# Patient Record
Sex: MALE | Race: BLACK OR AFRICAN AMERICAN | Employment: OTHER | URBAN - NONMETROPOLITAN AREA
[De-identification: names, ages, dates, MRNs, and addresses within clinical notes are randomized per-mention and may not be internally consistent; named-entity substitution may affect disease eponyms.]

---

## 2017-04-21 ENCOUNTER — OFFICE VISIT (OUTPATIENT)
Dept: ONCOLOGY | Age: 82
End: 2017-04-21

## 2017-04-21 VITALS
WEIGHT: 173.8 LBS | SYSTOLIC BLOOD PRESSURE: 84 MMHG | BODY MASS INDEX: 27.93 KG/M2 | OXYGEN SATURATION: 99 % | HEART RATE: 82 BPM | DIASTOLIC BLOOD PRESSURE: 50 MMHG | HEIGHT: 66 IN | RESPIRATION RATE: 18 BRPM | TEMPERATURE: 98.1 F

## 2017-04-21 DIAGNOSIS — N18.5 CHRONIC KIDNEY DISEASE (CKD), STAGE V (HCC): Primary | ICD-10-CM

## 2017-04-21 PROCEDURE — 1123F ACP DISCUSS/DSCN MKR DOCD: CPT | Performed by: INTERNAL MEDICINE

## 2017-04-21 PROCEDURE — 4040F PNEUMOC VAC/ADMIN/RCVD: CPT | Performed by: INTERNAL MEDICINE

## 2017-04-21 PROCEDURE — 1036F TOBACCO NON-USER: CPT | Performed by: INTERNAL MEDICINE

## 2017-04-21 PROCEDURE — G8599 NO ASA/ANTIPLAT THER USE RNG: HCPCS | Performed by: INTERNAL MEDICINE

## 2017-04-21 PROCEDURE — G8427 DOCREV CUR MEDS BY ELIG CLIN: HCPCS | Performed by: INTERNAL MEDICINE

## 2017-04-21 PROCEDURE — G8420 CALC BMI NORM PARAMETERS: HCPCS | Performed by: INTERNAL MEDICINE

## 2017-04-21 PROCEDURE — 99214 OFFICE O/P EST MOD 30 MIN: CPT | Performed by: INTERNAL MEDICINE

## 2017-08-23 ENCOUNTER — HOSPITAL ENCOUNTER (OUTPATIENT)
Age: 82
Setting detail: SPECIMEN
Discharge: HOME | End: 2017-08-23
Payer: MEDICARE

## 2017-08-23 LAB
ALBUMIN SERPL-MCNC: 3.8 G/DL (ref 3.5–5.1)
ALP BLD-CCNC: 49 U/L (ref 38–126)
ALT SERPL-CCNC: 7 U/L (ref 11–66)
ANION GAP SERPL CALCULATED.3IONS-SCNC: 15 MEQ/L (ref 8–16)
ANISOCYTOSIS: ABNORMAL
AST SERPL-CCNC: 13 U/L (ref 5–40)
BASOPHILIA: ABNORMAL
BASOPHILS # BLD: 0.8 %
BASOPHILS ABSOLUTE: 0 THOU/MM3 (ref 0–0.1)
BILIRUB SERPL-MCNC: 0.3 MG/DL (ref 0.3–1.2)
BILIRUBIN DIRECT: < 0.2 MG/DL (ref 0–0.3)
BUN BLDV-MCNC: 66 MG/DL (ref 7–22)
CALCIUM SERPL-MCNC: 8.8 MG/DL (ref 8.5–10.5)
CHLORIDE BLD-SCNC: 99 MEQ/L (ref 98–111)
CHOLESTEROL, TOTAL: 161 MG/DL (ref 100–199)
CO2: 22 MEQ/L (ref 23–33)
CREAT SERPL-MCNC: 9.7 MG/DL (ref 0.4–1.2)
EOSINOPHIL # BLD: 16.1 %
EOSINOPHILS ABSOLUTE: 0.6 THOU/MM3 (ref 0–0.4)
GFR SERPL CREATININE-BSD FRML MDRD: 6 ML/MIN/1.73M2
GLUCOSE BLD-MCNC: 137 MG/DL (ref 70–108)
HCT VFR BLD CALC: 29.9 % (ref 42–52)
HDLC SERPL-MCNC: 58 MG/DL
HEMOGLOBIN: 10.3 GM/DL (ref 14–18)
INR BLD: 1.01 (ref 0.85–1.13)
LDL CHOLESTEROL CALCULATED: 92 MG/DL
LYMPHOCYTES # BLD: 18.4 %
LYMPHOCYTES ABSOLUTE: 0.7 THOU/MM3 (ref 1–4.8)
MACROCYTES: ABNORMAL
MCH RBC QN AUTO: 34 PG (ref 27–31)
MCHC RBC AUTO-ENTMCNC: 34.3 GM/DL (ref 33–37)
MCV RBC AUTO: 99.1 FL (ref 80–94)
MONOCYTES # BLD: 7.1 %
MONOCYTES ABSOLUTE: 0.3 THOU/MM3 (ref 0.4–1.3)
NUCLEATED RED BLOOD CELLS: 0 /100 WBC
PDW BLD-RTO: 17.9 % (ref 11.5–14.5)
PLATELET # BLD: 94 THOU/MM3 (ref 130–400)
PLATELET ESTIMATE: ABNORMAL
PMV BLD AUTO: 8.8 MCM (ref 7.4–10.4)
POIKILOCYTES: SLIGHT
POTASSIUM SERPL-SCNC: 4.6 MEQ/L (ref 3.5–5.2)
RBC # BLD: 3.02 MILL/MM3 (ref 4.7–6.1)
RBC # BLD: ABNORMAL 10*6/UL
SCAN OF BLOOD SMEAR: NORMAL
SEG NEUTROPHILS: 57.6 %
SEGMENTED NEUTROPHILS ABSOLUTE COUNT: 2.1 THOU/MM3 (ref 1.8–7.7)
SODIUM BLD-SCNC: 136 MEQ/L (ref 135–145)
TARGET CELLS: ABNORMAL
TOTAL PROTEIN: 6.4 G/DL (ref 6.1–8)
TRIGL SERPL-MCNC: 57 MG/DL (ref 0–199)
WBC # BLD: 3.6 THOU/MM3 (ref 4.8–10.8)

## 2017-08-23 PROCEDURE — 85610 PROTHROMBIN TIME: CPT

## 2017-08-23 PROCEDURE — 80053 COMPREHEN METABOLIC PANEL: CPT

## 2017-08-23 PROCEDURE — 36415 COLL VENOUS BLD VENIPUNCTURE: CPT

## 2017-08-23 PROCEDURE — 80061 LIPID PANEL: CPT

## 2017-08-23 PROCEDURE — 82248 BILIRUBIN DIRECT: CPT

## 2017-08-23 PROCEDURE — 85025 COMPLETE CBC W/AUTO DIFF WBC: CPT

## 2017-08-24 ENCOUNTER — HOSPITAL ENCOUNTER (OUTPATIENT)
Age: 82
Discharge: HOME | End: 2017-08-24
Payer: MEDICARE

## 2017-08-24 ENCOUNTER — HOSPITAL ENCOUNTER (OUTPATIENT)
Dept: GENERAL RADIOLOGY | Age: 82
Discharge: HOME | End: 2017-08-24
Payer: MEDICARE

## 2017-08-24 DIAGNOSIS — R55 SYNCOPE, UNSPECIFIED SYNCOPE TYPE: ICD-10-CM

## 2017-08-24 PROCEDURE — 71020 XR CHEST STANDARD TWO VW: CPT

## 2017-08-30 LAB
BUN BLDV-MCNC: 65 MG/DL (ref 7–20)
BUN, POST DIALYSIS: 17 MG/DL (ref 7–20)
CREAT SERPL-MCNC: 9.3 MG/DL (ref 0.7–1.3)
CREATININE CLEARANCE: 7
HCT VFR BLD CALC: 29 % (ref 40–49)
HEMOGLOBIN: 9.7 GM/DL (ref 13.5–16.5)

## 2017-08-31 LAB
HBV SURFACE AB TITR SER: NONREACTIVE {TITER}
HCV AB: NONREACTIVE
HEPATITIS B SURFACE ANTIGEN: NONREACTIVE

## 2017-10-28 ENCOUNTER — APPOINTMENT (OUTPATIENT)
Dept: GENERAL RADIOLOGY | Age: 82
End: 2017-10-28
Payer: MEDICARE

## 2017-10-28 ENCOUNTER — APPOINTMENT (OUTPATIENT)
Dept: CT IMAGING | Age: 82
End: 2017-10-28
Payer: MEDICARE

## 2017-10-28 ENCOUNTER — HOSPITAL ENCOUNTER (EMERGENCY)
Age: 82
Discharge: HOME | End: 2017-10-28
Attending: FAMILY MEDICINE
Payer: MEDICARE

## 2017-10-28 VITALS
RESPIRATION RATE: 14 BRPM | DIASTOLIC BLOOD PRESSURE: 55 MMHG | OXYGEN SATURATION: 93 % | HEART RATE: 60 BPM | BODY MASS INDEX: 28.93 KG/M2 | HEIGHT: 66 IN | SYSTOLIC BLOOD PRESSURE: 123 MMHG | TEMPERATURE: 97.5 F | WEIGHT: 180 LBS

## 2017-10-28 DIAGNOSIS — D64.9 ANEMIA, UNSPECIFIED TYPE: ICD-10-CM

## 2017-10-28 DIAGNOSIS — R11.2 NON-INTRACTABLE VOMITING WITH NAUSEA, UNSPECIFIED VOMITING TYPE: Primary | ICD-10-CM

## 2017-10-28 DIAGNOSIS — K76.9 LIVER LESION: ICD-10-CM

## 2017-10-28 LAB
ALBUMIN SERPL-MCNC: 4 G/DL (ref 3.5–5.1)
ALLEN TEST: POSITIVE
ALP BLD-CCNC: 57 U/L (ref 38–126)
ALT SERPL-CCNC: 9 U/L (ref 11–66)
AMMONIA: 27 UMOL/L (ref 11–60)
AMORPHOUS: ABNORMAL
ANION GAP SERPL CALCULATED.3IONS-SCNC: 13 MEQ/L (ref 8–16)
ANISOCYTOSIS: ABNORMAL
AST SERPL-CCNC: 14 U/L (ref 5–40)
BACTERIA: ABNORMAL /HPF
BASE EXCESS (CALCULATED): 4.6 MMOL/L (ref -2.5–2.5)
BASOPHILS # BLD: 0.4 %
BASOPHILS ABSOLUTE: 0 THOU/MM3 (ref 0–0.1)
BILIRUB SERPL-MCNC: 0.2 MG/DL (ref 0.3–1.2)
BILIRUBIN DIRECT: < 0.2 MG/DL (ref 0–0.3)
BILIRUBIN URINE: NEGATIVE
BLOOD, URINE: NEGATIVE
BUN BLDV-MCNC: 43 MG/DL (ref 7–22)
CALCIUM SERPL-MCNC: 8.1 MG/DL (ref 8.5–10.5)
CASTS UA: ABNORMAL /LPF
CHARACTER, URINE: ABNORMAL
CHLORIDE BLD-SCNC: 95 MEQ/L (ref 98–111)
CO2: 28 MEQ/L (ref 23–33)
COLLECTED BY:: ABNORMAL
COLOR: YELLOW
CREAT SERPL-MCNC: 6.8 MG/DL (ref 0.4–1.2)
CRYSTALS, UA: ABNORMAL
DEVICE: ABNORMAL
EOSINOPHIL # BLD: 7.6 %
EOSINOPHILS ABSOLUTE: 0.6 THOU/MM3 (ref 0–0.4)
EPITHELIAL CELLS, UA: ABNORMAL /HPF
GLUCOSE BLD-MCNC: 92 MG/DL (ref 70–108)
GLUCOSE URINE: NEGATIVE MG/DL
HCO3: 28 MMOL/L (ref 23–28)
HCT VFR BLD CALC: 27.1 % (ref 42–52)
HEMOGLOBIN: 9.2 GM/DL (ref 14–18)
IFIO2: 21
KETONES, URINE: NEGATIVE
LEUKOCYTE ESTERASE, URINE: ABNORMAL
LIPASE: 58.1 U/L (ref 5.6–51.3)
LYMPHOCYTES # BLD: 7.4 %
LYMPHOCYTES ABSOLUTE: 0.5 THOU/MM3 (ref 1–4.8)
MACROCYTES: ABNORMAL
MCH RBC QN AUTO: 34.2 PG (ref 27–31)
MCHC RBC AUTO-ENTMCNC: 34.1 GM/DL (ref 33–37)
MCV RBC AUTO: 100.4 FL (ref 80–94)
MONOCYTES # BLD: 6.4 %
MONOCYTES ABSOLUTE: 0.5 THOU/MM3 (ref 0.4–1.3)
MUCUS: ABNORMAL
NITRITE, URINE: NEGATIVE
NUCLEATED RED BLOOD CELLS: 0 /100 WBC
O2 SATURATION: 98 %
OSMOLALITY CALCULATION: 282.4 MOSMOL/KG (ref 275–300)
PCO2: 33 MMHG (ref 35–45)
PDW BLD-RTO: 17.9 % (ref 11.5–14.5)
PH BLOOD GAS: 7.53 (ref 7.35–7.45)
PH UA: 8
PLATELET # BLD: 115 THOU/MM3 (ref 130–400)
PMV BLD AUTO: 8.2 MCM (ref 7.4–10.4)
PO2: 91 MMHG (ref 71–104)
POTASSIUM SERPL-SCNC: 4.7 MEQ/L (ref 3.5–5.2)
PRO-BNP: 3861 PG/ML (ref 0–1800)
PROTEIN UA: 100
RBC # BLD: 2.7 MILL/MM3 (ref 4.7–6.1)
RBC URINE: ABNORMAL /HPF
SEG NEUTROPHILS: 78.2 %
SEGMENTED NEUTROPHILS ABSOLUTE COUNT: 5.7 THOU/MM3 (ref 1.8–7.7)
SODIUM BLD-SCNC: 136 MEQ/L (ref 135–145)
SOURCE, BLOOD GAS: ABNORMAL
SPECIFIC GRAVITY, URINE: 1.01 (ref 1–1.03)
TOTAL PROTEIN: 6.9 G/DL (ref 6.1–8)
TROPONIN T: 0.02 NG/ML
TROPONIN T: 0.02 NG/ML
TSH SERPL DL<=0.05 MIU/L-ACNC: 1.5 UIU/ML (ref 0.4–4.2)
UROBILINOGEN, URINE: 1 EU/DL
WBC # BLD: 7.3 THOU/MM3 (ref 4.8–10.8)
WBC UA: ABNORMAL /HPF

## 2017-10-28 PROCEDURE — 84443 ASSAY THYROID STIM HORMONE: CPT

## 2017-10-28 PROCEDURE — 81001 URINALYSIS AUTO W/SCOPE: CPT

## 2017-10-28 PROCEDURE — 83690 ASSAY OF LIPASE: CPT

## 2017-10-28 PROCEDURE — 71010 XR CHEST PORTABLE: CPT

## 2017-10-28 PROCEDURE — 82140 ASSAY OF AMMONIA: CPT

## 2017-10-28 PROCEDURE — 84484 ASSAY OF TROPONIN QUANT: CPT

## 2017-10-28 PROCEDURE — 99285 EMERGENCY DEPT VISIT HI MDM: CPT

## 2017-10-28 PROCEDURE — 74177 CT ABD & PELVIS W/CONTRAST: CPT

## 2017-10-28 PROCEDURE — 82248 BILIRUBIN DIRECT: CPT

## 2017-10-28 PROCEDURE — 85025 COMPLETE CBC W/AUTO DIFF WBC: CPT

## 2017-10-28 PROCEDURE — 83880 ASSAY OF NATRIURETIC PEPTIDE: CPT

## 2017-10-28 PROCEDURE — 70450 CT HEAD/BRAIN W/O DYE: CPT

## 2017-10-28 PROCEDURE — 82803 BLOOD GASES ANY COMBINATION: CPT

## 2017-10-28 PROCEDURE — 87086 URINE CULTURE/COLONY COUNT: CPT

## 2017-10-28 PROCEDURE — 36415 COLL VENOUS BLD VENIPUNCTURE: CPT

## 2017-10-28 PROCEDURE — 80053 COMPREHEN METABOLIC PANEL: CPT

## 2017-10-28 PROCEDURE — 6360000004 HC RX CONTRAST MEDICATION: Performed by: FAMILY MEDICINE

## 2017-10-28 PROCEDURE — 93005 ELECTROCARDIOGRAM TRACING: CPT

## 2017-10-28 PROCEDURE — 36600 WITHDRAWAL OF ARTERIAL BLOOD: CPT

## 2017-10-28 RX ADMIN — IOPAMIDOL 80 ML: 755 INJECTION, SOLUTION INTRAVENOUS at 15:55

## 2017-10-28 ASSESSMENT — ENCOUNTER SYMPTOMS
EYE DISCHARGE: 0
RHINORRHEA: 0
SORE THROAT: 0
VOMITING: 1
COUGH: 1
BACK PAIN: 0
DIARRHEA: 0
WHEEZING: 0
ABDOMINAL PAIN: 0
NAUSEA: 1
EYE REDNESS: 0
SHORTNESS OF BREATH: 0

## 2017-10-28 NOTE — ED NOTES
Orthostatics done at this time and are as follows:  Layin/60 P61  Sittin/66 P65  Standin/60 P61  Pt denies dizziness with change of position. Pt then ambulated to and from restroom without difficulty. Urine specimen collected at this time. Will continue to monitor.       Jovana Arevalo RN  10/28/17 [id number]

## 2017-10-28 NOTE — ED NOTES
Bed: 003A  Expected date: 10/28/17  Expected time:   Means of arrival:   Comments:  LIMA FIRE/STROKE SXS     [facility]  10/28/17 [id number]

## 2017-10-28 NOTE — ED NOTES
Pt resting in bed comfortably with family at bedside. Pt currently denies any feelings of nausea. Will continue to monitor.       Johnathan North RN  10/28/17 1834

## 2017-10-28 NOTE — ED PROVIDER NOTES
[facility]  eMERGENCY dEPARTMENT eNCOUnter          CHIEF COMPLAINT       Chief Complaint   Patient presents with    Altered Mental Status       Nurses Notes reviewed and I agree except as noted in the HPI. HISTORY OF PRESENT ILLNESS    Ilda Chowdhury is a 80 y.o. male who presents to the Emergency Department for the evaluation of questionable CVA symptoms. Patient reportedly was sitting in a recliner, eating, and started to feel nauseous. He had an episode of vomiting. No speech changes or numbness/tingling. Patient has a history of tremors. No history of seizures. He also has a history of hyperlipidemia, hypertension, pacemaker, CKD, CAD, COPD. He dialyzes every Monday, Wednesday, and Friday and has not missed any dialysis appointments. Nephrologist is Dr. Joon Monahan. Patient denies shortness of breath or chest pain. No further complaints at this time. He states that the patient sometimes gets the episodes of vomiting after dialysis sessions. No blood in stool was noticed. She also reported some cough and congestion. Denies fever, chills, chest pain, shortness of breath. The HPI was provided by the patient's wife and the patient. REVIEW OF SYSTEMS     Review of Systems   Constitutional: Negative for appetite change, chills, fatigue and fever. HENT: Positive for congestion. Negative for ear pain, rhinorrhea and sore throat. Eyes: Negative for discharge, redness and visual disturbance. Respiratory: Positive for cough. Negative for shortness of breath and wheezing. Cardiovascular: Negative for chest pain, palpitations and leg swelling. Gastrointestinal: Positive for nausea and vomiting. Negative for abdominal pain and diarrhea. Genitourinary: Negative for decreased urine volume, difficulty urinating and dysuria. Musculoskeletal: Negative for arthralgias, back pain, joint swelling and neck pain. Skin: Negative for pallor and rash.    Allergic/Immunologic: Negative for father is . He indicated that his sister is . He indicated that his brother is . He indicated that the status of his paternal aunt is unknown.    family history includes Diabetes in his mother and sister; Heart Disease in his mother; Mental Illness in his paternal aunt. SOCIAL HISTORY      reports that he quit smoking about 35 years ago. He has a 40.00 pack-year smoking history. He has never used smokeless tobacco. He reports that he does not drink alcohol or use drugs. PHYSICAL EXAM     INITIAL VITALS:  height is 5' 6\" (1.676 m) and weight is 180 lb (81.6 kg). His oral temperature is 97.5 °F (36.4 °C). His blood pressure is 123/55 (abnormal) and his pulse is 60. His respiration is 14 and oxygen saturation is 93%. Physical Exam   Constitutional: He is oriented to person, place, and time. He appears well-developed and well-nourished. HENT:   Head: Normocephalic. Right Ear: External ear normal.   Left Ear: External ear normal.   Eyes: Conjunctivae are normal. Right eye exhibits no discharge. Left eye exhibits no discharge. Neck: Normal range of motion. Neck supple. No JVD present. Cardiovascular: Normal rate, regular rhythm and normal heart sounds. No murmur heard. Pulmonary/Chest: Effort normal and breath sounds normal. No respiratory distress. He has no wheezes. He has no rales. Abdominal: Soft. Normal appearance and bowel sounds are normal. He exhibits no distension. There is no tenderness. There is no rigidity, no rebound and no guarding. Minimal discomfort   Musculoskeletal: Normal range of motion. Neurological: He is alert and oriented to person, place, and time. No cranial nerve deficit or sensory deficit. He exhibits normal muscle tone. GCS eye subscore is 4. GCS verbal subscore is 5. GCS motor subscore is 6. Skin: Skin is warm and dry. He is not diaphoretic. No erythema. Psychiatric: He has a normal mood and affect.  His behavior is normal. DIFFERENTIAL DIAGNOSIS not limited to:   Stroke, TIA, electrolyte abnormality, ACS, intestinal obstruction    DIAGNOSTIC RESULTS     EKG: All EKG's are interpreted by the Emergency Department Physician who either signs or Co-signs this chart in the absence of a cardiologist.  EKG 12 Lead  Date/Time: 10/28/2017 6:21 PM  Performed by: Priscilla Seip  Authorized by: Priscilla Seip     Interpretation:     Interpretation: abnormal    Rhythm:     Rhythm comment:  Atrial paced rhythm      RADIOLOGY: non-plain film images(s) such as CT, Ultrasound and MRI are read by the radiologist.    Shira   Final Result   1. No acute intraperitoneal process. 2. Numerous chronic findings as described in the body the report. **This report has been created using voice recognition software. It may contain minor errors which are inherent in voice recognition technology. **      Final report electronically signed by Dr. Trell Kovacs on 10/28/2017 4:08 PM      XR Chest Portable   Final Result      Stable radiographic appearance of the chest. No evidence of an acute intrathoracic process. **This report has been created using voice recognition software. It may contain minor errors which are inherent in voice recognition technology. **      Final report electronically signed by Dr. Jacinda Rico on 10/28/2017 2:44 PM      CT Head WO Contrast (CODE STROKE)   Final Result      Stable senescent changes are present without acute intracranial abnormality identified. If there is continued clinical concern for acute ischemia, MRI is more sensitive and may be considered. **This report has been created using voice recognition software. It may contain minor errors which are inherent in voice recognition technology. **      Final report electronically signed by Dr. Jacinda Rico on 10/28/2017 1:58 PM          LABS:   Labs Reviewed   CBC WITH AUTO DIFFERENTIAL - Abnormal; TempSrc:  Oral     SpO2:  99% 94% 93%   Weight: 180 lb (81.6 kg)      Height: 5' 6\" (1.676 m)          2:08 PM: The patient was seen and evaluated following nausea, vomiting, generalized weakness. History and physical examination were performed. Appropriate labs and imaging ordered and reviewed. CRITICAL CARE:   None     Patient presented to the emergency room with one episode of vomiting. No slurred speech per patient and family. Patient has some episodes of shaking which is chronic per family. Given concern by EMS for slurred speech although the family denied that code stroke was called CT was done which was negative. Patient had lab work done which showed elevation of troponin by that as his baseline and he is a kidney patient, creatinine elevated and patient is an end-stage renal disease. All other lab work as mentioned above. CT abdomen (patient makes less than 300 mils per day)  was also done which was negative for any acute abnormality showed some liver lesions which was discussed with the family and advised to follow up with primary care physician for that. Patient did not have any weakness, numbness, slurred speech during his ER stay and he ambulated to the restroom with no issues. Patient also had some anemia patient denies any blood in stool, hemoglobin at baseline. Patient was advised to follow-up with his primary care physician within 2 days. Return precautions were discussed and patient was advised to come back immediately to the emergency for evaluation of his symptoms recurs. Patient was also advised to discuss with his nephrologist resuming iron pills. Patient was discharged home in stable condition to follow up with the primary care physician within 2 days. PROCEDURES:  None    FINAL IMPRESSION      1. Non-intractable vomiting with nausea, unspecified vomiting type    2. Anemia, unspecified type    3.  Liver lesion, follow up with PCP for repeat CT DISPOSITION/PLAN   Discharge    PATIENT REFERRED TO:  [place] MD  [phone]    Schedule an appointment as soon as possible for a visit in 2 days      Vianney Arvizu MD  [facility], [address]  [address] [facility] [address] [place] [address] Laura:  Discharge Medication List as of 10/28/2017  6:17 PM        (Please note that portions of this note were completed with a voice recognition program.  Efforts were made to edit the dictations but occasionally words are mis-transcribed.)    Scribe:  Sandrita Pollack 10/28/17 2:08 PM Scribing for and in the presence of No att. providers found. Signed by: Miguelangel Tomlin, 10/28/17 10:09 PM    Provider:  I personally performed the services described in the documentation, reviewed and edited the documentation which was dictated to the scribe in my presence, and it accurately records my words and actions.     No att. providers found 10/28/17 10:09 PM          Boby Gonzales MD  10/28/17 [id number]       Boby Gonzales MD  10/28/17 [id number]

## 2017-10-29 LAB
EKG ATRIAL RATE: 80 BPM
EKG P AXIS: -41 DEGREES
EKG P-R INTERVAL: 200 MS
EKG Q-T INTERVAL: 390 MS
EKG QRS DURATION: 74 MS
EKG QTC CALCULATION (BAZETT): 449 MS
EKG R AXIS: 15 DEGREES
EKG T AXIS: 50 DEGREES
EKG VENTRICULAR RATE: 80 BPM

## 2017-10-30 LAB
ORGANISM: ABNORMAL
URINE CULTURE REFLEX: ABNORMAL

## 2017-11-09 ENCOUNTER — HOSPITAL ENCOUNTER (OUTPATIENT)
Dept: INFUSION THERAPY | Age: 82
Discharge: HOME | End: 2017-11-09
Payer: MEDICARE

## 2017-11-09 ENCOUNTER — OFFICE VISIT (OUTPATIENT)
Dept: ONCOLOGY | Age: 82
End: 2017-11-09
Payer: MEDICARE

## 2017-11-09 VITALS
BODY MASS INDEX: 28.35 KG/M2 | DIASTOLIC BLOOD PRESSURE: 59 MMHG | OXYGEN SATURATION: 98 % | TEMPERATURE: 96.8 F | HEART RATE: 80 BPM | SYSTOLIC BLOOD PRESSURE: 101 MMHG | HEIGHT: 66 IN | RESPIRATION RATE: 18 BRPM | WEIGHT: 176.4 LBS

## 2017-11-09 DIAGNOSIS — E88.09 PLASMA CELL DYSCRASIA: ICD-10-CM

## 2017-11-09 DIAGNOSIS — N18.5 CHRONIC KIDNEY DISEASE (CKD), STAGE V (HCC): ICD-10-CM

## 2017-11-09 DIAGNOSIS — D50.8 OTHER IRON DEFICIENCY ANEMIA: Primary | ICD-10-CM

## 2017-11-09 DIAGNOSIS — I95.0 IDIOPATHIC HYPOTENSION: ICD-10-CM

## 2017-11-09 LAB
BASINOPHIL, AUTOMATED: 0 % (ref 0–12)
EOSINOPHILS RELATIVE PERCENT: 7 % (ref 0–12)
GFR SERPL CREATININE-BSD FRML MDRD: 8 ML/MIN/1.73M2
HCT VFR BLD CALC: 29.2 % (ref 42–52)
HEMOGLOBIN: 9.5 GM/DL (ref 14–18)
LYMPHOCYTES # BLD: 16 % (ref 15–47)
MCH RBC QN AUTO: 32.3 PG (ref 27–31)
MCHC RBC AUTO-ENTMCNC: 32.6 GM/DL (ref 33–37)
MCV RBC AUTO: 99 FL (ref 80–94)
MONOCYTES: 6 % (ref 0–12)
PDW BLD-RTO: 14.5 % (ref 11.5–14.5)
PLATELET # BLD: 284 THOU/MM3 (ref 130–400)
PMV BLD AUTO: 6.9 MCM (ref 7.4–10.4)
RBC # BLD: 2.94 MILL/MM3 (ref 4.7–6.1)
SEG NEUTROPHILS: 71 % (ref 43–75)
WBC # BLD: 5.8 THOU/MM3 (ref 4.8–10.8)

## 2017-11-09 PROCEDURE — 86334 IMMUNOFIX E-PHORESIS SERUM: CPT

## 2017-11-09 PROCEDURE — 99211 OFF/OP EST MAY X REQ PHY/QHP: CPT

## 2017-11-09 PROCEDURE — 4040F PNEUMOC VAC/ADMIN/RCVD: CPT | Performed by: INTERNAL MEDICINE

## 2017-11-09 PROCEDURE — G8599 NO ASA/ANTIPLAT THER USE RNG: HCPCS | Performed by: INTERNAL MEDICINE

## 2017-11-09 PROCEDURE — G8417 CALC BMI ABV UP PARAM F/U: HCPCS | Performed by: INTERNAL MEDICINE

## 2017-11-09 PROCEDURE — 84165 PROTEIN E-PHORESIS SERUM: CPT

## 2017-11-09 PROCEDURE — G8427 DOCREV CUR MEDS BY ELIG CLIN: HCPCS | Performed by: INTERNAL MEDICINE

## 2017-11-09 PROCEDURE — 1036F TOBACCO NON-USER: CPT | Performed by: INTERNAL MEDICINE

## 2017-11-09 PROCEDURE — 1123F ACP DISCUSS/DSCN MKR DOCD: CPT | Performed by: INTERNAL MEDICINE

## 2017-11-09 PROCEDURE — 83883 ASSAY NEPHELOMETRY NOT SPEC: CPT

## 2017-11-09 PROCEDURE — 84160 ASSAY OF PROTEIN ANY SOURCE: CPT

## 2017-11-09 PROCEDURE — 82784 ASSAY IGA/IGD/IGG/IGM EACH: CPT

## 2017-11-09 PROCEDURE — 99213 OFFICE O/P EST LOW 20 MIN: CPT | Performed by: INTERNAL MEDICINE

## 2017-11-09 PROCEDURE — G8484 FLU IMMUNIZE NO ADMIN: HCPCS | Performed by: INTERNAL MEDICINE

## 2017-11-09 PROCEDURE — 85025 COMPLETE CBC W/AUTO DIFF WBC: CPT

## 2017-11-09 PROCEDURE — 36415 COLL VENOUS BLD VENIPUNCTURE: CPT

## 2017-11-09 NOTE — PROGRESS NOTES
[facility] PROFESSIONAL SERVICES  [facility]  [address] [facility] [facility] [address] [facility] [address]  [address] [address]  Dept: [phone]  Dept Fax: [phone]  Loc: [phone]    Subjective:     Chief Complaint:  Clarita Phillips is a 80 y.o. male with a history of a monoclonal protein. He was referred by Dr. Jameel Kumar for a monoclonal protein. The patient has a history of Stage IV chronic kidney disease and anemia related to the kidney disease. His creatinine has been increasing over the past several months. Dr. Jameel Kumar obtained labs to evaluate his kidney disease. He has been noted to have a mild increase in Kappa and Lambda free light chains. A bone marrow examination was recently completed. The aspirate found erythroid hyperplasia with 2% plasma cells. There were rare kappa positive plasma cells. The marrow was normocellular with full active trilineage hematopoiesis with erythroid hyperplasia. Flow cytometry found a small population of atypical plasma cells, representing 0.53% of total leukocytes, indicative of plasma cell dyscrasia. HPI:  The patient is here today for follow up evaluation. He reports that he feels well. The patient denies any skeletal pain. He does report chronic arthralgias especially in the left knee from arthritis. He reports mild shortness of breath with exertion. He denies chest pain, change in bowel or bladder function and he continues to urinate. ECOG performance status at today's visit is 0-1. Serum studies found a normal SPEP pattern and MADI shows a normal pattern; no monoclonal proteins seen. PMH, SH, and FH:  I reviewed the PMH, SH and FH as noted on the electronic medical record. There have been no changes as noted in the previous documentation. Review of Systems   Constitutional: Negative. HENT: Negative. Eyes: Negative. Respiratory: Chronic dyspnea. Cardiovascular: Negative. Gastrointestinal: Negative. Genitourinary: Negative. Musculoskeletal: Arthritis. Skin: Negative. Neurological: Negative. Hematological: Negative. Psychiatric/Behavioral: Negative. Objective:   Physical Exam    04/21/17 1127   BP: 84/50   Pulse: 82   Resp: 18   Temp: 98.1 °F (36.7 °C)   SpO2: 99%   Vitals reviewed and are stable. Constitutional: Well-developed and well-nourished. No acute distress. HENT: Normocephalic and atraumatic. Eyes: Pupils are equal and reactive. No scleral icterus. Pulmonary/Chest: Effort normal. No respiratory distress. Cardiovascular: RRR. Normal S1 and S2. Abdominal: Soft. Non tender with no distension. Musculoskeletal: Good ROM of all four extremities. No skeletal tenderness  Neurological: Alert and oriented to person, place, and time. Judgment and thought content normal.  Skin: Skin is warm and dry. No rash. Psychiatric: Mood and affect appropriate for the clinical situation. Behavior is normal.      Data Analysis:  I reviewed the laboratory work from today and compared it to the previous laboratory work from 01/31/2017, 10/21/2016 and 08/20/2016. Hematology 8/20/2016 10/21/2016   WBC 3.4 (L) 4.4 (L)   RBC 2.33 (L) 2.98 (L)   HGB 7.0 (LL) 9.6 (L)   HCT 20.9 (LL) 28.9 (L)   MCV 89.6 97 (H)   RDW 19.5 (H) 17.5 (H)    155     Hematology 1/31/2017 4/21/2017   WBC 2.7 (L) 5.4   RBC 3.82 (L) 2.62 (L)   HGB 12.4 (L) 8.9 (L)   HCT 37.0 (L) 27.0 (L)   MCV 97.0 (H) 103 (H)   RDW 16.8 (H) 15.8 (H)   PLT 83 (L) 173     Assessment:   1. Plasma cell dyscrasia. 2. Anemia. Plan:   1. Monitor monoclonal protein. 2.  Monitor hemoglobin/hematocrit and for any signs of blood loss. 3.  Return to clinic in six months. 4.  Labs on return to clinic. Judd Heredia M.D.   [facility]

## 2017-11-11 PROBLEM — I95.0 IDIOPATHIC HYPOTENSION: Status: ACTIVE | Noted: 2017-11-11

## 2017-11-11 PROBLEM — E88.09 PLASMA CELL DYSCRASIA: Status: ACTIVE | Noted: 2017-11-11

## 2017-11-11 NOTE — PROGRESS NOTES
Hematological: Negative. Psychiatric/Behavioral: Negative. Objective:   Physical Exam   Vitals:    11/09/17 1301   BP: (!) 101/59   Pulse: 80   Resp: 18   Temp: 96.8 °F (36 °C)   SpO2: 98%   Vitals reviewed and are stable. Constitutional: Well-developed and well-nourished. No acute distress. HENT: Normocephalic and atraumatic. Eyes: Pupils are equal and reactive. No scleral icterus. Neck: Overall appearance is symmetrical. No identifiable masses. Chest: Inspection and palpation of chest is normal.  Pulmonary: Effort normal. No respiratory distress. Cardiovascular: RRR. No edema in any of the four extremities. Abdominal: Soft. No hepatomegaly or splenomegaly. Musculoskeletal: Gait is normal. Muscle strength and tone good. Neurological: Alert and oriented to person, place, and time. Judgment and thought content normal.  Skin: Skin is warm and dry. No rash. Psychiatric: Mood and affect appropriate for the clinical situation. Behavior is normal.        Data Analysis:    SPEP/MADI Interpretation                      Normal appearing SPEP pattern.  MADI shows a faint band in   kappa suggestive of a specific immune response or an early   monoclonal protein.  .    Hematology Latest Ref Rng & Units 11/9/2017 10/28/2017 8/30/2017   WBC 4.8 - 10.8 thou/mm3 5.8 7.3    RBC 4.70 - 6.10 mill/mm3 2.94 (L) 2.70 (L)    HGB 14.0 - 18.0 gm/dl 9.5 (L) 9.2 (L) 9.7 (L)   HCT 42.0 - 52.0 % 29.2 (L) 27.1 (L) 29.0 (L)   MCV 80 - 94 fL 99 (H) 100.4 (H)    RDW 11.5 - 14.5 % 14.5 17.9 (H)     - 400 thou/mm3 284 115 (L)      Hematology Latest Ref Rng & Units 8/30/2017 8/23/2017   WBC 4.8 - 10.8 thou/mm3 4.3 (L) 3.6 (L)   RBC 4.70 - 6.10 mill/mm3 2.84 (L) 3.02 (L)   HGB 14.0 - 18.0 gm/dl 9.2 (L) 10.3 (L)   HCT 42.0 - 52.0 % 27.7 (L) 29.9 (L)   MCV 80 - 94 fL 97.3 (H) 99.1 (H)   RDW 11.5 - 14.5 % 17.7 (H) 17.9 (H)    - 400 thou/mm3 78 (L) 94 (L)     Assessment:   1. Plasma cell dyscrasia. 2. Anemia.   3.

## 2017-11-12 LAB
IMMUNOFIXATION WITH QUANT: NORMAL
KAPPA/LAMBDA FREE LIGHT CHAINS: NORMAL

## 2017-11-20 ENCOUNTER — HOSPITAL ENCOUNTER (OUTPATIENT)
Age: 82
Setting detail: SPECIMEN
Discharge: HOME | End: 2017-11-20
Payer: MEDICARE

## 2017-11-20 LAB
HCT VFR BLD CALC: 26 % (ref 42–52)
HEMOGLOBIN: 8.7 GM/DL (ref 14–18)

## 2017-11-20 PROCEDURE — 36415 COLL VENOUS BLD VENIPUNCTURE: CPT

## 2017-11-20 PROCEDURE — 85014 HEMATOCRIT: CPT

## 2017-11-20 PROCEDURE — 85018 HEMOGLOBIN: CPT

## 2018-01-18 ENCOUNTER — ANESTHESIA EVENT (OUTPATIENT)
Dept: ENDOSCOPY | Age: 83
End: 2018-01-18
Payer: MEDICARE

## 2018-01-18 ENCOUNTER — ANESTHESIA (OUTPATIENT)
Dept: ENDOSCOPY | Age: 83
End: 2018-01-18
Payer: MEDICARE

## 2018-01-18 ENCOUNTER — HOSPITAL ENCOUNTER (OUTPATIENT)
Age: 83
Setting detail: OUTPATIENT SURGERY
Discharge: HOME | End: 2018-01-18
Attending: INTERNAL MEDICINE | Admitting: INTERNAL MEDICINE
Payer: MEDICARE

## 2018-01-18 VITALS
SYSTOLIC BLOOD PRESSURE: 129 MMHG | TEMPERATURE: 97.3 F | RESPIRATION RATE: 16 BRPM | WEIGHT: 178 LBS | HEIGHT: 66 IN | DIASTOLIC BLOOD PRESSURE: 69 MMHG | HEART RATE: 62 BPM | OXYGEN SATURATION: 99 % | BODY MASS INDEX: 28.61 KG/M2

## 2018-01-18 VITALS — OXYGEN SATURATION: 100 % | SYSTOLIC BLOOD PRESSURE: 143 MMHG | DIASTOLIC BLOOD PRESSURE: 71 MMHG

## 2018-01-18 PROCEDURE — 7100000000 HC PACU RECOVERY - FIRST 15 MIN: Performed by: INTERNAL MEDICINE

## 2018-01-18 PROCEDURE — 2500000003 HC RX 250 WO HCPCS: Performed by: NURSE ANESTHETIST, CERTIFIED REGISTERED

## 2018-01-18 PROCEDURE — 3700000000 HC ANESTHESIA ATTENDED CARE: Performed by: INTERNAL MEDICINE

## 2018-01-18 PROCEDURE — 2580000003 HC RX 258: Performed by: INTERNAL MEDICINE

## 2018-01-18 PROCEDURE — 6360000002 HC RX W HCPCS: Performed by: NURSE ANESTHETIST, CERTIFIED REGISTERED

## 2018-01-18 PROCEDURE — 3700000001 HC ADD 15 MINUTES (ANESTHESIA): Performed by: INTERNAL MEDICINE

## 2018-01-18 PROCEDURE — 3609017100 HC EGD: Performed by: INTERNAL MEDICINE

## 2018-01-18 PROCEDURE — 7100000001 HC PACU RECOVERY - ADDTL 15 MIN: Performed by: INTERNAL MEDICINE

## 2018-01-18 RX ORDER — CLOPIDOGREL BISULFATE 75 MG/1
75 TABLET ORAL DAILY
Status: ON HOLD | COMMUNITY
End: 2019-06-24 | Stop reason: HOSPADM

## 2018-01-18 RX ORDER — ISOSORBIDE MONONITRATE 30 MG/1
30 TABLET, EXTENDED RELEASE ORAL DAILY
Status: ON HOLD | COMMUNITY
End: 2021-01-01

## 2018-01-18 RX ORDER — GABAPENTIN 100 MG/1
100 CAPSULE ORAL 3 TIMES DAILY
Status: ON HOLD | COMMUNITY
End: 2021-01-01

## 2018-01-18 RX ORDER — CINACALCET 30 MG/1
30 TABLET, FILM COATED ORAL DAILY
Status: ON HOLD | COMMUNITY
End: 2018-05-22 | Stop reason: CLARIF

## 2018-01-18 RX ORDER — SODIUM CHLORIDE 450 MG/100ML
INJECTION, SOLUTION INTRAVENOUS CONTINUOUS
Status: DISCONTINUED | OUTPATIENT
Start: 2018-01-18 | End: 2018-01-18 | Stop reason: HOSPADM

## 2018-01-18 RX ORDER — PROPOFOL 10 MG/ML
INJECTION, EMULSION INTRAVENOUS PRN
Status: DISCONTINUED | OUTPATIENT
Start: 2018-01-18 | End: 2018-01-18 | Stop reason: SDUPTHER

## 2018-01-18 RX ORDER — KETAMINE HYDROCHLORIDE 50 MG/ML
INJECTION, SOLUTION, CONCENTRATE INTRAMUSCULAR; INTRAVENOUS PRN
Status: DISCONTINUED | OUTPATIENT
Start: 2018-01-18 | End: 2018-01-18 | Stop reason: SDUPTHER

## 2018-01-18 RX ORDER — ASCORBIC ACID, THIAMINE MONONITRATE,RIBOFLAVIN, NIACINAMIDE, PYRIDOXINE HYDROCHLORIDE, FOLIC ACID, CYANOCOBALAMIN, BIOTIN, CALCIUM PANTOTHENATE, 100; 1.5; 1.7; 20; 10; 1; 6000; 150000; 5 MG/1; MG/1; MG/1; MG/1; MG/1; MG/1; UG/1; UG/1; MG/1
1 CAPSULE, LIQUID FILLED ORAL DAILY
COMMUNITY

## 2018-01-18 RX ORDER — LIDOCAINE HYDROCHLORIDE 20 MG/ML
INJECTION, SOLUTION INFILTRATION; PERINEURAL PRN
Status: DISCONTINUED | OUTPATIENT
Start: 2018-01-18 | End: 2018-01-18 | Stop reason: SDUPTHER

## 2018-01-18 RX ADMIN — SODIUM CHLORIDE: 4.5 INJECTION, SOLUTION INTRAVENOUS at 07:30

## 2018-01-18 RX ADMIN — PROPOFOL 40 MG: 10 INJECTION, EMULSION INTRAVENOUS at 07:51

## 2018-01-18 RX ADMIN — KETAMINE HYDROCHLORIDE 25 MG: 50 INJECTION, SOLUTION INTRAMUSCULAR; INTRAVENOUS at 07:51

## 2018-01-18 RX ADMIN — LIDOCAINE HYDROCHLORIDE 25 MG: 20 INJECTION, SOLUTION INFILTRATION; PERINEURAL at 07:51

## 2018-01-18 ASSESSMENT — PAIN SCALES - GENERAL: PAINLEVEL_OUTOF10: 0

## 2018-01-18 ASSESSMENT — ENCOUNTER SYMPTOMS: SHORTNESS OF BREATH: 1

## 2018-01-18 ASSESSMENT — PAIN - FUNCTIONAL ASSESSMENT: PAIN_FUNCTIONAL_ASSESSMENT: 0-10

## 2018-01-18 NOTE — PROGRESS NOTES
Patient arrived to PACU. Vital signs stable. Family at bedside. Dr. Junior Si in to speak with patient and family regarding procedure, findings and plan of care.

## 2018-01-18 NOTE — H&P
[address] [id number]                         PREOPERATIVE HISTORY AND PHYSICAL    PATIENT NAME: Sandra Atkinson                      :        11/10/1930  MED REC NO:   [id number]                           ROOM:  ACCOUNT NO:   [de-identified]                           ADMIT DATE: 2018  PROVIDER:     Dinora Martinez M.D. INDICATION:  Hemoccult-positive stools. HISTORY OF PRESENT ILLNESS:  The patient is an 80-year-old pleasant male  for further evaluation of blood in the stool, has a stool studies ordered  by Dr. Henny Soria, has reported occult positive stools. No noticed blood in the  stool. No diarrhea. The patient had history of kidney failure. He is  taking aspirin. He is undergoing further evaluation. PAST MEDICAL HISTORY:  Anemia on chronic kidney disease, arthritis,  coronary artery disease, chronic kidney disease, hemodialysis,  hyperlipidemia, hypertension, sick sinus syndrome. PAST SURGICAL HISTORY:  Colonoscopy, coronary angioplasty with stent  placement, dialysis fistula placement, pacemaker placement, EGD. MEDICATIONS:  Reviewed. PHYSICAL EXAMINATION:  VITAL SIGNS:  Temperature 97.3, pulse 94, respiratory rate 18, blood  pressure 134/66. HEART:  Regular. Normal S1 and S2. No S3.  LUNGS:  Equal to expansion on inspection. ABDOMEN:  Soft, normoactive bowel sounds. IMPRESSION:  An 80-year-old pleasant male, who has Hemoccult-positive  stools. He is undergoing further evaluation. RECOMMENDATIONS:  Keep the patient nothing by mouth. Proceed with EGD as  planned. The risks including, but not limited to perforation, bleeding,  infection, adverse reaction to medicine, very slight chance of missing  significant lesions discussed with the patient. He expressed his  understanding. Further plan is pending the above test results.         Salma Blackburn M.D.    D: 2018 7:50:06       T:

## 2018-01-18 NOTE — ANESTHESIA PRE PROCEDURE
Department of Anesthesiology  Preprocedure Note       Name:  Doris Dooley   Age:  80 y.o.  :  11/10/1930                                          MRN:  [id number]         Date:  2018      Surgeon: Alida Vásquez):  Ebony Abel MD    Procedure: Procedure(s):  EGD    Medications prior to admission:   Prior to Admission medications    Medication Sig Start Date End Date Taking? Authorizing Provider   clopidogrel (PLAVIX) 75 MG tablet Take 75 mg by mouth daily   Yes Historical Provider, MD   aspirin 81 MG tablet Take 81 mg by mouth daily   Yes Historical Provider, MD   cinacalcet (SENSIPAR) 30 MG tablet Take 30 mg by mouth daily   Yes Historical Provider, MD   isosorbide mononitrate (IMDUR) 30 MG extended release tablet Take 30 mg by mouth daily   Yes Historical Provider, MD   gabapentin (NEURONTIN) 100 MG capsule Take 100 mg by mouth nightly.    Yes Historical Provider, MD SENA Complex-C-Folic Acid (RENAL) 1 MG CAPS Take 1 capsule by mouth daily   Yes Historical Provider, MD   pantoprazole (PROTONIX) 40 MG tablet Take 1 tablet by mouth every morning (before breakfast) 17  Yes XIN Hutchison   metoprolol (LOPRESSOR) 50 MG tablet Take 1 tablet by mouth 2 times daily 16  Yes Ford Archer MD   hydrALAZINE (APRESOLINE) 100 MG tablet Take 1 tablet by mouth 2 times daily 16  Yes Ford Archer MD   cloNIDine (CATAPRES) 0.2 MG tablet TAKE 1 TABLET TWICE A DAY 12/14/15  Yes Ford Archer MD   pravastatin (PRAVACHOL) 80 MG tablet Take 1 tablet by mouth daily 11/12/15  Yes oFrd Archer MD   sodium polystyrene (SPS) 15 GM/60ML suspension Take 60 mLs by mouth once for 1 dose 16  Siria Rodriguez MD       Current medications:    Current Facility-Administered Medications   Medication Dose Route Frequency Provider Last Rate Last Dose    0.45 % sodium chloride infusion   Intravenous Continuous Ebony Abel MD           Allergies:  No Known

## 2018-01-18 NOTE — OP NOTE
[address] [place] [address]                                 OPERATIVE REPORT    PATIENT NAME: Halina Armstrong                      :        11/10/1930  MED REC NO:   [id number]                           ROOM:  ACCOUNT NO:   [de-identified]                           ADMIT DATE: 2018  PROVIDER:     DANIKA Vogel  OF PROCEDURE:  2018    PROCEDURE:  Esophagogastroduodenoscopy. INDICATION:  The patient is an 70-year-old pleasant male, who has  Hemoccult-positive stools. He is undergoing further evaluation. Denied  any bright red blood per rectum or melena. Please see my brief history for  details and for physical.    ASA CLASSIFICATION:  As per Anesthesia. Please see Anesthesia note for  details. INSTRUMENT:  GIF-190 gastroscope. PHOTOGRAPHS:  Yes. BIOPSIES:  No.    DESCRIPTION OF PROCEDURE:  Procedure indications and complications  including but not limited to perforation, bleeding, infection, adverse  reaction to medicine, very slight chance of missing significant lesions  discussed with the patient and the patient expressed his understanding and  a written consent was obtained. The patient was placed in the left lateral decubitus position in the endo  room #13. The patient was placed on appropriate monitoring of vitals  including blood pressure, heart rate and pulse ox. Oropharynx was sprayed  with Cetacaine spray and bite-block was placed between maxilla and  mandible. After the adequate total intravenous anesthesia was given by  Anesthesia, the gastroscope was inserted into the oropharynx and the  esophagus was intubated under direct vision. The scope was advanced down  through the stomach into second portion of duodenum. On careful inspection  and on withdrawal of the scope, the duodenum looks normal as shown in  picture #3 and #4. Antrum of the stomach looks normal as shown in picture  #5.

## 2018-03-27 ENCOUNTER — HOSPITAL ENCOUNTER (OUTPATIENT)
Dept: INTERVENTIONAL RADIOLOGY/VASCULAR | Age: 83
Discharge: HOME | End: 2018-03-27
Payer: MEDICARE

## 2018-03-27 VITALS
OXYGEN SATURATION: 98 % | BODY MASS INDEX: 28.99 KG/M2 | DIASTOLIC BLOOD PRESSURE: 52 MMHG | SYSTOLIC BLOOD PRESSURE: 119 MMHG | RESPIRATION RATE: 18 BRPM | TEMPERATURE: 98.2 F | WEIGHT: 179.6 LBS | HEART RATE: 62 BPM

## 2018-03-27 DIAGNOSIS — N18.6 ESRD (END STAGE RENAL DISEASE) (HCC): ICD-10-CM

## 2018-03-27 PROCEDURE — 36907 BALO ANGIOP CTR DIALYSIS SEG: CPT

## 2018-03-27 PROCEDURE — 6360000004 HC RX CONTRAST MEDICATION: Performed by: RADIOLOGY

## 2018-03-27 PROCEDURE — 2500000003 HC RX 250 WO HCPCS

## 2018-03-27 PROCEDURE — 6360000002 HC RX W HCPCS

## 2018-03-27 PROCEDURE — C2623 CATH, TRANSLUMIN, DRUG-COAT: HCPCS

## 2018-03-27 PROCEDURE — 36902 INTRO CATH DIALYSIS CIRCUIT: CPT

## 2018-03-27 PROCEDURE — 2580000003 HC RX 258: Performed by: RADIOLOGY

## 2018-03-27 PROCEDURE — 6370000000 HC RX 637 (ALT 250 FOR IP): Performed by: RADIOLOGY

## 2018-03-27 PROCEDURE — C1894 INTRO/SHEATH, NON-LASER: HCPCS

## 2018-03-27 PROCEDURE — C1769 GUIDE WIRE: HCPCS

## 2018-03-27 PROCEDURE — C1725 CATH, TRANSLUMIN NON-LASER: HCPCS

## 2018-03-27 PROCEDURE — A6402 STERILE GAUZE <= 16 SQ IN: HCPCS

## 2018-03-27 RX ORDER — LIDOCAINE 40 MG/G
CREAM TOPICAL ONCE
Status: COMPLETED | OUTPATIENT
Start: 2018-03-27 | End: 2018-03-27

## 2018-03-27 RX ORDER — MIDAZOLAM HYDROCHLORIDE 1 MG/ML
1 INJECTION INTRAMUSCULAR; INTRAVENOUS ONCE
Status: COMPLETED | OUTPATIENT
Start: 2018-03-27 | End: 2018-03-27

## 2018-03-27 RX ORDER — ONDANSETRON 2 MG/ML
INJECTION INTRAMUSCULAR; INTRAVENOUS
Status: DISPENSED
Start: 2018-03-27 | End: 2018-03-28

## 2018-03-27 RX ORDER — HEPARIN SODIUM 1000 [USP'U]/ML
2000 INJECTION, SOLUTION INTRAVENOUS; SUBCUTANEOUS ONCE
Status: COMPLETED | OUTPATIENT
Start: 2018-03-27 | End: 2018-03-27

## 2018-03-27 RX ORDER — CLINDAMYCIN PHOSPHATE 600 MG/50ML
600 INJECTION INTRAVENOUS
Status: DISCONTINUED | OUTPATIENT
Start: 2018-03-27 | End: 2018-03-28 | Stop reason: HOSPADM

## 2018-03-27 RX ORDER — ONDANSETRON 2 MG/ML
4 INJECTION INTRAMUSCULAR; INTRAVENOUS ONCE
Status: COMPLETED | OUTPATIENT
Start: 2018-03-27 | End: 2018-03-27

## 2018-03-27 RX ORDER — SODIUM CHLORIDE 450 MG/100ML
INJECTION, SOLUTION INTRAVENOUS CONTINUOUS
Status: DISCONTINUED | OUTPATIENT
Start: 2018-03-27 | End: 2018-03-28 | Stop reason: HOSPADM

## 2018-03-27 RX ADMIN — Medication 1 MG: at 09:49

## 2018-03-27 RX ADMIN — LIDOCAINE 4%: 4 CREAM TOPICAL at 08:22

## 2018-03-27 RX ADMIN — MIDAZOLAM HYDROCHLORIDE 1 MG: 1 INJECTION INTRAMUSCULAR; INTRAVENOUS at 09:39

## 2018-03-27 RX ADMIN — IOVERSOL 70 ML: 509 INJECTION INTRA-ARTERIAL; INTRAVENOUS at 10:30

## 2018-03-27 RX ADMIN — SODIUM CHLORIDE: 4.5 INJECTION, SOLUTION INTRAVENOUS at 08:30

## 2018-03-27 RX ADMIN — ONDANSETRON 4 MG: 2 INJECTION INTRAMUSCULAR; INTRAVENOUS at 12:11

## 2018-03-27 RX ADMIN — MIDAZOLAM HYDROCHLORIDE 1 MG: 1 INJECTION INTRAMUSCULAR; INTRAVENOUS at 09:49

## 2018-03-27 RX ADMIN — HEPARIN SODIUM 2000 UNITS: 1000 INJECTION, SOLUTION INTRAVENOUS; SUBCUTANEOUS at 09:54

## 2018-03-27 RX ADMIN — Medication 1 MG: at 09:40

## 2018-03-27 ASSESSMENT — PAIN SCALES - GENERAL
PAINLEVEL_OUTOF10: 0

## 2018-03-27 ASSESSMENT — PAIN - FUNCTIONAL ASSESSMENT: PAIN_FUNCTIONAL_ASSESSMENT: 0-10

## 2018-03-27 NOTE — PROGRESS NOTES
[address] [address]  Sedation/Analgesia History & Physical    Pt Name: Anne Godfrey  MRN: [id number]  YOB: 1930  Provider Performing Procedure: Bettina Rosas MD  Primary Care Physician: Grayson Jaimes MD    PRE-PROCEDURE   DNR-CCA/DNR-CC []Yes [x]No  Brief History/Pre-Procedure Diagnosis: Malfunctioning dialysis fistula/graft           MEDICAL HISTORY  []CAD/Valve  []Liver Disease  []Lung Disease []Diabetes  []Hypertension []Renal Disease  []Additional information:       has a past medical history of Anemia in chronic kidney disease(285.21); Arthritis; CAD (coronary artery disease); Chronic kidney disease; Chronic kidney disease, stage IV (severe) ([facility]); CKD (chronic kidney disease) stage 3, GFR 30-59 ml/min; COPD (chronic obstructive pulmonary disease) ([facility] [address] Hemodialysis patient [facility]); Hyperlipidemia; Hypertension; and Sick sinus syndrome ([facility] SURGICAL HISTORY   has a past surgical history that includes pacemaker placement (2013); Endoscopy, colon, diagnostic; Dialysis fistula creation (5/6/2016); Colonoscopy (3003,7470); Upper gastrointestinal endoscopy (1421,3493); Coronary angioplasty with stent (2004); and pr esophagogastroduodenoscopy transoral diagnostic (N/A, 1/18/2018).   Additional information:       ALLERGIES   Allergies as of 03/27/2018    (No Known Allergies)     Additional information:       MEDICATIONS   Coumadin Use Last 5 Days [x]No []Yes  Antiplatelet drug therapy use last 5 days  []No [x]Yes  Other anticoagulant use last 5 days  [x]No []Yes    Current Outpatient Prescriptions:     clopidogrel (PLAVIX) 75 MG tablet, Take 75 mg by mouth daily, Disp: , Rfl:     aspirin 81 MG tablet, Take 81 mg by mouth daily, Disp: , Rfl:     cinacalcet (SENSIPAR) 30 MG tablet, Take 30 mg by mouth daily, Disp: , Rfl:     isosorbide mononitrate (IMDUR) 30 MG extended release tablet, Take 30 mg by mouth daily, Disp: , Rfl:     gabapentin (NEURONTIN) 100 MG capsule, Take 100 mg by mouth nightly., Disp: , Rfl:     B Complex-C-Folic Acid (RENAL) 1 MG CAPS, Take 1 capsule by mouth daily, Disp: , Rfl:     pantoprazole (PROTONIX) 40 MG tablet, Take 1 tablet by mouth every morning (before breakfast), Disp: 90 tablet, Rfl: 3    metoprolol (LOPRESSOR) 50 MG tablet, Take 1 tablet by mouth 2 times daily, Disp: 180 tablet, Rfl: 3    hydrALAZINE (APRESOLINE) 100 MG tablet, Take 1 tablet by mouth 2 times daily, Disp: 180 tablet, Rfl: 3    cloNIDine (CATAPRES) 0.2 MG tablet, TAKE 1 TABLET TWICE A DAY, Disp: 180 tablet, Rfl: 3    pravastatin (PRAVACHOL) 80 MG tablet, Take 1 tablet by mouth daily, Disp: 30 tablet, Rfl: 5    Current Facility-Administered Medications:     0.45 % sodium chloride infusion, , Intravenous, Continuous, Zoraida Littlejohn MD, Last Rate: 20 mL/hr at 03/27/18 0830    clindamycin (CLEOCIN) 600 mg in dextrose 5 % 50 mL IVPB, 600 mg, Intravenous, 60 Min Pre-Op, Zoraida Littlejohn MD  Prior to Admission medications    Medication Sig Start Date End Date Taking? Authorizing Provider   clopidogrel (PLAVIX) 75 MG tablet Take 75 mg by mouth daily   Yes Historical Provider, MD   aspirin 81 MG tablet Take 81 mg by mouth daily   Yes Historical Provider, MD   cinacalcet (SENSIPAR) 30 MG tablet Take 30 mg by mouth daily   Yes Historical Provider, MD   isosorbide mononitrate (IMDUR) 30 MG extended release tablet Take 30 mg by mouth daily   Yes Historical Provider, MD   gabapentin (NEURONTIN) 100 MG capsule Take 100 mg by mouth nightly.    Yes Historical Provider, MD   B Complex-C-Folic Acid (RENAL) 1 MG CAPS Take 1 capsule by mouth daily   Yes Historical Provider, MD   pantoprazole (PROTONIX) 40 MG tablet Take 1 tablet by mouth every morning (before breakfast) 6/20/17  Yes XIN Paulino   metoprolol (LOPRESSOR) 50 MG tablet Take 1 tablet by mouth 2 times daily 6/14/16  Yes [place]ia Agosto MD   hydrALAZINE (APRESOLINE) 100 MG tablet Take 1 tablet by mouth 2 times daily 3/27/2018 at 10:34 AM

## 2018-03-27 NOTE — PROGRESS NOTES
0930 Pt in specials radiology for fistulogram. Explained procedure to pt and pt verbalizes understanding. Consent signed. 5648 Moved to table and attached to monitor. 2906 Dr Radha Peres to evaluate fistula. 7415 Angioplasty of run-off vein with 6 x 80 IN-PACT Admiral balloon. 1012 Angioplasty of arterial anastamosis with 7 x 60 IN-PACT Admiral balloon. 1015 Angioplasty of adjacent venous run-off vein with 10 x 40 Hanna 35 balloon. 1022 Angioplasty of left subclavian vein with 10 x 40 Hanna 35 balloon. 1029 Procedure complete. Prepping for slip-not insertion. 1035 Slip-nots inserted as sheaths x 2 removed. No bleeding or hematoma noted. 1039 Pt positioned on cart for comfort. 1040 Transferred to [facility] per cart and report called to [employer].

## 2018-05-21 ENCOUNTER — APPOINTMENT (OUTPATIENT)
Dept: GENERAL RADIOLOGY | Age: 83
DRG: 286 | End: 2018-05-21
Payer: MEDICARE

## 2018-05-21 ENCOUNTER — HOSPITAL ENCOUNTER (INPATIENT)
Age: 83
LOS: 5 days | Discharge: HOME | DRG: 286 | End: 2018-05-26
Attending: HOSPITALIST | Admitting: HOSPITALIST
Payer: MEDICARE

## 2018-05-21 ENCOUNTER — APPOINTMENT (OUTPATIENT)
Dept: CT IMAGING | Age: 83
DRG: 286 | End: 2018-05-21
Payer: MEDICARE

## 2018-05-21 DIAGNOSIS — I50.20 SYSTOLIC CONGESTIVE HEART FAILURE, UNSPECIFIED CONGESTIVE HEART FAILURE CHRONICITY: Primary | ICD-10-CM

## 2018-05-21 DIAGNOSIS — I10 ESSENTIAL HYPERTENSION: ICD-10-CM

## 2018-05-21 DIAGNOSIS — R06.89 DYSPNEA AND RESPIRATORY ABNORMALITIES: ICD-10-CM

## 2018-05-21 DIAGNOSIS — R06.00 DYSPNEA AND RESPIRATORY ABNORMALITIES: ICD-10-CM

## 2018-05-21 PROBLEM — I16.0 HYPERTENSIVE URGENCY: Status: ACTIVE | Noted: 2018-05-21

## 2018-05-21 PROBLEM — N18.6 ESRD (END STAGE RENAL DISEASE) (HCC): Status: ACTIVE | Noted: 2018-05-21

## 2018-05-21 LAB
ALBUMIN SERPL-MCNC: 3.9 G/DL (ref 3.5–5.1)
ALLEN TEST: POSITIVE
ALLEN TEST: POSITIVE
ALP BLD-CCNC: 72 U/L (ref 38–126)
ALT SERPL-CCNC: 22 U/L (ref 11–66)
ANION GAP SERPL CALCULATED.3IONS-SCNC: 20 MEQ/L (ref 8–16)
ANISOCYTOSIS: ABNORMAL
AST SERPL-CCNC: 31 U/L (ref 5–40)
BASE EXCESS (CALCULATED): -3.5 MMOL/L (ref -2.5–2.5)
BASE EXCESS (CALCULATED): 2.6 MMOL/L (ref -2.5–2.5)
BASOPHILS # BLD: 0.6 %
BASOPHILS ABSOLUTE: 0 THOU/MM3 (ref 0–0.1)
BILIRUB SERPL-MCNC: 0.3 MG/DL (ref 0.3–1.2)
BILIRUBIN DIRECT: < 0.2 MG/DL (ref 0–0.3)
BUN BLDV-MCNC: 81 MG/DL (ref 7–22)
CALCIUM SERPL-MCNC: 8.2 MG/DL (ref 8.5–10.5)
CHLORIDE BLD-SCNC: 102 MEQ/L (ref 98–111)
CO2: 21 MEQ/L (ref 23–33)
COLLECTED BY:: ABNORMAL
COLLECTED BY:: ABNORMAL
CREAT SERPL-MCNC: 10.6 MG/DL (ref 0.4–1.2)
DEVICE: ABNORMAL
DEVICE: ABNORMAL
EKG ATRIAL RATE: 88 BPM
EKG P-R INTERVAL: 88 MS
EKG Q-T INTERVAL: 162 MS
EKG QRS DURATION: 166 MS
EKG QTC CALCULATION (BAZETT): 272 MS
EKG R AXIS: -120 DEGREES
EKG T AXIS: 0 DEGREES
EKG VENTRICULAR RATE: 170 BPM
EOSINOPHIL # BLD: 8.9 %
EOSINOPHILS ABSOLUTE: 0.6 THOU/MM3 (ref 0–0.4)
GFR SERPL CREATININE-BSD FRML MDRD: 6 ML/MIN/1.73M2
GLUCOSE BLD-MCNC: 96 MG/DL (ref 70–108)
HCO3: 20 MMOL/L (ref 23–28)
HCO3: 23 MMOL/L (ref 23–28)
HCT VFR BLD CALC: 26.4 % (ref 42–52)
HEMOGLOBIN: 8.9 GM/DL (ref 14–18)
IFIO2: 2
LACTIC ACID: 2.1 MMOL/L (ref 0.5–2.2)
LIPASE: 65 U/L (ref 5.6–51.3)
LYMPHOCYTES # BLD: 7 %
LYMPHOCYTES ABSOLUTE: 0.5 THOU/MM3 (ref 1–4.8)
MACROCYTES: ABNORMAL
MAGNESIUM: 2 MG/DL (ref 1.6–2.4)
MCH RBC QN AUTO: 34.3 PG (ref 27–31)
MCHC RBC AUTO-ENTMCNC: 33.9 GM/DL (ref 33–37)
MCV RBC AUTO: 101.3 FL (ref 80–94)
MONOCYTES # BLD: 7.2 %
MONOCYTES ABSOLUTE: 0.5 THOU/MM3 (ref 0.4–1.3)
MRSA SCREEN RT-PCR: NEGATIVE
NUCLEATED RED BLOOD CELLS: 0 /100 WBC
O2 SATURATION: 95 %
O2 SATURATION: 98 %
OSMOLALITY CALCULATION: 309.2 MOSMOL/KG (ref 275–300)
PCO2: 22 MMHG (ref 35–45)
PCO2: 29 MMHG (ref 35–45)
PDW BLD-RTO: 17.4 % (ref 11.5–14.5)
PH BLOOD GAS: 7.44 (ref 7.35–7.45)
PH BLOOD GAS: 7.63 (ref 7.35–7.45)
PLATELET # BLD: 135 THOU/MM3 (ref 130–400)
PMV BLD AUTO: 8.2 FL (ref 7.4–10.4)
PO2: 73 MMHG (ref 71–104)
PO2: 82 MMHG (ref 71–104)
POTASSIUM SERPL-SCNC: 4.6 MEQ/L (ref 3.5–5.2)
PRO-BNP: ABNORMAL PG/ML (ref 0–1800)
RBC # BLD: 2.61 MILL/MM3 (ref 4.7–6.1)
SEG NEUTROPHILS: 76.3 %
SEGMENTED NEUTROPHILS ABSOLUTE COUNT: 5 THOU/MM3 (ref 1.8–7.7)
SODIUM BLD-SCNC: 143 MEQ/L (ref 135–145)
SOURCE, BLOOD GAS: ABNORMAL
SOURCE, BLOOD GAS: ABNORMAL
TOTAL PROTEIN: 6.7 G/DL (ref 6.1–8)
TROPONIN T: 0.04 NG/ML
TROPONIN T: 0.08 NG/ML
TROPONIN T: 0.12 NG/ML
WBC # BLD: 6.6 THOU/MM3 (ref 4.8–10.8)

## 2018-05-21 PROCEDURE — 99223 1ST HOSP IP/OBS HIGH 75: CPT | Performed by: INTERNAL MEDICINE

## 2018-05-21 PROCEDURE — 90935 HEMODIALYSIS ONE EVALUATION: CPT | Performed by: INTERNAL MEDICINE

## 2018-05-21 PROCEDURE — 6360000002 HC RX W HCPCS: Performed by: FAMILY MEDICINE

## 2018-05-21 PROCEDURE — 93005 ELECTROCARDIOGRAM TRACING: CPT | Performed by: NURSE PRACTITIONER

## 2018-05-21 PROCEDURE — 99222 1ST HOSP IP/OBS MODERATE 55: CPT | Performed by: INTERNAL MEDICINE

## 2018-05-21 PROCEDURE — 2580000003 HC RX 258: Performed by: INTERNAL MEDICINE

## 2018-05-21 PROCEDURE — 2060000000 HC ICU INTERMEDIATE R&B

## 2018-05-21 PROCEDURE — 6360000004 HC RX CONTRAST MEDICATION: Performed by: INTERNAL MEDICINE

## 2018-05-21 PROCEDURE — 71275 CT ANGIOGRAPHY CHEST: CPT

## 2018-05-21 PROCEDURE — 82803 BLOOD GASES ANY COMBINATION: CPT

## 2018-05-21 PROCEDURE — 80053 COMPREHEN METABOLIC PANEL: CPT

## 2018-05-21 PROCEDURE — 6370000000 HC RX 637 (ALT 250 FOR IP): Performed by: NURSE PRACTITIONER

## 2018-05-21 PROCEDURE — 83880 ASSAY OF NATRIURETIC PEPTIDE: CPT

## 2018-05-21 PROCEDURE — 6370000000 HC RX 637 (ALT 250 FOR IP): Performed by: INTERNAL MEDICINE

## 2018-05-21 PROCEDURE — 74174 CTA ABD&PLVS W/CONTRAST: CPT

## 2018-05-21 PROCEDURE — 94640 AIRWAY INHALATION TREATMENT: CPT

## 2018-05-21 PROCEDURE — 87641 MR-STAPH DNA AMP PROBE: CPT

## 2018-05-21 PROCEDURE — 6360000002 HC RX W HCPCS: Performed by: NURSE PRACTITIONER

## 2018-05-21 PROCEDURE — 71045 X-RAY EXAM CHEST 1 VIEW: CPT

## 2018-05-21 PROCEDURE — 36600 WITHDRAWAL OF ARTERIAL BLOOD: CPT

## 2018-05-21 PROCEDURE — 93005 ELECTROCARDIOGRAM TRACING: CPT | Performed by: INTERNAL MEDICINE

## 2018-05-21 PROCEDURE — 6360000002 HC RX W HCPCS: Performed by: INTERNAL MEDICINE

## 2018-05-21 PROCEDURE — 83690 ASSAY OF LIPASE: CPT

## 2018-05-21 PROCEDURE — 6360000002 HC RX W HCPCS: Performed by: EMERGENCY MEDICINE

## 2018-05-21 PROCEDURE — 85025 COMPLETE CBC W/AUTO DIFF WBC: CPT

## 2018-05-21 PROCEDURE — 6370000000 HC RX 637 (ALT 250 FOR IP)

## 2018-05-21 PROCEDURE — 82248 BILIRUBIN DIRECT: CPT

## 2018-05-21 PROCEDURE — 84484 ASSAY OF TROPONIN QUANT: CPT

## 2018-05-21 PROCEDURE — 5A1D70Z PERFORMANCE OF URINARY FILTRATION, INTERMITTENT, LESS THAN 6 HOURS PER DAY: ICD-10-PCS | Performed by: INTERNAL MEDICINE

## 2018-05-21 PROCEDURE — 36415 COLL VENOUS BLD VENIPUNCTURE: CPT

## 2018-05-21 PROCEDURE — 96374 THER/PROPH/DIAG INJ IV PUSH: CPT

## 2018-05-21 PROCEDURE — 87081 CULTURE SCREEN ONLY: CPT

## 2018-05-21 PROCEDURE — 2700000000 HC OXYGEN THERAPY PER DAY

## 2018-05-21 PROCEDURE — 83735 ASSAY OF MAGNESIUM: CPT

## 2018-05-21 PROCEDURE — 99285 EMERGENCY DEPT VISIT HI MDM: CPT

## 2018-05-21 PROCEDURE — 70450 CT HEAD/BRAIN W/O DYE: CPT

## 2018-05-21 PROCEDURE — 83605 ASSAY OF LACTIC ACID: CPT

## 2018-05-21 RX ORDER — ISOSORBIDE MONONITRATE 30 MG/1
30 TABLET, EXTENDED RELEASE ORAL DAILY
Status: DISCONTINUED | OUTPATIENT
Start: 2018-05-21 | End: 2018-05-26 | Stop reason: HOSPADM

## 2018-05-21 RX ORDER — HYDRALAZINE HYDROCHLORIDE 20 MG/ML
10 INJECTION INTRAMUSCULAR; INTRAVENOUS EVERY 6 HOURS PRN
Status: DISCONTINUED | OUTPATIENT
Start: 2018-05-21 | End: 2018-05-26 | Stop reason: HOSPADM

## 2018-05-21 RX ORDER — PANTOPRAZOLE SODIUM 40 MG/1
40 TABLET, DELAYED RELEASE ORAL
Status: DISCONTINUED | OUTPATIENT
Start: 2018-05-22 | End: 2018-05-26 | Stop reason: HOSPADM

## 2018-05-21 RX ORDER — MORPHINE SULFATE 2 MG/ML
2 INJECTION, SOLUTION INTRAMUSCULAR; INTRAVENOUS EVERY 4 HOURS PRN
Status: COMPLETED | OUTPATIENT
Start: 2018-05-21 | End: 2018-05-23

## 2018-05-21 RX ORDER — PRAVASTATIN SODIUM 80 MG/1
80 TABLET ORAL NIGHTLY
Status: DISCONTINUED | OUTPATIENT
Start: 2018-05-21 | End: 2018-05-26 | Stop reason: HOSPADM

## 2018-05-21 RX ORDER — FUROSEMIDE 10 MG/ML
40 INJECTION INTRAMUSCULAR; INTRAVENOUS ONCE
Status: COMPLETED | OUTPATIENT
Start: 2018-05-21 | End: 2018-05-21

## 2018-05-21 RX ORDER — ACETAMINOPHEN 325 MG/1
650 TABLET ORAL EVERY 4 HOURS PRN
Status: DISCONTINUED | OUTPATIENT
Start: 2018-05-21 | End: 2018-05-25

## 2018-05-21 RX ORDER — POTASSIUM CHLORIDE 7.45 MG/ML
10 INJECTION INTRAVENOUS PRN
Status: DISCONTINUED | OUTPATIENT
Start: 2018-05-21 | End: 2018-05-26 | Stop reason: HOSPADM

## 2018-05-21 RX ORDER — ASPIRIN 81 MG/1
81 TABLET ORAL DAILY
Status: DISCONTINUED | OUTPATIENT
Start: 2018-05-21 | End: 2018-05-26 | Stop reason: HOSPADM

## 2018-05-21 RX ORDER — SODIUM CHLORIDE 0.9 % (FLUSH) 0.9 %
10 SYRINGE (ML) INJECTION PRN
Status: DISCONTINUED | OUTPATIENT
Start: 2018-05-21 | End: 2018-05-25 | Stop reason: SDUPTHER

## 2018-05-21 RX ORDER — LISINOPRIL 10 MG/1
10 TABLET ORAL DAILY
Status: DISCONTINUED | OUTPATIENT
Start: 2018-05-21 | End: 2018-05-25

## 2018-05-21 RX ORDER — SODIUM CHLORIDE 0.9 % (FLUSH) 0.9 %
10 SYRINGE (ML) INJECTION EVERY 12 HOURS SCHEDULED
Status: DISCONTINUED | OUTPATIENT
Start: 2018-05-21 | End: 2018-05-25 | Stop reason: SDUPTHER

## 2018-05-21 RX ORDER — FOLIC ACID/VIT B COMPLEX AND C 5 MG
1 TABLET ORAL DAILY
Status: DISCONTINUED | OUTPATIENT
Start: 2018-05-21 | End: 2018-05-26 | Stop reason: HOSPADM

## 2018-05-21 RX ORDER — CINACALCET 30 MG/1
30 TABLET, FILM COATED ORAL
Status: DISCONTINUED | OUTPATIENT
Start: 2018-05-21 | End: 2018-05-26 | Stop reason: HOSPADM

## 2018-05-21 RX ORDER — POTASSIUM CHLORIDE 20MEQ/15ML
40 LIQUID (ML) ORAL PRN
Status: DISCONTINUED | OUTPATIENT
Start: 2018-05-21 | End: 2018-05-26 | Stop reason: HOSPADM

## 2018-05-21 RX ORDER — CLOPIDOGREL BISULFATE 75 MG/1
75 TABLET ORAL DAILY
Status: DISCONTINUED | OUTPATIENT
Start: 2018-05-21 | End: 2018-05-26 | Stop reason: HOSPADM

## 2018-05-21 RX ORDER — SODIUM CHLORIDE 0.9 % (FLUSH) 0.9 %
10 SYRINGE (ML) INJECTION EVERY 12 HOURS SCHEDULED
Status: DISCONTINUED | OUTPATIENT
Start: 2018-05-21 | End: 2018-05-21 | Stop reason: SDUPTHER

## 2018-05-21 RX ORDER — HYDRALAZINE HYDROCHLORIDE 50 MG/1
100 TABLET, FILM COATED ORAL 2 TIMES DAILY
Status: DISCONTINUED | OUTPATIENT
Start: 2018-05-21 | End: 2018-05-22

## 2018-05-21 RX ORDER — SODIUM CHLORIDE 0.9 % (FLUSH) 0.9 %
10 SYRINGE (ML) INJECTION PRN
Status: DISCONTINUED | OUTPATIENT
Start: 2018-05-21 | End: 2018-05-21 | Stop reason: SDUPTHER

## 2018-05-21 RX ORDER — POTASSIUM CHLORIDE 20 MEQ/1
40 TABLET, EXTENDED RELEASE ORAL PRN
Status: DISCONTINUED | OUTPATIENT
Start: 2018-05-21 | End: 2018-05-26 | Stop reason: HOSPADM

## 2018-05-21 RX ORDER — GABAPENTIN 100 MG/1
100 CAPSULE ORAL NIGHTLY
Status: DISCONTINUED | OUTPATIENT
Start: 2018-05-21 | End: 2018-05-26 | Stop reason: HOSPADM

## 2018-05-21 RX ORDER — METOPROLOL TARTRATE 50 MG/1
50 TABLET, FILM COATED ORAL 2 TIMES DAILY
Status: DISCONTINUED | OUTPATIENT
Start: 2018-05-21 | End: 2018-05-26 | Stop reason: HOSPADM

## 2018-05-21 RX ORDER — NITROGLYCERIN 0.4 MG/1
TABLET SUBLINGUAL
Status: COMPLETED
Start: 2018-05-21 | End: 2018-05-21

## 2018-05-21 RX ORDER — FAMOTIDINE 20 MG/1
20 TABLET, FILM COATED ORAL 2 TIMES DAILY
Status: DISCONTINUED | OUTPATIENT
Start: 2018-05-21 | End: 2018-05-21 | Stop reason: DRUGHIGH

## 2018-05-21 RX ORDER — IPRATROPIUM BROMIDE AND ALBUTEROL SULFATE 2.5; .5 MG/3ML; MG/3ML
1 SOLUTION RESPIRATORY (INHALATION) ONCE
Status: COMPLETED | OUTPATIENT
Start: 2018-05-21 | End: 2018-05-21

## 2018-05-21 RX ORDER — CLONIDINE HYDROCHLORIDE 0.2 MG/1
0.2 TABLET ORAL 2 TIMES DAILY
Status: DISCONTINUED | OUTPATIENT
Start: 2018-05-21 | End: 2018-05-22

## 2018-05-21 RX ORDER — ONDANSETRON 2 MG/ML
4 INJECTION INTRAMUSCULAR; INTRAVENOUS EVERY 6 HOURS PRN
Status: DISCONTINUED | OUTPATIENT
Start: 2018-05-21 | End: 2018-05-25

## 2018-05-21 RX ORDER — FAMOTIDINE 20 MG/1
20 TABLET, FILM COATED ORAL DAILY
Status: DISCONTINUED | OUTPATIENT
Start: 2018-05-21 | End: 2018-05-26 | Stop reason: HOSPADM

## 2018-05-21 RX ORDER — NITROGLYCERIN 0.4 MG/1
0.4 TABLET SUBLINGUAL ONCE
Status: COMPLETED | OUTPATIENT
Start: 2018-05-21 | End: 2018-05-21

## 2018-05-21 RX ORDER — METHYLPREDNISOLONE SODIUM SUCCINATE 125 MG/2ML
125 INJECTION, POWDER, LYOPHILIZED, FOR SOLUTION INTRAMUSCULAR; INTRAVENOUS ONCE
Status: COMPLETED | OUTPATIENT
Start: 2018-05-21 | End: 2018-05-21

## 2018-05-21 RX ORDER — HEPARIN SODIUM 5000 [USP'U]/ML
5000 INJECTION, SOLUTION INTRAVENOUS; SUBCUTANEOUS EVERY 8 HOURS SCHEDULED
Status: DISCONTINUED | OUTPATIENT
Start: 2018-05-21 | End: 2018-05-26 | Stop reason: HOSPADM

## 2018-05-21 RX ADMIN — Medication 1 TABLET: at 15:35

## 2018-05-21 RX ADMIN — FAMOTIDINE 20 MG: 20 TABLET ORAL at 15:35

## 2018-05-21 RX ADMIN — HYDRALAZINE HYDROCHLORIDE 100 MG: 50 TABLET ORAL at 15:35

## 2018-05-21 RX ADMIN — PRAVASTATIN SODIUM 80 MG: 80 TABLET ORAL at 21:12

## 2018-05-21 RX ADMIN — HEPARIN SODIUM 5000 UNITS: 5000 INJECTION, SOLUTION INTRAVENOUS; SUBCUTANEOUS at 18:13

## 2018-05-21 RX ADMIN — Medication 10 ML: at 21:14

## 2018-05-21 RX ADMIN — MORPHINE SULFATE 2 MG: 2 INJECTION, SOLUTION INTRAMUSCULAR; INTRAVENOUS at 15:45

## 2018-05-21 RX ADMIN — ASPIRIN 81 MG: 81 TABLET, COATED ORAL at 15:35

## 2018-05-21 RX ADMIN — METOPROLOL TARTRATE 50 MG: 50 TABLET, FILM COATED ORAL at 21:12

## 2018-05-21 RX ADMIN — IPRATROPIUM BROMIDE AND ALBUTEROL SULFATE 1 AMPULE: .5; 3 SOLUTION RESPIRATORY (INHALATION) at 05:50

## 2018-05-21 RX ADMIN — CLOPIDOGREL BISULFATE 75 MG: 75 TABLET ORAL at 15:35

## 2018-05-21 RX ADMIN — NITROGLYCERIN 0.4 MG: 0.4 TABLET SUBLINGUAL at 06:10

## 2018-05-21 RX ADMIN — LISINOPRIL 10 MG: 10 TABLET ORAL at 15:35

## 2018-05-21 RX ADMIN — HYDRALAZINE HYDROCHLORIDE 100 MG: 50 TABLET ORAL at 21:12

## 2018-05-21 RX ADMIN — CLONIDINE HYDROCHLORIDE 0.2 MG: 0.2 TABLET ORAL at 15:35

## 2018-05-21 RX ADMIN — FUROSEMIDE 40 MG: 10 INJECTION, SOLUTION INTRAMUSCULAR; INTRAVENOUS at 06:33

## 2018-05-21 RX ADMIN — METHYLPREDNISOLONE SODIUM SUCCINATE 125 MG: 125 INJECTION, POWDER, FOR SOLUTION INTRAMUSCULAR; INTRAVENOUS at 05:33

## 2018-05-21 RX ADMIN — GABAPENTIN 100 MG: 100 CAPSULE ORAL at 21:13

## 2018-05-21 RX ADMIN — CLONIDINE HYDROCHLORIDE 0.2 MG: 0.2 TABLET ORAL at 21:12

## 2018-05-21 RX ADMIN — NITROGLYCERIN: 0.4 TABLET SUBLINGUAL at 13:07

## 2018-05-21 RX ADMIN — IOPAMIDOL 80 ML: 755 INJECTION, SOLUTION INTRAVENOUS at 14:24

## 2018-05-21 RX ADMIN — ISOSORBIDE MONONITRATE 30 MG: 30 TABLET ORAL at 15:35

## 2018-05-21 RX ADMIN — METOPROLOL TARTRATE 50 MG: 50 TABLET, FILM COATED ORAL at 15:35

## 2018-05-21 RX ADMIN — CINACALCET HYDROCHLORIDE 30 MG: 30 TABLET, COATED ORAL at 18:13

## 2018-05-21 ASSESSMENT — PAIN DESCRIPTION - PAIN TYPE
TYPE: ACUTE PAIN

## 2018-05-21 ASSESSMENT — ENCOUNTER SYMPTOMS
BACK PAIN: 0
ABDOMINAL PAIN: 0
NAUSEA: 0
WHEEZING: 0
EYE REDNESS: 0
DIARRHEA: 0
SORE THROAT: 0
COUGH: 0
SHORTNESS OF BREATH: 1
RHINORRHEA: 0
VOMITING: 0
EYE DISCHARGE: 0

## 2018-05-21 ASSESSMENT — PAIN SCALES - GENERAL
PAINLEVEL_OUTOF10: 5
PAINLEVEL_OUTOF10: 8
PAINLEVEL_OUTOF10: 7
PAINLEVEL_OUTOF10: 10
PAINLEVEL_OUTOF10: 8
PAINLEVEL_OUTOF10: 10
PAINLEVEL_OUTOF10: 7
PAINLEVEL_OUTOF10: 0
PAINLEVEL_OUTOF10: 0
PAINLEVEL_OUTOF10: 10

## 2018-05-21 ASSESSMENT — PAIN DESCRIPTION - DESCRIPTORS
DESCRIPTORS: ACHING

## 2018-05-21 ASSESSMENT — PAIN DESCRIPTION - FREQUENCY
FREQUENCY: CONTINUOUS

## 2018-05-21 ASSESSMENT — PAIN DESCRIPTION - LOCATION
LOCATION: CHEST
LOCATION: ABDOMEN;CHEST
LOCATION: ABDOMEN
LOCATION: CHEST
LOCATION: ABDOMEN
LOCATION: CHEST

## 2018-05-21 ASSESSMENT — PAIN DESCRIPTION - PROGRESSION: CLINICAL_PROGRESSION: NOT CHANGED

## 2018-05-21 ASSESSMENT — PAIN DESCRIPTION - ONSET: ONSET: ON-GOING

## 2018-05-22 PROBLEM — E83.39 HYPERPHOSPHATEMIA: Chronic | Status: ACTIVE | Noted: 2018-05-22

## 2018-05-22 PROBLEM — I27.20 PULMONARY HYPERTENSION (HCC): Status: ACTIVE | Noted: 2018-05-20

## 2018-05-22 LAB
ANION GAP SERPL CALCULATED.3IONS-SCNC: 17 MEQ/L (ref 8–16)
BUN BLDV-MCNC: 45 MG/DL (ref 7–22)
CALCIUM SERPL-MCNC: 7.9 MG/DL (ref 8.5–10.5)
CHLORIDE BLD-SCNC: 93 MEQ/L (ref 98–111)
CO2: 27 MEQ/L (ref 23–33)
CREAT SERPL-MCNC: 7.4 MG/DL (ref 0.4–1.2)
EKG ATRIAL RATE: 69 BPM
EKG P AXIS: 52 DEGREES
EKG P-R INTERVAL: 150 MS
EKG Q-T INTERVAL: 470 MS
EKG QRS DURATION: 66 MS
EKG QTC CALCULATION (BAZETT): 503 MS
EKG R AXIS: 4 DEGREES
EKG T AXIS: 23 DEGREES
EKG VENTRICULAR RATE: 69 BPM
GFR SERPL CREATININE-BSD FRML MDRD: 8 ML/MIN/1.73M2
GLUCOSE BLD-MCNC: 92 MG/DL (ref 70–108)
HCT VFR BLD CALC: 24.7 % (ref 42–52)
HEMOGLOBIN: 8.3 GM/DL (ref 14–18)
LV EF: 60 %
LVEF MODALITY: NORMAL
MCH RBC QN AUTO: 33.8 PG (ref 27–31)
MCHC RBC AUTO-ENTMCNC: 33.5 GM/DL (ref 33–37)
MCV RBC AUTO: 100.7 FL (ref 80–94)
PDW BLD-RTO: 17.6 % (ref 11.5–14.5)
PLATELET # BLD: 129 THOU/MM3 (ref 130–400)
PMV BLD AUTO: 8.4 FL (ref 7.4–10.4)
POTASSIUM REFLEX MAGNESIUM: 5.4 MEQ/L (ref 3.5–5.2)
RBC # BLD: 2.46 MILL/MM3 (ref 4.7–6.1)
SODIUM BLD-SCNC: 137 MEQ/L (ref 135–145)
TROPONIN T: 0.05 NG/ML
TROPONIN T: 0.05 NG/ML
WBC # BLD: 3.7 THOU/MM3 (ref 4.8–10.8)

## 2018-05-22 PROCEDURE — 84484 ASSAY OF TROPONIN QUANT: CPT

## 2018-05-22 PROCEDURE — 93306 TTE W/DOPPLER COMPLETE: CPT

## 2018-05-22 PROCEDURE — 85027 COMPLETE CBC AUTOMATED: CPT

## 2018-05-22 PROCEDURE — 36415 COLL VENOUS BLD VENIPUNCTURE: CPT

## 2018-05-22 PROCEDURE — 80048 BASIC METABOLIC PNL TOTAL CA: CPT

## 2018-05-22 PROCEDURE — 2580000003 HC RX 258: Performed by: INTERNAL MEDICINE

## 2018-05-22 PROCEDURE — 99233 SBSQ HOSP IP/OBS HIGH 50: CPT | Performed by: INTERNAL MEDICINE

## 2018-05-22 PROCEDURE — 6360000002 HC RX W HCPCS: Performed by: INTERNAL MEDICINE

## 2018-05-22 PROCEDURE — 99232 SBSQ HOSP IP/OBS MODERATE 35: CPT | Performed by: NURSE PRACTITIONER

## 2018-05-22 PROCEDURE — 1200000003 HC TELEMETRY R&B

## 2018-05-22 PROCEDURE — 6370000000 HC RX 637 (ALT 250 FOR IP): Performed by: NURSE PRACTITIONER

## 2018-05-22 PROCEDURE — 99223 1ST HOSP IP/OBS HIGH 75: CPT | Performed by: INTERNAL MEDICINE

## 2018-05-22 PROCEDURE — 6370000000 HC RX 637 (ALT 250 FOR IP): Performed by: INTERNAL MEDICINE

## 2018-05-22 RX ORDER — HYDRALAZINE HYDROCHLORIDE 50 MG/1
50 TABLET, FILM COATED ORAL 2 TIMES DAILY
Status: DISCONTINUED | OUTPATIENT
Start: 2018-05-22 | End: 2018-05-25

## 2018-05-22 RX ADMIN — PANTOPRAZOLE SODIUM 40 MG: 40 TABLET, DELAYED RELEASE ORAL at 06:06

## 2018-05-22 RX ADMIN — HEPARIN SODIUM 5000 UNITS: 5000 INJECTION, SOLUTION INTRAVENOUS; SUBCUTANEOUS at 06:04

## 2018-05-22 RX ADMIN — METOPROLOL TARTRATE 50 MG: 50 TABLET, FILM COATED ORAL at 19:57

## 2018-05-22 RX ADMIN — Medication 1 TABLET: at 07:56

## 2018-05-22 RX ADMIN — CINACALCET HYDROCHLORIDE 30 MG: 30 TABLET, COATED ORAL at 19:35

## 2018-05-22 RX ADMIN — HYDRALAZINE HYDROCHLORIDE 50 MG: 50 TABLET ORAL at 19:57

## 2018-05-22 RX ADMIN — METOPROLOL TARTRATE 50 MG: 50 TABLET, FILM COATED ORAL at 07:55

## 2018-05-22 RX ADMIN — HYDRALAZINE HYDROCHLORIDE 100 MG: 50 TABLET ORAL at 07:56

## 2018-05-22 RX ADMIN — GABAPENTIN 100 MG: 100 CAPSULE ORAL at 19:57

## 2018-05-22 RX ADMIN — HEPARIN SODIUM 5000 UNITS: 5000 INJECTION, SOLUTION INTRAVENOUS; SUBCUTANEOUS at 22:31

## 2018-05-22 RX ADMIN — CLONIDINE HYDROCHLORIDE 0.2 MG: 0.2 TABLET ORAL at 07:56

## 2018-05-22 RX ADMIN — FAMOTIDINE 20 MG: 20 TABLET ORAL at 07:56

## 2018-05-22 RX ADMIN — LISINOPRIL 10 MG: 10 TABLET ORAL at 07:56

## 2018-05-22 RX ADMIN — CLOPIDOGREL BISULFATE 75 MG: 75 TABLET ORAL at 07:56

## 2018-05-22 RX ADMIN — Medication 10 ML: at 19:58

## 2018-05-22 RX ADMIN — HEPARIN SODIUM 5000 UNITS: 5000 INJECTION, SOLUTION INTRAVENOUS; SUBCUTANEOUS at 14:24

## 2018-05-22 RX ADMIN — Medication 10 ML: at 07:56

## 2018-05-22 RX ADMIN — ASPIRIN 81 MG: 81 TABLET, COATED ORAL at 07:56

## 2018-05-22 RX ADMIN — PRAVASTATIN SODIUM 80 MG: 80 TABLET ORAL at 19:57

## 2018-05-22 RX ADMIN — ISOSORBIDE MONONITRATE 30 MG: 30 TABLET ORAL at 07:56

## 2018-05-22 ASSESSMENT — ENCOUNTER SYMPTOMS
EYES NEGATIVE: 1
SHORTNESS OF BREATH: 1
CHEST TIGHTNESS: 1
GASTROINTESTINAL NEGATIVE: 1
ALLERGIC/IMMUNOLOGIC NEGATIVE: 1
COUGH: 1

## 2018-05-22 ASSESSMENT — PAIN SCALES - GENERAL
PAINLEVEL_OUTOF10: 0

## 2018-05-23 ENCOUNTER — APPOINTMENT (OUTPATIENT)
Dept: NON INVASIVE DIAGNOSTICS | Age: 83
DRG: 286 | End: 2018-05-23
Payer: MEDICARE

## 2018-05-23 LAB
ANION GAP SERPL CALCULATED.3IONS-SCNC: 15 MEQ/L (ref 8–16)
BUN BLDV-MCNC: 64 MG/DL (ref 7–22)
CALCIUM SERPL-MCNC: 7.4 MG/DL (ref 8.5–10.5)
CHLORIDE BLD-SCNC: 94 MEQ/L (ref 98–111)
CO2: 26 MEQ/L (ref 23–33)
CREAT SERPL-MCNC: 9.3 MG/DL (ref 0.4–1.2)
GFR SERPL CREATININE-BSD FRML MDRD: 7 ML/MIN/1.73M2
GLUCOSE BLD-MCNC: 88 MG/DL (ref 70–108)
MRSA SCREEN: NORMAL
POTASSIUM SERPL-SCNC: 5.3 MEQ/L (ref 3.5–5.2)
PRO-BNP: ABNORMAL PG/ML (ref 0–1800)
SODIUM BLD-SCNC: 135 MEQ/L (ref 135–145)
VRE CULTURE: NORMAL

## 2018-05-23 PROCEDURE — 6360000002 HC RX W HCPCS: Performed by: FAMILY MEDICINE

## 2018-05-23 PROCEDURE — 6370000000 HC RX 637 (ALT 250 FOR IP): Performed by: FAMILY MEDICINE

## 2018-05-23 PROCEDURE — 6360000002 HC RX W HCPCS: Performed by: INTERNAL MEDICINE

## 2018-05-23 PROCEDURE — 93017 CV STRESS TEST TRACING ONLY: CPT

## 2018-05-23 PROCEDURE — 1200000003 HC TELEMETRY R&B

## 2018-05-23 PROCEDURE — 6370000000 HC RX 637 (ALT 250 FOR IP): Performed by: INTERNAL MEDICINE

## 2018-05-23 PROCEDURE — 6370000000 HC RX 637 (ALT 250 FOR IP): Performed by: NURSE PRACTITIONER

## 2018-05-23 PROCEDURE — 6360000002 HC RX W HCPCS

## 2018-05-23 PROCEDURE — 3430000000 HC RX DIAGNOSTIC RADIOPHARMACEUTICAL: Performed by: INTERNAL MEDICINE

## 2018-05-23 PROCEDURE — 90935 HEMODIALYSIS ONE EVALUATION: CPT

## 2018-05-23 PROCEDURE — 80048 BASIC METABOLIC PNL TOTAL CA: CPT

## 2018-05-23 PROCEDURE — 93005 ELECTROCARDIOGRAM TRACING: CPT | Performed by: INTERNAL MEDICINE

## 2018-05-23 PROCEDURE — 99233 SBSQ HOSP IP/OBS HIGH 50: CPT | Performed by: INTERNAL MEDICINE

## 2018-05-23 PROCEDURE — A9500 TC99M SESTAMIBI: HCPCS | Performed by: INTERNAL MEDICINE

## 2018-05-23 PROCEDURE — 83880 ASSAY OF NATRIURETIC PEPTIDE: CPT

## 2018-05-23 PROCEDURE — 78452 HT MUSCLE IMAGE SPECT MULT: CPT

## 2018-05-23 PROCEDURE — 36415 COLL VENOUS BLD VENIPUNCTURE: CPT

## 2018-05-23 PROCEDURE — 2580000003 HC RX 258: Performed by: INTERNAL MEDICINE

## 2018-05-23 RX ADMIN — PRAVASTATIN SODIUM 80 MG: 80 TABLET ORAL at 20:09

## 2018-05-23 RX ADMIN — LISINOPRIL 10 MG: 10 TABLET ORAL at 06:22

## 2018-05-23 RX ADMIN — Medication 32.5 MILLICURIE: at 14:45

## 2018-05-23 RX ADMIN — ASPIRIN 81 MG: 81 TABLET, COATED ORAL at 16:02

## 2018-05-23 RX ADMIN — METOPROLOL TARTRATE 50 MG: 50 TABLET, FILM COATED ORAL at 20:09

## 2018-05-23 RX ADMIN — ACETAMINOPHEN 650 MG: 325 TABLET ORAL at 22:18

## 2018-05-23 RX ADMIN — METOPROLOL TARTRATE 50 MG: 50 TABLET, FILM COATED ORAL at 06:19

## 2018-05-23 RX ADMIN — Medication 9.7 MILLICURIE: at 13:45

## 2018-05-23 RX ADMIN — HYDRALAZINE HYDROCHLORIDE 50 MG: 50 TABLET ORAL at 20:09

## 2018-05-23 RX ADMIN — Medication 10 ML: at 20:10

## 2018-05-23 RX ADMIN — CLOPIDOGREL BISULFATE 75 MG: 75 TABLET ORAL at 16:02

## 2018-05-23 RX ADMIN — PANTOPRAZOLE SODIUM 40 MG: 40 TABLET, DELAYED RELEASE ORAL at 16:02

## 2018-05-23 RX ADMIN — ISOSORBIDE MONONITRATE 30 MG: 30 TABLET ORAL at 06:20

## 2018-05-23 RX ADMIN — HEPARIN SODIUM 5000 UNITS: 5000 INJECTION, SOLUTION INTRAVENOUS; SUBCUTANEOUS at 06:20

## 2018-05-23 RX ADMIN — HEPARIN SODIUM 5000 UNITS: 5000 INJECTION, SOLUTION INTRAVENOUS; SUBCUTANEOUS at 20:10

## 2018-05-23 RX ADMIN — GABAPENTIN 100 MG: 100 CAPSULE ORAL at 20:09

## 2018-05-23 RX ADMIN — Medication 1 TABLET: at 16:02

## 2018-05-23 RX ADMIN — CINACALCET HYDROCHLORIDE 30 MG: 30 TABLET, COATED ORAL at 16:02

## 2018-05-23 RX ADMIN — MORPHINE SULFATE 2 MG: 2 INJECTION, SOLUTION INTRAMUSCULAR; INTRAVENOUS at 15:57

## 2018-05-23 RX ADMIN — HYDRALAZINE HYDROCHLORIDE 50 MG: 50 TABLET ORAL at 06:20

## 2018-05-23 RX ADMIN — HYDRALAZINE HYDROCHLORIDE 10 MG: 20 INJECTION INTRAMUSCULAR; INTRAVENOUS at 16:51

## 2018-05-23 RX ADMIN — FAMOTIDINE 20 MG: 20 TABLET ORAL at 16:02

## 2018-05-23 ASSESSMENT — ENCOUNTER SYMPTOMS
SHORTNESS OF BREATH: 1
CHEST TIGHTNESS: 1
EYES NEGATIVE: 1
COUGH: 1
ALLERGIC/IMMUNOLOGIC NEGATIVE: 1
GASTROINTESTINAL NEGATIVE: 1

## 2018-05-23 ASSESSMENT — PAIN SCALES - GENERAL
PAINLEVEL_OUTOF10: 0
PAINLEVEL_OUTOF10: 0
PAINLEVEL_OUTOF10: 8
PAINLEVEL_OUTOF10: 0
PAINLEVEL_OUTOF10: 7

## 2018-05-24 LAB
ANION GAP SERPL CALCULATED.3IONS-SCNC: 16 MEQ/L (ref 8–16)
BUN BLDV-MCNC: 31 MG/DL (ref 7–22)
CALCIUM SERPL-MCNC: 7.6 MG/DL (ref 8.5–10.5)
CHLORIDE BLD-SCNC: 92 MEQ/L (ref 98–111)
CO2: 27 MEQ/L (ref 23–33)
CREAT SERPL-MCNC: 6.9 MG/DL (ref 0.4–1.2)
EKG ATRIAL RATE: 65 BPM
EKG P AXIS: 99 DEGREES
EKG P-R INTERVAL: 198 MS
EKG Q-T INTERVAL: 450 MS
EKG QRS DURATION: 64 MS
EKG QTC CALCULATION (BAZETT): 468 MS
EKG R AXIS: -11 DEGREES
EKG T AXIS: 32 DEGREES
EKG VENTRICULAR RATE: 65 BPM
GFR SERPL CREATININE-BSD FRML MDRD: 9 ML/MIN/1.73M2
GLUCOSE BLD-MCNC: 93 MG/DL (ref 70–108)
POTASSIUM SERPL-SCNC: 4.3 MEQ/L (ref 3.5–5.2)
PRO-BNP: ABNORMAL PG/ML (ref 0–1800)
SODIUM BLD-SCNC: 135 MEQ/L (ref 135–145)

## 2018-05-24 PROCEDURE — 6370000000 HC RX 637 (ALT 250 FOR IP): Performed by: INTERNAL MEDICINE

## 2018-05-24 PROCEDURE — 93010 ELECTROCARDIOGRAM REPORT: CPT | Performed by: INTERNAL MEDICINE

## 2018-05-24 PROCEDURE — 99233 SBSQ HOSP IP/OBS HIGH 50: CPT | Performed by: INTERNAL MEDICINE

## 2018-05-24 PROCEDURE — 2580000003 HC RX 258: Performed by: INTERNAL MEDICINE

## 2018-05-24 PROCEDURE — 83880 ASSAY OF NATRIURETIC PEPTIDE: CPT

## 2018-05-24 PROCEDURE — 6360000002 HC RX W HCPCS: Performed by: INTERNAL MEDICINE

## 2018-05-24 PROCEDURE — 6370000000 HC RX 637 (ALT 250 FOR IP): Performed by: NURSE PRACTITIONER

## 2018-05-24 PROCEDURE — 80048 BASIC METABOLIC PNL TOTAL CA: CPT

## 2018-05-24 PROCEDURE — 99232 SBSQ HOSP IP/OBS MODERATE 35: CPT | Performed by: NURSE PRACTITIONER

## 2018-05-24 PROCEDURE — 36415 COLL VENOUS BLD VENIPUNCTURE: CPT

## 2018-05-24 PROCEDURE — 1200000003 HC TELEMETRY R&B

## 2018-05-24 PROCEDURE — 6360000002 HC RX W HCPCS: Performed by: FAMILY MEDICINE

## 2018-05-24 RX ORDER — MORPHINE SULFATE 2 MG/ML
2 INJECTION, SOLUTION INTRAMUSCULAR; INTRAVENOUS ONCE
Status: DISCONTINUED | OUTPATIENT
Start: 2018-05-24 | End: 2018-05-26 | Stop reason: HOSPADM

## 2018-05-24 RX ADMIN — HYDRALAZINE HYDROCHLORIDE 50 MG: 50 TABLET ORAL at 20:16

## 2018-05-24 RX ADMIN — ASPIRIN 81 MG: 81 TABLET, COATED ORAL at 09:19

## 2018-05-24 RX ADMIN — GABAPENTIN 100 MG: 100 CAPSULE ORAL at 20:16

## 2018-05-24 RX ADMIN — HYDRALAZINE HYDROCHLORIDE 10 MG: 20 INJECTION INTRAMUSCULAR; INTRAVENOUS at 23:32

## 2018-05-24 RX ADMIN — Medication 10 ML: at 20:16

## 2018-05-24 RX ADMIN — HEPARIN SODIUM 5000 UNITS: 5000 INJECTION, SOLUTION INTRAVENOUS; SUBCUTANEOUS at 13:18

## 2018-05-24 RX ADMIN — PRAVASTATIN SODIUM 80 MG: 80 TABLET ORAL at 20:16

## 2018-05-24 RX ADMIN — PANTOPRAZOLE SODIUM 40 MG: 40 TABLET, DELAYED RELEASE ORAL at 06:24

## 2018-05-24 RX ADMIN — FAMOTIDINE 20 MG: 20 TABLET ORAL at 09:19

## 2018-05-24 RX ADMIN — ISOSORBIDE MONONITRATE 30 MG: 30 TABLET ORAL at 09:19

## 2018-05-24 RX ADMIN — Medication 1 TABLET: at 09:19

## 2018-05-24 RX ADMIN — CLOPIDOGREL BISULFATE 75 MG: 75 TABLET ORAL at 09:19

## 2018-05-24 RX ADMIN — METOPROLOL TARTRATE 50 MG: 50 TABLET, FILM COATED ORAL at 20:16

## 2018-05-24 RX ADMIN — CINACALCET HYDROCHLORIDE 30 MG: 30 TABLET, COATED ORAL at 18:11

## 2018-05-24 RX ADMIN — HYDRALAZINE HYDROCHLORIDE 50 MG: 50 TABLET ORAL at 09:19

## 2018-05-24 RX ADMIN — LISINOPRIL 10 MG: 10 TABLET ORAL at 09:19

## 2018-05-24 RX ADMIN — HEPARIN SODIUM 5000 UNITS: 5000 INJECTION, SOLUTION INTRAVENOUS; SUBCUTANEOUS at 20:16

## 2018-05-24 RX ADMIN — METOPROLOL TARTRATE 50 MG: 50 TABLET, FILM COATED ORAL at 09:19

## 2018-05-24 RX ADMIN — Medication 10 ML: at 09:19

## 2018-05-24 ASSESSMENT — ENCOUNTER SYMPTOMS
CHEST TIGHTNESS: 1
ALLERGIC/IMMUNOLOGIC NEGATIVE: 1
SHORTNESS OF BREATH: 1
GASTROINTESTINAL NEGATIVE: 1
EYES NEGATIVE: 1
COUGH: 1

## 2018-05-24 ASSESSMENT — PAIN SCALES - GENERAL: PAINLEVEL_OUTOF10: 0

## 2018-05-25 LAB
ABO: NORMAL
ANION GAP SERPL CALCULATED.3IONS-SCNC: 18 MEQ/L (ref 8–16)
ANTIBODY SCREEN: NORMAL
APTT: 31.7 SECONDS (ref 22–38)
BUN BLDV-MCNC: 42 MG/DL (ref 7–22)
CALCIUM SERPL-MCNC: 7.5 MG/DL (ref 8.5–10.5)
CHLORIDE BLD-SCNC: 93 MEQ/L (ref 98–111)
CO2: 24 MEQ/L (ref 23–33)
CREAT SERPL-MCNC: 9.2 MG/DL (ref 0.4–1.2)
EKG ATRIAL RATE: 67 BPM
EKG P-R INTERVAL: 218 MS
EKG Q-T INTERVAL: 448 MS
EKG QRS DURATION: 68 MS
EKG QTC CALCULATION (BAZETT): 473 MS
EKG R AXIS: -5 DEGREES
EKG T AXIS: 28 DEGREES
EKG VENTRICULAR RATE: 67 BPM
GFR SERPL CREATININE-BSD FRML MDRD: 7 ML/MIN/1.73M2
GLUCOSE BLD-MCNC: 85 MG/DL (ref 70–108)
GLUCOSE BLD-MCNC: 88 MG/DL (ref 70–108)
HCT VFR BLD CALC: 31 % (ref 42–52)
HEMOGLOBIN: 10.5 GM/DL (ref 14–18)
INR BLD: 0.99 (ref 0.85–1.13)
MCH RBC QN AUTO: 33.9 PG (ref 27–31)
MCHC RBC AUTO-ENTMCNC: 33.8 GM/DL (ref 33–37)
MCV RBC AUTO: 100.4 FL (ref 80–94)
PDW BLD-RTO: 17.5 % (ref 11.5–14.5)
PLATELET # BLD: 161 THOU/MM3 (ref 130–400)
PMV BLD AUTO: 8 FL (ref 7.4–10.4)
POTASSIUM REFLEX MAGNESIUM: 4.4 MEQ/L (ref 3.5–5.2)
RBC # BLD: 3.09 MILL/MM3 (ref 4.7–6.1)
RH FACTOR: NORMAL
SODIUM BLD-SCNC: 135 MEQ/L (ref 135–145)
WBC # BLD: 4.4 THOU/MM3 (ref 4.8–10.8)

## 2018-05-25 PROCEDURE — 36415 COLL VENOUS BLD VENIPUNCTURE: CPT

## 2018-05-25 PROCEDURE — 2580000003 HC RX 258: Performed by: PHYSICIAN ASSISTANT

## 2018-05-25 PROCEDURE — 6360000002 HC RX W HCPCS: Performed by: INTERNAL MEDICINE

## 2018-05-25 PROCEDURE — 6370000000 HC RX 637 (ALT 250 FOR IP): Performed by: INTERNAL MEDICINE

## 2018-05-25 PROCEDURE — 2720000010 HC SURG SUPPLY STERILE

## 2018-05-25 PROCEDURE — 82948 REAGENT STRIP/BLOOD GLUCOSE: CPT

## 2018-05-25 PROCEDURE — 6370000000 HC RX 637 (ALT 250 FOR IP): Performed by: NURSE PRACTITIONER

## 2018-05-25 PROCEDURE — 80048 BASIC METABOLIC PNL TOTAL CA: CPT

## 2018-05-25 PROCEDURE — 93005 ELECTROCARDIOGRAM TRACING: CPT | Performed by: PHYSICIAN ASSISTANT

## 2018-05-25 PROCEDURE — B2151ZZ FLUOROSCOPY OF LEFT HEART USING LOW OSMOLAR CONTRAST: ICD-10-PCS | Performed by: INTERNAL MEDICINE

## 2018-05-25 PROCEDURE — C1769 GUIDE WIRE: HCPCS

## 2018-05-25 PROCEDURE — 86850 RBC ANTIBODY SCREEN: CPT

## 2018-05-25 PROCEDURE — 86900 BLOOD TYPING SEROLOGIC ABO: CPT

## 2018-05-25 PROCEDURE — 90935 HEMODIALYSIS ONE EVALUATION: CPT

## 2018-05-25 PROCEDURE — C1894 INTRO/SHEATH, NON-LASER: HCPCS

## 2018-05-25 PROCEDURE — 93458 L HRT ARTERY/VENTRICLE ANGIO: CPT | Performed by: INTERNAL MEDICINE

## 2018-05-25 PROCEDURE — 2780000010 HC IMPLANT OTHER

## 2018-05-25 PROCEDURE — 6360000002 HC RX W HCPCS

## 2018-05-25 PROCEDURE — 93010 ELECTROCARDIOGRAM REPORT: CPT | Performed by: INTERNAL MEDICINE

## 2018-05-25 PROCEDURE — 4A023N7 MEASUREMENT OF CARDIAC SAMPLING AND PRESSURE, LEFT HEART, PERCUTANEOUS APPROACH: ICD-10-PCS | Performed by: INTERNAL MEDICINE

## 2018-05-25 PROCEDURE — 2500000003 HC RX 250 WO HCPCS

## 2018-05-25 PROCEDURE — 85610 PROTHROMBIN TIME: CPT

## 2018-05-25 PROCEDURE — B2111ZZ FLUOROSCOPY OF MULTIPLE CORONARY ARTERIES USING LOW OSMOLAR CONTRAST: ICD-10-PCS | Performed by: INTERNAL MEDICINE

## 2018-05-25 PROCEDURE — 85730 THROMBOPLASTIN TIME PARTIAL: CPT

## 2018-05-25 PROCEDURE — 1200000003 HC TELEMETRY R&B

## 2018-05-25 PROCEDURE — 86901 BLOOD TYPING SEROLOGIC RH(D): CPT

## 2018-05-25 PROCEDURE — 6370000000 HC RX 637 (ALT 250 FOR IP): Performed by: PHYSICIAN ASSISTANT

## 2018-05-25 PROCEDURE — 99232 SBSQ HOSP IP/OBS MODERATE 35: CPT | Performed by: INTERNAL MEDICINE

## 2018-05-25 PROCEDURE — 85027 COMPLETE CBC AUTOMATED: CPT

## 2018-05-25 RX ORDER — ASPIRIN 325 MG
325 TABLET ORAL ONCE
Status: COMPLETED | OUTPATIENT
Start: 2018-05-25 | End: 2018-05-25

## 2018-05-25 RX ORDER — SODIUM CHLORIDE 0.9 % (FLUSH) 0.9 %
10 SYRINGE (ML) INJECTION PRN
Status: DISCONTINUED | OUTPATIENT
Start: 2018-05-25 | End: 2018-05-25

## 2018-05-25 RX ORDER — GUAIFENESIN/DEXTROMETHORPHAN 100-10MG/5
5 SYRUP ORAL EVERY 4 HOURS PRN
Status: DISCONTINUED | OUTPATIENT
Start: 2018-05-25 | End: 2018-05-26 | Stop reason: HOSPADM

## 2018-05-25 RX ORDER — NITROGLYCERIN 0.4 MG/1
0.4 TABLET SUBLINGUAL EVERY 5 MIN PRN
Status: DISCONTINUED | OUTPATIENT
Start: 2018-05-25 | End: 2018-05-25

## 2018-05-25 RX ORDER — SODIUM CHLORIDE 9 MG/ML
INJECTION, SOLUTION INTRAVENOUS CONTINUOUS
Status: DISCONTINUED | OUTPATIENT
Start: 2018-05-25 | End: 2018-05-25

## 2018-05-25 RX ORDER — LISINOPRIL 20 MG/1
20 TABLET ORAL DAILY
Status: DISCONTINUED | OUTPATIENT
Start: 2018-05-26 | End: 2018-05-26 | Stop reason: HOSPADM

## 2018-05-25 RX ORDER — HYDRALAZINE HYDROCHLORIDE 50 MG/1
50 TABLET, FILM COATED ORAL EVERY 8 HOURS SCHEDULED
Status: DISCONTINUED | OUTPATIENT
Start: 2018-05-25 | End: 2018-05-26 | Stop reason: HOSPADM

## 2018-05-25 RX ORDER — SODIUM CHLORIDE 0.9 % (FLUSH) 0.9 %
10 SYRINGE (ML) INJECTION EVERY 12 HOURS SCHEDULED
Status: DISCONTINUED | OUTPATIENT
Start: 2018-05-25 | End: 2018-05-25

## 2018-05-25 RX ADMIN — LISINOPRIL 10 MG: 10 TABLET ORAL at 07:53

## 2018-05-25 RX ADMIN — HEPARIN SODIUM 5000 UNITS: 5000 INJECTION, SOLUTION INTRAVENOUS; SUBCUTANEOUS at 22:03

## 2018-05-25 RX ADMIN — PRAVASTATIN SODIUM 80 MG: 80 TABLET ORAL at 20:36

## 2018-05-25 RX ADMIN — GABAPENTIN 100 MG: 100 CAPSULE ORAL at 20:36

## 2018-05-25 RX ADMIN — METOPROLOL TARTRATE 50 MG: 50 TABLET, FILM COATED ORAL at 20:36

## 2018-05-25 RX ADMIN — HYDRALAZINE HYDROCHLORIDE 50 MG: 50 TABLET ORAL at 22:03

## 2018-05-25 RX ADMIN — CLOPIDOGREL BISULFATE 75 MG: 75 TABLET ORAL at 07:49

## 2018-05-25 RX ADMIN — METOPROLOL TARTRATE 50 MG: 50 TABLET, FILM COATED ORAL at 07:53

## 2018-05-25 RX ADMIN — SODIUM CHLORIDE: 9 INJECTION, SOLUTION INTRAVENOUS at 07:56

## 2018-05-25 RX ADMIN — ASPIRIN 325 MG: 325 TABLET, COATED ORAL at 07:49

## 2018-05-25 RX ADMIN — HYDRALAZINE HYDROCHLORIDE 50 MG: 50 TABLET ORAL at 07:53

## 2018-05-25 RX ADMIN — PANTOPRAZOLE SODIUM 40 MG: 40 TABLET, DELAYED RELEASE ORAL at 07:08

## 2018-05-25 RX ADMIN — Medication 1 TABLET: at 07:53

## 2018-05-25 RX ADMIN — FAMOTIDINE 20 MG: 20 TABLET ORAL at 07:49

## 2018-05-25 RX ADMIN — ISOSORBIDE MONONITRATE 30 MG: 30 TABLET ORAL at 07:53

## 2018-05-25 RX ADMIN — CINACALCET HYDROCHLORIDE 30 MG: 30 TABLET, COATED ORAL at 18:29

## 2018-05-25 ASSESSMENT — PAIN DESCRIPTION - PROGRESSION

## 2018-05-25 ASSESSMENT — PAIN SCALES - GENERAL
PAINLEVEL_OUTOF10: 0
PAINLEVEL_OUTOF10: 0
PAINLEVEL_OUTOF10: 8
PAINLEVEL_OUTOF10: 0

## 2018-05-25 ASSESSMENT — PAIN DESCRIPTION - ONSET
ONSET: ON-GOING
ONSET: ON-GOING

## 2018-05-25 ASSESSMENT — PAIN DESCRIPTION - LOCATION
LOCATION: CHEST
LOCATION: CHEST
LOCATION: ABDOMEN

## 2018-05-25 ASSESSMENT — PAIN DESCRIPTION - DESCRIPTORS
DESCRIPTORS: ACHING
DESCRIPTORS: ACHING

## 2018-05-25 ASSESSMENT — PAIN DESCRIPTION - FREQUENCY
FREQUENCY: CONTINUOUS
FREQUENCY: CONTINUOUS

## 2018-05-25 ASSESSMENT — PAIN DESCRIPTION - PAIN TYPE
TYPE: ACUTE PAIN

## 2018-05-26 VITALS
HEIGHT: 66 IN | SYSTOLIC BLOOD PRESSURE: 133 MMHG | OXYGEN SATURATION: 96 % | RESPIRATION RATE: 16 BRPM | BODY MASS INDEX: 27.1 KG/M2 | TEMPERATURE: 98.2 F | DIASTOLIC BLOOD PRESSURE: 60 MMHG | HEART RATE: 69 BPM | WEIGHT: 168.6 LBS

## 2018-05-26 LAB
ANISOCYTOSIS: ABNORMAL
BASOPHILS # BLD: 0.4 %
BASOPHILS ABSOLUTE: 0 THOU/MM3 (ref 0–0.1)
EOSINOPHIL # BLD: 10.2 %
EOSINOPHILS ABSOLUTE: 0.6 THOU/MM3 (ref 0–0.4)
HCT VFR BLD CALC: 33.5 % (ref 42–52)
HEMOGLOBIN: 11.4 GM/DL (ref 14–18)
LYMPHOCYTES # BLD: 16.5 %
LYMPHOCYTES ABSOLUTE: 0.9 THOU/MM3 (ref 1–4.8)
MACROCYTES: ABNORMAL
MCH RBC QN AUTO: 33.8 PG (ref 27–31)
MCHC RBC AUTO-ENTMCNC: 33.9 GM/DL (ref 33–37)
MCV RBC AUTO: 99.6 FL (ref 80–94)
MONOCYTES # BLD: 11.7 %
MONOCYTES ABSOLUTE: 0.6 THOU/MM3 (ref 0.4–1.3)
NUCLEATED RED BLOOD CELLS: 0 /100 WBC
PDW BLD-RTO: 17.7 % (ref 11.5–14.5)
PLATELET # BLD: 168 THOU/MM3 (ref 130–400)
PMV BLD AUTO: 8.3 FL (ref 7.4–10.4)
RBC # BLD: 3.36 MILL/MM3 (ref 4.7–6.1)
SEG NEUTROPHILS: 61.2 %
SEGMENTED NEUTROPHILS ABSOLUTE COUNT: 3.3 THOU/MM3 (ref 1.8–7.7)
WBC # BLD: 5.4 THOU/MM3 (ref 4.8–10.8)

## 2018-05-26 PROCEDURE — 36415 COLL VENOUS BLD VENIPUNCTURE: CPT

## 2018-05-26 PROCEDURE — 6360000002 HC RX W HCPCS: Performed by: INTERNAL MEDICINE

## 2018-05-26 PROCEDURE — 6370000000 HC RX 637 (ALT 250 FOR IP): Performed by: INTERNAL MEDICINE

## 2018-05-26 PROCEDURE — 99232 SBSQ HOSP IP/OBS MODERATE 35: CPT | Performed by: PHYSICIAN ASSISTANT

## 2018-05-26 PROCEDURE — 99231 SBSQ HOSP IP/OBS SF/LOW 25: CPT | Performed by: INTERNAL MEDICINE

## 2018-05-26 PROCEDURE — 85025 COMPLETE CBC W/AUTO DIFF WBC: CPT

## 2018-05-26 RX ORDER — CLONIDINE HYDROCHLORIDE 0.2 MG/1
0.1 TABLET ORAL 2 TIMES DAILY
Qty: 60 TABLET | Refills: 3 | Status: SHIPPED | OUTPATIENT
Start: 2018-05-26 | End: 2018-06-06 | Stop reason: DRUGHIGH

## 2018-05-26 RX ORDER — HYDRALAZINE HYDROCHLORIDE 50 MG/1
50 TABLET, FILM COATED ORAL EVERY 8 HOURS SCHEDULED
Qty: 90 TABLET | Refills: 3 | Status: SHIPPED | OUTPATIENT
Start: 2018-05-26 | End: 2018-06-06 | Stop reason: DRUGHIGH

## 2018-05-26 RX ORDER — CINACALCET 30 MG/1
30 TABLET, FILM COATED ORAL
Qty: 30 TABLET | Refills: 3 | Status: ON HOLD | OUTPATIENT
Start: 2018-05-26 | End: 2021-01-01

## 2018-05-26 RX ORDER — ONDANSETRON 2 MG/ML
4 INJECTION INTRAMUSCULAR; INTRAVENOUS EVERY 6 HOURS PRN
Status: DISCONTINUED | OUTPATIENT
Start: 2018-05-26 | End: 2018-05-26 | Stop reason: HOSPADM

## 2018-05-26 RX ORDER — SODIUM CHLORIDE 0.9 % (FLUSH) 0.9 %
10 SYRINGE (ML) INJECTION EVERY 12 HOURS SCHEDULED
Status: DISCONTINUED | OUTPATIENT
Start: 2018-05-26 | End: 2018-05-26 | Stop reason: HOSPADM

## 2018-05-26 RX ORDER — LISINOPRIL 20 MG/1
20 TABLET ORAL DAILY
Qty: 30 TABLET | Refills: 3 | Status: ON HOLD | OUTPATIENT
Start: 2018-05-27 | End: 2021-01-01

## 2018-05-26 RX ORDER — SODIUM CHLORIDE 9 MG/ML
INJECTION, SOLUTION INTRAVENOUS CONTINUOUS
Status: DISCONTINUED | OUTPATIENT
Start: 2018-05-26 | End: 2018-05-26 | Stop reason: HOSPADM

## 2018-05-26 RX ORDER — SODIUM CHLORIDE 0.9 % (FLUSH) 0.9 %
10 SYRINGE (ML) INJECTION PRN
Status: DISCONTINUED | OUTPATIENT
Start: 2018-05-26 | End: 2018-05-26 | Stop reason: HOSPADM

## 2018-05-26 RX ORDER — ACETAMINOPHEN 325 MG/1
650 TABLET ORAL EVERY 4 HOURS PRN
Status: DISCONTINUED | OUTPATIENT
Start: 2018-05-26 | End: 2018-05-26 | Stop reason: HOSPADM

## 2018-05-26 RX ADMIN — PANTOPRAZOLE SODIUM 40 MG: 40 TABLET, DELAYED RELEASE ORAL at 05:46

## 2018-05-26 RX ADMIN — LISINOPRIL 20 MG: 20 TABLET ORAL at 09:08

## 2018-05-26 RX ADMIN — CLOPIDOGREL BISULFATE 75 MG: 75 TABLET ORAL at 09:06

## 2018-05-26 RX ADMIN — Medication 1 TABLET: at 09:06

## 2018-05-26 RX ADMIN — HYDRALAZINE HYDROCHLORIDE 50 MG: 50 TABLET ORAL at 05:46

## 2018-05-26 RX ADMIN — ASPIRIN 81 MG: 81 TABLET, COATED ORAL at 09:06

## 2018-05-26 RX ADMIN — METOPROLOL TARTRATE 50 MG: 50 TABLET, FILM COATED ORAL at 09:06

## 2018-05-26 RX ADMIN — FAMOTIDINE 20 MG: 20 TABLET ORAL at 09:06

## 2018-05-26 RX ADMIN — ISOSORBIDE MONONITRATE 30 MG: 30 TABLET ORAL at 09:06

## 2018-05-26 RX ADMIN — HEPARIN SODIUM 5000 UNITS: 5000 INJECTION, SOLUTION INTRAVENOUS; SUBCUTANEOUS at 05:46

## 2018-05-26 ASSESSMENT — PAIN DESCRIPTION - PROGRESSION
CLINICAL_PROGRESSION: NOT CHANGED

## 2018-06-06 ENCOUNTER — TELEPHONE (OUTPATIENT)
Dept: PHARMACY | Facility: CLINIC | Age: 83
End: 2018-06-06

## 2018-06-06 DIAGNOSIS — N18.6 ESRD (END STAGE RENAL DISEASE) (HCC): Primary | ICD-10-CM

## 2018-06-06 PROCEDURE — 1111F DSCHRG MED/CURRENT MED MERGE: CPT | Performed by: PHARMACIST

## 2018-06-06 RX ORDER — HYDRALAZINE HYDROCHLORIDE 50 MG/1
50 TABLET, FILM COATED ORAL 2 TIMES DAILY
Status: ON HOLD | COMMUNITY
End: 2021-01-01 | Stop reason: HOSPADM

## 2018-06-06 RX ORDER — CLONIDINE HYDROCHLORIDE 0.1 MG/1
0.1 TABLET ORAL 2 TIMES DAILY
Status: ON HOLD | COMMUNITY
Start: 2018-05-30 | End: 2021-01-01

## 2018-07-18 ENCOUNTER — HOSPITAL ENCOUNTER (EMERGENCY)
Age: 83
Discharge: HOME | End: 2018-07-18
Attending: EMERGENCY MEDICINE
Payer: MEDICARE

## 2018-07-18 ENCOUNTER — APPOINTMENT (OUTPATIENT)
Dept: CT IMAGING | Age: 83
End: 2018-07-18
Payer: MEDICARE

## 2018-07-18 VITALS
SYSTOLIC BLOOD PRESSURE: 133 MMHG | HEART RATE: 65 BPM | RESPIRATION RATE: 18 BRPM | OXYGEN SATURATION: 95 % | BODY MASS INDEX: 27.32 KG/M2 | TEMPERATURE: 97.7 F | DIASTOLIC BLOOD PRESSURE: 72 MMHG | WEIGHT: 170 LBS | HEIGHT: 66 IN

## 2018-07-18 DIAGNOSIS — N18.6 END STAGE RENAL DISEASE ON DIALYSIS (HCC): ICD-10-CM

## 2018-07-18 DIAGNOSIS — Z99.2 END STAGE RENAL DISEASE ON DIALYSIS (HCC): ICD-10-CM

## 2018-07-18 DIAGNOSIS — R10.9 FLANK PAIN: Primary | ICD-10-CM

## 2018-07-18 DIAGNOSIS — N30.00 ACUTE CYSTITIS WITHOUT HEMATURIA: ICD-10-CM

## 2018-07-18 LAB
ANION GAP SERPL CALCULATED.3IONS-SCNC: 19 MEQ/L (ref 8–16)
BACTERIA: ABNORMAL /HPF
BASOPHILS # BLD: 0.7 %
BASOPHILS ABSOLUTE: 0 THOU/MM3 (ref 0–0.1)
BILIRUBIN URINE: NEGATIVE
BLOOD, URINE: NEGATIVE
BUN BLDV-MCNC: 50 MG/DL (ref 7–22)
CALCIUM SERPL-MCNC: 9.2 MG/DL (ref 8.5–10.5)
CASTS 2: ABNORMAL /LPF
CASTS UA: ABNORMAL /LPF
CHARACTER, URINE: ABNORMAL
CHLORIDE BLD-SCNC: 100 MEQ/L (ref 98–111)
CO2: 23 MEQ/L (ref 23–33)
COLOR: YELLOW
CREAT SERPL-MCNC: 10.1 MG/DL (ref 0.4–1.2)
CRYSTALS, UA: ABNORMAL
EOSINOPHIL # BLD: 6.9 %
EOSINOPHILS ABSOLUTE: 0.3 THOU/MM3 (ref 0–0.4)
EPITHELIAL CELLS, UA: ABNORMAL /HPF
ERYTHROCYTE [DISTWIDTH] IN BLOOD BY AUTOMATED COUNT: 17.3 % (ref 11.5–14.5)
ERYTHROCYTE [DISTWIDTH] IN BLOOD BY AUTOMATED COUNT: 58.7 FL (ref 35–45)
GFR SERPL CREATININE-BSD FRML MDRD: 6 ML/MIN/1.73M2
GLUCOSE BLD-MCNC: 88 MG/DL (ref 70–108)
GLUCOSE URINE: NEGATIVE MG/DL
HCT VFR BLD CALC: 26.5 % (ref 42–52)
HEMOGLOBIN: 9.7 GM/DL (ref 14–18)
IMMATURE GRANS (ABS): 0.11 THOU/MM3 (ref 0–0.07)
IMMATURE GRANULOCYTES: 2.4 %
KETONES, URINE: ABNORMAL
LEUKOCYTE ESTERASE, URINE: ABNORMAL
LYMPHOCYTES # BLD: 15.1 %
LYMPHOCYTES ABSOLUTE: 0.7 THOU/MM3 (ref 1–4.8)
MCH RBC QN AUTO: 34.2 PG (ref 26–33)
MCHC RBC AUTO-ENTMCNC: 36.6 GM/DL (ref 32.2–35.5)
MCV RBC AUTO: 93.3 FL (ref 80–94)
MISCELLANEOUS 2: ABNORMAL
MONOCYTES # BLD: 10 %
MONOCYTES ABSOLUTE: 0.5 THOU/MM3 (ref 0.4–1.3)
NITRITE, URINE: NEGATIVE
NUCLEATED RED BLOOD CELLS: 0 /100 WBC
OSMOLALITY CALCULATION: 295.9 MOSMOL/KG (ref 275–300)
PH UA: 8.5
PLATELET # BLD: 104 THOU/MM3 (ref 130–400)
PMV BLD AUTO: 10.1 FL (ref 9.4–12.4)
POTASSIUM SERPL-SCNC: 5.6 MEQ/L (ref 3.5–5.2)
PROTEIN UA: 100
RBC # BLD: 2.84 MILL/MM3 (ref 4.7–6.1)
RBC URINE: ABNORMAL /HPF
RENAL EPITHELIAL, UA: PRESENT
SEG NEUTROPHILS: 64.9 %
SEGMENTED NEUTROPHILS ABSOLUTE COUNT: 2.9 THOU/MM3 (ref 1.8–7.7)
SODIUM BLD-SCNC: 142 MEQ/L (ref 135–145)
SPECIFIC GRAVITY, URINE: 1.01 (ref 1–1.03)
UROBILINOGEN, URINE: 1 EU/DL
WBC # BLD: 4.5 THOU/MM3 (ref 4.8–10.8)
WBC UA: ABNORMAL /HPF
YEAST: ABNORMAL

## 2018-07-18 PROCEDURE — 85025 COMPLETE CBC W/AUTO DIFF WBC: CPT

## 2018-07-18 PROCEDURE — 87086 URINE CULTURE/COLONY COUNT: CPT

## 2018-07-18 PROCEDURE — 6360000002 HC RX W HCPCS: Performed by: EMERGENCY MEDICINE

## 2018-07-18 PROCEDURE — 80048 BASIC METABOLIC PNL TOTAL CA: CPT

## 2018-07-18 PROCEDURE — 99284 EMERGENCY DEPT VISIT MOD MDM: CPT

## 2018-07-18 PROCEDURE — 6370000000 HC RX 637 (ALT 250 FOR IP): Performed by: EMERGENCY MEDICINE

## 2018-07-18 PROCEDURE — 96374 THER/PROPH/DIAG INJ IV PUSH: CPT

## 2018-07-18 PROCEDURE — 2580000003 HC RX 258: Performed by: EMERGENCY MEDICINE

## 2018-07-18 PROCEDURE — 81001 URINALYSIS AUTO W/SCOPE: CPT

## 2018-07-18 PROCEDURE — 36415 COLL VENOUS BLD VENIPUNCTURE: CPT

## 2018-07-18 PROCEDURE — 96361 HYDRATE IV INFUSION ADD-ON: CPT

## 2018-07-18 PROCEDURE — 74176 CT ABD & PELVIS W/O CONTRAST: CPT

## 2018-07-18 RX ORDER — GABAPENTIN 300 MG/1
600 CAPSULE ORAL ONCE
Status: DISCONTINUED | OUTPATIENT
Start: 2018-07-18 | End: 2018-07-18 | Stop reason: DRUGHIGH

## 2018-07-18 RX ORDER — SODIUM CHLORIDE 9 MG/ML
INJECTION, SOLUTION INTRAVENOUS CONTINUOUS
Status: DISCONTINUED | OUTPATIENT
Start: 2018-07-18 | End: 2018-07-18 | Stop reason: HOSPADM

## 2018-07-18 RX ORDER — CEFDINIR 300 MG/1
300 CAPSULE ORAL EVERY OTHER DAY
Qty: 7 CAPSULE | Refills: 0 | Status: SHIPPED | OUTPATIENT
Start: 2018-07-18 | End: 2018-07-31

## 2018-07-18 RX ORDER — LIDOCAINE 50 MG/G
1 PATCH TOPICAL ONCE
Status: DISCONTINUED | OUTPATIENT
Start: 2018-07-18 | End: 2018-07-18 | Stop reason: HOSPADM

## 2018-07-18 RX ORDER — GABAPENTIN 300 MG/1
300 CAPSULE ORAL ONCE
Status: COMPLETED | OUTPATIENT
Start: 2018-07-18 | End: 2018-07-18

## 2018-07-18 RX ORDER — ORPHENADRINE CITRATE 30 MG/ML
60 INJECTION INTRAMUSCULAR; INTRAVENOUS ONCE
Status: COMPLETED | OUTPATIENT
Start: 2018-07-18 | End: 2018-07-18

## 2018-07-18 RX ORDER — ACETAMINOPHEN 500 MG
1000 TABLET ORAL ONCE
Status: COMPLETED | OUTPATIENT
Start: 2018-07-18 | End: 2018-07-18

## 2018-07-18 RX ADMIN — SODIUM CHLORIDE: 9 INJECTION, SOLUTION INTRAVENOUS at 04:14

## 2018-07-18 RX ADMIN — ACETAMINOPHEN 1000 MG: 500 TABLET, FILM COATED ORAL at 04:02

## 2018-07-18 RX ADMIN — ORPHENADRINE CITRATE 60 MG: 30 INJECTION INTRAMUSCULAR; INTRAVENOUS at 04:02

## 2018-07-18 RX ADMIN — GABAPENTIN 300 MG: 300 CAPSULE ORAL at 04:26

## 2018-07-18 ASSESSMENT — PAIN DESCRIPTION - LOCATION
LOCATION: FLANK

## 2018-07-18 ASSESSMENT — ENCOUNTER SYMPTOMS
EYE DISCHARGE: 0
WHEEZING: 0
RHINORRHEA: 0
SORE THROAT: 0
BACK PAIN: 0
EYE REDNESS: 0
COUGH: 0
NAUSEA: 0
ABDOMINAL PAIN: 0
VOMITING: 0
DIARRHEA: 0
SHORTNESS OF BREATH: 0

## 2018-07-18 ASSESSMENT — PAIN SCALES - GENERAL
PAINLEVEL_OUTOF10: 10
PAINLEVEL_OUTOF10: 5
PAINLEVEL_OUTOF10: 9
PAINLEVEL_OUTOF10: 3

## 2018-07-18 ASSESSMENT — PAIN DESCRIPTION - PAIN TYPE
TYPE: ACUTE PAIN

## 2018-07-18 ASSESSMENT — PAIN DESCRIPTION - ORIENTATION: ORIENTATION: LEFT

## 2018-07-18 NOTE — ED NOTES
Pt is resting quietly on cart with call light in reach. Pt is updated on POC and questions are addressed. Will continue to monitor the patient.      Le Armstrong RN  07/18/18 5709

## 2018-07-18 NOTE — ED NOTES
Pt is returned to room from CT scan and medicated as ordered. PT is updated on POC. Will continue to monitor the patient.      Wing Santana RN  07/18/18 2401

## 2018-07-18 NOTE — ED TRIAGE NOTES
Pt arrives to the ED with a chief complaint of left sided flank pain. PT is a dialysis patient who has dialysis due today. PT states he was woken up tonight with 10/10 pain on his left flank. Pt denies ever having a kidney stone. Provider to see patient.

## 2018-07-18 NOTE — ED PROVIDER NOTES
reviewed. Workup suggest cystitis with bladder wall thickening and polyuria found in the urinalysis. No evidence of kidney stone and an incidental cholelithiasis and cysts are identified. Chronic kidney changes or noted again. Patient is feeling little better and I discussed options with him. We will have him follow-up with dialysis now as scheduled and continue with his normal routine. I contacted the pharmacy and they have recommended Omnicef 300 mg every other day for 7 doses to be taken after dialysis. Culture pending on the urinalysis at discharge. Nephrology paged with no answer at this point. Patient is regularly scheduled for dialysis at this time. CRITICAL CARE:  None. CONSULTS:  None. PROCEDURES:  None. FINAL IMPRESSION      1. Flank pain    2. Acute cystitis without hematuria    3. End stage renal disease on dialysis [facility])          DISPOSITION/PLAN   DISPOSITION Decision To Discharge 07/18/2018 07:05:58 AM    PATIENT REFERRED TO:  Nevaeh Vallejo MD  [address]  [phone]    Schedule an appointment as soon as possible for a visit in 3 days  For [address] [place] [address] [address] [place] [address] Av  Go today        [address] [facility]:  New Prescriptions    CEFDINIR (OMNICEF) 300 MG CAPSULE    Take 1 capsule by mouth every other day for 7 doses Take after dialysis           Scribe:  Althea Griffin 7/18/18 3:41 AM Scribing for and in the presence of Dr. Sima Wells M.D. Signed by: Miguelangel Yepez, 07/18/18 7:13 AM    Provider:  I personally performed the services described in the documentation, reviewed and edited the documentation which was dictated to the scribe in my presence, and it accurately records my words and actions.     Dr. Sima Wells M.D 7/18/18 7:13 AM        Sima Wells MD  07/18/18 4062

## 2018-07-18 NOTE — ED NOTES
Pt is resting on cart with call light in reach. Urine sample is obtained and sent. Pt is updated on POC. Will continue to monitor the patient.      Sukhi Arce RN  07/18/18 2082

## 2018-07-19 LAB
ORGANISM: ABNORMAL
URINE CULTURE REFLEX: ABNORMAL

## 2018-09-07 ENCOUNTER — HOSPITAL ENCOUNTER (EMERGENCY)
Age: 83
Discharge: HOME | End: 2018-09-07
Attending: FAMILY MEDICINE
Payer: MEDICARE

## 2018-09-07 VITALS
HEIGHT: 66 IN | OXYGEN SATURATION: 100 % | WEIGHT: 180 LBS | HEART RATE: 60 BPM | BODY MASS INDEX: 28.93 KG/M2 | TEMPERATURE: 97.7 F | DIASTOLIC BLOOD PRESSURE: 65 MMHG | SYSTOLIC BLOOD PRESSURE: 173 MMHG | RESPIRATION RATE: 18 BRPM

## 2018-09-07 DIAGNOSIS — T82.838A HEMORRHAGE OF ARTERIOVENOUS FISTULA, INITIAL ENCOUNTER (HCC): Primary | ICD-10-CM

## 2018-09-07 PROCEDURE — 99284 EMERGENCY DEPT VISIT MOD MDM: CPT

## 2018-09-07 ASSESSMENT — ENCOUNTER SYMPTOMS
CHEST TIGHTNESS: 0
SHORTNESS OF BREATH: 0
COLOR CHANGE: 0
STRIDOR: 0
CHOKING: 0
APNEA: 0
COUGH: 0
BACK PAIN: 0
WHEEZING: 0

## 2018-09-07 NOTE — ED PROVIDER NOTES
[facility]  eMERGENCY dEPARTMENT eNCOUnter          CHIEF COMPLAINT       Chief Complaint   Patient presents with    Other     fistula bleeding in right arm       Nurses Notes reviewed and I agree except as noted in the HPI. HISTORY OF PRESENT ILLNESS    Conrad Lombardi is a 80 y.o. male who presents to the Emergency Department for the evaluation of AV fistula site bleed status post his hemodialysis this morning. Should be noted that patient is being brought over by squad who helped to manage the initial hemorrhage by applying pressure to the AV fistula site. The patient is an otherwise his usual state of health no current concerns complications nausea vomiting shortness of breath changes in vision chest pain or headaches. The bleeding had stopped prior to the arrival to the emergency room department today. The HPI was provided by the patient. REVIEW OF SYSTEMS     Review of Systems   Constitutional: Negative for activity change, appetite change, chills, diaphoresis, fatigue and fever. HENT: Negative for congestion. Respiratory: Negative for apnea, cough, choking, chest tightness, shortness of breath, wheezing and stridor. Cardiovascular: Negative for chest pain, palpitations and leg swelling. Genitourinary: Negative for difficulty urinating and frequency. Musculoskeletal: Negative for arthralgias, back pain, gait problem, joint swelling, myalgias and neck pain. Skin: Negative for color change and wound. Neurological: Negative for dizziness, tremors, seizures, syncope, facial asymmetry, speech difficulty, weakness, light-headedness, numbness and headaches. Psychiatric/Behavioral: Negative for agitation, confusion, decreased concentration and dysphoric mood. The patient is not nervous/anxious. PAST MEDICAL HISTORY    has a past medical history of Anemia in chronic kidney disease(285.21); Arthritis; CAD (coronary artery disease);  Chronic kidney disease; Chronic Diabetes in his mother and sister; Heart Disease in his mother; Mental Illness in his paternal aunt. SOCIAL HISTORY      reports that he quit smoking about 36 years ago. He has a 40.00 pack-year smoking history. He has never used smokeless tobacco. He reports that he does not drink alcohol or use drugs. PHYSICAL EXAM     INITIAL VITALS:  height is 5' 6\" (1.676 m) and weight is 180 lb (81.6 kg). His blood pressure is 161/70 (abnormal) and his pulse is 60. His respiration is 18 and oxygen saturation is 100%. Physical Exam   Constitutional: He is oriented to person, place, and time. He appears well-developed and well-nourished. No distress. HENT:   Head: Normocephalic and atraumatic. Eyes: Conjunctivae are normal.   Neck: Normal range of motion. Cardiovascular: Normal rate, regular rhythm and normal heart sounds. No murmur heard. Pulmonary/Chest: Effort normal and breath sounds normal. No respiratory distress. He has no wheezes. He exhibits no tenderness. Abdominal: Soft. Bowel sounds are normal. He exhibits no distension and no mass. There is no tenderness. There is no rebound. Musculoskeletal: Normal range of motion. Neurological: He is alert and oriented to person, place, and time. He has normal reflexes. Skin: Skin is warm. Bruising (above R. Anteriorcubital fossa region) noted. No abrasion, no burn, no ecchymosis and no laceration noted. He is not diaphoretic. No erythema. Psychiatric: He has a normal mood and affect.  His behavior is normal. Thought content normal.       DIFFERENTIAL DIAGNOSIS:   AV Fistula Bleed    DIAGNOSTIC RESULTS     EKG: All EKG's are interpreted by the Emergency Department Physician who either signs or Co-signs this chart in the absence of a cardiologist.      RADIOLOGY: non-plain film images(s) such as CT, Ultrasound and MRI are read by the radiologist.    No orders to display       LABS:   Labs Reviewed - No data to display    EMERGENCY DEPARTMENT COURSE: 1:06 PM      Monika Hathaway MD  09/07/18 0917

## 2018-12-07 PROBLEM — Z99.2 ANEMIA DUE TO CHRONIC KIDNEY DISEASE, ON CHRONIC DIALYSIS (HCC): Status: ACTIVE | Noted: 2018-12-07

## 2018-12-07 PROBLEM — D63.1 ANEMIA DUE TO CHRONIC KIDNEY DISEASE, ON CHRONIC DIALYSIS (HCC): Status: ACTIVE | Noted: 2018-12-07

## 2018-12-07 PROBLEM — N18.6 ANEMIA DUE TO CHRONIC KIDNEY DISEASE, ON CHRONIC DIALYSIS (HCC): Status: ACTIVE | Noted: 2018-12-07

## 2019-06-07 ENCOUNTER — HOSPITAL ENCOUNTER (OUTPATIENT)
Dept: NURSING | Age: 84
Discharge: HOME | End: 2019-06-07
Payer: MEDICARE

## 2019-06-07 VITALS
DIASTOLIC BLOOD PRESSURE: 76 MMHG | HEART RATE: 80 BPM | SYSTOLIC BLOOD PRESSURE: 196 MMHG | RESPIRATION RATE: 16 BRPM | TEMPERATURE: 97.2 F

## 2019-06-07 LAB
ABO: NORMAL
ANTIBODY SCREEN: NORMAL
RH FACTOR: NORMAL

## 2019-06-07 PROCEDURE — P9016 RBC LEUKOCYTES REDUCED: HCPCS

## 2019-06-07 PROCEDURE — 36430 TRANSFUSION BLD/BLD COMPNT: CPT

## 2019-06-07 PROCEDURE — 86900 BLOOD TYPING SEROLOGIC ABO: CPT

## 2019-06-07 PROCEDURE — 2709999900 HC NON-CHARGEABLE SUPPLY

## 2019-06-07 PROCEDURE — 86923 COMPATIBILITY TEST ELECTRIC: CPT

## 2019-06-07 PROCEDURE — 36415 COLL VENOUS BLD VENIPUNCTURE: CPT

## 2019-06-07 PROCEDURE — 2580000003 HC RX 258: Performed by: INTERNAL MEDICINE

## 2019-06-07 PROCEDURE — 86901 BLOOD TYPING SEROLOGIC RH(D): CPT

## 2019-06-07 PROCEDURE — 86850 RBC ANTIBODY SCREEN: CPT

## 2019-06-07 RX ORDER — 0.9 % SODIUM CHLORIDE 0.9 %
250 INTRAVENOUS SOLUTION INTRAVENOUS ONCE
Status: COMPLETED | OUTPATIENT
Start: 2019-06-07 | End: 2019-06-07

## 2019-06-07 RX ADMIN — SODIUM CHLORIDE 250 ML: 9 INJECTION, SOLUTION INTRAVENOUS at 12:00

## 2019-06-14 ENCOUNTER — HOSPITAL ENCOUNTER (OUTPATIENT)
Dept: NURSING | Age: 84
Discharge: HOME | End: 2019-06-14
Payer: MEDICARE

## 2019-06-14 VITALS
RESPIRATION RATE: 18 BRPM | TEMPERATURE: 98.3 F | HEART RATE: 64 BPM | DIASTOLIC BLOOD PRESSURE: 90 MMHG | SYSTOLIC BLOOD PRESSURE: 176 MMHG | OXYGEN SATURATION: 100 %

## 2019-06-14 LAB
ABO: NORMAL
ANTIBODY SCREEN: NORMAL
RH FACTOR: NORMAL

## 2019-06-14 PROCEDURE — P9016 RBC LEUKOCYTES REDUCED: HCPCS

## 2019-06-14 PROCEDURE — 36430 TRANSFUSION BLD/BLD COMPNT: CPT

## 2019-06-14 PROCEDURE — 2709999900 HC NON-CHARGEABLE SUPPLY

## 2019-06-14 PROCEDURE — 86850 RBC ANTIBODY SCREEN: CPT

## 2019-06-14 PROCEDURE — 36415 COLL VENOUS BLD VENIPUNCTURE: CPT

## 2019-06-14 PROCEDURE — 86923 COMPATIBILITY TEST ELECTRIC: CPT

## 2019-06-14 PROCEDURE — 86901 BLOOD TYPING SEROLOGIC RH(D): CPT

## 2019-06-14 PROCEDURE — 86900 BLOOD TYPING SEROLOGIC ABO: CPT

## 2019-06-14 RX ORDER — SODIUM CHLORIDE 0.9 % (FLUSH) 0.9 %
10 SYRINGE (ML) INJECTION PRN
Status: DISCONTINUED | OUTPATIENT
Start: 2019-06-14 | End: 2019-06-15 | Stop reason: HOSPADM

## 2019-06-14 RX ORDER — 0.9 % SODIUM CHLORIDE 0.9 %
250 INTRAVENOUS SOLUTION INTRAVENOUS ONCE
Status: DISCONTINUED | OUTPATIENT
Start: 2019-06-14 | End: 2019-06-15 | Stop reason: HOSPADM

## 2019-06-14 NOTE — PROGRESS NOTES
1210:  ARRIVES AMBULATORY FOR PRC. PROCESS REVIEWED AND PT RIGHTS AND RESPONSIBILITIES OFFERED TO PT.   LUNCH OFFERED. PT DECLINES.   LUNGS CLEAR

## 2019-06-14 NOTE — PROGRESS NOTES
Patient denies any needs at this time  [phone] Blood completed patient tolerated well no problems noted   1642 Discharge instructions given to patient verbalized understanding.  Patient discharged to home in stable condition    _m___ Safety:       (Environmental)  Get Calderon to environment   Ensure ID band is correct and in place/ allergy band as needed   Assess for fall risk   Initiate fall precautions as applicable (fall band, side rails, etc.)   Call light within reach   Bed in low position/ wheels locked    __m__ Pain:        Assess pain level and characteristics   Administer analgesics as ordered   Assess effectiveness of pain management and report to MD as needed    __m__ Knowledge Deficit:   Assess baseline knowledge   Provide teaching at level of understanding   Provide teaching via preferred learning method   Evaluate teaching effectiveness    _m___ Hemodynamic/Respiratory Status:       (Pre and Post Procedure Monitoring)   Assess/Monitor vital signs and LOC   Assess Baseline SpO2 prior to any sedation   Obtain weight/height   Assess vital signs/ LOC until patient meets discharge criteria   Monitor procedure site and notify MD of any issues

## 2019-06-14 NOTE — PROGRESS NOTES
1215 bedside report received from Sheri Aguilera 894 PRBC unit 1 started per policy. Lungs clear throughout. Call light in reach, earm blankets given. Declines lunch and refreshments at this time  1240 no reactions noted after initial 15 minute observation. Lungs clear, call light in reach. Denies needs. Advised of reaction symptoms to report to staff  [phone] bedside report to Javier Menard RN  1310 NO CHANGE IN ASSESSMENT, DENIES NEEDS AT THIS TIME  1400 resting, denies needs  1410 PRBC unit 1 complete, no reactions noted, lungs clear throughout  1422 PRBC UNIT 2 STARTED PER POLICY. LUNGS CLEAR THROUGHOUT.  ADVISED OF REACTIONS TO REPORT  1425 JUICE GIVEN, CALL LIGHT IN REACH  1500 infusion continues, denies needs, no reactions noted  1530 bedside report to Msuhtaq Hudson

## 2019-06-21 ENCOUNTER — HOSPITAL ENCOUNTER (INPATIENT)
Age: 84
LOS: 3 days | Discharge: HOME | DRG: 291 | End: 2019-06-24
Attending: INTERNAL MEDICINE | Admitting: INTERNAL MEDICINE
Payer: MEDICARE

## 2019-06-21 ENCOUNTER — APPOINTMENT (OUTPATIENT)
Dept: ULTRASOUND IMAGING | Age: 84
DRG: 291 | End: 2019-06-21
Payer: MEDICARE

## 2019-06-21 ENCOUNTER — APPOINTMENT (OUTPATIENT)
Dept: GENERAL RADIOLOGY | Age: 84
DRG: 291 | End: 2019-06-21
Payer: MEDICARE

## 2019-06-21 DIAGNOSIS — I10 ACCELERATED HYPERTENSION: ICD-10-CM

## 2019-06-21 DIAGNOSIS — D63.8 ANEMIA, CHRONIC DISEASE: ICD-10-CM

## 2019-06-21 DIAGNOSIS — R07.9 CHEST PAIN, UNSPECIFIED TYPE: Primary | ICD-10-CM

## 2019-06-21 DIAGNOSIS — J81.0 ACUTE PULMONARY EDEMA (HCC): ICD-10-CM

## 2019-06-21 PROBLEM — E87.70 VOLUME OVERLOAD: Status: ACTIVE | Noted: 2019-06-21

## 2019-06-21 PROBLEM — Z99.2 ESRD (END STAGE RENAL DISEASE) ON DIALYSIS (HCC): Status: ACTIVE | Noted: 2018-05-21

## 2019-06-21 LAB
ANION GAP SERPL CALCULATED.3IONS-SCNC: 22 MEQ/L (ref 8–16)
BASE EXCESS (CALCULATED): 1.6 MMOL/L (ref -2.5–2.5)
BASOPHILS # BLD: 0.3 %
BASOPHILS ABSOLUTE: 0 THOU/MM3 (ref 0–0.1)
BUN BLDV-MCNC: 65 MG/DL (ref 7–22)
CALCIUM SERPL-MCNC: 9.4 MG/DL (ref 8.5–10.5)
CHLORIDE BLD-SCNC: 97 MEQ/L (ref 98–111)
CO2: 22 MEQ/L (ref 23–33)
COLLECTED BY:: ABNORMAL
CREAT SERPL-MCNC: 8.8 MG/DL (ref 0.4–1.2)
DEVICE: ABNORMAL
EKG ATRIAL RATE: 83 BPM
EKG P AXIS: 53 DEGREES
EKG P-R INTERVAL: 192 MS
EKG Q-T INTERVAL: 364 MS
EKG QRS DURATION: 74 MS
EKG QTC CALCULATION (BAZETT): 427 MS
EKG R AXIS: 23 DEGREES
EKG T AXIS: 28 DEGREES
EKG VENTRICULAR RATE: 83 BPM
EOSINOPHIL # BLD: 2.2 %
EOSINOPHILS ABSOLUTE: 0.1 THOU/MM3 (ref 0–0.4)
ERYTHROCYTE [DISTWIDTH] IN BLOOD BY AUTOMATED COUNT: 16 % (ref 11.5–14.5)
ERYTHROCYTE [DISTWIDTH] IN BLOOD BY AUTOMATED COUNT: 57.1 FL (ref 35–45)
GFR SERPL CREATININE-BSD FRML MDRD: 7 ML/MIN/1.73M2
GLUCOSE BLD-MCNC: 104 MG/DL (ref 70–108)
HCO3: 25 MMOL/L (ref 23–28)
HCT VFR BLD CALC: 24.2 % (ref 42–52)
HEMOGLOBIN: 8 GM/DL (ref 14–18)
IMMATURE GRANS (ABS): 0.04 THOU/MM3 (ref 0–0.07)
IMMATURE GRANULOCYTES: 0.6 %
LYMPHOCYTES # BLD: 5.7 %
LYMPHOCYTES ABSOLUTE: 0.4 THOU/MM3 (ref 1–4.8)
MAGNESIUM: 2.1 MG/DL (ref 1.6–2.4)
MCH RBC QN AUTO: 32.3 PG (ref 26–33)
MCHC RBC AUTO-ENTMCNC: 33.1 GM/DL (ref 32.2–35.5)
MCV RBC AUTO: 97.6 FL (ref 80–94)
MONOCYTES # BLD: 6.8 %
MONOCYTES ABSOLUTE: 0.4 THOU/MM3 (ref 0.4–1.3)
NUCLEATED RED BLOOD CELLS: 0 /100 WBC
O2 SATURATION: 94 %
OSMOLALITY CALCULATION: 300.3 MOSMOL/KG (ref 275–300)
PCO2: 33 MMHG (ref 35–45)
PH BLOOD GAS: 7.49 (ref 7.35–7.45)
PLATELET # BLD: 136 THOU/MM3 (ref 130–400)
PMV BLD AUTO: 9.8 FL (ref 9.4–12.4)
PO2: 65 MMHG (ref 71–104)
POTASSIUM SERPL-SCNC: 5.7 MEQ/L (ref 3.5–5.2)
PRO-BNP: ABNORMAL PG/ML (ref 0–1800)
RBC # BLD: 2.48 MILL/MM3 (ref 4.7–6.1)
SEG NEUTROPHILS: 84.4 %
SEGMENTED NEUTROPHILS ABSOLUTE COUNT: 5.3 THOU/MM3 (ref 1.8–7.7)
SODIUM BLD-SCNC: 141 MEQ/L (ref 135–145)
SOURCE, BLOOD GAS: ABNORMAL
TROPONIN T: 0.04 NG/ML
WBC # BLD: 6.3 THOU/MM3 (ref 4.8–10.8)

## 2019-06-21 PROCEDURE — 99221 1ST HOSP IP/OBS SF/LOW 40: CPT | Performed by: NURSE PRACTITIONER

## 2019-06-21 PROCEDURE — 83880 ASSAY OF NATRIURETIC PEPTIDE: CPT

## 2019-06-21 PROCEDURE — 2500000003 HC RX 250 WO HCPCS: Performed by: PHYSICIAN ASSISTANT

## 2019-06-21 PROCEDURE — 83735 ASSAY OF MAGNESIUM: CPT

## 2019-06-21 PROCEDURE — 80048 BASIC METABOLIC PNL TOTAL CA: CPT

## 2019-06-21 PROCEDURE — 93005 ELECTROCARDIOGRAM TRACING: CPT | Performed by: PHYSICIAN ASSISTANT

## 2019-06-21 PROCEDURE — 36600 WITHDRAWAL OF ARTERIAL BLOOD: CPT

## 2019-06-21 PROCEDURE — 6370000000 HC RX 637 (ALT 250 FOR IP): Performed by: NURSE PRACTITIONER

## 2019-06-21 PROCEDURE — 82803 BLOOD GASES ANY COMBINATION: CPT

## 2019-06-21 PROCEDURE — 36415 COLL VENOUS BLD VENIPUNCTURE: CPT

## 2019-06-21 PROCEDURE — 93010 ELECTROCARDIOGRAM REPORT: CPT | Performed by: INTERNAL MEDICINE

## 2019-06-21 PROCEDURE — 71045 X-RAY EXAM CHEST 1 VIEW: CPT

## 2019-06-21 PROCEDURE — 99223 1ST HOSP IP/OBS HIGH 75: CPT | Performed by: NURSE PRACTITIONER

## 2019-06-21 PROCEDURE — 84484 ASSAY OF TROPONIN QUANT: CPT

## 2019-06-21 PROCEDURE — 1200000003 HC TELEMETRY R&B

## 2019-06-21 PROCEDURE — 90935 HEMODIALYSIS ONE EVALUATION: CPT | Performed by: NURSE PRACTITIONER

## 2019-06-21 PROCEDURE — 76770 US EXAM ABDO BACK WALL COMP: CPT

## 2019-06-21 PROCEDURE — 90935 HEMODIALYSIS ONE EVALUATION: CPT

## 2019-06-21 PROCEDURE — 99285 EMERGENCY DEPT VISIT HI MDM: CPT

## 2019-06-21 PROCEDURE — 5A1D70Z PERFORMANCE OF URINARY FILTRATION, INTERMITTENT, LESS THAN 6 HOURS PER DAY: ICD-10-PCS | Performed by: INTERNAL MEDICINE

## 2019-06-21 PROCEDURE — 96374 THER/PROPH/DIAG INJ IV PUSH: CPT

## 2019-06-21 PROCEDURE — 85025 COMPLETE CBC W/AUTO DIFF WBC: CPT

## 2019-06-21 RX ORDER — BUMETANIDE 0.25 MG/ML
1 INJECTION, SOLUTION INTRAMUSCULAR; INTRAVENOUS ONCE
Status: COMPLETED | OUTPATIENT
Start: 2019-06-21 | End: 2019-06-21

## 2019-06-21 RX ORDER — GABAPENTIN 100 MG/1
100 CAPSULE ORAL NIGHTLY
Status: DISCONTINUED | OUTPATIENT
Start: 2019-06-21 | End: 2019-06-24 | Stop reason: HOSPADM

## 2019-06-21 RX ORDER — HYDRALAZINE HYDROCHLORIDE 20 MG/ML
10 INJECTION INTRAMUSCULAR; INTRAVENOUS EVERY 6 HOURS PRN
Status: DISCONTINUED | OUTPATIENT
Start: 2019-06-21 | End: 2019-06-24 | Stop reason: HOSPADM

## 2019-06-21 RX ORDER — PANTOPRAZOLE SODIUM 40 MG/1
40 TABLET, DELAYED RELEASE ORAL
Status: DISCONTINUED | OUTPATIENT
Start: 2019-06-21 | End: 2019-06-23

## 2019-06-21 RX ORDER — DOCUSATE SODIUM 100 MG/1
100 CAPSULE, LIQUID FILLED ORAL 2 TIMES DAILY
Status: DISCONTINUED | OUTPATIENT
Start: 2019-06-21 | End: 2019-06-24 | Stop reason: HOSPADM

## 2019-06-21 RX ORDER — SODIUM CHLORIDE 0.9 % (FLUSH) 0.9 %
10 SYRINGE (ML) INJECTION EVERY 12 HOURS SCHEDULED
Status: DISCONTINUED | OUTPATIENT
Start: 2019-06-21 | End: 2019-06-24 | Stop reason: HOSPADM

## 2019-06-21 RX ORDER — CINACALCET 30 MG/1
30 TABLET, FILM COATED ORAL
Status: DISCONTINUED | OUTPATIENT
Start: 2019-06-21 | End: 2019-06-24 | Stop reason: HOSPADM

## 2019-06-21 RX ORDER — HYDRALAZINE HYDROCHLORIDE 50 MG/1
50 TABLET, FILM COATED ORAL 2 TIMES DAILY
Status: DISCONTINUED | OUTPATIENT
Start: 2019-06-21 | End: 2019-06-24 | Stop reason: HOSPADM

## 2019-06-21 RX ORDER — SODIUM CHLORIDE 0.9 % (FLUSH) 0.9 %
10 SYRINGE (ML) INJECTION PRN
Status: DISCONTINUED | OUTPATIENT
Start: 2019-06-21 | End: 2019-06-24 | Stop reason: HOSPADM

## 2019-06-21 RX ORDER — METOPROLOL TARTRATE 50 MG/1
50 TABLET, FILM COATED ORAL 2 TIMES DAILY
Status: DISCONTINUED | OUTPATIENT
Start: 2019-06-21 | End: 2019-06-24 | Stop reason: HOSPADM

## 2019-06-21 RX ORDER — LISINOPRIL 20 MG/1
20 TABLET ORAL DAILY
Status: DISCONTINUED | OUTPATIENT
Start: 2019-06-21 | End: 2019-06-24 | Stop reason: HOSPADM

## 2019-06-21 RX ORDER — PRAVASTATIN SODIUM 80 MG/1
80 TABLET ORAL DAILY
Status: DISCONTINUED | OUTPATIENT
Start: 2019-06-21 | End: 2019-06-24 | Stop reason: HOSPADM

## 2019-06-21 RX ORDER — ISOSORBIDE MONONITRATE 30 MG/1
30 TABLET, EXTENDED RELEASE ORAL DAILY
Status: CANCELLED | OUTPATIENT
Start: 2019-06-21

## 2019-06-21 RX ORDER — CLONIDINE HYDROCHLORIDE 0.1 MG/1
0.1 TABLET ORAL 2 TIMES DAILY
Status: DISCONTINUED | OUTPATIENT
Start: 2019-06-21 | End: 2019-06-24 | Stop reason: HOSPADM

## 2019-06-21 RX ORDER — FOLIC ACID/VIT B COMPLEX AND C 5 MG
1 TABLET ORAL DAILY
Status: DISCONTINUED | OUTPATIENT
Start: 2019-06-21 | End: 2019-06-24 | Stop reason: HOSPADM

## 2019-06-21 RX ORDER — NITROGLYCERIN 20 MG/100ML
5 INJECTION INTRAVENOUS CONTINUOUS
Status: DISCONTINUED | OUTPATIENT
Start: 2019-06-21 | End: 2019-06-21

## 2019-06-21 RX ORDER — ACETAMINOPHEN 325 MG/1
650 TABLET ORAL EVERY 4 HOURS PRN
Status: DISCONTINUED | OUTPATIENT
Start: 2019-06-21 | End: 2019-06-24 | Stop reason: HOSPADM

## 2019-06-21 RX ORDER — ONDANSETRON 2 MG/ML
4 INJECTION INTRAMUSCULAR; INTRAVENOUS EVERY 6 HOURS PRN
Status: DISCONTINUED | OUTPATIENT
Start: 2019-06-21 | End: 2019-06-24 | Stop reason: HOSPADM

## 2019-06-21 RX ADMIN — METOPROLOL TARTRATE 50 MG: 50 TABLET, FILM COATED ORAL at 15:18

## 2019-06-21 RX ADMIN — NITROGLYCERIN 5 MCG/MIN: 20 INJECTION INTRAVENOUS at 08:24

## 2019-06-21 RX ADMIN — GABAPENTIN 100 MG: 100 CAPSULE ORAL at 21:00

## 2019-06-21 RX ADMIN — PRAVASTATIN SODIUM 80 MG: 80 TABLET ORAL at 21:00

## 2019-06-21 RX ADMIN — METOPROLOL TARTRATE 50 MG: 50 TABLET, FILM COATED ORAL at 21:00

## 2019-06-21 RX ADMIN — CINACALCET HYDROCHLORIDE 30 MG: 30 TABLET, COATED ORAL at 17:56

## 2019-06-21 RX ADMIN — BUMETANIDE 1 MG: 0.25 INJECTION INTRAMUSCULAR; INTRAVENOUS at 08:24

## 2019-06-21 RX ADMIN — DOCUSATE SODIUM 100 MG: 100 CAPSULE, LIQUID FILLED ORAL at 15:31

## 2019-06-21 RX ADMIN — PANTOPRAZOLE SODIUM 40 MG: 40 TABLET, DELAYED RELEASE ORAL at 15:30

## 2019-06-21 RX ADMIN — DOCUSATE SODIUM 100 MG: 100 CAPSULE, LIQUID FILLED ORAL at 21:00

## 2019-06-21 RX ADMIN — CLONIDINE HYDROCHLORIDE 0.1 MG: 0.1 TABLET ORAL at 15:18

## 2019-06-21 RX ADMIN — LISINOPRIL 20 MG: 20 TABLET ORAL at 15:18

## 2019-06-21 RX ADMIN — ACETAMINOPHEN 650 MG: 325 TABLET ORAL at 17:56

## 2019-06-21 RX ADMIN — Medication 1 TABLET: at 15:18

## 2019-06-21 RX ADMIN — HYDRALAZINE HYDROCHLORIDE 50 MG: 50 TABLET, FILM COATED ORAL at 21:00

## 2019-06-21 RX ADMIN — CLONIDINE HYDROCHLORIDE 0.1 MG: 0.1 TABLET ORAL at 21:00

## 2019-06-21 RX ADMIN — HYDRALAZINE HYDROCHLORIDE 50 MG: 50 TABLET, FILM COATED ORAL at 15:18

## 2019-06-21 ASSESSMENT — ENCOUNTER SYMPTOMS
EYE REDNESS: 0
BACK PAIN: 0
WHEEZING: 0
RHINORRHEA: 0
SHORTNESS OF BREATH: 1
DIARRHEA: 0
ABDOMINAL PAIN: 0
NAUSEA: 0
COUGH: 0
EYE DISCHARGE: 0
SORE THROAT: 0
VOMITING: 0

## 2019-06-21 ASSESSMENT — PAIN SCALES - GENERAL
PAINLEVEL_OUTOF10: 6
PAINLEVEL_OUTOF10: 9

## 2019-06-21 NOTE — ED PROVIDER NOTES
[facility]  eMERGENCY dEPARTMENT eNCOUnter          CHIEF COMPLAINT       Chief Complaint   Patient presents with    Shortness of Breath       Nurses Notes reviewed and I agree except as noted in the HPI. HISTORY OF PRESENT ILLNESS    Elaine Laguna is a 80 y.o. male who presents to the Emergency Department via EMS for the evaluation of shortness of breath and chest pain. Patient was at his dialysis appointment this morning when the nurse noticed he was five pounds heavier than usual. Patient then started complaining of shortness of breath and chest pain and was brought to the Emergency Department. Patient states the chest pain started last night. He does not use oxygen at home. He has a history of hypertension that he takes medication for. Patient has not taken his medication this morning, he notes that he typically waits till after his dialysis treatment. He sees Dr. Daiana Veliz (Nephrologist) for his dialysis and did not receive his dialysis treatment today. Patient had a low hemolglobin last week and was given a blood transfusion. He has a history of CAD, COPD, chronic renal insufficiency, CKD, ESRD, acute diastolic congestive heart failure, and systolic congestive heart failure. No further complaints at initial encounter. The HPI was provided by the patient. REVIEW OF SYSTEMS     Review of Systems   Constitutional: Negative for appetite change, chills, fatigue and fever. HENT: Negative for congestion, ear pain, rhinorrhea and sore throat. Eyes: Negative for discharge, redness and visual disturbance. Respiratory: Positive for shortness of breath. Negative for cough and wheezing. Cardiovascular: Positive for chest pain. Negative for palpitations and leg swelling. Gastrointestinal: Negative for abdominal pain, diarrhea, nausea and vomiting. Genitourinary: Negative for decreased urine volume, difficulty urinating, dysuria and hematuria.    Musculoskeletal: Negative for lung fields with additional alveolar infiltrates within the right infrahilar region. There is blunting of the lateral costophrenic angles suggesting a small amount of pleural fluid. Follow-up chest    radiographs are recommended to confirm complete resolution. **This report has been created using voice recognition software. It may contain minor errors which are inherent in voice recognition technology. **      Final report electronically signed by Dr. Capo Cartwright on 6/21/2019 7:24 AM       RENAL LIMITED    (Results Pending)       LABS:     Labs Reviewed   CBC WITH AUTO DIFFERENTIAL - Abnormal; Notable for the following components:       Result Value    RBC 2.48 (*)     Hemoglobin 8.0 (*)     Hematocrit 24.2 (*)     MCV 97.6 (*)     RDW-CV 16.0 (*)     RDW-SD 57.1 (*)     Lymphocytes # 0.4 (*)     All other components within normal limits   BASIC METABOLIC PANEL - Abnormal; Notable for the following components:    Potassium 5.7 (*)     Chloride 97 (*)     CO2 22 (*)     BUN 65 (*)     CREATININE 8.8 (*)     All other components within normal limits   TROPONIN - Abnormal; Notable for the following components:    Troponin T 0.036 (*)     All other components within normal limits   BRAIN NATRIURETIC PEPTIDE - Abnormal; Notable for the following components:    Pro-BNP 03517.0 (*)     All other components within normal limits   BLOOD GAS, ARTERIAL - Abnormal; Notable for the following components:    pH, Blood Gas 7.49 (*)     PCO2 33 (*)     PO2 65 (*)     All other components within normal limits   ANION GAP - Abnormal; Notable for the following components:    Anion Gap 22.0 (*)     All other components within normal limits   GLOMERULAR FILTRATION RATE, ESTIMATED - Abnormal; Notable for the following components:    Est, Glom Filt Rate 7 (*)     All other components within normal limits   OSMOLALITY - Abnormal; Notable for the following components:    Osmolality Calc 300.3 (*)     All other components within normal limits   MAGNESIUM   SODIUM, URINE, RANDOM   URINE RT REFLEX TO CULTURE       EMERGENCY DEPARTMENT COURSE:   Vitals:    Vitals:    06/21/19 0721 06/21/19 0825 06/21/19 0930 06/21/19 1001   BP:  (!) 189/85 (!) 210/85 (!) 215/112   Pulse:  77 84 87   Resp: 21 21 28 (!) 32   Temp:    97.5 °F (36.4 °C)   SpO2: 96% 97% 98% 100%   Weight:    193 lb 9 oz (87.8 kg)   Height:           7:11 AM: The patient was seen and evaluated. I spoke with Dr. Sarahi Ro (Nephrologist) who graciously agreed to admit the patient. MDM:  The patient was seen and evaluated within the ED today following shortness of breath. Within the department I observed the patient's vital signs to show a BP of 215/112 and a Resp of 32. On exam, I appreciated the patient to appear ill, be in respiratory distress, and have rales bilaterally at the bases. Radiologic studies within the department revealed interstitial infiltrates throughout both lung fields with additional alveolar infiltrates within the right infrahilar region. There is blunting of the lateral costophrenic angles suggesting a small amount of pleural fluid. Follow-up chest radiographs are recommended to confirm complete resolution. Laboratory results showed Creatinine 8.8, Troponin 0.036, BNP 35053.0, and a blood gas pH 7.49 . Within the department, the patient was treated with Bumex and nitroGlycerin. I explained my proposed course of treatment to the patient, who was amenable to my decision. The patient was admitted under Dr. Sarahi Ro (Nephrologist). CRITICAL CARE:   none     CONSULTS:  Dr. Sarahi Ro (Nephrologist) and Miquel Brown NP    PROCEDURES:  none    FINAL IMPRESSION      1. Chest pain, unspecified type    2. Acute pulmonary edema (HCC)    3.  Accelerated hypertension          DISPOSITION/PLAN   admit    PATIENT REFERRED TO:  Kinjal Coley MD  [address] [address] [phone]            DISCHARGE MEDICATIONS:  Current Discharge Medication List

## 2019-06-21 NOTE — ED NOTES
WHILE FURTHER EVALUATING WHERE THE SPOTTED BLOOD IN THE UNDERWEAR IS COMING FROM, IT IS FOUND THAT THE BLOOD IS ACTUALLY COMING FROM THE PATIENTS MEATUS.  DEANNA SAUNDERS CNP IS AT [address] [facility] [place] Moisés Daniel RN  06/21/19 9664

## 2019-06-21 NOTE — FLOWSHEET NOTE
06/21/19 1001 06/21/19 1346   Vital Signs   BP (!) 215/112 (!) 182/83   Temp 97.5 °F (36.4 °C) 98.8 °F (37.1 °C)   Pulse 87 69   Resp (!) 32 17   SpO2 100 % 100 %   Weight 193 lb 9 oz (87.8 kg) 184 lb 11.9 oz (83.8 kg)   Weight Method Bed scale Bed scale   Post-Hemodialysis Assessment   Post-Treatment Procedures  --  Blood returned; Access bleeding time < 10 minutes   Machine Disinfection Process  --  Acid/Vinegar Clean;Heat Disinfect; Exterior Machine Disinfection   Total Liters Processed (l/min)  --  57.7 l/min   Dialyzer Clearance  --  Lightly streaked   Duration of Treatment (minutes)  --  200 minutes   Hemodialysis Intake (ml)  --  400 ml   Hemodialysis Output (ml)  --  4375 ml   NET Removed (ml)  --  2975 ml   Tolerated Treatment  --  Poor   1330 This nurse at pt's bedside. pt not responding and moaning. This nurse immediately returned blood and hit BP. Bp read out at 108/57. pt coming around with blood return and responding appropriately. blood return completed. at 1334 Bp after blood return 208/103. Dr. Nadeen Wetzel  called and updated on incident. Dr. Nadeen Wetzel stating to end tx now and send back to floor. TX ended with 43 min remaining in tx. Bp recheck 15 min post tx 182/83, p 69, R 17. Sp02 100% on non-rebreather at 10 L. pt still reports SOB. 3.975 Liters of fluid removed. Pressure applied to each needle site x 10  min. drsg dry and intact upon leaving unit. Primary nurse Mitchell Cabral updated on pt. [place] record printed for scanning into EMR.

## 2019-06-21 NOTE — CARE COORDINATION
6/21/19, 11:32 AM      Annabelle Paget       Admitted from: Er 6/21/2019/ [address] day: 0   Location: -14/014-A Reason for admit: Volume overload [E87.70] Status: IP  Admit order signed?: yes  PMH:  has a past medical history of Anemia in chronic kidney disease(285.21), Arthritis, CAD (coronary artery disease), Chronic kidney disease, Chronic kidney disease, stage IV (severe) ([facility]), CKD (chronic kidney disease) stage 3, GFR 30-59 ml/min ([facility]), COPD (chronic obstructive pulmonary disease) ([facility] [address] Hemodialysis patient ([facility] [address] Hyperlipidemia, Hyperphosphatemia, Hypertension, Sick sinus syndrome ([facility] [address] and Systolic congestive heart failure ([facility] [address] Medications:  Scheduled Meds:   folbee plus  1 tablet Oral Daily    cinacalcet  30 mg Oral Dinner    cloNIDine  0.1 mg Oral BID    gabapentin  100 mg Oral Nightly    hydrALAZINE  50 mg Oral BID    lisinopril  20 mg Oral Daily    metoprolol tartrate  50 mg Oral BID    pantoprazole  40 mg Oral QAM AC    pravastatin  80 mg Oral Daily    sodium chloride flush  10 mL Intravenous 2 times per day    docusate sodium  100 mg Oral BID     Continuous Infusions:   nitroGLYCERIN 5 mcg/min (06/21/19 0824)      Pertinent Info/Orders/Treatment Plan:  K+ 5.7, creat 8.8, BNP 80047, trops chronically elev, Hgb 8.0. /112, Nitro gtt ordered. Nephrology managing HD. Urology consult pending. Diet: DIET RENAL;   Smoking status:  reports that he quit smoking about 37 years ago. He has a 40.00 pack-year smoking history.  He has never used smokeless tobacco.   PCP: Yumiko Travis MD  Readmission: no  Readmission Risk Score: 14%    Discharge Planning  Current Residence:     Living Arrangements:      Support Systems:     Current Services PTA:     Potential Assistance Needed:     Potential Assistance Purchasing Medications:     Does patient want to participate in local refill/ meds to beds program?     Type of Home Care Services:     Patient expects to be discharged to: Expected Discharge date: Follow Up Appointment: Best Day/ Time:      Discharge Plan: [facility] is currently off the floor having dialysis. Will assess when returns.      Evaluation: no

## 2019-06-21 NOTE — ED NOTES
Patient presents to the emergency dept for increased shortness of breath. Patient was supposed to receive outpatient dialysis today, but when he arrived, they realized how short of breath he was and they sent him to the emergency dept instead of starting his dialysis.  It is reported by dialysis that patient is weighing about five pounds greater than normal. Patient states that his shortness of breath started last night and he feels tightness in h     Heather Benavides RN  06/21/19 [id number]

## 2019-06-21 NOTE — CONSULTS
Nephrology Consult Note    Patient - Shahrzad Lawrence   MRN -  [id number]      - 11/10/1930   80 y.o. Date of Admission -  2019  6:58 AM        Date of evaluation -  2019 12:04 PM    Reason for Consult  Management of End Stage Renal Disease    Requesting Physician:  Desirae Curtis MD  History Information Obtained From: Nursing staff, Electronic medical records, Patient,    [address] / HPI:   The patient is a 80 y.o. male with past medical history of DM II, HTN, CAD, ESRD on HD who presented with c/o of gradually worsening shortness of breath that began last jennifer, associated with 5 lb wt gain. He was due to dialysis today. Has AV fistula. BP were high on admission. He was seen during Hemodialysis with 2 K bath. Goal Ultrafiltration 4000 ml. BP initially high but has dropped to 160/-170/ at this time. Reports compliance with usual home meds, but did not take them today prior to his planned HD today. History of GI bleed and has had recent EGD and colonoscopy per GI. Recent blood transfusion last week. Active Problems:    Volume overload  Resolved Problems:    * No resolved hospital problems.  *       Past Medical History:      Diagnosis Date    Anemia in chronic kidney disease(285.21)     Arthritis     CAD (coronary artery disease)     Chronic kidney disease     [facility], [facility] IV [facility]) [facility]     CKD (chronic kidney disease) stage 3, GFR 30-59 ml/min ([facility])     COPD [facility] obstructive [facility] disease) [facility]     Hemodialysis patient ([facility] 2016    @ [facility]    Hyperlipidemia     Hyperphosphatemia 2018    Hypertension     Sick sinus syndrome ([facility]     Systolic congestive heart failure [facility])        Past Surgical History:        Procedure Laterality Date    COLONOSCOPY  ,    CORONARY ANGIOPLASTY WITH STENT PLACEMENT  2004    DIALYSIS FISTULA CREATION  2016    right arm fistula placement    ENDOSCOPY, COLON, DIAGNOSTIC      PACEMAKER PLACEMENT  2013    CT ESOPHAGOGASTRODUODENOSCOPY TRANSORAL DIAGNOSTIC N/A 2018    EGD performed by Dmitriy Pink MD at [address] [facility] ENDOSCOPY  ,       Family History:       Problem Relation Age of Onset    Diabetes Mother     Heart Disease Mother     Diabetes Sister     Mental Illness Paternal Aunt        Allergies:  Patient has no known allergies. Social and Occupational History:    TOBACCO:   Social History     Tobacco Use   Smoking Status Former Smoker    Packs/day: 1.00    Years: 40.00    Pack years: 40.00    Last attempt to quit: 1982    Years since quittin.4   Smokeless Tobacco Never Used     ETOH:   reports that he does not drink alcohol. reports that he does not use drugs. Home Medications:  Medications Prior to Admission: pantoprazole (PROTONIX) 40 MG tablet, Take 1 tablet by mouth every morning (before breakfast)  polyethylene glycol (MIRALAX) powder, Renal Miralax Colonoscopy prep. Use as directed. Mix Miralax 238 g with 24 oz clear liquids. Drink 8 oz glass every 20-30 minutes until gone. cloNIDine (CATAPRES) 0.1 MG tablet, Take 0.1 mg by mouth 2 times daily  hydrALAZINE (APRESOLINE) 50 MG tablet, Take 50 mg by mouth 2 times daily  cinacalcet (SENSIPAR) 30 MG tablet, Take 1 tablet by mouth Daily with supper  lisinopril (PRINIVIL;ZESTRIL) 20 MG tablet, Take 1 tablet by mouth daily  clopidogrel (PLAVIX) 75 MG tablet, Take 75 mg by mouth daily  aspirin 81 MG tablet, Take 81 mg by mouth daily  isosorbide mononitrate (IMDUR) 30 MG extended release tablet, Take 30 mg by mouth daily  gabapentin (NEURONTIN) 100 MG capsule, Take 100 mg by mouth nightly.   B Complex-C-Folic Acid (RENAL) 1 MG CAPS, Take 1 capsule by mouth daily  metoprolol (LOPRESSOR) 50 MG tablet, Take 1 tablet by mouth 2 times daily  pravastatin (PRAVACHOL) 80 MG tablet, Take 1 tablet by mouth daily    Current Medications:     folbee plus  1 tablet Oral Daily    cinacalcet  30 mg Oral Dinner    cloNIDine  0.1 mg Oral BID    gabapentin  100 mg Oral Nightly    hydrALAZINE  50 mg Oral BID    lisinopril  20 mg Oral Daily    metoprolol tartrate  50 mg Oral BID    pantoprazole  40 mg Oral QAM AC    pravastatin  80 mg Oral Daily    sodium chloride flush  10 mL Intravenous 2 times per day    docusate sodium  100 mg Oral BID     PRN Medications:  sodium chloride flush, ondansetron, acetaminophen  Continuous Infusions:   nitroGLYCERIN 5 mcg/min (06/21/19 0824)     Med and Labs reviewed  24HR INTAKE/OUTPUT:  No intake or output data in the 24 hours ending 06/21/19 1204    No intake/output data recorded. Patient Vitals for the past 96 hrs (Last 3 readings):   Weight   06/21/19 1001 193 lb 9 oz (87.8 kg)   06/21/19 0706 185 lb 3 oz (84 kg)     REVIEW OF SYSTEMS:   GENERAL: Pleasant, Denies fever  HEENT: Denies recent vision change, Denies Upper respiratory complaints  CARDIOVASCULAR: Denies chest pain/angina. RESPIRATORY:Denies cough, wheezing  ABDOMEN: Denies nausea, vomiting, diarrhea, or abdominal pain  CNS:Denies headache, dizziness  MUSCULOSKELETAL: Reports joint arthralgia  SKIN: Denies rash, pruritis  HEMATOLOGIC: Denies bruising  ENDOCRINE:  Negative for heat or cold intolerance     EXAM:    VITALS:  BP (!) 215/112   Pulse 87   Temp 97.5 °F (36.4 °C)   Resp (!) 32   Ht 5' 6\" (1.676 m)   Wt 193 lb 9 oz (87.8 kg)   SpO2 100%   BMI 31.24 kg/m²    Body mass index is 31.24 kg/m². Vitals:  Patient Vitals for the past 6 hrs:   BP Temp Pulse Resp SpO2 Height Weight   06/21/19 1001 (!) 215/112 97.5 °F (36.4 °C) 87 (!) 32 100 % -- 193 lb 9 oz (87.8 kg)   06/21/19 0930 (!) 210/85 -- 84 28 98 % -- --   06/21/19 0825 (!) 189/85 -- 77 21 97 % -- --   06/21/19 0721 -- -- -- 21 96 % -- --   06/21/19 0706 (!) 193/114 97.8 °F (36.6 °C) 74 28 98 % 5' 6\" (1.676 m) 185 lb 3 oz (84 kg)       Constitutional:  No acute distress.   Skin: No rash on exposed surfaces, No significant bruises  Heent:  Head is normocephalic, Extraocular movement intact, Mucus membranes moist. Voice is clear without hoarseness or slurred speech   Neck: no jugular venous distention noted, Neck is supple  Cardiovascular:  S1, S2  regular rate and rhythm. Respiratory: Clear to ausculation bilaterally. Equal breath sounds, No crackles, No wheezes. No shortness of breath. Abdomen: Soft, non tender  Ext: Distal lower extremity temp is warm, Trace lower extremity edema. upper extremity AV Fistula   Musculoskeletal: Movement is coordinated. Moves all extremities. Psychiatric: mood and affect appropriate  Neurological: Patient is alert and oriented to person, place, and time. Recent and remote   memory intact, Thought is coherant. Diagnostic Data:  Recent Labs     06/21/19  0739      K 5.7*   CL 97*   CO2 22*   BUN 65*   CREATININE 8.8*   LABGLOM 7*   GLUCOSE 104   MG 2.1   CALCIUM 9.4     Recent Labs     06/21/19  0739   WBC 6.3   RBC 2.48*   HGB 8.0*   HCT 24.2*         Xr Chest Portable  Result Date: 6/21/2019  1. There are interstitial infiltrates throughout both lung fields with additional alveolar infiltrates within the right infrahilar region. There is blunting of the lateral costophrenic angles suggesting a small amount of pleural fluid. Follow-up chest radiographs are recommended to confirm complete resolution. ASSESSMENT  1. End Stage Renal Disease on Hemodialysis M,W,F. AV fistula. Hemodialysis at present time with goal Ultrafiltration 4000 ml. Expect improvement in resp status with Ultrafiltration  2. Hyperkalemia 2nd to ESRD, 2 K bath  3. Essential Hypertension with nephrosclerosis. Pt did not take his usual meds this AM. Also, BP high due to hypervolemia, Improving with Ultrafiltration   4. Coronary artery disease s/p PCI 2004, PPM 2013  5. Acute on chronic diastolic heart failure with preserved EF 60%  6. Anemia of chronic disease, plasma cell dyscrasias,. Melena, GI bleed, s/p transfusion last week  7. Blood on penis meatus, Urology consult pending  8. Secondary hyperparathyroidism of renal origin, Sensipar, Ca 9.4     Meds and labs reviewed  Active Problems:    Volume overload  Resolved Problems:    * No resolved hospital problems. *    Discussed with Dr. Milan Martins. Patient was advised about impression and plan. Patient verbalizes understanding and agrees with plan of care.    Thank you Henrique Jules MD  for allowing us to participate in care of Kiah Schaumann, APRN - CNP 6/21/2019 12:04 PM    CC: Nataly Palmer MD

## 2019-06-21 NOTE — PROGRESS NOTES
5 Pt presented top the floor with a non rebreather mask on r 28 and /100 pt was at outpatient dialysis and was sent to the ED for SOB. This nurse contacted dialysis to see if he could be taken to the unit sooner as pt was not doing well. 1000 this nurse escorted transport and this patient to dialysis     [phone] [id number] pt arrived back to floor admissions nurse contacted. Pt arrived back with non re breather mask, this nurse weaned pt over 45min to 2L NC. Pt 97% however does drop and become labored with exertion. 1530 Skin assessment completed by Jarvis Mohamud, JULI and [facility] no abnormalities scratched or bruising noted. Skin assessment negative.

## 2019-06-21 NOTE — H&P
History & Physical        Patient:  Jony Stockton  YOB: 1930    MRN: [id number]     Acct: [de-identified]    PCP: Nettie Nuñez MD    Date of Admission: 6/21/2019    Date of Service: Pt seen/examined on 06/21/19  and Admitted to Inpatient with expected LOS greater than two midnights due to medical therapy. Chief Complaint:  Shortness of breath and chest pain      History Of Present Illness:    Jony Stockton is a 80 y.o. male who presented to the Emergency Department via EMS for the evaluation of shortness of breath and chest pain aaht started last evening; he was at his dialysis appointment this morning when the nurse noticed he was 5 pounds heavier than on May 19; he was complaining of shortness of breath and chest pain and was brought to the Emergency Department; he has HTN and has not taken his medication this morning, he notes that he typically waits till after his dialysis treatment; sees Dr. Jaguar Villarreal (Nephrologist) for his dialysis and did not receive his dialysis treatment today; patient had a low hemolglobin last week and was given a blood transfusion (2 units on 6/14 and 1 unit on 6/7); he has had melena and saw Dr Jesus Manuel Sampson and is to have an EGD and colonoscopy done; today RN in ED noticed a small amount of blood in his underwear and upon further assessment (+) blood at the meatus; history of CAD and follows with Dr Coy Guerin; he currently c/o a non productive cough and \"pain all over my chest\") and c/o shortness of breath; in ED, he was given Bumex and started on a NTG gtt; we discussed code status and he wants \"everything done\"; Dr Jaguar Villarreal notified of pt from the eD and wanted pt admitted; pt is being admitted to the Hospitalist Service for further care and evaluation.     Past Medical History:          Diagnosis Date    Anemia in chronic kidney disease(285.21)     Arthritis     CAD (coronary artery disease)     Chronic kidney disease     Chronic kidney disease, stage IV (severe) extended release tablet Take 30 mg by mouth daily   Yes Historical Provider, MD   gabapentin (NEURONTIN) 100 MG capsule Take 100 mg by mouth nightly. Yes Historical Provider, MD SENA Complex-C-Folic Acid (RENAL) 1 MG CAPS Take 1 capsule by mouth daily   Yes Historical Provider, MD   metoprolol (LOPRESSOR) 50 MG tablet Take 1 tablet by mouth 2 times daily 6/14/16  Yes Nataly Palmer MD   pravastatin (PRAVACHOL) 80 MG tablet Take 1 tablet by mouth daily 11/12/15  Yes Nataly Palmer MD       Allergies:  Patient has no known allergies. Social History:      The patient currently lives with wife. TOBACCO:   reports that he quit smoking about 37 years ago. He has a 40.00 pack-year smoking history. He has never used smokeless tobacco.  ETOH:   reports that he does not drink alcohol.       Family History:      Positive as follows:        Problem Relation Age of Onset    Diabetes Mother     Heart Disease Mother     Diabetes Sister     Mental Illness Paternal Aunt        REVIEW OF SYSTEMS:     Constitutional: ROS: negative for - chills or fever; (+) weight gain of 5# in 2 days  Head: no headache, no head injury, no migraine   Eyes ROS: denies blurred/double vision  Ears ROS: no hearing difficulty, no tinnitus  Mouth and Throat ROS: no ulceration, dysphagia, dental caries  Psychological ROS: no depression, no anxiety, no panic attacks, denies suicide/homicide ideation  Endocrine ROS: denies polyuria, polydypsia, no heat or cold intolerance  Respiratory ROS: positive for - cough, pleuritic pain and shortness of breath  Cardiovascular ROS: positive for - chest pain, dyspnea on exertion and shortness of breath  Gastrointestinal ROS: no abdominal pain, change in bowel habits, or black or bloody stools  Genito-Urinary ROS: denies dysuria, frequency, urgency; denies hematuria  Musculoskeletal ROS: negative  Neurological ROS: no syncope, no seizures, no numbness or tingling of hands, no numbness or tingling of feet, no paresis  Dermatology: no skin rash, no eczema  Endocrine: no polyuria, polydypsia, no heat/cold intolerance  Hematology: denies bruising easily, denies bleeding problems, denies clotting disorders    PHYSICAL EXAM:    BP (!) 189/85   Pulse 77   Temp 97.8 °F (36.6 °C)   Resp 21   Ht 5' 6\" (1.676 m)   Wt 185 lb 3 oz (84 kg)   SpO2 97%   BMI 29.89 kg/m²     General appearance:  No apparent distress, appears stated age and cooperative. HEENT:  Normal cephalic, atraumatic without obvious deformity. Pupils equal, round, and reactive to light. Conjunctivae/corneas clear. Oral mucous membranes dry. Neck: Supple, with full range of motion. No jugular venous distention. Trachea midline. Respiratory:  Normal respiratory effort. (+) crackles throughout. Cardiovascular:  Regular rate and rhythm with normal S1/S2 without murmurs, rubs or gallops. Abdomen: Soft, non-tender, non-distended with normal bowel sounds. Musculoskeletal:  No clubbing, cyanosis or edema bilaterally. Full range of motion without deformity. Skin: Skin color, texture, turgor normal.    Neurologic:  Neurovascularly intact without any focal sensory/motor deficits. Cranial nerves: II-XII intact, grossly non-focal.  Psychiatric:  Alert and oriented x 4, thought content appropriate  Capillary Refill: Brisk,< 3 seconds   Peripheral Pulses: +2 palpable, equal bilaterally       Labs:     Recent Labs     06/21/19  0739   WBC 6.3   HGB 8.0*   HCT 24.2*        Recent Labs     06/21/19  0739      K 5.7*   CL 97*   CO2 22*   BUN 65*   CREATININE 8.8*   CALCIUM 9.4     No results for input(s): AST, ALT, BILIDIR, BILITOT, ALKPHOS in the last 72 hours. No results for input(s): INR in the last 72 hours. No results for input(s): Floreen Pill in the last 72 hours.     Urinalysis:      Lab Results   Component Value Date    NITRU NEGATIVE 07/18/2018    WBCUA 25-50 07/18/2018    BACTERIA NONE 07/18/2018    RBCUA 3-5 07/18/2018    BLOODU NEGATIVE 07/18/2018    SPECGRAV 1.013 05/03/2016    GLUCOSEU NEGATIVE 07/18/2018       Radiology:     CXR: I have reviewed the CXR with the following interpretation:     LINES/TUBES/MECHANICAL DEVICES:   1. There is a stable left subclavian pacemaker with leads overlying the right atrium and right ventricle.     TRACHEA/HEART/MEDIASTINUM/HILUM:   1. There is stable mild enlargement of the cardiac silhouette.     LUNG IRCH:   1. There are interstitial infiltrates throughout both lung fields with additional alveolar infiltrates within the right infrahilar region. There is blunting of the lateral costophrenic angles suggesting a small amount of pleural fluid. Follow-up chest   radiographs are recommended to confirm complete resolution.     OTHER: None.     PNEUMOTHORAX:  None.     OSSEOUS STRUCTURES:  1. No acute osseous abnormality. 2. There is generalized osteopenia. 3. Stable mild dextro scoliosis of the thoracic spine.      Impression  1. There are interstitial infiltrates throughout both lung fields with additional alveolar infiltrates within the right infrahilar region. There is blunting of the lateral costophrenic angles suggesting a small amount of pleural fluid. Follow-up chest   radiographs are recommended to confirm complete resolution.     EKG:  I have reviewed the EKG with the following interpretation: SR HR 83     DVT prophylaxis: [] Lovenox                                 [x] SCDs                                 [] SQ Heparin                                 [] Encourage ambulation           [] Already on Anticoagulation    Code Status: Prior      Disposition:    [x] Home       [] TCU       [] Rehab       [] Psych       [] SNF       [] [facility]       [] Other-    PROBLEM LIST:    Active Hospital Problems    Diagnosis Date Noted    Volume overload [E87.70] 06/21/2019       ASSESSMENT/PLAN:    1. Dyspnea--likely 2nd to #3, #4  2.  Acute Chest Pain--has chronic troponin elevation; hold ASA/Plavix with bleeding  3. Probable Volume Overload--per Dr Jonathan Carpenter; likely needs HD  4. Acute on Chronic Diastolic HF--EF was 25% on 5/22/2019 with grade 1 diastolic dysfx; was given Bumex and started on NTG gtt in ED; on BB  5. Hyperkalemia--likely needs HD  6. Blood at the meatus of the penis--have urology evaluate; hold ASA/Plavix  7. ESRD--on HD Mon/Wed/Fri; follows with Dr Jonathan Carpenter; he did not receive HD today  8. Essential HTN, uncontrolled--resume Catapres, Hydralazine, Lisinopril, Lopressor; monitor; need to get AM meds in him; monitor  9. Anemia of Chronic Disease--likely 2nd to #5; received 2 units PRBC's on 6/14/2019 and 1 units on 6/7/2019  10. Melena--follows with Dr Yeimy Marcos; to have EGD and colonoscopy soon per OV note   11. CAD--follows with Dr Selestino Libman; hold ASA and Plavix with (+) blood in the meatus of the penis        Thank you Rishi Ribera MD for the opportunity to be involved in this patient's care.     Electronically signed by Hazle Litten, APRN - CNP on 6/21/2019 at 8:54 AM

## 2019-06-22 LAB
ABO: NORMAL
ALBUMIN SERPL-MCNC: 3.4 G/DL (ref 3.5–5.1)
ANION GAP SERPL CALCULATED.3IONS-SCNC: 13 MEQ/L (ref 8–16)
ANTIBODY SCREEN: NORMAL
BACTERIA: ABNORMAL /HPF
BILIRUBIN URINE: NEGATIVE
BLOOD, URINE: ABNORMAL
BUN BLDV-MCNC: 45 MG/DL (ref 7–22)
CALCIUM SERPL-MCNC: 8.7 MG/DL (ref 8.5–10.5)
CASTS 2: ABNORMAL /LPF
CASTS UA: ABNORMAL /LPF
CHARACTER, URINE: ABNORMAL
CHLORIDE BLD-SCNC: 96 MEQ/L (ref 98–111)
CO2: 28 MEQ/L (ref 23–33)
COLOR: ABNORMAL
CREAT SERPL-MCNC: 8.1 MG/DL (ref 0.4–1.2)
CRYSTALS, UA: ABNORMAL
EPITHELIAL CELLS, UA: ABNORMAL /HPF
ERYTHROCYTE [DISTWIDTH] IN BLOOD BY AUTOMATED COUNT: 15.8 % (ref 11.5–14.5)
ERYTHROCYTE [DISTWIDTH] IN BLOOD BY AUTOMATED COUNT: 56 FL (ref 35–45)
GFR SERPL CREATININE-BSD FRML MDRD: 8 ML/MIN/1.73M2
GLUCOSE BLD-MCNC: 94 MG/DL (ref 70–108)
GLUCOSE URINE: NEGATIVE MG/DL
HCT VFR BLD CALC: 20.8 % (ref 42–52)
HCT VFR BLD CALC: 22.1 % (ref 42–52)
HCT VFR BLD CALC: 22.9 % (ref 42–52)
HEMOGLOBIN: 7 GM/DL (ref 14–18)
HEMOGLOBIN: 7.4 GM/DL (ref 14–18)
HEMOGLOBIN: 7.7 GM/DL (ref 14–18)
KETONES, URINE: NEGATIVE
LEUKOCYTE ESTERASE, URINE: ABNORMAL
MCH RBC QN AUTO: 32 PG (ref 26–33)
MCHC RBC AUTO-ENTMCNC: 33.5 GM/DL (ref 32.2–35.5)
MCV RBC AUTO: 95.7 FL (ref 80–94)
MISCELLANEOUS 2: ABNORMAL
NITRITE, URINE: NEGATIVE
PH UA: 8.5 (ref 5–9)
PHOSPHORUS: 5.6 MG/DL (ref 2.4–4.7)
PLATELET # BLD: 118 THOU/MM3 (ref 130–400)
PMV BLD AUTO: 10 FL (ref 9.4–12.4)
POTASSIUM SERPL-SCNC: 5.1 MEQ/L (ref 3.5–5.2)
PROCALCITONIN: 0.56 NG/ML (ref 0.01–0.09)
PROTEIN UA: 100
RBC # BLD: 2.31 MILL/MM3 (ref 4.7–6.1)
RBC URINE: ABNORMAL /HPF
RENAL EPITHELIAL, UA: ABNORMAL
RH FACTOR: NORMAL
SODIUM BLD-SCNC: 137 MEQ/L (ref 135–145)
SODIUM URINE: 70 MEQ/L
SPECIFIC GRAVITY, URINE: 1.01 (ref 1–1.03)
UROBILINOGEN, URINE: 0.2 EU/DL (ref 0–1)
WBC # BLD: 5.5 THOU/MM3 (ref 4.8–10.8)
WBC UA: ABNORMAL /HPF
YEAST: ABNORMAL

## 2019-06-22 PROCEDURE — 99233 SBSQ HOSP IP/OBS HIGH 50: CPT | Performed by: INTERNAL MEDICINE

## 2019-06-22 PROCEDURE — 86901 BLOOD TYPING SEROLOGIC RH(D): CPT

## 2019-06-22 PROCEDURE — 85018 HEMOGLOBIN: CPT

## 2019-06-22 PROCEDURE — 84145 PROCALCITONIN (PCT): CPT

## 2019-06-22 PROCEDURE — 99232 SBSQ HOSP IP/OBS MODERATE 35: CPT | Performed by: INTERNAL MEDICINE

## 2019-06-22 PROCEDURE — 86850 RBC ANTIBODY SCREEN: CPT

## 2019-06-22 PROCEDURE — 85014 HEMATOCRIT: CPT

## 2019-06-22 PROCEDURE — 86900 BLOOD TYPING SEROLOGIC ABO: CPT

## 2019-06-22 PROCEDURE — 6370000000 HC RX 637 (ALT 250 FOR IP): Performed by: NURSE PRACTITIONER

## 2019-06-22 PROCEDURE — 81001 URINALYSIS AUTO W/SCOPE: CPT

## 2019-06-22 PROCEDURE — 2580000003 HC RX 258: Performed by: NURSE PRACTITIONER

## 2019-06-22 PROCEDURE — 84300 ASSAY OF URINE SODIUM: CPT

## 2019-06-22 PROCEDURE — 80069 RENAL FUNCTION PANEL: CPT

## 2019-06-22 PROCEDURE — 1200000003 HC TELEMETRY R&B

## 2019-06-22 PROCEDURE — 87086 URINE CULTURE/COLONY COUNT: CPT

## 2019-06-22 PROCEDURE — 85027 COMPLETE CBC AUTOMATED: CPT

## 2019-06-22 PROCEDURE — 36415 COLL VENOUS BLD VENIPUNCTURE: CPT

## 2019-06-22 RX ADMIN — CLONIDINE HYDROCHLORIDE 0.1 MG: 0.1 TABLET ORAL at 08:11

## 2019-06-22 RX ADMIN — ACETAMINOPHEN 650 MG: 325 TABLET ORAL at 08:12

## 2019-06-22 RX ADMIN — PRAVASTATIN SODIUM 80 MG: 80 TABLET ORAL at 20:32

## 2019-06-22 RX ADMIN — CLONIDINE HYDROCHLORIDE 0.1 MG: 0.1 TABLET ORAL at 20:32

## 2019-06-22 RX ADMIN — CINACALCET HYDROCHLORIDE 30 MG: 30 TABLET, COATED ORAL at 16:55

## 2019-06-22 RX ADMIN — METOPROLOL TARTRATE 50 MG: 50 TABLET, FILM COATED ORAL at 20:32

## 2019-06-22 RX ADMIN — GABAPENTIN 100 MG: 100 CAPSULE ORAL at 20:32

## 2019-06-22 RX ADMIN — DOCUSATE SODIUM 100 MG: 100 CAPSULE, LIQUID FILLED ORAL at 08:11

## 2019-06-22 RX ADMIN — HYDRALAZINE HYDROCHLORIDE 50 MG: 50 TABLET, FILM COATED ORAL at 20:32

## 2019-06-22 RX ADMIN — Medication 1 TABLET: at 08:11

## 2019-06-22 RX ADMIN — DOCUSATE SODIUM 100 MG: 100 CAPSULE, LIQUID FILLED ORAL at 20:34

## 2019-06-22 RX ADMIN — METOPROLOL TARTRATE 50 MG: 50 TABLET, FILM COATED ORAL at 08:11

## 2019-06-22 RX ADMIN — PANTOPRAZOLE SODIUM 40 MG: 40 TABLET, DELAYED RELEASE ORAL at 06:19

## 2019-06-22 RX ADMIN — Medication 10 ML: at 08:12

## 2019-06-22 RX ADMIN — HYDRALAZINE HYDROCHLORIDE 50 MG: 50 TABLET, FILM COATED ORAL at 08:11

## 2019-06-22 RX ADMIN — LISINOPRIL 20 MG: 20 TABLET ORAL at 08:11

## 2019-06-22 ASSESSMENT — PAIN SCALES - GENERAL
PAINLEVEL_OUTOF10: 0
PAINLEVEL_OUTOF10: 0
PAINLEVEL_OUTOF10: 8
PAINLEVEL_OUTOF10: 0

## 2019-06-22 NOTE — PLAN OF CARE
Problem: Falls - Risk of:  Goal: Will remain free from falls  Description  Will remain free from falls  Outcome: Ongoing  Note:   Patient in bed with bed alarms on, call light within reach and being used appropriately. Non-skid socks on. Patient up with 2 assist to bathroom. Problem: Falls - Risk of:  Goal: Absence of physical injury  Description  Absence of physical injury  Outcome: Ongoing  Note:   Stay with me and falling star program in place. No signs of physical injury at this time. Problem: Pain:  Goal: Pain level will decrease  Description  Pain level will decrease  Outcome: Ongoing  Note:   Patient denies the presence of pain so far this shift. Pain goal is no pain. Problem: Pain:  Goal: Control of acute pain  Description  Control of acute pain  Outcome: Ongoing  Note:   Denies acute pain at this time. Problem: Pain:  Goal: Control of chronic pain  Description  Control of chronic pain  Outcome: Ongoing  Note:   Denies chronic pain. Problem: Tissue Perfusion - Renal, Altered:  Goal: Electrolytes within specified parameters  Description  Electrolytes within specified parameters  Outcome: Ongoing  Note:   Electrolytes being monitored with AM labs. Will be replaced as needed. Problem: Tissue Perfusion - Renal, Altered:  Goal: Serum creatinine will be within specified parameters  Description  Serum creatinine will be within specified parameters  Outcome: Ongoing  Note:   Patient's serum creatinine 8.8, hemodialysis patient. Problem: Discharge Planning:  Goal: Participates in care planning  Description  Participates in care planning  Outcome: Ongoing  Note:   Patient able to assist in identifying goals to achieve throughout the shift. Updated on plan of care. Problem: Discharge Planning:  Goal: Discharged to appropriate level of care  Description  Discharged to appropriate level of care  Outcome: Ongoing  Note:   Patient plans to return home with wife when discharged. Problem: Cardiac Output - Decreased:  Goal: Hemodynamic stability will improve  Description  Hemodynamic stability will improve  Outcome: Ongoing  Note:   Vitals:    06/21/19 2017   BP: (!) 160/72   Pulse: 75   Resp: 22   Temp: 99 °F (37.2 °C)   SpO2: 99%     Will continue to monitor. Problem: Fluid Volume - Imbalance:  Goal: Absence of imbalanced fluid volume signs and symptoms  Description  Absence of imbalanced fluid volume signs and symptoms  Outcome: Ongoing  Note:   Patient received hemodialysis earlier today, removed 4.1L. Lungs clear throughout. Problem: Pain:  Goal: Control of acute pain  Description  Control of acute pain  Outcome: Ongoing  Note:   Patient denies the presence of pain so far this shift. Problem: Pain:  Goal: Control of chronic pain  Description  Control of chronic pain  Outcome: Ongoing     Problem: Skin Integrity - Impaired:  Goal: Absence of new skin breakdown  Description  Absence of new skin breakdown  Outcome: Ongoing  Note:   Patient turns and repositions self in bed, educated patient on importance of turning and repositioning self in bed to preserve skin integrity. No new skin breakdown at this time. Care plan reviewed with patient. Patient verbalize understanding of the plan of care and contribute to goal setting.

## 2019-06-22 NOTE — FLOWSHEET NOTE
06/22/19 1437   Provider Notification   Reason for Communication Critical Value (comment)  (hgb 7.0)   Provider Name [address] Combs Marty   Provider Notification Physician   Method of Communication Face to face   Response See orders  (recheck in 6 hours)   Notification Time 1437   Recheck H+H in 6 hours, Dr. Briana Sullivan seeing today.

## 2019-06-22 NOTE — FLOWSHEET NOTE
06/22/19 1000   Provider Notification   Reason for Communication Review case  (patient peed, do we still need straight cath?)   Provider Name [facility]   Provider Notification Physician   Method of Communication Call   Response No new orders   Notification Time 1000   Notified Dr. [facility] that patient peed 50mL and got urine sample from that.      No straight cath needed

## 2019-06-22 NOTE — FLOWSHEET NOTE
06/22/19 1104   Provider Notification   Reason for Communication Review case  (consult)   Provider Name Dr. Finney Ip   Provider Notification Physician   Method of Communication Secure Message   Response Waiting for response   Notification Time 1104   Consult for Patient with anemia.  Was to get EGD with Dr. Finney Ip in a few days, hgb 7.4, after 4L's fluid removed during dialysis, suspect may have some bleeding    Add to list, will see today

## 2019-06-22 NOTE — PROGRESS NOTES
Renal Progress Note    Assessment and Plan:    1. ESRD on hemodialysis   2. Hyperkalemia better   3. Hematuria  4. Hypertension primary  5. COPD  6. Pulmonary edema   7. Pneumonia ? PLAN:  Reviewed labs   Reviewed chest xray report and images   UA pending   Straight cath for UA  Reviewed kidney ultrasound report   Reviewed medications   PT and OT  Serum potassium level in am  Will follow     Patient Active Problem List:     Hypertension     CAD (coronary artery disease)     COPD (chronic obstructive pulmonary disease) ([facility] [address]     Chronic renal insufficiency     Patient overweight     Arthritis-  low back and neck .      CKD (chronic kidney disease) stage 3, GFR 30-59 ml/min (HCC)     Dyspnea and respiratory abnormalities     ED (erectile dysfunction)     Degenerative joint disease of knee, left     Gout of big toe     Anemia, chronic disease     ROSAURA (acute kidney injury) ([facility] [address]     CKD (chronic kidney disease) stage 4, GFR 15-29 ml/min (HCC)     Essential hypertension     Monoclonal gammopathy     Leukopenia     Thrombocytopenia (HCC)     Chronic kidney disease (CKD), stage V (HCC)     Metabolic acidosis     Hyperkalemia     Benign essential HTN     Iron deficiency anemia     ROSAURA (acute kidney injury) (HCC)     Plasma cell dyscrasia     Idiopathic hypotension     Hypertensive urgency     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)     Systolic congestive heart failure (HCC)     Other fluid overload (CODE)     Hyperphosphatemia     Acute diastolic congestive heart failure (HCC)     Acute on chronic diastolic congestive heart failure (HCC)     Pulmonary hypertension (HCC)     Anemia due to chronic kidney disease, on chronic dialysis (HCC)     Volume overload     Secondary hyperparathyroidism of renal origin ([facility] [address]      Subjective:   Admit Date: 6/21/2019    Interval History:  Seeing for ESRD and fluid overload  Feels better this morning   Had blood in the urine yesterday  Blood pressure is better   Admitted for shortness of breath  Thought to have CHF and /or pneumonia   Was dialyzed yesterday        Medications:   Scheduled Meds:   folbee plus  1 tablet Oral Daily    cinacalcet  30 mg Oral Dinner    cloNIDine  0.1 mg Oral BID    gabapentin  100 mg Oral Nightly    hydrALAZINE  50 mg Oral BID    lisinopril  20 mg Oral Daily    metoprolol tartrate  50 mg Oral BID    pantoprazole  40 mg Oral QAM AC    pravastatin  80 mg Oral Daily    sodium chloride flush  10 mL Intravenous 2 times per day    docusate sodium  100 mg Oral BID     Continuous Infusions:    CBC:   Recent Labs     06/21/19  0739   WBC 6.3   HGB 8.0*        CMP:    Recent Labs     06/21/19  0739 06/22/19  0509    137   K 5.7* 5.1   CL 97* 96*   CO2 22* 28   BUN 65* 45*   CREATININE 8.8* 8.1*   GLUCOSE 104 94   CALCIUM 9.4 8.7   LABGLOM 7* 8*     Troponin: No results for input(s): TROPONINI in the last 72 hours. BNP: No results for input(s): BNP in the last 72 hours. INR: No results for input(s): INR in the last 72 hours. Lipids: No results for input(s): CHOL, LDLDIRECT, TRIG, HDL, AMYLASE, LIPASE in the last 72 hours. Liver:   Recent Labs     06/22/19  0509   LABALBU 3.4*     Iron:  No results for input(s): IRONS, FERRITIN in the last 72 hours. Invalid input(s): LABIRONS    Objective:   Vitals: BP (!) 153/67   Pulse 70   Temp 97.8 °F (36.6 °C) (Oral)   Resp 18   Ht 5' 6\" (1.676 m)   Wt 171 lb 12.8 oz (77.9 kg)   SpO2 98%   BMI 27.73 kg/m²    Wt Readings from Last 3 Encounters:   06/22/19 171 lb 12.8 oz (77.9 kg)   06/06/19 176 lb (79.8 kg)   12/06/18 175 lb 9.6 oz (79.7 kg)      24HR INTAKE/OUTPUT:      Intake/Output Summary (Last 24 hours) at 6/22/2019 0820  Last data filed at 6/22/2019 0407  Gross per 24 hour   Intake 500 ml   Output 4375 ml   Net -3875 ml       Constitutional:  Alert, awake, no apparent distress   Skin:normal   HEENT:Pupils are reactive . Throat is clear   Neck:supple with no thyromegaly  Cardiovascular:  S1, S2 without murmur  Respiratory:  Decreased breath sound   Abdomen: +bs, soft, non tender  Ext: No LE edema  Musculoskeletal:Intact  Neuro:Alert and awake with no deficit      Electronically signed by Bryant Justin MD on 6/22/2019 at 8:20 AM

## 2019-06-22 NOTE — PROGRESS NOTES
Hospitalist Progress Note    Patient:  Brayan Knight      Unit/Bed:6K-14/014-A    YOB: 1930    MRN: [id number]       Acct: [de-identified]     PCP: Amelie Garcias MD    Date of Admission: 6/21/2019    Assessment/Plan:    1. Dyspnea--likely 2nd to #3, #4  2. Acute Chest Pain--has chronic troponin elevation; hold ASA/Plavix with bleeding. Improved. 3. Suspect GIB causing acute blood loss anemia. Dr. Elsa Lopez consulted. Possible EGd tomorrow Transfuse if Hgb <7  4. Probable Volume Overload--per Dr Marybel Woods; undeerwent iHD, improved. 5. Acute on Chronic Diastolic HF--EF was 09% on 5/22/2019 with grade 1 diastolic dysfx; was given Bumex and started on NTG gtt in ED; on BB. Improved. 6. Hyperkalemia--resolved  7. Blood at the meatus of the penis--have urology evaluate; hold ASA/Plavix  8. ESRD--on HD Mon/Wed/Fri; follows with Dr Marybel Woods;   9. Essential HTN, uncontrolled--resume Catapres, Hydralazine, Lisinopril, Lopressor; monitor; need to get AM meds in him; monitor  10. Anemia of Chronic Disease--likely 2nd to #5; received 2 units PRBC's on 6/14/2019 and 1 units on 6/7/2019  11. CAD--follows with Dr Kyle Walker; hold ASA and Plavix with (+) blood in the meatus of the penis      Expected discharge date:  TBD    Disposition:    [] Home       [] TCU       [] Rehab       [] Psych       [] SNF       [] [place]       [] Other-    Chief Complaint: SOB, bleeding    Hospital Course: Vani Stone a 80 y. o. male who presented to the Emergency Department via EMS for the evaluation of shortness of breath and chest pain aaht started last evening; he was at his dialysis appointment this morning when the nurse noticed he was 5 pounds heavier than on May 19; he was complaining of shortness of breath and chest pain and was brought to the Emergency Department; he has HTN and has not taken his medication this morning, he notes that he typically waits till after his dialysis treatment; sees  Valentin Braden (Nephrologist) for his dialysis and did not receive his dialysis treatment today; patient had a low hemolglobin last week and was given a blood transfusion (2 units on 6/14 and 1 unit on 6/7); he has had melena and saw Dr Vicki Reyez and is to have an EGD and colonoscopy done; today RN in ED noticed a small amount of blood in his underwear and upon further assessment (+) blood at the meatus; history of CAD and follows with Dr Brandon Rodriguez; he currently c/o a non productive cough and \"pain all over my chest\") and c/o shortness of breath; in ED, he was given Bumex and started on a NTG gtt;    Felt much better after dialysis, hgb continue to trend down. GI was consulted. Subjective (past 24 hours): Feeling much improved, less SOB, still seeing blood in toilet. Denies chest pain/nausea. Urine clearing up. Medications:  Reviewed    Infusion Medications   Scheduled Medications    folbee plus  1 tablet Oral Daily    cinacalcet  30 mg Oral Dinner    cloNIDine  0.1 mg Oral BID    gabapentin  100 mg Oral Nightly    hydrALAZINE  50 mg Oral BID    lisinopril  20 mg Oral Daily    metoprolol tartrate  50 mg Oral BID    pantoprazole  40 mg Oral QAM AC    pravastatin  80 mg Oral Daily    sodium chloride flush  10 mL Intravenous 2 times per day    docusate sodium  100 mg Oral BID     PRN Meds: sodium chloride flush, ondansetron, acetaminophen, hydrALAZINE      Intake/Output Summary (Last 24 hours) at 6/22/2019 1603  Last data filed at 6/22/2019 1429  Gross per 24 hour   Intake 580 ml   Output 50 ml   Net 530 ml       Diet:  DIET RENAL;    Exam:  /61   Pulse 62   Temp 97.6 °F (36.4 °C) (Oral)   Resp 18   Ht 5' 6\" (1.676 m)   Wt 171 lb 12.8 oz (77.9 kg)   SpO2 94%   BMI 27.73 kg/m²     General appearance: No apparent distress, appears stated age and cooperative. HEENT: Pupils equal, round, and reactive to light. Conjunctivae/corneas clear. Neck: Supple, with full range of motion.  No jugular venous by Dr. Javier Salmeron on 6/21/2019 5:13 PM      XR CHEST PORTABLE   Final Result   1. There are interstitial infiltrates throughout both lung fields with additional alveolar infiltrates within the right infrahilar region. There is blunting of the lateral costophrenic angles suggesting a small amount of pleural fluid. Follow-up chest    radiographs are recommended to confirm complete resolution. **This report has been created using voice recognition software. It may contain minor errors which are inherent in voice recognition technology. **      Final report electronically signed by Dr. Zaheer Shaw on 6/21/2019 7:24 AM          DVT prophylaxis: [] Lovenox                                 [x] SCDs                                 [] SQ Heparin                                 [] Encourage ambulation           [] Already on Anticoagulation     Code Status: Full Code    PT/OT Eval Status: pend    Tele:   [x] yes             [] no    Active Hospital Problems    Diagnosis Date Noted    Volume overload [E87.70] 06/21/2019    Secondary hyperparathyroidism of renal origin ([facility] [address] [N25.81]     ESRD (end stage renal disease) on dialysis ([facility] [address] [N18.6, Z99.2] 05/21/2018    Anemia, chronic disease [D63.8] 04/23/2015       Electronically signed by Shaun Marquez MD on 6/22/2019 at 4:03 PM

## 2019-06-23 ENCOUNTER — APPOINTMENT (OUTPATIENT)
Dept: GENERAL RADIOLOGY | Age: 84
DRG: 291 | End: 2019-06-23
Payer: MEDICARE

## 2019-06-23 LAB
ERYTHROCYTE [DISTWIDTH] IN BLOOD BY AUTOMATED COUNT: 16 % (ref 11.5–14.5)
ERYTHROCYTE [DISTWIDTH] IN BLOOD BY AUTOMATED COUNT: 54.6 FL (ref 35–45)
HCT VFR BLD CALC: 21.1 % (ref 42–52)
HCT VFR BLD CALC: 22.9 % (ref 42–52)
HEMOGLOBIN: 7.4 GM/DL (ref 14–18)
HEMOGLOBIN: 7.8 GM/DL (ref 14–18)
LACTIC ACID, SEPSIS: 0.9 MMOL/L (ref 0.5–1.9)
LACTIC ACID, SEPSIS: 1 MMOL/L (ref 0.5–1.9)
MCH RBC QN AUTO: 32.9 PG (ref 26–33)
MCHC RBC AUTO-ENTMCNC: 35.1 GM/DL (ref 32.2–35.5)
MCV RBC AUTO: 93.8 FL (ref 80–94)
ORGANISM: ABNORMAL
PLATELET # BLD: 121 THOU/MM3 (ref 130–400)
PMV BLD AUTO: 9.9 FL (ref 9.4–12.4)
POTASSIUM SERPL-SCNC: 5.2 MEQ/L (ref 3.5–5.2)
POTASSIUM SERPL-SCNC: 5.7 MEQ/L (ref 3.5–5.2)
PROCALCITONIN: 0.58 NG/ML (ref 0.01–0.09)
RBC # BLD: 2.25 MILL/MM3 (ref 4.7–6.1)
URINE CULTURE REFLEX: ABNORMAL
WBC # BLD: 5.3 THOU/MM3 (ref 4.8–10.8)

## 2019-06-23 PROCEDURE — 87040 BLOOD CULTURE FOR BACTERIA: CPT

## 2019-06-23 PROCEDURE — 6370000000 HC RX 637 (ALT 250 FOR IP): Performed by: NURSE PRACTITIONER

## 2019-06-23 PROCEDURE — 2580000003 HC RX 258: Performed by: INTERNAL MEDICINE

## 2019-06-23 PROCEDURE — 85018 HEMOGLOBIN: CPT

## 2019-06-23 PROCEDURE — 2580000003 HC RX 258: Performed by: NURSE PRACTITIONER

## 2019-06-23 PROCEDURE — 2709999900 HC NON-CHARGEABLE SUPPLY: Performed by: INTERNAL MEDICINE

## 2019-06-23 PROCEDURE — 85027 COMPLETE CBC AUTOMATED: CPT

## 2019-06-23 PROCEDURE — 85014 HEMATOCRIT: CPT

## 2019-06-23 PROCEDURE — 6360000002 HC RX W HCPCS: Performed by: INTERNAL MEDICINE

## 2019-06-23 PROCEDURE — 36415 COLL VENOUS BLD VENIPUNCTURE: CPT

## 2019-06-23 PROCEDURE — 3609012800 HC EGD DIAGNOSTIC ONLY: Performed by: INTERNAL MEDICINE

## 2019-06-23 PROCEDURE — 84145 PROCALCITONIN (PCT): CPT

## 2019-06-23 PROCEDURE — 99232 SBSQ HOSP IP/OBS MODERATE 35: CPT | Performed by: INTERNAL MEDICINE

## 2019-06-23 PROCEDURE — 99152 MOD SED SAME PHYS/QHP 5/>YRS: CPT | Performed by: INTERNAL MEDICINE

## 2019-06-23 PROCEDURE — 71045 X-RAY EXAM CHEST 1 VIEW: CPT

## 2019-06-23 PROCEDURE — 6370000000 HC RX 637 (ALT 250 FOR IP): Performed by: INTERNAL MEDICINE

## 2019-06-23 PROCEDURE — 84132 ASSAY OF SERUM POTASSIUM: CPT

## 2019-06-23 PROCEDURE — 1200000003 HC TELEMETRY R&B

## 2019-06-23 PROCEDURE — 0DJ08ZZ INSPECTION OF UPPER INTESTINAL TRACT, VIA NATURAL OR ARTIFICIAL OPENING ENDOSCOPIC: ICD-10-PCS | Performed by: INTERNAL MEDICINE

## 2019-06-23 PROCEDURE — 83605 ASSAY OF LACTIC ACID: CPT

## 2019-06-23 PROCEDURE — 99221 1ST HOSP IP/OBS SF/LOW 40: CPT | Performed by: UROLOGY

## 2019-06-23 RX ORDER — PANTOPRAZOLE SODIUM 40 MG/1
40 TABLET, DELAYED RELEASE ORAL
Status: DISCONTINUED | OUTPATIENT
Start: 2019-06-23 | End: 2019-06-24 | Stop reason: HOSPADM

## 2019-06-23 RX ORDER — FENTANYL CITRATE 50 UG/ML
INJECTION, SOLUTION INTRAMUSCULAR; INTRAVENOUS PRN
Status: DISCONTINUED | OUTPATIENT
Start: 2019-06-23 | End: 2019-06-23 | Stop reason: ALTCHOICE

## 2019-06-23 RX ORDER — MIDAZOLAM HYDROCHLORIDE 1 MG/ML
INJECTION INTRAMUSCULAR; INTRAVENOUS PRN
Status: DISCONTINUED | OUTPATIENT
Start: 2019-06-23 | End: 2019-06-23 | Stop reason: ALTCHOICE

## 2019-06-23 RX ORDER — CEFTRIAXONE 1 G/1
1 INJECTION, POWDER, FOR SOLUTION INTRAMUSCULAR; INTRAVENOUS EVERY 24 HOURS
Status: DISCONTINUED | OUTPATIENT
Start: 2019-06-23 | End: 2019-06-23 | Stop reason: SDUPTHER

## 2019-06-23 RX ORDER — SODIUM CHLORIDE 0.9 % (FLUSH) 0.9 %
10 SYRINGE (ML) INJECTION EVERY 12 HOURS SCHEDULED
Status: DISCONTINUED | OUTPATIENT
Start: 2019-06-23 | End: 2019-06-23

## 2019-06-23 RX ORDER — SODIUM CHLORIDE 0.9 % (FLUSH) 0.9 %
10 SYRINGE (ML) INJECTION PRN
Status: DISCONTINUED | OUTPATIENT
Start: 2019-06-23 | End: 2019-06-23

## 2019-06-23 RX ORDER — SODIUM POLYSTYRENE SULFONATE 4.1 MEQ/G
15 POWDER, FOR SUSPENSION ORAL; RECTAL ONCE
Status: COMPLETED | OUTPATIENT
Start: 2019-06-23 | End: 2019-06-23

## 2019-06-23 RX ADMIN — GABAPENTIN 100 MG: 100 CAPSULE ORAL at 20:51

## 2019-06-23 RX ADMIN — DOCUSATE SODIUM 100 MG: 100 CAPSULE, LIQUID FILLED ORAL at 07:43

## 2019-06-23 RX ADMIN — CLONIDINE HYDROCHLORIDE 0.1 MG: 0.1 TABLET ORAL at 07:43

## 2019-06-23 RX ADMIN — PRAVASTATIN SODIUM 80 MG: 80 TABLET ORAL at 20:51

## 2019-06-23 RX ADMIN — HYDRALAZINE HYDROCHLORIDE 50 MG: 50 TABLET, FILM COATED ORAL at 20:51

## 2019-06-23 RX ADMIN — Medication 1 TABLET: at 07:44

## 2019-06-23 RX ADMIN — CLONIDINE HYDROCHLORIDE 0.1 MG: 0.1 TABLET ORAL at 20:51

## 2019-06-23 RX ADMIN — METOPROLOL TARTRATE 50 MG: 50 TABLET, FILM COATED ORAL at 20:51

## 2019-06-23 RX ADMIN — CINACALCET HYDROCHLORIDE 30 MG: 30 TABLET, COATED ORAL at 16:37

## 2019-06-23 RX ADMIN — HYDRALAZINE HYDROCHLORIDE 50 MG: 50 TABLET, FILM COATED ORAL at 07:44

## 2019-06-23 RX ADMIN — METOPROLOL TARTRATE 50 MG: 50 TABLET, FILM COATED ORAL at 07:43

## 2019-06-23 RX ADMIN — DOCUSATE SODIUM 100 MG: 100 CAPSULE, LIQUID FILLED ORAL at 20:51

## 2019-06-23 RX ADMIN — PANTOPRAZOLE SODIUM 40 MG: 40 TABLET, DELAYED RELEASE ORAL at 16:38

## 2019-06-23 RX ADMIN — CEFTRIAXONE SODIUM 1 G: 1 INJECTION, POWDER, FOR SOLUTION INTRAMUSCULAR; INTRAVENOUS at 10:02

## 2019-06-23 RX ADMIN — PANTOPRAZOLE SODIUM 40 MG: 40 TABLET, DELAYED RELEASE ORAL at 06:03

## 2019-06-23 RX ADMIN — SODIUM POLYSTYRENE SULFONATE 15 G: 1 POWDER ORAL; RECTAL at 10:02

## 2019-06-23 RX ADMIN — LISINOPRIL 20 MG: 20 TABLET ORAL at 07:43

## 2019-06-23 RX ADMIN — Medication 10 ML: at 07:44

## 2019-06-23 RX ADMIN — Medication 10 ML: at 20:52

## 2019-06-23 ASSESSMENT — PAIN SCALES - GENERAL
PAINLEVEL_OUTOF10: 0
PAINLEVEL_OUTOF10: 0

## 2019-06-23 ASSESSMENT — PAIN - FUNCTIONAL ASSESSMENT: PAIN_FUNCTIONAL_ASSESSMENT: 0-10

## 2019-06-23 NOTE — FLOWSHEET NOTE
Patient came in Friday and is feeling much better. The patient had a procedure done this morning and will have another on Thursday. A prayer for healing was offered on behalf of the patient. 06/23/19 1502   Encounter Summary   Services provided to: Patient   Referral/Consult From: [address] [place] [address] Family members; [facility]carolyn community   Place of [facility]   Contact Jain Completed   Continue Visiting Yes  (6/23)   Complexity of Encounter Low   Length of Encounter 15 minutes   Routine   Type Initial   Assessment Calm; Approachable; Hopeful;Peaceful   Intervention Active listening;Nurtured hope;Prayer   Outcome Acceptance;Comfort;Expressed gratitude;Encouraged

## 2019-06-23 NOTE — FLOWSHEET NOTE
06/23/19 0751   Provider Notification   Reason for Communication Review case  (K 5.7)   Provider Name [facility]   Provider Notification Physician   Method of Communication Face to face   Response See orders   Notification Time 9003

## 2019-06-23 NOTE — PRE SEDATION
[address] [address]  Sedation/Analgesia History & Physical    Patient: Adeel Music: 11/10/1930  Med Rec#: [id number] Acc#: [id number]   Provider Performing Procedure: Alfonzo Varner  Primary Care Physician: Shelly Sims MD    PRE-PROCEDURE   Full CODE [x]Yes  DNR-CCA/DNR-CC []Yes   Brief History/Pre-Procedure Diagnosis:andmia and melena           MEDICAL HISTORY  []CAD/Valve  []Liver Disease  []Lung Disease []Diabetes  []Hypertension []Renal Disease  []Additional information:       has a past medical history of Anemia in chronic kidney disease(285.21), Arthritis, CAD (coronary artery disease), Chronic kidney disease, Chronic kidney disease, stage IV (severe) ([facility] [address] CKD (chronic kidney disease) stage 3, GFR 30-59 ml/min ([facility] [address] COPD (chronic obstructive pulmonary disease) ([facility] [address] GI bleed, Hemodialysis patient ([facility] [address] History of blood transfusion, Hyperlipidemia, Hyperphosphatemia, Hypertension, Sick sinus syndrome ([facility] [address] and Systolic congestive heart failure ([facility] [address] SURGICAL HISTORY   has a past surgical history that includes pacemaker placement (2013); Endoscopy, colon, diagnostic; Dialysis fistula creation (5/6/2016); Colonoscopy (7277,4656); Upper gastrointestinal endoscopy (8393,8234); Coronary angioplasty with stent (2004); pr esophagogastroduodenoscopy transoral diagnostic (N/A, 1/18/2018); vascular surgery; and Cardiac surgery.   Additional information:       ALLERGIES   Allergies as of 06/21/2019 - Review Complete 06/21/2019   Allergen Reaction Noted    No known allergies  12/19/2013     Additional information:       MEDICATIONS   Coumadin Use Last 7 Days [x]No []Yes  Antiplatelet drug therapy use last 7 days  [x]No []Yes  Other anticoagulant use last 7 days  [x]No []Yes    Current Facility-Administered Medications:     cefTRIAXone (ROCEPHIN) 1 g IVPB in 50 mL D5W minibag, 1 g, Intravenous, Q24H, Shena Bro MD    sodium polystyrene (KAYEXALATE) powder 15 g, 15 g, Oral, Once, Boogie Mckeon MD    folbee plus tablet 1 tablet, 1 tablet, Oral, Daily, Aurora Hunt, APRN - CNP, 1 tablet at 06/23/19 0744    cinacalcet (SENSIPAR) tablet 30 mg, 30 mg, Oral, Dinner, Aurora Hunt, APRN - CNP, 30 mg at 06/22/19 1655    cloNIDine (CATAPRES) tablet 0.1 mg, 0.1 mg, Oral, BID, Oneliaher ELAYNE Buchananan, APRN - CNP, 0.1 mg at 06/23/19 0743    gabapentin (NEURONTIN) capsule 100 mg, 100 mg, Oral, Nightly, Onelia Garciahman, APRN - CNP, 100 mg at 06/22/19 2032    hydrALAZINE (APRESOLINE) tablet 50 mg, 50 mg, Oral, BID, Onelia A Rethman, APRN - CNP, 50 mg at 06/23/19 0744    lisinopril (PRINIVIL;ZESTRIL) tablet 20 mg, 20 mg, Oral, Daily, Noelia A Josehman, APRN - CNP, 20 mg at 06/23/19 0743    metoprolol tartrate (LOPRESSOR) tablet 50 mg, 50 mg, Oral, BID, Onelia A Rethman, APRN - CNP, 50 mg at 06/23/19 0743    pantoprazole (PROTONIX) tablet 40 mg, 40 mg, Oral, QAM AC, Onelia A Rethman, APRN - CNP, 40 mg at 06/23/19 0603    pravastatin (PRAVACHOL) tablet 80 mg, 80 mg, Oral, Daily, Onelia A Josehman, APRN - CNP, 80 mg at 06/22/19 2032    sodium chloride flush 0.9 % injection 10 mL, 10 mL, Intravenous, 2 times per day, Aurora Hunt, APRN - CNP, 10 mL at 06/23/19 0744    sodium chloride flush 0.9 % injection 10 mL, 10 mL, Intravenous, PRN, Onelia Garciahman, APRN - CNP    docusate sodium (COLACE) capsule 100 mg, 100 mg, Oral, BID, Onelia A Rethman, APRN - CNP, 100 mg at 06/23/19 0743    ondansetron (ZOFRAN) injection 4 mg, 4 mg, Intravenous, Q6H PRN, Onelia Rosen APRN - CNP    acetaminophen (TYLENOL) tablet 650 mg, 650 mg, Oral, Q4H PRN, Aurora Spoon, APRN - CNP, 650 mg at 06/22/19 4237    hydrALAZINE (APRESOLINE) injection 10 mg, 10 mg, Intravenous, Q6H PRN, Aurora Spoon, APRN - CNP  Prior to Admission medications    Medication Sig Start Date End Date Taking?  Authorizing Provider   pantoprazole (PROTONIX) 40 MG tablet Take 1 tablet by mouth every morning (before breakfast) 6/18/19  Yes XIN Elizabeth CNP   polyethylene glycol (MIRALAX) powder Renal Miralax Colonoscopy prep. Use as directed. Mix Miralax 238 g with 24 oz clear liquids. Drink 8 oz glass every 20-30 minutes until gone. 6/6/19  Yes XIN Elizabeth CNP   cloNIDine (CATAPRES) 0.1 MG tablet Take 0.1 mg by mouth 2 times daily 5/30/18  Yes Historical Provider, MD   hydrALAZINE (APRESOLINE) 50 MG tablet Take 50 mg by mouth 2 times daily   Yes Historical Provider, MD   cinacalcet (SENSIPAR) 30 MG tablet Take 1 tablet by mouth Daily with supper 5/26/18  Yes Vianey Perez DO   lisinopril (PRINIVIL;ZESTRIL) 20 MG tablet Take 1 tablet by mouth daily 5/27/18  Yes Vianey Perez DO   clopidogrel (PLAVIX) 75 MG tablet Take 75 mg by mouth daily   Yes Historical Provider, MD   aspirin 81 MG tablet Take 81 mg by mouth daily   Yes Historical Provider, MD   isosorbide mononitrate (IMDUR) 30 MG extended release tablet Take 30 mg by mouth daily   Yes Historical Provider, MD   gabapentin (NEURONTIN) 100 MG capsule Take 100 mg by mouth nightly.    Yes Historical Provider, MD   B Complex-C-Folic Acid (RENAL) 1 MG CAPS Take 1 capsule by mouth daily   Yes Historical Provider, MD   metoprolol (LOPRESSOR) 50 MG tablet Take 1 tablet by mouth 2 times daily 6/14/16  Yes Arianne Calderon MD   pravastatin (PRAVACHOL) 80 MG tablet Take 1 tablet by mouth daily 11/12/15  Yes Arianne Calderon MD     Additional information:       PHYSICAL:   Heart:  [x]Regular rate and rhythm  []Other:    Lungs:  [x]Clear    []Other:    Abdomen: [x]Soft    []Other:    Mental Status: [x]Alert & Oriented  []Other:      VITAL SIGNS   Patient Vitals for the past 24 hrs:   BP Temp Temp src Pulse Resp SpO2 Weight   06/23/19 0854 128/75 -- -- 85 18 100 % --   06/23/19 0850 (!) 172/73 -- -- 60 14 100 % --   06/23/19 0825 (!) 191/76 -- -- 62 20 97 % --   06/23/19 0740 (!) 165/74 98.1 °F (36.7 °C) Oral 62 16 100 % -- 06/23/19 0324 126/65 98 °F (36.7 °C) Oral 71 16 97 % 173 lb 12.8 oz (78.8 kg)   06/22/19 2317 (!) 168/75 99 °F (37.2 °C) Oral 57 18 97 % --   06/22/19 2000 (!) 166/64 98.4 °F (36.9 °C) Oral 64 18 96 % --   06/22/19 1610 (!) 146/65 97.9 °F (36.6 °C) Oral 64 18 94 % --   06/22/19 1140 127/61 97.6 °F (36.4 °C) Oral 62 18 94 % --       PLANNED PROCEDURE   [x]EGD  []Colonoscopy []Flex Sigmoid  []ERCP []EUS   []Cystoscopy  [] CATH [] BRONCH   Consent: I have discussed with the patient and/or the patient representative the indication, alternatives, and the possible risks and/or complications of the planned procedure and the anesthesia methods. The patient and/or patient representative appear to understand and agree to proceed. SEDATION  Planned agent:[x]Midazolam []Meperidine [x]Sublimaze []Morphine  []Diazepam  []Other:     ASA Classification: Class 3 - A patient with severe systemic disease that limits activity but is not incapacitating    Airway Assessment: Mallampati Class II - (soft palate, fauces & uvula are visible)    Monitoring and Safety: The patient will be placed on a cardiac monitor and vital signs, pulse oximetry and level of consciousness will be continuously evaluated throughout the procedure. The patient will be closely monitored until recovery from the medications is complete and the patient has returned to baseline status. Respiratory therapy will be on standby during the procedure. [x]Pre-procedure diagnostic studies complete and results available. Comment:    [x]Previous sedation/anesthesia experiences assessed. Comment:    [x]The patient is an appropriate candidate to undergo the planned procedure sedation and anesthesia. (Refer to nursing sedation/analgesia documentation record)  [x]Formulation and discussion of sedation/procedure plan, risks, and expectations with patient and/or responsible adult completed. [x]Patient examined immediately prior to the procedure.  (Refer to nursing sedation/analgesia documentation record)    Salena Garces MD   Electronically signed 6/23/2019 at 9:03 AM

## 2019-06-23 NOTE — OP NOTE
[address] [place] [address]                                OPERATIVE REPORT    PATIENT NAME: Fermin Fleming                      :        11/10/1930  MED REC NO:   [id number]                           ROOM:       0014  ACCOUNT NO:   [de-identified]                           ADMIT DATE: 2019  PROVIDER:     DANIKA Thompson OF PROCEDURE:  2019    INDICATIONS:  The patient with anemia, black stool versus melenic stool  as the patient is on iron supplement. Plan today for EGD to evaluate. SURGEON:  Shaq Pradhan MD    ASA CLASSIFICATION:  III. DESCRIPTION OF THE PROCEDURE:  The patient was brought to the GI lab. Consent was obtained. Risks involved with the procedure were explained  to the patient. Informed consent was obtained. The patient was  monitored during the procedure with pulse oximetry, blood pressure  monitoring, and oxygen by nasal cannula. Sedation by incremental doses  of IV Versed, total 1 mg of Versed and 50 mcg of fentanyl given in  incremental dosage during the procedure to achieve continuous conscious  sedation. PROCEDURE PERFORMED:  EGD. A standard video upper scope was advanced under direct vision from the  oral cavity up to third part of the duodenum. Esophagus:  Feature of  mild acid reflux, slightly thickened surface, distal esophagus. Scope  was advanced to the stomach. Retroflex examination of the cardia  revealed normal cardia with no evidence of hiatus hernia. Mucosa,  fundus, and body appear normal.  Antrum:  Patchy erythema and mild  gastritis. No evidence of GI bleed. Duodenum showed also mild  duodenitis. No evidence of GI bleed. No vascular malformation was  seen. No biopsy obtained as biopsy obtained on this patient not long  time ago as an outpatient. PLAN:  1. Continue PPI. 2.  Keep your appointment scheduled for colonoscopy.   3.  If colonoscopy is indicated, small bowel follow through as well as  capsule endoscopy to be done to complete the GI evaluation. Angelo Edwards M.D.    D: 06/23/2019 9:32:39       T: 06/23/2019 15:37:33     [email or web address]  Job#: [id number]     Doc#: [id number]    CC:  Megan Harry. DANIKA Copeland M.D.

## 2019-06-23 NOTE — PLAN OF CARE
Problem: Falls - Risk of:  Goal: Will remain free from falls  Description  Will remain free from falls  6/22/2019 2156 by Edythe Schaumann, RN  Outcome: Ongoing  Note:   Patient in bed with bed alarm on, call light within reach and being used appropriately. Non-skid socks on. Patient up with standby assist as  needed. Problem: Falls - Risk of:  Goal: Absence of physical injury  Description  Absence of physical injury  6/22/2019 2156 by Edythe Schaumann, RN  Outcome: Ongoing  Note:   Stay with me and falling star program in place. No signs of physical injury at this time. Problem: Pain:  Goal: Pain level will decrease  Description  Pain level will decrease  6/22/2019 2156 by Edythe Schaumann, RN  Outcome: Ongoing  Note:   Patient denies the presence of pain so far this shift. Pain goal no pain. Problem: Pain:  Goal: Control of acute pain  Description  Control of acute pain  6/22/2019 2156 by Edythe Schaumann, RN  Outcome: Ongoing  Note:   Denies the presence of pain at this time. Problem: Pain:  Goal: Control of chronic pain  Description  Control of chronic pain  6/22/2019 2156 by Edythe Schaumann, RN  Outcome: Ongoing  Note:   Denies the presence of chronic pain. Problem: Tissue Perfusion - Renal, Altered:  Goal: Electrolytes within specified parameters  Description  Electrolytes within specified parameters  6/22/2019 2156 by Edythe Schaumann, RN  Outcome: Ongoing  Note:   Electrolytes being monitored with AM labs. Will replace as needed. Problem: Tissue Perfusion - Renal, Altered:  Goal: Serum creatinine will be within specified parameters  Description  Serum creatinine will be within specified parameters  6/22/2019 2156 by Edythe Schaumann, RN  Outcome: Ongoing  Note:   Serum creatinine 8.4, hemodialysis patient.      Problem: Discharge Planning:  Goal: Participates in care planning  Description  Participates in care planning  6/22/2019 2156 by Edythe Schaumann, RN  Outcome: Ongoing  Note:   Patient able to assist in identifying goals to achieve throughout the shift, updated on plan of care. Problem: Discharge Planning:  Goal: Discharged to appropriate level of care  Description  Discharged to appropriate level of care  6/22/2019 2156 by Larissa Sandoval RN  Outcome: Ongoing  Note:   Patient plans to return home with wife. Problem: Cardiac Output - Decreased:  Goal: Hemodynamic stability will improve  Description  Hemodynamic stability will improve  6/22/2019 2156 by Larissa Sandoval RN  Outcome: Ongoing  Note:   Vitals:    06/22/19 2000   BP: (!) 166/64   Pulse: 64   Resp: 18   Temp: 98.4 °F (36.9 °C)   SpO2: 96%     Will continue to monitor every 4 hours. Problem: Fluid Volume - Imbalance:  Goal: Absence of imbalanced fluid volume signs and symptoms  Description  Absence of imbalanced fluid volume signs and symptoms  6/22/2019 2156 by Larissa Sandoval RN  Outcome: Ongoing  Note:   Patient received hemodialysis M/W/F. Accurate I&O in progress. Problem: Skin Integrity - Impaired:  Goal: Absence of new skin breakdown  Description  Absence of new skin breakdown  6/22/2019 2156 by Larissa Sandoval RN  Outcome: Ongoing  Note:   Patient turns and repositions self in bed. Educated patient on importance of turning and repositioning self to preserve skin integrity. No new skin breakdown at this time. Care plan reviewed with patient. Patient verbalize understanding of the plan of care and contribute to goal setting.

## 2019-06-23 NOTE — BRIEF OP NOTE
Brief Postoperative Note  ______________________________________________________________    Patient: Diana Woodall  YOB: 1930  MRN: [id number]  Date of Procedure: 6/23/2019    Pre-Op Diagnosis: ANEMIA & MELANA    Post-Op Diagnosis: gastritis and duodenitis . Procedure(s):  EGD DIAGNOSTIC ONLY    Anesthesia: Anesthesia type not filed in the log. Surgeon(s):  Shadia Hernandez MD    Assistant: Pascale Peña RN     Estimated Blood Loss (mL): none    Complications: None    Specimens:   * No specimens in log *    Implants:  * No implants in log *      Drains: * No LDAs found *    Findings:   gastritis and duodenitis .      Shadia Hernandez MD  Date: 6/23/2019  Time: 9:17 AM

## 2019-06-23 NOTE — CONSULTS
[address] [place] [address]                                  CONSULTATION    PATIENT NAME: Víctor Early                      :        11/10/1930  MED REC NO:   [id number]                           ROOM:       0014  ACCOUNT NO:   [de-identified]                           ADMIT DATE: 2019  PROVIDER:     DANIKA Nolasco Cooler:  2019    HISTORY OF PRESENT ILLNESS:  The patient is known to the practice, seen  in GI consult for evaluation of GI bleed and severe anemia, required  blood transfusion. From a GI point of view, he does not have any  significant problem. He says he has nausea. He has no vomit. He has  no heartburn. No dysphagia or odynophagia. He has no regurgitation. No vomiting of undigested food. No early satiety. He lost no weight. Appetite is fine. He has double stomach. His stool is softer more than  usual, been going on for a few days. He feels slightly weak. He  thought to be with anemia. He is having no fever, no chills. He has no abdominal discomfort. He  has no chest pain. He says his last stress test pass  with no  difficulty. His H and H is 7.0 and 20.8, MCV is 95.7. He is on  hemodialysis. He is actually scheduled to have an upper endoscopy done with me at [address] [place] [address] as an outpatient on  at 10:45 a.m. His last exam to be done  while he is here. PAST MEDICAL HISTORY:  Significant for kidney disease, on hemodialysis;  coronary artery disease; COPD; hyperlipidemia; hypercholesterolemia;  hypertension; sick sinus syndrome; systolic congestive heart failure;  coronary artery disease with angioplasty; dialysis fistula creation;  pacemaker placement. SOCIAL HISTORY:  Quit smoking years ago, more than 30 years ago. No  smokeless tobacco.  Does not drink alcohol. FAMILY HISTORY:  Diabetes, heart disease, mental illness. ALLERGIES:  None reported.     MEDICATIONS:

## 2019-06-23 NOTE — PLAN OF CARE
Care plan reviewed with patient and family. Patient and family verbalize understanding of the plan of care and contribute to goal setting. Problem: Falls - Risk of:  Goal: Will remain free from falls  Description  Will remain free from falls  Outcome: Ongoing  Note:   No falls noted this shift. Continue falling star program. Bed alarm on, bed in low position. Call light and personal belongings in reach. Patient uses call light appropriately. Goal: Absence of physical injury  Description  Absence of physical injury  Outcome: Ongoing  Note:   No falls noted this shift. Continue falling star program. Bed alarm on, bed in low position. Call light and personal belongings in reach. Patient uses call light appropriately. Problem: Pain:  Description  Pain management should include both nonpharmacologic and pharmacologic interventions. Goal: Pain level will decrease  Description  Pain level will decrease  Outcome: Ongoing  Note:   Patient denies pain so far this shift, will continue to monitor.      Goal: Control of acute pain  Description  Control of acute pain  Outcome: Ongoing  Goal: Control of chronic pain  Description  Control of chronic pain  Outcome: Ongoing     Problem: Tissue Perfusion - Renal, Altered:  Goal: Electrolytes within specified parameters  Description  Electrolytes within specified parameters  Outcome: Ongoing  Note:   K 5.7 today, kayexelate given, redrawn at 1645, HD tomorrow morning  Goal: Serum creatinine will be within specified parameters  Description  Serum creatinine will be within specified parameters  Outcome: Ongoing  Note:   HD patient, HD tomorrow morning     Problem: Discharge Planning:  Goal: Participates in care planning  Description  Participates in care planning  Outcome: Ongoing  Note:   Patient aware and updated on plan of care    Goal: Discharged to appropriate level of care  Description  Discharged to appropriate level of care  Outcome: Ongoing  Note:   Plan to discharge home with wife     Problem: Cardiac Output - Decreased:  Goal: Hemodynamic stability will improve  Description  Hemodynamic stability will improve  Outcome: Ongoing  Note:   VSS     Problem: Fluid Volume - Imbalance:  Goal: Absence of imbalanced fluid volume signs and symptoms  Description  Absence of imbalanced fluid volume signs and symptoms  Outcome: Ongoing  Note:   VSS, HD tomorrow morning     Problem: Pain:  Description  Pain management should include both nonpharmacologic and pharmacologic interventions.   Goal: Control of acute pain  Description  Control of acute pain  Outcome: Ongoing  Goal: Control of chronic pain  Description  Control of chronic pain  Outcome: Ongoing     Problem: Skin Integrity - Impaired:  Goal: Absence of new skin breakdown  Description  Absence of new skin breakdown  Outcome: Ongoing  Note:   No new signs or symptoms of skin breakdown noted this shift, encouraging patient to turn and reposition self in bed q2h

## 2019-06-23 NOTE — CONSULTS
GI:The patient denies abdominal or flank pain, anorexia, nausea or vomiting. : See HPI  Musculoskeletal: Patient denies low back pain or painful or reduced ROM of the back or extremities. Neurological: The patient denies any symptoms of neurological impairment or               TIA's; no history of stroke. Lymphatic: Denies swollen glands in neck, axillary or inguinal areas. Psychiatric: Denies anxiety or depression. Skin: Denies rash or lesions. The remainder of the complete ROS is negative    PHYSICAL EXAM:  VITALS:  BP (!) 155/70   Pulse 60   Temp 98.1 °F (36.7 °C) (Oral)   Resp 18   Ht 5' 6\" (1.676 m)   Wt 173 lb 12.8 oz (78.8 kg)   SpO2 97%   BMI 28.05 kg/m² . Nursing note and vitals reviewed. Constitutional:   Alert and oriented, no acute distress and cooperative to examination with appropriate mood and affect. Surrounded by multiple family members   HEENT:   Head::Normocephalic and atraumatic. Eyes: No scleral icterus. Nose:The external appearance of the nose is normal  Ears: The ears appear normal to external inspection   Neck: Supple, symmetrical    Pulmonary/Chest:  Chest symmetric with normal A/P diameter,  Normal respiratory rate and rhthym. No use of accessory muscles. Abdominal:   Soft. No tenderness. Genital:  Plallus: normal,   Scrotum: normal, no masses  Testis:atrophic bilaterally, no masses, no tenderness  Rectal:    normal tone   Prostate 30 gm, anodular, non-tender  Extremities:   No cyanosis.     DATA:  Labs:  CBC:   Recent Labs     06/21/19  0739 06/22/19  0948 06/22/19  1400 06/22/19 2002 06/23/19  0605   WBC 6.3 5.5  --   --  5.3   HGB 8.0* 7.4* 7.0* 7.7* 7.4*    118*  --   --  121*     BMP:    Recent Labs     06/21/19  0739 06/22/19  0509 06/23/19  0605    137  --    K 5.7* 5.1 5.7*   CL 97* 96*  --    CO2 22* 28  --    BUN 65* 45*  --    CREATININE 8.8* 8.1*  --    GLUCOSE 104 94  --    CALCIUM 9.4 8.7  --      Hepatic: No results for input(s): AST, ALT, ALB, BILITOT, ALKPHOS in the last 72 hours. INR: No results for input(s): INR in the last 72 hours. U/A:    Lab Results   Component Value Date    COLORU STRAW 06/22/2019    PHUR 8.5 06/22/2019    LABCAST NONE SEEN 05/03/2016    LABCAST NONE SEEN 05/03/2016    WBCUA 50-75 06/22/2019    RBCUA 25-50 06/22/2019    MUCUS THREADS 10/28/2017    YEAST NONE SEEN 06/22/2019    BACTERIA NONE 06/22/2019    SPECGRAV 1.013 05/03/2016    LEUKOCYTESUR LARGE 06/22/2019    UROBILINOGEN 0.2 06/22/2019    BILIRUBINUR NEGATIVE 06/22/2019    BLOODU LARGE 06/22/2019    GLUCOSEU NEGATIVE 06/22/2019    AMORPHOUS NONE SEEN 10/28/2017       Imaging: The patient has had a RENAL/BLADDER ULTRASOUND which I have reviewed along with its accompanying report. The study demonstrates       The urinary bladder appears normal. There is mild bladder wall thickening.  Neither ureteral jets was seen.                     Impression      No gross evidence of hydronephrosis. Kidneys are echogenic suggesting medical renal disease. Each shows moderate cortical thinning and elevated resistive indices. IMPRESSION:   Urethrorrhagia - resolved  Blood per stool  CRI on dialysis / anemia   On multiple meds including ASA and Plavix    Plan:     US was unremarkable. Recommend cystoscopy - can be done as an outpatient.         Thank you for including us in the care of Brayan Knight      Electronically signed by Parker Pradhan MD on 6/23/2019 at 10:57 AM    Urology

## 2019-06-23 NOTE — PROGRESS NOTES
Renal Progress Note    Assessment and Plan:    1. ESRD on hemodialysis   2. UTI  3. Hematuria likely due to UTI  4. Hypertension primary  5. COPD  6. Pulmonary edema better   7. Pneumonia ? 8. Anemia   9. Deconditiouing   PLAN:  Reviewed recent  labs  UA report note d  Reviewed medications   Add ceftriaxone 1000 mg IVPB every 24 hours   AM labs   Will follow     Patient Active Problem List:     Hypertension     CAD (coronary artery disease)     COPD (chronic obstructive pulmonary disease) ([facility] [address]     Chronic renal insufficiency     Patient overweight     Arthritis-  low back and neck .      CKD (chronic kidney disease) stage 3, GFR 30-59 ml/min ([facility])     Dyspnea and respiratory abnormalities     ED (erectile dysfunction)     Degenerative joint disease of knee, left     Gout of big toe     Anemia, chronic disease     ROSAURA (acute kidney injury) ([facility])     CKD (chronic kidney disease) stage 4, GFR 15-29 ml/min ([facility])     Essential hypertension     Monoclonal gammopathy     Leukopenia     Thrombocytopenia (HCC)     Chronic kidney disease (CKD), stage V (HCC)     Metabolic acidosis     Hyperkalemia     Benign essential HTN     Iron deficiency anemia     ROSAURA (acute kidney injury) ([facility] [address]     Plasma cell dyscrasia     Idiopathic hypotension     Hypertensive urgency     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)     Systolic congestive heart failure (HCC)     Other fluid overload (CODE)     Hyperphosphatemia     Acute diastolic congestive heart failure (HCC)     Acute on chronic diastolic congestive heart failure (HCC)     Pulmonary hypertension (HCC)     Anemia due to chronic kidney disease, on chronic dialysis (HCC)     Volume overload     Secondary hyperparathyroidism of renal origin [facility])      Subjective:   Admit Date: 6/21/2019    Interval History:  Seeing for ESRD and fluid overload  Sleeping  Updated by the staff  Blood pressure is stable  Admitted for shortness of breath  Thought to have CHF and /or pneumonia Medications:   Scheduled Meds:   cefTRIAXone  1 g Intramuscular Q24H    folbee plus  1 tablet Oral Daily    cinacalcet  30 mg Oral Dinner    cloNIDine  0.1 mg Oral BID    gabapentin  100 mg Oral Nightly    hydrALAZINE  50 mg Oral BID    lisinopril  20 mg Oral Daily    metoprolol tartrate  50 mg Oral BID    pantoprazole  40 mg Oral QAM AC    pravastatin  80 mg Oral Daily    sodium chloride flush  10 mL Intravenous 2 times per day    docusate sodium  100 mg Oral BID     Continuous Infusions:    CBC:   Recent Labs     06/21/19  0739 06/22/19  0948 06/22/19  1400 06/22/19 2002 06/23/19  0605   WBC 6.3 5.5  --   --  5.3   HGB 8.0* 7.4* 7.0* 7.7* 7.4*    118*  --   --  121*     CMP:    Recent Labs     06/21/19  0739 06/22/19  0509    137   K 5.7* 5.1   CL 97* 96*   CO2 22* 28   BUN 65* 45*   CREATININE 8.8* 8.1*   GLUCOSE 104 94   CALCIUM 9.4 8.7   LABGLOM 7* 8*     Troponin: No results for input(s): TROPONINI in the last 72 hours. BNP: No results for input(s): BNP in the last 72 hours. INR: No results for input(s): INR in the last 72 hours. Lipids: No results for input(s): CHOL, LDLDIRECT, TRIG, HDL, AMYLASE, LIPASE in the last 72 hours. Liver:   Recent Labs     06/22/19  0509   LABALBU 3.4*     Iron:  No results for input(s): IRONS, FERRITIN in the last 72 hours. Invalid input(s): LABIRONS    Objective:   Vitals: /65   Pulse 71   Temp 98 °F (36.7 °C) (Oral)   Resp 16   Ht 5' 6\" (1.676 m)   Wt 173 lb 12.8 oz (78.8 kg)   SpO2 97%   BMI 28.05 kg/m²    Wt Readings from Last 3 Encounters:   06/23/19 173 lb 12.8 oz (78.8 kg)   06/06/19 176 lb (79.8 kg)   12/06/18 175 lb 9.6 oz (79.7 kg)      24HR INTAKE/OUTPUT:      Intake/Output Summary (Last 24 hours) at 6/23/2019 0734  Last data filed at 6/23/2019 0326  Gross per 24 hour   Intake 700 ml   Output 50 ml   Net 650 ml       Constitutional: Sleeping   Skin:normal   HEENT:Pupils are reactive . Throat is clear   Neck:supple with no thyromegaly  Cardiovascular:  S1, S2 without murmur  Respiratory:  Decreased breath sound   Abdomen: +bs, soft, non tender  Ext: No LE edema  Musculoskeletal:Intact  Neuro:Deferred      Electronically signed by Eron Grove MD on 6/23/2019 at 7:34 AM

## 2019-06-24 VITALS
WEIGHT: 172.18 LBS | HEIGHT: 66 IN | SYSTOLIC BLOOD PRESSURE: 152 MMHG | HEART RATE: 71 BPM | BODY MASS INDEX: 27.67 KG/M2 | RESPIRATION RATE: 18 BRPM | DIASTOLIC BLOOD PRESSURE: 54 MMHG | TEMPERATURE: 98.2 F | OXYGEN SATURATION: 97 %

## 2019-06-24 LAB
ANION GAP SERPL CALCULATED.3IONS-SCNC: 20 MEQ/L (ref 8–16)
BUN BLDV-MCNC: 73 MG/DL (ref 7–22)
CALCIUM SERPL-MCNC: 8 MG/DL (ref 8.5–10.5)
CHLORIDE BLD-SCNC: 92 MEQ/L (ref 98–111)
CO2: 23 MEQ/L (ref 23–33)
CREAT SERPL-MCNC: 9.5 MG/DL (ref 0.4–1.2)
ERYTHROCYTE [DISTWIDTH] IN BLOOD BY AUTOMATED COUNT: 15.7 % (ref 11.5–14.5)
ERYTHROCYTE [DISTWIDTH] IN BLOOD BY AUTOMATED COUNT: 51.9 FL (ref 35–45)
GFR SERPL CREATININE-BSD FRML MDRD: 6 ML/MIN/1.73M2
GLUCOSE BLD-MCNC: 120 MG/DL (ref 70–108)
HCT VFR BLD CALC: 21.1 % (ref 42–52)
HEMOGLOBIN: 7.4 GM/DL (ref 14–18)
MCH RBC QN AUTO: 32.6 PG (ref 26–33)
MCHC RBC AUTO-ENTMCNC: 35.1 GM/DL (ref 32.2–35.5)
MCV RBC AUTO: 93 FL (ref 80–94)
PLATELET # BLD: 136 THOU/MM3 (ref 130–400)
PMV BLD AUTO: 9.9 FL (ref 9.4–12.4)
POTASSIUM SERPL-SCNC: 4.1 MEQ/L (ref 3.5–5.2)
RBC # BLD: 2.27 MILL/MM3 (ref 4.7–6.1)
SODIUM BLD-SCNC: 135 MEQ/L (ref 135–145)
WBC # BLD: 3.1 THOU/MM3 (ref 4.8–10.8)

## 2019-06-24 PROCEDURE — 97162 PT EVAL MOD COMPLEX 30 MIN: CPT

## 2019-06-24 PROCEDURE — 6370000000 HC RX 637 (ALT 250 FOR IP): Performed by: INTERNAL MEDICINE

## 2019-06-24 PROCEDURE — 80048 BASIC METABOLIC PNL TOTAL CA: CPT

## 2019-06-24 PROCEDURE — 90935 HEMODIALYSIS ONE EVALUATION: CPT

## 2019-06-24 PROCEDURE — 97116 GAIT TRAINING THERAPY: CPT

## 2019-06-24 PROCEDURE — 6370000000 HC RX 637 (ALT 250 FOR IP): Performed by: NURSE PRACTITIONER

## 2019-06-24 PROCEDURE — 90935 HEMODIALYSIS ONE EVALUATION: CPT | Performed by: NURSE PRACTITIONER

## 2019-06-24 PROCEDURE — 99239 HOSP IP/OBS DSCHRG MGMT >30: CPT | Performed by: INTERNAL MEDICINE

## 2019-06-24 PROCEDURE — 85027 COMPLETE CBC AUTOMATED: CPT

## 2019-06-24 PROCEDURE — 2580000003 HC RX 258: Performed by: NURSE PRACTITIONER

## 2019-06-24 PROCEDURE — 97530 THERAPEUTIC ACTIVITIES: CPT

## 2019-06-24 PROCEDURE — 6360000002 HC RX W HCPCS: Performed by: INTERNAL MEDICINE

## 2019-06-24 PROCEDURE — 2580000003 HC RX 258: Performed by: INTERNAL MEDICINE

## 2019-06-24 PROCEDURE — 36415 COLL VENOUS BLD VENIPUNCTURE: CPT

## 2019-06-24 RX ORDER — FERROUS SULFATE 325(65) MG
325 TABLET ORAL
Qty: 30 TABLET | Refills: 5 | Status: ON HOLD | OUTPATIENT
Start: 2019-06-24 | End: 2021-01-01

## 2019-06-24 RX ORDER — CEPHALEXIN 250 MG/1
250 CAPSULE ORAL DAILY
Qty: 4 CAPSULE | Refills: 0 | Status: SHIPPED | OUTPATIENT
Start: 2019-06-24 | End: 2019-06-28

## 2019-06-24 RX ORDER — ASCORBIC ACID 500 MG
500 TABLET ORAL DAILY
Qty: 30 TABLET | Refills: 3 | Status: SHIPPED | OUTPATIENT
Start: 2019-06-24

## 2019-06-24 RX ORDER — PSEUDOEPHEDRINE HCL 30 MG
100 TABLET ORAL 2 TIMES DAILY
Qty: 60 CAPSULE | Refills: 0 | Status: SHIPPED | OUTPATIENT
Start: 2019-06-24

## 2019-06-24 RX ORDER — PANTOPRAZOLE SODIUM 40 MG/1
40 TABLET, DELAYED RELEASE ORAL
Qty: 60 TABLET | Refills: 1 | Status: SHIPPED | OUTPATIENT
Start: 2019-06-24 | End: 2020-04-28

## 2019-06-24 RX ADMIN — PANTOPRAZOLE SODIUM 40 MG: 40 TABLET, DELAYED RELEASE ORAL at 06:09

## 2019-06-24 RX ADMIN — DOCUSATE SODIUM 100 MG: 100 CAPSULE, LIQUID FILLED ORAL at 13:21

## 2019-06-24 RX ADMIN — CINACALCET HYDROCHLORIDE 30 MG: 30 TABLET, COATED ORAL at 16:55

## 2019-06-24 RX ADMIN — METOPROLOL TARTRATE 50 MG: 50 TABLET, FILM COATED ORAL at 13:20

## 2019-06-24 RX ADMIN — LISINOPRIL 20 MG: 20 TABLET ORAL at 13:21

## 2019-06-24 RX ADMIN — Medication 10 ML: at 13:21

## 2019-06-24 RX ADMIN — CEFTRIAXONE SODIUM 1 G: 1 INJECTION, POWDER, FOR SOLUTION INTRAMUSCULAR; INTRAVENOUS at 13:24

## 2019-06-24 RX ADMIN — Medication 1 TABLET: at 13:20

## 2019-06-24 RX ADMIN — CLONIDINE HYDROCHLORIDE 0.1 MG: 0.1 TABLET ORAL at 13:21

## 2019-06-24 RX ADMIN — PANTOPRAZOLE SODIUM 40 MG: 40 TABLET, DELAYED RELEASE ORAL at 16:55

## 2019-06-24 RX ADMIN — HYDRALAZINE HYDROCHLORIDE 50 MG: 50 TABLET, FILM COATED ORAL at 13:20

## 2019-06-24 ASSESSMENT — PAIN DESCRIPTION - PAIN TYPE: TYPE: ACUTE PAIN

## 2019-06-24 ASSESSMENT — PAIN DESCRIPTION - LOCATION: LOCATION: HEAD

## 2019-06-24 ASSESSMENT — PAIN SCALES - GENERAL: PAINLEVEL_OUTOF10: 0

## 2019-06-24 NOTE — PROGRESS NOTES
Pharmacy called patient will not have payment until after 5pm. He stated he will pick them up tomorrow.

## 2019-06-24 NOTE — PLAN OF CARE
Problem: Falls - Risk of:  Goal: Will remain free from falls  Description  Will remain free from falls  6/24/2019 0006 by Valeriano Serrato RN  Outcome: Ongoing  Note:   Patient in bed with bed alarm on, call light within reach and being used appropriately. Non-skid socks on. Patient up with standby assist to bathroom. Problem: Falls - Risk of:  Goal: Absence of physical injury  Description  Absence of physical injury  6/24/2019 0006 by Valeriano Serrato RN  Outcome: Ongoing  Note:   Falling star program in place. No signs of physical injury at this time. Problem: Pain:  Goal: Pain level will decrease  Description  Pain level will decrease  6/24/2019 0006 by Valeriano Serrato RN  Outcome: Ongoing  Note:   Patient denies the presence of pain at this time. Pain goal is no pain. Problem: Pain:  Goal: Control of acute pain  Description  Control of acute pain  6/24/2019 0006 by Valeriano Serrato RN  Outcome: Ongoing  Note:   Denies acute pain. Problem: Pain:  Goal: Control of chronic pain  Description  Control of chronic pain  6/24/2019 0006 by Valeriano Serrato RN  Outcome: Ongoing  Note:   Denies chronic pain. Problem: Tissue Perfusion - Renal, Altered:  Goal: Electrolytes within specified parameters  Description  Electrolytes within specified parameters  6/24/2019 0006 by Valeriano Serrato RN  Outcome: Ongoing  Note:   Electrolytes being monitored with AM labs and replaced as needed. Problem: Tissue Perfusion - Renal, Altered:  Goal: Serum creatinine will be within specified parameters  Description  Serum creatinine will be within specified parameters  6/24/2019 0006 by Valeriano Serrato RN  Outcome: Ongoing  Note:   Serum creatinine 8.1, hemodialysis patient.      Problem: Discharge Planning:  Goal: Participates in care planning  Description  Participates in care planning  6/24/2019 0006 by Valeriano Serrato RN  Outcome: Ongoing  Note:   Patient able to assist in identifying goals to achieve throughout the shift, updated on plan of care. Problem: Discharge Planning:  Goal: Discharged to appropriate level of care  Description  Discharged to appropriate level of care  6/24/2019 0006 by Carmen Pink RN  Outcome: Ongoing  Note:   Patient plans to return home with wife. Problem: Cardiac Output - Decreased:  Goal: Hemodynamic stability will improve  Description  Hemodynamic stability will improve  6/24/2019 0006 by Carmen Pink RN  Outcome: Ongoing  Note:   Vitals:    06/23/19 2335   BP: (!) 157/72   Pulse: 60   Resp: 16   Temp: 97.6 °F (36.4 °C)   SpO2: 97%     Will continue to monitor. Problem: Fluid Volume - Imbalance:  Goal: Absence of imbalanced fluid volume signs and symptoms  Description  Absence of imbalanced fluid volume signs and symptoms  6/24/2019 0006 by Carmen Pink RN  Outcome: Ongoing  Note:   Accurate I&O in progress. Problem: Pain:  Goal: Control of acute pain  Description  Control of acute pain  6/24/2019 0006 by Carmen Pink RN  Outcome: Ongoing     Problem: Pain:  Goal: Control of chronic pain  Description  Control of chronic pain  6/24/2019 0006 by Carmen Pink RN  Outcome: Ongoing     Problem: Skin Integrity - Impaired:  Goal: Absence of new skin breakdown  Description  Absence of new skin breakdown  6/24/2019 0006 by Carmen Pink RN  Outcome: Ongoing  Note:   Patient turns and repositions self in bed, no new signs of skin breakdown. Care plan reviewed with patient. Patient verbalize understanding of the plan of care and contribute to goal setting.

## 2019-06-24 NOTE — PROGRESS NOTES
Gastroenterology  Progress Note    6/24/2019 6:26 PM  Subjective:   Admit Date: 6/21/2019    Interval History: no gross GI bleeding, had EGD yesterday finding discussed with the patient, plan for colonoscopy as out patint. .   Diet: DIET RENAL; Renal    Medications:   Scheduled Meds:   cefTRIAXone (ROCEPHIN) IV  1 g Intravenous Q24H    pantoprazole  40 mg Oral BID AC    folbee plus  1 tablet Oral Daily    cinacalcet  30 mg Oral Dinner    cloNIDine  0.1 mg Oral BID    gabapentin  100 mg Oral Nightly    hydrALAZINE  50 mg Oral BID    lisinopril  20 mg Oral Daily    metoprolol tartrate  50 mg Oral BID    pravastatin  80 mg Oral Daily    sodium chloride flush  10 mL Intravenous 2 times per day    docusate sodium  100 mg Oral BID     Continuous Infusions:  CBC:   Recent Labs     06/22/19  0948  06/23/19  0605 06/23/19  1812 06/24/19  0830   WBC 5.5  --  5.3  --  3.1*   HGB 7.4*   < > 7.4* 7.8* 7.4*   *  --  121*  --  136    < > = values in this interval not displayed. BMP:    Recent Labs     06/22/19  0509 06/23/19  0605 06/23/19  1812 06/24/19  0830     --   --  135   K 5.1 5.7* 5.2 4.1   CL 96*  --   --  92*   CO2 28  --   --  23   BUN 45*  --   --  73*   CREATININE 8.1*  --   --  9.5*   GLUCOSE 94  --   --  120*     Hepatic: No results for input(s): AST, ALT, ALB, BILITOT, ALKPHOS in the last 72 hours. INR: No results for input(s): INR in the last 72 hours.   Xray:    Endoscopy Finding:         Objective:   Vitals: BP (!) 152/54 Comment: manual  Pulse 71   Temp 98.2 °F (36.8 °C) (Oral)   Resp 18   Ht 5' 6\" (1.676 m)   Wt 172 lb 2.9 oz (78.1 kg)   SpO2 97%   BMI 27.79 kg/m²     Intake/Output Summary (Last 24 hours) at 6/24/2019 1826  Last data filed at 6/24/2019 1347  Gross per 24 hour   Intake 1072.1 ml   Output 2400 ml   Net -1327.9 ml     General appearance: alert and cooperative with exam  Lungs: clear to auscultation bilaterally  Heart: regular rate and rhythm, S1, S2 normal, no murmur, click, rub or gallop  Abdomen: soft, non-tender; bowel sounds normal; no masses,  no organomegaly  Extremities: extremities normal, atraumatic, no cyanosis or edema    Assessment and Plan:   1. Anemia, EGD showed gastritis  2. Colonoscopy as out patient. Follow up in GI Clinic after discharge in 2 week(s)    Patient Active Problem List:     Hypertension     CAD (coronary artery disease)     COPD (chronic obstructive pulmonary disease) (HCC)     Chronic renal insufficiency     Patient overweight     Arthritis-  low back and neck .      CKD (chronic kidney disease) stage 3, GFR 30-59 ml/min (HCC)     Dyspnea and respiratory abnormalities     ED (erectile dysfunction)     Degenerative joint disease of knee, left     Gout of big toe     Anemia, chronic disease     ROSAURA (acute kidney injury) ([address]     CKD (chronic kidney disease) stage 4, GFR 15-29 ml/min (HCC)     Essential hypertension     Monoclonal gammopathy     Leukopenia     Thrombocytopenia (HCC)     Chronic kidney disease (CKD), stage V (HCC)     Metabolic acidosis     Hyperkalemia     Benign essential HTN     Iron deficiency anemia     ROSAURA (acute kidney injury) ([address]     Plasma cell dyscrasia     Idiopathic hypotension     Hypertensive urgency     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)     Systolic congestive heart failure (HCC)     Other fluid overload (CODE)     Hyperphosphatemia     Acute diastolic congestive heart failure (HCC)     Acute on chronic diastolic congestive heart failure (HCC)     Pulmonary hypertension (HCC)     Anemia due to chronic kidney disease, on chronic dialysis ([address]     Volume overload     Secondary hyperparathyroidism of renal origin ([address]     Chest pain      Jagruti Duong MD

## 2019-06-24 NOTE — FLOWSHEET NOTE
06/24/19 1236   Provider Notification   Reason for Communication Review case  (regarding if okay for dc)   Provider Name Pete Vela   Provider Notification Nurse Practitioner   Method of Communication Secure Message   Response No new orders  (okay for dc, f/u at dialysis )   Notification Time [phone]

## 2019-06-24 NOTE — PROGRESS NOTES
Nephrology Progress Note    Patient - Tena Aguilaruise   MRN -  [id number]   Ancelmo # - [de-identified]      - 11/10/1930    80 y.o. Admit Date: 2019  Hospital Day: 3  [facility]  Date of evaluation -  2019    Subjective:   CC: shortness of breath   Denies sob. Room air   BP generally ok  Pt seen during hemodialysis, 2 K bath. BP dropped to 128/. Ultrafiltration decreased from 2000 to 1500 ml.   BP Range: Systolic (41NZX), EEU:022 , Min:128 , TJS:927      Diastolic (43ZCI), MV, Min:63, Max:86    Objective:   VITALS:  BP (!) 173/77   Pulse 66   Temp 97.7 °F (36.5 °C) (Oral)   Resp 16   Ht 5' 6\" (1.676 m)   Wt 175 lb 3.2 oz (79.5 kg)   SpO2 99%   BMI 28.28 kg/m²    Patient Vitals for the past 24 hrs:   BP Temp Temp src Pulse Resp SpO2 Weight   19 0419 (!) 173/77 97.7 °F (36.5 °C) Oral 66 16 99 % 175 lb 3.2 oz (79.5 kg)   19 2335 (!) 157/72 97.6 °F (36.4 °C) Oral 60 16 97 % --   19 1957 (!) 164/70 97.7 °F (36.5 °C) Oral 67 18 98 % --   19 1547 (!) 155/71 97.5 °F (36.4 °C) Oral 67 18 98 % --   19 1152 (!) 166/67 97.5 °F (36.4 °C) Oral 60 18 94 % --   19 0951 (!) 155/70 -- -- 60 18 97 % --   19 0928 (!) 174/77 -- -- 60 -- 98 % --   19 0926 -- -- -- 60 -- 97 % --   19 0925 (!) 164/76 -- -- -- -- -- --   1919 136/63 -- -- -- -- -- --   19 0916 (!) 140/63 -- -- 60 10 100 % --   19 0913 (!) 158/70 -- -- 61 8 100 % --   19 0910 (!) 158/70 -- -- 65 13 100 % --   19 0907 (!) 162/86 -- -- 62 13 100 % --   19 0904 (!) 162/70 -- -- 60 12 100 % --   19 0901 (!) 149/67 -- -- 60 (!) 6 100 % --   19 0859 (!) 155/70 -- -- 62 8 100 % --   19 0856 (!) 144/73 -- -- 70 10 100 % --   19 0854 128/75 -- -- 85 18 100 % --   19 0850 (!) 172/73 -- -- 60 14 100 % --   19 0825 (!) 191/76 -- -- 62 20 97 % --     2 L/min    Intake/Output Summary (Last 24 hours) at 2019 2000  Last data filed at 6/24/2019 0419  Gross per 24 hour   Intake 880 ml   Output 75 ml   Net 805 ml       Admission weight: 185 lb 3 oz (84 kg)  Patient Vitals for the past 96 hrs (Last 3 readings):   Weight   06/24/19 0419 175 lb 3.2 oz (79.5 kg)   06/23/19 0324 173 lb 12.8 oz (78.8 kg)   06/22/19 0405 171 lb 12.8 oz (77.9 kg)     Body mass index is 28.28 kg/m². EXAM:  CONSTITUTIONAL:  No acute distress. Pleasant  HEENT:  Head is normocephalic, Extraocular movement intact. Neck is supple. Voice is clear. CARDIOVASCULAR:  S1, S2  regular rate and rhythm. RESPIRATORY: Clear to ausculation bilaterally. Equal breath sounds. No wheezes. No shortness of breath noted at rest.  ABDOMEN: soft, non tender  NEUROLOGICAL: Patient is alert and oriented to person, place, and time. Recent and remote memory intact. Thought is coherant. SKIN: no rash, No significant bruises on exposed surfaces  MUSCULOSKELETAL: Movement is coordinated. Moves all extremities   EXTREMITIES: Distal lower extremity temp is warm, No  lower extremity edema. Upper extremity AV Fistula   PSYCHIATRIC: mood and affect appropriate.     Medications:   Med reviewed  Scheduled Meds:   cefTRIAXone (ROCEPHIN) IV  1 g Intravenous Q24H    pantoprazole  40 mg Oral BID AC    folbee plus  1 tablet Oral Daily    cinacalcet  30 mg Oral Dinner    cloNIDine  0.1 mg Oral BID    gabapentin  100 mg Oral Nightly    hydrALAZINE  50 mg Oral BID    lisinopril  20 mg Oral Daily    metoprolol tartrate  50 mg Oral BID    pravastatin  80 mg Oral Daily    sodium chloride flush  10 mL Intravenous 2 times per day    docusate sodium  100 mg Oral BID     Continuous Infusions:  Labs:   Labs reviewed  Recent Labs     06/22/19  0509 06/23/19  0605 06/23/19  1812     --   --    K 5.1 5.7* 5.2   CL 96*  --   --    CO2 28  --   --    BUN 45*  --   --    CREATININE 8.1*  --   --    LABGLOM 8*  --   --    GLUCOSE 94  --   --    CALCIUM 8.7  --   --      Recent Labs     06/22/19  0948 06/22/19 2002 06/23/19  0605 06/23/19  1812   WBC 5.5  --   --  5.3  --    RBC 2.31*  --   --  2.25*  --    HGB 7.4*   < > 7.7* 7.4* 7.8*   HCT 22.1*   < > 22.9* 21.1* 22.9*   MCV 95.7*  --   --  93.8  --    MCH 32.0  --   --  32.9  --    *  --   --  121*  --     < > = values in this interval not displayed. ASSESSMENT  1. End Stage Renal Disease on Hemodialysis M,W,F. AV fistula. 2. Hyperkalemia 2nd to ESRD, 2 K bath  3. Essential Hypertension with nephrosclerosis. Improved control  4. Coronary artery disease s/p PCI 2004, PPM 2013  5. Acute on chronic diastolic heart failure with preserved EF 60%  6. Anemia of chronic disease, plasma cell dyscrasias, S/P transfusion, Melena, S/P EGD with duodenitis and gastritis. No evidence of GI bleed. PPI. 7. Blood on penis meatus, Urology plans out pt cysto  8. Secondary hyperparathyroidism of renal origin, Sensipar, Ca 9.4  9. Urinary tract infection     Active Problems:    Anemia, chronic disease    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)    Volume overload    Secondary hyperparathyroidism of renal origin ([facility] [address]    Chest pain  Resolved Problems:    * No resolved hospital problems.  Elisa Murray, XIN - CNP 8:21 AM 6/24/2019

## 2019-06-24 NOTE — PROGRESS NOTES
Palliative care to unit to see pt. Case discussed with pt's nurse,JULI Humphrey. Pt is off unit for dialysis, he may be discharged today. Palliative care will attempt to stop back later this afternoon, as time allows, to follow up and see if pt has questions or needs. Floor to notify PRN if acute needs arise. 14:09 Palliative care back to unit to see pt, pt is unavailable at this time as therapy is seeing him. Pt may be discharged today, if not, palliative care will follow up tomorrow.

## 2019-06-24 NOTE — PROGRESS NOTES
Discharge teaching and instructions for diagnosis/procedure of fluid overload completed with patient using teachback method. AVS reviewed. Printed prescriptions given to patient. Patient voiced understanding regarding prescriptions, follow up appointments, and care of self at home. Discharged ambulatory and in a wheelchair to  home with support per family     Educated patient on all follow up appointments. Dr. Katerin Avelar saw patient before he left. Educated patient his office will call him tomorrow ton tell him about his prep which he must start Wednesday for his colonoscopy scheduled on Thursday.

## 2019-06-24 NOTE — CARE COORDINATION
EGD yesterday, showed gastritis and duodenitis. HD this a.m. Possible discharge later today. From home with wife. Electronically signed by Zeynep Awan RN on 6/24/2019 at 9:00 AM     6/24/19, 1:37 PM    Spoke with Chanel Mantilla, he plans return home with his wife at discharge. He is a MWF HD at [facility]. He declines HH. He has some concerns that oxygen may be needed, but is not using oxygen since hospital admission. Discharge plan discussed by  and . Discharge plan reviewed with patient/ family. Patient/ family verbalize understanding of discharge plan and are in agreement with plan. Understanding was demonstrated using the teach back method.        IMM Letter  IMM Letter given to Patient/Family/Significant other/Guardian/POA/by[de-identified] Italia Garcia CM   IMM Letter date given[de-identified] 06/24/19  IMM Letter time given[de-identified] 6885

## 2019-06-24 NOTE — PROGRESS NOTES
Hospitalist Progress Note    Patient:  Tony Garcia      Unit/Bed:6K-14/014-A    YOB: 1930    MRN: [id number]       Acct: [de-identified]     PCP: Marilee Minor MD    Date of Admission: 6/21/2019    Assessment/Plan:    1. Dyspnea--likely 2nd to #3, #4  2. Acute Chest Pain--has chronic troponin elevation; hold ASA/Plavix with bleeding. Improved. 3. Suspect GIB causing acute blood loss anemia. Dr. Leticia Kinney consulted. EGD with duodenitis and gastritis. Will do PPI BID. F/u with [facility]. 4. Probable Volume Overload--per Dr Macie Samayoa; undeerwent iHD, improved. 5. Acute on Chronic Diastolic HF--EF was 91% on 5/22/2019 with grade 1 diastolic dysfx; was given Bumex and started on NTG gtt in ED; on BB. Improved. Doesn't appear to have PNA on CXR after volume removal with dialysis. 6. Hyperkalemia--resolved  7. Blood at the meatus of the penis--have urology evaluate; hold ASA/Plavix. Will get outpatient cysto. 8. ESRD--on HD Mon/Wed/Fri; follows with Dr Macie Samayoa;   9. Essential HTN, uncontrolled--resume Catapres, Hydralazine, Lisinopril, Lopressor; monitor; need to get AM meds in him; monitor  10. Anemia of Chronic Disease--likely 2nd to #5; received 2 units PRBC's on 6/14/2019 and 1 units on 6/7/2019  11. CAD--follows with Dr Juanpablo Pollack; hold ASA and Plavix with (+) blood in the meatus of the penis      Expected discharge date:  TBD    Disposition:    [] Home       [] TCU       [] Rehab       [] Psych       [] SNF       [] [place]       [] Other-    Chief Complaint: SOB, bleeding    Hospital Course: Erika Frausto a 80 y. o. male who presented to the Emergency Department via EMS for the evaluation of shortness of breath and chest pain aaht started last evening; he was at his dialysis appointment this morning when the nurse noticed he was 5 pounds heavier than on May 19; he was complaining of shortness of breath and chest pain and was brought to the Emergency Department; he has HTN and has not taken his medication this morning, he notes that he typically waits till after his dialysis treatment; sees Dr. Jh Diaz (Nephrologist) for his dialysis and did not receive his dialysis treatment today; patient had a low hemolglobin last week and was given a blood transfusion (2 units on 6/14 and 1 unit on 6/7); he has had melena and saw Dr Reg Cao and is to have an EGD and colonoscopy done; today RN in ED noticed a small amount of blood in his underwear and upon further assessment (+) blood at the meatus; history of CAD and follows with Dr Tom Bajwa; he currently c/o a non productive cough and \"pain all over my chest\") and c/o shortness of breath; in ED, he was given Bumex and started on a NTG gtt;    Felt much better after dialysis, hgb continue to trend down. GI was consulted. EGD with gastritis and duodentitis. Subjective (past 24 hours): Feeling much improved. EGD with gastritis and duodenitis.  Hgb stable Potassium improving after kayexalate       Medications:  Reviewed    Infusion Medications   Scheduled Medications    cefTRIAXone (ROCEPHIN) IV  1 g Intravenous Q24H    pantoprazole  40 mg Oral BID AC    folbee plus  1 tablet Oral Daily    cinacalcet  30 mg Oral Dinner    cloNIDine  0.1 mg Oral BID    gabapentin  100 mg Oral Nightly    hydrALAZINE  50 mg Oral BID    lisinopril  20 mg Oral Daily    metoprolol tartrate  50 mg Oral BID    pravastatin  80 mg Oral Daily    sodium chloride flush  10 mL Intravenous 2 times per day    docusate sodium  100 mg Oral BID     PRN Meds: sodium chloride flush, ondansetron, acetaminophen, hydrALAZINE      Intake/Output Summary (Last 24 hours) at 6/23/2019 2055  Last data filed at 6/23/2019 2052  Gross per 24 hour   Intake 400 ml   Output 75 ml   Net 325 ml       Diet:  DIET RENAL; Renal    Exam:  BP (!) 164/70 Comment: manual  Pulse 67   Temp 97.7 °F (36.5 °C) (Oral)   Resp 18   Ht 5' 6\" (1.676 m)   Wt 173 lb 12.8 oz (78.8 kg)   SpO2 98%   BMI 28.05 [address] 1.013 05/03/2016    GLUCOSEU NEGATIVE 06/22/2019       Radiology:  XR CHEST PORTABLE   Final Result      Subtle linear and patchy densities within the bilateral lung bases are slightly improved compared to the prior exam and may correspond to improving atelectasis or edema. Trace bilateral pleural effusions are similar to the prior exam.               **This report has been created using voice recognition software. It may contain minor errors which are inherent in voice recognition technology. **      Final report electronically signed by Dr. Debby Sifuentes on 6/23/2019 10:05 AM      US RENAL COMPLETE   Final Result      No gross evidence of hydronephrosis. Kidneys are echogenic suggesting medical renal disease. Each shows moderate cortical thinning and elevated resistive indices. **This report has been created using voice recognition software. It may contain minor errors which are inherent in voice recognition technology. **      Final report electronically signed by Dr. Lobo Harrell on 6/21/2019 5:13 PM      XR CHEST PORTABLE   Final Result   1. There are interstitial infiltrates throughout both lung fields with additional alveolar infiltrates within the right infrahilar region. There is blunting of the lateral costophrenic angles suggesting a small amount of pleural fluid. Follow-up chest    radiographs are recommended to confirm complete resolution. **This report has been created using voice recognition software. It may contain minor errors which are inherent in voice recognition technology. **      Final report electronically signed by Dr. Warren Kuamr on 6/21/2019 7:24 AM          DVT prophylaxis: [] Lovenox                                 [x] SCDs                                 [] SQ Heparin                                 [] Encourage ambulation           [] Already on Anticoagulation     Code Status: Full Code    PT/OT Eval Status: pend    Tele:   [x] yes

## 2019-06-24 NOTE — FLOWSHEET NOTE
06/24/19 1647   Provider Notification   Reason for Communication Review case  (regarding [facility])   Provider Name [facility] [place] [place]   Provider Notification Physician   Method of Communication Secure Message   Response Waiting for response   Notification Time  from [facility] cardiology office called and said okay to keep aspirin and plavix on hold until after colonoscopy which is scheduled for 6/27. Restart after at provider discretion.      No new orders, patient okay for dc

## 2019-06-24 NOTE — PROGRESS NOTES
[address] [address]  INPATIENT PHYSICAL THERAPY  EVALUATION  [facility] RENAL TELEMETRY 6K - 6K-14/014-A    Time In: 1400  Time Out: 1432  Timed Code Treatment Minutes: 23 Minutes  Minutes: 32          Date: 2019  Patient Name: Delfino Siddiqi,  Gender:  male        MRN: [id number]  : 11/10/1930  (80 y.o.)      Referring Practitioner: Gladys Hannon MD  Diagnosis: volume overload  Additional Pertinent Hx: Per EMR: \"Tunde Carnes is a 80 y.o. male who presented to the Emergency Department via EMS for the evaluation of shortness of breath and chest pain aaht started last evening; he was at his dialysis appointment this morning when the nurse noticed he was 5 pounds heavier than on May 19; he was complaining of shortness of breath and chest pain and was brought to the Emergency Department; he has HTN and has not taken his medication this morning, he notes that he typically waits till after his dialysis treatment; sees Dr. Molly Dorsey (Nephrologist) for his dialysis and did not receive his dialysis treatment today; patient had a low hemolglobin last week and was given a blood transfusion (2 units on  and 1 unit on ); he has had melena and saw Dr Radames Bullard and is to have an EGD and colonoscopy done; today RN in ED noticed a small amount of blood in his underwear and upon further assessment (+) blood at the meatus; history of CAD and follows with Dr Delilah Sood; he currently c/o a non productive cough and \"pain all over my chest\") and c/o shortness of breath; in ED\"     Past Medical History:   Diagnosis Date    Anemia in chronic kidney disease(285.21)     Arthritis     CAD (coronary artery disease)     Chronic kidney disease     Chronic kidney disease, stage IV (severe) ([address]     CKD (chronic kidney disease) stage 3, GFR 30-59 ml/min ([address]     COPD (chronic obstructive pulmonary disease) ([address]     GI bleed     Hemodialysis patient [facility]) 2016    @ [facility] History of blood transfusion 6/2019    Hyperlipidemia     Hyperphosphatemia 5/22/2018    Hypertension     Sick sinus syndrome (HCC)     Systolic congestive heart failure [facility])      Past Surgical History:   Procedure Laterality Date    CARDIAC SURGERY      COLONOSCOPY  5712,2976    CORONARY ANGIOPLASTY WITH STENT PLACEMENT  2004    DIALYSIS FISTULA CREATION  5/6/2016    right arm fistula placement    ENDOSCOPY, COLON, DIAGNOSTIC      PACEMAKER PLACEMENT  2013    IN ESOPHAGOGASTRODUODENOSCOPY TRANSORAL DIAGNOSTIC N/A 1/18/2018    EGD performed by Brunilda Bills MD at [address]  [id number]    UPPER GASTROINTESTINAL ENDOSCOPY Left 6/23/2019    EGD DIAGNOSTIC ONLY performed by Gigi Bryan MD at [facility]         Restrictions/Precautions: Other position/activity restrictions: dialysis        Subjective:  Chart Reviewed: Yes  Patient assessed for rehabilitation services?: Yes  Family / Caregiver Present: No  Subjective: RN approved PT interventions. General:  Overall Orientation Status: Within Functional Limits  Follows Commands: Within Functional Limits    Vision: Within Functional Limits    Hearing: Within functional limits         Pain:  Yes. Pain Assessment  Pain Assessment: (value not rated)  Pain Type: Acute pain  Pain Location: Head  Non-Pharmaceutical Pain Intervention(s): Repositioned  Response to Pain Intervention: (RN notified)       Social/Functional History:    Lives With: Spouse  Type of Home: House  Home Layout: One level  Home Access: Level entry  Home Equipment: Rolling walker, Cane, [employer]     Bathroom Shower/Tub: Tub/Shower unit  Bathroom Toilet: Handicap height             Ambulation Assistance: Independent  Transfer Assistance: Independent    Active : Yes  Occupation: Retired  Additional Comments: Pt reports I prior to admission without use of AD.        Objective:       RLE AROM: WFL         LLE AROM : [facility] Strength RLE: WFL    Strength LLE: WFL      Sensation  Overall Sensation Status: WFL      Balance  Sitting - Static: Good  Sitting - Dynamic: Fair, +  Standing - Static: Fair, +  Standing - Dynamic: Fair    Rolling to Right: Stand by assistance  Supine to Sit: Stand by assistance(HOB flat, no rail)    Transfers  Sit to Stand: Contact guard assistance  Stand to sit: Stand by assistance  Comment: cueing for hand placement, sequencing provided        Ambulation 1  Surface: level tile  Device: No Device  Assistance: Contact guard assistance  Quality of Gait: increased MARIS, increased bilat ext rotation, decreased heel strike, decreased step lengths   Distance: 75+25+30  Comments: pt required 2 rest breaks secondary to fatigue (pt had dialysis this AM), no LOB noted       Exercises:  Comments: pt instructed in bilat LE Exericses in supine, 1x10: ankle pumps, glut set, quad set for improved functional mobility          Activity Tolerance:  Activity Tolerance: Patient limited by fatigue;Patient limited by pain(head ache)    Treatment Initiated: Pt instructed in bilat LE exercises as noted above. Increased time spent gait training, pt requires 2 rest breaks. Gait training completed, emphasis on heel strike with ambulation. Post ambulation, pt reports headache and a short bout of nausea. BP: 182/79. Pt declined returning back to bed. RN aware. Assessment: Body structures, Functions, Activity limitations: Decreased balance, Decreased safe awareness, Decreased functional mobility , Decreased ROM, Decreased strength, Decreased endurance  Assessment: Pt demonstrates a decrease in baseline by way of transfers, bed mobility and ambulation. Pt will benefit from skilled PT services during admission and post d/c for improved functional mobility and safety. Prognosis: Good    Clinical Presentation: Moderate - Evolving with Changing Characteristics: see assessment    Decision Making:  Moderate Complexitybased on patient assessment and decision making process of determining plan of care and establishing reasonable expectations for measurable functional outcomes    REQUIRES PT FOLLOW UP: Yes    Discharge Recommendations:  Discharge Recommendations: Continue to assess pending progress, Home with assist PRN, Home with Home health PT    Patient Education:  Patient Education: role of PT, plan of care, HEP, gait    Equipment Recommendations:  Equipment Needed: No    Safety:  Type of devices: All fall risk precautions in place, Gait belt, Patient at risk for falls, Nurse notified, Left in chair, Chair alarm in place, Call light within reach    Plan:  Times per week: 5xGM  Times per day: Daily  Specific instructions for Next Treatment: AAT  Current Treatment Recommendations: Strengthening, Transfer Training, Endurance Training, ROM, Balance Training, Gait Training, Home Exercise Program, Functional Mobility Training, Stair training    Goals:  Patient goals : return home with spouse   Short term goals  Time Frame for Short term goals: by discharge   Short term goal 1: bed mobility with mod I to prepare for OOB activity   Short term goal 2: sit <> stand with mod I for ease of transfers   Short term goal 3: ambulate 150ft with use of LRAD and mod I to prepare for community mobility   Short term goal 4: negotiate curb step with use of LRAD and supervision assist for safe community mobility   Long term goals  Time Frame for Long term goals : N/A secondary to short ELOS     Evaluation Complexity: Based on the findings of patient history, examination, clinical presentation, and decision making during this evaluation, the evaluation of Mirella Freeman  is of medium complexity.             AM-PAC Inpatient Mobility without Stair Climbing Raw Score : 15  AM-PAC Inpatient without Stair Climbing T-Scale Score : 43.03  Mobility Inpatient CMS 0-100% Score: 47.43  Mobility Inpatient without Stair CMS G-Code Modifier : CK

## 2019-06-24 NOTE — FLOWSHEET NOTE
06/24/19 0759 06/24/19 1230   Vital Signs   BP (!) 164/71 (!) 163/63   Temp 97.8 °F (36.6 °C) 97.6 °F (36.4 °C)   Pulse 70 63   Resp 16 18   SpO2 100 % 100 %   Weight 175 lb 4.3 oz (79.5 kg) 172 lb 2.9 oz (78.1 kg)   Weight Method Bed scale Bed scale   Percent Weight Change 0.04 -1.76   Post-Hemodialysis Assessment   Post-Treatment Procedures  --  Blood returned; Access bleeding time < 10 minutes   Machine Disinfection Process  --  Acid/Vinegar Clean;Heat Disinfect; Exterior Machine Disinfection   Total Liters Processed (l/min)  --  70.4 l/min   Dialyzer Clearance  --  Lightly streaked   Duration of Treatment (minutes)  --  240 minutes   Hemodialysis Intake (ml)  --  400 ml   Hemodialysis Output (ml)  --  2400 ml   NET Removed (ml)  --  2000 ml   Tolerated Treatment  --  Good   4 hour treatment completed without complications. Tolerated 1.5L fluid removal well. Pressure held to each site x10 min. Dressing clean dry and intact upon leaving the unit. Report called to primary nurse. tx to be scanned into EMR.

## 2019-06-27 ENCOUNTER — ANESTHESIA EVENT (OUTPATIENT)
Dept: ENDOSCOPY | Age: 84
End: 2019-06-27
Payer: MEDICARE

## 2019-06-27 ENCOUNTER — HOSPITAL ENCOUNTER (OUTPATIENT)
Age: 84
Setting detail: OUTPATIENT SURGERY
Discharge: HOME | End: 2019-06-27
Attending: INTERNAL MEDICINE | Admitting: INTERNAL MEDICINE
Payer: MEDICARE

## 2019-06-27 ENCOUNTER — ANESTHESIA (OUTPATIENT)
Dept: ENDOSCOPY | Age: 84
End: 2019-06-27
Payer: MEDICARE

## 2019-06-27 VITALS
OXYGEN SATURATION: 99 % | TEMPERATURE: 97.6 F | RESPIRATION RATE: 20 BRPM | SYSTOLIC BLOOD PRESSURE: 173 MMHG | BODY MASS INDEX: 28.57 KG/M2 | HEIGHT: 66 IN | DIASTOLIC BLOOD PRESSURE: 74 MMHG | HEART RATE: 75 BPM | WEIGHT: 177.8 LBS

## 2019-06-27 VITALS
DIASTOLIC BLOOD PRESSURE: 63 MMHG | RESPIRATION RATE: 10 BRPM | SYSTOLIC BLOOD PRESSURE: 132 MMHG | OXYGEN SATURATION: 100 %

## 2019-06-27 LAB
ANION GAP SERPL CALCULATED.3IONS-SCNC: 17 MEQ/L (ref 8–16)
BUN BLDV-MCNC: 34 MG/DL (ref 7–22)
CALCIUM SERPL-MCNC: 9.2 MG/DL (ref 8.5–10.5)
CHLORIDE BLD-SCNC: 95 MEQ/L (ref 98–111)
CO2: 27 MEQ/L (ref 23–33)
CREAT SERPL-MCNC: 7.5 MG/DL (ref 0.4–1.2)
GFR SERPL CREATININE-BSD FRML MDRD: 8 ML/MIN/1.73M2
GLUCOSE BLD-MCNC: 79 MG/DL (ref 70–108)
POTASSIUM SERPL-SCNC: 5.5 MEQ/L (ref 3.5–5.2)
SODIUM BLD-SCNC: 139 MEQ/L (ref 135–145)

## 2019-06-27 PROCEDURE — 2580000003 HC RX 258: Performed by: INTERNAL MEDICINE

## 2019-06-27 PROCEDURE — 2709999900 HC NON-CHARGEABLE SUPPLY: Performed by: INTERNAL MEDICINE

## 2019-06-27 PROCEDURE — 36415 COLL VENOUS BLD VENIPUNCTURE: CPT

## 2019-06-27 PROCEDURE — 7100000001 HC PACU RECOVERY - ADDTL 15 MIN: Performed by: INTERNAL MEDICINE

## 2019-06-27 PROCEDURE — 3700000001 HC ADD 15 MINUTES (ANESTHESIA): Performed by: INTERNAL MEDICINE

## 2019-06-27 PROCEDURE — 7100000000 HC PACU RECOVERY - FIRST 15 MIN: Performed by: INTERNAL MEDICINE

## 2019-06-27 PROCEDURE — 3700000000 HC ANESTHESIA ATTENDED CARE: Performed by: INTERNAL MEDICINE

## 2019-06-27 PROCEDURE — 80048 BASIC METABOLIC PNL TOTAL CA: CPT

## 2019-06-27 PROCEDURE — 2500000003 HC RX 250 WO HCPCS: Performed by: NURSE ANESTHETIST, CERTIFIED REGISTERED

## 2019-06-27 PROCEDURE — 6360000002 HC RX W HCPCS: Performed by: NURSE ANESTHETIST, CERTIFIED REGISTERED

## 2019-06-27 PROCEDURE — 3609009500 HC COLONOSCOPY DIAGNOSTIC OR SCREENING: Performed by: INTERNAL MEDICINE

## 2019-06-27 RX ORDER — PROPOFOL 10 MG/ML
INJECTION, EMULSION INTRAVENOUS PRN
Status: DISCONTINUED | OUTPATIENT
Start: 2019-06-27 | End: 2019-06-27 | Stop reason: SDUPTHER

## 2019-06-27 RX ORDER — SODIUM CHLORIDE 450 MG/100ML
INJECTION, SOLUTION INTRAVENOUS CONTINUOUS
Status: DISCONTINUED | OUTPATIENT
Start: 2019-06-27 | End: 2019-06-27 | Stop reason: HOSPADM

## 2019-06-27 RX ORDER — LIDOCAINE HYDROCHLORIDE 10 MG/ML
INJECTION, SOLUTION INFILTRATION; PERINEURAL PRN
Status: DISCONTINUED | OUTPATIENT
Start: 2019-06-27 | End: 2019-06-27 | Stop reason: SDUPTHER

## 2019-06-27 RX ADMIN — SODIUM CHLORIDE: 4.5 INJECTION, SOLUTION INTRAVENOUS at 10:35

## 2019-06-27 RX ADMIN — SODIUM CHLORIDE: 4.5 INJECTION, SOLUTION INTRAVENOUS at 11:11

## 2019-06-27 RX ADMIN — LIDOCAINE HYDROCHLORIDE 50 MG: 10 INJECTION, SOLUTION INFILTRATION; PERINEURAL at 11:13

## 2019-06-27 RX ADMIN — PROPOFOL 140 MG: 10 INJECTION, EMULSION INTRAVENOUS at 11:13

## 2019-06-27 ASSESSMENT — ENCOUNTER SYMPTOMS: SHORTNESS OF BREATH: 1

## 2019-06-27 ASSESSMENT — PAIN SCALES - GENERAL
PAINLEVEL_OUTOF10: 0
PAINLEVEL_OUTOF10: 0

## 2019-06-27 ASSESSMENT — PAIN - FUNCTIONAL ASSESSMENT: PAIN_FUNCTIONAL_ASSESSMENT: 0-10

## 2019-06-27 NOTE — H&P
Medication Sig Start Date End Date Taking? Authorizing Provider   cephALEXin (KEFLEX) 250 MG capsule Take 1 capsule by mouth daily for 4 days Take after dialysis on hemodialysis days. 6/24/19 6/28/19 Yes Christel Porteous, DO   docusate sodium (COLACE, DULCOLAX) 100 MG CAPS Take 100 mg by mouth 2 times daily 6/24/19  Yes Christel Porteous, DO   ferrous sulfate 325 (65 Fe) MG tablet Take 1 tablet by mouth daily (with breakfast) 6/24/19  Yes Christel Porteous, DO   polyethylene glycol (MIRALAX) powder Renal Miralax Colonoscopy prep. Use as directed. Mix Miralax 238 g with 24 oz clear liquids. Drink 8 oz glass every 20-30 minutes until gone. 6/6/19  Yes XIN Long CNP   pantoprazole (PROTONIX) 40 MG tablet Take 1 tablet by mouth 2 times daily (before meals) 6/24/19   Christel Porteous, DO   vitamin C (ASCORBIC ACID) 500 MG tablet Take 1 tablet by mouth daily 6/24/19   Christel Porteous, DO   cloNIDine (CATAPRES) 0.1 MG tablet Take 0.1 mg by mouth 2 times daily 5/30/18   Historical Provider, MD   hydrALAZINE (APRESOLINE) 50 MG tablet Take 50 mg by mouth 2 times daily    Historical Provider, MD   cinacalcet (SENSIPAR) 30 MG tablet Take 1 tablet by mouth Daily with supper 5/26/18   Jane Force, DO   lisinopril (PRINIVIL;ZESTRIL) 20 MG tablet Take 1 tablet by mouth daily 5/27/18   Jane Force, DO   isosorbide mononitrate (IMDUR) 30 MG extended release tablet Take 30 mg by mouth daily    Historical Provider, MD   gabapentin (NEURONTIN) 100 MG capsule Take 100 mg by mouth nightly.     Historical Provider, MD   B Complex-C-Folic Acid (RENAL) 1 MG CAPS Take 1 capsule by mouth daily    Historical Provider, MD   metoprolol (LOPRESSOR) 50 MG tablet Take 1 tablet by mouth 2 times daily 6/14/16   Mata Tolliver MD   pravastatin (PRAVACHOL) 80 MG tablet Take 1 tablet by mouth daily 11/12/15   Mata Tolliver MD     Additional information:       PHYSICAL:   Heart:  [x]Regular rate and rhythm  []Other:    Lungs:  [x]Clear

## 2019-06-27 NOTE — ANESTHESIA PRE PROCEDURE
Provider, MD   metoprolol (LOPRESSOR) 50 MG tablet Take 1 tablet by mouth 2 times daily 6/14/16   Luis Tai MD   pravastatin (PRAVACHOL) 80 MG tablet Take 1 tablet by mouth daily 11/12/15   Luis Tai MD       Current medications:    Current Facility-Administered Medications   Medication Dose Route Frequency Provider Last Rate Last Dose    0.45 % sodium chloride infusion   Intravenous Continuous Quin Fleming MD 75 mL/hr at 06/27/19 1035         Allergies: Allergies   Allergen Reactions    No Known Allergies        Problem List:    Patient Active Problem List   Diagnosis Code    Hypertension I10    CAD (coronary artery disease) I25.10    COPD (chronic obstructive pulmonary disease) ([facility] J44.9    Chronic renal insufficiency N18.9    Patient overweight E66.3    Arthritis-  low back and neck .  M19.90    CKD (chronic kidney disease) stage 3, GFR 30-59 ml/min ([facility]) N18.3    Dyspnea and respiratory abnormalities R06.00, R06.89    ED (erectile dysfunction) N52.9    Degenerative joint disease of knee, left M17.12    Gout of big toe M10.9    Anemia, chronic disease D63.8    ROSAURA (acute kidney injury) ([facility] N17.9    CKD (chronic kidney disease) stage 4, GFR 15-29 ml/min ([facility]) N18.4    Essential hypertension I10    Monoclonal gammopathy D47.2    Leukopenia D72.819    Thrombocytopenia ([facility]) D69.6    Chronic kidney disease (CKD), stage V ([facility]) X09.2    Metabolic acidosis M41.1    Hyperkalemia E87.5    Benign essential HTN I10    Iron deficiency anemia D50.9    ROSAURA (acute kidney injury) (HCC) N17.9    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    Idiopathic hypotension I95.0    Hypertensive urgency I16.0    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) N18.6, H58.4    Systolic congestive heart failure (HCC) I50.20    Other fluid overload (CODE) E87.79    Hyperphosphatemia E83.39    Acute diastolic congestive heart failure (HCC) I50.31    Acute on chronic diastolic congestive heart failure (HCC) I50.33    Pulmonary hypertension (HCC) I27.20    Anemia due to chronic kidney disease, on chronic dialysis (HCC) N18.6, D63.1, Z99.2    Volume overload E87.70    Secondary hyperparathyroidism of renal origin ([facility] N25.81    Chest pain R07.9    Acute pulmonary edema (HCC) J81.0    Accelerated hypertension I10    Gastritis and duodenitis K29.90       Past Medical History:        Diagnosis Date    Anemia in chronic kidney disease(285.21)     Arthritis     CAD (coronary artery disease)     Chronic kidney disease     Chronic kidney disease, stage IV (severe) (HCC)     CKD (chronic kidney disease) stage 3, GFR 30-59 ml/min (HCC)     COPD (chronic obstructive pulmonary disease) (HCC)     GI bleed     Hemodialysis patient ([facility] [facility] 2016    @ [facility]    History of blood transfusion     2019    Hyperlipidemia     Hyperphosphatemia 2018    Hypertension     Sick sinus syndrome (HCC)     Systolic congestive heart failure [facility])        Past Surgical History:        Procedure Laterality Date    CARDIAC SURGERY      COLONOSCOPY  ,    CORONARY ANGIOPLASTY WITH STENT PLACEMENT      DIALYSIS FISTULA CREATION  2016    right arm fistula placement    ENDOSCOPY, COLON, DIAGNOSTIC      PACEMAKER PLACEMENT  2013    [place] ESOPHAGOGASTRODUODENOSCOPY TRANSORAL DIAGNOSTIC N/A 2018    EGD performed by Dmitriy Pink MD at [facility]    UPPER GASTROINTESTINAL ENDOSCOPY  ,    UPPER GASTROINTESTINAL ENDOSCOPY Left 2019    EGD DIAGNOSTIC ONLY performed by Alfonzo Varner MD at [facility]    VASCULAR SURGERY         Social History:    Social History     Tobacco Use    Smoking status: Former Smoker     Packs/day: 1.00     Years: 40.00     Pack years: 40.00     Last attempt to quit: 1982     Years since quittin.4    Smokeless tobacco: Never Used   Substance Use Topics    Alcohol use:  No Counseling given: Not Answered      Vital Signs (Current):   Vitals:    06/27/19 1004   BP: (!) 216/99   Pulse: 63   Resp: 18   Temp: 36.7 °C (98.1 °F)   TempSrc: Temporal   SpO2: 96%   Weight: 177 lb 12.8 oz (80.6 kg)   Height: 5' 6\" (1.676 m)                                              BP Readings from Last 3 Encounters:   06/27/19 (!) 216/99   06/24/19 (!) 152/54   06/14/19 (!) 176/90       NPO Status: Time of last liquid consumption: 2000                        Time of last solid consumption: 1900                        Date of last liquid consumption: 06/27/19                        Date of last solid food consumption: 06/25/19    BMI:   Wt Readings from Last 3 Encounters:   06/27/19 177 lb 12.8 oz (80.6 kg)   06/24/19 172 lb 2.9 oz (78.1 kg)   06/06/19 176 lb (79.8 kg)     Body mass index is 28.7 kg/m². CBC:   Lab Results   Component Value Date    WBC 3.1 06/24/2019    RBC 2.27 06/24/2019    RBC 2.95 09/19/2011    HGB 7.4 06/24/2019    HCT 21.1 06/24/2019    MCV 93.0 06/24/2019    RDW 17.7 05/26/2018     06/24/2019       CMP:   Lab Results   Component Value Date     06/24/2019    K 4.1 06/24/2019    K 4.4 05/25/2018    CL 92 06/24/2019    CO2 23 06/24/2019    BUN 73 06/24/2019    CREATININE 9.5 06/24/2019    LABGLOM 6 06/24/2019    GLUCOSE 120 06/24/2019    GLUCOSE 135 09/20/2011    PROT 6.7 05/21/2018    CALCIUM 8.0 06/24/2019    BILITOT 0.3 05/21/2018    ALKPHOS 72 05/21/2018    AST 31 05/21/2018    ALT 22 05/21/2018       POC Tests: No results for input(s): POCGLU, POCNA, POCK, POCCL, POCBUN, POCHEMO, POCHCT in the last 72 hours.     Coags:   Lab Results   Component Value Date    PROTIME 11.5 12/10/2013    INR 0.99 05/25/2018    APTT 31.7 05/25/2018       HCG (If Applicable): No results found for: PREGTESTUR, PREGSERUM, HCG, HCGQUANT     ABGs: No results found for: PHART, PO2ART, WIP6FAQ, HLI7VVA, BEART, W7OVLXDJ     Type & Screen (If Applicable):  Lab Results   Component Value Date    [facility] POS 06/22/2019       Anesthesia Evaluation  Patient summary reviewed and Nursing notes reviewed  Airway: Mallampati: III  TM distance: >3 FB   Neck ROM: full  Mouth opening: > = 3 FB Dental:    (+) upper dentures and lower dentures      Pulmonary:normal exam  breath sounds clear to auscultation  (+) COPD:  shortness of breath: new,                             Cardiovascular:  Exercise tolerance: poor (<4 METS),   (+) hypertension:, angina:, CAD:, CHF:,       ECG reviewed      Echocardiogram reviewed         : Patient is perscribed lopressor 50 mg and hasn't taken for 48 hours. Neuro/Psych:                ROS comment: Patients bp has been elevated. Patient was admitted in the hospital for high BP. Patient was sent home from hospital. On Wednesday June 25th patient went to dialysis and was having increased HTN. Wife at bedside states that when he got home from dialysis patient was very tired and refused to take any of his medications for the day. Patient arrived to [facility] for colonoscopy and found patient's BP to be very elevated. Looking back at labs patient has chronic anemia and elevated creatine and BMP. Dr. Rosy Calle agreed to send a BMP stat. Dr. Anastacia Mcduffie aware of patient's elevated BP due to no meds for 48 hours since they left the hospital.  GI/Hepatic/Renal: Neg GI/Hepatic/Renal ROS            Endo/Other: Negative Endo/Other ROS                    Abdominal:           Vascular: negative vascular ROS. Anesthesia Plan      MAC     ASA 4       Induction: intravenous. Anesthetic plan and risks discussed with patient. Plan discussed with CRNA and attending.     Attending anesthesiologist reviewed and agrees with [address], APRN - CRNA   6/27/2019

## 2019-06-27 NOTE — PROGRESS NOTES
CLINICAL PHARMACY NOTE: MEDS TO [address] Select Patient?: No  Total # of Prescriptions Filled: 4   The following medications were delivered to the patient:    Total # of Interventions Completed: 4  Time Spent (min): 60    Additional Documentation:

## 2019-06-28 LAB
BLOOD CULTURE, ROUTINE: NORMAL
BLOOD CULTURE, ROUTINE: NORMAL

## 2019-06-28 NOTE — OP NOTE
[address] [place] [address]                                OPERATIVE REPORT    PATIENT NAME: Massimo Glasgow                      :        11/10/1930  MED REC NO:   [id number]                           ROOM:  ACCOUNT NO:   [de-identified]                           ADMIT DATE: 2019  PROVIDER:     Escobar Toledo M.D.    Rick Garcia OF PROCEDURE:  2019    INDICATION:  The patient with a history of anemia, occult positive  sample, GI bleed. Plan today for colonoscopy to evaluate. ASA CLASSIFICATION:  IV.    SURGEON:  Escobar Toledo M.D.    DESCRIPTION OF PROCEDURE:  The patient was brought to the GI lab. Consent was obtained. The risks involved with the procedure were  explained to the patient. Informed consent was obtained. The patient  was monitored during the procedure with pulse oximetry, blood pressure  monitoring, and oxygen by nasal cannula. Sedation done by incremental  dose of IV propofol given by the anesthesia service to achieve total IV  anesthesia. PROCEDURE PERFORMED:  Colonoscopy. Digital examination revealed normal rectum. Standard colonoscope was  advanced under direct vision from the rectum up to the cecum. Prep was  good and the patient tolerated the procedure well. The cecum intubation  was confirmed by appendical orifice. Scope was withdrawn. Very rare,  not clinically significant diverticulosis seen. No polyps. No colitis. Grade 2 internal hemorrhoids seen on withdrawing the scope, likely the  source of lower GI bleed. Overall, the exam quality and prep is very good. The scope was withdrawn with no immediate complications. IMPRESSION:  1.  Grade 2 internal hemorrhoids. 2.  Very rare, not clinically significant diverticulosis. PLAN:  Followup with biopsy results in the GI clinic for evaluation. Consider hemorrhoid banding if the patient has recurrent GI bleed.         Grazyna Nascimento M.D.    D: 06/27/2019 11:54:13       T: 06/27/2019 14:32:29     [email or web address]  Job#: [id number]     Doc#: [id number]    CC:  Kylie Baker M.D.

## 2019-12-11 ENCOUNTER — HOSPITAL ENCOUNTER (OUTPATIENT)
Dept: GENERAL RADIOLOGY | Age: 84
Discharge: HOME | End: 2019-12-11
Payer: MEDICARE

## 2019-12-11 ENCOUNTER — HOSPITAL ENCOUNTER (OUTPATIENT)
Age: 84
Discharge: HOME | End: 2019-12-11
Payer: MEDICARE

## 2019-12-11 DIAGNOSIS — M25.552 LEFT HIP PAIN: ICD-10-CM

## 2019-12-11 DIAGNOSIS — M54.50 LOW BACK PAIN, UNSPECIFIED BACK PAIN LATERALITY, UNSPECIFIED CHRONICITY, UNSPECIFIED WHETHER SCIATICA PRESENT: ICD-10-CM

## 2019-12-11 PROCEDURE — 72100 X-RAY EXAM L-S SPINE 2/3 VWS: CPT

## 2019-12-11 PROCEDURE — 73502 X-RAY EXAM HIP UNI 2-3 VIEWS: CPT

## 2019-12-26 ENCOUNTER — HOSPITAL ENCOUNTER (OUTPATIENT)
Age: 84
Setting detail: SPECIMEN
Discharge: HOME | End: 2019-12-26
Payer: MEDICARE

## 2020-01-24 ENCOUNTER — HOSPITAL ENCOUNTER (EMERGENCY)
Age: 85
Discharge: HOME | End: 2020-01-24
Attending: FAMILY MEDICINE
Payer: MEDICARE

## 2020-01-24 VITALS
OXYGEN SATURATION: 97 % | HEIGHT: 66 IN | BODY MASS INDEX: 28.53 KG/M2 | SYSTOLIC BLOOD PRESSURE: 153 MMHG | WEIGHT: 177.5 LBS | RESPIRATION RATE: 16 BRPM | HEART RATE: 93 BPM | DIASTOLIC BLOOD PRESSURE: 85 MMHG | TEMPERATURE: 97.9 F

## 2020-01-24 PROCEDURE — 99283 EMERGENCY DEPT VISIT LOW MDM: CPT

## 2020-01-24 NOTE — ED NOTES
Rn went to change pressure bandage. Bleeding has stopped. Provider notified. New pressure bandage placed. PT tolerated well.       Luzma Abraham RN  01/24/20 2434

## 2020-01-24 NOTE — ED PROVIDER NOTES
COLONOSCOPY DIAGNOSTIC performed by Alison Kirby MD at [address]  2004    DIALYSIS FISTULA CREATION  5/6/2016    right arm fistula placement    ENDOSCOPY, COLON, DIAGNOSTIC      PACEMAKER PLACEMENT  2013    AR ESOPHAGOGASTRODUODENOSCOPY TRANSORAL DIAGNOSTIC N/A 1/18/2018    EGD performed by Ishaan Flannery MD at [address]  2006,2009    UPPER GASTROINTESTINAL ENDOSCOPY Left 6/23/2019    EGD DIAGNOSTIC ONLY performed by Alison Kirby MD at [facility]    VASCULAR SURGERY          CURRENT MEDICATIONS   Current Outpatient Rx   Medication Sig Dispense Refill    docusate sodium (COLACE, DULCOLAX) 100 MG CAPS Take 100 mg by mouth 2 times daily 60 capsule 0    pantoprazole (PROTONIX) 40 MG tablet Take 1 tablet by mouth 2 times daily (before meals) 60 tablet 1    ferrous sulfate 325 (65 Fe) MG tablet Take 1 tablet by mouth daily (with breakfast) 30 tablet 5    vitamin C (ASCORBIC ACID) 500 MG tablet Take 1 tablet by mouth daily 30 tablet 3    polyethylene glycol (MIRALAX) powder Renal Miralax Colonoscopy prep. Use as directed. Mix Miralax 238 g with 24 oz clear liquids. Drink 8 oz glass every 20-30 minutes until gone. 238 g 0    cloNIDine (CATAPRES) 0.1 MG tablet Take 0.1 mg by mouth 2 times daily      hydrALAZINE (APRESOLINE) 50 MG tablet Take 50 mg by mouth 2 times daily      cinacalcet (SENSIPAR) 30 MG tablet Take 1 tablet by mouth Daily with supper 30 tablet 3    lisinopril (PRINIVIL;ZESTRIL) 20 MG tablet Take 1 tablet by mouth daily 30 tablet 3    isosorbide mononitrate (IMDUR) 30 MG extended release tablet Take 30 mg by mouth daily      gabapentin (NEURONTIN) 100 MG capsule Take 100 mg by mouth nightly.       B Complex-C-Folic Acid (RENAL) 1 MG CAPS Take 1 capsule by mouth daily      metoprolol (LOPRESSOR) 50 MG tablet Take 1 tablet by mouth 2 times daily 180 tablet 3    pravastatin (PRAVACHOL) 80 MG tablet Take 1 tablet by mouth daily 30 tablet 5        ALLERGIES   Allergies   Allergen Reactions    No Known Allergies         SOCIAL & FAMILY HISTORY   Social History     Socioeconomic History    Marital status:      Spouse name: Norbert Mccann Number of children: None    Years of education: None    Highest education level: None   Occupational History    None   Social Needs    Financial resource strain: None    Food insecurity:     Worry: None     Inability: None    Transportation needs:     Medical: None     Non-medical: None   Tobacco Use    Smoking status: Former Smoker     Packs/day: 1.00     Years: 40.00     Pack years: 40.00     Last attempt to quit: 1982     Years since quittin.0    Smokeless tobacco: Never Used   Substance and Sexual Activity    Alcohol use: No    Drug use: No    Sexual activity: None   Lifestyle    Physical activity:     Days per week: None     Minutes per session: None    Stress: None   Relationships    Social connections:     Talks on phone: None     Gets together: None     Attends Taoism service: None     Active member of club or organization: None     Attends meetings of clubs or organizations: None     Relationship status: None    Intimate partner violence:     Fear of current or ex partner: None     Emotionally abused: None     Physically abused: None     Forced sexual activity: None   Other Topics Concern    None   Social History Narrative    None      Family History   Problem Relation Age of Onset    Diabetes Mother     Heart Disease Mother     Diabetes Sister     Mental Illness Paternal Aunt         PHYSICAL EXAM   VITAL SIGNS: BP (!) 153/85   Pulse 93   Temp 97.9 °F (36.6 °C) (Oral)   Resp 16   Ht 5' 6\" (1.676 m)   Wt 177 lb 8 oz (80.5 kg)   SpO2 97%   BMI 28.65 kg/m²    Constitutional: Well developed, well nourished, no acute distress   Eyes: Pupils equally round and react to light, sclera nonicteric   HENT: Atraumatic, no trismus Neck: supple, no JVD,   Respiratory: Lungs Clear, no retractions   Cardiovascular: Reg rate, normal rhythm, no murmurs   Vascular: DP pulses 2+ equal bilaterally   GI: Soft, nontender, normal bowel sounds   Musculoskeletal: No edema, palpable bruit noted to right arm (no active pulsatile bleeding noted) with pressure bandage in place   Integument: Well hydrated, no petechiae   Neurologic: Alert & oriented, no slurred speech, 5/5  strength noted to both arms (marlene affected)   Psych: Pleasant affect       RADIOLOGY   No orders to display        Labs Reviewed - No data to display     ED COURSE & MEDICAL DECISION MAKING   Pertinent Labs & Imaging studies reviewed and interpreted. (See chart for details)   Vitals:    01/24/20 1146   BP: (!) 153/85   Pulse: 93   Resp: 16   Temp: 97.9 °F (36.6 °C)   SpO2: 97%        Differential Diagnosis: bleeding AV fistula    FINAL IMPRESSION   1. Hemorrhage of arteriovenous fistula, initial encounter ([facility] [address]         PLAN   Pressure bandaging was successful in stopping the bleeding. I examined the dressings and the underlying fistula, which appear to be intact (I.e. palpable bruit). I will let the pressure bandage continue being applied, will discuss case with Dr. Deisy Aguilar prior to discharging patient. Re-assess at 1300 - pt's arm is free of bleeding upon re-eval. I discussed case with Dr. Deisy Aguilar over phone, who agreed with plan of care and that pt can be dc home. Probable cause of his bleeding is his Plavix use.      Electronically signed by: Violeta Haddad, 1/24/2020 1:02 PM         Taylor Castillo MD  01/24/20 2213

## 2020-01-27 ENCOUNTER — CARE COORDINATION (OUTPATIENT)
Dept: CASE MANAGEMENT | Age: 85
End: 2020-01-27

## 2020-01-28 ENCOUNTER — APPOINTMENT (OUTPATIENT)
Dept: INTERVENTIONAL RADIOLOGY/VASCULAR | Age: 85
End: 2020-01-28
Payer: MEDICARE

## 2020-01-28 ENCOUNTER — APPOINTMENT (OUTPATIENT)
Dept: GENERAL RADIOLOGY | Age: 85
End: 2020-01-28
Payer: MEDICARE

## 2020-01-28 ENCOUNTER — HOSPITAL ENCOUNTER (EMERGENCY)
Age: 85
Discharge: HOME | End: 2020-01-28
Attending: EMERGENCY MEDICINE
Payer: MEDICARE

## 2020-01-28 VITALS
WEIGHT: 180 LBS | DIASTOLIC BLOOD PRESSURE: 80 MMHG | OXYGEN SATURATION: 99 % | HEART RATE: 79 BPM | TEMPERATURE: 98.5 F | BODY MASS INDEX: 28.93 KG/M2 | RESPIRATION RATE: 20 BRPM | HEIGHT: 66 IN | SYSTOLIC BLOOD PRESSURE: 150 MMHG

## 2020-01-28 PROCEDURE — 73502 X-RAY EXAM HIP UNI 2-3 VIEWS: CPT

## 2020-01-28 PROCEDURE — 96372 THER/PROPH/DIAG INJ SC/IM: CPT

## 2020-01-28 PROCEDURE — 6360000002 HC RX W HCPCS: Performed by: EMERGENCY MEDICINE

## 2020-01-28 PROCEDURE — 99284 EMERGENCY DEPT VISIT MOD MDM: CPT

## 2020-01-28 PROCEDURE — 73564 X-RAY EXAM KNEE 4 OR MORE: CPT

## 2020-01-28 PROCEDURE — 6370000000 HC RX 637 (ALT 250 FOR IP): Performed by: EMERGENCY MEDICINE

## 2020-01-28 PROCEDURE — 93971 EXTREMITY STUDY: CPT

## 2020-01-28 RX ORDER — HYDROCODONE BITARTRATE AND ACETAMINOPHEN 5; 325 MG/1; MG/1
1 TABLET ORAL ONCE
Status: COMPLETED | OUTPATIENT
Start: 2020-01-28 | End: 2020-01-28

## 2020-01-28 RX ORDER — CYCLOBENZAPRINE HCL 5 MG
5 TABLET ORAL 2 TIMES DAILY PRN
Qty: 10 TABLET | Refills: 0 | Status: SHIPPED | OUTPATIENT
Start: 2020-01-28 | End: 2020-02-07

## 2020-01-28 RX ORDER — HYDROCODONE BITARTRATE AND ACETAMINOPHEN 5; 325 MG/1; MG/1
1 TABLET ORAL EVERY 6 HOURS PRN
Qty: 12 TABLET | Refills: 0 | Status: SHIPPED | OUTPATIENT
Start: 2020-01-28 | End: 2020-01-31

## 2020-01-28 RX ORDER — CYCLOBENZAPRINE HCL 10 MG
10 TABLET ORAL ONCE
Status: COMPLETED | OUTPATIENT
Start: 2020-01-28 | End: 2020-01-28

## 2020-01-28 RX ORDER — KETOROLAC TROMETHAMINE 30 MG/ML
30 INJECTION, SOLUTION INTRAMUSCULAR; INTRAVENOUS ONCE
Status: COMPLETED | OUTPATIENT
Start: 2020-01-28 | End: 2020-01-28

## 2020-01-28 RX ADMIN — KETOROLAC TROMETHAMINE 30 MG: 30 INJECTION, SOLUTION INTRAMUSCULAR at 06:39

## 2020-01-28 RX ADMIN — CYCLOBENZAPRINE 10 MG: 10 TABLET, FILM COATED ORAL at 05:50

## 2020-01-28 RX ADMIN — HYDROCODONE BITARTRATE AND ACETAMINOPHEN 1 TABLET: 5; 325 TABLET ORAL at 05:50

## 2020-01-28 ASSESSMENT — ENCOUNTER SYMPTOMS
WHEEZING: 0
TROUBLE SWALLOWING: 0
NAUSEA: 0
EYE ITCHING: 0
RHINORRHEA: 0
SHORTNESS OF BREATH: 0
CHOKING: 0
SORE THROAT: 0
VOMITING: 0
EYE REDNESS: 0
BACK PAIN: 0
DIARRHEA: 0
PHOTOPHOBIA: 0
EYE DISCHARGE: 0
VOICE CHANGE: 0
EYE PAIN: 0
CHEST TIGHTNESS: 0
ABDOMINAL DISTENTION: 0
BLOOD IN STOOL: 0
ABDOMINAL PAIN: 0
CONSTIPATION: 0
SINUS PRESSURE: 0
COUGH: 0

## 2020-01-28 ASSESSMENT — PAIN SCALES - GENERAL
PAINLEVEL_OUTOF10: 10
PAINLEVEL_OUTOF10: 9
PAINLEVEL_OUTOF10: 10

## 2020-01-28 ASSESSMENT — PAIN DESCRIPTION - PAIN TYPE: TYPE: ACUTE PAIN

## 2020-01-28 ASSESSMENT — PAIN DESCRIPTION - LOCATION: LOCATION: HIP

## 2020-01-28 ASSESSMENT — PAIN DESCRIPTION - ORIENTATION: ORIENTATION: LEFT

## 2020-01-28 NOTE — ED PROVIDER NOTES
[facility]  eMERGENCY dEPARTMENT eNCOUnter          CHIEF COMPLAINT       Chief Complaint   Patient presents with    Hip Pain       Nurses Notes reviewed and I agree except as noted in the HPI. HISTORY OF PRESENT ILLNESS    Elham Kilpatrick is a 80 y.o. male who presents for evaluation of left hip pain. Patient reports that the pain started yesterday and has radiated to his left foot. Patient denies any falling but his wife reports that he has a history of back problems. Patient rates his pain as a 10/10 in severity. 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 REVIEW OF SYSTEMS     Review of Systems   Constitutional: Negative for activity change, appetite change, diaphoresis, fatigue and unexpected weight change. HENT: Negative for congestion, ear discharge, ear pain, hearing loss, rhinorrhea, sinus pressure, sore throat, trouble swallowing and voice change. Eyes: Negative for photophobia, pain, discharge, redness and itching. Respiratory: Negative for cough, choking, chest tightness, shortness of breath and wheezing. Cardiovascular: Negative for chest pain, palpitations and leg swelling. Gastrointestinal: Negative for abdominal distention, abdominal pain, blood in stool, constipation, diarrhea, nausea and vomiting. Endocrine: Negative for polydipsia, polyphagia and polyuria. Genitourinary: Negative for decreased urine volume, difficulty urinating, dysuria, enuresis, frequency, hematuria and urgency. Musculoskeletal: Positive for arthralgias (left hip radiating to foot) and myalgias (left leg). Negative for back pain, gait problem, neck pain and neck stiffness. Skin: Negative for pallor and rash. Allergic/Immunologic: Negative for immunocompromised state. Neurological: Negative for dizziness, tremors, seizures, syncope, facial asymmetry, weakness, light-headedness, numbness and headaches.    Hematological: Negative for (LOPRESSOR) 50 MG TABLET    Take 1 tablet by mouth 2 times daily    PANTOPRAZOLE (PROTONIX) 40 MG TABLET    Take 1 tablet by mouth 2 times daily (before meals)    POLYETHYLENE GLYCOL (MIRALAX) POWDER    Renal Miralax Colonoscopy prep. Use as directed. Mix Miralax 238 g with 24 oz clear liquids. Drink 8 oz glass every 20-30 minutes until gone. PRAVASTATIN (PRAVACHOL) 80 MG TABLET    Take 1 tablet by mouth daily    VITAMIN C (ASCORBIC ACID) 500 MG TABLET    Take 1 tablet by mouth daily       ALLERGIES     is allergic to no known allergies. FAMILY HISTORY     He indicated that his mother is . He indicated that his father is . He indicated that his sister is . He indicated that his brother is . He indicated that the status of his paternal aunt is unknown.   family history includes Diabetes in his mother and sister; Heart Disease in his mother; Mental Illness in his paternal aunt. SOCIAL HISTORY      reports that he quit smoking about 38 years ago. He has a 40.00 pack-year smoking history. He has never used smokeless tobacco. He reports that he does not drink alcohol or use drugs. PHYSICAL EXAM     INITIAL VITALS:  height is 5' 6\" (1.676 m) and weight is 180 lb (81.6 kg). His oral temperature is 98.5 °F (36.9 °C). His blood pressure is 150/97 (abnormal) and his pulse is 92. His respiration is 20 and oxygen saturation is 99%. Physical Exam  Vitals signs and nursing note reviewed. Constitutional:       General: He is not in acute distress. Appearance: He is well-developed. He is not diaphoretic. HENT:      Head: Normocephalic and atraumatic. Right Ear: External ear normal.      Left Ear: External ear normal.      Nose: Nose normal.      Mouth/Throat:      Pharynx: No oropharyngeal exudate. Eyes:      General: No scleral icterus. Right eye: No discharge. Left eye: No discharge.       Conjunctiva/sclera: Conjunctivae normal.      Pupils: Pupils are equal, round, and reactive to light. Neck:      Musculoskeletal: Normal range of motion and neck supple. Vascular: No JVD. Trachea: No tracheal deviation. Cardiovascular:      Rate and Rhythm: Normal rate and regular rhythm. Heart sounds: Normal heart sounds. No murmur. No friction rub. No gallop. Pulmonary:      Effort: Pulmonary effort is normal.      Breath sounds: Normal breath sounds. No wheezing or rales. Abdominal:      General: Bowel sounds are normal.      Palpations: Abdomen is soft. There is no mass. Tenderness: There is no abdominal tenderness. There is no guarding or rebound. Musculoskeletal: Normal range of motion. General: No tenderness. Comments: Patient has musculoskeletal pain on left hip and leg    Lymphadenopathy:      Cervical: No cervical adenopathy. Skin:     General: Skin is warm and dry. Findings: No rash. Neurological:      Mental Status: He is alert and oriented to person, place, and time. Cranial Nerves: No cranial nerve deficit. Motor: No abnormal muscle tone. Coordination: Coordination normal.      Deep Tendon Reflexes: Reflexes are normal and symmetric. Reflexes normal.   Psychiatric:         Behavior: Behavior normal.         Thought Content: Thought content normal.         Judgment: Judgment normal.         DIFFERENTIAL DIAGNOSIS:   Sciatica, bursitis, back strain, less likely fracture     DIAGNOSTIC RESULTS     EKG: All EKG's are interpreted by the Emergency Department Physician who either signs or Co-signs this chart in the absence of a cardiologist.  None      RADIOLOGY: non-plain film images(s) such as CT, Ultrasound and MRI are read by the radiologist.  XR KNEE LEFT (MIN 4 VIEWS)   Final Result   1. Degenerative changes of the left knee. Negative exam for acute appearing abnormality. **This report has been created using voice recognition software.  It may contain minor errors which are inherent in voice recognition technology. **      Final report electronically signed by Dr. Lilo Mc on 1/28/2020 6:41 AM      XR HIP 2-3 VW W PELVIS LEFT   Final Result   . 1. Negative exam for hip fracture or dislocation. 2. Degenerative arthropathy changes noted. 3. Spondylosis of the lumbar sacral spine. Final report electronically signed by Dr. Lilo Mc on 1/28/2020 6:32 AM      VL DUP LOWER EXTREMITY VENOUS LEFT    (Results Pending)       LABS:   Labs Reviewed - No data to display    EMERGENCY DEPARTMENT COURSE:   Vitals:    Vitals:    01/28/20 0542 01/28/20 0546   BP: (!) 150/97    Pulse: 92    Resp: 20    Temp: 98.5 °F (36.9 °C)    TempSrc: Oral    SpO2: 99%    Weight:  180 lb (81.6 kg)   Height:  5' 6\" (1.676 m)         Patient was assessed bedside. Appropriate labs and imaging were ordered. Patient presents  for evaluation of left hip pain. Patient reports that the pain started yesterday and has radiated to his left foot. Patient denies any falling but his wife reports that he has a history of back problems. Patient rates his pain as a 10/10 in severity. 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 Appropriate examination prove the patient has musculoskeletal pain to his left hip and leg. XR knee, XR hip, Flexeril, and norco were all ordered on 0545. Patient is currently resting on bed with no further complaints. X-rays were negative for any acute injury. At this point I feel the patient be safely discharged home. I think he does have sciatica this is the referral down to his knee. There is also some components of arthritis in the hip as well as knee. This is a contributory factor. The patient will be discharged with pain medication and with muscle relaxers. He is instructed to follow-up with a primary care physician and to do so within the next 1 to 2 days. I discussed this at bedside with the patient who understood and agreed with the plan.

## 2020-01-29 ENCOUNTER — CARE COORDINATION (OUTPATIENT)
Dept: CASE MANAGEMENT | Age: 85
End: 2020-01-29

## 2020-03-04 ENCOUNTER — APPOINTMENT (OUTPATIENT)
Dept: GENERAL RADIOLOGY | Age: 85
End: 2020-03-04
Payer: OTHER MISCELLANEOUS

## 2020-03-04 ENCOUNTER — HOSPITAL ENCOUNTER (EMERGENCY)
Age: 85
Discharge: HOME | End: 2020-03-04
Attending: EMERGENCY MEDICINE
Payer: OTHER MISCELLANEOUS

## 2020-03-04 VITALS
RESPIRATION RATE: 20 BRPM | TEMPERATURE: 99.1 F | HEART RATE: 71 BPM | DIASTOLIC BLOOD PRESSURE: 75 MMHG | BODY MASS INDEX: 28.93 KG/M2 | OXYGEN SATURATION: 98 % | WEIGHT: 180 LBS | SYSTOLIC BLOOD PRESSURE: 147 MMHG | HEIGHT: 66 IN

## 2020-03-04 PROCEDURE — 6370000000 HC RX 637 (ALT 250 FOR IP): Performed by: EMERGENCY MEDICINE

## 2020-03-04 PROCEDURE — 72100 X-RAY EXAM L-S SPINE 2/3 VWS: CPT

## 2020-03-04 PROCEDURE — 73564 X-RAY EXAM KNEE 4 OR MORE: CPT

## 2020-03-04 PROCEDURE — 72040 X-RAY EXAM NECK SPINE 2-3 VW: CPT

## 2020-03-04 PROCEDURE — 99284 EMERGENCY DEPT VISIT MOD MDM: CPT

## 2020-03-04 PROCEDURE — 72072 X-RAY EXAM THORAC SPINE 3VWS: CPT

## 2020-03-04 RX ORDER — CYCLOBENZAPRINE HCL 10 MG
10 TABLET ORAL ONCE
Status: COMPLETED | OUTPATIENT
Start: 2020-03-04 | End: 2020-03-04

## 2020-03-04 RX ORDER — HYDROCODONE BITARTRATE AND ACETAMINOPHEN 5; 325 MG/1; MG/1
1 TABLET ORAL ONCE
Status: COMPLETED | OUTPATIENT
Start: 2020-03-04 | End: 2020-03-04

## 2020-03-04 RX ORDER — TRAMADOL HYDROCHLORIDE 50 MG/1
50 TABLET ORAL 3 TIMES DAILY PRN
Qty: 10 TABLET | Refills: 0 | Status: SHIPPED | OUTPATIENT
Start: 2020-03-04 | End: 2020-03-07

## 2020-03-04 RX ADMIN — HYDROCODONE BITARTRATE AND ACETAMINOPHEN 1 TABLET: 5; 325 TABLET ORAL at 21:03

## 2020-03-04 RX ADMIN — CYCLOBENZAPRINE 10 MG: 10 TABLET, FILM COATED ORAL at 21:03

## 2020-03-04 ASSESSMENT — ENCOUNTER SYMPTOMS
DIARRHEA: 0
ABDOMINAL PAIN: 0
RHINORRHEA: 0
WHEEZING: 0
VOMITING: 0
COUGH: 0
CHEST TIGHTNESS: 0
VOICE CHANGE: 0
SORE THROAT: 0
TROUBLE SWALLOWING: 0
SINUS PRESSURE: 0
SHORTNESS OF BREATH: 0
CONSTIPATION: 0
BACK PAIN: 1
NAUSEA: 0

## 2020-03-04 ASSESSMENT — PAIN DESCRIPTION - PAIN TYPE: TYPE: ACUTE PAIN

## 2020-03-04 ASSESSMENT — PAIN DESCRIPTION - LOCATION: LOCATION: BACK

## 2020-03-04 ASSESSMENT — PAIN SCALES - GENERAL
PAINLEVEL_OUTOF10: 9
PAINLEVEL_OUTOF10: 10
PAINLEVEL_OUTOF10: 9

## 2020-03-04 ASSESSMENT — PAIN DESCRIPTION - DESCRIPTORS: DESCRIPTORS: ACHING
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
## 2020-03-08 ENCOUNTER — APPOINTMENT (OUTPATIENT)
Dept: CT IMAGING | Age: 85
End: 2020-03-08
Payer: MEDICARE

## 2020-03-08 ENCOUNTER — APPOINTMENT (OUTPATIENT)
Dept: GENERAL RADIOLOGY | Age: 85
End: 2020-03-08
Payer: MEDICARE

## 2020-03-08 ENCOUNTER — HOSPITAL ENCOUNTER (EMERGENCY)
Age: 85
Discharge: HOME | End: 2020-03-08
Payer: MEDICARE

## 2020-03-08 VITALS
BODY MASS INDEX: 28.93 KG/M2 | SYSTOLIC BLOOD PRESSURE: 115 MMHG | WEIGHT: 180 LBS | DIASTOLIC BLOOD PRESSURE: 61 MMHG | HEART RATE: 60 BPM | OXYGEN SATURATION: 98 % | TEMPERATURE: 97.8 F | RESPIRATION RATE: 14 BRPM | HEIGHT: 66 IN

## 2020-03-08 PROCEDURE — 96372 THER/PROPH/DIAG INJ SC/IM: CPT

## 2020-03-08 PROCEDURE — 99284 EMERGENCY DEPT VISIT MOD MDM: CPT

## 2020-03-08 PROCEDURE — 73502 X-RAY EXAM HIP UNI 2-3 VIEWS: CPT

## 2020-03-08 PROCEDURE — 6370000000 HC RX 637 (ALT 250 FOR IP): Performed by: PHYSICIAN ASSISTANT

## 2020-03-08 PROCEDURE — 72125 CT NECK SPINE W/O DYE: CPT

## 2020-03-08 PROCEDURE — 99285 EMERGENCY DEPT VISIT HI MDM: CPT

## 2020-03-08 PROCEDURE — 6360000002 HC RX W HCPCS: Performed by: EMERGENCY MEDICINE

## 2020-03-08 RX ORDER — CYCLOBENZAPRINE HCL 5 MG
5 TABLET ORAL DAILY
Qty: 5 TABLET | Refills: 0 | Status: SHIPPED | OUTPATIENT
Start: 2020-03-08 | End: 2020-03-13

## 2020-03-08 RX ORDER — TRAMADOL HYDROCHLORIDE 50 MG/1
50 TABLET ORAL ONCE
Status: COMPLETED | OUTPATIENT
Start: 2020-03-08 | End: 2020-03-08

## 2020-03-08 RX ORDER — NAPROXEN 500 MG/1
500 TABLET ORAL DAILY
Qty: 10 TABLET | Refills: 0 | Status: ON HOLD | OUTPATIENT
Start: 2020-03-08 | End: 2021-01-01 | Stop reason: HOSPADM

## 2020-03-08 RX ORDER — ORPHENADRINE CITRATE 30 MG/ML
30 INJECTION INTRAMUSCULAR; INTRAVENOUS ONCE
Status: COMPLETED | OUTPATIENT
Start: 2020-03-08 | End: 2020-03-08

## 2020-03-08 RX ADMIN — ORPHENADRINE CITRATE 30 MG: 30 INJECTION INTRAMUSCULAR; INTRAVENOUS at 17:18

## 2020-03-08 RX ADMIN — TRAMADOL HYDROCHLORIDE 50 MG: 50 TABLET, FILM COATED ORAL at 17:07

## 2020-03-08 ASSESSMENT — PAIN SCALES - GENERAL
PAINLEVEL_OUTOF10: 10
PAINLEVEL_OUTOF10: 6

## 2020-03-08 ASSESSMENT — PAIN DESCRIPTION - FREQUENCY: FREQUENCY: CONTINUOUS

## 2020-03-08 ASSESSMENT — ENCOUNTER SYMPTOMS
SHORTNESS OF BREATH: 0
NAUSEA: 0
VOMITING: 0
ABDOMINAL PAIN: 0

## 2020-03-08 ASSESSMENT — PAIN DESCRIPTION - LOCATION: LOCATION: LEG;BACK;NECK

## 2020-03-08 ASSESSMENT — PAIN DESCRIPTION - PAIN TYPE: TYPE: ACUTE PAIN

## 2020-03-08 NOTE — ED PROVIDER NOTES
daily       ALLERGIES     No known allergies    FAMILY HISTORY       Family History   Problem Relation Age of Onset    Diabetes Mother     Heart Disease Mother     Diabetes Sister     Mental Illness Paternal Aunt         SOCIAL HISTORY       Social History     Socioeconomic History    Marital status:      Spouse name: Leticia Blankenship Number of children: None    Years of education: None    Highest education level: None   Occupational History    None   Social Needs    Financial resource strain: None    Food insecurity     Worry: None     Inability: None    Transportation needs     Medical: None     Non-medical: None   Tobacco Use    Smoking status: Former Smoker     Packs/day: 1.00     Years: 40.00     Pack years: 40.00     Last attempt to quit: 1982     Years since quittin.1    Smokeless tobacco: Never Used   Substance and Sexual Activity    Alcohol use: No    Drug use: No    Sexual activity: None   Lifestyle    Physical activity     Days per week: None     Minutes per session: None    Stress: None   Relationships    Social connections     Talks on phone: None     Gets together: None     Attends Gnosticist service: None     Active member of club or organization: None     Attends meetings of clubs or organizations: None     Relationship status: None    Intimate partner violence     Fear of current or ex partner: None     Emotionally abused: None     Physically abused: None     Forced sexual activity: None   Other Topics Concern    None   Social History Narrative    None       REVIEW OF SYSTEMS     Review of Systems   Constitutional: Negative for appetite change, chills and fever. Respiratory: Negative for shortness of breath. Cardiovascular: Negative for chest pain. Gastrointestinal: Negative for abdominal pain, nausea and vomiting. Genitourinary: Negative for hematuria. Musculoskeletal: Positive for arthralgias (bilateral legs and neck, hands are sore) and myalgias. Neurological: Positive for weakness (bilaterally in legs, ), numbness (in both hands and legs) and headaches. Except as noted above the remainder of the review of systems was reviewed and is. PHYSICAL EXAM    (up to 7 for level 4, 8 or more for level 5)     ED Triage Vitals [03/08/20 1644]   BP Temp Temp Source Pulse Resp SpO2 Height Weight   138/60 97.8 °F (36.6 °C) Oral 92 18 95 % 5' 6\" (1.676 m) 180 lb (81.6 kg)       Physical Exam  Vitals signs and nursing note reviewed. Constitutional:       Appearance: He is well-developed. He is not toxic-appearing or diaphoretic. HENT:      Head: Normocephalic and atraumatic. Right Ear: Tympanic membrane and external ear normal.      Left Ear: Tympanic membrane and external ear normal.      Nose: Nose normal.      Mouth/Throat:      Pharynx: No oropharyngeal exudate or posterior oropharyngeal erythema. Eyes:      Conjunctiva/sclera: Conjunctivae normal.   Neck:      Musculoskeletal: Normal range of motion and neck supple. Vascular: No JVD. Cardiovascular:      Rate and Rhythm: Normal rate and regular rhythm. Pulses: Normal pulses. Heart sounds: Normal heart sounds. No murmur. No friction rub. No gallop. Pulmonary:      Effort: Pulmonary effort is normal. No respiratory distress. Breath sounds: Normal breath sounds. No decreased breath sounds, wheezing, rhonchi or rales. Abdominal:      General: Bowel sounds are normal. There is no distension. Palpations: Abdomen is soft. Tenderness: There is no abdominal tenderness. There is no guarding or rebound. Musculoskeletal: Normal range of motion. Cervical back: He exhibits tenderness (due to strain ). Lumbar back: He exhibits tenderness (L4 L5 paraspinal tenderness ). Skin:     General: Skin is warm and dry. Findings: No rash. Neurological:      Mental Status: He is alert and oriented to person, place, and time. Motor: No abnormal muscle tone.

## 2020-03-08 NOTE — ED NOTES
Report received from [facility]. Pt resting on cot. VS obtained. Pt rates pain 10 out of 10. Pt updated on POC. Medications administered. Will monitor.      Manuel Rodriguez RN  03/08/20 4659
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
## 2020-03-09 ENCOUNTER — CARE COORDINATION (OUTPATIENT)
Dept: CASE MANAGEMENT | Age: 85
End: 2020-03-09
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
## 2020-03-13 ENCOUNTER — HOSPITAL ENCOUNTER (OUTPATIENT)
Dept: INTERVENTIONAL RADIOLOGY/VASCULAR | Age: 85
Discharge: HOME | End: 2020-03-13
Payer: MEDICARE

## 2020-03-13 VITALS
WEIGHT: 180 LBS | RESPIRATION RATE: 10 BRPM | DIASTOLIC BLOOD PRESSURE: 76 MMHG | OXYGEN SATURATION: 99 % | HEART RATE: 66 BPM | SYSTOLIC BLOOD PRESSURE: 170 MMHG | TEMPERATURE: 98.1 F | BODY MASS INDEX: 29.05 KG/M2

## 2020-03-13 PROCEDURE — 2500000003 HC RX 250 WO HCPCS: Performed by: RADIOLOGY

## 2020-03-13 PROCEDURE — 6360000002 HC RX W HCPCS

## 2020-03-13 PROCEDURE — C1769 GUIDE WIRE: HCPCS

## 2020-03-13 PROCEDURE — 6360000004 HC RX CONTRAST MEDICATION: Performed by: RADIOLOGY

## 2020-03-13 PROCEDURE — C1894 INTRO/SHEATH, NON-LASER: HCPCS

## 2020-03-13 PROCEDURE — C1874 STENT, COATED/COV W/DEL SYS: HCPCS

## 2020-03-13 PROCEDURE — 6370000000 HC RX 637 (ALT 250 FOR IP): Performed by: RADIOLOGY

## 2020-03-13 PROCEDURE — 2500000003 HC RX 250 WO HCPCS

## 2020-03-13 PROCEDURE — 2709999900 HC NON-CHARGEABLE SUPPLY

## 2020-03-13 PROCEDURE — C1725 CATH, TRANSLUMIN NON-LASER: HCPCS

## 2020-03-13 PROCEDURE — 36906 THRMBC/NFS DIALYSIS CIRCUIT: CPT | Performed by: RADIOLOGY

## 2020-03-13 PROCEDURE — 2580000003 HC RX 258: Performed by: RADIOLOGY

## 2020-03-13 RX ORDER — SODIUM CHLORIDE 450 MG/100ML
INJECTION, SOLUTION INTRAVENOUS CONTINUOUS
Status: DISCONTINUED | OUTPATIENT
Start: 2020-03-13 | End: 2020-03-14 | Stop reason: HOSPADM

## 2020-03-13 RX ORDER — MIDAZOLAM HYDROCHLORIDE 1 MG/ML
0.5 INJECTION INTRAMUSCULAR; INTRAVENOUS ONCE
Status: COMPLETED | OUTPATIENT
Start: 2020-03-13 | End: 2020-03-13

## 2020-03-13 RX ORDER — CLINDAMYCIN PHOSPHATE 600 MG/50ML
600 INJECTION INTRAVENOUS
Status: COMPLETED | OUTPATIENT
Start: 2020-03-13 | End: 2020-03-13

## 2020-03-13 RX ORDER — HEPARIN SODIUM (PORCINE) LOCK FLUSH IV SOLN 100 UNIT/ML 100 UNIT/ML
600 SOLUTION INTRAVENOUS ONCE
Status: COMPLETED | OUTPATIENT
Start: 2020-03-13 | End: 2020-03-13

## 2020-03-13 RX ORDER — MIDAZOLAM HYDROCHLORIDE 1 MG/ML
1 INJECTION INTRAMUSCULAR; INTRAVENOUS ONCE
Status: COMPLETED | OUTPATIENT
Start: 2020-03-13 | End: 2020-03-13

## 2020-03-13 RX ORDER — HEPARIN SODIUM 1000 [USP'U]/ML
2000 INJECTION, SOLUTION INTRAVENOUS; SUBCUTANEOUS ONCE
Status: COMPLETED | OUTPATIENT
Start: 2020-03-13 | End: 2020-03-13

## 2020-03-13 RX ORDER — LIDOCAINE AND PRILOCAINE 25; 25 MG/G; MG/G
CREAM TOPICAL ONCE
Status: DISCONTINUED | OUTPATIENT
Start: 2020-03-13 | End: 2020-03-13

## 2020-03-13 RX ORDER — LIDOCAINE 40 MG/G
CREAM TOPICAL ONCE
Status: COMPLETED | OUTPATIENT
Start: 2020-03-13 | End: 2020-03-13

## 2020-03-13 RX ADMIN — CLINDAMYCIN IN 5 PERCENT DEXTROSE 600 MG: 12 INJECTION, SOLUTION INTRAVENOUS at 08:51

## 2020-03-13 RX ADMIN — HEPARIN SODIUM (PORCINE) LOCK FLUSH IV SOLN 100 UNIT/ML 600 UNITS: 100 SOLUTION at 14:58

## 2020-03-13 RX ADMIN — LIDOCAINE 4%: 4 CREAM TOPICAL at 09:00

## 2020-03-13 RX ADMIN — Medication 0.5 MG: at 11:08

## 2020-03-13 RX ADMIN — MIDAZOLAM HYDROCHLORIDE 0.5 MG: 1 INJECTION INTRAMUSCULAR; INTRAVENOUS at 12:22

## 2020-03-13 RX ADMIN — Medication 0.5 MG: at 11:01

## 2020-03-13 RX ADMIN — Medication 0.5 MG: at 12:22

## 2020-03-13 RX ADMIN — MIDAZOLAM HYDROCHLORIDE 0.5 MG: 1 INJECTION INTRAMUSCULAR; INTRAVENOUS at 11:08

## 2020-03-13 RX ADMIN — MIDAZOLAM HYDROCHLORIDE 0.5 MG: 1 INJECTION INTRAMUSCULAR; INTRAVENOUS at 11:01

## 2020-03-13 RX ADMIN — IOPAMIDOL 230 ML: 510 INJECTION, SOLUTION INTRAVASCULAR at 14:45

## 2020-03-13 RX ADMIN — HEPARIN SODIUM 2000 UNITS: 1000 INJECTION, SOLUTION INTRAVENOUS; SUBCUTANEOUS at 11:22

## 2020-03-13 RX ADMIN — SODIUM CHLORIDE: 4.5 INJECTION, SOLUTION INTRAVENOUS at 08:50

## 2020-03-13 RX ADMIN — HEPARIN SODIUM 2000 UNITS: 1000 INJECTION, SOLUTION INTRAVENOUS; SUBCUTANEOUS at 14:53

## 2020-03-13 ASSESSMENT — PAIN SCALES - GENERAL
PAINLEVEL_OUTOF10: 5

## 2020-03-13 ASSESSMENT — PAIN DESCRIPTION - DESCRIPTORS: DESCRIPTORS: ACHING

## 2020-03-13 ASSESSMENT — PAIN - FUNCTIONAL ASSESSMENT: PAIN_FUNCTIONAL_ASSESSMENT: 0-10

## 2020-03-13 NOTE — PROGRESS NOTES
Department of Radiology  Post Procedure Progress Note      Pre-Procedure Diagnosis:  clotted fistula RUE    Procedure Performed:  RUE fistulogram with declot, angioplasty, and stenting    Anesthesia: local / versed and dilaudid    Findings: successful declot. Immediate Complications:  None    Estimated Blood Loss: minimal    SEE DICTATED PROCEDURE NOTE FOR COMPLETE DETAILS.     [address]   3/13/2020 4:37 PM HEART:  The precordial activity appeared normal.  No thrills or heaves were noted. On auscultation, the patient had normal S1 and S2 with regular rate and rhythm. The second heart sound did split with inspiration. No murmur noted. No gallops, clicks or rubs were heard. Pulses were equal and symmetrical without pulse delay on all extremities. ABDOMEN: The abdomen was soft, nontender, nondistended, with no hepatosplenomegaly. EXTREMITIES: Warm and well-perfused, no clubbing, cyanosis or edema was seen. SKIN: The skin was intact and dry with no rashes or lesions. NEUROLOGY: Neurologic exam is grossly intact. STUDIES:   EKG (11/13/19)  Sinus  Rhythm  -With rate variation   WITHIN NORMAL LIMITS    Tests performed in the clinic were reviewed and test results discussed with Jean Segura and Marcos chandler. DIAGNOSES:  1. Neurocardiogenic syndrome with dizziness   2. Family history of coronary artery disease at young age     RECOMMENDATIONS:   3. I discussed this diagnosis at length with the family who demonstrated good understanding   2. Drink 64 to 80 oz non-caffeine fluid per day (until urine is clear-colored) and add 2-4 grams of salt to diet per day to keep good hydration   3. Avoid excessive standing and sitting, heat and alcohol. 4. Continue Florinef 0.1 mg daily   5. No cardiac medication, no activity restriction, and no SBE prophylaxis   6. Pediatric Cardiology follow up in 3 months with clinical evaluation       IMPRESSIONS AND DISCUSSIONS:   Imani Atkinson is a 15 yrs old female who presents for evaluation of neurocardiogenic syndrome. It is my impression that since increasing water and salt intake and florinef, she has been better with less dizziness but no near-syncope or syncope. Therefore, she should remain on high fluid and salt intake. I would like to keep her on Florinef at current dose. My recommendations are listed above.

## 2020-03-13 NOTE — PROGRESS NOTES
8610:  ARRIVES VIA WC FOR FISTULAGRAM.  PROCESS REVIEWED AND PT RIGHTS AND RESPONSIBILITIES OFFERED TO PT. FISTULA NOTED RIGHT ARM WITH DRESSINGS INTACT.   NO THRILL NOTED.    1005:  TO IR PER CART      M____ Safety:       (Environmental)   [place] to environment   Ensure ID band is correct and in place/ allergy band as needed   Assess for fall risk   Initiate fall precautions as applicable (fall band, side rails, etc.)   Call light within reach   Bed in low position/ wheels locked    __M__ Pain:        Assess pain level and characteristics   Administer analgesics as ordered   Assess effectiveness of pain management and report to MD as needed    __M__ Knowledge Deficit:   Assess baseline knowledge   Provide teaching at level of understanding   Provide teaching via preferred learning method   Evaluate teaching effectiveness    __M__ Hemodynamic/Respiratory Status:       (Pre and Post Procedure Monitoring)   Assess/Monitor vital signs and LOC   Assess Baseline SpO2 prior to any sedation   Obtain weight/height   Assess vital signs/ LOC until patient meets discharge criteria   Monitor procedure site and notify MD of any issues    _

## 2020-03-13 NOTE — PROGRESS NOTES
1010 Patient received in IR for fistulogram; no family present. 1015 This procedure has been fully reviewed with the patient and written informed consent has been obtained. 1045 Dr Annalisa Fisher assessing pt and fistula. Pt gives Dr Annalisa Fisher permission to place a tunneled dialysis catheter insertion if necessary. 1 Pt calling his daughter to let her know what is going. She will be arriving later. Pt gives consent to proceed with procedure. 1106 Procedure started with Dr. Annalisa Fisher. 1120 Angioplasty of the run off vein with an 0.35 [place] 6 x 20 balloon. 46 Family presenting in waiting room and updated on progress. 46 Pt's wife calls and is very irrate. She wants to know why no one is telling her daughter what is going on. Explained to the wife that the daughter was just informed that everything is going well and as soon as someone is available to come talk to her face to face, they will. Wife insists that someone speak to the daughter in the waiting room immediately. Informed the wife that the only staff present in the department are those in the procedure with her  and that we cannot leave him at this time. Wife continues to be irritated. 1140 Dr Annalisa Fisher broke scrub and went out to speak with the family. 1150 Dr Annalisa Fisher back from speaking with family. 1201 Angioplasty of the venous outflow with an 0.35 [place] 8 x 20 balloon. 1211 Angioplasty of the run off vein outflow vein with an 0.35 [place] 8 x 60 balloon. 1219 Angioplasty of the run off vein with a [place] 8 x 40 balloon. 1235 Angioplasty of the run off vein with a [place] 8 x 20 balloon. 1313 Deployed a Fluency 8 x 60 stent to the venous run off of the distal humerous. 1315 Post dilation of the stent with an 0.35 [place] 8 x 40 balloon. 1319 Deployed a Fluency 8 x 40 stent to the venous run off of the distal humerous. 26 Post dilation of the stent with a [place] 8 x 40 balloon.    [facility]

## 2020-03-18 ENCOUNTER — HOSPITAL ENCOUNTER (EMERGENCY)
Age: 85
Discharge: HOME | End: 2020-03-18
Attending: EMERGENCY MEDICINE
Payer: MEDICARE

## 2020-03-18 VITALS
HEART RATE: 86 BPM | HEIGHT: 66 IN | TEMPERATURE: 97.4 F | BODY MASS INDEX: 28.93 KG/M2 | RESPIRATION RATE: 16 BRPM | OXYGEN SATURATION: 99 % | DIASTOLIC BLOOD PRESSURE: 61 MMHG | SYSTOLIC BLOOD PRESSURE: 139 MMHG | WEIGHT: 180 LBS

## 2020-03-18 LAB
ANION GAP SERPL CALCULATED.3IONS-SCNC: 21 MEQ/L (ref 8–16)
APTT: 33.8 SECONDS (ref 22–38)
BASOPHILS # BLD: 0.5 %
BASOPHILS ABSOLUTE: 0 THOU/MM3 (ref 0–0.1)
BUN BLDV-MCNC: 21 MG/DL (ref 7–22)
CALCIUM SERPL-MCNC: 9.4 MG/DL (ref 8.5–10.5)
CHLORIDE BLD-SCNC: 92 MEQ/L (ref 98–111)
CO2: 27 MEQ/L (ref 23–33)
CREAT SERPL-MCNC: 5.4 MG/DL (ref 0.4–1.2)
EKG ATRIAL RATE: 84 BPM
EKG P AXIS: 58 DEGREES
EKG P-R INTERVAL: 180 MS
EKG Q-T INTERVAL: 388 MS
EKG QRS DURATION: 64 MS
EKG QTC CALCULATION (BAZETT): 458 MS
EKG R AXIS: -6 DEGREES
EKG T AXIS: 19 DEGREES
EKG VENTRICULAR RATE: 84 BPM
EOSINOPHIL # BLD: 6.5 %
EOSINOPHILS ABSOLUTE: 0.4 THOU/MM3 (ref 0–0.4)
ERYTHROCYTE [DISTWIDTH] IN BLOOD BY AUTOMATED COUNT: 15.5 % (ref 11.5–14.5)
ERYTHROCYTE [DISTWIDTH] IN BLOOD BY AUTOMATED COUNT: 54.7 FL (ref 35–45)
GFR SERPL CREATININE-BSD FRML MDRD: 12 ML/MIN/1.73M2
GLUCOSE BLD-MCNC: 95 MG/DL (ref 70–108)
HCT VFR BLD CALC: 25.4 % (ref 42–52)
HEMOGLOBIN: 8.7 GM/DL (ref 14–18)
IMMATURE GRANS (ABS): 0.02 THOU/MM3 (ref 0–0.07)
IMMATURE GRANULOCYTES: 0.3 %
INR BLD: 1.04 (ref 0.85–1.13)
LYMPHOCYTES # BLD: 13.7 %
LYMPHOCYTES ABSOLUTE: 0.8 THOU/MM3 (ref 1–4.8)
MCH RBC QN AUTO: 33.5 PG (ref 26–33)
MCHC RBC AUTO-ENTMCNC: 34.3 GM/DL (ref 32.2–35.5)
MCV RBC AUTO: 97.7 FL (ref 80–94)
MONOCYTES # BLD: 11.8 %
MONOCYTES ABSOLUTE: 0.7 THOU/MM3 (ref 0.4–1.3)
NUCLEATED RED BLOOD CELLS: 1 /100 WBC
OSMOLALITY CALCULATION: 282.2 MOSMOL/KG (ref 275–300)
PLATELET # BLD: 171 THOU/MM3 (ref 130–400)
PMV BLD AUTO: 9.8 FL (ref 9.4–12.4)
POTASSIUM REFLEX MAGNESIUM: 4.8 MEQ/L (ref 3.5–5.2)
RBC # BLD: 2.6 MILL/MM3 (ref 4.7–6.1)
SEG NEUTROPHILS: 67.2 %
SEGMENTED NEUTROPHILS ABSOLUTE COUNT: 4 THOU/MM3 (ref 1.8–7.7)
SODIUM BLD-SCNC: 140 MEQ/L (ref 135–145)
WBC # BLD: 6 THOU/MM3 (ref 4.8–10.8)

## 2020-03-18 PROCEDURE — 36569 INSJ PICC 5 YR+ W/O IMAGING: CPT

## 2020-03-18 PROCEDURE — 85025 COMPLETE CBC W/AUTO DIFF WBC: CPT

## 2020-03-18 PROCEDURE — 99284 EMERGENCY DEPT VISIT MOD MDM: CPT

## 2020-03-18 PROCEDURE — 36415 COLL VENOUS BLD VENIPUNCTURE: CPT

## 2020-03-18 PROCEDURE — 80048 BASIC METABOLIC PNL TOTAL CA: CPT

## 2020-03-18 PROCEDURE — 93005 ELECTROCARDIOGRAM TRACING: CPT | Performed by: EMERGENCY MEDICINE

## 2020-03-18 PROCEDURE — 85730 THROMBOPLASTIN TIME PARTIAL: CPT

## 2020-03-18 PROCEDURE — 6370000000 HC RX 637 (ALT 250 FOR IP): Performed by: EMERGENCY MEDICINE

## 2020-03-18 PROCEDURE — 85610 PROTHROMBIN TIME: CPT

## 2020-03-18 RX ORDER — ACETAMINOPHEN 500 MG
1000 TABLET ORAL ONCE
Status: COMPLETED | OUTPATIENT
Start: 2020-03-18 | End: 2020-03-18

## 2020-03-18 RX ADMIN — ACETAMINOPHEN 1000 MG: 500 TABLET ORAL at 18:21

## 2020-03-18 ASSESSMENT — PAIN DESCRIPTION - DESCRIPTORS: DESCRIPTORS: SHARP

## 2020-03-18 ASSESSMENT — PAIN DESCRIPTION - PAIN TYPE
TYPE: ACUTE PAIN
TYPE: ACUTE PAIN

## 2020-03-18 ASSESSMENT — ENCOUNTER SYMPTOMS
ABDOMINAL PAIN: 0
VOMITING: 0
SHORTNESS OF BREATH: 0
NAUSEA: 0

## 2020-03-18 ASSESSMENT — PAIN SCALES - GENERAL: PAINLEVEL_OUTOF10: 10

## 2020-03-18 ASSESSMENT — PAIN DESCRIPTION - ORIENTATION
ORIENTATION: RIGHT;UPPER
ORIENTATION: RIGHT

## 2020-03-18 ASSESSMENT — PAIN DESCRIPTION - LOCATION
LOCATION: ARM
LOCATION: ARM

## 2020-03-18 ASSESSMENT — PAIN DESCRIPTION - ONSET: ONSET: ON-GOING

## 2020-03-18 ASSESSMENT — PAIN DESCRIPTION - FREQUENCY
FREQUENCY: CONTINUOUS
FREQUENCY: CONTINUOUS

## 2020-03-18 NOTE — ED PROVIDER NOTES
[facility] COMPLAINT       Chief Complaint   Patient presents with    Arm Pain     possible fistula clot       Nurses Notes reviewed and I agree except as noted in the HPI. HISTORY OF PRESENT ILLNESS    Karla Recio is a 80 y.o. male who presents to the Emergency Department for the evaluation of arm pain. The patient receives hemodialysis on Mondays, Wednesdays, and Fridays, and he was at his dialysis appointment today when they difficulty with access his fistula on the right arm. The patient reports they stuck him several times at the upper site was bleeding. Patient states he had a clot in his fistula since he had stents placed in his same fistula five days ago for a blood clot. The patient reports the dialysis sight on his right arm arm is painful, and he rates the pain as a 10/10 in severity. He admits to taking direct oral anticoagulants, but is unable to name what medications he is taking. The patient hasn't had any problems with dialysis in the past, and his nephrologist is Dr. Skinny Garcia. The patient has a past medical history of anemia, CAD, CKD stage IV, COPD, GI bleed, HLD, hyperphosphatemia, HTN, sick sinus syndrome, and systolic congestive heart failure. There are no further symptoms or concerns at this time. The HPI was provided by the patient. REVIEW OF SYSTEMS     Review of Systems   Constitutional: Negative for fever. Respiratory: Negative for shortness of breath. Cardiovascular: Negative for chest pain. Gastrointestinal: Negative for abdominal pain, nausea and vomiting. Musculoskeletal: Positive for myalgias (pain at fistula sight on right upper extremity). Negative for arthralgias.        PAST MEDICAL HISTORY    has a past medical history of Anemia in chronic kidney disease(285.21), Arthritis, CAD (coronary artery disease), Chronic kidney disease, Chronic kidney disease, stage IV (severe) ([facility] [address] CKD (chronic kidney disease) stage 3, GFR 30-59 ml/min ([facility]), COPD (chronic obstructive pulmonary disease) ([facility]), GI bleed, Hemodialysis patient ([facility] [address] History of blood transfusion, Hyperlipidemia, Hyperphosphatemia, Hypertension, Sick sinus syndrome ([facility] [address] and Systolic congestive heart failure ([facility] [address] SURGICAL HISTORY    has a past surgical history that includes pacemaker placement (2013); Endoscopy, colon, diagnostic; Dialysis fistula creation (5/6/2016); Colonoscopy (2988,5816); Upper gastrointestinal endoscopy (5823,7894); Coronary angioplasty with stent (2004); pr esophagogastroduodenoscopy transoral diagnostic (N/A, 1/18/2018); vascular surgery; Cardiac surgery; Upper gastrointestinal endoscopy (Left, 6/23/2019); and Colonoscopy (N/A, 6/27/2019). CURRENT MEDICATIONS       Previous Medications    B COMPLEX-C-FOLIC ACID (RENAL) 1 MG CAPS    Take 1 capsule by mouth daily    CINACALCET (SENSIPAR) 30 MG TABLET    Take 1 tablet by mouth Daily with supper    CLONIDINE (CATAPRES) 0.1 MG TABLET    Take 0.1 mg by mouth 2 times daily    DOCUSATE SODIUM (COLACE, DULCOLAX) 100 MG CAPS    Take 100 mg by mouth 2 times daily    FERROUS SULFATE 325 (65 FE) MG TABLET    Take 1 tablet by mouth daily (with breakfast)    GABAPENTIN (NEURONTIN) 100 MG CAPSULE    Take 100 mg by mouth nightly. HYDRALAZINE (APRESOLINE) 50 MG TABLET    Take 50 mg by mouth 2 times daily    ISOSORBIDE MONONITRATE (IMDUR) 30 MG EXTENDED RELEASE TABLET    Take 30 mg by mouth daily    LISINOPRIL (PRINIVIL;ZESTRIL) 20 MG TABLET    Take 1 tablet by mouth daily    METOPROLOL (LOPRESSOR) 50 MG TABLET    Take 1 tablet by mouth 2 times daily    NAPROXEN (NAPROSYN) 500 MG TABLET    Take 1 tablet by mouth daily for 10 days    PANTOPRAZOLE (PROTONIX) 40 MG TABLET    Take 1 tablet by mouth 2 times daily (before meals)    POLYETHYLENE GLYCOL (MIRALAX) POWDER    Renal Miralax Colonoscopy prep. Use as directed. Mix Miralax 238 g with 24 oz clear liquids.   Drink 8 oz glass every 20-30 minutes until gone. PRAVASTATIN (PRAVACHOL) 80 MG TABLET    Take 1 tablet by mouth daily    VITAMIN C (ASCORBIC ACID) 500 MG TABLET    Take 1 tablet by mouth daily       ALLERGIES     is allergic to no known allergies. FAMILY HISTORY     He indicated that his mother is . He indicated that his father is . He indicated that his sister is . He indicated that his brother is . He indicated that the status of his paternal aunt is unknown.   family history includes Diabetes in his mother and sister; Heart Disease in his mother; Mental Illness in his paternal aunt. SOCIAL HISTORY      reports that he quit smoking about 38 years ago. He has a 40.00 pack-year smoking history. He has never used smokeless tobacco. He reports that he does not drink alcohol or use drugs. PHYSICAL EXAM     INITIAL VITALS:  height is 5' 6\" (1.676 m) and weight is 180 lb (81.6 kg). His blood pressure is 123/79 and his pulse is 90. His respiration is 18 and oxygen saturation is 99%. Physical Exam  Vitals signs and nursing note reviewed. Constitutional:       Appearance: He is well-developed. He is not toxic-appearing or diaphoretic. HENT:      Head: Normocephalic and atraumatic. Right Ear: Tympanic membrane and external ear normal.      Left Ear: Tympanic membrane and external ear normal.      Nose: Nose normal.      Mouth/Throat:      Pharynx: No oropharyngeal exudate or posterior oropharyngeal erythema. Eyes:      Conjunctiva/sclera: Conjunctivae normal.   Neck:      Musculoskeletal: Normal range of motion and neck supple. Vascular: No JVD. Cardiovascular:      Rate and Rhythm: Normal rate and regular rhythm. Pulses: Normal pulses. Heart sounds: Normal heart sounds. No murmur. No friction rub. No gallop. Pulmonary:      Effort: Pulmonary effort is normal. No respiratory distress. Breath sounds: Normal breath sounds.  No decreased breath sounds, wheezing, rhonchi or rales. Abdominal:      General: Bowel sounds are normal. There is no distension. Palpations: Abdomen is soft. Tenderness: There is no abdominal tenderness. There is no guarding or rebound. Musculoskeletal: Normal range of motion. Skin:     General: Skin is warm and dry. Findings: No rash. Comments: Upper area site over the fistula over the bicep is firm and tender. There is a thrill present distally over the antecubital region and lower bicep. No bleeding, no Hematoma. Neurological:      Mental Status: He is alert and oriented to person, place, and time. Motor: No abnormal muscle tone. Coordination: Coordination normal.         DIFFERENTIAL DIAGNOSIS:   Including but not limited to clotted fistula, ,Pain from needle stick. Mechanical complication with recent stenting. Hematoma. DIAGNOSTIC RESULTS     EKG: All EKG's are interpreted by the Emergency Department Physician who either signs or Co-signs this chart in the absence of a cardiologist.  EKG interpreted by Cheryle Bogaert, DO:    Sinus Rhythm at 84 bpms. PACs.      RADIOLOGY: non-plain film images(s) such as CT, Ultrasound and MRI are read by the radiologist.    IR ANGIOGRAM ARTERIOVENOUS SHUNT    (Results Pending)        LABS:   Labs Reviewed   CBC WITH AUTO DIFFERENTIAL - Abnormal; Notable for the following components:       Result Value    RBC 2.60 (*)     Hemoglobin 8.7 (*)     Hematocrit 25.4 (*)     MCV 97.7 (*)     MCH 33.5 (*)     RDW-CV 15.5 (*)     RDW-SD 54.7 (*)     Lymphocytes Absolute 0.8 (*)     All other components within normal limits   BASIC METABOLIC PANEL W/ REFLEX TO MG FOR LOW K - Abnormal; Notable for the following components:    Chloride 92 (*)     CREATININE 5.4 (*)     All other components within normal limits   ANION GAP - Abnormal; Notable for the following components:    Anion Gap 21.0 (*)     All other components within normal limits   GLOMERULAR FILTRATION RATE, ESTIMATED - Abnormal; Notable

## 2020-03-18 NOTE — ED NOTES
Dr Viral Jones in to update pt. Pt resting in semi-fowlers position moaning in pain. Pt states pain is still a 10/10. Pt remains A&Ox4, resps easy and unlabored. Will continue to monitor pt.      Tam Garber RN  03/18/20 0794

## 2020-03-18 NOTE — ED TRIAGE NOTES
Pt presents to the ED from outpatient dialysis with c/o right upper arm pain. Pt states they were unable to use his fistula and they sent him to the ED to rule out a blood clot. Upon arrival the pt is holding ashis right upper arm and is breathing heavily. Dr. Bob Reyes at bedside to assess pt.

## 2020-03-18 NOTE — ED NOTES
Dr Anjel Bowers at beside performing ultrasound of fistula. Dr Anjel Bowers states fistula is free flowing without obstruction.      Mallory Sahu RN  03/18/20 0234

## 2020-03-19 ENCOUNTER — CARE COORDINATION (OUTPATIENT)
Dept: CARE COORDINATION | Age: 85
End: 2020-03-19

## 2020-03-19 PROCEDURE — 93010 ELECTROCARDIOGRAM REPORT: CPT | Performed by: INTERNAL MEDICINE

## 2020-04-02 ENCOUNTER — CARE COORDINATION (OUTPATIENT)
Dept: CARE COORDINATION | Age: 85
End: 2020-04-02

## 2020-09-02 ENCOUNTER — HOSPITAL ENCOUNTER (OUTPATIENT)
Age: 85
Discharge: HOME | End: 2020-09-02
Payer: MEDICARE

## 2020-09-02 ENCOUNTER — HOSPITAL ENCOUNTER (OUTPATIENT)
Dept: GENERAL RADIOLOGY | Age: 85
Discharge: HOME | End: 2020-09-02
Payer: MEDICARE

## 2020-09-02 LAB
CHOLESTEROL, TOTAL: 222 MG/DL (ref 100–199)
HDLC SERPL-MCNC: 63 MG/DL
LDL CHOLESTEROL CALCULATED: 141 MG/DL
SEDIMENTATION RATE, ERYTHROCYTE: 42 MM/HR (ref 0–10)
TRIGL SERPL-MCNC: 92 MG/DL (ref 0–199)
TSH SERPL DL<=0.05 MIU/L-ACNC: 1.28 UIU/ML (ref 0.4–4.2)

## 2020-09-02 PROCEDURE — 85651 RBC SED RATE NONAUTOMATED: CPT

## 2020-09-02 PROCEDURE — 72040 X-RAY EXAM NECK SPINE 2-3 VW: CPT

## 2020-09-02 PROCEDURE — 73562 X-RAY EXAM OF KNEE 3: CPT

## 2020-09-02 PROCEDURE — 80061 LIPID PANEL: CPT

## 2020-09-02 PROCEDURE — 84443 ASSAY THYROID STIM HORMONE: CPT

## 2020-09-02 PROCEDURE — 72100 X-RAY EXAM L-S SPINE 2/3 VWS: CPT

## 2020-09-02 PROCEDURE — 73130 X-RAY EXAM OF HAND: CPT

## 2020-09-02 PROCEDURE — 36415 COLL VENOUS BLD VENIPUNCTURE: CPT

## 2020-09-02 PROCEDURE — 86038 ANTINUCLEAR ANTIBODIES: CPT

## 2020-09-04 LAB — ANA SCREEN: DETECTED

## 2020-11-03 PROBLEM — I10 BENIGN ESSENTIAL HTN: Status: RESOLVED | Noted: 2020-11-03 | Resolved: 2020-11-03

## 2021-01-01 ENCOUNTER — APPOINTMENT (OUTPATIENT)
Dept: GENERAL RADIOLOGY | Age: 86
DRG: 177 | End: 2021-01-01
Payer: MEDICARE

## 2021-01-01 ENCOUNTER — HOSPITAL ENCOUNTER (OUTPATIENT)
Age: 86
Discharge: HOME | End: 2021-07-07
Payer: MEDICARE

## 2021-01-01 ENCOUNTER — TELEPHONE (OUTPATIENT)
Dept: CARDIOLOGY CLINIC | Age: 86
End: 2021-01-01

## 2021-01-01 ENCOUNTER — APPOINTMENT (OUTPATIENT)
Dept: GENERAL RADIOLOGY | Age: 86
DRG: 562 | End: 2021-01-01
Payer: MEDICARE

## 2021-01-01 ENCOUNTER — APPOINTMENT (OUTPATIENT)
Dept: GENERAL RADIOLOGY | Age: 86
DRG: 304 | End: 2021-01-01
Payer: MEDICARE

## 2021-01-01 ENCOUNTER — APPOINTMENT (OUTPATIENT)
Dept: INTERVENTIONAL RADIOLOGY/VASCULAR | Age: 86
DRG: 177 | End: 2021-01-01
Payer: MEDICARE

## 2021-01-01 ENCOUNTER — APPOINTMENT (OUTPATIENT)
Dept: CT IMAGING | Age: 86
DRG: 189 | End: 2021-01-01
Payer: MEDICARE

## 2021-01-01 ENCOUNTER — HOSPITAL ENCOUNTER (INPATIENT)
Age: 86
LOS: 2 days | Discharge: HOME | DRG: 304 | End: 2021-07-14
Attending: INTERNAL MEDICINE | Admitting: INTERNAL MEDICINE
Payer: MEDICARE

## 2021-01-01 ENCOUNTER — APPOINTMENT (OUTPATIENT)
Dept: CT IMAGING | Age: 86
End: 2021-01-01
Payer: MEDICARE

## 2021-01-01 ENCOUNTER — APPOINTMENT (OUTPATIENT)
Dept: CT IMAGING | Age: 86
DRG: 177 | End: 2021-01-01
Payer: MEDICARE

## 2021-01-01 ENCOUNTER — OUTSIDE SERVICES (OUTPATIENT)
Dept: FAMILY MEDICINE CLINIC | Age: 86
End: 2021-01-01
Payer: MEDICARE

## 2021-01-01 ENCOUNTER — APPOINTMENT (OUTPATIENT)
Dept: GENERAL RADIOLOGY | Age: 86
DRG: 377 | End: 2021-01-01
Payer: MEDICARE

## 2021-01-01 ENCOUNTER — APPOINTMENT (OUTPATIENT)
Dept: NON INVASIVE DIAGNOSTICS | Age: 86
DRG: 562 | End: 2021-01-01
Payer: MEDICARE

## 2021-01-01 ENCOUNTER — APPOINTMENT (OUTPATIENT)
Dept: GENERAL RADIOLOGY | Age: 86
DRG: 189 | End: 2021-01-01
Payer: MEDICARE

## 2021-01-01 ENCOUNTER — ANESTHESIA (OUTPATIENT)
Dept: ENDOSCOPY | Age: 86
DRG: 377 | End: 2021-01-01
Payer: MEDICARE

## 2021-01-01 ENCOUNTER — APPOINTMENT (OUTPATIENT)
Dept: CT IMAGING | Age: 86
DRG: 304 | End: 2021-01-01
Payer: MEDICARE

## 2021-01-01 ENCOUNTER — HOSPITAL ENCOUNTER (EMERGENCY)
Age: 86
Discharge: NURSING, REHAB OR LONG-TERM CARE | End: 2021-12-08
Payer: MEDICARE

## 2021-01-01 ENCOUNTER — ANESTHESIA EVENT (OUTPATIENT)
Dept: ORTHOPEDICS UNIT | Age: 86
DRG: 562 | End: 2021-01-01
Payer: MEDICARE

## 2021-01-01 ENCOUNTER — ANESTHESIA EVENT (OUTPATIENT)
Dept: ENDOSCOPY | Age: 86
DRG: 177 | End: 2021-01-01
Payer: MEDICARE

## 2021-01-01 ENCOUNTER — APPOINTMENT (OUTPATIENT)
Dept: GENERAL RADIOLOGY | Age: 86
End: 2021-01-01
Payer: MEDICARE

## 2021-01-01 ENCOUNTER — ANESTHESIA (OUTPATIENT)
Dept: ORTHOPEDICS UNIT | Age: 86
DRG: 562 | End: 2021-01-01
Payer: MEDICARE

## 2021-01-01 ENCOUNTER — ANESTHESIA EVENT (OUTPATIENT)
Dept: ENDOSCOPY | Age: 86
DRG: 377 | End: 2021-01-01
Payer: MEDICARE

## 2021-01-01 ENCOUNTER — HOSPITAL ENCOUNTER (INPATIENT)
Age: 86
LOS: 2 days | Discharge: HOME HEALTH | DRG: 304 | End: 2021-06-17
Attending: EMERGENCY MEDICINE | Admitting: FAMILY MEDICINE
Payer: MEDICARE

## 2021-01-01 ENCOUNTER — HOSPITAL ENCOUNTER (INPATIENT)
Age: 86
LOS: 5 days | Discharge: NURSING, REHAB OR LONG-TERM CARE | DRG: 377 | End: 2021-11-10
Attending: FAMILY MEDICINE | Admitting: FAMILY MEDICINE
Payer: MEDICARE

## 2021-01-01 ENCOUNTER — ANESTHESIA (OUTPATIENT)
Dept: ENDOSCOPY | Age: 86
DRG: 177 | End: 2021-01-01
Payer: MEDICARE

## 2021-01-01 ENCOUNTER — HOSPITAL ENCOUNTER (OUTPATIENT)
Dept: GENERAL RADIOLOGY | Age: 86
Discharge: HOME | End: 2021-07-07
Payer: MEDICARE

## 2021-01-01 ENCOUNTER — HOSPITAL ENCOUNTER (EMERGENCY)
Age: 86
Discharge: HOME | End: 2021-06-07
Attending: EMERGENCY MEDICINE
Payer: MEDICARE

## 2021-01-01 ENCOUNTER — TELEPHONE (OUTPATIENT)
Dept: NEPHROLOGY | Age: 86
End: 2021-01-01

## 2021-01-01 ENCOUNTER — HOSPITAL ENCOUNTER (INPATIENT)
Age: 86
LOS: 6 days | Discharge: NURSING, REHAB OR LONG-TERM CARE | DRG: 189 | End: 2021-11-01
Attending: EMERGENCY MEDICINE | Admitting: HOSPITALIST
Payer: MEDICARE

## 2021-01-01 ENCOUNTER — HOSPITAL ENCOUNTER (INPATIENT)
Age: 86
LOS: 32 days | Discharge: NURSING, REHAB OR LONG-TERM CARE | DRG: 177 | End: 2021-10-15
Attending: EMERGENCY MEDICINE | Admitting: HOSPITALIST
Payer: MEDICARE

## 2021-01-01 VITALS
DIASTOLIC BLOOD PRESSURE: 79 MMHG | RESPIRATION RATE: 18 BRPM | SYSTOLIC BLOOD PRESSURE: 145 MMHG | WEIGHT: 136 LBS | BODY MASS INDEX: 21.86 KG/M2 | OXYGEN SATURATION: 94 % | HEART RATE: 73 BPM | HEIGHT: 66 IN | TEMPERATURE: 97.8 F

## 2021-01-01 VITALS
DIASTOLIC BLOOD PRESSURE: 74 MMHG | RESPIRATION RATE: 16 BRPM | TEMPERATURE: 97.6 F | HEIGHT: 66 IN | BODY MASS INDEX: 24.27 KG/M2 | WEIGHT: 151 LBS | HEART RATE: 84 BPM | SYSTOLIC BLOOD PRESSURE: 130 MMHG

## 2021-01-01 VITALS
HEIGHT: 66 IN | SYSTOLIC BLOOD PRESSURE: 123 MMHG | WEIGHT: 176.81 LBS | OXYGEN SATURATION: 96 % | RESPIRATION RATE: 16 BRPM | BODY MASS INDEX: 28.42 KG/M2 | DIASTOLIC BLOOD PRESSURE: 80 MMHG | TEMPERATURE: 98 F | HEART RATE: 58 BPM

## 2021-01-01 VITALS
OXYGEN SATURATION: 94 % | WEIGHT: 145.5 LBS | RESPIRATION RATE: 20 BRPM | TEMPERATURE: 97.5 F | DIASTOLIC BLOOD PRESSURE: 66 MMHG | HEIGHT: 66 IN | HEART RATE: 71 BPM | BODY MASS INDEX: 23.38 KG/M2 | SYSTOLIC BLOOD PRESSURE: 147 MMHG

## 2021-01-01 VITALS
BODY MASS INDEX: 21.12 KG/M2 | HEIGHT: 66 IN | HEART RATE: 92 BPM | RESPIRATION RATE: 20 BRPM | OXYGEN SATURATION: 94 % | DIASTOLIC BLOOD PRESSURE: 75 MMHG | WEIGHT: 131.39 LBS | SYSTOLIC BLOOD PRESSURE: 184 MMHG | TEMPERATURE: 97.4 F

## 2021-01-01 VITALS
SYSTOLIC BLOOD PRESSURE: 163 MMHG | OXYGEN SATURATION: 100 % | DIASTOLIC BLOOD PRESSURE: 77 MMHG | RESPIRATION RATE: 17 BRPM

## 2021-01-01 VITALS
RESPIRATION RATE: 16 BRPM | WEIGHT: 151 LBS | DIASTOLIC BLOOD PRESSURE: 75 MMHG | TEMPERATURE: 98 F | HEART RATE: 70 BPM | HEIGHT: 66 IN | SYSTOLIC BLOOD PRESSURE: 129 MMHG | BODY MASS INDEX: 24.27 KG/M2

## 2021-01-01 VITALS
WEIGHT: 161 LBS | BODY MASS INDEX: 21.95 KG/M2 | WEIGHT: 136 LBS | HEART RATE: 59 BPM | TEMPERATURE: 97.9 F | RESPIRATION RATE: 18 BRPM | BODY MASS INDEX: 25.88 KG/M2 | RESPIRATION RATE: 18 BRPM | OXYGEN SATURATION: 96 % | SYSTOLIC BLOOD PRESSURE: 118 MMHG | OXYGEN SATURATION: 98 % | HEART RATE: 65 BPM | TEMPERATURE: 98 F | DIASTOLIC BLOOD PRESSURE: 66 MMHG | DIASTOLIC BLOOD PRESSURE: 70 MMHG | SYSTOLIC BLOOD PRESSURE: 147 MMHG | HEIGHT: 66 IN

## 2021-01-01 VITALS
BODY MASS INDEX: 23.99 KG/M2 | WEIGHT: 149.25 LBS | RESPIRATION RATE: 18 BRPM | SYSTOLIC BLOOD PRESSURE: 126 MMHG | HEIGHT: 66 IN | OXYGEN SATURATION: 93 % | HEART RATE: 81 BPM | DIASTOLIC BLOOD PRESSURE: 67 MMHG | TEMPERATURE: 97.5 F

## 2021-01-01 VITALS
DIASTOLIC BLOOD PRESSURE: 63 MMHG | OXYGEN SATURATION: 100 % | SYSTOLIC BLOOD PRESSURE: 137 MMHG | RESPIRATION RATE: 11 BRPM

## 2021-01-01 VITALS
BODY MASS INDEX: 22.31 KG/M2 | WEIGHT: 138.23 LBS | TEMPERATURE: 97.3 F | RESPIRATION RATE: 18 BRPM | DIASTOLIC BLOOD PRESSURE: 84 MMHG | SYSTOLIC BLOOD PRESSURE: 184 MMHG | OXYGEN SATURATION: 92 % | HEART RATE: 96 BPM

## 2021-01-01 VITALS
RESPIRATION RATE: 21 BRPM | OXYGEN SATURATION: 91 % | BODY MASS INDEX: 27.44 KG/M2 | HEART RATE: 89 BPM | SYSTOLIC BLOOD PRESSURE: 166 MMHG | WEIGHT: 170 LBS | TEMPERATURE: 97.8 F | DIASTOLIC BLOOD PRESSURE: 76 MMHG

## 2021-01-01 VITALS
HEIGHT: 66 IN | BODY MASS INDEX: 24.27 KG/M2 | HEART RATE: 75 BPM | RESPIRATION RATE: 12 BRPM | SYSTOLIC BLOOD PRESSURE: 149 MMHG | TEMPERATURE: 97.5 F | DIASTOLIC BLOOD PRESSURE: 79 MMHG | WEIGHT: 151 LBS

## 2021-01-01 DIAGNOSIS — K92.2 ACUTE UPPER GASTROINTESTINAL HEMORRHAGE: ICD-10-CM

## 2021-01-01 DIAGNOSIS — I50.20 HEART FAILURE WITH REDUCED EJECTION FRACTION (HCC): ICD-10-CM

## 2021-01-01 DIAGNOSIS — U07.1 PNEUMONIA DUE TO COVID-19 VIRUS: ICD-10-CM

## 2021-01-01 DIAGNOSIS — I10 ESSENTIAL HYPERTENSION: ICD-10-CM

## 2021-01-01 DIAGNOSIS — R06.00 DYSPNEA, UNSPECIFIED TYPE: ICD-10-CM

## 2021-01-01 DIAGNOSIS — E78.5 DYSLIPIDEMIA: ICD-10-CM

## 2021-01-01 DIAGNOSIS — K22.10 EROSIVE ESOPHAGITIS: ICD-10-CM

## 2021-01-01 DIAGNOSIS — D63.8 ANEMIA, CHRONIC DISEASE: ICD-10-CM

## 2021-01-01 DIAGNOSIS — I26.99 OTHER ACUTE PULMONARY EMBOLISM WITHOUT ACUTE COR PULMONALE (HCC): ICD-10-CM

## 2021-01-01 DIAGNOSIS — J81.0 ACUTE PULMONARY EDEMA (HCC): ICD-10-CM

## 2021-01-01 DIAGNOSIS — J44.9 CHRONIC OBSTRUCTIVE PULMONARY DISEASE, UNSPECIFIED COPD TYPE (HCC): ICD-10-CM

## 2021-01-01 DIAGNOSIS — Z99.2 ESRD (END STAGE RENAL DISEASE) ON DIALYSIS (HCC): ICD-10-CM

## 2021-01-01 DIAGNOSIS — D62 ACUTE BLOOD LOSS ANEMIA: ICD-10-CM

## 2021-01-01 DIAGNOSIS — K92.2 UGI BLEED: ICD-10-CM

## 2021-01-01 DIAGNOSIS — R53.81 PHYSICAL DECONDITIONING: ICD-10-CM

## 2021-01-01 DIAGNOSIS — E43 SEVERE MALNUTRITION (HCC): ICD-10-CM

## 2021-01-01 DIAGNOSIS — I25.10 CORONARY ARTERY DISEASE INVOLVING NATIVE CORONARY ARTERY OF NATIVE HEART WITHOUT ANGINA PECTORIS: ICD-10-CM

## 2021-01-01 DIAGNOSIS — I25.10 ASHD (ARTERIOSCLEROTIC HEART DISEASE): ICD-10-CM

## 2021-01-01 DIAGNOSIS — I16.1 HYPERTENSIVE EMERGENCY: Primary | ICD-10-CM

## 2021-01-01 DIAGNOSIS — E16.2 HYPOGLYCEMIA: ICD-10-CM

## 2021-01-01 DIAGNOSIS — K29.90 GASTRITIS AND DUODENITIS: ICD-10-CM

## 2021-01-01 DIAGNOSIS — Z99.2 ESRD ON HEMODIALYSIS (HCC): ICD-10-CM

## 2021-01-01 DIAGNOSIS — R06.89 DYSPNEA AND RESPIRATORY ABNORMALITIES: Primary | ICD-10-CM

## 2021-01-01 DIAGNOSIS — J96.01 ACUTE RESPIRATORY FAILURE WITH HYPOXIA (HCC): Primary | ICD-10-CM

## 2021-01-01 DIAGNOSIS — N18.6 ESRD (END STAGE RENAL DISEASE) ON DIALYSIS (HCC): ICD-10-CM

## 2021-01-01 DIAGNOSIS — I10 HYPERTENSION, ESSENTIAL: ICD-10-CM

## 2021-01-01 DIAGNOSIS — N25.81 SECONDARY HYPERPARATHYROIDISM OF RENAL ORIGIN (HCC): ICD-10-CM

## 2021-01-01 DIAGNOSIS — R09.02 HYPOXIA: Primary | ICD-10-CM

## 2021-01-01 DIAGNOSIS — N18.6 ESRD ON DIALYSIS (HCC): ICD-10-CM

## 2021-01-01 DIAGNOSIS — M25.552 LEFT HIP PAIN: Primary | ICD-10-CM

## 2021-01-01 DIAGNOSIS — R53.81 PHYSICAL DECONDITIONING: Primary | ICD-10-CM
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
## 2021-01-01 DIAGNOSIS — K92.2 UPPER GI BLEED: Primary | ICD-10-CM

## 2021-01-01 DIAGNOSIS — N18.5 CHRONIC KIDNEY DISEASE (CKD), STAGE V (HCC): ICD-10-CM

## 2021-01-01 DIAGNOSIS — J96.01 ACUTE RESPIRATORY FAILURE WITH HYPOXIA (HCC): ICD-10-CM

## 2021-01-01 DIAGNOSIS — D50.8 OTHER IRON DEFICIENCY ANEMIA: ICD-10-CM
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
## 2021-01-01 DIAGNOSIS — I49.5 SSS (SICK SINUS SYNDROME) (HCC): ICD-10-CM

## 2021-01-01 DIAGNOSIS — N18.6 CHRONIC KIDNEY DISEASE WITH END STAGE RENAL FAILURE ON DIALYSIS (HCC): ICD-10-CM

## 2021-01-01 DIAGNOSIS — K92.2 UGI BLEED: Primary | ICD-10-CM

## 2021-01-01 DIAGNOSIS — J12.82 PNEUMONIA DUE TO COVID-19 VIRUS: ICD-10-CM

## 2021-01-01 DIAGNOSIS — N18.6 ESRD ON HEMODIALYSIS (HCC): ICD-10-CM

## 2021-01-01 DIAGNOSIS — R06.00 DYSPNEA AND RESPIRATORY ABNORMALITIES: Primary | ICD-10-CM

## 2021-01-01 DIAGNOSIS — I27.20 PULMONARY HYPERTENSION (HCC): ICD-10-CM

## 2021-01-01 DIAGNOSIS — E87.5 HYPERKALEMIA: ICD-10-CM

## 2021-01-01 DIAGNOSIS — I50.43 ACUTE ON CHRONIC COMBINED SYSTOLIC AND DIASTOLIC CHF (CONGESTIVE HEART FAILURE) (HCC): ICD-10-CM

## 2021-01-01 DIAGNOSIS — R77.8 ELEVATED TROPONIN: ICD-10-CM

## 2021-01-01 DIAGNOSIS — M19.90 ARTHRITIS: ICD-10-CM

## 2021-01-01 DIAGNOSIS — I48.0 PAROXYSMAL ATRIAL FIBRILLATION (HCC): ICD-10-CM

## 2021-01-01 DIAGNOSIS — K31.819 GASTRIC AVM: ICD-10-CM

## 2021-01-01 DIAGNOSIS — J15.9 BACTERIAL PNEUMONIA: ICD-10-CM

## 2021-01-01 DIAGNOSIS — M25.552 LEFT HIP PAIN: ICD-10-CM

## 2021-01-01 DIAGNOSIS — I50.20 SYSTOLIC CONGESTIVE HEART FAILURE, UNSPECIFIED HF CHRONICITY (HCC): Primary | ICD-10-CM

## 2021-01-01 DIAGNOSIS — J18.9 COMMUNITY ACQUIRED PNEUMONIA, UNSPECIFIED LATERALITY: Primary | ICD-10-CM

## 2021-01-01 DIAGNOSIS — R07.9 CHEST PAIN, UNSPECIFIED TYPE: ICD-10-CM

## 2021-01-01 DIAGNOSIS — I50.42 CHRONIC COMBINED SYSTOLIC AND DIASTOLIC CHF (CONGESTIVE HEART FAILURE) (HCC): ICD-10-CM

## 2021-01-01 DIAGNOSIS — J90 PLEURAL EFFUSION: ICD-10-CM

## 2021-01-01 DIAGNOSIS — Z99.2 CHRONIC KIDNEY DISEASE WITH END STAGE RENAL FAILURE ON DIALYSIS (HCC): ICD-10-CM

## 2021-01-01 DIAGNOSIS — Z99.2 ESRD ON DIALYSIS (HCC): ICD-10-CM

## 2021-01-01 DIAGNOSIS — U07.1 COVID-19: ICD-10-CM

## 2021-01-01 DIAGNOSIS — I10 UNCONTROLLED HYPERTENSION: ICD-10-CM

## 2021-01-01 DIAGNOSIS — I10 ESSENTIAL HYPERTENSION: Primary | ICD-10-CM

## 2021-01-01 DIAGNOSIS — I16.1 HYPERTENSIVE EMERGENCY: ICD-10-CM

## 2021-01-01 LAB
ABO: NORMAL
ABSOLUTE RETIC #: 45 THOU/MM3 (ref 20–115)
ACINETOBACTER BAUMANNII FILM ARRAY: NOT DETECTED
ALBUMIN SERPL-MCNC: 2.9 G/DL (ref 3.5–5.1)
ALBUMIN SERPL-MCNC: 3 G/DL (ref 3.5–5.1)
ALBUMIN SERPL-MCNC: 3 G/DL (ref 3.5–5.1)
ALBUMIN SERPL-MCNC: 3.1 G/DL (ref 3.5–5.1)
ALBUMIN SERPL-MCNC: 3.2 G/DL (ref 3.5–5.1)
ALBUMIN SERPL-MCNC: 3.7 G/DL (ref 3.5–5.1)
ALBUMIN SERPL-MCNC: 3.9 G/DL (ref 3.5–5.1)
ALBUMIN SERPL-MCNC: 4 G/DL (ref 3.5–5.1)
ALBUMIN SERPL-MCNC: 4.2 G/DL (ref 3.5–5.1)
ALBUMIN SERPL-MCNC: 4.2 G/DL (ref 3.5–5.1)
ALBUMIN SERPL-MCNC: 4.4 G/DL (ref 3.5–5.1)
ALLEN TEST: POSITIVE
ALP BLD-CCNC: 100 U/L (ref 38–126)
ALP BLD-CCNC: 139 U/L (ref 38–126)
ALP BLD-CCNC: 177 U/L (ref 38–126)
ALP BLD-CCNC: 46 U/L (ref 38–126)
ALP BLD-CCNC: 69 U/L (ref 38–126)
ALP BLD-CCNC: 71 U/L (ref 38–126)
ALP BLD-CCNC: 72 U/L (ref 38–126)
ALP BLD-CCNC: 76 U/L (ref 38–126)
ALP BLD-CCNC: 76 U/L (ref 38–126)
ALP BLD-CCNC: 77 U/L (ref 38–126)
ALP BLD-CCNC: 78 U/L (ref 38–126)
ALP BLD-CCNC: 79 U/L (ref 38–126)
ALP BLD-CCNC: 89 U/L (ref 38–126)
ALP BLD-CCNC: 89 U/L (ref 38–126)
ALP BLD-CCNC: 99 U/L (ref 38–126)
ALT SERPL-CCNC: 10 U/L (ref 11–66)
ALT SERPL-CCNC: 10 U/L (ref 11–66)
ALT SERPL-CCNC: 11 U/L (ref 11–66)
ALT SERPL-CCNC: 12 U/L (ref 11–66)
ALT SERPL-CCNC: 128 U/L (ref 11–66)
ALT SERPL-CCNC: 14 U/L (ref 11–66)
ALT SERPL-CCNC: 17 U/L (ref 11–66)
ALT SERPL-CCNC: 19 U/L (ref 11–66)
ALT SERPL-CCNC: 20 U/L (ref 11–66)
ALT SERPL-CCNC: 27 U/L (ref 11–66)
ALT SERPL-CCNC: 8 U/L (ref 11–66)
ALT SERPL-CCNC: 9 U/L (ref 11–66)
AMMONIA: 14 UMOL/L (ref 11–60)
AMMONIA: 23 UMOL/L (ref 11–60)
ANION GAP SERPL CALCULATED.3IONS-SCNC: 10 MEQ/L (ref 8–16)
ANION GAP SERPL CALCULATED.3IONS-SCNC: 11 MEQ/L (ref 8–16)
ANION GAP SERPL CALCULATED.3IONS-SCNC: 11 MEQ/L (ref 8–16)
ANION GAP SERPL CALCULATED.3IONS-SCNC: 12 MEQ/L (ref 8–16)
ANION GAP SERPL CALCULATED.3IONS-SCNC: 13 MEQ/L (ref 8–16)
ANION GAP SERPL CALCULATED.3IONS-SCNC: 14 MEQ/L (ref 8–16)
ANION GAP SERPL CALCULATED.3IONS-SCNC: 15 MEQ/L (ref 8–16)
ANION GAP SERPL CALCULATED.3IONS-SCNC: 16 MEQ/L (ref 8–16)
ANION GAP SERPL CALCULATED.3IONS-SCNC: 17 MEQ/L (ref 8–16)
ANION GAP SERPL CALCULATED.3IONS-SCNC: 17 MEQ/L (ref 8–16)
ANION GAP SERPL CALCULATED.3IONS-SCNC: 18 MEQ/L (ref 8–16)
ANION GAP SERPL CALCULATED.3IONS-SCNC: 19 MEQ/L (ref 8–16)
ANION GAP SERPL CALCULATED.3IONS-SCNC: 19 MEQ/L (ref 8–16)
ANION GAP SERPL CALCULATED.3IONS-SCNC: 20 MEQ/L (ref 8–16)
ANION GAP SERPL CALCULATED.3IONS-SCNC: 24 MEQ/L (ref 8–16)
ANION GAP SERPL CALCULATED.3IONS-SCNC: 9 MEQ/L (ref 8–16)
ANISOCYTOSIS: PRESENT
ANTIBODY SCREEN: NORMAL
APTT: 28.2 SECONDS (ref 22–38)
APTT: 32.6 SECONDS (ref 22–38)
APTT: 82 SECONDS (ref 22–38)
APTT: > 200 SECONDS (ref 22–38)
AST SERPL-CCNC: 15 U/L (ref 5–40)
AST SERPL-CCNC: 16 U/L (ref 5–40)
AST SERPL-CCNC: 165 U/L (ref 5–40)
AST SERPL-CCNC: 18 U/L (ref 5–40)
AST SERPL-CCNC: 20 U/L (ref 5–40)
AST SERPL-CCNC: 32 U/L (ref 5–40)
AST SERPL-CCNC: 33 U/L (ref 5–40)
AST SERPL-CCNC: 39 U/L (ref 5–40)
AST SERPL-CCNC: 39 U/L (ref 5–40)
AST SERPL-CCNC: 40 U/L (ref 5–40)
AST SERPL-CCNC: 40 U/L (ref 5–40)
AST SERPL-CCNC: 42 U/L (ref 5–40)
AST SERPL-CCNC: 43 U/L (ref 5–40)
AST SERPL-CCNC: 46 U/L (ref 5–40)
AST SERPL-CCNC: 47 U/L (ref 5–40)
AVERAGE GLUCOSE: 75 MG/DL (ref 70–126)
BASE EXCESS (CALCULATED): 1.3 MMOL/L (ref -2.5–2.5)
BASE EXCESS (CALCULATED): 4.1 MMOL/L (ref -2.5–2.5)
BASE EXCESS (CALCULATED): 5.6 MMOL/L (ref -2.5–2.5)
BASE EXCESS (CALCULATED): 6.1 MMOL/L (ref -2.5–2.5)
BASE EXCESS (CALCULATED): 6.5 MMOL/L (ref -2.5–2.5)
BASE EXCESS (CALCULATED): 7.2 MMOL/L (ref -2.5–2.5)
BASE EXCESS MIXED: 5.5 MMOL/L (ref -2–3)
BASOPHILIA: ABNORMAL
BASOPHILIC STIPPLING: ABNORMAL
BASOPHILS # BLD: 0 %
BASOPHILS # BLD: 0.1 %
BASOPHILS # BLD: 0.3 %
BASOPHILS # BLD: 0.3 %
BASOPHILS # BLD: 0.4 %
BASOPHILS # BLD: 0.5 %
BASOPHILS # BLD: 0.5 %
BASOPHILS # BLD: 0.6 %
BASOPHILS # BLD: 0.6 %
BASOPHILS # BLD: 0.7 %
BASOPHILS # BLD: 0.7 %
BASOPHILS # BLD: 0.8 %
BASOPHILS # BLD: 0.9 %
BASOPHILS # BLD: 1.1 %
BASOPHILS ABSOLUTE: 0 THOU/MM3 (ref 0–0.1)
BASOPHILS ABSOLUTE: 0.1 THOU/MM3 (ref 0–0.1)
BILIRUB SERPL-MCNC: 0.3 MG/DL (ref 0.3–1.2)
BILIRUB SERPL-MCNC: 0.4 MG/DL (ref 0.3–1.2)
BILIRUB SERPL-MCNC: 0.5 MG/DL (ref 0.3–1.2)
BILIRUB SERPL-MCNC: 0.5 MG/DL (ref 0.3–1.2)
BILIRUB SERPL-MCNC: 0.6 MG/DL (ref 0.3–1.2)
BILIRUB SERPL-MCNC: 0.7 MG/DL (ref 0.3–1.2)
BILIRUB SERPL-MCNC: 0.7 MG/DL (ref 0.3–1.2)
BILIRUBIN DIRECT: 0.3 MG/DL (ref 0–0.3)
BILIRUBIN DIRECT: 0.3 MG/DL (ref 0–0.3)
BILIRUBIN DIRECT: < 0.2 MG/DL (ref 0–0.3)
BLOOD CULTURE, ROUTINE: ABNORMAL
BLOOD CULTURE, ROUTINE: ABNORMAL
BLOOD CULTURE, ROUTINE: NORMAL
BOTTLE TYPE: NORMAL
BUN BLDV-MCNC: 10 MG/DL (ref 7–22)
BUN BLDV-MCNC: 11 MG/DL (ref 7–22)
BUN BLDV-MCNC: 11 MG/DL (ref 7–22)
BUN BLDV-MCNC: 13 MG/DL (ref 7–22)
BUN BLDV-MCNC: 13 MG/DL (ref 7–22)
BUN BLDV-MCNC: 14 MG/DL (ref 7–22)
BUN BLDV-MCNC: 15 MG/DL (ref 7–22)
BUN BLDV-MCNC: 16 MG/DL (ref 7–22)
BUN BLDV-MCNC: 17 MG/DL (ref 7–22)
BUN BLDV-MCNC: 17 MG/DL (ref 7–22)
BUN BLDV-MCNC: 18 MG/DL (ref 7–22)
BUN BLDV-MCNC: 19 MG/DL (ref 7–22)
BUN BLDV-MCNC: 20 MG/DL (ref 7–22)
BUN BLDV-MCNC: 21 MG/DL (ref 7–22)
BUN BLDV-MCNC: 21 MG/DL (ref 7–22)
BUN BLDV-MCNC: 22 MG/DL (ref 7–22)
BUN BLDV-MCNC: 22 MG/DL (ref 7–22)
BUN BLDV-MCNC: 23 MG/DL (ref 7–22)
BUN BLDV-MCNC: 24 MG/DL (ref 7–22)
BUN BLDV-MCNC: 25 MG/DL (ref 7–22)
BUN BLDV-MCNC: 27 MG/DL (ref 7–22)
BUN BLDV-MCNC: 28 MG/DL (ref 7–22)
BUN BLDV-MCNC: 29 MG/DL (ref 7–22)
BUN BLDV-MCNC: 29 MG/DL (ref 7–22)
BUN BLDV-MCNC: 30 MG/DL (ref 7–22)
BUN BLDV-MCNC: 31 MG/DL (ref 7–22)
BUN BLDV-MCNC: 32 MG/DL (ref 7–22)
BUN BLDV-MCNC: 33 MG/DL (ref 7–22)
BUN BLDV-MCNC: 33 MG/DL (ref 7–22)
BUN BLDV-MCNC: 35 MG/DL (ref 7–22)
BUN BLDV-MCNC: 36 MG/DL (ref 7–22)
BUN BLDV-MCNC: 41 MG/DL (ref 7–22)
BUN BLDV-MCNC: 44 MG/DL (ref 7–22)
BUN BLDV-MCNC: 46 MG/DL (ref 7–22)
BUN BLDV-MCNC: 48 MG/DL (ref 7–22)
BUN BLDV-MCNC: 56 MG/DL (ref 7–22)
BUN BLDV-MCNC: 62 MG/DL (ref 7–22)
BUN BLDV-MCNC: 63 MG/DL (ref 7–22)
BUN BLDV-MCNC: 64 MG/DL (ref 7–22)
BUN BLDV-MCNC: 9 MG/DL (ref 7–22)
C-REACTIVE PROTEIN: 10.94 MG/DL (ref 0–1)
C-REACTIVE PROTEIN: 14.32 MG/DL (ref 0–1)
C-REACTIVE PROTEIN: 6.72 MG/DL (ref 0–1)
C-REACTIVE PROTEIN: 9.42 MG/DL (ref 0–1)
CALCIUM SERPL-MCNC: 10.1 MG/DL (ref 8.5–10.5)
CALCIUM SERPL-MCNC: 10.3 MG/DL (ref 8.5–10.5)
CALCIUM SERPL-MCNC: 7.2 MG/DL (ref 8.5–10.5)
CALCIUM SERPL-MCNC: 7.5 MG/DL (ref 8.5–10.5)
CALCIUM SERPL-MCNC: 7.6 MG/DL (ref 8.5–10.5)
CALCIUM SERPL-MCNC: 7.8 MG/DL (ref 8.5–10.5)
CALCIUM SERPL-MCNC: 7.9 MG/DL (ref 8.5–10.5)
CALCIUM SERPL-MCNC: 8 MG/DL (ref 8.5–10.5)
CALCIUM SERPL-MCNC: 8.1 MG/DL (ref 8.5–10.5)
CALCIUM SERPL-MCNC: 8.2 MG/DL (ref 8.5–10.5)
CALCIUM SERPL-MCNC: 8.3 MG/DL (ref 8.5–10.5)
CALCIUM SERPL-MCNC: 8.4 MG/DL (ref 8.5–10.5)
CALCIUM SERPL-MCNC: 8.4 MG/DL (ref 8.5–10.5)
CALCIUM SERPL-MCNC: 8.6 MG/DL (ref 8.5–10.5)
CALCIUM SERPL-MCNC: 8.7 MG/DL (ref 8.5–10.5)
CALCIUM SERPL-MCNC: 8.7 MG/DL (ref 8.5–10.5)
CALCIUM SERPL-MCNC: 8.8 MG/DL (ref 8.5–10.5)
CALCIUM SERPL-MCNC: 8.8 MG/DL (ref 8.5–10.5)
CALCIUM SERPL-MCNC: 8.9 MG/DL (ref 8.5–10.5)
CALCIUM SERPL-MCNC: 9 MG/DL (ref 8.5–10.5)
CALCIUM SERPL-MCNC: 9 MG/DL (ref 8.5–10.5)
CALCIUM SERPL-MCNC: 9.3 MG/DL (ref 8.5–10.5)
CALCIUM SERPL-MCNC: 9.3 MG/DL (ref 8.5–10.5)
CALCIUM SERPL-MCNC: 9.4 MG/DL (ref 8.5–10.5)
CALCIUM SERPL-MCNC: 9.4 MG/DL (ref 8.5–10.5)
CALCIUM SERPL-MCNC: 9.6 MG/DL (ref 8.5–10.5)
CALCIUM SERPL-MCNC: 9.7 MG/DL (ref 8.5–10.5)
CALCIUM SERPL-MCNC: 9.8 MG/DL (ref 8.5–10.5)
CANDIDA ALBICANS FILM ARRAY: NOT DETECTED
CANDIDA GLABRATA FILM ARRAY: NOT DETECTED
CANDIDA KRUSEI FILM ARRAY: NOT DETECTED
CANDIDA PARAPSILOSIS FILM ARRAY: NOT DETECTED
CANDIDA TROPICALIS FILM ARRAY: NOT DETECTED
CARBAPENEM RESITANT FILM ARRAY: NORMAL
CHLORIDE BLD-SCNC: 100 MEQ/L (ref 98–111)
CHLORIDE BLD-SCNC: 101 MEQ/L (ref 98–111)
CHLORIDE BLD-SCNC: 102 MEQ/L (ref 98–111)
CHLORIDE BLD-SCNC: 103 MEQ/L (ref 98–111)
CHLORIDE BLD-SCNC: 103 MEQ/L (ref 98–111)
CHLORIDE BLD-SCNC: 90 MEQ/L (ref 98–111)
CHLORIDE BLD-SCNC: 91 MEQ/L (ref 98–111)
CHLORIDE BLD-SCNC: 91 MEQ/L (ref 98–111)
CHLORIDE BLD-SCNC: 92 MEQ/L (ref 98–111)
CHLORIDE BLD-SCNC: 93 MEQ/L (ref 98–111)
CHLORIDE BLD-SCNC: 94 MEQ/L (ref 98–111)
CHLORIDE BLD-SCNC: 95 MEQ/L (ref 98–111)
CHLORIDE BLD-SCNC: 96 MEQ/L (ref 98–111)
CHLORIDE BLD-SCNC: 97 MEQ/L (ref 98–111)
CHLORIDE BLD-SCNC: 98 MEQ/L (ref 98–111)
CHLORIDE BLD-SCNC: 99 MEQ/L (ref 98–111)
CHLORIDE BLD-SCNC: 99 MEQ/L (ref 98–111)
CO2: 19 MEQ/L (ref 23–33)
CO2: 21 MEQ/L (ref 23–33)
CO2: 22 MEQ/L (ref 23–33)
CO2: 22 MEQ/L (ref 23–33)
CO2: 23 MEQ/L (ref 23–33)
CO2: 24 MEQ/L (ref 23–33)
CO2: 25 MEQ/L (ref 23–33)
CO2: 26 MEQ/L (ref 23–33)
CO2: 27 MEQ/L (ref 23–33)
CO2: 28 MEQ/L (ref 23–33)
CO2: 29 MEQ/L (ref 23–33)
CO2: 30 MEQ/L (ref 23–33)
CO2: 31 MEQ/L (ref 23–33)
CO2: 31 MEQ/L (ref 23–33)
COLLECTED BY:: ABNORMAL
CORTISOL COLLECTION INFO: NORMAL
CORTISOL SPECIMEN 1: 17.64 UG/DL
CORTISOL SPECIMEN 2: NORMAL UG/DL
CORTISOL: 15.3 UG/DL
CORTISOL: 17.64 UG/DL
CORTISOL: 21.21 UG/DL
CORTISOL: 23.57 UG/DL
CORTISOL: 5.7 UG/DL
CORTISOL: 6.34 UG/DL
CORTISOL: 6.45 UG/DL
CORTISOL: 6.82 UG/DL
CREAT SERPL-MCNC: 10.4 MG/DL (ref 0.4–1.2)
CREAT SERPL-MCNC: 3.1 MG/DL (ref 0.4–1.2)
CREAT SERPL-MCNC: 3.2 MG/DL (ref 0.4–1.2)
CREAT SERPL-MCNC: 3.3 MG/DL (ref 0.4–1.2)
CREAT SERPL-MCNC: 3.4 MG/DL (ref 0.4–1.2)
CREAT SERPL-MCNC: 3.6 MG/DL (ref 0.4–1.2)
CREAT SERPL-MCNC: 3.6 MG/DL (ref 0.4–1.2)
CREAT SERPL-MCNC: 3.7 MG/DL (ref 0.4–1.2)
CREAT SERPL-MCNC: 3.7 MG/DL (ref 0.4–1.2)
CREAT SERPL-MCNC: 3.8 MG/DL (ref 0.4–1.2)
CREAT SERPL-MCNC: 3.8 MG/DL (ref 0.4–1.2)
CREAT SERPL-MCNC: 3.9 MG/DL (ref 0.4–1.2)
CREAT SERPL-MCNC: 4.3 MG/DL (ref 0.4–1.2)
CREAT SERPL-MCNC: 4.4 MG/DL (ref 0.4–1.2)
CREAT SERPL-MCNC: 4.4 MG/DL (ref 0.4–1.2)
CREAT SERPL-MCNC: 4.5 MG/DL (ref 0.4–1.2)
CREAT SERPL-MCNC: 4.7 MG/DL (ref 0.4–1.2)
CREAT SERPL-MCNC: 4.8 MG/DL (ref 0.4–1.2)
CREAT SERPL-MCNC: 4.9 MG/DL (ref 0.4–1.2)
CREAT SERPL-MCNC: 5 MG/DL (ref 0.4–1.2)
CREAT SERPL-MCNC: 5.2 MG/DL (ref 0.4–1.2)
CREAT SERPL-MCNC: 5.3 MG/DL (ref 0.4–1.2)
CREAT SERPL-MCNC: 5.4 MG/DL (ref 0.4–1.2)
CREAT SERPL-MCNC: 5.5 MG/DL (ref 0.4–1.2)
CREAT SERPL-MCNC: 5.6 MG/DL (ref 0.4–1.2)
CREAT SERPL-MCNC: 5.7 MG/DL (ref 0.4–1.2)
CREAT SERPL-MCNC: 5.8 MG/DL (ref 0.4–1.2)
CREAT SERPL-MCNC: 5.9 MG/DL (ref 0.4–1.2)
CREAT SERPL-MCNC: 6 MG/DL (ref 0.4–1.2)
CREAT SERPL-MCNC: 6 MG/DL (ref 0.4–1.2)
CREAT SERPL-MCNC: 6.1 MG/DL (ref 0.4–1.2)
CREAT SERPL-MCNC: 6.2 MG/DL (ref 0.4–1.2)
CREAT SERPL-MCNC: 6.3 MG/DL (ref 0.4–1.2)
CREAT SERPL-MCNC: 6.4 MG/DL (ref 0.4–1.2)
CREAT SERPL-MCNC: 6.5 MG/DL (ref 0.4–1.2)
CREAT SERPL-MCNC: 6.7 MG/DL (ref 0.4–1.2)
CREAT SERPL-MCNC: 6.9 MG/DL (ref 0.4–1.2)
CREAT SERPL-MCNC: 6.9 MG/DL (ref 0.4–1.2)
CREAT SERPL-MCNC: 7.1 MG/DL (ref 0.4–1.2)
CREAT SERPL-MCNC: 7.3 MG/DL (ref 0.4–1.2)
CREAT SERPL-MCNC: 7.7 MG/DL (ref 0.4–1.2)
CREAT SERPL-MCNC: 8.7 MG/DL (ref 0.4–1.2)
CREAT SERPL-MCNC: 8.8 MG/DL (ref 0.4–1.2)
CREAT SERPL-MCNC: 9.1 MG/DL (ref 0.4–1.2)
D-DIMER QUANTITATIVE: 515 NG/ML FEU (ref 0–500)
D-DIMER QUANTITATIVE: 553 NG/ML FEU (ref 0–500)
D-DIMER QUANTITATIVE: 638 NG/ML FEU (ref 0–500)
DEVICE: ABNORMAL
DIFFERENTIAL TYPE: ABNORMAL
EKG ATRIAL RATE: 110 BPM
EKG ATRIAL RATE: 153 BPM
EKG ATRIAL RATE: 46 BPM
EKG ATRIAL RATE: 64 BPM
EKG ATRIAL RATE: 64 BPM
EKG ATRIAL RATE: 72 BPM
EKG ATRIAL RATE: 75 BPM
EKG ATRIAL RATE: 83 BPM
EKG ATRIAL RATE: 85 BPM
EKG ATRIAL RATE: 99 BPM
EKG P AXIS: -63 DEGREES
EKG P AXIS: 27 DEGREES
EKG P AXIS: 49 DEGREES
EKG P AXIS: 54 DEGREES
EKG P AXIS: 58 DEGREES
EKG P AXIS: 61 DEGREES
EKG P AXIS: 89 DEGREES
EKG P AXIS: 99 DEGREES
EKG P-R INTERVAL: 152 MS
EKG P-R INTERVAL: 154 MS
EKG P-R INTERVAL: 176 MS
EKG P-R INTERVAL: 180 MS
EKG P-R INTERVAL: 188 MS
EKG P-R INTERVAL: 190 MS
EKG P-R INTERVAL: 198 MS
EKG P-R INTERVAL: 198 MS
EKG Q-T INTERVAL: 312 MS
EKG Q-T INTERVAL: 352 MS
EKG Q-T INTERVAL: 360 MS
EKG Q-T INTERVAL: 366 MS
EKG Q-T INTERVAL: 370 MS
EKG Q-T INTERVAL: 370 MS
EKG Q-T INTERVAL: 376 MS
EKG Q-T INTERVAL: 384 MS
EKG Q-T INTERVAL: 386 MS
EKG Q-T INTERVAL: 392 MS
EKG Q-T INTERVAL: 392 MS
EKG Q-T INTERVAL: 394 MS
EKG Q-T INTERVAL: 396 MS
EKG Q-T INTERVAL: 410 MS
EKG Q-T INTERVAL: 422 MS
EKG Q-T INTERVAL: 422 MS
EKG Q-T INTERVAL: 428 MS
EKG Q-T INTERVAL: 428 MS
EKG Q-T INTERVAL: 436 MS
EKG Q-T INTERVAL: 454 MS
EKG QRS DURATION: 64 MS
EKG QRS DURATION: 66 MS
EKG QRS DURATION: 70 MS
EKG QRS DURATION: 72 MS
EKG QRS DURATION: 74 MS
EKG QRS DURATION: 76 MS
EKG QRS DURATION: 78 MS
EKG QRS DURATION: 84 MS
EKG QRS DURATION: 84 MS
EKG QRS DURATION: 88 MS
EKG QTC CALCULATION (BAZETT): 424 MS
EKG QTC CALCULATION (BAZETT): 428 MS
EKG QTC CALCULATION (BAZETT): 430 MS
EKG QTC CALCULATION (BAZETT): 433 MS
EKG QTC CALCULATION (BAZETT): 442 MS
EKG QTC CALCULATION (BAZETT): 447 MS
EKG QTC CALCULATION (BAZETT): 449 MS
EKG QTC CALCULATION (BAZETT): 468 MS
EKG QTC CALCULATION (BAZETT): 468 MS
EKG QTC CALCULATION (BAZETT): 469 MS
EKG QTC CALCULATION (BAZETT): 471 MS
EKG QTC CALCULATION (BAZETT): 474 MS
EKG QTC CALCULATION (BAZETT): 477 MS
EKG QTC CALCULATION (BAZETT): 481 MS
EKG QTC CALCULATION (BAZETT): 487 MS
EKG QTC CALCULATION (BAZETT): 490 MS
EKG QTC CALCULATION (BAZETT): 499 MS
EKG QTC CALCULATION (BAZETT): 525 MS
EKG R AXIS: -15 DEGREES
EKG R AXIS: -15 DEGREES
EKG R AXIS: -16 DEGREES
EKG R AXIS: -17 DEGREES
EKG R AXIS: -18 DEGREES
EKG R AXIS: -4 DEGREES
EKG R AXIS: 0 DEGREES
EKG R AXIS: 1 DEGREES
EKG R AXIS: 10 DEGREES
EKG R AXIS: 16 DEGREES
EKG R AXIS: 29 DEGREES
EKG R AXIS: 37 DEGREES
EKG R AXIS: 37 DEGREES
EKG R AXIS: 41 DEGREES
EKG R AXIS: 45 DEGREES
EKG R AXIS: 48 DEGREES
EKG R AXIS: 50 DEGREES
EKG R AXIS: 54 DEGREES
EKG R AXIS: 7 DEGREES
EKG T AXIS: -17 DEGREES
EKG T AXIS: -20 DEGREES
EKG T AXIS: -43 DEGREES
EKG T AXIS: -6 DEGREES
EKG T AXIS: -86 DEGREES
EKG T AXIS: 16 DEGREES
EKG T AXIS: 17 DEGREES
EKG T AXIS: 17 DEGREES
EKG T AXIS: 27 DEGREES
EKG T AXIS: 27 DEGREES
EKG T AXIS: 30 DEGREES
EKG T AXIS: 31 DEGREES
EKG T AXIS: 36 DEGREES
EKG T AXIS: 40 DEGREES
EKG T AXIS: 59 DEGREES
EKG T AXIS: 6 DEGREES
EKG T AXIS: 63 DEGREES
EKG T AXIS: 66 DEGREES
EKG T AXIS: 70 DEGREES
EKG T AXIS: 9 DEGREES
EKG VENTRICULAR RATE: 103 BPM
EKG VENTRICULAR RATE: 111 BPM
EKG VENTRICULAR RATE: 121 BPM
EKG VENTRICULAR RATE: 121 BPM
EKG VENTRICULAR RATE: 61 BPM
EKG VENTRICULAR RATE: 64 BPM
EKG VENTRICULAR RATE: 64 BPM
EKG VENTRICULAR RATE: 72 BPM
EKG VENTRICULAR RATE: 75 BPM
EKG VENTRICULAR RATE: 75 BPM
EKG VENTRICULAR RATE: 76 BPM
EKG VENTRICULAR RATE: 76 BPM
EKG VENTRICULAR RATE: 79 BPM
EKG VENTRICULAR RATE: 83 BPM
EKG VENTRICULAR RATE: 85 BPM
EKG VENTRICULAR RATE: 85 BPM
EKG VENTRICULAR RATE: 93 BPM
EKG VENTRICULAR RATE: 94 BPM
EKG VENTRICULAR RATE: 99 BPM
EKG VENTRICULAR RATE: 99 BPM
ENTERBACTER CLOACAE FILM ARRAY: NOT DETECTED
ENTERBACTERIACEAE FILM ARRAY: NOT DETECTED
ENTEROCOCCUS FILM ARRAY: NOT DETECTED
EOSINOPHIL # BLD: 0 %
EOSINOPHIL # BLD: 0.2 %
EOSINOPHIL # BLD: 0.3 %
EOSINOPHIL # BLD: 0.5 %
EOSINOPHIL # BLD: 0.6 %
EOSINOPHIL # BLD: 0.9 %
EOSINOPHIL # BLD: 1 %
EOSINOPHIL # BLD: 1.2 %
EOSINOPHIL # BLD: 1.4 %
EOSINOPHIL # BLD: 2.4 %
EOSINOPHIL # BLD: 2.4 %
EOSINOPHIL # BLD: 2.5 %
EOSINOPHIL # BLD: 2.9 %
EOSINOPHIL # BLD: 3.8 %
EOSINOPHIL # BLD: 4 %
EOSINOPHIL # BLD: 4.6 %
EOSINOPHIL # BLD: 5.4 %
EOSINOPHIL # BLD: 5.6 %
EOSINOPHIL # BLD: 6.2 %
EOSINOPHIL # BLD: 7 %
EOSINOPHILS ABSOLUTE: 0 THOU/MM3 (ref 0–0.4)
EOSINOPHILS ABSOLUTE: 0.1 THOU/MM3 (ref 0–0.4)
EOSINOPHILS ABSOLUTE: 0.2 THOU/MM3 (ref 0–0.4)
EOSINOPHILS ABSOLUTE: 0.3 THOU/MM3 (ref 0–0.4)
EOSINOPHILS ABSOLUTE: 0.4 THOU/MM3 (ref 0–0.4)
ERYTHROCYTE [DISTWIDTH] IN BLOOD BY AUTOMATED COUNT: 16.4 % (ref 11.5–14.5)
ERYTHROCYTE [DISTWIDTH] IN BLOOD BY AUTOMATED COUNT: 16.5 % (ref 11.5–14.5)
ERYTHROCYTE [DISTWIDTH] IN BLOOD BY AUTOMATED COUNT: 16.7 % (ref 11.5–14.5)
ERYTHROCYTE [DISTWIDTH] IN BLOOD BY AUTOMATED COUNT: 16.9 % (ref 11.5–14.5)
ERYTHROCYTE [DISTWIDTH] IN BLOOD BY AUTOMATED COUNT: 17 % (ref 11.5–14.5)
ERYTHROCYTE [DISTWIDTH] IN BLOOD BY AUTOMATED COUNT: 17.2 % (ref 11.5–14.5)
ERYTHROCYTE [DISTWIDTH] IN BLOOD BY AUTOMATED COUNT: 17.3 % (ref 11.5–14.5)
ERYTHROCYTE [DISTWIDTH] IN BLOOD BY AUTOMATED COUNT: 17.3 % (ref 11.5–14.5)
ERYTHROCYTE [DISTWIDTH] IN BLOOD BY AUTOMATED COUNT: 17.5 % (ref 11.5–14.5)
ERYTHROCYTE [DISTWIDTH] IN BLOOD BY AUTOMATED COUNT: 17.6 % (ref 11.5–14.5)
ERYTHROCYTE [DISTWIDTH] IN BLOOD BY AUTOMATED COUNT: 17.8 % (ref 11.5–14.5)
ERYTHROCYTE [DISTWIDTH] IN BLOOD BY AUTOMATED COUNT: 18 % (ref 11.5–14.5)
ERYTHROCYTE [DISTWIDTH] IN BLOOD BY AUTOMATED COUNT: 18 % (ref 11.5–14.5)
ERYTHROCYTE [DISTWIDTH] IN BLOOD BY AUTOMATED COUNT: 18.3 % (ref 11.5–14.5)
ERYTHROCYTE [DISTWIDTH] IN BLOOD BY AUTOMATED COUNT: 18.3 % (ref 11.5–14.5)
ERYTHROCYTE [DISTWIDTH] IN BLOOD BY AUTOMATED COUNT: 18.4 % (ref 11.5–14.5)
ERYTHROCYTE [DISTWIDTH] IN BLOOD BY AUTOMATED COUNT: 18.5 % (ref 11.5–14.5)
ERYTHROCYTE [DISTWIDTH] IN BLOOD BY AUTOMATED COUNT: 18.6 % (ref 11.5–14.5)
ERYTHROCYTE [DISTWIDTH] IN BLOOD BY AUTOMATED COUNT: 18.6 % (ref 11.5–14.5)
ERYTHROCYTE [DISTWIDTH] IN BLOOD BY AUTOMATED COUNT: 18.7 % (ref 11.5–14.5)
ERYTHROCYTE [DISTWIDTH] IN BLOOD BY AUTOMATED COUNT: 18.8 % (ref 11.5–14.5)
ERYTHROCYTE [DISTWIDTH] IN BLOOD BY AUTOMATED COUNT: 18.9 % (ref 11.5–14.5)
ERYTHROCYTE [DISTWIDTH] IN BLOOD BY AUTOMATED COUNT: 19 % (ref 11.5–14.5)
ERYTHROCYTE [DISTWIDTH] IN BLOOD BY AUTOMATED COUNT: 19.1 % (ref 11.5–14.5)
ERYTHROCYTE [DISTWIDTH] IN BLOOD BY AUTOMATED COUNT: 19.2 % (ref 11.5–14.5)
ERYTHROCYTE [DISTWIDTH] IN BLOOD BY AUTOMATED COUNT: 19.6 % (ref 11.5–14.5)
ERYTHROCYTE [DISTWIDTH] IN BLOOD BY AUTOMATED COUNT: 19.7 % (ref 11.5–14.5)
ERYTHROCYTE [DISTWIDTH] IN BLOOD BY AUTOMATED COUNT: 19.8 % (ref 11.5–14.5)
ERYTHROCYTE [DISTWIDTH] IN BLOOD BY AUTOMATED COUNT: 19.8 % (ref 11.5–14.5)
ERYTHROCYTE [DISTWIDTH] IN BLOOD BY AUTOMATED COUNT: 20.1 % (ref 11.5–14.5)
ERYTHROCYTE [DISTWIDTH] IN BLOOD BY AUTOMATED COUNT: 20.4 % (ref 11.5–14.5)
ERYTHROCYTE [DISTWIDTH] IN BLOOD BY AUTOMATED COUNT: 22.9 % (ref 11.5–14.5)
ERYTHROCYTE [DISTWIDTH] IN BLOOD BY AUTOMATED COUNT: 55.3 FL (ref 35–45)
ERYTHROCYTE [DISTWIDTH] IN BLOOD BY AUTOMATED COUNT: 57.1 FL (ref 35–45)
ERYTHROCYTE [DISTWIDTH] IN BLOOD BY AUTOMATED COUNT: 58.1 FL (ref 35–45)
ERYTHROCYTE [DISTWIDTH] IN BLOOD BY AUTOMATED COUNT: 58.7 FL (ref 35–45)
ERYTHROCYTE [DISTWIDTH] IN BLOOD BY AUTOMATED COUNT: 58.7 FL (ref 35–45)
ERYTHROCYTE [DISTWIDTH] IN BLOOD BY AUTOMATED COUNT: 58.8 FL (ref 35–45)
ERYTHROCYTE [DISTWIDTH] IN BLOOD BY AUTOMATED COUNT: 58.8 FL (ref 35–45)
ERYTHROCYTE [DISTWIDTH] IN BLOOD BY AUTOMATED COUNT: 59.3 FL (ref 35–45)
ERYTHROCYTE [DISTWIDTH] IN BLOOD BY AUTOMATED COUNT: 59.5 FL (ref 35–45)
ERYTHROCYTE [DISTWIDTH] IN BLOOD BY AUTOMATED COUNT: 59.8 FL (ref 35–45)
ERYTHROCYTE [DISTWIDTH] IN BLOOD BY AUTOMATED COUNT: 59.8 FL (ref 35–45)
ERYTHROCYTE [DISTWIDTH] IN BLOOD BY AUTOMATED COUNT: 59.9 FL (ref 35–45)
ERYTHROCYTE [DISTWIDTH] IN BLOOD BY AUTOMATED COUNT: 60 FL (ref 35–45)
ERYTHROCYTE [DISTWIDTH] IN BLOOD BY AUTOMATED COUNT: 60 FL (ref 35–45)
ERYTHROCYTE [DISTWIDTH] IN BLOOD BY AUTOMATED COUNT: 60.4 FL (ref 35–45)
ERYTHROCYTE [DISTWIDTH] IN BLOOD BY AUTOMATED COUNT: 60.5 FL (ref 35–45)
ERYTHROCYTE [DISTWIDTH] IN BLOOD BY AUTOMATED COUNT: 60.6 FL (ref 35–45)
ERYTHROCYTE [DISTWIDTH] IN BLOOD BY AUTOMATED COUNT: 60.8 FL (ref 35–45)
ERYTHROCYTE [DISTWIDTH] IN BLOOD BY AUTOMATED COUNT: 60.8 FL (ref 35–45)
ERYTHROCYTE [DISTWIDTH] IN BLOOD BY AUTOMATED COUNT: 61 FL (ref 35–45)
ERYTHROCYTE [DISTWIDTH] IN BLOOD BY AUTOMATED COUNT: 61.1 FL (ref 35–45)
ERYTHROCYTE [DISTWIDTH] IN BLOOD BY AUTOMATED COUNT: 61.5 FL (ref 35–45)
ERYTHROCYTE [DISTWIDTH] IN BLOOD BY AUTOMATED COUNT: 62.1 FL (ref 35–45)
ERYTHROCYTE [DISTWIDTH] IN BLOOD BY AUTOMATED COUNT: 62.2 FL (ref 35–45)
ERYTHROCYTE [DISTWIDTH] IN BLOOD BY AUTOMATED COUNT: 63 FL (ref 35–45)
ERYTHROCYTE [DISTWIDTH] IN BLOOD BY AUTOMATED COUNT: 63.4 FL (ref 35–45)
ERYTHROCYTE [DISTWIDTH] IN BLOOD BY AUTOMATED COUNT: 63.8 FL (ref 35–45)
ERYTHROCYTE [DISTWIDTH] IN BLOOD BY AUTOMATED COUNT: 64.3 FL (ref 35–45)
ERYTHROCYTE [DISTWIDTH] IN BLOOD BY AUTOMATED COUNT: 64.4 FL (ref 35–45)
ERYTHROCYTE [DISTWIDTH] IN BLOOD BY AUTOMATED COUNT: 64.5 FL (ref 35–45)
ERYTHROCYTE [DISTWIDTH] IN BLOOD BY AUTOMATED COUNT: 64.9 FL (ref 35–45)
ERYTHROCYTE [DISTWIDTH] IN BLOOD BY AUTOMATED COUNT: 65.1 FL (ref 35–45)
ERYTHROCYTE [DISTWIDTH] IN BLOOD BY AUTOMATED COUNT: 65.1 FL (ref 35–45)
ERYTHROCYTE [DISTWIDTH] IN BLOOD BY AUTOMATED COUNT: 65.6 FL (ref 35–45)
ERYTHROCYTE [DISTWIDTH] IN BLOOD BY AUTOMATED COUNT: 65.9 FL (ref 35–45)
ERYTHROCYTE [DISTWIDTH] IN BLOOD BY AUTOMATED COUNT: 66 FL (ref 35–45)
ERYTHROCYTE [DISTWIDTH] IN BLOOD BY AUTOMATED COUNT: 67.9 FL (ref 35–45)
ERYTHROCYTE [DISTWIDTH] IN BLOOD BY AUTOMATED COUNT: 68.7 FL (ref 35–45)
ERYTHROCYTE [DISTWIDTH] IN BLOOD BY AUTOMATED COUNT: 71 FL (ref 35–45)
ERYTHROCYTE [DISTWIDTH] IN BLOOD BY AUTOMATED COUNT: 73.2 FL (ref 35–45)
ERYTHROCYTE [DISTWIDTH] IN BLOOD BY AUTOMATED COUNT: 81.6 FL (ref 35–45)
ESCHERICHIA COLI FILM ARRAY: NOT DETECTED
FERRITIN: 1455 NG/ML (ref 22–322)
FERRITIN: 2607 NG/ML (ref 22–322)
FERRITIN: 3607 NG/ML (ref 22–322)
FIO2, MIXED VENOUS: 6
FOLATE: 11 NG/ML (ref 4.8–24.2)
FOLATE: 17.6 NG/ML (ref 4.8–24.2)
GFR SERPL CREATININE-BSD FRML MDRD: 10 ML/MIN/1.73M2
GFR SERPL CREATININE-BSD FRML MDRD: 11 ML/MIN/1.73M2
GFR SERPL CREATININE-BSD FRML MDRD: 12 ML/MIN/1.73M2
GFR SERPL CREATININE-BSD FRML MDRD: 13 ML/MIN/1.73M2
GFR SERPL CREATININE-BSD FRML MDRD: 13 ML/MIN/1.73M2
GFR SERPL CREATININE-BSD FRML MDRD: 14 ML/MIN/1.73M2
GFR SERPL CREATININE-BSD FRML MDRD: 15 ML/MIN/1.73M2
GFR SERPL CREATININE-BSD FRML MDRD: 16 ML/MIN/1.73M2
GFR SERPL CREATININE-BSD FRML MDRD: 18 ML/MIN/1.73M2
GFR SERPL CREATININE-BSD FRML MDRD: 19 ML/MIN/1.73M2
GFR SERPL CREATININE-BSD FRML MDRD: 21 ML/MIN/1.73M2
GFR SERPL CREATININE-BSD FRML MDRD: 21 ML/MIN/1.73M2
GFR SERPL CREATININE-BSD FRML MDRD: 22 ML/MIN/1.73M2
GFR SERPL CREATININE-BSD FRML MDRD: 23 ML/MIN/1.73M2
GFR SERPL CREATININE-BSD FRML MDRD: 6 ML/MIN/1.73M2
GFR SERPL CREATININE-BSD FRML MDRD: 7 ML/MIN/1.73M2
GFR SERPL CREATININE-BSD FRML MDRD: 8 ML/MIN/1.73M2
GFR SERPL CREATININE-BSD FRML MDRD: 9 ML/MIN/1.73M2
GLUCOSE BLD-MCNC: 100 MG/DL (ref 70–108)
GLUCOSE BLD-MCNC: 101 MG/DL (ref 70–108)
GLUCOSE BLD-MCNC: 102 MG/DL (ref 70–108)
GLUCOSE BLD-MCNC: 102 MG/DL (ref 70–108)
GLUCOSE BLD-MCNC: 103 MG/DL (ref 70–108)
GLUCOSE BLD-MCNC: 103 MG/DL (ref 70–108)
GLUCOSE BLD-MCNC: 104 MG/DL (ref 70–108)
GLUCOSE BLD-MCNC: 105 MG/DL (ref 70–108)
GLUCOSE BLD-MCNC: 106 MG/DL (ref 70–108)
GLUCOSE BLD-MCNC: 107 MG/DL (ref 70–108)
GLUCOSE BLD-MCNC: 108 MG/DL (ref 70–108)
GLUCOSE BLD-MCNC: 109 MG/DL (ref 70–108)
GLUCOSE BLD-MCNC: 110 MG/DL (ref 70–108)
GLUCOSE BLD-MCNC: 111 MG/DL (ref 70–108)
GLUCOSE BLD-MCNC: 112 MG/DL (ref 70–108)
GLUCOSE BLD-MCNC: 113 MG/DL (ref 70–108)
GLUCOSE BLD-MCNC: 114 MG/DL (ref 70–108)
GLUCOSE BLD-MCNC: 115 MG/DL (ref 70–108)
GLUCOSE BLD-MCNC: 115 MG/DL (ref 70–108)
GLUCOSE BLD-MCNC: 116 MG/DL (ref 70–108)
GLUCOSE BLD-MCNC: 117 MG/DL (ref 70–108)
GLUCOSE BLD-MCNC: 117 MG/DL (ref 70–108)
GLUCOSE BLD-MCNC: 118 MG/DL (ref 70–108)
GLUCOSE BLD-MCNC: 118 MG/DL (ref 70–108)
GLUCOSE BLD-MCNC: 119 MG/DL (ref 70–108)
GLUCOSE BLD-MCNC: 120 MG/DL (ref 70–108)
GLUCOSE BLD-MCNC: 122 MG/DL (ref 70–108)
GLUCOSE BLD-MCNC: 122 MG/DL (ref 70–108)
GLUCOSE BLD-MCNC: 123 MG/DL (ref 70–108)
GLUCOSE BLD-MCNC: 124 MG/DL (ref 70–108)
GLUCOSE BLD-MCNC: 124 MG/DL (ref 70–108)
GLUCOSE BLD-MCNC: 125 MG/DL (ref 70–108)
GLUCOSE BLD-MCNC: 126 MG/DL (ref 70–108)
GLUCOSE BLD-MCNC: 127 MG/DL (ref 70–108)
GLUCOSE BLD-MCNC: 127 MG/DL (ref 70–108)
GLUCOSE BLD-MCNC: 129 MG/DL (ref 70–108)
GLUCOSE BLD-MCNC: 130 MG/DL (ref 70–108)
GLUCOSE BLD-MCNC: 131 MG/DL (ref 70–108)
GLUCOSE BLD-MCNC: 133 MG/DL (ref 70–108)
GLUCOSE BLD-MCNC: 136 MG/DL (ref 70–108)
GLUCOSE BLD-MCNC: 138 MG/DL (ref 70–108)
GLUCOSE BLD-MCNC: 140 MG/DL (ref 70–108)
GLUCOSE BLD-MCNC: 142 MG/DL (ref 70–108)
GLUCOSE BLD-MCNC: 142 MG/DL (ref 70–108)
GLUCOSE BLD-MCNC: 143 MG/DL (ref 70–108)
GLUCOSE BLD-MCNC: 148 MG/DL (ref 70–108)
GLUCOSE BLD-MCNC: 199 MG/DL (ref 70–108)
GLUCOSE BLD-MCNC: 224 MG/DL (ref 70–108)
GLUCOSE BLD-MCNC: 27 MG/DL (ref 70–108)
GLUCOSE BLD-MCNC: 28 MG/DL (ref 70–108)
GLUCOSE BLD-MCNC: 36 MG/DL (ref 70–108)
GLUCOSE BLD-MCNC: 40 MG/DL (ref 70–108)
GLUCOSE BLD-MCNC: 41 MG/DL (ref 70–108)
GLUCOSE BLD-MCNC: 49 MG/DL (ref 70–108)
GLUCOSE BLD-MCNC: 54 MG/DL (ref 70–108)
GLUCOSE BLD-MCNC: 57 MG/DL (ref 70–108)
GLUCOSE BLD-MCNC: 58 MG/DL (ref 70–108)
GLUCOSE BLD-MCNC: 59 MG/DL (ref 70–108)
GLUCOSE BLD-MCNC: 63 MG/DL (ref 70–108)
GLUCOSE BLD-MCNC: 65 MG/DL (ref 70–108)
GLUCOSE BLD-MCNC: 65 MG/DL (ref 70–108)
GLUCOSE BLD-MCNC: 66 MG/DL (ref 70–108)
GLUCOSE BLD-MCNC: 67 MG/DL (ref 70–108)
GLUCOSE BLD-MCNC: 67 MG/DL (ref 70–108)
GLUCOSE BLD-MCNC: 69 MG/DL (ref 70–108)
GLUCOSE BLD-MCNC: 70 MG/DL (ref 70–108)
GLUCOSE BLD-MCNC: 72 MG/DL (ref 70–108)
GLUCOSE BLD-MCNC: 72 MG/DL (ref 70–108)
GLUCOSE BLD-MCNC: 75 MG/DL (ref 70–108)
GLUCOSE BLD-MCNC: 75 MG/DL (ref 70–108)
GLUCOSE BLD-MCNC: 76 MG/DL (ref 70–108)
GLUCOSE BLD-MCNC: 77 MG/DL (ref 70–108)
GLUCOSE BLD-MCNC: 79 MG/DL
GLUCOSE BLD-MCNC: 79 MG/DL (ref 70–108)
GLUCOSE BLD-MCNC: 80 MG/DL (ref 70–108)
GLUCOSE BLD-MCNC: 81 MG/DL (ref 70–108)
GLUCOSE BLD-MCNC: 82 MG/DL (ref 70–108)
GLUCOSE BLD-MCNC: 83 MG/DL (ref 70–108)
GLUCOSE BLD-MCNC: 84 MG/DL (ref 70–108)
GLUCOSE BLD-MCNC: 85 MG/DL (ref 70–108)
GLUCOSE BLD-MCNC: 86 MG/DL (ref 70–108)
GLUCOSE BLD-MCNC: 87 MG/DL (ref 70–108)
GLUCOSE BLD-MCNC: 87 MG/DL (ref 70–108)
GLUCOSE BLD-MCNC: 88 MG/DL (ref 70–108)
GLUCOSE BLD-MCNC: 89 MG/DL (ref 70–108)
GLUCOSE BLD-MCNC: 90 MG/DL (ref 70–108)
GLUCOSE BLD-MCNC: 91 MG/DL (ref 70–108)
GLUCOSE BLD-MCNC: 92 MG/DL (ref 70–108)
GLUCOSE BLD-MCNC: 93 MG/DL (ref 70–108)
GLUCOSE BLD-MCNC: 94 MG/DL (ref 70–108)
GLUCOSE BLD-MCNC: 96 MG/DL (ref 70–108)
GLUCOSE BLD-MCNC: 97 MG/DL (ref 70–108)
GLUCOSE BLD-MCNC: 98 MG/DL (ref 70–108)
GLUCOSE BLD-MCNC: 99 MG/DL (ref 70–108)
GLUCOSE BLD-MCNC: 99 MG/DL (ref 70–108)
HAEMOPHILUS INFLUENZA FILM ARRAY: NOT DETECTED
HBA1C MFR BLD: 4.5 % (ref 4.4–6.4)
HCO3, MIXED: 32 MMOL/L (ref 23–28)
HCO3: 24 MMOL/L (ref 23–28)
HCO3: 26 MMOL/L (ref 23–28)
HCO3: 28 MMOL/L (ref 23–28)
HCO3: 29 MMOL/L (ref 23–28)
HCO3: 29 MMOL/L (ref 23–28)
HCO3: 31 MMOL/L (ref 23–28)
HCT VFR BLD CALC: 19 % (ref 42–52)
HCT VFR BLD CALC: 19.9 % (ref 42–52)
HCT VFR BLD CALC: 20 % (ref 42–52)
HCT VFR BLD CALC: 21.1 % (ref 42–52)
HCT VFR BLD CALC: 21.2 % (ref 42–52)
HCT VFR BLD CALC: 21.4 % (ref 42–52)
HCT VFR BLD CALC: 21.5 % (ref 42–52)
HCT VFR BLD CALC: 21.9 % (ref 42–52)
HCT VFR BLD CALC: 22.6 % (ref 42–52)
HCT VFR BLD CALC: 22.9 % (ref 42–52)
HCT VFR BLD CALC: 23.6 % (ref 42–52)
HCT VFR BLD CALC: 23.7 % (ref 42–52)
HCT VFR BLD CALC: 24 % (ref 42–52)
HCT VFR BLD CALC: 24.4 % (ref 42–52)
HCT VFR BLD CALC: 24.4 % (ref 42–52)
HCT VFR BLD CALC: 24.6 % (ref 42–52)
HCT VFR BLD CALC: 24.7 % (ref 42–52)
HCT VFR BLD CALC: 24.9 % (ref 42–52)
HCT VFR BLD CALC: 25.2 % (ref 42–52)
HCT VFR BLD CALC: 25.4 % (ref 42–52)
HCT VFR BLD CALC: 25.5 % (ref 42–52)
HCT VFR BLD CALC: 25.8 % (ref 42–52)
HCT VFR BLD CALC: 26.1 % (ref 42–52)
HCT VFR BLD CALC: 26.2 % (ref 42–52)
HCT VFR BLD CALC: 26.3 % (ref 42–52)
HCT VFR BLD CALC: 26.4 % (ref 42–52)
HCT VFR BLD CALC: 26.5 % (ref 42–52)
HCT VFR BLD CALC: 26.6 % (ref 42–52)
HCT VFR BLD CALC: 26.6 % (ref 42–52)
HCT VFR BLD CALC: 26.7 % (ref 42–52)
HCT VFR BLD CALC: 26.7 % (ref 42–52)
HCT VFR BLD CALC: 26.8 % (ref 42–52)
HCT VFR BLD CALC: 26.9 % (ref 42–52)
HCT VFR BLD CALC: 27.4 % (ref 42–52)
HCT VFR BLD CALC: 27.6 % (ref 42–52)
HCT VFR BLD CALC: 27.7 % (ref 42–52)
HCT VFR BLD CALC: 27.7 % (ref 42–52)
HCT VFR BLD CALC: 27.8 % (ref 42–52)
HCT VFR BLD CALC: 28.2 % (ref 42–52)
HCT VFR BLD CALC: 28.2 % (ref 42–52)
HCT VFR BLD CALC: 29.3 % (ref 42–52)
HCT VFR BLD CALC: 29.8 % (ref 42–52)
HCT VFR BLD CALC: 30 % (ref 42–52)
HCT VFR BLD CALC: 30.2 % (ref 42–52)
HCT VFR BLD CALC: 30.3 % (ref 42–52)
HCT VFR BLD CALC: 30.4 % (ref 42–52)
HCT VFR BLD CALC: 30.5 % (ref 42–52)
HCT VFR BLD CALC: 31 % (ref 42–52)
HCT VFR BLD CALC: 31.5 % (ref 42–52)
HCT VFR BLD CALC: 31.6 % (ref 42–52)
HCT VFR BLD CALC: 31.7 % (ref 42–52)
HCT VFR BLD CALC: 32.3 % (ref 42–52)
HCT VFR BLD CALC: 32.9 % (ref 42–52)
HCT VFR BLD CALC: 33.4 % (ref 42–52)
HCT VFR BLD CALC: 34.5 % (ref 42–52)
HCT VFR BLD CALC: 34.8 % (ref 42–52)
HCT VFR BLD CALC: 35.6 % (ref 42–52)
HCT VFR BLD CALC: 37 % (ref 42–52)
HEMOCCULT STL QL: NEGATIVE
HEMOGLOBIN: 10 GM/DL (ref 14–18)
HEMOGLOBIN: 10.1 GM/DL (ref 14–18)
HEMOGLOBIN: 10.2 GM/DL (ref 14–18)
HEMOGLOBIN: 10.3 GM/DL (ref 14–18)
HEMOGLOBIN: 10.3 GM/DL (ref 14–18)
HEMOGLOBIN: 10.4 GM/DL (ref 14–18)
HEMOGLOBIN: 10.7 GM/DL (ref 14–18)
HEMOGLOBIN: 10.8 GM/DL (ref 14–18)
HEMOGLOBIN: 10.9 GM/DL (ref 14–18)
HEMOGLOBIN: 11.4 GM/DL (ref 14–18)
HEMOGLOBIN: 11.7 GM/DL (ref 14–18)
HEMOGLOBIN: 12.1 GM/DL (ref 14–18)
HEMOGLOBIN: 12.5 GM/DL (ref 14–18)
HEMOGLOBIN: 6.1 GM/DL (ref 14–18)
HEMOGLOBIN: 6.6 GM/DL (ref 14–18)
HEMOGLOBIN: 6.9 GM/DL (ref 14–18)
HEMOGLOBIN: 7 GM/DL (ref 14–18)
HEMOGLOBIN: 7.1 GM/DL (ref 14–18)
HEMOGLOBIN: 7.2 GM/DL (ref 14–18)
HEMOGLOBIN: 7.5 GM/DL (ref 14–18)
HEMOGLOBIN: 7.7 GM/DL (ref 14–18)
HEMOGLOBIN: 8 GM/DL (ref 14–18)
HEMOGLOBIN: 8 GM/DL (ref 14–18)
HEMOGLOBIN: 8.1 GM/DL (ref 14–18)
HEMOGLOBIN: 8.2 GM/DL (ref 14–18)
HEMOGLOBIN: 8.3 GM/DL (ref 14–18)
HEMOGLOBIN: 8.4 GM/DL (ref 14–18)
HEMOGLOBIN: 8.5 GM/DL (ref 14–18)
HEMOGLOBIN: 8.5 GM/DL (ref 14–18)
HEMOGLOBIN: 8.6 GM/DL (ref 14–18)
HEMOGLOBIN: 8.6 GM/DL (ref 14–18)
HEMOGLOBIN: 8.7 GM/DL (ref 14–18)
HEMOGLOBIN: 8.8 GM/DL (ref 14–18)
HEMOGLOBIN: 8.8 GM/DL (ref 14–18)
HEMOGLOBIN: 8.9 GM/DL (ref 14–18)
HEMOGLOBIN: 9 GM/DL (ref 14–18)
HEMOGLOBIN: 9 GM/DL (ref 14–18)
HEMOGLOBIN: 9.1 GM/DL (ref 14–18)
HEMOGLOBIN: 9.2 GM/DL (ref 14–18)
HEMOGLOBIN: 9.3 GM/DL (ref 14–18)
HEMOGLOBIN: 9.3 GM/DL (ref 14–18)
HEMOGLOBIN: 9.4 GM/DL (ref 14–18)
HEMOGLOBIN: 9.5 GM/DL (ref 14–18)
HEMOGLOBIN: 9.5 GM/DL (ref 14–18)
HEMOGLOBIN: 9.6 GM/DL (ref 14–18)
HEMOGLOBIN: 9.8 GM/DL (ref 14–18)
HEMOGLOBIN: 9.8 GM/DL (ref 14–18)
HEMOGLOBIN: 9.9 GM/DL (ref 14–18)
HEMOGLOBIN: 9.9 GM/DL (ref 14–18)
HEPARIN UNFRACTIONATED: 0.47 U/ML (ref 0.3–0.7)
HEPARIN UNFRACTIONATED: 1.02 U/ML (ref 0.3–0.7)
HEPARIN UNFRACTIONATED: < 0.04 U/ML (ref 0.3–0.7)
IFIO2: 15
IFIO2: 2
IFIO2: 3
IMMATURE GRANS (ABS): 0.02 THOU/MM3 (ref 0–0.07)
IMMATURE GRANS (ABS): 0.02 THOU/MM3 (ref 0–0.07)
IMMATURE GRANS (ABS): 0.03 THOU/MM3 (ref 0–0.07)
IMMATURE GRANS (ABS): 0.04 THOU/MM3 (ref 0–0.07)
IMMATURE GRANS (ABS): 0.05 THOU/MM3 (ref 0–0.07)
IMMATURE GRANS (ABS): 0.05 THOU/MM3 (ref 0–0.07)
IMMATURE GRANS (ABS): 0.06 THOU/MM3 (ref 0–0.07)
IMMATURE GRANS (ABS): 0.06 THOU/MM3 (ref 0–0.07)
IMMATURE GRANS (ABS): 0.07 THOU/MM3 (ref 0–0.07)
IMMATURE GRANS (ABS): 0.08 THOU/MM3 (ref 0–0.07)
IMMATURE GRANS (ABS): 0.09 THOU/MM3 (ref 0–0.07)
IMMATURE GRANS (ABS): 0.12 THOU/MM3 (ref 0–0.07)
IMMATURE GRANULOCYTES: 0.3 %
IMMATURE GRANULOCYTES: 0.3 %
IMMATURE GRANULOCYTES: 0.4 %
IMMATURE GRANULOCYTES: 0.5 %
IMMATURE GRANULOCYTES: 0.5 %
IMMATURE GRANULOCYTES: 0.6 %
IMMATURE GRANULOCYTES: 0.7 %
IMMATURE GRANULOCYTES: 0.8 %
IMMATURE GRANULOCYTES: 0.9 %
IMMATURE GRANULOCYTES: 1 %
IMMATURE GRANULOCYTES: 1 %
IMMATURE GRANULOCYTES: 1.1 %
IMMATURE GRANULOCYTES: 1.2 %
IMMATURE RETIC FRACT: 20.6 % (ref 2.3–13.4)
INR BLD: 0.96 (ref 0.85–1.13)
INR BLD: 1.12 (ref 0.85–1.13)
INR BLD: 1.15 (ref 0.85–1.13)
IRON SATURATION: 35 % (ref 20–50)
IRON: 32 UG/DL (ref 65–195)
IRON: 56 UG/DL (ref 65–195)
KLEBSIELLA OXYTOCA FILM ARRAY: NOT DETECTED
KLEBSIELLA PNEUMONIAE FILM ARRAY: NOT DETECTED
LACTIC ACID, SEPSIS: 1.6 MMOL/L (ref 0.5–1.9)
LACTIC ACID: 1.3 MMOL/L (ref 0.5–2)
LACTIC ACID: 1.3 MMOL/L (ref 0.5–2)
LACTIC ACID: 1.6 MMOL/L (ref 0.5–2)
LACTIC ACID: 2.3 MMOL/L (ref 0.5–2)
LACTIC ACID: 2.5 MMOL/L (ref 0.5–2)
LACTIC ACID: 2.9 MMOL/L (ref 0.5–2)
LACTIC ACID: 3 MMOL/L (ref 0.5–2)
LACTIC ACID: 3 MMOL/L (ref 0.5–2)
LACTIC ACID: 3.4 MMOL/L (ref 0.5–2)
LACTIC ACID: 3.9 MMOL/L (ref 0.5–2)
LACTIC ACID: 3.9 MMOL/L (ref 0.5–2)
LACTIC ACID: 4.1 MMOL/L (ref 0.5–2)
LACTIC ACID: 4.9 MMOL/L (ref 0.5–2)
LACTIC ACID: 4.9 MMOL/L (ref 0.5–2)
LACTIC ACID: 8.7 MMOL/L (ref 0.5–2)
LACTIC ACID: NORMAL MMOL/L (ref 0.5–2)
LD: 329 U/L (ref 100–190)
LIPASE: 34.1 U/L (ref 5.6–51.3)
LIPASE: 49.1 U/L (ref 5.6–51.3)
LIPASE: 50.2 U/L (ref 5.6–51.3)
LISTERIA MONOCYTOGENES FILM ARRAY: NOT DETECTED
LV EF: 45 %
LVEF MODALITY: NORMAL
LYMPHOCYTES # BLD: 10.6 %
LYMPHOCYTES # BLD: 10.6 %
LYMPHOCYTES # BLD: 12.1 %
LYMPHOCYTES # BLD: 12.2 %
LYMPHOCYTES # BLD: 12.4 %
LYMPHOCYTES # BLD: 15.4 %
LYMPHOCYTES # BLD: 17.1 %
LYMPHOCYTES # BLD: 22.2 %
LYMPHOCYTES # BLD: 4.8 %
LYMPHOCYTES # BLD: 5.4 %
LYMPHOCYTES # BLD: 5.8 %
LYMPHOCYTES # BLD: 6.3 %
LYMPHOCYTES # BLD: 6.4 %
LYMPHOCYTES # BLD: 6.5 %
LYMPHOCYTES # BLD: 7.4 %
LYMPHOCYTES # BLD: 7.8 %
LYMPHOCYTES # BLD: 8.1 %
LYMPHOCYTES # BLD: 8.6 %
LYMPHOCYTES # BLD: 8.7 %
LYMPHOCYTES # BLD: 8.7 %
LYMPHOCYTES # BLD: 9.1 %
LYMPHOCYTES # BLD: 9.2 %
LYMPHOCYTES ABSOLUTE: 0.2 THOU/MM3 (ref 1–4.8)
LYMPHOCYTES ABSOLUTE: 0.3 THOU/MM3 (ref 1–4.8)
LYMPHOCYTES ABSOLUTE: 0.4 THOU/MM3 (ref 1–4.8)
LYMPHOCYTES ABSOLUTE: 0.5 THOU/MM3 (ref 1–4.8)
LYMPHOCYTES ABSOLUTE: 0.6 THOU/MM3 (ref 1–4.8)
LYMPHOCYTES ABSOLUTE: 0.7 THOU/MM3 (ref 1–4.8)
LYMPHOCYTES ABSOLUTE: 0.8 THOU/MM3 (ref 1–4.8)
LYMPHOCYTES ABSOLUTE: 0.9 THOU/MM3 (ref 1–4.8)
LYMPHOCYTES ABSOLUTE: 0.9 THOU/MM3 (ref 1–4.8)
LYMPHOCYTES ABSOLUTE: 1 THOU/MM3 (ref 1–4.8)
LYMPHOCYTES ABSOLUTE: 1 THOU/MM3 (ref 1–4.8)
LYMPHOCYTES ABSOLUTE: 1.1 THOU/MM3 (ref 1–4.8)
LYMPHOCYTES ABSOLUTE: 1.3 THOU/MM3 (ref 1–4.8)
MAGNESIUM: 1.7 MG/DL (ref 1.6–2.4)
MAGNESIUM: 1.7 MG/DL (ref 1.6–2.4)
MAGNESIUM: 1.8 MG/DL (ref 1.6–2.4)
MAGNESIUM: 1.9 MG/DL (ref 1.6–2.4)
MAGNESIUM: 2 MG/DL (ref 1.6–2.4)
MAGNESIUM: 2.2 MG/DL (ref 1.6–2.4)
MAGNESIUM: 2.2 MG/DL (ref 1.6–2.4)
MAGNESIUM: 2.3 MG/DL (ref 1.6–2.4)
MAGNESIUM: 2.3 MG/DL (ref 1.6–2.4)
MCH RBC QN AUTO: 30.4 PG (ref 26–33)
MCH RBC QN AUTO: 30.5 PG (ref 26–33)
MCH RBC QN AUTO: 30.9 PG (ref 26–33)
MCH RBC QN AUTO: 31 PG (ref 26–33)
MCH RBC QN AUTO: 31.1 PG (ref 26–33)
MCH RBC QN AUTO: 31.2 PG (ref 26–33)
MCH RBC QN AUTO: 31.2 PG (ref 26–33)
MCH RBC QN AUTO: 31.3 PG (ref 26–33)
MCH RBC QN AUTO: 31.4 PG (ref 26–33)
MCH RBC QN AUTO: 31.5 PG (ref 26–33)
MCH RBC QN AUTO: 31.5 PG (ref 26–33)
MCH RBC QN AUTO: 31.6 PG (ref 26–33)
MCH RBC QN AUTO: 31.7 PG (ref 26–33)
MCH RBC QN AUTO: 31.8 PG (ref 26–33)
MCH RBC QN AUTO: 31.9 PG (ref 26–33)
MCH RBC QN AUTO: 32 PG (ref 26–33)
MCH RBC QN AUTO: 32.2 PG (ref 26–33)
MCH RBC QN AUTO: 32.3 PG (ref 26–33)
MCH RBC QN AUTO: 32.5 PG (ref 26–33)
MCH RBC QN AUTO: 32.6 PG (ref 26–33)
MCH RBC QN AUTO: 32.6 PG (ref 26–33)
MCH RBC QN AUTO: 33.5 PG (ref 26–33)
MCHC RBC AUTO-ENTMCNC: 29.9 GM/DL (ref 32.2–35.5)
MCHC RBC AUTO-ENTMCNC: 31.4 GM/DL (ref 32.2–35.5)
MCHC RBC AUTO-ENTMCNC: 32 GM/DL (ref 32.2–35.5)
MCHC RBC AUTO-ENTMCNC: 32 GM/DL (ref 32.2–35.5)
MCHC RBC AUTO-ENTMCNC: 32.1 GM/DL (ref 32.2–35.5)
MCHC RBC AUTO-ENTMCNC: 32.6 GM/DL (ref 32.2–35.5)
MCHC RBC AUTO-ENTMCNC: 32.7 GM/DL (ref 32.2–35.5)
MCHC RBC AUTO-ENTMCNC: 32.8 GM/DL (ref 32.2–35.5)
MCHC RBC AUTO-ENTMCNC: 32.8 GM/DL (ref 32.2–35.5)
MCHC RBC AUTO-ENTMCNC: 32.9 GM/DL (ref 32.2–35.5)
MCHC RBC AUTO-ENTMCNC: 33 GM/DL (ref 32.2–35.5)
MCHC RBC AUTO-ENTMCNC: 33 GM/DL (ref 32.2–35.5)
MCHC RBC AUTO-ENTMCNC: 33.1 GM/DL (ref 32.2–35.5)
MCHC RBC AUTO-ENTMCNC: 33.2 GM/DL (ref 32.2–35.5)
MCHC RBC AUTO-ENTMCNC: 33.5 GM/DL (ref 32.2–35.5)
MCHC RBC AUTO-ENTMCNC: 33.6 GM/DL (ref 32.2–35.5)
MCHC RBC AUTO-ENTMCNC: 33.7 GM/DL (ref 32.2–35.5)
MCHC RBC AUTO-ENTMCNC: 33.8 GM/DL (ref 32.2–35.5)
MCHC RBC AUTO-ENTMCNC: 34 GM/DL (ref 32.2–35.5)
MCHC RBC AUTO-ENTMCNC: 34.1 GM/DL (ref 32.2–35.5)
MCHC RBC AUTO-ENTMCNC: 34.3 GM/DL (ref 32.2–35.5)
MCHC RBC AUTO-ENTMCNC: 34.4 GM/DL (ref 32.2–35.5)
MCHC RBC AUTO-ENTMCNC: 34.5 GM/DL (ref 32.2–35.5)
MCHC RBC AUTO-ENTMCNC: 35.1 GM/DL (ref 32.2–35.5)
MCHC RBC AUTO-ENTMCNC: 35.8 GM/DL (ref 32.2–35.5)
MCV RBC AUTO: 100.7 FL (ref 80–94)
MCV RBC AUTO: 106.4 FL (ref 80–94)
MCV RBC AUTO: 90.1 FL (ref 80–94)
MCV RBC AUTO: 91 FL (ref 80–94)
MCV RBC AUTO: 91.8 FL (ref 80–94)
MCV RBC AUTO: 91.9 FL (ref 80–94)
MCV RBC AUTO: 92 FL (ref 80–94)
MCV RBC AUTO: 92 FL (ref 80–94)
MCV RBC AUTO: 92.4 FL (ref 80–94)
MCV RBC AUTO: 92.4 FL (ref 80–94)
MCV RBC AUTO: 92.5 FL (ref 80–94)
MCV RBC AUTO: 92.5 FL (ref 80–94)
MCV RBC AUTO: 92.6 FL (ref 80–94)
MCV RBC AUTO: 92.9 FL (ref 80–94)
MCV RBC AUTO: 92.9 FL (ref 80–94)
MCV RBC AUTO: 93.1 FL (ref 80–94)
MCV RBC AUTO: 93.4 FL (ref 80–94)
MCV RBC AUTO: 93.8 FL (ref 80–94)
MCV RBC AUTO: 93.9 FL (ref 80–94)
MCV RBC AUTO: 94.3 FL (ref 80–94)
MCV RBC AUTO: 94.4 FL (ref 80–94)
MCV RBC AUTO: 94.4 FL (ref 80–94)
MCV RBC AUTO: 94.5 FL (ref 80–94)
MCV RBC AUTO: 94.6 FL (ref 80–94)
MCV RBC AUTO: 94.6 FL (ref 80–94)
MCV RBC AUTO: 94.7 FL (ref 80–94)
MCV RBC AUTO: 94.7 FL (ref 80–94)
MCV RBC AUTO: 94.9 FL (ref 80–94)
MCV RBC AUTO: 95 FL (ref 80–94)
MCV RBC AUTO: 95 FL (ref 80–94)
MCV RBC AUTO: 95.2 FL (ref 80–94)
MCV RBC AUTO: 95.5 FL (ref 80–94)
MCV RBC AUTO: 95.5 FL (ref 80–94)
MCV RBC AUTO: 95.7 FL (ref 80–94)
MCV RBC AUTO: 96.1 FL (ref 80–94)
MCV RBC AUTO: 96.5 FL (ref 80–94)
MCV RBC AUTO: 97.2 FL (ref 80–94)
MCV RBC AUTO: 97.4 FL (ref 80–94)
MCV RBC AUTO: 97.4 FL (ref 80–94)
MCV RBC AUTO: 97.8 FL (ref 80–94)
MCV RBC AUTO: 98.3 FL (ref 80–94)
MCV RBC AUTO: 98.4 FL (ref 80–94)
MCV RBC AUTO: 99.1 FL (ref 80–94)
METHICILLIN RESISTANT FILM ARRAY: NORMAL
MONOCYTES # BLD: 3 %
MONOCYTES # BLD: 3.1 %
MONOCYTES # BLD: 3.2 %
MONOCYTES # BLD: 3.3 %
MONOCYTES # BLD: 3.3 %
MONOCYTES # BLD: 3.7 %
MONOCYTES # BLD: 3.8 %
MONOCYTES # BLD: 4.2 %
MONOCYTES # BLD: 4.9 %
MONOCYTES # BLD: 5.4 %
MONOCYTES # BLD: 6.4 %
MONOCYTES # BLD: 6.6 %
MONOCYTES # BLD: 7.5 %
MONOCYTES # BLD: 7.7 %
MONOCYTES # BLD: 7.8 %
MONOCYTES # BLD: 7.9 %
MONOCYTES # BLD: 8.2 %
MONOCYTES # BLD: 8.2 %
MONOCYTES # BLD: 8.5 %
MONOCYTES # BLD: 9 %
MONOCYTES # BLD: 9 %
MONOCYTES # BLD: 9.5 %
MONOCYTES ABSOLUTE: 0.1 THOU/MM3 (ref 0.4–1.3)
MONOCYTES ABSOLUTE: 0.2 THOU/MM3 (ref 0.4–1.3)
MONOCYTES ABSOLUTE: 0.3 THOU/MM3 (ref 0.4–1.3)
MONOCYTES ABSOLUTE: 0.4 THOU/MM3 (ref 0.4–1.3)
MONOCYTES ABSOLUTE: 0.5 THOU/MM3 (ref 0.4–1.3)
MONOCYTES ABSOLUTE: 0.6 THOU/MM3 (ref 0.4–1.3)
MONOCYTES ABSOLUTE: 0.7 THOU/MM3 (ref 0.4–1.3)
MONOCYTES ABSOLUTE: 0.8 THOU/MM3 (ref 0.4–1.3)
MRSA SCREEN RT-PCR: NEGATIVE
MRSA SCREEN: NORMAL
NEISSERIA MENIGITIDIS FILM ARRAY: NOT DETECTED
NUCLEATED RED BLOOD CELLS: 0 /100 WBC
NUCLEATED RED BLOOD CELLS: 1 /100 WBC
NUCLEATED RED BLOOD CELLS: 2 /100 WBC
O2 SAT, MIXED: 7 %
O2 SATURATION: 100 %
O2 SATURATION: 100 %
O2 SATURATION: 94 %
O2 SATURATION: 95 %
O2 SATURATION: 97 %
O2 SATURATION: 99 %
ORGANISM: ABNORMAL
ORIGINAL SAMPLE NUMBER: NORMAL
OSMOLALITY CALCULATION: 275.6 MOSMOL/KG (ref 275–300)
OSMOLALITY CALCULATION: 277.8 MOSMOL/KG (ref 275–300)
OSMOLALITY CALCULATION: 283.6 MOSMOL/KG (ref 275–300)
OSMOLALITY CALCULATION: 285.3 MOSMOL/KG (ref 275–300)
OSMOLALITY CALCULATION: 286.4 MOSMOL/KG (ref 275–300)
OSMOLALITY CALCULATION: 288.9 MOSMOL/KG (ref 275–300)
PATHOLOGIST REVIEW: ABNORMAL
PATHOLOGIST REVIEW: ABNORMAL
PCO2, MIXED VENOUS: 54 MMHG (ref 41–51)
PCO2: 28 MMHG (ref 35–45)
PCO2: 30 MMHG (ref 35–45)
PCO2: 32 MMHG (ref 35–45)
PCO2: 34 MMHG (ref 35–45)
PCO2: 37 MMHG (ref 35–45)
PCO2: 38 MMHG (ref 35–45)
PH BLOOD GAS: 7.5 (ref 7.35–7.45)
PH BLOOD GAS: 7.52 (ref 7.35–7.45)
PH BLOOD GAS: 7.54 (ref 7.35–7.45)
PH BLOOD GAS: 7.54 (ref 7.35–7.45)
PH BLOOD GAS: 7.55 (ref 7.35–7.45)
PH BLOOD GAS: 7.55 (ref 7.35–7.45)
PH, MIXED: 7.38 (ref 7.31–7.41)
PHOSPHORUS: 1.4 MG/DL (ref 2.4–4.7)
PHOSPHORUS: 1.7 MG/DL (ref 2.4–4.7)
PHOSPHORUS: 2.2 MG/DL (ref 2.4–4.7)
PHOSPHORUS: 2.3 MG/DL (ref 2.4–4.7)
PHOSPHORUS: 2.4 MG/DL (ref 2.4–4.7)
PHOSPHORUS: 2.5 MG/DL (ref 2.4–4.7)
PHOSPHORUS: 2.5 MG/DL (ref 2.4–4.7)
PHOSPHORUS: 2.9 MG/DL (ref 2.4–4.7)
PHOSPHORUS: 2.9 MG/DL (ref 2.4–4.7)
PHOSPHORUS: 3 MG/DL (ref 2.4–4.7)
PHOSPHORUS: 4 MG/DL (ref 2.4–4.7)
PHOSPHORUS: 4.8 MG/DL (ref 2.4–4.7)
PLATELET # BLD: 121 THOU/MM3 (ref 130–400)
PLATELET # BLD: 122 THOU/MM3 (ref 130–400)
PLATELET # BLD: 125 THOU/MM3 (ref 130–400)
PLATELET # BLD: 137 THOU/MM3 (ref 130–400)
PLATELET # BLD: 138 THOU/MM3 (ref 130–400)
PLATELET # BLD: 139 THOU/MM3 (ref 130–400)
PLATELET # BLD: 142 THOU/MM3 (ref 130–400)
PLATELET # BLD: 144 THOU/MM3 (ref 130–400)
PLATELET # BLD: 145 THOU/MM3 (ref 130–400)
PLATELET # BLD: 147 THOU/MM3 (ref 130–400)
PLATELET # BLD: 149 THOU/MM3 (ref 130–400)
PLATELET # BLD: 154 THOU/MM3 (ref 130–400)
PLATELET # BLD: 158 THOU/MM3 (ref 130–400)
PLATELET # BLD: 159 THOU/MM3 (ref 130–400)
PLATELET # BLD: 165 THOU/MM3 (ref 130–400)
PLATELET # BLD: 166 THOU/MM3 (ref 130–400)
PLATELET # BLD: 166 THOU/MM3 (ref 130–400)
PLATELET # BLD: 169 THOU/MM3 (ref 130–400)
PLATELET # BLD: 170 THOU/MM3 (ref 130–400)
PLATELET # BLD: 175 THOU/MM3 (ref 130–400)
PLATELET # BLD: 176 THOU/MM3 (ref 130–400)
PLATELET # BLD: 181 THOU/MM3 (ref 130–400)
PLATELET # BLD: 184 THOU/MM3 (ref 130–400)
PLATELET # BLD: 186 THOU/MM3 (ref 130–400)
PLATELET # BLD: 188 THOU/MM3 (ref 130–400)
PLATELET # BLD: 188 THOU/MM3 (ref 130–400)
PLATELET # BLD: 190 THOU/MM3 (ref 130–400)
PLATELET # BLD: 190 THOU/MM3 (ref 130–400)
PLATELET # BLD: 192 THOU/MM3 (ref 130–400)
PLATELET # BLD: 195 THOU/MM3 (ref 130–400)
PLATELET # BLD: 198 THOU/MM3 (ref 130–400)
PLATELET # BLD: 199 THOU/MM3 (ref 130–400)
PLATELET # BLD: 202 THOU/MM3 (ref 130–400)
PLATELET # BLD: 203 THOU/MM3 (ref 130–400)
PLATELET # BLD: 207 THOU/MM3 (ref 130–400)
PLATELET # BLD: 207 THOU/MM3 (ref 130–400)
PLATELET # BLD: 216 THOU/MM3 (ref 130–400)
PLATELET # BLD: 221 THOU/MM3 (ref 130–400)
PLATELET # BLD: 231 THOU/MM3 (ref 130–400)
PLATELET # BLD: 248 THOU/MM3 (ref 130–400)
PLATELET # BLD: 289 THOU/MM3 (ref 130–400)
PLATELET ESTIMATE: ABNORMAL
PLATELET ESTIMATE: ADEQUATE
PMV BLD AUTO: 10 FL (ref 9.4–12.4)
PMV BLD AUTO: 10.1 FL (ref 9.4–12.4)
PMV BLD AUTO: 10.2 FL (ref 9.4–12.4)
PMV BLD AUTO: 10.2 FL (ref 9.4–12.4)
PMV BLD AUTO: 10.3 FL (ref 9.4–12.4)
PMV BLD AUTO: 10.3 FL (ref 9.4–12.4)
PMV BLD AUTO: 10.4 FL (ref 9.4–12.4)
PMV BLD AUTO: 10.4 FL (ref 9.4–12.4)
PMV BLD AUTO: 10.5 FL (ref 9.4–12.4)
PMV BLD AUTO: 10.6 FL (ref 9.4–12.4)
PMV BLD AUTO: 10.7 FL (ref 9.4–12.4)
PMV BLD AUTO: 10.7 FL (ref 9.4–12.4)
PMV BLD AUTO: 10.9 FL (ref 9.4–12.4)
PMV BLD AUTO: 11.5 FL (ref 9.4–12.4)
PMV BLD AUTO: 9.6 FL (ref 9.4–12.4)
PMV BLD AUTO: 9.7 FL (ref 9.4–12.4)
PMV BLD AUTO: 9.8 FL (ref 9.4–12.4)
PMV BLD AUTO: 9.8 FL (ref 9.4–12.4)
PMV BLD AUTO: 9.9 FL (ref 9.4–12.4)
PO2 MIXED: 9 MMHG (ref 25–40)
PO2: 117 MMHG (ref 71–104)
PO2: 179 MMHG (ref 71–104)
PO2: 258 MMHG (ref 71–104)
PO2: 64 MMHG (ref 71–104)
PO2: 67 MMHG (ref 71–104)
PO2: 80 MMHG (ref 71–104)
POIKILOCYTES: ABNORMAL
POTASSIUM REFLEX MAGNESIUM: 4 MEQ/L (ref 3.5–5.2)
POTASSIUM REFLEX MAGNESIUM: 4.1 MEQ/L (ref 3.5–5.2)
POTASSIUM REFLEX MAGNESIUM: 4.1 MEQ/L (ref 3.5–5.2)
POTASSIUM REFLEX MAGNESIUM: 4.2 MEQ/L (ref 3.5–5.2)
POTASSIUM REFLEX MAGNESIUM: 4.4 MEQ/L (ref 3.5–5.2)
POTASSIUM REFLEX MAGNESIUM: 4.4 MEQ/L (ref 3.5–5.2)
POTASSIUM REFLEX MAGNESIUM: 4.6 MEQ/L (ref 3.5–5.2)
POTASSIUM REFLEX MAGNESIUM: 4.6 MEQ/L (ref 3.5–5.2)
POTASSIUM REFLEX MAGNESIUM: 4.8 MEQ/L (ref 3.5–5.2)
POTASSIUM REFLEX MAGNESIUM: 4.9 MEQ/L (ref 3.5–5.2)
POTASSIUM REFLEX MAGNESIUM: 5 MEQ/L (ref 3.5–5.2)
POTASSIUM REFLEX MAGNESIUM: 5.2 MEQ/L (ref 3.5–5.2)
POTASSIUM REFLEX MAGNESIUM: 5.3 MEQ/L (ref 3.5–5.2)
POTASSIUM REFLEX MAGNESIUM: 6 MEQ/L (ref 3.5–5.2)
POTASSIUM REFLEX MAGNESIUM: 6.1 MEQ/L (ref 3.5–5.2)
POTASSIUM SERPL-SCNC: 3.1 MEQ/L (ref 3.5–5.2)
POTASSIUM SERPL-SCNC: 3.4 MEQ/L (ref 3.5–5.2)
POTASSIUM SERPL-SCNC: 3.7 MEQ/L (ref 3.5–5.2)
POTASSIUM SERPL-SCNC: 3.8 MEQ/L (ref 3.5–5.2)
POTASSIUM SERPL-SCNC: 3.9 MEQ/L (ref 3.5–5.2)
POTASSIUM SERPL-SCNC: 4 MEQ/L (ref 3.5–5.2)
POTASSIUM SERPL-SCNC: 4.2 MEQ/L (ref 3.5–5.2)
POTASSIUM SERPL-SCNC: 4.2 MEQ/L (ref 3.5–5.2)
POTASSIUM SERPL-SCNC: 4.3 MEQ/L (ref 3.5–5.2)
POTASSIUM SERPL-SCNC: 4.4 MEQ/L (ref 3.5–5.2)
POTASSIUM SERPL-SCNC: 4.5 MEQ/L (ref 3.5–5.2)
POTASSIUM SERPL-SCNC: 4.6 MEQ/L (ref 3.5–5.2)
POTASSIUM SERPL-SCNC: 4.8 MEQ/L (ref 3.5–5.2)
POTASSIUM SERPL-SCNC: 5 MEQ/L (ref 3.5–5.2)
POTASSIUM SERPL-SCNC: 5.2 MEQ/L (ref 3.5–5.2)
POTASSIUM SERPL-SCNC: 5.3 MEQ/L (ref 3.5–5.2)
POTASSIUM SERPL-SCNC: 5.3 MEQ/L (ref 3.5–5.2)
POTASSIUM SERPL-SCNC: 5.6 MEQ/L (ref 3.5–5.2)
POTASSIUM SERPL-SCNC: 5.6 MEQ/L (ref 3.5–5.2)
POTASSIUM SERPL-SCNC: 5.7 MEQ/L (ref 3.5–5.2)
POTASSIUM SERPL-SCNC: 5.7 MEQ/L (ref 3.5–5.2)
POTASSIUM SERPL-SCNC: 6.1 MEQ/L (ref 3.5–5.2)
PRO-BNP: ABNORMAL PG/ML (ref 0–1800)
PROCALCITONIN: 0.41 NG/ML (ref 0.01–0.09)
PROCALCITONIN: 0.54 NG/ML (ref 0.01–0.09)
PROCALCITONIN: 0.59 NG/ML (ref 0.01–0.09)
PROCALCITONIN: 0.95 NG/ML (ref 0.01–0.09)
PROCALCITONIN: 1.23 NG/ML (ref 0.01–0.09)
PROCALCITONIN: 1.41 NG/ML (ref 0.01–0.09)
PROCALCITONIN: 22.1 NG/ML (ref 0.01–0.09)
PROTEUS FILM ARRAY: NOT DETECTED
PSEUDOMONAS AERUGINOSA FILM ARRAY: NOT DETECTED
RBC # BLD: 1.93 MILL/MM3 (ref 4.7–6.1)
RBC # BLD: 2.25 MILL/MM3 (ref 4.7–6.1)
RBC # BLD: 2.26 MILL/MM3 (ref 4.7–6.1)
RBC # BLD: 2.29 MILL/MM3 (ref 4.7–6.1)
RBC # BLD: 2.29 MILL/MM3 (ref 4.7–6.1)
RBC # BLD: 2.31 MILL/MM3 (ref 4.7–6.1)
RBC # BLD: 2.46 MILL/MM3 (ref 4.7–6.1)
RBC # BLD: 2.51 MILL/MM3 (ref 4.7–6.1)
RBC # BLD: 2.55 MILL/MM3 (ref 4.7–6.1)
RBC # BLD: 2.56 MILL/MM3 (ref 4.7–6.1)
RBC # BLD: 2.57 MILL/MM3 (ref 4.7–6.1)
RBC # BLD: 2.64 MILL/MM3 (ref 4.7–6.1)
RBC # BLD: 2.67 MILL/MM3 (ref 4.7–6.1)
RBC # BLD: 2.68 MILL/MM3 (ref 4.7–6.1)
RBC # BLD: 2.73 MILL/MM3 (ref 4.7–6.1)
RBC # BLD: 2.75 MILL/MM3 (ref 4.7–6.1)
RBC # BLD: 2.76 MILL/MM3 (ref 4.7–6.1)
RBC # BLD: 2.79 MILL/MM3 (ref 4.7–6.1)
RBC # BLD: 2.8 MILL/MM3 (ref 4.7–6.1)
RBC # BLD: 2.82 MILL/MM3 (ref 4.7–6.1)
RBC # BLD: 2.83 MILL/MM3 (ref 4.7–6.1)
RBC # BLD: 2.83 MILL/MM3 (ref 4.7–6.1)
RBC # BLD: 2.88 MILL/MM3 (ref 4.7–6.1)
RBC # BLD: 2.97 MILL/MM3 (ref 4.7–6.1)
RBC # BLD: 3 MILL/MM3 (ref 4.7–6.1)
RBC # BLD: 3.04 MILL/MM3 (ref 4.7–6.1)
RBC # BLD: 3.07 MILL/MM3 (ref 4.7–6.1)
RBC # BLD: 3.08 MILL/MM3 (ref 4.7–6.1)
RBC # BLD: 3.12 MILL/MM3 (ref 4.7–6.1)
RBC # BLD: 3.15 MILL/MM3 (ref 4.7–6.1)
RBC # BLD: 3.19 MILL/MM3 (ref 4.7–6.1)
RBC # BLD: 3.22 MILL/MM3 (ref 4.7–6.1)
RBC # BLD: 3.25 MILL/MM3 (ref 4.7–6.1)
RBC # BLD: 3.27 MILL/MM3 (ref 4.7–6.1)
RBC # BLD: 3.28 MILL/MM3 (ref 4.7–6.1)
RBC # BLD: 3.32 MILL/MM3 (ref 4.7–6.1)
RBC # BLD: 3.32 MILL/MM3 (ref 4.7–6.1)
RBC # BLD: 3.51 MILL/MM3 (ref 4.7–6.1)
RBC # BLD: 3.62 MILL/MM3 (ref 4.7–6.1)
RBC # BLD: 3.76 MILL/MM3 (ref 4.7–6.1)
RBC # BLD: 3.91 MILL/MM3 (ref 4.7–6.1)
REASON FOR REJECTION: NORMAL
REFERENCE LOCATION: NORMAL
REFERENCE RANGE: NORMAL
REJECTED TEST: NORMAL
RETIC HEMOGLOBIN: 29.6 PG (ref 28.2–35.7)
RETICULOCYTE ABSOLUTE COUNT: 1.8 % (ref 0.5–2)
RH FACTOR: NORMAL
SARS-COV-2, NAAT: DETECTED
SARS-COV-2, NAAT: DETECTED
SARS-COV-2, NAAT: NOT  DETECTED
SCAN OF BLOOD SMEAR: NORMAL
SCHISTOCYTES: ABNORMAL
SEG NEUTROPHILS: 61.4 %
SEG NEUTROPHILS: 66.7 %
SEG NEUTROPHILS: 70.2 %
SEG NEUTROPHILS: 70.2 %
SEG NEUTROPHILS: 74.1 %
SEG NEUTROPHILS: 75.9 %
SEG NEUTROPHILS: 77.4 %
SEG NEUTROPHILS: 78.6 %
SEG NEUTROPHILS: 80.8 %
SEG NEUTROPHILS: 81.5 %
SEG NEUTROPHILS: 81.5 %
SEG NEUTROPHILS: 81.9 %
SEG NEUTROPHILS: 82.2 %
SEG NEUTROPHILS: 84.8 %
SEG NEUTROPHILS: 85.2 %
SEG NEUTROPHILS: 85.6 %
SEG NEUTROPHILS: 87.7 %
SEG NEUTROPHILS: 88.5 %
SEG NEUTROPHILS: 88.9 %
SEG NEUTROPHILS: 89.3 %
SEG NEUTROPHILS: 90.2 %
SEG NEUTROPHILS: 90.7 %
SEGMENTED NEUTROPHILS ABSOLUTE COUNT: 3.6 THOU/MM3 (ref 1.8–7.7)
SEGMENTED NEUTROPHILS ABSOLUTE COUNT: 3.6 THOU/MM3 (ref 1.8–7.7)
SEGMENTED NEUTROPHILS ABSOLUTE COUNT: 3.8 THOU/MM3 (ref 1.8–7.7)
SEGMENTED NEUTROPHILS ABSOLUTE COUNT: 4 THOU/MM3 (ref 1.8–7.7)
SEGMENTED NEUTROPHILS ABSOLUTE COUNT: 4.4 THOU/MM3 (ref 1.8–7.7)
SEGMENTED NEUTROPHILS ABSOLUTE COUNT: 4.5 THOU/MM3 (ref 1.8–7.7)
SEGMENTED NEUTROPHILS ABSOLUTE COUNT: 4.5 THOU/MM3 (ref 1.8–7.7)
SEGMENTED NEUTROPHILS ABSOLUTE COUNT: 4.9 THOU/MM3 (ref 1.8–7.7)
SEGMENTED NEUTROPHILS ABSOLUTE COUNT: 5 THOU/MM3 (ref 1.8–7.7)
SEGMENTED NEUTROPHILS ABSOLUTE COUNT: 5.1 THOU/MM3 (ref 1.8–7.7)
SEGMENTED NEUTROPHILS ABSOLUTE COUNT: 5.2 THOU/MM3 (ref 1.8–7.7)
SEGMENTED NEUTROPHILS ABSOLUTE COUNT: 5.3 THOU/MM3 (ref 1.8–7.7)
SEGMENTED NEUTROPHILS ABSOLUTE COUNT: 5.5 THOU/MM3 (ref 1.8–7.7)
SEGMENTED NEUTROPHILS ABSOLUTE COUNT: 5.6 THOU/MM3 (ref 1.8–7.7)
SEGMENTED NEUTROPHILS ABSOLUTE COUNT: 5.7 THOU/MM3 (ref 1.8–7.7)
SEGMENTED NEUTROPHILS ABSOLUTE COUNT: 5.8 THOU/MM3 (ref 1.8–7.7)
SEGMENTED NEUTROPHILS ABSOLUTE COUNT: 6.3 THOU/MM3 (ref 1.8–7.7)
SEGMENTED NEUTROPHILS ABSOLUTE COUNT: 6.6 THOU/MM3 (ref 1.8–7.7)
SEGMENTED NEUTROPHILS ABSOLUTE COUNT: 6.7 THOU/MM3 (ref 1.8–7.7)
SEGMENTED NEUTROPHILS ABSOLUTE COUNT: 6.8 THOU/MM3 (ref 1.8–7.7)
SEGMENTED NEUTROPHILS ABSOLUTE COUNT: 8.2 THOU/MM3 (ref 1.8–7.7)
SEGMENTED NEUTROPHILS ABSOLUTE COUNT: 8.9 THOU/MM3 (ref 1.8–7.7)
SERRATIA MARCESCENS FILM ARRAY: NOT DETECTED
SODIUM BLD-SCNC: 130 MEQ/L (ref 135–145)
SODIUM BLD-SCNC: 131 MEQ/L (ref 135–145)
SODIUM BLD-SCNC: 132 MEQ/L (ref 135–145)
SODIUM BLD-SCNC: 133 MEQ/L (ref 135–145)
SODIUM BLD-SCNC: 134 MEQ/L (ref 135–145)
SODIUM BLD-SCNC: 135 MEQ/L (ref 135–145)
SODIUM BLD-SCNC: 136 MEQ/L (ref 135–145)
SODIUM BLD-SCNC: 137 MEQ/L (ref 135–145)
SODIUM BLD-SCNC: 138 MEQ/L (ref 135–145)
SODIUM BLD-SCNC: 139 MEQ/L (ref 135–145)
SODIUM BLD-SCNC: 139 MEQ/L (ref 135–145)
SODIUM BLD-SCNC: 140 MEQ/L (ref 135–145)
SODIUM BLD-SCNC: 140 MEQ/L (ref 135–145)
SODIUM BLD-SCNC: 141 MEQ/L (ref 135–145)
SODIUM BLD-SCNC: 143 MEQ/L (ref 135–145)
SOURCE OF BLOOD CULTURE: NORMAL
SOURCE, BLOOD GAS: ABNORMAL
SPHEROCYTES: ABNORMAL
SPHEROCYTES: ABNORMAL
STAPH AUREUS FILM ARRAY: NOT DETECTED
STAPHYLOCOCCUS FILM ARRAY: NOT DETECTED
STREP AGALACTIAE FILM ARRAY: NOT DETECTED
STREP PNEUMONIAE FILM ARRAY: NOT DETECTED
STREP PYOCGENES FILM ARRAY: NOT DETECTED
STREPTOCOCCUS FILM ARRAY: NOT DETECTED
TARGET CELLS: ABNORMAL
TARGET CELLS: ABNORMAL
TEST RESULTS WITH UNITS: 10.3
TEST(S) BEING PERFORMED: NORMAL
TOTAL IRON BINDING CAPACITY: 91 UG/DL (ref 171–450)
TOTAL PROTEIN: 5.7 G/DL (ref 6.1–8)
TOTAL PROTEIN: 5.9 G/DL (ref 6.1–8)
TOTAL PROTEIN: 6 G/DL (ref 6.1–8)
TOTAL PROTEIN: 6.1 G/DL (ref 6.1–8)
TOTAL PROTEIN: 6.2 G/DL (ref 6.1–8)
TOTAL PROTEIN: 6.3 G/DL (ref 6.1–8)
TOTAL PROTEIN: 6.4 G/DL (ref 6.1–8)
TOTAL PROTEIN: 6.8 G/DL (ref 6.1–8)
TOTAL PROTEIN: 7 G/DL (ref 6.1–8)
TOTAL PROTEIN: 7.3 G/DL (ref 6.1–8)
TOTAL PROTEIN: 7.6 G/DL (ref 6.1–8)
TROPONIN T: 0.06 NG/ML
TROPONIN T: 0.06 NG/ML
TROPONIN T: 0.07 NG/ML
TROPONIN T: 0.07 NG/ML
TROPONIN T: 0.08 NG/ML
TROPONIN T: 0.11 NG/ML
TROPONIN T: 0.15 NG/ML
TROPONIN T: 0.16 NG/ML
TROPONIN T: 0.17 NG/ML
TROPONIN T: 0.21 NG/ML
TROPONIN T: 0.22 NG/ML
TROPONIN T: 0.25 NG/ML
TROPONIN T: 0.25 NG/ML
TROPONIN T: 0.28 NG/ML
TROPONIN T: 0.29 NG/ML
TSH SERPL DL<=0.05 MIU/L-ACNC: 0.83 UIU/ML (ref 0.4–4.2)
TSH SERPL DL<=0.05 MIU/L-ACNC: 2.6 UIU/ML (ref 0.4–4.2)
VANCOMYCIN RANDOM: 24.5 UG/ML (ref 0.1–39.9)
VANCOMYCIN RANDOM: 28.6 UG/ML (ref 0.1–39.9)
VANCOMYCIN RANDOM: NORMAL UG/ML (ref 0.1–39.9)
VANCOMYCIN RESISTANT FILM ARRAY: NORMAL
VITAMIN B-12: 1531 PG/ML (ref 211–911)
VITAMIN B-12: > 2000 PG/ML (ref 211–911)
WBC # BLD: 10 THOU/MM3 (ref 4.8–10.8)
WBC # BLD: 10.5 THOU/MM3 (ref 4.8–10.8)
WBC # BLD: 3.5 THOU/MM3 (ref 4.8–10.8)
WBC # BLD: 4 THOU/MM3 (ref 4.8–10.8)
WBC # BLD: 4.5 THOU/MM3 (ref 4.8–10.8)
WBC # BLD: 4.7 THOU/MM3 (ref 4.8–10.8)
WBC # BLD: 4.8 THOU/MM3 (ref 4.8–10.8)
WBC # BLD: 4.8 THOU/MM3 (ref 4.8–10.8)
WBC # BLD: 4.9 THOU/MM3 (ref 4.8–10.8)
WBC # BLD: 4.9 THOU/MM3 (ref 4.8–10.8)
WBC # BLD: 5.1 THOU/MM3 (ref 4.8–10.8)
WBC # BLD: 5.2 THOU/MM3 (ref 4.8–10.8)
WBC # BLD: 5.3 THOU/MM3 (ref 4.8–10.8)
WBC # BLD: 5.5 THOU/MM3 (ref 4.8–10.8)
WBC # BLD: 5.7 THOU/MM3 (ref 4.8–10.8)
WBC # BLD: 5.8 THOU/MM3 (ref 4.8–10.8)
WBC # BLD: 5.9 THOU/MM3 (ref 4.8–10.8)
WBC # BLD: 5.9 THOU/MM3 (ref 4.8–10.8)
WBC # BLD: 6 THOU/MM3 (ref 4.8–10.8)
WBC # BLD: 6.1 THOU/MM3 (ref 4.8–10.8)
WBC # BLD: 6.3 THOU/MM3 (ref 4.8–10.8)
WBC # BLD: 6.3 THOU/MM3 (ref 4.8–10.8)
WBC # BLD: 6.4 THOU/MM3 (ref 4.8–10.8)
WBC # BLD: 6.5 THOU/MM3 (ref 4.8–10.8)
WBC # BLD: 6.5 THOU/MM3 (ref 4.8–10.8)
WBC # BLD: 6.8 THOU/MM3 (ref 4.8–10.8)
WBC # BLD: 7.2 THOU/MM3 (ref 4.8–10.8)
WBC # BLD: 7.2 THOU/MM3 (ref 4.8–10.8)
WBC # BLD: 7.3 THOU/MM3 (ref 4.8–10.8)
WBC # BLD: 7.4 THOU/MM3 (ref 4.8–10.8)
WBC # BLD: 7.5 THOU/MM3 (ref 4.8–10.8)
WBC # BLD: 7.8 THOU/MM3 (ref 4.8–10.8)
WBC # BLD: 7.8 THOU/MM3 (ref 4.8–10.8)
WBC # BLD: 8.4 THOU/MM3 (ref 4.8–10.8)
WBC # BLD: 8.5 THOU/MM3 (ref 4.8–10.8)

## 2021-01-01 PROCEDURE — 87081 CULTURE SCREEN ONLY: CPT

## 2021-01-01 PROCEDURE — 99232 SBSQ HOSP IP/OBS MODERATE 35: CPT | Performed by: INTERNAL MEDICINE

## 2021-01-01 PROCEDURE — 7100000010 HC PHASE II RECOVERY - FIRST 15 MIN: Performed by: INTERNAL MEDICINE

## 2021-01-01 PROCEDURE — 85027 COMPLETE CBC AUTOMATED: CPT

## 2021-01-01 PROCEDURE — 97116 GAIT TRAINING THERAPY: CPT

## 2021-01-01 PROCEDURE — 82948 REAGENT STRIP/BLOOD GLUCOSE: CPT

## 2021-01-01 PROCEDURE — 6370000000 HC RX 637 (ALT 250 FOR IP): Performed by: INTERNAL MEDICINE

## 2021-01-01 PROCEDURE — 36415 COLL VENOUS BLD VENIPUNCTURE: CPT

## 2021-01-01 PROCEDURE — 92523 SPEECH SOUND LANG COMPREHEN: CPT

## 2021-01-01 PROCEDURE — 82607 VITAMIN B-12: CPT

## 2021-01-01 PROCEDURE — 96374 THER/PROPH/DIAG INJ IV PUSH: CPT

## 2021-01-01 PROCEDURE — 6370000000 HC RX 637 (ALT 250 FOR IP): Performed by: FAMILY MEDICINE

## 2021-01-01 PROCEDURE — 86923 COMPATIBILITY TEST ELECTRIC: CPT

## 2021-01-01 PROCEDURE — 6360000002 HC RX W HCPCS: Performed by: NURSE PRACTITIONER

## 2021-01-01 PROCEDURE — 99239 HOSP IP/OBS DSCHRG MGMT >30: CPT | Performed by: FAMILY MEDICINE

## 2021-01-01 PROCEDURE — 6370000000 HC RX 637 (ALT 250 FOR IP)

## 2021-01-01 PROCEDURE — 93010 ELECTROCARDIOGRAM REPORT: CPT | Performed by: INTERNAL MEDICINE

## 2021-01-01 PROCEDURE — 6360000002 HC RX W HCPCS: Performed by: INTERNAL MEDICINE

## 2021-01-01 PROCEDURE — 6360000002 HC RX W HCPCS: Performed by: PEDIATRICS

## 2021-01-01 PROCEDURE — 1200000000 HC SEMI PRIVATE

## 2021-01-01 PROCEDURE — 6360000002 HC RX W HCPCS: Performed by: HOSPITALIST

## 2021-01-01 PROCEDURE — 85018 HEMOGLOBIN: CPT

## 2021-01-01 PROCEDURE — 6370000000 HC RX 637 (ALT 250 FOR IP): Performed by: PHYSICIAN ASSISTANT

## 2021-01-01 PROCEDURE — 73600 X-RAY EXAM OF ANKLE: CPT

## 2021-01-01 PROCEDURE — 36600 WITHDRAWAL OF ARTERIAL BLOOD: CPT

## 2021-01-01 PROCEDURE — 90935 HEMODIALYSIS ONE EVALUATION: CPT

## 2021-01-01 PROCEDURE — 73502 X-RAY EXAM HIP UNI 2-3 VIEWS: CPT

## 2021-01-01 PROCEDURE — 97110 THERAPEUTIC EXERCISES: CPT

## 2021-01-01 PROCEDURE — 6370000000 HC RX 637 (ALT 250 FOR IP): Performed by: PEDIATRICS

## 2021-01-01 PROCEDURE — 2580000003 HC RX 258: Performed by: PHYSICIAN ASSISTANT

## 2021-01-01 PROCEDURE — 6370000000 HC RX 637 (ALT 250 FOR IP): Performed by: STUDENT IN AN ORGANIZED HEALTH CARE EDUCATION/TRAINING PROGRAM

## 2021-01-01 PROCEDURE — 85025 COMPLETE CBC W/AUTO DIFF WBC: CPT

## 2021-01-01 PROCEDURE — 6370000000 HC RX 637 (ALT 250 FOR IP): Performed by: HOSPITALIST

## 2021-01-01 PROCEDURE — 5A1D70Z PERFORMANCE OF URINARY FILTRATION, INTERMITTENT, LESS THAN 6 HOURS PER DAY: ICD-10-PCS | Performed by: INTERNAL MEDICINE

## 2021-01-01 PROCEDURE — 3609017100 HC EGD: Performed by: INTERNAL MEDICINE

## 2021-01-01 PROCEDURE — 85014 HEMATOCRIT: CPT

## 2021-01-01 PROCEDURE — 36592 COLLECT BLOOD FROM PICC: CPT

## 2021-01-01 PROCEDURE — 6360000002 HC RX W HCPCS: Performed by: PHYSICIAN ASSISTANT

## 2021-01-01 PROCEDURE — 76937 US GUIDE VASCULAR ACCESS: CPT

## 2021-01-01 PROCEDURE — 80048 BASIC METABOLIC PNL TOTAL CA: CPT

## 2021-01-01 PROCEDURE — 6360000002 HC RX W HCPCS

## 2021-01-01 PROCEDURE — 93005 ELECTROCARDIOGRAM TRACING: CPT | Performed by: NURSE PRACTITIONER

## 2021-01-01 PROCEDURE — 2500000003 HC RX 250 WO HCPCS: Performed by: PHYSICIAN ASSISTANT

## 2021-01-01 PROCEDURE — 84484 ASSAY OF TROPONIN QUANT: CPT

## 2021-01-01 PROCEDURE — 74018 RADEX ABDOMEN 1 VIEW: CPT

## 2021-01-01 PROCEDURE — 99231 SBSQ HOSP IP/OBS SF/LOW 25: CPT | Performed by: FAMILY MEDICINE

## 2021-01-01 PROCEDURE — 85379 FIBRIN DEGRADATION QUANT: CPT

## 2021-01-01 PROCEDURE — 93005 ELECTROCARDIOGRAM TRACING: CPT | Performed by: STUDENT IN AN ORGANIZED HEALTH CARE EDUCATION/TRAINING PROGRAM

## 2021-01-01 PROCEDURE — 72125 CT NECK SPINE W/O DYE: CPT

## 2021-01-01 PROCEDURE — 73610 X-RAY EXAM OF ANKLE: CPT

## 2021-01-01 PROCEDURE — 83605 ASSAY OF LACTIC ACID: CPT

## 2021-01-01 PROCEDURE — 99233 SBSQ HOSP IP/OBS HIGH 50: CPT | Performed by: PHYSICIAN ASSISTANT

## 2021-01-01 PROCEDURE — 80400 ACTH STIMULATION PANEL: CPT

## 2021-01-01 PROCEDURE — 99490 CHRNC CARE MGMT STAFF 1ST 20: CPT | Performed by: FAMILY MEDICINE

## 2021-01-01 PROCEDURE — 3430000000 HC RX DIAGNOSTIC RADIOPHARMACEUTICAL: Performed by: STUDENT IN AN ORGANIZED HEALTH CARE EDUCATION/TRAINING PROGRAM

## 2021-01-01 PROCEDURE — 2580000003 HC RX 258: Performed by: NURSE PRACTITIONER

## 2021-01-01 PROCEDURE — 3700000001 HC ADD 15 MINUTES (ANESTHESIA): Performed by: INTERNAL MEDICINE

## 2021-01-01 PROCEDURE — 83735 ASSAY OF MAGNESIUM: CPT

## 2021-01-01 PROCEDURE — 82803 BLOOD GASES ANY COMBINATION: CPT

## 2021-01-01 PROCEDURE — 6370000000 HC RX 637 (ALT 250 FOR IP): Performed by: NURSE PRACTITIONER

## 2021-01-01 PROCEDURE — 36410 VNPNXR 3YR/> PHY/QHP DX/THER: CPT

## 2021-01-01 PROCEDURE — 2580000003 HC RX 258: Performed by: INTERNAL MEDICINE

## 2021-01-01 PROCEDURE — 99232 SBSQ HOSP IP/OBS MODERATE 35: CPT | Performed by: PHYSICIAN ASSISTANT

## 2021-01-01 PROCEDURE — 87500 VANOMYCIN DNA AMP PROBE: CPT

## 2021-01-01 PROCEDURE — 82140 ASSAY OF AMMONIA: CPT

## 2021-01-01 PROCEDURE — 99223 1ST HOSP IP/OBS HIGH 75: CPT | Performed by: NURSE PRACTITIONER

## 2021-01-01 PROCEDURE — 36430 TRANSFUSION BLD/BLD COMPNT: CPT

## 2021-01-01 PROCEDURE — 85730 THROMBOPLASTIN TIME PARTIAL: CPT

## 2021-01-01 PROCEDURE — 6360000002 HC RX W HCPCS: Performed by: REGISTERED NURSE

## 2021-01-01 PROCEDURE — 93017 CV STRESS TEST TRACING ONLY: CPT | Performed by: INTERNAL MEDICINE

## 2021-01-01 PROCEDURE — 80053 COMPREHEN METABOLIC PANEL: CPT

## 2021-01-01 PROCEDURE — 97530 THERAPEUTIC ACTIVITIES: CPT

## 2021-01-01 PROCEDURE — 82533 TOTAL CORTISOL: CPT

## 2021-01-01 PROCEDURE — 87635 SARS-COV-2 COVID-19 AMP PRB: CPT

## 2021-01-01 PROCEDURE — 6360000004 HC RX CONTRAST MEDICATION: Performed by: PHYSICIAN ASSISTANT

## 2021-01-01 PROCEDURE — P9016 RBC LEUKOCYTES REDUCED: HCPCS

## 2021-01-01 PROCEDURE — 77001 FLUOROGUIDE FOR VEIN DEVICE: CPT

## 2021-01-01 PROCEDURE — 83550 IRON BINDING TEST: CPT

## 2021-01-01 PROCEDURE — 2580000003 HC RX 258: Performed by: HOSPITALIST

## 2021-01-01 PROCEDURE — 99232 SBSQ HOSP IP/OBS MODERATE 35: CPT | Performed by: HOSPITALIST

## 2021-01-01 PROCEDURE — 84100 ASSAY OF PHOSPHORUS: CPT

## 2021-01-01 PROCEDURE — 84132 ASSAY OF SERUM POTASSIUM: CPT

## 2021-01-01 PROCEDURE — 82272 OCCULT BLD FECES 1-3 TESTS: CPT

## 2021-01-01 PROCEDURE — 93005 ELECTROCARDIOGRAM TRACING: CPT

## 2021-01-01 PROCEDURE — 83880 ASSAY OF NATRIURETIC PEPTIDE: CPT

## 2021-01-01 PROCEDURE — 99222 1ST HOSP IP/OBS MODERATE 55: CPT | Performed by: INTERNAL MEDICINE

## 2021-01-01 PROCEDURE — 96376 TX/PRO/DX INJ SAME DRUG ADON: CPT

## 2021-01-01 PROCEDURE — 1200000003 HC TELEMETRY R&B

## 2021-01-01 PROCEDURE — 78452 HT MUSCLE IMAGE SPECT MULT: CPT

## 2021-01-01 PROCEDURE — 6360000002 HC RX W HCPCS: Performed by: STUDENT IN AN ORGANIZED HEALTH CARE EDUCATION/TRAINING PROGRAM

## 2021-01-01 PROCEDURE — 99223 1ST HOSP IP/OBS HIGH 75: CPT | Performed by: HOSPITALIST

## 2021-01-01 PROCEDURE — 85520 HEPARIN ASSAY: CPT

## 2021-01-01 PROCEDURE — C9113 INJ PANTOPRAZOLE SODIUM, VIA: HCPCS | Performed by: STUDENT IN AN ORGANIZED HEALTH CARE EDUCATION/TRAINING PROGRAM

## 2021-01-01 PROCEDURE — 97163 PT EVAL HIGH COMPLEX 45 MIN: CPT

## 2021-01-01 PROCEDURE — 97166 OT EVAL MOD COMPLEX 45 MIN: CPT

## 2021-01-01 PROCEDURE — C1751 CATH, INF, PER/CENT/MIDLINE: HCPCS

## 2021-01-01 PROCEDURE — C9113 INJ PANTOPRAZOLE SODIUM, VIA: HCPCS | Performed by: NURSE PRACTITIONER

## 2021-01-01 PROCEDURE — 82728 ASSAY OF FERRITIN: CPT

## 2021-01-01 PROCEDURE — 93010 ELECTROCARDIOGRAM REPORT: CPT | Performed by: NUCLEAR MEDICINE

## 2021-01-01 PROCEDURE — 86900 BLOOD TYPING SEROLOGIC ABO: CPT

## 2021-01-01 PROCEDURE — 99223 1ST HOSP IP/OBS HIGH 75: CPT | Performed by: SURGERY

## 2021-01-01 PROCEDURE — 70450 CT HEAD/BRAIN W/O DYE: CPT

## 2021-01-01 PROCEDURE — 85610 PROTHROMBIN TIME: CPT

## 2021-01-01 PROCEDURE — 2500000003 HC RX 250 WO HCPCS: Performed by: PEDIATRICS

## 2021-01-01 PROCEDURE — 99232 SBSQ HOSP IP/OBS MODERATE 35: CPT | Performed by: NURSE PRACTITIONER

## 2021-01-01 PROCEDURE — 86140 C-REACTIVE PROTEIN: CPT

## 2021-01-01 PROCEDURE — 99284 EMERGENCY DEPT VISIT MOD MDM: CPT

## 2021-01-01 PROCEDURE — 99285 EMERGENCY DEPT VISIT HI MDM: CPT

## 2021-01-01 PROCEDURE — 71275 CT ANGIOGRAPHY CHEST: CPT

## 2021-01-01 PROCEDURE — G0378 HOSPITAL OBSERVATION PER HR: HCPCS

## 2021-01-01 PROCEDURE — 90935 HEMODIALYSIS ONE EVALUATION: CPT | Performed by: INTERNAL MEDICINE

## 2021-01-01 PROCEDURE — 94669 MECHANICAL CHEST WALL OSCILL: CPT

## 2021-01-01 PROCEDURE — 97129 THER IVNTJ 1ST 15 MIN: CPT

## 2021-01-01 PROCEDURE — 93307 TTE W/O DOPPLER COMPLETE: CPT

## 2021-01-01 PROCEDURE — 2060000000 HC ICU INTERMEDIATE R&B

## 2021-01-01 PROCEDURE — 2580000003 HC RX 258: Performed by: REGISTERED NURSE

## 2021-01-01 PROCEDURE — 90935 HEMODIALYSIS ONE EVALUATION: CPT | Performed by: NURSE PRACTITIONER

## 2021-01-01 PROCEDURE — 5A1D70Z PERFORMANCE OF URINARY FILTRATION, INTERMITTENT, LESS THAN 6 HOURS PER DAY: ICD-10-PCS | Performed by: PHYSICIAN ASSISTANT

## 2021-01-01 PROCEDURE — 86901 BLOOD TYPING SEROLOGIC RH(D): CPT

## 2021-01-01 PROCEDURE — 87801 DETECT AGNT MULT DNA AMPLI: CPT

## 2021-01-01 PROCEDURE — 99221 1ST HOSP IP/OBS SF/LOW 40: CPT | Performed by: INTERNAL MEDICINE

## 2021-01-01 PROCEDURE — 96365 THER/PROPH/DIAG IV INF INIT: CPT

## 2021-01-01 PROCEDURE — 93005 ELECTROCARDIOGRAM TRACING: CPT | Performed by: PEDIATRICS

## 2021-01-01 PROCEDURE — 3609013000 HC EGD TRANSORAL CONTROL BLEEDING ANY METHOD: Performed by: INTERNAL MEDICINE

## 2021-01-01 PROCEDURE — 71045 X-RAY EXAM CHEST 1 VIEW: CPT

## 2021-01-01 PROCEDURE — 2580000003 HC RX 258: Performed by: PEDIATRICS

## 2021-01-01 PROCEDURE — 97130 THER IVNTJ EA ADDL 15 MIN: CPT

## 2021-01-01 PROCEDURE — 94640 AIRWAY INHALATION TREATMENT: CPT

## 2021-01-01 PROCEDURE — 99239 HOSP IP/OBS DSCHRG MGMT >30: CPT | Performed by: PHYSICIAN ASSISTANT

## 2021-01-01 PROCEDURE — 97542 WHEELCHAIR MNGMENT TRAINING: CPT

## 2021-01-01 PROCEDURE — 99238 HOSP IP/OBS DSCHRG MGMT 30/<: CPT | Performed by: INTERNAL MEDICINE

## 2021-01-01 PROCEDURE — 80202 ASSAY OF VANCOMYCIN: CPT

## 2021-01-01 PROCEDURE — 84145 PROCALCITONIN (PCT): CPT

## 2021-01-01 PROCEDURE — 93005 ELECTROCARDIOGRAM TRACING: CPT | Performed by: EMERGENCY MEDICINE

## 2021-01-01 PROCEDURE — 6370000000 HC RX 637 (ALT 250 FOR IP): Performed by: EMERGENCY MEDICINE

## 2021-01-01 PROCEDURE — 97162 PT EVAL MOD COMPLEX 30 MIN: CPT

## 2021-01-01 PROCEDURE — 7100000011 HC PHASE II RECOVERY - ADDTL 15 MIN: Performed by: INTERNAL MEDICINE

## 2021-01-01 PROCEDURE — 83690 ASSAY OF LIPASE: CPT

## 2021-01-01 PROCEDURE — 87040 BLOOD CULTURE FOR BACTERIA: CPT

## 2021-01-01 PROCEDURE — 93005 ELECTROCARDIOGRAM TRACING: CPT | Performed by: HOSPITALIST

## 2021-01-01 PROCEDURE — 99310 SBSQ NF CARE HIGH MDM 45: CPT | Performed by: NURSE PRACTITIONER

## 2021-01-01 PROCEDURE — 97167 OT EVAL HIGH COMPLEX 60 MIN: CPT

## 2021-01-01 PROCEDURE — 83540 ASSAY OF IRON: CPT

## 2021-01-01 PROCEDURE — 99309 SBSQ NF CARE MODERATE MDM 30: CPT

## 2021-01-01 PROCEDURE — 2580000003 HC RX 258

## 2021-01-01 PROCEDURE — 84443 ASSAY THYROID STIM HORMONE: CPT

## 2021-01-01 PROCEDURE — APPSS45 APP SPLIT SHARED TIME 31-45 MINUTES: Performed by: PHYSICIAN ASSISTANT

## 2021-01-01 PROCEDURE — 82746 ASSAY OF FOLIC ACID SERUM: CPT

## 2021-01-01 PROCEDURE — 2700000000 HC OXYGEN THERAPY PER DAY

## 2021-01-01 PROCEDURE — 2500000003 HC RX 250 WO HCPCS: Performed by: NURSE ANESTHETIST, CERTIFIED REGISTERED

## 2021-01-01 PROCEDURE — 71046 X-RAY EXAM CHEST 2 VIEWS: CPT

## 2021-01-01 PROCEDURE — 86850 RBC ANTIBODY SCREEN: CPT

## 2021-01-01 PROCEDURE — 93005 ELECTROCARDIOGRAM TRACING: CPT | Performed by: INTERNAL MEDICINE

## 2021-01-01 PROCEDURE — 80076 HEPATIC FUNCTION PANEL: CPT

## 2021-01-01 PROCEDURE — 0DJ08ZZ INSPECTION OF UPPER INTESTINAL TRACT, VIA NATURAL OR ARTIFICIAL OPENING ENDOSCOPIC: ICD-10-PCS | Performed by: INTERNAL MEDICINE

## 2021-01-01 PROCEDURE — 73700 CT LOWER EXTREMITY W/O DYE: CPT

## 2021-01-01 PROCEDURE — 2580000003 HC RX 258: Performed by: STUDENT IN AN ORGANIZED HEALTH CARE EDUCATION/TRAINING PROGRAM

## 2021-01-01 PROCEDURE — 3700000000 HC ANESTHESIA ATTENDED CARE: Performed by: INTERNAL MEDICINE

## 2021-01-01 PROCEDURE — 97535 SELF CARE MNGMENT TRAINING: CPT

## 2021-01-01 PROCEDURE — 05HY33Z INSERTION OF INFUSION DEVICE INTO UPPER VEIN, PERCUTANEOUS APPROACH: ICD-10-PCS | Performed by: RADIOLOGY

## 2021-01-01 PROCEDURE — 96375 TX/PRO/DX INJ NEW DRUG ADDON: CPT

## 2021-01-01 PROCEDURE — 99223 1ST HOSP IP/OBS HIGH 75: CPT | Performed by: INTERNAL MEDICINE

## 2021-01-01 PROCEDURE — 99305 1ST NF CARE MODERATE MDM 35: CPT | Performed by: FAMILY MEDICINE

## 2021-01-01 PROCEDURE — 82248 BILIRUBIN DIRECT: CPT

## 2021-01-01 PROCEDURE — 2500000003 HC RX 250 WO HCPCS: Performed by: NURSE PRACTITIONER

## 2021-01-01 PROCEDURE — 2500000003 HC RX 250 WO HCPCS: Performed by: STUDENT IN AN ORGANIZED HEALTH CARE EDUCATION/TRAINING PROGRAM

## 2021-01-01 PROCEDURE — 99306 1ST NF CARE HIGH MDM 50: CPT | Performed by: FAMILY MEDICINE

## 2021-01-01 PROCEDURE — 99222 1ST HOSP IP/OBS MODERATE 55: CPT | Performed by: NURSE PRACTITIONER

## 2021-01-01 PROCEDURE — 99380 NURSING FAC CARE SUPERVISION: CPT | Performed by: NURSE PRACTITIONER

## 2021-01-01 PROCEDURE — A9500 TC99M SESTAMIBI: HCPCS | Performed by: STUDENT IN AN ORGANIZED HEALTH CARE EDUCATION/TRAINING PROGRAM

## 2021-01-01 PROCEDURE — 85046 RETICYTE/HGB CONCENTRATE: CPT

## 2021-01-01 PROCEDURE — 93306 TTE W/DOPPLER COMPLETE: CPT

## 2021-01-01 PROCEDURE — 73030 X-RAY EXAM OF SHOULDER: CPT

## 2021-01-01 PROCEDURE — 74177 CT ABD & PELVIS W/CONTRAST: CPT

## 2021-01-01 PROCEDURE — 73630 X-RAY EXAM OF FOOT: CPT

## 2021-01-01 PROCEDURE — 74176 CT ABD & PELVIS W/O CONTRAST: CPT

## 2021-01-01 PROCEDURE — 6360000002 HC RX W HCPCS: Performed by: EMERGENCY MEDICINE

## 2021-01-01 PROCEDURE — 6360000004 HC RX CONTRAST MEDICATION: Performed by: STUDENT IN AN ORGANIZED HEALTH CARE EDUCATION/TRAINING PROGRAM

## 2021-01-01 PROCEDURE — 94760 N-INVAS EAR/PLS OXIMETRY 1: CPT

## 2021-01-01 PROCEDURE — 6360000002 HC RX W HCPCS: Performed by: NURSE ANESTHETIST, CERTIFIED REGISTERED

## 2021-01-01 PROCEDURE — 74174 CTA ABD&PLVS W/CONTRAST: CPT

## 2021-01-01 PROCEDURE — 83036 HEMOGLOBIN GLYCOSYLATED A1C: CPT

## 2021-01-01 PROCEDURE — 99283 EMERGENCY DEPT VISIT LOW MDM: CPT

## 2021-01-01 PROCEDURE — 2500000003 HC RX 250 WO HCPCS: Performed by: REGISTERED NURSE

## 2021-01-01 PROCEDURE — 2720000010 HC SURG SUPPLY STERILE: Performed by: INTERNAL MEDICINE

## 2021-01-01 PROCEDURE — 96366 THER/PROPH/DIAG IV INF ADDON: CPT

## 2021-01-01 PROCEDURE — 51798 US URINE CAPACITY MEASURE: CPT

## 2021-01-01 PROCEDURE — 2709999900 IR FLUORO GUIDED CVA DEVICE PLMT/REPLACE/REMOVAL

## 2021-01-01 PROCEDURE — 87641 MR-STAPH DNA AMP PROBE: CPT

## 2021-01-01 PROCEDURE — 2500000003 HC RX 250 WO HCPCS

## 2021-01-01 PROCEDURE — 0W3P8ZZ CONTROL BLEEDING IN GASTROINTESTINAL TRACT, VIA NATURAL OR ARTIFICIAL OPENING ENDOSCOPIC: ICD-10-PCS | Performed by: INTERNAL MEDICINE

## 2021-01-01 PROCEDURE — 02HV33Z INSERTION OF INFUSION DEVICE INTO SUPERIOR VENA CAVA, PERCUTANEOUS APPROACH: ICD-10-PCS | Performed by: RADIOLOGY

## 2021-01-01 PROCEDURE — 36556 INSERT NON-TUNNEL CV CATH: CPT

## 2021-01-01 PROCEDURE — 83615 LACTATE (LD) (LDH) ENZYME: CPT

## 2021-01-01 PROCEDURE — 99231 SBSQ HOSP IP/OBS SF/LOW 25: CPT | Performed by: INTERNAL MEDICINE

## 2021-01-01 RX ORDER — POLYETHYLENE GLYCOL 3350 17 G/17G
17 POWDER, FOR SOLUTION ORAL DAILY PRN
Status: DISCONTINUED | OUTPATIENT
Start: 2021-01-01 | End: 2021-01-01 | Stop reason: HOSPADM

## 2021-01-01 RX ORDER — ONDANSETRON 2 MG/ML
4 INJECTION INTRAMUSCULAR; INTRAVENOUS EVERY 6 HOURS PRN
Status: DISCONTINUED | OUTPATIENT
Start: 2021-01-01 | End: 2021-01-01 | Stop reason: SDUPTHER

## 2021-01-01 RX ORDER — MORPHINE SULFATE 10 MG/5ML
2.5 SOLUTION ORAL
Status: DISCONTINUED | OUTPATIENT
Start: 2021-01-01 | End: 2021-01-01

## 2021-01-01 RX ORDER — ASPIRIN 81 MG/1
81 TABLET, CHEWABLE ORAL DAILY
Status: DISCONTINUED | OUTPATIENT
Start: 2021-01-01 | End: 2021-01-01 | Stop reason: HOSPADM

## 2021-01-01 RX ORDER — PROPOFOL 10 MG/ML
INJECTION, EMULSION INTRAVENOUS PRN
Status: DISCONTINUED | OUTPATIENT
Start: 2021-01-01 | End: 2021-01-01 | Stop reason: SDUPTHER

## 2021-01-01 RX ORDER — LOPERAMIDE HYDROCHLORIDE 2 MG/1
2 CAPSULE ORAL 4 TIMES DAILY PRN
Status: DISCONTINUED | OUTPATIENT
Start: 2021-01-01 | End: 2021-01-01 | Stop reason: HOSPADM

## 2021-01-01 RX ORDER — CYCLOBENZAPRINE HCL 10 MG
10 TABLET ORAL DAILY
Status: DISCONTINUED | OUTPATIENT
Start: 2021-01-01 | End: 2021-01-01 | Stop reason: HOSPADM

## 2021-01-01 RX ORDER — PETROLATUM,WHITE
1 OINTMENT IN PACKET (GRAM) TOPICAL PRN
Refills: 0 | DISCHARGE
Start: 2021-01-01

## 2021-01-01 RX ORDER — ONDANSETRON 4 MG/1
4 TABLET, ORALLY DISINTEGRATING ORAL EVERY 8 HOURS PRN
Status: DISCONTINUED | OUTPATIENT
Start: 2021-01-01 | End: 2021-01-01 | Stop reason: HOSPADM

## 2021-01-01 RX ORDER — ONDANSETRON 2 MG/ML
4 INJECTION INTRAMUSCULAR; INTRAVENOUS ONCE
Status: COMPLETED | OUTPATIENT
Start: 2021-01-01 | End: 2021-01-01

## 2021-01-01 RX ORDER — ALBUTEROL SULFATE 90 UG/1
1 AEROSOL, METERED RESPIRATORY (INHALATION) EVERY 4 HOURS PRN
COMMUNITY
Start: 2021-01-01

## 2021-01-01 RX ORDER — DIPHENHYDRAMINE HCL 25 MG
25 TABLET ORAL
Status: COMPLETED | OUTPATIENT
Start: 2021-01-01 | End: 2021-01-01

## 2021-01-01 RX ORDER — LOSARTAN POTASSIUM 50 MG/1
50 TABLET ORAL DAILY
Qty: 30 TABLET | Refills: 3 | DISCHARGE
Start: 2021-01-01

## 2021-01-01 RX ORDER — ASCORBIC ACID 500 MG
500 TABLET ORAL DAILY
Status: DISCONTINUED | OUTPATIENT
Start: 2021-01-01 | End: 2021-01-01 | Stop reason: HOSPADM

## 2021-01-01 RX ORDER — ACETAMINOPHEN 650 MG/1
650 SUPPOSITORY RECTAL EVERY 6 HOURS PRN
Status: DISCONTINUED | OUTPATIENT
Start: 2021-01-01 | End: 2021-01-01 | Stop reason: HOSPADM

## 2021-01-01 RX ORDER — DEXTROSE MONOHYDRATE 50 MG/ML
100 INJECTION, SOLUTION INTRAVENOUS PRN
Status: DISCONTINUED | OUTPATIENT
Start: 2021-01-01 | End: 2021-01-01 | Stop reason: HOSPADM

## 2021-01-01 RX ORDER — HEPARIN SODIUM 5000 [USP'U]/ML
5000 INJECTION, SOLUTION INTRAVENOUS; SUBCUTANEOUS EVERY 8 HOURS SCHEDULED
Status: DISCONTINUED | OUTPATIENT
Start: 2021-01-01 | End: 2021-01-01

## 2021-01-01 RX ORDER — SODIUM CHLORIDE 9 MG/ML
INJECTION, SOLUTION INTRAVENOUS PRN
Status: DISCONTINUED | OUTPATIENT
Start: 2021-01-01 | End: 2021-01-01 | Stop reason: HOSPADM

## 2021-01-01 RX ORDER — FERROUS SULFATE 325(65) MG
325 TABLET ORAL
Status: DISCONTINUED | OUTPATIENT
Start: 2021-01-01 | End: 2021-01-01 | Stop reason: HOSPADM

## 2021-01-01 RX ORDER — ASPIRIN 81 MG/1
81 TABLET ORAL DAILY
Status: DISCONTINUED | OUTPATIENT
Start: 2021-01-01 | End: 2021-01-01 | Stop reason: HOSPADM

## 2021-01-01 RX ORDER — FENTANYL CITRATE 50 UG/ML
25 INJECTION, SOLUTION INTRAMUSCULAR; INTRAVENOUS ONCE
Status: COMPLETED | OUTPATIENT
Start: 2021-01-01 | End: 2021-01-01

## 2021-01-01 RX ORDER — SODIUM CHLORIDE 9 MG/ML
25 INJECTION, SOLUTION INTRAVENOUS PRN
Status: CANCELLED | OUTPATIENT
Start: 2021-01-01

## 2021-01-01 RX ORDER — HEPARIN SODIUM 1000 [USP'U]/ML
80 INJECTION, SOLUTION INTRAVENOUS; SUBCUTANEOUS PRN
Status: DISCONTINUED | OUTPATIENT
Start: 2021-01-01 | End: 2021-01-01

## 2021-01-01 RX ORDER — METOPROLOL SUCCINATE 50 MG/1
50 TABLET, EXTENDED RELEASE ORAL DAILY
Status: ON HOLD | COMMUNITY
Start: 2021-01-01 | End: 2021-01-01 | Stop reason: HOSPADM

## 2021-01-01 RX ORDER — OXYCODONE HYDROCHLORIDE AND ACETAMINOPHEN 5; 325 MG/1; MG/1
1 TABLET ORAL ONCE
Status: COMPLETED | OUTPATIENT
Start: 2021-01-01 | End: 2021-01-01

## 2021-01-01 RX ORDER — ISOSORBIDE MONONITRATE 30 MG/1
30 TABLET, EXTENDED RELEASE ORAL DAILY
Status: DISCONTINUED | OUTPATIENT
Start: 2021-01-01 | End: 2021-01-01

## 2021-01-01 RX ORDER — ONDANSETRON 2 MG/ML
4 INJECTION INTRAMUSCULAR; INTRAVENOUS EVERY 6 HOURS PRN
Status: DISCONTINUED | OUTPATIENT
Start: 2021-01-01 | End: 2021-01-01 | Stop reason: HOSPADM

## 2021-01-01 RX ORDER — TRAMADOL HYDROCHLORIDE 50 MG/1
50 TABLET ORAL EVERY 6 HOURS PRN
Status: DISCONTINUED | OUTPATIENT
Start: 2021-01-01 | End: 2021-01-01

## 2021-01-01 RX ORDER — SODIUM CHLORIDE 9 MG/ML
25 INJECTION, SOLUTION INTRAVENOUS PRN
Status: DISCONTINUED | OUTPATIENT
Start: 2021-01-01 | End: 2021-01-01 | Stop reason: HOSPADM

## 2021-01-01 RX ORDER — SODIUM CHLORIDE 0.9 % (FLUSH) 0.9 %
5-40 SYRINGE (ML) INJECTION PRN
Status: DISCONTINUED | OUTPATIENT
Start: 2021-01-01 | End: 2021-01-01 | Stop reason: SDUPTHER

## 2021-01-01 RX ORDER — HEPARIN SODIUM 1000 [USP'U]/ML
2000 INJECTION, SOLUTION INTRAVENOUS; SUBCUTANEOUS PRN
Status: DISCONTINUED | OUTPATIENT
Start: 2021-01-01 | End: 2021-01-01

## 2021-01-01 RX ORDER — CLOPIDOGREL BISULFATE 75 MG/1
75 TABLET ORAL DAILY
Status: DISCONTINUED | OUTPATIENT
Start: 2021-01-01 | End: 2021-01-01 | Stop reason: HOSPADM

## 2021-01-01 RX ORDER — COSYNTROPIN 0.25 MG/ML
250 INJECTION, POWDER, FOR SOLUTION INTRAMUSCULAR; INTRAVENOUS ONCE
Status: DISCONTINUED | OUTPATIENT
Start: 2021-01-01 | End: 2021-01-01

## 2021-01-01 RX ORDER — CINACALCET 30 MG/1
30 TABLET, FILM COATED ORAL
Status: DISCONTINUED | OUTPATIENT
Start: 2021-01-01 | End: 2021-01-01 | Stop reason: HOSPADM

## 2021-01-01 RX ORDER — METOPROLOL TARTRATE 100 MG/1
100 TABLET ORAL 2 TIMES DAILY
Status: DISCONTINUED | OUTPATIENT
Start: 2021-01-01 | End: 2021-01-01 | Stop reason: HOSPADM

## 2021-01-01 RX ORDER — NICOTINE POLACRILEX 4 MG
15 LOZENGE BUCCAL PRN
Status: DISCONTINUED | OUTPATIENT
Start: 2021-01-01 | End: 2021-01-01 | Stop reason: HOSPADM

## 2021-01-01 RX ORDER — PANTOPRAZOLE SODIUM 40 MG/1
40 TABLET, DELAYED RELEASE ORAL
Qty: 60 TABLET | Refills: 0 | Status: SHIPPED | OUTPATIENT
Start: 2021-01-01

## 2021-01-01 RX ORDER — SODIUM CHLORIDE 0.9 % (FLUSH) 0.9 %
5-40 SYRINGE (ML) INJECTION EVERY 12 HOURS SCHEDULED
Status: DISCONTINUED | OUTPATIENT
Start: 2021-01-01 | End: 2021-01-01 | Stop reason: HOSPADM

## 2021-01-01 RX ORDER — GABAPENTIN 100 MG/1
100 CAPSULE ORAL 3 TIMES DAILY
Status: DISCONTINUED | OUTPATIENT
Start: 2021-01-01 | End: 2021-01-01 | Stop reason: HOSPADM

## 2021-01-01 RX ORDER — METOPROLOL TARTRATE 50 MG/1
50 TABLET, FILM COATED ORAL 2 TIMES DAILY
Status: DISCONTINUED | OUTPATIENT
Start: 2021-01-01 | End: 2021-01-01 | Stop reason: HOSPADM

## 2021-01-01 RX ORDER — LIDOCAINE HYDROCHLORIDE 10 MG/ML
5 INJECTION, SOLUTION EPIDURAL; INFILTRATION; INTRACAUDAL; PERINEURAL ONCE
Status: DISCONTINUED | OUTPATIENT
Start: 2021-01-01 | End: 2021-01-01 | Stop reason: HOSPADM

## 2021-01-01 RX ORDER — SODIUM CHLORIDE 9 MG/ML
500 INJECTION, SOLUTION INTRAVENOUS CONTINUOUS PRN
Status: ACTIVE | OUTPATIENT
Start: 2021-01-01 | End: 2021-01-01

## 2021-01-01 RX ORDER — CLONIDINE HYDROCHLORIDE 0.1 MG/1
0.1 TABLET ORAL DAILY
Status: ON HOLD | COMMUNITY
Start: 2021-01-01 | End: 2021-01-01 | Stop reason: HOSPADM

## 2021-01-01 RX ORDER — LIDOCAINE HYDROCHLORIDE 20 MG/ML
INJECTION, SOLUTION EPIDURAL; INFILTRATION; INTRACAUDAL; PERINEURAL PRN
Status: DISCONTINUED | OUTPATIENT
Start: 2021-01-01 | End: 2021-01-01 | Stop reason: SDUPTHER

## 2021-01-01 RX ORDER — ISOSORBIDE MONONITRATE 30 MG/1
30 TABLET, EXTENDED RELEASE ORAL DAILY
Status: DISCONTINUED | OUTPATIENT
Start: 2021-01-01 | End: 2021-01-01 | Stop reason: HOSPADM

## 2021-01-01 RX ORDER — SODIUM CHLORIDE 0.9 % (FLUSH) 0.9 %
5-40 SYRINGE (ML) INJECTION PRN
Status: CANCELLED | OUTPATIENT
Start: 2021-01-01

## 2021-01-01 RX ORDER — PANTOPRAZOLE SODIUM 40 MG/1
40 TABLET, DELAYED RELEASE ORAL
Status: DISCONTINUED | OUTPATIENT
Start: 2021-01-01 | End: 2021-01-01 | Stop reason: HOSPADM

## 2021-01-01 RX ORDER — POLYETHYLENE GLYCOL 3350 17 G/17G
17 POWDER, FOR SOLUTION ORAL DAILY
Status: DISCONTINUED | OUTPATIENT
Start: 2021-01-01 | End: 2021-01-01 | Stop reason: HOSPADM

## 2021-01-01 RX ORDER — CINACALCET 30 MG/1
60 TABLET, FILM COATED ORAL
Status: DISCONTINUED | OUTPATIENT
Start: 2021-01-01 | End: 2021-01-01 | Stop reason: HOSPADM

## 2021-01-01 RX ORDER — METOCLOPRAMIDE HYDROCHLORIDE 5 MG/ML
5 INJECTION INTRAMUSCULAR; INTRAVENOUS EVERY 12 HOURS PRN
Status: DISCONTINUED | OUTPATIENT
Start: 2021-01-01 | End: 2021-01-01 | Stop reason: HOSPADM

## 2021-01-01 RX ORDER — HYDRALAZINE HYDROCHLORIDE 50 MG/1
50 TABLET, FILM COATED ORAL DAILY
Status: ON HOLD | COMMUNITY
Start: 2021-01-01 | End: 2021-01-01 | Stop reason: HOSPADM

## 2021-01-01 RX ORDER — ATORVASTATIN CALCIUM 40 MG/1
40 TABLET, FILM COATED ORAL NIGHTLY
Status: DISCONTINUED | OUTPATIENT
Start: 2021-01-01 | End: 2021-01-01 | Stop reason: HOSPADM

## 2021-01-01 RX ORDER — 0.9 % SODIUM CHLORIDE 0.9 %
250 INTRAVENOUS SOLUTION INTRAVENOUS ONCE
Status: COMPLETED | OUTPATIENT
Start: 2021-01-01 | End: 2021-01-01

## 2021-01-01 RX ORDER — SODIUM CHLORIDE 9 MG/ML
10 INJECTION INTRAVENOUS DAILY
Status: DISCONTINUED | OUTPATIENT
Start: 2021-01-01 | End: 2021-01-01 | Stop reason: ALTCHOICE

## 2021-01-01 RX ORDER — HYDRALAZINE HYDROCHLORIDE 20 MG/ML
20 INJECTION INTRAMUSCULAR; INTRAVENOUS ONCE
Status: COMPLETED | OUTPATIENT
Start: 2021-01-01 | End: 2021-01-01

## 2021-01-01 RX ORDER — DOCUSATE SODIUM 100 MG/1
100 CAPSULE, LIQUID FILLED ORAL 2 TIMES DAILY
Status: DISCONTINUED | OUTPATIENT
Start: 2021-01-01 | End: 2021-01-01 | Stop reason: HOSPADM

## 2021-01-01 RX ORDER — LABETALOL 20 MG/4 ML (5 MG/ML) INTRAVENOUS SYRINGE
Status: COMPLETED
Start: 2021-01-01 | End: 2021-01-01

## 2021-01-01 RX ORDER — FERROUS SULFATE 325(65) MG
325 TABLET ORAL DAILY
COMMUNITY
Start: 2019-06-24

## 2021-01-01 RX ORDER — IPRATROPIUM BROMIDE AND ALBUTEROL SULFATE 2.5; .5 MG/3ML; MG/3ML
1 SOLUTION RESPIRATORY (INHALATION) EVERY 4 HOURS PRN
Status: DISCONTINUED | OUTPATIENT
Start: 2021-01-01 | End: 2021-01-01 | Stop reason: HOSPADM

## 2021-01-01 RX ORDER — DEXTROSE MONOHYDRATE 100 MG/ML
INJECTION, SOLUTION INTRAVENOUS CONTINUOUS
Status: DISCONTINUED | OUTPATIENT
Start: 2021-01-01 | End: 2021-01-01

## 2021-01-01 RX ORDER — NITROGLYCERIN 0.4 MG/1
0.4 TABLET SUBLINGUAL EVERY 5 MIN PRN
Status: DISCONTINUED | OUTPATIENT
Start: 2021-01-01 | End: 2021-01-01 | Stop reason: HOSPADM

## 2021-01-01 RX ORDER — LORAZEPAM 2 MG/ML
1 INJECTION INTRAMUSCULAR EVERY 4 HOURS PRN
Status: DISCONTINUED | OUTPATIENT
Start: 2021-01-01 | End: 2021-01-01

## 2021-01-01 RX ORDER — SODIUM CHLORIDE 0.9 % (FLUSH) 0.9 %
5-40 SYRINGE (ML) INJECTION PRN
Status: DISCONTINUED | OUTPATIENT
Start: 2021-01-01 | End: 2021-01-01 | Stop reason: HOSPADM

## 2021-01-01 RX ORDER — DIPHENHYDRAMINE HCL 25 MG
12.5 TABLET ORAL
Status: COMPLETED | OUTPATIENT
Start: 2021-01-01 | End: 2021-01-01

## 2021-01-01 RX ORDER — HYDRALAZINE HYDROCHLORIDE 20 MG/ML
INJECTION INTRAMUSCULAR; INTRAVENOUS
Status: DISCONTINUED
Start: 2021-01-01 | End: 2021-01-01

## 2021-01-01 RX ORDER — ATROPINE SULFATE 0.1 MG/ML
0.5 INJECTION INTRAVENOUS EVERY 5 MIN PRN
Status: DISPENSED | OUTPATIENT
Start: 2021-01-01 | End: 2021-01-01

## 2021-01-01 RX ORDER — SODIUM CHLORIDE 0.9 % (FLUSH) 0.9 %
5-40 SYRINGE (ML) INJECTION PRN
Status: ACTIVE | OUTPATIENT
Start: 2021-01-01 | End: 2021-01-01

## 2021-01-01 RX ORDER — DEXTROSE MONOHYDRATE 25 G/50ML
12.5 INJECTION, SOLUTION INTRAVENOUS PRN
Status: DISCONTINUED | OUTPATIENT
Start: 2021-01-01 | End: 2021-01-01 | Stop reason: HOSPADM

## 2021-01-01 RX ORDER — HYDROCODONE BITARTRATE AND ACETAMINOPHEN 5; 325 MG/1; MG/1
1 TABLET ORAL EVERY 6 HOURS PRN
Status: DISCONTINUED | OUTPATIENT
Start: 2021-01-01 | End: 2021-01-01

## 2021-01-01 RX ORDER — DEXAMETHASONE 4 MG/1
6 TABLET ORAL DAILY
Status: COMPLETED | OUTPATIENT
Start: 2021-01-01 | End: 2021-01-01

## 2021-01-01 RX ORDER — CALCITRIOL 0.25 UG/1
1.5 CAPSULE, LIQUID FILLED ORAL
Status: DISCONTINUED | OUTPATIENT
Start: 2021-01-01 | End: 2021-01-01 | Stop reason: HOSPADM

## 2021-01-01 RX ORDER — LOSARTAN POTASSIUM 50 MG/1
50 TABLET ORAL DAILY
Status: DISCONTINUED | OUTPATIENT
Start: 2021-01-01 | End: 2021-01-01 | Stop reason: HOSPADM

## 2021-01-01 RX ORDER — COSYNTROPIN 0.25 MG/ML
250 INJECTION, POWDER, FOR SOLUTION INTRAMUSCULAR; INTRAVENOUS ONCE
Status: COMPLETED | OUTPATIENT
Start: 2021-01-01 | End: 2021-01-01

## 2021-01-01 RX ORDER — DEXAMETHASONE 4 MG/1
10 TABLET ORAL ONCE
Status: COMPLETED | OUTPATIENT
Start: 2021-01-01 | End: 2021-01-01

## 2021-01-01 RX ORDER — LISINOPRIL 20 MG/1
20 TABLET ORAL DAILY
Status: DISCONTINUED | OUTPATIENT
Start: 2021-01-01 | End: 2021-01-01 | Stop reason: HOSPADM

## 2021-01-01 RX ORDER — CINACALCET 30 MG/1
60 TABLET, FILM COATED ORAL
COMMUNITY

## 2021-01-01 RX ORDER — ONDANSETRON 2 MG/ML
INJECTION INTRAMUSCULAR; INTRAVENOUS
Status: COMPLETED
Start: 2021-01-01 | End: 2021-01-01

## 2021-01-01 RX ORDER — DICYCLOMINE HYDROCHLORIDE 10 MG/1
10 CAPSULE ORAL
Status: DISCONTINUED | OUTPATIENT
Start: 2021-01-01 | End: 2021-01-01 | Stop reason: HOSPADM

## 2021-01-01 RX ORDER — LIDOCAINE HYDROCHLORIDE 20 MG/ML
INJECTION, SOLUTION INFILTRATION; PERINEURAL PRN
Status: DISCONTINUED | OUTPATIENT
Start: 2021-01-01 | End: 2021-01-01 | Stop reason: SDUPTHER

## 2021-01-01 RX ORDER — FOLIC ACID/VIT B COMPLEX AND C 5 MG
1 TABLET ORAL DAILY
Status: DISCONTINUED | OUTPATIENT
Start: 2021-01-01 | End: 2021-01-01 | Stop reason: HOSPADM

## 2021-01-01 RX ORDER — LIDOCAINE 4 G/G
2 PATCH TOPICAL DAILY
Status: DISCONTINUED | OUTPATIENT
Start: 2021-01-01 | End: 2021-01-01 | Stop reason: HOSPADM

## 2021-01-01 RX ORDER — HEPARIN SODIUM 1000 [USP'U]/ML
40 INJECTION, SOLUTION INTRAVENOUS; SUBCUTANEOUS PRN
Status: DISCONTINUED | OUTPATIENT
Start: 2021-01-01 | End: 2021-01-01

## 2021-01-01 RX ORDER — MORPHINE SULFATE 2 MG/ML
2 INJECTION, SOLUTION INTRAMUSCULAR; INTRAVENOUS ONCE
Status: COMPLETED | OUTPATIENT
Start: 2021-01-01 | End: 2021-01-01

## 2021-01-01 RX ORDER — LIDOCAINE 40 MG/G
CREAM TOPICAL
Status: DISCONTINUED | OUTPATIENT
Start: 2021-01-01 | End: 2021-01-01 | Stop reason: HOSPADM

## 2021-01-01 RX ORDER — ACETAMINOPHEN 325 MG/1
650 TABLET ORAL EVERY 6 HOURS PRN
Status: DISCONTINUED | OUTPATIENT
Start: 2021-01-01 | End: 2021-01-01 | Stop reason: HOSPADM

## 2021-01-01 RX ORDER — ASPIRIN 81 MG/1
324 TABLET, CHEWABLE ORAL ONCE
Status: COMPLETED | OUTPATIENT
Start: 2021-01-01 | End: 2021-01-01

## 2021-01-01 RX ORDER — METOCLOPRAMIDE HYDROCHLORIDE 5 MG/ML
10 INJECTION INTRAMUSCULAR; INTRAVENOUS EVERY 12 HOURS PRN
Status: DISCONTINUED | OUTPATIENT
Start: 2021-01-01 | End: 2021-01-01 | Stop reason: DRUGHIGH

## 2021-01-01 RX ORDER — POTASSIUM CHLORIDE 20 MEQ/1
20 TABLET, EXTENDED RELEASE ORAL ONCE
Status: COMPLETED | OUTPATIENT
Start: 2021-01-01 | End: 2021-01-01

## 2021-01-01 RX ORDER — LORAZEPAM 2 MG/ML
1 INJECTION INTRAMUSCULAR ONCE
Status: COMPLETED | OUTPATIENT
Start: 2021-01-01 | End: 2021-01-01

## 2021-01-01 RX ORDER — ACETAMINOPHEN 325 MG/1
650 TABLET ORAL EVERY 4 HOURS PRN
Status: DISCONTINUED | OUTPATIENT
Start: 2021-01-01 | End: 2021-01-01 | Stop reason: HOSPADM

## 2021-01-01 RX ORDER — CLONIDINE HYDROCHLORIDE 0.1 MG/1
0.1 TABLET ORAL 2 TIMES DAILY
Status: DISCONTINUED | OUTPATIENT
Start: 2021-01-01 | End: 2021-01-01 | Stop reason: HOSPADM

## 2021-01-01 RX ORDER — NITROGLYCERIN 0.4 MG/1
0.4 TABLET SUBLINGUAL EVERY 5 MIN PRN
Status: ACTIVE | OUTPATIENT
Start: 2021-01-01 | End: 2021-01-01

## 2021-01-01 RX ORDER — SODIUM CHLORIDE 9 MG/ML
25 INJECTION, SOLUTION INTRAVENOUS PRN
Status: DISCONTINUED | OUTPATIENT
Start: 2021-01-01 | End: 2021-01-01 | Stop reason: SDUPTHER

## 2021-01-01 RX ORDER — LORAZEPAM 1 MG/1
1 TABLET ORAL EVERY 4 HOURS PRN
Status: DISCONTINUED | OUTPATIENT
Start: 2021-01-01 | End: 2021-01-01

## 2021-01-01 RX ORDER — 0.9 % SODIUM CHLORIDE 0.9 %
500 INTRAVENOUS SOLUTION INTRAVENOUS ONCE
Status: COMPLETED | OUTPATIENT
Start: 2021-01-01 | End: 2021-01-01

## 2021-01-01 RX ORDER — HYDRALAZINE HYDROCHLORIDE 25 MG/1
25 TABLET, FILM COATED ORAL 2 TIMES DAILY
Status: DISCONTINUED | OUTPATIENT
Start: 2021-01-01 | End: 2021-01-01 | Stop reason: HOSPADM

## 2021-01-01 RX ORDER — ALBUTEROL SULFATE 90 UG/1
1 AEROSOL, METERED RESPIRATORY (INHALATION) EVERY 4 HOURS PRN
Status: ON HOLD | COMMUNITY
Start: 2021-01-01 | End: 2021-01-01 | Stop reason: HOSPADM

## 2021-01-01 RX ORDER — HEPARIN SODIUM 5000 [USP'U]/ML
5000 INJECTION, SOLUTION INTRAVENOUS; SUBCUTANEOUS EVERY 8 HOURS SCHEDULED
Status: DISCONTINUED | OUTPATIENT
Start: 2021-01-01 | End: 2021-01-01 | Stop reason: HOSPADM

## 2021-01-01 RX ORDER — HYDROCODONE BITARTRATE AND ACETAMINOPHEN 5; 325 MG/1; MG/1
1 TABLET ORAL EVERY 4 HOURS PRN
Status: DISCONTINUED | OUTPATIENT
Start: 2021-01-01 | End: 2021-01-01 | Stop reason: HOSPADM

## 2021-01-01 RX ORDER — SODIUM CHLORIDE 9 MG/ML
INJECTION, SOLUTION INTRAVENOUS CONTINUOUS
Status: ACTIVE | OUTPATIENT
Start: 2021-01-01 | End: 2021-01-01

## 2021-01-01 RX ORDER — SODIUM POLYSTYRENE SULFONATE 15 G/60ML
30 SUSPENSION ORAL; RECTAL ONCE
Status: COMPLETED | OUTPATIENT
Start: 2021-01-01 | End: 2021-01-01

## 2021-01-01 RX ORDER — IPRATROPIUM BROMIDE AND ALBUTEROL SULFATE 2.5; .5 MG/3ML; MG/3ML
1 SOLUTION RESPIRATORY (INHALATION) EVERY 4 HOURS
Status: DISCONTINUED | OUTPATIENT
Start: 2021-01-01 | End: 2021-01-01

## 2021-01-01 RX ORDER — HEPARIN SODIUM 10000 [USP'U]/100ML
5-30 INJECTION, SOLUTION INTRAVENOUS CONTINUOUS
Status: DISCONTINUED | OUTPATIENT
Start: 2021-01-01 | End: 2021-01-01

## 2021-01-01 RX ORDER — CLOPIDOGREL BISULFATE 75 MG/1
75 TABLET ORAL DAILY
COMMUNITY
Start: 2021-01-01

## 2021-01-01 RX ORDER — METOPROLOL TARTRATE 5 MG/5ML
5 INJECTION INTRAVENOUS EVERY 8 HOURS PRN
Status: DISCONTINUED | OUTPATIENT
Start: 2021-01-01 | End: 2021-01-01 | Stop reason: HOSPADM

## 2021-01-01 RX ORDER — GABAPENTIN 100 MG/1
100 CAPSULE ORAL NIGHTLY
Status: DISCONTINUED | OUTPATIENT
Start: 2021-01-01 | End: 2021-01-01 | Stop reason: HOSPADM

## 2021-01-01 RX ORDER — GUAIFENESIN/DEXTROMETHORPHAN 100-10MG/5
5 SYRUP ORAL EVERY 4 HOURS PRN
Status: DISCONTINUED | OUTPATIENT
Start: 2021-01-01 | End: 2021-01-01 | Stop reason: HOSPADM

## 2021-01-01 RX ORDER — HYDRALAZINE HYDROCHLORIDE 25 MG/1
25 TABLET, FILM COATED ORAL 2 TIMES DAILY
Status: DISCONTINUED | OUTPATIENT
Start: 2021-01-01 | End: 2021-01-01

## 2021-01-01 RX ORDER — SODIUM CHLORIDE 0.9 % (FLUSH) 0.9 %
5-40 SYRINGE (ML) INJECTION EVERY 12 HOURS SCHEDULED
Status: DISCONTINUED | OUTPATIENT
Start: 2021-01-01 | End: 2021-01-01 | Stop reason: SDUPTHER

## 2021-01-01 RX ORDER — HYDRALAZINE HYDROCHLORIDE 20 MG/ML
10 INJECTION INTRAMUSCULAR; INTRAVENOUS EVERY 6 HOURS PRN
Status: DISCONTINUED | OUTPATIENT
Start: 2021-01-01 | End: 2021-01-01 | Stop reason: HOSPADM

## 2021-01-01 RX ORDER — SODIUM POLYSTYRENE SULFONATE 15 G/60ML
SUSPENSION ORAL; RECTAL
Status: DISPENSED
Start: 2021-01-01 | End: 2021-01-01

## 2021-01-01 RX ORDER — ASPIRIN 81 MG/1
81 TABLET, COATED ORAL DAILY
COMMUNITY
Start: 2021-01-01

## 2021-01-01 RX ORDER — SODIUM CHLORIDE 0.9 % (FLUSH) 0.9 %
5-40 SYRINGE (ML) INJECTION EVERY 12 HOURS SCHEDULED
Status: CANCELLED | OUTPATIENT
Start: 2021-01-01

## 2021-01-01 RX ORDER — HEPARIN SODIUM 1000 [USP'U]/ML
80 INJECTION, SOLUTION INTRAVENOUS; SUBCUTANEOUS ONCE
Status: COMPLETED | OUTPATIENT
Start: 2021-01-01 | End: 2021-01-01

## 2021-01-01 RX ORDER — IPRATROPIUM BROMIDE AND ALBUTEROL SULFATE 2.5; .5 MG/3ML; MG/3ML
1 SOLUTION RESPIRATORY (INHALATION) ONCE
Status: COMPLETED | OUTPATIENT
Start: 2021-01-01 | End: 2021-01-01

## 2021-01-01 RX ORDER — CINACALCET 30 MG/1
60 TABLET, FILM COATED ORAL
Status: DISCONTINUED | OUTPATIENT
Start: 2021-01-01 | End: 2021-01-01

## 2021-01-01 RX ORDER — PANTOPRAZOLE SODIUM 40 MG/10ML
40 INJECTION, POWDER, LYOPHILIZED, FOR SOLUTION INTRAVENOUS DAILY
Status: DISCONTINUED | OUTPATIENT
Start: 2021-01-01 | End: 2021-01-01 | Stop reason: ALTCHOICE

## 2021-01-01 RX ORDER — PANTOPRAZOLE SODIUM 40 MG/1
40 TABLET, DELAYED RELEASE ORAL
Status: DISCONTINUED | OUTPATIENT
Start: 2021-01-01 | End: 2021-01-01 | Stop reason: SDUPTHER

## 2021-01-01 RX ORDER — ASCORBIC ACID, THIAMINE MONONITRATE,RIBOFLAVIN, NIACINAMIDE, PYRIDOXINE HYDROCHLORIDE, FOLIC ACID, CYANOCOBALAMIN, BIOTIN, CALCIUM PANTOTHENATE, 100; 1.5; 1.7; 20; 10; 1; 6000; 150000; 5 MG/1; MG/1; MG/1; MG/1; MG/1; MG/1; UG/1; UG/1; MG/1
1 CAPSULE, LIQUID FILLED ORAL DAILY
Status: DISCONTINUED | OUTPATIENT
Start: 2021-01-01 | End: 2021-01-01 | Stop reason: CLARIF

## 2021-01-01 RX ORDER — MIDAZOLAM HYDROCHLORIDE 1 MG/ML
5 INJECTION INTRAMUSCULAR; INTRAVENOUS ONCE
Status: COMPLETED | OUTPATIENT
Start: 2021-01-01 | End: 2021-01-01

## 2021-01-01 RX ORDER — LISINOPRIL 20 MG/1
20 TABLET ORAL DAILY
Status: DISCONTINUED | OUTPATIENT
Start: 2021-01-01 | End: 2021-01-01

## 2021-01-01 RX ORDER — PRAVASTATIN SODIUM 80 MG/1
80 TABLET ORAL DAILY
Status: DISCONTINUED | OUTPATIENT
Start: 2021-01-01 | End: 2021-01-01

## 2021-01-01 RX ORDER — TRAMADOL HYDROCHLORIDE 50 MG/1
50 TABLET ORAL EVERY 8 HOURS PRN
Status: ON HOLD | COMMUNITY
End: 2021-01-01

## 2021-01-01 RX ORDER — CLONIDINE HYDROCHLORIDE 0.1 MG/1
0.1 TABLET ORAL 2 TIMES DAILY
Status: DISCONTINUED | OUTPATIENT
Start: 2021-01-01 | End: 2021-01-01

## 2021-01-01 RX ORDER — ACETAMINOPHEN 325 MG/1
650 TABLET ORAL ONCE
Status: COMPLETED | OUTPATIENT
Start: 2021-01-01 | End: 2021-01-01

## 2021-01-01 RX ORDER — SODIUM CHLORIDE 9 MG/ML
INJECTION, SOLUTION INTRAVENOUS CONTINUOUS PRN
Status: DISCONTINUED | OUTPATIENT
Start: 2021-01-01 | End: 2021-01-01 | Stop reason: SDUPTHER

## 2021-01-01 RX ORDER — FERROUS SULFATE 325(65) MG
325 TABLET ORAL DAILY
Status: DISCONTINUED | OUTPATIENT
Start: 2021-01-01 | End: 2021-01-01 | Stop reason: HOSPADM

## 2021-01-01 RX ORDER — HYDRALAZINE HYDROCHLORIDE 25 MG/1
25 TABLET, FILM COATED ORAL 2 TIMES DAILY
Qty: 90 TABLET | Refills: 3 | Status: ON HOLD | OUTPATIENT
Start: 2021-01-01 | End: 2021-01-01

## 2021-01-01 RX ORDER — TRAMADOL HYDROCHLORIDE 50 MG/1
50 TABLET ORAL EVERY 4 HOURS PRN
Status: DISCONTINUED | OUTPATIENT
Start: 2021-01-01 | End: 2021-01-01 | Stop reason: HOSPADM

## 2021-01-01 RX ORDER — PROMETHAZINE HYDROCHLORIDE 25 MG/1
12.5 TABLET ORAL EVERY 6 HOURS PRN
Status: DISCONTINUED | OUTPATIENT
Start: 2021-01-01 | End: 2021-01-01 | Stop reason: HOSPADM

## 2021-01-01 RX ORDER — CLONIDINE HYDROCHLORIDE 0.2 MG/1
0.2 TABLET ORAL 3 TIMES DAILY
Status: DISCONTINUED | OUTPATIENT
Start: 2021-01-01 | End: 2021-01-01 | Stop reason: HOSPADM

## 2021-01-01 RX ORDER — ATORVASTATIN CALCIUM 40 MG/1
40 TABLET, FILM COATED ORAL DAILY
COMMUNITY
Start: 2021-01-01

## 2021-01-01 RX ORDER — BUTALBITAL, ACETAMINOPHEN AND CAFFEINE 50; 325; 40 MG/1; MG/1; MG/1
2 TABLET ORAL ONCE
Status: COMPLETED | OUTPATIENT
Start: 2021-01-01 | End: 2021-01-01

## 2021-01-01 RX ORDER — TRAMADOL HYDROCHLORIDE 50 MG/1
25 TABLET ORAL EVERY 8 HOURS PRN
Status: DISCONTINUED | OUTPATIENT
Start: 2021-01-01 | End: 2021-01-01 | Stop reason: HOSPADM

## 2021-01-01 RX ORDER — PETROLATUM,WHITE
OINTMENT IN PACKET (GRAM) TOPICAL PRN
Status: DISCONTINUED | OUTPATIENT
Start: 2021-01-01 | End: 2021-01-01 | Stop reason: HOSPADM

## 2021-01-01 RX ORDER — CEFAZOLIN SODIUM 1 G/50ML
1000 INJECTION, SOLUTION INTRAVENOUS ONCE
Status: DISCONTINUED | OUTPATIENT
Start: 2021-01-01 | End: 2021-01-01 | Stop reason: HOSPADM

## 2021-01-01 RX ORDER — AMLODIPINE BESYLATE 10 MG/1
10 TABLET ORAL DAILY
Status: ON HOLD | COMMUNITY
Start: 2019-01-26 | End: 2021-01-01 | Stop reason: HOSPADM

## 2021-01-01 RX ORDER — HEPARIN SODIUM 10000 [USP'U]/100ML
5-30 INJECTION, SOLUTION INTRAVENOUS CONTINUOUS
Status: DISPENSED | OUTPATIENT
Start: 2021-01-01 | End: 2021-01-01

## 2021-01-01 RX ORDER — LIDOCAINE HYDROCHLORIDE 10 MG/ML
4 INJECTION, SOLUTION EPIDURAL; INFILTRATION; INTRACAUDAL; PERINEURAL ONCE
Status: COMPLETED | OUTPATIENT
Start: 2021-01-01 | End: 2021-01-01

## 2021-01-01 RX ORDER — DEXTROSE MONOHYDRATE 25 G/50ML
25 INJECTION, SOLUTION INTRAVENOUS ONCE
Status: COMPLETED | OUTPATIENT
Start: 2021-01-01 | End: 2021-01-01

## 2021-01-01 RX ORDER — HYDRALAZINE HYDROCHLORIDE 50 MG/1
50 TABLET, FILM COATED ORAL 2 TIMES DAILY
Status: DISCONTINUED | OUTPATIENT
Start: 2021-01-01 | End: 2021-01-01

## 2021-01-01 RX ORDER — METOPROLOL TARTRATE 50 MG/1
50 TABLET, FILM COATED ORAL 2 TIMES DAILY
Status: DISCONTINUED | OUTPATIENT
Start: 2021-01-01 | End: 2021-01-01

## 2021-01-01 RX ORDER — PRAVASTATIN SODIUM 80 MG/1
80 TABLET ORAL DAILY
Status: DISCONTINUED | OUTPATIENT
Start: 2021-01-01 | End: 2021-01-01 | Stop reason: HOSPADM

## 2021-01-01 RX ORDER — METOPROLOL TARTRATE 100 MG/1
100 TABLET ORAL 2 TIMES DAILY
Qty: 60 TABLET | Refills: 3 | Status: SHIPPED | OUTPATIENT
Start: 2021-01-01

## 2021-01-01 RX ORDER — LABETALOL 20 MG/4 ML (5 MG/ML) INTRAVENOUS SYRINGE
5 ONCE
Status: DISCONTINUED | OUTPATIENT
Start: 2021-01-01 | End: 2021-01-01

## 2021-01-01 RX ORDER — METHYLPREDNISOLONE ACETATE 40 MG/ML
40 INJECTION, SUSPENSION INTRA-ARTICULAR; INTRALESIONAL; INTRAMUSCULAR; SOFT TISSUE ONCE
Status: COMPLETED | OUTPATIENT
Start: 2021-01-01 | End: 2021-01-01

## 2021-01-01 RX ORDER — AMINOPHYLLINE DIHYDRATE 25 MG/ML
50 INJECTION, SOLUTION INTRAVENOUS PRN
Status: ACTIVE | OUTPATIENT
Start: 2021-01-01 | End: 2021-01-01

## 2021-01-01 RX ORDER — POLYETHYLENE GLYCOL 3350 17 G/17G
17 POWDER, FOR SOLUTION ORAL DAILY
Status: DISCONTINUED | OUTPATIENT
Start: 2021-01-01 | End: 2021-01-01

## 2021-01-01 RX ORDER — HYDRALAZINE HYDROCHLORIDE 20 MG/ML
5 INJECTION INTRAMUSCULAR; INTRAVENOUS EVERY 6 HOURS PRN
Status: DISCONTINUED | OUTPATIENT
Start: 2021-01-01 | End: 2021-01-01 | Stop reason: HOSPADM

## 2021-01-01 RX ORDER — LACTULOSE 10 G/15ML
20 SOLUTION ORAL 2 TIMES DAILY
Status: DISCONTINUED | OUTPATIENT
Start: 2021-01-01 | End: 2021-01-01 | Stop reason: HOSPADM

## 2021-01-01 RX ORDER — LORAZEPAM 2 MG/ML
INJECTION INTRAMUSCULAR
Status: COMPLETED
Start: 2021-01-01 | End: 2021-01-01

## 2021-01-01 RX ORDER — PRAVASTATIN SODIUM 80 MG/1
80 TABLET ORAL NIGHTLY
Status: DISCONTINUED | OUTPATIENT
Start: 2021-01-01 | End: 2021-01-01 | Stop reason: HOSPADM

## 2021-01-01 RX ORDER — PANTOPRAZOLE SODIUM 40 MG/1
40 TABLET, DELAYED RELEASE ORAL
Status: DISCONTINUED | OUTPATIENT
Start: 2021-01-01 | End: 2021-01-01

## 2021-01-01 RX ORDER — DEXTROSE MONOHYDRATE 50 MG/ML
INJECTION, SOLUTION INTRAVENOUS CONTINUOUS
Status: DISCONTINUED | OUTPATIENT
Start: 2021-01-01 | End: 2021-01-01

## 2021-01-01 RX ORDER — CYCLOBENZAPRINE HCL 10 MG
10 TABLET ORAL DAILY
COMMUNITY
Start: 2017-08-28

## 2021-01-01 RX ORDER — ALBUTEROL SULFATE 90 UG/1
1 AEROSOL, METERED RESPIRATORY (INHALATION) EVERY 4 HOURS PRN
Status: DISCONTINUED | OUTPATIENT
Start: 2021-01-01 | End: 2021-01-01 | Stop reason: HOSPADM

## 2021-01-01 RX ORDER — METOPROLOL TARTRATE 5 MG/5ML
5 INJECTION INTRAVENOUS EVERY 5 MIN PRN
Status: ACTIVE | OUTPATIENT
Start: 2021-01-01 | End: 2021-01-01

## 2021-01-01 RX ORDER — ALBUTEROL SULFATE 90 UG/1
2 AEROSOL, METERED RESPIRATORY (INHALATION) EVERY 6 HOURS PRN
Status: DISCONTINUED | OUTPATIENT
Start: 2021-01-01 | End: 2021-01-01 | Stop reason: HOSPADM

## 2021-01-01 RX ORDER — HYDROCODONE BITARTRATE AND ACETAMINOPHEN 5; 325 MG/1; MG/1
2 TABLET ORAL EVERY 4 HOURS PRN
Status: DISCONTINUED | OUTPATIENT
Start: 2021-01-01 | End: 2021-01-01 | Stop reason: HOSPADM

## 2021-01-01 RX ORDER — CALCITRIOL 0.25 UG/1
1.5 CAPSULE, LIQUID FILLED ORAL
COMMUNITY

## 2021-01-01 RX ORDER — CALCIUM GLUCONATE 94 MG/ML
1000 INJECTION, SOLUTION INTRAVENOUS ONCE
Status: COMPLETED | OUTPATIENT
Start: 2021-01-01 | End: 2021-01-01

## 2021-01-01 RX ORDER — HEPARIN SODIUM 1000 [USP'U]/ML
4000 INJECTION, SOLUTION INTRAVENOUS; SUBCUTANEOUS PRN
Status: DISCONTINUED | OUTPATIENT
Start: 2021-01-01 | End: 2021-01-01

## 2021-01-01 RX ORDER — PANTOPRAZOLE SODIUM 40 MG/10ML
40 INJECTION, POWDER, LYOPHILIZED, FOR SOLUTION INTRAVENOUS 2 TIMES DAILY
Status: DISCONTINUED | OUTPATIENT
Start: 2021-01-01 | End: 2021-01-01 | Stop reason: HOSPADM

## 2021-01-01 RX ORDER — HYDROCODONE BITARTRATE AND ACETAMINOPHEN 5; 325 MG/1; MG/1
1 TABLET ORAL EVERY 4 HOURS PRN
Qty: 42 TABLET | Refills: 0 | Status: SHIPPED | OUTPATIENT
Start: 2021-01-01 | End: 2021-01-01

## 2021-01-01 RX ORDER — HEPARIN SODIUM 1000 [USP'U]/ML
4000 INJECTION, SOLUTION INTRAVENOUS; SUBCUTANEOUS ONCE
Status: COMPLETED | OUTPATIENT
Start: 2021-01-01 | End: 2021-01-01

## 2021-01-01 RX ADMIN — GABAPENTIN 100 MG: 100 CAPSULE ORAL at 08:41

## 2021-01-01 RX ADMIN — DOCUSATE SODIUM 100 MG: 100 CAPSULE, LIQUID FILLED ORAL at 09:30

## 2021-01-01 RX ADMIN — METOPROLOL TARTRATE 100 MG: 100 TABLET, FILM COATED ORAL at 13:02

## 2021-01-01 RX ADMIN — HEPARIN SODIUM 5000 UNITS: 5000 INJECTION INTRAVENOUS; SUBCUTANEOUS at 06:40

## 2021-01-01 RX ADMIN — ASPIRIN 81 MG CHEWABLE TABLET 81 MG: 81 TABLET CHEWABLE at 15:20

## 2021-01-01 RX ADMIN — HYDRALAZINE HYDROCHLORIDE 50 MG: 50 TABLET, FILM COATED ORAL at 10:30

## 2021-01-01 RX ADMIN — PANTOPRAZOLE SODIUM 40 MG: 40 INJECTION, POWDER, FOR SOLUTION INTRAVENOUS at 13:40

## 2021-01-01 RX ADMIN — MIDAZOLAM 5 MG: 1 INJECTION INTRAMUSCULAR; INTRAVENOUS at 08:27

## 2021-01-01 RX ADMIN — Medication 1 TABLET: at 13:34

## 2021-01-01 RX ADMIN — OXYCODONE HYDROCHLORIDE AND ACETAMINOPHEN 500 MG: 500 TABLET ORAL at 08:59

## 2021-01-01 RX ADMIN — PIPERACILLIN AND TAZOBACTAM 2250 MG: 2; .25 INJECTION, POWDER, LYOPHILIZED, FOR SOLUTION INTRAVENOUS at 02:29

## 2021-01-01 RX ADMIN — OXYCODONE HYDROCHLORIDE AND ACETAMINOPHEN 500 MG: 500 TABLET ORAL at 09:56

## 2021-01-01 RX ADMIN — FERROUS SULFATE TAB 325 MG (65 MG ELEMENTAL FE) 325 MG: 325 (65 FE) TAB at 09:00

## 2021-01-01 RX ADMIN — HYDROCODONE BITARTRATE AND ACETAMINOPHEN 2 TABLET: 5; 325 TABLET ORAL at 20:26

## 2021-01-01 RX ADMIN — METOPROLOL TARTRATE 100 MG: 100 TABLET, FILM COATED ORAL at 01:18

## 2021-01-01 RX ADMIN — HYDROCODONE BITARTRATE AND ACETAMINOPHEN 2 TABLET: 5; 325 TABLET ORAL at 22:11

## 2021-01-01 RX ADMIN — METOPROLOL TARTRATE 100 MG: 100 TABLET, FILM COATED ORAL at 12:42

## 2021-01-01 RX ADMIN — GABAPENTIN 100 MG: 100 CAPSULE ORAL at 21:26

## 2021-01-01 RX ADMIN — CLOPIDOGREL BISULFATE 75 MG: 75 TABLET, FILM COATED ORAL at 09:22

## 2021-01-01 RX ADMIN — CLOPIDOGREL BISULFATE 75 MG: 75 TABLET ORAL at 08:46

## 2021-01-01 RX ADMIN — HYDROCODONE BITARTRATE AND ACETAMINOPHEN 1 TABLET: 5; 325 TABLET ORAL at 16:38

## 2021-01-01 RX ADMIN — POLYETHYLENE GLYCOL 3350 17 G: 17 POWDER, FOR SOLUTION ORAL at 10:32

## 2021-01-01 RX ADMIN — GABAPENTIN 100 MG: 100 CAPSULE ORAL at 13:37

## 2021-01-01 RX ADMIN — DICYCLOMINE HYDROCHLORIDE 10 MG: 10 CAPSULE ORAL at 17:40

## 2021-01-01 RX ADMIN — HEPARIN SODIUM 5540 UNITS: 1000 INJECTION, SOLUTION INTRAVENOUS; SUBCUTANEOUS at 15:23

## 2021-01-01 RX ADMIN — APIXABAN 10 MG: 5 TABLET, FILM COATED ORAL at 21:49

## 2021-01-01 RX ADMIN — SODIUM CHLORIDE, PRESERVATIVE FREE 10 ML: 5 INJECTION INTRAVENOUS at 08:48

## 2021-01-01 RX ADMIN — GABAPENTIN 100 MG: 100 CAPSULE ORAL at 09:22

## 2021-01-01 RX ADMIN — HEPARIN SODIUM 5000 UNITS: 5000 INJECTION INTRAVENOUS; SUBCUTANEOUS at 15:10

## 2021-01-01 RX ADMIN — POLYETHYLENE GLYCOL 3350 17 G: 17 POWDER, FOR SOLUTION ORAL at 08:22

## 2021-01-01 RX ADMIN — FERROUS SULFATE TAB 325 MG (65 MG ELEMENTAL FE) 325 MG: 325 (65 FE) TAB at 09:56

## 2021-01-01 RX ADMIN — Medication 15 G: at 17:30

## 2021-01-01 RX ADMIN — METOPROLOL TARTRATE 100 MG: 100 TABLET, FILM COATED ORAL at 20:04

## 2021-01-01 RX ADMIN — LABETALOL 20 MG/4 ML (5 MG/ML) INTRAVENOUS SYRINGE 5 MG: at 19:24

## 2021-01-01 RX ADMIN — Medication 10.3 MILLICURIE: at 13:07

## 2021-01-01 RX ADMIN — SODIUM CHLORIDE, PRESERVATIVE FREE 10 ML: 5 INJECTION INTRAVENOUS at 20:06

## 2021-01-01 RX ADMIN — CALCITRIOL 1.5 MCG: 0.25 CAPSULE ORAL at 07:47

## 2021-01-01 RX ADMIN — OXYCODONE HYDROCHLORIDE AND ACETAMINOPHEN 500 MG: 500 TABLET ORAL at 08:35

## 2021-01-01 RX ADMIN — HYDROCODONE BITARTRATE AND ACETAMINOPHEN 2 TABLET: 5; 325 TABLET ORAL at 06:41

## 2021-01-01 RX ADMIN — GLUCAGON HYDROCHLORIDE 1 MG: KIT at 22:15

## 2021-01-01 RX ADMIN — DIPHENHYDRAMINE HCL 12.5 MG: 25 TABLET ORAL at 08:35

## 2021-01-01 RX ADMIN — GABAPENTIN 100 MG: 100 CAPSULE ORAL at 14:37

## 2021-01-01 RX ADMIN — OXYCODONE HYDROCHLORIDE AND ACETAMINOPHEN 500 MG: 500 TABLET ORAL at 08:47

## 2021-01-01 RX ADMIN — HEPARIN SODIUM 5000 UNITS: 5000 INJECTION INTRAVENOUS; SUBCUTANEOUS at 04:08

## 2021-01-01 RX ADMIN — DOCUSATE SODIUM 100 MG: 100 CAPSULE, LIQUID FILLED ORAL at 21:26

## 2021-01-01 RX ADMIN — HYDRALAZINE HYDROCHLORIDE 5 MG: 20 INJECTION, SOLUTION INTRAMUSCULAR; INTRAVENOUS at 00:49

## 2021-01-01 RX ADMIN — DOCUSATE SODIUM 100 MG: 100 CAPSULE, LIQUID FILLED ORAL at 22:32

## 2021-01-01 RX ADMIN — HEPARIN SODIUM 5000 UNITS: 5000 INJECTION INTRAVENOUS; SUBCUTANEOUS at 04:43

## 2021-01-01 RX ADMIN — CALCIUM GLUCONATE 1000 MG: 98 INJECTION, SOLUTION INTRAVENOUS at 11:12

## 2021-01-01 RX ADMIN — ONDANSETRON 4 MG: 4 TABLET, ORALLY DISINTEGRATING ORAL at 23:51

## 2021-01-01 RX ADMIN — PANTOPRAZOLE SODIUM 40 MG: 40 TABLET, DELAYED RELEASE ORAL at 13:32

## 2021-01-01 RX ADMIN — DOCUSATE SODIUM 100 MG: 100 CAPSULE, LIQUID FILLED ORAL at 20:57

## 2021-01-01 RX ADMIN — CALCITRIOL 1.5 MCG: 0.25 CAPSULE ORAL at 13:56

## 2021-01-01 RX ADMIN — OXYCODONE HYDROCHLORIDE AND ACETAMINOPHEN 500 MG: 500 TABLET ORAL at 08:38

## 2021-01-01 RX ADMIN — ATORVASTATIN CALCIUM 40 MG: 40 TABLET, FILM COATED ORAL at 20:35

## 2021-01-01 RX ADMIN — LISINOPRIL 20 MG: 20 TABLET ORAL at 09:18

## 2021-01-01 RX ADMIN — Medication 1 TABLET: at 09:19

## 2021-01-01 RX ADMIN — CLOPIDOGREL BISULFATE 75 MG: 75 TABLET ORAL at 09:25

## 2021-01-01 RX ADMIN — OXYCODONE HYDROCHLORIDE AND ACETAMINOPHEN 500 MG: 500 TABLET ORAL at 13:35

## 2021-01-01 RX ADMIN — CALCITRIOL 1.5 MCG: 0.25 CAPSULE ORAL at 21:14

## 2021-01-01 RX ADMIN — SODIUM CHLORIDE, PRESERVATIVE FREE 10 ML: 5 INJECTION INTRAVENOUS at 21:28

## 2021-01-01 RX ADMIN — GABAPENTIN 100 MG: 100 CAPSULE ORAL at 09:03

## 2021-01-01 RX ADMIN — OXYCODONE HYDROCHLORIDE AND ACETAMINOPHEN 500 MG: 500 TABLET ORAL at 09:50

## 2021-01-01 RX ADMIN — METOPROLOL TARTRATE 100 MG: 100 TABLET, FILM COATED ORAL at 13:33

## 2021-01-01 RX ADMIN — FERROUS SULFATE TAB 325 MG (65 MG ELEMENTAL FE) 325 MG: 325 (65 FE) TAB at 10:30

## 2021-01-01 RX ADMIN — DEXTROSE MONOHYDRATE 7.5 MG/HR: 50 INJECTION, SOLUTION INTRAVENOUS at 17:31

## 2021-01-01 RX ADMIN — METOPROLOL TARTRATE 100 MG: 100 TABLET, FILM COATED ORAL at 09:39

## 2021-01-01 RX ADMIN — Medication 1 TABLET: at 13:19

## 2021-01-01 RX ADMIN — DICYCLOMINE HYDROCHLORIDE 10 MG: 10 CAPSULE ORAL at 13:47

## 2021-01-01 RX ADMIN — HYDRALAZINE HYDROCHLORIDE 10 MG: 20 INJECTION INTRAMUSCULAR; INTRAVENOUS at 01:25

## 2021-01-01 RX ADMIN — GABAPENTIN 100 MG: 100 CAPSULE ORAL at 14:24

## 2021-01-01 RX ADMIN — CLOPIDOGREL BISULFATE 75 MG: 75 TABLET, FILM COATED ORAL at 10:28

## 2021-01-01 RX ADMIN — METOPROLOL TARTRATE 50 MG: 50 TABLET, FILM COATED ORAL at 08:41

## 2021-01-01 RX ADMIN — HYDRALAZINE HYDROCHLORIDE 25 MG: 25 TABLET, FILM COATED ORAL at 09:02

## 2021-01-01 RX ADMIN — SODIUM CHLORIDE, PRESERVATIVE FREE 10 ML: 5 INJECTION INTRAVENOUS at 22:32

## 2021-01-01 RX ADMIN — CLONIDINE HYDROCHLORIDE 0.2 MG: 0.2 TABLET ORAL at 09:19

## 2021-01-01 RX ADMIN — PANTOPRAZOLE SODIUM 40 MG: 40 INJECTION, POWDER, FOR SOLUTION INTRAVENOUS at 21:28

## 2021-01-01 RX ADMIN — DOCUSATE SODIUM 100 MG: 100 CAPSULE, LIQUID FILLED ORAL at 21:24

## 2021-01-01 RX ADMIN — SODIUM CHLORIDE, PRESERVATIVE FREE 10 ML: 5 INJECTION INTRAVENOUS at 20:14

## 2021-01-01 RX ADMIN — DEXAMETHASONE 10 MG: 4 TABLET ORAL at 22:27

## 2021-01-01 RX ADMIN — SODIUM CHLORIDE, PRESERVATIVE FREE 10 ML: 5 INJECTION INTRAVENOUS at 09:57

## 2021-01-01 RX ADMIN — FERROUS SULFATE TAB 325 MG (65 MG ELEMENTAL FE) 325 MG: 325 (65 FE) TAB at 14:13

## 2021-01-01 RX ADMIN — PANTOPRAZOLE SODIUM 40 MG: 40 TABLET, DELAYED RELEASE ORAL at 06:37

## 2021-01-01 RX ADMIN — FERROUS SULFATE TAB 325 MG (65 MG ELEMENTAL FE) 325 MG: 325 (65 FE) TAB at 14:00

## 2021-01-01 RX ADMIN — PANTOPRAZOLE SODIUM 40 MG: 40 INJECTION, POWDER, FOR SOLUTION INTRAVENOUS at 07:46

## 2021-01-01 RX ADMIN — METOPROLOL TARTRATE 50 MG: 50 TABLET, FILM COATED ORAL at 00:42

## 2021-01-01 RX ADMIN — CLOPIDOGREL BISULFATE 75 MG: 75 TABLET, FILM COATED ORAL at 08:17

## 2021-01-01 RX ADMIN — DEXAMETHASONE 6 MG: 4 TABLET ORAL at 00:35

## 2021-01-01 RX ADMIN — ISOSORBIDE MONONITRATE 30 MG: 30 TABLET ORAL at 08:47

## 2021-01-01 RX ADMIN — SODIUM CHLORIDE, PRESERVATIVE FREE 10 ML: 5 INJECTION INTRAVENOUS at 07:46

## 2021-01-01 RX ADMIN — METOPROLOL TARTRATE 100 MG: 100 TABLET, FILM COATED ORAL at 20:18

## 2021-01-01 RX ADMIN — OXYCODONE HYDROCHLORIDE AND ACETAMINOPHEN 500 MG: 500 TABLET ORAL at 13:30

## 2021-01-01 RX ADMIN — HYDRALAZINE HYDROCHLORIDE 20 MG: 20 INJECTION INTRAMUSCULAR; INTRAVENOUS at 16:16

## 2021-01-01 RX ADMIN — ISOSORBIDE MONONITRATE 30 MG: 30 TABLET ORAL at 09:18

## 2021-01-01 RX ADMIN — ASPIRIN 81 MG: 81 TABLET, COATED ORAL at 15:39

## 2021-01-01 RX ADMIN — HYDROCODONE BITARTRATE AND ACETAMINOPHEN 1 TABLET: 5; 325 TABLET ORAL at 04:41

## 2021-01-01 RX ADMIN — HEPARIN SODIUM 5000 UNITS: 5000 INJECTION INTRAVENOUS; SUBCUTANEOUS at 16:00

## 2021-01-01 RX ADMIN — PANTOPRAZOLE SODIUM 40 MG: 40 TABLET, DELAYED RELEASE ORAL at 06:01

## 2021-01-01 RX ADMIN — LACTULOSE 20 G: 20 SOLUTION ORAL at 21:15

## 2021-01-01 RX ADMIN — GABAPENTIN 100 MG: 100 CAPSULE ORAL at 13:31

## 2021-01-01 RX ADMIN — SODIUM CHLORIDE, PRESERVATIVE FREE 10 ML: 5 INJECTION INTRAVENOUS at 12:39

## 2021-01-01 RX ADMIN — HEPARIN SODIUM 5000 UNITS: 5000 INJECTION INTRAVENOUS; SUBCUTANEOUS at 20:38

## 2021-01-01 RX ADMIN — OXYCODONE HYDROCHLORIDE AND ACETAMINOPHEN 500 MG: 500 TABLET ORAL at 09:41

## 2021-01-01 RX ADMIN — GABAPENTIN 100 MG: 100 CAPSULE ORAL at 21:59

## 2021-01-01 RX ADMIN — LACTULOSE 20 G: 20 SOLUTION ORAL at 09:52

## 2021-01-01 RX ADMIN — HEPARIN SODIUM 5000 UNITS: 5000 INJECTION INTRAVENOUS; SUBCUTANEOUS at 21:53

## 2021-01-01 RX ADMIN — GABAPENTIN 100 MG: 100 CAPSULE ORAL at 20:57

## 2021-01-01 RX ADMIN — CLOPIDOGREL BISULFATE 75 MG: 75 TABLET, FILM COATED ORAL at 15:40

## 2021-01-01 RX ADMIN — GABAPENTIN 100 MG: 100 CAPSULE ORAL at 14:54

## 2021-01-01 RX ADMIN — METOPROLOL TARTRATE 100 MG: 100 TABLET, FILM COATED ORAL at 13:37

## 2021-01-01 RX ADMIN — OXYCODONE HYDROCHLORIDE AND ACETAMINOPHEN 500 MG: 500 TABLET ORAL at 10:30

## 2021-01-01 RX ADMIN — VANCOMYCIN HYDROCHLORIDE 1750 MG: 5 INJECTION, POWDER, LYOPHILIZED, FOR SOLUTION INTRAVENOUS at 15:00

## 2021-01-01 RX ADMIN — GABAPENTIN 100 MG: 100 CAPSULE ORAL at 14:51

## 2021-01-01 RX ADMIN — OXYCODONE AND ACETAMINOPHEN 1 TABLET: 5; 325 TABLET ORAL at 05:33

## 2021-01-01 RX ADMIN — LACTULOSE 20 G: 20 SOLUTION ORAL at 08:48

## 2021-01-01 RX ADMIN — FERROUS SULFATE TAB 325 MG (65 MG ELEMENTAL FE) 325 MG: 325 (65 FE) TAB at 10:31

## 2021-01-01 RX ADMIN — ONDANSETRON 4 MG: 4 TABLET, ORALLY DISINTEGRATING ORAL at 00:30

## 2021-01-01 RX ADMIN — DOCUSATE SODIUM 100 MG: 100 CAPSULE, LIQUID FILLED ORAL at 09:55

## 2021-01-01 RX ADMIN — SODIUM CHLORIDE, PRESERVATIVE FREE 10 ML: 5 INJECTION INTRAVENOUS at 15:27

## 2021-01-01 RX ADMIN — DOCUSATE SODIUM 100 MG: 100 CAPSULE, LIQUID FILLED ORAL at 22:05

## 2021-01-01 RX ADMIN — ATORVASTATIN CALCIUM 40 MG: 40 TABLET, FILM COATED ORAL at 19:47

## 2021-01-01 RX ADMIN — Medication 1 TABLET: at 08:31

## 2021-01-01 RX ADMIN — DOCUSATE SODIUM 100 MG: 100 CAPSULE, LIQUID FILLED ORAL at 20:05

## 2021-01-01 RX ADMIN — FERROUS SULFATE TAB 325 MG (65 MG ELEMENTAL FE) 325 MG: 325 (65 FE) TAB at 12:42

## 2021-01-01 RX ADMIN — CALCITRIOL CAPSULES 0.25 MCG 1.5 MCG: 0.25 CAPSULE ORAL at 13:20

## 2021-01-01 RX ADMIN — HYDROCODONE BITARTRATE AND ACETAMINOPHEN 2 TABLET: 5; 325 TABLET ORAL at 10:29

## 2021-01-01 RX ADMIN — HEPARIN SODIUM 5000 UNITS: 5000 INJECTION INTRAVENOUS; SUBCUTANEOUS at 13:26

## 2021-01-01 RX ADMIN — PANTOPRAZOLE SODIUM 40 MG: 40 TABLET, DELAYED RELEASE ORAL at 05:57

## 2021-01-01 RX ADMIN — FERROUS SULFATE TAB 325 MG (65 MG ELEMENTAL FE) 325 MG: 325 (65 FE) TAB at 13:31

## 2021-01-01 RX ADMIN — CINACALCET HYDROCHLORIDE 30 MG: 30 TABLET, FILM COATED ORAL at 18:43

## 2021-01-01 RX ADMIN — HEPARIN SODIUM 5000 UNITS: 5000 INJECTION INTRAVENOUS; SUBCUTANEOUS at 20:53

## 2021-01-01 RX ADMIN — LACTULOSE 20 G: 20 SOLUTION ORAL at 08:32

## 2021-01-01 RX ADMIN — OXYCODONE HYDROCHLORIDE AND ACETAMINOPHEN 500 MG: 500 TABLET ORAL at 09:18

## 2021-01-01 RX ADMIN — SODIUM CHLORIDE, PRESERVATIVE FREE 10 ML: 5 INJECTION INTRAVENOUS at 20:57

## 2021-01-01 RX ADMIN — CYCLOBENZAPRINE 10 MG: 10 TABLET, FILM COATED ORAL at 08:54

## 2021-01-01 RX ADMIN — DOCUSATE SODIUM 100 MG: 100 CAPSULE, LIQUID FILLED ORAL at 20:53

## 2021-01-01 RX ADMIN — GABAPENTIN 100 MG: 100 CAPSULE ORAL at 20:52

## 2021-01-01 RX ADMIN — FERROUS SULFATE TAB 325 MG (65 MG ELEMENTAL FE) 325 MG: 325 (65 FE) TAB at 12:31

## 2021-01-01 RX ADMIN — DICYCLOMINE HYDROCHLORIDE 10 MG: 10 CAPSULE ORAL at 16:30

## 2021-01-01 RX ADMIN — ONDANSETRON 4 MG: 2 INJECTION INTRAMUSCULAR; INTRAVENOUS at 08:26

## 2021-01-01 RX ADMIN — LOPERAMIDE HYDROCHLORIDE 2 MG: 2 CAPSULE ORAL at 09:22

## 2021-01-01 RX ADMIN — POTASSIUM CHLORIDE 20 MEQ: 1500 TABLET, EXTENDED RELEASE ORAL at 00:40

## 2021-01-01 RX ADMIN — DEXAMETHASONE 6 MG: 4 TABLET ORAL at 16:16

## 2021-01-01 RX ADMIN — PANTOPRAZOLE SODIUM 40 MG: 40 INJECTION, POWDER, FOR SOLUTION INTRAVENOUS at 22:32

## 2021-01-01 RX ADMIN — Medication 1 TABLET: at 07:26

## 2021-01-01 RX ADMIN — PANTOPRAZOLE SODIUM 40 MG: 40 TABLET, DELAYED RELEASE ORAL at 06:35

## 2021-01-01 RX ADMIN — GABAPENTIN 100 MG: 100 CAPSULE ORAL at 14:40

## 2021-01-01 RX ADMIN — PANTOPRAZOLE SODIUM 40 MG: 40 INJECTION, POWDER, FOR SOLUTION INTRAVENOUS at 09:52

## 2021-01-01 RX ADMIN — SODIUM BICARBONATE 50 MEQ: 84 INJECTION, SOLUTION INTRAVENOUS at 11:11

## 2021-01-01 RX ADMIN — ACETAMINOPHEN 650 MG: 325 TABLET ORAL at 23:18

## 2021-01-01 RX ADMIN — CALCITRIOL 1.5 MCG: 0.25 CAPSULE ORAL at 06:20

## 2021-01-01 RX ADMIN — CEFTRIAXONE SODIUM 1000 MG: 1 INJECTION, POWDER, FOR SOLUTION INTRAMUSCULAR; INTRAVENOUS at 18:45

## 2021-01-01 RX ADMIN — LORAZEPAM 1 MG: 2 INJECTION INTRAMUSCULAR at 13:25

## 2021-01-01 RX ADMIN — PANTOPRAZOLE SODIUM 40 MG: 40 TABLET, DELAYED RELEASE ORAL at 13:50

## 2021-01-01 RX ADMIN — CLOPIDOGREL BISULFATE 75 MG: 75 TABLET, FILM COATED ORAL at 09:09

## 2021-01-01 RX ADMIN — SODIUM CHLORIDE, PRESERVATIVE FREE 10 ML: 5 INJECTION INTRAVENOUS at 21:46

## 2021-01-01 RX ADMIN — EPOETIN ALFA-EPBX 10000 UNITS: 10000 INJECTION, SOLUTION INTRAVENOUS; SUBCUTANEOUS at 13:58

## 2021-01-01 RX ADMIN — OXYCODONE HYDROCHLORIDE AND ACETAMINOPHEN 500 MG: 500 TABLET ORAL at 07:47

## 2021-01-01 RX ADMIN — GABAPENTIN 100 MG: 100 CAPSULE ORAL at 20:37

## 2021-01-01 RX ADMIN — CALCITRIOL CAPSULES 0.25 MCG 1.5 MCG: 0.25 CAPSULE ORAL at 15:10

## 2021-01-01 RX ADMIN — ACETAMINOPHEN 650 MG: 325 TABLET ORAL at 03:55

## 2021-01-01 RX ADMIN — HYDROCODONE BITARTRATE AND ACETAMINOPHEN 2 TABLET: 5; 325 TABLET ORAL at 17:42

## 2021-01-01 RX ADMIN — METOPROLOL TARTRATE 100 MG: 100 TABLET, FILM COATED ORAL at 08:46

## 2021-01-01 RX ADMIN — PANTOPRAZOLE SODIUM 40 MG: 40 TABLET, DELAYED RELEASE ORAL at 04:12

## 2021-01-01 RX ADMIN — HEPARIN SODIUM 5000 UNITS: 5000 INJECTION INTRAVENOUS; SUBCUTANEOUS at 05:58

## 2021-01-01 RX ADMIN — CLOPIDOGREL BISULFATE 75 MG: 75 TABLET, FILM COATED ORAL at 09:40

## 2021-01-01 RX ADMIN — IPRATROPIUM BROMIDE AND ALBUTEROL SULFATE 1 AMPULE: .5; 3 SOLUTION RESPIRATORY (INHALATION) at 19:07

## 2021-01-01 RX ADMIN — POLYETHYLENE GLYCOL 3350 17 G: 17 POWDER, FOR SOLUTION ORAL at 08:46

## 2021-01-01 RX ADMIN — HYDROCODONE BITARTRATE AND ACETAMINOPHEN 2 TABLET: 5; 325 TABLET ORAL at 18:43

## 2021-01-01 RX ADMIN — CINACALCET HYDROCHLORIDE 60 MG: 30 TABLET, FILM COATED ORAL at 06:20

## 2021-01-01 RX ADMIN — DOCUSATE SODIUM 100 MG: 100 CAPSULE ORAL at 21:52

## 2021-01-01 RX ADMIN — PANTOPRAZOLE SODIUM 40 MG: 40 INJECTION, POWDER, FOR SOLUTION INTRAVENOUS at 20:15

## 2021-01-01 RX ADMIN — METOPROLOL TARTRATE 100 MG: 100 TABLET, FILM COATED ORAL at 14:06

## 2021-01-01 RX ADMIN — DICYCLOMINE HYDROCHLORIDE 10 MG: 10 CAPSULE ORAL at 05:45

## 2021-01-01 RX ADMIN — OXYCODONE HYDROCHLORIDE AND ACETAMINOPHEN 500 MG: 500 TABLET ORAL at 13:38

## 2021-01-01 RX ADMIN — HYDROCODONE BITARTRATE AND ACETAMINOPHEN 2 TABLET: 5; 325 TABLET ORAL at 04:40

## 2021-01-01 RX ADMIN — SODIUM CHLORIDE, PRESERVATIVE FREE 10 ML: 5 INJECTION INTRAVENOUS at 13:50

## 2021-01-01 RX ADMIN — GABAPENTIN 100 MG: 100 CAPSULE ORAL at 15:37

## 2021-01-01 RX ADMIN — PRAVASTATIN SODIUM 80 MG: 80 TABLET ORAL at 08:46

## 2021-01-01 RX ADMIN — PANTOPRAZOLE SODIUM 40 MG: 40 TABLET, DELAYED RELEASE ORAL at 05:44

## 2021-01-01 RX ADMIN — OXYCODONE HYDROCHLORIDE AND ACETAMINOPHEN 500 MG: 500 TABLET ORAL at 07:26

## 2021-01-01 RX ADMIN — FERROUS SULFATE TAB 325 MG (65 MG ELEMENTAL FE) 325 MG: 325 (65 FE) TAB at 08:31

## 2021-01-01 RX ADMIN — FENTANYL CITRATE 25 MCG: 50 INJECTION, SOLUTION INTRAMUSCULAR; INTRAVENOUS at 07:41

## 2021-01-01 RX ADMIN — PANTOPRAZOLE SODIUM 40 MG: 40 TABLET, DELAYED RELEASE ORAL at 04:40

## 2021-01-01 RX ADMIN — SODIUM CHLORIDE, PRESERVATIVE FREE 10 ML: 5 INJECTION INTRAVENOUS at 20:54

## 2021-01-01 RX ADMIN — DOCUSATE SODIUM 100 MG: 100 CAPSULE, LIQUID FILLED ORAL at 08:36

## 2021-01-01 RX ADMIN — HYDROCODONE BITARTRATE AND ACETAMINOPHEN 2 TABLET: 5; 325 TABLET ORAL at 21:51

## 2021-01-01 RX ADMIN — LACTULOSE 20 G: 20 SOLUTION ORAL at 00:44

## 2021-01-01 RX ADMIN — DOCUSATE SODIUM 100 MG: 100 CAPSULE, LIQUID FILLED ORAL at 09:41

## 2021-01-01 RX ADMIN — HEPARIN SODIUM 5000 UNITS: 5000 INJECTION INTRAVENOUS; SUBCUTANEOUS at 21:26

## 2021-01-01 RX ADMIN — ACETAMINOPHEN 650 MG: 325 TABLET ORAL at 13:10

## 2021-01-01 RX ADMIN — OXYCODONE HYDROCHLORIDE AND ACETAMINOPHEN 500 MG: 500 TABLET ORAL at 09:09

## 2021-01-01 RX ADMIN — DEXTROSE MONOHYDRATE 12.5 G: 25 INJECTION, SOLUTION INTRAVENOUS at 22:30

## 2021-01-01 RX ADMIN — CALCITRIOL 1.5 MCG: 0.25 CAPSULE ORAL at 13:01

## 2021-01-01 RX ADMIN — GABAPENTIN 100 MG: 100 CAPSULE ORAL at 08:14

## 2021-01-01 RX ADMIN — HEPARIN SODIUM 5000 UNITS: 5000 INJECTION INTRAVENOUS; SUBCUTANEOUS at 22:05

## 2021-01-01 RX ADMIN — HEPARIN SODIUM 5000 UNITS: 5000 INJECTION INTRAVENOUS; SUBCUTANEOUS at 21:10

## 2021-01-01 RX ADMIN — OXYCODONE HYDROCHLORIDE AND ACETAMINOPHEN 500 MG: 500 TABLET ORAL at 12:33

## 2021-01-01 RX ADMIN — METOPROLOL TARTRATE 100 MG: 100 TABLET, FILM COATED ORAL at 13:58

## 2021-01-01 RX ADMIN — POLYETHYLENE GLYCOL 3350 17 G: 17 POWDER, FOR SOLUTION ORAL at 09:14

## 2021-01-01 RX ADMIN — GABAPENTIN 100 MG: 100 CAPSULE ORAL at 09:10

## 2021-01-01 RX ADMIN — PIPERACILLIN AND TAZOBACTAM 2250 MG: 2; .25 INJECTION, POWDER, LYOPHILIZED, FOR SOLUTION INTRAVENOUS at 17:26

## 2021-01-01 RX ADMIN — FERROUS SULFATE TAB 325 MG (65 MG ELEMENTAL FE) 325 MG: 325 (65 FE) TAB at 13:37

## 2021-01-01 RX ADMIN — GABAPENTIN 100 MG: 100 CAPSULE ORAL at 22:05

## 2021-01-01 RX ADMIN — FERROUS SULFATE TAB 325 MG (65 MG ELEMENTAL FE) 325 MG: 325 (65 FE) TAB at 09:03

## 2021-01-01 RX ADMIN — CLOPIDOGREL BISULFATE 75 MG: 75 TABLET, FILM COATED ORAL at 10:07

## 2021-01-01 RX ADMIN — CLONIDINE HYDROCHLORIDE 0.2 MG: 0.2 TABLET ORAL at 13:03

## 2021-01-01 RX ADMIN — SODIUM PHOSPHATE, MONOBASIC, MONOHYDRATE 10 MMOL: 276; 142 INJECTION, SOLUTION INTRAVENOUS at 00:40

## 2021-01-01 RX ADMIN — GABAPENTIN 100 MG: 100 CAPSULE ORAL at 10:31

## 2021-01-01 RX ADMIN — ASPIRIN 81 MG: 81 TABLET, COATED ORAL at 09:41

## 2021-01-01 RX ADMIN — GABAPENTIN 100 MG: 100 CAPSULE ORAL at 21:52

## 2021-01-01 RX ADMIN — FERROUS SULFATE TAB 325 MG (65 MG ELEMENTAL FE) 325 MG: 325 (65 FE) TAB at 09:06

## 2021-01-01 RX ADMIN — SODIUM CHLORIDE, PRESERVATIVE FREE 10 ML: 5 INJECTION INTRAVENOUS at 20:59

## 2021-01-01 RX ADMIN — METOPROLOL TARTRATE 100 MG: 100 TABLET, FILM COATED ORAL at 21:47

## 2021-01-01 RX ADMIN — METOPROLOL TARTRATE 100 MG: 100 TABLET, FILM COATED ORAL at 08:14

## 2021-01-01 RX ADMIN — METOPROLOL TARTRATE 100 MG: 100 TABLET, FILM COATED ORAL at 15:15

## 2021-01-01 RX ADMIN — OXYCODONE HYDROCHLORIDE AND ACETAMINOPHEN 500 MG: 500 TABLET ORAL at 09:03

## 2021-01-01 RX ADMIN — HYDROCODONE BITARTRATE AND ACETAMINOPHEN 2 TABLET: 5; 325 TABLET ORAL at 11:58

## 2021-01-01 RX ADMIN — DOCUSATE SODIUM 100 MG: 100 CAPSULE ORAL at 08:47

## 2021-01-01 RX ADMIN — HYDRALAZINE HYDROCHLORIDE 25 MG: 25 TABLET, FILM COATED ORAL at 20:18

## 2021-01-01 RX ADMIN — OXYCODONE HYDROCHLORIDE AND ACETAMINOPHEN 500 MG: 500 TABLET ORAL at 14:12

## 2021-01-01 RX ADMIN — Medication 1 TABLET: at 13:31

## 2021-01-01 RX ADMIN — ASPIRIN 81 MG: 81 TABLET, COATED ORAL at 13:48

## 2021-01-01 RX ADMIN — PANTOPRAZOLE SODIUM 40 MG: 40 INJECTION, POWDER, FOR SOLUTION INTRAVENOUS at 08:24

## 2021-01-01 RX ADMIN — HEPARIN SODIUM 5000 UNITS: 5000 INJECTION INTRAVENOUS; SUBCUTANEOUS at 20:37

## 2021-01-01 RX ADMIN — FERROUS SULFATE TAB 325 MG (65 MG ELEMENTAL FE) 325 MG: 325 (65 FE) TAB at 08:29

## 2021-01-01 RX ADMIN — FERROUS SULFATE TAB 325 MG (65 MG ELEMENTAL FE) 325 MG: 325 (65 FE) TAB at 13:04

## 2021-01-01 RX ADMIN — OXYCODONE HYDROCHLORIDE AND ACETAMINOPHEN 500 MG: 500 TABLET ORAL at 14:34

## 2021-01-01 RX ADMIN — POLYETHYLENE GLYCOL 3350 17 G: 17 POWDER, FOR SOLUTION ORAL at 09:40

## 2021-01-01 RX ADMIN — CALCITRIOL 1.5 MCG: 0.25 CAPSULE ORAL at 13:47

## 2021-01-01 RX ADMIN — METOPROLOL TARTRATE 100 MG: 100 TABLET, FILM COATED ORAL at 08:22

## 2021-01-01 RX ADMIN — EPOETIN ALFA-EPBX 6000 UNITS: 3000 INJECTION, SOLUTION INTRAVENOUS; SUBCUTANEOUS at 17:41

## 2021-01-01 RX ADMIN — OXYCODONE HYDROCHLORIDE AND ACETAMINOPHEN 500 MG: 500 TABLET ORAL at 08:31

## 2021-01-01 RX ADMIN — HYDROCODONE BITARTRATE AND ACETAMINOPHEN 2 TABLET: 5; 325 TABLET ORAL at 14:17

## 2021-01-01 RX ADMIN — APIXABAN 10 MG: 5 TABLET, FILM COATED ORAL at 21:24

## 2021-01-01 RX ADMIN — FERROUS SULFATE TAB 325 MG (65 MG ELEMENTAL FE) 325 MG: 325 (65 FE) TAB at 08:40

## 2021-01-01 RX ADMIN — LIDOCAINE HYDROCHLORIDE 4 ML: 10 INJECTION, SOLUTION EPIDURAL; INFILTRATION; INTRACAUDAL; PERINEURAL at 20:00

## 2021-01-01 RX ADMIN — HEPARIN SODIUM 5000 UNITS: 5000 INJECTION INTRAVENOUS; SUBCUTANEOUS at 05:28

## 2021-01-01 RX ADMIN — ACETAMINOPHEN 650 MG: 325 TABLET ORAL at 17:12

## 2021-01-01 RX ADMIN — ONDANSETRON 4 MG: 2 INJECTION INTRAMUSCULAR; INTRAVENOUS at 21:06

## 2021-01-01 RX ADMIN — ASPIRIN 81 MG: 81 TABLET, COATED ORAL at 15:40

## 2021-01-01 RX ADMIN — CLOPIDOGREL BISULFATE 75 MG: 75 TABLET, FILM COATED ORAL at 10:43

## 2021-01-01 RX ADMIN — SODIUM ZIRCONIUM CYCLOSILICATE 10 G: 5 POWDER, FOR SUSPENSION ORAL at 14:15

## 2021-01-01 RX ADMIN — METOPROLOL TARTRATE 100 MG: 100 TABLET, FILM COATED ORAL at 09:06

## 2021-01-01 RX ADMIN — SODIUM CHLORIDE: 9 INJECTION, SOLUTION INTRAVENOUS at 10:45

## 2021-01-01 RX ADMIN — METOPROLOL TARTRATE 100 MG: 100 TABLET, FILM COATED ORAL at 06:20

## 2021-01-01 RX ADMIN — HYDRALAZINE HYDROCHLORIDE 25 MG: 25 TABLET, FILM COATED ORAL at 08:29

## 2021-01-01 RX ADMIN — HYDRALAZINE HYDROCHLORIDE 25 MG: 50 TABLET, FILM COATED ORAL at 08:47

## 2021-01-01 RX ADMIN — APIXABAN 10 MG: 5 TABLET, FILM COATED ORAL at 20:14

## 2021-01-01 RX ADMIN — SODIUM CHLORIDE, PRESERVATIVE FREE 10 ML: 5 INJECTION INTRAVENOUS at 13:34

## 2021-01-01 RX ADMIN — HYDROCODONE BITARTRATE AND ACETAMINOPHEN 2 TABLET: 5; 325 TABLET ORAL at 05:51

## 2021-01-01 RX ADMIN — PANTOPRAZOLE SODIUM 40 MG: 40 INJECTION, POWDER, FOR SOLUTION INTRAVENOUS at 20:54

## 2021-01-01 RX ADMIN — DICYCLOMINE HYDROCHLORIDE 10 MG: 10 CAPSULE ORAL at 16:18

## 2021-01-01 RX ADMIN — PIPERACILLIN AND TAZOBACTAM 2250 MG: 2; .25 INJECTION, POWDER, LYOPHILIZED, FOR SOLUTION INTRAVENOUS at 10:55

## 2021-01-01 RX ADMIN — CALCITRIOL 1.5 MCG: 0.25 CAPSULE ORAL at 12:42

## 2021-01-01 RX ADMIN — SODIUM CHLORIDE, PRESERVATIVE FREE 10 ML: 5 INJECTION INTRAVENOUS at 01:26

## 2021-01-01 RX ADMIN — SODIUM CHLORIDE 500 ML: 9 INJECTION, SOLUTION INTRAVENOUS at 22:32

## 2021-01-01 RX ADMIN — GABAPENTIN 100 MG: 100 CAPSULE ORAL at 20:26

## 2021-01-01 RX ADMIN — GABAPENTIN 100 MG: 100 CAPSULE ORAL at 13:57

## 2021-01-01 RX ADMIN — METOPROLOL TARTRATE 100 MG: 100 TABLET, FILM COATED ORAL at 08:47

## 2021-01-01 RX ADMIN — OXYCODONE HYDROCHLORIDE AND ACETAMINOPHEN 500 MG: 500 TABLET ORAL at 08:15

## 2021-01-01 RX ADMIN — DEXTROSE MONOHYDRATE 100 ML/HR: 50 INJECTION, SOLUTION INTRAVENOUS at 00:31

## 2021-01-01 RX ADMIN — LOSARTAN POTASSIUM 50 MG: 50 TABLET, FILM COATED ORAL at 09:50

## 2021-01-01 RX ADMIN — GABAPENTIN 100 MG: 100 CAPSULE ORAL at 14:59

## 2021-01-01 RX ADMIN — PANTOPRAZOLE SODIUM 40 MG: 40 INJECTION, POWDER, FOR SOLUTION INTRAVENOUS at 14:16

## 2021-01-01 RX ADMIN — DOCUSATE SODIUM 100 MG: 100 CAPSULE, LIQUID FILLED ORAL at 21:02

## 2021-01-01 RX ADMIN — SODIUM CHLORIDE, PRESERVATIVE FREE 10 ML: 5 INJECTION INTRAVENOUS at 21:27

## 2021-01-01 RX ADMIN — METOPROLOL TARTRATE 50 MG: 50 TABLET, FILM COATED ORAL at 21:26

## 2021-01-01 RX ADMIN — GABAPENTIN 100 MG: 100 CAPSULE ORAL at 20:55

## 2021-01-01 RX ADMIN — HEPARIN SODIUM 5000 UNITS: 5000 INJECTION INTRAVENOUS; SUBCUTANEOUS at 06:24

## 2021-01-01 RX ADMIN — SODIUM CHLORIDE, PRESERVATIVE FREE 10 ML: 5 INJECTION INTRAVENOUS at 09:52

## 2021-01-01 RX ADMIN — METOPROLOL TARTRATE 100 MG: 100 TABLET, FILM COATED ORAL at 21:26

## 2021-01-01 RX ADMIN — ASPIRIN 81 MG CHEWABLE TABLET 81 MG: 81 TABLET CHEWABLE at 08:04

## 2021-01-01 RX ADMIN — SODIUM CHLORIDE 8 MG/HR: 9 INJECTION, SOLUTION INTRAVENOUS at 06:56

## 2021-01-01 RX ADMIN — ASPIRIN 81 MG: 81 TABLET, COATED ORAL at 13:33

## 2021-01-01 RX ADMIN — GABAPENTIN 100 MG: 100 CAPSULE ORAL at 07:47

## 2021-01-01 RX ADMIN — ACETAMINOPHEN 650 MG: 325 TABLET ORAL at 19:47

## 2021-01-01 RX ADMIN — DOCUSATE SODIUM 100 MG: 100 CAPSULE, LIQUID FILLED ORAL at 10:31

## 2021-01-01 RX ADMIN — METHYLPREDNISOLONE ACETATE 40 MG: 40 INJECTION, SUSPENSION INTRA-ARTICULAR; INTRALESIONAL; INTRAMUSCULAR; SOFT TISSUE at 20:00

## 2021-01-01 RX ADMIN — SODIUM CHLORIDE 250 ML: 9 INJECTION, SOLUTION INTRAVENOUS at 02:46

## 2021-01-01 RX ADMIN — ASPIRIN 81 MG: 81 TABLET, COATED ORAL at 10:27

## 2021-01-01 RX ADMIN — PANTOPRAZOLE SODIUM 40 MG: 40 TABLET, DELAYED RELEASE ORAL at 06:41

## 2021-01-01 RX ADMIN — SODIUM CHLORIDE, PRESERVATIVE FREE 10 ML: 5 INJECTION INTRAVENOUS at 20:04

## 2021-01-01 RX ADMIN — HYDRALAZINE HYDROCHLORIDE 25 MG: 25 TABLET, FILM COATED ORAL at 21:26

## 2021-01-01 RX ADMIN — METOPROLOL TARTRATE 100 MG: 100 TABLET, FILM COATED ORAL at 20:26

## 2021-01-01 RX ADMIN — EPOETIN ALFA-EPBX 6000 UNITS: 3000 INJECTION, SOLUTION INTRAVENOUS; SUBCUTANEOUS at 13:41

## 2021-01-01 RX ADMIN — FENTANYL CITRATE 25 MCG: 50 INJECTION, SOLUTION INTRAMUSCULAR; INTRAVENOUS at 08:09

## 2021-01-01 RX ADMIN — HEPARIN SODIUM 5000 UNITS: 5000 INJECTION INTRAVENOUS; SUBCUTANEOUS at 14:24

## 2021-01-01 RX ADMIN — FERROUS SULFATE TAB 325 MG (65 MG ELEMENTAL FE) 325 MG: 325 (65 FE) TAB at 13:34

## 2021-01-01 RX ADMIN — DOCUSATE SODIUM 100 MG: 100 CAPSULE, LIQUID FILLED ORAL at 09:06

## 2021-01-01 RX ADMIN — DEXAMETHASONE 6 MG: 4 TABLET ORAL at 09:55

## 2021-01-01 RX ADMIN — DICYCLOMINE HYDROCHLORIDE 10 MG: 10 CAPSULE ORAL at 22:22

## 2021-01-01 RX ADMIN — DEXAMETHASONE 6 MG: 4 TABLET ORAL at 09:10

## 2021-01-01 RX ADMIN — GABAPENTIN 100 MG: 100 CAPSULE ORAL at 20:39

## 2021-01-01 RX ADMIN — METOPROLOL TARTRATE 50 MG: 50 TABLET, FILM COATED ORAL at 20:55

## 2021-01-01 RX ADMIN — ASPIRIN 81 MG: 81 TABLET, COATED ORAL at 12:40

## 2021-01-01 RX ADMIN — PRAVASTATIN SODIUM 80 MG: 80 TABLET ORAL at 20:06

## 2021-01-01 RX ADMIN — LACTULOSE 20 G: 20 SOLUTION ORAL at 09:41

## 2021-01-01 RX ADMIN — PANTOPRAZOLE SODIUM 40 MG: 40 INJECTION, POWDER, FOR SOLUTION INTRAVENOUS at 10:51

## 2021-01-01 RX ADMIN — DOCUSATE SODIUM 100 MG: 100 CAPSULE, LIQUID FILLED ORAL at 21:47

## 2021-01-01 RX ADMIN — IPRATROPIUM BROMIDE AND ALBUTEROL SULFATE 1 AMPULE: .5; 3 SOLUTION RESPIRATORY (INHALATION) at 09:52

## 2021-01-01 RX ADMIN — SODIUM CHLORIDE: 9 INJECTION, SOLUTION INTRAVENOUS at 07:49

## 2021-01-01 RX ADMIN — FERROUS SULFATE TAB 325 MG (65 MG ELEMENTAL FE) 325 MG: 325 (65 FE) TAB at 11:00

## 2021-01-01 RX ADMIN — CALCITRIOL 1.5 MCG: 0.25 CAPSULE ORAL at 14:06

## 2021-01-01 RX ADMIN — PIPERACILLIN AND TAZOBACTAM 2250 MG: 2; .25 INJECTION, POWDER, LYOPHILIZED, FOR SOLUTION INTRAVENOUS at 09:05

## 2021-01-01 RX ADMIN — METOPROLOL TARTRATE 100 MG: 100 TABLET, FILM COATED ORAL at 22:31

## 2021-01-01 RX ADMIN — DEXAMETHASONE 6 MG: 4 TABLET ORAL at 09:06

## 2021-01-01 RX ADMIN — OXYCODONE HYDROCHLORIDE AND ACETAMINOPHEN 500 MG: 500 TABLET ORAL at 09:22

## 2021-01-01 RX ADMIN — CINACALCET HYDROCHLORIDE 60 MG: 30 TABLET, FILM COATED ORAL at 08:15

## 2021-01-01 RX ADMIN — SODIUM CHLORIDE, PRESERVATIVE FREE 10 ML: 5 INJECTION INTRAVENOUS at 09:11

## 2021-01-01 RX ADMIN — PIPERACILLIN AND TAZOBACTAM 2250 MG: 2; .25 INJECTION, POWDER, LYOPHILIZED, FOR SOLUTION INTRAVENOUS at 04:09

## 2021-01-01 RX ADMIN — PANTOPRAZOLE SODIUM 40 MG: 40 INJECTION, POWDER, FOR SOLUTION INTRAVENOUS at 13:39

## 2021-01-01 RX ADMIN — GABAPENTIN 100 MG: 100 CAPSULE ORAL at 13:47

## 2021-01-01 RX ADMIN — IOPAMIDOL 80 ML: 755 INJECTION, SOLUTION INTRAVENOUS at 13:54

## 2021-01-01 RX ADMIN — ACETAMINOPHEN 650 MG: 325 TABLET ORAL at 07:56

## 2021-01-01 RX ADMIN — CALCITRIOL 1.5 MCG: 0.25 CAPSULE ORAL at 13:42

## 2021-01-01 RX ADMIN — PROPOFOL 30 MG: 10 INJECTION, EMULSION INTRAVENOUS at 10:52

## 2021-01-01 RX ADMIN — OXYCODONE HYDROCHLORIDE AND ACETAMINOPHEN 500 MG: 500 TABLET ORAL at 13:57

## 2021-01-01 RX ADMIN — METOPROLOL TARTRATE 100 MG: 100 TABLET, FILM COATED ORAL at 08:48

## 2021-01-01 RX ADMIN — SODIUM CHLORIDE, PRESERVATIVE FREE 10 ML: 5 INJECTION INTRAVENOUS at 14:35

## 2021-01-01 RX ADMIN — HYDRALAZINE HYDROCHLORIDE 25 MG: 25 TABLET, FILM COATED ORAL at 12:42

## 2021-01-01 RX ADMIN — CLONIDINE HYDROCHLORIDE 0.2 MG: 0.2 TABLET ORAL at 20:25

## 2021-01-01 RX ADMIN — PANTOPRAZOLE SODIUM 40 MG: 40 INJECTION, POWDER, FOR SOLUTION INTRAVENOUS at 20:04

## 2021-01-01 RX ADMIN — DOCUSATE SODIUM 100 MG: 100 CAPSULE, LIQUID FILLED ORAL at 20:27

## 2021-01-01 RX ADMIN — OXYCODONE HYDROCHLORIDE AND ACETAMINOPHEN 500 MG: 500 TABLET ORAL at 13:42

## 2021-01-01 RX ADMIN — CLOPIDOGREL BISULFATE 75 MG: 75 TABLET, FILM COATED ORAL at 08:50

## 2021-01-01 RX ADMIN — METOPROLOL TARTRATE 100 MG: 100 TABLET, FILM COATED ORAL at 07:47

## 2021-01-01 RX ADMIN — CLONIDINE HYDROCHLORIDE 0.2 MG: 0.2 TABLET ORAL at 14:36

## 2021-01-01 RX ADMIN — EPOETIN ALFA-EPBX 6000 UNITS: 3000 INJECTION, SOLUTION INTRAVENOUS; SUBCUTANEOUS at 14:12

## 2021-01-01 RX ADMIN — SODIUM CHLORIDE, PRESERVATIVE FREE 10 ML: 5 INJECTION INTRAVENOUS at 21:24

## 2021-01-01 RX ADMIN — HYDRALAZINE HYDROCHLORIDE 25 MG: 25 TABLET, FILM COATED ORAL at 00:35

## 2021-01-01 RX ADMIN — DICYCLOMINE HYDROCHLORIDE 10 MG: 10 CAPSULE ORAL at 05:12

## 2021-01-01 RX ADMIN — METOPROLOL TARTRATE 100 MG: 100 TABLET, FILM COATED ORAL at 20:15

## 2021-01-01 RX ADMIN — SODIUM CHLORIDE, PRESERVATIVE FREE 10 ML: 5 INJECTION INTRAVENOUS at 20:39

## 2021-01-01 RX ADMIN — METOPROLOL TARTRATE 100 MG: 100 TABLET, FILM COATED ORAL at 20:53

## 2021-01-01 RX ADMIN — IPRATROPIUM BROMIDE AND ALBUTEROL SULFATE 1 AMPULE: .5; 3 SOLUTION RESPIRATORY (INHALATION) at 15:20

## 2021-01-01 RX ADMIN — POLYETHYLENE GLYCOL 3350 17 G: 17 POWDER, FOR SOLUTION ORAL at 12:40

## 2021-01-01 RX ADMIN — LOSARTAN POTASSIUM 50 MG: 50 TABLET, FILM COATED ORAL at 08:46

## 2021-01-01 RX ADMIN — SODIUM CHLORIDE, PRESERVATIVE FREE 10 ML: 5 INJECTION INTRAVENOUS at 20:51

## 2021-01-01 RX ADMIN — FERROUS SULFATE TAB 325 MG (65 MG ELEMENTAL FE) 325 MG: 325 (65 FE) TAB at 09:19

## 2021-01-01 RX ADMIN — GABAPENTIN 100 MG: 100 CAPSULE ORAL at 22:42

## 2021-01-01 RX ADMIN — METOPROLOL TARTRATE 100 MG: 100 TABLET, FILM COATED ORAL at 09:09

## 2021-01-01 RX ADMIN — CALCITRIOL 1.5 MCG: 0.25 CAPSULE ORAL at 08:28

## 2021-01-01 RX ADMIN — DICYCLOMINE HYDROCHLORIDE 10 MG: 10 CAPSULE ORAL at 06:21

## 2021-01-01 RX ADMIN — HYDRALAZINE HYDROCHLORIDE 25 MG: 25 TABLET, FILM COATED ORAL at 09:08

## 2021-01-01 RX ADMIN — CLOPIDOGREL BISULFATE 75 MG: 75 TABLET, FILM COATED ORAL at 12:42

## 2021-01-01 RX ADMIN — LACTULOSE 20 G: 20 SOLUTION ORAL at 20:27

## 2021-01-01 RX ADMIN — HEPARIN SODIUM 4000 UNITS: 1000 INJECTION, SOLUTION INTRAVENOUS; SUBCUTANEOUS at 17:12

## 2021-01-01 RX ADMIN — Medication 1 TABLET: at 09:00

## 2021-01-01 RX ADMIN — CYCLOBENZAPRINE 10 MG: 10 TABLET, FILM COATED ORAL at 13:31

## 2021-01-01 RX ADMIN — GABAPENTIN 100 MG: 100 CAPSULE ORAL at 02:06

## 2021-01-01 RX ADMIN — GABAPENTIN 100 MG: 100 CAPSULE ORAL at 20:50

## 2021-01-01 RX ADMIN — ISOSORBIDE MONONITRATE 30 MG: 30 TABLET ORAL at 09:19

## 2021-01-01 RX ADMIN — CINACALCET HYDROCHLORIDE 60 MG: 30 TABLET, FILM COATED ORAL at 13:42

## 2021-01-01 RX ADMIN — OXYCODONE HYDROCHLORIDE AND ACETAMINOPHEN 500 MG: 500 TABLET ORAL at 08:28

## 2021-01-01 RX ADMIN — DICYCLOMINE HYDROCHLORIDE 10 MG: 10 CAPSULE ORAL at 06:49

## 2021-01-01 RX ADMIN — FERROUS SULFATE TAB 325 MG (65 MG ELEMENTAL FE) 325 MG: 325 (65 FE) TAB at 07:47

## 2021-01-01 RX ADMIN — CLOPIDOGREL BISULFATE 75 MG: 75 TABLET, FILM COATED ORAL at 09:02

## 2021-01-01 RX ADMIN — POTASSIUM & SODIUM PHOSPHATES POWDER PACK 280-160-250 MG 250 MG: 280-160-250 PACK at 17:53

## 2021-01-01 RX ADMIN — DICYCLOMINE HYDROCHLORIDE 10 MG: 10 CAPSULE ORAL at 08:35

## 2021-01-01 RX ADMIN — HEPARIN SODIUM 5000 UNITS: 5000 INJECTION INTRAVENOUS; SUBCUTANEOUS at 23:00

## 2021-01-01 RX ADMIN — FERROUS SULFATE TAB 325 MG (65 MG ELEMENTAL FE) 325 MG: 325 (65 FE) TAB at 13:57

## 2021-01-01 RX ADMIN — EPOETIN ALFA-EPBX 6000 UNITS: 3000 INJECTION, SOLUTION INTRAVENOUS; SUBCUTANEOUS at 09:41

## 2021-01-01 RX ADMIN — METOPROLOL TARTRATE 100 MG: 100 TABLET, FILM COATED ORAL at 00:35

## 2021-01-01 RX ADMIN — COSYNTROPIN 250 MCG: 0.25 INJECTION, POWDER, LYOPHILIZED, FOR SOLUTION INTRAMUSCULAR; INTRAVENOUS at 08:53

## 2021-01-01 RX ADMIN — AZITHROMYCIN DIHYDRATE 500 MG: 500 INJECTION, POWDER, LYOPHILIZED, FOR SOLUTION INTRAVENOUS at 13:10

## 2021-01-01 RX ADMIN — SODIUM CHLORIDE, PRESERVATIVE FREE 10 ML: 5 INJECTION INTRAVENOUS at 22:04

## 2021-01-01 RX ADMIN — SODIUM CHLORIDE 8 MG/HR: 9 INJECTION, SOLUTION INTRAVENOUS at 04:07

## 2021-01-01 RX ADMIN — Medication 15 G: at 18:00

## 2021-01-01 RX ADMIN — HEPARIN SODIUM 5000 UNITS: 5000 INJECTION INTRAVENOUS; SUBCUTANEOUS at 22:22

## 2021-01-01 RX ADMIN — METOPROLOL TARTRATE 50 MG: 50 TABLET, FILM COATED ORAL at 09:08

## 2021-01-01 RX ADMIN — HEPARIN SODIUM 5000 UNITS: 5000 INJECTION INTRAVENOUS; SUBCUTANEOUS at 05:24

## 2021-01-01 RX ADMIN — METOPROLOL TARTRATE 100 MG: 100 TABLET, FILM COATED ORAL at 21:19

## 2021-01-01 RX ADMIN — SODIUM CHLORIDE, PRESERVATIVE FREE 10 ML: 5 INJECTION INTRAVENOUS at 00:11

## 2021-01-01 RX ADMIN — DOCUSATE SODIUM 100 MG: 100 CAPSULE, LIQUID FILLED ORAL at 08:17

## 2021-01-01 RX ADMIN — CINACALCET HYDROCHLORIDE 60 MG: 30 TABLET, FILM COATED ORAL at 08:27

## 2021-01-01 RX ADMIN — CLONIDINE HYDROCHLORIDE 0.2 MG: 0.2 TABLET ORAL at 07:39

## 2021-01-01 RX ADMIN — METOPROLOL TARTRATE 100 MG: 100 TABLET, FILM COATED ORAL at 20:27

## 2021-01-01 RX ADMIN — DOCUSATE SODIUM 100 MG: 100 CAPSULE, LIQUID FILLED ORAL at 09:52

## 2021-01-01 RX ADMIN — HEPARIN SODIUM 5000 UNITS: 5000 INJECTION INTRAVENOUS; SUBCUTANEOUS at 14:35

## 2021-01-01 RX ADMIN — HEPARIN SODIUM 5000 UNITS: 5000 INJECTION INTRAVENOUS; SUBCUTANEOUS at 07:57

## 2021-01-01 RX ADMIN — SODIUM CHLORIDE, PRESERVATIVE FREE 10 ML: 5 INJECTION INTRAVENOUS at 09:19

## 2021-01-01 RX ADMIN — METOPROLOL TARTRATE 100 MG: 100 TABLET, FILM COATED ORAL at 22:05

## 2021-01-01 RX ADMIN — PANTOPRAZOLE SODIUM 40 MG: 40 TABLET, DELAYED RELEASE ORAL at 04:00

## 2021-01-01 RX ADMIN — GABAPENTIN 100 MG: 100 CAPSULE ORAL at 08:22

## 2021-01-01 RX ADMIN — METOPROLOL TARTRATE 100 MG: 100 TABLET, FILM COATED ORAL at 21:53

## 2021-01-01 RX ADMIN — HEPARIN SODIUM 5000 UNITS: 5000 INJECTION INTRAVENOUS; SUBCUTANEOUS at 13:37

## 2021-01-01 RX ADMIN — SODIUM POLYSTYRENE SULFONATE 30 G: 15 SUSPENSION ORAL; RECTAL at 11:11

## 2021-01-01 RX ADMIN — GABAPENTIN 100 MG: 100 CAPSULE ORAL at 13:03

## 2021-01-01 RX ADMIN — FERROUS SULFATE TAB 325 MG (65 MG ELEMENTAL FE) 325 MG: 325 (65 FE) TAB at 09:52

## 2021-01-01 RX ADMIN — IOPAMIDOL 80 ML: 755 INJECTION, SOLUTION INTRAVENOUS at 10:57

## 2021-01-01 RX ADMIN — DIPHENHYDRAMINE HCL 25 MG: 25 TABLET ORAL at 10:13

## 2021-01-01 RX ADMIN — HEPARIN SODIUM 5000 UNITS: 5000 INJECTION INTRAVENOUS; SUBCUTANEOUS at 20:39

## 2021-01-01 RX ADMIN — HYDRALAZINE HYDROCHLORIDE 25 MG: 25 TABLET, FILM COATED ORAL at 09:56

## 2021-01-01 RX ADMIN — DOCUSATE SODIUM 100 MG: 100 CAPSULE, LIQUID FILLED ORAL at 20:38

## 2021-01-01 RX ADMIN — METOPROLOL TARTRATE 100 MG: 100 TABLET, FILM COATED ORAL at 20:39

## 2021-01-01 RX ADMIN — VANCOMYCIN HYDROCHLORIDE 1750 MG: 1 INJECTION, POWDER, LYOPHILIZED, FOR SOLUTION INTRAVENOUS at 16:10

## 2021-01-01 RX ADMIN — FERROUS SULFATE TAB 325 MG (65 MG ELEMENTAL FE) 325 MG: 325 (65 FE) TAB at 09:50

## 2021-01-01 RX ADMIN — CALCITRIOL CAPSULES 0.25 MCG 1.5 MCG: 0.25 CAPSULE ORAL at 13:59

## 2021-01-01 RX ADMIN — CLOPIDOGREL BISULFATE 75 MG: 75 TABLET, FILM COATED ORAL at 00:34

## 2021-01-01 RX ADMIN — HYDRALAZINE HYDROCHLORIDE 25 MG: 25 TABLET, FILM COATED ORAL at 20:56

## 2021-01-01 RX ADMIN — HYDRALAZINE HYDROCHLORIDE 25 MG: 25 TABLET, FILM COATED ORAL at 09:09

## 2021-01-01 RX ADMIN — OXYCODONE HYDROCHLORIDE AND ACETAMINOPHEN 500 MG: 500 TABLET ORAL at 14:06

## 2021-01-01 RX ADMIN — METOPROLOL TARTRATE 100 MG: 100 TABLET, FILM COATED ORAL at 09:52

## 2021-01-01 RX ADMIN — ASPIRIN 81 MG: 81 TABLET, COATED ORAL at 13:38

## 2021-01-01 RX ADMIN — SODIUM CHLORIDE, PRESERVATIVE FREE 10 ML: 5 INJECTION INTRAVENOUS at 21:34

## 2021-01-01 RX ADMIN — CLOPIDOGREL BISULFATE 75 MG: 75 TABLET, FILM COATED ORAL at 13:38

## 2021-01-01 RX ADMIN — HYDRALAZINE HYDROCHLORIDE 50 MG: 50 TABLET, FILM COATED ORAL at 20:25

## 2021-01-01 RX ADMIN — SODIUM CHLORIDE, PRESERVATIVE FREE 10 ML: 5 INJECTION INTRAVENOUS at 13:38

## 2021-01-01 RX ADMIN — DOCUSATE SODIUM 100 MG: 100 CAPSULE, LIQUID FILLED ORAL at 09:20

## 2021-01-01 RX ADMIN — METOPROLOL TARTRATE 100 MG: 100 TABLET, FILM COATED ORAL at 08:55

## 2021-01-01 RX ADMIN — PRAVASTATIN SODIUM 80 MG: 80 TABLET ORAL at 13:03

## 2021-01-01 RX ADMIN — HEPARIN SODIUM 18 UNITS/KG/HR: 10000 INJECTION, SOLUTION INTRAVENOUS at 15:23

## 2021-01-01 RX ADMIN — OXYCODONE HYDROCHLORIDE AND ACETAMINOPHEN 500 MG: 500 TABLET ORAL at 10:58

## 2021-01-01 RX ADMIN — POLYETHYLENE GLYCOL 3350 17 G: 17 POWDER, FOR SOLUTION ORAL at 12:39

## 2021-01-01 RX ADMIN — SODIUM CHLORIDE, PRESERVATIVE FREE 10 ML: 5 INJECTION INTRAVENOUS at 08:46

## 2021-01-01 RX ADMIN — FERROUS SULFATE TAB 325 MG (65 MG ELEMENTAL FE) 325 MG: 325 (65 FE) TAB at 09:40

## 2021-01-01 RX ADMIN — PANTOPRAZOLE SODIUM 40 MG: 40 TABLET, DELAYED RELEASE ORAL at 06:40

## 2021-01-01 RX ADMIN — PANTOPRAZOLE SODIUM 40 MG: 40 INJECTION, POWDER, FOR SOLUTION INTRAVENOUS at 08:48

## 2021-01-01 RX ADMIN — DICYCLOMINE HYDROCHLORIDE 10 MG: 10 CAPSULE ORAL at 14:59

## 2021-01-01 RX ADMIN — LACTULOSE 20 G: 20 SOLUTION ORAL at 22:32

## 2021-01-01 RX ADMIN — GABAPENTIN 100 MG: 100 CAPSULE ORAL at 12:43

## 2021-01-01 RX ADMIN — GABAPENTIN 100 MG: 100 CAPSULE ORAL at 20:15

## 2021-01-01 RX ADMIN — DICYCLOMINE HYDROCHLORIDE 10 MG: 10 CAPSULE ORAL at 18:26

## 2021-01-01 RX ADMIN — SODIUM CHLORIDE, PRESERVATIVE FREE 10 ML: 5 INJECTION INTRAVENOUS at 14:09

## 2021-01-01 RX ADMIN — GABAPENTIN 100 MG: 100 CAPSULE ORAL at 20:23

## 2021-01-01 RX ADMIN — OXYCODONE HYDROCHLORIDE AND ACETAMINOPHEN 500 MG: 500 TABLET ORAL at 09:21

## 2021-01-01 RX ADMIN — CALCITRIOL 1.5 MCG: 0.25 CAPSULE ORAL at 12:41

## 2021-01-01 RX ADMIN — HEPARIN SODIUM 5000 UNITS: 5000 INJECTION INTRAVENOUS; SUBCUTANEOUS at 14:28

## 2021-01-01 RX ADMIN — CINACALCET HYDROCHLORIDE 60 MG: 30 TABLET, FILM COATED ORAL at 08:21

## 2021-01-01 RX ADMIN — HYDRALAZINE HYDROCHLORIDE 10 MG: 20 INJECTION INTRAMUSCULAR; INTRAVENOUS at 03:15

## 2021-01-01 RX ADMIN — METOPROLOL TARTRATE 50 MG: 50 TABLET, FILM COATED ORAL at 08:46

## 2021-01-01 RX ADMIN — CLOPIDOGREL BISULFATE 75 MG: 75 TABLET ORAL at 15:17

## 2021-01-01 RX ADMIN — SODIUM CHLORIDE, PRESERVATIVE FREE 10 ML: 5 INJECTION INTRAVENOUS at 14:14

## 2021-01-01 RX ADMIN — ISOSORBIDE MONONITRATE 30 MG: 30 TABLET ORAL at 10:30

## 2021-01-01 RX ADMIN — SODIUM CHLORIDE, PRESERVATIVE FREE 10 ML: 5 INJECTION INTRAVENOUS at 20:25

## 2021-01-01 RX ADMIN — HEPARIN SODIUM 5000 UNITS: 5000 INJECTION INTRAVENOUS; SUBCUTANEOUS at 22:27

## 2021-01-01 RX ADMIN — CINACALCET HYDROCHLORIDE 60 MG: 30 TABLET, FILM COATED ORAL at 13:37

## 2021-01-01 RX ADMIN — DOCUSATE SODIUM 100 MG: 100 CAPSULE, LIQUID FILLED ORAL at 22:01

## 2021-01-01 RX ADMIN — METOPROLOL TARTRATE 100 MG: 100 TABLET, FILM COATED ORAL at 20:17

## 2021-01-01 RX ADMIN — ISOSORBIDE MONONITRATE 30 MG: 30 TABLET ORAL at 08:40

## 2021-01-01 RX ADMIN — CLONIDINE HYDROCHLORIDE 0.2 MG: 0.2 TABLET ORAL at 20:55

## 2021-01-01 RX ADMIN — PIPERACILLIN AND TAZOBACTAM 2250 MG: 2; .25 INJECTION, POWDER, LYOPHILIZED, FOR SOLUTION INTRAVENOUS at 03:14

## 2021-01-01 RX ADMIN — HYDRALAZINE HYDROCHLORIDE 25 MG: 50 TABLET, FILM COATED ORAL at 22:12

## 2021-01-01 RX ADMIN — EPOETIN ALFA-EPBX 10000 UNITS: 10000 INJECTION, SOLUTION INTRAVENOUS; SUBCUTANEOUS at 13:34

## 2021-01-01 RX ADMIN — CLOPIDOGREL BISULFATE 75 MG: 75 TABLET, FILM COATED ORAL at 09:41

## 2021-01-01 RX ADMIN — SODIUM CHLORIDE, PRESERVATIVE FREE 10 ML: 5 INJECTION INTRAVENOUS at 13:03

## 2021-01-01 RX ADMIN — PANTOPRAZOLE SODIUM 40 MG: 40 TABLET, DELAYED RELEASE ORAL at 06:18

## 2021-01-01 RX ADMIN — ISOSORBIDE MONONITRATE 30 MG: 30 TABLET ORAL at 09:08

## 2021-01-01 RX ADMIN — Medication 34.8 MILLICURIE: at 13:07

## 2021-01-01 RX ADMIN — METOPROLOL TARTRATE 100 MG: 100 TABLET, FILM COATED ORAL at 08:36

## 2021-01-01 RX ADMIN — GABAPENTIN 100 MG: 100 CAPSULE ORAL at 21:24

## 2021-01-01 RX ADMIN — METOPROLOL TARTRATE 100 MG: 100 TABLET, FILM COATED ORAL at 19:47

## 2021-01-01 RX ADMIN — HEPARIN SODIUM 5000 UNITS: 5000 INJECTION INTRAVENOUS; SUBCUTANEOUS at 21:19

## 2021-01-01 RX ADMIN — METOPROLOL TARTRATE 100 MG: 100 TABLET, FILM COATED ORAL at 13:47

## 2021-01-01 RX ADMIN — OXYCODONE HYDROCHLORIDE AND ACETAMINOPHEN 500 MG: 500 TABLET ORAL at 08:54

## 2021-01-01 RX ADMIN — DICYCLOMINE HYDROCHLORIDE 10 MG: 10 CAPSULE ORAL at 06:36

## 2021-01-01 RX ADMIN — DOCUSATE SODIUM 100 MG: 100 CAPSULE, LIQUID FILLED ORAL at 08:21

## 2021-01-01 RX ADMIN — AZITHROMYCIN DIHYDRATE 500 MG: 500 INJECTION, POWDER, LYOPHILIZED, FOR SOLUTION INTRAVENOUS at 12:31

## 2021-01-01 RX ADMIN — HEPARIN SODIUM 5000 UNITS: 5000 INJECTION INTRAVENOUS; SUBCUTANEOUS at 21:40

## 2021-01-01 RX ADMIN — PIPERACILLIN AND TAZOBACTAM 2250 MG: 2; .25 INJECTION, POWDER, LYOPHILIZED, FOR SOLUTION INTRAVENOUS at 19:32

## 2021-01-01 RX ADMIN — CINACALCET HYDROCHLORIDE 60 MG: 30 TABLET, FILM COATED ORAL at 14:06

## 2021-01-01 RX ADMIN — SODIUM CHLORIDE, PRESERVATIVE FREE 10 ML: 5 INJECTION INTRAVENOUS at 09:33

## 2021-01-01 RX ADMIN — CLOPIDOGREL BISULFATE 75 MG: 75 TABLET, FILM COATED ORAL at 14:41

## 2021-01-01 RX ADMIN — CLOPIDOGREL BISULFATE 75 MG: 75 TABLET ORAL at 13:48

## 2021-01-01 RX ADMIN — ACETAMINOPHEN 650 MG: 325 TABLET ORAL at 21:48

## 2021-01-01 RX ADMIN — PANTOPRAZOLE SODIUM 40 MG: 40 TABLET, DELAYED RELEASE ORAL at 06:00

## 2021-01-01 RX ADMIN — GABAPENTIN 100 MG: 100 CAPSULE ORAL at 09:55

## 2021-01-01 RX ADMIN — METOPROLOL TARTRATE 50 MG: 50 TABLET, FILM COATED ORAL at 01:33

## 2021-01-01 RX ADMIN — ACETAMINOPHEN 650 MG: 325 TABLET ORAL at 20:27

## 2021-01-01 RX ADMIN — LIDOCAINE HYDROCHLORIDE 100 MG: 20 INJECTION, SOLUTION EPIDURAL; INFILTRATION; INTRACAUDAL; PERINEURAL at 10:52

## 2021-01-01 RX ADMIN — METOPROLOL TARTRATE 100 MG: 100 TABLET, FILM COATED ORAL at 23:23

## 2021-01-01 RX ADMIN — METOPROLOL TARTRATE 100 MG: 100 TABLET, FILM COATED ORAL at 09:22

## 2021-01-01 RX ADMIN — GABAPENTIN 100 MG: 100 CAPSULE ORAL at 20:38

## 2021-01-01 RX ADMIN — FERROUS SULFATE TAB 325 MG (65 MG ELEMENTAL FE) 325 MG: 325 (65 FE) TAB at 13:42

## 2021-01-01 RX ADMIN — GABAPENTIN 100 MG: 100 CAPSULE ORAL at 10:58

## 2021-01-01 RX ADMIN — GABAPENTIN 100 MG: 100 CAPSULE ORAL at 13:43

## 2021-01-01 RX ADMIN — GABAPENTIN 100 MG: 100 CAPSULE ORAL at 14:17

## 2021-01-01 RX ADMIN — EPOETIN ALFA-EPBX 6000 UNITS: 3000 INJECTION, SOLUTION INTRAVENOUS; SUBCUTANEOUS at 13:49

## 2021-01-01 RX ADMIN — LISINOPRIL 20 MG: 20 TABLET ORAL at 13:03

## 2021-01-01 RX ADMIN — DICYCLOMINE HYDROCHLORIDE 10 MG: 10 CAPSULE ORAL at 14:25

## 2021-01-01 RX ADMIN — HEPARIN SODIUM 12 UNITS/KG/HR: 10000 INJECTION, SOLUTION INTRAVENOUS at 17:12

## 2021-01-01 RX ADMIN — DEXAMETHASONE 6 MG: 4 TABLET ORAL at 08:27

## 2021-01-01 RX ADMIN — GABAPENTIN 100 MG: 100 CAPSULE ORAL at 09:40

## 2021-01-01 RX ADMIN — EPOETIN ALFA-EPBX 6000 UNITS: 3000 INJECTION, SOLUTION INTRAVENOUS; SUBCUTANEOUS at 18:44

## 2021-01-01 RX ADMIN — GABAPENTIN 100 MG: 100 CAPSULE ORAL at 21:53

## 2021-01-01 RX ADMIN — PROPOFOL 40 MG: 10 INJECTION, EMULSION INTRAVENOUS at 08:02

## 2021-01-01 RX ADMIN — SODIUM CHLORIDE, PRESERVATIVE FREE 10 ML: 5 INJECTION INTRAVENOUS at 20:19

## 2021-01-01 RX ADMIN — PRAVASTATIN SODIUM 80 MG: 80 TABLET ORAL at 20:52

## 2021-01-01 RX ADMIN — METOPROLOL TARTRATE 50 MG: 50 TABLET, FILM COATED ORAL at 23:38

## 2021-01-01 RX ADMIN — DICYCLOMINE HYDROCHLORIDE 10 MG: 10 CAPSULE ORAL at 12:36

## 2021-01-01 RX ADMIN — PANTOPRAZOLE SODIUM 40 MG: 40 TABLET, DELAYED RELEASE ORAL at 05:32

## 2021-01-01 RX ADMIN — METOPROLOL TARTRATE 100 MG: 100 TABLET, FILM COATED ORAL at 13:42

## 2021-01-01 RX ADMIN — HYDROCODONE BITARTRATE AND ACETAMINOPHEN 1 TABLET: 5; 325 TABLET ORAL at 23:33

## 2021-01-01 RX ADMIN — DICYCLOMINE HYDROCHLORIDE 10 MG: 10 CAPSULE ORAL at 11:55

## 2021-01-01 RX ADMIN — HYDRALAZINE HYDROCHLORIDE 50 MG: 50 TABLET, FILM COATED ORAL at 13:03

## 2021-01-01 RX ADMIN — FERROUS SULFATE TAB 325 MG (65 MG ELEMENTAL FE) 325 MG: 325 (65 FE) TAB at 09:18

## 2021-01-01 RX ADMIN — GABAPENTIN 100 MG: 100 CAPSULE ORAL at 22:32

## 2021-01-01 RX ADMIN — SODIUM CHLORIDE, PRESERVATIVE FREE 10 ML: 5 INJECTION INTRAVENOUS at 12:38

## 2021-01-01 RX ADMIN — GABAPENTIN 100 MG: 100 CAPSULE ORAL at 15:39

## 2021-01-01 RX ADMIN — HYDROCODONE BITARTRATE AND ACETAMINOPHEN 2 TABLET: 5; 325 TABLET ORAL at 23:38

## 2021-01-01 RX ADMIN — DOCUSATE SODIUM 100 MG: 100 CAPSULE, LIQUID FILLED ORAL at 22:22

## 2021-01-01 RX ADMIN — PIPERACILLIN AND TAZOBACTAM 2250 MG: 2; .25 INJECTION, POWDER, LYOPHILIZED, FOR SOLUTION INTRAVENOUS at 09:40

## 2021-01-01 RX ADMIN — Medication 10 UNITS: at 11:12

## 2021-01-01 RX ADMIN — AZITHROMYCIN DIHYDRATE 500 MG: 500 INJECTION, POWDER, LYOPHILIZED, FOR SOLUTION INTRAVENOUS at 19:22

## 2021-01-01 RX ADMIN — DICYCLOMINE HYDROCHLORIDE 10 MG: 10 CAPSULE ORAL at 06:01

## 2021-01-01 RX ADMIN — CINACALCET HYDROCHLORIDE 60 MG: 30 TABLET, FILM COATED ORAL at 13:57

## 2021-01-01 RX ADMIN — GABAPENTIN 100 MG: 100 CAPSULE ORAL at 14:36

## 2021-01-01 RX ADMIN — PANTOPRAZOLE SODIUM 40 MG: 40 TABLET, DELAYED RELEASE ORAL at 06:53

## 2021-01-01 RX ADMIN — CLONIDINE HYDROCHLORIDE 0.1 MG: 0.1 TABLET ORAL at 14:28

## 2021-01-01 RX ADMIN — HEPARIN SODIUM 5000 UNITS: 5000 INJECTION INTRAVENOUS; SUBCUTANEOUS at 20:23

## 2021-01-01 RX ADMIN — SODIUM CHLORIDE 8 MG/HR: 9 INJECTION, SOLUTION INTRAVENOUS at 05:28

## 2021-01-01 RX ADMIN — HEPARIN SODIUM 5000 UNITS: 5000 INJECTION INTRAVENOUS; SUBCUTANEOUS at 06:41

## 2021-01-01 RX ADMIN — CINACALCET HYDROCHLORIDE 30 MG: 30 TABLET, FILM COATED ORAL at 17:42

## 2021-01-01 RX ADMIN — LISINOPRIL 20 MG: 20 TABLET ORAL at 09:19

## 2021-01-01 RX ADMIN — ISOSORBIDE MONONITRATE 30 MG: 30 TABLET ORAL at 13:34

## 2021-01-01 RX ADMIN — OXYCODONE HYDROCHLORIDE AND ACETAMINOPHEN 500 MG: 500 TABLET ORAL at 10:07

## 2021-01-01 RX ADMIN — POLYETHYLENE GLYCOL 3350 17 G: 17 POWDER, FOR SOLUTION ORAL at 08:47

## 2021-01-01 RX ADMIN — HEPARIN SODIUM 5000 UNITS: 5000 INJECTION INTRAVENOUS; SUBCUTANEOUS at 22:02

## 2021-01-01 RX ADMIN — CINACALCET HYDROCHLORIDE 60 MG: 30 TABLET, FILM COATED ORAL at 14:34

## 2021-01-01 RX ADMIN — FERROUS SULFATE TAB 325 MG (65 MG ELEMENTAL FE) 325 MG: 325 (65 FE) TAB at 09:09

## 2021-01-01 RX ADMIN — ASPIRIN 81 MG: 81 TABLET, COATED ORAL at 08:17

## 2021-01-01 RX ADMIN — GABAPENTIN 100 MG: 100 CAPSULE ORAL at 08:36

## 2021-01-01 RX ADMIN — METOCLOPRAMIDE 5 MG: 5 INJECTION, SOLUTION INTRAMUSCULAR; INTRAVENOUS at 16:48

## 2021-01-01 RX ADMIN — PANTOPRAZOLE SODIUM 40 MG: 40 INJECTION, POWDER, FOR SOLUTION INTRAVENOUS at 20:51

## 2021-01-01 RX ADMIN — CLOPIDOGREL BISULFATE 75 MG: 75 TABLET ORAL at 13:59

## 2021-01-01 RX ADMIN — CINACALCET HYDROCHLORIDE 30 MG: 30 TABLET, FILM COATED ORAL at 16:57

## 2021-01-01 RX ADMIN — HYDROCODONE BITARTRATE AND ACETAMINOPHEN 1 TABLET: 5; 325 TABLET ORAL at 15:41

## 2021-01-01 RX ADMIN — CLOPIDOGREL BISULFATE 75 MG: 75 TABLET, FILM COATED ORAL at 09:06

## 2021-01-01 RX ADMIN — GABAPENTIN 100 MG: 100 CAPSULE ORAL at 18:45

## 2021-01-01 RX ADMIN — FERROUS SULFATE TAB 325 MG (65 MG ELEMENTAL FE) 325 MG: 325 (65 FE) TAB at 08:47

## 2021-01-01 RX ADMIN — GABAPENTIN 100 MG: 100 CAPSULE ORAL at 14:50

## 2021-01-01 RX ADMIN — SODIUM CHLORIDE, PRESERVATIVE FREE 10 ML: 5 INJECTION INTRAVENOUS at 08:29

## 2021-01-01 RX ADMIN — EPOETIN ALFA-EPBX 6000 UNITS: 3000 INJECTION, SOLUTION INTRAVENOUS; SUBCUTANEOUS at 14:02

## 2021-01-01 RX ADMIN — GABAPENTIN 100 MG: 100 CAPSULE ORAL at 08:47

## 2021-01-01 RX ADMIN — ASPIRIN 81 MG CHEWABLE TABLET 81 MG: 81 TABLET CHEWABLE at 13:59

## 2021-01-01 RX ADMIN — SODIUM CHLORIDE, PRESERVATIVE FREE 10 ML: 5 INJECTION INTRAVENOUS at 20:18

## 2021-01-01 RX ADMIN — METOPROLOL TARTRATE 100 MG: 100 TABLET, FILM COATED ORAL at 10:31

## 2021-01-01 RX ADMIN — CALCITRIOL 1.5 MCG: 0.25 CAPSULE ORAL at 08:21

## 2021-01-01 RX ADMIN — DOCUSATE SODIUM 100 MG: 100 CAPSULE, LIQUID FILLED ORAL at 09:40

## 2021-01-01 RX ADMIN — GABAPENTIN 100 MG: 100 CAPSULE ORAL at 09:06

## 2021-01-01 RX ADMIN — METOPROLOL TARTRATE 100 MG: 100 TABLET, FILM COATED ORAL at 20:52

## 2021-01-01 RX ADMIN — CLONIDINE HYDROCHLORIDE 0.2 MG: 0.2 TABLET ORAL at 08:47

## 2021-01-01 RX ADMIN — PANTOPRAZOLE SODIUM 40 MG: 40 INJECTION, POWDER, FOR SOLUTION INTRAVENOUS at 21:34

## 2021-01-01 RX ADMIN — ACETAMINOPHEN 650 MG: 325 TABLET ORAL at 08:29

## 2021-01-01 RX ADMIN — FERROUS SULFATE TAB 325 MG (65 MG ELEMENTAL FE) 325 MG: 325 (65 FE) TAB at 13:20

## 2021-01-01 RX ADMIN — LACTULOSE 20 G: 20 SOLUTION ORAL at 20:54

## 2021-01-01 RX ADMIN — DOCUSATE SODIUM 100 MG: 100 CAPSULE ORAL at 20:26

## 2021-01-01 RX ADMIN — DICYCLOMINE HYDROCHLORIDE 10 MG: 10 CAPSULE ORAL at 13:57

## 2021-01-01 RX ADMIN — GABAPENTIN 100 MG: 100 CAPSULE ORAL at 15:16

## 2021-01-01 RX ADMIN — CLOPIDOGREL BISULFATE 75 MG: 75 TABLET, FILM COATED ORAL at 15:37

## 2021-01-01 RX ADMIN — ISOSORBIDE MONONITRATE 30 MG: 30 TABLET ORAL at 20:52

## 2021-01-01 RX ADMIN — METOPROLOL TARTRATE 100 MG: 100 TABLET, FILM COATED ORAL at 20:05

## 2021-01-01 RX ADMIN — PANTOPRAZOLE SODIUM 40 MG: 40 TABLET, DELAYED RELEASE ORAL at 06:21

## 2021-01-01 RX ADMIN — CINACALCET HYDROCHLORIDE 60 MG: 30 TABLET, FILM COATED ORAL at 13:31

## 2021-01-01 RX ADMIN — HEPARIN SODIUM 5000 UNITS: 5000 INJECTION INTRAVENOUS; SUBCUTANEOUS at 06:49

## 2021-01-01 RX ADMIN — HYDRALAZINE HYDROCHLORIDE 25 MG: 50 TABLET, FILM COATED ORAL at 13:34

## 2021-01-01 RX ADMIN — BUTALBITAL, ACETAMINOPHEN, AND CAFFEINE 2 TABLET: 50; 325; 40 TABLET ORAL at 14:13

## 2021-01-01 RX ADMIN — HEPARIN SODIUM 5000 UNITS: 5000 INJECTION INTRAVENOUS; SUBCUTANEOUS at 17:38

## 2021-01-01 RX ADMIN — ACETAMINOPHEN 650 MG: 325 TABLET ORAL at 20:23

## 2021-01-01 RX ADMIN — CLOPIDOGREL BISULFATE 75 MG: 75 TABLET, FILM COATED ORAL at 12:40

## 2021-01-01 RX ADMIN — PANTOPRAZOLE SODIUM 40 MG: 40 INJECTION, POWDER, FOR SOLUTION INTRAVENOUS at 13:03

## 2021-01-01 RX ADMIN — HYDRALAZINE HYDROCHLORIDE 25 MG: 25 TABLET, FILM COATED ORAL at 20:38

## 2021-01-01 RX ADMIN — DOCUSATE SODIUM 100 MG: 100 CAPSULE, LIQUID FILLED ORAL at 20:39

## 2021-01-01 RX ADMIN — APIXABAN 10 MG: 5 TABLET, FILM COATED ORAL at 08:55

## 2021-01-01 RX ADMIN — EPOETIN ALFA-EPBX 6000 UNITS: 3000 INJECTION, SOLUTION INTRAVENOUS; SUBCUTANEOUS at 14:05

## 2021-01-01 RX ADMIN — SODIUM CHLORIDE, PRESERVATIVE FREE 10 ML: 5 INJECTION INTRAVENOUS at 20:28

## 2021-01-01 RX ADMIN — SODIUM CHLORIDE, PRESERVATIVE FREE 10 ML: 5 INJECTION INTRAVENOUS at 22:15

## 2021-01-01 RX ADMIN — POLYETHYLENE GLYCOL 3350 17 G: 17 POWDER, FOR SOLUTION ORAL at 09:58

## 2021-01-01 RX ADMIN — Medication 1 TABLET: at 10:30

## 2021-01-01 RX ADMIN — METOPROLOL TARTRATE 50 MG: 50 TABLET, FILM COATED ORAL at 09:19

## 2021-01-01 RX ADMIN — SODIUM CHLORIDE, PRESERVATIVE FREE 10 ML: 5 INJECTION INTRAVENOUS at 20:52

## 2021-01-01 RX ADMIN — FERROUS SULFATE TAB 325 MG (65 MG ELEMENTAL FE) 325 MG: 325 (65 FE) TAB at 08:54

## 2021-01-01 RX ADMIN — HEPARIN SODIUM 5000 UNITS: 5000 INJECTION INTRAVENOUS; SUBCUTANEOUS at 21:46

## 2021-01-01 RX ADMIN — SODIUM CHLORIDE, PRESERVATIVE FREE 10 ML: 5 INJECTION INTRAVENOUS at 21:22

## 2021-01-01 RX ADMIN — FERROUS SULFATE TAB 325 MG (65 MG ELEMENTAL FE) 325 MG: 325 (65 FE) TAB at 10:07

## 2021-01-01 RX ADMIN — Medication 15 G: at 08:34

## 2021-01-01 RX ADMIN — LORAZEPAM 1 MG: 2 INJECTION INTRAMUSCULAR; INTRAVENOUS at 13:25

## 2021-01-01 RX ADMIN — ISOSORBIDE MONONITRATE 30 MG: 30 TABLET ORAL at 06:20

## 2021-01-01 RX ADMIN — METOPROLOL TARTRATE 100 MG: 100 TABLET, FILM COATED ORAL at 20:55

## 2021-01-01 RX ADMIN — ACETAMINOPHEN 650 MG: 325 TABLET ORAL at 20:38

## 2021-01-01 RX ADMIN — LOPERAMIDE HYDROCHLORIDE 2 MG: 2 CAPSULE ORAL at 03:22

## 2021-01-01 RX ADMIN — METOPROLOL TARTRATE 100 MG: 100 TABLET, FILM COATED ORAL at 09:00

## 2021-01-01 RX ADMIN — GABAPENTIN 100 MG: 100 CAPSULE ORAL at 20:17

## 2021-01-01 RX ADMIN — APIXABAN 10 MG: 5 TABLET, FILM COATED ORAL at 13:59

## 2021-01-01 RX ADMIN — PANTOPRAZOLE SODIUM 40 MG: 40 TABLET, DELAYED RELEASE ORAL at 05:42

## 2021-01-01 RX ADMIN — PROPOFOL 30 MG: 10 INJECTION, EMULSION INTRAVENOUS at 10:59

## 2021-01-01 RX ADMIN — METOPROLOL TARTRATE 100 MG: 100 TABLET, FILM COATED ORAL at 21:02

## 2021-01-01 RX ADMIN — GABAPENTIN 100 MG: 100 CAPSULE ORAL at 10:29

## 2021-01-01 RX ADMIN — MORPHINE SULFATE 2 MG: 2 INJECTION, SOLUTION INTRAMUSCULAR; INTRAVENOUS at 13:45

## 2021-01-01 RX ADMIN — METOPROLOL TARTRATE 50 MG: 50 TABLET, FILM COATED ORAL at 08:31

## 2021-01-01 RX ADMIN — GABAPENTIN 100 MG: 100 CAPSULE ORAL at 20:04

## 2021-01-01 RX ADMIN — OXYCODONE HYDROCHLORIDE AND ACETAMINOPHEN 500 MG: 500 TABLET ORAL at 10:29

## 2021-01-01 RX ADMIN — FERROUS SULFATE TAB 325 MG (65 MG ELEMENTAL FE) 325 MG: 325 (65 FE) TAB at 08:48

## 2021-01-01 RX ADMIN — SODIUM CHLORIDE, PRESERVATIVE FREE 10 ML: 5 INJECTION INTRAVENOUS at 10:31

## 2021-01-01 RX ADMIN — SODIUM CHLORIDE, PRESERVATIVE FREE 10 ML: 5 INJECTION INTRAVENOUS at 19:42

## 2021-01-01 RX ADMIN — Medication 1 TABLET: at 08:54

## 2021-01-01 RX ADMIN — PRAVASTATIN SODIUM 80 MG: 80 TABLET ORAL at 10:29

## 2021-01-01 RX ADMIN — METOPROLOL TARTRATE 100 MG: 100 TABLET, FILM COATED ORAL at 08:04

## 2021-01-01 RX ADMIN — DICYCLOMINE HYDROCHLORIDE 10 MG: 10 CAPSULE ORAL at 06:02

## 2021-01-01 RX ADMIN — DICYCLOMINE HYDROCHLORIDE 10 MG: 10 CAPSULE ORAL at 13:37

## 2021-01-01 RX ADMIN — GABAPENTIN 100 MG: 100 CAPSULE ORAL at 15:10

## 2021-01-01 RX ADMIN — CLOPIDOGREL BISULFATE 75 MG: 75 TABLET, FILM COATED ORAL at 09:21

## 2021-01-01 RX ADMIN — HYDRALAZINE HYDROCHLORIDE 25 MG: 50 TABLET, FILM COATED ORAL at 08:31

## 2021-01-01 RX ADMIN — IPRATROPIUM BROMIDE AND ALBUTEROL SULFATE 1 AMPULE: .5; 3 SOLUTION RESPIRATORY (INHALATION) at 22:10

## 2021-01-01 RX ADMIN — METOPROLOL TARTRATE 100 MG: 100 TABLET, FILM COATED ORAL at 19:42

## 2021-01-01 RX ADMIN — DOCUSATE SODIUM 100 MG: 100 CAPSULE, LIQUID FILLED ORAL at 08:38

## 2021-01-01 RX ADMIN — METOPROLOL TARTRATE 100 MG: 100 TABLET, FILM COATED ORAL at 20:13

## 2021-01-01 RX ADMIN — PANTOPRAZOLE SODIUM 40 MG: 40 TABLET, DELAYED RELEASE ORAL at 05:25

## 2021-01-01 RX ADMIN — SODIUM CHLORIDE, PRESERVATIVE FREE 10 ML: 5 INJECTION INTRAVENOUS at 21:49

## 2021-01-01 RX ADMIN — LOSARTAN POTASSIUM 50 MG: 50 TABLET, FILM COATED ORAL at 13:20

## 2021-01-01 RX ADMIN — DOCUSATE SODIUM 100 MG: 100 CAPSULE, LIQUID FILLED ORAL at 09:09

## 2021-01-01 RX ADMIN — GABAPENTIN 100 MG: 100 CAPSULE ORAL at 20:54

## 2021-01-01 RX ADMIN — DEXAMETHASONE 6 MG: 4 TABLET ORAL at 08:22

## 2021-01-01 RX ADMIN — DICYCLOMINE HYDROCHLORIDE 10 MG: 10 CAPSULE ORAL at 17:20

## 2021-01-01 RX ADMIN — HYDRALAZINE HYDROCHLORIDE 10 MG: 20 INJECTION INTRAMUSCULAR; INTRAVENOUS at 23:59

## 2021-01-01 RX ADMIN — PROPOFOL 30 MG: 10 INJECTION, EMULSION INTRAVENOUS at 10:55

## 2021-01-01 RX ADMIN — ISOSORBIDE MONONITRATE 30 MG: 30 TABLET ORAL at 13:03

## 2021-01-01 RX ADMIN — PANTOPRAZOLE SODIUM 40 MG: 40 TABLET, DELAYED RELEASE ORAL at 05:58

## 2021-01-01 RX ADMIN — Medication 1 TABLET: at 08:41

## 2021-01-01 RX ADMIN — POLYETHYLENE GLYCOL 3350 17 G: 17 POWDER, FOR SOLUTION ORAL at 07:47

## 2021-01-01 RX ADMIN — LACTULOSE 20 G: 20 SOLUTION ORAL at 20:38

## 2021-01-01 RX ADMIN — OXYCODONE HYDROCHLORIDE AND ACETAMINOPHEN 500 MG: 500 TABLET ORAL at 09:38

## 2021-01-01 RX ADMIN — CLOPIDOGREL BISULFATE 75 MG: 75 TABLET ORAL at 13:33

## 2021-01-01 RX ADMIN — PANTOPRAZOLE SODIUM 40 MG: 40 TABLET, DELAYED RELEASE ORAL at 05:28

## 2021-01-01 RX ADMIN — HEPARIN SODIUM 5000 UNITS: 5000 INJECTION INTRAVENOUS; SUBCUTANEOUS at 04:12

## 2021-01-01 RX ADMIN — EPOETIN ALFA-EPBX 6000 UNITS: 3000 INJECTION, SOLUTION INTRAVENOUS; SUBCUTANEOUS at 12:43

## 2021-01-01 RX ADMIN — CINACALCET HYDROCHLORIDE 60 MG: 30 TABLET, FILM COATED ORAL at 21:16

## 2021-01-01 RX ADMIN — OXYCODONE HYDROCHLORIDE AND ACETAMINOPHEN 500 MG: 500 TABLET ORAL at 13:34

## 2021-01-01 RX ADMIN — HYDROMORPHONE HYDROCHLORIDE 0.5 MG: 1 INJECTION, SOLUTION INTRAMUSCULAR; INTRAVENOUS; SUBCUTANEOUS at 09:20

## 2021-01-01 RX ADMIN — APIXABAN 10 MG: 5 TABLET, FILM COATED ORAL at 20:08

## 2021-01-01 RX ADMIN — CYCLOBENZAPRINE 10 MG: 10 TABLET, FILM COATED ORAL at 09:50

## 2021-01-01 RX ADMIN — Medication 1 TABLET: at 08:47

## 2021-01-01 RX ADMIN — ACETAMINOPHEN 650 MG: 325 TABLET ORAL at 21:00

## 2021-01-01 RX ADMIN — METOPROLOL TARTRATE 50 MG: 50 TABLET, FILM COATED ORAL at 13:03

## 2021-01-01 RX ADMIN — DICYCLOMINE HYDROCHLORIDE 10 MG: 10 CAPSULE ORAL at 11:00

## 2021-01-01 RX ADMIN — DOCUSATE SODIUM 100 MG: 100 CAPSULE, LIQUID FILLED ORAL at 20:50

## 2021-01-01 RX ADMIN — METOPROLOL TARTRATE 100 MG: 100 TABLET, FILM COATED ORAL at 20:14

## 2021-01-01 RX ADMIN — METOPROLOL TARTRATE 100 MG: 100 TABLET, FILM COATED ORAL at 20:08

## 2021-01-01 RX ADMIN — GABAPENTIN 100 MG: 100 CAPSULE ORAL at 21:19

## 2021-01-01 RX ADMIN — PIPERACILLIN AND TAZOBACTAM 2250 MG: 2; .25 INJECTION, POWDER, LYOPHILIZED, FOR SOLUTION INTRAVENOUS at 17:36

## 2021-01-01 RX ADMIN — PIPERACILLIN AND TAZOBACTAM 2250 MG: 2; .25 INJECTION, POWDER, LYOPHILIZED, FOR SOLUTION INTRAVENOUS at 01:34

## 2021-01-01 RX ADMIN — EPOETIN ALFA-EPBX 6000 UNITS: 3000 INJECTION, SOLUTION INTRAVENOUS; SUBCUTANEOUS at 13:06

## 2021-01-01 RX ADMIN — PANTOPRAZOLE SODIUM 40 MG: 40 TABLET, DELAYED RELEASE ORAL at 05:00

## 2021-01-01 RX ADMIN — CEFTRIAXONE SODIUM 1000 MG: 1 INJECTION, POWDER, FOR SOLUTION INTRAMUSCULAR; INTRAVENOUS at 12:38

## 2021-01-01 RX ADMIN — SODIUM CHLORIDE, PRESERVATIVE FREE 10 ML: 5 INJECTION INTRAVENOUS at 20:53

## 2021-01-01 RX ADMIN — DICYCLOMINE HYDROCHLORIDE 10 MG: 10 CAPSULE ORAL at 05:00

## 2021-01-01 RX ADMIN — ACETAMINOPHEN 650 MG: 325 TABLET ORAL at 11:23

## 2021-01-01 RX ADMIN — ONDANSETRON 4 MG: 2 INJECTION INTRAMUSCULAR; INTRAVENOUS at 10:26

## 2021-01-01 RX ADMIN — CEFTRIAXONE SODIUM 1000 MG: 1 INJECTION, POWDER, FOR SOLUTION INTRAMUSCULAR; INTRAVENOUS at 12:56

## 2021-01-01 RX ADMIN — CALCITRIOL 1.5 MCG: 0.25 CAPSULE ORAL at 12:31

## 2021-01-01 RX ADMIN — CLONIDINE HYDROCHLORIDE 0.2 MG: 0.2 TABLET ORAL at 08:31

## 2021-01-01 RX ADMIN — METOPROLOL TARTRATE 100 MG: 100 TABLET, FILM COATED ORAL at 13:57

## 2021-01-01 RX ADMIN — Medication 15 G: at 09:52

## 2021-01-01 RX ADMIN — Medication 1 TABLET: at 13:04

## 2021-01-01 RX ADMIN — GABAPENTIN 100 MG: 100 CAPSULE ORAL at 07:26

## 2021-01-01 RX ADMIN — OXYCODONE HYDROCHLORIDE AND ACETAMINOPHEN 500 MG: 500 TABLET ORAL at 13:03

## 2021-01-01 RX ADMIN — PRAVASTATIN SODIUM 80 MG: 80 TABLET ORAL at 13:33

## 2021-01-01 RX ADMIN — CLOPIDOGREL BISULFATE 75 MG: 75 TABLET, FILM COATED ORAL at 10:27

## 2021-01-01 RX ADMIN — GABAPENTIN 100 MG: 100 CAPSULE ORAL at 19:42

## 2021-01-01 RX ADMIN — SODIUM CHLORIDE, PRESERVATIVE FREE 10 ML: 5 INJECTION INTRAVENOUS at 09:07

## 2021-01-01 RX ADMIN — OXYCODONE HYDROCHLORIDE AND ACETAMINOPHEN 500 MG: 500 TABLET ORAL at 09:06

## 2021-01-01 RX ADMIN — GUAIFENESIN AND DEXTROMETHORPHAN 5 ML: 100; 10 SYRUP ORAL at 20:23

## 2021-01-01 RX ADMIN — GABAPENTIN 100 MG: 100 CAPSULE ORAL at 21:16

## 2021-01-01 RX ADMIN — CLOPIDOGREL BISULFATE 75 MG: 75 TABLET, FILM COATED ORAL at 10:58

## 2021-01-01 RX ADMIN — PRAVASTATIN SODIUM 80 MG: 80 TABLET ORAL at 08:31

## 2021-01-01 RX ADMIN — SODIUM CHLORIDE, PRESERVATIVE FREE 10 ML: 5 INJECTION INTRAVENOUS at 20:26

## 2021-01-01 RX ADMIN — CLOPIDOGREL BISULFATE 75 MG: 75 TABLET, FILM COATED ORAL at 12:31

## 2021-01-01 RX ADMIN — GLUCAGON HYDROCHLORIDE 1 MG: KIT at 08:58

## 2021-01-01 RX ADMIN — CALCITRIOL 1.5 MCG: 0.25 CAPSULE ORAL at 08:13

## 2021-01-01 RX ADMIN — ASPIRIN 324 MG: 81 TABLET, CHEWABLE ORAL at 16:24

## 2021-01-01 RX ADMIN — LOSARTAN POTASSIUM 50 MG: 50 TABLET, FILM COATED ORAL at 08:55

## 2021-01-01 RX ADMIN — DEXAMETHASONE 6 MG: 4 TABLET ORAL at 14:49

## 2021-01-01 RX ADMIN — METOPROLOL TARTRATE 100 MG: 100 TABLET, FILM COATED ORAL at 09:25

## 2021-01-01 RX ADMIN — POLYETHYLENE GLYCOL 3350 17 G: 17 POWDER, FOR SOLUTION ORAL at 12:36

## 2021-01-01 RX ADMIN — HYDROCODONE BITARTRATE AND ACETAMINOPHEN 2 TABLET: 5; 325 TABLET ORAL at 20:56

## 2021-01-01 RX ADMIN — PANTOPRAZOLE SODIUM 40 MG: 40 TABLET, DELAYED RELEASE ORAL at 04:08

## 2021-01-01 RX ADMIN — ASPIRIN 324 MG: 81 TABLET, CHEWABLE ORAL at 11:11

## 2021-01-01 RX ADMIN — LIDOCAINE HYDROCHLORIDE 100 MG: 20 INJECTION, SOLUTION INFILTRATION; PERINEURAL at 08:02

## 2021-01-01 RX ADMIN — LOSARTAN POTASSIUM 50 MG: 50 TABLET, FILM COATED ORAL at 13:39

## 2021-01-01 RX ADMIN — FERROUS SULFATE TAB 325 MG (65 MG ELEMENTAL FE) 325 MG: 325 (65 FE) TAB at 14:34

## 2021-01-01 RX ADMIN — GABAPENTIN 100 MG: 100 CAPSULE ORAL at 10:07

## 2021-01-01 RX ADMIN — ONDANSETRON 4 MG: 4 TABLET, ORALLY DISINTEGRATING ORAL at 04:41

## 2021-01-01 RX ADMIN — HYDROCODONE BITARTRATE AND ACETAMINOPHEN 2 TABLET: 5; 325 TABLET ORAL at 06:35

## 2021-01-01 RX ADMIN — HEPARIN SODIUM 5000 UNITS: 5000 INJECTION INTRAVENOUS; SUBCUTANEOUS at 14:43

## 2021-01-01 RX ADMIN — METOPROLOL TARTRATE 100 MG: 100 TABLET, FILM COATED ORAL at 21:15

## 2021-01-01 RX ADMIN — ACETAMINOPHEN 650 MG: 325 TABLET ORAL at 19:42

## 2021-01-01 RX ADMIN — DICYCLOMINE HYDROCHLORIDE 10 MG: 10 CAPSULE ORAL at 09:52

## 2021-01-01 RX ADMIN — GABAPENTIN 100 MG: 100 CAPSULE ORAL at 21:34

## 2021-01-01 RX ADMIN — PANTOPRAZOLE SODIUM 40 MG: 40 TABLET, DELAYED RELEASE ORAL at 06:54

## 2021-01-01 RX ADMIN — DOCUSATE SODIUM 100 MG: 100 CAPSULE, LIQUID FILLED ORAL at 20:15

## 2021-01-01 RX ADMIN — ASPIRIN 81 MG: 81 TABLET, COATED ORAL at 09:21

## 2021-01-01 RX ADMIN — CALCITRIOL CAPSULES 0.25 MCG 1.5 MCG: 0.25 CAPSULE ORAL at 13:31

## 2021-01-01 RX ADMIN — CINACALCET HYDROCHLORIDE 60 MG: 30 TABLET, FILM COATED ORAL at 12:31

## 2021-01-01 RX ADMIN — METOPROLOL TARTRATE 100 MG: 100 TABLET, FILM COATED ORAL at 23:38

## 2021-01-01 RX ADMIN — CINACALCET HYDROCHLORIDE 60 MG: 30 TABLET, FILM COATED ORAL at 13:19

## 2021-01-01 RX ADMIN — HEPARIN SODIUM 5000 UNITS: 5000 INJECTION INTRAVENOUS; SUBCUTANEOUS at 13:46

## 2021-01-01 RX ADMIN — HYDROMORPHONE HYDROCHLORIDE 0.5 MG: 1 INJECTION, SOLUTION INTRAMUSCULAR; INTRAVENOUS; SUBCUTANEOUS at 06:36

## 2021-01-01 RX ADMIN — DEXTROSE MONOHYDRATE 100 ML/HR: 50 INJECTION, SOLUTION INTRAVENOUS at 10:03

## 2021-01-01 RX ADMIN — ATORVASTATIN CALCIUM 40 MG: 40 TABLET, FILM COATED ORAL at 21:49

## 2021-01-01 RX ADMIN — SODIUM CHLORIDE, PRESERVATIVE FREE 10 ML: 5 INJECTION INTRAVENOUS at 08:54

## 2021-01-01 RX ADMIN — SODIUM CHLORIDE, PRESERVATIVE FREE 10 ML: 5 INJECTION INTRAVENOUS at 20:23

## 2021-01-01 RX ADMIN — METOPROLOL TARTRATE 100 MG: 100 TABLET, FILM COATED ORAL at 20:35

## 2021-01-01 RX ADMIN — OXYCODONE HYDROCHLORIDE AND ACETAMINOPHEN 500 MG: 500 TABLET ORAL at 09:52

## 2021-01-01 RX ADMIN — DEXTROSE MONOHYDRATE 25 G: 25 INJECTION, SOLUTION INTRAVENOUS at 11:11

## 2021-01-01 RX ADMIN — CLOPIDOGREL BISULFATE 75 MG: 75 TABLET ORAL at 08:04

## 2021-01-01 RX ADMIN — METOPROLOL TARTRATE 100 MG: 100 TABLET, FILM COATED ORAL at 21:49

## 2021-01-01 RX ADMIN — FERROUS SULFATE TAB 325 MG (65 MG ELEMENTAL FE) 325 MG: 325 (65 FE) TAB at 09:22

## 2021-01-01 RX ADMIN — EPOETIN ALFA-EPBX 6000 UNITS: 3000 INJECTION, SOLUTION INTRAVENOUS; SUBCUTANEOUS at 14:01

## 2021-01-01 RX ADMIN — DOCUSATE SODIUM 100 MG: 100 CAPSULE, LIQUID FILLED ORAL at 07:47

## 2021-01-01 RX ADMIN — LOSARTAN POTASSIUM 50 MG: 50 TABLET, FILM COATED ORAL at 08:54

## 2021-01-01 RX ADMIN — HEPARIN SODIUM 5000 UNITS: 5000 INJECTION INTRAVENOUS; SUBCUTANEOUS at 05:36

## 2021-01-01 RX ADMIN — ACETAMINOPHEN 650 MG: 325 TABLET ORAL at 23:16

## 2021-01-01 RX ADMIN — SODIUM CHLORIDE, PRESERVATIVE FREE 10 ML: 5 INJECTION INTRAVENOUS at 20:55

## 2021-01-01 RX ADMIN — GABAPENTIN 100 MG: 100 CAPSULE ORAL at 14:55

## 2021-01-01 RX ADMIN — ASPIRIN 81 MG CHEWABLE TABLET 81 MG: 81 TABLET CHEWABLE at 08:46

## 2021-01-01 RX ADMIN — ATORVASTATIN CALCIUM 40 MG: 40 TABLET, FILM COATED ORAL at 21:02

## 2021-01-01 RX ADMIN — CINACALCET HYDROCHLORIDE 30 MG: 30 TABLET, FILM COATED ORAL at 16:14

## 2021-01-01 RX ADMIN — HEPARIN SODIUM 5000 UNITS: 5000 INJECTION INTRAVENOUS; SUBCUTANEOUS at 05:44

## 2021-01-01 RX ADMIN — SODIUM CHLORIDE, PRESERVATIVE FREE 10 ML: 5 INJECTION INTRAVENOUS at 08:32

## 2021-01-01 RX ADMIN — CINACALCET HYDROCHLORIDE 30 MG: 30 TABLET, FILM COATED ORAL at 17:00

## 2021-01-01 RX ADMIN — GABAPENTIN 100 MG: 100 CAPSULE ORAL at 20:18

## 2021-01-01 RX ADMIN — HEPARIN SODIUM 5000 UNITS: 5000 INJECTION INTRAVENOUS; SUBCUTANEOUS at 20:57

## 2021-01-01 RX ADMIN — DOCUSATE SODIUM 100 MG: 100 CAPSULE ORAL at 22:12

## 2021-01-01 RX ADMIN — GABAPENTIN 100 MG: 100 CAPSULE ORAL at 09:52

## 2021-01-01 RX ADMIN — LISINOPRIL 20 MG: 20 TABLET ORAL at 08:47

## 2021-01-01 RX ADMIN — FERROUS SULFATE TAB 325 MG (65 MG ELEMENTAL FE) 325 MG: 325 (65 FE) TAB at 09:21

## 2021-01-01 RX ADMIN — CINACALCET HYDROCHLORIDE 60 MG: 30 TABLET, FILM COATED ORAL at 13:06

## 2021-01-01 RX ADMIN — LIDOCAINE: 40 CREAM TOPICAL at 13:36

## 2021-01-01 RX ADMIN — METOPROLOL TARTRATE 50 MG: 50 TABLET, FILM COATED ORAL at 09:18

## 2021-01-01 RX ADMIN — OXYCODONE HYDROCHLORIDE AND ACETAMINOPHEN 500 MG: 500 TABLET ORAL at 13:04

## 2021-01-01 RX ADMIN — GABAPENTIN 100 MG: 100 CAPSULE ORAL at 20:05

## 2021-01-01 RX ADMIN — GABAPENTIN 100 MG: 100 CAPSULE ORAL at 20:27

## 2021-01-01 RX ADMIN — ACETAMINOPHEN 650 MG: 325 TABLET ORAL at 16:09

## 2021-01-01 RX ADMIN — METOPROLOL TARTRATE 100 MG: 100 TABLET, FILM COATED ORAL at 20:23

## 2021-01-01 RX ADMIN — HEPARIN SODIUM 5000 UNITS: 5000 INJECTION INTRAVENOUS; SUBCUTANEOUS at 14:05

## 2021-01-01 RX ADMIN — METOPROLOL TARTRATE 100 MG: 100 TABLET, FILM COATED ORAL at 09:56

## 2021-01-01 RX ADMIN — LACTULOSE 20 G: 20 SOLUTION ORAL at 20:18

## 2021-01-01 RX ADMIN — ATORVASTATIN CALCIUM 40 MG: 40 TABLET, FILM COATED ORAL at 20:14

## 2021-01-01 RX ADMIN — DOCUSATE SODIUM 100 MG: 100 CAPSULE, LIQUID FILLED ORAL at 21:19

## 2021-01-01 RX ADMIN — METOPROLOL TARTRATE 100 MG: 100 TABLET, FILM COATED ORAL at 08:28

## 2021-01-01 RX ADMIN — CLONIDINE HYDROCHLORIDE 0.1 MG: 0.1 TABLET ORAL at 21:26

## 2021-01-01 RX ADMIN — GABAPENTIN 100 MG: 100 CAPSULE ORAL at 22:22

## 2021-01-01 RX ADMIN — PANTOPRAZOLE SODIUM 40 MG: 40 TABLET, DELAYED RELEASE ORAL at 09:56

## 2021-01-01 RX ADMIN — LOSARTAN POTASSIUM 50 MG: 50 TABLET, FILM COATED ORAL at 13:58

## 2021-01-01 RX ADMIN — METOPROLOL TARTRATE 100 MG: 100 TABLET, FILM COATED ORAL at 13:19

## 2021-01-01 RX ADMIN — SODIUM CHLORIDE, PRESERVATIVE FREE 10 ML: 5 INJECTION INTRAVENOUS at 20:56

## 2021-01-01 RX ADMIN — HYDROCODONE BITARTRATE AND ACETAMINOPHEN 1 TABLET: 5; 325 TABLET ORAL at 20:53

## 2021-01-01 RX ADMIN — FERROUS SULFATE TAB 325 MG (65 MG ELEMENTAL FE) 325 MG: 325 (65 FE) TAB at 07:26

## 2021-01-01 RX ADMIN — CLOPIDOGREL BISULFATE 75 MG: 75 TABLET, FILM COATED ORAL at 08:22

## 2021-01-01 RX ADMIN — PIPERACILLIN AND TAZOBACTAM 2250 MG: 2; .25 INJECTION, POWDER, LYOPHILIZED, FOR SOLUTION INTRAVENOUS at 09:25

## 2021-01-01 RX ADMIN — HYDRALAZINE HYDROCHLORIDE 50 MG: 50 TABLET, FILM COATED ORAL at 21:52

## 2021-01-01 RX ADMIN — SODIUM CHLORIDE, PRESERVATIVE FREE 10 ML: 5 INJECTION INTRAVENOUS at 10:51

## 2021-01-01 RX ADMIN — GABAPENTIN 100 MG: 100 CAPSULE ORAL at 13:11

## 2021-01-01 RX ADMIN — SODIUM CHLORIDE, PRESERVATIVE FREE 10 ML: 5 INJECTION INTRAVENOUS at 21:53

## 2021-01-01 RX ADMIN — PANTOPRAZOLE SODIUM 40 MG: 40 TABLET, DELAYED RELEASE ORAL at 10:29

## 2021-01-01 RX ADMIN — HYDROCODONE BITARTRATE AND ACETAMINOPHEN 1 TABLET: 5; 325 TABLET ORAL at 02:09

## 2021-01-01 RX ADMIN — CLOPIDOGREL BISULFATE 75 MG: 75 TABLET ORAL at 08:55

## 2021-01-01 RX ADMIN — HYDRALAZINE HYDROCHLORIDE 50 MG: 50 TABLET, FILM COATED ORAL at 09:26

## 2021-01-01 RX ADMIN — OXYCODONE HYDROCHLORIDE AND ACETAMINOPHEN 500 MG: 500 TABLET ORAL at 12:42

## 2021-01-01 RX ADMIN — ATORVASTATIN CALCIUM 40 MG: 40 TABLET, FILM COATED ORAL at 23:23

## 2021-01-01 RX ADMIN — CEFTRIAXONE SODIUM 1000 MG: 1 INJECTION, POWDER, FOR SOLUTION INTRAMUSCULAR; INTRAVENOUS at 11:47

## 2021-01-01 RX ADMIN — AZITHROMYCIN DIHYDRATE 500 MG: 500 INJECTION, POWDER, LYOPHILIZED, FOR SOLUTION INTRAVENOUS at 12:42

## 2021-01-01 RX ADMIN — TRAMADOL HYDROCHLORIDE 25 MG: 50 TABLET ORAL at 08:54

## 2021-01-01 RX ADMIN — IOPAMIDOL 80 ML: 755 INJECTION, SOLUTION INTRAVENOUS at 21:19

## 2021-01-01 RX ADMIN — CALCITRIOL 1.5 MCG: 0.25 CAPSULE ORAL at 14:34

## 2021-01-01 RX ADMIN — PANTOPRAZOLE SODIUM 40 MG: 40 TABLET, DELAYED RELEASE ORAL at 04:41

## 2021-01-01 RX ADMIN — HEPARIN SODIUM 5000 UNITS: 5000 INJECTION INTRAVENOUS; SUBCUTANEOUS at 06:01

## 2021-01-01 RX ADMIN — DICYCLOMINE HYDROCHLORIDE 10 MG: 10 CAPSULE ORAL at 13:05

## 2021-01-01 RX ADMIN — TRAMADOL HYDROCHLORIDE 50 MG: 50 TABLET ORAL at 03:17

## 2021-01-01 RX ADMIN — PANTOPRAZOLE SODIUM 40 MG: 40 TABLET, DELAYED RELEASE ORAL at 05:51

## 2021-01-01 RX ADMIN — METOPROLOL TARTRATE 100 MG: 100 TABLET, FILM COATED ORAL at 09:50

## 2021-01-01 RX ADMIN — HYDROCODONE BITARTRATE AND ACETAMINOPHEN 2 TABLET: 5; 325 TABLET ORAL at 13:05

## 2021-01-01 RX ADMIN — HEPARIN SODIUM 5000 UNITS: 5000 INJECTION INTRAVENOUS; SUBCUTANEOUS at 14:37

## 2021-01-01 RX ADMIN — HEPARIN SODIUM 5000 UNITS: 5000 INJECTION INTRAVENOUS; SUBCUTANEOUS at 06:20

## 2021-01-01 RX ADMIN — CINACALCET HYDROCHLORIDE 60 MG: 30 TABLET, FILM COATED ORAL at 13:02

## 2021-01-01 RX ADMIN — SODIUM CHLORIDE: 9 INJECTION, SOLUTION INTRAVENOUS at 00:13

## 2021-01-01 RX ADMIN — PANTOPRAZOLE SODIUM 40 MG: 40 TABLET, DELAYED RELEASE ORAL at 05:54

## 2021-01-01 RX ADMIN — SODIUM ZIRCONIUM CYCLOSILICATE 10 G: 5 POWDER, FOR SUSPENSION ORAL at 14:24

## 2021-01-01 RX ADMIN — GABAPENTIN 100 MG: 100 CAPSULE ORAL at 14:25

## 2021-01-01 RX ADMIN — METOPROLOL TARTRATE 100 MG: 100 TABLET, FILM COATED ORAL at 10:07

## 2021-01-01 RX ADMIN — METOPROLOL TARTRATE 100 MG: 100 TABLET, FILM COATED ORAL at 21:24

## 2021-01-01 RX ADMIN — ASPIRIN 81 MG CHEWABLE TABLET 81 MG: 81 TABLET CHEWABLE at 09:25

## 2021-01-01 RX ADMIN — METOPROLOL TARTRATE 100 MG: 100 TABLET, FILM COATED ORAL at 09:03

## 2021-01-01 RX ADMIN — GUAIFENESIN AND DEXTROMETHORPHAN 5 ML: 100; 10 SYRUP ORAL at 20:38

## 2021-01-01 RX ADMIN — METOPROLOL TARTRATE 100 MG: 100 TABLET, FILM COATED ORAL at 10:29

## 2021-01-01 RX ADMIN — VANCOMYCIN HYDROCHLORIDE 1000 MG: 1 INJECTION, POWDER, LYOPHILIZED, FOR SOLUTION INTRAVENOUS at 11:22

## 2021-01-01 RX ADMIN — DICYCLOMINE HYDROCHLORIDE 10 MG: 10 CAPSULE ORAL at 14:52

## 2021-01-01 RX ADMIN — ACETAMINOPHEN 650 MG: 325 TABLET ORAL at 20:26

## 2021-01-01 RX ADMIN — HEPARIN SODIUM 5000 UNITS: 5000 INJECTION INTRAVENOUS; SUBCUTANEOUS at 14:32

## 2021-01-01 RX ADMIN — GUAIFENESIN AND DEXTROMETHORPHAN 5 ML: 100; 10 SYRUP ORAL at 19:42

## 2021-01-01 RX ADMIN — HYDROCODONE BITARTRATE AND ACETAMINOPHEN 1 TABLET: 5; 325 TABLET ORAL at 13:38

## 2021-01-01 RX ADMIN — EPOETIN ALFA-EPBX 6000 UNITS: 3000 INJECTION, SOLUTION INTRAVENOUS; SUBCUTANEOUS at 15:23

## 2021-01-01 RX ADMIN — CLONIDINE HYDROCHLORIDE 0.2 MG: 0.2 TABLET ORAL at 15:41

## 2021-01-01 RX ADMIN — SODIUM CHLORIDE 8 MG/HR: 9 INJECTION, SOLUTION INTRAVENOUS at 19:08

## 2021-01-01 RX ADMIN — LACTULOSE 20 G: 20 SOLUTION ORAL at 20:51

## 2021-01-01 RX ADMIN — DEXTROSE MONOHYDRATE 5 MG/HR: 50 INJECTION, SOLUTION INTRAVENOUS at 10:05

## 2021-01-01 RX ADMIN — OXYCODONE HYDROCHLORIDE AND ACETAMINOPHEN 500 MG: 500 TABLET ORAL at 13:47

## 2021-01-01 RX ADMIN — GABAPENTIN 100 MG: 100 CAPSULE ORAL at 14:06

## 2021-01-01 RX ADMIN — ONDANSETRON 4 MG: 2 INJECTION INTRAMUSCULAR; INTRAVENOUS at 18:44

## 2021-01-01 RX ADMIN — DOCUSATE SODIUM 100 MG: 100 CAPSULE, LIQUID FILLED ORAL at 09:22

## 2021-01-01 RX ADMIN — GABAPENTIN 100 MG: 100 CAPSULE ORAL at 12:42

## 2021-01-01 RX ADMIN — DICYCLOMINE HYDROCHLORIDE 10 MG: 10 CAPSULE ORAL at 11:58

## 2021-01-01 RX ADMIN — PIPERACILLIN AND TAZOBACTAM 2250 MG: 2; .25 INJECTION, POWDER, LYOPHILIZED, FOR SOLUTION INTRAVENOUS at 03:06

## 2021-01-01 RX ADMIN — CINACALCET HYDROCHLORIDE 60 MG: 30 TABLET, FILM COATED ORAL at 12:42

## 2021-01-01 RX ADMIN — FERROUS SULFATE TAB 325 MG (65 MG ELEMENTAL FE) 325 MG: 325 (65 FE) TAB at 08:13

## 2021-01-01 RX ADMIN — DEXAMETHASONE 6 MG: 4 TABLET ORAL at 09:02

## 2021-01-01 RX ADMIN — PIPERACILLIN AND TAZOBACTAM 2250 MG: 2; .25 INJECTION, POWDER, LYOPHILIZED, FOR SOLUTION INTRAVENOUS at 02:26

## 2021-01-01 RX ADMIN — HEPARIN SODIUM 5000 UNITS: 5000 INJECTION INTRAVENOUS; SUBCUTANEOUS at 04:55

## 2021-01-01 RX ADMIN — CLONIDINE HYDROCHLORIDE 0.2 MG: 0.2 TABLET ORAL at 14:34

## 2021-01-01 RX ADMIN — DOCUSATE SODIUM 100 MG: 100 CAPSULE, LIQUID FILLED ORAL at 19:42

## 2021-01-01 RX ADMIN — HEPARIN SODIUM 5000 UNITS: 5000 INJECTION INTRAVENOUS; SUBCUTANEOUS at 14:55

## 2021-01-01 RX ADMIN — PANTOPRAZOLE SODIUM 40 MG: 40 INJECTION, POWDER, FOR SOLUTION INTRAVENOUS at 20:28

## 2021-01-01 RX ADMIN — GABAPENTIN 100 MG: 100 CAPSULE ORAL at 20:59

## 2021-01-01 RX ADMIN — METOPROLOL TARTRATE 100 MG: 100 TABLET, FILM COATED ORAL at 08:54

## 2021-01-01 RX ADMIN — POLYETHYLENE GLYCOL 3350 17 G: 17 POWDER, FOR SOLUTION ORAL at 08:38

## 2021-01-01 RX ADMIN — GABAPENTIN 100 MG: 100 CAPSULE ORAL at 08:29

## 2021-01-01 RX ADMIN — SODIUM CHLORIDE 8 MG/HR: 9 INJECTION, SOLUTION INTRAVENOUS at 18:48

## 2021-01-01 RX ADMIN — OXYCODONE HYDROCHLORIDE AND ACETAMINOPHEN 500 MG: 500 TABLET ORAL at 09:19

## 2021-01-01 RX ADMIN — DOCUSATE SODIUM 100 MG: 100 CAPSULE, LIQUID FILLED ORAL at 12:30

## 2021-01-01 RX ADMIN — CINACALCET HYDROCHLORIDE 60 MG: 30 TABLET, FILM COATED ORAL at 13:47

## 2021-01-01 RX ADMIN — ATORVASTATIN CALCIUM 40 MG: 40 TABLET, FILM COATED ORAL at 21:24

## 2021-01-01 RX ADMIN — POLYETHYLENE GLYCOL 3350 17 G: 17 POWDER, FOR SOLUTION ORAL at 12:56

## 2021-01-01 RX ADMIN — DICYCLOMINE HYDROCHLORIDE 10 MG: 10 CAPSULE ORAL at 17:06

## 2021-01-01 RX ADMIN — SODIUM CHLORIDE, PRESERVATIVE FREE 10 ML: 5 INJECTION INTRAVENOUS at 08:38

## 2021-01-01 RX ADMIN — HEPARIN SODIUM 5000 UNITS: 5000 INJECTION INTRAVENOUS; SUBCUTANEOUS at 21:12

## 2021-01-01 RX ADMIN — IPRATROPIUM BROMIDE AND ALBUTEROL SULFATE 1 AMPULE: .5; 3 SOLUTION RESPIRATORY (INHALATION) at 05:36

## 2021-01-01 RX ADMIN — GABAPENTIN 100 MG: 100 CAPSULE ORAL at 22:13

## 2021-01-01 RX ADMIN — HYDRALAZINE HYDROCHLORIDE 25 MG: 25 TABLET, FILM COATED ORAL at 09:18

## 2021-01-01 RX ADMIN — SODIUM CHLORIDE, PRESERVATIVE FREE 5 ML: 5 INJECTION INTRAVENOUS at 08:31

## 2021-01-01 RX ADMIN — CYCLOBENZAPRINE 10 MG: 10 TABLET, FILM COATED ORAL at 13:20

## 2021-01-01 RX ADMIN — MORPHINE SULFATE 2 MG: 2 INJECTION, SOLUTION INTRAMUSCULAR; INTRAVENOUS at 11:52

## 2021-01-01 RX ADMIN — SODIUM CHLORIDE, PRESERVATIVE FREE 10 ML: 5 INJECTION INTRAVENOUS at 09:02

## 2021-01-01 RX ADMIN — DOCUSATE SODIUM 100 MG: 100 CAPSULE, LIQUID FILLED ORAL at 12:42

## 2021-01-01 RX ADMIN — GABAPENTIN 100 MG: 100 CAPSULE ORAL at 13:45

## 2021-01-01 RX ADMIN — DICYCLOMINE HYDROCHLORIDE 10 MG: 10 CAPSULE ORAL at 16:10

## 2021-01-01 RX ADMIN — FERROUS SULFATE TAB 325 MG (65 MG ELEMENTAL FE) 325 MG: 325 (65 FE) TAB at 08:36

## 2021-01-01 RX ADMIN — OXYCODONE HYDROCHLORIDE AND ACETAMINOPHEN 500 MG: 500 TABLET ORAL at 10:31

## 2021-01-01 RX ADMIN — HYDRALAZINE HYDROCHLORIDE 10 MG: 20 INJECTION INTRAMUSCULAR; INTRAVENOUS at 02:41

## 2021-01-01 RX ADMIN — SODIUM CHLORIDE, PRESERVATIVE FREE 10 ML: 5 INJECTION INTRAVENOUS at 20:38

## 2021-01-01 RX ADMIN — DOCUSATE SODIUM 100 MG: 100 CAPSULE, LIQUID FILLED ORAL at 22:02

## 2021-01-01 RX ADMIN — METOPROLOL TARTRATE 100 MG: 100 TABLET, FILM COATED ORAL at 14:34

## 2021-01-01 RX ADMIN — HEPARIN SODIUM 5000 UNITS: 5000 INJECTION INTRAVENOUS; SUBCUTANEOUS at 05:54

## 2021-01-01 RX ADMIN — EPOETIN ALFA-EPBX 6000 UNITS: 3000 INJECTION, SOLUTION INTRAVENOUS; SUBCUTANEOUS at 14:41

## 2021-01-01 RX ADMIN — DOCUSATE SODIUM 100 MG: 100 CAPSULE, LIQUID FILLED ORAL at 21:15

## 2021-01-01 RX ADMIN — HEPARIN SODIUM 5000 UNITS: 5000 INJECTION INTRAVENOUS; SUBCUTANEOUS at 14:50

## 2021-01-01 RX ADMIN — APIXABAN 10 MG: 5 TABLET, FILM COATED ORAL at 15:17

## 2021-01-01 RX ADMIN — FERROUS SULFATE TAB 325 MG (65 MG ELEMENTAL FE) 325 MG: 325 (65 FE) TAB at 08:22

## 2021-01-01 RX ADMIN — SODIUM CHLORIDE, PRESERVATIVE FREE 10 ML: 5 INJECTION INTRAVENOUS at 07:27

## 2021-01-01 RX ADMIN — GLUCAGON HYDROCHLORIDE 1 MG: KIT at 09:19

## 2021-01-01 RX ADMIN — DEXTROSE MONOHYDRATE: 100 INJECTION, SOLUTION INTRAVENOUS at 12:08

## 2021-01-01 RX ADMIN — HEPARIN SODIUM 5000 UNITS: 5000 INJECTION INTRAVENOUS; SUBCUTANEOUS at 05:53

## 2021-01-01 RX ADMIN — CLONIDINE HYDROCHLORIDE 0.1 MG: 0.1 TABLET ORAL at 09:18

## 2021-01-01 RX ADMIN — CLONIDINE HYDROCHLORIDE 0.2 MG: 0.2 TABLET ORAL at 21:22

## 2021-01-01 RX ADMIN — TRAMADOL HYDROCHLORIDE 25 MG: 50 TABLET ORAL at 21:24

## 2021-01-01 RX ADMIN — DEXAMETHASONE 10 MG: 4 TABLET ORAL at 12:09

## 2021-01-01 RX ADMIN — GABAPENTIN 100 MG: 100 CAPSULE ORAL at 21:22

## 2021-01-01 RX ADMIN — DICYCLOMINE HYDROCHLORIDE 10 MG: 10 CAPSULE ORAL at 06:03

## 2021-01-01 RX ADMIN — SODIUM CHLORIDE, PRESERVATIVE FREE 10 ML: 5 INJECTION INTRAVENOUS at 13:40

## 2021-01-01 RX ADMIN — LISINOPRIL 20 MG: 20 TABLET ORAL at 10:30

## 2021-01-01 RX ADMIN — CINACALCET HYDROCHLORIDE 30 MG: 30 TABLET, FILM COATED ORAL at 16:30

## 2021-01-01 RX ADMIN — DOCUSATE SODIUM 100 MG: 100 CAPSULE ORAL at 09:19

## 2021-01-01 RX ADMIN — ASPIRIN 81 MG: 81 TABLET, COATED ORAL at 10:28

## 2021-01-01 RX ADMIN — CEFTRIAXONE SODIUM 1000 MG: 1 INJECTION, POWDER, FOR SOLUTION INTRAMUSCULAR; INTRAVENOUS at 11:55

## 2021-01-01 RX ADMIN — MORPHINE SULFATE 2 MG: 2 INJECTION, SOLUTION INTRAMUSCULAR; INTRAVENOUS at 22:57

## 2021-01-01 RX ADMIN — HYDRALAZINE HYDROCHLORIDE 25 MG: 25 TABLET, FILM COATED ORAL at 19:42

## 2021-01-01 RX ADMIN — HYDROCODONE BITARTRATE AND ACETAMINOPHEN 2 TABLET: 5; 325 TABLET ORAL at 14:19

## 2021-01-01 RX ADMIN — APIXABAN 10 MG: 5 TABLET, FILM COATED ORAL at 15:36

## 2021-01-01 RX ADMIN — METOPROLOL TARTRATE 5 MG: 5 INJECTION INTRAVENOUS at 09:52

## 2021-01-01 RX ADMIN — LOSARTAN POTASSIUM 50 MG: 50 TABLET, FILM COATED ORAL at 09:00

## 2021-01-01 RX ADMIN — DOCUSATE SODIUM 100 MG: 100 CAPSULE, LIQUID FILLED ORAL at 20:04

## 2021-01-01 RX ADMIN — CLOPIDOGREL BISULFATE 75 MG: 75 TABLET, FILM COATED ORAL at 08:29

## 2021-01-01 RX ADMIN — VANCOMYCIN HYDROCHLORIDE 1750 MG: 5 INJECTION, POWDER, LYOPHILIZED, FOR SOLUTION INTRAVENOUS at 04:41

## 2021-01-01 RX ADMIN — DOCUSATE SODIUM 100 MG: 100 CAPSULE, LIQUID FILLED ORAL at 08:47

## 2021-01-01 RX ADMIN — METOPROLOL TARTRATE 50 MG: 50 TABLET, FILM COATED ORAL at 10:29

## 2021-01-01 RX ADMIN — OXYCODONE HYDROCHLORIDE AND ACETAMINOPHEN 500 MG: 500 TABLET ORAL at 08:21

## 2021-01-01 RX ADMIN — GLUCAGON HYDROCHLORIDE 1 MG: KIT at 09:48

## 2021-01-01 RX ADMIN — METOPROLOL TARTRATE 50 MG: 50 TABLET, FILM COATED ORAL at 22:13

## 2021-01-01 RX ADMIN — OXYCODONE HYDROCHLORIDE AND ACETAMINOPHEN 500 MG: 500 TABLET ORAL at 12:43

## 2021-01-01 RX ADMIN — PIPERACILLIN AND TAZOBACTAM 2250 MG: 2; .25 INJECTION, POWDER, LYOPHILIZED, FOR SOLUTION INTRAVENOUS at 10:10

## 2021-01-01 RX ADMIN — POTASSIUM & SODIUM PHOSPHATES POWDER PACK 280-160-250 MG 250 MG: 280-160-250 PACK at 14:05

## 2021-01-01 RX ADMIN — METOPROLOL TARTRATE 100 MG: 100 TABLET, FILM COATED ORAL at 12:43

## 2021-01-01 RX ADMIN — ATORVASTATIN CALCIUM 40 MG: 40 TABLET, FILM COATED ORAL at 20:13

## 2021-01-01 RX ADMIN — LACTULOSE 20 G: 20 SOLUTION ORAL at 13:45

## 2021-01-01 RX ADMIN — HYDRALAZINE HYDROCHLORIDE 25 MG: 50 TABLET, FILM COATED ORAL at 21:22

## 2021-01-01 RX ADMIN — FERROUS SULFATE TAB 325 MG (65 MG ELEMENTAL FE) 325 MG: 325 (65 FE) TAB at 14:06

## 2021-01-01 RX ADMIN — SODIUM CHLORIDE, PRESERVATIVE FREE 10 ML: 5 INJECTION INTRAVENOUS at 22:31

## 2021-01-01 RX ADMIN — PIPERACILLIN AND TAZOBACTAM 2250 MG: 2; .25 INJECTION, POWDER, LYOPHILIZED, FOR SOLUTION INTRAVENOUS at 18:00

## 2021-01-01 RX ADMIN — DEXAMETHASONE 6 MG: 4 TABLET ORAL at 12:42

## 2021-01-01 RX ADMIN — CALCITRIOL 1.5 MCG: 0.25 CAPSULE ORAL at 13:38

## 2021-01-01 RX ADMIN — METOPROLOL TARTRATE 100 MG: 100 TABLET, FILM COATED ORAL at 20:38

## 2021-01-01 RX ADMIN — ASPIRIN 81 MG: 81 TABLET, COATED ORAL at 08:49

## 2021-01-01 RX ADMIN — HYDRALAZINE HYDROCHLORIDE 25 MG: 25 TABLET, FILM COATED ORAL at 10:06

## 2021-01-01 RX ADMIN — PIPERACILLIN AND TAZOBACTAM 2250 MG: 2; .25 INJECTION, POWDER, LYOPHILIZED, FOR SOLUTION INTRAVENOUS at 00:35

## 2021-01-01 RX ADMIN — METOPROLOL TARTRATE 100 MG: 100 TABLET, FILM COATED ORAL at 21:59

## 2021-01-01 RX ADMIN — HEPARIN SODIUM 5000 UNITS: 5000 INJECTION INTRAVENOUS; SUBCUTANEOUS at 15:47

## 2021-01-01 RX ADMIN — HYDROCODONE BITARTRATE AND ACETAMINOPHEN 2 TABLET: 5; 325 TABLET ORAL at 00:42

## 2021-01-01 RX ADMIN — GABAPENTIN 100 MG: 100 CAPSULE ORAL at 13:05

## 2021-01-01 RX ADMIN — PRAVASTATIN SODIUM 80 MG: 80 TABLET ORAL at 09:18

## 2021-01-01 RX ADMIN — CLONIDINE HYDROCHLORIDE 0.1 MG: 0.1 TABLET ORAL at 10:30

## 2021-01-01 RX ADMIN — DEXTROSE MONOHYDRATE: 50 INJECTION, SOLUTION INTRAVENOUS at 18:30

## 2021-01-01 RX ADMIN — PIPERACILLIN AND TAZOBACTAM 2250 MG: 2; .25 INJECTION, POWDER, LYOPHILIZED, FOR SOLUTION INTRAVENOUS at 15:18

## 2021-01-01 RX ADMIN — ISOSORBIDE MONONITRATE 30 MG: 30 TABLET ORAL at 08:31

## 2021-01-01 RX ADMIN — METOPROLOL TARTRATE 100 MG: 100 TABLET, FILM COATED ORAL at 21:34

## 2021-01-01 RX ADMIN — GABAPENTIN 100 MG: 100 CAPSULE ORAL at 20:53

## 2021-01-01 RX ADMIN — EPOETIN ALFA-EPBX 10000 UNITS: 10000 INJECTION, SOLUTION INTRAVENOUS; SUBCUTANEOUS at 19:24

## 2021-01-01 RX ADMIN — ASPIRIN 81 MG: 81 TABLET, COATED ORAL at 09:22

## 2021-01-01 RX ADMIN — SODIUM CHLORIDE, PRESERVATIVE FREE 10 ML: 5 INJECTION INTRAVENOUS at 10:06

## 2021-01-01 RX ADMIN — HEPARIN SODIUM 5000 UNITS: 5000 INJECTION INTRAVENOUS; SUBCUTANEOUS at 21:18

## 2021-01-01 RX ADMIN — ASPIRIN 81 MG: 81 TABLET, COATED ORAL at 10:58

## 2021-01-01 RX ADMIN — GABAPENTIN 100 MG: 100 CAPSULE ORAL at 14:34

## 2021-01-01 RX ADMIN — IPRATROPIUM BROMIDE AND ALBUTEROL SULFATE 1 AMPULE: .5; 3 SOLUTION RESPIRATORY (INHALATION) at 07:48

## 2021-01-01 RX ADMIN — POTASSIUM BICARBONATE 40 MEQ: 782 TABLET, EFFERVESCENT ORAL at 16:12

## 2021-01-01 RX ADMIN — IPRATROPIUM BROMIDE AND ALBUTEROL SULFATE 1 AMPULE: .5; 3 SOLUTION RESPIRATORY (INHALATION) at 14:13

## 2021-01-01 RX ADMIN — ACETAMINOPHEN 650 MG: 325 TABLET ORAL at 00:58

## 2021-01-01 RX ADMIN — METOPROLOL TARTRATE 100 MG: 100 TABLET, FILM COATED ORAL at 20:50

## 2021-01-01 RX ADMIN — APIXABAN 10 MG: 5 TABLET, FILM COATED ORAL at 08:46

## 2021-01-01 RX ADMIN — METOPROLOL TARTRATE 50 MG: 50 TABLET, FILM COATED ORAL at 13:33

## 2021-01-01 RX ADMIN — GLUCAGON HYDROCHLORIDE 1 MG: KIT at 10:21

## 2021-01-01 RX ADMIN — GABAPENTIN 100 MG: 100 CAPSULE ORAL at 09:39

## 2021-01-01 RX ADMIN — DOCUSATE SODIUM 100 MG: 100 CAPSULE, LIQUID FILLED ORAL at 12:40

## 2021-01-01 RX ADMIN — ASPIRIN 81 MG: 81 TABLET, COATED ORAL at 14:41

## 2021-01-01 RX ADMIN — SODIUM CHLORIDE, PRESERVATIVE FREE 10 ML: 5 INJECTION INTRAVENOUS at 09:04

## 2021-01-01 RX ADMIN — ATORVASTATIN CALCIUM 40 MG: 40 TABLET, FILM COATED ORAL at 20:08

## 2021-01-01 RX ADMIN — HYDROCODONE BITARTRATE AND ACETAMINOPHEN 2 TABLET: 5; 325 TABLET ORAL at 14:36

## 2021-01-01 RX ADMIN — ASPIRIN 81 MG CHEWABLE TABLET 81 MG: 81 TABLET CHEWABLE at 08:55

## 2021-01-01 RX ADMIN — CLOPIDOGREL BISULFATE 75 MG: 75 TABLET, FILM COATED ORAL at 09:03

## 2021-01-01 RX ADMIN — SODIUM CHLORIDE, PRESERVATIVE FREE 10 ML: 5 INJECTION INTRAVENOUS at 08:35

## 2021-01-01 RX ADMIN — DOCUSATE SODIUM 100 MG: 100 CAPSULE, LIQUID FILLED ORAL at 13:42

## 2021-01-01 RX ADMIN — PRAVASTATIN SODIUM 80 MG: 80 TABLET ORAL at 21:26

## 2021-01-01 RX ADMIN — PIPERACILLIN AND TAZOBACTAM 2250 MG: 2; .25 INJECTION, POWDER, LYOPHILIZED, FOR SOLUTION INTRAVENOUS at 17:21

## 2021-01-01 RX ADMIN — HEPARIN SODIUM 5000 UNITS: 5000 INJECTION INTRAVENOUS; SUBCUTANEOUS at 05:32

## 2021-01-01 RX ADMIN — HYDRALAZINE HYDROCHLORIDE 25 MG: 50 TABLET, FILM COATED ORAL at 20:55

## 2021-01-01 RX ADMIN — EPOETIN ALFA-EPBX 10000 UNITS: 10000 INJECTION, SOLUTION INTRAVENOUS; SUBCUTANEOUS at 13:23

## 2021-01-01 RX ADMIN — METOPROLOL TARTRATE 100 MG: 100 TABLET, FILM COATED ORAL at 20:54

## 2021-01-01 ASSESSMENT — PAIN DESCRIPTION - PAIN TYPE
TYPE: ACUTE PAIN
TYPE_2: ACUTE PAIN
TYPE: ACUTE PAIN
TYPE_2: ACUTE PAIN
TYPE: ACUTE PAIN
TYPE: CHRONIC PAIN
TYPE_2: ACUTE PAIN
TYPE: CHRONIC PAIN
TYPE: ACUTE PAIN

## 2021-01-01 ASSESSMENT — PAIN SCALES - GENERAL
PAINLEVEL_OUTOF10: 0
PAINLEVEL_OUTOF10: 0
PAINLEVEL_OUTOF10: 10
PAINLEVEL_OUTOF10: 0
PAINLEVEL_OUTOF10: 10
PAINLEVEL_OUTOF10: 0
PAINLEVEL_OUTOF10: 10
PAINLEVEL_OUTOF10: 0
PAINLEVEL_OUTOF10: 0
PAINLEVEL_OUTOF10: 3
PAINLEVEL_OUTOF10: 0
PAINLEVEL_OUTOF10: 10
PAINLEVEL_OUTOF10: 0
PAINLEVEL_OUTOF10: 10
PAINLEVEL_OUTOF10: 0
PAINLEVEL_OUTOF10: 5
PAINLEVEL_OUTOF10: 6
PAINLEVEL_OUTOF10: 0
PAINLEVEL_OUTOF10: 9
PAINLEVEL_OUTOF10: 10
PAINLEVEL_OUTOF10: 9
PAINLEVEL_OUTOF10: 0
PAINLEVEL_OUTOF10: 10
PAINLEVEL_OUTOF10: 7
PAINLEVEL_OUTOF10: 8
PAINLEVEL_OUTOF10: 10
PAINLEVEL_OUTOF10: 0
PAINLEVEL_OUTOF10: 9
PAINLEVEL_OUTOF10: 0
PAINLEVEL_OUTOF10: 0
PAINLEVEL_OUTOF10: 3
PAINLEVEL_OUTOF10: 0
PAINLEVEL_OUTOF10: 6
PAINLEVEL_OUTOF10: 0
PAINLEVEL_OUTOF10: 0
PAINLEVEL_OUTOF10: 9
PAINLEVEL_OUTOF10: 0
PAINLEVEL_OUTOF10: 0
PAINLEVEL_OUTOF10: 8
PAINLEVEL_OUTOF10: 9
PAINLEVEL_OUTOF10: 0
PAINLEVEL_OUTOF10: 10
PAINLEVEL_OUTOF10: 0
PAINLEVEL_OUTOF10: 0
PAINLEVEL_OUTOF10: 4
PAINLEVEL_OUTOF10: 0
PAINLEVEL_OUTOF10: 4
PAINLEVEL_OUTOF10: 9
PAINLEVEL_OUTOF10: 0
PAINLEVEL_OUTOF10: 2
PAINLEVEL_OUTOF10: 0
PAINLEVEL_OUTOF10: 0
PAINLEVEL_OUTOF10: 10
PAINLEVEL_OUTOF10: 0
PAINLEVEL_OUTOF10: 8
PAINLEVEL_OUTOF10: 0
PAINLEVEL_OUTOF10: 0
PAINLEVEL_OUTOF10: 2
PAINLEVEL_OUTOF10: 6
PAINLEVEL_OUTOF10: 8
PAINLEVEL_OUTOF10: 3
PAINLEVEL_OUTOF10: 0
PAINLEVEL_OUTOF10: 2
PAINLEVEL_OUTOF10: 9
PAINLEVEL_OUTOF10: 10
PAINLEVEL_OUTOF10: 10
PAINLEVEL_OUTOF10: 0
PAINLEVEL_OUTOF10: 10
PAINLEVEL_OUTOF10: 9
PAINLEVEL_OUTOF10: 3
PAINLEVEL_OUTOF10: 7
PAINLEVEL_OUTOF10: 10
PAINLEVEL_OUTOF10: 0
PAINLEVEL_OUTOF10: 9
PAINLEVEL_OUTOF10: 0
PAINLEVEL_OUTOF10: 7
PAINLEVEL_OUTOF10: 0
PAINLEVEL_OUTOF10: 9
PAINLEVEL_OUTOF10: 0
PAINLEVEL_OUTOF10: 9
PAINLEVEL_OUTOF10: 0
PAINLEVEL_OUTOF10: 10
PAINLEVEL_OUTOF10: 0
PAINLEVEL_OUTOF10: 10
PAINLEVEL_OUTOF10: 0
PAINLEVEL_OUTOF10: 10
PAINLEVEL_OUTOF10: 0
PAINLEVEL_OUTOF10: 7
PAINLEVEL_OUTOF10: 10
PAINLEVEL_OUTOF10: 8
PAINLEVEL_OUTOF10: 5
PAINLEVEL_OUTOF10: 0
PAINLEVEL_OUTOF10: 6
PAINLEVEL_OUTOF10: 0
PAINLEVEL_OUTOF10: 0
PAINLEVEL_OUTOF10: 8
PAINLEVEL_OUTOF10: 0
PAINLEVEL_OUTOF10: 0
PAINLEVEL_OUTOF10: 4
PAINLEVEL_OUTOF10: 8
PAINLEVEL_OUTOF10: 0
PAINLEVEL_OUTOF10: 5
PAINLEVEL_OUTOF10: 0
PAINLEVEL_OUTOF10: 5
PAINLEVEL_OUTOF10: 10
PAINLEVEL_OUTOF10: 0
PAINLEVEL_OUTOF10: 10
PAINLEVEL_OUTOF10: 0
PAINLEVEL_OUTOF10: 10
PAINLEVEL_OUTOF10: 10
PAINLEVEL_OUTOF10: 0
PAINLEVEL_OUTOF10: 0
PAINLEVEL_OUTOF10: 10
PAINLEVEL_OUTOF10: 2
PAINLEVEL_OUTOF10: 0
PAINLEVEL_OUTOF10: 0
PAINLEVEL_OUTOF10: 4
PAINLEVEL_OUTOF10: 0
PAINLEVEL_OUTOF10: 9
PAINLEVEL_OUTOF10: 9
PAINLEVEL_OUTOF10: 0
PAINLEVEL_OUTOF10: 0
PAINLEVEL_OUTOF10: 3
PAINLEVEL_OUTOF10: 7
PAINLEVEL_OUTOF10: 10
PAINLEVEL_OUTOF10: 10
PAINLEVEL_OUTOF10: 3
PAINLEVEL_OUTOF10: 5
PAINLEVEL_OUTOF10: 0
PAINLEVEL_OUTOF10: 7
PAINLEVEL_OUTOF10: 0
PAINLEVEL_OUTOF10: 5
PAINLEVEL_OUTOF10: 4
PAINLEVEL_OUTOF10: 6
PAINLEVEL_OUTOF10: 8
PAINLEVEL_OUTOF10: 0
PAINLEVEL_OUTOF10: 10
PAINLEVEL_OUTOF10: 7
PAINLEVEL_OUTOF10: 9
PAINLEVEL_OUTOF10: 10
PAINLEVEL_OUTOF10: 0
PAINLEVEL_OUTOF10: 4
PAINLEVEL_OUTOF10: 0
PAINLEVEL_OUTOF10: 7
PAINLEVEL_OUTOF10: 7
PAINLEVEL_OUTOF10: 0

## 2021-01-01 ASSESSMENT — PAIN DESCRIPTION - ONSET
ONSET: ON-GOING
ONSET_2: ON-GOING
ONSET: ON-GOING
ONSET_2: ON-GOING
ONSET: ON-GOING
ONSET: ON-GOING
ONSET_2: ON-GOING
ONSET: ON-GOING
ONSET: ON-GOING
ONSET_2: ON-GOING
ONSET: ON-GOING
ONSET_2: ON-GOING
ONSET: ON-GOING
ONSET: GRADUAL
ONSET: ON-GOING

## 2021-01-01 ASSESSMENT — PAIN DESCRIPTION - ORIENTATION
ORIENTATION: RIGHT
ORIENTATION_2: LEFT
ORIENTATION: LOWER
ORIENTATION: RIGHT
ORIENTATION: LOWER
ORIENTATION: LEFT
ORIENTATION: RIGHT;LEFT
ORIENTATION_2: RIGHT
ORIENTATION: LEFT
ORIENTATION: LEFT
ORIENTATION: RIGHT
ORIENTATION_2: UPPER;RIGHT
ORIENTATION: LOWER
ORIENTATION: LOWER
ORIENTATION: RIGHT
ORIENTATION_2: RIGHT
ORIENTATION: RIGHT;LEFT
ORIENTATION: MID
ORIENTATION: LEFT
ORIENTATION: LOWER
ORIENTATION: LOWER
ORIENTATION: RIGHT
ORIENTATION: LEFT
ORIENTATION: LEFT
ORIENTATION: RIGHT;LEFT
ORIENTATION: RIGHT
ORIENTATION: LOWER
ORIENTATION_2: LEFT
ORIENTATION: LOWER
ORIENTATION_2: LEFT
ORIENTATION: LEFT
ORIENTATION: RIGHT
ORIENTATION: LEFT
ORIENTATION: LEFT
ORIENTATION: LOWER
ORIENTATION: LEFT
ORIENTATION: RIGHT;LEFT
ORIENTATION: LEFT;RIGHT
ORIENTATION: LEFT
ORIENTATION: RIGHT

## 2021-01-01 ASSESSMENT — PAIN DESCRIPTION - PROGRESSION
CLINICAL_PROGRESSION: NOT CHANGED
CLINICAL_PROGRESSION_2: GRADUALLY WORSENING
CLINICAL_PROGRESSION: GRADUALLY WORSENING
CLINICAL_PROGRESSION_2: GRADUALLY WORSENING
CLINICAL_PROGRESSION: NOT CHANGED
CLINICAL_PROGRESSION: GRADUALLY WORSENING
CLINICAL_PROGRESSION: GRADUALLY WORSENING
CLINICAL_PROGRESSION: NOT CHANGED
CLINICAL_PROGRESSION: GRADUALLY WORSENING
CLINICAL_PROGRESSION: GRADUALLY WORSENING
CLINICAL_PROGRESSION_2: NOT CHANGED
CLINICAL_PROGRESSION: GRADUALLY WORSENING
CLINICAL_PROGRESSION: GRADUALLY WORSENING
CLINICAL_PROGRESSION: NOT CHANGED
CLINICAL_PROGRESSION: NOT CHANGED
CLINICAL_PROGRESSION_2: GRADUALLY WORSENING
CLINICAL_PROGRESSION_2: NOT CHANGED
CLINICAL_PROGRESSION: NOT CHANGED
CLINICAL_PROGRESSION: GRADUALLY WORSENING
CLINICAL_PROGRESSION: NOT CHANGED
CLINICAL_PROGRESSION: NOT CHANGED
CLINICAL_PROGRESSION: GRADUALLY WORSENING
CLINICAL_PROGRESSION: NOT CHANGED
CLINICAL_PROGRESSION_2: NOT CHANGED
CLINICAL_PROGRESSION: NOT CHANGED
CLINICAL_PROGRESSION: NOT CHANGED
CLINICAL_PROGRESSION: GRADUALLY WORSENING
CLINICAL_PROGRESSION: GRADUALLY WORSENING
CLINICAL_PROGRESSION: NOT CHANGED
CLINICAL_PROGRESSION: GRADUALLY WORSENING
CLINICAL_PROGRESSION: NOT CHANGED
CLINICAL_PROGRESSION: GRADUALLY IMPROVING
CLINICAL_PROGRESSION: GRADUALLY WORSENING
CLINICAL_PROGRESSION: GRADUALLY WORSENING
CLINICAL_PROGRESSION: NOT CHANGED
CLINICAL_PROGRESSION: NOT CHANGED
CLINICAL_PROGRESSION: GRADUALLY WORSENING
CLINICAL_PROGRESSION: GRADUALLY WORSENING
CLINICAL_PROGRESSION: NOT CHANGED
CLINICAL_PROGRESSION: GRADUALLY WORSENING
CLINICAL_PROGRESSION: GRADUALLY WORSENING
CLINICAL_PROGRESSION: GRADUALLY IMPROVING
CLINICAL_PROGRESSION: GRADUALLY WORSENING

## 2021-01-01 ASSESSMENT — ENCOUNTER SYMPTOMS
SHORTNESS OF BREATH: 0
NAUSEA: 1
VOMITING: 0
CONSTIPATION: 0
CHEST TIGHTNESS: 0
CHEST TIGHTNESS: 0
ABDOMINAL PAIN: 0
COUGH: 0
VOICE CHANGE: 0
RHINORRHEA: 0
BLOOD IN STOOL: 0
ABDOMINAL PAIN: 1
SORE THROAT: 0
DIARRHEA: 0
NAUSEA: 0
WHEEZING: 0
WHEEZING: 0
EYE PAIN: 0
ABDOMINAL PAIN: 0
RHINORRHEA: 0
COUGH: 0
PHOTOPHOBIA: 0
CHEST TIGHTNESS: 1
CONSTIPATION: 0
ABDOMINAL PAIN: 0
BACK PAIN: 0
VOMITING: 0
NAUSEA: 0
EYE PAIN: 0
BLOOD IN STOOL: 0
SHORTNESS OF BREATH: 1
BACK PAIN: 0
VOMITING: 0
SORE THROAT: 0
DIARRHEA: 0
SHORTNESS OF BREATH: 1
ABDOMINAL PAIN: 1
SHORTNESS OF BREATH: 1
BACK PAIN: 0
SINUS PAIN: 0
TROUBLE SWALLOWING: 0
ABDOMINAL PAIN: 0
NAUSEA: 0
STRIDOR: 0
DIARRHEA: 0
VOMITING: 1
NAUSEA: 0
SINUS PRESSURE: 0
ABDOMINAL PAIN: 0
COUGH: 0
SHORTNESS OF BREATH: 1
BACK PAIN: 0
CHEST TIGHTNESS: 0
VOMITING: 0
CONSTIPATION: 0
WHEEZING: 0
SHORTNESS OF BREATH: 1
RHINORRHEA: 0
EYE REDNESS: 0
COUGH: 1
COUGH: 1
VOMITING: 0
CHEST TIGHTNESS: 0
COUGH: 0
NAUSEA: 0
ABDOMINAL PAIN: 0
VOMITING: 0
DIARRHEA: 0
NAUSEA: 0
SORE THROAT: 0
SHORTNESS OF BREATH: 1
DIARRHEA: 0
NAUSEA: 0
RHINORRHEA: 0
PHOTOPHOBIA: 0
SHORTNESS OF BREATH: 0
SHORTNESS OF BREATH: 0

## 2021-01-01 ASSESSMENT — PAIN DESCRIPTION - DESCRIPTORS
DESCRIPTORS: ACHING;DISCOMFORT
DESCRIPTORS: ACHING
DESCRIPTORS: SORE
DESCRIPTORS: ACHING
DESCRIPTORS: DISCOMFORT
DESCRIPTORS: ACHING
DESCRIPTORS_2: ACHING;SHARP;THROBBING
DESCRIPTORS: ACHING
DESCRIPTORS_2: ACHING
DESCRIPTORS: ACHING;CONSTANT;SHARP;STABBING
DESCRIPTORS: ACHING
DESCRIPTORS: ACHING
DESCRIPTORS: ACHING;DISCOMFORT
DESCRIPTORS: SORE
DESCRIPTORS: ACHING
DESCRIPTORS: CONSTANT
DESCRIPTORS: ACHING
DESCRIPTORS_2: SORE;ACHING
DESCRIPTORS: ACHING

## 2021-01-01 ASSESSMENT — PAIN DESCRIPTION - FREQUENCY
FREQUENCY: CONTINUOUS
FREQUENCY: INTERMITTENT
FREQUENCY: INTERMITTENT
FREQUENCY: CONTINUOUS
FREQUENCY: INTERMITTENT
FREQUENCY: INTERMITTENT
FREQUENCY: CONTINUOUS
FREQUENCY: INTERMITTENT
FREQUENCY: CONTINUOUS
FREQUENCY: CONTINUOUS
FREQUENCY: INTERMITTENT
FREQUENCY: CONTINUOUS
FREQUENCY: INTERMITTENT
FREQUENCY: CONTINUOUS
FREQUENCY: INTERMITTENT
FREQUENCY: CONTINUOUS
FREQUENCY: CONTINUOUS
FREQUENCY: INTERMITTENT
FREQUENCY: CONTINUOUS
FREQUENCY: INTERMITTENT

## 2021-01-01 ASSESSMENT — PAIN DESCRIPTION - LOCATION
LOCATION: ABDOMEN
LOCATION: GENERALIZED
LOCATION: SHOULDER
LOCATION: ABDOMEN;HEAD
LOCATION_2: SHOULDER
LOCATION: SHOULDER
LOCATION: ANKLE;KNEE
LOCATION: ABDOMEN;HEAD
LOCATION: ABDOMEN
LOCATION: SHOULDER
LOCATION: HEAD;ABDOMEN
LOCATION_2: ANKLE
LOCATION: WRIST
LOCATION: LEG
LOCATION: ANKLE;SHOULDER
LOCATION: HEAD
LOCATION: ABDOMEN
LOCATION: HEAD
LOCATION_2: SHOULDER
LOCATION_2: SHOULDER
LOCATION: WRIST
LOCATION: ABDOMEN;HEAD
LOCATION: ARM;SHOULDER
LOCATION: ABDOMEN
LOCATION: HIP
LOCATION: ANKLE;SHOULDER
LOCATION_2: SHOULDER
LOCATION: ABDOMEN;HEAD
LOCATION: ANKLE
LOCATION: SHOULDER
LOCATION: ABDOMEN
LOCATION: HEAD
LOCATION: ABDOMEN
LOCATION: ABDOMEN;HEAD
LOCATION: ABDOMEN
LOCATION: HEAD
LOCATION: HEAD;SHOULDER
LOCATION: SHOULDER
LOCATION: ANKLE
LOCATION: ANKLE
LOCATION_2: ANKLE
LOCATION: HEAD

## 2021-01-01 ASSESSMENT — PAIN DESCRIPTION - DIRECTION: RADIATING_TOWARDS: NO

## 2021-01-01 ASSESSMENT — PAIN - FUNCTIONAL ASSESSMENT
PAIN_FUNCTIONAL_ASSESSMENT: PREVENTS OR INTERFERES WITH MANY ACTIVE NOT PASSIVE ACTIVITIES
PAIN_FUNCTIONAL_ASSESSMENT: INTOLERABLE, UNABLE TO DO ANY ACTIVE OR PASSIVE ACTIVITIES
PAIN_FUNCTIONAL_ASSESSMENT: ACTIVITIES ARE NOT PREVENTED
PAIN_FUNCTIONAL_ASSESSMENT: INTOLERABLE, UNABLE TO DO ANY ACTIVE OR PASSIVE ACTIVITIES
PAIN_FUNCTIONAL_ASSESSMENT: ACTIVITIES ARE NOT PREVENTED
PAIN_FUNCTIONAL_ASSESSMENT: 0-10
PAIN_FUNCTIONAL_ASSESSMENT: ACTIVITIES ARE NOT PREVENTED
PAIN_FUNCTIONAL_ASSESSMENT: ACTIVITIES ARE NOT PREVENTED
PAIN_FUNCTIONAL_ASSESSMENT: 0-10
PAIN_FUNCTIONAL_ASSESSMENT: PREVENTS OR INTERFERES SOME ACTIVE ACTIVITIES AND ADLS
PAIN_FUNCTIONAL_ASSESSMENT: ACTIVITIES ARE NOT PREVENTED
PAIN_FUNCTIONAL_ASSESSMENT: PREVENTS OR INTERFERES SOME ACTIVE ACTIVITIES AND ADLS
PAIN_FUNCTIONAL_ASSESSMENT: INTOLERABLE, UNABLE TO DO ANY ACTIVE OR PASSIVE ACTIVITIES
PAIN_FUNCTIONAL_ASSESSMENT: PREVENTS OR INTERFERES SOME ACTIVE ACTIVITIES AND ADLS
PAIN_FUNCTIONAL_ASSESSMENT: INTOLERABLE, UNABLE TO DO ANY ACTIVE OR PASSIVE ACTIVITIES
PAIN_FUNCTIONAL_ASSESSMENT: ACTIVITIES ARE NOT PREVENTED
PAIN_FUNCTIONAL_ASSESSMENT: ACTIVITIES ARE NOT PREVENTED
PAIN_FUNCTIONAL_ASSESSMENT: INTOLERABLE, UNABLE TO DO ANY ACTIVE OR PASSIVE ACTIVITIES
PAIN_FUNCTIONAL_ASSESSMENT: ACTIVITIES ARE NOT PREVENTED
PAIN_FUNCTIONAL_ASSESSMENT: PREVENTS OR INTERFERES SOME ACTIVE ACTIVITIES AND ADLS
PAIN_FUNCTIONAL_ASSESSMENT: ACTIVITIES ARE NOT PREVENTED
PAIN_FUNCTIONAL_ASSESSMENT: ACTIVITIES ARE NOT PREVENTED
PAIN_FUNCTIONAL_ASSESSMENT: PREVENTS OR INTERFERES SOME ACTIVE ACTIVITIES AND ADLS
PAIN_FUNCTIONAL_ASSESSMENT: INTOLERABLE, UNABLE TO DO ANY ACTIVE OR PASSIVE ACTIVITIES

## 2021-01-01 ASSESSMENT — PAIN SCALES - WONG BAKER
WONGBAKER_NUMERICALRESPONSE: 8
WONGBAKER_NUMERICALRESPONSE: 0
WONGBAKER_NUMERICALRESPONSE: 4
WONGBAKER_NUMERICALRESPONSE: 4
WONGBAKER_NUMERICALRESPONSE: 2
WONGBAKER_NUMERICALRESPONSE: 0
WONGBAKER_NUMERICALRESPONSE: 4
WONGBAKER_NUMERICALRESPONSE: 0

## 2021-01-01 ASSESSMENT — PAIN DESCRIPTION - DURATION
DURATION_2: CONTINUOUS
DURATION_2: CONTINUOUS

## 2021-01-01 ASSESSMENT — PAIN DESCRIPTION - INTENSITY
RATING_2: 10
RATING_2: 9

## 2021-04-30 ENCOUNTER — HOSPITAL ENCOUNTER (EMERGENCY)
Age: 86
Discharge: HOME | End: 2021-04-30
Attending: EMERGENCY MEDICINE
Payer: MEDICARE

## 2021-04-30 ENCOUNTER — APPOINTMENT (OUTPATIENT)
Dept: GENERAL RADIOLOGY | Age: 86
End: 2021-04-30
Payer: MEDICARE

## 2021-04-30 VITALS
RESPIRATION RATE: 16 BRPM | HEART RATE: 73 BPM | SYSTOLIC BLOOD PRESSURE: 164 MMHG | DIASTOLIC BLOOD PRESSURE: 74 MMHG | WEIGHT: 180 LBS | TEMPERATURE: 98.5 F | BODY MASS INDEX: 29.05 KG/M2 | OXYGEN SATURATION: 98 %

## 2021-04-30 DIAGNOSIS — M25.511 CHRONIC RIGHT SHOULDER PAIN: ICD-10-CM

## 2021-04-30 DIAGNOSIS — R06.02 SHORTNESS OF BREATH: Primary | ICD-10-CM

## 2021-04-30 DIAGNOSIS — G89.29 CHRONIC RIGHT SHOULDER PAIN: ICD-10-CM

## 2021-04-30 LAB
ALBUMIN SERPL-MCNC: 4.3 G/DL (ref 3.5–5.1)
ALP BLD-CCNC: 55 U/L (ref 38–126)
ALT SERPL-CCNC: 13 U/L (ref 11–66)
ANION GAP SERPL CALCULATED.3IONS-SCNC: 12 MEQ/L (ref 8–16)
AST SERPL-CCNC: 21 U/L (ref 5–40)
BASOPHILS # BLD: 0.4 %
BASOPHILS ABSOLUTE: 0 THOU/MM3 (ref 0–0.1)
BILIRUB SERPL-MCNC: 0.2 MG/DL (ref 0.3–1.2)
BILIRUBIN DIRECT: < 0.2 MG/DL (ref 0–0.3)
BUN BLDV-MCNC: 38 MG/DL (ref 7–22)
CALCIUM SERPL-MCNC: 9.8 MG/DL (ref 8.5–10.5)
CHLORIDE BLD-SCNC: 96 MEQ/L (ref 98–111)
CO2: 32 MEQ/L (ref 23–33)
CREAT SERPL-MCNC: 7.2 MG/DL (ref 0.4–1.2)
EOSINOPHIL # BLD: 7.3 %
EOSINOPHILS ABSOLUTE: 0.3 THOU/MM3 (ref 0–0.4)
ERYTHROCYTE [DISTWIDTH] IN BLOOD BY AUTOMATED COUNT: 15 % (ref 11.5–14.5)
ERYTHROCYTE [DISTWIDTH] IN BLOOD BY AUTOMATED COUNT: 52.6 FL (ref 35–45)
GFR SERPL CREATININE-BSD FRML MDRD: 9 ML/MIN/1.73M2
GLUCOSE BLD-MCNC: 95 MG/DL (ref 70–108)
HCT VFR BLD CALC: 33 % (ref 42–52)
HEMOGLOBIN: 11.3 GM/DL (ref 14–18)
IMMATURE GRANS (ABS): 0.02 THOU/MM3 (ref 0–0.07)
IMMATURE GRANULOCYTES: 0.4 %
LYMPHOCYTES # BLD: 13 %
LYMPHOCYTES ABSOLUTE: 0.6 THOU/MM3 (ref 1–4.8)
MCH RBC QN AUTO: 32.8 PG (ref 26–33)
MCHC RBC AUTO-ENTMCNC: 34.2 GM/DL (ref 32.2–35.5)
MCV RBC AUTO: 95.9 FL (ref 80–94)
MONOCYTES # BLD: 8.6 %
MONOCYTES ABSOLUTE: 0.4 THOU/MM3 (ref 0.4–1.3)
NUCLEATED RED BLOOD CELLS: 0 /100 WBC
OSMOLALITY CALCULATION: 288.2 MOSMOL/KG (ref 275–300)
PLATELET # BLD: 122 THOU/MM3 (ref 130–400)
PMV BLD AUTO: 11.2 FL (ref 9.4–12.4)
POTASSIUM REFLEX MAGNESIUM: 4.9 MEQ/L (ref 3.5–5.2)
PRO-BNP: ABNORMAL PG/ML (ref 0–1800)
RBC # BLD: 3.44 MILL/MM3 (ref 4.7–6.1)
SARS-COV-2, NAAT: NOT DETECTED
SEG NEUTROPHILS: 70.3 %
SEGMENTED NEUTROPHILS ABSOLUTE COUNT: 3.2 THOU/MM3 (ref 1.8–7.7)
SODIUM BLD-SCNC: 140 MEQ/L (ref 135–145)
TOTAL PROTEIN: 7.1 G/DL (ref 6.1–8)
TROPONIN T: 0.06 NG/ML
WBC # BLD: 4.5 THOU/MM3 (ref 4.8–10.8)

## 2021-04-30 PROCEDURE — 73030 X-RAY EXAM OF SHOULDER: CPT

## 2021-04-30 PROCEDURE — 99284 EMERGENCY DEPT VISIT MOD MDM: CPT

## 2021-04-30 PROCEDURE — 87635 SARS-COV-2 COVID-19 AMP PRB: CPT

## 2021-04-30 PROCEDURE — 36415 COLL VENOUS BLD VENIPUNCTURE: CPT

## 2021-04-30 PROCEDURE — 83880 ASSAY OF NATRIURETIC PEPTIDE: CPT

## 2021-04-30 PROCEDURE — 85025 COMPLETE CBC W/AUTO DIFF WBC: CPT

## 2021-04-30 PROCEDURE — 80048 BASIC METABOLIC PNL TOTAL CA: CPT

## 2021-04-30 PROCEDURE — 71045 X-RAY EXAM CHEST 1 VIEW: CPT

## 2021-04-30 PROCEDURE — 6370000000 HC RX 637 (ALT 250 FOR IP): Performed by: EMERGENCY MEDICINE

## 2021-04-30 PROCEDURE — 84484 ASSAY OF TROPONIN QUANT: CPT

## 2021-04-30 PROCEDURE — 80076 HEPATIC FUNCTION PANEL: CPT

## 2021-04-30 PROCEDURE — 93005 ELECTROCARDIOGRAM TRACING: CPT | Performed by: STUDENT IN AN ORGANIZED HEALTH CARE EDUCATION/TRAINING PROGRAM

## 2021-04-30 RX ORDER — ACETAMINOPHEN 325 MG/1
650 TABLET ORAL ONCE
Status: COMPLETED | OUTPATIENT
Start: 2021-04-30 | End: 2021-04-30

## 2021-04-30 RX ADMIN — ACETAMINOPHEN 650 MG: 325 TABLET ORAL at 18:30

## 2021-04-30 ASSESSMENT — ENCOUNTER SYMPTOMS
WHEEZING: 0
BACK PAIN: 0
CHEST TIGHTNESS: 0
VOMITING: 0
SORE THROAT: 0
SINUS PAIN: 0
SHORTNESS OF BREATH: 1
COLOR CHANGE: 0
EYE PAIN: 0
TROUBLE SWALLOWING: 0
ABDOMINAL PAIN: 0
SINUS PRESSURE: 0
CONSTIPATION: 0
COUGH: 0
DIARRHEA: 0
EYE DISCHARGE: 0

## 2021-04-30 ASSESSMENT — PAIN DESCRIPTION - PROGRESSION: CLINICAL_PROGRESSION: NOT CHANGED

## 2021-04-30 ASSESSMENT — PAIN DESCRIPTION - DESCRIPTORS: DESCRIPTORS: ACHING

## 2021-04-30 ASSESSMENT — PAIN SCALES - GENERAL: PAINLEVEL_OUTOF10: 9

## 2021-04-30 ASSESSMENT — PAIN DESCRIPTION - LOCATION: LOCATION: SHOULDER

## 2021-04-30 NOTE — ED NOTES
Patient to the ED with one week of shortness of breath and right shoulder pain. Patient states that he was dialyzed today and does so every Monday/Wednesday/Friday. Patient is resting in bed with easy and unlabored respirations. Call light in reach. Side rails up x2. Patient denies further complaints or concerns. Will monitor.         Zack Escobar RN  04/30/21 0047

## 2021-04-30 NOTE — ED NOTES
ED nurse-to-nurse bedside report    Chief Complaint   Patient presents with    Shortness of Breath      LOC: alert and orientated to name, place, date  Vital signs   Vitals:    04/30/21 1622   BP: (!) 168/79   Pulse: 55   Resp: 18   Temp: 98.5 °F (36.9 °C)   TempSrc: Oral   SpO2: 97%   Weight: 180 lb (81.6 kg)      Pain:    Pain Interventions: N/a  Pain Goal: 4/10  Oxygen: NA    Current needs required room air   Telemetry: Yes  LDAs:   Peripheral IV 04/30/21 Left Antecubital (Active)   Site Assessment Clean;Dry; Intact 04/30/21 1640   Line Status Blood return noted;Specimen collected 04/30/21 1640   Dressing Status Clean;Dry; Intact 04/30/21 1640   Dressing Intervention New 04/30/21 1640     Continuous Infusions:   Mobility: Requires assistance * 1  Escalera Fall Risk Score:    Fall Risk 8/5/2014   2 or more falls in past year? no   Fall with injury in past year? no     Fall Interventions: call light in reach, side rails up x2  Report given to: Mervat Wiley RN  04/30/21 8280

## 2021-04-30 NOTE — ED NOTES
Patient states the pain in his shoulder is now a 6/10. Patient resting in bed. Respirations easy and unlabored. Call light within reach.        Marva Leon RN  04/30/21 3001

## 2021-04-30 NOTE — ED PROVIDER NOTES
urgency. Musculoskeletal: Negative for arthralgias, back pain, neck pain and neck stiffness. Skin: Negative for color change and rash. Neurological: Negative for dizziness, weakness, light-headedness, numbness and headaches. PAST MEDICAL AND SURGICAL HISTORY     Past Medical History:   Diagnosis Date    Anemia in chronic kidney disease(285.21)     Arthritis     CAD (coronary artery disease)     Chronic kidney disease     Chronic kidney disease, stage IV (severe) (HCC)     CKD (chronic kidney disease) stage 3, GFR 30-59 ml/min     COPD [facility]ch[facility] obstructive pulmonary disease) ([facility] [address]     GI bleed     Hemodialysis patient [facility]) 07/26/2016    [facility]    History of blood transfusion     6/2019    Hyperlipidemia     Hyperphosphatemia 5/22/2018    Hypertension     Sick sinus syndrome ([facility] [address]     Systolic congestive heart failure [facility])      Past Surgical History:   Procedure Laterality Date   Brigid Grijalva CARDIAC SURGERY      COLONOSCOPY  2006,2009    COLONOSCOPY N/A 6/27/2019    COLONOSCOPY DIAGNOSTIC performed by Daquan Quigley MD at [facility]  2004    DIALYSIS FISTULA CREATION  5/6/2016    right arm fistula placement    ENDOSCOPY, COLON, DIAGNOSTIC      PACEMAKER PLACEMENT  2013    [place] ESOPHAGOGASTRODUODENOSCOPY TRANSORAL DIAGNOSTIC N/A 1/18/2018    EGD performed [facility] Main Mena MD at [facility]  2006,2009    UPPER GASTROINTESTINAL ENDOSCOPY Left 6/23/2019    EGD DIAGNOSTIC ONLY performed by Daquan Quigley MD at [address] [place] [address]   No current facility-administered medications for this encounter.      Current Outpatient Medications:     pantoprazole (PROTONIX) 40 MG tablet, TAKE 1 TABLET DAILY BEFORE BREAKFAST, Disp: 90 tablet, Rfl: 3    naproxen (NAPROSYN) 500 MG tablet, Take 1 tablet by mouth daily for 10 days, Disp: 10 tablet, Rfl: 0   docusate sodium (COLACE, DULCOLAX) 100 MG CAPS, Take 100 mg by mouth 2 times daily, Disp: 60 capsule, Rfl: 0    ferrous sulfate 325 (65 Fe) MG tablet, Take 1 tablet by mouth daily (with breakfast), Disp: 30 tablet, Rfl: 5    vitamin C (ASCORBIC ACID) 500 MG tablet, Take 1 tablet by mouth daily, Disp: 30 tablet, Rfl: 3    polyethylene glycol (MIRALAX) powder, Renal Miralax Colonoscopy prep. Use as directed. Mix Miralax 238 g with 24 oz clear liquids.   Drink 8 oz glass every 20-30 minutes until gone., Disp: 238 g, Rfl: 0    cloNIDine (CATAPRES) 0.1 MG tablet, Take 0.1 mg by mouth 2 times daily, Disp: , Rfl:     hydrALAZINE (APRESOLINE) 50 MG tablet, Take 50 mg by mouth 2 times daily, Disp: , Rfl:     cinacalcet (SENSIPAR) 30 MG tablet, Take 1 tablet by mouth Daily with supper, Disp: 30 tablet, Rfl: 3    lisinopril (PRINIVIL;ZESTRIL) 20 MG tablet, Take 1 tablet by mouth daily, Disp: 30 tablet, Rfl: 3    isosorbide mononitrate (IMDUR) 30 MG extended release tablet, Take 30 mg by mouth daily, Disp: , Rfl:     gabapentin (NEURONTIN) 100 MG capsule, Take 100 mg by mouth nightly., Disp: , Rfl:     B Complex-C-Folic Acid (RENAL) 1 MG CAPS, Take 1 capsule by mouth daily, Disp: , Rfl:     metoprolol (LOPRESSOR) 50 MG tablet, Take 1 tablet by mouth 2 times daily, Disp: 180 tablet, Rfl: 3    pravastatin (PRAVACHOL) 80 MG tablet, Take 1 tablet by mouth daily, Disp: 30 tablet, Rfl: 5      SOCIAL HISTORY     Social History     Social History Narrative    Not on file     Social History     Tobacco Use    Smoking status: Former Smoker     Packs/day: 1.00     Years: 40.00     Pack years: 40.00     Quit date: 1982     Years since quittin.2    Smokeless tobacco: Never Used   Substance Use Topics    Alcohol use: No    Drug use: No         ALLERGIES     Allergies   Allergen Reactions    No Known Allergies          FAMILY HISTORY     Family History   Problem Relation Age of Onset    Diabetes Mother     Heart Disease Mother     Diabetes Sister     Mental Illness Paternal Aunt          PREVIOUS RECORDS   Previous records reviewed: Patient last seen approximate 1 year ago for fistula complication. [facility] PHYSICAL EXAM       Initial vital signs and nursing assessment reviewed and normal. Body mass index is 29.05 kg/m². Pulsoximetry is normal per my interpretation. Additional Vital Signs:  Vitals:    04/30/21 1804   BP: (!) 164/74   Pulse: 73   Resp: 16   Temp:    SpO2: 98%       Physical Exam  Constitutional:       General: He is not in acute distress. Appearance: Normal appearance. He is not ill-appearing or diaphoretic. HENT:      Head: Normocephalic and atraumatic. Mouth/Throat:      Mouth: Mucous membranes are moist.      Pharynx: Oropharynx is clear. No oropharyngeal exudate or posterior oropharyngeal erythema. Eyes:      Extraocular Movements: Extraocular movements intact. Conjunctiva/sclera: Conjunctivae normal.      Pupils: Pupils are equal, round, and reactive to light. Neck:      Musculoskeletal: Normal range of motion. No neck rigidity or muscular tenderness. Cardiovascular:      Rate and Rhythm: Normal rate and regular rhythm. Pulses: Normal pulses. Heart sounds: Normal heart sounds. No murmur. No friction rub. No gallop. Comments: Right AV fistula with palpable thrill. Pulmonary:      Effort: Pulmonary effort is normal.      Breath sounds: Normal breath sounds. No wheezing, rhonchi or rales. Abdominal:      Palpations: Abdomen is soft. Tenderness: There is no abdominal tenderness. There is no guarding or rebound. Musculoskeletal:         General: No swelling. Right lower leg: No edema. Left lower leg: No edema. Comments: No edema or decrease in station of the right upper extremity. Skin:     General: Skin is warm and dry. Capillary Refill: Capillary refill takes less than 2 seconds. Findings: No bruising, erythema or lesion. Neurological:      General: No focal deficit present. Mental Status: He is alert and oriented to person, place, and time. Sensory: No sensory deficit. MEDICAL DECISION MAKING   Initial Assessment:   1. Pleasant 80-year-old male. ESRD dialysis, Monday Wednesday Friday. 1 week shortness of breath. No known sick contacts with Covid exposure. Comfortable appearing, saturating well on room air. Afebrile. Nonischemic EKG. Plan:    Covid swab, CBC, BMP, chest x-ray, troponin. Ensure this is not a cardiac cause of patient's shortness of breath. Unlikely spontaneous pneumothorax, do not of pneumonia. Expected disposition is discharge home with outpatient follow-up.  On reassessment patient says shoulder pain still persists. Provided with Tylenol. X-ray is negative. Likely muscle spasm, osteoarthritis. Patient does have hypertonic right neck musculature. No extremity swelling and present palpable thrill, low index of suspicion for vascular emergency. Patient reassured and told to follow-up with his primary care physician. Strict return precautions given. Patient agreeable to discharge and close follow-up. ED RESULTS   Laboratory results:  Labs Reviewed   CBC WITH AUTO DIFFERENTIAL - Abnormal; Notable for the following components:       Result Value    WBC 4.5 (*)     RBC 3.44 (*)     Hemoglobin 11.3 (*)     Hematocrit 33.0 (*)     MCV 95.9 (*)     RDW-CV 15.0 (*)     RDW-SD 52.6 (*)     Platelets 737 (*)     Lymphocytes Absolute 0.6 (*)     All other components within normal limits   BASIC METABOLIC PANEL W/ REFLEX TO MG FOR LOW K - Abnormal; Notable for the following components:    Chloride 96 (*)     BUN 38 (*)     CREATININE 7.2 (*)     All other components within normal limits   HEPATIC FUNCTION PANEL - Abnormal; Notable for the following components:     Total Bilirubin 0.2 (*)     All other components within normal limits   TROPONIN - Abnormal; Notable for the following components:    Troponin T 0.063 (*)     All other components within normal limits   BRAIN NATRIURETIC PEPTIDE - Abnormal; Notable for the following components:    Pro-BNP 94695.0 (*)     All other components within normal limits   GLOMERULAR FILTRATION RATE, ESTIMATED - Abnormal; Notable for the following components:    Est, Glom Filt Rate 9 (*)     All other components within normal limits   COVID-19, RAPID   ANION GAP   OSMOLALITY       Radiologic studies results:  XR SHOULDER RIGHT (MIN 2 VIEWS)   Final Result   No acute osseous abnormality            **This report has been created using voice recognition software. It may contain minor errors which are inherent in voice recognition technology. **      Final report electronically signed by Dr. aPras Marcos on 4/30/2021 7:07 PM      XR CHEST PORTABLE   Final Result   Borderline heart size. No acute cardiopulmonary disease. **This report has been created using voice recognition software. It may contain minor errors which are inherent in voice recognition technology. **      Final report electronically signed by Dr. Paras Marcos on 4/30/2021 4:58 PM          ED Medications administered this visit:   Medications   acetaminophen (TYLENOL) tablet 650 mg (650 mg Oral Given 4/30/21 1830)         ED COURSE          Strict return precautions and follow up instructions were discussed with the patient prior to discharge, with which the patient agrees. FINAL DISPOSITION     Final diagnoses:   Shortness of breath   Chronic right shoulder pain     Condition: condition: stable  Dispo: Discharge to home      This transcription was electronically signed. Parts of this transcriptions may have been dictated by use of voice recognition software and electronically transcribed, and parts may have been transcribed with the assistance of an ED scribe. The transcription may contain errors not detected in proofreading.   Please refer to my supervising physician's documentation if my documentation differs.     Electronically Signed: Kirk Lennox, 04/30/21, 7:30 PM       Kirk Lennox, DO  Resident  04/30/21 8959

## 2021-05-01 LAB
EKG ATRIAL RATE: 86 BPM
EKG P AXIS: 27 DEGREES
EKG P-R INTERVAL: 220 MS
EKG Q-T INTERVAL: 402 MS
EKG QRS DURATION: 76 MS
EKG QTC CALCULATION (BAZETT): 481 MS
EKG R AXIS: 20 DEGREES
EKG T AXIS: 52 DEGREES
EKG VENTRICULAR RATE: 86 BPM

## 2021-05-19 ENCOUNTER — APPOINTMENT (OUTPATIENT)
Dept: GENERAL RADIOLOGY | Age: 86
DRG: 562 | End: 2021-05-19
Payer: MEDICARE

## 2021-05-19 ENCOUNTER — HOSPITAL ENCOUNTER (INPATIENT)
Age: 86
LOS: 6 days | Discharge: HOME HEALTH | DRG: 562 | End: 2021-05-25
Attending: SURGERY | Admitting: SURGERY
Payer: MEDICARE

## 2021-05-19 DIAGNOSIS — M17.10 ARTHRITIS OF KNEE: ICD-10-CM

## 2021-05-19 DIAGNOSIS — M25.562 ACUTE PAIN OF LEFT KNEE: ICD-10-CM

## 2021-05-19 DIAGNOSIS — S82.892A CLOSED FRACTURE OF LEFT ANKLE, INITIAL ENCOUNTER: Primary | ICD-10-CM
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
## 2021-05-19 PROBLEM — S82.832A CLOSED FRACTURE OF DISTAL END OF LEFT FIBULA: Status: ACTIVE | Noted: 2021-05-19

## 2021-05-19 LAB
ANION GAP SERPL CALCULATED.3IONS-SCNC: 16 MEQ/L (ref 8–16)
BASOPHILS # BLD: 0.5 %
BASOPHILS ABSOLUTE: 0 THOU/MM3 (ref 0–0.1)
BUN BLDV-MCNC: 32 MG/DL (ref 7–22)
CALCIUM SERPL-MCNC: 9.7 MG/DL (ref 8.5–10.5)
CHLORIDE BLD-SCNC: 94 MEQ/L (ref 98–111)
CO2: 30 MEQ/L (ref 23–33)
CREAT SERPL-MCNC: 6.5 MG/DL (ref 0.4–1.2)
EOSINOPHIL # BLD: 6.6 %
EOSINOPHILS ABSOLUTE: 0.4 THOU/MM3 (ref 0–0.4)
ERYTHROCYTE [DISTWIDTH] IN BLOOD BY AUTOMATED COUNT: 16.8 % (ref 11.5–14.5)
ERYTHROCYTE [DISTWIDTH] IN BLOOD BY AUTOMATED COUNT: 59 FL (ref 35–45)
GFR SERPL CREATININE-BSD FRML MDRD: 10 ML/MIN/1.73M2
GLUCOSE BLD-MCNC: 94 MG/DL (ref 70–108)
HCT VFR BLD CALC: 34.3 % (ref 42–52)
HEMOGLOBIN: 11.5 GM/DL (ref 14–18)
IMMATURE GRANS (ABS): 0.01 THOU/MM3 (ref 0–0.07)
IMMATURE GRANULOCYTES: 0.2 %
LYMPHOCYTES # BLD: 12.3 %
LYMPHOCYTES ABSOLUTE: 0.7 THOU/MM3 (ref 1–4.8)
MCH RBC QN AUTO: 32.7 PG (ref 26–33)
MCHC RBC AUTO-ENTMCNC: 33.5 GM/DL (ref 32.2–35.5)
MCV RBC AUTO: 97.4 FL (ref 80–94)
MONOCYTES # BLD: 12.3 %
MONOCYTES ABSOLUTE: 0.7 THOU/MM3 (ref 0.4–1.3)
NUCLEATED RED BLOOD CELLS: 0 /100 WBC
OSMOLALITY CALCULATION: 286.1 MOSMOL/KG (ref 275–300)
PLATELET # BLD: 179 THOU/MM3 (ref 130–400)
PMV BLD AUTO: 10.3 FL (ref 9.4–12.4)
POTASSIUM SERPL-SCNC: 5 MEQ/L (ref 3.5–5.2)
RBC # BLD: 3.52 MILL/MM3 (ref 4.7–6.1)
SEG NEUTROPHILS: 68.1 %
SEGMENTED NEUTROPHILS ABSOLUTE COUNT: 4 THOU/MM3 (ref 1.8–7.7)
SODIUM BLD-SCNC: 140 MEQ/L (ref 135–145)
WBC # BLD: 5.9 THOU/MM3 (ref 4.8–10.8)

## 2021-05-19 PROCEDURE — 99283 EMERGENCY DEPT VISIT LOW MDM: CPT

## 2021-05-19 PROCEDURE — 80048 BASIC METABOLIC PNL TOTAL CA: CPT

## 2021-05-19 PROCEDURE — 1200000003 HC TELEMETRY R&B

## 2021-05-19 PROCEDURE — 71045 X-RAY EXAM CHEST 1 VIEW: CPT

## 2021-05-19 PROCEDURE — G0378 HOSPITAL OBSERVATION PER HR: HCPCS

## 2021-05-19 PROCEDURE — 73610 X-RAY EXAM OF ANKLE: CPT

## 2021-05-19 PROCEDURE — 73564 X-RAY EXAM KNEE 4 OR MORE: CPT

## 2021-05-19 PROCEDURE — 36415 COLL VENOUS BLD VENIPUNCTURE: CPT

## 2021-05-19 PROCEDURE — APPSS180 APP SPLIT SHARED TIME > 60 MINUTES: Performed by: NURSE PRACTITIONER

## 2021-05-19 PROCEDURE — 93005 ELECTROCARDIOGRAM TRACING: CPT | Performed by: NURSE PRACTITIONER

## 2021-05-19 PROCEDURE — 85025 COMPLETE CBC W/AUTO DIFF WBC: CPT

## 2021-05-19 ASSESSMENT — ENCOUNTER SYMPTOMS
DIARRHEA: 0
VOMITING: 0
COLOR CHANGE: 0
VOICE CHANGE: 0
SORE THROAT: 0
BACK PAIN: 0
PHOTOPHOBIA: 0
NAUSEA: 0
EYE DISCHARGE: 0
CONSTIPATION: 0
BLOOD IN STOOL: 0
CHOKING: 0
FACIAL SWELLING: 0
ABDOMINAL PAIN: 0
EYE ITCHING: 0
SINUS PRESSURE: 0
APNEA: 0
RHINORRHEA: 0
EYE REDNESS: 0
COUGH: 0
TROUBLE SWALLOWING: 0
ABDOMINAL DISTENTION: 0
WHEEZING: 0
SHORTNESS OF BREATH: 0
STRIDOR: 0
CHEST TIGHTNESS: 0
EYE PAIN: 0

## 2021-05-19 ASSESSMENT — PAIN DESCRIPTION - FREQUENCY: FREQUENCY: CONTINUOUS

## 2021-05-19 ASSESSMENT — PAIN SCALES - GENERAL: PAINLEVEL_OUTOF10: 10

## 2021-05-19 ASSESSMENT — PAIN DESCRIPTION - ORIENTATION: ORIENTATION: LEFT

## 2021-05-20 PROBLEM — S82.892A CLOSED FRACTURE OF LEFT ANKLE: Status: ACTIVE | Noted: 2021-05-19

## 2021-05-20 NOTE — CONSULTS
[facility]    [address], [address]  5/20/2021 7:25 AM    Pt Name:    [place]  MRN:     [id number]   [id number]  YOB: 1930  Admit Date:    5/19/2021  8:34 PM  Primary Care Physician:  Kadie Diallo MD      Reason for Consult:  ESRD on HD, surgery planned    History:      Mr. Fernando Garrido is a 72-year-old male with a past medical history of ESRD on HD M/W/F, anemia, CAD/CHF, sick sinus syndrome, COPD without use of BiPAP, hyperphosphatemia, HTN, and HLD. He presented to the ER for evaluation of his right shoulder and left ankle following a fall that he had on 5/18. He states that the fall occurred while he was fishing and he tripped falling to the ground. He was helped up and was able to walk after that but did have significant pain in his left ankle. He has tried Tylenol for the pain but did not help much so he came in for the ER for further evaluation. He also states that his right shoulder began hurting on 5/19 with twinges of pain reaching into his upper chest.  He has been evaluated by trauma in the ER. Surgery was consulted and there is potential surgery for the left ankle. Hospitalist were consulted for surgery evaluation. Nephrology was consulted due to the patient being a dialysis patient. The patient still states that he is a Monday, Wednesday, Friday dialysis patient and has not missed a day of dialysis yet. He did go to dialysis on 5/19. He has a fistula in place on his right arm. He reports that he is able to still make scant urine. He denies any increased swelling in his lower limbs, or shortness of breath.     Past Medical History:  Past Medical History:   Diagnosis Date    Anemia in chronic kidney disease(285.21)     Arthritis     CAD (coronary artery disease)     Chronic kidney disease     Chronic kidney [facility], [facility] IV [facility]severe) ([facility])     CKD (chronic kidney disease) stage 3, GFR 30-59 ml/min     COPD (chronic obstructive pulmonary disease) (HCC)     GI bleed     Hemod[facility] patient [facility]) 2016    @ [facility]    History of blood transfusion     2019    Hyperlipidemia     Hyperphosphatemia 2018    Hypertension     Sick sinus syndrome (HCC)     Systolic congestive heart failure [facility])        Past Surgical History:  Past Surgical History:   Procedure Laterality Date    CARDIAC SURGERY      COLONOSCOPY  ,    COLONOSCOPY N/A 2019    COLONOSCOPY DIAGNOSTIC performed by Ceferino Munoz MD at [address] [place] [address]  2004    DIALYSIS FISTULA CREATION  2016    right arm fistula placement    ENDOSCOPY, COLON, DIAGNOSTIC      PACEMAKER PLACEMENT  2013    DE ESOPHAGOGASTRODUODENOSCOPY TRANSORAL DIAGNOSTIC N/A 2018    [facility] performed [facility]al Smith [facility]    UPPER GASTROINTESTINAL ENDOSCOPY  ,    UPPER GASTROINTESTINAL ENDOSCOPY Left 2019    EGD DIAGNOSTIC ONLY performed by Ceferino Munoz MD at [facility]    VASCULAR SURGERY         Family History:  Family History   Problem Relation Age of Onset    Diabetes Mother     Heart Disease Mother     Diabetes Sister     Mental Illness Paternal Aunt        Social History:  Social History     Socioeconomic History    Marital status:      Spouse name: Kavya Oswald Number of children: Not on file    Years of education: Not on file    Highest education level: Not on file   Occupational History    Not on file   Tobacco Use    Smoking status: Former Smoker     Packs/day: 1.00     Years: 40.00     Pack years: 40.00     Quit date: 1982     Years since quittin.3    Smokeless tobacco: Never Used   Vaping Use    Vaping Use: Never used   Substance and Sexual Activity    Alcohol use: No    Drug use: No    Sexual activity: Not on file   Other Topics Concern    Not on file   Social History Narrative    Not on file     Social Determinants of Health     Financial Resource Strain:     Difficulty of Paying Living Expenses:    Food Insecurity:     Worried About Running Out of Food in the Last Year:     [address] in the Last Year:    Transportation Needs:     Lack of Transportation (Medical):  Lack of Transportation (Non-Medical):    Physical Activity:     Days of Exercise per Week:     Minutes of Exercise per Session:    Stress:     Feeling of Stress :    Social Connections:     Frequency of Communication with Friends and Family:     Frequency of Social Gatherings with Friends and Family:     Attends Rastafarian Services:     Active Member of Clubs or Organizations:     Attends Club or Organization Meetings:     Marital Status:    Intimate Partner Violence:     Fear of Current or Ex-Partner:     Emotionally Abused:     Physically Abused:     Sexually Abused:        Home Meds:  Prior to Admission medications    Medication Sig Start Date End Date Taking? Authorizing Provider   pantoprazole (PROTONIX) 40 MG tablet TAKE 1 TABLET DAILY BEFORE BREAKFAST 4/28/20  Yes XNI Schmitz CNP   docusate sodium (COLACE, DULCOLAX) 100 MG CAPS Take 100 mg by mouth 2 times daily 6/24/19  Yes Dk Porter DO   ferrous sulfate 325 (65 Fe) MG tablet Take 1 tablet by mouth daily (with breakfast) 6/24/19  Yes Dk Porter DO   vitamin C (ASCORBIC ACID) 500 MG tablet Take 1 tablet by mouth daily 6/24/19  Yes Dk Porter DO   polyethylene glycol (MIRALAX) powder Renal Miralax Colonoscopy prep. Use as directed. Mix Miralax 238 g with 24 oz clear liquids. Drink 8 oz glass every 20-30 minutes until gone.  6/6/19  Yes XIN Schmitz CNP   cloNIDine (CATAPRES) 0.1 MG tablet Take 0.1 mg by mouth 2 times daily 5/30/18  Yes Historical Provider, MD   hydrALAZINE (APRESOLINE) 50 MG tablet Take 50 mg by mouth 2 times daily   Yes Historical Provider, MD   cinacalcet (SENSIPAR) 30 MG tablet Take 1 gabapentin  100 mg Oral Nightly    ferrous sulfate  325 mg Oral Daily with breakfast    docusate sodium  100 mg Oral BID    cloNIDine  0.1 mg Oral BID    cinacalcet  30 mg Oral Dinner    folbee plus  1 tablet Oral Daily     Meds prn: sodium chloride flush, sodium chloride, acetaminophen, promethazine **OR** ondansetron, polyethylene glycol, HYDROcodone 5 mg - acetaminophen **OR** HYDROcodone 5 mg - acetaminophen, HYDROmorphone **OR** HYDROmorphone     Allergies/Intolerances: ALLERGIES: No known allergies    24HR INTAKE/OUTPUT:  No intake or output data in the 24 hours ending 05/20/21 0725  No intake/output data recorded. No intake/output data recorded. Admission weight: 180 lb (81.6 kg)  Wt Readings from Last 3 Encounters:   05/19/21 180 lb (81.6 kg)   04/30/21 180 lb (81.6 kg)   03/18/20 180 lb (81.6 kg)     Body mass index is 29.05 kg/m². Physical Examination:  VITALS:  /79   Pulse 78   Temp 98.9 °F (37.2 °C)   Resp 16   Ht 5' 6\" (1.676 m)   Wt 180 lb (81.6 kg)   SpO2 97%   BMI 29.05 kg/m²   Weight:   Wt Readings from Last 3 Encounters:   05/19/21 180 lb (81.6 kg)   04/30/21 180 lb (81.6 kg)   03/18/20 180 lb (81.6 kg)     Constitutional and General Appearance: alert and cooperative with exam, appears to be in mild discomfort mostly related to his right shoulder pain, no distress  Eyes: no icteric sclera, no pallor conjunctiva, no discharge seen from either eye  Ears and Nose: normal external appearance of left and right ear and nose. No active drainage from nose. Oral: moist oral mucus membranes  Neck: No jugular venous distention, appears symmetric, good ROM  Lungs: Air entry B/L, no crackles or rales, no use of accessory muscles  Heart: Rate controlled, irregular rhythm, S1, S2, no murmurs appreciated  Extremities: Trace nonpitting lower extremity edema bilaterally. The left ankle is currently in a cast to the upper shin.   GI: soft, non-tender, no guarding  Skin: no rash seen on exposed extremities  Musculo: moves all extremities, pain in right shoulder reaching down to right upper back with movement. HD fistula on right arm located next to the bicep with palpable thrill and bruit present. Neuro: no slurred speech, no facial drooping, no asterixis  Psychiatric: Awake, alert, Oriented    Lab Data  CBC:   Recent Labs     05/19/21 2219 05/20/21  0626   WBC 5.9 4.5*   HGB 11.5* 10.9*   HCT 34.3* 31.6*    159     BMP:  Recent Labs     05/19/21 2219 05/20/21  0626    141   K 5.0 4.6   CL 94* 98   CO2 30 28   BUN 32* 36*   CREATININE 6.5* 7.3*   GLUCOSE 94 86   CALCIUM 9.7 9.3     PTH: @PTH@  TSH: No results for input(s): TSH in the last 72 hours. HgBa1c: No results for input(s): LABA1C in the last 72 hours. Hepatic:   Recent Labs     05/20/21  0626   LABALBU 3.7   AST 15   ALT 10*   BILITOT 0.3   ALKPHOS 46     ABGs: No results found for: PHART, PO2ART, CWU2HQG  Troponin: No results for input(s): TROPONINI in the last 72 hours. BNP: No results for input(s): BNP in the last 72 hours. Old labs reviewed. Impression and Plan:  1. ESRD on HD M/W/F likely secondary to HTN   - Received dialysis on 5/19, no issues   - Fistula site on right arm clean and does not appear to be compromised   - Continue HD on regular days   - Monitor labs with daily BMP especially with upcoming surgery  2. Chronic macrocytic anemia of chronic disease   - Hgb 10.9, at patient's baseline  3. Hyperphosphatemia   - Phosphorus level not drawn in ER, does not take phosphorus binders at home  4. Essential HTN   - Controlled on admission  5. Closed fracture of left ankle   - Seen by trauma surgery in the ER, stated potential surgery either today or tomorrow    Thank you for allowing me to participate in the care of this patient. Please feel free to call me if you have any questions.      Electronically signed by Annika Nguyen DO on 5/20/21 at 7:25 AM EDT    [facility]

## 2021-05-20 NOTE — ED NOTES
Upon first presentation to pt, pt resting on cot in position of comfort. Pt is A&Ox4, resps easy and unlabored. Pt reports having a fall on Tuesday of this week. Pt presented to ED when the pain continued. Pt reports pain in L ankle and L knee, rating pain 10/10. Pt appears in no apparent distress. Will monitor.      Shine Maldonado RN  05/19/21 6663

## 2021-05-20 NOTE — ED NOTES
In for hourly rounding. Pt resting on cot in position of comfort. Pt remains A&Ox4, resps easy and unlabored. IV shows no s/s of infection or infiltration. Pt pain remains unchanged at this time. Updated pt on POC. Will monitor.      Evelin Arevalo RN  05/20/21 4565

## 2021-05-20 NOTE — CARE COORDINATION
5/20/21, 3:43 PM EDT    DISCHARGE PLANNING EVALUATION      Following to assist with discharge plan. Patient is working with other staff at this time. No family is here at this time. Patient is from home alone, is a dialysis patient. Will see again tomorrow to assess for needs with patient and family.

## 2021-05-20 NOTE — PROGRESS NOTES
[facility] THERAPY  [facility] ORTHOPEDICS 7K  Speech - Language - Cognitive Evaluation    SLP Individual Minutes  Time In: 6819  Time Out: 2689  Minutes: 19  Timed Code Treatment Minutes: 0 Minutes       Date: 2021  Patient Name: Jorge Obregon      CSN: [id number]   : 11/10/1930  (80 y.o.)  Gender: male   Referring Physician:  SANDOVAL Gomes   Diagnosis: Fall from slip, trip or stumble, initial encounter   Secondary Diagnosis: Cognitive deficits   Precautions: Fall Risk   History of Present Illness/Injury: Patient admitted to [facility] with above dx. See physician H&P for full report. ST consulted to complete cognitive evaluation and determine goals and POC as clinically appropriate. Past Medical History:   Diagnosis Date    Anemia in chronic kidney disease(285.21)     Arthritis     CAD (coronary artery disease)     Chronic kidney disease     Chronic kidney disease, stage IV (severe) (HCC)     CKD (chronic kidney disease) stage 3, GFR 30-59 ml/min     COPD ([facility] obstructive [facility] disease) ([facility] [address] [facility]     GI bleed     Hemod[facility] patient [facility]) 2016    [facility]    History of blood transfusion     2019    Hyperlipidemia     Hyperphosphatemia 2018    Hypertension     Sick sinus syndrome (HCC)     Systolic congestive heart failure (HCC)        Pain: 10/10 - Pain location: right shoulder; JULI Rosario aware     Subjective:  Patient seen at bedside; alert upon ST entering room. Patient with increasing fatigue. Patient consistently reporting 20/10 arm pain; JULI Rosario aware.      SOCIAL HISTORY:   Living Arrangements: Home alone, independent   Work History: Retired  Education Level: 3rd grade education level   Driving Status: Active   Finance Management: Assistance Required - daughter assists  Medication Management: Assistance Required- daughter manages medication box  ADL's: Independent.  - daughters provide meals

## 2021-05-20 NOTE — ED NOTES
Pt resting in cot with eyes closed. Pt respirations even and unlabored.      Thania Martinez RN  05/20/21 2131

## 2021-05-20 NOTE — CARE COORDINATION
5/20/21, 1:49 PM EDT  DISCHARGE PLANNING EVALUATION:    Shoaib Whitaker       Admitted: 5/19/2021/ 2034   Hospital day: 1   Location: -21/021-A Reason for admit: Fall from slip, trip, or stumble, initial encounter [W01. 0XXA]   PMH:  has a past medical history of Anemia in chronic kidney disease(285.21), Arthritis, CAD (coronary artery disease), Chronic kidney disease, Chronic kidney disease, stage IV (severe) ([address] CKD (chronic kidney disease) stage 3, GFR 30-59 ml/min, COPD (chronic obstructive pulmonary disease) ([facility] [address] GI bleed, Hemodialysis patient ([facility] [address] History of blood transfusion, Hyperlipidemia, Hyperphosphatemia, Hypertension, Sick sinus syndrome ([facility] [address] and Systolic congestive heart failure ([facility] [address] Procedure:   5/20/21 : ECHO and stress test  5/21/21: Dr Randal Turcios, Orthopedic podiatry, plans ORIF left fibula   Barriers to Discharge: [facility] tripped and fell last night and experienced pain to his left knee and ankle. Surgery bumped until tomorrow. N/V checks. Pain management. Hospitalist consult for surgery clearance. Nephrology consult: hemodialysis patient of Dr. Radha Andino. Telemetry monitoring. Workup in progress for surgery clearance. PCP: Darinel Nicolas MD  Readmission Risk Score: 22%    Patient Goals/Plan/Treatment Preferences: [facility] is currently in stress test. Primary nurse Bill Morenoer said that he lives alone and may need ECF. SW consulted. I will follow. Hemodialysis MWF   Transportation/Food Security/Housekeeping Addressed:  No issues identified.

## 2021-05-20 NOTE — ED TRIAGE NOTES
To ED from home with complaints of fishing yesterday and tripping. Pt states left knee pain and left ankle pain. Pt states he took tylenol at home with no relieve.  Pt uses a cane at home to ambulate

## 2021-05-20 NOTE — CONSULTS
Podiatric Surgery Consult    CC:  Left ankle pain    HPI:  The patient is a 80 y.o. male with significant past medical history of ESRD on dialysis who being seen at bedside this morning on behalf of Dr. Bridgett Santos. Patient states he tripped and fell last night and experienced pain to his left knee and ankle. He is unable to walk due to pain. He has no other complaints.     Past Medical History:        Diagnosis Date    Anemia in chronic kidney disease(285.21)     Arthritis     CAD (coronary artery disease)     Chronic kidney disease     Chronic kidney disease, stage IV (severe) (HCC)     CKD (chronic kidney disease) stage 3, GFR 30-59 ml/min     COPD ([facility]ronic obstructive [facility] disease) [facility] [address] [facility]     GI bleed     [facility]od[facility] patient ([facility] [address] 07/26/2016    @ [facility]    History of blood transfusion     6/2019    Hyperlipidemia     Hyperphosphatemia 5/22/2018    Hypertension     Sick sinus syndrome ([facility] [address]     Systolic congestive heart failure [facility])        Past Surgical History:        Procedure Laterality Date   [facility] CARDIAC SURGERY      COLONOSCOPY  2006,2009    COLONOSCOPY N/A 6/27/2019    COLONOSCOPY DIAGNOSTIC performed by Luke Jones MD at [facility]   [facility] 2004    DIALYSIS FISTULA CREATION  5/6/2016    right arm fistula placement    ENDOSCOPY, COLON, DIAGNOSTIC      PACEMAKER PLACEMENT  2013    GA ESOPHAGOGASTRODUODENOSCOPY TRANSORAL DIAGNOSTIC N/A 1/18/2018    EGD [facility] Lizzie Lujan MD [facility]  2006,2009    UPPER GASTROINTESTINAL ENDOSCOPY Left 6/23/2019    EGD DIAGNOSTIC ONLY performed [facility] MD [facility]    VASCULAR SURGERY         Current Medications:    Current Facility-Administered Medications: sodium chloride flush 0.9 % injection 5-40 mL, 5-40 mL, Intravenous, 2 times per day  sodium chloride flush 0.9 % injection 5-40 mL, 5-40 mL, Intravenous, PRN  0.9 % sodium chloride infusion, 25 mL, Intravenous, PRN  acetaminophen (TYLENOL) tablet 650 mg, 650 mg, Oral, Q4H PRN  promethazine (PHENERGAN) tablet 12.5 mg, 12.5 mg, Oral, Q6H PRN **OR** ondansetron (ZOFRAN) injection 4 mg, 4 mg, Intravenous, Q6H PRN  polyethylene glycol (GLYCOLAX) packet 17 g, 17 g, Oral, Daily PRN  polyethylene glycol (GLYCOLAX) packet 17 g, 17 g, Oral, Daily  HYDROcodone-acetaminophen (NORCO) 5-325 MG per tablet 1 tablet, 1 tablet, Oral, Q4H PRN **OR** HYDROcodone-acetaminophen (NORCO) 5-325 MG per tablet 2 tablet, 2 tablet, Oral, Q4H PRN  HYDROmorphone (DILAUDID) injection 0.25 mg, 0.25 mg, Intravenous, Q3H PRN **OR** HYDROmorphone (DILAUDID) injection 0.5 mg, 0.5 mg, Intravenous, Q3H PRN  pantoprazole (PROTONIX) tablet 40 mg, 40 mg, Oral, QAM AC  lidocaine 4 % external patch 2 patch, 2 patch, Transdermal, Daily  pravastatin (PRAVACHOL) tablet 80 mg, 80 mg, Oral, Daily  ascorbic acid (VITAMIN C) tablet 500 mg, 500 mg, Oral, Daily  metoprolol tartrate (LOPRESSOR) tablet 50 mg, 50 mg, Oral, BID  lisinopril (PRINIVIL;ZESTRIL) tablet 20 mg, 20 mg, Oral, Daily  isosorbide mononitrate (IMDUR) extended release tablet 30 mg, 30 mg, Oral, Daily  hydrALAZINE (APRESOLINE) tablet 50 mg, 50 mg, Oral, BID  gabapentin (NEURONTIN) capsule 100 mg, 100 mg, Oral, Nightly  ferrous sulfate (IRON 325) tablet 325 mg, 325 mg, Oral, Daily with breakfast  docusate sodium (COLACE) capsule 100 mg, 100 mg, Oral, BID  cloNIDine (CATAPRES) tablet 0.1 mg, 0.1 mg, Oral, BID  cinacalcet (SENSIPAR) tablet 30 mg, 30 mg, Oral, Dinner  folbee plus tablet 1 tablet, 1 tablet, Oral, Daily    Allergies:  No known allergies    Social History:    40 pack year history smoking.  hasnt' smoked since 1982    Family History:       Problem Relation Age of Onset    Diabetes Mother     Heart Disease Mother     Diabetes Sister     Mental Illness Paternal Aunt        REVIEW OF SYSTEMS:    Constitutional: Negative for fever, chills, and sudden weight loss or gain. HEENT: Negative for blurry/double vision, ears, nose, or throat pain. Negative for mass. Respiratory: Negative for shortness of breath or hemoptysis. Cardiovascular: Negative for angina, palpitations, or lower extremity edema. Gastrointestinal: Negative for nausea, vomiting, diarrhea, constipation, or abdominal pain. Genitourinary: Negative for increased urination, burning with urination, or hematuria. Musculoskeletal: See above HPI. Integument: See above HPI. Hematology: Negative for easy bruising or easy bleeding. Physical Examination:  General: Awake, alert, and oriented. In no acute distress. Resting in bed. HEENT: Atraumatic, normocephalic. Respiratory: CTA. No rales, rhonchi, or wheezing. Cardiovascular: RRR. Normal S1, S2.  Abdomen: Soft, non-tender, non-distended. Bowel sounds present x4 quadrants. Focused Lower Extremity Examination:    Vitals:    /79   Pulse 78   Temp 98.9 °F (37.2 °C)   Resp 16   Ht 5' 6\" (1.676 m)   Wt 180 lb (81.6 kg)   SpO2 97%   BMI 29.05 kg/m²      Dermatologic: edema noted to left lower extremity. No open lesions    Vascular: Dorsalis pedis and posterior tibial pulses are palpable bilaterally. Skin temperature is warm to warm from proximal tibial tuberosity to distal digits. CFT less than 5 seconds to all 10 digits. Neurovascular: intact    Musculoskeletal: pain over distal fibula. No pain to medial malleolus, styloid process, navicular tuberosity, proximal fibula    STUDIES:  XR ANKLE LEFT (MIN 3 VIEWS)    Impression   1.  Acute Sun type B fibular fracture or medial clear space is not    widened.    2.  Lateral ankle swelling.  No ankle effusion.       This document has been electronically signed by: Victor Manuel Bradley MD on    05/19/2021 09:19 PM         CULTURES:    LABS:   Recent Labs     05/19/21  2219 05/20/21  0626   WBC 5.9 4.5*   HGB 11.5* 10.9*   HCT 34.3* 31.6*    159        Recent Labs 05/20/21  0626      K 4.6   CL 98   CO2 28   BUN 36*   CREATININE 7.3*        Recent Labs     05/20/21  0626   PROT 6.3      No results for input(s): CKTOTAL, CKMB, CKMBINDEX, TROPONINI in the last 72 hours. Assessment: 80 y.o. male with:  Principle  Left fibula fracture   Chronic  Patient Active Problem List   Diagnosis    CAD (coronary artery disease)    COPD (chronic obstructive pulmonary disease) ([facility] [address]    Chronic renal insufficiency    Patient overweight    Arthritis-  low back and neck .  CKD (chronic kidney disease) stage 3, GFR 30-59 ml/min    Dyspnea and respiratory abnormalities    ED (erectile dysfunction)    Degenerative joint disease of knee, left    Gout of big toe    Anemia, chronic disease    CKD (chronic kidney disease) stage 4, GFR 15-29 ml/min (HCC)    Monoclonal gammopathy    Leukopenia    Thrombocytopenia (HCC)    Chronic kidney disease (CKD), stage V (HCC)    Metabolic acidosis    Hyperkalemia    Iron deficiency anemia    ROSAURA (acute kidney injury) (HCC)    Plasma cell dyscrasia    Idiopathic hypotension    Hypertensive urgency    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)    Systolic congestive heart failure (HCC)    Other fluid overload (CODE)    Hyperphosphatemia    Acute diastolic congestive heart failure (HCC)    Acute on chronic diastolic congestive heart failure (HCC)    Pulmonary hypertension (HCC)    Anemia due to chronic kidney disease, on chronic dialysis (HCC)    Volume overload    Secondary hyperparathyroidism of renal origin ([facility] [address]    Chest pain    Acute pulmonary edema (HCC)    Accelerated hypertension    Gastritis and duodenitis    Fall from slip, trip, or stumble, initial encounter    Closed fracture of left ankle       Plan  1.   Left fibula fracture   - patient examined and evaluated   - xrays reviewed   - shirley compression dressing and posterior splint applied   - pending hospitalist and nephrology consult   - NPO  -

## 2021-05-20 NOTE — PROGRESS NOTES
Pt admitted to  [address] [place] [address] from ED. Complaints: left fib fx. IV IV push only, no IV fluids infusing into the antecubital left, condition patent and no redness at a rate of 0 mls/ hour. Site free of s/s of infection or infiltration. Vital signs obtained. Assessment and data collection initiated. Two nurse skin assessment performed by virgilio RN and guillermo RN. Oriented to room. Explained patients right to have family, representative or physician notified of their admission. Patient has Declined for physician to be notified. Patient has Declined for family/representative to be notified. The patient is interested in [facility]. Cinthyas meds to beds program?:  No    Policies and procedures for 7K explained. All questions answered with no further questions at this time. Fall prevention and safety brochure discussed with patient. Bed alarm on. Call light in reach.

## 2021-05-20 NOTE — PROGRESS NOTES
Tertiary exam completed. PMS intact in all 4 extremities. Left lower extremity short leg splint and wrapped, distally neurovascularly intact. Patient with tenderness to palpation noted over right shoulder and left knee. Chest and abdomen nontender. No midline spinal tenderness palpation. Head atraumatic. Plain films from yesterday reviewed. No acute findings noted on chest x-ray and plain films of left knee. Distal fibula fracture noted. Plain films of right shoulder ordered today which revealed no acute fracture dislocation. Patient stable from a trauma surgery perspective and plan to transfer care over podiatry and signed off. Case discussed with podiatry resident, Dr Ashley Barron, and trauma surgeon, Dr. Homar Pollack. Wt Readings from Last 3 Encounters:   05/19/21 180 lb (81.6 kg)   04/30/21 180 lb (81.6 kg)   03/18/20 180 lb (81.6 kg)     Temp Readings from Last 3 Encounters:   05/20/21 97.8 °F (36.6 °C) (Oral)   04/30/21 98.5 °F (36.9 °C) (Oral)   03/18/20 97.4 °F (36.3 °C)     BP Readings from Last 3 Encounters:   05/20/21 (!) 200/106   04/30/21 (!) 164/74   03/18/20 139/61     Pulse Readings from Last 3 Encounters:   05/20/21 87   04/30/21 73   03/18/20 86       24 HR INTAKE/OUTPUT : No intake or output data in the 24 hours ending 05/20/21 1121  Diet NPO Effective Now    OBJECTIVE  CURRENT VITALS BP (!) 200/106   Pulse 87   Temp 97.8 °F (36.6 °C) (Oral)   Resp 16   Ht 5' 6\" (1.676 m)   Wt 180 lb (81.6 kg)   SpO2 94%   BMI 29.05 kg/m²   General: Alert, NAD, sitting upright in hospital bed, pleasant and cooperative with exam  Head: Normocephalic, mid face stable. Nares patent bilaterally, no epistaxis. Mouth clear of foreign bodies, no lacerations or abrasions. No signs of head trauma  Eyes: PERRL. EOMI. Nontraumatic. Neurologic: A & O x3. Following commands. CN 2-12 grossly intact, PMS intact in all 4 extremities, no signs of focal neurological deficits. Neck: trachea midline.  Cervical spines NTTP midline, without step-offs, crepitus or deformity. Back:TL spines are NTTP midline, without step-offs, crepitus or deformity. No abrasions, contusions, or ecchymosis noted. Lungs: Clear to auscultation bilaterally. Chest Wall: Chest rise symmetrical.  Chest wall without tenderness to palpation. No crepitus, deformities, lacerations, or abrasions. Heart: RRR. Normal S1/S2. No obvious M/G/R. Abdomen:  Soft, NTTP. No guarding. Non-peritoneal.  Pelvis:  NTTP, stable to compression. Extremities: No gross deformities. PMS intact in all 4 extremities. Short leg splint and wrapped the left lower extremity, distally neurovascularly intact. Tenderness to palpation noted over right shoulder and left knee. No other extremity tenderness to palpation. Skin: Skin warm and dry. Normal for ethnicity. LABS  CBC :   Recent Labs     05/19/21 2219 05/20/21  0626   WBC 5.9 4.5*   HGB 11.5* 10.9*   HCT 34.3* 31.6*   MCV 97.4* 97.2*    159     BMP:   Recent Labs     05/19/21 2219 05/20/21  0626    141   K 5.0 4.6   CL 94* 98   CO2 30 28   BUN 32* 36*   CREATININE 6.5* 7.3*     COAGS:   Recent Labs     05/20/21  0626   PROT 6.3     Pancreas/HFP:  No results for input(s): LIPASE, AMYLASE in the last 72 hours. Recent Labs     05/20/21  0626   AST 15   ALT 10*   BILITOT 0.3   ALKPHOS 46     RADIOLOGY  Narrative   PROCEDURE: XR SHOULDER RIGHT (MIN 2 VIEWS)       CLINICAL INFORMATION: pain and tenderness s/p fall       COMPARISON: April 30, 2021       TECHNIQUE: AP, Grashey, axillary and transscapular Y views performed. FINDINGS:    POSTOPERATIVE CHANGES: None. ALIGNMENT: Anatomic. MINERALIZATION: Calcification is identified within the supraspinatus tendon. FRACTURE: None. ACROMIOCLAVICULAR ARTICULATION: Unremarkable. GLENOHUMERAL ARTICULATION: Unremarkable. ACROMIOHUMERAL INTERVAL: Unremarkable.        RIBS:  No rib abnormality is seen within the imaged portions of the chest.       OTHER: Axillary venous stenting               Impression   1. Unremarkable shoulder series. 2. Supraspinatus tendinopathy           **This report has been created using voice recognition software. It may contain minor errors which are inherent in voice recognition technology. **       Final report electronically signed by Dr Ubaldo Conner on 5/20/2021 12:09 PM         Electronically signed by Regina Mccarthy PA-C on 5/20/2021 at 11:21 AM  Patient seen and evaluated this a.m. in the emergency department. On ED hold. Orthopedic podiatry plans ORIF this evening. Medicine to see evaluate medical stability for OR. All data and imaging reviewed and patient examined. Agree as documented. Medicine and nephrology evaluations. Patient had hemodialysis yesterday. Care coordinated with Regina Mccarthy agree as documented.

## 2021-05-20 NOTE — PROGRESS NOTES
hospitalist recommending delay in surgery until tomorrow due to needing stress test and echo. Discussed with Dr. Christophe Philip. Ok to let patient eat after stress test and delay surgery until tomorrow. Monitor.

## 2021-05-20 NOTE — H&P
Trauma H&P  Dr. Michael Benítez     Patient:  Katie Garcia date: 5/19/2021   YOB: 1930  Date of Evaluation: 5/19/2021  MRN: [id number]   Acct: [de-identified]      Injury Date:05/18/21   Injury time:Afternoon    PCP: Herminio Moe MD   Referring physician: Mary Ann Butler CNP    Time of Trauma Surgeon Notification:  2202  Time of Trama DIANA Arrival: 2215  Time of Trauma Surgeon Arrival:  ~0700 5/18/2021    Assessment:    Trauma by fall  Left distal fibula fracture    Plan:    Admit to Trauma Services    Trauma by fall    Left distal fibula fracture   - Consult to [address] and elevate   - Pain control   - NPO after midnight   - Consult hospitalist and Nephrology for surgical clearance   - Neurovascular checks    Hx of CKD stage IV, hemodialysis MWF, COPD, SSS, CHF   - Hospitalist consult for surgical clearance   - Resume home meds   - Nephrology consult for dialysis     Pain control   - Norco and Dilaudid PRN   - Lidoderm patches    Renal diet, NPO after midnight  Repeat labs in AM  IVF per consultants  Bedrest  Will eventually need PT and OT to eval and treat  Discharge disposition pending clinical course      Activation: []Level I (Trauma Alert) []Level II (Injury Call) [x]Level III (Trauma Consult) []Downgraded  Mode of Arrival: self  Referring Facility: N/A   Loss of Consciousness [x]No []Yes[]Unknown    Mechanism of Injury:  []Motor Vehicle crash   []Single Vehicle [] []Passenger []Scene Fatality []Front Seat  []Restrained   []Air Bag Deployed   []Ejected []Rollover []Pedestrian []Trapped   Type of vehicle:   Protective Devices:   []Motorcycle  Wearing Helmet []Yes []No  []Bicycle  Wearing Helmet []Yes []No  [x]Fall   Distance - Ground level  []Assault    Abuse Reported []Yes []No  []Gunshot  []Stabbing  []Work Related  []Burn: []Flame []Scald []Electrical []Chemical []Contact []Inhalation []House Fire  []Other:   Patient Active Problem List   Diagnosis CAD (coronary artery disease)    COPD (chronic obstructive pulmonary disease) (HCC)    Chronic renal insufficiency    Patient overweight    Arthritis-  low back and neck . CKD (chronic kidney disease) stage 3, GFR 30-59 ml/min    Dyspnea and respiratory abnormalities    ED (erectile dysfunction)    Degenerative joint disease of knee, left    Gout of big toe    Anemia, chronic disease    CKD (chronic kidney disease) stage 4, GFR 15-29 ml/min (HCC)    Monoclonal gammopathy    Leukopenia    Thrombocytopenia (HCC)    Chronic kidney disease (CKD), stage V (HCC)    Metabolic acidosis    Hyperkalemia    Iron deficiency anemia    ROSAURA (acute kidney injury) ([address]    Plasma cell dyscrasia    Idiopathic hypotension    Hypertensive urgency    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)    Systolic congestive heart failure (HCC)    Other fluid overload (CODE)    Hyperphosphatemia    Acute diastolic congestive heart failure (HCC)    Acute on chronic diastolic congestive heart failure (HCC)    Pulmonary hypertension (HCC)    Anemia due to chronic kidney disease, on chronic dialysis (HCC)    Volume overload    Secondary hyperparathyroidism of renal origin ([address]    Chest pain    Acute pulmonary edema ([address]    Accelerated hypertension    Gastritis and duodenitis     Subjective   Chief Complaint: Fall    History of Present Illness:  Palmira Mccabe is a 80year old male presenting to the Emergency Department via self for evaluation of injury sustained in a fall yesterday. He reports that he was fishing and tripped, falling to the ground. He states that 2 gentlemen helped him up and that he was able to walk after that, but with pain. He is currently ambulating with a cane. He states that he tried to take Tylenol for pain but that it did not help much so he came in for treatment. He is a dialysis patient and had hemodialysis today. He has no other complaints besides left knee and left ankle pain.       Review of Systems:   Review of COLONOSCOPY DIAGNOSTIC performed by Gonzalo Kulkarni MD at . [address]  2004    DIALYSIS FISTULA CREATION  5/6/2016    right arm fistula placement    ENDOSCOPY, COLON, DIAGNOSTIC      PACEMAKER PLACEMENT  2013    GA ESOPHAGOGASTRODUODENOSCOPY TRANSORAL DIAGNOSTIC N/A 1/18/2018    EGD performed by Demetrio Morrissey MD at [facility]    UPPER GASTROINTESTINAL ENDOSCOPY  2006,2009    UPPER GASTROINTESTINAL ENDOSCOPY Left 6/23/2019    EGD DIAGNOSTIC ONLY performed by Gonzalo Kulkarni MD at [facility]    VASCULAR SURGERY       Past Medical History:   Diagnosis Date    Anemia in chronic kidney disease(285.21)     Arthritis     CAD (coronary artery disease)     Chronic kidney disease     Chronic kidney disease, stage IV (severe) (HCC)     CKD (chronic kidney disease) stage 3, GFR 30-59 ml/min     COPD (chronic obstructive pulmonary disease) ([facility]     GI bleed     Hemodialysis patient ([facility] 07/26/2016    @ [facility]    History of blood transfusion     6/2019    Hyperlipidemia     Hyperphosphatemia 5/22/2018    Hypertension     Sick sinus syndrome (HCC)     Systolic congestive heart failure [facility])      Past Surgical History:   Procedure Laterality Date    CARDIAC SURGERY      COLONOSCOPY  2006,2009    COLONOSCOPY N/A 6/27/2019    COLONOSCOPY DIAGNOSTIC performed by Gonzalo Kulkarni MD [address]  2004    DIALYSIS FISTULA CREATION  5/6/2016    right arm fistula placement    ENDOSCOPY, COLON, DIAGNOSTIC      PACEMAKER PLACEMENT  2013    GA ESOPHAGOGASTRODUODENOSCOPY TRANSORAL DIAGNOSTIC N/A 1/18/2018    EGD performed by Demetrio Morrissey MD at [place] [address]  2006,2009    UPPER GASTROINTESTINAL ENDOSCOPY Left 6/23/2019    EGD DIAGNOSTIC ONLY performed by Gonzalo Kulkarni MD at [address] History     Socioeconomic History    Marital SULFATE 325 (65 FE) MG TABLET    Take 1 tablet by mouth daily (with breakfast)    GABAPENTIN (NEURONTIN) 100 MG CAPSULE    Take 100 mg by mouth nightly. HYDRALAZINE (APRESOLINE) 50 MG TABLET    Take 50 mg by mouth 2 times daily    ISOSORBIDE MONONITRATE (IMDUR) 30 MG EXTENDED RELEASE TABLET    Take 30 mg by mouth daily    LISINOPRIL (PRINIVIL;ZESTRIL) 20 MG TABLET    Take 1 tablet by mouth daily    METOPROLOL (LOPRESSOR) 50 MG TABLET    Take 1 tablet by mouth 2 times daily    NAPROXEN (NAPROSYN) 500 MG TABLET    Take 1 tablet by mouth daily for 10 days    PANTOPRAZOLE (PROTONIX) 40 MG TABLET    TAKE 1 TABLET DAILY BEFORE BREAKFAST    POLYETHYLENE GLYCOL (MIRALAX) POWDER    Renal Miralax Colonoscopy prep. Use as directed. Mix Miralax 238 g with 24 oz clear liquids. Drink 8 oz glass every 20-30 minutes until gone. PRAVASTATIN (PRAVACHOL) 80 MG TABLET    Take 1 tablet by mouth daily    VITAMIN C (ASCORBIC ACID) 500 MG TABLET    Take 1 tablet by mouth daily       Hospital medications:  Scheduled Meds:  Continuous Infusions:  PRN Meds:  Objective   ED TRIAGE VITALS   , Temp: 98.9 °F (37.2 °C), Pulse: 100, Resp: 18, SpO2: 93 %     No results found for this visit on 05/19/21. Physical Exam:  Patient Vitals for the past 24 hrs:   Temp Pulse Resp SpO2 Height Weight   05/19/21 2031 98.9 °F (37.2 °C) 100 18 93 % 5' 6\" (1.676 m) 180 lb (81.6 kg)     Primary Assessment:  Airway: Patent, trachea midline  Breathing: Breath sounds present and equal bilaterally, spontaneous, and unlabored  Circulation: Hemodynamically stable, 2+ central and peripheral pulses. Disability: CROW x 4, following commands. GCS =15    Secondary Assessment:  General: Alert, NAD. Head: Normocephalic, mid face stable. Tympanic membranes intact. Nares patent bilaterally, no epistaxis. Mouth clear of foreign bodies, no lacerations or abrasions. Eyes: PERRLA. EOMI. Nontraumatic. Neurologic: A & O x3. Following commands.  CN 2-12 intact  Neck: trachea midline. Cervical spines NTTP midline, without step-offs, crepitus or deformity. Back:TL spines are NTTP midline, without step-offs, crepitus or deformity. No abrasions, contusions, or ecchymosis noted. Lungs: Clear to auscultation bilaterally. Chest Wall: Chest rise symmetrical.  Chest wall without tenderness to palpation. No crepitus, deformities, lacerations, or abrasions. Heart: RRR. Normal S1/S2. No obvious M/G/R. Abdomen:  Soft, NTTP. No guarding. Non-peritoneal.  Pelvis:  NTTP, stable to compression. Femoral pulses 2+. GI/: No blood at the urinary meatus. No gross hematuria. Extremities: No gross deformities. Left knee swelling and warmth. Left ankle swelling. PMS intact. Radial /DP/PT pulses 2+ bilaterally. Skin: Skin warm and dry. Normal for ethnicity. Radiology:     XR KNEE LEFT (MIN 4 VIEWS)   Final Result   1. No acute findings. 2. Tricompartmental osteoarthritis and chondrocalcinosis. This document has been electronically signed by: Taylor Kahn MD on    05/19/2021 09:19 PM      XR ANKLE LEFT (MIN 3 VIEWS)   Final Result   1. Acute Sun type B fibular fracture or medial clear space is not    widened. 2.  Lateral ankle swelling. No ankle effusion. This document has been electronically signed by: Taylor Kahn MD on    05/19/2021 09:19 PM        Fast Exam: No not clinically indicated    Electronically signed by XIN Amador CNP on 5/19/2021 at 10:06 PM    Patient seen and evaluated in the emergency department on ED hold for bed. All data and imaging reviewed. Per protocol admit to trauma services for podiatry who plans operative intervention pending medical clearance. Patient hemodynamically stable overnight. Medicine to evaluate and manage multiple medical issues. Hold blood thinning medications. Agree as documented.

## 2021-05-21 NOTE — PLAN OF CARE
Problem: Falls - Risk of:  Goal: Will remain free from falls  Description: Will remain free from falls  Outcome: Ongoing  Note: Patient using call light appropriately to call for assistance. Patient is compliant with use of non-skid slippers. Patient reports understanding of fall prevention when discussed. Bed alarm remains in place. Goal: Absence of physical injury  Description: Absence of physical injury  Outcome: Ongoing  Note: Patient remains free from physical injury at this time. Problem: Pain:  Goal: Pain level will decrease  Description: Pain level will decrease  Outcome: Ongoing  Note: Patient report pain at 10 on scale. Patient states oral medication helping to achieve pain goal of no pain on scale. Patient able to reposition for comfort, pillows used for support, and ice applied as tolerated to left ankle. Goal: Control of acute pain  Description: Control of acute pain  Outcome: Ongoing  Goal: Control of chronic pain  Description: Control of chronic pain  Outcome: Ongoing     Problem: Skin Integrity:  Goal: Will show no infection signs and symptoms  Description: Will show no infection signs and symptoms  Outcome: Ongoing  Note: Splint remains in place to left lower leg. Patient understands the importance of frequent repositioning in order to prevent any skin breakdown. Goal: Absence of new skin breakdown  Description: Absence of new skin breakdown  Outcome: Ongoing  Note: No new skin breakdown noted at this time. Problem: DISCHARGE BARRIERS  Goal: Patient's continuum of care needs are met  Outcome: Ongoing  Note: Discharge planning in progress pending surgery. Case management consulted for discharge planning. Care plan reviewed with patient. Patient verbalizes understanding of the plan of care and contributes to goal setting.

## 2021-05-21 NOTE — CONSULTS
Orthopedic Consult      CHIEF COMPLAINT:  Right shoulder pain    HISTORY OF PRESENT ILLNESS:      The patient is a 80 y.o. male with right shoulder pain. He is RHD. Patient states that his right shoulder started hurting him mildly around 1-2 weeks ago. Atraumatic and insidious in onset. No prior issues or complaints of right shoulder pain. Then on Tuesday patient had a mechanical fall injuring ankle along with right shoulder worsening the pain. Now he states his right shoulder pain went from 2/10 to a 7/10 pain. It has started to subside some since then but still having right shoulder pain. Xrays were taken of right shoulder as patient was admitted initially for the ankle fracture on 5/19/21. Ortho consulted.      Past Medical History:    Past Medical History:   Diagnosis Date    Anemia in chronic kidney disease(285.21)     Arthritis     CAD (coronary artery disease)     Chronic kidney disease     [facility] kidney disease, stage IV (severe) [facility]     CKD (chronic kidney disease) stage 3, GFR 30-59 ml/min     COPD (chronic obstructive pulmonary disease) ([facility]     GI bleed     Hemodialysis patient ([facility] [address] 07/26/2016    [facility]    History of blood transfusion     6/2019    Hyperlipidemia     Hyperphosphatemia 5/22/2018    Hypertension     Sick sinus syndrome ([facility] [address]     Systolic congestive heart failure [facility])        Past Surgical History:    Past Surgical History:   Procedure Laterality Date   [facility] CARDIAC SURGERY      COLONOSCOPY  2006,2009    COLONOSCOPY N/A 6/27/2019    COLONOSCOPY DIAGNOSTIC performed by Luke Jones MD at [facility]  2004    DIALYSIS FISTULA CREATION  5/6/2016    right arm fistula placement    ENDOSCOPY, COLON, DIAGNOSTIC      PACEMAKER PLACEMENT  2013    MA ESOPHAGOGASTRODUODENOSCOPY TRANSORAL DIAGNOSTIC N/A 1/18/2018    EGD performed by Lizzie Lujan MD at [facility] ENDOSCOPY  [id number]    UPPER GASTROINTESTINAL ENDOSCOPY Left 6/23/2019    EGD DIAGNOSTIC ONLY performed by Vicenta Overton MD at [address] [place] [address]         Medications Prior to Admission:   Prior to Admission medications    Medication Sig Start Date End Date Taking? Authorizing Provider   pantoprazole (PROTONIX) 40 MG tablet TAKE 1 TABLET DAILY BEFORE BREAKFAST 4/28/20  Yes XIN Schmitz - CNP   docusate sodium (COLACE, DULCOLAX) 100 MG CAPS Take 100 mg by mouth 2 times daily 6/24/19  Yes Dk Porter DO   ferrous sulfate 325 (65 Fe) MG tablet Take 1 tablet by mouth daily (with breakfast) 6/24/19  Yes Dk Porter DO   vitamin C (ASCORBIC ACID) 500 MG tablet Take 1 tablet by mouth daily 6/24/19  Yes Dk Porter DO   cloNIDine (CATAPRES) 0.1 MG tablet Take 0.1 mg by mouth 2 times daily 5/30/18  Yes Historical Provider, MD   hydrALAZINE (APRESOLINE) 50 MG tablet Take 50 mg by mouth 2 times daily   Yes Historical Provider, MD   cinacalcet (SENSIPAR) 30 MG tablet Take 1 tablet by mouth Daily with supper 5/26/18  Yes Orbie Schlatter,    lisinopril (PRINIVIL;ZESTRIL) 20 MG tablet Take 1 tablet by mouth daily 5/27/18  Yes Orbie Schlatter, DO   isosorbide mononitrate (IMDUR) 30 MG extended release tablet Take 30 mg by mouth daily   Yes Historical Provider, MD   gabapentin (NEURONTIN) 100 MG capsule Take 100 mg by mouth nightly. Yes Historical Provider, MD   B Complex-C-Folic Acid (RENAL) 1 MG CAPS Take 1 capsule by mouth daily   Yes Historical Provider, MD   metoprolol (LOPRESSOR) 50 MG tablet Take 1 tablet by mouth 2 times daily 6/14/16  Yes Kai Muniz MD   pravastatin (PRAVACHOL) 80 MG tablet Take 1 tablet by mouth daily 11/12/15  Yes Kai Muniz MD   naproxen (NAPROSYN) 500 MG tablet Take 1 tablet by mouth daily for 10 days 3/8/20 3/18/20  SHANELL Marquez   polyethylene glycol [facility]) powder Renal Miralax Colonoscopy prep. Use as directed.   Mix Miralax 238 g with 24 oz clear liquids. Drink 8 oz glass every 20-30 minutes until gone. 19   Brandee Celestin APRN - CNP       Allergies:    No known allergies    Social History:   Social History     Socioeconomic History    Marital status:      Spouse name: Shashi Lozano Number of children: None    Years of education: None    Highest education level: None   Occupational History    None   Tobacco Use    Smoking status: Former Smoker     Packs/day: 1.00     Years: 40.00     Pack years: 40.00     Quit date: 1982     Years since quittin.3    Smokeless tobacco: Never Used   Vaping Use    Vaping Use: Never used   Substance and Sexual Activity    Alcohol use: No    Drug use: No    Sexual activity: None   Other Topics Concern    None   Social History Narrative    None     Social Determinants of Health     Financial Resource Strain:     Difficulty of Paying Living Expenses:    Food Insecurity:     Worried About Running Out of Food in the Last Year:     Ran Out of Food in the Last Year:    Transportation Needs:     Lack of Transportation (Medical):      Lack of Transportation (Non-Medical):    Physical Activity:     Days of Exercise per Week:     Minutes of Exercise per Session:    Stress:     Feeling of Stress :    Social Connections:     Frequency of Communication with Friends and Family:     Frequency of Social Gatherings with Friends and Family:     Attends Jain Services:     Active Member of Clubs or Organizations:     Attends Club or Organization Meetings:     Marital Status:    Intimate Partner Violence:     Fear of Current or Ex-Partner:     Emotionally Abused:     Physically Abused:     Sexually Abused:        Family History:  Family History   Problem Relation Age of Onset    Diabetes Mother     Heart Disease Mother     Diabetes Sister     Mental Illness Paternal Aunt          REVIEW OF SYSTEMS:  General: No fever or chills  Respiratory: no cough or wheezing  Cardiovascular: no chest pain or dyspnea   Gastrointestinal ROS: no nausea no vomiting   MSK:right shoulder pain    PHYSICAL EXAM:  Blood pressure 136/68, pulse 64, temperature 97 °F (36.1 °C), resp. rate 17, height 5' 6\" (1.676 m), weight 171 lb 4.8 oz (77.7 kg), SpO2 96 %. Gen: alert and oriented  Head: normocephalic and atraumatic   Neck: supple  Chest: Non labored breathing   Heart: Reg rate   Extremity: RUE: limited ROM due to pain of right shoulder, TTP diffusely around acromion, + Neers and Waggoner test, pain with Jobes 4+/5 strength, 5/5 strength IR and ER, AIN, PIN, median, ulnar and radial motor nerves intact. SILT. 2+ distal radial pulse. LABS:  Recent Labs     05/20/21  0626   WBC 4.5*   HGB 10.9*   HCT 31.6*         K 4.6   BUN 36*   CREATININE 7.3*   GLUCOSE 86        Radiology:      PROCEDURE: XR SHOULDER RIGHT (MIN 2 VIEWS)       CLINICAL INFORMATION: pain and tenderness s/p fall       COMPARISON: April 30, 2021       TECHNIQUE: AP, Grashey, axillary and transscapular Y views performed.           FINDINGS:    POSTOPERATIVE CHANGES: None.       ALIGNMENT: Anatomic.       MINERALIZATION: Calcification is identified within the supraspinatus tendon.       FRACTURE: None.       ACROMIOCLAVICULAR ARTICULATION: Unremarkable.       GLENOHUMERAL ARTICULATION: Unremarkable.       ACROMIOHUMERAL INTERVAL: Unremarkable.       RIBS:  No rib abnormality is seen within the imaged portions of the chest.       OTHER: Axillary venous stenting         A/P: 80 y.o. male right shoulder calcific tendinitis    Plan: Patient currently doing dialysis. Recommend pain control and ice or heat as needed. Will discuss with Dr. Tony Herrera and if medically able recommend corticosteroid injection subacromial space right shoulder if pain continues and does not improve. Patient understands and agrees.      Electronically signed by Yfn Vail PA-C on 5/21/2021 at 8:32 AM

## 2021-05-21 NOTE — PROGRESS NOTES
[address] [address]  INPATIENT PHYSICAL THERAPY  EVALUATION  [facility] ORTHOPEDICS 7K - 7K-21/021-A    Time In: 3736  Time Out: 1441  Timed Code Treatment Minutes: 11 Minutes  Minutes: 26      Co-evaluation completed with OT, due to initial expectation that pt would need extra assist. Future sessions will be separate. Date: 2021  Patient Name: Ana Deluna,  Gender:  male        MRN: [id number]  : 11/10/1930  (80 y.o.)      Referring Practitioner: XIN Tinajero CNP  Diagnosis: Closed fracture of left ankle  Additional Pertinent Hx: The patient is a 80 y.o. male with right shoulder pain. He is RHD. Patient states that his right shoulder started hurting him mildly around 1-2 weeks ago. Atraumatic and insidious in onset. No prior issues or complaints of right shoulder pain. Then on Tuesday patient had a mechanical fall injuring ankle along with right shoulder worsening the pain. Now he states his right shoulder pain went from 2/10 to a 7/10 pain. It has started to subside some since then but still having right shoulder pain. Xrays were taken of right shoulder as patient was admitted initially for the ankle fracture on 21. Ortho consulted and surgery was canceled  due to cardiac factors. Restrictions/Precautions:  Restrictions/Precautions: Weight Bearing  Left Lower Extremity Weight Bearing: Non Weight Bearing    Subjective:  Chart Reviewed: Yes  Patient assessed for rehabilitation services?: Yes  Family / Caregiver Present: Yes  Subjective: Pt was sitting up in his bed finishing lunch upon arrival. He was in a pleasant mood and agreed to therapy services and moving to the chair.     General:  Overall Orientation Status: Within Normal Limits  Follows Commands: Within Functional Limits    Vision: Within Functional Limits    Hearing: Within functional limits         Pain: 10/10: Pt states L ankle is a 10/10 and R shoulder is a \"20/10\"    Vitals: Vitals not assessed per clinical judgement, see nursing flowsheet    Social/Functional History:    Lives With: Alone  Type of Home: House  Home Layout: One level  Home Access: Level entry  Home Equipment: Cane, Rolling walker     Bathroom Shower/Tub: Tub/Shower unit  Bathroom Toilet: Standard  Bathroom Equipment:  (standard toilet)    Receives Help From: Family  ADL Assistance: Independent  Homemaking Assistance: Independent  Homemaking Responsibilities: Yes  Ambulation Assistance: Independent  Transfer Assistance: Independent    Active : Yes  Occupation: Retired       OBJECTIVE:  Range of Motion:  Right Lower Extremity: WFL    Strength:  Right Lower Extremity: WFL; based on functional mobility pt exhibited ability to hop on R leg with transfers     Balance:  Static Sitting Balance:  Supervision  Dynamic Sitting Balance: Independent, Supervision  Static Standing Balance: Contact Guard Assistance, with RW     Bed Mobility:  Supine to Sit: Contact Guard Assistance  Scooting: Contact Guard Assistance    Transfers:  Sit to Stand: [employer], X 1  Stand to [address] [place] [address], X 1  [facility], X 2, Pt was able to clear foot from floor and hop with the pivot     Ambulation:  Contact Guard Assistance  Distance: 5 ft  Surface: Level Tile  Device:Rolling Walker  Gait Deviations:  Pt is NWB on the L and utilized the hopping technique     Exercise:  Patient was guided in 1 set(s) 10 reps of exercise to both lower extremities. Ankle pumps, Glut sets and Quad sets. Exercises were completed for increased independence with functional mobility. Functional Outcome Measures: Completed  AM-PAC Inpatient Mobility Raw Score : 17  AM-PAC Inpatient T-Scale Score : 42.13    ASSESSMENT:  Activity Tolerance:  Patient tolerance of  treatment: good. Treatment Initiated: Treatment and education initiated within context of evaluation.   Evaluation time included review of current medical information, gathering information related to past medical, social and functional history, completion of standardized testing, formal and informal observation of tasks, assessment of data and development of plan of care and goals. Treatment time included skilled education and facilitation of tasks to increase safety and independence with functional mobility for improved independence and quality of life. Assessment: Body structures, Functions, Activity limitations: Decreased functional mobility , Decreased balance, Decreased endurance, Decreased safe awareness  Assessment: Pt reports to PT with a L closed fracture of the ankle and R shoulder pain. The  patient was able to transfer with CGA, but with some balance deficits. He also required CGA for bed mobility. The pt would benefit from skilled PT services to address these concerns and further strengthen his R leg for ambulation and improve his safe awareness and endurance.   Prognosis: Good    REQUIRES PT FOLLOW UP: Yes    Discharge Recommendations:  Discharge Recommendations: Continue to assess pending progress, Home with Home health PT    Patient Education:  PT Education: Goals, PT Role, Plan of Care, Home Exercise Program, Precautions    Equipment Recommendations:  Equipment Needed: No    Plan:  Times per week: 6x O  Times per day: Daily  Current Treatment Recommendations: Strengthening, ROM, Balance Training, Endurance Training, Functional Mobility Training, Transfer Training, Gait Training    Goals:  Patient goals : go home  Short term goals  Short term goal 1: Pt will supine<>sit with SBA  in order to get out of bed  Short term goal 2: Pt will ambulate 20 ft with supervision and a RW to be able to ambulate around his home  Short term goal 3: Pt will sit<>stand with supervision to be able to transfer surfaces at home  Long term goals  Time Frame for Long term goals : No goals set due to short ELOS    Following session, patient left in safe position with all fall risk precautions in place.     Asia Caballero, SPT

## 2021-05-21 NOTE — PROGRESS NOTES
[address] 60  PHYSICAL THERAPY MISSED TREATMENT NOTE  [facility] ORTHOPEDICS 7K    Date: 2021  Patient Name: Rupa Kebede        MRN: [id number]   : 11/10/1930  (80 y.o.)  Gender: male                REASON FOR MISSED TREATMENT:  According to RN, Pt has dialysis this morning and has pending surgery this afternoon. Will reassess after new orders are in place after surgery.  Atilio Caballero, SPT

## 2021-05-21 NOTE — PROGRESS NOTES
[address] 60  OCCUPATIONAL THERAPY MISSED TREATMENT NOTE  [facility] ORTHOPEDICS 7K  7K-/021-A      Date: 2021  Patient Name: Octavia Reyes        CSN: [id number]   : 11/10/1930  (80 y.o.)  Gender: male                REASON FOR MISSED TREATMENT: Patient Off Floor for Dialysis Pt is currently off of floor for dialysis. After dialysis, patient is heading to OR for ORIF left fibula. OT will hold this date.

## 2021-05-21 NOTE — PROGRESS NOTES
Jacklynkayleighkyree [address] 60  INPATIENT OCCUPATIONAL THERAPY  [facility] ORTHOPEDICS 7K  EVALUATION    Time:   Time In: 2243  Time Out: 1443  Timed Code Treatment Minutes: 20 Minutes  Minutes: 28    Co-eval with PT d/t decreased activity tolerance and medical complexity. Date: 2021  Patient Name: Karl Stock,   Gender: male      MRN: [id number]  : 11/10/1930  (80 y.o.)  Referring Practitioner: XIN Blake CNP  Diagnosis: Fall from [place], Trip or Stumble  Additional Pertinent Hx: Per H&P, \"Tunde is a 80year old male presenting to the Emergency Department via self for evaluation of injury sustained in a fall yesterday. He reports that he was fishing and tripped, falling to the ground. He states that 2 gentlemen helped him up and that he was able to walk after that, but with pain. He is currently ambulating with a cane. He states that he tried to take Tylenol for pain but that it did not help much so he came in for treatment. He is a dialysis patient and had hemodialysis today. He has no other complaints besides left knee and left ankle pain. \"    Restrictions/Precautions:  Restrictions/Precautions: Weight Bearing  Left Lower Extremity Weight Bearing: Non Weight Bearing    Subjective  Chart Reviewed: Yes, Orders, Progress Notes, Operative Notes  Patient assessed for rehabilitation services?: Yes    Subjective: RN okayed OT session. Upon arrival patient was sitting up in bed. Pt was agreeable to OT session.     Pain:  Pain Assessment  Patient Currently in Pain: Yes  Pain Assessment: 0-10  Pain Level: 10  Pain Type: Acute pain  Pain Location: Ankle  Pain Orientation: Left    Vitals: Vitals not assessed per clinical judgement, see nursing flowsheet    Social/Functional History:  Lives With: Alone  Type of Home: House  Home Layout: One level  Home Access: Level entry  Home Equipment: Cane, Rolling walker   Bathroom Shower/Tub: Tub/Shower unit  Bathroom Toilet: Standard  Bathroom Equipment:  (standard toilet)    Receives Help From: Family  ADL Assistance: Independent  Homemaking Assistance: Independent  Homemaking Responsibilities: Yes  Ambulation Assistance: Independent  Transfer Assistance: Independent    Active : Yes  Occupation: Retired     VISION:WFL    HEARING:  Torres Martinez    COGNITION: Lonny Uribe:  Bilateral Upper Extremity:  WFL    STRENGTH:  Bilateral Upper Extremity:  WFL    SENSATION:   WFL    ADL:   Lower Extremity Dressing: Maximum Assistance. socks. Harini Sida BALANCE:  Sitting Balance:  Stand By Assistance. sitting EOB. Standing Balance: Contact Guard Assistance, with verbal cues , with increased time for completion. with BUE on walker. BED MOBILITY:  Supine to Sit: Minimal Assistance, with head of bed raised, with increased time for completion    Scooting: Contact Guard Assistance, with increased time for completion      TRANSFERS:  Sit to Stand:  Minimal Assistance, with increased time for completion, cues for hand placement. Stand to Sit: Contact Guard Assistance. FUNCTIONAL MOBILITY:  Assistive Device: Rolling Walker  Assist Level:  Contact Guard Assistance. Distance: EOB to recliner  Slow pace, No LOB, maintained NWB through LLE. Exercise:  Pt completed BUE AROM exercises x 10 reps x 1 set/s in all joints/planes. and All exercises completed to increase strength and endurance required for ADLs. Activity Tolerance:  Patient tolerance of  treatment: good. Assessment:  Assessment: This 80year old male is s/p fall. Pt sustained a Left distal fibula fracture. Pt is not appropriate for sx at this time. Pt requires skilled OT intervention to increase indep and safety with all self cares, transfers, mobility, and IADLs to return to PLOF.   Performance deficits / Impairments: Decreased functional mobility , Decreased ADL status, Decreased strength, Decreased endurance, Decreased safe awareness, Decreased high-level IADLs, Decreased balance  Prognosis: Good  REQUIRES OT FOLLOW UP: Yes    Treatment Initiated: Treatment and education initiated within context of evaluation. Evaluation time included review of current medical information, gathering information related to past medical, social and functional history, completion of standardized testing, formal and informal observation of tasks, assessment of data and development of plan of care and goals. Treatment time included skilled education and facilitation of tasks to increase safety and independence with ADL's for improved functional independence and quality of life. Discharge Recommendations:  Continue to assess pending progress, 24 hour supervision or assist, Home with Home health OT    Patient Education:  OT Education: OT Role, Plan of Care, Precautions, ADL Adaptive Strategies, Transfer Training    Equipment Recommendations:  Equipment Needed: Yes  Mobility Devices: ADL Assistive Devices, Walker, Wheelchair  ADL Assistive Devices: Toileting - 3-in-1 Commode, Transfer Tub Bench    Plan:  Times per week: 6x  Current Treatment Recommendations: Strengthening, Functional Mobility Training, Balance Training, Endurance Training, Patient/Caregiver Education & Training, Equipment Evaluation, Education, & procurement, Self-Care / ADL, Safety Education & Training. See long-term goal time frame for expected duration of plan of care. If no long-term goals established, a short length of stay is anticipated. Goals:  Patient goals : Go Home with daughter  Short term goals  Time Frame for Short term goals: Until discharge  Short term goal 1: Pt will complete BUE light resistive exercises with min vcs for technique to increase indep and safety with all self cares and transfers while maintaining NWB. Short term goal 2: Pt will complete standing tolerance x 2 minutes with SBA and 1 UE release to increase indep with all toileting.   Short term goal 3: Pt will complete functional mobility short distances with CGA and min vcs for safety to increase indep with self cares. Short term goal 4: Pt will complete LB dressing with LHAE PRN and min A to increase indep and safety within home environment. Following session, patient left in safe position with all fall risk precautions in place.

## 2021-05-21 NOTE — CARE COORDINATION
DISCHARGE/PLANNING EVALUATION  5/21/21, 3:07 PM EDT    Reason for Referral: discharge plan  Mental Status: alert, oriented  Decision Making: makes own decisions with family support   Family/Social/Home Environment:  Tunde lives at home alone. He has good family support, including his daughters. He has dialysis M/W/F in [facility]. He plans home to his daughter's home. Current Services including food security, transportation and housekeeping:  Dialysis M/W/F, no food security, transportation or housekeeping concerns noted by pt or family   Current Equipment: cane  Payment Source: [employer] medicare   Concerns or Barriers to Discharge:  Planning home with daughter, Kermit Santizo, with equipment and [place]  Post acute provider list with quality measures, geographic area and applicable managed care information provided. Questions regarding selection process answered: considering [place] choices    Teach Back Method used with patient and daughters regarding care plan and discharge plan  Patient and daughters verbalize understanding of the plan of care and contribute to goal setting. Patient goals, treatment preferences and discharge plan: spoke with Denisa Echeverria and his daughters. He plans home with daughter, Kermit Santizo, who can assist with his care. They are requesting home health nursing, PT/OT and aid. Discussed equipment needs, including walker, wheelchair, bedside commode and possibly shower bench. Family will consider [place] options.       Electronically signed by SARAHI Lindsay on 5/21/2021 at 3:07 PM

## 2021-05-21 NOTE — CARE COORDINATION
5/21/21, 1:36 PM EDT    DISCHARGE ON GOING [facility] day: 2  Location: -21/021-A Reason for admit: Fall from slip, trip, or stumble, initial encounter [W01. 0XXA]   Procedure:5/20/21 : ECHO and stress test    Barriers to Discharge: Eliane Griffith tripped and fell last night and experienced pain to his left knee and ankle. Surgery cancelled - no surgery planned now. N/V checks. Pain management. Hospitalist for medical management. Nephrology consult: hemodialysis patient of Dr. Dorita Navarrete. Telemetry monitoring. Workup in progress for surgery clearance. PCP: Aftab Carpio MD  Readmission Risk Score: 22%     Patient Goals/Plan/Treatment Preferences:  Primary nurse Renee López said that he lives alone will need ECF. I discussed with Giana Gee. I will follow.  Hemodialysis MWF

## 2021-05-21 NOTE — ED PROVIDER NOTES
[facility] [address] [place] [address]       Chief Complaint   Patient presents with    Knee Pain    Ankle Pain       Nurses Notes reviewed and I agree except as noted in the HPI. HISTORY OF PRESENT ILLNESS    Chikis Kincaid elza 80 y.o. male who presents to the ED for evaluation of  Knee and ankle pain. The patient states that he tripped and fell yesterday. He is having a lot of pain in the left ankle and knee. Swelling of the left lower leg. He is a dialysis patient. HPI was provided by the patient    REVIEW OF SYSTEMS     Review of Systems   Constitutional: Negative for chills, fatigue and fever. HENT: Negative for congestion, ear discharge, ear pain, postnasal drip and rhinorrhea. Respiratory: Negative for cough, chest tightness and shortness of breath. Cardiovascular: Positive for leg swelling. Gastrointestinal: Negative for abdominal pain, nausea and vomiting. Genitourinary: Negative for difficulty urinating, dysuria, enuresis, flank pain and hematuria. Musculoskeletal: Positive for arthralgias, gait problem and joint swelling. Negative for back pain. Skin: Negative for rash. Neurological: Negative for dizziness, light-headedness, numbness and headaches. Psychiatric/Behavioral: Negative for agitation, behavioral problems and confusion. All other systems negative except as noted.       PAST MEDICAL HISTORY     Past Medical History:   Diagnosis Date    Anemia in chronic kidney disease(285.21)     Arthritis     CAD (coronary artery disease)     Chronic kidney disease     Chronic kidney disease, stage IV (severe) (HCC)     CKD (chronic kidney disease) stage 3, GFR 30-59 ml/min     [facility]     GI bleed     Hemodialysis patient [facility]) 07/26/2016    @ [facility]    History of blood transfusion     6/2019    Hyperlipidemia     Hyperphosphatemia 5/22/2018    Hypertension     Sick sinus Refills: 5      naproxen (NAPROSYN) 500 MG tablet Take 1 tablet by mouth daily for 10 days  Qty: 10 tablet, Refills: 0      polyethylene glycol (MIRALAX) powder Renal Miralax Colonoscopy prep. Use as directed. Mix Miralax 238 g with 24 oz clear liquids. Drink 8 oz glass every 20-30 minutes until gone. Qty: 238 g, Refills: 0    Associated Diagnoses: Iron deficiency anemia due to chronic blood loss; Positive fecal occult blood test; Encounter for diagnostic colonoscopy due to change in bowel habits; Diarrhea, unspecified type             ALLERGIES     is allergic to no known allergies. FAMILY HISTORY     He indicated that his mother is . He indicated that his father is . He indicated that his sister is . He indicated that his brother is . He indicated that the status of his paternal aunt is unknown.   family history includes Diabetes in his mother and sister; Heart Disease in his mother; Mental Illness in his paternal aunt.     SOCIAL HISTORY       Social History     Socioeconomic History    Marital status:      Spouse name: Kelsi Delgado Number of children: Not on file    Years of education: Not on file    Highest education level: Not on file   Occupational History    Not on file   Tobacco Use    Smoking status: Former Smoker     Packs/day: 1.00     Years: 40.00     Pack years: 40.00     Quit date: 1982     Years since quittin.3    Smokeless tobacco: Never Used   Vaping Use    Vaping Use: Never used   Substance and Sexual Activity    Alcohol use: No    Drug use: No    Sexual activity: Not on file   Other Topics Concern    Not on file   Social History Narrative    Not on file     Social Determinants of Health     Financial Resource Strain:     Difficulty of Paying Living Expenses:    Food Insecurity:     Worried About Running Out of Food in the Last Year:     [address] in the Last Year:    Transportation Needs:     Lack of Transportation (Medical):  Lack of Transportation (Non-Medical):    Physical Activity:     Days of Exercise per Week:     Minutes of Exercise per Session:    Stress:     Feeling of Stress :    Social Connections:     Frequency of Communication with Friends and Family:     Frequency of Social Gatherings with Friends and Family:     Attends [employer] Services:     Active Member of Clubs or Organizations:     Attends Club or Organization Meetings:     Marital Status:    Intimate Partner Violence:     Fear of Current or Ex-Partner:     Emotionally Abused:     Physically Abused:     Sexually Abused:        PHYSICAL EXAM     INITIAL VITALS:  height is 5' 6\" (1.676 m) and weight is 171 lb 4.8 oz (77.7 kg). His temperature is 97 °F (36.1 °C). His blood pressure is 136/68 and his pulse is 64. His respiration is 17 and oxygen saturation is 96%. Physical Exam  Constitutional:       Appearance: He is well-developed. HENT:      Head: Normocephalic and atraumatic. Eyes:      Conjunctiva/sclera: Conjunctivae normal.   Cardiovascular:      Rate and Rhythm: Normal rate. Pulmonary:      Effort: Pulmonary effort is normal.   Abdominal:      Palpations: Abdomen is soft. Musculoskeletal:         General: Swelling, tenderness and signs of injury present. Left lower leg: Swelling, tenderness and bony tenderness present. 2+ Edema present. Legs:    Skin:     General: Skin is warm and dry. Capillary Refill: Capillary refill takes less than 2 seconds. Neurological:      General: No focal deficit present. Mental Status: He is alert and oriented to person, place, and time.    Psychiatric:         Behavior: Behavior normal.         DIFFERENTIAL DIAGNOSIS:   Sprain, strain, fracture, dislocation      DIAGNOSTIC RESULTS     EKG: All EKG's are interpreted by the Emergency Department Physician who eithersigns or Co-signs this chart in the absence of a cardiologist.        RADIOLOGY: non-plainfilm images(s) such as CT, Ultrasound and MRI are read by the radiologist.  Plain radiographic images are visualized and preliminarily interpreted by the emergency physician unless otherwise stated below. XR SHOULDER RIGHT (MIN 2 VIEWS)   Final Result   1. Unremarkable shoulder series. 2. Supraspinatus tendinopathy         **This report has been created using voice recognition software. It may contain minor errors which are inherent in voice recognition technology. **      Final report electronically signed by Dr Margie Petty on 5/20/2021 12:09 PM      XR CHEST PORTABLE   Final Result   Stable borderline cardiomegaly without acute findings. This document has been electronically signed by: Raymond Reyes MD on    05/19/2021 10:35 PM      XR KNEE LEFT (MIN 4 VIEWS)   Final Result   1. No acute findings. 2. Tricompartmental osteoarthritis and chondrocalcinosis. This document has been electronically signed by: Raymond Reyes MD on    05/19/2021 09:19 PM      XR ANKLE LEFT (MIN 3 VIEWS)   Final Result   1. Acute Sun type B fibular fracture or medial clear space is not    widened. 2.  Lateral ankle swelling. No ankle effusion.       This document has been electronically signed by: Raymond Reyes MD on    05/19/2021 09:19 PM            LABS:   Labs Reviewed   CBC WITH AUTO DIFFERENTIAL - Abnormal; Notable for the following components:       Result Value    RBC 3.52 (*)     Hemoglobin 11.5 (*)     Hematocrit 34.3 (*)     MCV 97.4 (*)     RDW-CV 16.8 (*)     RDW-SD 59.0 (*)     Lymphocytes Absolute 0.7 (*)     All other components within normal limits   BASIC METABOLIC PANEL - Abnormal; Notable for the following components:    Chloride 94 (*)     BUN 32 (*)     CREATININE 6.5 (*)     All other components within normal limits   GLOMERULAR FILTRATION RATE, ESTIMATED - Abnormal; Notable for the following components:    Est, Glom Filt Rate 10 (*)     All other components within normal limits   COMPREHENSIVE METABOLIC PANEL W/ REFLEX TO MG FOR LOW K - Abnormal; Notable for the following components:    CREATININE 7.3 (*)     BUN 36 (*)     ALT 10 (*)     All other components within normal limits   CBC - Abnormal; Notable for the following components:    WBC 4.5 (*)     RBC 3.25 (*)     Hemoglobin 10.9 (*)     Hematocrit 31.6 (*)     MCV 97.2 (*)     MCH 33.5 (*)     RDW-CV 16.7 (*)     RDW-SD 59.8 (*)     All other components within normal limits   GLOMERULAR FILTRATION RATE, ESTIMATED - Abnormal; Notable for the following components:    Est, Glom Filt Rate 9 (*)     All other components within normal limits   COVID-19, RAPID   ANION GAP   OSMOLALITY   ANION GAP   OSMOLALITY       EMERGENCY DEPARTMENT COURSE:   Vitals:    Vitals:    05/20/21 2015 05/21/21 0042 05/21/21 0440 05/21/21 0720   BP: (!) 119/56 (!) 132/59 122/76 136/68   Pulse: 58 61 64 64   Resp: 16 16 16 17   Temp: 97.4 °F (36.3 °C) 97.9 °F (36.6 °C) 98 °F (36.7 °C) 97 °F (36.1 °C)   TempSrc: Oral Oral Oral    SpO2: 97% 99% 96%    Weight:    171 lb 4.8 oz (77.7 kg)   Height:              MDM                         MDM    Patient was seen evaluate in the emergency room for a left lower extremity injury after a fall yesterday. Appropriate imaging is ordered and reviewed. Patient has an acute unstable fracture of the left ankle. Consulted with Dr. Orquidea El of podiatry. He advises the patient needs to be completely nonweightbearing. Given the patient's overall health status and the fact that he is 80years old, I believe that nonweightbearing status will be more of a danger to him at home. Spoke to Dr. Jennie Ascencio from trauma and she agrees to admit and podiatry will address his fracture. Patient will require surgery. Patient is updated and he is agreeable.       Medications   sodium chloride flush 0.9 % injection 5-40 mL (10 mLs Intravenous Given 5/20/21 2025)   0.9 % sodium chloride infusion (has no administration in time range)   acetaminophen (TYLENOL) tablet 650 mg (has no administration in time range)   promethazine (PHENERGAN) tablet 12.5 mg (has no administration in time range)     Or   ondansetron (ZOFRAN) injection 4 mg (has no administration in time range)   polyethylene glycol (GLYCOLAX) packet 17 g (has no administration in time range)   polyethylene glycol (GLYCOLAX) packet 17 g (17 g Oral Not Given 5/20/21 1022)   HYDROcodone-acetaminophen (NORCO) 5-325 MG per tablet 1 tablet ( Oral See Alternative 5/21/21 0440)     Or   HYDROcodone-acetaminophen (NORCO) 5-325 MG per tablet 2 tablet (2 tablets Oral Given 5/21/21 0440)   HYDROmorphone (DILAUDID) injection 0.25 mg ( Intravenous See Alternative 5/20/21 0636)     Or   HYDROmorphone (DILAUDID) injection 0.5 mg (0.5 mg Intravenous Given 5/20/21 0636)   pantoprazole (PROTONIX) tablet 40 mg (40 mg Oral Given 5/21/21 0440)   lidocaine 4 % external patch 2 patch (2 patches Transdermal Not Given 5/20/21 1022)   pravastatin (PRAVACHOL) tablet 80 mg (80 mg Oral Given 5/20/21 1029)   ascorbic acid (VITAMIN C) tablet 500 mg (500 mg Oral Given 5/20/21 1030)   metoprolol tartrate (LOPRESSOR) tablet 50 mg (50 mg Oral Given 5/21/21 0042)   lisinopril (PRINIVIL;ZESTRIL) tablet 20 mg (20 mg Oral Given 5/20/21 1030)   isosorbide mononitrate (IMDUR) extended release tablet 30 mg (30 mg Oral Given 5/20/21 1030)   hydrALAZINE (APRESOLINE) tablet 50 mg (50 mg Oral Given 5/20/21 2025)   gabapentin (NEURONTIN) capsule 100 mg (100 mg Oral Given 5/20/21 2026)   ferrous sulfate (IRON 325) tablet 325 mg (325 mg Oral Given 5/20/21 1030)   docusate sodium (COLACE) capsule 100 mg (100 mg Oral Given 5/20/21 2026)   cinacalcet (SENSIPAR) tablet 30 mg (30 mg Oral Given 5/20/21 1630)   folbee plus tablet 1 tablet (1 tablet Oral Given 5/20/21 1030)   ceFAZolin (ANCEF) 1000 mg in dextrose 5 % 50 mL IVPB (premix) (has no administration in time range)   hydrALAZINE (APRESOLINE) injection 5 mg (has no administration in time range)   regadenoson (LEXISCAN) injection 0.4 mg (has no administration in time range)   sodium chloride flush 0.9 % injection 5-40 mL (has no administration in time range)   0.9 % sodium chloride infusion (has no administration in time range)   atropine injection 0.5 mg (has no administration in time range)   nitroGLYCERIN (NITROSTAT) SL tablet 0.4 mg (has no administration in time range)   metoprolol (LOPRESSOR) injection 5 mg (has no administration in time range)   aminophylline injection 50 mg (has no administration in time range)   cloNIDine (CATAPRES) tablet 0.2 mg (0.2 mg Oral Given 5/20/21 2025)   technetium sestamibi (CARDIOLITE) injection 67.9 millicurie (62.4 millicuries Intravenous Given 5/20/21 1307)   technetium sestamibi (CARDIOLITE) injection 20.8 millicurie (28.7 millicuries Intravenous Given 5/20/21 1307)         Patient was seen independently by myself. The patient's final impression and disposition and plan was determined by myself. Strict return precautions and follow up instructions were discussed with the patient prior to discharge, with which the patient agrees. Physical assessment findings, diagnostic testing(s) if applicable, and vital signs reviewed with patient/patient representative. Questions answered. Medications asdirected, including OTC medications for supportive care. Education provided on medications. Differential diagnosis(s) discussed with patient/patient representative. Home care/self care instructions reviewed withpatient/patient representative. Patient is to follow-up with family care provider in 2-3 days if no improvement. Patient is to go to the emergency department if symptoms worsen. Patient/patient representative isaware of care plan, questions answered, verbalizes understanding and is in agreement. CRITICAL CARE:   None    CONSULTS:  Trauma Surgery and OTHER--podiatry resident    PROCEDURES:  None    FINAL IMPRESSION     1. Closed fracture of left ankle, initial encounter    2.  Arthritis of knee    3. Acute pain of left knee          DISPOSITION/PLAN   DISPOSITION Admitted 05/19/2021 11:00:43 PM      PATIENT REFERREDTO:  No follow-up provider specified.     DISCHARGE MEDICATIONS:  Current Discharge Medication List          (Please note that portions of this note were completed with a voice recognition program.  Efforts were made to edit the dictations but occasionally words are mis-transcribed.)         XIN Obregon - XIN Borrero - CNP  05/21/21 7810

## 2021-05-21 NOTE — PROGRESS NOTES
Podiatric Surgery Consult    CC:  Left ankle pain    HPI:      05.21.2021   Patient seen at bedside on behalf of Dr. Yenifer Barahona. Patient's surgery was cancelled today due to concerns over cardiac health and possible need for heart cath. Discussed with patient and family and they would like to proceed with conservative care at this time. Patient reporting pain in the ankle.     05.20.2021   The patient is a 80 y.o. male with significant past medical history of ESRD on dialysis who being seen at bedside this morning on behalf of Dr. Latoya Mendez. Patient states he tripped and fell last night and experienced pain to his left knee and ankle. He is unable to walk due to pain. He has no other complaints.     Past Medical History:        Diagnosis Date    Anemia in chronic kidney disease(285.21)     Arthritis     CAD (coronary artery disease)     Chronic kidney disease     Chronic kidney disease, stage IV (severe) (HCC)     CKD (chronic kidney disease) stage 3, GFR 30-59 ml/min     COPD (chronic obstructive pulmonary disease) ([address]     GI bleed     Hemod[facility] patient [facility]) 07/26/2016    @ [facility]    History of blood transfusion     6/2019    Hyperlipidemia     Hyperphosphatemia 5/22/2018    Hypertension     S[facility] syndrome ([facility]     Systolic congestive heart failure [facility])        Past Surgical History:        Procedure Laterality Date   [facility] CARDIAC SURGERY      COLONOSCOPY  2006,2009    COLONOSCOPY N/A 6/27/2019    COLONOSCOPY DIAGNOSTIC performed by Bryce Soriano MD at [address]  2004    DIALYSIS FISTULA CREATION  5/6/2016    right arm fistula placement    ENDOSCOPY, COLON, DIAGNOSTIC      PACEMAKER PLACEMENT  2013    LA ESOPHAGOGASTRODUODENOSCOPY TRANSORAL DIAGNOSTIC N/A 1/18/2018    EGD performed by Mikki Lowe MD at [address] [facility]  [id number]    UPPER GASTROINTESTINAL ENDOSCOPY Left 6/23/2019    EGD DIAGNOSTIC ONLY performed by Howard Hernandez MD at [facility]    VASCULAR SURGERY         Current Medications:    Current Facility-Administered Medications: sodium chloride flush 0.9 % injection 5-40 mL, 5-40 mL, Intravenous, 2 times per day  0.9 % sodium chloride infusion, 25 mL, Intravenous, PRN  acetaminophen (TYLENOL) tablet 650 mg, 650 mg, Oral, Q4H PRN  promethazine (PHENERGAN) tablet 12.5 mg, 12.5 mg, Oral, Q6H PRN **OR** ondansetron (ZOFRAN) injection 4 mg, 4 mg, Intravenous, Q6H PRN  polyethylene glycol (GLYCOLAX) packet 17 g, 17 g, Oral, Daily PRN  polyethylene glycol (GLYCOLAX) packet 17 g, 17 g, Oral, Daily  HYDROcodone-acetaminophen (NORCO) 5-325 MG per tablet 1 tablet, 1 tablet, Oral, Q4H PRN **OR** HYDROcodone-acetaminophen (NORCO) 5-325 MG per tablet 2 tablet, 2 tablet, Oral, Q4H PRN  HYDROmorphone (DILAUDID) injection 0.25 mg, 0.25 mg, Intravenous, Q3H PRN **OR** HYDROmorphone (DILAUDID) injection 0.5 mg, 0.5 mg, Intravenous, Q3H PRN  pantoprazole (PROTONIX) tablet 40 mg, 40 mg, Oral, QAM AC  lidocaine 4 % external patch 2 patch, 2 patch, Transdermal, Daily  pravastatin (PRAVACHOL) tablet 80 mg, 80 mg, Oral, Daily  ascorbic acid (VITAMIN C) tablet 500 mg, 500 mg, Oral, Daily  metoprolol tartrate (LOPRESSOR) tablet 50 mg, 50 mg, Oral, BID  lisinopril (PRINIVIL;ZESTRIL) tablet 20 mg, 20 mg, Oral, Daily  isosorbide mononitrate (IMDUR) extended release tablet 30 mg, 30 mg, Oral, Daily  hydrALAZINE (APRESOLINE) tablet 50 mg, 50 mg, Oral, BID  gabapentin (NEURONTIN) capsule 100 mg, 100 mg, Oral, Nightly  ferrous sulfate (IRON 325) tablet 325 mg, 325 mg, Oral, Daily with breakfast  docusate sodium (COLACE) capsule 100 mg, 100 mg, Oral, BID  cinacalcet (SENSIPAR) tablet 30 mg, 30 mg, Oral, Dinner  folbee plus tablet 1 tablet, 1 tablet, Oral, Daily  ceFAZolin (ANCEF) 1000 mg in dextrose 5 % 50 mL IVPB (premix), 1,000 mg, Intravenous, Once  hydrALAZINE (APRESOLINE) injection 5 mg, 5 mg, Intravenous, Q6H PRN  regadenoson (LEXISCAN) injection 0.4 mg, 0.4 mg, Intravenous, ONCE PRN  cloNIDine (CATAPRES) tablet 0.2 mg, 0.2 mg, Oral, TID    Allergies:  No known allergies    Social History:    40 pack year history smoking. hasnt' smoked since 1982    Family History:       Problem Relation Age of Onset    Diabetes Mother     Heart Disease Mother     Diabetes Sister     Mental Illness Paternal Aunt        REVIEW OF SYSTEMS:    Constitutional: Negative for fever, chills, and sudden weight loss or gain. HEENT: Negative for blurry/double vision, ears, nose, or throat pain. Negative for mass. Respiratory: Negative for shortness of breath or hemoptysis. Cardiovascular: Negative for angina, palpitations, or lower extremity edema. Gastrointestinal: Negative for nausea, vomiting, diarrhea, constipation, or abdominal pain. Genitourinary: Negative for increased urination, burning with urination, or hematuria. Musculoskeletal: See above HPI. Integument: See above HPI. Hematology: Negative for easy bruising or easy bleeding. Physical Examination:  General: Awake, alert, and oriented. In no acute distress. Resting in bed. HEENT: Atraumatic, normocephalic. Respiratory: CTA. No rales, rhonchi, or wheezing. Cardiovascular: RRR. Normal S1, S2.  Abdomen: Soft, non-tender, non-distended. Bowel sounds present x4 quadrants. Focused Lower Extremity Examination:    Vitals:    BP (!) 154/60   Pulse 63   Temp 97 °F (36.1 °C)   Resp 17   Ht 5' 6\" (1.676 m)   Wt 168 lb 14 oz (76.6 kg)   SpO2 96%   BMI 27.26 kg/m²      Dermatologic: edema noted to left lower extremity. No open lesions    Vascular: Dorsalis pedis and posterior tibial pulses are palpable bilaterally. Skin temperature is warm to warm from proximal tibial tuberosity to distal digits. CFT less than 5 seconds to all 10 digits.      Neurovascular: intact    Musculoskeletal: deferred    STUDIES:  XR ANKLE LEFT (MIN 3 VIEWS)    Impression   1.  Acute Sun type B fibular fracture or medial clear space is not    widened. 2.  Lateral ankle swelling.  No ankle effusion.       This document has been electronically signed by: Bryon Braynt MD on    05/19/2021 09:19 PM         CULTURES:    LABS:   Recent Labs     05/19/21  2219 05/20/21  0626   WBC 5.9 4.5*   HGB 11.5* 10.9*   HCT 34.3* 31.6*    159        Recent Labs     05/20/21  0626      K 4.6   CL 98   CO2 28   BUN 36*   CREATININE 7.3*        Recent Labs     05/20/21  0626   PROT 6.3      No results for input(s): CKTOTAL, CKMB, CKMBINDEX, TROPONINI in the last 72 hours. Assessment: 80 y.o. male with:  Principle  Left fibula fracture   Chronic  Patient Active Problem List   Diagnosis    CAD (coronary artery disease)    COPD [facility] disease) [facility]    Chronic renal insufficiency    Patient overweight    Arthritis-  low back and neck .     CKD (chronic kidney disease) stage 3, GFR 30-59 ml/min    Dyspnea and respiratory abnormalities    ED (erectile dysfunction)    Degenerative joint disease of knee, left    Gout of big toe    Anemia, chronic disease    CKD (chronic kidney disease) stage 4, GFR 15-29 ml/min ([facility])    Monoclonal gammopathy    Leukopenia    Thrombocytopenia (HCC)    Chronic kidney disease (CKD), stage V (HCC)    Metabolic acidosis    Hyperkalemia    Iron deficiency anemia    ROSAURA (acute kidney injury) (HCC)    Plasma cell dyscrasia    Idiopathic hypotension    Hypertensive urgency    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)    Systolic congestive heart failure (HCC)    Other fluid overload (CODE)    Hyperphosphatemia    Acute diastolic congestive heart failure (HCC)    Acute on chronic diastolic congestive heart failure (HCC)    Pulmonary hypertension (HCC)    Anemia due to chronic kidney disease, on chronic dialysis (HCC)    Volume overload    Secondary hyperparathyroidism of renal origin [facility])    Chest pain    Acute pulmonary edema (HCC)    Accelerated hypertension    Gastritis and duodenitis    Fall from slip, trip, or stumble, initial encounter    Closed fracture of left ankle    ESRD on hemodialysis ([facility]       Plan  1. Left fibula fracture   - patient examined and evaluated   - shirley compression dressing and posterior splint intact   - pending hospitalist and nephrology following  - NPO order lifted, patient started on renal diet  - social work consulted for discharge planning. Patient will need to be NWB 6 - 8 weeks for healing  - PT/OT consulted for gait training, patient to be NWB to the E     DISPO: Patient to be seen by PT/OT for gait training, case management to assist with discharge planning.      NIGEL MccoyM, PGY-3  Foot and Ankle Surgical Resident  5/21/2021 1:17 PM

## 2021-05-21 NOTE — PROGRESS NOTES
Renal Progress Note  [facility]    Patient :  Aletha Wisdom; 80 y.o. MRN# [id number]  Location:  [facility]21/021-A  Attending:  Demetrio Kimball DPM  Admit Date:  5/19/2021   Hospital Day: 2      Subjective:     Nephrology is following the patient for ESRD on HD and surgical Clearance    Patient states continues pain in Right Shoulder and Leg Leg. No chest pain, shortness of breath, or swelling in his limbs. Scheduled HD today prior to surgery. Awaiting Cardiac Clearance, Echo and Stress Test preformed on 5/20. Outpatient Medications:     Medications Prior to Admission: pantoprazole (PROTONIX) 40 MG tablet, TAKE 1 TABLET DAILY BEFORE BREAKFAST  docusate sodium (COLACE, DULCOLAX) 100 MG CAPS, Take 100 mg by mouth 2 times daily  ferrous sulfate 325 (65 Fe) MG tablet, Take 1 tablet by mouth daily (with breakfast)  vitamin C (ASCORBIC ACID) 500 MG tablet, Take 1 tablet by mouth daily  cloNIDine (CATAPRES) 0.1 MG tablet, Take 0.1 mg by mouth 2 times daily  hydrALAZINE (APRESOLINE) 50 MG tablet, Take 50 mg by mouth 2 times daily  cinacalcet (SENSIPAR) 30 MG tablet, Take 1 tablet by mouth Daily with supper  lisinopril (PRINIVIL;ZESTRIL) 20 MG tablet, Take 1 tablet by mouth daily  isosorbide mononitrate (IMDUR) 30 MG extended release tablet, Take 30 mg by mouth daily  gabapentin (NEURONTIN) 100 MG capsule, Take 100 mg by mouth nightly. B Complex-C-Folic Acid (RENAL) 1 MG CAPS, Take 1 capsule by mouth daily  metoprolol (LOPRESSOR) 50 MG tablet, Take 1 tablet by mouth 2 times daily  pravastatin (PRAVACHOL) 80 MG tablet, Take 1 tablet by mouth daily  naproxen (NAPROSYN) 500 MG tablet, Take 1 tablet by mouth daily for 10 days  polyethylene glycol (MIRALAX) powder, Renal Miralax Colonoscopy prep. Use as directed. Mix Miralax 238 g with 24 oz clear liquids. Drink 8 oz glass every 20-30 minutes until gone.     Current Medications:     Scheduled Meds:    sodium chloride flush  5-40 mL Intravenous 2 times per day    polyethylene glycol  17 g Oral Daily    pantoprazole  40 mg Oral QAM AC    lidocaine  2 patch Transdermal Daily    pravastatin  80 mg Oral Daily    vitamin C  500 mg Oral Daily    metoprolol tartrate  50 mg Oral BID    lisinopril  20 mg Oral Daily    isosorbide mononitrate  30 mg Oral Daily    hydrALAZINE  50 mg Oral BID    gabapentin  100 mg Oral Nightly    ferrous sulfate  325 mg Oral Daily with breakfast    docusate sodium  100 mg Oral BID    cinacalcet  30 mg Oral Dinner    folbee plus  1 tablet Oral Daily    ceFAZolin  1,000 mg Intravenous Once    cloNIDine  0.2 mg Oral TID     Continuous Infusions:    sodium chloride       PRN Meds:  sodium chloride, acetaminophen, promethazine **OR** ondansetron, polyethylene glycol, HYDROcodone 5 mg - acetaminophen **OR** HYDROcodone 5 mg - acetaminophen, HYDROmorphone **OR** HYDROmorphone, hydrALAZINE, regadenoson    Input/Output:       I/O last 3 completed shifts: In: 410 [P.O.:400; I.V.:10]  Out: - .      Patient Vitals for the past 96 hrs (Last 3 readings):   Weight   21 180 lb (81.6 kg)       Vital Signs:   Temperature:  Temp: 98 °F (36.7 °C)  TMax:   Temp (24hrs), Av.8 °F (36.6 °C), Min:97.4 °F (36.3 °C), Max:98 °F (36.7 °C)    Respirations:  Resp: 16  Pulse:   Pulse: 64  BP:    BP: 122/76  BP Range: Systolic (25GLF), CKA:784 , Min:119 , SXZ:221       Diastolic (08QVT), HRN:56, Min:56, Max:106      Physical Examination:     General:  Awake, alert, no acute distress  HEENT: NC/AT/ MMM  Chest:              Clear to auscultation Bilaterally, no W/R/R  Cardiac:  Regular Rate and Rhythm, S1 S2, no murmur appreciated  Abdomen: Soft, non-tender, good bowel sounds  Neuro:  No facial droop, No Asterixis  SKIN:  No rashes, good skin turgor.   Extremities:  No edema (SCDs in place), no cyanosis, Leg ankle is wrapped up to the knee due to the fracture, Pain with movement of Right Shoulder still present    Labs:       Recent Labs 05/19/21 2219 05/20/21  0626   WBC 5.9 4.5*   RBC 3.52* 3.25*   HGB 11.5* 10.9*   HCT 34.3* 31.6*   MCV 97.4* 97.2*   MCH 32.7 33.5*   MCHC 33.5 34.5    159   MPV 10.3 10.4      BMP:   Recent Labs     05/19/21 2219 05/20/21 0626    141   K 5.0 4.6   CL 94* 98   CO2 30 28   BUN 32* 36*   CREATININE 6.5* 7.3*   GLUCOSE 94 86   CALCIUM 9.7 9.3      Phosphorus:   No results for input(s): PHOS in the last 72 hours. Magnesium:  No results for input(s): MG in the last 72 hours.   Albumin:    Recent Labs     05/20/21 0626   LABALBU 3.7     BNP:    No results found for: BNP  RANJITH:    No results found for: RANJITH  SPEP:  Lab Results   Component Value Date    PROT 6.3 05/20/2021     UPEP:   No results found for: LABPE  C3:   No results found for: C3  C4:   No results found for: C4  MPO ANCA:   No results found for: MPO  PR3 ANCA:   No results found for: PR3  Anti-GBM:   No results found for: GBMABIGG  Hep BsAg:         Lab Results   Component Value Date    HEPBSAG Nonreactive 08/30/2017     Hep C AB:        No results found for: HEPCAB    Urinalysis/Chemistries:      Lab Results   Component Value Date    NITRU NEGATIVE 06/22/2019    COLORU STRAW 06/22/2019    PHUR 8.5 06/22/2019    LABCAST NONE SEEN 05/03/2016    LABCAST NONE SEEN 05/03/2016    WBCUA 50-75 06/22/2019    RBCUA 25-50 06/22/2019    MUCUS THREADS 10/28/2017    YEAST NONE SEEN 06/22/2019    BACTERIA NONE 06/22/2019    SPECGRAV 1.013 05/03/2016    LEUKOCYTESUR LARGE 06/22/2019    UROBILINOGEN 0.2 06/22/2019    BILIRUBINUR NEGATIVE 06/22/2019    BLOODU LARGE 06/22/2019    GLUCOSEU NEGATIVE 06/22/2019    KETUA NEGATIVE 06/22/2019    AMORPHOUS NONE SEEN 10/28/2017     Urine Sodium:   No results found for: SCOTTY  Urine Potassium:  No results found for: KUR  Urine Chloride:  No results found for: CLUR  Urine Osmolarity:   Lab Results   Component Value Date    TIERRA 570 09/12/2011     Urine Protein:   No components found for: TOTALPROTEIN, URINE   Urine Creatinine:   No results found for: LABCREA  Urine Eosinophils:  No components found for: UEOS        Impression and Plan:  1. ESRD on HD M/W/F likely secondary to HTN              - Received dialysis on 5/19, no issues              - Fistula site on right arm clean and does not appear to be compromised              - Continue HD on regular days              - Monitor labs with daily BMP especially with upcoming surgery   - HD planned on 5/21 with surgery afterwards if cardiac clearance obtained  2. Chronic macrocytic anemia of chronic disease              - Hgb 10.9, at patient's baseline  3. Hyperphosphatemia              - Phosphorus level not drawn in ER, does not take phosphorus binders at home  4. Essential HTN              - Controlled on admission  5. Closed fracture of left ankle              - Seen by trauma surgery in the ER   - Surgery planned for 5/21 after HD      Please don't hesitate to call with any questions.   Electronically signed by Annika Nguyen DO on 5/21/2021 at 7:09 AM

## 2021-05-21 NOTE — FLOWSHEET NOTE
05/21/21 0720 05/21/21 1140   Vital Signs   /68 (!) 154/60   Temp 97 °F (36.1 °C) 97 °F (36.1 °C)   Pulse 64 63   Resp 17  --    Weight 171 lb 4.8 oz (77.7 kg) 168 lb 14 oz (76.6 kg)   Weight Method  --  Bed scale   Percent Weight Change -4.83 -1.42   Post-Hemodialysis Assessment   Post-Treatment Procedures  --  Blood returned; Access bleeding time < 10 minutes   Machine Disinfection Process  --  Exterior Machine Disinfection   Rinseback Volume (ml)  --  400 ml   Total Liters Processed (l/min)  --  85.1 l/min   Dialyzer Clearance  --  Lightly streaked   Duration of Treatment (minutes)  --  240 minutes   Hemodialysis Intake (ml)  --  400 ml   Hemodialysis Output (ml)  --  1400 ml   NET Removed (ml)  --  1000 ml   Tolerated Treatment  --  Good   Stable 4 hr tx. 1L removed during HD; patient tolerated well. Both sites held x10 min each - dressings dry and intact. Report given to primary RN.  Treatment record printed for scanning

## 2021-05-21 NOTE — CONSULTS
during the fall, it was purely a mechanical  fall. REVIEW OF SYSTEMS:  Negative except the above-mentioned ones. PAST MEDICAL HISTORY:  Includes coronary artery disease per the cardiac  catheterization in 2018 and medical management was recommended at that  time; end-stage renal disease, on hemodialysis; COPD; peripheral artery  disease; hypertension; hyperlipidemia, sick sinus syndrome for which he  had a pacemaker. PAST SURGICAL HISTORY:  Pacemaker placed in 2013, dialysis fistula,  colonoscopy, endoscopy, coronary angioplasty and stent placement in  2004, vascular surgery and colonoscopy. FAMILY HISTORY:  Positive for heart disease. SOCIAL HISTORY:  He is a former smoker, quit in 1982. No alcohol. No  drug use. ALLERGIES:  No known drug allergies. MEDICATIONS:  Home medications prior to this admission include vitamin B  complex, Sensipar, Catapres, iron, Neurontin, hydralazine, Imdur,  lisinopril, Lopressor, naproxen, Protonix, MiraLax, Pravachol, and  vitamin C.    PHYSICAL EXAMINATION:  GENERAL:  Looks sick, in no form of distress. VITAL SIGNS:  Blood pressure 136/68, pulse rate 64, respiratory rate 16,  and temperature 97. HEENT:  Pink conjunctivae. Anicteric sclerae. Pupils are equal and  reactive bilaterally to light. NECK:  No lymphadenopathy. No goiter. No JVD. CHEST:  Clear to auscultation. CARDIOVASCULAR:  Arteries are felt; carotid, femoral, and pedal.  No  bruits. S1, S2 well heard. No murmur or gallop rhythm. ABDOMEN:  Soft, nontender, and nondistended. Bowel sounds are positive. GENITOURINARY:  No CVA, flank or suprapubic tenderness. LOCOMOTOR:  No cyanosis, clubbing, muscle or joint tenderness. The left  lower leg is dressed at the site of the fracture. SKIN:  No rashes, ulcers, or nodules. CNS:  Alert, awake, and oriented to time, person, and place. Cranial  nerves are intact. Sensation is intact to light touch and pain.   Motor:  Normal muscle strength and on nuclear stress test and  he has significant history of coronary artery disease. I am not sure if  he has anything to be addressed. The drop in the ejection fraction  suggests that there may be a progression of his underlying coronary  artery disease and so we may need to reevaluate. The stress test is  negative, but that could be a balanced ischemia in someone with already  100% occlusion RCA and LAD lesion, which is already known. So at this  level, I will proceed with the cardiac catheterization. 2.  The patient need cardiac catheterization prior to the intended  surgery and see if there is anything to be addressed after drop in the  ejection fraction and has significant history of previous coronary  artery disease and he has history of shortness of breath on exertion. 3.  He need a pacemaker interrogation prior to the surgery as well,  never been interrogated for a while at least per the record. 4.  Continue his home medication. 5.  Discussed with the patient the need, risks and benefits has been  well explained and the patient verbalized understanding and agreed with  the plan of care. 6.  So based on the course, we will gauge further care. Thank you for allowing me to participate in the care of this patient. I  will follow up with you. Myrna Notice.  Slade Varela M.D.    D: 05/21/2021 10:50:18       T: 05/21/2021 12:22:05     [email or web address]  Job#: [id number]     Doc#: [id number]    CC:

## 2021-05-21 NOTE — CONSULTS
Pre op eval  CMP EF 45%- new drop in EF   CAD per cath 2018  Neg stress - could be balanced ischemia  PPM  ESRD on HD    Plan    Need cath for stability and risk assessment- if stable cath finding may proceed with mod risk  If no surgery needed cont med RX as pat no chest pain or new sob and anginaequivalent  Need pacer interrogation prior to surgery    [id number]        Rebecca Holt MD      Conclusions      Summary   Lexiscan EKG stress test is not suggestive for ischemia. The nuclear images is not suggestive for myocardial ischemia. Signatures      ----------------------------------------------------------------   Electronically signed by Leon Guy MD (Interpreting   Cardiologist) on 05/20/2021 at 19:07   ----------------------------------------------------------------    Cath 005/2018    Procedure Summary  Left main artery is patent. Right coronary artery is totally occluded. Left circumflex and its artery has a diffuse disease in the mid circumflex stent is patent. Left anterior descending artery has a diffuse disease with a 50% stenosis in the mid segment at 50% stenosis in the  distal segment. Distal LAD at the apex has 80% stenosis. Recommendations  Patient was admitted with diastolic congestive heart failure. Patient had elevated troponins but troponins are chronically elevated. Right coronary artery is totally occluded and it seems like chronic occlusion. Left circumflex and its artery stent is patent. Distal LAD has 80% stenosis but it is a small vessel and it is all the way at the apex. Recommend optimal medical management for now. Patient and family were informed about the findings and their questions were answered to their satisfaction. The importance of lifestyle changes and cardiovascular risk factors modification was emphasized. The importance of compliance with medications was emphasized.   The patient will be on optimal medical therapy for atherosclerotic disease, including

## 2021-05-21 NOTE — PROGRESS NOTES
bronchodilator therapy meds included in home meds. PAD: stent in RUE. Noted. HTN/HLD: on hydralazine, lisinopril, lopressor, pravachol    PMH, PSH, SH, FHX reviewed in chart review snap shot in EPIC    CC:  HFrEF    HPI: Initial admission HPI by admitting physician reviewed as below:  Gavin Edwards y.o. male who we are asked to see/evaluate by Cruzito Mckee MD for preoperative assessment.      Patient with history of ESRD on HD MWF, CAD/CHF, Sick sinus syndrome s/p AICD, HTN, HLD, COPD not on BiPAP/CPAP presented after a fall on 05/18/2021 presented after a mechanical fall. XR revealed Acute Sun type B fibular fracture, medial clear space is not widened, lateral ankle swelling with no ankle effusion. Left knee with no acute findings, positive for tricompartemental osteoarthritis and chondrocalcinosis. CXR showed borderline cardiomegaly without acute findings. XR shoulder pending. Hgb stable at baseline ~11. COVID rapid negative. Evaluated by podiatry, plan for ORIF left fibula 05/20/2021. Also seen by Nephrology for inpatient follow up.      On evaluation, patient reports he tripped and fell down. Had no associated prodromal symptoms including chest pain/tightness, dyspnea, diaphoresis, dizziness/lightheadedness. Reports no recent recurrent falls. No syncopal episodes. Reports pain in his right shoulder, left knee, and left ankle. Reports  Baseline dyspnea on exertion at baseline with no acute changes. Lives at home and able to perform his ADLs. Does not use walker or cane at baseline. For further details and updates please refer to assessment and plan at the beginning of the note. ROS (10 point review of systems completed. Pertinent positives noted.  Otherwise ROS is negative) : shoulder pain  PMH:  Per HPI and reviewed in the chart  SHX: Reviewed in the chart, also the patient  FHX: Reviewed in the chart, also with the patient  Allergies: Reviewed in the chart  Medications:     sodium chloride        sodium chloride flush  5-40 mL Intravenous 2 times per day    polyethylene glycol  17 g Oral Daily    pantoprazole  40 mg Oral QAM AC    lidocaine  2 patch Transdermal Daily    pravastatin  80 mg Oral Daily    vitamin C  500 mg Oral Daily    metoprolol tartrate  50 mg Oral BID    lisinopril  20 mg Oral Daily    isosorbide mononitrate  30 mg Oral Daily    hydrALAZINE  50 mg Oral BID    gabapentin  100 mg Oral Nightly    ferrous sulfate  325 mg Oral Daily with breakfast    docusate sodium  100 mg Oral BID    cinacalcet  30 mg Oral Dinner    folbee plus  1 tablet Oral Daily    ceFAZolin  1,000 mg Intravenous Once    cloNIDine  0.2 mg Oral TID   Subjective 5/21/2021: Shoulder pain, orthopedic surgeon on the case, clearance from cardiology needed before proceeding with the surgery. Monitor closely. Vital Signs:   Vitals reviewed and compared with the previous ones  /68   Pulse 64   Temp 97 °F (36.1 °C)   Resp 17   Ht 5' 6\" (1.676 m)   Wt 171 lb 4.8 oz (77.7 kg)   SpO2 96%   BMI 27.65 kg/m²      Intake/Output Summary (Last 24 hours) at 5/21/2021 0948  Last data filed at 5/21/2021 0440  Gross per 24 hour   Intake 410 ml   Output --   Net 410 ml        General:   Age-appropriate, in no acute distress  HEENT:  normocephalic and atraumatic. No scleral icterus. PERRLA. Neck: supple. No thyromegaly. Lungs: clear to auscultation. No abnormal breathing sounds appreciated. Cardiac: S1, S2, RRR without murmur. No JVD. Abdomen: soft. Nontender. Bowel sounds positive. Extremities:  No clubbing, cyanosis, left foot wrapped. Hemodialysis fistula at the right forearm appreciated. Vasculature: capillary refill < 3 seconds. Pedal pulses intact bilaterally. Skin:  warm and dry. Not clammy. Psych:  Alert to time, person and place. Communicable. Affect appropriate  Lymph:  No supraclavicular ,and no anterior cervical lymphadenopathy. Neurologic:  No focal deficit. CN II-XII grossly intact. Motor and Sensory capacity intact in all extremities. Labs:   Recent Labs     05/19/21 2219 05/20/21 0626   WBC 5.9 4.5*   HGB 11.5* 10.9*   HCT 34.3* 31.6*    159     Recent Labs     05/19/21 2219 05/20/21 0626    141   K 5.0 4.6   CL 94* 98   CO2 30 28   BUN 32* 36*   CREATININE 6.5* 7.3*   CALCIUM 9.7 9.3     Recent Labs     05/20/21 0626   AST 15   ALT 10*   BILITOT 0.3   ALKPHOS 46     No results for input(s): INR in the last 72 hours. No results for input(s): Junior Ramirezer in the last 72 hours. Microbiology:    Blood culture #1:   Lab Results   Component Value Date    BC No growth-preliminary  No growth   06/23/2019    BC No growth-preliminary  No growth   06/23/2019       Blood culture #2:No results found for: Lanis Leigh    Organism:  Lab Results   Component Value Date    ORG Growth of Contaminants 06/22/2019       No results found for: LABGRAM    MRSA culture only:No results found for: [facility]    Urine culture: No results found for: LABURIN    Respiratory culture: No results found for: CULTRESP    Aerobic and Anaerobic :  No results found for: LABAERO  No results found for: LABANAE    Urinalysis:      Lab Results   Component Value Date    NITRU NEGATIVE 06/22/2019    WBCUA 50-75 06/22/2019    BACTERIA NONE 06/22/2019    RBCUA 25-50 06/22/2019    BLOODU LARGE 06/22/2019    SPECGRAV 1.013 05/03/2016    GLUCOSEU NEGATIVE 06/22/2019       Radiology:  XR SHOULDER RIGHT (MIN 2 VIEWS)   Final Result   1. Unremarkable shoulder series. 2. Supraspinatus tendinopathy         **This report has been created using voice recognition software. It may contain minor errors which are inherent in voice recognition technology. **      Final report electronically signed by Dr Desiree Rubin on 5/20/2021 12:09 PM      XR CHEST PORTABLE   Final Result   Stable borderline cardiomegaly without acute findings.       This document has been electronically signed by: Uche Sweet MD on    05/19/2021 10:35 PM      XR KNEE LEFT (MIN 4 VIEWS)   Final Result   1. No acute findings. 2. Tricompartmental osteoarthritis and chondrocalcinosis. This document has been electronically signed by: Belen Costa MD on    05/19/2021 09:19 PM      XR ANKLE LEFT (MIN 3 VIEWS)   Final Result   1. Acute Sun type B fibular fracture or medial clear space is not    widened. 2.  Lateral ankle swelling. No ankle effusion. This document has been electronically signed by: Belen Costa MD on    05/19/2021 09:19 PM        ECHO Complete 2D W Doppler W Color    Result Date: 5/20/2021  Transthoracic Echocardiography Report (TTE)  Demographics   Patient Name    Binu Hutchins Gender               Male   MR #            [id number]     Race                 Black                                 Ethnicity   Account #       [de-identified]     Room Number          0021   Accession       [id number]    Date of Study        05/20/2021  Number   Date of Birth   11/10/1930    Referring Physician  Romana Begin MD Jeraline Olives MD   Age             80 year(s)    Derek Zarate,                                                     [facility]                                 Interpreting         Joi Lai MD                                Physician  Procedure Type of Study   TTE procedure:ECHOCARDIOGRAM COMPLETE 2D W DOPPLER W COLOR. Procedure Date Date: 05/20/2021 Start: 01:10 PM Study Location: Echo Lab Technical Quality: Adequate visualization Indications:Preop cardiac evaluation. Additional Medical History:Anemia, arthritis, coronary artery disease, chronic kidney disease, COPD, end stage renal disease, hypertension, hyperlipidemia, sick sinus syndrome, pacemaker, former smoker, congestive heart failure Patient Status: Routine Height: 66.14 inches Weight: 180.78 pounds BSA: 1.92 m^2 BMI: 29.05 kg/m^2 BP: 200/106 mmHg Allergies   - No Known Allergies.   Conclusions   Summary Left ventricle size is normal.  Normal left ventricular wall thickness. There was mild global hypokinesis of the left ventricle. Systolic function was mildly reduced. Ejection fraction is visually estimated at 45%. Doppler parameters were consistent with abnormal left ventricular  relaxation (grade 1 diastolic dysfunction). The left atrium is Moderately dilated. Moderate mitral regurgitation is present. Moderate tricuspid regurgitation visualized. Right ventricular systolic pressure measures 55 mmhg. When this echo is compared with the echo from 05/22/2018, the EF dropped  from 60 % to 45% now   Signature   ----------------------------------------------------------------  Electronically signed by Vashti Ruff MD (Interpreting  physician) on 05/20/2021 at 07:47 PM  ----------------------------------------------------------------   Findings   Mitral Valve  The mitral valve structure was normal with normal leaflet separation. DOPPLER: The transmitral velocity was within the normal range with no  evidence for mitral stenosis. Moderate mitral regurgitation is present. Aortic Valve  Aortic valve appears tricuspid. Aortic valve leaflets are Moderately  calcified. Leaflets exhibited moderately increased thickness and mild to  moderately reduced cuspal separation of the aortic valve. No evidence of  aortic valve regurgitation . There is mild aortic stenosis with valve area of 1.5 sq cm. Tricuspid Valve  The tricuspid valve structure was normal with normal leaflet separation. DOPPLER: There was no evidence of tricuspid stenosis. Moderate tricuspid regurgitation visualized. Right ventricular systolic pressure measures 55 mmhg. Pulmonic Valve  The pulmonic valve leaflets exhibited normal thickness, no calcification,  and normal cuspal separation. DOPPLER: The transpulmonic velocity was  within the normal range with no evidence for regurgitation. Left Atrium  The left atrium is Moderately dilated.    Left Ventricle  Left ventricle size is normal.  Normal left ventricular wall thickness. There was mild global hypokinesis of the left ventricle. Systolic function was mildly reduced. Ejection fraction is visually estimated at 45%. Doppler parameters were consistent with abnormal left ventricular  relaxation (grade 1 diastolic dysfunction). Right Atrium  Right atrial size was normal.   Right Ventricle  The right ventricular size was normal with normal systolic function and  wall thickness. Pericardial Effusion  The pericardium was normal in appearance with no evidence of a pericardial  effusion. Pleural Effusion  No evidence of pleural effusion. Aorta / Great Vessels  -Aortic root dimension within normal limits.  -The Pulmonary artery is within normal limits. -IVC size is within normal limits with normal respiratory phasic changes.   M-Mode/2D Measurements & Calculations   LV Diastolic   LV Systolic Dimension:    AV Cusp Separation: 1.6 cmLA  Dimension: 4.6 3.6 cm                    Dimension: 3.9 cmAO Root  cm             LV Volume Diastolic: 23.8 Dimension: 3.5 cmLA Area: 22.2  LV FS:21.7 %   ml                        cm^2  LV PW          LV Volume Systolic: 25.5  Diastolic: 1.1 ml  cm             LV EDV/LV EDV Index: 86.2  Septum         ml/45 m^2LV ESV/LV ESV    RV Diastolic Dimension: 3.8 cm  Diastolic: 1.1 Index: 55.6 ml/25 m^2  cm             EF Calculated: 43.7 %     LA/Aorta: 1.11                                           Ascending Aorta: 3.4 cm                 LV Length: 9 cm           LA volume/Index: 71.6 ml /37m^2   LV Area        LVOT: 2 cm  Diastolic:  54.1 cm^2  LV Area  Systolic: 91.4  cm^2  Doppler Measurements & Calculations   MV Peak E-Wave: 101 AV Peak Velocity: 166   LVOT Peak Velocity: 82.9 cm/s  cm/s                cm/s                    LVOT Mean Velocity: 59.7 cm/s  MV Peak A-Wave:     AV Peak Gradient: 11.02 LVOT Peak Gradient: 3 mmHgLVOT  83.6 cm/s           mmHg by Dr Kita Celaya on 5/20/2021 12:09 PM    XR KNEE LEFT (MIN 4 VIEWS)    Result Date: 5/19/2021  4 view left knee Comparison:  CR,SR  - XR KNEE LEFT (MIN 4 VIEWS)  - 03/04/2020 08:36 PM EST Findings: No acute fracture or dislocation. No suprapatellar joint effusion. Marginal osteophytes in the medial, lateral, and patellofemoral compartments. Mild medial compartmental narrowing with calcification in the menisci. No radiopaque foreign body. Vascular calcifications in the soft tissues. 1. No acute findings. 2. Tricompartmental osteoarthritis and chondrocalcinosis. This document has been electronically signed by: Michelle Graff MD on 05/19/2021 09:19 PM    XR ANKLE LEFT (MIN 3 VIEWS)    Result Date: 5/19/2021  3 view left ankle Comparison: None Findings: Acute oblique fracture of the distal fibula extending to the tibiofibular syndesmosis. Medial clear space is not widened. There is lateral ankle swelling without ankle effusion. No radiopaque foreign body. 1.  Acute Sun type B fibular fracture or medial clear space is not widened. 2.  Lateral ankle swelling. No ankle effusion. This document has been electronically signed by: Michelle Graff MD on 05/19/2021 09:19 PM    XR CHEST PORTABLE    Result Date: 5/19/2021  1 view chest x-ray Comparison:  CR,SR  - XR CHEST PORTABLE  - 04/30/2021 04:43 PM EDT Findings: No consolidation or effusion. No pneumothorax. Stable borderline cardiomegaly. No pulmonary vascular congestion. Stable left AICD leads in the right atrium and right ventricle. Calcification in the aortic knob. No acute fracture. Stable borderline cardiomegaly without acute findings. This document has been electronically signed by: Michelle Graff MD on 05/19/2021 10:35 PM      Discussed plan with patient and nurse. Patient and nurse verbalized understanding and agree. All questions addressed with concerns. Please excuse my TYPOS!     Electronically signed by Anshul Castaneda MD on 5/21/2021 at 9:48 AM

## 2021-05-22 NOTE — PLAN OF CARE
Problem: Falls - Risk of:  Goal: Will remain free from falls  Description: Will remain free from falls  Outcome: Ongoing  Note: Patient has remained free of falls throughout this shift. Bed alarm on for safety. Call light in reach     Problem: Falls - Risk of:  Goal: Absence of physical injury  Description: Absence of physical injury  Outcome: Ongoing  Note: Patient has remained free of physical injury throughout this shift. Bed alarm on for safety. Call light in reach     Problem: Pain:  Goal: Pain level will decrease  Description: Pain level will decrease  Outcome: Ongoing  Note: Patient reports pain 10/10 in left ankle and right shoulder. Patient medicated per MAR orders. Patient repositioned for comfort. Problem: Pain:  Goal: Control of acute pain  Description: Control of acute pain  Outcome: Ongoing  Note: Patient reports pain 10/10 in left ankle and right shoulder. Patient medicated per MAR orders. Patient repositioned for comfort. Problem: Pain:  Goal: Control of chronic pain  Description: Control of chronic pain  Outcome: Ongoing  Note: Patient denies chronic pain      Problem: Skin Integrity:  Goal: Will show no infection signs and symptoms  Description: Will show no infection signs and symptoms  Outcome: Ongoing  Note: Patient has remained afebrile throughout this shift     Problem: Skin Integrity:  Goal: Absence of new skin breakdown  Description: Absence of new skin breakdown  Outcome: Ongoing  Note: Patient has remained free of new skin breakdown throughout this shift. Patient repositioned for comfort      Problem: DISCHARGE BARRIERS  Goal: Patient's continuum of care needs are met  Outcome: Ongoing  Note: Pt plans home at discharge. Care manager and social working helping with discharge needs. Care plan reviewed with patient. Patient verbalize understanding of the plan of care and contribute to goal setting.

## 2021-05-22 NOTE — PROGRESS NOTES
[facility]  INPATIENT PHYSICAL THERAPY  DAILY NOTE  [facility] ORTHOP[facility] 7K - 7K-21/021-A    Time In:   Time Out: 0945  Timed Code Treatment Minutes: 23 Minutes  Minutes: 23          Date: 2021  Patient Name: [place],  Gender:  male        MRN: [id number]  : 11/10/1930  (80 y.o.)     Referring Practitioner: XIN Ortiz - CNP  Diagnosis: Closed fracture of left ankle  Additional Pertinent Hx: The patient is a 80 y.o. male with right shoulder pain. He is RHD. Patient states that his right shoulder started hurting him mildly around 1-2 weeks ago. Atraumatic and insidious in onset. No prior issues or complaints of right shoulder pain. Then on Tuesday patient had a mechanical fall injuring ankle along with right shoulder worsening the pain. Now he states his right shoulder pain went from 2/10 to a 7/10 pain. It has started to subside some since then but still having right shoulder pain. Xrays were taken of right shoulder as patient was admitted initially for the ankle fracture on 21. Ortho consulted and surgery was canceled  due to cardiac factors.      Prior Level of Function:  Lives With: Alone  Type of Home: House  Home Layout: One level  Home Access: Level entry  Home Equipment: Cane, Rolling walker   Bathroom Shower/Tub: Tub/Shower unit  Bathroom Toilet: Standard  Bathroom Equipment:  (standard toilet)    Receives Help From: Family  ADL Assistance: Independent  Homemaking Assistance: Independent  Homemaking Responsibilities: Yes  Ambulation Assistance: Independent  Transfer Assistance: Independent  Active : Yes    Restrictions/Precautions:  Restrictions/Precautions: [place] Bearing  Left Lower Extremity Weight Bearing: Non Weight Bearing     SUBJECTIVE: family present, pt doesn't say many words-per family is his baseline, pt agreeable for OOB to chair    PAIN: 10/10 left ankle, 20/10 right shoulder per pt    Vitals: Vitals not assessed per clinical judgement, see nursing flowsheet    OBJECTIVE:  Bed Mobility:  Rolling to Left: Supervision   Supine to Sit: Minimal Assistance, assist at LLE only, HOB up 10 degrees  Scooting: Supervision fwd and bwd in sitting    Transfers:  Sit to Stand: Contact Guard Assistance  Stand to Sit:Stand By Assistance  Pt maintained NWB LLE, pt impulsive to std and min unsteady initially, ed and cues to slow down for safety  Std pivot transfer bed to chair with NWB LLE, pt impulsive to std before PT could ebonie gait belt or get walker for safety, pt was CGA to SBA  Ambulation:  Not Tested      Balance:  Static Sitting Balance:  Modified Independent  Static Standing Balance: Contact Guard Assistance, to SBA with BUE support on furniture and maintaining NWB LLE    Exercise:  Patient was guided in 1 set(s) 10 reps of exercise to both lower extremities. Glut sets, Quad sets, Heelslides, Short arc quads and Hip abduction/adduction. RLE ankle pumps and S.L.R. Exercises were completed for increased independence with functional mobility. Functional Outcome Measures: Completed  AM-PAC Inpatient Mobility Raw Score : 17  AM-PAC Inpatient T-Scale Score : 42.13    ASSESSMENT:  Assessment: Patient progressing toward established goals. Activity Tolerance:  Patient tolerance of  treatment: good.       Equipment Recommendations:Equipment Needed: No  Discharge Recommendations:  Continue to assess pending progress, Home with Home health PT    Plan: Times per week: 6x O  Times per day: Daily  Current Treatment Recommendations: Strengthening, ROM, Balance Training, Endurance Training, Functional Mobility Training, Transfer Training, Gait Training    Patient Education  Patient Education: Home Exercise Program, [employer] Mobility, Transfers    Goals:  Patient goals : go home  Short term goals  Short term goal 1: Pt will supine<>sit with SBA  in order to get out of bed  Short term goal 2: Pt will ambulate 20 ft with supervision and a RW to be able to ambulate around his home  Short term goal 3: Pt will sit<>stand with supervision to be able to transfer surfaces at home  Long term goals  Time Frame for Long term goals : No goals set due to short ELOS    Following session, patient left in safe position with all fall risk precautions in place.

## 2021-05-22 NOTE — PROGRESS NOTES
Hospitalist Progress Note    This is a consult for medical management  Patient:  Moi Wade    Unit/Bed:7K-21/021-A  YOB: 1930  MRN: [id number]   Acct: [de-identified]   PCP: Maddie Correa MD  Date of Admission: 5/19/2021    Assessment and Plan:          HFrEF: Previous echocardiogram in the system as of 5/22/2018 showed 60% EF with grade 1 diastolic dysfunction, repeat echocardiogram as of 5/20/2021 showed EF 45%, cardiology been consulted yesterday for possible cath versus none, although Lexiscan stress test was negative for ischemia. Continue lipid-lowering therapy, beta-blocker, ACE inhibitor, further recommendation from cardiology appreciated. 5/22/2021: Patient did not cleared for the surgery per cardiology, conservative management for his current fracture only, pain management per primary team, if pain continue not controlled we recommend to consult pain management for ultimate pain control. Bilateral malleolus fracture : Primary team managing, currently on Norco and Dilaudid, we recommend more pain management as the patient currently in pain which been rated 10/10 on the scale, if this did not help then we recommend pain management team consult for better pain control. Right shoulder pain : X-ray no fracture, currently having lidocaine patch in addition to pain medications as above. Monitor closely. Preoperative assessment: Hx of obstructive CAD, last cath in 2018 -lad 80% stenosis, almost completely occluded hx of RCA- just medical management , no stents were placed, no stress or echo since. RCRI Class IV with 15% - 20% risk for  CARDIAC event intraoperatively. Patient has moderate risk for intermediate risk surgery. Currently, has no symptoms of worsening dyspnea at baseline or chest pain as well as no evidence of acute CHF. Last ECHO showed EF 60% with grade 1 diastolic dysfunction, mild aortic stenosis, and RVSP 50 mmHg.  Would recommend stress test and ECHO falls. No syncopal episodes. Reports pain in his right shoulder, left knee, and left ankle. Reports  Baseline dyspnea on exertion at baseline with no acute changes. Lives at home and able to perform his ADLs. Does not use walker or cane at baseline. For further details and updates please refer to assessment and plan at the beginning of the note. ROS (10 point review of systems completed. Pertinent positives noted. Otherwise ROS is negative) : shoulder pain, left foot pain. PMH:  Per HPI and reviewed in the chart  SHX: Reviewed in the chart, also the patient  FHX: Reviewed in the chart, also with the patient  Allergies: Reviewed in the chart  Medications:     sodium chloride        sodium chloride flush  5-40 mL Intravenous 2 times per day    polyethylene glycol  17 g Oral Daily    pantoprazole  40 mg Oral QAM AC    lidocaine  2 patch Transdermal Daily    pravastatin  80 mg Oral Daily    vitamin C  500 mg Oral Daily    metoprolol tartrate  50 mg Oral BID    lisinopril  20 mg Oral Daily    isosorbide mononitrate  30 mg Oral Daily    hydrALAZINE  50 mg Oral BID    gabapentin  100 mg Oral Nightly    ferrous sulfate  325 mg Oral Daily with breakfast    docusate sodium  100 mg Oral BID    cinacalcet  30 mg Oral Dinner    folbee plus  1 tablet Oral Daily    ceFAZolin  1,000 mg Intravenous Once    cloNIDine  0.2 mg Oral TID   Subjective 5/21/2021: Shoulder pain, orthopedic surgeon on the case, clearance from cardiology needed before proceeding with the surgery. Monitor closely. 5/22/2021: Left foot pain and right shoulder pain, no fracture right shoulder, primary managing his pain, will recommend pain management consult if pain continues not under control. Conservative management for now as cardiology did not clear the patient for the surgery.   Monitor closely  Vital Signs:   Vitals reviewed and compared with the previous ones  BP (!) 157/70   Pulse 69   Temp 98.6 °F (37 °C) (Oral)   Resp 18   Ht 5' 6\" (1.676 m)   Wt 168 lb 14 oz (76.6 kg)   SpO2 96%   BMI 27.26 kg/m²      Intake/Output Summary (Last 24 hours) at 5/22/2021 0800  Last data filed at 5/22/2021 0419  Gross per 24 hour   Intake 1050 ml   Output 1400 ml   Net -350 ml        General:   Age-appropriate, in no acute distress  HEENT:  normocephalic and atraumatic. No scleral icterus. PERRLA. Neck: supple. No thyromegaly. Lungs: clear to auscultation. No abnormal breathing sounds appreciated. Cardiac: S1, S2, RRR without murmur. No JVD. Abdomen: soft. Nontender. Bowel sounds positive. Extremities:  No clubbing, cyanosis, left foot wrapped using splints. Hemodialysis fistula at the right forearm appreciated. Decreased range of motion of the right shoulder in different directions, slight tenderness on palpation. Vasculature: capillary refill < 3 seconds. Pedal pulses intact bilaterally. Skin:  warm and dry. Not clammy. Psych:  Alert to time, person and place. Communicable. Affect appropriate  Lymph:  No supraclavicular ,and no anterior cervical lymphadenopathy. Neurologic:  No focal deficit. CN II-XII grossly intact. Motor and Sensory capacity intact in all extremities. Labs:   Recent Labs     05/19/21 2219 05/20/21  0626   WBC 5.9 4.5*   HGB 11.5* 10.9*   HCT 34.3* 31.6*    159     Recent Labs     05/19/21 2219 05/20/21  0626    141   K 5.0 4.6   CL 94* 98   CO2 30 28   BUN 32* 36*   CREATININE 6.5* 7.3*   CALCIUM 9.7 9.3     Recent Labs     05/20/21  0626   AST 15   ALT 10*   BILITOT 0.3   ALKPHOS 46     No results for input(s): INR in the last 72 hours. No results for input(s): Cristina Elder in the last 72 hours.     Microbiology:    Blood culture #1:   Lab Results   Component Value Date    BC No growth-preliminary  No growth   06/23/2019    BC No growth-preliminary  No growth   06/23/2019       Blood culture #2:No results found for: BLOODCULT2    Organism:  Lab Results   Component Value Date    ORG Growth 05/19/2021 09:19 PM        ECHO Complete 2D W Doppler W Color    Result Date: 5/20/2021  Transthoracic Echocardiography Report (TTE)  Demographics   Patient Name    Lainey Chau Gender               Male   MR #            [id number]     Race                 Black                                 Ethnicity   Account #       [de-identified]     Room Number          0021   Accession       [id number]    Date of Study        05/20/2021  Number   Date of Birth   11/10/1930    Referring Physician  Claire Felder MD   Age             80 year(s)    Omar Grande,                                                     CS                                 Interpreting         Leon Guy MD                                Physician  Procedure Type of Study   TTE procedure:ECHOCARDIOGRAM COMPLETE 2D W DOPPLER W COLOR. Procedure Date Date: 05/20/2021 Start: 01:10 PM Study Location: Echo Lab Technical Quality: Adequate visualization Indications:Preop cardiac evaluation. Additional Medical History:Anemia, arthritis, coronary artery disease, chronic kidney disease, COPD, end stage renal disease, hypertension, hyperlipidemia, sick sinus syndrome, pacemaker, former smoker, congestive heart failure Patient Status: Routine Height: 66.14 inches Weight: 180.78 pounds BSA: 1.92 m^2 BMI: 29.05 kg/m^2 BP: 200/106 mmHg Allergies   - No Known Allergies. Conclusions   Summary  Left ventricle size is normal.  Normal left ventricular wall thickness. There was mild global hypokinesis of the left ventricle. Systolic function was mildly reduced. Ejection fraction is visually estimated at 45%. Doppler parameters were consistent with abnormal left ventricular  relaxation (grade 1 diastolic dysfunction). The left atrium is Moderately dilated. Moderate mitral regurgitation is present. Moderate tricuspid regurgitation visualized.   Right ventricular systolic pressure measures 55 mmhg. When this echo is compared with the echo from 05/22/2018, the EF dropped  from 60 % to 45% now   Signature   ----------------------------------------------------------------  Electronically signed by Rajinder Silva MD (Interpreting  physician) on 05/20/2021 at 07:47 PM  ----------------------------------------------------------------   Findings   Mitral Valve  The mitral valve structure was normal with normal leaflet separation. DOPPLER: The transmitral velocity was within the normal range with no  evidence for mitral stenosis. Moderate mitral regurgitation is present. Aortic Valve  Aortic valve appears tricuspid. Aortic valve leaflets are Moderately  calcified. Leaflets exhibited moderately increased thickness and mild to  moderately reduced cuspal separation of the aortic valve. No evidence of  aortic valve regurgitation . There is mild aortic stenosis with valve area of 1.5 sq cm. Tricuspid Valve  The tricuspid valve structure was normal with normal leaflet separation. DOPPLER: There was no evidence of tricuspid stenosis. Moderate tricuspid regurgitation visualized. Right ventricular systolic pressure measures 55 mmhg. Pulmonic Valve  The pulmonic valve leaflets exhibited normal thickness, no calcification,  and normal cuspal separation. DOPPLER: The transpulmonic velocity was  within the normal range with no evidence for regurgitation. Left Atrium  The left atrium is Moderately dilated. Left Ventricle  Left ventricle size is normal.  Normal left ventricular wall thickness. There was mild global hypokinesis of the left ventricle. Systolic function was mildly reduced. Ejection fraction is visually estimated at 45%. Doppler parameters were consistent with abnormal left ventricular  relaxation (grade 1 diastolic dysfunction).    Right Atrium  Right atrial size was normal.   Right Ventricle  The right ventricular size was normal with normal systolic function and wall thickness. Pericardial Effusion  The pericardium was normal in appearance with no evidence of a pericardial  effusion. Pleural Effusion  No evidence of pleural effusion. Aorta / Great Vessels  -Aortic root dimension within normal limits.  -The Pulmonary artery is within normal limits. -IVC size is within normal limits with normal respiratory phasic changes.   M-Mode/2D Measurements & Calculations   LV Diastolic   LV Systolic Dimension:    AV Cusp Separation: 1.6 cmLA  Dimension: 4.6 3.6 cm                    Dimension: 3.9 cmAO Root  cm             LV Volume Diastolic: 87.5 Dimension: 3.5 cmLA Area: 22.2  LV FS:21.7 %   ml                        cm^2  LV PW          LV Volume Systolic: 54.4  Diastolic: 1.1 ml  cm             LV EDV/LV EDV Index: 86.2  Septum         ml/45 m^2LV ESV/LV ESV    RV Diastolic Dimension: 3.8 cm  Diastolic: 1.1 Index: 44.2 ml/25 m^2  cm             EF Calculated: 43.7 %     LA/Aorta: 1.11                                           Ascending Aorta: 3.4 cm                 LV Length: 9 cm           LA volume/Index: 71.6 ml /37m^2   LV Area        LVOT: 2 cm  Diastolic:  28.0 cm^2  LV Area  Systolic: 98.4  cm^2  Doppler Measurements & Calculations   MV Peak E-Wave: 101 AV Peak Velocity: 166   LVOT Peak Velocity: 82.9 cm/s  cm/s                cm/s                    LVOT Mean Velocity: 59.7 cm/s  MV Peak A-Wave:     AV Peak Gradient: 11.02 LVOT Peak Gradient: 3 mmHgLVOT  83.6 cm/s           mmHg                    Mean Gradient: 2 mmHg  MV E/A Ratio: 1.21  AV Mean Velocity: 126  MV Peak Gradient:   cm/s                    TV Peak E-Wave: 59.1 cm/s  4.08 mmHg           AV Mean Gradient: 7     TV Peak A-Wave: 78.8 cm/s                      mmHg  MV Deceleration     AV VTI: 43.4 cm         TV Peak Gradient: 1.4 mmHg  Time: 222 msec      AV Area                 TR Velocity:342 cm/s  MV P1/2t: 65 msec   (Continuity):1.5 cm^2   TR Gradient:46.79 mmHg  MVA by PHT:3.38 PV Peak Velocity: 58.7 cm/s  cm^2                LVOT VTI: 20.7 cm       PV Peak Gradient: 1.38 mmHg                      IVRT: 81 msec  MV E' Septal  Velocity: 4.5 cm/s                          TX ED Velocity: 140 cm/s  MV A' Septal        AV DVI (VTI): 0.48AV  Velocity: 7.4 cm/s  DVI (Vmax):0.5  MV E' Lateral  Velocity: 6.1 cm/s  MV A' Lateral  Velocity: 5.4 cm/s  E/E' septal: 22.44  E/E' lateral: 16.56  MR Velocity: 570  cm/s  [email or web address] [id number]    XR SHOULDER RIGHT (MIN 2 VIEWS)    Result Date: 5/20/2021  PROCEDURE: XR SHOULDER RIGHT (MIN 2 VIEWS) CLINICAL INFORMATION: pain and tenderness s/p fall COMPARISON: April 30, 2021 TECHNIQUE: AP, Grashey, axillary and transscapular Y views performed. FINDINGS: POSTOPERATIVE CHANGES: None. ALIGNMENT: Anatomic. MINERALIZATION: Calcification is identified within the supraspinatus tendon. FRACTURE: None. ACROMIOCLAVICULAR ARTICULATION: Unremarkable. GLENOHUMERAL ARTICULATION: Unremarkable. ACROMIOHUMERAL INTERVAL: Unremarkable. RIBS:  No rib abnormality is seen within the imaged portions of the chest. OTHER: Axillary venous stenting     1. Unremarkable shoulder series. 2. Supraspinatus tendinopathy **This report has been created using voice recognition software. It may contain minor errors which are inherent in voice recognition technology. ** Final report electronically signed by Dr Mehnaz Oliva on 5/20/2021 12:09 PM    XR KNEE LEFT (MIN 4 VIEWS)    Result Date: 5/19/2021  4 view left knee Comparison:  CR,SR  - XR KNEE LEFT (MIN 4 VIEWS)  - 03/04/2020 08:36 PM EST Findings: No acute fracture or dislocation. No suprapatellar joint effusion. Marginal osteophytes in the medial, lateral, and patellofemoral compartments. Mild medial compartmental narrowing with calcification in the menisci. No radiopaque foreign body.  Vascular calcifications in the soft tissues. 1. No acute findings. 2. Tricompartmental osteoarthritis and chondrocalcinosis. This document has been electronically signed by: Uche Sweet MD on 05/19/2021 09:19 PM    XR ANKLE LEFT (MIN 3 VIEWS)    Result Date: 5/19/2021  3 view left ankle Comparison: None Findings: Acute oblique fracture of the distal fibula extending to the tibiofibular syndesmosis. Medial clear space is not widened. There is lateral ankle swelling without ankle effusion. No radiopaque foreign body. 1.  Acute Sun type B fibular fracture or medial clear space is not widened. 2.  Lateral ankle swelling. No ankle effusion. This document has been electronically signed by: Uche Sweet MD on 05/19/2021 09:19 PM    XR CHEST PORTABLE    Result Date: 5/19/2021  1 view chest x-ray Comparison:  CR,SR  - XR CHEST PORTABLE  - 04/30/2021 04:43 PM EDT Findings: No consolidation or effusion. No pneumothorax. Stable borderline cardiomegaly. No pulmonary vascular congestion. Stable left AICD leads in the right atrium and right ventricle. Calcification in the aortic knob. No acute fracture. Stable borderline cardiomegaly without acute findings. This document has been electronically signed by: Uche Sweet MD on 05/19/2021 10:35 PM      Discussed plan with patient and nurse. Patient and nurse verbalized understanding and agree. All questions addressed with concerns. Please excuse my TYPOS!     Electronically signed by Rohan Arguello MD on 5/22/2021 at 8:00 AM

## 2021-05-22 NOTE — PLAN OF CARE
Problem: Falls - Risk of:  Goal: Will remain free from falls  Description: Will remain free from falls  5/22/2021 1554 by Vargas Hung  Outcome: Met This Shift  Note: No falls noted this shift. Patient ambulates with x1 staff assistance with some difficulty. Patient is impulsive. Family member at bedside. Bed kept in low position. Safe environment maintained. Bedside table & call light in reach. Uses call light appropriately when needing assistance. Problem: Pain:  Goal: Control of acute pain  Description: Control of acute pain  5/22/2021 1554 by Vargas Hung  Outcome: Ongoing  Note: Pt report pain at 10 on scale. Pt states oral/iv medication helping to achieve pain goal of a 5 on scale. Problem: Skin Integrity:  Goal: Will show no infection signs and symptoms  Description: Will show no infection signs and symptoms  5/22/2021 1554 by Vargas Hung  Outcome: Met This Shift  Note: Pt's left ankle fracture healing. Dressing is dry and intact and not due to be changed today. No new skin impairments noted. Pt understands the importance of frequent repositioning in order to prevent any skin breakdown. Problem: DISCHARGE BARRIERS  Goal: Patient's continuum of care needs are met  5/22/2021 1554 by Vargas Hung  Outcome: Met This Shift  Note: Pt plans home with daughter at discharge. Care manager and social working helping with discharge needs. Care plan reviewed with patient and daughter. Patient and daughter verbalize understanding of the plan of care and contribute to goal setting.

## 2021-05-22 NOTE — PROGRESS NOTES
[facility]    History of blood transfusion     6/2019    Hyperlipidemia     Hyperphosphatemia 5/22/2018    Hypertension     Sick sinus syndrome (HCC)     Systolic congestive heart failure [facility])        Past Surgical History:        Procedure Laterality Date   Ketan Jose CARDIAC SURGERY      COLONOSCOPY  2006,2009    COLONOSCOPY N/A 6/27/2019    COLONOSCOPY DIAGNOSTIC performed by Merry Thomas MD at [address] [facility]  2004    DIALYSIS FISTULA CREATION  5/6/2016    right arm fistula placement    ENDOSCOPY, COLON, DIAGNOSTIC      PACEMAKER PLACEMENT  2013    GA ESOPHAGOGASTRODUODENOSCOPY TRANSORAL DIAGNOSTIC N/A 1/18/2018    EGD performed by Chelita Green MD at [address] [facility]  2006,2009    UPPER GASTROINTESTINAL ENDOSCOPY Left 6/23/2019    EGD DIAGNOSTIC ONLY performed by Merry Thomas MD at [facility]    VASCULAR SURGERY         Current Medications:    Current Facility-Administered Medications: sodium chloride flush 0.9 % injection 5-40 mL, 5-40 mL, Intravenous, 2 times per day  0.9 % sodium chloride infusion, 25 mL, Intravenous, PRN  acetaminophen (TYLENOL) tablet 650 mg, 650 mg, Oral, Q4H PRN  promethazine (PHENERGAN) tablet 12.5 mg, 12.5 mg, Oral, Q6H PRN **OR** ondansetron (ZOFRAN) injection 4 mg, 4 mg, Intravenous, Q6H PRN  polyethylene glycol (GLYCOLAX) packet 17 g, 17 g, Oral, Daily PRN  polyethylene glycol (GLYCOLAX) packet 17 g, 17 g, Oral, Daily  HYDROcodone-acetaminophen (NORCO) 5-325 MG per tablet 1 tablet, 1 tablet, Oral, Q4H PRN **OR** HYDROcodone-acetaminophen (NORCO) 5-325 MG per tablet 2 tablet, 2 tablet, Oral, Q4H PRN  HYDROmorphone (DILAUDID) injection 0.25 mg, 0.25 mg, Intravenous, Q3H PRN **OR** HYDROmorphone (DILAUDID) injection 0.5 mg, 0.5 mg, Intravenous, Q3H PRN  pantoprazole (PROTONIX) tablet 40 mg, 40 mg, Oral, QAM AC  lidocaine 4 % external patch 2 patch, 2 patch, Transdermal, Daily  pravastatin (PRAVACHOL) tablet 80 mg, 80 mg, Oral, Daily  ascorbic acid (VITAMIN C) tablet 500 mg, 500 mg, Oral, Daily  metoprolol tartrate (LOPRESSOR) tablet 50 mg, 50 mg, Oral, BID  lisinopril (PRINIVIL;ZESTRIL) tablet 20 mg, 20 mg, Oral, Daily  isosorbide mononitrate (IMDUR) extended release tablet 30 mg, 30 mg, Oral, Daily  hydrALAZINE (APRESOLINE) tablet 50 mg, 50 mg, Oral, BID  gabapentin (NEURONTIN) capsule 100 mg, 100 mg, Oral, Nightly  ferrous sulfate (IRON 325) tablet 325 mg, 325 mg, Oral, Daily with breakfast  docusate sodium (COLACE) capsule 100 mg, 100 mg, Oral, BID  cinacalcet (SENSIPAR) tablet 30 mg, 30 mg, Oral, Dinner  folbee plus tablet 1 tablet, 1 tablet, Oral, Daily  ceFAZolin (ANCEF) 1000 mg in dextrose 5 % 50 mL IVPB (premix), 1,000 mg, Intravenous, Once  hydrALAZINE (APRESOLINE) injection 5 mg, 5 mg, Intravenous, Q6H PRN  regadenoson (LEXISCAN) injection 0.4 mg, 0.4 mg, Intravenous, ONCE PRN  cloNIDine (CATAPRES) tablet 0.2 mg, 0.2 mg, Oral, TID    Allergies:  No known allergies    Social History:    40 pack year history smoking. hasnt' smoked since 1982    Family History:       Problem Relation Age of Onset    Diabetes Mother     Heart Disease Mother     Diabetes Sister     Mental Illness Paternal Aunt        REVIEW OF SYSTEMS:    Constitutional: Negative for fever, chills, and sudden weight loss or gain. HEENT: Negative for blurry/double vision, ears, nose, or throat pain. Negative for mass. Respiratory: Negative for shortness of breath or hemoptysis. Cardiovascular: Negative for angina, palpitations, or lower extremity edema. Gastrointestinal: Negative for nausea, vomiting, diarrhea, constipation, or abdominal pain. Genitourinary: Negative for increased urination, burning with urination, or hematuria. Musculoskeletal: See above HPI. Integument: See above HPI. Hematology: Negative for easy bruising or easy bleeding.     Physical Examination:  General: Awake, alert, and oriented. In no acute distress. Resting in bed. HEENT: Atraumatic, normocephalic. Respiratory: CTA. No rales, rhonchi, or wheezing. Cardiovascular: RRR. Normal S1, S2.  Abdomen: Soft, non-tender, non-distended. Bowel sounds present x4 quadrants. Focused Lower Extremity Examination:    Vitals:    BP (!) 144/66   Pulse 70   Temp 98.6 °F (37 °C) (Oral)   Resp 18   Ht 5' 6\" (1.676 m)   Wt 168 lb 14 oz (76.6 kg)   SpO2 93%   BMI 27.26 kg/m²      Posterior splint to LLE kept intact during today's visit  Dermatologic: Mild pitting edema noted to left lower extremity just proximal to the posterior splint. CFT to exposed digits within normal limits    Vascular: Dorsalis pedis and posterior tibial pulses are palpable bilaterally. Skin temperature is warm to warm from proximal tibial tuberosity to distal digits. CFT less than 5 seconds to all 10 digits. Neurovascular: intact to the level of the digits    Musculoskeletal: deferred    STUDIES:  XR ANKLE LEFT (MIN 3 VIEWS)    Impression   1.  Acute Sun type B fibular fracture or medial clear space is not    widened. 2.  Lateral ankle swelling.  No ankle effusion.       This document has been electronically signed by: Agatha Stockton MD on    05/19/2021 09:19 PM         CULTURES:    LABS:   Recent Labs     05/19/21 2219 05/20/21  0626   WBC 5.9 4.5*   HGB 11.5* 10.9*   HCT 34.3* 31.6*    159        Recent Labs     05/20/21  0626      K 4.6   CL 98   CO2 28   BUN 36*   CREATININE 7.3*        Recent Labs     05/20/21  0626   PROT 6.3      No results for input(s): CKTOTAL, CKMB, CKMBINDEX, TROPONINI in the last 72 hours. Assessment: 80 y.o. male with:  Principle  Left fibula fracture   Chronic  Patient Active Problem List   Diagnosis    CAD (coronary artery disease)    COPD (chronic obstructive pulmonary disease) ([facility]    Chronic renal insufficiency    Patient overweight    Arthritis-  low back and neck .     CKD (chronic kidney disease) stage 3, GFR 30-59 ml/min    Dyspnea and respiratory abnormalities    ED (erectile dysfunction)    Degenerative joint disease of knee, left    Gout of big toe    Anemia, chronic disease    CKD (chronic kidney disease) stage 4, GFR 15-29 ml/min (HCC)    Monoclonal gammopathy    Leukopenia    Thrombocytopenia (HCC)    Chronic kidney disease (CKD), stage V (HCC)    Metabolic acidosis    Hyperkalemia    Iron deficiency anemia    ROSAURA (acute kidney injury) (HCC)    Plasma cell dyscrasia    Idiopathic hypotension    Hypertensive urgency    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)    Systolic congestive heart failure (HCC)    Other fluid overload (CODE)    Hyperphosphatemia    Acute diastolic congestive heart failure (HCC)    Acute on chronic diastolic congestive heart failure (HCC)    Pulmonary hypertension (HCC)    Anemia due to chronic kidney disease, on chronic dialysis (HCC)    Volume overload    Secondary hyperparathyroidism of renal origin ([address]    Chest pain    Acute pulmonary edema (HCC)    Accelerated hypertension    Gastritis and duodenitis    Fall from slip, trip, or stumble, initial encounter    Closed fracture of left ankle    ESRD on hemodialysis ([address]       Plan  1. Left fibula fracture   - patient examined and evaluated   - shirley compression dressing and posterior splint intact and well maintained  - pending hospitalist and nephrology following  - NPO order lifted, patient started on renal diet  - social work consulted for discharge planning. Patient will need to be NWB 6 - 8 weeks for healing  -Discussed with patient and his daughter that conservative treatment will take 6 to 8 weeks.   Patient's daughter related that the family plans to have the patient stay with one of his daughters Yu Landis) until he is healed  - PT/OT consulted for gait training, patient to be NWB to the LLE   -The patient and his daughter verbalized understanding and agreement with the plan as stated. All of their questions were answered to their satisfaction. DISPO: Patient to be seen by PT/OT for gait training, case management to assist with discharge planning.      NIGEL BrownleeM, PGY-3  Foot and Ankle Surgical Resident  5/22/2021 2:13 PM

## 2021-05-22 NOTE — PROGRESS NOTES
Renal Progress Note  [facility]    Patient :  Pool Pinto; 80 y.o. MRN# [id number]  Location:  721/021-A  Attending:  Mary Frye DPM  Admit Date:  5/19/2021   Hospital Day: 3      Subjective:     Nephrology is following the patient for ESRD. Patient seen and examined. Multiple family members at bedside. C/o right shoulder pain. BP low this afternoon. Outpatient Medications:     Medications Prior to Admission: pantoprazole (PROTONIX) 40 MG tablet, TAKE 1 TABLET DAILY BEFORE BREAKFAST  docusate sodium (COLACE, DULCOLAX) 100 MG CAPS, Take 100 mg by mouth 2 times daily  ferrous sulfate 325 (65 Fe) MG tablet, Take 1 tablet by mouth daily (with breakfast)  vitamin C (ASCORBIC ACID) 500 MG tablet, Take 1 tablet by mouth daily  cloNIDine (CATAPRES) 0.1 MG tablet, Take 0.1 mg by mouth 2 times daily  hydrALAZINE (APRESOLINE) 50 MG tablet, Take 50 mg by mouth 2 times daily  cinacalcet (SENSIPAR) 30 MG tablet, Take 1 tablet by mouth Daily with supper  lisinopril (PRINIVIL;ZESTRIL) 20 MG tablet, Take 1 tablet by mouth daily  isosorbide mononitrate (IMDUR) 30 MG extended release tablet, Take 30 mg by mouth daily  gabapentin (NEURONTIN) 100 MG capsule, Take 100 mg by mouth nightly. B Complex-C-Folic Acid (RENAL) 1 MG CAPS, Take 1 capsule by mouth daily  metoprolol (LOPRESSOR) 50 MG tablet, Take 1 tablet by mouth 2 times daily  pravastatin (PRAVACHOL) 80 MG tablet, Take 1 tablet by mouth daily  naproxen (NAPROSYN) 500 MG tablet, Take 1 tablet by mouth daily for 10 days  polyethylene glycol (MIRALAX) powder, Renal Miralax Colonoscopy prep. Use as directed. Mix Miralax 238 g with 24 oz clear liquids. Drink 8 oz glass every 20-30 minutes until gone.     Current Medications:     Scheduled Meds:    hydrALAZINE  25 mg Oral BID    sodium chloride flush  5-40 mL Intravenous 2 times per day    polyethylene glycol  17 g Oral Daily    pantoprazole  40 mg Oral QAM AC    lidocaine  2 patch Transdermal Daily    pravastatin  80 mg Oral Daily    vitamin C  500 mg Oral Daily    metoprolol tartrate  50 mg Oral BID    lisinopril  20 mg Oral Daily    isosorbide mononitrate  30 mg Oral Daily    gabapentin  100 mg Oral Nightly    ferrous sulfate  325 mg Oral Daily with breakfast    docusate sodium  100 mg Oral BID    cinacalcet  30 mg Oral Dinner    folbee plus  1 tablet Oral Daily    ceFAZolin  1,000 mg Intravenous Once    cloNIDine  0.2 mg Oral TID     Continuous Infusions:    sodium chloride       PRN Meds:  sodium chloride, acetaminophen, promethazine **OR** ondansetron, polyethylene glycol, HYDROcodone 5 mg - acetaminophen **OR** HYDROcodone 5 mg - acetaminophen, HYDROmorphone **OR** HYDROmorphone, hydrALAZINE, regadenoson    Input/Output:       I/O last 3 completed shifts: In: 1050 [P.O.:650]  Out: 1400 . Patient Vitals for the past 96 hrs (Last 3 readings):   Weight   21 1140 168 lb 14 oz (76.6 kg)   21 0720 171 lb 4.8 oz (77.7 kg)   21 2031 180 lb (81.6 kg)       Vital Signs:   Temperature:  Temp: 98.6 °F (37 °C)  TMax:   Temp (24hrs), Av.4 °F (36.9 °C), Min:98.1 °F (36.7 °C), Max:98.6 °F (37 °C)    Respirations:  Resp: 18  Pulse:   Pulse: (!) 47  BP:    BP: 98/60  BP Range: Systolic (60EZG), CLN:093 , Min:98 , SQZ:974       Diastolic (48TJC), TXC:92, Min:53, Max:80      Physical Examination:     General:  Awake, alert, no acute distress  HEENT: NC/AT/ MMM  Chest:               Clear B/L no W/R/R  Cardiac:  S1 S2   Abdomen: Soft, non-tender,  Neuro:  No facial droop, No Asterixis  SKIN:  No rashes, good skin turgor.   Extremities:  Left foot in boot    Labs:       Recent Labs     21  2219 21  0626   WBC 5.9 4.5*   RBC 3.52* 3.25*   HGB 11.5* 10.9*   HCT 34.3* 31.6*   MCV 97.4* 97.2*   MCH 32.7 33.5*   MCHC 33.5 34.5    159   MPV 10.3 10.4      BMP:   Recent Labs     21  2219 21  0626    141   K 5.0 4.6   CL 94* 98   CO2 30 28 BUN 32* 36*   CREATININE 6.5* 7.3*   GLUCOSE 94 86   CALCIUM 9.7 9.3      Phosphorus:   No results for input(s): PHOS in the last 72 hours. Magnesium:  No results for input(s): MG in the last 72 hours. Albumin:    Recent Labs     05/20/21  0626   LABALBU 3.7     BNP:    No results found for: BNP  RANJITH:    No results found for: RANJITH  SPEP:  Lab Results   Component Value Date    PROT 6.3 05/20/2021     UPEP:   No results found for: LABPE  C3:   No results found for: C3  C4:   No results found for: C4  MPO ANCA:   No results found for: MPO  PR3 ANCA:   No results found for: PR3  Anti-GBM:   No results found for: GBMABIGG  Hep BsAg:         Lab Results   Component Value Date    HEPBSAG Nonreactive 08/30/2017     Hep C AB:        No results found for: HEPCAB    Urinalysis/Chemistries:      Lab Results   Component Value Date    NITRU NEGATIVE 06/22/2019    COLORU STRAW 06/22/2019    PHUR 8.5 06/22/2019    LABCAST NONE SEEN 05/03/2016    LABCAST NONE SEEN 05/03/2016    WBCUA 50-75 06/22/2019    RBCUA 25-50 06/22/2019    MUCUS THREADS 10/28/2017    YEAST NONE SEEN 06/22/2019    BACTERIA NONE 06/22/2019    SPECGRAV 1.013 05/03/2016    LEUKOCYTESUR LARGE 06/22/2019    UROBILINOGEN 0.2 06/22/2019    BILIRUBINUR NEGATIVE 06/22/2019    BLOODU LARGE 06/22/2019    GLUCOSEU NEGATIVE 06/22/2019    KETUA NEGATIVE 06/22/2019    AMORPHOUS NONE SEEN 10/28/2017     Urine Sodium:   No results found for: SCOTTY  Urine Potassium:  No results found for: KUR  Urine Chloride:  No results found for: CLUR  Urine Osmolarity:   Lab Results   Component Value Date    OSMOU 570 09/12/2011     Urine Protein:   No components found for: TOTALPROTEIN, URINE   Urine Creatinine:   No results found for: LABCREA  Urine Eosinophils:  No components found for: UEOS        Impression and Plan:  1. ESRD on hemodialysis MWF  2. HTN with relative hypotension this afternoon, reduce Hydralazine to 25 mg BID and add hold parameters  3.  Ankle fracture: podiatry managing conservatively  Due to cardiac issues  4. Cardiomyopathy with drop in ED : cardio recommending heart cath  5. Anemia  6. CAD        Please don't hesitate to call with any questions.   Electronically signed by Kaylyn Coates DO on 5/22/2021 at 2:47 PM

## 2021-05-23 NOTE — PROGRESS NOTES
stress or echo since. RCRI Class IV with 15% - 20% risk for  CARDIAC event intraoperatively. Patient has moderate risk for intermediate risk surgery. Currently, has no symptoms of worsening dyspnea at baseline or chest pain as well as no evidence of acute CHF. Last ECHO showed EF 60% with grade 1 diastolic dysfunction, mild aortic stenosis, and RVSP 50 mmHg. Would recommend stress test and ECHO prior to surgery given that patient had total occlusion of RCA and 80% stenosis of distal LAD in 05/25/2018.      hypertension urgency - restart all isabell emeds- added hydralazine 5mg iv prn for sbp> 170- closely monitor     Acute Sun type B fibular fracture: plan for surgery per podiatry possibly tomorrow if stress test negative and ECHO with no acute changes. Continue pain control per primary.     ESRD on HD MWF: Follows with Dr. Tarun Whipple. Last session 5/19/2021. Trend BMP.  CAD/SSS with Pacemaker: on imdur, lopressor, and lisinopril. COPD: no evidence of exacerbation. Last PFT on epic from 07/24/2012- FEV1 112%, FEV1/FVC 74%, more evidence of emphysema than obstruction. No bronchodilator therapy meds included in home meds. PAD: stent in RUE. Noted. HTN/HLD: on hydralazine, lisinopril, lopressor, pravachol    PMH, PSH, SH, FHX reviewed in chart review snap shot in EPIC    CC:  HFrEF    HPI: Initial admission HPI by admitting physician reviewed as below:  Odette kearney.o. male who we are asked to see/evaluate by Ilia Ceballos MD for preoperative assessment.      Patient with history of ESRD on HD MWF, CAD/CHF, Sick sinus syndrome s/p AICD, HTN, HLD, COPD not on BiPAP/CPAP presented after a fall on 05/18/2021 presented after a mechanical fall. XR revealed Acute Sun type B fibular fracture, medial clear space is not widened, lateral ankle swelling with no ankle effusion. Left knee with no acute findings, positive for tricompartemental osteoarthritis and chondrocalcinosis.  CXR showed borderline cardiomegaly without acute 05/23/21  0513   WBC 5.1   HGB 10.7*   HCT 31.7*        Recent Labs     05/23/21 0513      K 5.6*   CL 93*   CO2 27   BUN 41*   CREATININE 8.8*   CALCIUM 8.8     No results for input(s): AST, ALT, BILIDIR, BILITOT, ALKPHOS in the last 72 hours. No results for input(s): INR in the last 72 hours. No results for input(s): [place] in the last 72 hours. Microbiology:    Blood culture #1:   Lab Results   Component Value Date    BC No growth-preliminary  No growth   06/23/2019    BC No growth-preliminary  No growth   06/23/2019       Blood culture #2:No results found for: [place]    Organism:  Lab Results   Component Value Date    ORG Growth of Contaminants 06/22/2019       No results found for: LABGRAM    MRSA culture only:No results found for: [facility]    Urine culture: No results found for: LABURIN    Respiratory culture: No results found for: CULTRESP    Aerobic and Anaerobic :  No results found for: LABAERO  No results found for: LABANAE    Urinalysis:      Lab Results   Component Value Date    NITRU NEGATIVE 06/22/2019    WBCUA 50-75 06/22/2019    BACTERIA NONE 06/22/2019    RBCUA 25-50 06/22/2019    BLOODU LARGE 06/22/2019    SPECGRAV 1.013 05/03/2016    GLUCOSEU NEGATIVE 06/22/2019       Radiology:  XR ANKLE LEFT (2 VIEWS)   Final Result   1. Bilateral malleolus fracture are demonstrated            **This report has been created using voice recognition software. It may contain minor errors which are inherent in voice recognition technology. **      Final report electronically signed by Dr Ifrah Covarrubias on 5/21/2021 12:38 PM      XR SHOULDER RIGHT (MIN 2 VIEWS)   Final Result   1. Unremarkable shoulder series. 2. Supraspinatus tendinopathy         **This report has been created using voice recognition software. It may contain minor errors which are inherent in voice recognition technology. **      Final report electronically signed by Dr Ifrah Covarrubias on 5/20/2021 12:09 PM XR CHEST PORTABLE   Final Result   Stable borderline cardiomegaly without acute findings. This document has been electronically signed by: Franki Rangel MD on    05/19/2021 10:35 PM      XR KNEE LEFT (MIN 4 VIEWS)   Final Result   1. No acute findings. 2. Tricompartmental osteoarthritis and chondrocalcinosis. This document has been electronically signed by: Franki Rangel MD on    05/19/2021 09:19 PM      XR ANKLE LEFT (MIN 3 VIEWS)   Final Result   1. Acute Sun type B fibular fracture or medial clear space is not    widened. 2.  Lateral ankle swelling. No ankle effusion. This document has been electronically signed by: Franki Rangel MD on    05/19/2021 09:19 PM        ECHO Complete 2D W Doppler W Color    Result Date: 5/20/2021  Transthoracic Echocardiography Report (TTE)  Demographics   Patient Name    Pari Santizo Gender               Male   MR #            [id number]     Race                 Black                                 Ethnicity   Account #       [de-identified]     Room Number          0021   Accession       [id number]    Date of Study        05/20/2021  Number   Date of Birth   11/10/1930    Referring Physician  Kodak Currie MD   Age             80 year(s)    Sunday Alban                                                     [facility]                                 Interpreting         Paz Lopez MD                                Physician  Procedure Type of Study   TTE procedure:ECHOCARDIOGRAM COMPLETE 2D W DOPPLER W COLOR. Procedure Date Date: 05/20/2021 Start: 01:10 PM Study Location: Echo Lab Technical Quality: Adequate visualization Indications:Preop cardiac evaluation.  Additional Medical History:Anemia, arthritis, coronary artery disease, chronic kidney disease, COPD, end stage renal disease, hypertension, hyperlipidemia, sick sinus syndrome, pacemaker, former smoker, congestive heart failure Patient Status: Routine Height: 66.14 inches Weight: 180.78 pounds BSA: 1.92 m^2 BMI: 29.05 kg/m^2 BP: 200/106 mmHg Allergies   - No Known Allergies. Conclusions   Summary  Left ventricle size is normal.  Normal left ventricular wall thickness. There was mild global hypokinesis of the left ventricle. Systolic function was mildly reduced. Ejection fraction is visually estimated at 45%. Doppler parameters were consistent with abnormal left ventricular  relaxation (grade 1 diastolic dysfunction). The left atrium is Moderately dilated. Moderate mitral regurgitation is present. Moderate tricuspid regurgitation visualized. Right ventricular systolic pressure measures 55 mmhg. When this echo is compared with the echo from 05/22/2018, the EF dropped  from 60 % to 45% now   Signature   ----------------------------------------------------------------  Electronically signed by Joi Lai MD (Interpreting  physician) on 05/20/2021 at 07:47 PM  ----------------------------------------------------------------   Findings   Mitral Valve  The mitral valve structure was normal with normal leaflet separation. DOPPLER: The transmitral velocity was within the normal range with no  evidence for mitral stenosis. Moderate mitral regurgitation is present. Aortic Valve  Aortic valve appears tricuspid. Aortic valve leaflets are Moderately  calcified. Leaflets exhibited moderately increased thickness and mild to  moderately reduced cuspal separation of the aortic valve. No evidence of  aortic valve regurgitation . There is mild aortic stenosis with valve area of 1.5 sq cm. Tricuspid Valve  The tricuspid valve structure was normal with normal leaflet separation. DOPPLER: There was no evidence of tricuspid stenosis. Moderate tricuspid regurgitation visualized. Right ventricular systolic pressure measures 55 mmhg.    Pulmonic Valve  The pulmonic valve leaflets exhibited normal thickness, no calcification,  and normal cuspal separation. DOPPLER: The transpulmonic velocity was  within the normal range with no evidence for regurgitation. Left Atrium  The left atrium is Moderately dilated. Left Ventricle  Left ventricle size is normal.  Normal left ventricular wall thickness. There was mild global hypokinesis of the left ventricle. Systolic function was mildly reduced. Ejection fraction is visually estimated at 45%. Doppler parameters were consistent with abnormal left ventricular  relaxation (grade 1 diastolic dysfunction). Right Atrium  Right atrial size was normal.   Right Ventricle  The right ventricular size was normal with normal systolic function and  wall thickness. Pericardial Effusion  The pericardium was normal in appearance with no evidence of a pericardial  effusion. Pleural Effusion  No evidence of pleural effusion. Aorta / Great Vessels  -Aortic root dimension within normal limits.  -The Pulmonary artery is within normal limits. -IVC size is within normal limits with normal respiratory phasic changes.   M-Mode/2D Measurements & Calculations   LV Diastolic   LV Systolic Dimension:    AV Cusp Separation: 1.6 cmLA  Dimension: 4.6 3.6 cm                    Dimension: 3.9 cmAO Root  cm             LV Volume Diastolic: 31.3 Dimension: 3.5 cmLA Area: 22.2  LV FS:21.7 %   ml                        cm^2  LV PW          LV Volume Systolic: 71.9  Diastolic: 1.1 ml  cm             LV EDV/LV EDV Index: 86.2  Septum         ml/45 m^2LV ESV/LV ESV    RV Diastolic Dimension: 3.8 cm  Diastolic: 1.1 Index: 81.0 ml/25 m^2  cm             EF Calculated: 43.7 %     LA/Aorta: 1.11                                           Ascending Aorta: 3.4 cm                 LV Length: 9 cm           LA volume/Index: 71.6 ml /37m^2   LV Area        LVOT: 2 cm  Diastolic:  77.5 cm^2  LV Area  Systolic: 80.8  cm^2  Doppler Measurements & Calculations   MV Peak E-Wave: 101 AV Peak Velocity: 166   LVOT Peak Velocity: 82.9 cm/s  cm/s nurse. Patient and nurse verbalized understanding and agree. All questions addressed with concerns. Please excuse my TYPOS!     Electronically signed by Aisha Cervantes MD on 5/23/2021 at 8:18 AM

## 2021-05-23 NOTE — PROGRESS NOTES
Renal Progress Note  [facility]    Patient :  Shoaib Whitaker; 80 y.o. MRN# [id number]  Location:  7-21/021-A  Attending:  Tate Vazquez DPM  Admit Date:  5/19/2021   Hospital Day: 4      Subjective:     Nephrology is following the patient for ESRD. Patient seen and examined. C/o right shoulder pain. Ankle pain seems controlled. Outpatient Medications:     Medications Prior to Admission: pantoprazole (PROTONIX) 40 MG tablet, TAKE 1 TABLET DAILY BEFORE BREAKFAST  docusate sodium (COLACE, DULCOLAX) 100 MG CAPS, Take 100 mg by mouth 2 times daily  ferrous sulfate 325 (65 Fe) MG tablet, Take 1 tablet by mouth daily (with breakfast)  vitamin C (ASCORBIC ACID) 500 MG tablet, Take 1 tablet by mouth daily  cloNIDine (CATAPRES) 0.1 MG tablet, Take 0.1 mg by mouth 2 times daily  hydrALAZINE (APRESOLINE) 50 MG tablet, Take 50 mg by mouth 2 times daily  cinacalcet (SENSIPAR) 30 MG tablet, Take 1 tablet by mouth Daily with supper  lisinopril (PRINIVIL;ZESTRIL) 20 MG tablet, Take 1 tablet by mouth daily  isosorbide mononitrate (IMDUR) 30 MG extended release tablet, Take 30 mg by mouth daily  gabapentin (NEURONTIN) 100 MG capsule, Take 100 mg by mouth nightly. B Complex-C-Folic Acid (RENAL) 1 MG CAPS, Take 1 capsule by mouth daily  metoprolol (LOPRESSOR) 50 MG tablet, Take 1 tablet by mouth 2 times daily  pravastatin (PRAVACHOL) 80 MG tablet, Take 1 tablet by mouth daily  naproxen (NAPROSYN) 500 MG tablet, Take 1 tablet by mouth daily for 10 days  polyethylene glycol (MIRALAX) powder, Renal Miralax Colonoscopy prep. Use as directed. Mix Miralax 238 g with 24 oz clear liquids. Drink 8 oz glass every 20-30 minutes until gone.     Current Medications:     Scheduled Meds:    sodium zirconium cyclosilicate  10 g Oral Once    hydrALAZINE  25 mg Oral BID    sodium chloride flush  5-40 mL Intravenous 2 times per day    polyethylene glycol  17 g Oral Daily    pantoprazole  40 mg Oral QAM AC    CALCIUM 8.8      Phosphorus:   No results for input(s): PHOS in the last 72 hours. Magnesium:  No results for input(s): MG in the last 72 hours. Albumin:    No results for input(s): LABALBU in the last 72 hours. BNP:    No results found for: BNP  RANJITH:    No results found for: RANJITH  SPEP:  Lab Results   Component Value Date    PROT 6.3 05/20/2021     UPEP:   No results found for: LABPE  C3:   No results found for: C3  C4:   No results found for: C4  MPO ANCA:   No results found for: MPO  PR3 ANCA:   No results found for: PR3  Anti-GBM:   No results found for: GBMABIGG  Hep BsAg:         Lab Results   Component Value Date    HEPBSAG Nonreactive 08/30/2017     Hep C AB:        No results found for: HEPCAB    Urinalysis/Chemistries:      Lab Results   Component Value Date    NITRU NEGATIVE 06/22/2019    COLORU STRAW 06/22/2019    PHUR 8.5 06/22/2019    LABCAST NONE SEEN 05/03/2016    LABCAST NONE SEEN 05/03/2016    WBCUA 50-75 06/22/2019    RBCUA 25-50 06/22/2019    MUCUS THREADS 10/28/2017    YEAST NONE SEEN 06/22/2019    BACTERIA NONE 06/22/2019    SPECGRAV 1.013 05/03/2016    LEUKOCYTESUR LARGE 06/22/2019    UROBILINOGEN 0.2 06/22/2019    BILIRUBINUR NEGATIVE 06/22/2019    BLOODU LARGE 06/22/2019    GLUCOSEU NEGATIVE 06/22/2019    KETUA NEGATIVE 06/22/2019    AMORPHOUS NONE SEEN 10/28/2017     Urine Sodium:   No results found for: SCOTTY  Urine Potassium:  No results found for: KUR  Urine Chloride:  No results found for: CLUR  Urine Osmolarity:   Lab Results   Component Value Date    OSMOU 570 09/12/2011     Urine Protein:   No components found for: TOTALPROTEIN, URINE   Urine Creatinine:   No results found for: LABCREA  Urine Eosinophils:  No components found for: UEOS        Impression and Plan:  1. ESRD on hemodialysis MWF  2. Mild hyperkalemia: Lokelma 10 grams x 1 hold lisinopril temporarily. HD tomorrow  2. HTN: stable  3. Ankle fracture: podiatry managing conservatively  Due to cardiac issues  4.

## 2021-05-23 NOTE — PROGRESS NOTES
Spoke with patient about the need to move rooms due to bariatric surgery patients on the schedule tomorrow. Patient is ok with the move. Plan is for him to go to [address] [place] [address]. Called and spoke with daughter Bhavna Israel and updated her on patient room move and plan for right shoulder injection and possible discharge tomorrow. Verbalized understanding.

## 2021-05-23 NOTE — PROGRESS NOTES
[address] [facility]  Occupational Therapy  Daily Note  Time:   Time In: 1441  Time Out: 1505  Timed Code Treatment Minutes: 24 Minutes  Minutes: 24          Date: 2021  Patient Name: Pricila Munoz,   Gender: male      Room: [facility]021-A  MRN: [id number]  : 11/10/1930  (80 y.o.)  Referring Practitioner: XIN Carroll CNP  Diagnosis: Fall from [place], Trip or Stumble  Additional Pertinent Hx: Per H&P, \"Tunde is a 80year old male presenting to the Emergency Department via self for evaluation of injury sustained in a fall yesterday. He reports that he was fishing and tripped, falling to the ground. He states that 2 gentlemen helped him up and that he was able to walk after that, but with pain. He is currently ambulating with a cane. He states that he tried to take Tylenol for pain but that it did not help much so he came in for treatment. He is a dialysis patient and had hemodialysis today. He has no other complaints besides left knee and left ankle pain. \"    Restrictions/Precautions:  Restrictions/Precautions: Weight Bearing  Left Lower Extremity Weight Bearing: Non Weight Bearing     SUBJECTIVE: In bed upon arrival agreeable to OT treatment with encouragement     PAIN: 8/10:    Vitals: Vitals not assessed per clinical judgement, see nursing flowsheet    COGNITION: Slow Processing and Decreased Insight    ADL:   Lower Extremity Dressing: Maximum Assistance. .  Education on DME and LHAE     BALANCE:  Sitting Balance:  Contact Guard Assistance. BED MOBILITY:  Supine to Sit: Minimal Assistance    Sit to Supine: Minimal Assistance      ADDITIONAL ACTIVITIES:  Guided patient through BUE AROM this date x15 reps x1 set in all planes and joints available in order to increase BUE strength and improve activity tolerance for ADLs and homemaking tasks. ASSESSMENT:     Activity Tolerance:  Patient tolerance of  treatment: fair.        Discharge Recommendations: Continue to assess pending progress, 24 hour supervision or assist, Home with Home health OT   Equipment Recommendations: Equipment Needed: Yes  Mobility Devices: ADL Assistive Devices, Walker, Wheelchair  ADL Assistive Devices: Toileting - 3-in-1 Commode, Transfer Tub Bench  Plan: Times per week: 6x  Current Treatment Recommendations: Strengthening, Functional Mobility Training, Balance Training, Endurance Training, Patient/Caregiver Education & Training, Equipment Evaluation, Education, & procurement, Self-Care / ADL, Safety Education & Training    Patient Education  Patient Education: Home Exercise Program    Goals  Short term goals  Time Frame for Short term goals: Until discharge  Short term goal 1: Pt will complete BUE light resistive exercises with min vcs for technique to increase indep and safety with all self cares and transfers while maintaining NWB. Short term goal 2: Pt will complete standing tolerance x 2 minutes with SBA and 1 UE release to increase indep with all toileting. Short term goal 3: Pt will complete functional mobility short distances with CGA and min vcs for safety to increase indep with self cares. Short term goal 4: Pt will complete LB dressing with LHAE PRN and min A to increase indep and safety within home environment. Following session, patient left in safe position with all fall risk precautions in place.

## 2021-05-23 NOTE — PLAN OF CARE
Problem: Falls - Risk of:  Goal: Will remain free from falls  Description: Will remain free from falls  Outcome: Met This Shift  Note: No falls noted this shift. Patient ambulates with x1 staff assistance without difficulty. Bed kept in low position. Safe environment maintained. Bedside table & call light in reach. Uses call light appropriately when needing assistance. Problem: Pain:  Goal: Control of acute pain  Description: Control of acute pain  Outcome: Ongoing  Note: Pt report pain at 10/10 on scale. Pt states oral medication helping to achieve pain goal of a 5 on scale. Problem: Skin Integrity:  Goal: Will show no infection signs and symptoms  Description: Will show no infection signs and symptoms  Outcome: Met This Shift  Note: Pt's left ankle fracture healing. Dressing is dry and intact and not due to be changed today, Splint in place. No new skin impairments noted. Pt understands the importance of frequent repositioning in order to prevent any skin breakdown. Problem: DISCHARGE BARRIERS  Goal: Patient's continuum of care needs are met  Outcome: Met This Shift  Note: Pt plans home with daughter at discharge. Care manager and social working helping with discharge needs. Care plan reviewed with patient. Patient verbalizes understanding of the plan of care and contribute to goal setting.

## 2021-05-23 NOTE — PROGRESS NOTES
has no other pedal complaints at this time.    05.21.2021   Patient seen at bedside on behalf of Dr. David Lucero. Patient's surgery was cancelled today due to concerns over cardiac health and possible need for heart cath. Discussed with patient and family and they would like to proceed with conservative care at this time. Patient reporting pain in the ankle.     05.20.2021   The patient is a 80 y.o. male with significant past medical history of ESRD on dialysis who being seen at bedside this morning on behalf of Dr. Madina Gallegos. Patient states he tripped and fell last night and experienced pain to his left knee and ankle. He is unable to walk due to pain. He has no other complaints.     Past Medical History:        Diagnosis Date    Anemia in chronic kidney disease(285.21)     Arthritis     CAD (coronary artery disease)     Chronic kidney disease     Chronic kidney disease, stage IV (severe) (HCC)     CKD (chronic kidney disease) stage 3, GFR 30-59 ml/min     COPD [facility] obstructive [facility] disease) [facility]     GI bleed     Hemodialysis patient [facility]) 07/26/2016    @ [facility]    History of blood transfusion     6/2019    Hyperlipidemia     Hyperphosphatemia 5/22/2018    Hypertension     Sick sinus syndrome ([facility] [address]     Systolic congestive heart failure [facility])        Past Surgical History:        Procedure Laterality Date   Fely Diaz CARDIAC SURGERY      COLONOSCOPY  2006,2009    COLONOSCOPY N/A 6/27/2019    COLONOSCOPY DIAGNOSTIC performed by Mena Habermann, MD at [address] [facility]  2004    DIALYSIS FISTULA CREATION  5/6/2016    right arm fistula placement    ENDOSCOPY, COLON, DIAGNOSTIC      PACEMAKER PLACEMENT  2013    [place] ESOPHAGOGASTRODUODENOSCOPY TRANSORAL DIAGNOSTIC N/A 1/18/2018    EGD performed by Mary Ruiz MD at [address] [facility]  [phone]    UPPER GASTROINTESTINAL ENDOSCOPY Left 6/23/2019    EGD DIAGNOSTIC ONLY performed by Melina Hickey MD at [address] [facility]    VASCULAR SURGERY         Current Medications:    Current Facility-Administered Medications: hydrALAZINE (APRESOLINE) tablet 25 mg, 25 mg, Oral, BID  sodium chloride flush 0.9 % injection 5-40 mL, 5-40 mL, Intravenous, 2 times per day  0.9 % sodium chloride infusion, 25 mL, Intravenous, PRN  acetaminophen (TYLENOL) tablet 650 mg, 650 mg, Oral, Q4H PRN  promethazine (PHENERGAN) tablet 12.5 mg, 12.5 mg, Oral, Q6H PRN **OR** ondansetron (ZOFRAN) injection 4 mg, 4 mg, Intravenous, Q6H PRN  polyethylene glycol (GLYCOLAX) packet 17 g, 17 g, Oral, Daily PRN  polyethylene glycol (GLYCOLAX) packet 17 g, 17 g, Oral, Daily  HYDROcodone-acetaminophen (NORCO) 5-325 MG per tablet 1 tablet, 1 tablet, Oral, Q4H PRN **OR** HYDROcodone-acetaminophen (NORCO) 5-325 MG per tablet 2 tablet, 2 tablet, Oral, Q4H PRN  HYDROmorphone (DILAUDID) injection 0.25 mg, 0.25 mg, Intravenous, Q3H PRN **OR** HYDROmorphone (DILAUDID) injection 0.5 mg, 0.5 mg, Intravenous, Q3H PRN  pantoprazole (PROTONIX) tablet 40 mg, 40 mg, Oral, QAM AC  lidocaine 4 % external patch 2 patch, 2 patch, Transdermal, Daily  pravastatin (PRAVACHOL) tablet 80 mg, 80 mg, Oral, Daily  ascorbic acid (VITAMIN C) tablet 500 mg, 500 mg, Oral, Daily  metoprolol tartrate (LOPRESSOR) tablet 50 mg, 50 mg, Oral, BID  lisinopril (PRINIVIL;ZESTRIL) tablet 20 mg, 20 mg, Oral, Daily  isosorbide mononitrate (IMDUR) extended release tablet 30 mg, 30 mg, Oral, Daily  gabapentin (NEURONTIN) capsule 100 mg, 100 mg, Oral, Nightly  ferrous sulfate (IRON 325) tablet 325 mg, 325 mg, Oral, Daily with breakfast  docusate sodium (COLACE) capsule 100 mg, 100 mg, Oral, BID  cinacalcet (SENSIPAR) tablet 30 mg, 30 mg, Oral, Dinner  folbee plus tablet 1 tablet, 1 tablet, Oral, Daily  ceFAZolin (ANCEF) 1000 mg in dextrose 5 % 50 mL IVPB (premix), 1,000 mg, Intravenous, Once  hydrALAZINE (APRESOLINE) injection 5 mg, 5 mg, Intravenous, Q6H PRN  regadenoson (LEXISCAN) injection 0.4 mg, 0.4 mg, Intravenous, ONCE PRN  cloNIDine (CATAPRES) tablet 0.2 mg, 0.2 mg, Oral, TID    Allergies:  No known allergies    Social History:    40 pack year history smoking. hasnt' smoked since 1982    Family History:       Problem Relation Age of Onset    Diabetes Mother     Heart Disease Mother     Diabetes Sister     Mental Illness Paternal Aunt        REVIEW OF SYSTEMS:    Constitutional: Alert and oriented X3. Negative for fever, chills, and sudden weight loss or gain. HEENT: Negative for blurry/double vision, ears, nose, or throat pain. Negative for mass. Respiratory: Negative for shortness of breath or hemoptysis. Cardiovascular: Negative for angina, palpitations, or lower extremity edema. Gastrointestinal: Negative for nausea, vomiting, diarrhea, constipation, or abdominal pain. Genitourinary: Negative for increased urination, burning with urination, or hematuria. Musculoskeletal: See above HPI. Integument: See above HPI. Hematology: Negative for easy bruising or easy bleeding. Physical Examination:  General: Awake, alert, and oriented. In no acute distress. Resting in bed. HEENT: Atraumatic, normocephalic. Respiratory: CTA. No rales, rhonchi, or wheezing. Cardiovascular: RRR. Normal S1, S2.  Abdomen: Soft, non-tender, non-distended. Bowel sounds present x4 quadrants. Focused Lower Extremity Examination:    Vitals:    BP (!) 144/67   Pulse 64   Temp 98 °F (36.7 °C) (Oral)   Resp 16   Ht 5' 6\" (1.676 m)   Wt 168 lb 14 oz (76.6 kg)   SpO2 98%   BMI 27.26 kg/m²      Posterior splint to LLE kept intact during today's visit  Dermatologic: Mild pitting edema noted to left lower extremity just proximal to the posterior splint. CFT to exposed digits within normal limits    Vascular: Dorsalis pedis and posterior tibial pulses are palpable bilaterally. Skin temperature is warm to warm from proximal tibial tuberosity to distal digits.  CFT

## 2021-05-23 NOTE — PROGRESS NOTES
Subjective:   Right shoulder pain calcific RC tendinitis. Patient states pain has not improved to his right shoulder and is worse than his ankle fracture. Pain 7/10 dull ache. Worse with activity. No c/o fevers, chills, numbness tingling to the hand. Pain not improving. Cardiology to see patient today.   Objective:   BP (!) 144/67   Pulse 64   Temp 98 °F (36.7 °C) (Oral)   Resp 16   Ht 5' 6\" (1.676 m)   Wt 168 lb 14 oz (76.6 kg)   SpO2 98%   BMI 27.26 kg/m²     Recent Labs     05/23/21  0513   WBC 5.1   HGB 10.7*   HCT 31.7*          Current Facility-Administered Medications: sodium zirconium cyclosilicate (LOKELMA) oral suspension 10 g, 10 g, Oral, Once  methylPREDNISolone acetate (DEPO-MEDROL) injection 40 mg, 40 mg, Intra-articular, Once  lidocaine PF 1 % injection 4 mL, 4 mL, Other, Once  hydrALAZINE (APRESOLINE) tablet 25 mg, 25 mg, Oral, BID  sodium chloride flush 0.9 % injection 5-40 mL, 5-40 mL, Intravenous, 2 times per day  0.9 % sodium chloride infusion, 25 mL, Intravenous, PRN  acetaminophen (TYLENOL) tablet 650 mg, 650 mg, Oral, Q4H PRN  promethazine (PHENERGAN) tablet 12.5 mg, 12.5 mg, Oral, Q6H PRN **OR** ondansetron (ZOFRAN) injection 4 mg, 4 mg, Intravenous, Q6H PRN  polyethylene glycol (GLYCOLAX) packet 17 g, 17 g, Oral, Daily PRN  polyethylene glycol (GLYCOLAX) packet 17 g, 17 g, Oral, Daily  HYDROcodone-acetaminophen (NORCO) 5-325 MG per tablet 1 tablet, 1 tablet, Oral, Q4H PRN **OR** HYDROcodone-acetaminophen (NORCO) 5-325 MG per tablet 2 tablet, 2 tablet, Oral, Q4H PRN  HYDROmorphone (DILAUDID) injection 0.25 mg, 0.25 mg, Intravenous, Q3H PRN **OR** HYDROmorphone (DILAUDID) injection 0.5 mg, 0.5 mg, Intravenous, Q3H PRN  pantoprazole (PROTONIX) tablet 40 mg, 40 mg, Oral, QAM AC  lidocaine 4 % external patch 2 patch, 2 patch, Transdermal, Daily  pravastatin (PRAVACHOL) tablet 80 mg, 80 mg, Oral, Daily  ascorbic acid (VITAMIN C) tablet 500 mg, 500 mg, Oral, Daily  metoprolol tartrate (LOPRESSOR) tablet 50 mg, 50 mg, Oral, BID  [Held by provider] lisinopril (PRINIVIL;ZESTRIL) tablet 20 mg, 20 mg, Oral, Daily  isosorbide mononitrate (IMDUR) extended release tablet 30 mg, 30 mg, Oral, Daily  gabapentin (NEURONTIN) capsule 100 mg, 100 mg, Oral, Nightly  ferrous sulfate (IRON 325) tablet 325 mg, 325 mg, Oral, Daily with breakfast  docusate sodium (COLACE) capsule 100 mg, 100 mg, Oral, BID  cinacalcet (SENSIPAR) tablet 30 mg, 30 mg, Oral, Dinner  folbee plus tablet 1 tablet, 1 tablet, Oral, Daily  ceFAZolin (ANCEF) 1000 mg in dextrose 5 % 50 mL IVPB (premix), 1,000 mg, Intravenous, Once  hydrALAZINE (APRESOLINE) injection 5 mg, 5 mg, Intravenous, Q6H PRN  regadenoson (LEXISCAN) injection 0.4 mg, 0.4 mg, Intravenous, ONCE PRN  cloNIDine (CATAPRES) tablet 0.2 mg, 0.2 mg, Oral, TID      Exam:  Gen: NAD  RUE: Minimal edema to shoulder with no erythema, ecchymosis, active drainage or other signs of infection. 2+ distal radial pulse with BCR of all digits. Sensation intact to light touch distally. Motor function of AIN, PIN, median, ulnar, and radial nerve distribution intact. Arm and forearm soft and supple. Limited ROM of shoulder due to pain. + Waggoner and Neers test.      Assessment:  80 y.o. male right shoulder pain secondary to calcific tendinitis RC    Plan:  Discussed with patient and would recommend corticosteroid injection subacromial right shoulder. Discussed with hospitalist and ok if blood sugars are controlled and it will help the pain. Patient is to see cardiologist today. Will order medication from pharmacy and proceed with injection tomorrow morning if no serious cardiac issues. Patient to follow up with Dr. Kyung Novak outpatient in 1-2 weeks if still having right shoulder issues.    Pain control  Activity as tolerated    Electronically signed by Earl Vaughan PA-C on 5/23/2021 at 12:55 PM

## 2021-05-24 NOTE — PROGRESS NOTES
[facility]  INPATIENT PHYSICAL THERAPY  DAILY NOTE  [facility] ORTHOPEDICS 7K - 7K-18/018-A      Time In: 1430  Time Out: 1511  Timed Code Treatment Minutes: 41 Minutes  Minutes: 41          Date: 2021  Patient Name: Alison Ruby,  Gender:  male        MRN: [id number]  : 11/10/1930  (80 y.o.)     Referring Practitioner: XIN Cheema CNP  Diagnosis: Closed fracture of left ankle  Additional Pertinent Hx: The patient is a 80 y.o. male with right shoulder pain. He is RHD. Patient states that his right shoulder started hurting him mildly around 1-2 weeks ago. Atraumatic and insidious in onset. No prior issues or complaints of right shoulder pain. Then on Tuesday patient had a mechanical fall injuring ankle along with right shoulder worsening the pain. Now he states his right shoulder pain went from 2/10 to a 7/10 pain. It has started to subside some since then but still having right shoulder pain. Xrays were taken of right shoulder as patient was admitted initially for the ankle fracture on 21. Ortho consulted and surgery was canceled  due to cardiac factors.      Prior Level of Function:  Lives With: Alone  Type of Home: House  Home Layout: One level  Home Access: Level entry  Home Equipment: Cane, Rolling walker   Bathroom Shower/Tub: Tub/Shower unit  Bathroom Toilet: Standard  Bathroom Equipment:  (standard toilet)    Receives Help From: Family  ADL Assistance: Independent  Homemaking Assistance: Independent  Homemaking Responsibilities: Yes  Ambulation Assistance: Independent  Transfer Assistance: Independent  Active : Yes    Restrictions/Precautions:  Restrictions/Precautions: Wells Martin Bearing  Left Lower Extremity Weight Bearing: Non Weight Bearing       SUBJECTIVE: pt had dialysis earlier today and is agreeable for therapy- pt is very impulsive thorughout session needing cues to slow down and cues for safety     PAIN: 10/10: at right shoulder and upper arm he also reported some tingling in his fingers nursing reported that he is to get an injection later this evening- pt c/co of minimal pain at left foot      Vitals: Vitals not assessed per clinical judgement, see nursing flowsheet    OBJECTIVE:  Bed Mobility:  Supine to Sit: Contact Guard Assistance, cues for safety to remove his blankets out of the way and not to attempt to stand till therapist ready     Transfers:  Sit to Stand: Contact Guard Assistance  Stand to [address], from bed to w/c going to his right   Car:Minimal Assistance, max cues for safety with hand placement, cues to use the walker as I wasn't able to get the w/c next to seat he needed assist to lower down to the chair and assist to stand from the seat   ** pt also required max verbal cues for w/c set up prior to trying to stand up noted decreased carryover and I had to do this each time he came to standing   Ambulation:  Minimal Assistance to CGA   Distance: 5 feet, 2 feet x 2  Surface: Level Tile  Device:Rolling Walker  Gait Deviations:  Pt very impulsive and poor safety awareness, cues given for use of walker and to keep nayla UEs on walker till he is able to back upto surface   Wheelchair Mobility:  Stand By Assistance  Extremities Used: Bilateral Upper Extremities  Type of Chair:Manual  Surface: Level Tile  Distance: [id number] for technique when turning and manuevering in narrow spaces, he needed max cues for w/c set up prior to transfers         Balance:  standing at walker with CGA pt kept trying to let go of walker to adjust his gown he was unsafe and needed min assist for balance to complete this task- he demonstrated poor awareness of safety issues with this     Exercise:  Patient was guided in 1 set(s) 10 reps of exercise to both lower extremities. Glut sets, Quad sets, Heelslides and Long arc quads. Exercises were completed for increased independence with functional mobility.     Functional Outcome Measures: Completed  AM-[facility] Inpatient Mobility Raw Score : 14  AM-[facility] Inpatient T-Scale Score : 38.1    ASSESSMENT:  Assessment: Patient progressing toward established goals. and however pt is very impulsive needing physical assist and several cues for safety, pt would benefit from cont skilled therapy to work on strength, balance and increased independence and safety with functional mobility prior to return home   Activity Tolerance:  Patient tolerance of  treatment: fair. Decreased carryover with cues given for safety      Equipment Recommendations:Equipment Needed: Yes  Other: 5/24 recommend st walker, w/c with elevating leg rest  Discharge Recommendations:    Continue to assess pending progress (pt would benefit from cont skilled therapy- if he is planning on home he will require 24 hour hands on assist (he is unsafe to be left alone) he would benefit from cont therapy at discharge home health vs OP)    Plan: Times per week: 6x O  Times per day: Daily  Current Treatment Recommendations: Strengthening, ROM, Balance Training, Endurance Training, Functional Mobility Training, Transfer Training, Gait Training    Patient Education  Patient Education: Plan of Care, Equipment Education, Transfers, Car Transfers    Goals:  Patient goals : go home  Short term goals  Short term goal 1: Pt will supine<>sit with SBA  in order to get out of bed  Short term goal 2: Pt will ambulate 20 ft with supervision and a RW to be able to ambulate around his home  Short term goal 3: Pt will sit<>stand with supervision to be able to transfer surfaces at home  Long term goals  Time Frame for Long term goals : No goals set due to short ELOS    Following session, patient left in safe position with all fall risk precautions in place.

## 2021-05-24 NOTE — CARE COORDINATION
5/24/21, 2:17 PM EDT    DISCHARGE ON GOING [facility] day: 5  Location: -18/018-A Reason for admit: Fall from slip, trip, or stumble, initial encounter [W01. 0XXA]   Procedure:5/20/21 : ECHO and stress test     Barriers to Discharge: Tunde tripped and fell at home and experienced pain to his left knee and ankle. Surgery cancelled - no surgery planned now.  N/V checks. Pain management. Hospitalist for medical management. Nephrology consult: hemodialysis patient of Dr. Guzman. Telemetry monitoring.   Felicitas Norman MD  Readmission Risk Score: 22%     Patient Goals/Plan/Treatment Preferences:  Planning home with family and HH. Need HH orders. HD MWF. Requesting Wheelchair with elevating leg lifts. Commode and standard walker for home use. [facility] DME not in network.  Ordered from Alma Israel per patient request.

## 2021-05-24 NOTE — CARE COORDINATION
Janessance Necessary requires the assistance of a standard wheelchair to successfully complete daily living tasks such as: toileting, bathing, dressing, and grooming; or any other daily living task in the home. A standard wheelchair is necessary due to the patient's impaired ambulation and mobility restrictions. The patient would not be able to resolve these daily living tasks using a cane or walker. The patient can self-propel a standard wheelchair safely in their home and can maneuver within their home with adequate access. The patient has not expressed an unwillingness to use the wheelchair. Elevating leg lifts to prevent legs edema    Clarance Necessary requires the assistance of a standard walker to successfully complete daily living tasks such as: bathing, toileting, dressing and grooming. A standard walker is necessary due to the patient's impaired ambulation and mobility restrictions, and can ambulate only by using a walker instead of a lesser assistive device, such as a cane or crutch. Walker needed for safe transfers to and from bed and commode. Date: 2021  Patient Name: Homer Romero        MRN: [id number]   Account: [de-identified]   : 11/10/1930  (80 y.o.)  Gender: male   Referring Practitioner: XIN Dias CNP  Diagnosis: Fall from [place], Trip or Stumble  Additional Pertinent Hx: Per H&P, \"Tunde is a 80year old male presenting to the Emergency Department via self for evaluation of injury sustained in a fall yesterday. He reports that he was fishing and tripped, falling to the ground. He states that 2 gentlemen helped him up and that he was able to walk after that, but with pain. He is currently ambulating with a cane. He states that he tried to take Tylenol for pain but that it did not help much so he came in for treatment. He is a dialysis patient and had hemodialysis today. He has no other complaints besides left knee and left ankle pain.  \"    Ericke Necessary requires a Bedside Commode to complete bathing, toileting, dressing and grooming tasks. Patient requires a Bedside Commode due to Upper Extremity/Lower Extremity Weakness and limited ambulation and is unable to walk to the bathroom at home. Without the Bedside Commode, Jenna Parker is at increased risk for falls and would require increased assistance for ADL's and mobility.         Electronically signed by Dutch Dorsey RN on 5/24/2021 at 2:46 PM    Candelario Dickson DPM   Electronically signed by Candelario Dickson DPM on 5/24/2021 at 6:31 PM

## 2021-05-24 NOTE — PROGRESS NOTES
Renal Progress Note    Assessment and Plan:    1. End-stage kidney disease on hemodialysis. 2.  Hypertension primary with variable control  3. Left ankle fracture   4. Deconditioned state  5. Hyponatremia due to end-stage kidney disease and inability of the kidneys to excrete  free water  6. Hyperkalemia mild  7. Metabolic acidosis from end-stage kidney disease  8. Anemia of chronic disease  PLAN:  Labs reviewed  Medications reviewed  No changes  Monitor blood pressure closely however  Hemodialysis today    Patient Active Problem List:     CAD (coronary artery disease)     COPD (chronic obstructive pulmonary disease) (HCC)     Chronic renal insufficiency     Patient overweight     Arthritis-  low back and neck .      CKD (chronic kidney disease) stage 3, GFR 30-59 ml/min     Dyspnea and respiratory abnormalities     ED (erectile dysfunction)     Degenerative joint disease of knee, left     Gout of big toe     Anemia, chronic disease     CKD (chronic kidney disease) stage 4, GFR 15-29 ml/min (HCC)     Monoclonal gammopathy     Leukopenia     Thrombocytopenia (HCC)     Chronic kidney disease (CKD), stage V (HCC)     Metabolic acidosis     Hyperkalemia     Iron deficiency anemia     ROSAURA (acute kidney injury) ([address]     Plasma cell dyscrasia     Idiopathic hypotension     Hypertensive urgency     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)     Systolic congestive heart failure (HCC)     Other fluid overload (CODE)     Hyperphosphatemia     Acute diastolic congestive heart failure (HCC)     Acute on chronic diastolic congestive heart failure (HCC)     Pulmonary hypertension (HCC)     Anemia due to chronic kidney disease, on chronic dialysis (HCC)     Volume overload     Secondary hyperparathyroidism of renal origin ([address]     Chest pain     Acute pulmonary edema (HCC)     Accelerated hypertension     Gastritis and duodenitis     Fall from slip, trip, or stumble, initial encounter     Closed fracture of left fracture are demonstrated            **This report has been created using voice recognition software. It may contain minor errors which are inherent in voice recognition technology. **      Final report electronically signed by Dr Dariel Alvares on 5/21/2021 12:38 PM      XR SHOULDER RIGHT (MIN 2 VIEWS)   Final Result   1. Unremarkable shoulder series. 2. Supraspinatus tendinopathy         **This report has been created using voice recognition software. It may contain minor errors which are inherent in voice recognition technology. **      Final report electronically signed by Dr Dariel Alvares on 5/20/2021 12:09 PM      XR CHEST PORTABLE   Final Result   Stable borderline cardiomegaly without acute findings. This document has been electronically signed by: Lucianne Fleischer, MD on    05/19/2021 10:35 PM      XR KNEE LEFT (MIN 4 VIEWS)   Final Result   1. No acute findings. 2. Tricompartmental osteoarthritis and chondrocalcinosis. This document has been electronically signed by: Lucianne Fleischer, MD on    05/19/2021 09:19 PM      XR ANKLE LEFT (MIN 3 VIEWS)   Final Result   1. Acute Sun type B fibular fracture or medial clear space is not    widened. 2.  Lateral ankle swelling. No ankle effusion.       This document has been electronically signed by: Lucianne Fleischer, MD on    05/19/2021 09:19 PM            Objective:   Vitals: BP (!) 101/55   Pulse 53   Temp 97.6 °F (36.4 °C)   Resp 18   Ht 5' 6\" (1.676 m)   Wt 175 lb 14.8 oz (79.8 kg)   SpO2 95%   BMI 28.40 kg/m²    Wt Readings from Last 3 Encounters:   05/24/21 175 lb 14.8 oz (79.8 kg)   04/30/21 180 lb (81.6 kg)   03/18/20 180 lb (81.6 kg)      24HR INTAKE/OUTPUT:      Intake/Output Summary (Last 24 hours) at 5/24/2021 1151  Last data filed at 5/23/2021 2105  Gross per 24 hour   Intake 200 ml   Output --   Net 200 ml       Constitutional: Well-developed elderly gentleman comfortably asleep, no apparent distress   Skin:normal with no rash or lesions. HEENT: Head is normal.. Landon Decent Oral mucosa is moist   Neck:supple with no  carotid bruit  Cardiovascular:  S1, S2 without murmur or rubs   Respiratory:  Clear to ausculation without wheezes, rhonchi or rales in the anterior  Abdomen: Soft. Good bowel sounds. Ext: No LE edema  Musculoskeletal:Intact  Neuro: Deferred      Electronically signed by Rober Goldman MD on 5/24/2021 at 11:51 AM  **This report has been created using voice recognition software. It maycontain minor  errors which are inherent in voice recognition technology. **

## 2021-05-24 NOTE — CARE COORDINATION
5/24/21, 11:17 AM EDT    DISCHARGE PLANNING EVALUATION      Spoke with daughter, Nara Nelson. She confirms that she plans for patient to go to her home at discharge. She is requesting [facility]. Confirmed plan to arrange for needed equipment before discharge. Will need HH orders.

## 2021-05-24 NOTE — PROGRESS NOTES
Podiatric Surgery Consult    CC:  Left ankle pain    HPI:      5/24/2021   Patient seen at bedside this AM on behalf of Dr. Jennifer Melendrez. Patient is resting comfortably, but reports pain in his shoulder, patient states shoulder pain is worse than ankle pain at this time. Patient states he wants to go home with Keiry Varsha for his recovery, discussed possible discharge this afternoon. Patient to follow up with Dr. Jennifer Melendrez as outpatient. Patient has no other complaints at this time. 5/23/2021  Patient was seen this morning at the bedside on behalf of Dr. Jennifer Melendrez. Patient resting comfortably in bed during encounter. Patient states he has moderate pain in his left ankle when I readjusted his LLE to elevate his leg. Patient still complaining for severe pain in the right shoulder. Dr. Kyung Novak and his team following right right shoulder. Discussed discharge with patient today. Patient states he lives by himself at home and plans on being discharged to his daughter Jemma's home for recovery. At this time, patient would like to continue with conservative treatment for this left ankle fracture. Patient is currently in a well maintained long leg posterior splint. We will keep patient in this splint until he F/U with Dr. Jennifer Melendrez at [facility], which at that time patient will be converted over to a nonweightbearing below-knee fiberglass cast. Patient has ESRD on HD M/W/F here at [facility]. From podiatry standpoint, patient is eligible for discharge, pending approval from Nephrology, Ortho, and Cardiology and placement following hospitalization with family vs SNF.    5/22/2021  Patient seen at bedside this morning on behalf of Dr. Jennifer Melendrez. Patient's daughter was present at bedside during encounter. Patient was pleasant, and was alert and oriented to person, place, and time. He was not in acute distress. Patient states that he continues to have right shoulder pain that is more severe than the left ankle pain.   However he states that the ankle pain is well controlled with the current pain medications. Discussed with patient that his surgery was postponed due to cardiac issues and that we will treat this left ankle fracture with conservative care. Patient's daughter reaffirmed that she would prefer her father have conservative treatment for the fracture versus surgery. Patient denies any N/V/F/C/SOB/CP or bilateral calf tenderness. He has no other pedal complaints at this time.    05.21.2021   Patient seen at bedside on behalf of Dr. Ronald Cuello. Patient's surgery was cancelled today due to concerns over cardiac health and possible need for heart cath. Discussed with patient and family and they would like to proceed with conservative care at this time. Patient reporting pain in the ankle.     05.20.2021   The patient is a 80 y.o. male with significant past medical history of ESRD on dialysis who being seen at bedside this morning on behalf of Dr. Angel Neville. Patient states he tripped and fell last night and experienced pain to his left knee and ankle. He is unable to walk due to pain. He has no other complaints.     Past Medical History:        Diagnosis Date    Anemia in chronic kidney disease(285.21)     Arthritis     CAD (coronary artery disease)     Chronic kidney disease     Chronic kidney disease, stage IV (severe) (HCC)     CKD (chronic kidney disease) stage 3, GFR 30-59 ml/min     COPD (chronic obstructive pulmonary disease) ([facility] [address]     GI bleed     Hemodialysis patient [facility]) 07/26/2016    [facility]    History of blood transfusion     6/2019    Hyperlipidemia     Hyperphosphatemia 5/22/2018    Hypertension     Sick sinus syndrome (HCC)     Systolic congestive heart failure [facility])        Past Surgical History:        Procedure Laterality Date   Murrel Neither CARDIAC SURGERY      COLONOSCOPY  2006,2009    COLONOSCOPY N/A 6/27/2019    COLONOSCOPY DIAGNOSTIC performed by Martha Robles MD at [facility]    CORONARY ANGIOPLASTY WITH STENT PLACEMENT  2004    DIALYSIS FISTULA CREATION  5/6/2016    right arm fistula placement    ENDOSCOPY, COLON, DIAGNOSTIC      PACEMAKER PLACEMENT  2013    MO ESOPHAGOGASTRODUODENOSCOPY TRANSORAL DIAGNOSTIC N/A 1/18/2018    [facility]  [facility]    UPPER GASTROINTESTINAL ENDOSCOPY Left 6/23/2019    EGD DIAGNOSTIC ONLY performed by Shahana Lee MD at [facility]    VASCULAR SURGERY         Current Medications:    Current Facility-Administered Medications: methylPREDNISolone acetate (DEPO-MEDROL) injection 40 mg, 40 mg, Intra-articular, Once  lidocaine PF 1 % injection 4 mL, 4 mL, Other, Once  hydrALAZINE (APRESOLINE) tablet 25 mg, 25 mg, Oral, BID  sodium chloride flush 0.9 % injection 5-40 mL, 5-40 mL, Intravenous, 2 times per day  0.9 % sodium chloride infusion, 25 mL, Intravenous, PRN  acetaminophen (TYLENOL) tablet 650 mg, 650 mg, Oral, Q4H PRN  promethazine (PHENERGAN) tablet 12.5 mg, 12.5 mg, Oral, Q6H PRN **OR** ondansetron (ZOFRAN) injection 4 mg, 4 mg, Intravenous, Q6H PRN  polyethylene glycol (GLYCOLAX) packet 17 g, 17 g, Oral, Daily PRN  polyethylene glycol (GLYCOLAX) packet 17 g, 17 g, Oral, Daily  HYDROcodone-acetaminophen (NORCO) 5-325 MG per tablet 1 tablet, 1 tablet, Oral, Q4H PRN **OR** HYDROcodone-acetaminophen (NORCO) 5-325 MG per tablet 2 tablet, 2 tablet, Oral, Q4H PRN  HYDROmorphone (DILAUDID) injection 0.25 mg, 0.25 mg, Intravenous, Q3H PRN **OR** HYDROmorphone (DILAUDID) injection 0.5 mg, 0.5 mg, Intravenous, Q3H PRN  pantoprazole (PROTONIX) tablet 40 mg, 40 mg, Oral, QAM AC  lidocaine 4 % external patch 2 patch, 2 patch, Transdermal, Daily  pravastatin (PRAVACHOL) tablet 80 mg, 80 mg, Oral, Daily  ascorbic acid (VITAMIN C) tablet 500 mg, 500 mg, Oral, Daily  metoprolol tartrate (LOPRESSOR) tablet 50 mg, 50 mg, Oral, BID  [Held by provider] lisinopril (PRINIVIL;ZESTRIL) tablet 20 mg, 20 mg, Oral, Daily  isosorbide mononitrate (IMDUR) extended release tablet 30 mg, 30 mg, Oral, Daily  gabapentin (NEURONTIN) capsule 100 mg, 100 mg, Oral, Nightly  ferrous sulfate (IRON 325) tablet 325 mg, 325 mg, Oral, Daily with breakfast  docusate sodium (COLACE) capsule 100 mg, 100 mg, Oral, BID  cinacalcet (SENSIPAR) tablet 30 mg, 30 mg, Oral, Dinner  folbee plus tablet 1 tablet, 1 tablet, Oral, Daily  ceFAZolin (ANCEF) 1000 mg in dextrose 5 % 50 mL IVPB (premix), 1,000 mg, Intravenous, Once  hydrALAZINE (APRESOLINE) injection 5 mg, 5 mg, Intravenous, Q6H PRN  regadenoson (LEXISCAN) injection 0.4 mg, 0.4 mg, Intravenous, ONCE PRN  cloNIDine (CATAPRES) tablet 0.2 mg, 0.2 mg, Oral, TID    Allergies:  No known allergies    Social History:    40 pack year history smoking. hasnt' smoked since 1982    Family History:       Problem Relation Age of Onset    Diabetes Mother     Heart Disease Mother     Diabetes Sister     Mental Illness Paternal Aunt        Physical Examination:  General: Awake, alert, and oriented. In no acute distress. Resting in bed. Focused Lower Extremity Examination:    Vitals:    BP (!) 101/55   Pulse 53   Temp 97.6 °F (36.4 °C)   Resp 18   Ht 5' 6\" (1.676 m)   Wt 175 lb 14.8 oz (79.8 kg)   SpO2 98%   BMI 28.40 kg/m²      Posterior splint to LLE kept intact during today's visit  Dermatologic: Mild pitting edema noted to left lower extremity just proximal to the posterior splint. CFT to exposed digits within normal limits    Vascular: Dorsalis pedis and posterior tibial pulses are palpable bilaterally. Skin temperature is warm to warm from proximal tibial tuberosity to distal digits. CFT less than 5 seconds to all 10 digits. Neurovascular: intact to the level of the digits    Musculoskeletal: deferred    STUDIES:  XR ANKLE LEFT (MIN 3 VIEWS)    Impression   1.  Acute Sun type B fibular fracture or medial clear space is not    widened. 2.  Lateral ankle swelling.  No ankle effusion.       This document has been electronically signed by: Victor Manuel Bradley MD on    05/19/2021 09:19 PM         CULTURES:    LABS:   Recent Labs     05/23/21  0513   WBC 5.1   HGB 10.7*   HCT 31.7*           Recent Labs     05/24/21  0550   *   K 5.3*   CL 90*   CO2 21*   BUN 56*   CREATININE 10.4*        No results for input(s): PROT, INR, APTT in the last 72 hours. No results for input(s): CKTOTAL, CKMB, CKMBINDEX, TROPONINI in the last 72 hours. Assessment: 80 y.o. male with:  Principle  Left fibula fracture   Chronic  Patient Active Problem List   Diagnosis    CAD (coronary artery disease)    COPD [facility] obstructive pulmonary disease) ([facility] [address]    Chronic renal insufficiency    Patient overweight    Arthritis-  low back and neck .     CKD (chronic kidney disease) stage 3, GFR 30-59 ml/min    Dyspnea and respiratory abnormalities    ED (erectile dysfunction)    Degenerative joint disease of knee, left    Gout of big toe    Anemia, chronic disease    CKD (chronic kidney disease) stage 4, GFR 15-29 ml/min (HCC)    Monoclonal gammopathy    Leukopenia    Thrombocytopenia (HCC)    Chronic kidney disease (CKD), stage V (HCC)    Metabolic acidosis    Hyperkalemia    Iron deficiency anemia    ROSAURA (acute kidney injury) (HCC)    Plasma cell dyscrasia    Idiopathic hypotension    Hypertensive urgency    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)    Systolic congestive heart failure (HCC)    Other fluid overload (CODE)    Hyperphosphatemia    Acute diastolic congestive heart failure (HCC)    Acute on chronic diastolic congestive heart failure (HCC)    Pulmonary hypertension (HCC)    Anemia due to chronic kidney disease, on chronic dialysis (HCC)    Volume overload    Secondary hyperparathyroidism of renal origin ([facility] [address]    Chest pain    Acute pulmonary edema (HCC)    Accelerated hypertension    Gastritis and duodenitis    Fall from slip, trip, or stumble, initial encounter    Closed fracture of left ankle    ESRD on hemodialysis ([address]       Plan  -Patient to keep LLE posterior splint in place.    -Patient to remain nonweightbearing to the LLE. -Patient ok from podiatry standpoint for discharge due to the fact we are going to treat this fracture conservatively at this point. After discharge, patient is to F/U with Dr. Madina Gallegos at [facility] at his earliest convenience for further conservative treatment and care.  -Discharge pending approval from Nephrology, Ortho, Cardiology, and patient placement after hospitalization family vs SNF.  -Discussed with patient that conservative treatment will take 6 to 8 weeks. Plans to stay with one of his daughters Mikki Rogers) until he is healed    DISPO: Patient to be seen by PT/OT for gait training, case management to assist with discharge planning.      Electronically signed by Meenakshi Escobar DPM on 5/24/2021 at 9:21 AM

## 2021-05-24 NOTE — PROGRESS NOTES
[facility]  OCCUPATIONAL THERAPY MISSED TREATMENT NOTE  [facility] 7K  7K-18/018-A      Date: 2021  Patient Name: Sridhar Starr        CSN: [id number]   : 11/10/1930  (80 y.o.)  Gender: male   Referring Practitioner: XIN Chaudhary CNP  [facility]: [facility] [address] [facility]         REASON FOR MISSED TREATMENT: OT treatment attempted Pt.out of room for dialysis. Will return as time allows.

## 2021-05-24 NOTE — PROGRESS NOTES
[facility]  INPATIENT SPEECH THERAPY  [facility] 7K  DAILY NOTE    TIME   SLP Individual Minutes  Time In: 4341  Time Out: 5038  Minutes: 25  Timed Code Treatment Minutes: 25 Minutes       Date: 2021  Patient Name: Michelle Laguna      CSN: [id number]   : 11/10/1930  (80 y.o.)  Gender: male   Referring Physician:  SANDOVAL Head   Diagnosis: Fall from slip, trip or stumble, initial encounter   Secondary Diagnosis: Cognitive deficits   Precautions: Fall Risk  Current Diet: Regular solids/thin liquids  Swallowing Strategies: Standard Universal Swallow Precautions  Date of Last MBS/FEES: Not Applicable    Pain:  94/86 - Pain location: shoulder pain, radiating down his arm. *RN & MD are aware     Subjective:  Patient seen at bedside with RN permission on this date. Pain is a barrier, requiring additional time for all tasks on this date. Short-Term Goals:  SHORT TERM GOAL #1:  Goal 1: Patient will complete functional recall, short term memory and working memory tasks with 60% accuracy provided mod cues to improve overall recall of daily and medical information  INTERVENTIONS:   Orientation:   Name: jing   : jing   Age: jing   Name of facility: [facility]   Month: [facility]   Date: MAX A w/ calender  Year: MAX A w/ calend   XIN: mod A via verbal cues     STM:   Given 3 facts about ST (Alric Null, having a girl)  (immediate recall): 1/3 min A, 1/3 MOD A via visual/verbal cue, 1/3 total A unable to prompt  *completed introduction of WRAP memory strategies to improve recall of aforementioned 3 units to recall in 5 minutes. (delayed recall)   5 minute delay: 2/3 mod A via verbal cues and pt eventually referring to written cues on the board, 1/3 MAX A.     SHORT TERM GOAL #2:  Goal 2: Patient will complete functional problem solving/reasoning tasks with 60% accuracy provided mod cues to improve overall safety within hospital and home setting  INTERVENTIONS:  Call:  Max A to locate, mod I

## 2021-05-24 NOTE — PLAN OF CARE
Problem: Falls - Risk of:  Goal: Will remain free from falls  Description: Will remain free from falls  5/23/2021 2328 by Edmond Anderson  Outcome: Ongoing     Problem: Pain:  Goal: Control of acute pain  Description: Control of acute pain  5/23/2021 2328 by Edmond Anderson  Outcome: Ongoing  Note: Patient states pain is 10/10 in right shoulder and left ankle. Patient medicated per MAR orders. Problem: Skin Integrity:  Goal: Will show no infection signs and symptoms  Description: Will show no infection signs and symptoms  5/23/2021 2328 by Edmond Anderson  Outcome: Ongoing  Note: Patient has remained afebrile throughout this shift      Problem: DISCHARGE BARRIERS  Goal: Patient's continuum of care needs are met  5/23/2021 2328 by Edmond Anderson  Outcome: Ongoing  Note: Pt plans home at discharge. Care manager and social working helping with discharge needs. Problem: Falls - Risk of: Intervention: Assess risk factors for falls  Note: Patient has remained free of falls throughout this shift. Patient is impulsive at times and does not wait for staff to assist to bathroom. Patient re-educated on importance of fall prevention. Bed/chair alarm on for safety. Call light in reach   Care plan reviewed with patient. Patient verbalize understanding of the plan of care and contribute to goal setting.

## 2021-05-24 NOTE — FLOWSHEET NOTE
05/24/21 0830 05/24/21 1246   Vital Signs   BP (!) 101/55 (!) 142/71   Temp 97.6 °F (36.4 °C) 97.6 °F (36.4 °C)   Pulse 53 71   Resp 18 17   Weight 175 lb 14.8 oz (79.8 kg) 173 lb 15.1 oz (78.9 kg)   Weight Method Bed scale Bed scale   Post-Hemodialysis Assessment   Post-Treatment Procedures  --  Blood returned; Access bleeding time < 10 minutes   Machine Disinfection Process  --  Acid/Vinegar Clean;Heat Disinfect; Exterior Machine Disinfection   Total Liters Processed (l/min)  --  85.8 l/min   Dialyzer Clearance  --  Lightly streaked   Duration of Treatment (minutes)  --  240 minutes   Heparin amount administered during treatment (units)  --  0 units   Hemodialysis Intake (ml)  --  400 ml   Hemodialysis Output (ml)  --  1400 ml   NET Removed (ml)  --  1000 ml   Tolerated Treatment  --  Good   Stable 4hr  tx. 1.0 L removed during HD; patient tolerated well. Both sites held x10 min each- dressings dry and intact upon leaving unit. Report given to primary RN. Treatment record printed for scanning.

## 2021-05-24 NOTE — PROGRESS NOTES
Hospitalist Progress Note    Patient:  [place]      Unit/Bed:7K-18/018-A    YOB: 1930    MRN: [id number]       Acct: [de-identified]     PCP: Kadie Diallo MD    Date of Admission: 5/19/2021    Assessment/Plan:    1. Chronic diastolic heart failure--EF 45% with grade 1 diastolic dysfunction; echo approximately 3 years ago showed an EF of 60%; chest x-ray from May 19, 2021 did not reveal pulmonary vascular congestion; appreciate cardiology input and report per RN is no cardiac catheterization as they are treating the fracture nonoperatively; on Lopressor  2. End-stage renal disease--on hemodialysis Monday/Wednesday/Friday; seen in HD today  3. Hyperkalemia--HD today  4. Normocytic anemia--stable  5. Essential hypertension, uncontrolled--on Catapres, Imdur, Lopressor; initial blood pressure this morning is 132/66 however half hour later is 101/55, monitor  6. Hyperlipidemia--treated  7. PAD  8. Left fibula fracture (POA)--per podiatry  9. Right shoulder calcific tendinitis--per orthopedic    Expected discharge date: Per primary; hospitalist will sign off, medication reconciliation is completed, please call as needed    Disposition:    [x] Home       [] TCU       [] Rehab       [] Psych       [] SNF       [] [place]       [] Other-    Chief Complaint: Medical management    Hospital Course: Per initial progress note done dated 5/20: \"Patient with history of ESRD on HD MWF, CAD/CHF, Sick sinus syndrome s/p AICD, HTN, HLD, COPD not on BiPAP/CPAP presented after a fall on 05/18/2021 presented after a mechanical fall. XR revealed Acute Sun type B fibular fracture, medial clear space is not widened, lateral ankle swelling with no ankle effusion. Left knee with no acute findings, positive for tricompartemental osteoarthritis and chondrocalcinosis. CXR showed borderline cardiomegaly without acute findings. XR shoulder pending.  Hgb stable at baseline ~11. COVID rapid negative. Evaluated by podiatry, plan for ORIF left fibula 05/20/2021. Also seen by Nephrology for inpatient follow up.      On evaluation, patient reports he tripped and fell down. Had no associated prodromal symptoms including chest pain/tightness, dyspnea, diaphoresis, dizziness/lightheadedness. Reports no recent recurrent falls. No syncopal episodes. Reports pain in his right shoulder, left knee, and left ankle. Reports  Baseline dyspnea on exertion at baseline with no acute changes. Lives at home and able to perform his ADLs. Does not use walker or cane at baseline. \"    5/24--> patient seen and HD, vital signs reveal some hypotension otherwise stable, no needs from RN; cardiology is on the case, nephrology is on the case as well as podiatry     Subjective (past 24 hours):  Complains of pain to his right shoulder and arm states it is 20 out of 10~this is what he has been complaining of, he also complains of pain to his left ankle and rates 10 out of 10; he denies lightheadedness or dizziness, chest pain, shortness of breath, nausea      Medications:  Reviewed    Infusion Medications    sodium chloride       Scheduled Medications    methylPREDNISolone acetate  40 mg Intra-articular Once    lidocaine PF  4 mL Other Once    hydrALAZINE  25 mg Oral BID    sodium chloride flush  5-40 mL Intravenous 2 times per day    polyethylene glycol  17 g Oral Daily    pantoprazole  40 mg Oral QAM AC    lidocaine  2 patch Transdermal Daily    pravastatin  80 mg Oral Daily    vitamin C  500 mg Oral Daily    metoprolol tartrate  50 mg Oral BID    [Held by provider] lisinopril  20 mg Oral Daily    isosorbide mononitrate  30 mg Oral Daily    gabapentin  100 mg Oral Nightly    ferrous sulfate  325 mg Oral Daily with breakfast    docusate sodium  100 mg Oral BID    cinacalcet  30 mg Oral Dinner    folbee plus  1 tablet Oral Daily    ceFAZolin  1,000 mg Intravenous Once    cloNIDine  0.2 mg Oral TID     PRN Meds: medial, lateral, and patellofemoral compartments. Mild medial compartmental narrowing with calcification in the menisci. No radiopaque foreign body. Vascular calcifications in the soft tissues. 1. No acute findings. 2. Tricompartmental osteoarthritis and chondrocalcinosis. This document has been electronically signed by: Chin Wei MD on 05/19/2021 09:19 PM    XR ANKLE LEFT (MIN 3 VIEWS)    Result Date: 5/19/2021  3 view left ankle Comparison: None Findings: Acute oblique fracture of the distal fibula extending to the tibiofibular syndesmosis. Medial clear space is not widened. There is lateral ankle swelling without ankle effusion. No radiopaque foreign body. 1.  Acute Sun type B fibular fracture or medial clear space is not widened. 2.  Lateral ankle swelling. No ankle effusion. This document has been electronically signed by: Chin Wei MD on 05/19/2021 09:19 PM    XR CHEST PORTABLE    Result Date: 5/19/2021  1 view chest x-ray Comparison:  CR,SR  - XR CHEST PORTABLE  - 04/30/2021 04:43 PM EDT Findings: No consolidation or effusion. No pneumothorax. Stable borderline cardiomegaly. No pulmonary vascular congestion. Stable left AICD leads in the right atrium and right ventricle. Calcification in the aortic knob. No acute fracture. Stable borderline cardiomegaly without acute findings. This document has been electronically signed by: Chin Wei MD on 05/19/2021 10:35 PM      DVT prophylaxis: [] Lovenox                                 [x] SCDs                                 [] SQ Heparin                                 [] Encourage ambulation           [] Already on Anticoagulation     Code Status: Full Code    Tele:   [] yes             [] no    Active Hospital Problems    Diagnosis Date Noted    ESRD on hemodialysis ([facility] [address] [N18.6, Z99.2]     Fall from slip, trip, or stumble, initial encounter [W01. 0XXA] 05/19/2021    Closed fracture of left ankle [S82.892A] 05/19/2021       Electronically signed by Ernestine Eisenmenger, XIN - CNP on 5/24/2021 at 9:19 AM

## 2021-05-25 NOTE — PROGRESS NOTES
Renal Progress Note    Assessment and Plan:    1. End-stage kidney disease on hemodialysis. 2.  Hypertension controlled  3. Left ankle fracture   4. Deconditioned state  5. Anemia of chronic disease   6. Hyponatremia  PLAN:  Recent labs reviewed   Medications reviewed  No changes  Ok to discharge if discharged by the primary service. Continue clonidine 0.1 mg twice a day on discharge. We will follow up in hemodialysis. Patient Active Problem List:     CAD (coronary artery disease)     COPD (chronic obstructive pulmonary disease) ([address]     Chronic renal insufficiency     Patient overweight     Arthritis-  low back and neck .      CKD (chronic kidney disease) stage 3, GFR 30-59 ml/min     Dyspnea and respiratory abnormalities     ED (erectile dysfunction)     Degenerative joint disease of knee, left     Gout of big toe     Anemia, chronic disease     CKD (chronic kidney disease) stage 4, GFR 15-29 ml/min (HCC)     Monoclonal gammopathy     Leukopenia     Thrombocytopenia (HCC)     Chronic kidney disease (CKD), stage V (HCC)     Metabolic acidosis     Hyperkalemia     Iron deficiency anemia     ROSAURA (acute kidney injury) ([address]     Plasma cell dyscrasia     Idiopathic hypotension     Hypertensive urgency     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)     Systolic congestive heart failure (HCC)     Other fluid overload (CODE)     Hyperphosphatemia     Acute diastolic congestive heart failure (HCC)     Acute on chronic diastolic congestive heart failure (HCC)     Pulmonary hypertension (HCC)     Anemia due to chronic kidney disease, on chronic dialysis (HCC)     Volume overload     Secondary hyperparathyroidism of renal origin ([address]     Chest pain     Acute pulmonary edema (HCC)     Accelerated hypertension     Gastritis and duodenitis     Fall from slip, trip, or stumble, initial encounter     Closed fracture of left ankle     ESRD on hemodialysis ([address]      Subjective:   Admit Date: 5/19/2021    Interval History:   Seen for end stage kidney disease on hemodialysis  Awake and alert  No new complaints  Updated by the staff  No new issues  Blood pressure is good  . Medications:   Scheduled Meds:   hydrALAZINE  25 mg Oral BID    sodium chloride flush  5-40 mL Intravenous 2 times per day    polyethylene glycol  17 g Oral Daily    pantoprazole  40 mg Oral QAM AC    lidocaine  2 patch Transdermal Daily    pravastatin  80 mg Oral Daily    vitamin C  500 mg Oral Daily    metoprolol tartrate  50 mg Oral BID    [Held by provider] lisinopril  20 mg Oral Daily    isosorbide mononitrate  30 mg Oral Daily    gabapentin  100 mg Oral Nightly    ferrous sulfate  325 mg Oral Daily with breakfast    docusate sodium  100 mg Oral BID    cinacalcet  30 mg Oral Dinner    folbee plus  1 tablet Oral Daily    ceFAZolin  1,000 mg Intravenous Once    cloNIDine  0.2 mg Oral TID     Continuous Infusions:   sodium chloride         CBC:   Recent Labs     05/23/21  0513   WBC 5.1   HGB 10.7*        CMP:    Recent Labs     05/23/21  0513 05/24/21  0550    131*   K 5.6* 5.3*   CL 93* 90*   CO2 27 21*   BUN 41* 56*   CREATININE 8.8* 10.4*   GLUCOSE 96 92   CALCIUM 8.8 8.2*   LABGLOM 7* 6*     Troponin: No results for input(s): TROPONINI in the last 72 hours. BNP: No results for input(s): BNP in the last 72 hours. INR: No results for input(s): INR in the last 72 hours. Lipids: No results for input(s): CHOL, LDLDIRECT, TRIG, HDL, AMYLASE, LIPASE in the last 72 hours. Liver: No results for input(s): AST, ALT, ALKPHOS, PROT, LABALBU, BILITOT in the last 72 hours. Invalid input(s): BILDIR  Iron:  No results for input(s): IRONS, FERRITIN in the last 72 hours. Invalid input(s): LABIRONS  XR ANKLE LEFT (2 VIEWS)   Final Result   1. Bilateral malleolus fracture are demonstrated            **This report has been created using voice recognition software.   It may contain minor errors which are inherent in voice bruit  Cardiovascular:  S1, S2 without murmur or rubs   Respiratory:  Clear to ausculation without wheezes, rhonchi or rales in the anterior  Abdomen: Soft. Good bowel sounds. Ext: No LE edema. The left lower extremity is wrapped. Musculoskeletal:Intact  Neuro: Very awake and very alert. Well-oriented. Normal speech. No focal deficit. Electronically signed by Michelle Rhodes MD on 5/25/2021 at 4:13 PM  **This report has been created using voice recognition software. It maycontain minor  errors which are inherent in voice recognition technology. **

## 2021-05-25 NOTE — PROGRESS NOTES
Ok to stop lido patches and ancef in discharge med rec per dr. Omar Lopez via secure message. Patient ok for discharge.

## 2021-05-25 NOTE — PROGRESS NOTES
Discharge instructions and norco script given to patient at this time. All questions answered. All belongings packed and ready for transport. Transport called to assist to private car via wheelchair.

## 2021-05-25 NOTE — PROGRESS NOTES
This nurse went over entire AVS on phone with patient daughter Sandee Abreu at this time. All questions answered. She will be here at 6:00 at discharge doors to pick patient up.

## 2021-05-25 NOTE — PROGRESS NOTES
Dr. Marlo Brantley next appt at [facility] is Tuesday at 9:40 am. Follow up made for that time. Discussed with Dr. Jose Francisco Delgado. This appt date and time is acceptable.

## 2021-05-25 NOTE — PROGRESS NOTES
Discussed clonidine dosing with Dr. Audrey Jackson at this time. Ok to go home on previous dose of clonidine at 0.1mg BID.

## 2021-05-25 NOTE — DISCHARGE SUMMARY
supply: 7 days. Take lowest dose possible to manage pain  Qty: 42 tablet, Refills: 0    Comments: Reduce doses taken as pain becomes manageable  Associated Diagnoses: Closed fracture of left ankle, initial encounter         CONTINUE these medications which have CHANGED    Details   hydrALAZINE (APRESOLINE) 25 MG tablet Take 1 tablet by mouth 2 times daily  Qty: 90 tablet, Refills: 3         CONTINUE these medications which have NOT CHANGED    Details   pantoprazole (PROTONIX) 40 MG tablet TAKE 1 TABLET DAILY BEFORE BREAKFAST  Qty: 90 tablet, Refills: 3      docusate sodium (COLACE, DULCOLAX) 100 MG CAPS Take 100 mg by mouth 2 times daily  Qty: 60 capsule, Refills: 0      ferrous sulfate 325 (65 Fe) MG tablet Take 1 tablet by mouth daily (with breakfast)  Qty: 30 tablet, Refills: 5      vitamin C (ASCORBIC ACID) 500 MG tablet Take 1 tablet by mouth daily  Qty: 30 tablet, Refills: 3      cloNIDine (CATAPRES) 0.1 MG tablet Take 0.1 mg by mouth 2 times daily      cinacalcet (SENSIPAR) 30 MG tablet Take 1 tablet by mouth Daily with supper  Qty: 30 tablet, Refills: 3      lisinopril (PRINIVIL;ZESTRIL) 20 MG tablet Take 1 tablet by mouth daily  Qty: 30 tablet, Refills: 3      isosorbide mononitrate (IMDUR) 30 MG extended release tablet Take 30 mg by mouth daily      gabapentin (NEURONTIN) 100 MG capsule Take 100 mg by mouth nightly. B Complex-C-Folic Acid (RENAL) 1 MG CAPS Take 1 capsule by mouth daily      metoprolol (LOPRESSOR) 50 MG tablet Take 1 tablet by mouth 2 times daily  Qty: 180 tablet, Refills: 3    Associated Diagnoses: Essential hypertension      pravastatin (PRAVACHOL) 80 MG tablet Take 1 tablet by mouth daily  Qty: 30 tablet, Refills: 5      naproxen (NAPROSYN) 500 MG tablet Take 1 tablet by mouth daily for 10 days  Qty: 10 tablet, Refills: 0      polyethylene glycol (MIRALAX) powder Renal Miralax Colonoscopy prep. Use as directed. Mix Miralax 238 g with 24 oz clear liquids.   Drink 8 oz glass every 20-30 minutes until gone. Qty: 238 g, Refills: 0    Associated Diagnoses: Iron deficiency anemia due to chronic blood loss;  Positive fecal occult blood test; Encounter for diagnostic colonoscopy due to change in bowel habits; Diarrhea, unspecified type           Activity: non-weightbearing to the left lower extremity  Diet: cardiac diet  Wound Care: Keep posterior splint clean, dry, and intact through your follow-up with Dr. Glenn Deutsch with Dr. Randal Turcios at [facility] next Tuesday June 1, 2021    Signed:  Leoncio Jefferson DPM  Podiatric Surgical Resident  5/25/2021  1:44 PM

## 2021-05-25 NOTE — PLAN OF CARE
Problem: Falls - Risk of:  Goal: Will remain free from falls  Description: Will remain free from falls  Outcome: Ongoing  Note: Patient using call light appropriately to call for assistance. Patient is compliant with use of non-skid slippers. Patient reports understanding of fall prevention when discussed. Bed alarm remains in place. Problem: Pain:  Goal: Control of acute pain  Description: Control of acute pain  Outcome: Ongoing  Note: Patient report pain at 10 on scale. Patient states oral medication helping to achieve pain goal of no pain on scale. Patient able to reposition for comfort, pillows used for support, and ice applied as tolerated to left ankle. Problem: Skin Integrity:  Goal: Will show no infection signs and symptoms  Description: Will show no infection signs and symptoms  Outcome: Ongoing  Note: Splint remains in place to left lower leg. Patient understands the importance of frequent repositioning in order to prevent any skin breakdown. Problem: DISCHARGE BARRIERS  Goal: Patient's continuum of care needs are met  Outcome: Ongoing  Note: Patient plans to return home with daughter at discharge. Case management consulted for discharge planning. Care plan reviewed with patient and daughter. Patient and daughter verbalize understanding of the plan of care and contribute to goal setting.

## 2021-05-25 NOTE — CARE COORDINATION
5/25/21, 10:13 AM EDT    DISCHARGE ON GOING [facility] day: 6  Location: [facility]18/018-A Reason for admit: Fall from slip, trip, or stumble, initial encounter [W01. 0XXA]   Procedure:5/20/21 : ECHO and stress test     Barriers to Discharge: Tunde tripped and fell at home and experienced pain to his left knee and ankle. Surgery cancelled - no surgery planned now.  N/V checks. Pain management. Hospitalist for medical management. Nephrology consult: hemodialysis patient of Dr. Guzman. Telemetry monitoring.   Juliette Benavides MD  Readmission Risk Score: 22%     Patient Goals/Plan/Treatment Preferences:  Planning home with family and HH. HH ordered. HD MWF. Requesting Wheelchair with elevating leg lifts. Commode and standard walker for home use. [address] [place] [address] not in network. Ordered from [facility] per patient request.   [facility] confirmed that they have equipment ready for delivery. They will call daughter Elida Cedeño.  Address for delivery to [facility] home: [address], [address]

## 2021-05-25 NOTE — PROGRESS NOTES
on assist at home, pt would benefit from cont skilled therapy at discharge to cont to work on strength, balance, endurance and increased independence with functional mobility   Activity Tolerance:  Patient tolerance of  treatment: good. Equipment Recommendations:Equipment Needed: Yes  Other: 5/24 recommend st walker, w/c with elevating leg rest  Discharge Recommendations:    Continue to assess pending progress (pt would benefit from cont skilled therapy- if he is planning on home he will require 24 hour hands on assist (he is unsafe to be left alone) he would benefit from cont therapy at discharge home health vs OP)    Plan: Times per week: 6x O  Times per day: Daily  Current Treatment Recommendations: Strengthening, ROM, Balance Training, Endurance Training, Functional Mobility Training, Transfer Training, Gait Training    Patient Education  Patient Education: Plan of Care, Home Exercise Program, Precautions/Restrictions, Transfers, Gait, Car Transfers, Wheelchair Mobility    Goals:  Patient goals : go home  Short term goals  Short term goal 1: Pt will supine<>sit with SBA  in order to get out of bed  Short term goal 2: Pt will ambulate 20 ft with supervision and a RW to be able to ambulate around his home  Short term goal 3: Pt will sit<>stand with supervision to be able to transfer surfaces at home  Short term goal 4: Pt to negotiate wheelchair >80 ft with Supervision including around tight spaces/corners  Long term goals  Time Frame for Long term goals : No goals set due to short ELOS    Following session, patient left in safe position with all fall risk precautions in place.

## 2021-05-25 NOTE — PROGRESS NOTES
mg, Oral, Nightly  ferrous sulfate (IRON 325) tablet 325 mg, 325 mg, Oral, Daily with breakfast  docusate sodium (COLACE) capsule 100 mg, 100 mg, Oral, BID  cinacalcet (SENSIPAR) tablet 30 mg, 30 mg, Oral, Dinner  folbee plus tablet 1 tablet, 1 tablet, Oral, Daily  ceFAZolin (ANCEF) 1000 mg in dextrose 5 % 50 mL IVPB (premix), 1,000 mg, Intravenous, Once  hydrALAZINE (APRESOLINE) injection 5 mg, 5 mg, Intravenous, Q6H PRN  regadenoson (LEXISCAN) injection 0.4 mg, 0.4 mg, Intravenous, ONCE PRN  cloNIDine (CATAPRES) tablet 0.2 mg, 0.2 mg, Oral, TID      Exam:  Gen: NAD  RUE: Minimal edema to shoulder with no erythema, ecchymosis, active drainage or other signs of infection. 2+ distal radial pulse with BCR of all digits. Sensation intact to light touch distally. Motor function of AIN, PIN, median, ulnar, and radial nerve distribution intact. Arm and forearm soft and supple. Limited ROM of shoulder due to pain. + Waggoner and Neers test.      Assessment:  80 y.o. male right shoulder pain secondary to calcific tendinitis RC    Plan:  Discussed with patient and would recommend corticosteroid injection subacromial right shoulder. Discussed with hospitalist and ok if blood sugars are controlled and it will help the pain. Patient agreed to injection. Injection 1 cc depomedrol 40 mg and 4 cc 1% plain lidocaine for right subacromial space. Patient to follow up with Dr. Lalitha eFrnandez outpatient in 1-2 weeks if still having right shoulder issues.    Pain control  Activity as tolerated    Electronically signed by Alondra Romero PA-C on 5/24/2021 at 8:09 PM

## 2021-05-25 NOTE — PROGRESS NOTES
Spoke with patient daughter Bertram Gray. She spoke with [facility] and they will be delivering equipment to her house within the hour.

## 2021-05-25 NOTE — PROGRESS NOTES
[facility]  INPATIENT SPEECH THERAPY  [facility]  DAILY NOTE    TIME   SLP Individual Minutes  Time In: 3799  Time Out: 1120  Minutes: 25  Timed Code Treatment Minutes: 25 Minutes       Date: 2021  Patient Name: [place]      CSN: [id number]   : 11/10/1930  (80 y.o.)  Gender: male   Referring Physician:  SANDOVAL Hooker   Diagnosis: Fall from slip, trip or stumble, initial encounter   Secondary Diagnosis: Cognitive deficits   Precautions: Fall Risk  Current Diet: Regular solids/thin liquids  Swallowing Strategies: Standard Universal Swallow Precautions  Date of Last MBS/FEES: Not Applicable    Pain:   - Pain location: shoulder     Subjective:  Patient seen upright in recliner chair for ST cognitive treatment. Pt endorsed slightly reduced pain since MD provided a shot in his shoulder. Short-Term Goals:  SHORT TERM GOAL #1:  Goal 1: Patient will complete functional recall, short term memory and working memory tasks with 60% accuracy provided mod cues to improve overall recall of daily and medical information  INTERVENTIONS:   Orientation:   Name: jing   : indep   Age: indep   Name of facility: [facility]   Month: indep   Date: min A w/ calender   Year: min A w/ calender   XIN: min A w/ calender    Clock Time: indep     STM:   Given 3 facts about ST (Bailee Wall, having a girl)  (delayed recall)   24 hour minute delay: 1/3 indep, 2/3 MAX A    SHORT TERM GOAL #2:  Goal 2: Patient will complete functional problem solving/reasoning tasks with 60% accuracy provided mod cues to improve overall safety within hospital and home setting  INTERVENTIONS:  Call: indep to locate and indep to press \"RN button\"  3 reason to use call light: Max A to generate reasons to push the call light. Given several examples, pt answered yes/no appropriate 4/5 examples.    What would you do if you smelled smoke in your house: INDEP to determine possible FIRE --> 1/2 indep (Get out), 1/2 min A (call

## 2021-06-07 NOTE — ED PROVIDER NOTES
[place] ENCOUNTER          Pt Name: Shoaib Whitaker  MRN: [id number]  [place] 11/10/1930  Date of evaluation: 6/7/2021  Treating Resident Physician: Vincent Hernandez MD  Supervising Physician: Dr. Pauly Fiore MD    CHIEF COMPLAINT     No chief complaint on file. History obtained from the patient and family member at bedside. HISTORY OF PRESENT ILLNESS    HPI  Shoaib Whitaker is a 80 y.o. male who presents to the emergency department for evaluation of shortness of breath. Patient was sent to emergency department by dialysis. According to family member at bedside patient was having hypertension, tachycardia shortness of breath at dialysis. Patient states that he began having shortness of breath last night. Patient denies any inciting or traumatic event. Patient states he chronically has some shortness of breath but last night was worse. Patient is unsure how much he removed at dialysis today. Patient normally goes to dialysis Monday Wednesday Friday. Patient mentions chronic cough. Whenever bedside also mentions decreased p.o. intake. patient denies any fever chills chest pain palpitations abdominal pain nausea vomiting diarrhea constipation leg swelling. Patient was recently admitted on 5/19/2021 after a fall and traumatic injury to his left lower extremity. Chart review does not reveal any blood thinners  Chart review shows patient has a history of CAD, COPD, ESRD, hypertension, CHF      Patient denies any new fever chills chest pain palpitations abdominal pain nausea vomiting diarrhea constipation leg swelling. The patient has no other acute complaints at this time. REVIEW OF SYSTEMS   Review of Systems   Constitutional: Positive for appetite change. Negative for chills, diaphoresis and fever. HENT: Negative for ear pain and sinus pain. Eyes: Negative for pain.    Respiratory: Positive for cough and shortness of breath. Cardiovascular: Negative for chest pain and leg swelling. Gastrointestinal: Negative for abdominal pain, constipation, diarrhea, nausea and vomiting. Genitourinary: Negative for dysuria and flank pain. Musculoskeletal: Positive for gait problem. Negative for back pain and neck pain. Skin: Negative for wound. Neurological: Negative for headaches. Psychiatric/Behavioral: Negative for confusion. PAST MEDICAL AND SURGICAL HISTORY     Past Medical History:   Diagnosis Date    Anemia in chronic kidney disease(285.21)     Arthritis     CAD (coronary artery disease)     Chronic kidney disease     [facility], [facility] IV [facility]     CKD (chronic kidney disease) stage 3, GFR 30-59 ml/min ([facility])     [facility]     GI bleed     Hemodialysis patient ([facility] [address] 07/26/2016    @ [facility]    History of blood transfusion     6/2019    Hyperlipidemia     Hyperphosphatemia 5/22/2018    Hypertension     Sick sinus syndrome ([facility] [address] [facility]     Systolic congestive heart failure [facility])      Past Surgical History:   Procedure Laterality Date   [facility] CARDIAC SURGERY      COLONOSCOPY  2006,2009    COLONOSCOPY N/A 6/27/2019    COLONOSCOPY DIAGNOSTIC performed by Dawson Vasquez MD at [address] [facility]  2004    DIALYSIS FISTULA CREATION  5/6/2016    right arm fistula placement    ENDOSCOPY, COLON, DIAGNOSTIC      PACEMAKER PLACEMENT  2013    [place] ESOPHAGOGASTRODUODENOSCOPY TRANSORAL DIAGNOSTIC N/A 1/18/2018    EGD performed by Sveta Soares MD at [address] [facility]  2006,2009    UPPER GASTROINTESTINAL ENDOSCOPY Left 6/23/2019    EGD DIAGNOSTIC ONLY performed by Dawson Vasquez MD at [address] [facility]   No current facility-administered medications for this encounter.     Current Outpatient Medications:     hydrALAZINE (APRESOLINE) 25 MG tablet, Take 1 tablet by mouth 2 times daily, Disp: 90 tablet, Rfl: 3    pantoprazole (PROTONIX) 40 MG tablet, TAKE 1 TABLET DAILY BEFORE BREAKFAST, Disp: 90 tablet, Rfl: 3    docusate sodium (COLACE, DULCOLAX) 100 MG CAPS, Take 100 mg by mouth 2 times daily, Disp: 60 capsule, Rfl: 0    ferrous sulfate 325 (65 Fe) MG tablet, Take 1 tablet by mouth daily (with breakfast), Disp: 30 tablet, Rfl: 5    vitamin C (ASCORBIC ACID) 500 MG tablet, Take 1 tablet by mouth daily, Disp: 30 tablet, Rfl: 3    polyethylene glycol (MIRALAX) powder, Renal Miralax Colonoscopy prep. Use as directed. Mix Miralax 238 g with 24 oz clear liquids.   Drink 8 oz glass every 20-30 minutes until gone., Disp: 238 g, Rfl: 0    cloNIDine (CATAPRES) 0.1 MG tablet, Take 0.1 mg by mouth 2 times daily, Disp: , Rfl:     cinacalcet (SENSIPAR) 30 MG tablet, Take 1 tablet by mouth Daily with supper, Disp: 30 tablet, Rfl: 3    lisinopril (PRINIVIL;ZESTRIL) 20 MG tablet, Take 1 tablet by mouth daily, Disp: 30 tablet, Rfl: 3    isosorbide mononitrate (IMDUR) 30 MG extended release tablet, Take 30 mg by mouth daily, Disp: , Rfl:     gabapentin (NEURONTIN) 100 MG capsule, Take 100 mg by mouth nightly., Disp: , Rfl:     B Complex-C-Folic Acid (RENAL) 1 MG CAPS, Take 1 capsule by mouth daily, Disp: , Rfl:     metoprolol (LOPRESSOR) 50 MG tablet, Take 1 tablet by mouth 2 times daily, Disp: 180 tablet, Rfl: 3    pravastatin (PRAVACHOL) 80 MG tablet, Take 1 tablet by mouth daily, Disp: 30 tablet, Rfl: 5      SOCIAL HISTORY     Social History     Social History Narrative    Not on file     Social History     Tobacco Use    Smoking status: Former Smoker     Packs/day: 1.00     Years: 40.00     Pack years: 40.00     Quit date: 1982     Years since quittin.3    Smokeless tobacco: Never Used   Vaping Use    Vaping Use: Never used   Substance Use Topics    Alcohol use: No    Drug use: No         ALLERGIES     Allergies Allergen Reactions    No Known Allergies          FAMILY HISTORY     Family History   Problem Relation Age of Onset    Diabetes Mother     Heart Disease Mother     Diabetes Sister    [facility] Mental Illness Paternal Aunt          PREVIOUS RECORDS   Previous records reviewed: Patient was recently admitted on 5/19/2021 for a fall with traumatic injury to left lower extremity. PHYSICAL EXAM     ED Triage Vitals [06/07/21 1132]   BP Temp Temp Source Pulse Resp SpO2 Height Weight   (!) 193/86 97.8 °F (36.6 °C) Oral 75 19 98 % -- 170 lb (77.1 kg)     Initial vital signs and nursing assessment reviewed and vitals are/show: abnormal from Hypertensive. Pulsoximetry is normal per my interpretation. Additional Vital Signs:  Vitals:    06/07/21 1631   BP: (!) 166/76   Pulse: 89   Resp: 21   Temp:    SpO2:        Physical Exam  Constitutional:       General: He is not in acute distress. Appearance: Normal appearance. He is not ill-appearing, toxic-appearing or diaphoretic. HENT:      Head: Normocephalic and atraumatic. Right Ear: External ear normal.      Left Ear: External ear normal.   Eyes:      General: No scleral icterus. Right eye: No discharge. Left eye: No discharge. Cardiovascular:      Rate and Rhythm: Normal rate and regular rhythm. Pulses: Normal pulses. Heart sounds: Normal heart sounds. No murmur heard. No friction rub. No gallop. Pulmonary:      Effort: Pulmonary effort is normal. No respiratory distress. Breath sounds: Normal breath sounds. No stridor. No wheezing, rhonchi or rales. Chest:      Chest wall: No tenderness. Abdominal:      General: Abdomen is flat. There is no distension. Palpations: Abdomen is soft. Tenderness: There is no abdominal tenderness. There is no guarding or rebound. Musculoskeletal:         General: Normal range of motion. Cervical back: Neck supple. Right lower leg: No edema.       Left lower leg: No edema.   Skin:     General: Skin is warm and dry. Comments: AV fistula with palpable thrill to right upper extremity   Neurological:      Mental Status: He is alert and oriented to person, place, and time. Mental status is at baseline. Psychiatric:         Mood and Affect: Mood normal.         Behavior: Behavior normal.         Thought Content: Thought content normal.         Judgment: Judgment normal.             MEDICAL DECISION MAKING   Initial Assessment:     Differential diagnoses include but are not limited to: Hypertension, ACS, PE, ESRD, pneumonia, CHF, COPD    Plan:   EKG  Labs. Imaging: Chest x-ray  Antihypertensive  Discharge    Patient was seen and evaluated in the emergency department for hypertension and shortness of breath. Patient receives dialysis Monday Wednesday Friday and prior to arrival emergency department he received a full course of his dialysis. Patient is unsure of how much was removed but family member at bedside had mentioned that he was hypotensive and tachycardic afterwards and he complained of shortness of breath. Upon my initial evaluation of patient, patient's blood pressure was 328 systolic, he was not significantly hypertensive. Patient's heart rate was also within normal limits. Labs were obtained and were similar to baseline values with relation to his ESRD and dialysis. Chest x-ray was unremarkable. There was low suspicion for significant pulmonary or cardiac process. Near the end of patient's emergency department stay he became hypertensive likely from missing his home doses of medications. I administered an antihypertensive and patient was safely discharged with PCP follow-up.     ED RESULTS   Laboratory results:  Labs Reviewed   CBC WITH AUTO DIFFERENTIAL - Abnormal; Notable for the following components:       Result Value    RBC 3.51 (*)     Hemoglobin 11.4 (*)     Hematocrit 33.4 (*)     MCV 95.2 (*)     RDW-CV 17.0 (*)     RDW-SD 59.5 (*)     Lymphocytes Absolute 0.5 (*)     All other components within normal limits   COMPREHENSIVE METABOLIC PANEL W/ REFLEX TO MG FOR LOW K - Abnormal; Notable for the following components:    CREATININE 5.8 (*)     Chloride 93 (*)     ALT 9 (*)     All other components within normal limits   TROPONIN - Abnormal; Notable for the following components:    Troponin T 0.074 (*)     All other components within normal limits   BRAIN NATRIURETIC PEPTIDE - Abnormal; Notable for the following components:    Pro-BNP > 04906.0 (*)     All other components within normal limits   GLOMERULAR FILTRATION RATE, ESTIMATED - Abnormal; Notable for the following components:    Est, Glom Filt Rate 11 (*)     All other components within normal limits   MAGNESIUM   ANION GAP   OSMOLALITY       Radiologic studies results:  XR CHEST (2 VW)   Final Result   1. Mild cardiomegaly status post pacemaker placement. 2. Bilateral pleural thickening or effusions. 3. Atherosclerotic calcification in the aortic arch. **This report has been created using voice recognition software. It may contain minor errors which are inherent in voice recognition technology. **      Final report electronically signed by DR Justice Ganser on 6/7/2021 12:44 PM          ED Medications administered this visit:   Medications   hydrALAZINE (APRESOLINE) injection 20 mg (20 mg Intravenous Given 6/7/21 1616)         ED COURSE     ED Course as of Jun 07 1734   Mon Jun 07, 2021   1256 XR CHEST (2 VW) [DT]   1257 CXR:IMPRESSION:  1. Mild cardiomegaly status post pacemaker placement. 2. Bilateral pleural thickening or effusions. 3. Atherosclerotic calcification in the aortic arch. [CR]   1258 CBC reviewed, unremarkable.     [CR]      ED Course User Index  [CR] Scot Lima MD  [DT] Shannon Barrientos MD        Strict return precautions and follow up instructions were discussed with the patient prior to discharge, with which the patient agrees.       MEDICATION CHANGES Discharge Medication List as of 6/7/2021  4:15 PM            FINAL DISPOSITION     Final diagnoses:   Essential hypertension     Condition: condition: stable  Dispo: Discharge to home      This transcription was electronically signed. Parts of this transcriptions may have been dictated by use of voice recognition software and electronically transcribed, and parts may have been transcribed with the assistance of an ED scribe. The transcription may contain errors not detected in proofreading. Please refer to my supervising physician's documentation if my documentation differs.     Electronically Signed: Nadine Banegas MD, 06/07/21, 5:34 PM         Nadine Banegas MD  Resident  06/07/21 6123

## 2021-06-15 PROBLEM — I16.1 HYPERTENSIVE EMERGENCY: Status: ACTIVE | Noted: 2021-01-01

## 2021-06-15 NOTE — ED NOTES
Patient resting quietly in cot with eyes closed. Shows no signs of distress at this time. Tabs alarm in place. Will continue to monitor.       Sathya Mendez RN  06/15/21 3814

## 2021-06-15 NOTE — ED NOTES
Patient remains resting quietly in cot with eyes closed showing no signs of distress at this time. Tabs alarm in place. Will continue to monitor.       Kori Aguilar RN  06/15/21 1200

## 2021-06-15 NOTE — ED NOTES
[facility] [address] [place] [address].      Rex Mccarthy RN  06/15/21 5467

## 2021-06-15 NOTE — ED TRIAGE NOTES
Pt presents to the ED via ED lobby with c/o hypertension and sever headache since last night. Pt receives dialysis ever [facility]. Upon arrival pt is uncooperative with triage and is moving in the bed and continuously groaning. Pt rates pain in his head at a 10/10 and complains of pain in his right shoulder.

## 2021-06-15 NOTE — ED NOTES
Patient returned to ED room 3 with this RN in stable condition. Resting in cot with eyes closed. Tabs alarm in placed. Will continue to monitor.       Nicole Modi RN  06/15/21 1123

## 2021-06-15 NOTE — ED NOTES
Patient resting quietly in cot showing no signs of distress at this time. Family remains at bedside. Updated on POC. Will continue to monitor.       Nicole Modi RN  06/15/21 3140

## 2021-06-15 NOTE — ED NOTES
In room to medicate pt. Family holding pt's bilateral arms while pt is continuously trying to pull at IV line and get up. Pt unable to be redirected. Perfect serve to [facility] CNP regarding soft restraints. Pt placed in bilateral soft wrist restraints per orders.       Lexus Bain RN  06/15/21 7055

## 2021-06-15 NOTE — ED NOTES
ED to inpatient nurses report    Chief Complaint   Patient presents with    Hypertension    Headache      Present to ED from home  LOC: alert to only name  Vital signs   Vitals:    06/15/21 1159 06/15/21 1258 06/15/21 1407 06/15/21 1420   BP: (!) 147/67 (!) 145/66 135/73 138/69   Pulse: 74 86 100 102   Resp: 20 20 18 18   Temp:       TempSrc:       SpO2: 100% 98% 98% 98%   Weight:       Height:          Oxygen Baseline 2L NC    Current needs required bilateral wrist restraints  Bipap/Cpap No  LDAs:   Peripheral IV 06/15/21 Left Antecubital (Active)   Site Assessment Clean;Dry; Intact 06/15/21 1419   Line Status Normal saline locked; Infusing 06/15/21 1419   Dressing Status Clean;Dry; Intact 06/15/21 1419   Dressing Intervention New 06/15/21 0714     Mobility: Fully dependent  Pending ED orders: none  Present condition: stable      Electronically signed by Abby Mcmillan RN on 6/15/2021 at 3:11 PM       Abby Mcmlilan RN  06/15/21 2549

## 2021-06-15 NOTE — CONSULTS
Nephrology Consult Note  Patient's Name: Jenna Parker  6:09 PM  6/15/2021    Nephrologist: Sherrell Charles    Reason for Consult: End-stage kidney disease. Malaise and management. Requesting Physician:  Kristel Cormier, *  PCP: Himanshu Gonzalez MD    Chief Complaint: Headache. Assessment  1. 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 2. Hypertensive emergency  3. Cephalgia likely due to accelerated hypertension  4. Recent left ankle fracture status post cast placement  5. Hypercalcemia very mild  6. Anemia of chronic disease with macrocytosis  7. Pulmonary edema mild with no symptoms    Plan    1. Labs reviewed  2. Serum potassium is borderline high  3. Chest x-ray report reviewed  4. Mild pulmonary vascular congestion  5. CT of the head report reviewed  6. Unremarkable  7. Medications reviewed  8. Hold nifedipine infusion for now as blood pressure is dropping too quickly  9. Hemodialysis tomorrow  10. Repeat serum potassium level tomorrow morning  11. Discussed with staff  12. We will follow      History Obtained From: Staff and electronic medical record  History of Present Ilness:    Jenna Parker is a 80 y.o. male with history of hypertension, degenerative joint disease, end-stage kidney disease on hemodialysis among other multiple medical diseases admitted through the emergency department after he presented there with headache. He was found to have  very high blood pressure with systolic of over 511 mmHg and diastolic of about 542 mmHg. He was placed on nifedipine infusion. Blood pressure currently is at 138/69. No chest pain or shortness of breath according to the history. No fever chills. No nausea vomiting. CT of the head in emergency department was unremarkable. He had received multiple sedatives in emergency department and also opiate analgesic due to agitation including  morphine sulfate, fentanyl, lorazepam and midazolam respectively.   He is therefore very sleepy but easily arousable. Past Medical History:   Diagnosis Date    Anemia in chronic kidney disease(285.21)     Arthritis     CAD (coronary artery disease)     Chronic kidney disease     Chronic kidney disease, stage IV (severe) ([facility])     CKD (chronic kidney disease) stage 3, GFR 30-59 ml/min ([facility])     [facility]) [facility] [address]     GI bleed     [facility]od[facility] patient [facility]) [facility]26/2016    [facility]    History of blood transfusion     6/2019    Hyperlipidemia     Hyperphosphatemia 5/22/2018    Hypertension     Sick sinus syndrome ([facility] [address] [facility]     Systolic congestive heart failure [facility])        Past Surgical History:   Procedure Laterality Date    CARDIAC SURGERY      COLONOSCOPY  2006,2009    COLONOSCOPY N/A 6/27/2019    COLONOSCOPY DIAGNOSTIC performed by Merry Thomas MD at [address] [facility]  2004    DIALYSIS FISTULA CREATION  5/6/2016    right arm fistula placement    ENDOSCOPY, COLON, DIAGNOSTIC      PACEMAKER PLACEMENT  2013    [place] ESOPHAGOGASTRODUODENOSCOPY TRANSORAL DIAGNOSTIC N/A 1/18/2018    EGD performed by Chelita Green MD at [facility]    UPPER GASTROINTESTINAL ENDOSCOPY  2006,2009    UPPER GASTROINTESTINAL ENDOSCOPY Left 6/23/2019    EGD DIAGNOSTIC ONLY performed by Mer[facility] Thomas MD at [facility]    VASCULAR SURGERY         Family History   Problem Relation Age of Onset    Diabetes Mother     Heart Disease Mother     Diabetes Sister     Mental Illness Paternal Aunt         reports that he quit smoking about 39 years ago. He has a 40.00 pack-year smoking history. He has never used smokeless tobacco. He reports that he does not drink alcohol and does not use drugs.     Allergies:  No known allergies    Current Medications:    niCARdipine (CARDENE) 25 mg in dextrose 5 % 250 mL infusion, Continuous  cinacalcet (SENSIPAR) tablet 30 mg, Dinner  docusate sodium (COLACE) capsule 100 mg, BID  [START ON 6/16/2021] ferrous sulfate (IRON 325) tablet 325 mg, Daily with breakfast  cloNIDine (CATAPRES) tablet 0.1 mg, BID  gabapentin (NEURONTIN) capsule 100 mg, Nightly  hydrALAZINE (APRESOLINE) tablet 25 mg, BID  isosorbide mononitrate (IMDUR) extended release tablet 30 mg, Daily  lisinopril (PRINIVIL;ZESTRIL) tablet 20 mg, Daily  metoprolol tartrate (LOPRESSOR) tablet 50 mg, BID  [START ON 6/16/2021] pantoprazole (PROTONIX) tablet 40 mg, QAM AC  pravastatin (PRAVACHOL) tablet 80 mg, Daily  ascorbic acid (VITAMIN C) tablet 500 mg, Daily  sodium chloride flush 0.9 % injection 5-40 mL, 2 times per day  sodium chloride flush 0.9 % injection 5-40 mL, PRN  0.9 % sodium chloride infusion, PRN  heparin (porcine) injection 5,000 Units, 3 times per day  ondansetron (ZOFRAN-ODT) disintegrating tablet 4 mg, Q8H PRN   Or  ondansetron (ZOFRAN) injection 4 mg, Q6H PRN  polyethylene glycol (GLYCOLAX) packet 17 g, Daily PRN  acetaminophen (TYLENOL) tablet 650 mg, Q6H PRN   Or  acetaminophen (TYLENOL) suppository 650 mg, Q6H PRN  butalbital-acetaminophen-caffeine (FIORICET, ESGIC) per tablet 2 tablet, Once        Review of Systems:   Pertinent positives stated above in HPI. All other systems were reviewed and were negative. ROS: As in HPI. Physical exam:   Constitutional: Well-developed elderly gentleman comfortably asleep but arousable in no distress. Vitals:  Vitals:    06/15/21 1420   BP: 138/69   Pulse: 102   Resp: 18   Temp:    SpO2: 98%       Skin: no rash, turgor is normal  Heent: Pupils are reactive to light. Throat is clear. Oral mucosa is moist.  Neck: no bruits or jvd noted  Cardiovascular:  S1, S2 without murmur   Respiratory: Clear with no wheezes,rhonchi or rales   Abdomen: soft,non tender,good bowel sound and no palpable mass  Ext: No lower extremity edema.   Left lower extremity is on cast.  Psychiatric: Deferred  CNS: Deferred    Data:   Labs:  Lab Results   Component Value Date     06/15/2021     06/07/2021     (L) 05/24/2021    K 5.3 (H) 06/15/2021    K 4.2 06/07/2021    K 5.3 (H) 05/24/2021    CL 97 (L) 06/15/2021    CO2 27 06/15/2021    CO2 29 06/07/2021    CO2 21 (L) 05/24/2021    CREATININE 6.9 (HH) 06/15/2021    CREATININE 5.8 (HH) 06/07/2021    CREATININE 10.4 (HH) 05/24/2021    BUN 23 (H) 06/15/2021    BUN 19 06/07/2021    BUN 56 (H) 05/24/2021    GLUCOSE 125 (H) 06/15/2021    GLUCOSE 90 06/07/2021    GLUCOSE 92 05/24/2021    PHOS 5.6 (H) 06/22/2019    PHOS 4.1 04/05/2016    PHOS 4.4 01/07/2016    WBC 6.1 06/15/2021    WBC 6.8 06/07/2021    WBC 5.1 05/23/2021    HGB 11.7 (L) 06/15/2021    HGB 11.4 (L) 06/07/2021    HGB 10.7 (L) 05/23/2021    HCT 35.6 (L) 06/15/2021    HCT 33.4 (L) 06/07/2021    HCT 31.7 (L) 05/23/2021    MCV 98.3 (H) 06/15/2021     06/15/2021     {Labs reviewed    Imaging:  CXR results: Diagnostic images reports reviewed        Thank you Dr. Aydin Rubio MD for allowing us to participate in care of Laura Soria   **This report has been created using voice recognition software. It maycontain minor  errors which are inherent in voice recognition technology. **    Electronically signed by Michelle Obrien MD on 6/15/2021 at 6:09 PM

## 2021-06-15 NOTE — ED NOTES
Patient resting quietly in cot with family at bedside. Soft retrains remain in place on bilateral wrists. Family updated on POC. Will continue to monitor.       Harlan Blackwell RN  06/15/21 3935

## 2021-06-15 NOTE — ED NOTES
Pt transported to 4K23 by cart in stable condition. Called [facility] and informed the charge nurse that the patient was on their way to the unit.       Tanner Frias, JUDITH  05/55/15 9906

## 2021-06-15 NOTE — H&P
as directed. Mix Miralax 238 g with 24 oz clear liquids. Drink 8 oz glass every 20-30 minutes until gone. 6/6/19   Josey Dias APRN - CNP   cloNIDine (CATAPRES) 0.1 MG tablet Take 0.1 mg by mouth 2 times daily 5/30/18   Historical Provider, MD   cinacalcet (SENSIPAR) 30 MG tablet Take 1 tablet by mouth Daily with supper 5/26/18   Kj Rail, DO   lisinopril (PRINIVIL;ZESTRIL) 20 MG tablet Take 1 tablet by mouth daily 5/27/18   Kj Rail, DO   isosorbide mononitrate (IMDUR) 30 MG extended release tablet Take 30 mg by mouth daily    Historical Provider, MD   gabapentin (NEURONTIN) 100 MG capsule Take 100 mg by mouth nightly. Historical Provider, MD   B Complex-KEVIN-Folic Acid (RENAL) 1 MG CAPS Take 1 capsule by mouth daily    Historical Provider, MD   metoprolol (LOPRESSOR) 50 MG tablet Take 1 tablet by mouth 2 times daily 6/14/16   Darinel Nicolas MD   pravastatin (PRAVACHOL) 80 MG tablet Take 1 tablet by mouth daily 11/12/15   Darinel Nicolas MD       Allergies:  No known allergies    Social History:      The patient currently lives with granddaughter    TOBACCO:   reports that he quit smoking about 39 years ago. He has a 40.00 pack-year smoking history. He has never used smokeless tobacco.  ETOH:   reports no history of alcohol use. Family History:      Positive as follows:        Problem Relation Age of Onset    Diabetes Mother     Heart Disease Mother     Diabetes Sister     Mental Illness Paternal Aunt        Diet:  No diet orders on file    REVIEW OF SYSTEMS:   Unable to obtain due to mental status. Ofe Brittle PHYSICAL EXAM:    BP (!) 181/94   Pulse 66   Temp 98.3 °F (36.8 °C) (Axillary)   Resp 20   Ht 5' 6\" (1.676 m)   Wt 170 lb (77.1 kg)   SpO2 98%   BMI 27.44 kg/m²     General appearance:  Appears stated age and cooperative. Appears mildly uncomfortable with a grimace. HEENT:  Normal cephalic, atraumatic without obvious deformity.  Pupils equal, round, and reactive to light. Extra ocular muscles intact. Conjunctivae/corneas clear. Neck: Supple, with full range of motion. No jugular venous distention. Trachea midline. Respiratory:  Normal respiratory effort. Clear to auscultation, bilaterally without Rales/Wheezes/Rhonchi. Cardiovascular:  Regular rate and rhythm with normal S1/S2 without murmurs, rubs or gallops. Abdomen: Soft, non-tender, non-distended with normal bowel sounds. Musculoskeletal:  No clubbing, cyanosis or edema bilaterally. Full range of motion without deformity. Skin: Skin color, texture, turgor normal.  No rashes or lesions. Neurologic:  Unable to complete   Psychiatric:  Drowsy, arouses easily. Capillary Refill: Brisk,< 3 seconds   Peripheral Pulses: +2 palpable, equal bilaterally       Labs:     Recent Labs     06/15/21  0700   WBC 6.1   HGB 11.7*   HCT 35.6*        Recent Labs     06/15/21  0700      K 5.3*   CL 97*   CO2 27   BUN 23*   CREATININE 6.9*   CALCIUM 10.1     Recent Labs     06/15/21  0700   AST 16   ALT 8*   BILIDIR <0.2   BILITOT 0.6   ALKPHOS 69     No results for input(s): INR in the last 72 hours. No results for input(s): Moriah Councilman in the last 72 hours. Urinalysis:      Lab Results   Component Value Date    NITRU NEGATIVE 06/22/2019    WBCUA 50-75 06/22/2019    BACTERIA NONE 06/22/2019    RBCUA 25-50 06/22/2019    BLOODU LARGE 06/22/2019    SPECGRAV 1.013 05/03/2016    GLUCOSEU NEGATIVE 06/22/2019       Intake & Output:  No intake/output data recorded. No intake/output data recorded. Radiology:       EKG:  I have reviewed the EKG with the following interpretation: ST with PACs    XR CHEST PORTABLE   Final Result   Pulmonary vascular congestion. **This report has been created using voice recognition software. It may contain minor errors which are inherent in voice recognition technology. **      Final report electronically signed by Dr. Alondra Romero MD on 6/15/2021 9:13 AM      CT Head WO Contrast   Final Result       1. Mild atrophy and dilatation of the third and lateral ventricles. 2. Diminished attenuation in the white matter most likely representing ischemic changes. 3. Calcification in the cavernous segments of both internal carotid arteries. 4. Otherwise negative noncontrast CT scan of the brain. .               **This report has been created using voice recognition software. It may contain minor errors which are inherent in voice recognition technology. **      Final report electronically signed by DR Selene Tanner on 6/15/2021 8:44 AM           DVT prophylaxis: SQ heparin    Code Status: Limited        [facility] Problems    Diagnosis Date Noted    Hypertensive emergency [I16.1] 06/15/2021         Thank you Gigi Webb MD for the opportunity to be involved in this patient's care.     Electronically signed by XIN Hilton CNP on 6/15/2021 at 9:55 AM

## 2021-06-15 NOTE — ED NOTES
Patient rolling back and forth in cot and refuses to hold still. Dr. James Kruse at bedside to speak with patient. Patient is alert to self.       Syd Ravi RN  06/15/21 8346

## 2021-06-15 NOTE — ED PROVIDER NOTES
[place] ENCOUNTER          Pt Name: Jenna Parker  MRN: [id number]  [place] 11/10/1930  Date of evaluation: 6/15/2021  Treating Resident Physician: Savita Amos DO  Supervising Physician: Kd Cabrera MD     CHIEF COMPLAINT       Chief Complaint   Patient presents with    Hypertension    Headache     History obtained from daughter who is at bedside. Trey Couch HISTORY OF PRESENT ILLNESS    HPI  Jenna Parker is a 80 y.o. male who presents to the emergency department for evaluation of severe persistent headache as well as elevated blood pressure. Patient had hemodialysis yesterday and was noted to have elevated blood pressure at that time. Patient went home, currently living with daughter, and was noted to have a headache in the evening. However he was able to visit with relatives that came to see him last night as well as grandchild. This morning patient woke up with severe headache and at that time patient family brought him in for further evaluation. Patient also complaining of right shoulder pain, however per chart review as well as talking with patient's daughter this has been significantly worked up and found to be arthritis. Patient's daughter at bedside states patient has had no change in appetite, no change in bowel or urinary habits, denies previous fever/chills, chest pain, palpitations abdominal pain, nausea vomiting or constipation. Upon arrival patient was found to have blood pressure 161/100, heart rate 106 respiratory rate 27. Patient was moaning in pain and moving around the bed unable to comfortable. Physical exam significant GCS 14. Patient is oriented to self only. Unable to follow commands. REVIEW OF SYSTEMS  Obtained from patient's daughter who is at bedside and whom which patient lives with   Review of Systems   Constitutional: Negative for chills and fever. HENT: Negative for congestion and sore throat. Eyes: Negative for photophobia and visual disturbance. Respiratory: Negative for chest tightness and shortness of breath. Cardiovascular: Negative for chest pain and palpitations. Gastrointestinal: Negative for abdominal pain and nausea. Genitourinary: Negative for dysuria and hematuria. Musculoskeletal: Negative for back pain and neck pain. Neurological: Positive for headaches. Negative for dizziness, light-headedness and numbness. Psychiatric/Behavioral: Positive for confusion. Negative for suicidal ideas.          PAST MEDICAL AND SURGICAL HISTORY     Past Medical History:   Diagnosis Date    Anemia in chronic kidney disease(285.21)     Arthritis     CAD (coronary artery disease)     Chronic kidney disease     [facility], [facility] IV [facility]     CKD (chronic kidney disease) stage 3, GFR 30-59 ml/min ([facility])     [facility]     GI bleed     Hemod[facility] patient [facility]) 07/26/2016    [facility]    History of blood transfusion     6/2019    Hyperlipidemia     Hyperphosphatemia 5/22/2018    Hypertension     Sick sinus syndrome ([facility]     Systolic congestive heart failure [facility])      Past Surgical History:   Procedure Laterality Date   [facility] CARDIAC SURGERY      COLONOSCOPY  2006,2009    COLONOSCOPY N/A 6/27/2019    COLONOSCOPY DIAGNOSTIC performed by Loly Perdue MD at [address] [facility]  2004    DIALYSIS FISTULA CREATION  5/6/2016    right arm fistula placement    ENDOSCOPY, COLON, DIAGNOSTIC      PACEMAKER PLACEMENT  2013    TX ESOPHAGOGASTRODUODENOSCOPY TRANSORAL DIAGNOSTIC N/A 1/18/2018    EGD performed by Yana Kapoor MD at [address] [facility]  [phone]    UPPER GASTROINTESTINAL ENDOSCOPY Left 6/23/2019    EGD DIAGNOSTIC ONLY performed by Loly Perdue MD at [address] [facility] Current Facility-Administered Medications:     ondansetron (ZOFRAN) injection 4 mg, 4 mg, Intravenous, Q6H PRN, Annie Salt, DO, 4 mg at 06/15/21 0826    niCARdipine (CARDENE) 25 mg in dextrose 5 % 250 mL infusion, 3-15 mg/hr, Intravenous, Continuous, Annie Salt, DO    Current Outpatient Medications:     hydrALAZINE (APRESOLINE) 25 MG tablet, Take 1 tablet by mouth 2 times daily, Disp: 90 tablet, Rfl: 3    pantoprazole (PROTONIX) 40 MG tablet, TAKE 1 TABLET DAILY BEFORE BREAKFAST, Disp: 90 tablet, Rfl: 3    docusate sodium (COLACE, DULCOLAX) 100 MG CAPS, Take 100 mg by mouth 2 times daily, Disp: 60 capsule, Rfl: 0    ferrous sulfate 325 (65 Fe) MG tablet, Take 1 tablet by mouth daily (with breakfast), Disp: 30 tablet, Rfl: 5    vitamin C (ASCORBIC ACID) 500 MG tablet, Take 1 tablet by mouth daily, Disp: 30 tablet, Rfl: 3    polyethylene glycol (MIRALAX) powder, Renal Miralax Colonoscopy prep. Use as directed. Mix Miralax 238 g with 24 oz clear liquids.   Drink 8 oz glass every 20-30 minutes until gone., Disp: 238 g, Rfl: 0    cloNIDine (CATAPRES) 0.1 MG tablet, Take 0.1 mg by mouth 2 times daily, Disp: , Rfl:     cinacalcet (SENSIPAR) 30 MG tablet, Take 1 tablet by mouth Daily with supper, Disp: 30 tablet, Rfl: 3    lisinopril (PRINIVIL;ZESTRIL) 20 MG tablet, Take 1 tablet by mouth daily, Disp: 30 tablet, Rfl: 3    isosorbide mononitrate (IMDUR) 30 MG extended release tablet, Take 30 mg by mouth daily, Disp: , Rfl:     gabapentin (NEURONTIN) 100 MG capsule, Take 100 mg by mouth nightly., Disp: , Rfl:     B Complex-C-Folic Acid (RENAL) 1 MG CAPS, Take 1 capsule by mouth daily, Disp: , Rfl:     metoprolol (LOPRESSOR) 50 MG tablet, Take 1 tablet by mouth 2 times daily, Disp: 180 tablet, Rfl: 3    pravastatin (PRAVACHOL) 80 MG tablet, Take 1 tablet by mouth daily, Disp: 30 tablet, Rfl: 5      SOCIAL HISTORY     Social History     Social History Narrative    Not on file     Social History     Tobacco Use    Smoking status: Former Smoker     Packs/day: 1.00     Years: 40.00     Pack years: 40.00     Quit date: 1982     Years since quittin.4    Smokeless tobacco: Never Used   Vaping Use    Vaping Use: Never used   Substance Use Topics    Alcohol use: No    Drug use: No         ALLERGIES     Allergies   Allergen Reactions    No Known Allergies          FAMILY HISTORY     Family History   Problem Relation Age of Onset    Diabetes Mother     Heart Disease Mother     Diabetes Sister     Mental Illness Paternal Aunt          PHYSICAL EXAM     Vitals:    06/15/21 0701   BP: (!) 161/100   Pulse:    Resp:    Temp: 98.3 °F (36.8 °C)   SpO2: 95%       Physical Exam  Constitutional:       General: He is in acute distress. Appearance: He is not ill-appearing. HENT:      Head: Normocephalic and atraumatic. Right Ear: External ear normal.      Left Ear: External ear normal.      Mouth/Throat:      Mouth: Mucous membranes are dry. Pharynx: Oropharynx is clear. Cardiovascular:      Rate and Rhythm: Regular rhythm. Tachycardia present. Pulses:           Dorsalis pedis pulses are 1+ on the right side. Pulmonary:      Effort: Pulmonary effort is normal.   Abdominal:      General: Bowel sounds are normal.      Palpations: Abdomen is soft. Tenderness: There is no abdominal tenderness. Musculoskeletal:        Legs:    Skin:     General: Skin is warm and dry. Neurological:      Cranial Nerves: No facial asymmetry. Comments: Alert to person only    Psychiatric:         Behavior: Behavior is uncooperative, agitated and hyperactive. Judgment: Judgment is impulsive. MEDICAL DECISION MAKING     80 y.o. male who presents to the emergency department for evaluation of severe persistent headache as well as elevated blood pressure. Patient had hemodialysis yesterday and was noted to have elevated blood pressure at that time.   This morning patient woke up with severe headache and at that time patient family brought him in for further evaluation. Upon arrival patient was found to have blood pressure 161/100, heart rate 106 respiratory rate 27. Patient was moaning in pain and moving around the bed unable to comfortable. Physical exam significant GCS 14. Patient is oriented to self only. Unable to follow commands. CODE STATUS was discussed with patient's daughter at bedside. At this time they wish to be a limited x3 with the exception of they would like defibrillation if patient's heart were to stop. Critical Care  Performed by: Annie Arrington DO  Authorized by: Nunzio Sacks, MD     Critical care provider statement:     Critical care time (minutes):  30    Critical care time was exclusive of:  Separately billable procedures and treating other patients    Critical care was necessary to treat or prevent imminent or life-threatening deterioration of the following conditions:  CNS failure or compromise and respiratory failure    Critical care was time spent personally by me on the following activities:  Ordering and review of laboratory studies, ordering and review of radiographic studies, re-evaluation of patient's condition, review of old charts and examination of patient    I assumed direction of critical care for this patient from another provider in my specialty: no          Plan:   EKG, head CT without contrast, CXR   Chronic elevated troponin  UA, BMP, reticulocyte panel, CBC,   Fentanyl 25 MCG IV x2 for pain, Versed 5 mg IV to calm patient down in order to obtain CT. Patient required additional Zofran 4 mg IV for dry heaving well down CT. CT obtained, patient reported nasal cannula admitting adequate oxygen saturation  CXR: Pulmonary vascular congestion.     CT Head Wo Contrast: Negative for acute processes   Admit to hospital service for Hypertensive emergency with encephalopathy       ED RESULTS   Laboratory results:  Labs Reviewed   BASIC METABOLIC PANEL W/ REFLEX TO MG FOR LOW K - Abnormal; Notable for the following components:       Result Value    Potassium reflex Magnesium 5.3 (*)     Chloride 97 (*)     Glucose 125 (*)     BUN 23 (*)     CREATININE 6.9 (*)     All other components within normal limits   HEPATIC FUNCTION PANEL - Abnormal; Notable for the following components:    ALT 8 (*)     All other components within normal limits   TROPONIN - Abnormal; Notable for the following components:    Troponin T 0.066 (*)     All other components within normal limits   CBC WITH AUTO DIFFERENTIAL - Abnormal; Notable for the following components:    RBC 3.62 (*)     Hemoglobin 11.7 (*)     Hematocrit 35.6 (*)     MCV 98.3 (*)     RDW-CV 17.8 (*)     RDW-SD 63.8 (*)     Lymphocytes Absolute 0.5 (*)     All other components within normal limits   ANION GAP - Abnormal; Notable for the following components:    Anion Gap 17.0 (*)     All other components within normal limits   GLOMERULAR FILTRATION RATE, ESTIMATED - Abnormal; Notable for the following components:    Est, Glom Filt Rate 9 (*)     All other components within normal limits   POCT GLUCOSE - Abnormal; Notable for the following components:    POC Glucose 116 (*)     All other components within normal limits   MAGNESIUM   OSMOLALITY   URINALYSIS WITH MICROSCOPIC       Radiologic studies results:  XR CHEST PORTABLE   Final Result   Pulmonary vascular congestion. **This report has been created using voice recognition software. It may contain minor errors which are inherent in voice recognition technology. **      Final report electronically signed by Dr. Brady Virk MD on 6/15/2021 9:13 AM      CT Head WO Contrast   Final Result       1. Mild atrophy and dilatation of the third and lateral ventricles. 2. Diminished attenuation in the white matter most likely representing ischemic changes.    3. Calcification in the cavernous segments of both internal carotid arteries. 4. Otherwise negative noncontrast CT scan of the brain. .               **This report has been created using voice recognition software. It may contain minor errors which are inherent in voice recognition technology. **      Final report electronically signed by DR Isaias Bernal on 6/15/2021 8:44 AM          ED Medications administered this visit:   Medications   [facility]) injection 4 mg (4 mg Intravenous Given 6/15/21 0826)   niCARdipine (CARDENE) 25 mg in dextrose 5 % 250 mL infusion (7.5 mg/hr Intravenous Rate/Dose Change 6/15/21 1031)   fentaNYL (SUBLIMAZE) injection 25 mcg (25 mcg Intravenous Given 6/15/21 0741)   fentaNYL (SUBLIMAZE) injection 25 mcg (25 mcg Intravenous Given 6/15/21 0809)   midazolam (VERSED) injection 5 mg (5 mg Intravenous Given 6/15/21 0827)         ED COURSE     ED Course as of Tanner 15 1044   Tue Tanner 15, 2021   [address] CT Head WO Contrast:  1. Mild atrophy and dilatation of the third and lateral ventricles. 2. Diminished attenuation in the white matter most likely representing ischemic changes. 3. Calcification in the cavernous segments of both internal carotid arteries. 4. Otherwise negative noncontrast CT scan of the brain. .    [AC]   0908 XR CHEST PORTABLE [AC]   0915 CXR: Pulmonary vascular congestion.    [AC]      ED Course User Index  [AC] Martín Lopez DO         FINAL DISPOSITION     Final diagnoses:   Hypertensive emergency     Condition: condition: stable  Dispo: Admit to telemetry      This transcription was electronically signed. Parts of this transcriptions may have been dictated by use of voice recognition software and electronically transcribed, and parts may have been transcribed with the assistance of an ED scribe. The transcription may contain errors not detected in proofreading. Please refer to my supervising physician's documentation if my documentation differs.     Electronically Signed: Martín Lopez DO, 06/15/21, 6:54 AM         Jeevan Guerra Arsh Corona DO  Resident  06/15/21 1040

## 2021-06-15 NOTE — ED NOTES
Patient began dry heaving while in CT scan. Dr. Whitney Celestin contacted via phone and notified. Orders received.       Sona Holcmob RN  06/15/21 3712

## 2021-06-16 NOTE — CARE COORDINATION
6/16/21, 10:27 AM EDT  DISCHARGE PLANNING EVALUATION:    Esvin Martinez       Admitted: 6/15/2021/ Yakelin Patino day: 1   Location: -23/023-A Reason for admit: Hypertensive emergency [I16.1]   PMH:  has a past medical history of Anemia in chronic kidney disease(285.21), Arthritis, CAD (coronary artery disease), Chronic kidney disease, Chronic kidney disease, stage IV (severe) ([address] CKD (chronic kidney disease) stage 3, GFR 30-59 ml/min (HCC), COPD (chronic obstructive pulmonary disease) ([address] GI bleed, Hemodialysis patient ([facility] [address] History of blood transfusion, Hyperlipidemia, Hyperphosphatemia, Hypertension, Sick sinus syndrome ([facility] [address] and Systolic congestive heart failure ([facility] [address] Procedure:   6/15 CXR: Pulmonary Vascular Congestion  Barriers to Discharge:  HTN treated w Cardene gtt (now off) w HD, CKD, COPD, CHF. Palliative Care following. Creatinine 9.1, K+ 6, elevated BNP; monitor  PCP: Daniel Hayes MD  Readmission Risk Score: 28%    Patient Goals/Plan/Treatment Preferences: plans home w [facility] (nsg, therapy) as PTA (monitor for SNF needs versus 24h family care); has BSC, cane, walker, WC; has [address]  [facility] MWF HD, has AVF; await therapy recommendations; NWB LLE; confused; collaborated w PAWAN Keller  Transportation/Food Security/Housekeeping Addressed:  No issues identified.

## 2021-06-16 NOTE — PLAN OF CARE
Eustachian Tube Problems: Care Instructions Your Care Instructions The eustachian (say \"you-STAY-shee-un\") tubes run between the inside of the ears and the throat. They keep air pressure stable in the ears. If your eustachian tubes become blocked, the air pressure in your ears changes. The fluids from a cold can clog eustachian tubes, causing pain in the ears. A quick change in air pressure can cause eustachian tubes to close up. This might happen when an airplane changes altitude or when a  goes up or down underwater. Eustachian tube problems often clear up on their own or after antibiotic treatment. If your tubes continue to be blocked, you may need surgery. Follow-up care is a key part of your treatment and safety. Be sure to make and go to all appointments, and call your doctor if you are having problems. It's also a good idea to know your test results and keep a list of the medicines you take. How can you care for yourself at home? · To ease ear pain, apply a warm washcloth or a heating pad set on low. There may be some drainage from the ear when the heat melts earwax. Put a cloth between the heat source and your skin. Do not use a heating pad with children. · If your doctor prescribed antibiotics, take them as directed. Do not stop taking them just because you feel better. You need to take the full course of antibiotics. SW consult received and completed. See SW note. · Your doctor may recommend over-the-counter medicine. Be safe with medicines. Oral or nasal decongestants may relieve ear pain. Avoid decongestants that are combined with antihistamines, which tend to cause more blockage. But if allergies seem to be the problem, your doctor may recommend a combination. Be careful with cough and cold medicines. Don't give them to children younger than 6, because they don't work for children that age and can even be harmful. For children 6 and older, always follow all the instructions carefully. Make sure you know how much medicine to give and how long to use it. And use the dosing device if one is included. When should you call for help? Call your doctor now or seek immediate medical care if: 
  · You develop sudden, complete hearing loss.  
  · You have severe pain or feel dizzy.  
  · You have new or increasing pus or blood draining from your ear.  
  · You have redness, swelling, or pain around or behind the ear. Watch closely for changes in your health, and be sure to contact your doctor if: 
  · You do not get better after 2 weeks.  
  · You have any new symptoms, such as itching or a feeling of fullness in the ear. Where can you learn more? Go to http://www.gray.com/ Enter Y822 in the search box to learn more about \"Eustachian Tube Problems: Care Instructions. \" Current as of: April 15, 2020               Content Version: 12.6 © 1818-5323 [employer]. Care instructions adapted under license by [employer] (which disclaims liability or warranty for this information). If you have questions about a medical condition or this instruction, always ask your healthcare professional. [address] any warranty or liability for your use of this information.

## 2021-06-16 NOTE — CARE COORDINATION
06/16/21 1052   Readmission Assessment   Number of Days since last admission? 8-30 days   Previous Disposition Home with Family   Who is being Interviewed Patient   What was the patient's/caregiver's perception as to why they think they needed to return back to the hospital?   (HA, pain)   Did you visit your Primary Care Physician after you left the hospital, before you returned this time? Yes   Did you see a specialist, such as Cardiac, Pulmonary, Orthopedic Physician, etc. after you left the hospital? Yes   Who advised the patient to return to the hospital? Physician   Does the patient report anything that got in the way of taking their medications? No   In our efforts to provide the best possible care to you and others like you, can you think of anything that we could have done to help you after you left the hospital the first time, so that you might not have needed to return so soon?  Other (Comment)  (sent me home too early with high BP)

## 2021-06-16 NOTE — PROGRESS NOTES
overload     Secondary hyperparathyroidism of renal origin [facility])     Chest pain     Acute pulmonary edema (HCC)     Accelerated hypertension     Gastritis and duodenitis     Fall from slip, trip, or stumble, initial encounter     Closed fracture of left ankle     ESRD on hemodialysis [facility])     Hypertensive emergency      Subjective:   Admit Date: 6/15/2021    Interval History:   Seen and examined  Seen for end stage kidney disease on hemodialysis   Awake and alert but still confused  Blood pressure is stable  Updated by the staff  No new issues      Medications:   Scheduled Meds:   cinacalcet  30 mg Oral Dinner    docusate sodium  100 mg Oral BID    ferrous sulfate  325 mg Oral Daily with breakfast    cloNIDine  0.1 mg Oral BID    gabapentin  100 mg Oral Nightly    hydrALAZINE  25 mg Oral BID    isosorbide mononitrate  30 mg Oral Daily    lisinopril  20 mg Oral Daily    metoprolol tartrate  50 mg Oral BID    pantoprazole  40 mg Oral QAM AC    pravastatin  80 mg Oral Nightly    vitamin C  500 mg Oral Daily    sodium chloride flush  5-40 mL Intravenous 2 times per day    heparin (porcine)  5,000 Units Subcutaneous 3 times per day    butalbital-acetaminophen-caffeine  2 tablet Oral Once     Continuous Infusions:   niCARdipine Stopped (06/15/21 1930)    sodium chloride         CBC:   Recent Labs     06/15/21  0700 06/16/21  0530   WBC 6.1 7.8   HGB 11.7* 10.4*    199     CMP:    Recent Labs     06/15/21  0700 06/16/21  0530    135   K 5.3* 6.0*   CL 97* 96*   CO2 27 26   BUN 23* 30*   CREATININE 6.9* 9.1*   GLUCOSE 125* 92   CALCIUM 10.1 9.4   LABGLOM 9* 7*     Troponin: No results for input(s): TROPONINI in the last 72 hours. BNP: No results for input(s): BNP in the last 72 hours. INR: No results for input(s): INR in the last 72 hours. Lipids: No results for input(s): CHOL, LDLDIRECT, TRIG, HDL, AMYLASE, LIPASE in the last 72 hours.   Liver:   Recent Labs     06/15/21  0700   AST 16 ALT 8*   ALKPHOS 69   PROT 7.3   LABALBU 4.2   BILITOT 0.6     Iron:  No results for input(s): IRONS, FERRITIN in the last 72 hours. Invalid input(s): LABIRONS  XR CHEST PORTABLE   Final Result   Pulmonary vascular congestion. **This report has been created using voice recognition software. It may contain minor errors which are inherent in voice recognition technology. **      Final report electronically signed by Dr. Selene Quinn MD on 6/15/2021 9:13 AM      CT Head WO Contrast   Final Result       1. Mild atrophy and dilatation of the third and lateral ventricles. 2. Diminished attenuation in the white matter most likely representing ischemic changes. 3. Calcification in the cavernous segments of both internal carotid arteries. 4. Otherwise negative noncontrast CT scan of the brain. .               **This report has been created using voice recognition software. It may contain minor errors which are inherent in voice recognition technology. **      Final report electronically signed by DR Zahra Corbett on 6/15/2021 8:44 AM      CT ABDOMEN PELVIS WO CONTRAST Additional Contrast? None    (Results Pending)         Objective:   Vitals: BP (!) 151/83   Pulse 71   Temp 99.2 °F (37.3 °C) (Axillary)   Resp 14   Ht 5' 6\" (1.676 m)   Wt 164 lb (74.4 kg)   SpO2 94%   BMI 26.47 kg/m²    Wt Readings from Last 3 Encounters:   06/16/21 164 lb (74.4 kg)   06/07/21 170 lb (77.1 kg)   05/25/21 176 lb 12.9 oz (80.2 kg)      24HR INTAKE/OUTPUT:      Intake/Output Summary (Last 24 hours) at 6/16/2021 0754  Last data filed at 6/15/2021 2030  Gross per 24 hour   Intake 0 ml   Output 0 ml   Net 0 ml       Constitutional:  Alert, awake, no apparent distress   Skin:normal with no rash or lesions. HEENT:Pupils are reactive . Throat is clear . Oral mucosa is moist   Neck:supple with no thyromegaly or carotid bruit  Cardiovascular:  S1, S2 without murmur or rubs   Respiratory:  Clear to ausculation without wheezes, rhonchi or rales. Abdomen: +bs, soft, no tenderness to palpation and no palpable mass. No abdominal bruit   Ext:No LE edema. The left lower extremity is in a cast.  Musculoskeletal:Intact  Neuro:Alert and awake. Confused. Faina Pemberton Speech is distant. Electronically signed by Paula Sutherland MD on 6/16/2021 at 7:54 AM  **This report has been created using voice recognition software. It maycontain minor  errors which are inherent in voice recognition technology. **

## 2021-06-16 NOTE — PROGRESS NOTES
[facility] THERAPY MISSED TREATMENT NOTE  [facility] ICU STEPDOWN TELEMETRY 4K  4K-23/023-A      Date: 2021  Patient Name: Rupa Kebede        CSN: [id number]   : 11/10/1930  (80 y.o.)  Gender: male                REASON FOR MISSED TREATMENT: Pt off the floor at dialysis, will check back later as time allows.

## 2021-06-16 NOTE — PROGRESS NOTES
Hospitalist Progress Note    Patient:  Luna Brown      Unit/Bed:4K-23/023-A    YOB: 1930    MRN: [id number]       Acct: [de-identified]     PCP: Ange Cee MD    Date of Admission: 6/15/2021    Assessment/Plan:    1. Hypertensive emergency with acute encephalopathy -  On Cardene gtt for less than 24 hours. Cardene gtt now off. Improvement of blood pressure today. - continue blood pressure medications ( clonidine 0.1 mg, Hydralazine 25 mg bid, lisinopril 20 daily, Lopressor 50 mg bid ). Patient may benefit from changes to medications as per chart review, his blood pressure is not well controlled on this regimen. Defer to Nephrology. Continue to monitor vital signs. 2. Acute on chronic combined CHF. Last Echo 5/18/21 reveals drop in EF from 60% to 45%. Stress test 5/18->negative for ischemia. Pro-BNP > 700,000. CXR on admission reveals vascular congestion.           -daily weight, strict I/Os, low sodium diet. Continue Imdur 30 mg daily     3. Elevated troponin - chronically elevated, likely 2/2 to ESDR. No EKG changes. No chest pain. Continue to monitor for symptoms of ACS (shortness of breath, chest pain radiating to neck/jaw/arm, numbness/tingling in left arm)     4. Hyperkalemia - potassium level 6.0 this morning. No acute changes. Likely to resolve with HD. Repeat K in the morning. 5. CAD s/p PCI 2004, s/p cath 2018 - continue medical management. No heart cath at this time. 6. Hyperlipidemia - continue statin therapy. 7. Anemia of chronic disease - 2/2 to ESRD. Hg stable 10.4. No signs of bleeding. CBC daily. 8. COPD without exacerbation - continue nebulizer. 9. S/p pacemaker for sick sinus syndrome - noted.          Expected discharge date:  TBD    Disposition:    [x] Home       [] TCU       [] Rehab       [] Psych       [] SNF       [] [place]       [] Other-    Chief Complaint: Headache     Hospital Course:     Luna Brown is a 27-year-old male with complex PMHx including ESRD on HD, COPD, PAD, HTN, HLD, CAD s/p cath (2018) with findings of total occlusion of RCA, moderate-to-severe lesion of LAD (no stents, recommendation for medical therapy), S/p pacemaker for sick sinus syndrome. Per chart review, patient undergoes dialysis MWF. On Monday 6/14/21 after dialysis patient experienced headache that improved but got worse again the next day prior to presentation. On arrival to the ER, he was found to have elevated blood pressure of over 215 systolic and diastolic of over 452. He was placed on Nifedipine gtt with improvement of blood pressure. No chest pain or shortness of breath according to the history. No fever or chills. No nausea or vomiting. CT of the head in emergency department was unremarkable. He had received multiple sedatives in emergency department and also opiate analgesic due to agitation including  morphine sulfate, fentanyl, lorazepam and midazolam respectively. 6/16/21 Patient underwent dialysis with removal of 1L. Nifedipine gtt stopped overnight due to rapid decrease in blood pressure. Subjective (past 24 hours):     Patient laying in bed, in no acute distress. He just arrived from dialysis. Reports improving of headache. Denies chest pain or shortness of breath. Denies abdominal pain, nausea or vomiting. ROS (12 point review of systems completed. Pertinent positives noted. Otherwise ROS is negative).     Medications:  Reviewed    Infusion Medications    niCARdipine Stopped (06/15/21 1930)    sodium chloride       Scheduled Medications    cinacalcet  30 mg Oral Dinner    docusate sodium  100 mg Oral BID    ferrous sulfate  325 mg Oral Daily with breakfast    cloNIDine  0.1 mg Oral BID    gabapentin  100 mg Oral Nightly    hydrALAZINE  25 mg Oral BID    isosorbide mononitrate  30 mg Oral Daily    lisinopril  20 mg Oral Daily    metoprolol tartrate  50 mg Oral BID    pantoprazole  40 mg Oral QAM AC    pravastatin  80 mg Oral Nightly    vitamin C  500 mg Oral Daily    sodium chloride flush  5-40 mL Intravenous 2 times per day    heparin (porcine)  5,000 Units Subcutaneous 3 times per day     PRN Meds: sodium chloride flush, sodium chloride, ondansetron **OR** ondansetron, polyethylene glycol, acetaminophen **OR** acetaminophen      Intake/Output Summary (Last 24 hours) at 6/16/2021 1804  Last data filed at 6/16/2021 1201  Gross per 24 hour   Intake 400 ml   Output 1400 ml   Net -1000 ml       Diet:  ADULT DIET; Regular; Low Sodium (2 gm); Low Potassium (Less than 3000 mg/day); Low Phosphorus (Less than 1000 mg)    Exam:  BP (!) 143/99   Pulse 65   Temp 98.6 °F (37 °C) (Oral)   Resp 18   Ht 5' 6\" (1.676 m)   Wt 156 lb 15.5 oz (71.2 kg)   SpO2 98%   BMI 25.34 kg/m²     General appearance: No apparent distress, appears stated age and cooperative. HEENT: Pupils equal, round, and reactive to light. Conjunctivae/corneas clear. Neck: Supple, with full range of motion. No jugular venous distention. Trachea midline. Respiratory:  Normal respiratory effort. Clear to auscultation, bilaterally without Rales/Wheezes/Rhonchi. Cardiovascular: Regular rate and rhythm with normal S1/S2 without murmurs, rubs or gallops. Abdomen: Soft, non-tender, non-distended with normal bowel sounds. Musculoskeletal: passive and active ROM x 4 extremities. Skin: Skin color, texture, turgor normal.  No rashes or lesions. Neurologic:  Neurovascularly intact without any focal sensory/motor deficits.  Cranial nerves: II-XII intact, grossly non-focal.  Psychiatric: Alert and oriented, thought content appropriate, normal insight  Capillary Refill: Brisk,< 3 seconds   Peripheral Pulses: +2 palpable, equal bilaterally       Labs:   Recent Labs     06/15/21  0700 06/16/21  0530   WBC 6.1 7.8   HGB 11.7* 10.4*   HCT 35.6* 34.8*    199     Recent Labs     06/15/21  0700 06/16/21  0530    135   K 5.3* 6.0*   CL 97* 96*   CO2 27 26   BUN 23* 30*   CREATININE 6.9* 9.1*   CALCIUM 10.1 9.4     Recent Labs     06/15/21  0700   AST 16   ALT 8*   BILIDIR <0.2   BILITOT 0.6   ALKPHOS 69     No results for input(s): INR in the last 72 hours. No results for input(s): Nano Sullivan in the last 72 hours. Microbiology:      Urinalysis:      Lab Results   Component Value Date    NITRU NEGATIVE 06/22/2019    WBCUA 50-75 06/22/2019    BACTERIA NONE 06/22/2019    RBCUA 25-50 06/22/2019    BLOODU LARGE 06/22/2019    SPECGRAV 1.013 05/03/2016    GLUCOSEU NEGATIVE 06/22/2019       Radiology:  XR CHEST PORTABLE   Final Result   Pulmonary vascular congestion. **This report has been created using voice recognition software. It may contain minor errors which are inherent in voice recognition technology. **      Final report electronically signed by Dr. Marilynn Johnson MD on 6/15/2021 9:13 AM      CT Head WO Contrast   Final Result       1. Mild atrophy and dilatation of the third and lateral ventricles. 2. Diminished attenuation in the white matter most likely representing ischemic changes. 3. Calcification in the cavernous segments of both internal carotid arteries. 4. Otherwise negative noncontrast CT scan of the brain. .               **This report has been created using voice recognition software. It may contain minor errors which are inherent in voice recognition technology. **      Final report electronically signed by DR Soni Rowe on 6/15/2021 8:44 AM      CT ABDOMEN PELVIS WO CONTRAST Additional Contrast? None    (Results Pending)       DVT prophylaxis: [] Lovenox                                 [] SCDs                                 [x] SQ Heparin                                 [] Encourage ambulation           [] Already on Anticoagulation     Code Status: Limited    PT/OT Eval Status: YES    Tele:   [x] yes             [] no    Electronically signed by Reyes Molina MD PGY-1 on 6/16/2021 at 6:04

## 2021-06-16 NOTE — PROGRESS NOTES
Pharmacy Medication History Note      List of current medications patient is taking is complete. Source of information: [facility], [place], [facility]    Changes made to medication list:  Medications removed (include reason, ex. therapy complete or physician discontinued): · Removed clonidine    Medications added/doses adjusted:  · Added tramadol   · Adjusted gabapentin to TID  · Changed cinacalcet to 60 mg MWF before dialysis     Other notes (ex. Recent course of antibiotics, Coumadin dosing):  · Patient receives MIRCERA 150 mcg every two weeks, [facility]  · Denies use of other OTC or herbal medications.       Allergies reviewed      Electronically signed by Ilene Rodriguez on 6/16/2021 at 12:10 PM   KANDI Martinez PharmD Candidate [id number]

## 2021-06-16 NOTE — PLAN OF CARE
Problem: Falls - Risk of:  Goal: Will remain free from falls  Description: Will remain free from falls  Outcome: Ongoing  Note: Free from falls thus far this shift. Bed alarm on, call light within reach, hourly rounding completed. Problem: Falls - Risk of:  Goal: Absence of physical injury  Description: Absence of physical injury  Outcome: Ongoing  Note: Free from physical injury at this time. Problem: Non-Violent Restraints  Goal: Removal from restraints as soon as assessed to be safe  Outcome: Ongoing  Note: Restraints off at this time. Will continue to re-evaluate     Problem: Non-Violent Restraints  Goal: No harm/injury to patient while restraints in use  Outcome: Ongoing     Problem: Non-Violent Restraints  Goal: Patient's dignity will be maintained  Outcome: Ongoing     Problem: Skin Integrity:  Goal: Will show no infection signs and symptoms  Description: Will show no infection signs and symptoms  Outcome: Ongoing     Problem: Skin Integrity:  Goal: Absence of new skin breakdown  Description: Absence of new skin breakdown  Outcome: Ongoing  Note: Turning every two hours to prevent skin breakdown. Problem: Pain:  Description: Pain management should include both nonpharmacologic and pharmacologic interventions. Goal: Pain level will decrease  Description: Pain level will decrease  Outcome: Ongoing  Note: No evidence of pain at this time. Problem: Pain:  Description: Pain management should include both nonpharmacologic and pharmacologic interventions. Goal: Control of acute pain  Description: Control of acute pain  Outcome: Ongoing     Problem: Pain:  Description: Pain management should include both nonpharmacologic and pharmacologic interventions.   Goal: Control of chronic pain  Description: Control of chronic pain  Outcome: Ongoing     Problem: Discharge Planning:  Goal: Discharged to appropriate level of care  Description: Discharged to appropriate level of care  Outcome: Ongoing  Note: Patient plans to return home with daughter at discharge and no needs voiced at this time. Patient working with social work for discharge planning. Problem: Fluid Volume:  Goal: Will show no signs or symptoms of fluid imbalance  Description: Will show no signs or symptoms of fluid imbalance  Outcome: Ongoing  Note: HD patient. Plans for dialysis today. Pro BNP elevated. Problem: Cardiac:  Goal: Ability to maintain vital signs within normal range will improve  Description: Ability to maintain vital signs within normal range will improve  Outcome: Ongoing  Note: BP within range at this time. Cardene gtt available if necessary. Problem: Cardiac:  Goal: Cardiovascular alteration will improve  Description: Cardiovascular alteration will improve  Outcome: Ongoing     Problem: Coping:  Goal: Ability to remain calm will improve  Description: Ability to remain calm will improve  Outcome: Ongoing  Note: Patient remains lethargic. Calm at this time. Problem: Self-Care:  Goal: Ability to participate in self-care as condition permits will improve  Description: Ability to participate in self-care as condition permits will improve  Outcome: Ongoing  Note: Patient remains lethargic. Care plan reviewed with patient and family. Patient and family verbalize understanding of the plan of care and contribute to goal setting.

## 2021-06-16 NOTE — FLOWSHEET NOTE
06/16/21 1201   Vital Signs   BP (!) 148/73   Temp 98.5 °F (36.9 °C)   Pulse 79   Resp 18   Weight 156 lb 15.5 oz (71.2 kg)   Weight Method Bed scale   Percent Weight Change -1.38   Post-Hemodialysis Assessment   Post-Treatment Procedures Blood returned; Access bleeding time < 10 minutes   Machine Disinfection Process Exterior Machine Disinfection   Rinseback Volume (ml) 400 ml   Total Liters Processed (l/min) 82.8 l/min   Dialyzer Clearance Lightly streaked   Duration of Treatment (minutes) 240 minutes   Heparin amount administered during treatment (units) 0 units   Hemodialysis Intake (ml) 400 ml   Hemodialysis Output (ml) 1400 ml   NET Removed (ml) 1000 ml   4 hour treatment completed, restless throughout sitter at bedside. tylenol given for headache, hydralazine, metoprolol, imdur for htn. 1 liter net uf. pressure held both cannulation sites x 10 minutes, dressing dry and intact. report called to primary nurse.

## 2021-06-17 NOTE — DISCHARGE INSTR - DIET
 Good nutrition is important when healing from an illness, injury, or surgery. Follow any nutrition recommendations given to you during your hospital stay.  If you were given an oral nutrition supplement while in the hospital, continue to take this supplement at home. You can take it with meals, in-between meals, and/or before bedtime. These supplements can be purchased at most local grocery stores, pharmacies, and chain super-stores.  If you have any questions about your diet or nutrition, call the hospital and ask for the dietitian. You are recommended to follow a renal (kidney) diet    What foods are good to eat? Eat food rich in calories and protein. Meats and proteins like:  Beef  Fish  Poultry  Pork  Eggs  Breads and grains like:  Cereals  Bread  Milk products like: Yogurt  Cream cheese  Ricotta cheese  Butter  Margarine  Heavy cream  Sherbet  Fruits like:  Apples  Berries  Cherries  Grapes  Peaches  Pears  Pineapples  Plums  Veggies like:  Broccoli  Cabbage  Carrots  Cauliflower  Celery  Cucumber  Eggplant  Lettuce  Peppers  Radish  Zucchini  Yellow squash  Talk to your doctor about these foods if you need to control your blood sugar. What foods should be limited or avoided? Stay away from food high in water, potassium, phosphorus, and sodium or salt. If potassium builds up in your blood, it may cause heart problems. Phosphorus, in large amount in your blood, can take away the calcium in your blood. This may weaken your bones. Salt or sodium can trap fluids in your body and cause high blood pressure.  Limit or stay away from eating these foods:  Breads and grains like:  Whole wheat bread  Brown rice  Fruits like:  Oranges  Kiwis  Prunes  Raisins  Bananas  Melons  Veggies like:  Potatoes  Tomatoes  Winter squash  Pumpkin  Asparagus  Avocados  Beets  Spinach  Parsnips  Seasonings like:  Soy sauce  Teriyaki sauce  Other foods and drinks like:  Canned foods  Chips  Restaurant foods  Coffee  Tea  Soda

## 2021-06-17 NOTE — CARE COORDINATION
Update: plans home w [facility] today; collaborated tanvi [facility]ell [facility]  Electronically signed by Sasha Larios RN on 6/17/2021 at 2:22 PM

## 2021-06-17 NOTE — PROGRESS NOTES
Nephrology Progress Note    Patient - Pricila Munoz   MRN -  [id number]   Acct # - [de-identified]      - 11/10/1930    80 y.o. Admit Date: 6/15/2021  Hospital Day: 2  [facility]  Date of evaluation -  2021    Subjective:   CC: confusion  Denies shortness of breath on Room air   Hemodialysis  with Ultrafiltration 1 L   BP ok  Mentation improving  BP Range: Systolic (57YAM), BYH:276 , Min:128 , KPR:406      Diastolic (69OPV), AHT:45, Min:60, Max:112    Objective:   VITALS:  BP (!) 149/70   Pulse 65   Temp 98.6 °F (37 °C) (Oral)   Resp 20   Ht 5' 6\" (1.676 m)   Wt 161 lb (73 kg)   SpO2 96%   BMI 25.99 kg/m²    Patient Vitals for the past 24 hrs:   BP Temp Temp src Pulse Resp SpO2 Weight   21 0900 (!) 149/70 98.6 °F (37 °C) Oral 65 20 96 %    21 0330 128/60 98.6 °F (37 °C) Oral 60 19 96 % 161 lb (73 kg)   21 2300 (!) 147/67 98 °F (36.7 °C) Oral 87 19     21 2000 (!) 144/108 98.3 °F (36.8 °C) Oral 65 18     21 1619 (!) 143/99         21 1549 (!) 181/77 98.6 °F (37 °C) Oral 65 18 98 %    21 1330 (!) 158/71         21 1245 (!) 155/112         21 1201 (!) 148/73 98.5 °F (36.9 °C)  79 18  156 lb 15.5 oz (71.2 kg)     1 L/min    Intake/Output Summary (Last 24 hours) at 2021 0950  Last data filed at 2021 0344  Gross per 24 hour   Intake 700 ml   Output 1400 ml   Net -700 ml       Admission weight: 170 lb (77.1 kg)  Patient Vitals for the past 96 hrs (Last 3 readings):   Weight   06/17/21 0330 161 lb (73 kg)   21 1201 156 lb 15.5 oz (71.2 kg)   21 0744 159 lb 2.8 oz (72.2 kg)     Body mass index is 25.99 kg/m². EXAM:  CONSTITUTIONAL:  No acute distress. Pleasant  HEENT:  Head is normocephalic, Extraocular movement intact. Neck is supple. Voice is clear. CARDIOVASCULAR:  S1, S2  regular rate and rhythm. RESPIRATORY: Clear to ausculation bilaterally. Equal breath sounds. No wheezes.  No shortness

## 2021-06-17 NOTE — DISCHARGE SUMMARY
Hospital Medicine Discharge Summary      Patient Identification:   Rupa Kebede   : 11/10/1930  MRN: [id number]   Account: [de-identified]      Patient's PCP: Lucie Garcia MD    Admit Date: 6/15/2021     Discharge Date:   21    Admitting Physician: Julia Gramajo MD     Discharge Physician: Linette Ho MD PGY-1    Discharge Diagnoses:    1. Hypertensive emergency with acute encephalopathy -  resolved. On Cardene gtt for less than 24 hours. Cardene gtt now off. Improvement of blood pressure throughout the hospital stay. Patient may benefit from changes to medications as per chart review, his blood pressure is not well controlled on this regimen. Defer to Nephrology. Discharged with improved blood pressure. 2. ESRD on HD (MWF) - secondary to diabetic and hypertensive nephrosclerosis. Underwent dialysis 21 with Ultrafiltration 1L. Continue dialysis as outpatient.      3. Acute on chronic combined CHF. Last Echo 21 reveals drop in EF from 60% to 45%. Stress test ->negative for ischemia. Pro-BNP > 700,000. CXR on admission reveals vascular congestion.      4. Elevated troponin - chronically elevated, likely 2/2 to ESDR. No EKG changes. No chest pain.      5. Hyperkalemia - resolved. Potassium level 6.2 this morning. No acute changes. Likely to resolve with HD.      6. CAD s/p PCI , s/p cath  - continue medical management. No heart cath at this time.      7. Hyperlipidemia - continue statin therapy.      8. Anemia of chronic disease - 2/2 to ESRD. Hg stable 10.4. No signs of bleeding. CBC daily.      9. COPD without exacerbation - continue nebulizer.      10. S/p pacemaker for sick sinus syndrome - noted. 11. S/p recent left ankle fracture with cast - noted. No intervention done. Continue to follow up with [facility] outpatient.        The patient was seen and examined on day of discharge and this discharge summary is in conjunction with any daily progress note from apparent distress, appears stated age and cooperative. HEENT:  Normal cephalic, atraumatic without obvious deformity. Pupils equal, round, and reactive to light. Extra ocular muscles intact. Conjunctivae/corneas clear. Neck: Supple, with full range of motion. No jugular venous distention. Trachea midline. Respiratory:  Normal respiratory effort. Clear to auscultation, bilaterally without Rales/Wheezes/Rhonchi. Cardiovascular:  Regular rate and rhythm with normal S1/S2 without murmurs, rubs or gallops. Abdomen: Soft, non-tender, non-distended with normal bowel sounds. Musculoskeletal:  No clubbing, cyanosis or edema bilaterally. Left extremity cast present. Skin: Skin color, texture, turgor normal.  No rashes or lesions. Neurologic:  Neurovascularly intact without any focal sensory/motor deficits. Cranial nerves: II-XII intact, grossly non-focal.  Psychiatric:  Alert and oriented, thought content appropriate, normal insight  Capillary Refill: Brisk,< 3 seconds   Peripheral Pulses: +2 palpable, equal bilaterally       Labs: For convenience and continuity at follow-up the following most recent labs are provided:      CBC:    Lab Results   Component Value Date    WBC 4.8 06/17/2021    HGB 10.8 06/17/2021    HCT 32.9 06/17/2021     06/17/2021       Renal:    Lab Results   Component Value Date     06/17/2021    K 4.5 06/17/2021    K 6.0 06/16/2021    CL 95 06/17/2021    CO2 29 06/17/2021    BUN 20 06/17/2021    CREATININE 6.2 06/17/2021    CALCIUM 8.8 06/17/2021    PHOS 5.6 06/22/2019         Significant Diagnostic Studies    Radiology:   XR CHEST PORTABLE   Final Result   Pulmonary vascular congestion. **This report has been created using voice recognition software. It may contain minor errors which are inherent in voice recognition technology. **      Final report electronically signed by Dr. Lavon Clnacy MD on 6/15/2021 9:13 AM      CT Head WO Contrast   Final Result

## 2021-06-17 NOTE — PROGRESS NOTES
Discharge teaching and instructions for diagnosis/procedure of hypertensive emergency completed with patient using teachback method. AVS reviewed. Printed prescriptions given to patient. Patient voiced understanding regarding prescriptions, follow up appointments, and care of self at home. Discharged in a wheelchair to  home with support per family. HH to follow.

## 2021-06-17 NOTE — PROGRESS NOTES
[address] [facility] PHYSICAL THERAPY  EVALUATION  [facility] ICU STEPDOWN TELEMETRY 4K - 4K-23/023-A    Time In: 3104  Time Out: 0910  Timed Code Treatment Minutes: 23 Minutes  Minutes: 35          Date: 2021  Patient Name: Jorge Obregon,  Gender:  male        MRN: [id number]  : 11/10/1930  (80 y.o.)      Referring Practitioner: XIN Mejia CNP  Diagnosis: Hypertensive emergency  Additional Pertinent Hx: 80 y.o. male who presented to [facility] with severe headache. Please note this HPI was difficult to obtain. On arrival to see the the patient he had Versed for sedation for head CT. He arouses but does not answer questions. No family at beside. Per ED note patient had dialysis yesterday as scheduled. He was hypertensive at that time. Started with a headache yesterday evening. Headache was more severe this AM. Also had confusion, so they brought him to the ED today for evaluation. Restrictions/Precautions:  Restrictions/Precautions: General Precautions, Fall Risk, Weight Bearing  Left Lower Extremity Weight Bearing: Non Weight Bearing  Position Activity Restriction  Other position/activity restrictions: Cast on L ankle/foot. d/c note from  states NWBing; no further notes regarding LLE; RN to follow up with family to verify    Subjective:  Chart Reviewed: Yes  Patient assessed for rehabilitation services?: Yes  Subjective: RN approved session. Pt pleasant and agreeable to therapy. Pt slow to respond.     General:  Overall Orientation Status: Within Functional Limits  Follows Commands: Within Functional Limits    Vision: Within Functional Limits    Hearing: Within functional limits         Pain: 0/10: denies pain     Vitals: Blood Pressure: 149/70  Oxygen: >90%  Heart Rate: 59    Social/Functional History:    Lives With: Daughter  Type of Home: House  Home Layout: One level  Home Access: Stairs to enter without rails  Entrance Stairs - Number of Steps: 2  Home Equipment: Wheelchair-manual, Rolling walker                   Ambulation Assistance: Needs assistance  Transfer Assistance: Needs assistance    Active : No     Additional Comments: pt reports he uses his RW and w/c; does not specify when or how often    OBJECTIVE:  Range of Motion:  Right Lower Extremity: WFL  Left Lower Extremity: Impaired - due to LE case;     Strength:  Right Lower Extremity: WFL  Left Lower Extremity: Impaired - due to LE cast    Balance:  Static Sitting Balance:  Stand By Assistance  Static Standing Balance: Minimal Assistance    Bed Mobility:  Supine to Sit: Contact Guard Assistance, with head of bed raised, with rail    Transfers:  Sit to Stand: Minimal Assistance  Stand to Sit:Contact Guard Assistance    Ambulation:  Minimal Assistance  Distance: 3'  Surface: Level Tile  Device:Rolling Walker  Gait Deviations:  Pt performed a hopping technique to maintain NWBing LLE; decreased step length and unsteady gait       Functional Outcome Measures: Completed  AM-PAC Inpatient Mobility Raw Score : 15  AM-PAC Inpatient T-Scale Score : 39.45    ASSESSMENT:  Activity Tolerance:  Patient tolerance of  treatment: good. Treatment Initiated: Treatment and education initiated within context of evaluation. Evaluation time included review of current medical information, gathering information related to past medical, social and functional history, completion of standardized testing, formal and informal observation of tasks, assessment of data and development of plan of care and goals. Treatment time included skilled education and facilitation of tasks to increase safety and independence with functional mobility for improved independence and quality of life. Assessment:   Body structures, Functions, Activity limitations: Decreased functional mobility , Decreased endurance, Decreased strength, Decreased balance, Decreased posture  Assessment: Pt demonstrates a decrease in baseline by way of bed mobility, transfers and ambulation secondary to decreased activity tolerance, strength, fatigue, and balance deficits. Pt will benefit from skilled PT services throughout admission and beyond hospital discharge for improvements in functional mobility and in order to decrease fall risk and return pt to PLOF. Prognosis: Good    REQUIRES PT FOLLOW UP: Yes    Discharge Recommendations:  Discharge Recommendations: Home with Home health PT    Patient Education:  PT Education: Goals, PT Role, Plan of Care, Functional Mobility Training    Equipment Recommendations:       Plan:  Times per week: 3-5x GM  Times per day: Daily  Current Treatment Recommendations: Strengthening, Patient/Caregiver Education & Training, Gait Training, Equipment Evaluation, Education, & procurement, Balance Training, Endurance Training, Functional Mobility Training, Home Exercise Program, Transfer Training, Safety Education & Training    Goals:  Patient goals : none stated  Short term goals  Time Frame for Short term goals: by discharge  Short term goal 1: bed mobility with HOB flat, no rails mod I for increased functional ind  Short term goal 2: sit <>stand from various surfaces with LRD mod I for safe transfers  Short term goal 3: stand pivot with RW and Supervision assist while maintaining NWB LLE for safe transfers  Short term goal 4: Pt to hop 10' with RW and Supervision assist while maintaining [address] for safe household navigation  Long term goals  Time Frame for Long term goals : NA due to short ELOS    Following session, patient left in safe position with all fall risk precautions in place.     Valentin Watson PT, DPT [id number]

## 2021-06-17 NOTE — PROGRESS NOTES
CLINICAL PHARMACY: DISCHARGE MED RECONCILIATION/REVIEW    [facility] ([facility] Select Patient?: No  Total # of Interventions Recommended: 0   -   Total # Interventions Accepted: 0  Intervention Severity:   - Level 1 Intervention Present?: No   - Level 2 #: 0   - Level 3 #: 0   Time Spent (min): 5

## 2021-07-07 NOTE — TELEPHONE ENCOUNTER
----- Message from Christie Navarro MD sent at 7/7/2021  1:07 PM EDT -----  Call dialysis to inform patient that chest x-ray did not show anything significant 5

## 2021-07-07 NOTE — TELEPHONE ENCOUNTER
I called and informed Deann Kelly of this information. She will let the nurses know to inform the pt.

## 2021-07-12 PROBLEM — R06.02 SOB (SHORTNESS OF BREATH): Status: ACTIVE | Noted: 2021-01-01

## 2021-07-12 NOTE — ED NOTES
2nd EKG completed per verbal order from Dr. Claudia Smith at this time     Mike Phelps RN  07/12/21 3353

## 2021-07-12 NOTE — CONSULTS
Nephrology Consult Note  Patient's Name: Keke Treviño  3:27 PM  7/12/2021    Nephrologist: Radha Adhikari    Reason for Consult:  ESRD. Hyperkalemia . Hemodialysis management   Requesting Physician:  Maninder Hogue MD  PCP: Bora Jeffires MD    Chief Complaint:  Shortness of breath  Assessment  1. End stage kidney disease on hemodialysis  2. Hypertension primary  3. Right moderate pleural effusion  4. Pulmonary hypertension   5. Hyperkalemia  6. Elevated troponin level of uncertain significant  7. Anemia of chronic disease with macrocytosis  8. Cephalgia    Plan    1. I discussed my thoughts with the patient  2. He understood  3. I addressed his questions  4. Labs reviewed  5. CT of the thorax report reviewed  6. CT abdomen and pelvis report reviewed  7. Medications reviewed  8. No changes for now  9. Monitor blood pressure closely  10. Recommend  pulmonary evaluation for pulmonary hypertension and moderate right pleural effusion with persistent shortness of breath  11. Seen in hemodialysis unit  12. Procedure in progress  13. Tolerating treatment well so far  14. Hemodynamically stable  15. Using 2K bath because of hyperkalemia  16. Repeat potassium level tonight and also tomorrow morning  17. We will follow      History Obtained From: Patient, staff and electronic medical record  History of Present Ilness:    Keke Treviño is a 80 y.o. male with history of end-stage kidney disease on hemodialysis, hypertension among other multiple medical entities discussed below admitted through the emergency department after the patient presented there with shortness of breath. Admitted for evaluation and management. Chest x-ray revealed small right pleural effusion. Possibility of  pulmonary embolism was entertained. Therefore CT of the thorax was done which revealed  pulmonary hypertension and right moderate pleural effusion. Shortness of breath has been ongoing for about 2 weeks now.   He did have a chest x-ray in the never used smokeless tobacco. He reports that he does not drink alcohol and does not use drugs. Allergies:  No known allergies    Current Medications:    glucose (GLUTOSE) 40 % oral gel 15 g, PRN  dextrose 50 % IV solution, PRN  glucagon (rDNA) injection 1 mg, PRN  dextrose 5 % solution, PRN  nitroGLYCERIN (NITROSTAT) SL tablet 0.4 mg, Q5 Min PRN  sodium polystyrene (KAYEXALATE) 15 GM/60ML suspension,         Review of Systems:   Pertinent positives stated above in HPI. All other systems were reviewed and were negative. ROS: As in HPI    Physical exam:   Constitutional: Well-developed elderly gentleman awake and alert in the last vicinity no apparent distress  Vitals:   Vitals:    07/12/21 1350   BP: (!) 137/104   Pulse: 120   Resp: 16   Temp: 96.3 °F (35.7 °C)   SpO2:        Skin: no rash, turgor is normal  Heent: Pupils are reactive to light. Throat is clear. Oral mucosa is moist.  Neck: no bruits or jvd noted   Cardiovascular:  S1, S2 without murmur   Respiratory: Clear with no wheezes,rhonchi or rales   Abdomen: soft,non tender,good bowel sound and no palpable mass  Ext: No lower extremity edema. Left lower extremity is immobilized. Psychiatric: mood and affect appropriate  Musculoskeletal:  Rom, muscular strength intact   CNS: Very awake and very alert. Well-oriented. Normal speech. Good motor strength. No obvious focal deficit.     Data:   Labs:  Lab Results   Component Value Date     07/12/2021     (L) 06/17/2021     06/16/2021    K 6.1 () 07/12/2021    K 4.5 06/17/2021    K 6.0 () 06/16/2021    CL 91 (L) 07/12/2021    CO2 31 07/12/2021    CO2 29 06/17/2021    CO2 26 06/16/2021    CREATININE 6.4 () 07/12/2021    CREATININE 6.2 () 06/17/2021    CREATININE 9.1 () 06/16/2021    BUN 29 (H) 07/12/2021    BUN 20 06/17/2021    BUN 30 (H) 06/16/2021    GLUCOSE 79 07/12/2021    GLUCOSE 99 07/12/2021    GLUCOSE 104 06/17/2021    PHOS 5.6 (H) 06/22/2019    PHOS 4.1 04/05/2016 PHOS 4.4 01/07/2016    WBC 7.5 07/12/2021    WBC 4.8 06/17/2021    WBC 7.8 06/16/2021    HGB 12.5 (L) 07/12/2021    HGB 10.8 (L) 06/17/2021    HGB 10.4 (L) 06/16/2021    HCT 37.0 (L) 07/12/2021    HCT 32.9 (L) 06/17/2021    HCT 34.8 (L) 06/16/2021    MCV 94.6 (H) 07/12/2021     07/12/2021     {Labs reviewed    Imaging:  CXR results: Report reviewed        Thank you Dr. Eligio Camilo MD for allowing us to participate in care of Jamaal Perry   **This report has been created using voice recognition software. It maycontain minor  errors which are inherent in voice recognition technology. **    Electronically signed by Marj Richter MD on 7/12/2021 at 3:27 PM

## 2021-07-12 NOTE — ED TRIAGE NOTES
Pt to ED through triage with c/c SOB that started this morning. Pt states he has been SOB since this morning at dialysis. Pt states that he also has had high blood pressure this morning according to dialysis nurses. Pt blood pressure currently 136/98. Pt states he is also having 9/10 pain in his right shoulder following a fall that occurred 3 weeks ago. Pt has boot on right foot as well as a result from the fall. Pt vitals stable. EKG completed. Pt res even but labored.

## 2021-07-12 NOTE — FLOWSHEET NOTE
07/12/21 1816   Vital Signs   /64   Temp 96.2 °F (35.7 °C)   Pulse 95   Resp 18   SpO2 99 %   Weight 158 lb 11.7 oz (72 kg)   Weight Method Bed scale   Percent Weight Change -3.61   Post-Hemodialysis Assessment   Post-Treatment Procedures Blood returned; Access bleeding time < 10 minutes   Machine Disinfection Process Acid/Vinegar Clean;Heat Disinfect; Exterior Machine Disinfection   Rinseback Volume (ml) 400 ml   Total Liters Processed (l/min) 86.2 l/min   Dialyzer Clearance Lightly streaked   Duration of Treatment (minutes) 240 minutes   Heparin amount administered during treatment (units) 0 units   Hemodialysis Intake (ml) 400 ml   Hemodialysis Output (ml) 2929 ml   NET Removed (ml) 2529 ml   stable 4 hour treatment some tachycardia nonsustained. Tylenol given for headache. . 2.6 liters net uf.

## 2021-07-12 NOTE — ED PROVIDER NOTES
[facility] EMERGENCY [facility]      CHIEF COMPLAINT       Chief Complaint   Patient presents with    Shortness of Breath     Nurses Notes reviewed and I agree except as noted in the HPI. HISTORY OF PRESENT ILLNESS    Josefina Yeboah is a 80 y.o. male who presents for shortness of breath. Patient states he started having SOB last night and couldn't sleep, and then at dialysis this morning he also began to have an increased BP and worsening SOB. Nothing makes the SOB better or worse, he also has a HA and some on and off stomach pain, though the stomach pain isn't new. He says he's had SOB like this before and he also came to [facility] at that time where they \"gave him a shot\" that made him feel better. He denies fever, chills, fatigue, and changes in weight. He denies CP, palpitations, lightheadedness/dizziness, edema or syncope. Denies coughing, wheezing, hemoptysis, leg swelling or leg pain. Left lower extremity has been in Unna boot for over 3 weeks due to fractured ankle after fall. During HPI patient developed anterior chest tightness. Pharmacological cardiac stress test 5-20-21: Imaging Results:Calculated gated LVEF 49 %. The T.I.D. ratio was 0.91 . There is mild attenuation artifact noted in the inferior wall seems to be  related to bowel artifact. There was no evidence of definite reversible tracer uptake. The nuclear images is not suggestive for myocardial ischemia.      Conclusions      Summary   Lexiscan EKG stress test is not suggestive for ischemia. The nuclear images is not suggestive for myocardial ischemia. Echo 5-20-21:  Summary   Left ventricle size is normal.   Normal left ventricular wall thickness. There was mild global hypokinesis of the left ventricle. Systolic function was mildly reduced. Ejection fraction is visually estimated at 45%. Doppler parameters were consistent with abnormal left ventricular   relaxation (grade 1 diastolic dysfunction).    The left atrium is Moderately dilated. Moderate mitral regurgitation is present. Moderate tricuspid regurgitation visualized. Right ventricular systolic pressure measures 55 mmhg. When this echo is compared with the echo from 05/22/2018, the EF dropped   from 60 % to 45% now       REVIEW OF SYSTEMS     Review of Systems   Constitutional: Negative for chills, fatigue, fever and unexpected weight change. HENT: Negative for congestion, rhinorrhea and sore throat. Eyes: Negative for photophobia. Respiratory: Positive for chest tightness and shortness of breath. Negative for cough, wheezing and stridor. Cardiovascular: Negative for chest pain, palpitations and leg swelling. Gastrointestinal: Positive for abdominal pain (Reports this isn't new and his stomach gets upset but it comes and goes. ). Negative for diarrhea, nausea and vomiting. Genitourinary: Negative for difficulty urinating. Musculoskeletal: Negative for gait problem. Skin: Negative for rash. Neurological: Positive for headaches. Negative for dizziness, syncope, weakness, light-headedness and numbness. Psychiatric/Behavioral: Negative for confusion. The patient is not nervous/anxious. PAST MEDICAL HISTORY    has a past medical history of Anemia in chronic kidney disease(285.21), Arthritis, CAD (coronary artery disease), Chronic kidney disease, Chronic kidney disease, stage IV (severe) ([address] CKD (chronic kidney disease) stage 3, GFR 30-59 ml/min ([facility]), COPD (chronic obstructive pulmonary disease) ([address] GI bleed, Hemodialysis patient ([address] History of blood transfusion, Hyperlipidemia, Hyperphosphatemia, Hypertension, Sick sinus syndrome ([address] and Systolic congestive heart failure ([address] SURGICAL HISTORY      has a past surgical history that includes pacemaker placement (2013); Endoscopy, colon, diagnostic; Dialysis fistula creation (5/6/2016); Colonoscopy (0630,5159); Upper gastrointestinal endoscopy (0702,8696);  Coronary angioplasty unknown.   family history includes Diabetes in his mother and sister; Heart Disease in his mother; Mental Illness in his paternal aunt. SOCIAL HISTORY    reports that he quit smoking about 39 years ago. He has a 40.00 pack-year smoking history. He has never used smokeless tobacco. He reports that he does not drink alcohol and does not use drugs. PHYSICAL EXAM     INITIAL VITALS:  height is 5' 6\" (1.676 m) and weight is 170 lb (77.1 kg). His oral temperature is 98 °F (36.7 °C). His blood pressure is 195/94 (abnormal) and his pulse is 102. His respiration is 20 and oxygen saturation is 98%. Physical Exam  Constitutional:       General: He is in acute distress. Appearance: Normal appearance. He is well-developed. He is not ill-appearing or diaphoretic. HENT:      Head: Normocephalic and atraumatic. Right Ear: Hearing normal.      Left Ear: Hearing normal.      Nose: Nose normal. No rhinorrhea. Mouth/Throat:      Lips: Pink. Mouth: Mucous membranes are moist.      Pharynx: Oropharynx is clear. Eyes:      General: Lids are normal. No scleral icterus. Extraocular Movements: Extraocular movements intact. Conjunctiva/sclera: Conjunctivae normal.      Pupils: Pupils are equal, round, and reactive to light. Neck:      Trachea: Trachea normal.   Cardiovascular:      Rate and Rhythm: Regular rhythm. Tachycardia present. Heart sounds: Normal heart sounds. No murmur heard. No friction rub. No gallop. Pulmonary:      Effort: Tachypnea, accessory muscle usage and respiratory distress present. Breath sounds: Normal breath sounds and air entry. No stridor. No decreased breath sounds, wheezing or rhonchi. Abdominal:      General: There is no distension. Palpations: Abdomen is soft. Tenderness: There is no abdominal tenderness. Musculoskeletal:      Cervical back: Normal range of motion and neck supple. No rigidity. Right lower leg: No edema.       Left lower leg: No edema. Comments: Well perfused; movement normal as observed; no signs of DVT   Lymphadenopathy:      Cervical: No cervical adenopathy. Skin:     General: Skin is warm and dry. Findings: No rash. Neurological:      General: No focal deficit present. Mental Status: He is alert. Sensory: Sensation is intact. Motor: Motor function is intact. Gait: Gait is intact. Psychiatric:         Mood and Affect: Mood normal.         Speech: Speech normal.         Behavior: Behavior is cooperative. DIFFERENTIAL DIAGNOSIS:   Including but not limited to: anemia of chronic disease, a-fib with RVR, sick sinus syndrome, PE, pulmonary edema or effusion, acute on chronic kidney injury     DIAGNOSTIC RESULTS     EKG: All E[facility]'s [facility] who either signs or Co-signs this chart in the absence of a cardiologist.  Ventricular rate 111 bpm  MI interval *  QRS duration 64 ms  QTc interval 424 ms  P-R-T axes *, 54, 31  Normal sinus rhythm with PVCs. No STEMI. Unchanged when compared to 6-15-21    RADIOLOGY: non-plain film images(s) such as CT,Ultrasound and MRI are read by the radiologist.  Plain radiographic images are visualized and preliminarily interpreted by the emergency physician unless otherwise stated below. CTA CHEST W WO CONTRAST   Final Result       1. No evidence of pulmonary embolus. 2. Moderate right and small left pleural effusions with adjacent atelectasis. 3. Ascending aortic aneurysm measuring 3.7 cm in greatest diameter. 4. Pulmonary artery hypertension. 5. Cardiomegaly, increased compared to prior CT. 6. Prostatomegaly with enhancing component on the left. 7. Cholelithiasis. **This report has been created using voice recognition software. It may contain minor errors which are inherent in voice recognition technology. **      Final report electronically signed by Dr. Jordi Herron MD on 7/12/2021 11:43 AM      CT ABDOMEN PELVIS W IV CONTRAST Additional Contrast? None   Final Result       1. No evidence of pulmonary embolus. 2. Moderate right and small left pleural effusions with adjacent atelectasis. 3. Ascending aortic aneurysm measuring 3.7 cm in greatest diameter. 4. Pulmonary artery hypertension. 5. Cardiomegaly, increased compared to prior CT. 6. Prostatomegaly with enhancing component on the left. 7. Cholelithiasis. **This report has been created using voice recognition software. It may contain minor errors which are inherent in voice recognition technology. **      Final report electronically signed by Dr. Randolph Murrieta MD on 7/12/2021 11:43 AM      XR CHEST PORTABLE   Final Result   Stable small left pleural effusion. **This report has been created using voice recognition software. It may contain minor errors which are inherent in voice recognition technology. **      Final report electronically signed by Dr. Randolph Murrieta MD on 7/12/2021 9:40 AM          LABS:   Labs Reviewed   BASIC METABOLIC PANEL W/ REFLEX TO MG FOR LOW K - Abnormal; Notable for the following components:       Result Value    Potassium reflex Magnesium 6.1 (*)     Chloride 91 (*)     BUN 29 (*)     CREATININE 6.4 (*)     All other components within normal limits   BRAIN NATRIURETIC PEPTIDE - Abnormal; Notable for the following components:    Pro-BNP > 36412.0 (*)     All other components within normal limits   CBC WITH AUTO DIFFERENTIAL - Abnormal; Notable for the following components:    RBC 3.91 (*)     Hemoglobin 12.5 (*)     Hematocrit 37.0 (*)     MCV 94.6 (*)     RDW-CV 16.9 (*)     RDW-SD 58.8 (*)     Lymphocytes Absolute 0.8 (*)     All other components within normal limits   TROPONIN - Abnormal; Notable for the following components:    Troponin T 0.080 (*)     All other components within normal limits   HEPATIC FUNCTION PANEL - Abnormal; Notable for the following components:    AST 42 (*)     All other components within normal limits   GLOMERULAR FILTRATION RATE, ESTIMATED - Abnormal; Notable for the following components:    Est, Glom Filt Rate 10 (*)     All other components within normal limits   POCT GLUCOSE - Normal   ANION GAP   OSMOLALITY   POCT GLUCOSE   POCT GLUCOSE   POCT GLUCOSE   POCT GLUCOSE   POCT GLUCOSE   POCT GLUCOSE       EMERGENCY DEPARTMENT COURSE:   Vitals:    Vitals:    07/12/21 1015 07/12/21 1117 07/12/21 1211 07/12/21 1259   BP: (!) 196/110 (!) 190/107  (!) 195/94   Pulse: 104 83 102 102   Resp: 16 18 20 20   Temp:       TempSrc:       SpO2: 98%  98% 98%   Weight:       Height:         1054: Consulted Dr. Fabián Benitez who advised CTA of the chest was okay and follow hyperkalemia protocol     1251: [facility] in regards to admission    MDM:  The patient was seen by me in the emergency room for dyspnea. Physical exam revealed patient resting in bed with visible increased work of breathing with chest heaves, he appears to be in mild distress d/t SOB. HR is increased between 100-130, but no murmurs or gallops were present. Has increased work of breathing but no rales, rhonchi, or crackles were heard in his lungs. No LE edema was present though the patient is wearing a boot on his left leg d/t an ankle fracture 3 weeks ago. .    Vital signs reviewed and noted stable, although hypertensive. Old records were reviewed. Appropriate laboratory and imaging studies were ordered and reviewed upon completion. Pertinent findings: Potassium 6.1, creatinine 6.4, troponin 0.08; CTA of chest revealed pulmonary hypertension, moderate right and small left pleural effusions, no PE. Incidental finding of ascending aortic aneurysm 3.7 cm. Interventions: Calcium gluconate, D50, insulin, sodium bicarbonate, Kayexalate, nitroglycerin    Reexamination: Patient remained stable although dyspneic intermittently. Vital signs remained stable although hypertensive. Pulse ox remained in the upper 90s. Results were discussed with the patient and family as well as desire for admission and they were amenable. Dr. Debora Tanner of our hospitalists' service was consulted and graciously accepted admission. The patient was admitted to the hospital in fair condition. CRITICAL CARE:   None    CONSULTS:  Dr. Debora Tanner (hospitalist)    PROCEDURES:  None    FINAL IMPRESSION      1. Dyspnea and respiratory abnormalities    2. Chest pain, unspecified type    3. Uncontrolled hypertension    4. Elevated troponin    5. Hyperkalemia    6. ESRD (end stage renal disease) on dialysis ([facility]          DISPOSITION/PLAN     1. Dyspnea and respiratory abnormalities    2. Chest pain, unspecified type    3. Uncontrolled hypertension    4. Elevated troponin    5. Hyperkalemia    6.  ESRD (end stage renal disease) on dialysis [facility])    Admission    (Please note that portions of this note were completed with a voice recognition program.  Efforts were made to edit the dictations but occasionally words are mis-transcribed.)    Immanuel Hurtado PA-C 07/12/21 1:11 PM    PÉREZ Gardiner PA-C  07/12/21 2501

## 2021-07-12 NOTE — Clinical Note
Patient Class: Inpatient [101]   REQUIRED: Diagnosis: SOB (shortness of breath) [166492]   Estimated Length of Stay: Estimated stay of more than 2 midnights

## 2021-07-12 NOTE — ED NOTES
Pt reassessed at this time. BP cuff moved to pt LUE and noted to be 196/110 at this time. Provider notified. Pt states pain is still 9/10 in right shoulder. Call light in reach. Family at bedside.  Will continue to monitor      Rolf Vaughn RN  07/12/21 3917

## 2021-07-13 NOTE — PROGRESS NOTES
Nephrology Progress Note    Patient - Sandra Rosario   MRN -  [id number]   Acct # - [de-identified]      - 11/10/1930    80 y.o. Admit Date: 2021  Hospital Day: 1  [facility]  Date of evaluation -  2021    Subjective:   CC: shortness of breath   Amb with assist to chair  Denies shortness of breath on Room air. However c/o of shortness of breath overnight.    Hemodialysis 2021 with 2500 Ultrafiltration   BP Range: Systolic (09KCU), AHQ:591 , Min:130 , VPC:353      Diastolic (00HSN), NORI:51, Min:61, Max:110    Objective:   VITALS:  BP (!) 147/80   Pulse 100   Temp 98.5 °F (36.9 °C) (Oral)   Resp 22   Ht 5' 6\" (1.676 m)   Wt 150 lb 9.6 oz (68.3 kg)   SpO2 96%   BMI 24.31 kg/m²    Patient Vitals for the past 24 hrs:   BP Temp Temp src Pulse Resp SpO2 Height Weight   21 0542 -- -- -- -- -- -- -- 150 lb 9.6 oz (68.3 kg)   21 0312 (!) 147/80 98.5 °F (36.9 °C) Oral 100 22 96 % -- --   21 0147 (!) 149/84 -- -- 91 -- -- -- --   21 0030 (!) 161/75 -- -- 88 -- -- -- --   21 2330 (!) 145/82 -- -- 106 -- -- -- --   21 2305 (!) 163/110 98.5 °F (36.9 °C) Oral 96 20 96 % -- --   21 220 (!) 145/91 -- -- 101 -- 96 % -- --   21 -- -- -- -- 18 99 % -- --   21 (!) 161/86 -- -- 100 -- -- -- --   21 (!) 151/102 -- -- 99 -- -- -- --   21 (!) 146/95 98.1 °F (36.7 °C) Rectal 106 18 (!) 88 % -- --   21 1931 130/75 -- -- 92 -- -- -- --   21 1833 (!) 142/88 98 °F (36.7 °C) Oral 88 16 93 % -- --   21 1816 139/64 96.2 °F (35.7 °C) -- 95 18 99 % -- 158 lb 11.7 oz (72 kg)   21 1350 (!) 137/104 96.3 °F (35.7 °C) -- 120 16 -- -- 164 lb 10.9 oz (74.7 kg)   21 1341 (!) 177/61 -- -- 103 20 98 % -- --   21 1259 (!) 195/94 -- -- 102 20 98 % -- --   21 1211 -- -- -- 102 20 98 % -- --   21 1117 (!) 190/107 -- -- 83 18 -- -- --   21 1015 (!) 196/110 -- -- 104 16 98 % -- --   21 0915 (!) 136/98 98 °F (36.7 °C) Oral 100 26 96 % 5' 6\" (1.676 m) 170 lb (77.1 kg)     3 L/min (decreased to 1 L at this time per abg results/protocol)    Intake/Output Summary (Last 24 hours) at 7/13/2021 0805  Last data filed at 7/13/2021 0312  Gross per 24 hour   Intake 714.01 ml   Output 2929 ml   Net -2214.99 ml       Admission weight: 170 lb (77.1 kg)  Patient Vitals for the past 96 hrs (Last 3 readings):   Weight   07/13/21 0542 150 lb 9.6 oz (68.3 kg)   07/12/21 1816 158 lb 11.7 oz (72 kg)   07/12/21 1350 164 lb 10.9 oz (74.7 kg)     Body mass index is 24.31 kg/m². EXAM:  CONSTITUTIONAL:  No acute distress. Pleasant  HEENT:  Head is normocephalic, Extraocular movement intact. Neck is supple. Voice is clear. CARDIOVASCULAR:  S1, S2  regular rate and rhythm. RESPIRATORY: Clear to ausculation bilaterally. Equal breath sounds. No wheezes. No shortness of breath noted at rest.  ABDOMEN: soft, non tender  NEUROLOGICAL: Patient is alert and oriented to person, place, and time. Recent and remote memory intact. Thought is coherant. SKIN: no rash, No significant bruises on exposed surfaces  MUSCULOSKELETAL: Movement is coordinated. Moves all extremities   EXTREMITIES: Distal lower extremity temp is warm, No lower extremity edema. Upper extremity AV Fistula   PSYCHIATRIC: mood and affect appropriate.     Medications:   Med reviewed  Scheduled Meds:   pravastatin  80 mg Oral Daily    metoprolol tartrate  50 mg Oral BID    gabapentin  100 mg Oral TID    folbee plus  1 tablet Oral Daily    docusate sodium  100 mg Oral BID    ferrous sulfate  325 mg Oral Daily with breakfast    vitamin C  500 mg Oral Daily    pantoprazole  40 mg Oral QAM AC    cinacalcet  60 mg Oral Once per day on Mon Wed Fri    calcitRIOL  1.5 mcg Oral Once per day on Mon Wed Fri    isosorbide mononitrate  30 mg Oral Daily    sodium chloride flush  5-40 mL Intravenous 2 times per day    heparin (porcine)  5,000 Units Subcutaneous 3 times per day    polyethylene glycol  17 g Oral Daily     Continuous Infusions:   dextrose      sodium chloride       PRN Meds glucose, dextrose, glucagon (rDNA), dextrose, nitroGLYCERIN, traMADol, sodium chloride flush, sodium chloride, acetaminophen **OR** acetaminophen, promethazine **OR** ondansetron   Labs:   Labs reviewed  Recent Labs     07/12/21  0915 07/12/21  2019   WBC 7.5 6.1   RBC 3.91* 3.76*   HGB 12.5* 12.1*   HCT 37.0* 34.5*   MCV 94.6* 91.8   MCH 32.0 32.2    158       Recent Labs     07/12/21  0915 07/12/21  0915 07/12/21  1136 07/12/21 2020     --   --  137   K 6.1*  --   --  3.7   CL 91*  --   --  96*   CO2 31  --   --  27   BUN 29*  --   --  13   CREATININE 6.4*  --   --  4.4*   LABGLOM 10*   < >  --  15*   GLUCOSE 99  --  79 94   MG 2.3  --   --  2.0   CALCIUM 10.3  --   --  9.7    < > = values in this interval not displayed. CTA CHEST W WO CONTRAST  Result Date: 7/12/2021   1. No evidence of pulmonary embolus. 2. Moderate right and small left pleural effusions with adjacent atelectasis. 3. Ascending aortic aneurysm measuring 3.7 cm in greatest diameter. 4. Pulmonary artery hypertension. 5. Cardiomegaly, increased compared to prior CT. 6. Prostatomegaly with enhancing component on the left. 7. Cholelithiasis. Summary 2/20/2021   Left ventricle size is normal.   Normal left ventricular wall thickness. There was mild global hypokinesis of the left ventricle. Systolic function was mildly reduced. Ejection fraction is visually estimated at 45%. Doppler parameters were consistent with abnormal left ventricular   relaxation (grade 1 diastolic dysfunction). The left atrium is Moderately dilated. Moderate mitral regurgitation is present. Moderate tricuspid regurgitation visualized. Right ventricular systolic pressure measures 55 mmhg.    When this echo is compared with the echo from 05/22/2018, the EF dropped   from 60 % to 45% now     ASSESSMENT:  1. 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 AV fistula. Plan for Hemodialysis in AM  2. Hyperkalemia, K down to 4.2 following HD with 2 K bath   3. Essential Hypertension with nephrosclerosis   4. Abdominal aortic aneurysm 3.7 cm   5. Acute on chronic HFrEF 69%, Chronic diastolic HF. pulm artery HTN  6. Coronary artery disease   7. Right pleural effusion  8. Anemia of chronic disease   9. Hypophosphatemia, replaced  10. Secondary hyperparathyroidism of renal origin    Active Problems:    SOB (shortness of breath)  Resolved Problems:    * No resolved hospital problems.  Shadia Ortega, XIN - CNP 8:05 AM 7/13/2021

## 2021-07-13 NOTE — PROGRESS NOTES
[address] [address]  INPATIENT PHYSICAL THERAPY  EVALUATION  [facility] ICU STEPDOWN TELEMETRY 4K - 4K-17/017-A    Time In: 1410  Time Out: 1433  Timed Code Treatment Minutes: 9 Minutes  Minutes: 23          Date: 2021  Patient Name: Sandra Rosario,  Gender:  male        MRN: [id number]  : 11/10/1930  (80 y.o.)      Referring Practitioner: Dr. Sara Kingsley  Diagnosis: SOB  Additional Pertinent Hx: Sandra Rosario is a 80 y.o. male with a PMH of Restrictive Lung Disease, HLD, HTN, SSS, CAD, and systolic HF who presented with chest pain and shortness of breath since last night. He states that this morning during dialysis, he began to have elevated blood pressures and worsening shortness of breath. He also noted some intermittent stomach pain. He described the chest pain as located mostly over his right shoulder. Through chart review, this shoulder pain has been a chronic problem for which previous imaging demonstrated a chronic R shoulder tendinopathy. The SOB he notes started suddenly. Pt also endorses headache but notes he often gets them. Denies worsening with exertion, associated cough, fever, chills, fatigue, nausea, vomiting, weight changes, wheezing, hemoptysis, lightheadedness, dizziness, or leg swelling. Of note, pt has a LLE boot from an ankle fracture 3 weeks ago s/p fall.      Restrictions/Precautions:  Restrictions/Precautions: General Precautions, Fall Risk, Weight Bearing  Left Lower Extremity Weight Bearing: Non Weight Bearing  Partial Weight Bearing Percentage Or Pounds: per notes 21-RN to get a hold of podiatry to verify WB status  Required Braces or Orthoses  Left Lower Extremity Brace: Boot    Subjective:  Chart Reviewed: Yes  Patient assessed for rehabilitation services?: Yes  Subjective: pt asleep upon arrival, flat affect, cooperative    General:    Hearing: Within functional limits         Pain: no pain per pt    Vitals: on room air, O2 sats >/=90%    Social/Functional History:    Lives With: Daughter (currently residing with daughter d/t ankle injury and frequent 'episodes')  Type of Home: House  Home Layout: One level, Performs ADL's on one level  Home Access: Stairs to enter without rails  Entrance Stairs - Number of Steps: 1  Home Equipment: Rolling walker, Wheelchair-manual     Bathroom Shower/Tub: Tub/Shower unit  Bathroom Toilet: Standard       ADL Assistance: Needs assistance  Homemaking Assistance: Needs assistance (daughter completes at this time, typically completes at home)  Homemaking Responsibilities: No  Ambulation Assistance: Independent (prior to ankle injury)  Transfer Assistance: Independent (prior to ankle injury)    Active : Yes     Additional Comments: Patient previously lived home alone prior to ankle injury, is now staying with daughter with pt and family unsure of plans to return home. Home environment collected above is regarding daughter's house. OBJECTIVE:  Range of Motion:  Bilateral Lower Extremity:WFL    Strength:  Bilateral Lower Extremity:  grossly >/=3/5, generalized weakness    Balance:  Static Sitting Balance:  Stand By Assistance  Static Standing Balance: Contact Guard Assistance, with NWB LLE    Bed Mobility:  Rolling to Left: Supervision   Supine to Sit: Stand By Assistance  Sit to Supine: Stand By Assistance   Scooting: Stand By Assistance  HOB up 30 degrees and use BR  Transfers:  Sit to Stand: Contact Guard Assistance  Stand to Sit:Contact [employer] Boot on LLE, NWB LLE, cues for hand placement for safety  Ambulation:  Contact Guard Assistance  Distance: 2'x1, 25'x1  Surface: Level Tile  Device:Rolling Walker  Gait Deviations: Forward Flexed Posture, Slow Valeri and min unsteady, primarily NWB LLE with boot        Functional Outcome Measures: Completed  -PAC Inpatient Mobility Raw Score : 18  -PAC Inpatient T-Scale Score : 43.63    ASSESSMENT:  Activity Tolerance:  Patient tolerance of  treatment: fair.        Treatment Initiated: Treatment and education initiated within context of evaluation. Evaluation time included review of current medical information, gathering information related to past medical, social and functional history, completion of standardized testing, formal and informal observation of tasks, assessment of data and development of plan of care and goals. Treatment time included skilled education and facilitation of tasks to increase safety and independence with functional mobility for improved independence and quality of life. Assessment: Body structures, Functions, Activity limitations: Decreased functional mobility , Decreased endurance, Decreased strength, Decreased balance  Assessment: pt tolerate fair, NWB LLE with boot, generalized weakness, dec endurance, dec balance, use of walker and inc assist for safe mobility, recommend cont PT to inc pt I with functional mobility  Prognosis: Good    REQUIRES PT FOLLOW UP: Yes    Discharge Recommendations:  Discharge Recommendations: Continue to assess pending progress, 24 hour supervision or assist, Patient would benefit from continued therapy after discharge    Patient Education:  PT Education: Goals, PT Role, Plan of Care, Functional Mobility Training    Equipment Recommendations:  Equipment Needed: No    Plan:  Times per week: 3-5X GM  Times per day: Daily  Specific instructions for Next Treatment: therex and mobility    Goals:  Patient goals : home with dtr  Short term goals  Time Frame for Short term goals: by discharge  Short term goal 1: bed mobility with S to get in/out of bed  Short term goal 2: transfer with S, boot and NWB LLE, to get in/out of chairs  Short term goal 3: amb 25'x1 with walker, NWB LLE, and SBA to walk safely in room  Long term goals  Time Frame for Long term goals : no LTGs set secondary to short ELOS    Following session, patient left in safe position with all fall risk precautions in place.

## 2021-07-13 NOTE — PROGRESS NOTES
[facility] Palliative Care           Progress Note    Patient Name:  Euna Boas  Medical Record Number:  [id number]  [place]:  11/10/1930    Date:  7/13/2021  Time:  12:15 PM  Completed By:  Kendra Tejada RN, RN    Reason for Palliative Care Evaluation:  Code status    Current Issues:  []  Pain  []  Fatigue  []  Nausea  []  Anxiety  []  Depression  [x]  Shortness of Breath  []  Constipation  []  Appetite  []  Other:    Advance Directives  [] [facility] DNR Form  [] Living Will  [] Medical POA    Current Code Status  [x] Full Resuscitation  [] DNR-Comfort Care-Arrest  [] DNR-Comfort Care  [] Limited   [] No CPR   [] No shock   [] No ET intubation/reintubation   [] No resuscitative medications   [] Other limitation:    Performance Status:  Palliative Performance Scale:  ___70%  Ambulation reduced; Some disease; Can't do normal job or work; intake normal or reduced; can do full self care; LOC full  ___60%  Ambulation reduced; Significant disease; Can't do hobbies/housework; intake normal or reduced; occasional assist; LOC full/confusion  _X_50%  Mainly sit/lie; Extensive disease; Can't do any work; Considerable assist; intake normal or reduced; LOC full/confusion  ___40%  Mainly in bed; Extensive disease; Mainly assist; intake normal or reduced; LOC full/confusion   ___30%  Bed Bound; Extensive disease; Total care; intake reduced; LOC full/confusion  ___20%  Bed Bound; Extensive disease; Total care; intake minimal; Drowsy/coma  ___10%  Bed Bound; Extensive disease; Total care; Mouth care only; Drowsy/coma  ___0       Death      Family/Patient Discussion:    6850 Received consult for code status discussion. Upon entering room, patient resting in chair. Introduced as palliative care, patient agreed to speak with this RN. Asked patient how current treatment was going. Patient stated he was still feeling SOB.  This RN educated patient on FULL code, incluiding chest compressions with possbile broken ribs and organ damage, defibrillation, and intubation. Educated patient on [facility]. Patient closing eyes during conversation. This RN asked patient if he was tired. Patient again stated that he was SOB. This Rn asked patient if he became more SOB and needed intuabted would he want that. Patient stated \"if you can save me then yes\". This RN asked patient if he had spoken with his family about his wishes if he were unable to make his own decisions, patient nodded head \"yes\". THis RN asked patient if he would want chest compressions, patient nodded head \"yes\" again. Dietary in to bring patient lunch. This RN confirmed that patient would want to remain a FULL code, patient nodded head \"yes\". Assisted patient to bed as he requested, bed alarm on.     1238 This Rn called and spoke with patient's daughter/POA, Rosalind Stallworth. Informed Rosalind Stallworth of conversation with patient. Rosalind Stallworth stated that she and the patient had spoken about this before and that is not what patient would want, stated patient must not have understood conversation. Rosalind Stallworth stated she and her  will be returning to the hospital shortly, plan to meet to speak with this RN when she has arrived. 1330 Returned to unit to speak with patient and Rosalind Stallworth. Rosalind Stallworth at bedside. Rosalind Stallworth stated her and the patient spoke and agreed patient would want to be a LIMITED code, yes to defibrillation, no to compressions/medications/intubation. Rosalind Stallworth asking for an update by attending. Denied further questions at this time     Updated Mary Keating. Perfect serve message sent to Dr Devyn Atkinson and Dr Skylar Wayne. Plan/Follow-Up: Change code status to LIMITED yes to defibrillation. No to chest compressions/intubation/medications. Will follow PRN, please call if needs arise.     Melly Cummings, RN, RN  7/13/2021,  12:15 PM

## 2021-07-13 NOTE — FLOWSHEET NOTE
07/13/21 1516   Provider Notification   Reason for Communication Review case   Provider Name Dr. Joe Cadena   Provider Notification Physician   Method of Communication Secure Message   Response See orders   Notification Time [phone]   Consult:  Can patient be weight bearing; H/o fracture of L ankle in boot. Dr. Joe Cadena to see patient. Received order for a 3 view x-ray of left foot. No other orders received.

## 2021-07-13 NOTE — FLOWSHEET NOTE
07/12/21 2235   Provider Notification   Reason for Communication Review case  (Procal 0.41)   Provider Name Dr. Michel Robbins    Provider Notification Resident   Method of Communication Secure Message   Response No new orders   Notification Time [phone]

## 2021-07-13 NOTE — PLAN OF CARE
Problem: Discharge Planning:  Goal: Discharged to appropriate level of care  Description: Discharged to appropriate level of care  Outcome: Completed     SW consult received and completed. See SW note.

## 2021-07-13 NOTE — PROGRESS NOTES
auscultation of all lung fields. No JVD, hepatojugular reflex, or peripheral edema. Pt is on the following HF Mortality Modifiers: lisinopril,  and metoprolol, . Pt is on the following diuretics: none. Follows in HF Clinic: No.   Plan:   - Cardiology consulted as above  - Strict I&Os  - Daily standing weights  - Nephrology consulted to remove fluid via dialysis as patient will not respond to diuretics given ESRD  - Repeat Echo, limited to assess for progression of systolic heart failure     #Coronary Artery Disease, Stable  #Hyperlipidemia, Stable  LHC in 05/2018 demonstrated patent L main artery, 100% occlusion of RCA, diffuse disease of LCX with patient mid circumflex stent, LAD with 50% stenosis in mid and distal segments, and 80% stenosis in distal LAD at apex. Cardiac stress test in 05/2021 demonstrated no chest pain or induced arrhythmia during stress via lexiscan. Last lipid panel 09/2020: total cholesterol: 222, HDL 63, , and TRG 92. On pravastatin 80 mg daily. Plan:  - Continue pravastatin 80 mg daily  - Will continue to monitor      #Restrictive Lung Disease, Inactive  PFTs in 2012 demonstrated elevated FEV1/FVC ratio of 112% predicted consistent with RLD. Etiology uncertain. No follow up PFTs. Unlikely cause of pt's SOB. Plan: Will continue to monitor     #Elevated Troponin, Stable  Presented with troponin 0.08. This is his baseline. Likely elevated due to ESRD. Plan: Will continue to monitor     #Elevated pro-BNP, Stable  Presented with pro-BNP of >70,000. This is his baseline. Likely elevated due to ESRD. Plan:  Will continue to monitor     Fluids: none  Electrolyte: Replete as needed   Nutrition:  Regular, 3 carb choice, 2g sodium restruction, <3g potassium, <1g phos  GI: none  DVT ppx:  Heparin, renally dose; pharmacy consulted  Code status: Full Code No additional code details    Dispo: Continue on 4K    Subjective     [facility] following dialysis, pt had multiple episodes of NSVT in addition to being hypertensive with systolic max of 123 however this fluctuated considerably down to systolic of 759. Pt also c/o headache. He was given potassium chloride PO 20 mEq and IV sodium phosphate 10 mmol in 250 cc D5W    Objective     Vitals:     BP (!) 147/80   Pulse 100   Temp 98.5 °F (36.9 °C) (Oral)   Resp 22   Ht 5' 6\" (1.676 m)   Wt 150 lb 9.6 oz (68.3 kg)   SpO2 96%   BMI 24.31 kg/m²     Intake and Output:       Intake/Output Summary (Last 24 hours) at 7/13/2021 0606  Last data filed at 7/13/2021 4317  Gross per 24 hour   Intake 714.01 ml   Output 2929 ml   Net -2214.99 ml     Lines  - L Arm PIV 07/12  - HD Fistula R Upper Extremity     Medications:     Scheduled Meds:   pravastatin  80 mg Oral Daily    metoprolol tartrate  50 mg Oral BID    gabapentin  100 mg Oral TID    folbee plus  1 tablet Oral Daily    docusate sodium  100 mg Oral BID    ferrous sulfate  325 mg Oral Daily with breakfast    vitamin C  500 mg Oral Daily    pantoprazole  40 mg Oral QAM AC    cinacalcet  60 mg Oral Once per day on Mon Wed Fri    calcitRIOL  1.5 mcg Oral Once per day on Mon Wed Fri    isosorbide mononitrate  30 mg Oral Daily    sodium chloride flush  5-40 mL Intravenous 2 times per day    heparin (porcine)  5,000 Units Subcutaneous 3 times per day    polyethylene glycol  17 g Oral Daily     Continuous Infusions:   dextrose      sodium chloride       PRN Meds:glucose, dextrose, glucagon (rDNA), dextrose, nitroGLYCERIN, traMADol, sodium chloride flush, sodium chloride, acetaminophen **OR** acetaminophen, promethazine **OR** ondansetron    Physical Exam:     Physical Exam  Constitutional:       Appearance: Normal appearance. He is not diaphoretic. HENT:      Head: Normocephalic and atraumatic. Mouth/Throat:      Mouth: Mucous membranes are moist.      Pharynx: Oropharynx is clear. No oropharyngeal exudate or posterior oropharyngeal erythema. Eyes:      General: No scleral icterus. Extraocular Movements: Extraocular movements intact. Pupils: Pupils are equal, round, and reactive to light. Cardiovascular:      Rate and Rhythm: Tachycardia present. Rhythm irregular. Pulses: Normal pulses. Heart sounds: Normal heart sounds. No murmur heard. No friction rub. No gallop. Pulmonary:      Effort: Pulmonary effort is normal.      Breath sounds: Normal breath sounds. No wheezing, rhonchi or rales. Abdominal:      General: Bowel sounds are normal.      Palpations: Abdomen is soft. Tenderness: There is no abdominal tenderness. There is no guarding or rebound. Musculoskeletal:         General: No swelling. Cervical back: Normal range of motion and neck supple. Right lower leg: No edema. Left lower leg: No edema. Skin:     General: Skin is warm and dry. Capillary Refill: Capillary refill takes less than 2 seconds. Neurological:      General: No focal deficit present. Mental Status: He is alert and oriented to person, place, and time.    Psychiatric:         Mood and Affect: Mood normal.         Behavior: Behavior normal.         Labs:     Recent Labs     Units 07/12/21 2019 07/12/21 0915   WBC thou/mm3 6.1 7.5   HGB gm/dl 12.1* 12.5*   HCT % 34.5* 37.0*   PLT thou/mm3 158 188   MPV fL 11.5 10.9   EOSABS thou/mm3  --  0.3   LYMPHSABS thou/mm3  --  0.8*   MONOPCT %  --  7.8   MONOSABS thou/mm3  --  0.6   IMMGRAN thou/mm3  --  0.4  0.03   NRBC /100 wbc  --  0     Recent Labs     Units 07/12/21 2020 07/12/21  1136 07/12/21 0915   NA meq/L 137  --  136   K meq/L 3.7  --  6.1*   CL meq/L 96*  --  91*   CO2 meq/L 27  --  31   BUN mg/dL 13  --  29*   CREATININE mg/dL 4.4*  --  6.4*   GLUCOSE mg/dL 94 79 99   PHOS mg/dL 2.3*  --  3.0   MG mg/dL 2.0  --  2.3     Recent Labs     Units 07/12/21 2020 07/12/21 0915   BILITOT mg/dL 0.6 0.6   BILIDIR mg/dL  --  <0.2   PROT g/dL 7.0 7.6   AST U/L 32 42*     Diagnostics:     Imaging:  XR CHEST (2 VW)    Result Date: 7/7/2021  PROCEDURE: XR CHEST (2 VW) CLINICAL INFORMATION: Dyspnea, unspecified type COMPARISON: Chest x-ray Rose 15, 2021 TECHNIQUE: PA and lateral views of the chest performed. FINDINGS: POSTSURGICAL CHANGES: None. LINES/TUBES/MECHANICAL DEVICES: Dual lead cardiac assist device. TRACHEA/HEART/MEDIASTINUM/HILUM: Unremarkable. LUNG RICH: Hyperlucent with interstitial prominence. Small bilateral effusions. OTHER: None. PNEUMOTHORAX: None. OSSEOUS STRUCTURES: 1. No acute osseous abnormality. 1.  Small bilateral pleural effusions with associated atelectasis, left more than right. **This report has been created using voice recognition software. It may contain minor errors which are inherent in voice recognition technology. ** Final report electronically signed by Dr. Collin Ware on 7/7/2021 12:17 PM    CT Head WO Contrast    Result Date: 6/15/2021  PROCEDURE: CT HEAD WO CONTRAST CLINICAL INFORMATION: severe headache and hypertension. COMPARISON: CT scan of the brain dated 21 May 2018. TECHNIQUE: Noncontrast 5 mm axial images were obtained through the brain. Sagittal and coronal reconstructions were obtained. All CT scans at this facility use dose modulation, iterative reconstruction, and/or weight-based dosing when appropriate to reduce radiation dose to as low as reasonably achievable. FINDINGS: There is mild atrophy and dilatation of the third and lateral ventricles. There is diminished attenuation in the white matter, most likely representing ischemic changes. There is calcification in the cavernous segments of both internal carotid arteries. There is no hemorrhage. There are no intra-or extra-axial collections. There is no  midline shift or mass effect. . The paranasal sinuses and mastoid air cells are normally aerated. There is no suspicious calvarial abnormality. 1. Mild atrophy and dilatation of the third and lateral ventricles.  2. Diminished attenuation in the white matter most likely representing ischemic changes. 3. Calcification in the cavernous segments of both internal carotid arteries. 4. Otherwise negative noncontrast CT scan of the brain. . **This report has been created using voice recognition software. It may contain minor errors which are inherent in voice recognition technology. ** Final report electronically signed by DR Yue Borwne on 6/15/2021 8:44 AM    CTA CHEST W WO CONTRAST    Result Date: 7/12/2021  PROCEDURE: CTA CHEST W WO CONTRAST, CT ABDOMEN PELVIS W IV CONTRAST CLINICAL INFORMATION: sob . Periumbilical pain. Loss of appetite. COMPARISON: CTA chest, abdomen and pelvis dated 5/21/2018. TECHNIQUE: Helical CT of the chest during fast bolus administration of 80 mL Isovue-370 injected in the left [facility] with thick sagittal and coronal maximum intensity projection reconstructions. Subsequent helical CT of the abdomen and pelvis with sagittal coronal reconstructions. All CT scans at this facility use dose modulation, iterative reconstruction, and/or weight-based dosing when appropriate to reduce radiation dose to as low as reasonably achievable. FINDINGS:  Chest: There is a good contrast bolus within the pulmonary arteries, adequate for evaluation to the subsegmental level. There are no focal filling defects within the pulmonary arteries to suggest pulmonary embolus. The main pulmonary arteries are enlarged. There is mural calcification in the aorta. The ascending aorta is mildly enlarged measuring 3.7 cm in greatest short axis diameter the level of the main pulmonary artery. There is no evidence of dissection. There are moderate right and small left pleural  effusions with mild adjacent atelectasis. There are moderate emphysematous changes predominantly in the upper lungs which have worsened in the interval compared to prior exam. There are scattered distal lymph nodes some which are calcified, stable compared to prior exam. The heart is enlarged, increased compared to previous CT. There is a left-sided cardiac pacer/defibrillator generator in the subcutaneous fat overlying the left pectoralis muscle, similar to prior exam. There are stable leads in the right atrium and right ventricle, respectively. There are mild anterior wedge shape configuration's of the T6-T8 vertebral bodies with approximately 10% anterior height loss, stable compared to prior exam. There are stable degenerative changes of the  thoracic spine. Abdomen/pelvis: There are few scattered cysts in the liver, stable compared to prior exam. There is a calcified stone in the gallbladder, stable compared to prior exam. Adrenal glands are unremarkable. The kidneys are atrophied, stable compared to prior exam. The spleen  is unremarkable. There is mild ductal prominence in the pancreas, unchanged compared to prior exam. No definite pancreatic lesion is identified. No retroperitoneal or mesenteric lymphadenopathy is identified. There is mural calcification in the aorta without evidence of aneurysm. There is moderately prominent stool within the large bowel. Small bowel appears within normal limits. The appendix is normal in appearance. The bladder is nondistended. There is diffuse wall thickening the bladder which may represent pseudothickening from underdistention. The prostate is prominently enlarged, increased compared to prior exam. There is an asymmetrically enhancing area in the left prostate. No free fluid is identified. There are stable degenerative changes of lumbar spine. There is stable bone island in the right ilium. 1. No evidence of pulmonary embolus. 2. Moderate right and small left pleural effusions with adjacent atelectasis. 3. Ascending aortic aneurysm measuring 3.7 cm in greatest diameter. 4. Pulmonary artery hypertension. 5. Cardiomegaly, increased compared to prior CT. 6. Prostatomegaly with enhancing component on the left. 7. Cholelithiasis. **This report has been created using voice recognition software. It may contain minor errors which are inherent in voice recognition technology. ** Final report electronically signed by Dr. Michelle Fiore MD on 7/12/2021 11:43 AM    CT ABDOMEN PELVIS W IV CONTRAST Additional Contrast? None    Result Date: 7/12/2021  PROCEDURE: CTA CHEST W WO CONTRAST, CT ABDOMEN PELVIS W IV CONTRAST CLINICAL INFORMATION: sob . Periumbilical pain. Loss of appetite. COMPARISON: CTA chest, abdomen and pelvis dated 5/21/2018. TECHNIQUE: Helical CT of the chest during fast bolus administration of 80 mL Isovue-370 injected in the left [facility] with thick sagittal and coronal maximum intensity projection reconstructions. Subsequent helical CT of the abdomen and pelvis with sagittal coronal reconstructions. All CT scans at this facility use dose modulation, iterative reconstruction, and/or weight-based dosing when appropriate to reduce radiation dose to as low as reasonably achievable. FINDINGS:  Chest: There is a good contrast bolus within the pulmonary arteries, adequate for evaluation to the subsegmental level. There are no focal filling defects within the pulmonary arteries to suggest pulmonary embolus. The main pulmonary arteries are enlarged. There is mural calcification in the aorta. The ascending aorta is mildly enlarged measuring 3.7 cm in greatest short axis diameter the level of the main pulmonary artery. There is no evidence of dissection. There are moderate right and small left pleural  effusions with mild adjacent atelectasis. There are moderate emphysematous changes predominantly in the upper lungs which have worsened in the interval compared to prior exam. There are scattered distal lymph nodes some which are calcified, stable compared to prior exam. The heart is enlarged, increased compared to previous CT.  There is a left-sided cardiac pacer/defibrillator generator in the subcutaneous fat overlying the left pectoralis muscle, similar to prior exam. There are stable leads in the right atrium and right ventricle, respectively. There are mild anterior wedge shape configuration's of the T6-T8 vertebral bodies with approximately 10% anterior height loss, stable compared to prior exam. There are stable degenerative changes of the  thoracic spine. Abdomen/pelvis: There are few scattered cysts in the liver, stable compared to prior exam. There is a calcified stone in the gallbladder, stable compared to prior exam. Adrenal glands are unremarkable. The kidneys are atrophied, stable compared to prior exam. The spleen  is unremarkable. There is mild ductal prominence in the pancreas, unchanged compared to prior exam. No definite pancreatic lesion is identified. No retroperitoneal or mesenteric lymphadenopathy is identified. There is mural calcification in the aorta without evidence of aneurysm. There is moderately prominent stool within the large bowel. Small bowel appears within normal limits. The appendix is normal in appearance. The bladder is nondistended. There is diffuse wall thickening the bladder which may represent pseudothickening from underdistention. The prostate is prominently enlarged, increased compared to prior exam. There is an asymmetrically enhancing area in the left prostate. No free fluid is identified. There are stable degenerative changes of lumbar spine. There is stable bone island in the right ilium. 1. No evidence of pulmonary embolus. 2. Moderate right and small left pleural effusions with adjacent atelectasis. 3. Ascending aortic aneurysm measuring 3.7 cm in greatest diameter. 4. Pulmonary artery hypertension. 5. Cardiomegaly, increased compared to prior CT. 6. Prostatomegaly with enhancing component on the left. 7. Cholelithiasis. **This report has been created using voice recognition software. It may contain minor errors which are inherent in voice recognition technology. ** Final report electronically signed by Dr. Yadira Bryant MD on 7/12/2021 11:43 AM    XR CHEST PORTABLE    Result Date: 7/12/2021  PROCEDURE: XR CHEST PORTABLE CLINICAL INFORMATION: 77-year-old male with shortness of breath. COMPARISON: Chest x-ray 7/12/2021. TECHNIQUE: AP semiupright view of the chest was obtained. FINDINGS: There is a dual-chamber cardiac device over the left hemithorax. There is cardiomegaly and pulmonary vascular congestion. There are small bilateral pleural effusions. There is no pneumothorax. Visualized portions of the upper abdomen are within normal limits. The osseous structures are intact. No acute fractures or suspicious osseous lesions. Cardiomegaly with pulmonary vascular congestion and small bilateral pleural effusions. **This report has been created using voice recognition software. It may contain minor errors which are inherent in voice recognition technology. ** Final report electronically signed by Dr Claudene Patch on 7/12/2021 9:50 PM    XR CHEST PORTABLE    Result Date: 7/12/2021  PROCEDURE: XR CHEST PORTABLE CLINICAL INFORMATION: SOB. COMPARISON: 7/7/2021. TECHNIQUE: AP upright view of the chest. FINDINGS: There is stable left sided cardiac pacer generator with 2 leads. There is a persistent small left pleural effusion. The lungs appear grossly clear. The cardiac silhouette is mildly prominent, stable compared to prior exam. Visualized osseous structures appear grossly intact. Stable small left pleural effusion. **This report has been created using voice recognition software. It may contain minor errors which are inherent in voice recognition technology. ** Final report electronically signed by Dr. Lauren Prather MD on 7/12/2021 9:40 AM    XR CHEST PORTABLE    Result Date: 6/15/2021  PROCEDURE: XR CHEST PORTABLE CLINICAL INFORMATION: decreaed breath sounds. COMPARISON: 6/7/2021.  TECHNIQUE: AP upright view of the chest. FINDINGS: Redemonstration of left sided cardiac pacer generator with 2 leads, stable compared to prior exam. Pulmonary vessels are mildly

## 2021-07-13 NOTE — H&P
Medicine Admission History & Physical      Patient:  Juanito Escamilla  YOB: 1930  Date of Service: 7/12/2021  MRN: [id number]   Acct: [de-identified]   Primary Care Physician: Edwige Frank MD    Admitting Faculty MD: James Tinoco MD    Code Status: Full Code No additional code details    Date of Service: Pt seen/examined in consultation on 7/12/2021     General Medicine History and Physical     History provided by: patient and chart review  History limited by: pt condition  Patient presents from: dialysis [facility]    Chief Complaint with Duration:  Shortness of breath and chest pain since last night (07/12)    History of Present Illness:  Juanito Escamilla is a 80 y.o. male with a PMH of Restrictive Lung Disease, HLD, HTN, SSS, CAD, and systolic HF who presented with chest pain and shortness of breath since last night. He states that this morning during dialysis, he began to have elevated blood pressures and worsening shortness of breath. He also noted some intermittent stomach pain. He described the chest pain as located mostly over his right shoulder. Through chart review, this shoulder pain has been a chronic problem for which previous imaging demonstrated a chronic R shoulder tendinopathy. The SOB he notes started suddenly. Pt also endorses headache but notes he often gets them. Denies worsening with exertion, associated cough, fever, chills, fatigue, nausea, vomiting, weight changes, wheezing, hemoptysis, lightheadedness, dizziness, or leg swelling. Of note, pt has a LLE boot from an ankle fracture 3 weeks ago s/p fall. Summarized ED course: Initial vital signs were stable except for elevated BP of 146/95 with max of 865 systolic, and multiple runs of NSVT. Admission Labs:   Hyperkalemia (6.1)   BUN 29   sCr 6.4   Rest of BMP + Mg/Phos/Sathya wnl.     Pro-BNP >700   Trop 0.08   Hgb 12.5, MCV 91.8    Admission Imaging:   CTA Chest, Abdomen, and Pelvis 07/12  o No evidence of PE  o Moderate R and Small L pleural effusion w/ adjacent atelectasis  o Ascending aortic aneurysm measuring 3.7 cm in greatest diameter  o Pulmonary artery HTN  o Cardiomegaly, increased vs prior  o Cholelithiasis  o Prostatomegaly w/ enhancing component on left    Assessment / Plan     #Hypertensive Urgency, Active  #Ascending Aortic Aneurysm, Active  Presented with systolic BP max of 022 however this resolved without medication to 262D systolic. On hydralazine 25 mg BID, lisinopril 20 mg daily, Isosorbide dinitrate 30 mg daily, and metoprolol tartrate 50 mg BID. No evidence of end organ damage. Of note, pt does have an ascending aortic aneurysm on CTA measuring 3.7 cm which was not present in 2018. No evidence of end organ damage at this time. Plan:  - Hold hydralazine  - Continue metoprolol  - Continue lisinopril  - Continue isosorbide dinitrate  - Allow for some level of permissive hypertension  - Hold metoprolol and isosorbide dinitrate if BP <120/80  - Will consider consult to CT Surgery for evaluation and management of ascending aortic aneurysm. #NSVT, Active  #Sick Sinus Syndrome  Pt presented with NSVT on EKG and telemetry. H/o sick sinus syndrome s/p PPM in 2013 last interrogated in 05/2021 however data was never documented. 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 Basia Garsia. He missed his morning dialysis due to elevated BP and arrived in ED. Pt presented with K of 6.1. Otherwise hemodynamically stable.    Plan:  - Consult Nephrology; appreciate recs     + Will continue to dialyze     + 2K HD bath due to hyperkalemia  - BMP q12h to monitor for electrolyte abnormalities     #Decompensated Systolic Heart Failure with EF 45 on (05/2021), NYHA Class III, Active  CT Chest 07/12 demonstrated moderate R and small L pleural effusions with adjacent atelectasis. No rales on bilateral auscultation of all lung fields. No JVD, hepatojugular reflex, or peripheral edema. Pt is on the following HF Mortality Modifiers: lisinopril,  and metoprolol, . Pt is on the following diuretics: none. Follows in HF Clinic: No.  Plan:   - Cardiology consulted as above  - Strict I&Os  - Daily standing weights  - Nephrology consulted to remove fluid via dialysis as patient will not respond to diuretics given ESRD  - Repeat Echo, limited to assess for progression of systolic heart failure    #Coronary Artery Disease  #Hyperlipidemia  LHC in 05/2018 demonstrated patent L main artery, 100% occlusion of RCA, diffuse disease of LCX with patient mid circumflex stent, LAD with 50% stenosis in mid and distal segments, and 80% stenosis in distal LAD at apex. Cardiac stress test in 05/2021 demonstrated no chest pain or induced arrhythmia during stress via lexiscan. Last lipid panel 09/2020: total cholesterol: 222, HDL 63, , and TRG 92. On pravastatin 80 mg daily. Plan:  - Continue pravastatin 80 mg daily  - Will continue to monitor     #Restrictive Lung Disease, Inactive  PFTs in 2012 demonstrated elevated FEV1/FVC ratio of 112% predicted consistent with RLD. Etiology uncertain. No follow up PFTs. Unlikely cause of pt's SOB  Plan: Will continue to monitor    #Elevated Troponin, Stable  Presented with troponin 0.08. This is his baseline. Likely elevated due to ESRD. Plan: Will continue to monitor    #Elevated pro-BNP, Stable  Presented with pro-BNP of >70,000. This is his baseline. Likely elevated due to ESRD. Plan:  Will continue to monitor    Fluids: none  Electrolyte: Replete as needed   Nutrition:  Regular, 3 carb choice, 2g sodium restruction, <3g potassium, <1g phos  GI: none  DVT ppx:  Heparin, renally dose; pharmacy consulted  Code status: Full Code No additional code details    Admit to: 4K    Past Medical History Past Surgical History    has a past medical history of Anemia in chronic kidney disease(285.21), Arthritis, CAD (coronary artery disease), Chronic kidney disease, Chronic kidney disease, stage IV (severe) ([facility]), CKD (chronic kidney disease) stage 3, GFR 30-59 ml/min ([facility]), COPD (chronic obstructive pulmonary disease) ([address] GI bleed, Hemodialysis patient ([address] History of blood transfusion, Hyperlipidemia, Hyperphosphatemia, Hypertension, Sick sinus syndrome ([address] and Systolic congestive heart failure ([address] has a past surgical history that includes pacemaker placement (2013); Endoscopy, colon, diagnostic; Dialysis fistula creation (5/6/2016); Colonoscopy (3221,1360); Upper gastrointestinal endoscopy (5545,6478); Coronary angioplasty with stent (2004); pr esophagogastroduodenoscopy transoral diagnostic (N/A, 1/18/2018); vascular surgery; Cardiac surgery; Upper gastrointestinal endoscopy (Left, 6/23/2019); and Colonoscopy (N/A, 6/27/2019). Social History Family History    reports that he quit smoking about 39 years ago. He has a 40.00 pack-year smoking history. He has never used smokeless tobacco. He reports that he does not drink alcohol and does not use drugs. family history includes Diabetes in his mother and sister; Heart Disease in his mother; Mental Illness in his paternal aunt. - Lives w/: alone  - Occupation: retired  - Travel: no  - Pets: no  - Diet: ADULT DIET; Regular; 3 carb choices (45 gm/meal); Low Sodium (2 gm); Low Potassium (Less than 3000 mg/day); Low Phosphorus (Less than 1000 mg);  Less than 60 gm; 2000 ml    Medications    sodium polystyrene        pravastatin  80 mg Oral Daily    metoprolol tartrate  50 mg Oral BID    gabapentin  100 mg Oral TID    [START ON 7/13/2021] folbee plus  1 tablet Oral Daily    docusate sodium  100 mg Oral BID    [START ON 7/13/2021] ferrous sulfate  325 mg Oral Daily with breakfast    [START ON 7/13/2021] vitamin C  500 mg Oral Daily    [START ON 7/13/2021] pantoprazole  40 mg Oral QAM AC    cinacalcet  60 mg Oral Once per day on Mon Wed Fri    calcitRIOL  1.5 mcg Oral Once per day on Mon Wed Fri    isosorbide mononitrate  30 mg Oral Daily    sodium chloride flush  5-40 mL Intravenous 2 times per day    heparin (porcine)  5,000 Units Subcutaneous 3 times per day    [START ON 7/13/2021] polyethylene glycol  17 g Oral Daily      dextrose      sodium chloride       glucose, dextrose, glucagon (rDNA), dextrose, nitroGLYCERIN, traMADol, sodium chloride flush, sodium chloride, acetaminophen **OR** acetaminophen, promethazine **OR** ondansetron     Allergies   No known allergies     Immunizations   Immunization History   Administered Date(s) Administered    Influenza Virus Vaccine 10/01/2018    Pneumococcal Conjugate 13-valent ([id number]) 04/13/2017    Pneumococcal Polysaccharide ([id number]) 10/16/2017        Review of Systems: 12 point review of systems negative except as above. Objective     Vitals:  BP (!) 145/91   Pulse 101   Temp 98.1 °F (36.7 °C) (Rectal)   Resp 18   Ht 5' 6\" (1.676 m)   Wt 158 lb 11.7 oz (72 kg)   SpO2 96%   BMI 25.62 kg/m²     Physical Exam  Constitutional:       Appearance: He is obese. He is ill-appearing. He is not diaphoretic. HENT:      Head: Normocephalic and atraumatic. Mouth/Throat:      Mouth: Mucous membranes are moist.      Pharynx: Oropharynx is clear. No oropharyngeal exudate. Eyes:      General: Scleral icterus present. Extraocular Movements: Extraocular movements intact. Pupils: Pupils are equal, round, and reactive to light. Cardiovascular:      Rate and Rhythm: Tachycardia present. Rhythm irregular. Pulses: Normal pulses. Heart sounds: Normal heart sounds. No murmur heard. No friction rub. No gallop. Comments: JVD not present  Pulmonary:      Effort: Pulmonary effort is normal.      Breath sounds: Normal breath sounds. No wheezing, rhonchi or rales.    Abdominal:      General: Bowel sounds are normal.      Palpations: Abdomen is soft. There is no hepatomegaly or splenomegaly. Tenderness: There is abdominal tenderness (Mild, intermittent). There is no guarding or rebound. Comments: Negative hepatojugular reflex. Musculoskeletal:         General: No swelling. Right lower leg: No edema. Left lower leg: No edema. Skin:     General: Skin is warm and dry. Coloration: Skin is not jaundiced. Neurological:      General: No focal deficit present. Mental Status: He is alert. Psychiatric:         Mood and Affect: Mood normal.         Behavior: Behavior normal.       Lines:  - L Arm PIV 07/12  - HD Fistula on R arm ? Labs:   Recent Labs     Units 07/12/21 2019 07/12/21  0915   WBC thou/mm3 6.1 7.5   HGB gm/dl 12.1* 12.5*   HCT % 34.5* 37.0*   PLT thou/mm3 158 188   MPV fL 11.5 10.9   EOSABS thou/mm3  --  0.3   LYMPHSABS thou/mm3  --  0.8*   MONOPCT %  --  7.8   MONOSABS thou/mm3  --  0.6   IMMGRAN thou/mm3  --  0.4  0.03   NRBC /100 wbc  --  0     Recent Labs     Units 07/12/21 2020 07/12/21  1136 07/12/21  0915   NA meq/L 137  --  136   K meq/L 3.7  --  6.1*   CL meq/L 96*  --  91*   CO2 meq/L 27  --  31   BUN mg/dL 13  --  29*   CREATININE mg/dL 4.4*  --  6.4*   GLUCOSE mg/dL 94 79 99   PHOS mg/dL 2.3*  --  3.0   MG mg/dL 2.0  --  2.3     Recent Labs     Units 07/12/21 2020 07/12/21 0915   BILITOT mg/dL 0.6 0.6   BILIDIR mg/dL  --  <0.2   PROT g/dL 7.0 7.6   AST U/L 32 42*     Recent Labs     Units 07/12/21  2119   PH  7.55*   PO2 mmhg 117*     No results for input(s): ABO, RH in the last 72 hours. No results for input(s): AMYLASE in the last 72 hours. Recent Labs     Units 07/12/21  0915   OSMOLALITY mOsmol/kg 277.8     Diagnostics:  CTA CHEST W WO CONTRAST    Result Date: 7/12/2021  PROCEDURE: CTA CHEST W WO CONTRAST, CT ABDOMEN PELVIS W IV CONTRAST CLINICAL INFORMATION: sob . Periumbilical pain. Loss of appetite. COMPARISON: CTA chest, abdomen and pelvis dated 5/21/2018.  TECHNIQUE: Helical CT of the chest during fast bolus administration of 80 mL Isovue-370 injected in the left [facility] with thick sagittal and coronal maximum intensity projection reconstructions. Subsequent helical CT of the abdomen and pelvis with sagittal coronal reconstructions. All CT scans at this facility use dose modulation, iterative reconstruction, and/or weight-based dosing when appropriate to reduce radiation dose to as low as reasonably achievable. FINDINGS:  Chest: There is a good contrast bolus within the pulmonary arteries, adequate for evaluation to the subsegmental level. There are no focal filling defects within the pulmonary arteries to suggest pulmonary embolus. The main pulmonary arteries are enlarged. There is mural calcification in the aorta. The ascending aorta is mildly enlarged measuring 3.7 cm in greatest short axis diameter the level of the main pulmonary artery. There is no evidence of dissection. There are moderate right and small left pleural  effusions with mild adjacent atelectasis. There are moderate emphysematous changes predominantly in the upper lungs which have worsened in the interval compared to prior exam. There are scattered distal lymph nodes some which are calcified, stable compared to prior exam. The heart is enlarged, increased compared to previous CT. There is a left-sided cardiac pacer/defibrillator generator in the subcutaneous fat overlying the left pectoralis muscle, similar to prior exam. There are stable leads in the right atrium and right ventricle, respectively. There are mild anterior wedge shape configuration's of the T6-T8 vertebral bodies with approximately 10% anterior height loss, stable compared to prior exam. There are stable degenerative changes of the  thoracic spine. Abdomen/pelvis: There are few scattered cysts in the liver, stable compared to prior exam. There is a calcified stone in the gallbladder, stable compared to prior exam. Adrenal glands are unremarkable. The kidneys are atrophied, stable compared to prior exam. The spleen  is unremarkable. There is mild ductal prominence in the pancreas, unchanged compared to prior exam. No definite pancreatic lesion is identified. No retroperitoneal or mesenteric lymphadenopathy is identified. There is mural calcification in the aorta without evidence of aneurysm. There is moderately prominent stool within the large bowel. Small bowel appears within normal limits. The appendix is normal in appearance. The bladder is nondistended. There is diffuse wall thickening the bladder which may represent pseudothickening from underdistention. The prostate is prominently enlarged, increased compared to prior exam. There is an asymmetrically enhancing area in the left prostate. No free fluid is identified. There are stable degenerative changes of lumbar spine. There is stable bone island in the right ilium. 1. No evidence of pulmonary embolus. 2. Moderate right and small left pleural effusions with adjacent atelectasis. 3. Ascending aortic aneurysm measuring 3.7 cm in greatest diameter. 4. Pulmonary artery hypertension. 5. Cardiomegaly, increased compared to prior CT. 6. Prostatomegaly with enhancing component on the left. 7. Cholelithiasis. **This report has been created using voice recognition software. It may contain minor errors which are inherent in voice recognition technology. ** Final report electronically signed by Dr. Randolph Murrieta MD on 7/12/2021 11:43 AM    CT ABDOMEN PELVIS W IV CONTRAST Additional Contrast? None    Result Date: 7/12/2021  PROCEDURE: CTA CHEST W WO CONTRAST, CT ABDOMEN PELVIS W IV CONTRAST CLINICAL INFORMATION: sob . Periumbilical pain. Loss of appetite. COMPARISON: CTA chest, abdomen and pelvis dated 5/21/2018. TECHNIQUE: Helical CT of the chest during fast bolus administration of 80 mL Isovue-370 injected in the left [facility] with thick sagittal and coronal maximum intensity projection reconstructions. Subsequent helical CT of the abdomen and pelvis with sagittal coronal reconstructions. All CT scans at this facility use dose modulation, iterative reconstruction, and/or weight-based dosing when appropriate to reduce radiation dose to as low as reasonably achievable. FINDINGS:  Chest: There is a good contrast bolus within the pulmonary arteries, adequate for evaluation to the subsegmental level. There are no focal filling defects within the pulmonary arteries to suggest pulmonary embolus. The main pulmonary arteries are enlarged. There is mural calcification in the aorta. The ascending aorta is mildly enlarged measuring 3.7 cm in greatest short axis diameter the level of the main pulmonary artery. There is no evidence of dissection. There are moderate right and small left pleural  effusions with mild adjacent atelectasis. There are moderate emphysematous changes predominantly in the upper lungs which have worsened in the interval compared to prior exam. There are scattered distal lymph nodes some which are calcified, stable compared to prior exam. The heart is enlarged, increased compared to previous CT. There is a left-sided cardiac pacer/defibrillator generator in the subcutaneous fat overlying the left pectoralis muscle, similar to prior exam. There are stable leads in the right atrium and right ventricle, respectively. There are mild anterior wedge shape configuration's of the T6-T8 vertebral bodies with approximately 10% anterior height loss, stable compared to prior exam. There are stable degenerative changes of the  thoracic spine. Abdomen/pelvis: There are few scattered cysts in the liver, stable compared to prior exam. There is a calcified stone in the gallbladder, stable compared to prior exam. Adrenal glands are unremarkable. The kidneys are atrophied, stable compared to prior exam. The spleen  is unremarkable.  There is mild ductal prominence in the pancreas, unchanged compared to prior exam. No definite pancreatic lesion is identified. No retroperitoneal or mesenteric lymphadenopathy is identified. There is mural calcification in the aorta without evidence of aneurysm. There is moderately prominent stool within the large bowel. Small bowel appears within normal limits. The appendix is normal in appearance. The bladder is nondistended. There is diffuse wall thickening the bladder which may represent pseudothickening from underdistention. The prostate is prominently enlarged, increased compared to prior exam. There is an asymmetrically enhancing area in the left prostate. No free fluid is identified. There are stable degenerative changes of lumbar spine. There is stable bone island in the right ilium. 1. No evidence of pulmonary embolus. 2. Moderate right and small left pleural effusions with adjacent atelectasis. 3. Ascending aortic aneurysm measuring 3.7 cm in greatest diameter. 4. Pulmonary artery hypertension. 5. Cardiomegaly, increased compared to prior CT. 6. Prostatomegaly with enhancing component on the left. 7. Cholelithiasis. **This report has been created using voice recognition software. It may contain minor errors which are inherent in voice recognition technology. ** Final report electronically signed by Dr. Flores Gipson MD on 7/12/2021 11:43 AM    XR CHEST PORTABLE    Result Date: 7/12/2021  PROCEDURE: XR CHEST PORTABLE CLINICAL INFORMATION: SOB. COMPARISON: 7/7/2021. TECHNIQUE: AP upright view of the chest. FINDINGS: There is stable left sided cardiac pacer generator with 2 leads. There is a persistent small left pleural effusion. The lungs appear grossly clear. The cardiac silhouette is mildly prominent, stable compared to prior exam. Visualized osseous structures appear grossly intact. Stable small left pleural effusion. **This report has been created using voice recognition software. It may contain minor errors which are inherent in voice recognition technology. ** Final report electronically signed by Dr. Pablo Mckee MD on 2021 9:40 AM    ECG/Tele Events:   EK/12: NSVT, old septal infarct evident by q waves in V1 and V2    Micro:  No active infections    Signed:  Chino Giron MD  Internal Medicine, PGY-1  21  10:28 PM    Staff: Dr. Minesh Gillespie  PGY-2: Dr. Geovanny Valentine

## 2021-07-13 NOTE — CARE COORDINATION
Update: therapy recommendations 24h care, SNF versus HH; collaborated w PAWAN Anaya  Electronically signed by Getachew Cueva RN on 7/13/2021 at 3:05 PM

## 2021-07-13 NOTE — PROGRESS NOTES
[address] [facility] STEPDOWN TELEMETRY 4K  EVALUATION      Time In: 0207  Time Out: 1014  Timed Code Treatment Minutes: 40 Minutes  Minutes: 51      Date: 2021  Patient Name: Nnamdi Naidu,   Gender: male      MRN: [id number]  : 11/10/1930  (80 y.o.)  Referring Practitioner: Monika Escamilla MD  Diagnosis: Shortness of Breath  Additional Pertinent Hx: Nnamdi Naidu is a 80 y.o. male with a PMH of Restrictive Lung Disease, HLD, HTN, SSS, CAD, and systolic HF who presented with chest pain and shortness of breath since last night. He states that this morning during dialysis, he began to have elevated blood pressures and worsening shortness of breath. He also noted some intermittent stomach pain. He described the chest pain as located mostly over his right shoulder. Through chart review, this shoulder pain has been a chronic problem for which previous imaging demonstrated a chronic R shoulder tendinopathy. The SOB he notes started suddenly. Pt also endorses headache but notes he often gets them. Denies worsening with exertion, associated cough, fever, chills, fatigue, nausea, vomiting, weight changes, wheezing, hemoptysis, lightheadedness, dizziness, or leg swelling. Of note, pt has a LLE boot from an ankle fracture 3 weeks ago s/p fall. Restrictions/Precautions:  Restrictions/Precautions: General Precautions, Fall Risk  Required Braces or Orthoses  Left Lower Extremity Brace: Boot    Subjective  Chart Reviewed: Yes, Orders, Progress Notes, History and Physical  Patient assessed for rehabilitation services?: Yes  Family / Caregiver Present: Yes (daughter Toney Barragan present for initial eval portion but left during ADLs)    Subjective: JULI luong'ed session, pt resting in bedside chair at arrival and agreeable to OT.     Pain:  Pain Assessment  Patient Currently in Pain: Yes  Pain Level: 8 (R shoulder pain)    Vitals: Vitals continuously assessed throughout treatment: Heartrate: low 130s max   SP02: unable to receive accurate reading as pt's hands were very cold and unable to warm. RN aware   Respirations: 33/minute max    Social/Functional History:  Lives With: Daughter (currently residing with daughter d/t ankle injury and frequent 'episodes')  Type of Home: House  Home Layout: One level, Performs ADL's on one level  Home Access: Stairs to enter without rails  Entrance Stairs - Number of Steps: 1  Home Equipment: Rolling walker (uses 2ww now, did not use AD prior to ankle injury)   Bathroom Shower/Tub: Tub/Shower unit  Bathroom Toilet: Standard       ADL Assistance: Needs assistance  Homemaking Assistance: Needs assistance (daughter completes at this time, typically completes at home)  Homemaking Responsibilities: No  Ambulation Assistance: Independent (prior to ankle injury)  Transfer Assistance: Independent (prior to ankle injury)    Active : Yes     Additional Comments: Patient previously lived home alone prior to ankle injury, is now staying with daughter with pt and family unsure of plans to return home. Home environment collected above is regarding daughter's house. VISION:WNL    HEARING:  WNL    COGNITION: WFL    RANGE OF MOTION:  Bilateral Upper Extremity:  WFL    STRENGTH:   Bilateral Upper Extremity: 3+/5, grossly impaired    SENSATION:   WFL    ADL:   Bathing: Contact Guard Assistance. in standing for pt to wash ricky area, cued for PLB throughout task  Upper Extremity Dressing: Minimal Assistance. to don hospital gown  Lower Extremity Dressing: Stand By Assistance. to don/doff B socks seated. BALANCE:  Sitting Balance:  Stand By Assistance. seated for ADL routine  Standing Balance: Contact Guard Assistance. standing x30 seconds for ADLs    BED MOBILITY:  Not Tested    TRANSFERS:  Sit to Stand:  Contact Guard Assistance. from bedside chair  Stand to Sit: [address]  to bedside chair    FUNCTIONAL MOBILITY:  Assistive Device: Rolling Walker  Assist Level:  Contact Guard Assistance. Distance: Pt completed functional mobility short distance within room with CGA, no LOB noted. Activity Tolerance:  Patient tolerance of  treatment: fair. Pt required several rest breaks throughout session to return heartrate, respirations and SPO2 to norm levels, often demo'ing SOB with trunk flexion activities. Assessment:  Assessment: Pt presents requiring increased assistance for ADLs, transfers, and functional mobility compared to PLOF. Pt will continue to benefit from OT services to improve independence with these tasks, in addition to overall strength/endurance to facilitate return to PLOF. Performance deficits / Impairments: Decreased functional mobility , Decreased ADL status, Decreased endurance, Decreased balance, Decreased strength, Decreased high-level IADLs, Decreased safe awareness, Decreased ROM  Prognosis: Good  REQUIRES OT FOLLOW UP: Yes  Decision Making: Medium Complexity    Treatment Initiated: Treatment and education initiated within context of evaluation. Evaluation time included review of current medical information, gathering information related to past medical, social and functional history, completion of standardized testing, formal and informal observation of tasks, assessment of data and development of plan of care and goals. Treatment time included skilled education and facilitation of tasks to increase safety and independence with ADL's for improved functional independence and quality of life.     Discharge Recommendations:  Continue to assess pending progress, Subacute/Skilled Nursing Facility, Home with Home health OT, Patient would benefit from continued therapy after discharge, 24 hour supervision or assist    Patient Education:  OT Education: OT Role, Plan of Care, ADL Adaptive Strategies    Equipment Recommendations:  Equipment Needed: Yes (consider shower chair, raised toilet seat and LHAE pending progress)    Plan:  Times per week: 5x. See long-term goal time frame for expected duration of plan of care. If no long-term goals established, a short length of stay is anticipated. Goals:     Short term goals  Time Frame for Short term goals: by discharge  Short term goal 1: Pt will complete functional mobility to/from bathroom with 2ww and SBA in order to complete ADL routine. Short term goal 2: Pt will complete dynamic standing x5 minutes with BUE release and SBA in order to complete grooming tasks. Short term goal 3: Pt will complete LB ADLs with SBA in order to complete dressing/showering routine. Short term goal 4: Pt will complete BUE HEP with SBA in order to increase strength/endurance needed for ADL routine. Following session, patient left in safe position with all fall risk precautions in place.

## 2021-07-13 NOTE — FLOWSHEET NOTE
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

## 2021-07-13 NOTE — CONSULTS
Podiatric Surgery Consult    Patient Brenda Perez RECORD NUMBER:  [id number]  AGE: 80 y.o. GENDER: male  : 11/10/1930  EPISODE DATE:  2021    Reason for Consult:  Left ankle fracture follow up    Requesting Physician:  Dr. Rachel Butcher:  <principal problem not specified>    HISTORY OF PRESENT ILLNESS:                The patient is a 80 y.o. male with significant past medical history of CKD and hypertension who is being seen at bedside on behalf of Dr. Louisa Rodriguez. Patient states that he fell three weeks ago while fishing at [facility]. He walked on his ankle for three days prior to going to an urgent care on [address]. He states that he had his leg casted. He followed up with Dr. Louisa Rodriguez at his clinic. The patient has been wearing the CAM boot. The patient states that he has no pain with his left ankle. The patient has no N/V/F/C, SOB or chest pain.  The patient denies any other pedal complaints at this time       Immunizations:              Tetanus:  unknown      Past Medical History:    Past Medical History:   Diagnosis Date    Anemia in chronic kidney disease(285.21)     Arthritis     CAD (coronary artery disease)     Chronic kidney disease     [facility] kidney disease, stage IV (severe) [facility]     CKD (chronic kidney disease) stage 3, GFR 30-59 ml/min ([facility])     COPD [facility] obstructive [facility] disease) [facility]     GI bleed     [facility]od[facility] patient [facility]) 2016    @ [facility]    History of blood transfusion     2019    Hyperlipidemia     Hyperphosphatemia 2018    Hypertension     Sick sinus syndrome ([facility]     Systolic congestive heart failure [facility])         Past Surgical History:    Past Surgical History:   Procedure Laterality Date   [employer] CARDIAC SURGERY      COLONOSCOPY  ,    COLONOSCOPY N/A 2019    COLONOSCOPY DIAGNOSTIC performed by Anderson Babinski, MD at [address] [place] [address]  2004    DIALYSIS FISTULA CREATION  5/6/2016    right arm fistula placement    ENDOSCOPY, COLON, DIAGNOSTIC      PACEMAKER PLACEMENT  2013    MO ESOPHAGOGASTRODUODENOSCOPY TRANSORAL DIAGNOSTIC N/A 1/18/2018    EGD performed by Felice Correa MD at [facility] ENDOSCOPY  2006,2009    UPPER GASTROINTESTINAL ENDOSCOPY Left 6/23/2019    EGD DIAGNOSTIC ONLY performed by Sandip Spicer MD at [facility]    VASCULAR SURGERY          Current Medications:    Current Facility-Administered Medications: metoprolol (LOPRESSOR) tablet 100 mg, 100 mg, Oral, BID  glucose (GLUTOSE) 40 % oral gel 15 g, 15 g, Oral, PRN  dextrose 50 % IV solution, 12.5 g, Intravenous, PRN  glucagon (rDNA) injection 1 mg, 1 mg, Intramuscular, PRN  dextrose 5 % solution, 100 mL/hr, Intravenous, PRN  nitroGLYCERIN (NITROSTAT) SL tablet 0.4 mg, 0.4 mg, Sublingual, Q5 Min PRN  pravastatin (PRAVACHOL) tablet 80 mg, 80 mg, Oral, Daily  gabapentin (NEURONTIN) capsule 100 mg, 100 mg, Oral, TID  folbee plus tablet 1 tablet, 1 tablet, Oral, Daily  docusate sodium (COLACE) capsule 100 mg, 100 mg, Oral, BID  ferrous sulfate (IRON 325) tablet 325 mg, 325 mg, Oral, Daily with breakfast  ascorbic acid (VITAMIN C) tablet 500 mg, 500 mg, Oral, Daily  pantoprazole (PROTONIX) tablet 40 mg, 40 mg, Oral, QAM AC  traMADol (ULTRAM) tablet 50 mg, 50 mg, Oral, Q4H PRN  cinacalcet (SENSIPAR) tablet 60 mg, 60 mg, Oral, Once per day on Mon Wed Fri  calcitRIOL (ROCALTROL) capsule 1.5 mcg, 1.5 mcg, Oral, Once per day on Mon Wed Fri  isosorbide mononitrate (IMDUR) extended release tablet 30 mg, 30 mg, Oral, Daily  sodium chloride flush 0.9 % injection 5-40 mL, 5-40 mL, Intravenous, 2 times per day  sodium chloride flush 0.9 % injection 5-40 mL, 5-40 mL, Intravenous, PRN  0.9 % sodium chloride infusion, 25 mL, Intravenous, PRN  heparin (porcine) injection 5,000 Units, 5,000 Units, Subcutaneous, 3 times per day  acetaminophen (TYLENOL) tablet 650 mg, 650 mg, Oral, Q6H PRN **OR** acetaminophen (TYLENOL) suppository 650 mg, 650 mg, Rectal, Q6H PRN  promethazine (PHENERGAN) tablet 12.5 mg, 12.5 mg, Oral, Q6H PRN **OR** ondansetron (ZOFRAN) injection 4 mg, 4 mg, Intravenous, Q6H PRN  polyethylene glycol (GLYCOLAX) packet 17 g, 17 g, Oral, Daily    Allergies:  No known allergies    Social History:    Social History     Socioeconomic History    Marital status:      Spouse name: Apryl Brumfield Number of children: None    Years of education: None    Highest education level: None   Occupational History    None   Tobacco Use    Smoking status: Former Smoker     Packs/day: 1.00     Years: 40.00     Pack years: 40.00     Quit date: 1982     Years since quittin.4    Smokeless tobacco: Never Used   Vaping Use    Vaping Use: Never used   Substance and Sexual Activity    Alcohol use: No    Drug use: No    Sexual activity: None   Other Topics Concern    None   Social History Narrative    None     Social Determinants of Health     Financial Resource Strain:     Difficulty of Paying Living Expenses:    Food Insecurity:     Worried About Running Out of Food in the Last Year:     Ran Out of Food in the Last Year:    Transportation Needs:     Lack of Transportation (Medical):  Lack of Transportation (Non-Medical):    Physical Activity:     Days of Exercise per Week:     Minutes of Exercise per Session:    Stress:     Feeling of Stress :    Social Connections:     Frequency of Communication with Friends and Family:     Frequency of Social Gatherings with Friends and Family:     Attends Denominational Services:     Active Member of Clubs or Organizations:     Attends Club or Organization Meetings:     Marital Status:    Intimate Partner Violence:     Fear of Current or Ex-Partner:     Emotionally Abused:     Physically Abused:     Sexually Abused:        Family History:   family history includes Diabetes in his mother and sister;  Heart Disease in his mother; Mental Illness in his paternal aunt. REVIEW OF SYSTEMS:    General appearance:  No apparent distress, appears stated age and cooperative. Respiratory:  Normal respiratory effort on room air. Psychiatric:  Alert and oriented, thought content appropriate, normal insight    PHYSICAL EXAM:      Vitals:    /71   Pulse 76   Temp 97.8 °F (36.6 °C) (Axillary)   Resp 17   Ht 5' 6\" (1.676 m)   Wt 150 lb 9.6 oz (68.3 kg)   SpO2 97%   BMI 24.31 kg/m²     Exam:     Vascular: Dorsalis pedis and posterior tibial pulses are palpable bilaterally. Skin temperature is warm  to cool from proximal tibial tuberosity to distal digits. CFT brisk to exposed digits. Mild edema noted to the left ankle. Hair growth absent. Quality of skin wnl. Dermatologic: No open lesions, rashes or subcutaneous nodules of note. Neurovascular: Epicritic and protopathic sensation present b/l. Musculoskeletal: Muscle strength 4/5 for all plantarflexors, dorsiflexors, inverters and everters examined. Full ROM noted at the ankle joint bilaterally. No pain with palpation of the left ankle or foot. There is rectus deformity to the left foot and ankle. IMAGING    Pending left foot x-rays for WB status. XR CHEST (2 VW)    Result Date: 7/7/2021  PROCEDURE: XR CHEST (2 VW) CLINICAL INFORMATION: Dyspnea, unspecified type COMPARISON: Chest x-ray Rose 15, 2021 TECHNIQUE: PA and lateral views of the chest performed. FINDINGS: POSTSURGICAL CHANGES: None. LINES/TUBES/MECHANICAL DEVICES: Dual lead cardiac assist device. TRACHEA/HEART/MEDIASTINUM/HILUM: Unremarkable. LUNG RICH: Hyperlucent with interstitial prominence. Small bilateral effusions. OTHER: None. PNEUMOTHORAX: None. OSSEOUS STRUCTURES: 1. No acute osseous abnormality. 1.  Small bilateral pleural effusions with associated atelectasis, left more than right. **This report has been created using voice recognition software.   It may contain minor errors which are inherent in voice recognition technology. ** Final report electronically signed by Dr. Kin Kramer on 7/7/2021 12:17 PM    CT Head WO Contrast    Result Date: 6/15/2021  PROCEDURE: CT HEAD WO CONTRAST CLINICAL INFORMATION: severe headache and hypertension. COMPARISON: CT scan of the brain dated 21 May 2018. TECHNIQUE: Noncontrast 5 mm axial images were obtained through the brain. Sagittal and coronal reconstructions were obtained. All CT scans at this facility use dose modulation, iterative reconstruction, and/or weight-based dosing when appropriate to reduce radiation dose to as low as reasonably achievable. FINDINGS: There is mild atrophy and dilatation of the third and lateral ventricles. There is diminished attenuation in the white matter, most likely representing ischemic changes. There is calcification in the cavernous segments of both internal carotid arteries. There is no hemorrhage. There are no intra-or extra-axial collections. There is no  midline shift or mass effect. . The paranasal sinuses and mastoid air cells are normally aerated. There is no suspicious calvarial abnormality. 1. Mild atrophy and dilatation of the third and lateral ventricles. 2. Diminished attenuation in the white matter most likely representing ischemic changes. 3. Calcification in the cavernous segments of both internal carotid arteries. 4. Otherwise negative noncontrast CT scan of the brain. . **This report has been created using voice recognition software. It may contain minor errors which are inherent in voice recognition technology. ** Final report electronically signed by DR Anayeli Heredia on 6/15/2021 8:44 AM    CTA CHEST W WO CONTRAST    Result Date: 7/12/2021  PROCEDURE: CTA CHEST W WO CONTRAST, CT ABDOMEN PELVIS W IV CONTRAST CLINICAL INFORMATION: sob . Periumbilical pain. Loss of appetite. COMPARISON: CTA chest, abdomen and pelvis dated 5/21/2018.  TECHNIQUE: Helical CT of the chest during fast bolus administration of 80 mL Isovue-370 injected in the left [facility] with thick sagittal and coronal maximum intensity projection reconstructions. Subsequent helical CT of the abdomen and pelvis with sagittal coronal reconstructions. All CT scans at this facility use dose modulation, iterative reconstruction, and/or weight-based dosing when appropriate to reduce radiation dose to as low as reasonably achievable. FINDINGS:  Chest: There is a good contrast bolus within the pulmonary arteries, adequate for evaluation to the subsegmental level. There are no focal filling defects within the pulmonary arteries to suggest pulmonary embolus. The main pulmonary arteries are enlarged. There is mural calcification in the aorta. The ascending aorta is mildly enlarged measuring 3.7 cm in greatest short axis diameter the level of the main pulmonary artery. There is no evidence of dissection. There are moderate right and small left pleural  effusions with mild adjacent atelectasis. There are moderate emphysematous changes predominantly in the upper lungs which have worsened in the interval compared to prior exam. There are scattered distal lymph nodes some which are calcified, stable compared to prior exam. The heart is enlarged, increased compared to previous CT. There is a left-sided cardiac pacer/defibrillator generator in the subcutaneous fat overlying the left pectoralis muscle, similar to prior exam. There are stable leads in the right atrium and right ventricle, respectively. There are mild anterior wedge shape configuration's of the T6-T8 vertebral bodies with approximately 10% anterior height loss, stable compared to prior exam. There are stable degenerative changes of the  thoracic spine. Abdomen/pelvis: There are few scattered cysts in the liver, stable compared to prior exam. There is a calcified stone in the gallbladder, stable compared to prior exam. Adrenal glands are unremarkable.  The kidneys are atrophied, stable compared to prior exam. The spleen is unremarkable. There is mild ductal prominence in the pancreas, unchanged compared to prior exam. No definite pancreatic lesion is identified. No retroperitoneal or mesenteric lymphadenopathy is identified. There is mural calcification in the aorta without evidence of aneurysm. There is moderately prominent stool within the large bowel. Small bowel appears within normal limits. The appendix is normal in appearance. The bladder is nondistended. There is diffuse wall thickening the bladder which may represent pseudothickening from underdistention. The prostate is prominently enlarged, increased compared to prior exam. There is an asymmetrically enhancing area in the left prostate. No free fluid is identified. There are stable degenerative changes of lumbar spine. There is stable bone island in the right ilium. 1. No evidence of pulmonary embolus. 2. Moderate right and small left pleural effusions with adjacent atelectasis. 3. Ascending aortic aneurysm measuring 3.7 cm in greatest diameter. 4. Pulmonary artery hypertension. 5. Cardiomegaly, increased compared to prior CT. 6. Prostatomegaly with enhancing component on the left. 7. Cholelithiasis. **This report has been created using voice recognition software. It may contain minor errors which are inherent in voice recognition technology. ** Final report electronically signed by Dr. Didier Gan MD on 7/12/2021 11:43 AM    CT ABDOMEN PELVIS W IV CONTRAST Additional Contrast? None    Result Date: 7/12/2021  PROCEDURE: CTA CHEST W WO CONTRAST, CT ABDOMEN PELVIS W IV CONTRAST CLINICAL INFORMATION: sob . Periumbilical pain. Loss of appetite. COMPARISON: CTA chest, abdomen and pelvis dated 5/21/2018. TECHNIQUE: Helical CT of the chest during fast bolus administration of 80 mL Isovue-370 injected in the left [facility] with thick sagittal and coronal maximum intensity projection reconstructions.  Subsequent helical CT of the abdomen and pelvis with sagittal coronal reconstructions. All CT scans at this facility use dose modulation, iterative reconstruction, and/or weight-based dosing when appropriate to reduce radiation dose to as low as reasonably achievable. FINDINGS:  Chest: There is a good contrast bolus within the pulmonary arteries, adequate for evaluation to the subsegmental level. There are no focal filling defects within the pulmonary arteries to suggest pulmonary embolus. The main pulmonary arteries are enlarged. There is mural calcification in the aorta. The ascending aorta is mildly enlarged measuring 3.7 cm in greatest short axis diameter the level of the main pulmonary artery. There is no evidence of dissection. There are moderate right and small left pleural  effusions with mild adjacent atelectasis. There are moderate emphysematous changes predominantly in the upper lungs which have worsened in the interval compared to prior exam. There are scattered distal lymph nodes some which are calcified, stable compared to prior exam. The heart is enlarged, increased compared to previous CT. There is a left-sided cardiac pacer/defibrillator generator in the subcutaneous fat overlying the left pectoralis muscle, similar to prior exam. There are stable leads in the right atrium and right ventricle, respectively. There are mild anterior wedge shape configuration's of the T6-T8 vertebral bodies with approximately 10% anterior height loss, stable compared to prior exam. There are stable degenerative changes of the  thoracic spine. Abdomen/pelvis: There are few scattered cysts in the liver, stable compared to prior exam. There is a calcified stone in the gallbladder, stable compared to prior exam. Adrenal glands are unremarkable. The kidneys are atrophied, stable compared to prior exam. The spleen  is unremarkable. There is mild ductal prominence in the pancreas, unchanged compared to prior exam. No definite pancreatic lesion is identified.  No retroperitoneal or mesenteric lymphadenopathy is identified. There is mural calcification in the aorta without evidence of aneurysm. There is moderately prominent stool within the large bowel. Small bowel appears within normal limits. The appendix is normal in appearance. The bladder is nondistended. There is diffuse wall thickening the bladder which may represent pseudothickening from underdistention. The prostate is prominently enlarged, increased compared to prior exam. There is an asymmetrically enhancing area in the left prostate. No free fluid is identified. There are stable degenerative changes of lumbar spine. There is stable bone island in the right ilium. 1. No evidence of pulmonary embolus. 2. Moderate right and small left pleural effusions with adjacent atelectasis. 3. Ascending aortic aneurysm measuring 3.7 cm in greatest diameter. 4. Pulmonary artery hypertension. 5. Cardiomegaly, increased compared to prior CT. 6. Prostatomegaly with enhancing component on the left. 7. Cholelithiasis. **This report has been created using voice recognition software. It may contain minor errors which are inherent in voice recognition technology. ** Final report electronically signed by Dr. Didier Gan MD on 7/12/2021 11:43 AM    XR CHEST PORTABLE    Result Date: 7/12/2021  PROCEDURE: XR CHEST PORTABLE CLINICAL INFORMATION: 80-year-old male with shortness of breath. COMPARISON: Chest x-ray 7/12/2021. TECHNIQUE: AP semiupright view of the chest was obtained. FINDINGS: There is a dual-chamber cardiac device over the left hemithorax. There is cardiomegaly and pulmonary vascular congestion. There are small bilateral pleural effusions. There is no pneumothorax. Visualized portions of the upper abdomen are within normal limits. The osseous structures are intact. No acute fractures or suspicious osseous lesions. Cardiomegaly with pulmonary vascular congestion and small bilateral pleural effusions.  **This report has been created using voice recognition software. It may contain minor errors which are inherent in voice recognition technology. ** Final report electronically signed by Dr Christophe Contreras on 7/12/2021 9:50 PM    XR CHEST PORTABLE    Result Date: 7/12/2021  PROCEDURE: XR CHEST PORTABLE CLINICAL INFORMATION: SOB. COMPARISON: 7/7/2021. TECHNIQUE: AP upright view of the chest. FINDINGS: There is stable left sided cardiac pacer generator with 2 leads. There is a persistent small left pleural effusion. The lungs appear grossly clear. The cardiac silhouette is mildly prominent, stable compared to prior exam. Visualized osseous structures appear grossly intact. Stable small left pleural effusion. **This report has been created using voice recognition software. It may contain minor errors which are inherent in voice recognition technology. ** Final report electronically signed by Dr. Екатерина Mayorga MD on 7/12/2021 9:40 AM    XR CHEST PORTABLE    Result Date: 6/15/2021  PROCEDURE: XR CHEST PORTABLE CLINICAL INFORMATION: decreaed breath sounds. COMPARISON: 6/7/2021. TECHNIQUE: AP upright view of the chest. FINDINGS: Redemonstration of left sided cardiac pacer generator with 2 leads, stable compared to prior exam. Pulmonary vessels are mildly more prominent and indistinct compared to prior exam. Lungs otherwise appear grossly clear. The cardiac silhouette is mildly enlarged. Visualized osseous structures appear grossly intact. Pulmonary vascular congestion. **This report has been created using voice recognition software. It may contain minor errors which are inherent in voice recognition technology. ** Final report electronically signed by Dr. Екатерина Mayorga MD on 6/15/2021 9:13 AM       LABS:   Recent Labs     07/12/21  0915 07/12/21 2019   WBC 7.5 6.1   HGB 12.5* 12.1*   HCT 37.0* 34.5*    158        Recent Labs     07/13/21  0707      K 4.2   CL 95*   CO2 27   PHOS 4.0   BUN 14   CREATININE 4.8*        Recent Labs 07/12/21  0915 07/12/21 2020   PROT 7.6 7.0      No results for input(s): CKTOTAL, CKMB, CKMBINDEX, TROPONINI in the last 72 hours. Treatment:   Orders Placed This Encounter   Procedures    Culture, Blood 2     Standing Status:   Standing     Number of Occurrences:   1    Culture, Blood 1     Standing Status:   Standing     Number of Occurrences:   1    XR CHEST PORTABLE     Standing Status:   Standing     Number of Occurrences:   1     Order Specific Question:   Reason for exam:     Answer:   SOB    CTA CHEST W WO CONTRAST     Standing Status:   Standing     Number of Occurrences:   1     Order Specific Question:   Reason for exam:     Answer:   sob     Order Specific Question:   Decision Support Exception - unselect if not a suspected or confirmed emergency medical condition     Answer:   Emergency Medical Condition (MA) [1]    CT ABDOMEN PELVIS W IV CONTRAST Additional Contrast? None     Standing Status:   Standing     Number of Occurrences:   1     Order Specific Question:   Additional Contrast?     Answer:   None     Order Specific Question:   Reason for exam:     Answer:   periumbical abd pain; loss appettite     Order Specific Question:   Decision Support Exception - unselect if not a suspected or confirmed emergency medical condition     Answer:   Emergency Medical Condition (MA) [1]    XR CHEST PORTABLE     Standing Status:   Standing     Number of Occurrences:   1     Order Specific Question:   Reason for exam:     Answer:   SOB    XR FOOT LEFT (MIN 3 VIEWS)     Standing Status:   Standing     Number of Occurrences:   1     Order Specific Question:   Reason for exam:     Answer: Old fx of left foot.     Basic Metabolic Panel w/ Reflex to MG     Standing Status:   Standing     Number of Occurrences:   1    Brain Natriuretic Peptide     Standing Status:   Standing     Number of Occurrences:   1    CBC Auto Differential     Standing Status:   Standing     Number of Occurrences:   1    Troponin Standing Status:   Standing     Number of Occurrences:   1    Hepatic function panel     Standing Status:   Standing     Number of Occurrences:   1    Anion Gap     Standing Status:   Standing     Number of Occurrences:   1    Glomerular Filtration Rate, Estimated     Standing Status:   Standing     Number of Occurrences:   1    Osmolality     Standing Status:   Standing     Number of Occurrences:   1    CBC     Standing Status:   Standing     Number of Occurrences:   1    Magnesium     Standing Status:   Standing     Number of Occurrences:   1    Phosphorus     Standing Status:   Standing     Number of Occurrences:   1    Magnesium     Standing Status:   Standing     Number of Occurrences:   1    Phosphorus     Standing Status:   Standing     Number of Occurrences:   1    Comprehensive metabolic panel     Standing Status:   Standing     Number of Occurrences:   1    Blood Gas, Arterial     Please call Respiratory Care     Standing Status:   Standing     Number of Occurrences:   1    Procalcitonin     Add on if able     Standing Status:   Standing     Number of Occurrences:   1    Vitamin B12 & folate     Standing Status:   Standing     Number of Occurrences:   1    Anion Gap     Standing Status:   Standing     Number of Occurrences:   1    Glomerular Filtration Rate, Estimated     Standing Status:   Standing     Number of Occurrences:   1    Basic Metabolic Panel     Standing Status:   Standing     Number of Occurrences:   4    Magnesium     Standing Status:   Standing     Number of Occurrences:   4    Phosphorus     Standing Status:   Standing     Number of Occurrences:   4    TSH with Reflex     Standing Status:   Standing     Number of Occurrences:   1    Anion Gap     Standing Status:   Standing     Number of Occurrences:   1    Glomerular Filtration Rate, Estimated     Standing Status:   Standing     Number of Occurrences:   1    ADULT DIET; Regular; 3 carb choices (45 gm/meal);  Low Sodium (2 gm); Low Potassium (Less than 3000 mg/day); Low Phosphorus (Less than 1000 mg); Less than 60 gm; 2000 ml     Standing Status:   Standing     Number of Occurrences:   1     Order Specific Question:   Primary Diet     Answer:   Regular     Order Specific Question:   Carb Control Restriction     Answer:   3 carb choices (45 gm/meal)     Order Specific Question:   Sodium Restriction     Answer:   Low Sodium (2 gm)     Order Specific Question:   Potassium Restriction     Answer:   Low Potassium (Less than 3000 mg/day)     Order Specific Question:   Phosphorus Restriction:     Answer:   Low Phosphorus (Less than 1000 mg)     Order Specific Question:   Protein Restriction     Answer:   Less than 60 gm     Order Specific Question:   Fluid Restriction     Answer:   2000 ml    HYPOGLYCEMIA TREATMENT: blood glucose less than 50 mg/dL and patient  ALERT and TOLERATING PO     Give 8 ounces juice or regular soda or 2 tubes glucose gel. Repeat blood glucose in 15 minutes. If blood glucose is less than 70 mg/dL, repeat treatment and recheck blood glucose in 15 minutes x2 and notify provider. Standing Status:   Standing     Number of Occurrences:   [id number]    HYPOGLYCEMIA TREATMENT: blood glucose less than 70 mg/dL and patient ALERT and TOLERATING PO     Give 4 ounces juice or regular soda or 1 tube glucose gel. Repeat blood glucose in 15 minutes. If blood glucose is less than 70 mg/dL, repeat treatment and recheck blood glucose in 15 minutes x2. If blood glucose remains less than 70 mg/dL, notify provider. Standing Status:   Standing     Number of Occurrences:   [id number]    HYPOGLYCEMIA TREATMENT: blood glucose less than 70 mg/dL and patient NOT ALERT or NPO     Give dextrose 50% intravenous. If patient does not respond within 5 minutes, repeat dose x1. Start D5W at 100 mL/hour until ordering provider can be reached. Repeat blood glucose in 15 minutes.  If blood glucose is less than 70 mg/dL, repeat treatment and recheck blood glucose in 15 minutes x2. Notify provider. If no intravenous access, administer glucagon 1 mg. After administration, attempt intravenous access and start D5W at 100 mL/hr. Repeat blood glucose in 15 minutes x2 and notify provider. Standing Status:   Standing     Number of Occurrences:   [id number]    Vital signs per unit routine     Standing Status:   Standing     Number of Occurrences:   1    Obtain initial SBP and decrease SBP by 25% in first 8 hours     Standing Status:   Standing     Number of Occurrences:   1    Notify Physician     Notify physician for systolic BP less than 90 or greater than 455, diastolic BP less than 60 or greater than 110, pulse less than 59 or greater than 120, respiratory rate less than 6 or greater than 32, temperature greater than 38.5, or urinary output less than 30 mL/kg/hr. Standing Status:   Standing     Number of Occurrences:   1    Up with assistance     Standing Status:   Standing     Number of Occurrences:   [address] communication     Please notify physician IMMEDIATELY for SVT lasting > 30 seconds     Standing Status:   Standing     Number of Occurrences:   1    Turn or assist with turn approximately every 2 hours if patient is unable to turn self. Remind patient to turn if necessary.      Standing Status:   Standing     Number of Occurrences:   1    Assess skin per unit guidelines     Standing Status:   Standing     Number of Occurrences:   1    Pad/offload medical devices     Standing Status:   Standing     Number of Occurrences:   1    Maintain HOB at the lowest elevation consistent with medical plan of care     Standing Status:   Standing     Number of Occurrences:   1    Use lift equipment for lifting patient     Standing Status:   Standing     Number of Occurrences:   1    Maintain heels off of bed at all times     Standing Status:   Standing     Number of Occurrences:   1    Nursing communication     Please provide patient with wet therapy: nurse enters RT51 Nasal cannula oxygen order using Per Protocol order mode (if indication Home Oxygen then change L/min to same amount at home and change Wean to Room Air to No). Notify provider if initiate oxygen therapy unless already on Home Oxygen. Standing Status:   Standing     Number of Occurrences:   7    SLP eval and treat     Standing Status:   Standing     Number of Occurrences:   1     Order Specific Question:   Reason for SLP? Answer:   cognitive eval    Pacer Interrogate     Standing Status:   Standing     Number of Occurrences:   1    POCT Glucose     Obtain POCT Glucose every 30 minutes following D50 administration times 2 occurrences, then every 1 hr times 2 occurrences, and then AC/HS for 24 hours. Standing Status:   Standing     Number of Occurrences:   2    POCT Glucose     Obtain POCT Glucose every 30 minutes following D50 administration times 2 occurrences, then every 1 hr times 2 occurrences, and then AC/HS for 24 hours. Standing Status:   Standing     Number of Occurrences:   2    POCT Glucose     Obtain POCT Glucose every 30 minutes following D50 administration times 2 occurrences, then every 1 hr times 2 occurrences, and then AC/HS for 24 hours. Standing Status:   Standing     Number of Occurrences:   4    POCT Glucose     Repeat blood glucose 15 minutes following intervention. If blood glucose is less than 70 mg/dL, repeat treatment and recheck blood glucose in 15 minutes x2. If blood glucose remains less than 70 mg/dL, call ordering provider for further instruction. If patient experienced a hypoglycemic event in last 24 hours, obtain blood glucose at 0200.      Standing Status:   Standing     Number of Occurrences:   [id number]    EKG 12 Lead     Standing Status:   Standing     Number of Occurrences:   1     Order Specific Question:   Reason for Exam?     Answer:   Chest pain    EKG 12 Lead     Standing Status:   Standing     Number of Occurrences:   1     Order Specific Question:   Reason for Exam?     Answer:   Emergency    EKG 12 Lead     Standing Status:   Standing     Number of Occurrences:   1     Order Specific Question:   Reason for Exam?     Answer:   Irregular heart rate    EKG 12 Lead     Standing Status:   Standing     Number of Occurrences:   1     Order Specific Question:   Reason for Exam?     Answer:   Irregular heart rate    Hemodialysis inpatient     Standing Status:   Standing     Number of Occurrences:   5     Order Specific Question:   K+     Answer:   2     Order Specific Question:   Ca++     Answer:   2.5     Order Specific Question:   Bicarb     Answer:   28     Order Specific Question:   Na+     Answer:   135     Order Specific Question:   Dialyzer     Answer:   F160     Order Specific Question:   Dialysate Temperature (C)     Answer:   39     Order Specific Question:   Blood flow rate (BFR)     Answer:   400     Order Specific Question:   Dialysate flow rate (DFR)     Answer:   600     Order Specific Question:   Treatment Time     Answer:   4     Order Specific Question:   Dry weight (kg)     Answer:   76     Order Specific Question:   Access     Answer:   Fistula/Graft    PATIENT STATUS (FROM ED OR OR/PROCEDURAL) Inpatient     Standing Status:   Standing     Number of Occurrences:   1     Order Specific Question:   Patient Class     Answer:   Inpatient [101]     Order Specific Question:   REQUIRED: Diagnosis     Answer:   SOB (shortness of breath) [976944]     Order Specific Question:   Estimated Length of Stay     Answer:   Estimated stay of more than 2 midnights       Assessment and Plan  Principle  1. Left ankle fracture    - Patient initially examined and evaluated. - WBC 6.1 (7/12/21) ;  Afebrile  - No dressing needed to LLE.  - WB status pending the left foot and ankle X-ray reads.   - Patient has CAM boot in possession.   - All of patient's questions and concerns were addressed to patient's understanding.  - Dr. Doug Lara will continue to follow patient    DISPO: NWB status pending the left foot x-ray reads. Dr. Maggie Velazquez, thank you for the consultation allowing podiatry to assist in the medical welfare of this patient. Podiatry will continue to follow this patient throughout the duration of hospitalization.      Apryl Luciano [id number]  Foot and Ankle Surgical Resident  7/13/2021 5:28 PM

## 2021-07-13 NOTE — FLOWSHEET NOTE
07/12/21 2025   Provider Notification   Reason for Communication Review case  (SOB)   Provider Name Dr. Saqib Anthony   Provider Notification Resident   Method of Communication Secure Message   Response At bedside   Notification Time 2025

## 2021-07-13 NOTE — FLOWSHEET NOTE
07/13/21 0256   Provider Notification   Reason for Communication Review case   Provider Name Dr. Phil Sanford    Provider Notification Resident   Method of Communication Secure Message   Response See orders   Notification Time [phone]   Secure message sent to ask for AM labs. Also multiple EKGs showed possible a fib. Are we waiting on cardiology's recommendations? There is no cardiology consult order. 9378- Message received. AM labs are in. Will defer cardio consult for days.

## 2021-07-14 NOTE — FLOWSHEET NOTE
07/14/21 0755 07/14/21 1214   Vital Signs   BP (!) 140/70 (!) 162/70   Temp 98.3 °F (36.8 °C) 98 °F (36.7 °C)   Pulse 63 73   Resp 20 20   Weight 148 lb 9.4 oz (67.4 kg) 145 lb 8.1 oz (66 kg)   Weight Method Bed scale Bed scale   Percent Weight Change -0.01 -2.08   Post-Hemodialysis Assessment   Post-Treatment Procedures  --  Blood returned; Access bleeding time < 10 minutes   Machine Disinfection Process  --  Acid/Vinegar Clean;Heat Disinfect; Exterior Machine Disinfection   Rinseback Volume (ml)  --  400 ml   Total Liters Processed (l/min)  --  90.9 l/min   Dialyzer Clearance  --  Lightly streaked   Duration of Treatment (minutes)  --  240 minutes   Hemodialysis Intake (ml)  --  400 ml   Hemodialysis Output (ml)  --  1900 ml   NET Removed (ml)  --  1500 ml   Tolerated Treatment  --  Good   Stable 4hr tx. 1.5L removed during HD; patient tolerated well. Both sites held x10 min each with dressings dry and intact upon leaving the unit. Report given to primary RN.  Treatment record printed for scanning

## 2021-07-14 NOTE — CARE COORDINATION
7/14/21, 9:42 AM EDT    Patient goals/plan/ treatment preferences discussed by  and . Patient goals/plan/ treatment preferences reviewed with patient/ family. Patient/ family verbalize understanding of discharge plan and are in agreement with goal/plan/treatment preferences. Understanding was demonstrated using the teach back method. AVS provided by RN at time of discharge, which includes all necessary medical information pertaining to the patients current course of illness, treatment, post-discharge goals of care, and treatment preferences. Services After Discharge  Services At/After Discharge: Nursing Services, PT [facility])   IMM Letter  IMM Letter date given[de-identified] 07/13/21       Patient to discharge home with current [facility] RN and PT. Patient and daughter Vipin agreeable. SW spoke with Vipin and she reported that she is with patient all the time as he is staying with her. SW reviewed PT recommendations of 24 hours assist and supervision. Vipin reported that she is home with patient. PAWAN called [facility] and notified Norm of potential discharge today. RN made aware and discharge instructions placed on chart.

## 2021-07-14 NOTE — PROGRESS NOTES
[address] THERAPY MISSED TREATMENT NOTE  [facility] ICU STEPDOWN TELEMETRY 4K  [id number]      Date: 2021  Patient Name: Diana Sands        CSN: [id number]   : 11/10/1930  (80 y.o.)  Gender: male   Referring Practitioner: Andrey Hayward MD  Diagnosis: Shortness of Breath         REASON FOR MISSED TREATMENT: OT treatment attempted Pt. At dialysis will return later this date as time allows.

## 2021-07-14 NOTE — PROGRESS NOTES
Renal Progress Note    Assessment and Plan:    1. End-stage kidney disease on modalities  2. Hypertension primary reasonably controlled now  3. Dyspnea of unclear etiology  4. Hyponatremia from end-stage kidney disease and inability of the kidneys that there is critical free water  5. Hyperphosphatemia mild  6. Dementia new onset? 7. Anemia of chronic disease  8. Anxiety? PLAN:   I discussed my thoughts with the patient. I am not sure how much he understood. He kept repeating the same question over and over  Labs reviewed  Medications reviewed  No changes for now since  blood pressure appears to be doing good  Seen in hemodialysis unit  Hemodialysis in progress  May benefit from psychological evaluation  Reviewed the cardiology note  We will follow    Patient Active Problem List:     CAD (coronary artery disease)     COPD (chronic obstructive pulmonary disease) ([address]     Chronic renal insufficiency     Patient overweight     Arthritis-  low back and neck .      CKD (chronic kidney disease) stage 3, GFR 30-59 ml/min (HCC)     Dyspnea and respiratory abnormalities     ED (erectile dysfunction)     Degenerative joint disease of knee, left     Gout of big toe     Anemia, chronic disease     CKD (chronic kidney disease) stage 4, GFR 15-29 ml/min (HCC)     Monoclonal gammopathy     Leukopenia     Thrombocytopenia (HCC)     Chronic kidney disease (CKD), stage V (HCC)     Metabolic acidosis     Hyperkalemia     Iron deficiency anemia     ROSAURA (acute kidney injury) ([address]     Plasma cell dyscrasia     Idiopathic hypotension     Hypertensive urgency     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)     Systolic congestive heart failure (HCC)     Other fluid overload (CODE)     Hyperphosphatemia     Acute diastolic congestive heart failure (HCC)     Acute on chronic diastolic congestive heart failure ([address]     Pulmonary hypertension ([address]     Anemia due to chronic kidney disease, on chronic dialysis ([address] Volume overload     Secondary hyperparathyroidism of renal origin [facility])     Chest pain     Acute pulmonary edema (HCC)     Accelerated hypertension     Gastritis and duodenitis     Fall from slip, trip, or stumble, initial encounter     Closed fracture of left ankle     ESRD on hemodialysis ([address]     Hypertensive emergency     SOB (shortness of breath)      Subjective:   Admit Date: 7/12/2021    Interval History:   Seen for end-stage kidney disease on hemodialysis  Very awake and alert this morning  Still complains of shortness of breath does have flaccid very comfortable without the use of oxygen. Updated by the staff  No new issues  Blood pressure is good      Medications:   Scheduled Meds:   metoprolol tartrate  100 mg Oral BID    pravastatin  80 mg Oral Daily    gabapentin  100 mg Oral TID    folbee plus  1 tablet Oral Daily    docusate sodium  100 mg Oral BID    ferrous sulfate  325 mg Oral Daily with breakfast    vitamin C  500 mg Oral Daily    pantoprazole  40 mg Oral QAM AC    cinacalcet  60 mg Oral Once per day on Mon Wed Fri    calcitRIOL  1.5 mcg Oral Once per day on Mon Wed Fri    isosorbide mononitrate  30 mg Oral Daily    sodium chloride flush  5-40 mL Intravenous 2 times per day    heparin (porcine)  5,000 Units Subcutaneous 3 times per day    polyethylene glycol  17 g Oral Daily     Continuous Infusions:   dextrose      sodium chloride         CBC:   Recent Labs     07/12/21  0915 07/12/21  2019   WBC 7.5 6.1   HGB 12.5* 12.1*    158     CMP:    Recent Labs     07/12/21 2020 07/13/21  0707 07/14/21  0349    135 132*   K 3.7 4.2 5.3*   CL 96* 95* 94*   CO2 27 27 24   BUN 13 14 21   CREATININE 4.4* 4.8* 6.7*   GLUCOSE 94 91 96   CALCIUM 9.7 9.4 9.0   LABGLOM 15* 14* 9*     Troponin: No results for input(s): TROPONINI in the last 72 hours. BNP: No results for input(s): BNP in the last 72 hours. INR: No results for input(s): INR in the last 72 hours.   Lipids: No results for input(s): CHOL, LDLDIRECT, TRIG, HDL, AMYLASE, LIPASE in the last 72 hours. Liver:   Recent Labs     07/12/21 2020   AST 32   ALT 20   ALKPHOS 89   PROT 7.0   LABALBU 4.0   BILITOT 0.6     Iron:  No results for input(s): IRONS, FERRITIN in the last 72 hours. Invalid input(s): LABIRONS  XR FOOT LEFT (MIN 3 VIEWS)   Final Result       1. Healing fracture in the distal fibula. 2. Mild diffuse osteopenia. 3. Degenerative changes. 4. Otherwise negative left foot radiographs. .               **This report has been created using voice recognition software. It may contain minor errors which are inherent in voice recognition technology. **      Final report electronically signed by DR Norman Carmen on 7/13/2021 7:18 PM      XR ANKLE LEFT (MIN 3 VIEWS)   Final Result         1. Healing fracture in the distal fibula. 2. Mild degenerative change. 3. Soft tissue swelling over the lateral malleolus. .               **This report has been created using voice recognition software. It may contain minor errors which are inherent in voice recognition technology. **      Final report electronically signed by DR Norman Carmen on 7/13/2021 7:32 PM      XR CHEST PORTABLE   Final Result   Cardiomegaly with pulmonary vascular congestion and small bilateral pleural effusions. **This report has been created using voice recognition software. It may contain minor errors which are inherent in voice recognition technology. **      Final report electronically signed by Dr Isra Fernandez on 7/12/2021 9:50 PM      CTA CHEST [address]   Final Result       1. No evidence of pulmonary embolus. 2. Moderate right and small left pleural effusions with adjacent atelectasis. 3. Ascending aortic aneurysm measuring 3.7 cm in greatest diameter. 4. Pulmonary artery hypertension. 5. Cardiomegaly, increased compared to prior CT. 6. Prostatomegaly with enhancing component on the left. 7. Cholelithiasis. **This report has been created using voice recognition software. It may contain minor errors which are inherent in voice recognition technology. **      Final report electronically signed by Dr. Manuel Fox MD on 7/12/2021 11:43 AM      CT ABDOMEN PELVIS W IV CONTRAST Additional Contrast? None   Final Result       1. No evidence of pulmonary embolus. 2. Moderate right and small left pleural effusions with adjacent atelectasis. 3. Ascending aortic aneurysm measuring 3.7 cm in greatest diameter. 4. Pulmonary artery hypertension. 5. Cardiomegaly, increased compared to prior CT. 6. Prostatomegaly with enhancing component on the left. 7. Cholelithiasis. **This report has been created using voice recognition software. It may contain minor errors which are inherent in voice recognition technology. **      Final report electronically signed by Dr. Manuel Fox MD on 7/12/2021 11:43 AM      XR CHEST PORTABLE   Final Result   Stable small left pleural effusion. **This report has been created using voice recognition software. It may contain minor errors which are inherent in voice recognition technology. **      Final report electronically signed by Dr. Manuel Fox MD on 7/12/2021 9:40 AM            Objective:   Vitals: /64   Pulse 79   Temp 98.1 °F (36.7 °C) (Oral)   Resp 18   Ht 5' 6\" (1.676 m)   Wt 148 lb 9.6 oz (67.4 kg)   SpO2 94%   BMI 23.98 kg/m²    Wt Readings from Last 3 Encounters:   07/14/21 148 lb 9.6 oz (67.4 kg)   06/17/21 161 lb (73 kg)   06/07/21 170 lb (77.1 kg)      24HR INTAKE/OUTPUT:      Intake/Output Summary (Last 24 hours) at 7/14/2021 0730  Last data filed at 7/14/2021 0320  Gross per 24 hour   Intake 740 ml   Output 0 ml   Net 740 ml       Constitutional: Well-developed elderly gentleman alert, awake, no apparent distress   Skin:normal with no rash or lesions. HEENT:Pupils are reactive . Throat is clear . Oral mucosa is moist Neck:supple with no thyromegaly or carotid bruit  Cardiovascular:  S1, S2 without murmur or rubs   Respiratory:  Clear to ausculation without wheezes, rhonchi or rales. Abdomen: +bs, soft, no tenderness to palpation and no palpable mass. No abdominal bruit   Ext: No LE edema  Musculoskeletal:Intact  Neuro:Alert and awake. Well oriented  with no focal deficit. Speech is normal      Electronically signed by Jane Swain MD on 7/14/2021 at 7:30 AM  **This report has been created using voice recognition software. It maycontain minor  errors which are inherent in voice recognition technology. **

## 2021-07-14 NOTE — PROGRESS NOTES
Podiatric Progress Note  Cindy Jeffries  Subjective :   7/14/2021  Patient seen at bedside today on behalf of Dr. Linwood Acevedo. Patient appeared pleasant, was oriented to person, place and time and in no acute distress. Patient is s/p minimally displaced, closed left fibular fracture. He was originally seen by Podiatry at [facility] on 5/20/2021. Patient denies any pain to his left foot or ankle. He also denies any N/V/F/C/SOB or CP. Patient has no further pedal concerns at this point in time. HISTORY OF PRESENT ILLNESS:    The patient is a 80 y.o. male with significant past medical history of CKD and hypertension who is being seen at bedside on behalf of Dr. Linwood Acevedo. Patient states that he fell three weeks ago while fishing at [place]. He walked on his ankle for three days prior to going to an urgent care on [address]. He states that he had his leg casted. He followed up with Dr. Linwood Acevedo at his clinic. The patient has been wearing the CAM boot. The patient states that he has no pain with his left ankle. The patient has no N/V/F/C, SOB or chest pain.  The patient denies any other pedal complaints at this time     Current Medications:    Current Facility-Administered Medications: metoprolol (LOPRESSOR) tablet 100 mg, 100 mg, Oral, BID  glucose (GLUTOSE) 40 % oral gel 15 g, 15 g, Oral, PRN  dextrose 50 % IV solution, 12.5 g, Intravenous, PRN  glucagon (rDNA) injection 1 mg, 1 mg, Intramuscular, PRN  dextrose 5 % solution, 100 mL/hr, Intravenous, PRN  nitroGLYCERIN (NITROSTAT) SL tablet 0.4 mg, 0.4 mg, Sublingual, Q5 Min PRN  pravastatin (PRAVACHOL) tablet 80 mg, 80 mg, Oral, Daily  gabapentin (NEURONTIN) capsule 100 mg, 100 mg, Oral, TID  folbee plus tablet 1 tablet, 1 tablet, Oral, Daily  docusate sodium (COLACE) capsule 100 mg, 100 mg, Oral, BID  ferrous sulfate (IRON 325) tablet 325 mg, 325 mg, Oral, Daily with breakfast  ascorbic acid (VITAMIN C) tablet 500 mg, 500 mg, Oral, Daily  pantoprazole (PROTONIX) tablet 40 mg, 40 mg, Oral, QAM AC  traMADol (ULTRAM) tablet 50 mg, 50 mg, Oral, Q4H PRN  cinacalcet (SENSIPAR) tablet 60 mg, 60 mg, Oral, Once per day on Mon Wed Fri  calcitRIOL (ROCALTROL) capsule 1.5 mcg, 1.5 mcg, Oral, Once per day on Mon Wed Fri  isosorbide mononitrate (IMDUR) extended release tablet 30 mg, 30 mg, Oral, Daily  sodium chloride flush 0.9 % injection 5-40 mL, 5-40 mL, Intravenous, 2 times per day  sodium chloride flush 0.9 % injection 5-40 mL, 5-40 mL, Intravenous, PRN  0.9 % sodium chloride infusion, 25 mL, Intravenous, PRN  heparin (porcine) injection 5,000 Units, 5,000 Units, Subcutaneous, 3 times per day  acetaminophen (TYLENOL) tablet 650 mg, 650 mg, Oral, Q6H PRN **OR** acetaminophen (TYLENOL) suppository 650 mg, 650 mg, Rectal, Q6H PRN  promethazine (PHENERGAN) tablet 12.5 mg, 12.5 mg, Oral, Q6H PRN **OR** ondansetron (ZOFRAN) injection 4 mg, 4 mg, Intravenous, Q6H PRN  polyethylene glycol (GLYCOLAX) packet 17 g, 17 g, Oral, Daily    Objective     BP (!) 154/67   Pulse 79   Temp 98.3 °F (36.8 °C) (Oral)   Resp 18   Ht 5' 6\" (1.676 m)   Wt 148 lb 9.6 oz (67.4 kg)   SpO2 96%   BMI 23.98 kg/m²      I/O:    Intake/Output Summary (Last 24 hours) at 7/14/2021 0700  Last data filed at 7/14/2021 0320  Gross per 24 hour   Intake 740 ml   Output 0 ml   Net 740 ml              Wt Readings from Last 3 Encounters:   07/14/21 148 lb 9.6 oz (67.4 kg)   06/17/21 161 lb (73 kg)   06/07/21 170 lb (77.1 kg)       LABS:    Recent Labs     07/12/21  0915 07/12/21 2019   WBC 7.5 6.1   HGB 12.5* 12.1*   HCT 37.0* 34.5*    158        Recent Labs     07/14/21  0349   *   K 5.3*   CL 94*   CO2 24   PHOS 4.8*   BUN 21   CREATININE 6.7*        Recent Labs     07/12/21  0915 07/12/21 2020   PROT 7.6 7.0      No results for input(s): CKTOTAL, CKMB, CKMBINDEX, TROPONINI in the last 72 hours.     Focused Lower Extremity Examination:    Vitals:    BP (!) 154/67   Pulse 79   Temp 98.3 °F (36.8 °C) (Oral)   Resp 18   Ht 5' 6\" (1.676 m)   Wt 148 lb 9.6 oz (67.4 kg)   SpO2 96%   BMI 23.98 kg/m²      Vascular: Dorsalis pedis and posterior tibial pulses are palpable bilaterally. Skin temperature is warm  to cool from proximal tibial tuberosity to distal digits. CFT brisk to exposed digits. Mild edema noted to the left ankle. Hair growth absent. Quality of skin wnl.     Dermatologic: No open lesions, rashes or subcutaneous nodules of note.      Neurovascular: Epicritic and protopathic sensation present b/l.      Musculoskeletal: Muscle strength 4/5 for all plantarflexors, dorsiflexors, inverters and everters examined. Full ROM noted at the ankle joint bilaterally. No pain with palpation of the left ankle or foot. There is rectus deformity to the left foot and ankle. IMAGING  XR Left Ankle  Impression           1. Healing fracture in the distal fibula. 2. Mild degenerative change. 3. Soft tissue swelling over the lateral malleolus. .                   **This report has been created using voice recognition software. It may contain minor errors which are inherent in voice recognition technology. **       Final report electronically signed by DR Carisa Corbett on 7/13/2021 7:32 PM     XR Foot  Narrative   PROCEDURE: XR FOOT LEFT (MIN 3 VIEWS)       CLINICAL INFORMATION: Old fx of left foot. .       COMPARISON: Left ankle radiographs dated 21 May 2021.       TECHNIQUE: 4 views of the left foot.        FINDINGS:           There is a probable healing fracture in the distal fibula. There is mild diffuse osteopenia. There are degenerative changes. The remaining bony structures appear to be intact. .           ASSESSMENT: Pt. is a 80 y.o. male with:  Principle  Left fibular fracture    Chronic  Patient Active Problem List   Diagnosis    CAD (coronary artery disease)    [facility] disease) [facility] [address]    Chronic renal insufficiency    Patient overweight    Arthritis-  low back and neck .     CKD (chronic kidney disease) stage 3, GFR 30-59 ml/min (HCC)    Dyspnea and respiratory abnormalities    ED (erectile dysfunction)    Degenerative joint disease of knee, left    Gout of big toe    Anemia, chronic disease    CKD (chronic kidney disease) stage 4, GFR 15-29 ml/min (HCC)    Monoclonal gammopathy    Leukopenia    Thrombocytopenia (HCC)    Chronic kidney disease (CKD), stage V (HCC)    Metabolic acidosis    Hyperkalemia    Iron deficiency anemia    ROSAURA (acute kidney injury) (HCC)    Plasma cell dyscrasia    Idiopathic hypotension    Hypertensive urgency    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)    Systolic congestive heart failure (HCC)    Other fluid overload (CODE)    Hyperphosphatemia    Acute diastolic congestive heart failure (HCC)    Acute on chronic diastolic congestive heart failure (HCC)    Pulmonary hypertension (HCC)    Anemia due to chronic kidney disease, on chronic dialysis (HCC)    Volume overload    Secondary hyperparathyroidism of renal origin ([address]    Chest pain    Acute pulmonary edema (HCC)    Accelerated hypertension    Gastritis and duodenitis    Fall from slip, trip, or stumble, initial encounter    Closed fracture of left ankle    ESRD on hemodialysis ([address]    Hypertensive emergency    SOB (shortness of breath)       PLAN:   Patient was examined and evaluated  Reviewed radiographs; impression above  Continue wearing CAM boot to LLE; weight-bearing as tolerated to the left leg in CAM walker boot  Instructed patient to call Dr. [facility] office to schedule follow-up appointment  OK to discharge from [id number]  Podiatric Surgical Resident  7/14/2021   7:00 AM

## 2021-07-14 NOTE — CARE COORDINATION
Update: plans home w [facility] today; collaborated w PAWAN Sheikh  Electronically signed by Bairon Leggett RN on 7/14/2021 at 9:32 AM

## 2021-07-14 NOTE — PROGRESS NOTES
Discharge instructions reviewed using teach back method, with daughter Dave Rowland and patient. No concerns voiced.

## 2021-07-14 NOTE — CONSULTS
[facility] [address] [place] [address]  Cardiology Consult        Patient:  Saúl Chaves  YOB: 1930  MRN: [id number]     Acct: [de-identified]    PCP: Arnav Rhoades MD    Date of Admission: 7/12/2021      Reason for Consultation:        History Of Present Illness:    80 y.o.  male with history of ESRD on dialysis, CAD with significant coronary artery disease being managed medically, high blood pressure history of GI bleed, restrictive lung disease, sick sinus syndrome status post pacemaker and chronic systolic heart failure who initially was sent from dialysis for elevated blood pressure and shortness of breath. Patient [place] had several complaints of right-sided chest pain, shortness of breath, headaches. Cardiology was consulted for NSVT. On telemetry patient shows sinus rhythm with intermittent AV paced rhythm. He underwent pacemaker interrogation which showed few high atrial ventricular rate episodes. Otherwise pacemaker was working appropriately. Patient has poor insight;denies any significant symptoms at present. He was previously in the hospital on May 2021. Was seen by cardiology for preoperative stratification of ankle surgery. At that time cardiology suggested possible cardiac cath but for what ever reason this was not done. Subsequently patient was managed medically without any surgery.  At present patient denies any chest pains      Past Medical History:          Diagnosis Date    Anemia in chronic kidney disease(285.21)     Arthritis     CAD (coronary artery disease)     Chronic kidney disease     [facility], stage IV (severe) ([facility])     CKD (chronic kidney disease) stage 3, GFR 30-59 ml/min ([facility])     COPD [facility]ronic obstructive [facility] disease) [facility] [address]     GI bleed     Hemod[facility]) 07/26/2016    [facility]    History of blood transfusion     6/2019    Hyperlipidemia     Hyperphosphatemia 5/22/2018    Hypertension     Sick sinus syndrome (HCC)     Systolic congestive heart failure [facility])        Past Surgical History:          Procedure Laterality Date   [facility]      COLONOSCOPY  2006,2009    COLONOSCOPY N/A 6/27/2019    COLONOSCOPY DIAGNOSTIC performed by Armando Gandhi MD at [address]  2004    DIALYSIS FISTULA CREATION  5/6/2016    right arm fistula placement    ENDOSCOPY, COLON, DIAGNOSTIC      PACEMAKER PLACEMENT  2013    MS ESOPHAGOGASTRODUODENOSCOPY TRANSORAL DIAGNOSTIC N/A 1/18/2018    EGD [facility] Sa[facility]l [facility]cala MD at [facility]  2006,2009    UPPER GASTROINTESTINAL ENDOSCOPY Left 6/23/2019    EGD DIAGNOSTIC ONLY performed by [facility]ando [facility] MD at [facility]    VASCULAR SURGERY         Medications Prior to Admission:      Prior to Admission medications    Medication Sig Start Date End Date Taking? Authorizing Provider   traMADol (ULTRAM) 50 MG tablet Take 50 mg by mouth every 8 hours as needed for Pain.     Historical Provider, MD   cinacalcet (SENSIPAR) 30 MG tablet Take 60 mg by mouth three times a week before dialysis on M,W,F    Historical Provider, MD   calcitRIOL (ROCALTROL) 0.25 MCG capsule Take 1.5 mcg by mouth three times a week before dialysis on M,W,F    Historical Provider, MD   hydrALAZINE (APRESOLINE) 25 MG tablet Take 1 tablet by mouth 2 times daily 5/24/21   XIN Walker - CNP   pantoprazole (PROTONIX) 40 MG tablet TAKE 1 TABLET DAILY BEFORE BREAKFAST 4/28/20   XIN Vick - CNP   docusate sodium (COLACE, DULCOLAX) 100 MG CAPS Take 100 mg by mouth 2 times daily 6/24/19   Cici Littlejohn,    ferrous sulfate 325 (65 Fe) MG tablet Take 1 tablet by mouth daily (with breakfast) 6/24/19   Cici Littlejohn,    vitamin C (ASCORBIC ACID) 500 MG tablet Take 1 tablet by mouth daily 6/24/19   Cici Littlejohn,    polyethylene glycol (MIRALAX) powder Renal Miralax Colonoscopy prep.  Use as directed. Mix Miralax 238 g with 24 oz clear liquids. Drink 8 oz glass every 20-30 minutes until gone. 6/6/19   XIN Parker - CNP   lisinopril (PRINIVIL;ZESTRIL) 20 MG tablet Take 1 tablet by mouth daily 5/27/18   Yoshi Melvin DO   isosorbide mononitrate (IMDUR) 30 MG extended release tablet Take 30 mg by mouth daily    Historical Provider, MD   gabapentin (NEURONTIN) 100 MG capsule Take 100 mg by mouth 3 times daily.      Historical Provider, MD SENA Complex-C-Folic Acid (RENAL) 1 MG CAPS Take 1 capsule by mouth daily    Historical Provider, MD   metoprolol (LOPRESSOR) 50 MG tablet Take 1 tablet by mouth 2 times daily 6/14/16   Carly Cui MD   pravastatin (PRAVACHOL) 80 MG tablet Take 1 tablet by mouth daily 11/12/15   Carly Cui MD       Current Facility-Administered Medications   Medication Dose Route Frequency Provider Last Rate Last Admin    metoprolol (LOPRESSOR) tablet 100 mg  100 mg Oral BID Randal Sampson DO   100 mg at 07/13/21 2005    glucose (GLUTOSE) 40 % oral gel 15 g  15 g Oral PRN Blanca Thomas PA-C        dextrose 50 % IV solution  12.5 g Intravenous PRN Blanca Thomas PA-C        glucagon (rDNA) injection 1 mg  1 mg Intramuscular PRN Blanca Thomas PA-C        dextrose 5 % solution  100 mL/hr Intravenous PRN Blanca Thomas PA-C        nitroGLYCERIN (NITROSTAT) SL tablet 0.4 mg  0.4 mg Sublingual Q5 Min PRN Blanca Thomas PA-C        pravastatin (PRAVACHOL) tablet 80 mg  80 mg Oral Daily Bhavana Munoz MD   80 mg at 07/13/21 2006    gabapentin (NEURONTIN) capsule 100 mg  100 mg Oral TID Bhavana Munoz MD   100 mg at 07/13/21 2005    folbee plus tablet 1 tablet  1 tablet Oral Daily Bhavana Munoz MD   1 tablet at 07/13/21 0841    docusate sodium (COLACE) capsule 100 mg  100 mg Oral BID Bhavana Munoz MD   100 mg at 07/13/21 2005    ferrous sulfate (IRON 325) tablet 325 mg  325 mg Oral Daily with breakfast Estela Campos MD   325 mg at 07/13/21 0840    ascorbic acid (VITAMIN C) tablet 500 mg  500 mg Oral Daily Estela Campos MD   500 mg at 07/13/21 0838    pantoprazole (PROTONIX) tablet 40 mg  40 mg Oral QAM AC Estela Campos MD   40 mg at 07/13/21 0532    traMADol (ULTRAM) tablet 50 mg  50 mg Oral Q4H PRN Estela Campos MD   50 mg at 07/13/21 0317    cinacalcet (SENSIPAR) tablet 60 mg  60 mg Oral Once per day on Mon Wed Fri Estela Campos MD   60 mg at 07/12/21 2116    calcitRIOL (ROCALTROL) capsule 1.5 mcg  1.5 mcg Oral Once per day on Mon Wed Fri Estela Campos MD   1.5 mcg at 07/12/21 2114    isosorbide mononitrate (IMDUR) extended release tablet 30 mg  30 mg Oral Daily Randal Sampson,    30 mg at 07/13/21 0840    sodium chloride flush 0.9 % injection 5-40 mL  5-40 mL Intravenous 2 times per day Estela Campos MD   10 mL at 07/13/21 2006    sodium chloride flush 0.9 % injection 5-40 mL  5-40 mL Intravenous PRN Estela Campos MD        0.9 % sodium chloride infusion  25 mL Intravenous PRN Estela Campos MD        heparin (porcine) injection 5,000 Units  5,000 Units Subcutaneous 3 times per day Estela Campos MD   5,000 Units at 07/13/21 2112    acetaminophen (TYLENOL) tablet 650 mg  650 mg Oral Q6H PRN Estela Campos MD   650 mg at 07/12/21 2318    Or    acetaminophen (TYLENOL) suppository 650 mg  650 mg Rectal Q6H PRN Estela Campos MD        promethazine (PHENERGAN) tablet 12.5 mg  12.5 mg Oral Q6H PRN Estela Campos MD        Or    ondansetron (ZOFRAN) injection 4 mg  4 mg Intravenous Q6H PRN Estela Campos MD        polyethylene glycol (GLYCOLAX) packet 17 g  17 g Oral Daily Estela Campos MD   17 g at 07/13/21 1133       Allergies:  No known allergies    Social History:    TOBACCO:   reports that he quit smoking about 39 years ago. He has a 40.00 pack-year smoking history. He has never used smokeless tobacco.  ETOH:   reports no history of alcohol use.       Family History: Problem Relation Age of Onset    Diabetes Mother     Heart Disease Mother     Diabetes Sister     Mental Illness Paternal Aunt          Review of Systems -   General ROS: negative  Psychological ROS: negative  Hematological and Lymphatic ROS: No history of blood clots or bleeding disorder. Respiratory ROS: no cough, shortness of breath, or wheezing  Cardiovascular ROS: As per HPI  Gastrointestinal ROS: negative  Genito-Urinary ROS: no dysuria, trouble voiding, or hematuria  Musculoskeletal ROS: negative  Neurological ROS: no TIA or stroke symptoms  Dermatological ROS: negative    All others reviewed and are negative.        /75   Pulse 71   Temp 97.8 °F (36.6 °C) (Oral)   Resp 18   Ht 5' 6\" (1.676 m)   Wt 150 lb 9.6 oz (68.3 kg)   SpO2 98%   BMI 24.31 kg/m²       Physical Examination:   General appearance - eldely, in no distress  Mental status - alert, oriented to person, place, and time  Neck - supple, no significant adenopathy, no JVD, or carotid bruits  Chest - clear to auscultation, no wheezes, rales or rhonchi, symmetric air entry  Heart - normal rate, regular rhythm, normal S1, S2, no murmurs, rubs, clicks or gallops  Abdomen - soft, nontender, nondistended, no masses or organomegaly  Neurological - alert, oriented, normal speech, no focal findings or movement disorder noted  Musculoskeletal - no joint tenderness, deformity or swelling  Extremities - peripheral pulses normal, no pedal edema, no clubbing or cyanosis  Skin - normal coloration and turgor, no rashes, no suspicious skin lesions noted      LABS:    Recent Labs     07/12/21  0915   TROPONINT 0.080*     CBC:   Lab Results   Component Value Date    WBC 6.1 07/12/2021    RBC 3.76 07/12/2021    RBC 2.95 09/19/2011    HGB 12.1 07/12/2021    HCT 34.5 07/12/2021    MCV 91.8 07/12/2021    MCH 32.2 07/12/2021    MCHC 35.1 07/12/2021    RDW 17.7 05/26/2018     07/12/2021    MPV 11.5 07/12/2021     BMP:    Lab Results   Component Value Date     07/13/2021    K 4.2 07/13/2021    K 6.1 07/12/2021    CL 95 07/13/2021    CO2 27 07/13/2021    BUN 14 07/13/2021    LABALBU 4.0 07/12/2021    LABALBU 4.2 04/05/2016    CREATININE 4.8 07/13/2021    CALCIUM 9.4 07/13/2021    LABGLOM 14 07/13/2021    GLUCOSE 91 07/13/2021    GLUCOSE 135 09/20/2011     Hepatic Function Panel:    Lab Results   Component Value Date    ALKPHOS 89 07/12/2021    ALT 20 07/12/2021    AST 32 07/12/2021    PROT 7.0 07/12/2021    BILITOT 0.6 07/12/2021    BILIDIR <0.2 07/12/2021    LABALBU 4.0 07/12/2021    LABALBU 4.2 04/05/2016     Magnesium:    Lab Results   Component Value Date    MG 2.0 07/13/2021     Warfarin PT/INR:  No components found for: PTPATWAR, PTINRWAR  HgBA1c:    Lab Results   Component Value Date    LABA1C 5.1 08/05/2015     FLP:    Lab Results   Component Value Date    TRIG 92 09/02/2020    HDL 63 09/02/2020    LDLCALC 141 09/02/2020     TSH:    Lab Results   Component Value Date    TSH 2.600 07/13/2021     BNP: No components found for: PRO-BNP      Assessment/Plan:    Patient Active Problem List   Diagnosis    CAD (coronary artery disease)    [facility] ([facility] disease) [facility] [address]    Chronic renal insufficiency    Patient overweight    Arthritis-  low back and neck .     CKD (chronic kidney disease) stage 3, GFR 30-59 ml/min ([facility])    Dyspnea and respiratory abnormalities    ED (erectile dysfunction)    Degenerative joint disease of knee, left    Gout of big toe    Anemia, chronic disease    CKD (chronic kidney disease) stage 4, GFR 15-29 ml/min ([facility])    Monoclonal gammopathy    Leukopenia    Thrombocytopenia (HCC)    Chronic kidney disease (CKD), stage V ([facility])    Metabolic acidosis    Hyperkalemia    Iron deficiency anemia    ROSAURA (acute kidney injury) ([facility])    Plasma cell dyscrasia    Idiopathic hypotension    Hypertensive urgency    ESRD (end stage renal disease) on dialysis ([facility])    Systolic congestive heart failure ([facility] [address]    Other fluid overload (CODE)    Hyperphosphatemia    Acute diastolic congestive heart failure (HCC)    Acute on chronic diastolic congestive heart failure (HCC)    Pulmonary hypertension (HCC)    Anemia due to chronic kidney disease, on chronic dialysis (HCC)    Volume overload    Secondary hyperparathyroidism of renal origin ([address]    Chest pain    Acute pulmonary edema (HCC)    Accelerated hypertension    Gastritis and duodenitis    Fall from slip, trip, or stumble, initial encounter    Closed fracture of left ankle    ESRD on hemodialysis ([facility] [address]    Hypertensive emergency    SOB (shortness of breath)     Briefly this is a 51-year-old man with history of chronic systolic heart failure, status post pacemaker, significant CAD being managed medically, ESRD on dialysis who was sent from dialysis for elevated blood pressure and shortness of breath. His blood pressure so somewhat normalized. Is currently asymptomatic. Has poor medical insight but denies any symptoms. Discussed once again about cardiac catheterization with the patient. Suggest to continue antianginal therapy with Imdur, metoprolol, statin  If BP is uncontrolled, consider switching metoprolol to carvedilol for better BP management. Continue rest of management    Please do note hesitate to contact me for any further questions. Thank you for the opportunity to be involved in this patient's care.     Code Status: Limited    Electronically signed by Justin White MD on 7/13/2021 at 9:39 PM

## 2021-07-14 NOTE — FLOWSHEET NOTE
07/14/21 0157   Provider Notification   Reason for Communication Review case   Provider Name Dr. Jovanny Mccarty   Provider Notification Physician   Method of Communication Secure Message   Response No new orders   Notification Time 0157   Secure message sent to update pt first set of cultures came back with gram positive cocci in clusters and pairs, groups of 4 tetrads. They were wondering if it could have been contaminated and are doing a biofire. 0200- Message received. No new orders.

## 2021-07-14 NOTE — DISCHARGE SUMMARY
hepatojugular reflex, or peripheral edema. Pt is on the following HF Mortality Modifiers: lisinopril,  and metoprolol, . Pt is on the following diuretics: none. Follows in HF Clinic: No.     #Coronary Artery Disease, Stable  #Hyperlipidemia, Stable  LHC in 05/2018 demonstrated patent L main artery, 100% occlusion of RCA, diffuse disease of LCX with patient mid circumflex stent, LAD with 50% stenosis in mid and distal segments, and 80% stenosis in distal LAD at apex. Cardiac stress test in 05/2021 demonstrated no chest pain or induced arrhythmia during stress via lexiscan. Last lipid panel 09/2020: total cholesterol: 222, HDL 63, , and TRG 92. On pravastatin 80 mg daily which was continued.     #Restrictive Lung Disease, Inactive  PFTs in 2012 demonstrated elevated FEV1/FVC ratio of 112% predicted consistent with RLD. Etiology uncertain. No follow up PFTs. Recommend following up with pulmonologist.      #Elevated Troponin, Stable  Presented with troponin 0.08. This is his baseline. Likely elevated due to ESRD.      #Elevated pro-BNP, Stable  Presented with pro-BNP of >70,000. This is his baseline. Likely elevated due to ESRD. #H/o L ankle fracture  Pt presented with a CAM boot on his L ankle. This was from previous L ankle fracture in 05/19. Pt missed follow up for re-evaluation. Podiatry consulted and recommended continuing CAM boot to LLE. F/u OP w/ Dr. Bart Leonard. Hospital Course:   Saúl Chaves is a 80 y.o. [facility] with a PMH of Mild Restrictive Lung Disease, HLD, HTN, SSS, CAD, and systolic HF who presented with chest pain and shortness of breath since last night. He states that this morning during dialysis, he began to have elevated blood pressures and worsening shortness of breath. He also noted some intermittent stomach pain. He described the chest pain as located mostly over his right shoulder.  Through chart review, this shoulder pain has been a chronic problem for which previous imaging demonstrated a chronic R shoulder tendinopathy. The SOB he notes started suddenly. Pt also endorses headache but notes he often gets them. Denies worsening with exertion, associated cough, fever, chills, fatigue, nausea, vomiting, weight changes, wheezing, hemoptysis, lightheadedness, dizziness, or leg swelling. Of note, pt has a LLE boot from an ankle fracture 3 weeks ago s/p fall.      Summarized ED course: Initial vital signs were stable except for elevated BP of 146/95 with max of 238 systolic, and multiple runs of NSVT.       Admission Labs:  · Hyperkalemia (6.1)  · BUN 29  · sCr 6.4  · Rest of BMP + Mg/Phos/Sathya wnl. · Pro-BNP >700  · Trop 0.08  · Hgb 12.5, MCV 91.8     Admission Imaging:  · CTA Chest, Abdomen, and Pelvis 07/12  ? No evidence of PE  ? Moderate R and Small L pleural effusion w/ adjacent atelectasis  ? Ascending aortic aneurysm measuring 3.7 cm in greatest diameter  ? Pulmonary artery HTN  ? Cardiomegaly, increased vs prior  ? Cholelithiasis  ? Prostatomegaly w/ enhancing component on left    Discharge Medications:   Lequita Norlander   Home Medication Instructions [facility]:[id number]    Printed on:07/14/21 9639   Medication Information                      B Complex-C-Folic Acid (RENAL) 1 MG CAPS  Take 1 capsule by mouth daily             calcitRIOL (ROCALTROL) 0.25 MCG capsule  Take 1.5 mcg by mouth three times a week before dialysis on M,W,F             cinacalcet (SENSIPAR) 30 MG tablet  Take 60 mg by mouth three times a week before dialysis on M,W,F             docusate sodium (COLACE, DULCOLAX) 100 MG CAPS  Take 100 mg by mouth 2 times daily             ferrous sulfate 325 (65 Fe) MG tablet  Take 1 tablet by mouth daily (with breakfast)             gabapentin (NEURONTIN) 100 MG capsule  Take 100 mg by mouth 3 times daily.               hydrALAZINE (APRESOLINE) 25 MG tablet  Take 1 tablet by mouth 2 times daily             isosorbide mononitrate (IMDUR) 30 MG extended release tablet  Take 30 mg by mouth daily             lisinopril (PRINIVIL;ZESTRIL) 20 MG tablet  Take 1 tablet by mouth daily             metoprolol (LOPRESSOR) 50 MG tablet  Take 1 tablet by mouth 2 times daily             pantoprazole (PROTONIX) 40 MG tablet  TAKE 1 TABLET DAILY BEFORE BREAKFAST             polyethylene glycol (MIRALAX) powder  Renal Miralax Colonoscopy prep. Use as directed. Mix Miralax 238 g with 24 oz clear liquids. Drink 8 oz glass every 20-30 minutes until gone. pravastatin (PRAVACHOL) 80 MG tablet  Take 1 tablet by mouth daily             traMADol (ULTRAM) 50 MG tablet  Take 50 mg by mouth every 8 hours as needed for Pain.             vitamin C (ASCORBIC ACID) 500 MG tablet  Take 1 tablet by mouth daily                 Patient Instructions:    Discharge lab work: repeat BMP, Ca, Mg, Phos in 1 week. Follow up with PCP and Nephrologist.   Activity: activity as tolerated  Diet: ADULT DIET; Regular; 3 carb choices (45 gm/meal); Low Sodium (2 gm); Low Potassium (Less than 3000 mg/day); Low Phosphorus (Less than 1000 mg);  Less than 60 gm; 2000 ml    Code Status: Limited    Follow-up visits:   Jimy Mcknight MD  [address] [place] [id number] [phone]             Procedures: Hemodialysis     Consults:   IP CONSULT TO NEPHROLOGY  IP CONSULT TO SOCIAL WORK  IP CONSULT TO CARDIOLOGY  PALLIATIVE CARE EVAL  IP CONSULT TO PODIATRY  IP CONSULT TO CARDIOLOGY    Examination:  Vitals:  Vitals:    07/13/21 1520 07/13/21 1918 07/13/21 2318 07/14/21 0320   BP: 128/71 123/75 139/82 (!) 144/90   Pulse: 76 71 70 80   Resp: 17 18 18 18   Temp: 97.8 °F (36.6 °C) 97.8 °F (36.6 °C) 98.4 °F (36.9 °C) 98.3 °F (36.8 °C)   TempSrc: Axillary Oral Oral Oral   SpO2: 97% 98% 96% 96%   Weight:    148 lb 9.6 oz (67.4 kg)   Height:         Weight: Weight: 148 lb 9.6 oz (67.4 kg)     24 hour intake/output:    Intake/Output Summary (Last 24 hours) at 7/14/2021 0616  Last data filed at 7/14/2021 0320  Gross per 24 hour   Intake 740 ml   Output 0 ml   Net 740 ml       General appearance - alert, well appearing, and in no distress  Chest - clear to auscultation, no wheezes, rales or rhonchi, symmetric air entry  Heart - normal rate, regular rhythm, normal S1, S2, no murmurs, rubs, clicks or gallops  Abdomen - soft, nontender, nondistended, no masses or organomegaly  Obese: No; Protuberant: No   Neurological - alert, oriented, normal speech, no focal findings or movement disorder noted  Extremities - peripheral pulses normal, no pedal edema, no clubbing or cyanosis  Skin - normal coloration and turgor, no rashes, no suspicious skin lesions noted    Significant Diagnostics:   Radiology: XR ANKLE LEFT (MIN 3 VIEWS)    Result Date: 7/13/2021  PROCEDURE: XR ANKLE LEFT (MIN 3 VIEWS) CLINICAL INFORMATION: fracture healing. COMPARISON: Plain radiographs dated 21 May 2021. TECHNIQUE: 3 views of the left ankle. FINDINGS: There is a healing fracture in the distal fibula. .  The ankle mortise is congruent with the talar dome. There is mild degenerative change The calcaneus is normal.   The base of the fifth metatarsal is normal. There is mild soft tissue swelling over the lateral malleolus. .     1. Healing fracture in the distal fibula. 2. Mild degenerative change. 3. Soft tissue swelling over the lateral malleolus. . **This report has been created using voice recognition software. It may contain minor errors which are inherent in voice recognition technology. ** Final report electronically signed by DR Thai Zepeda on 7/13/2021 7:32 PM    XR FOOT LEFT (MIN 3 VIEWS)    Result Date: 7/13/2021  PROCEDURE: XR FOOT LEFT (MIN 3 VIEWS) CLINICAL INFORMATION: Old fx of left foot. . COMPARISON: Left ankle radiographs dated 21 May 2021. TECHNIQUE: 4 views of the left foot.  FINDINGS: There is a probable healing fracture in the distal are calcified, stable compared to prior exam. The heart is enlarged, increased compared to previous CT. There is a left-sided cardiac pacer/defibrillator generator in the subcutaneous fat overlying the left pectoralis muscle, similar to prior exam. There are stable leads in the right atrium and right ventricle, respectively. There are mild anterior wedge shape configuration's of the T6-T8 vertebral bodies with approximately 10% anterior height loss, stable compared to prior exam. There are stable degenerative changes of the  thoracic spine. Abdomen/pelvis: There are few scattered cysts in the liver, stable compared to prior exam. There is a calcified stone in the gallbladder, stable compared to prior exam. Adrenal glands are unremarkable. The kidneys are atrophied, stable compared to prior exam. The spleen  is unremarkable. There is mild ductal prominence in the pancreas, unchanged compared to prior exam. No definite pancreatic lesion is identified. No retroperitoneal or mesenteric lymphadenopathy is identified. There is mural calcification in the aorta without evidence of aneurysm. There is moderately prominent stool within the large bowel. Small bowel appears within normal limits. The appendix is normal in appearance. The bladder is nondistended. There is diffuse wall thickening the bladder which may represent pseudothickening from underdistention. The prostate is prominently enlarged, increased compared to prior exam. There is an asymmetrically enhancing area in the left prostate. No free fluid is identified. There are stable degenerative changes of lumbar spine. There is stable bone island in the right ilium. 1. No evidence of pulmonary embolus. 2. Moderate right and small left pleural effusions with adjacent atelectasis. 3. Ascending aortic aneurysm measuring 3.7 cm in greatest diameter. 4. Pulmonary artery hypertension. 5. Cardiomegaly, increased compared to prior CT.  6. Prostatomegaly with enhancing component on the left. 7. Cholelithiasis. **This report has been created using voice recognition software. It may contain minor errors which are inherent in voice recognition technology. ** Final report electronically signed by Dr. Randolph Murrieta MD on 7/12/2021 11:43 AM    CT ABDOMEN PELVIS W IV CONTRAST Additional Contrast? None    Result Date: 7/12/2021  PROCEDURE: CTA CHEST W WO CONTRAST, CT ABDOMEN PELVIS W IV CONTRAST CLINICAL INFORMATION: sob . Periumbilical pain. Loss of appetite. COMPARISON: CTA chest, abdomen and pelvis dated 5/21/2018. TECHNIQUE: Helical CT of the chest during fast bolus administration of 80 mL Isovue-370 injected in the left [facility] with thick sagittal and coronal maximum intensity projection reconstructions. Subsequent helical CT of the abdomen and pelvis with sagittal coronal reconstructions. All CT scans at this facility use dose modulation, iterative reconstruction, and/or weight-based dosing when appropriate to reduce radiation dose to as low as reasonably achievable. FINDINGS:  Chest: There is a good contrast bolus within the pulmonary arteries, adequate for evaluation to the subsegmental level. There are no focal filling defects within the pulmonary arteries to suggest pulmonary embolus. The main pulmonary arteries are enlarged. There is mural calcification in the aorta. The ascending aorta is mildly enlarged measuring 3.7 cm in greatest short axis diameter the level of the main pulmonary artery. There is no evidence of dissection. There are moderate right and small left pleural  effusions with mild adjacent atelectasis. There are moderate emphysematous changes predominantly in the upper lungs which have worsened in the interval compared to prior exam. There are scattered distal lymph nodes some which are calcified, stable compared to prior exam. The heart is enlarged, increased compared to previous CT.  There is a left-sided cardiac pacer/defibrillator generator in the subcutaneous fat overlying the left pectoralis muscle, similar to prior exam. There are stable leads in the right atrium and right ventricle, respectively. There are mild anterior wedge shape configuration's of the T6-T8 vertebral bodies with approximately 10% anterior height loss, stable compared to prior exam. There are stable degenerative changes of the  thoracic spine. Abdomen/pelvis: There are few scattered cysts in the liver, stable compared to prior exam. There is a calcified stone in the gallbladder, stable compared to prior exam. Adrenal glands are unremarkable. The kidneys are atrophied, stable compared to prior exam. The spleen  is unremarkable. There is mild ductal prominence in the pancreas, unchanged compared to prior exam. No definite pancreatic lesion is identified. No retroperitoneal or mesenteric lymphadenopathy is identified. There is mural calcification in the aorta without evidence of aneurysm. There is moderately prominent stool within the large bowel. Small bowel appears within normal limits. The appendix is normal in appearance. The bladder is nondistended. There is diffuse wall thickening the bladder which may represent pseudothickening from underdistention. The prostate is prominently enlarged, increased compared to prior exam. There is an asymmetrically enhancing area in the left prostate. No free fluid is identified. There are stable degenerative changes of lumbar spine. There is stable bone island in the right ilium. 1. No evidence of pulmonary embolus. 2. Moderate right and small left pleural effusions with adjacent atelectasis. 3. Ascending aortic aneurysm measuring 3.7 cm in greatest diameter. 4. Pulmonary artery hypertension. 5. Cardiomegaly, increased compared to prior CT. 6. Prostatomegaly with enhancing component on the left. 7. Cholelithiasis. **This report has been created using voice recognition software.  It may contain minor errors which are inherent in voice recognition technology. ** Final report electronically signed by Dr. Cortney Chacon MD on 7/12/2021 11:43 AM    XR CHEST PORTABLE    Result Date: 7/12/2021  PROCEDURE: XR CHEST PORTABLE CLINICAL INFORMATION: 80-year-old male with shortness of breath. COMPARISON: Chest x-ray 7/12/2021. TECHNIQUE: AP semiupright view of the chest was obtained. FINDINGS: There is a dual-chamber cardiac device over the left hemithorax. There is cardiomegaly and pulmonary vascular congestion. There are small bilateral pleural effusions. There is no pneumothorax. Visualized portions of the upper abdomen are within normal limits. The osseous structures are intact. No acute fractures or suspicious osseous lesions. Cardiomegaly with pulmonary vascular congestion and small bilateral pleural effusions. **This report has been created using voice recognition software. It may contain minor errors which are inherent in voice recognition technology. ** Final report electronically signed by Dr Andria Barnard on 7/12/2021 9:50 PM    XR CHEST PORTABLE    Result Date: 7/12/2021  PROCEDURE: XR CHEST PORTABLE CLINICAL INFORMATION: SOB. COMPARISON: 7/7/2021. TECHNIQUE: AP upright view of the chest. FINDINGS: There is stable left sided cardiac pacer generator with 2 leads. There is a persistent small left pleural effusion. The lungs appear grossly clear. The cardiac silhouette is mildly prominent, stable compared to prior exam. Visualized osseous structures appear grossly intact. Stable small left pleural effusion. **This report has been created using voice recognition software. It may contain minor errors which are inherent in voice recognition technology. ** Final report electronically signed by Dr. Cortney Chacon MD on 7/12/2021 9:40 AM      Labs:   Recent Results (from the past 72 hour(s))   EKG 12 Lead    Collection Time: 07/12/21  9:12 AM   Result Value Ref Range    Ventricular Rate 111 BPM    QRS Duration 64 ms    Q-T Interval 312 ms    QTc Calculation (Bazenany) 424 ms    R Axis 54 degrees    T Axis 31 degrees   Basic Metabolic Panel w/ Reflex to MG    Collection Time: 07/12/21  9:15 AM   Result Value Ref Range    Sodium 136 135 - 145 meq/L    Potassium reflex Magnesium 6.1 (HH) 3.5 - 5.2 meq/L    Chloride 91 (L) 98 - 111 meq/L    CO2 31 23 - 33 meq/L    Glucose 99 70 - 108 mg/dL    BUN 29 (H) 7 - 22 mg/dL    CREATININE 6.4 (HH) 0.4 - 1.2 mg/dL    Calcium 10.3 8.5 - 10.5 mg/dL   Brain Natriuretic Peptide    Collection Time: 07/12/21  9:15 AM   Result Value Ref Range    Pro-BNP > 68573.0 (H) 0.0 - 1800.0 pg/mL   CBC Auto Differential    Collection Time: 07/12/21  9:15 AM   Result Value Ref Range    WBC 7.5 4.8 - 10.8 thou/mm3    RBC 3.91 (L) 4.70 - 6.10 mill/mm3    Hemoglobin 12.5 (L) 14.0 - 18.0 gm/dl    Hematocrit 37.0 (L) 42.0 - 52.0 %    MCV 94.6 (H) 80.0 - 94.0 fL    MCH 32.0 26.0 - 33.0 pg    MCHC 33.8 32.2 - 35.5 gm/dl    RDW-CV 16.9 (H) 11.5 - 14.5 %    RDW-SD 58.8 (H) 35.0 - 45.0 fL    Platelets 098 988 - 535 thou/mm3    MPV 10.9 9.4 - 12.4 fL    Seg Neutrophils 75.9 %    Lymphocytes 10.6 %    Monocytes 7.8 %    Eosinophils 4.6 %    Basophils 0.7 %    Immature Granulocytes 0.4 %    Segs Absolute 5.7 1 - 7 thou/mm3    Lymphocytes Absolute 0.8 (L) 1.0 - 4.8 thou/mm3    Monocytes Absolute 0.6 0.4 - 1.3 thou/mm3    Eosinophils Absolute 0.3 0.0 - 0.4 thou/mm3    Basophils Absolute 0.1 0.0 - 0.1 thou/mm3    Immature Grans (Abs) 0.03 0.00 - 0.07 thou/mm3    nRBC 0 /100 wbc   Troponin    Collection Time: 07/12/21  9:15 AM   Result Value Ref Range    Troponin T 0.080 (A) ng/ml   Hepatic function panel    Collection Time: 07/12/21  9:15 AM   Result Value Ref Range    Albumin 4.4 3.5 - 5.1 g/dL    Total Bilirubin 0.6 0.3 - 1.2 mg/dL    Bilirubin, Direct <0.2 0.0 - 0.3 mg/dL    Alkaline Phosphatase 89 38 - 126 U/L    AST 42 (H) 5 - 40 U/L    ALT 14 11 - 66 U/L    Total Protein 7.6 6.1 - 8.0 g/dL   Anion Gap    Collection Time: 07/12/21  9:15 AM   Result Value Ref Range    Anion Gap 14.0 8.0 - 16.0 meq/L   Glomerular Filtration Rate, Estimated    Collection Time: 07/12/21  9:15 AM   Result Value Ref Range    Est, Glom Filt Rate 10 (A) ml/min/1.73m2   Osmolality    Collection Time: 07/12/21  9:15 AM   Result Value Ref Range    Osmolality Calc 277.8 275.0 - 300.0 mOsmol/kg   Magnesium    Collection Time: 07/12/21  9:15 AM   Result Value Ref Range    Magnesium 2.3 1.6 - 2.4 mg/dL   Phosphorus    Collection Time: 07/12/21  9:15 AM   Result Value Ref Range    Phosphorus 3.0 2.4 - 4.7 mg/dL   POCT Glucose    Collection Time: 07/12/21 11:14 AM   Result Value Ref Range    POC Glucose 79 70 - 108 mg/dl   POCT Glucose    Collection Time: 07/12/21 11:36 AM   Result Value Ref Range    Glucose 79 mg/dL   EKG 12 Lead    Collection Time: 07/12/21  1:37 PM   Result Value Ref Range    Ventricular Rate 93 BPM    QRS Duration 78 ms    Q-T Interval 392 ms    QTc Calculation (Bazett) 487 ms    R Axis 50 degrees    T Axis 16 degrees   POCT Glucose    Collection Time: 07/12/21  2:21 PM   Result Value Ref Range    POC Glucose 76 70 - 108 mg/dl   EKG 12 Lead    Collection Time: 07/12/21  7:03 PM   Result Value Ref Range    Ventricular Rate 121 BPM    QRS Duration 74 ms    Q-T Interval 352 ms    QTc Calculation (Bazett) 499 ms    R Axis 29 degrees    T Axis 63 degrees   EKG 12 Lead    Collection Time: 07/12/21  7:05 PM   Result Value Ref Range    Ventricular Rate 121 BPM    Atrial Rate 153 BPM    P-R Interval 152 ms    QRS Duration 76 ms    Q-T Interval 370 ms    QTc Calculation (Bazett) 525 ms    P Axis 99 degrees    R Axis 16 degrees    T Axis 27 degrees   POCT Glucose    Collection Time: 07/12/21  7:52 PM   Result Value Ref Range    POC Glucose 92 70 - 108 mg/dl   CBC    Collection Time: 07/12/21  8:19 PM   Result Value Ref Range    WBC 6.1 4.8 - 10.8 thou/mm3    RBC 3.76 (L) 4.70 - 6.10 mill/mm3    Hemoglobin 12.1 (L) 14.0 - 18.0 gm/dl    Hematocrit 34.5 (L) 42.0 - 52.0 %    MCV 91.8 80.0 - 94.0 fL    MCH 32.2 26.0 - 33.0 pg    MCHC 35.1 32.2 - 35.5 gm/dl    RDW-CV 16.5 (H) 11.5 - 14.5 %    RDW-SD 55.3 (H) 35.0 - 45.0 fL    Platelets 129 673 - 248 thou/mm3    MPV 11.5 9.4 - 12.4 fL   Culture, Blood 1    Collection Time: 07/12/21  8:20 PM    Specimen: Blood   Result Value Ref Range    Blood Culture, Routine No growth-preliminary     Magnesium    Collection Time: 07/12/21  8:20 PM   Result Value Ref Range    Magnesium 2.0 1.6 - 2.4 mg/dL   Phosphorus    Collection Time: 07/12/21  8:20 PM   Result Value Ref Range    Phosphorus 2.3 (L) 2.4 - 4.7 mg/dL   Comprehensive metabolic panel    Collection Time: 07/12/21  8:20 PM   Result Value Ref Range    Glucose 94 70 - 108 mg/dL    CREATININE 4.4 (HH) 0.4 - 1.2 mg/dL    BUN 13 7 - 22 mg/dL    Sodium 137 135 - 145 meq/L    Potassium 3.7 3.5 - 5.2 meq/L    Chloride 96 (L) 98 - 111 meq/L    CO2 27 23 - 33 meq/L    Calcium 9.7 8.5 - 10.5 mg/dL    AST 32 5 - 40 U/L    Alkaline Phosphatase 89 38 - 126 U/L    Total Protein 7.0 6.1 - 8.0 g/dL    Albumin 4.0 3.5 - 5.1 g/dL    Total Bilirubin 0.6 0.3 - 1.2 mg/dL    ALT 20 11 - 66 U/L   Procalcitonin    Collection Time: 07/12/21  8:20 PM   Result Value Ref Range    Procalcitonin 0.41 (H) 0.01 - 0.09 ng/mL   Anion Gap    Collection Time: 07/12/21  8:20 PM   Result Value Ref Range    Anion Gap 14.0 8.0 - 16.0 meq/L   Glomerular Filtration Rate, Estimated    Collection Time: 07/12/21  8:20 PM   Result Value Ref Range    Est, Glom Filt Rate 15 (A) ml/min/1.73m2   Blood Gas, Arterial    Collection Time: 07/12/21  9:19 PM   Result Value Ref Range    pH, Blood Gas 7.55 (H) 7.35 - 7.45    PCO2 32 (L) 35 - 45 mmhg    PO2 117 (H) 71 - 104 mmhg    HCO3 28 23 - 28 mmol/l    Base Excess (Calculated) 6.1 (H) -2.5 - 2.5 mmol/l    O2 Sat 99 %    IFIO2 3     DEVICE Cannula     Avery Test Positive     Source: L Radial     COLLECTED BY: [id number]    Culture, Blood 2    Collection Time: 07/12/21 10:03 PM    Specimen: Blood   Result Value Ref Range    Blood Culture, Routine No growth-preliminary     Vitamin B12 & folate    Collection Time: 07/12/21 10:03 PM   Result Value Ref Range    Vitamin B-12 > 2000 (H) 211 - 911 pg/mL    Folate 17.6 4.8 - 24.2 ng/mL   Blood ID, Molecular    Collection Time: 07/12/21 10:03 PM   Result Value Ref Range    CARBAPENEM RESITANT FILM ARRAY NA Not Detected    METHICILLIN RESISTANT FILM ARRAY NA Not Detected    VANCOMYCIN RESISTANT FILM ARRAY NA Not Detected    ENTEROCOCCUS FILM ARRAY Not Detected Not Detected    LISTERIA MONOCYTOGENES FILM ARRAY Not Detected Not Detected    STAPHYLOCOCCUS FILM ARRAY Not Detected Not Detected    STAPH [address] [address] Not Detected Not Detected    STREPTOCOCCUS FILM ARRAY Not Detected Not Detected    STREP AGALACTIAE FILM ARRAY Not Detected Not Detected    STREP PNEUMONIAE FILM ARRAY Not Detected Not Detected    STREP [address] Not Detected Not Detected    ACINETOBACTER BAUMANNII FILM ARRAY Not Detected Not Detected    ENTERBACTERIACEAE FILM ARRAY Not Detected Not Detected    ENTERBACTER CLOACAE FILM ARRAY Not Detected Not Detected    ESCHERICHIA COLI FILM ARRAY Not Detected Not Detected    KLEBSIELLA OXYTOCA FILM ARRAY Not Detected Not Detected    KLEBSIELLA PNEUMONIAE FILM ARRAY Not Detected Not Detected    PROTEUS FILM ARRAY Not Detected Not Detected    SERRATIA MARCESCENS FILM ARRAY Not Detected Not Detected    HAEMOPHILUS INFLUENZA FILM ARRAY Not Detected Not Detected    NEISSERIA MENIGITIDIS FILM ARRAY Not Detected Not Detected    PSEUDOMONAS AERUGINOSA FILM ARRAY Not Detected Not Detected    TAJ ALBICANS FILM ARRAY Not Detected Not Detected    TAJ GLABRATA FILM ARRAY Not Detected Not Detected    TAJ KRUSEI FILM ARRAY Not Detected Not Detected    CANDIDA PARAPSILOSIS FILM ARRAY Not Detected Not Detected    CANDIDA TROPICALIS FILM ARRAY Not Detected Not Detected    Bottle Type AEROBIC     Source of Blood Culture VEIN; SINGLE    Basic Metabolic Panel Collection Time: 07/13/21  7:07 AM   Result Value Ref Range    Sodium 135 135 - 145 meq/L    Potassium 4.2 3.5 - 5.2 meq/L    Chloride 95 (L) 98 - 111 meq/L    CO2 27 23 - 33 meq/L    Glucose 91 70 - 108 mg/dL    BUN 14 7 - 22 mg/dL    CREATININE 4.8 (HH) 0.4 - 1.2 mg/dL    Calcium 9.4 8.5 - 10.5 mg/dL   Magnesium    Collection Time: 07/13/21  7:07 AM   Result Value Ref Range    Magnesium 2.0 1.6 - 2.4 mg/dL   Phosphorus    Collection Time: 07/13/21  7:07 AM   Result Value Ref Range    Phosphorus 4.0 2.4 - 4.7 mg/dL   TSH with Reflex    Collection Time: 07/13/21  7:07 AM   Result Value Ref Range    TSH 2.600 0.400 - 4.200 uIU/mL   Anion Gap    Collection Time: 07/13/21  7:07 AM   Result Value Ref Range    Anion Gap 13.0 8.0 - 16.0 meq/L   Glomerular Filtration Rate, Estimated    Collection Time: 07/13/21  7:07 AM   Result Value Ref Range    Est, Glom Filt Rate 14 (A) ml/min/1.73m2   POCT Glucose    Collection Time: 07/13/21  8:28 AM   Result Value Ref Range    POC Glucose 89 70 - 108 mg/dl   POCT Glucose    Collection Time: 07/13/21 11:42 AM   Result Value Ref Range    POC Glucose 105 70 - 108 mg/dl   POCT Glucose    Collection Time: 07/13/21  4:46 PM   Result Value Ref Range    POC Glucose 67 (L) 70 - 108 mg/dl   POCT Glucose    Collection Time: 07/13/21  7:24 PM   Result Value Ref Range    POC Glucose 143 (H) 70 - 108 mg/dl   Basic Metabolic Panel    Collection Time: 07/14/21  3:49 AM   Result Value Ref Range    Sodium 132 (L) 135 - 145 meq/L    Potassium 5.3 (H) 3.5 - 5.2 meq/L    Chloride 94 (L) 98 - 111 meq/L    CO2 24 23 - 33 meq/L    Glucose 96 70 - 108 mg/dL    BUN 21 7 - 22 mg/dL    CREATININE 6.7 (HH) 0.4 - 1.2 mg/dL    Calcium 9.0 8.5 - 10.5 mg/dL   Magnesium    Collection Time: 07/14/21  3:49 AM   Result Value Ref Range    Magnesium 2.0 1.6 - 2.4 mg/dL   Phosphorus    Collection Time: 07/14/21  3:49 AM   Result Value Ref Range    Phosphorus 4.8 (H) 2.4 - 4.7 mg/dL   Anion Gap    Collection Time: 07/14/21 3:49 AM   Result Value Ref Range    Anion Gap 14.0 8.0 - 16.0 meq/L   Glomerular Filtration Rate, Estimated    Collection Time: 07/14/21  3:49 AM   Result Value Ref Range    Est, Glom Filt Rate 9 (A) ml/min/1.73m2       Discharge condition: fair  Disposition: Home  Time spent on discharge: 35 minutes    Electronically signed by Judi Naik MD on 7/14/2021 at 6:16 AM

## 2021-07-14 NOTE — PLAN OF CARE
Problem: Falls - Risk of:  Goal: Will remain free from falls  Description: Will remain free from falls  Outcome: Ongoing  Note: Pt free of falls this shift. Fall precautions in place. Bed alarm on. Call light and patient belongings within reach. Problem: Falls - Risk of:  Goal: Absence of physical injury  Description: Absence of physical injury  Outcome: Ongoing  Note: No new physical injury noted this shift. Problem: Pain:  Goal: Pain level will decrease  Description: Pain level will decrease  Outcome: Ongoing  Note: Pain Assessment: 0-10  Pain Level: 0   Patient's Stated Pain Goal: No pain   Is pain goal met at this time? Yes     Non-Pharmaceutical Pain Intervention(s): Rest, Repositioned       Problem: Skin Integrity:  Goal: Will show no infection signs and symptoms  Description: Will show no infection signs and symptoms  Outcome: Ongoing  Note: No S/S of infection noted this shift. Pt afebrile. Labs monitored. Problem: Skin Integrity:  Goal: Absence of new skin breakdown  Description: Absence of new skin breakdown  Outcome: Ongoing  Note: No new skin breakdown noted this shift. Pt repositioned every 2 hours and as needed. Pillow support provided. Problem: Musculor/Skeletal Functional Status  Goal: Highest potential functional level  Outcome: Ongoing  Note: Pt up to the chair today. NWB on left leg. Problem: Cardiovascular  Goal: No DVT, peripheral vascular complications  Outcome: Ongoing  Note: No S/S of DVT noted this shift. Pt on SQ heparin. Problem: Cardiovascular  Goal: Hemodynamic stability  Outcome: Ongoing  Note: Vital signs monitored every 4 hours and PRN. Labs monitored. Problem: Respiratory  Goal: O2 Sat > 90%  Outcome: Ongoing  Note: Pt able to maintain O2 sat greater than 90% on RA. Lung sounds clear and diminished. Care plan reviewed with patient. Patient  verbalize understanding of the plan of care and contribute to goal setting.

## 2021-07-14 NOTE — FLOWSHEET NOTE
07/14/21 1156   Encounter Summary   Services provided to: Patient not available   I attempted to visit the patient today but the nurse stated that the patient was not in his room at this time. I or another  will attempt to see the patient at a later time.

## 2021-07-14 NOTE — PROGRESS NOTES
Pharmacy Note  BioFire Result    Kristian Herrera is a 80 y.o. male, with a positive blood culture result    [employer] message received from Angie Davis , laboratory employee on 7/14/2021 at 4:14 AM    First Gram stain result: gram positive cocci in clusters and pairs    BioFire BCID result: negative    BioFire BCID and gram stain congruent? No    Suspected source? Possible contaminant     Patient chart reviewed for signs/symptoms of infection: Yes  BP (!) 144/90   Pulse 80   Temp 98.3 °F (36.8 °C) (Oral)   Resp 18   Ht 5' 6\" (1.676 m)   Wt 148 lb 9.6 oz (67.4 kg)   SpO2 96%   BMI 23.98 kg/m²   Lab Results   Component Value Date    WBC 6.1 07/12/2021       Allergies reviewed  No known allergies    Renal function reviewed  Estimated Creatinine Clearance: 9 mL/min (A) (based on SCr of 4.8 mg/dL [facility])). Current antibiotic regimen: none    Intervention needed: No    Individual contacted: Provider Jessica Archer PA-C    Recommendations: second culture is pending. Possible contaminant - would recommend waiting for 2nd bottle to be resulted as patient not showing signs of infection. Recommendations accepted?  Yes - will wait for 2nd culture    JIGNESH Leahy [facility]  7/14/2021 4:14 AM

## 2021-07-22 NOTE — PROGRESS NOTES
CLINICAL PHARMACY NOTE: MEDS TO BEDS    Total # of Prescriptions Filled: 1   The following medications were delivered to the patient:  Metoprolol Tartrate 100mg    Additional Documentation:

## 2021-08-05 PROBLEM — I50.42 CHRONIC COMBINED SYSTOLIC AND DIASTOLIC CHF (CONGESTIVE HEART FAILURE) (HCC): Status: ACTIVE | Noted: 2021-01-01

## 2021-08-09 NOTE — LETTER
[facility]  [address] [address]  Phone: [phone]  Fax: [phone]      August 9, 2021     [address] [address]      Dear Nakia Barnard:    I am writing you because I have been informed of your missed appointment(s). We care about you and the management of your healthcare and want to make sure that you follow up as recommended. We're sorry you were unable to keep your appointment and hope that you are doing well. We would like to continue treating your healthcare needs. Please call the office to let us know your plans for treatment and to reschedule your appointment.      Sincerely,        Saira Hoffman PA-C

## 2021-09-13 PROBLEM — J12.82 PNEUMONIA DUE TO COVID-19 VIRUS: Status: ACTIVE | Noted: 2021-01-01

## 2021-09-13 PROBLEM — U07.1 PNEUMONIA DUE TO COVID-19 VIRUS: Status: ACTIVE | Noted: 2021-01-01

## 2021-09-13 NOTE — ED NOTES
Patient resting quietly in cot showing no signs of distress at this time. Updated on POC. Dr. Ros Jackson placed right EJ IV at this time. Will continue to monitor.       Cassie Orantes RN  09/13/21 6749

## 2021-09-13 NOTE — ED NOTES
Attempted to obtained O2 by toe, finger, forehead, and ear. O2 with ear pulse ox reading 90%. Patient placed on 2L NC. Attempted and IV x3 in left arm. JULI Daigle attempted ultrasound guided IV. Dr. Sudhir Wing notified. Will continue to monitor. Patient shows no signs of distress at this time. Tabs alarm in place.       Dwaine Kat RN  09/13/21 [id number]

## 2021-09-13 NOTE — H&P
History & Physical    Patient:  Yasmin Cooper  YOB: 1930  Date of Service: 9/13/2021  MRN: [id number]   Acct:  [de-identified]   Primary Care Physician: Ella Butt MD    Chief Complaint: Shortness of breath    History of Present Illness:   History obtained from the patient and family. The patient is a 80 y.o. male who presents with complaints of shortness of breath and cough for the last 2 days. Patient is accompanied by family who helps with history. They report that patient has had a poor appetite, fatigued, short of breath and coughing for last 2 days. He recently was at [facility] approximately 2 weeks ago where he had 2 stents placed. They state he was in his normal state of health until 2 days ago. Patient was seen in dialysis today and had a full session however continued to be short of breath and had a significant cough and was sent for evaluation. He denies any fevers or chills, nausea or vomiting. He denies any diarrhea or constipation. He denies any chest pain. In the ED patient was found to be Covid positive. He was placed on 2 L nasal cannula. He was also found to have slightly elevated troponin and was started on heparin drip in the ED. Family updated on test results.       Past Medical History:        Diagnosis Date    Anemia in chronic kidney disease(285.21)     Arthritis     CAD (coronary artery disease)     Chronic kidney disease     Chronic kidney disease, stage IV (severe) (HCC)     CKD (chronic kidney disease) stage 3, GFR 30-59 ml/min (HCC)     COPD (chronic obstructive pulmonary disease) (HCC)     GI bleed     Hemod[facility]26/2016    [facility]    History of blood transfusion     6/2019    Hyperlipidemia     Hyperphosphatemia 5/22/2018    Hypertension     Sick sinus syndrome (HCC)     Systolic congestive heart failure ([address]        Past Surgical History:        Procedure Laterality Date   [facility] CARDIAC SURGERY      COLONOSCOPY  [id number]    COLONOSCOPY N/A 6/27/2019    [facility] [address]    DIALYSIS FISTULA CREATION  5/6/2016    right arm fistula placement    ENDOSCOPY, COLON, DIAGNOSTIC      PACEMAKER PLACEMENT  2013    [place] ESOPHAGOGASTRODUODENOSCOPY TRANSORAL DIAGNOSTIC N/A 1/18/2018    EGD performed by Beulah Charles MD at [address]  2006,2009    UPPER GASTROINTESTINAL ENDOSCOPY Left 6/23/2019    EGD DIAGNOSTIC ONLY performed by Ramona Leija MD at [facility] Medications:   No current facility-administered medications on file prior to encounter. Current Outpatient Medications on File Prior to Encounter   Medication Sig Dispense Refill    metoprolol (LOPRESSOR) 100 MG tablet Take 1 tablet by mouth 2 times daily 60 tablet 3    traMADol (ULTRAM) 50 MG tablet Take 50 mg by mouth every 8 hours as needed for Pain.  cinacalcet (SENSIPAR) 30 MG tablet Take 60 mg by mouth three times a week before dialysis on M,W,F      calcitRIOL (ROCALTROL) 0.25 MCG capsule Take 1.5 mcg by mouth three times a week before dialysis on M,W,F      hydrALAZINE (APRESOLINE) 25 MG tablet Take 1 tablet by mouth 2 times daily 90 tablet 3    pantoprazole (PROTONIX) 40 MG tablet TAKE 1 TABLET DAILY BEFORE BREAKFAST 90 tablet 3    docusate sodium (COLACE, DULCOLAX) 100 MG CAPS Take 100 mg by mouth 2 times daily 60 capsule 0    ferrous sulfate 325 (65 Fe) MG tablet Take 1 tablet by mouth daily (with breakfast) 30 tablet 5    vitamin C (ASCORBIC ACID) 500 MG tablet Take 1 tablet by mouth daily 30 tablet 3    polyethylene glycol (MIRALAX) powder Renal Miralax Colonoscopy prep. Use as directed. Mix Miralax 238 g with 24 oz clear liquids. Drink 8 oz glass every 20-30 minutes until gone.  238 g 0    lisinopril (PRINIVIL;ZESTRIL) 20 MG tablet Take 1 tablet by mouth daily 30 tablet 3    isosorbide mononitrate (IMDUR) 30 MG extended release tablet Take 30 mg by mouth daily      gabapentin (NEURONTIN) 100 MG capsule Take 100 mg by mouth 3 times daily.  B Complex-C-Folic Acid (RENAL) 1 MG CAPS Take 1 capsule by mouth daily      pravastatin (PRAVACHOL) 80 MG tablet Take 1 tablet by mouth daily 30 tablet 5       Allergies:  No known allergies    Social History:    reports that he quit smoking about 39 years ago. He has a 40.00 pack-year smoking history. He has never used smokeless tobacco. He reports that he does not drink alcohol and does not use drugs. Family History:       Problem Relation Age of Onset    Diabetes Mother     Heart Disease Mother     Diabetes Sister     Mental Illness Paternal Aunt        Review of systems:  Constitutional: no fever, no night sweats. Fatigue   head: no headache, no head injury, no migranes. Eye: no blurring of vision, no double vision. Ears: no hearing difficulty, no tinnitus  Mouth/throat: no ulceration, dental caries, dysphagia  Lungs: Shortness of breath, cough productive of clear sputum   CVS: no palpitation, no chest pain, no shortness of breath  GI: no abdominal pain, no nausea , no vomiting, no constipation  FLIP: no dysuria, frequency and urgency, no hematuria, no kidney stones  Musculoskeletal: no joint pain, swelling , stiffness  Endocrine: no polyuria, polydypsia, no cold or heat intolerence  Hematology: no anemia, no easy brusing or bleeding, no hx of clotting disorder  Dermatology: no skin rash, no eczema, no prurities,  Psychiatry: no depression, no anxiety,no panic attacks, no suicide ideation  Neurology: no syncope, no seizures, no numbness or tingling of hands, no numbness or tingling of feet, no paresis    10 point review of systems completed, all other than noted above are negative.        Vitals:   Vitals:    09/13/21 1621   BP:    Pulse: 73   Resp: 22 Temp:    SpO2: 93%      BMI: Body mass index is 26.31 kg/m². Exam:  Physical Examination: General appearance - alert, awake appears to be in no acute distress  HEENT: Atraumatic normocephalic,no JVD, trachea is midline  Neck - supple, no significant adenopathy, no JVD, or carotid bruits  Chest -coarse air entry heart - S1S2 RRR, no murmurs or gallops  Abdomen - Soft, non tender non distended. Normoactive bowel sounds  Neurological - II-XII grossly intact, no neurological deficits  Musculoskeletal - 5/5 power upper and lower extremities equal bilaterally. Full ROM of all limbs  Extremities - no edema, no cyanosis or clubbing  Skin - normal coloration and turgor, no rashes, no suspicious skin lesions noted      Review of Labs and Diagnostic Testing:  CBC:   Recent Labs     09/13/21  1318   WBC 5.7   HGB 10.3*        BMP:    Recent Labs     09/13/21  1318      K 4.6   CL 95*   CO2 30   BUN 22   CREATININE 4.4*   GLUCOSE 111*     Calcium:  Recent Labs     09/13/21  1318   CALCIUM 9.3     Hepatic:   Recent Labs     09/13/21  1318   ALKPHOS 100   ALT 14   AST 46*   PROT 7.3   BILITOT 0.7   BILIDIR 0.3   LABALBU 3.9     Troponin:   Recent Labs     09/13/21  1318   TROPONINT 0.290*       ABGs:   Lab Results   Component Value Date    PH 7.55 07/12/2021    PH 7.37 09/12/2011    PCO2 32 07/12/2021    PCO2 34 09/12/2011    PO2 117 07/12/2021    PO2 55 09/12/2011    HCO3 28 07/12/2021    O2SAT 99 07/12/2021       Radiology:     XR CHEST PORTABLE    Result Date: 9/13/2021  PROCEDURE: XR CHEST PORTABLE CLINICAL INFORMATION: SOB . COMPARISON: 7/12/2021 TECHNIQUE: Portable upright FINDINGS: Heart size is mildly enlarged. Mediastinum is not widened. There is pulmonary vascular congestion. Interstitial edema. There is suggestion of small effusions. AICD over the left chest. EKG leads overlie the chest. Right upper extremity vascular stents are noted.      Cardiomegaly with vascular congestion and interstitial edema and effusions differential would include congestive heart failure or fluid overload. **This report has been created using voice recognition software. It may contain minor errors which are inherent in voice recognition technology. ** Final report electronically signed by Dr. Carisa Reyes on 9/13/2021 2:22 PM     Active Problems:    Pneumonia due to COVID-19 virus  Resolved Problems:    * No resolved hospital problems. *      Assessment and Plan:  1. COVID-19 infection:  will start patient on dexamethasone 6 mg daily x10 days. Monitor inflammatory markers. Aggressive pulmonary toiletry. Procalcitonin elevated we will start her on antibiotics. 2. Acute hypoxemia: Secondary to above and volume overload. Wean O2.  3. End-stage renal disease with volume overload: Patient completed dialysis today however will have additional dialysis as an inpatient. Nephrology consulted. 4. Elevated troponin: Patient not having chest pains. Doubt that this is cardiac his enzymes had been higher in the past.  Heparin drip initiated by ED will obtain repeat troponin if they are not rising will DC heparin drip. Obtain twelve-lead EKG  5. CAD: Patient had 2 stents placed at [facility] 2 weeks ago. We will continue with aspirin and Plavix. Continue with metoprolol and pravastatin. 6. Advance care planning: Limited CODE STATUS no CPR, no resuscitation and no intubation.       DVT prophylaxis: [] Lovenox                                 [] SCDs                                 [] SQ Heparin                                 [] Encourage ambulation, low risk for DVT, no chemical or mechanical prophylaxis necessary              [x] Already on Anticoagulation                Anticipated Disposition upon discharge: [x] Home                                                                         [] Home with Home Health                                                                         [] [facility]

## 2021-09-13 NOTE — CONSULTS
Nephrology Consult Note  Patient's Name: Marbella Howard  5:19 PM  9/13/2021    Nephrologist: Grzegorz Quiñones    Reason for Consult: End-stage kidney disease on hemodialysis  Requesting Physician:  Vidya Acevedo MD  PCP: Carol Jones MD    Chief Complaint: None  Assessment  1. End-stage kidney disease on hemodialysis  2. SARS-CoV-2 pneumonia  3. Pulmonary edema? 4. Hypertension primary  5. Elevated troponin level of uncertain significant  6. Anemia of chronic disease with macrocytosis  7. Shortness of breath likely due to SARS-CoV-2 pneumonia    Plan    1. I tried to discuss my thoughts with the patient  2. Uncertain how much he understood  3. Labs reviewed  4. Chest x-ray report and images reviewed  5. Medications reviewed  6. No changes  7. No indication for hemodialysis this evening as he was  fully dialyzed today  8. Discussed with dialysis staff  9. Labs in the morning  10. We will follow      History Obtained From: Staff, electronic medical record  History of Present Ilness:    Marbella Howard is a 80 y.o. male with history of end-stage kidney disease on hemodialysis, hypertension, anemia of chronic disease, chronic deconditioning among other multiple medical entities admitted through the emergency department after the patient presented there with shortness of breath. Chest x-ray revealed questionable  pulmonary edema. However, he tested positive for SARS-CoV-2. Admitted for evaluation and management. He appears comfortable during my exam.  Not in any distress. Unable to give much history however. But there was no mention of chest pain. No mention of fever or chills. No nausea vomiting.     Past Medical History:   Diagnosis Date    Anemia in chronic kidney disease(285.21)     Arthritis     CAD (coronary artery disease)     Chronic kidney disease     [facility], [facility] IV [facility]severe) ([facility])     CKD (chronic kidney disease) stage 3, GFR 30-59 ml/min ([facility])     [facility]) [facility]     GI bleed     [facility]2016    @ [facility]    History of blood transfusion     6/2019    Hyperlipidemia     Hyperphosphatemia 5/22/2018    Hypertension     [facility]ick [facility] syndrome [facility]     Systolic congestive heart failure [facility])        Past Surgical History:   Procedure Laterality Date    CARDIAC SURGERY      COLONOSCOPY  2006,2009    COLONOSCOPY N/A 6/27/2019    COLONOSCOPY DIAGNOSTIC performed by Armando Loving MD at [address] [facility]  2004    DIALYSIS FISTULA CREATION  5/6/2016    right arm fistula placement    ENDOSCOPY, COLON, DIAGNOSTIC      PACEMAKER PLACEMENT  2013    [place] ESOPHAGOGASTRODUODENOSCOPY TRANSORAL DIAGNOSTIC N/A 1/18/2018    EGD performed by Pooja Garces MD at [facility]    UPPER GASTROINTESTINAL ENDOSCOPY  2006,2009    UPPER GASTROINTESTINAL ENDOSCOPY Left 6/23/2019    EGD DIAGNOSTIC ONLY performed by Armando Loving MD at [facility]    VASCULAR SURGERY         Family History   Problem Relation Age of Onset    Diabetes Mother     Heart Disease Mother     Diabetes Sister     Mental Illness Paternal Aunt         reports that he quit smoking about 39 years ago. He has a 40.00 pack-year smoking history. He has never used smokeless tobacco. He reports that he does not drink alcohol and does not use drugs. Allergies:  No known allergies    Current Medications:    heparin (porcine) injection 4,000 Units, PRN  heparin 25,000 units in dextrose 5% 250 mL (premix) infusion, Continuous  heparin (porcine) injection 2,000 Units, PRN        Review of Systems:   Review of Systems   Pertinent positives stated above in HPI. All other systems were reviewed and were negative. ROS: As in HPI    Physical exam:   Physical Exam   Constitutional: Well-developed elderly gentleman  No new obvious distress abdominal pain examination.   Vitals:   Vitals:    09/13/21 1716   BP: 139/65   Pulse: 79   Resp: 22   Temp:    SpO2: 94%       Skin: no rash, turgor is normal  Heent: Pupils are reactive to light. Throat is clear. Oral mucosa is moist.  Neck: no bruits or jvd noted  Cardiovascular:  S1, S2 without murmur   Respiratory: Clear with no wheezes,rhonchi or rales in the anterior  Abdomen: soft,non tender,good bowel sound and no palpable mass  Ext: No lower extremity edema  Psychiatric: mood and affect appropriate  Musculoskeletal:  Rom, muscular strength intact   CNS: Very awake and very alert. Distant speech. Obeys command. No obvious focal deficit. Data:   Labs:  Lab Results   Component Value Date     09/13/2021     08/26/2021     (L) 07/14/2021    K 4.6 09/13/2021    K 4.8 08/26/2021    K 5.3 (H) 07/14/2021    CL 95 (L) 09/13/2021    CO2 30 09/13/2021    CO2 32 08/26/2021    CO2 24 07/14/2021    CREATININE 4.4 (HH) 09/13/2021    CREATININE 5.84 (H) 08/26/2021    CREATININE 6.7 (HH) 07/14/2021    BUN 22 09/13/2021    BUN 22 (H) 08/26/2021    BUN 21 07/14/2021    GLUCOSE 111 (H) 09/13/2021    GLUCOSE 90 08/26/2021    GLUCOSE 96 07/14/2021    PHOS 4.8 (H) 07/14/2021    PHOS 4.0 07/13/2021    PHOS 2.3 (L) 07/12/2021    WBC 5.7 09/13/2021    WBC 4.7 08/26/2021    WBC 6.1 07/12/2021    HGB 10.3 (L) 09/13/2021    HGB 9.9 (L) 08/26/2021    HGB 12.1 (L) 07/12/2021    HCT 30.5 (L) 09/13/2021    HCT 28.5 (L) 08/26/2021    HCT 34.5 (L) 07/12/2021    MCV 94.7 (H) 09/13/2021     09/13/2021     {Labs reviewed    Imaging:  CXR results: Report and images reviewed        Thank you Dr. Ella Butt MD for allowing us to participate in care of Yasmin Cooper   **This report has been created using voice recognition software. It maycontain minor  errors which are inherent in voice recognition technology. **    Electronically signed by Dorota Michael MD on 9/13/2021 at 5:19 PM

## 2021-09-13 NOTE — ED NOTES
Patient resting quietly in cot showing no signs of distress at this time. Updated on POC. Medicated per MAR. Tabs alarm remains in place. Will continue to monitor.       Preethi Segura RN  09/13/21 4811

## 2021-09-13 NOTE — ED NOTES
Covid swab obtained and sent to lab. Dr. Casper Rose at bedside to speak with family.        Safia Martinez RN  09/13/21 2642

## 2021-09-13 NOTE — ACP (ADVANCE CARE PLANNING)
Advance Care Planning     Advance Care Planning Activator (Inpatient)  Conversation Note      Date of ACP Conversation: 9/13/2021     Conversation Conducted with: Patient with [facility]: Next of Kin by law (only applies in absence of above) (name) Yael Anton, dmitriy, [phone]    ACP Activator: Felipa Lanes, RN    Health Care Decision Maker:     Current Designated Health Care Decision Maker: Yael Anton, child [phone]    Today we placed a referral for spiritual care for documents to be created. Care Preferences    Ventilation: \"If you were in your present state of health and suddenly became very ill and were unable to breathe on your own, what would your preference be about the use of a ventilator (breathing machine) if it were available to you? \"      Would the patient desire the use of ventilator (breathing machine)?: no    \"If your health worsens and it becomes clear that your chance of recovery is unlikely, what would your preference be about the use of a ventilator (breathing machine) if it were available to you? \"     Would the patient desire the use of ventilator (breathing machine)?: No      Resuscitation  \"CPR works best to restart the heart when there is a sudden event, like a heart attack, in someone who is otherwise healthy. Unfortunately, CPR does not typically restart the heart for people who have serious health conditions or who are very sick. \"    \"In the event your heart stopped as a result of an underlying serious health condition, would you want attempts to be made to restart your heart (answer \"yes\" for attempt to resuscitate) or would you prefer a natural death (answer \"no\" for do not attempt to resuscitate)? \" No compressions or meds but ok with shock       [x] Yes   [] No   Educated Patient / Decision Maker regarding differences between Advance Directives and portable DNR orders.     Length of ACP Conversation in minutes:  26  Conversation Outcomes:  [x] ACP discussion completed  [] Existing advance directive reviewed with patient; no changes to patient's previously recorded wishes  [x] New Advance Directive completed  [x] Portable Do Not Rescitate prepared for Provider review and signature  [] POLST/POST/MOLST/MOST prepared for Provider review and signature      Follow-up plan:    [x] Schedule follow-up conversation to continue planning  [] Referred individual to Provider for additional questions/concerns   [] Advised patient/agent/surrogate to review completed ACP document and update if needed with changes in condition, patient preferences or care setting    [] This note routed to one or more involved healthcare providers     Patient able to answer some questions but did not answer for code status questions. Pedrito Alvarado, daughter arrived. He indicates he has lives with her. She and brother in room verify wanting \"no compressions, no intubation\" but recall stating \"ok to shock\" when asked last.  They did not receive a DNR form for home. I explained that there is no option for shock only in the outside world. They state understanding. Dr. Juvencio Galloway ok'd limited x3 order and stated would sign form for homegoing. Covid (+). Copies of form provided to family and sent to Medical Records.

## 2021-09-13 NOTE — ED NOTES
ED to inpatient nurses report    Chief Complaint   Patient presents with    Shortness of Breath      Present to ED from home  LOC: alert and orientated to name and place  Vital signs   Vitals:    09/13/21 1419 09/13/21 1452 09/13/21 1621 09/13/21 1716   BP:  (!) 173/68  139/65   Pulse:  85 73 79   Resp:  22 22 22   Temp:       TempSrc:       SpO2: 95% 94% 93% 94%   Weight:       Height:          Oxygen Baseline 2L NC    Current needs required none   Bipap/Cpap No  LDAs:   Peripheral IV 09/13/21 Right External Jugular (Active)   Site Assessment Clean;Dry; Intact 09/13/21 1618   Line Status Normal saline locked; Blood return noted; Flushed 09/13/21 1618   Dressing Status Clean;Dry; Intact 09/13/21 1618   Dressing Intervention New 09/13/21 1618     Mobility: Requires assistance * 2  Pending ED orders: none at this time  Present condition: stable      Electronically signed by Conrad Barboza RN on 9/13/2021 at [address] [place] [address], RN  09/13/21 6265

## 2021-09-13 NOTE — ED PROVIDER NOTES
[facility] [address] [address] EMERGENCY DEPT    EMERGENCY MEDICINE     Pt Name: Marbella Howard  MRN: [id number]  [place] 11/10/1930  Date of evaluation: 9/13/2021  Provider: Kathleen Daniels DO, [address]       Chief Complaint   Patient presents with    Shortness of Breath       HISTORY OF PRESENT ILLNESS    Marbella Howard is a pleasant 80 y.o. male who presents to the emergency department from home with complaints of shortness of breath. The patient was sent in after a full dialysis run today. The patient is extremely poor historian, difficult to obtain a history. Apparently was concerned about shortness of breath and he was not feeling well during dialysis and therefore was sent to the ED for evaluation. Currently, he answer some questions although not consistently, denies any chest pain, lower extremity pain or swelling. Family is at the bedside, state the patient had cardiac stents done recently. Record review shows this to have happened at [facility] approximately a week ago. Triage notes and Nursing notes were reviewed by myself. Any discrepancies are addressed above.     PAST MEDICAL HISTORY     Past Medical History:   Diagnosis Date    Anemia in chronic kidney disease(285.21)     Arthritis     CAD (coronary artery disease)     Chronic kidney disease     Chronic kidney disease, stage IV (severe) (HCC)     CKD (chronic kidney disease) stage 3, GFR 30-59 ml/min (HCC)     COPD (chronic obstructive pulmonary disease) ([address]     GI bleed     Hemod[facility] 07/26/2016    @ [facility]    History of blood transfusion     6/2019    Hyperlipidemia     Hyperphosphatemia 5/22/2018    Hypertension     Sick sinus syndrome ([address]     Systolic congestive heart failure [facility])        SURGICAL HISTORY       Past Surgical History:   Procedure Laterality Date   [facility] CARDIAC SURGERY      COLONOSCOPY  2006,2009    COLONOSCOPY N/A 6/27/2019    COLONOSCOPY DIAGNOSTIC performed by Ester Freedman MD at [address]  2004    DIALYSIS FISTULA CREATION  5/6/2016    right arm fistula placement    ENDOSCOPY, COLON, DIAGNOSTIC      PACEMAKER PLACEMENT  2013    NC ESOPHAGOGASTRODUODENOSCOPY TRANSORAL DIAGNOSTIC N/A 1/18/2018    EGD performed by Yokasta Roland MD at [address] [facility]  2006,2009    UPPER GASTROINTESTINAL ENDOSCOPY Left 6/23/2019    EGD DIAGNOSTIC ONLY performed by Ester Freedman MD at [facility]       Previous Medications    B COMPLEX-C-FOLIC ACID (RENAL) 1 MG CAPS    Take 1 capsule by mouth daily    CALCITRIOL (ROCALTROL) 0.25 MCG CAPSULE    Take 1.5 mcg by mouth three times a week before dialysis on M,W,F    CINACALCET (SENSIPAR) 30 MG TABLET    Take 60 mg by mouth three times a week before dialysis on M,W,F    DOCUSATE SODIUM (COLACE, DULCOLAX) 100 MG CAPS    Take 100 mg by mouth 2 times daily    FERROUS SULFATE 325 (65 FE) MG TABLET    Take 1 tablet by mouth daily (with breakfast)    GABAPENTIN (NEURONTIN) 100 MG CAPSULE    Take 100 mg by mouth 3 times daily. HYDRALAZINE (APRESOLINE) 25 MG TABLET    Take 1 tablet by mouth 2 times daily    ISOSORBIDE MONONITRATE (IMDUR) 30 MG EXTENDED RELEASE TABLET    Take 30 mg by mouth daily    LISINOPRIL (PRINIVIL;ZESTRIL) 20 MG TABLET    Take 1 tablet by mouth daily    METOPROLOL (LOPRESSOR) 100 MG TABLET    Take 1 tablet by mouth 2 times daily    PANTOPRAZOLE (PROTONIX) 40 MG TABLET    TAKE 1 TABLET DAILY BEFORE BREAKFAST    POLYETHYLENE GLYCOL (MIRALAX) POWDER    Renal Miralax Colonoscopy prep. Use as directed. Mix Miralax 238 g with 24 oz clear liquids. Drink 8 oz glass every 20-30 minutes until gone. PRAVASTATIN (PRAVACHOL) 80 MG TABLET    Take 1 tablet by mouth daily    TRAMADOL (ULTRAM) 50 MG TABLET    Take 50 mg by mouth every 8 hours as needed for Pain.     VITAMIN C (ASCORBIC ACID) 500 MG TABLET    Take 1 tablet by mouth daily       ALLERGIES     No known allergies    FAMILY HISTORY       Family History   Problem Relation Age of Onset    Diabetes Mother     Heart Disease Mother     Diabetes Sister     Mental Illness Paternal Aunt         SOCIAL HISTORY       Social History     Socioeconomic History    Marital status:      Spouse name: Joesph Irizarry Number of children: Not on file    Years of education: Not on file    Highest education level: Not on file   Occupational History    Not on file   Tobacco Use    Smoking status: Former Smoker     Packs/day: 1.00     Years: 40.00     Pack years: 40.00     Quit date: 1982     Years since quittin.6    Smokeless tobacco: Never Used   Vaping Use    Vaping Use: Never used   Substance and Sexual Activity    Alcohol use: No    Drug use: No    Sexual activity: Not on file   Other Topics Concern    Not on file   Social History Narrative    Not on file     Social Determinants of Health     Financial Resource Strain:     Difficulty of Paying Living Expenses:    Food Insecurity:     Worried About Running Out of Food in the Last Year:     [address] in the Last Year:    Transportation Needs:     Lack of Transportation (Medical):  Lack of Transportation (Non-Medical):    Physical Activity:     Days of Exercise per Week:     Minutes of Exercise per Session:    Stress:     Feeling of Stress :    Social Connections:     Frequency of Communication with Friends and Family:     Frequency of Social Gatherings with Friends and Family:     Attends [employer] witness Services:     Active Member of Clubs or Organizations:     Attends Club or Organization Meetings:     Marital Status:    Intimate Partner Violence:     Fear of Current or Ex-Partner:     Emotionally Abused:     Physically Abused:     Sexually Abused:        REVIEW OF SYSTEMS     Review of Systems   Constitutional: Negative for chills, diaphoresis and fever.    HENT: Negative for trouble swallowing and voice change. Eyes: Negative for visual disturbance. Respiratory: Positive for cough and shortness of breath. Negative for chest tightness. Cardiovascular: Negative for chest pain and leg swelling. Gastrointestinal: Negative for abdominal pain, blood in stool, diarrhea, nausea and vomiting. Genitourinary: Negative for dysuria, frequency and hematuria. Musculoskeletal: Negative for back pain and neck pain. Skin: Negative for rash and wound. Neurological: Negative for speech difficulty, weakness, numbness and headaches. Psychiatric/Behavioral: Negative for confusion. Except as noted above the remainder of the review of systems was reviewed and is. PHYSICAL EXAM    (up to 7 for level 4, 8 or more for level 5)     ED Triage Vitals   BP Temp Temp Source Pulse Resp SpO2 Height Weight   09/13/21 1243 09/13/21 1241 09/13/21 1241 09/13/21 1241 09/13/21 1241 09/13/21 1404 09/13/21 1241 09/13/21 1241   (!) 187/77 97.9 °F (36.6 °C) Oral 80 24 90 % 5' 6\" (1.676 m) 163 lb (73.9 kg)       Physical Exam  Vitals and nursing note reviewed. Constitutional:       General: He is not in acute distress. Appearance: He is well-developed. He is not ill-appearing, toxic-appearing or diaphoretic. HENT:      Head: Normocephalic and atraumatic. Eyes:      General: No scleral icterus. Conjunctiva/sclera: Conjunctivae normal.      Right eye: Right conjunctiva is not injected. Left eye: Left conjunctiva is not injected. Pupils: Pupils are equal, round, and reactive to light. Neck:      Thyroid: No thyromegaly. Trachea: No tracheal deviation. Cardiovascular:      Rate and Rhythm: Normal rate and regular rhythm. Heart sounds: Normal heart sounds. No murmur heard. No friction rub. No gallop. Pulmonary:      Effort: Pulmonary effort is normal. No respiratory distress. Breath sounds: Normal breath sounds. No stridor. No wheezing or rales. Abdominal:      General: Bowel sounds are normal. There is no distension. Palpations: Abdomen is soft. There is no mass. Tenderness: There is no abdominal tenderness. There is no guarding or rebound. Comments: Negative Randall's sign  Nontender McBurney's Point  Negative Rovsig's sign  No bruising or echymosis of abdomen   Musculoskeletal:         General: No tenderness. Cervical back: Normal range of motion and neck supple. Comments: Negative Annie's Sign bilaterally   Lymphadenopathy:      Cervical: No cervical adenopathy. Skin:     General: Skin is warm and dry. Coloration: Skin is not pale. Findings: No erythema or rash. Neurological:      Mental Status: He is alert and oriented to person, place, and time. Cranial Nerves: No cranial nerve deficit. Motor: No abnormal muscle tone. Coordination: Coordination normal.      Comments: No nystagmus   Psychiatric:         Behavior: Behavior normal.         Thought Content: Thought content normal.         DIAGNOSTIC RESULTS     EKG:(none if blank)  All E[facility]'s are [facility] who either signs or Co-signs this chart in the absence of a cardiologist.    EKG shows a sinus rhythm occasional PVCs,  no acute ischemic changes    RADIOLOGY: (none if blank)   Interpretation per the Radiologistbelow, if available at the time of this note:    XR CHEST PORTABLE   Final Result   Cardiomegaly with vascular congestion and interstitial edema and effusions differential would include congestive heart failure or fluid overload. **This report has been created using voice recognition software. It may contain minor errors which are inherent in voice recognition technology. **      Final report electronically signed by Dr. Idalia Diego on 9/13/2021 2:22 PM          LABS:  Labs Reviewed   COVID-19, RAPID - Abnormal; Notable for the following components:       Result Value    SARS-CoV-2, NAAT DETECTED (*)     All other components within normal limits   BASIC METABOLIC PANEL - Abnormal; Notable for the following components:    Chloride 95 (*)     Glucose 111 (*)     CREATININE 4.4 (*)     All other components within normal limits   CBC WITH AUTO DIFFERENTIAL - Abnormal; Notable for the following components:    RBC 3.22 (*)     Hemoglobin 10.3 (*)     Hematocrit 30.5 (*)     MCV 94.7 (*)     RDW-CV 17.2 (*)     RDW-SD 60.0 (*)     Lymphocytes Absolute 0.5 (*)     Monocytes Absolute 0.2 (*)     All other components within normal limits   HEPATIC FUNCTION PANEL - Abnormal; Notable for the following components:    AST 46 (*)     All other components within normal limits   BRAIN NATRIURETIC PEPTIDE - Abnormal; Notable for the following components:    Pro-BNP > 90102.0 (*)     All other components within normal limits   TROPONIN - Abnormal; Notable for the following components:    Troponin T 0.290 (*)     All other components within normal limits   GLOMERULAR FILTRATION RATE, ESTIMATED - Abnormal; Notable for the following components:    Est, Glom Filt Rate 15 (*)     All other components within normal limits   LIPASE   ANION GAP   OSMOLALITY   SCAN OF BLOOD SMEAR   APTT   APTT   PROTIME-INR   APTT       All other labs were within normal range or not returned as of this dictation. Please note, any cultures that may have been sent were not resulted at the time of this patient visit. EMERGENCY DEPARTMENT COURSE andMedical Decision Making:     MDM/     Elevated troponin  ?pulmonary edema on CXR  Plan heparin and admit  nephro notified         ED Medications administered this visit:    Medications   heparin (porcine) injection 4,000 Units (has no administration in time range)   heparin 25,000 units in dextrose 5% 250 mL (premix) infusion (has no administration in time range)   aspirin chewable tablet 324 mg (has no administration in time range)         Procedures: (None if blank)       CLINICAL       1.  Hypoxia 2. Acute pulmonary edema (HCC)    3. Elevated troponin    4. COVID-19          DISPOSITION/PLAN   DISPOSITION Decision To Admit 09/13/2021 02:59:43 PM      PATIENT REFERRED TO:  No follow-up provider specified.     DISCHARGE MEDICATIONS:  New Prescriptions    No medications on file              (Please note that portions of this note were completed with a voice recognition program.  Efforts were made to edit the dictations but occasionallywords are mis-transcribed.)      Dilan Mckinney DO,DARION (electronically signed)  Attending Physician, Emergency [address], DO  09/13/21 [address], DO  09/13/21 5030

## 2021-09-14 NOTE — PROGRESS NOTES
Writer spoke with daughter Vipin update given. Daughter expressed concern for placement when patient is released. Daughter wants patient to go to rehab or skilled facility to improve strength and quarantine because she and family have not tested positive. Informed daughter nurse will speak with social work in the am and have SW call her tomorrow. All questions answered.

## 2021-09-14 NOTE — PROGRESS NOTES
Joann [facility] 60  INPATIENT OCCUPATIONAL THERAPY  [facility] MED SURG 8A  EVALUATION    Time:   Time In: 7618  Time Out: 9371  Timed Code Treatment Minutes: 25 Minutes  Minutes: 37          Date: 2021  Patient Name: Sherrill Root,   Gender: male      MRN: [id number]  : 11/10/1930  (80 y.o.)  Referring Practitioner: Shannon Sena MD  Diagnosis: Acute Pulmonary Edema  Additional Pertinent Hx: Sherrill Root is a pleasant 80 y.o. male who presents to the emergency department from home with complaints of shortness of breath. The patient was sent in after a full dialysis run today. The patient is extremely poor historian, difficult to obtain a history. Apparently was concerned about shortness of breath and he was not feeling well during dialysis and therefore was sent to the ED for evaluation. Currently, he answer some questions although not consistently, denies any chest pain, lower extremity pain or swelling. Family is at the bedside, state the patient had cardiac stents done recently. Record review shows this to have happened at [facility] approximately a week ago. Restrictions/Precautions:  Restrictions/Precautions: Fall Risk, Isolation  Position Activity Restriction  Other position/activity restrictions: droplet +    Subjective  Chart Reviewed: Yes, Orders, History and Physical, Progress Notes  Patient assessed for rehabilitation services?: Yes    Subjective: Pleasant and cooperative, difficult to arouse initially    Pain:  Pain Assessment  Patient Currently in Pain: Denies    Vitals: Oxygen: Patient on 3 L O2 via Nasal Canula  upon arrival to room. Patient O2 sats at 83 %. Increased O2 to 4 L (unsure of accuracy of pleth with portable pulse ox.) Upon activity patient maintaining O2 at  %. Decreased to 3L upon sitting in chair. Pt does appear SOB, pt requiring min rest break(s) to recover.        Social/Functional History:  Lives With: Daughter  Type of Home: House  Home Layout: One level  Home Access: Level entry  Home Equipment: Rolling walker   Bathroom Shower/Tub: Walk-in shower  Bathroom Toilet: Standard       ADL Assistance: Independent  Homemaking Assistance: Needs assistance  Ambulation Assistance: Independent  Transfer Assistance: Independent          Additional Comments: Pt using RW PTA. Daughter assists at home. Unsure of accuracy of above pt is slighly confused this date. VISION:WFL    HEARING:  WFL    COGNITION: Slow Processing, Decreased Safety Awareness and Difficulty Following Commands    RANGE OF MOTION:  Bilateral Upper Extremity:  WFL    STRENGTH:  Bilateral Upper Extremity:  general deconditioning noted    SENSATION:   WFL    ADL:   Lower Extremity Dressing: Dependent. socks. BALANCE:  Sitting Balance:  Contact Guard Assistance. in prep for mobility  Standing Balance: [employer]. BED MOBILITY:  Supine to Sit: Moderate Assistance    Scooting: Moderate Assistance extended time to EOB    TRANSFERS:  Sit to Stand:  Minimal Assistance. Stand to Sit: Contact Guard Assistance. FUNCTIONAL MOBILITY:  Assistive Device: Rolling Walker  Assist Level:  Contact Guard Assistance. Distance: EOB to recliner  Slow pace       Activity Tolerance:  Patient tolerance of  treatment: fair. Appears to fatigue quickly, required RBs and SOB noted. Provided pt with energy conservation/work simplification education and deep breathing education. Reviewed education with decreased particiaption in education with fair understanding of education. Assessment:    Pt s/p admission acute pulmonary edema. Pt exhibiting deficits detailed below, requiring additional OT intervention to restore PLOF. Pt now requiring assist for ADLs, IADLs, mobility, t/fs, and standing balance. Pt with significant increase in burden of care.  Without skilled OT intervention pt at risk for functional decline, increased falls, and readmission to hospital.    Performance deficits / Impairments: Decreased functional mobility , Decreased safe awareness, Decreased balance, Decreased ADL status, Decreased endurance, Decreased strength, Decreased high-level IADLs  Prognosis: Good  REQUIRES OT FOLLOW UP: Yes    Treatment Initiated: Treatment and education initiated within context of evaluation. Evaluation time included review of current medical information, gathering information related to past medical, social and functional history, completion of standardized testing, formal and informal observation of tasks, assessment of data and development of plan of care and goals. Treatment time included skilled education and facilitation of tasks to increase safety and independence with ADL's for improved functional independence and quality of life. Discharge Recommendations:  Patient would benefit from continued therapy after discharge, Continue to assess pending progress    Patient Education:  OT Education: OT Role, Plan of Care, Transfer Training, ADL Adaptive Strategies, Energy Conservation    Equipment Recommendations: Other: continue to assess    Plan:  Times per week: 5x  Current Treatment Recommendations: Strengthening, Balance Training, Functional Mobility Training, Endurance Training, Safety Education & Training, Self-Care / ADL, Equipment Evaluation, Education, & procurement. See long-term goal time frame for expected duration of plan of care. If no long-term goals established, a short length of stay is anticipated. Goals:     Short term goals  Time Frame for Short term goals: by discharge  Short term goal 1: Pt will complete functional mobility to/from BR with AD and S to increase indep with accessing environment for ADLs. Short term goal 2: Pt will complete dynamic standing task with B hand release x 4 min with S to increase balance required for ADLs. Short term goal 3: Pt will complete LB ADL with Robert to increase indep with self care.          Following session, patient left in safe position with all fall risk precautions in place.

## 2021-09-14 NOTE — ACP (ADVANCE CARE PLANNING)
Advance Care Planning     Advance Care Planning Inpatient Note  [facility] Department    Today's Date: 9/14/2021  Unit: [facility]A    Received request from IDT Member. Upon review of chart and communication with care team, patient's decision making abilities are not in question. . Patient was/were present in the room during visit. Goals of ACP Conversation:  Discuss advance care planning documents    Health Care Decision Makers:     No healthcare decision makers have been documented. Click here to complete [address] including selection of the Healthcare Decision Maker Relationship (ie \"Primary\")  Summary:  No Decision Maker named by patient at this time    Advance Care Planning Documents (Patient Wishes):  None     Assessment:  Advanced directives Consult: Pt's nurse said that pt's understanding capability has a questing paxton. Nurse said that pt's son will come in this afternoon. I gave a copy of the document to nurse to give to pt's son so he and his dad can talk about it Pt is 80years old. When they are ready to have it completed, 's help can be sought.       Interventions:  Deferred conversation as patient not interested in completing an advance directive at this time    Care Preferences Communicated:   No    Outcomes/Plan:  ACP Discussion: Postponed    Electronically signed by Leticia Palencia [facility] on 9/14/2021 at 11:49 AM

## 2021-09-14 NOTE — PROGRESS NOTES
[address] [address]  INPATIENT PHYSICAL THERAPY  EVALUATION  [facility] 8A - 8A-20/020-A    Time In: 1158  Time Out: 8929  Timed Code Treatment Minutes: 10 Minutes  Minutes: 31          Date: 2021  Patient Name: Eulogio Knowles,  Gender:  male        MRN: [id number]  : 11/10/1930  (80 y.o.)      Referring Practitioner: Dr. Hannah Jackson  Diagnosis: acute pulmonary edema  Additional Pertinent Hx: admit with above diagnosis, ESRD on HD, COVID-19 pneumonia, HTN, COPD, anemia     Restrictions/Precautions:  Restrictions/Precautions: Fall Risk, Isolation  Position Activity Restriction  Other position/activity restrictions: droplet +    Subjective:  Chart Reviewed: Yes  Patient assessed for rehabilitation services?: Yes  Subjective: pt confused, pt talking to nephew and stating \"I don't have COVID\" and \"I am not taking any shot\", PT reoriented pt to place and situation several times    General:    Hearing: Exceptions to [facility]  Hearing Exceptions: Hard of hearing/hearing concerns         Pain:  No pain per pt    Vitals: Oxygen: pt with O2 pulled off of nose and O2 sats at 825 upon arrival, pt required 4L O2 for 93% with RN aware    Social/Functional History:    Lives With: Daughter  Type of Home: House  Home Layout: One level  Home Access: Level entry  Home Equipment: Rolling walker     Bathroom Shower/Tub: Walk-in shower  Bathroom Toilet: Standard       ADL Assistance: Independent  Homemaking Assistance: Needs assistance  Ambulation Assistance: Independent  Transfer Assistance: Independent          Additional Comments: Pt using RW PTA. Daughter assists at home. Unsure of accuracy of above pt is slighly confused this date.     OBJECTIVE:  Range of Motion:  Bilateral Lower Extremity: WFL    Strength:  Bilateral Lower Extremity: WFL, >/=3/5, generalized weakness    Balance:  Static Sitting Balance:  Stand By Assistance  Static Standing Balance: Contact Guard Assistance, with RW, fwd flexed head and trunk posture    Bed Mobility:  Not Tested  Anticipate Robert  Transfers:  Sit to Stand: Contact Guard Assistance  Stand to [address] for hand placement for safety  Ambulation:  Contact Guard Assistance  Distance: 12'x1  Surface: Level Tile  Device:Rolling Walker  Gait Deviations: Forward Flexed Posture, Slow Valeri, Decreased Step Length Bilaterally, Narrow Base of Support and Unsteady Gait, pt shakey throughtout body during transfers and gait        Functional Outcome Measures: Completed  AM-PAC Inpatient Mobility Raw Score : 17  AM-PAC Inpatient T-Scale Score : 42.13    ASSESSMENT:  Activity Tolerance:  Patient tolerance of  treatment: fair. Treatment Initiated: Treatment and education initiated within context of evaluation. Evaluation time included review of current medical information, gathering information related to past medical, social and functional history, completion of standardized testing, formal and informal observation of tasks, assessment of data and development of plan of care and goals. Treatment time included skilled education and facilitation of tasks to increase safety and independence with functional mobility for improved independence and quality of life. Assessment:   Body structures, Functions, Activity limitations: Decreased functional mobility , Decreased endurance, Decreased strength, Decreased balance  Assessment: pt tolerated fair, +COVID-19 on 4L O2, confused, generalized weakness, deconditioning, dec balance, inc assist for safe mobility, recommend cont PT to inc pt I with functional mobility  Prognosis: Good    REQUIRES PT FOLLOW UP: Yes    Discharge Recommendations:  Discharge Recommendations: Continue to assess pending progress, Subacute/Skilled Nursing Facility, 24 hour supervision or assist, Patient would benefit from continued therapy after discharge    Patient Education:  PT Education: Goals, PT Role, Plan of Care, Functional Mobility Training  Patient Education: reorienting pt to place and situation    Equipment Recommendations: Other: cont to assess needs    Plan:  Times per week: 5X GM  Times per day: Daily  Specific instructions for Next Treatment: therex and mobility    Goals:  Patient goals : not stated  Short term goals  Time Frame for Short term goals: by discharge  Short term goal 1: bed mobility with S to get in/out of bed  Short term goal 2: transfer with S to get in/out of chairs  Short term goal 3: amb >50'x1 with RW and SBA, maintaining O2 sats >90% on </=4L O2, to walk safely in room  Long term goals  Time Frame for Long term goals : no LTGs set secondary to short ELOS    Following session, patient left in safe position with all fall risk precautions in place.

## 2021-09-14 NOTE — PROGRESS NOTES
New Epoetin traci-epbx (Retacrit)     Herber Obrien is a 80 y.o. male. Pharmacy has reviewed the appropriateness of Epoetin traci-epbx (Retacrit) dosage per P&T protocol. Lab Results   Component Value Date    HGB 8.1 09/14/2021    HCT 24.6 09/14/2021     09/14/2021           Epoetin traci-epbx (Retacrit) ordered dose: 6000 units MWF  Prior to arrival dose ALEJANDRO (if available): n/a; Appropriate conversion: n/a    Hold Epoetin traci-epbx (Retacrit) for:     Hemoglobin >/= 10 g/dL, exception for patients who cannot receive transfusions. Pharmacists will automatically hold one dose by discontinuing the order, reentering to start for next scheduled dose, and contacting the provider. If two doses are held in a row the pharmacist will contact the provider to discuss dose reduction/ discontinuation.       Plan:  Retacrit appropriate

## 2021-09-14 NOTE — PROGRESS NOTES
Consult received for portable DNR form. [place] DNR form and prescription to indicate do not intubate placed in the chart amador for MD signature. Updated primary RN. Please call palliative care if further needs arise.

## 2021-09-14 NOTE — CARE COORDINATION
9/14/21, 11:16 AM EDT  DISCHARGE PLANNING EVALUATION:    Philly Reyez       Admitted: 9/13/2021/ [facility] day: 1   Location: 8A-20/020-A Reason for admit: Acute pulmonary edema ([facility] [J81.0]  Hypoxia [R09.02]  Elevated troponin [R77.8]  Pneumonia due to COVID-19 virus [U07.1, J12.82]  COVID-19 [U07.1]   PMH:  has a past medical history of Anemia in chronic kidney disease(285.21), Arthritis, CAD (coronary artery disease), Chronic kidney disease, Chronic kidney disease, stage IV (severe) ([facility]), CKD (chronic kidney disease) stage 3, GFR 30-59 ml/min ([facility]), COPD (chronic obstructive pulmonary disease) ([facility] GI bleed, Hemodialysis patient ([facility] History of blood transfusion, Hyperlipidemia, Hyperphosphatemia, Hypertension, Sick sinus syndrome ([facility] and Systolic congestive heart failure ([facility] Procedure: none  Barriers to Discharge:  91% on 3L. Creat 5.9, CRP 14.32, , trops elev, Hgb 8.1. IV zithromax, IV rocephin, decadron, albuterol inh, Vit C. Nephrology managing HD. PT/OT. PCP: Sylvester Schlatter, MD  Readmission Risk Score: 46%    Patient Goals/Plan/Treatment Preferences: [employer] is from home with his daughter. He has a RW. MWF HD at [facility]. Fredi Cheng following. Transportation/Food Security/Housekeeping Addressed:  No issues identified.

## 2021-09-14 NOTE — PROGRESS NOTES
Son Kamran Serrano called in and request information on pt. No hippa code set up. Pt gave permission to discuss care with son. Updated status and plan of care. Kamran Serrano asked when the pt would be rechecked for covid, explained that since pt tested positive today that we would not be rechecking at this time. Son insisted that he be rechecked, explained that we need a doctors order to test for covid. Son request to speak with the supervisor, explained that I will call and talk to doctor about request for a retest.     2022- spoke with hospitalist about sons request for a retest of covid. hospitalist was unsure of policy request this rn speak with house supervisor. 2030- spoke with house supervisor updated on situation suggested that we have day shift hospitalist address in the morning. 2045- spoke with pt, pt denies wanting another test.     2210- called and updated son on speaking with house supervisor/hospitalist and pt.  States understanding states he will speak with pt in the morning and encourage pt to agree to retest.

## 2021-09-14 NOTE — PROGRESS NOTES
In to speak with pt explained importance of rechecking labs. Pt voiced understanding, agreed to lab draw at this time. Called updated lab pt agreeable for lab draw.

## 2021-09-14 NOTE — PROGRESS NOTES
9/15/2021] cinacalcet  60 mg Oral Once per day on Mon Wed Fri    docusate sodium  100 mg Oral BID    ferrous sulfate  325 mg Oral Daily with breakfast    gabapentin  100 mg Oral TID    hydrALAZINE  25 mg Oral BID    metoprolol  100 mg Oral BID    pantoprazole  40 mg Oral QAM AC    vitamin C  500 mg Oral Daily    polyethylene glycol  17 g Oral Daily    sodium chloride flush  5-40 mL IntraVENous 2 times per day    dexamethasone  6 mg Oral Daily    clopidogrel  75 mg Oral Daily     PRN Meds: heparin (porcine), sodium chloride flush, sodium chloride, ondansetron **OR** ondansetron, polyethylene glycol, acetaminophen **OR** acetaminophen, guaiFENesin-dextromethorphan, albuterol sulfate HFA      Intake/Output Summary (Last 24 hours) at 9/14/2021 1001  Last data filed at 9/14/2021 0256  Gross per 24 hour   Intake 361.54 ml   Output --   Net 361.54 ml       Diet:  ADULT DIET; Regular; Low Potassium (Less than 3000 mg/day); Low Phosphorus (Less than 1000 mg); 1200 ml    Exam:  BP (!) 156/54   Pulse 71   Temp 98 °F (36.7 °C) (Oral)   Resp 22   Ht 5' 6\" (1.676 m)   Wt 163 lb (73.9 kg)   SpO2 91%   BMI 26.31 kg/m²     General appearance: No apparent distress, appears stated age and cooperative. Respiratory:  Normal respiratory effort. Clear to auscultation, bilaterally without Rales/Wheezes/Rhonchi. Cardiovascular: Regular rate and rhythm with normal S1/S2 without murmurs, rubs or gallops. Abdomen: Soft, non-tender, non-distended with normal bowel sounds.   Extremities: no pedal edema  Skin:  no rashes    Labs:   Recent Labs     09/13/21  1318 09/13/21 2034 09/14/21  0326   WBC 5.7 3.5* 4.0*   HGB 10.3* 8.0* 8.1*   HCT 30.5* 23.7* 24.6*    137 142     Recent Labs     09/13/21  1318 09/14/21  0326    139   K 4.6 4.9   CL 95* 97*   CO2 30 31   BUN 22 36*   CREATININE 4.4* 5.9*   CALCIUM 9.3 8.2*     Recent Labs     09/13/21  1318 09/14/21  0326   AST 46* 39   ALT 14 11   BILIDIR 0.3  -- BILITOT 0.7 0.5   ALKPHOS 100 76     Recent Labs     09/13/21 2034 09/14/21  0326   INR 1.15* 1.12     No results for input(s): CKTOTAL, TROPONINI in the last 72 hours. Recent Labs     09/13/21 2034   PROCAL 1.23*       Microbiology:      Urinalysis:      Lab Results   Component Value Date    NITRU NEGATIVE 06/22/2019    WBCUA 50-75 06/22/2019    BACTERIA NONE 06/22/2019    RBCUA 25-50 06/22/2019    BLOODU LARGE 06/22/2019    SPECGRAV 1.013 05/03/2016    GLUCOSEU NEGATIVE 06/22/2019       Radiology:  XR CHEST PORTABLE    Result Date: 9/13/2021  PROCEDURE: XR CHEST PORTABLE CLINICAL INFORMATION: SOB . COMPARISON: 7/12/2021 TECHNIQUE: Portable upright FINDINGS: Heart size is mildly enlarged. Mediastinum is not widened. There is pulmonary vascular congestion. Interstitial edema. There is suggestion of small effusions. AICD over the left chest. EKG leads overlie the chest. Right upper extremity vascular stents are noted. Cardiomegaly with vascular congestion and interstitial edema and effusions differential would include congestive heart failure or fluid overload. **This report has been created using voice recognition software. It may contain minor errors which are inherent in voice recognition technology. ** Final report electronically signed by Dr. Donte Wilson on 9/13/2021 2:22 PM      DVT prophylaxis: [] Lovenox                                 [] SCDs                                 [x] SQ Heparin                                 [] Encourage ambulation           [] Already on Anticoagulation     Code Status: Limited    Tele:   [x] yes             [] no    Active Hospital Problems    Diagnosis Date Noted    Pneumonia due to COVID-19 virus [U07.1, J12.82] 09/13/2021       Electronically signed by Jayme Steel MD on 9/14/2021 at 10:01 AM

## 2021-09-15 NOTE — PROGRESS NOTES
Patient's daughter, Pedrito Alvarado, called and given update this morning about patient's condition. All questions and concerns address at this time.

## 2021-09-15 NOTE — PROGRESS NOTES
This nurse alerted that patient was on floor. This RN and Vashti Quick RN into patient's room. Pt sitting on floor in puddle of incontinent stool. Pt reports he did not hit head, but also states he didn't fall. VS obtained and stable. Unit Manager, Rosie Mckenna, in to room to assist. Dr. Malu Da Silva notified and reports to bedside. Orders placed and patient assisted to stand to bedside commode. Pt then assisted into bed by this RN and Vashti Quick RN. Pt denies any pain. Pt cleaned and resting comfortably in bed with no needs voiced at this time. Call light in reach and bed alarm on.

## 2021-09-15 NOTE — PROGRESS NOTES
Pt to dialysis via bed. Pt oxygen sats 88% during assessment. Oxygen applied via NC at 2L/min. Sats at 93% on 2L NC when patient left unit.

## 2021-09-15 NOTE — FLOWSHEET NOTE
09/15/21 0803 09/15/21 1200   Vital Signs   BP (!) 156/72 (!) 164/63   Temp 98.6 °F (37 °C) 97.6 °F (36.4 °C)   Pulse 62 66   Resp 18 18   SpO2 100 % 96 %   Weight 162 lb 14.7 oz (73.9 kg)  (unknown- bed scale not accurate) 158 lb 15.2 oz (72.1 kg)   Weight Method Estimated Estimated   Percent Weight Change -0.05 -2.44   Post-Hemodialysis Assessment   Post-Treatment Procedures  --  Blood returned; Access bleeding time < 10 minutes   Machine Disinfection Process  --  Acid/Vinegar Clean;Heat Disinfect; Other (Comment)   Total Liters Processed (l/min)  --  74.4 l/min   Dialyzer Clearance  --  Clotted   Duration of Treatment (minutes)  --  210 minutes   Hemodialysis Intake (ml)  --  500 ml   Hemodialysis Output (ml)  --  2312 ml   NET Removed (ml)  --  1812 ml   Tolerated Treatment  --  Fair   3.5 hour treatment completed. 1.8L of fluid removed, attempted to remove more fluid but patient BP dropped 102/58. He had 1 episode of small emesis at that time and did not feel well. Patient clotted off dialyzer with 30 mins left in treatment- partial blood return. Dr. Teresa Snyder notified. Remaining of treatment patient tolerated fairly well. Pressure held to access for 10 mins x2. Dressing clean/dry and intact upon leaving the unit. Report given to primary nurse.

## 2021-09-15 NOTE — PROGRESS NOTES
Renal Progress Note    Assessment and Plan:   1. End-stage kidney disease on hemodialysis  2. SARS-CoV-2 pneumonia  3. COPD  4. Deconditioned state  5. Hypertension primary with variable control  6. Anemia of chronic disease    PLAN:  1. I discussed my thoughts with the patient. 2. He understood. 3. I addressed his questions as to why he is here, whether he would be okay or not. 4. Medications reviewed  5. No changes. 6. Hemodialysis today  7. We will follow    Patient Active Problem List:     CAD (coronary artery disease)     COPD (chronic obstructive pulmonary disease) ([facility]     Chronic renal insufficiency     Patient overweight     Arthritis-  low back and neck .      CKD (chronic kidney disease) stage 3, GFR 30-59 ml/min (HCC)     Dyspnea and respiratory abnormalities     ED (erectile dysfunction)     Degenerative joint disease of knee, left     Gout of big toe     Anemia, chronic disease     CKD (chronic kidney disease) stage 4, GFR 15-29 ml/min (HCC)     Essential hypertension     Monoclonal gammopathy     Leukopenia     Thrombocytopenia (HCC)     Chronic kidney disease (CKD), stage V (HCC)     Metabolic acidosis     Hyperkalemia     Iron deficiency anemia     ROSAURA (acute kidney injury) ([address]     Plasma cell dyscrasia     Idiopathic hypotension     Hypertensive urgency     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)     Systolic congestive heart failure (HCC)     Other fluid overload (CODE)     Hyperphosphatemia     Acute diastolic congestive heart failure (HCC)     Acute on chronic diastolic congestive heart failure (HCC)     Pulmonary hypertension (HCC)     Anemia due to chronic kidney disease, on chronic dialysis (HCC)     Volume overload     Secondary hyperparathyroidism of renal origin ([address]     Chest pain     Acute pulmonary edema (HCC)     Accelerated hypertension     Gastritis and duodenitis     Fall from slip, trip, or stumble, initial encounter     Closed fracture of left ankle     ESRD on hemodialysis ([facility]     Hypertensive emergency     SOB (shortness of breath)     Chronic combined systolic and diastolic CHF (congestive heart failure) [facility]     Pneumonia due to COVID-19 virus      Subjective:   Admit Date: 9/13/2021    Interval History:   Seen for end-stage kidney disease on hemodialysis  Awake and alert this morning. Had a good night sleep  Had several questions that were addressed  Updated by the staff  No new issues  Blood pressure is variable        Medications:   Scheduled Meds:   epoetin traci-epbx  6,000 Units SubCUTAneous Once per day on Mon Wed Fri    cefTRIAXone (ROCEPHIN) IV  1,000 mg IntraVENous Q24H    azithromycin  500 mg IntraVENous Q24H    calcitRIOL  1.5 mcg Oral Once per day on Mon Wed Fri    cinacalcet  60 mg Oral Once per day on Mon Wed Fri    docusate sodium  100 mg Oral BID    ferrous sulfate  325 mg Oral Daily with breakfast    gabapentin  100 mg Oral TID    hydrALAZINE  25 mg Oral BID    metoprolol  100 mg Oral BID    pantoprazole  40 mg Oral QAM AC    vitamin C  500 mg Oral Daily    polyethylene glycol  17 g Oral Daily    sodium chloride flush  5-40 mL IntraVENous 2 times per day    dexamethasone  6 mg Oral Daily    clopidogrel  75 mg Oral Daily     Continuous Infusions:   sodium chloride         CBC:   Recent Labs     09/13/21 2034 09/14/21  0326 09/15/21  0626   WBC 3.5* 4.0* 4.9   HGB 8.0* 8.1* 8.1*    142 176     CMP:    Recent Labs     09/13/21  1318 09/14/21  0326 09/15/21  0626    139 138   K 4.6 4.9 5.2   CL 95* 97* 94*   CO2 30 31 27   BUN 22 36* 63*   CREATININE 4.4* 5.9* 7.7*   GLUCOSE 111* 101 118*   CALCIUM 9.3 8.2* 8.1*   LABGLOM 15* 11* 8*     Troponin: No results for input(s): TROPONINI in the last 72 hours. BNP: No results for input(s): BNP in the last 72 hours.   INR:   Recent Labs     09/13/21 2034 09/14/21  0326   INR 1.15* 1.12     Lipids:   Recent Labs     09/13/21  1318   LIPASE 49.1     Liver:   Recent Labs 09/15/21  0626   AST 40   ALT 10*   ALKPHOS 79   PROT 6.0*   LABALBU 3.0*   BILITOT 0.4     Iron:    Recent Labs     09/14/21  0326   FERRITIN 3,607*     XR CHEST PORTABLE   Final Result   Cardiomegaly with vascular congestion and interstitial edema and effusions differential would include congestive heart failure or fluid overload. **This report has been created using voice recognition software. It may contain minor errors which are inherent in voice recognition technology. **      Final report electronically signed by Dr. Bhat Seen on 9/13/2021 2:22 PM      CT HEAD WO CONTRAST    (Results Pending)   CT CERVICAL SPINE WO CONTRAST    (Results Pending)         Objective:   Vitals: /66   Pulse 60   Temp 97.8 °F (36.6 °C) (Oral)   Resp 18   Ht 5' 6\" (1.676 m)   Wt 158 lb 15.2 oz (72.1 kg)   SpO2 95%   BMI 25.66 kg/m²    Wt Readings from Last 3 Encounters:   09/15/21 158 lb 15.2 oz (72.1 kg)   07/14/21 145 lb 8.1 oz (66 kg)   06/17/21 161 lb (73 kg)      24HR INTAKE/OUTPUT:      Intake/Output Summary (Last 24 hours) at 9/15/2021 1605  Last data filed at 9/15/2021 1200  Gross per 24 hour   Intake 1455.8 ml   Output 2312 ml   Net -856.2 ml       Constitutional: Well-developed elderly gentleman alert, awake, no apparent distress   Skin:normal with no rash or lesions. HEENT:Pupils are reactive . Throat is clear . Oral mucosa is moist   Neck:supple with no thyromegaly or carotid bruit  Cardiovascular:  S1, S2 without murmur or rubs   Respiratory:  Clear to ausculation without wheezes, rhonchi or rales. Abdomen: +bs, soft, no tenderness to palpation and no palpable mass. No abdominal bruit   Ext: No LE edema  Musculoskeletal:Intact  Neuro:Alert and awake with no focal deficit. Speech is normal      Electronically signed by Jordan Gallo MD on 9/15/2021 at 4:05 PM  **This report has been created using voice recognition software.  It maycontain minor  errors which are inherent in voice recognition technology. **

## 2021-09-15 NOTE — PROGRESS NOTES
[address]  PHYSICAL THERAPY MISSED TREATMENT NOTE  [facility] 8A    Date: 9/15/2021  Patient Name: Marcos Barreto        MRN: [id number]   : 11/10/1930  (80 y.o.)  Gender: male   Referring Practitioner: Dr. Libby Tidwell  Diagnosis: acute pulmonary edema         REASON FOR MISSED TREATMENT:  Patient at testing and/or off unit. Per nursing, pt is currently at dialysis, will check back as able.      Jeffrey Mckenzie PT, DPT

## 2021-09-15 NOTE — PROGRESS NOTES
[address] 60  OCCUPATIONAL THERAPY MISSED TREATMENT NOTE  [facility] SURG 8A  8A-020-A      Date: 9/15/2021  Patient Name: Haven Colin        CSN: [id number]   : 11/10/1930  (80 y.o.)  Gender: male   Referring Practitioner: Juventino Jerome MD  Diagnosis: Acute Pulmonary Edema         REASON FOR MISSED TREATMENT: Patient Off Floor for Dialysis. Will re-attempt this PM as able.

## 2021-09-15 NOTE — CARE COORDINATION
9/15/21, 9:39 AM EDT    DISCHARGE ON GOING [facility] day: 2  Location: 8A-20/020-A Reason for admit: Acute pulmonary edema ([address] [J81.0]  Hypoxia [R09.02]  Elevated troponin [R77.8]  Pneumonia due to COVID-19 virus [U07.1, J12.82]  COVID-19 [U07.1]   Procedure: none  Barriers to Discharge: Requiring 2L oxygen. IV zithromax. IV rocephin. Decadron. PT/OT. HD today. PCP: Holden Perkins MD  Readmission Risk Score: 46%  Patient Goals/Plan/Treatment Preferences: New placement, will need to be out of isolation, next review is 9/21. Spoke with [facility] Shanna Awan will be unable to return to [facility] clinic until he has 2 negative covid test. Chair time will need to be arranged at [facility]. Called [facility] [phone], updated on new patient. Message left for Marine Chappell at [facility] admissions ([phone]) re: need for chair time and anticipated discharge of 9/21.      Devante Araujo ask they be notified of discharge and F [phone]

## 2021-09-15 NOTE — PROGRESS NOTES
Hospitalist Progress Note    Patient:  Jimena Painter      Unit/Bed:8A-20/020-A    YOB: 1930    MRN: [id number]       Acct: [de-identified]     PCP: Sophia Chu MD    Date of Admission: 9/13/2021    Assessment/Plan:    1. COVID-19 pneumonia:  Continue with Dexamethasone 6mg daily. Repeat COVID testing per family request was again positive  Monitor inflammatory markers  2. CAP:  Elevated procalcitonin on Rocephin and Azithromycin   2. Acute hypoxemia: Secondary to above and volume overload. Continue to wean O2   3. end-stage renal disease with volume overload: Patient completed dialysis today   4. Status post unwitnessed fall: No apparent injuries, CT brain and cervical spine. 5. Dark tarry stools: Patient have liquid black stools, check occult blood, hold heparin subcu, monitor H&H. If positive for heme will need to consult with GI and cardiology given his most recent stent placement. 6. Elevated troponin: Patient not having chest pains. Repeat troponins were improved. Not cardiac in nature. Heparin discontinued. 7. CAD: Patient had 2 stents placed at [facility] 2 weeks ago. We will continue with aspirin and Plavix. Continue with metoprolol and pravastatin. 8. Advance care planning: Limited CODE STATUS no CPR, no resuscitation and no intubation. 9. VTE prophylaxis: SCD  10. Dispo: likely will need placement. Discussed with her daughter twice today and updated on above. Called daughter back to inform her of his fall and that we will be obtaining CT. Hospital Course:   80year old with ESRD on dialysis here with COVID infection and hypoxemia. Subjective (past 24 hours):   Patient seen and examined twice today. Patient found down on the floor, denied hitting his head. Had liquid black stools.        Medications:  Reviewed    Infusion Medications    sodium chloride       Scheduled Medications    epoetin traci-epbx  6,000 Units SubCUTAneous Once per day on Mon Wed Fri    cefTRIAXone (ROCEPHIN) IV  1,000 mg IntraVENous Q24H    azithromycin  500 mg IntraVENous Q24H    calcitRIOL  1.5 mcg Oral Once per day on Mon Wed Fri    cinacalcet  60 mg Oral Once per day on Mon Wed Fri    docusate sodium  100 mg Oral BID    ferrous sulfate  325 mg Oral Daily with breakfast    gabapentin  100 mg Oral TID    hydrALAZINE  25 mg Oral BID    metoprolol  100 mg Oral BID    pantoprazole  40 mg Oral QAM AC    vitamin C  500 mg Oral Daily    polyethylene glycol  17 g Oral Daily    sodium chloride flush  5-40 mL IntraVENous 2 times per day    dexamethasone  6 mg Oral Daily    clopidogrel  75 mg Oral Daily     PRN Meds: sodium chloride flush, sodium chloride, ondansetron **OR** ondansetron, polyethylene glycol, acetaminophen **OR** acetaminophen, guaiFENesin-dextromethorphan, albuterol sulfate HFA      Intake/Output Summary (Last 24 hours) at 9/15/2021 1504  Last data filed at 9/15/2021 1200  Gross per 24 hour   Intake 1455.8 ml   Output 2312 ml   Net -856.2 ml       Diet:  ADULT DIET; Regular; Low Potassium (Less than 3000 mg/day); Low Phosphorus (Less than 1000 mg); 1200 ml    Exam:  BP (!) 151/61   Pulse 59   Temp 97.7 °F (36.5 °C) (Oral)   Resp 18   Ht 5' 6\" (1.676 m)   Wt 158 lb 15.2 oz (72.1 kg)   SpO2 95%   BMI 25.66 kg/m²     General appearance: No apparent distress, appears stated age and cooperative. Respiratory:  Normal respiratory effort. Clear to auscultation, bilaterally without Rales/Wheezes/Rhonchi. Cardiovascular: Regular rate and rhythm with normal S1/S2 without murmurs, rubs or gallops. Abdomen: Soft, non-tender, non-distended with normal bowel sounds.   Extremities: no pedal edema  Skin:  no rashes    Labs:   Recent Labs     09/13/21 2034 09/14/21  0326 09/15/21  0626   WBC 3.5* 4.0* 4.9   HGB 8.0* 8.1* 8.1*   HCT 23.7* 24.6* 23.6*    142 176     Recent Labs     09/13/21  1318 09/14/21  0326 09/15/21  0626    139 138   K 4.6 4.9 5.2 CL 95* 97* 94*   CO2 30 31 27   BUN 22 36* 63*   CREATININE 4.4* 5.9* 7.7*   CALCIUM 9.3 8.2* 8.1*     Recent Labs     09/13/21  1318 09/14/21  0326 09/15/21  0626   AST 46* 39 40   ALT 14 11 10*   BILIDIR 0.3  --   --    BILITOT 0.7 0.5 0.4   ALKPHOS 100 76 79     Recent Labs     09/13/21 2034 09/14/21  0326   INR 1.15* 1.12     No results for input(s): John Gey in the last 72 hours. Recent Labs     09/13/21 2034   PROCAL 1.23*       Microbiology:      Urinalysis:      Lab Results   Component Value Date    NITRU NEGATIVE 06/22/2019    WBCUA 50-75 06/22/2019    BACTERIA NONE 06/22/2019    RBCUA 25-50 06/22/2019    BLOODU LARGE 06/22/2019    SPECGRAV 1.013 05/03/2016    GLUCOSEU NEGATIVE 06/22/2019       Radiology:  XR CHEST PORTABLE    Result Date: 9/13/2021  PROCEDURE: XR CHEST PORTABLE CLINICAL INFORMATION: SOB . COMPARISON: 7/12/2021 TECHNIQUE: Portable upright FINDINGS: Heart size is mildly enlarged. Mediastinum is not widened. There is pulmonary vascular congestion. Interstitial edema. There is suggestion of small effusions. AICD over the left chest. EKG leads overlie the chest. Right upper extremity vascular stents are noted. Cardiomegaly with vascular congestion and interstitial edema and effusions differential would include congestive heart failure or fluid overload. **This report has been created using voice recognition software. It may contain minor errors which are inherent in voice recognition technology. ** Final report electronically signed by Dr. Loyda Hall on 9/13/2021 2:22 PM      DVT prophylaxis: [] Lovenox                                 [x] SCDs                                 [] SQ Heparin                                 [] Encourage ambulation           [] Already on Anticoagulation     Code Status: Limited    Tele:   [x] yes             [] no    Active Hospital Problems    Diagnosis Date Noted    Pneumonia due to COVID-19 virus [U07.1, J12.82] 09/13/2021 Electronically signed by Vidya Acevedo MD on 9/15/2021 at 3:04 PM

## 2021-09-15 NOTE — PROGRESS NOTES
[facility] 60  OCCUPATIONAL THERAPY MISSED TREATMENT NOTE  STR MED SURG 8A  8A-020-A      Date: 9/15/2021  Patient Name: Tita Camilo        CSN: [id number]   : 11/10/1930  (80 y.o.)  Gender: male   Referring Practitioner: Sravan Cabrera MD  Diagnosis: Acute Pulmonary Edema         REASON FOR MISSED TREATMENT: Second attempt this PM for therapy session. RN stating pt had just had a fall and been assisted back to bed- currently awaiting transport to CT scan. Will attempt for next scheduled date.

## 2021-09-16 NOTE — PROGRESS NOTES
[facility]ann [facility] 60  OCCUPATIONAL THERAPY MISSED TREATMENT NOTE  STR[facility] MED SURG 8A  8A-020-A      Date: 2021  Patient Name: Sherrill Root        CSN: [id number]   : 11/10/1930  (80 y.o.)  Gender: male   Referring Practitioner: Shannon Sena MD  Diagnosis: Acute Pulmonary Edema         REASON FOR MISSED TREATMENT: Patient Refused Per nursing, patient presents with increased confusion this date and reports patient had fallen yesterday. Patient supine in bed with HOB elevated upon arrival.  Patient begins to moan with this therapist introducing self, shaking head no with participation. This therapist retrieved O2 stats with patient at 91% on RA. Will attempt later this date if time allows.

## 2021-09-16 NOTE — PROGRESS NOTES
diastolic CHF (congestive heart failure) (HCC)     Pneumonia due to COVID-19 virus      Subjective:   Admit Date: 9/13/2021    Interval History:   Seen for end-stage kidney disease on hemodialysis  Sleeping during round. Appears comfortable  Updated by the staff  No new issues of concern  Blood pressure is mostly okay        Medications:   Scheduled Meds:   epoetin traci-epbx  6,000 Units SubCUTAneous Once per day on Mon Wed Fri    cefTRIAXone (ROCEPHIN) IV  1,000 mg IntraVENous Q24H    azithromycin  500 mg IntraVENous Q24H    calcitRIOL  1.5 mcg Oral Once per day on Mon Wed Fri    cinacalcet  60 mg Oral Once per day on Mon Wed Fri    docusate sodium  100 mg Oral BID    ferrous sulfate  325 mg Oral Daily with breakfast    gabapentin  100 mg Oral TID    hydrALAZINE  25 mg Oral BID    metoprolol  100 mg Oral BID    pantoprazole  40 mg Oral QAM AC    vitamin C  500 mg Oral Daily    polyethylene glycol  17 g Oral Daily    sodium chloride flush  5-40 mL IntraVENous 2 times per day    dexamethasone  6 mg Oral Daily    clopidogrel  75 mg Oral Daily     Continuous Infusions:   sodium chloride         CBC:   Recent Labs     09/14/21  0326 09/14/21  0326 09/15/21  0626 09/15/21  0626 09/15/21  1626 09/15/21  2316 09/16/21  0737   WBC 4.0*  --  4.9  --   --   --  6.4   HGB 8.1*   < > 8.1*   < > 8.7* 8.6* 8.7*     --  176  --   --   --  199    < > = values in this interval not displayed. CMP:    Recent Labs     09/14/21  0326 09/15/21  0626 09/16/21  0737    138 137   K 4.9 5.2 4.9   CL 97* 94* 95*   CO2 31 27 27   BUN 36* 63* 44*   CREATININE 5.9* 7.7* 5.7*   GLUCOSE 101 118* 107   CALCIUM 8.2* 8.1* 8.3*   LABGLOM 11* 8* 11*     Troponin: No results for input(s): TROPONINI in the last 72 hours. BNP: No results for input(s): BNP in the last 72 hours.   INR:   Recent Labs     09/13/21 2034 09/14/21 0326   INR 1.15* 1.12     Lipids:   Recent Labs     09/13/21  1318   LIPASE 49.1     Liver: Recent Labs     09/16/21  0737   AST 39   ALT 12   ALKPHOS 78   PROT 6.2   LABALBU 3.2*   BILITOT 0.4     Iron:    Recent Labs     09/14/21  0326   FERRITIN 3,607*     CT HEAD WO CONTRAST   Final Result      1. Stable CT scan of the brain, no interval change since previous study dated 15th of June 2021. **This report has been created using voice recognition software. It may contain minor errors which are inherent in voice recognition technology. **      Final report electronically signed by DR Becki Hancock on 9/15/2021 4:26 PM      CT CERVICAL SPINE WO CONTRAST   Final Result    No evidence of acute osseous injury of the cervical spine. **This report has been created using voice recognition software. It may contain minor errors which are inherent in voice recognition technology. **      Final report electronically signed by Dr. Nany Dorsey MD on 9/15/2021 4:29 PM      XR CHEST PORTABLE   Final Result   Cardiomegaly with vascular congestion and interstitial edema and effusions differential would include congestive heart failure or fluid overload. **This report has been created using voice recognition software. It may contain minor errors which are inherent in voice recognition technology. **      Final report electronically signed by Dr. Tong Jasso on 9/13/2021 2:22 PM            Objective:   Vitals: BP (!) 166/83   Pulse 68   Temp 98.2 °F (36.8 °C) (Oral)   Resp 20   Ht 5' 6\" (1.676 m)   Wt 158 lb 15.2 oz (72.1 kg)   SpO2 95%   BMI 25.66 kg/m²    Wt Readings from Last 3 Encounters:   09/15/21 158 lb 15.2 oz (72.1 kg)   07/14/21 145 lb 8.1 oz (66 kg)   06/17/21 161 lb (73 kg)      24HR INTAKE/OUTPUT:      Intake/Output Summary (Last 24 hours) at 9/16/2021 1228  Last data filed at 9/16/2021 0330  Gross per 24 hour   Intake 250 ml   Output --   Net 250 ml       Constitutional: Well-developed elderly gentleman comfortably asleep, no apparent distress   Skin:normal with no rash or lesions. HEENT: Head is normal. .Oral mucosa is moist   Neck:supple with no  carotid bruit  Cardiovascular: Regular sinus rhythm  Respiratory:  Clear to ausculation without wheezes, rhonchi or rales in the anterior. Abdomen: Soft. Good bowel sounds. Ext: No LE edema  Musculoskeletal:Intact  Neuro: Deferred      Electronically signed by Liliana Cox MD on 9/16/2021 at 12:28 PM  **This report has been created using voice recognition software. It maycontain minor  errors which are inherent in voice recognition technology. **

## 2021-09-16 NOTE — CARE COORDINATION
DISCHARGE/PLANNING EVALUATION  9/16/21, 10:51 AM EDT    Reason for Referral: \"Possible ECF needed\"  Mental Status: Patient is confused, nonverbal at this [facility]  Decision Making: Patient daughter Peg Paxton makes decisions  Family/Social/Home Environment: Spoke with patient daughter Peg Sailors, assessment completed. Patient lives with daughter in a one level home. She is his caretaker and aides assist with his care. Patient has Hemo dialysis at [facility], transportation and housekeeping: daughter meets needs  Current Equipment: rolling walker  Payment Source: [facility]  Concerns or Barriers to Discharge: Peg Sailors states that he will need ECF placement. Discussed situation with dialysis, he will need to go to [facility] until he has 2 negative covid test. Options for ECF is [facility] with transport arranged for dialysis or [facility] ([facility] preference) and family transports to dialysis. Family transport will depend on chair time for dialysis. Will know chair time for dialysis on Friday 9/17. Post acute provider list with quality measures, geographic area and applicable managed care information provided. Questions regarding selection process answered: see above    Teach Back Method used with daughter Peg Sailors regarding care plan and discharge planning. Daughter Peg Sailors verbalize understanding of the plan of care and contribute to goal setting. Patient goals, treatment preferences and discharge plan: Patient plans ECF at discharge with dialysis at [facility]. Electronically signed by SARAHI Rice on 9/16/2021 at 10:51 AM     Made referral to [facility] for [facility] or possibly [facility]. Advised he will not be out of isolation until 9/21. They will look at him, will update with [facility] chair time tomorrow and with family decision on facility.

## 2021-09-16 NOTE — PROGRESS NOTES
Patient's daughter Germaine Diallo called this morning and given an update on patient's condition. No further questions at this time.

## 2021-09-16 NOTE — CARE COORDINATION
9/16/21, 8:52 AM EDT    DISCHARGE ON GOING [facility] day: 3  Location: 8A-20/020-A Reason for admit: Acute pulmonary edema ([facility] [address] [J81.0]  Hypoxia [R09.02]  Elevated troponin [R77.8]  Pneumonia due to COVID-19 virus [U07.1, J12.82]  COVID-19 [U07.1]   Procedure: none  Barriers to Discharge: Hospitalist and nephro following. 9/15 unwitnessed fall-no injuries. Dark tarry stools, checking for obs. Zithromax. IV rocephin. Decdron. Vit C, D, zinc. HD MWF. Room air. PCP: Gavin Cervantes MD  Readmission Risk Score: 44%  Patient Goals/Plan/Treatment Preferences: Planning new ECF at discharge. Will have to transition to [address] [place] [address] until 2 negative covid test are obtained. [facility] is aware. Sixto Thom with [address] [place] [address] is working on [employer] time, should know by Friday. Not out of isolation at least until 9/21.

## 2021-09-16 NOTE — PROGRESS NOTES
3L  Treatment: Oxygen    Thank you. Please call if you have any questions. P) [phone]. Signed   by Isi Diaz RN Clinical , CRCR  Options provided:  -- Acute respiratory failure with hypoxia  -- Acute respiratory failure with hypercapnia  -- Chronic respiratory failure with hypoxia  -- Chronic respiratory failure with hypercapnia  -- Acute on chronic respiratory failure with hypoxia  -- Acute on chronic respiratory failure with hypercapnia  -- Other - I will add my own diagnosis  -- Disagree - Not applicable / Not valid  -- Disagree - Clinically unable to determine / Unknown  -- Refer to Clinical Documentation Reviewer    PROVIDER RESPONSE TEXT:    This patient is in acute respiratory failure with hypoxia.     Query created by: Derek Guadarrama on 9/14/2021 1:23 PM      Electronically signed by:  Maylin Jo MD 9/16/2021 4:25 PM

## 2021-09-16 NOTE — PROGRESS NOTES
[facility]  INPATIENT PHYSICAL THERAPY  DAILY NOTE  [facility] MED SURG 8A - 8A-20/020-A    Time In: 5473  Time Out: 1110  Timed Code Treatment Minutes: 12 Minutes  Minutes: 12          Date: 2021  Patient Name: Sherrill Root,  Gender:  male        MRN: [id number]  : 11/10/1930  (80 y.o.)     Referring Practitioner: Dr. Qiana Fernandez  Diagnosis: acute pulmonary edema  Additional Pertinent Hx: admit with above diagnosis, ESRD on HD, COVID-19 pneumonia, HTN, COPD, anemia     Prior Level of Function:  Lives With: Daughter  Type of Home: House  Home Layout: One level  Home Access: Level entry  Home Equipment: Rolling walker   Bathroom Shower/Tub: Walk-in shower  Bathroom Toilet: Standard    ADL Assistance: Independent  Homemaking Assistance: Needs assistance  Ambulation Assistance: Independent  Transfer Assistance: Independent  Additional Comments: Pt using RW PTA. Daughter assists at home. Unsure of accuracy of above pt is slighly confused this date. Restrictions/Precautions:  Restrictions/Precautions: Fall Risk, Isolation  Position Activity Restriction  Other position/activity restrictions: droplet +     SUBJECTIVE: confused, pt moaning during session, pt requested back to bed after amb and c/o fatigue    PAIN: no c/o pain during session    Vitals: Oxygen: on room air with O2 sats at beginning of session at 90%, unable to get pulse ox to read during session, after session RN stated pt at 95%    OBJECTIVE:  Bed Mobility:  Rolling to Left: Minimal Assistance, X 1   Supine to Sit: Moderate Assistance, X 1  Sit to Supine: Moderate Assistance, X 1   Scooting: Moderate Assistance, X 1, fwd in sitting to EOB  HOB up 20 degrees and use BR  Transfers:  Sit to Stand: Minimal Assistance  Stand to [address] for hand placement  Ambulation:  Minimal Assistance, to CGA  Distance: 12'x1  Surface: Level Tile  Device:Rolling Walker  Gait Deviations:   Forward Flexed Posture, Slow Valeri, Decreased Step Length Bilaterally, Unsteady Gait and BLE wanting to buckle at times, pt fatigued quickly    Balance:  Static Sitting Balance:  Stand By Assistance  Static Standing Balance: Minimal Assistance, to CGA, cues for erect posture  With walker      Functional Outcome Measures: Completed  AM-PAC Inpatient Mobility Raw Score : 15  AM-PAC Inpatient T-Scale Score : 39.45    ASSESSMENT:  Assessment: Patient progressing toward established goals. Activity Tolerance:  Patient tolerance of  treatment: fair. Equipment Recommendations: Other: cont to assess needs  Discharge Recommendations:  Continue to assess pending progress, Subacute/Skilled Nursing Facility, 24 hour supervision or assist, Patient would benefit from continued therapy after discharge    Plan: Times per week: 5X GM  Times per day: Daily  Specific instructions for Next Treatment: therex and mobility    Patient Education  Patient Education: Bed Mobility, Transfers, Gait    Goals:  Patient goals : not stated  Short term goals  Time Frame for Short term goals: by discharge  Short term goal 1: bed mobility with S to get in/out of bed  Short term goal 2: transfer with S to get in/out of chairs  Short term goal 3: amb >50'x1 with RW and SBA, maintaining O2 sats >90% on </=4L O2, to walk safely in room  Long term goals  Time Frame for Long term goals : no LTGs set secondary to short ELOS    Following session, patient left in safe position with all fall risk precautions in place.

## 2021-09-16 NOTE — PROGRESS NOTES
Hospitalist Progress Note    Patient:  Audrey Shah      Unit/Bed:8A-20/020-A    YOB: 1930    MRN: [id number]       Acct: [de-identified]     PCP: Zoey Fisher MD    Date of Admission: 9/13/2021    Assessment/Plan:    1. COVID-19 pneumonia:  Continue with Dexamethasone 6mg daily. Repeat COVID testing per family request was again positive  Monitor inflammatory markers  2. CAP:  Elevated procalcitonin on Rocephin and Azithromycin   2. Acute hypoxemia: Resolved, patient has been weaned off of oxygen. 3. End-stage renal disease: Dialysis per nephro  4. Status post unwitnessed fall: No apparent injuries, CT brain and cervical spine. 5. Dark tarry stools: No further episodes, hemoglobin stable. Continue to monitor  6. Elevated troponin: Patient not having chest pains. Repeat troponins were improved. Not cardiac in nature. Heparin discontinued. 7. CAD: Patient had 2 stents placed at [facility] 2 weeks ago. We will continue with aspirin and Plavix. Continue with metoprolol and pravastatin. 8. Advance care planning: Limited CODE STATUS no CPR, no resuscitation and no intubation. 9. VTE prophylaxis: SCD  10. Dispo: Will need placement,  consulted for ECF and outpatient dialysis. Hospital Course:   80year old with ESRD on dialysis here with COVID infection and hypoxemia. Subjective (past 24 hours):   Patient seen and examined at bedside feeling much better today. No new complaints or acute overnight events. Patient remains on room air.       Medications:  Reviewed    Infusion Medications    sodium chloride       Scheduled Medications    epoetin traci-epbx  6,000 Units SubCUTAneous Once per day on Mon Wed Fri    cefTRIAXone (ROCEPHIN) IV  1,000 mg IntraVENous Q24H    azithromycin  500 mg IntraVENous Q24H    calcitRIOL  1.5 mcg Oral Once per day on Mon Wed Fri    cinacalcet  60 mg Oral Once per day on Mon Wed Fri    docusate sodium  100 mg Oral BID    ferrous sulfate  325 mg Oral Daily with breakfast    gabapentin  100 mg Oral TID    hydrALAZINE  25 mg Oral BID    metoprolol  100 mg Oral BID    pantoprazole  40 mg Oral QAM AC    vitamin C  500 mg Oral Daily    polyethylene glycol  17 g Oral Daily    sodium chloride flush  5-40 mL IntraVENous 2 times per day    dexamethasone  6 mg Oral Daily    clopidogrel  75 mg Oral Daily     PRN Meds: sodium chloride flush, sodium chloride, ondansetron **OR** ondansetron, polyethylene glycol, acetaminophen **OR** acetaminophen, guaiFENesin-dextromethorphan, albuterol sulfate HFA      Intake/Output Summary (Last 24 hours) at 9/16/2021 1625  Last data filed at 9/16/2021 0330  Gross per 24 hour   Intake 250 ml   Output --   Net 250 ml       Diet:  ADULT DIET; Regular; Low Potassium (Less than 3000 mg/day); Low Phosphorus (Less than 1000 mg); 1200 ml    Exam:  BP (!) 156/69   Pulse 73   Temp 98.4 °F (36.9 °C) (Oral)   Resp 16   Ht 5' 6\" (1.676 m)   Wt 158 lb 15.2 oz (72.1 kg)   SpO2 92%   BMI 25.66 kg/m²     General appearance: No apparent distress, appears stated age and cooperative. Respiratory:  Normal respiratory effort. Clear to auscultation, bilaterally without Rales/Wheezes/Rhonchi. Cardiovascular: Regular rate and rhythm with normal S1/S2 without murmurs, rubs or gallops. Abdomen: Soft, non-tender, non-distended with normal bowel sounds. Extremities: no pedal edema  Skin:  no rashes    Labs:   Recent Labs     09/14/21 0326 09/14/21  0326 09/15/21  0626 09/15/21  0626 09/15/21  1626 09/15/21  2316 09/16/21  0737   WBC 4.0*  --  4.9  --   --   --  6.4   HGB 8.1*   < > 8.1*   < > 8.7* 8.6* 8.7*   HCT 24.6*   < > 23.6*   < > 26.8* 25.4* 25.5*     --  176  --   --   --  199    < > = values in this interval not displayed.      Recent Labs     09/14/21 0326 09/15/21  0626 09/16/21  0737    138 137   K 4.9 5.2 4.9   CL 97* 94* 95*   CO2 31 27 27   BUN 36* 63* 44*   CREATININE 5.9* 7.7* 5.7* CALCIUM 8.2* 8.1* 8.3*     Recent Labs     09/14/21  0326 09/15/21  0626 09/16/21  0737   AST 39 40 39   ALT 11 10* 12   BILITOT 0.5 0.4 0.4   ALKPHOS 76 79 78     Recent Labs     09/13/21 2034 09/14/21  0326   INR 1.15* 1.12     No results for input(s): CKTOTAL, TROPONINI in the last 72 hours. Recent Labs     09/13/21  2034 09/15/21  0626   PROCAL 1.23* 1.41*       Microbiology:      Urinalysis:      Lab Results   Component Value Date    NITRU NEGATIVE 06/22/2019    WBCUA 50-75 06/22/2019    BACTERIA NONE 06/22/2019    RBCUA 25-50 06/22/2019    BLOODU LARGE 06/22/2019    SPECGRAV 1.013 05/03/2016    GLUCOSEU NEGATIVE 06/22/2019       Radiology:  XR CHEST PORTABLE    Result Date: 9/13/2021  PROCEDURE: XR CHEST PORTABLE CLINICAL INFORMATION: SOB . COMPARISON: 7/12/2021 TECHNIQUE: Portable upright FINDINGS: Heart size is mildly enlarged. Mediastinum is not widened. There is pulmonary vascular congestion. Interstitial edema. There is suggestion of small effusions. AICD over the left chest. EKG leads overlie the chest. Right upper extremity vascular stents are noted. Cardiomegaly with vascular congestion and interstitial edema and effusions differential would include congestive heart failure or fluid overload. **This report has been created using voice recognition software. It may contain minor errors which are inherent in voice recognition technology. ** Final report electronically signed by Dr. India Her on 9/13/2021 2:22 PM      DVT prophylaxis: [] Lovenox                                 [x] SCDs                                 [] SQ Heparin                                 [] Encourage ambulation           [] Already on Anticoagulation     Code Status: Limited    Tele:   [x] yes             [] no    Active Hospital Problems    Diagnosis Date Noted    Pneumonia due to COVID-19 virus [U07.1, J12.82] 09/13/2021       Electronically signed by Rajani Farfan MD on 9/16/2021 at 4:25 PM

## 2021-09-17 NOTE — FLOWSHEET NOTE
09/17/21 1248 09/17/21 1715   Vital Signs   BP (!) 141/61 (!) 161/75   Temp 98.7 °F (37.1 °C) 97.8 °F (36.6 °C)   Pulse 72 70   Resp 18 16   SpO2 100 % 96 %   Post-Hemodialysis Assessment   Post-Treatment Procedures  --  Blood returned; Access bleeding time < 10 minutes   Machine Disinfection Process  --  Acid/Vinegar Clean;Heat Disinfect   Total Liters Processed (l/min)  --  87.4 l/min   Dialyzer Clearance  --  Heavily streaked   Duration of Treatment (minutes)  --  240 minutes   Hemodialysis Intake (ml)  --  600 ml   Hemodialysis Output (ml)  --  1600 ml   NET Removed (ml)  --  1000 ml   Tolerated Treatment  --  Fair   4 hour treatment completed. 1L of fluid removed. Pressure help to access for 10 mins x2. Gauze dressing clean/dry and intact upon leaving the unit. Report called to primary floor nurse.

## 2021-09-17 NOTE — CARE COORDINATION
9/17/21, 11:12 AM EDT    DISCHARGE ON GOING [facility] day: 4  Location: 8A-20/020-A Reason for admit: Acute pulmonary edema ([facility] [address] [J81.0]  Hypoxia [R09.02]  Elevated troponin [R77.8]  Pneumonia due to COVID-19 virus [U07.1, J12.82]  COVID-19 [U07.1]   Procedure: none  Barriers to Discharge: Hospitalist and nephro following. Placed back on 2L, sats 95%. IVF> IV zitrhomax. IV rocephin. Decadron. PT/OT. HD today. PCP: Catracho Emerson MD  Readmission Risk Score: 45%  Patient Goals/Plan/Treatment Preferences: Planning new ECF, SW is working with family. Terese grant vs Doris pending chair time. [address] working on chair time at [facility] and will have later today. Will follow-up with Sara Powell at [address] this afternoon if have not heard back. Cannot be discharged to ecf until isolation is completed, up for review on 9/21. Spoke with Sara Powell, at [address], chair time is 0600, arrive at [facility]  On Tues, [address], Sat. SW  Updated.

## 2021-09-17 NOTE — PROGRESS NOTES
Nephrology Progress Note    Patient - Raj Tomlinson   MRN -  [id number]   [facility]      - 11/10/1930    80 y.o. Admit Date: 2021  Hospital Day: 4  Location: -020-A  Date of evaluation -  2021    Subjective:   CC: shortness of breath  Denies shortness of breath   Lethargic  Pt seen during hemodialysis, Hemodynamically stable 3 K bath, Goal Ultrafiltration 1500 ml  BP Range: Systolic (15YYX), SLA:995 , Min:109 , SDR:765      Diastolic (62IGE), CC, Min:55, Max:87    Objective:   VITALS:  /87   Pulse 73   Temp 98.7 °F (37.1 °C) (Axillary)   Resp 20   Ht 5' 6\" (1.676 m)   Wt 158 lb 15.2 oz (72.1 kg)   SpO2 99%   BMI 25.66 kg/m²    Patient Vitals for the past 24 hrs:   BP Temp Temp src Pulse Resp SpO2   21 1138 109/87 98.7 °F (37.1 °C) Axillary 73 20 99 %   21 0821 (!) 170/55 98.6 °F (37 °C) Oral 70 20 95 %   21 0800 -- -- -- -- -- (!) 86 %   21 0445 (!) 172/56 97.7 °F (36.5 °C) Oral 62 18 91 %   21 2309 (!) 164/71 98.2 °F (36.8 °C) Oral 62 18 94 %   21 2045 (!) 169/65 97.3 °F (36.3 °C) Oral 67 18 94 %   21 1558 -- -- -- 65 -- --   21 1538 (!) 156/69 98.4 °F (36.9 °C) Oral 73 16 92 %     2 L/min    Intake/Output Summary (Last 24 hours) at 2021 1217  Last data filed at 2021 0445  Gross per 24 hour   Intake 210 ml   Output --   Net 210 ml       Admission weight: 163 lb (73.9 kg)  Patient Vitals for the past 96 hrs (Last 3 readings):   Weight   09/15/21 1200 158 lb 15.2 oz (72.1 kg)   09/15/21 0803 162 lb 14.7 oz (73.9 kg)   21 1241 163 lb (73.9 kg)     Body mass index is 25.66 kg/m². EXAM:  CONSTITUTIONAL:  No acute distress. HEENT:  Head is normocephalic, Extraocular movement intact. Neck is supple. CARDIOVASCULAR:  S1, S2  regular rate and rhythm. RESPIRATORY: Clear to ausculation bilaterally. Equal breath sounds. No wheezes.  No shortness of breath noted at rest.  ABDOMEN: soft, non tender  NEUROLOGICAL: Patient is alert and oriented to person, place. Recent and remote memory partially intact. SKIN: no rash, No significant bruises on exposed surfaces  MUSCULOSKELETAL: Movement is coordinated. Moves all extremities   EXTREMITIES: Distal lower extremity temp is warm, No bilateral symmetric lower extremity edema. Upper extremity AV Fistula   PSYCHIATRIC: mood and affect appropriate. Medications:   Med reviewed  Scheduled Meds:   epoetin traci-epbx  6,000 Units SubCUTAneous Once per day on Mon Wed Fri    cefTRIAXone (ROCEPHIN) IV  1,000 mg IntraVENous Q24H    azithromycin  500 mg IntraVENous Q24H    calcitRIOL  1.5 mcg Oral Once per day on Mon Wed Fri    cinacalcet  60 mg Oral Once per day on Mon Wed Fri    docusate sodium  100 mg Oral BID    ferrous sulfate  325 mg Oral Daily with breakfast    gabapentin  100 mg Oral TID    hydrALAZINE  25 mg Oral BID    metoprolol  100 mg Oral BID    pantoprazole  40 mg Oral QAM AC    vitamin C  500 mg Oral Daily    polyethylene glycol  17 g Oral Daily    sodium chloride flush  5-40 mL IntraVENous 2 times per day    dexamethasone  6 mg Oral Daily    clopidogrel  75 mg Oral Daily     Continuous Infusions:   sodium chloride       PRN Meds sodium chloride flush, sodium chloride, ondansetron **OR** ondansetron, polyethylene glycol, acetaminophen **OR** acetaminophen, guaiFENesin-dextromethorphan, albuterol sulfate HFA   Labs:   Labs reviewed  Recent Labs     09/15/21  0626 09/15/21  1626 09/15/21  2316 09/16/21 0737 09/17/21  0702   WBC 4.9  --   --  6.4 5.7   RBC 2.55*  --   --  2.76* 2.75*   HGB 8.1*   < > 8.6* 8.7* 8.7*   HCT 23.6*   < > 25.4* 25.5* 27.7*   MCV 92.5  --   --  92.4 100.7*   MCH 31.8  --   --  31.5 31.6     --   --  199 170    < > = values in this interval not displayed.        Recent Labs     09/15/21  0626 09/15/21  0626 09/16/21  0737 09/16/21  0737 09/17/21  0702     --  137  --  134*   K 5.2  --  4.9  --  4.8   CL 94*  --  95*  -- 94*   CO2 27  --  27  --  22*   BUN 63*  --  44*  --  64*   CREATININE 7.7*  --  5.7*  --  7.1*   LABGLOM 8*   < > 11*   < > 9*   GLUCOSE 118*  --  107  --  92   CALCIUM 8.1*  --  8.3*  --  8.1*    < > = values in this interval not displayed. ASSESSMENT:  1. 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 AV fistula. 2. COVID 19 pneumonia  3. Essential Hypertension with nephrosclerosis   4. Abdominal aortic aneurysm 3.7 cm   5. 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 Coronary artery disease Recent PCI at [facility]  7. Anemia of chronic disease   8. Hypophosphatemia, replaced  9. Secondary hyperparathyroidism of renal origin    Active Problems:    Pneumonia due to COVID-19 virus  Resolved Problems:    * No resolved hospital problems.  Juan Pablo Knowles, XIN - CNP 12:17 PM 9/17/2021

## 2021-09-17 NOTE — PROGRESS NOTES
[address] [address]  INPATIENT PHYSICAL THERAPY  DAILY NOTE  [facility] SURG 8A - 8A-20/020-A  Time In: 6421  Time Out: 4414  Timed Code Treatment Minutes: 32 Minutes  Minutes: 26          Date: 2021  Patient Name: Eren Robison,  Gender:  male        MRN: [id number]  : 11/10/1930  (80 y.o.)     Referring Practitioner: Dr. Kleber Julien  Diagnosis: acute pulmonary edema  Additional Pertinent Hx: admit with above diagnosis, ESRD on HD, COVID-19 pneumonia, HTN, COPD, anemia     Prior Level of Function:  Lives With: Daughter  Type of Home: House  Home Layout: One level  Home Access: Level entry  Home Equipment: Rolling walker   Bathroom Shower/Tub: Walk-in shower  Bathroom Toilet: Standard    ADL Assistance: Independent  Homemaking Assistance: Needs assistance  Ambulation Assistance: Independent  Transfer Assistance: Independent  Additional Comments: Pt using RW PTA. Daughter assists at home. Unsure of accuracy of above pt is slighly confused this date. Restrictions/Precautions:  Restrictions/Precautions: Fall Risk, Isolation  Position Activity Restriction  Other position/activity restrictions: droplet +     SUBJECTIVE: Ok to see pt per nursing. Pt in bed when therapy arrived, not on O2 per nursing at rest. Pt agreeable to PT session and to sit in chair for breakfast prior to leaving for dialysis. Pt with moaning noted throughout session, did not communicate much to therapist and nursing in room. PAIN: denies    Vitals: Oxygen: 90% on room air sitting EOB, required 2 L of O2 following amb due to low O2 sats 85%  2 L of O2 applied due to increased SOB following short distance amb task, Pt required increased time to recover, nursing in room to help assist, pt wanting to lay back down due to increased fatigue.    OBJECTIVE:  Bed Mobility:  Supine to Sit: Contact Guard Assistance, X 1, with head of bed raised, with rail, with verbal cues , with increased time for completion good technique of completion, cues for hand placement on rail for support    Transfers:  Sit to Stand: Contact Guard Assistance, Minimal Assistance, X 1, with increased time for completion, cues for hand placement, with verbal cues  Stand to [facility] 68, X 1, with increased time for completion, cues for hand placement, with verbal cues  Use of RW for support, cues for improved safety with completion, fair demo, SOB noted  Ambulation:  Contact Guard Assistance, Minimal Assistance, X 1, with cues for safety, with verbal cues , with increased time for completion  Distance: 15 ft x1  Surface: Level Tile  Device:Rolling Walker  Gait Deviations: Forward Flexed Posture, Decreased Step Length Bilaterally, Decreased Gait Speed, Decreased Heel Strike Bilaterally, Mild Path Deviations, Unsteady Gait, Decreased Terminal Knee Extension and reduced foot clearance  Cues for increased safety with use of RW for support, fair demo, increased SOB noted, required seated rest break on EOB, O2 monitored. Pt instructed to step on weight scale to obtain weight, min A for completion and cue for proper technique. Balance:  Static Sitting Balance:  Contact Guard Assistance, Moderate Assistance, X 1, with cues for safety, with verbal cues   Required to monitor O2, increased assist due to SOB noted, education on pursed lip breathing to help improve energy conservation and improve O2 sats on 2 L of O2, fair demo, pt wanting to lay back down in bed    Functional Outcome Measures: Completed  AM-PAC Inpatient Mobility Raw Score : 16  AM-PAC Inpatient T-Scale Score : 40.78    ASSESSMENT:  Assessment: Patient progressing toward established goals. Activity Tolerance:  Patient tolerance of  treatment: fair. SOB, reduced endurance     Equipment Recommendations: Other: cont to assess needs  Discharge Recommendations: Continue to assess pending progress, Subacute/Skilled Nursing Facility, 24 hour supervision or assist, Patient would benefit from continued therapy after

## 2021-09-17 NOTE — CARE COORDINATION
9/17/21, 3:32 PM EDT    DISCHARGE PLANNING EVALUATION      Spoke with Sofia Siddiqi at [facility], informed patient chair time is 6:00am T[facility]TH-S at [facility]. She stated that [facility] does not have a bed, [facility] has a bed but would need family to transport. Spoke with daughter Chikis Lin, informed of above. Explained to Chikis Lin that family would need to transport to dialysis, 6:00 am is the only time available,  has tried to change it for later but unsuccessful. Chikis Lin stated that they would not be able to transport all the time. She requested he be screened for Medicaid to assist with transport. Advised that medicaid screening can be done however it will not help with the immediate transport problem. Chikis Lin requested this SW call find a ride as an option for transport. [facility] states at 6:00 am.  [facility] [place], left message regarding possibility of transport, gave needed information. There is a cost of approximately $26 per hour.      Chrissy from [facility] called, they do not have staff for new transports, have a 2 week waiting list.

## 2021-09-17 NOTE — PROGRESS NOTES
Hospitalist Progress Note    Patient:  Leanne Sol      Unit/Bed:8A-20/020-A    YOB: 1930    MRN: [id number]       Acct: [de-identified]     PCP: Kiersten Gonzalez MD    Date of Admission: 9/13/2021    Assessment/Plan:    1. COVID-19 pneumonia:  Continue with Dexamethasone 6mg daily. Repeat COVID testing per family request was again positive  Monitor inflammatory markers  On her 2 L nasal cannula again today  2. CAP:  Elevated procalcitonin on Rocephin and Azithromycin, discontinue after today's dose  2. Acute hypoxemia: 2 L nasal cannula, wean O2 as able. Increase mobility, out of bed to chair. 3. End-stage renal disease: Dialysis per nephro  4. Status post unwitnessed fall: No apparent injuries, CT brain and cervical spine. 5. Dark tarry stools: No further episodes, hemoglobin stable. Continue to monitor  6. Elevated troponin: Patient not having chest pains. Repeat troponins were improved. Not cardiac in nature. Heparin discontinued. 7. CAD: Patient had 2 stents placed at [facility] 2 weeks ago. We will continue with aspirin and Plavix. Continue with metoprolol and pravastatin. 8. Advance care planning: Limited CODE STATUS no CPR, no resuscitation and no intubation. 9. VTE prophylaxis: SCD  10. Dispo: Will need placement,  consulted for ECF and outpatient dialysis. Patient will not be able to go to [facility] until 9/21 when he comes out of isolation      Hospital Course:   80year old with ESRD on dialysis here with COVID infection and hypoxemia. Subjective (past 24 hours):   Patient seen and examined at bedside just finished eating breakfast has no complaints. Per staff patient worked with physical therapy became hypoxic and was placed back on 2 L  No new complaints or acute overnight events.       Medications:  Reviewed    Infusion Medications    sodium chloride       Scheduled Medications    epoetin traci-epbx  6,000 Units SubCUTAneous Once per day on Mon Wed Fri    cefTRIAXone (ROCEPHIN) IV  1,000 mg IntraVENous Q24H    azithromycin  500 mg IntraVENous Q24H    calcitRIOL  1.5 mcg Oral Once per day on Mon Wed Fri    cinacalcet  60 mg Oral Once per day on Mon Wed Fri    docusate sodium  100 mg Oral BID    ferrous sulfate  325 mg Oral Daily with breakfast    gabapentin  100 mg Oral TID    hydrALAZINE  25 mg Oral BID    metoprolol  100 mg Oral BID    pantoprazole  40 mg Oral QAM AC    vitamin C  500 mg Oral Daily    polyethylene glycol  17 g Oral Daily    sodium chloride flush  5-40 mL IntraVENous 2 times per day    dexamethasone  6 mg Oral Daily    clopidogrel  75 mg Oral Daily     PRN Meds: sodium chloride flush, sodium chloride, ondansetron **OR** ondansetron, polyethylene glycol, acetaminophen **OR** acetaminophen, guaiFENesin-dextromethorphan, albuterol sulfate HFA      Intake/Output Summary (Last 24 hours) at 9/17/2021 1119  Last data filed at 9/17/2021 0445  Gross per 24 hour   Intake 210 ml   Output --   Net 210 ml       Diet:  ADULT DIET; Regular; Low Potassium (Less than 3000 mg/day); Low Phosphorus (Less than 1000 mg); 1200 ml    Exam:  BP (!) 170/55   Pulse 70   Temp 98.6 °F (37 °C) (Oral)   Resp 20   Ht 5' 6\" (1.676 m)   Wt 158 lb 15.2 oz (72.1 kg)   SpO2 95%   BMI 25.66 kg/m²     General appearance: No apparent distress, appears stated age and cooperative. Respiratory:  Normal respiratory effort. Clear to auscultation, bilaterally without Rales/Wheezes/Rhonchi. Cardiovascular: Regular rate and rhythm with normal S1/S2 without murmurs, rubs or gallops. Abdomen: Soft, non-tender, non-distended with normal bowel sounds.   Extremities: no pedal edema  Skin:  no rashes    Labs:   Recent Labs     09/15/21  0626 09/15/21  1626 09/15/21  2316 09/16/21  0737 09/17/21  0702   WBC 4.9  --   --  6.4 5.7   HGB 8.1*   < > 8.6* 8.7* 8.7*   HCT 23.6*   < > 25.4* 25.5* 27.7*     --   --  199 170    < > = values in this interval not displayed. Recent Labs     09/15/21  0626 09/16/21  0737 09/17/21  0702    137 134*   K 5.2 4.9 4.8   CL 94* 95* 94*   CO2 27 27 22*   BUN 63* 44* 64*   CREATININE 7.7* 5.7* 7.1*   CALCIUM 8.1* 8.3* 8.1*     Recent Labs     09/15/21  0626 09/16/21  0737 09/17/21  0702   AST 40 39 43*   ALT 10* 12 14   BILITOT 0.4 0.4 0.4   ALKPHOS 79 78 76     No results for input(s): INR in the last 72 hours. No results for input(s): Kathie Height in the last 72 hours. Recent Labs     09/15/21  0626   PROCAL 1.41*       Microbiology:      Urinalysis:      Lab Results   Component Value Date    NITRU NEGATIVE 06/22/2019    WBCUA 50-75 06/22/2019    BACTERIA NONE 06/22/2019    RBCUA 25-50 06/22/2019    BLOODU LARGE 06/22/2019    SPECGRAV 1.013 05/03/2016    GLUCOSEU NEGATIVE 06/22/2019       Radiology:  XR CHEST PORTABLE    Result Date: 9/13/2021  PROCEDURE: XR CHEST PORTABLE CLINICAL INFORMATION: SOB . COMPARISON: 7/12/2021 TECHNIQUE: Portable upright FINDINGS: Heart size is mildly enlarged. Mediastinum is not widened. There is pulmonary vascular congestion. Interstitial edema. There is suggestion of small effusions. AICD over the left chest. EKG leads overlie the chest. Right upper extremity vascular stents are noted. Cardiomegaly with vascular congestion and interstitial edema and effusions differential would include congestive heart failure or fluid overload. **This report has been created using voice recognition software. It may contain minor errors which are inherent in voice recognition technology. ** Final report electronically signed by Dr. Juan Barnes on 9/13/2021 2:22 PM      DVT prophylaxis: [] Lovenox                                 [x] SCDs                                 [] SQ Heparin                                 [] Encourage ambulation           [] Already on Anticoagulation     Code Status: Limited    Tele:   [x] yes             [] no    Active Hospital Problems    Diagnosis Date Noted    Pneumonia due to COVID-19 virus [U07.1, J12.82] 09/13/2021       Electronically signed by Gabriele Gonzales MD on 9/17/2021 at 11:19 AM

## 2021-09-17 NOTE — PROGRESS NOTES
Joann Martinez 60  OCCUPATIONAL THERAPY MISSED TREATMENT NOTE  STR MED SURG 8A  [id number]      Date: 2021  Patient Name: Yasmin Cooper        CSN: [id number]   : 11/10/1930  (80 y.o.)  Gender: male   Referring Practitioner: Rajani Farfan MD  Diagnosis: Acute Pulmonary Edema         REASON FOR MISSED TREATMENT:OT treatment attempted Pt. Nurse recommended to hold off until this afternoon due to anxiety and O2 levels. Will attempt back later this date as time allows.

## 2021-09-18 NOTE — PROGRESS NOTES
Renal Progress Note    Assessment and Plan:   1. End-stage kidney disease on hemodialysis  2. Anemia of chronic disease  3. Primary hypertension  4. SARS-CoV-2 infection  5. COPD    PLAN:  1. I discussed my thoughts with the patient. 2. He appears to have understood. 3. I addressed his questions including how he is progressing clinically  4. Labs reviewed  5. Medications reviewed  6. No changes  7. We will follow    Patient Active Problem List:     CAD (coronary artery disease)     COPD (chronic obstructive pulmonary disease) ([facility] [address]     Chronic renal insufficiency     Patient overweight     Arthritis-  low back and neck .      CKD (chronic kidney disease) stage 3, GFR 30-59 ml/min (HCC)     Dyspnea and respiratory abnormalities     ED (erectile dysfunction)     Degenerative joint disease of knee, left     Gout of big toe     Anemia, chronic disease     CKD (chronic kidney disease) stage 4, GFR 15-29 ml/min (HCC)     Essential hypertension     Monoclonal gammopathy     Leukopenia     Thrombocytopenia (HCC)     Chronic kidney disease (CKD), stage V (HCC)     Metabolic acidosis     Hyperkalemia     Iron deficiency anemia     ROSAURA (acute kidney injury) ([facility] [address]     Plasma cell dyscrasia     Idiopathic hypotension     Hypertensive urgency     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)     Systolic congestive heart failure (HCC)     Other fluid overload (CODE)     Hyperphosphatemia     Acute diastolic congestive heart failure (HCC)     Acute on chronic diastolic congestive heart failure (HCC)     Pulmonary hypertension (HCC)     Anemia due to chronic kidney disease, on chronic dialysis (HCC)     Volume overload     Secondary hyperparathyroidism of renal origin ([facility] [address]     Chest pain     Acute pulmonary edema (HCC)     Accelerated hypertension     Gastritis and duodenitis     Fall from slip, trip, or stumble, initial encounter     Closed fracture of left ankle     ESRD on hemodialysis [facility])     Hypertensive emergency SOB (shortness of breath)     Chronic combined systolic and diastolic CHF (congestive heart failure) (HCC)     Pneumonia due to COVID-19 virus      Subjective:   Admit Date: 9/13/2021    Interval History:   Seen for end stage kidney disease on hemodialysis  Awake and alert  No complaint  Updated by the staff  No issues of concern  Blood pressure is stable      Medications:   Scheduled Meds:   epoetin traci-epbx  6,000 Units SubCUTAneous Once per day on Mon Wed Fri    calcitRIOL  1.5 mcg Oral Once per day on Mon Wed Fri    cinacalcet  60 mg Oral Once per day on Mon Wed Fri    docusate sodium  100 mg Oral BID    ferrous sulfate  325 mg Oral Daily with breakfast    gabapentin  100 mg Oral TID    hydrALAZINE  25 mg Oral BID    metoprolol  100 mg Oral BID    pantoprazole  40 mg Oral QAM AC    vitamin C  500 mg Oral Daily    polyethylene glycol  17 g Oral Daily    sodium chloride flush  5-40 mL IntraVENous 2 times per day    dexamethasone  6 mg Oral Daily    clopidogrel  75 mg Oral Daily     Continuous Infusions:   sodium chloride         CBC:   Recent Labs     09/16/21  0737 09/17/21  0702 09/18/21  0844   WBC 6.4 5.7 5.1   HGB 8.7* 8.7* 9.5*    170 216     CMP:    Recent Labs     09/16/21  0737 09/17/21  0702 09/18/21  0705    134* 138   K 4.9 4.8 5.0   CL 95* 94* 96*   CO2 27 22* 28   BUN 44* 64* 32*   CREATININE 5.7* 7.1* 4.9*   GLUCOSE 107 92 108   CALCIUM 8.3* 8.1* 8.3*   LABGLOM 11* 9* 14*     Troponin: No results for input(s): TROPONINI in the last 72 hours. BNP: No results for input(s): BNP in the last 72 hours. INR: No results for input(s): INR in the last 72 hours. Lipids: No results for input(s): CHOL, LDLDIRECT, TRIG, HDL, AMYLASE, LIPASE in the last 72 hours. Liver:   Recent Labs     09/18/21  0705   AST 40   ALT 17   ALKPHOS 77   PROT 6.1   LABALBU 3.1*   BILITOT 0.4     Iron:  No results for input(s): IRONS, FERRITIN in the last 72 hours.     Invalid input(s): LABIRONS  CT HEAD WO CONTRAST   Final Result      1. Stable CT scan of the brain, no interval change since previous study dated 15th of June 2021. **This report has been created using voice recognition software. It may contain minor errors which are inherent in voice recognition technology. **      Final report electronically signed by DR Nickolas Hernandez on 9/15/2021 4:26 PM      CT CERVICAL SPINE WO CONTRAST   Final Result    No evidence of acute osseous injury of the cervical spine. **This report has been created using voice recognition software. It may contain minor errors which are inherent in voice recognition technology. **      Final report electronically signed by Dr. Rhett Arnold MD on 9/15/2021 4:29 PM      XR CHEST PORTABLE   Final Result   Cardiomegaly with vascular congestion and interstitial edema and effusions differential would include congestive heart failure or fluid overload. **This report has been created using voice recognition software. It may contain minor errors which are inherent in voice recognition technology. **      Final report electronically signed by Dr. Devin Landa on 9/13/2021 2:22 PM            Objective:   Vitals: BP (!) 108/49   Pulse 68   Temp 98.5 °F (36.9 °C) (Oral)   Resp 20   Ht 5' 6\" (1.676 m)   Wt 134 lb 9.6 oz (61.1 kg)   SpO2 92%   BMI 21.73 kg/m²    Wt Readings from Last 3 Encounters:   09/18/21 134 lb 9.6 oz (61.1 kg)   07/14/21 145 lb 8.1 oz (66 kg)   06/17/21 161 lb (73 kg)      24HR INTAKE/OUTPUT:      Intake/Output Summary (Last 24 hours) at 9/18/2021 1439  Last data filed at 9/18/2021 1028  Gross per 24 hour   Intake 1458.1 ml   Output 1600 ml   Net -141.9 ml       Constitutional: Well-developed elderly gentleman alert, awake, no apparent distress   Skin:normal with no rash or lesions. HEENT:Pupils are reactive . Throat is clear . Oral mucosa is moist   Neck:supple with no thyromegaly or carotid bruit  Cardiovascular:  S1, S2 without murmur or rubs   Respiratory:  Clear to ausculation without wheezes, rhonchi or rales. Abdomen: +bs, soft, no tenderness to palpation and no palpable mass. No abdominal bruit   Ext: No LE edema  Musculoskeletal:Intact  Neuro:Alert and awake with no focal deficit. Speech is normal      Electronically signed by Liliana Cox MD on 9/18/2021 at 2:39 PM  **This report has been created using voice recognition software. It maycontain minor  errors which are inherent in voice recognition technology. **

## 2021-09-18 NOTE — PROGRESS NOTES
azithromycin  500 mg IntraVENous Q24H    calcitRIOL  1.5 mcg Oral Once per day on Mon Wed Fri    cinacalcet  60 mg Oral Once per day on Mon Wed Fri    docusate sodium  100 mg Oral BID    ferrous sulfate  325 mg Oral Daily with breakfast    gabapentin  100 mg Oral TID    hydrALAZINE  25 mg Oral BID    metoprolol  100 mg Oral BID    pantoprazole  40 mg Oral QAM AC    vitamin C  500 mg Oral Daily    polyethylene glycol  17 g Oral Daily    sodium chloride flush  5-40 mL IntraVENous 2 times per day    dexamethasone  6 mg Oral Daily    clopidogrel  75 mg Oral Daily     PRN Meds: sodium chloride flush, sodium chloride, ondansetron **OR** ondansetron, polyethylene glycol, acetaminophen **OR** acetaminophen, guaiFENesin-dextromethorphan, albuterol sulfate HFA      Intake/Output Summary (Last 24 hours) at 9/18/2021 1344  Last data filed at 9/18/2021 1028  Gross per 24 hour   Intake 1458.1 ml   Output 1600 ml   Net -141.9 ml       Diet:  ADULT DIET; Regular; Low Potassium (Less than 3000 mg/day); Low Phosphorus (Less than 1000 mg); 1200 ml    Exam:  BP (!) 108/49   Pulse 68   Temp 98.5 °F (36.9 °C) (Oral)   Resp 20   Ht 5' 6\" (1.676 m)   Wt 134 lb 9.6 oz (61.1 kg)   SpO2 92%   BMI 21.73 kg/m²     General appearance: No apparent distress, appears stated age and cooperative. Respiratory:  Normal respiratory effort. Clear to auscultation, bilaterally without Rales/Wheezes/Rhonchi. Cardiovascular: Regular rate and rhythm with normal S1/S2 without murmurs, rubs or gallops. Abdomen: Soft, non-tender, non-distended with normal bowel sounds.   Extremities: no pedal edema  Skin:  no rashes    Labs:   Recent Labs     09/16/21  0737 09/17/21  0702 09/18/21  0844   WBC 6.4 5.7 5.1   HGB 8.7* 8.7* 9.5*   HCT 25.5* 27.7* 28.2*    170 216     Recent Labs     09/16/21  0737 09/17/21  0702 09/18/21  0705    134* 138   K 4.9 4.8 5.0   CL 95* 94* 96*   CO2 27 22* 28   BUN 44* 64* 32*   CREATININE 5.7* 7.1* 4.9* CALCIUM 8.3* 8.1* 8.3*     Recent Labs     09/16/21  0737 09/17/21  0702 09/18/21  0705   AST 39 43* 40   ALT 12 14 17   BILITOT 0.4 0.4 0.4   ALKPHOS 78 76 77       Microbiology:      Urinalysis:      Lab Results   Component Value Date    NITRU NEGATIVE 06/22/2019    WBCUA 50-75 06/22/2019    BACTERIA NONE 06/22/2019    RBCUA 25-50 06/22/2019    BLOODU LARGE 06/22/2019    SPECGRAV 1.013 05/03/2016    GLUCOSEU NEGATIVE 06/22/2019       Radiology:  XR CHEST PORTABLE    Result Date: 9/13/2021  PROCEDURE: XR CHEST PORTABLE CLINICAL INFORMATION: SOB . COMPARISON: 7/12/2021 TECHNIQUE: Portable upright FINDINGS: Heart size is mildly enlarged. Mediastinum is not widened. There is pulmonary vascular congestion. Interstitial edema. There is suggestion of small effusions. AICD over the left chest. EKG leads overlie the chest. Right upper extremity vascular stents are noted. Cardiomegaly with vascular congestion and interstitial edema and effusions differential would include congestive heart failure or fluid overload. **This report has been created using voice recognition software. It may contain minor errors which are inherent in voice recognition technology. ** Final report electronically signed by Dr. Tong Jasso on 9/13/2021 2:22 PM      DVT prophylaxis: [] Lovenox                                 [x] SCDs                                 [] SQ Heparin                                 [] Encourage ambulation           [] Already on Anticoagulation     Code Status: Limited    Tele:   [x] yes             [] no    Active Hospital Problems    Diagnosis Date Noted    Pneumonia due to COVID-19 virus [U07.1, J12.82] 09/13/2021       Electronically signed by Hermes Gallegos MD on 9/18/2021 at 1:44 PM

## 2021-09-18 NOTE — PROGRESS NOTES
Patient's daughter Tammy Recinos called to receive an update. Update given on patient, POC, expectations, and visiting hours. Tammy Recinos voiced understanding. All questions answered. 23-Oct-2018

## 2021-09-19 NOTE — PROGRESS NOTES
Hospitalist Progress Note      Name:  Henny Hayes /Age/Sex: 11/10/1930  (22 y.o. male)   MRN & CSN:  [id number] & [id number] Admission Date/Time: 2021 12:59 PM   Location:  [facility] PCP: Segun Valencia MD         Hospital Day: 7    Assessment and Plan:   Henny Hayes is a 80 y.o.  male  who presents with ESRD on dialysis admitted with COVID infection and respiratory failure       COVID-19 pneumonia      Continue with Dexamethasone 6mg daily. Repeat COVID testing per family request was again positive  Monitor inflammatory markers  On 2 L nasal cannula     Possible superimopsed baterial pneumonia AP    Completed treatment with Rocephin and Azithromycin     Acute respiratory failure with hypoxia    Continue to wean O2 as able  Increase mobility, out of bed to chair    End-stage renal disease - HD per nephro via AVF    S/p unwitnessed fall - No apparent injuries, CT brain and cervical spine. Dark tarry stools - No further episodes, hemoglobin stable. Continue to monitor    Elevated troponin -  Likely from CKD     Patient not having chest pains, repeat troponins were improved. Not cardiac in nature. Heparin discontinued. CAD - S/p recent stents     Patient had 2 stents placed at [facility] 2 weeks ago. We will continue with aspirin and Plavix. Continue with metoprolol and pravastatin. Advance care planning- Limited CODE STATUS no CPR, no resuscitation and no intubation. VTE prophylaxis: SCD - start heparin SQ    Dispo -Patient is medically stable for discharge but awaiting placement and coordination of transfer. .    Diet ADULT DIET;  Regular; Low Potassium (Less than 3000 mg/day); 1200 ml   DVT Prophylaxis [] Lovenox, []  Heparin, [] SCDs, []No VTE prophylaxis, patient ambulating   GI Prophylaxis [] PPI, [] H2 Blocker, [] No GI prophylaxis, patient is receiving diet/Tube Feeds   Code Status Limited   Disposition Patient requires continued admission due to Williams, resp failure   MDM [] Low, [x] Moderate,[]  High     History of Present Illness: Subjective     Patient Seen & Examined at the bedside      He is resting in bed with no distress while on NC - very few words but able to communicate properly - was on the phone with family   Denies any SOB or chest pain      Ten point ROS reviewed negative, unless as noted above    Objective: Intake/Output Summary (Last 24 hours) at 9/19/2021 1223  Last data filed at 9/19/2021 0950  Gross per 24 hour   Intake 470 ml   Output --   Net 470 ml      Vitals:   Vitals:    09/19/21 1113   BP: 111/63   Pulse: 65   Resp: 20   Temp: 97.6 °F (36.4 °C)   SpO2: 91%     Physical Exam:    GEN Awake male, resting in bed in no apparent distress. Appears given age. RESP Clear to auscultation, no wheezes, rales or rhonchi. CARDIO/VASC -S1/S2 auscultated. Regular rate without appreciable murmurs, rubs, or gallops. Peripheral pulses equal bilaterally and palpable. No peripheral edema. GI Abdomen is soft without significant tenderness, masses, or guarding. Bowel sounds are normoactive. Rectal exam deferred.  Philippe catheter is not present. MSK No gross joint deformities. Spontaneous movement of all extremities  SKIN Normal coloration, warm, dry.     Medications:   Medications:    epoetin traci-epbx  6,000 Units SubCUTAneous Once per day on Mon Wed Fri    calcitRIOL  1.5 mcg Oral Once per day on Mon Wed Fri    cinacalcet  60 mg Oral Once per day on Mon Wed Fri    docusate sodium  100 mg Oral BID    ferrous sulfate  325 mg Oral Daily with breakfast    gabapentin  100 mg Oral TID    metoprolol  100 mg Oral BID    pantoprazole  40 mg Oral QAM AC    vitamin C  500 mg Oral Daily    polyethylene glycol  17 g Oral Daily    sodium chloride flush  5-40 mL IntraVENous 2 times per day    dexamethasone  6 mg Oral Daily    clopidogrel  75 mg Oral Daily      Infusions:    sodium chloride       PRN Meds: sodium chloride flush, 5-40 mL, PRN  sodium

## 2021-09-19 NOTE — PROGRESS NOTES
Nephrology Progress Note    Patient - Audrey Shah   MRN -  [id number]   Acct # - [de-identified]      - 11/10/1930    80 y.o. Admit Date: 2021  Hospital Day: 6  Location: 8A--A  Date of evaluation -  2021    Subjective:   CC: shortness of breath  Denies shortness of breath   Lethargic  BP Range: Systolic (60KQN), QOR:360 , Min:107 , QLP:077      Diastolic (60IGW), YTD:93, Min:46, Max:71    Objective:   VITALS:  BP (!) 144/56   Pulse 78   Temp 97.8 °F (36.6 °C) (Oral)   Resp 20   Ht 5' 6\" (1.676 m)   Wt 126 lb 8.7 oz (57.4 kg)   SpO2 94%   BMI 20.42 kg/m²    Patient Vitals for the past 24 hrs:   BP Temp Temp src Pulse Resp SpO2 Weight   21 0950 (!) 144/56 97.8 °F (36.6 °C) Oral 78 20 94 % --   21 0410 (!) 135/54 98.5 °F (36.9 °C) Oral 63 -- 94 % 126 lb 8.7 oz (57.4 kg)   21 2346 131/71 98.4 °F (36.9 °C) Oral (!) 48 18 97 % --   21 2000 137/67 98.2 °F (36.8 °C) Oral 78 20 99 % --   21 1600 -- -- -- 58 -- -- --   21 1534 (!) 107/46 98.3 °F (36.8 °C) Oral 67 20 96 % --   21 1359 -- -- -- 77 -- -- --   21 1114 (!) 108/49 98.5 °F (36.9 °C) Oral 68 20 92 % --     1 L/min    Intake/Output Summary (Last 24 hours) at 2021 1035  Last data filed at 2021 0410  Gross per 24 hour   Intake 230 ml   Output --   Net 230 ml       Admission weight: 163 lb (73.9 kg)  Patient Vitals for the past 96 hrs (Last 3 readings):   Weight   21 0410 126 lb 8.7 oz (57.4 kg)   21 0630 134 lb 9.6 oz (61.1 kg)   09/15/21 1200 158 lb 15.2 oz (72.1 kg)     Body mass index is 20.42 kg/m². EXAM:  CONSTITUTIONAL:  No acute distress. HEENT:  Head is normocephalic, Extraocular movement intact. Neck is supple. CARDIOVASCULAR:  S1, S2  regular rate and rhythm. RESPIRATORY: Clear to ausculation bilaterally. Equal breath sounds. No wheezes.  No shortness of breath noted at rest.  ABDOMEN: soft, non tender  NEUROLOGICAL: Patient is alert and oriented to person, place. Recent and remote memory partially intact. SKIN: no rash, No significant bruises on exposed surfaces  MUSCULOSKELETAL: Movement is coordinated. Moves all extremities   EXTREMITIES: Distal lower extremity temp is warm, No bilateral symmetric lower extremity edema. Upper extremity AV Fistula   PSYCHIATRIC: mood and affect appropriate. Medications:   Med reviewed  Scheduled Meds:   epoetin traci-epbx  6,000 Units SubCUTAneous Once per day on Mon Wed Fri    calcitRIOL  1.5 mcg Oral Once per day on Mon Wed Fri    cinacalcet  60 mg Oral Once per day on Mon Wed Fri    docusate sodium  100 mg Oral BID    ferrous sulfate  325 mg Oral Daily with breakfast    gabapentin  100 mg Oral TID    hydrALAZINE  25 mg Oral BID    metoprolol  100 mg Oral BID    pantoprazole  40 mg Oral QAM AC    vitamin C  500 mg Oral Daily    polyethylene glycol  17 g Oral Daily    sodium chloride flush  5-40 mL IntraVENous 2 times per day    dexamethasone  6 mg Oral Daily    clopidogrel  75 mg Oral Daily     Continuous Infusions:   sodium chloride       PRN Meds sodium chloride flush, sodium chloride, ondansetron **OR** ondansetron, polyethylene glycol, acetaminophen **OR** acetaminophen, guaiFENesin-dextromethorphan, albuterol sulfate HFA   Labs:   Labs reviewed  Recent Labs     09/17/21  0702 09/18/21  0844 09/19/21  0706   WBC 5.7 5.1 5.1   RBC 2.75* 2.97* 2.67*   HGB 8.7* 9.5* 8.5*   HCT 27.7* 28.2* 25.2*   .7* 94.9* 94.4*   MCH 31.6 32.0 31.8    216 207       Recent Labs     09/17/21  0702 09/17/21  0702 09/18/21  0705 09/18/21  0705 09/19/21  0706   *  --  138  --  136   K 4.8  --  5.0  --  4.9   CL 94*  --  96*  --  94*   CO2 22*  --  28  --  29   BUN 64*  --  32*  --  46*   CREATININE 7.1*  --  4.9*  --  6.9*   LABGLOM 9*   < > 14*   < > 9*   GLUCOSE 92  --  108  --  98   MG  --   --  2.2  --   --    CALCIUM 8.1*  --  8.3*  --  8.2*    < > = values in this interval not displayed. ASSESSMENT:  1. 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 AV fistula. Plan for Hemodialysis in AM  2. COVID 19 pneumonia  3. Essential Hypertension with nephrosclerosis, BP running borderline low. Stop Hydralazine 25 mg    4. Abdominal aortic aneurysm 3.7 cm   5. Chronic combined systolic and diastolic HF with EF 00%, Pulm artery HTN  6. Coronary artery disease Recent PCI at [facility]  7. Anemia of chronic disease   8. Hypophosphatemia, resolved. Stop Low Phos diet  9. Secondary hyperparathyroidism of renal origin on rocaltrol and sensipar    Active Problems:    Pneumonia due to COVID-19 virus  Resolved Problems:    * No resolved hospital problems.  Mellisa Miller, APRN - CNP 10:35 AM 9/19/2021

## 2021-09-20 NOTE — PROGRESS NOTES
Renal Progress Note    Assessment and Plan:   1. End-stage kidney disease on hemodialysis  2. Anemia of chronic disease  3. Primary hypertension  4. SARS-CoV-2 infection  5. COPD  6. Deconditioning    PLAN:  1. I discussed my thoughts with the patient. 2. He understood well  3. He had no questions  4. Labs reviewed  5. Medications reviewed  6. No changes  7. We will follow    Patient Active Problem List:     CAD (coronary artery disease)     COPD (chronic obstructive pulmonary disease) ([address]     Chronic renal insufficiency     Patient overweight     Arthritis-  low back and neck .      CKD (chronic kidney disease) stage 3, GFR 30-59 ml/min (HCC)     Dyspnea and respiratory abnormalities     ED (erectile dysfunction)     Degenerative joint disease of knee, left     Gout of big toe     Anemia, chronic disease     CKD (chronic kidney disease) stage 4, GFR 15-29 ml/min (HCC)     Essential hypertension     Monoclonal gammopathy     Leukopenia     Thrombocytopenia (HCC)     Chronic kidney disease (CKD), stage V (HCC)     Metabolic acidosis     Hyperkalemia     Iron deficiency anemia     ROSAURA (acute kidney injury) ([address]     Plasma cell dyscrasia     Idiopathic hypotension     Hypertensive urgency     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)     Systolic congestive heart failure (HCC)     Other fluid overload (CODE)     Hyperphosphatemia     Acute diastolic congestive heart failure (HCC)     Acute on chronic diastolic congestive heart failure (HCC)     Pulmonary hypertension (HCC)     Anemia due to chronic kidney disease, on chronic dialysis (HCC)     Volume overload     Secondary hyperparathyroidism of renal origin ([address]     Chest pain     Acute pulmonary edema (HCC)     Accelerated hypertension     Gastritis and duodenitis     Fall from slip, trip, or stumble, initial encounter     Closed fracture of left ankle     ESRD on hemodialysis ([address]     Hypertensive emergency     SOB (shortness of breath)     Chronic combined systolic and diastolic CHF (congestive heart failure) (HCC)     Pneumonia due to COVID-19 virus      Subjective:   Admit Date: 9/13/2021    Interval History:   Seen for end stage kidney disease on hemodialysis  Awake and alert this morning  No complaint  Eating breakfast  Updated by the staff  No issues of concern  Blood pressure is okay      Medications:   Scheduled Meds:   heparin (porcine)  5,000 Units SubCUTAneous 3 times per day    epoetin traci-epbx  6,000 Units SubCUTAneous Once per day on Mon Wed Fri    calcitRIOL  1.5 mcg Oral Once per day on Mon Wed Fri    cinacalcet  60 mg Oral Once per day on Mon Wed Fri    docusate sodium  100 mg Oral BID    ferrous sulfate  325 mg Oral Daily with breakfast    gabapentin  100 mg Oral TID    metoprolol  100 mg Oral BID    pantoprazole  40 mg Oral QAM AC    vitamin C  500 mg Oral Daily    polyethylene glycol  17 g Oral Daily    sodium chloride flush  5-40 mL IntraVENous 2 times per day    dexamethasone  6 mg Oral Daily    clopidogrel  75 mg Oral Daily     Continuous Infusions:   sodium chloride         CBC:   Recent Labs     09/18/21  0844 09/19/21  0706   WBC 5.1 5.1   HGB 9.5* 8.5*    207     CMP:    Recent Labs     09/18/21  0705 09/19/21  0706 09/20/21  0915    136 135   K 5.0 4.9 4.3   CL 96* 94* 93*   CO2 28 29 23   BUN 32* 46* 62*   CREATININE 4.9* 6.9* 8.7*   GLUCOSE 108 98 148*   CALCIUM 8.3* 8.2* 8.1*   LABGLOM 14* 9* 7*     Troponin: No results for input(s): TROPONINI in the last 72 hours. BNP: No results for input(s): BNP in the last 72 hours. INR: No results for input(s): INR in the last 72 hours. Lipids: No results for input(s): CHOL, LDLDIRECT, TRIG, HDL, AMYLASE, LIPASE in the last 72 hours. Liver:   Recent Labs     09/19/21  0706   AST 33   ALT 19   ALKPHOS 71   PROT 6.0*   LABALBU 3.1*   BILITOT 0.4     Iron:  No results for input(s): IRONS, FERRITIN in the last 72 hours.     Invalid input(s): LABIRONS  CT HEAD WO CONTRAST   Final Result      1. Stable CT scan of the brain, no interval change since previous study dated 15th of June 2021. **This report has been created using voice recognition software. It may contain minor errors which are inherent in voice recognition technology. **      Final report electronically signed by DR Stephania Vazquez on 9/15/2021 4:26 PM      CT CERVICAL SPINE WO CONTRAST   Final Result    No evidence of acute osseous injury of the cervical spine. **This report has been created using voice recognition software. It may contain minor errors which are inherent in voice recognition technology. **      Final report electronically signed by Dr. Westley Rodriguez MD on 9/15/2021 4:29 PM      XR CHEST PORTABLE   Final Result   Cardiomegaly with vascular congestion and interstitial edema and effusions differential would include congestive heart failure or fluid overload. **This report has been created using voice recognition software. It may contain minor errors which are inherent in voice recognition technology. **      Final report electronically signed by Dr. Kathie Rodriguez on 9/13/2021 2:22 PM            Objective:   Vitals: /76   Pulse 65   Temp 97.8 °F (36.6 °C) (Oral)   Resp 18   Ht 5' 6\" (1.676 m)   Wt 126 lb 8.7 oz (57.4 kg)   SpO2 95%   BMI 20.42 kg/m²    Wt Readings from Last 3 Encounters:   09/19/21 126 lb 8.7 oz (57.4 kg)   07/14/21 145 lb 8.1 oz (66 kg)   06/17/21 161 lb (73 kg)      24HR INTAKE/OUTPUT:      Intake/Output Summary (Last 24 hours) at 9/20/2021 1849  Last data filed at 9/20/2021 1650  Gross per 24 hour   Intake 400 ml   Output 1400 ml   Net -1000 ml       Constitutional: Well-developed elderly gentleman alert, awake, no apparent distress   Skin:normal with no rash or lesions. HEENT:Pupils are reactive . Throat is clear . Oral mucosa is moist   Neck:supple with no thyromegaly or carotid bruit  Cardiovascular:  S1, S2 without murmur or rubs Respiratory:  Clear to ausculation without wheezes, rhonchi or rales. Abdomen: +bs, soft, no tenderness to palpation and no palpable mass. No abdominal bruit   Ext: No LE edema  Musculoskeletal:Intact  Neuro:Alert and awake with no focal deficit. Speech is normal      Electronically signed by Rachel Han MD on 9/20/2021 at 6:49 PM  **This report has been created using voice recognition software. It maycontain minor  errors which are inherent in voice recognition technology. **

## 2021-09-20 NOTE — CARE COORDINATION
9/20/21, 1:49 PM EDT    DISCHARGE ON GOING [facility] day: 7  Location: 8A-20/020-A Reason for admit: Acute pulmonary edema ([facility] [J81.0]  Hypoxia [R09.02]  Elevated troponin [R77.8]  Pneumonia due to COVID-19 virus [U07.1, J12.82]  COVID-19 [U07.1]   Procedure: no  Barriers to Discharge: Hospitalist and nephro following. Decadron oral. On 1L, sats 94%. IVF. PT/OT.  HD.  PCP: Fany Garcia MD  Readmission Risk Score: 48%  Patient Goals/Plan/Treatment Preferences: PAWAN working on [facility] placement - See SW notes

## 2021-09-20 NOTE — PROGRESS NOTES
Patient filled out medical release form to obtain cardiac history records. Faxed to [facility] [phone].

## 2021-09-20 NOTE — PROGRESS NOTES
Hospitalist Progress Note      Name:  Harriett Mcduffie /Age/Sex: 11/10/1930  (34 y.o. male)   MRN & CSN:  [id number] & [id number] Admission Date/Time: 2021 12:59 PM   Location:  [facility] PCP: Rosa Hopper MD         Hospital Day: 8    Assessment and Plan:   Harriett Mcduffie is a 80 y.o.  male  who presents with ESRD on dialysis admitted with COVID infection and respiratory failure       COVID-19 pneumonia      Continue with Dexamethasone 6mg daily. Repeat COVID testing per family request was again positive  Monitor inflammatory markers  On 2 L nasal cannula     Possible superimopsed baterial pneumonia AP    Completed treatment with Rocephin and Azithromycin     Acute respiratory failure with hypoxia    Continue to wean O2 as able  Increase mobility, out of bed to chair    End-stage renal disease - HD per nephro via AVF    S/p unwitnessed fall - No apparent injuries, CT brain and cervical spine. Dark tarry stools - No further episodes, hemoglobin stable. Continue to monitor    Elevated troponin -  Likely from CKD     Patient not having chest pains, repeat troponins were improved. Not cardiac in nature. Heparin discontinued. CAD - S/p recent stents     Patient had 2 stents (LAD and Ramus) at [facility] on 21  We will continue with aspirin, Plavix, metoprolol and pravastatin. Advance care planning- Limited CODE STATUS no CPR, no resuscitation and no intubation. VTE prophylaxis: SCD - start heparin SQ    Dispo -Patient is medically stable for discharge but awaiting placement and coordination of transfer. .    Diet ADULT DIET;  Regular; Low Potassium (Less than 3000 mg/day); 1200 ml   DVT Prophylaxis [] Lovenox, []  Heparin, [] SCDs, []No VTE prophylaxis, patient ambulating   GI Prophylaxis [] PPI, [] H2 Blocker, [] No GI prophylaxis, patient is receiving diet/Tube Feeds   Code Status Limited   Disposition Patient requires continued admission due to COVID, resp failure MDM [] Low, [x] Moderate,[]  High     History of Present Illness: Subjective     Patient Seen & Examined at the bedside      He is resting in bed with no distress while on NC   Denies any SOB or chest pain      Ten point ROS reviewed negative, unless as noted above    Objective: Intake/Output Summary (Last 24 hours) at 9/20/2021 1428  Last data filed at 9/19/2021 1832  Gross per 24 hour   Intake 240 ml   Output --   Net 240 ml      Vitals:   Vitals:    09/20/21 1130   BP: (!) 107/47   Pulse: 59   Resp: 18   Temp: 98 °F (36.7 °C)   SpO2: 94%     Physical Exam:    GEN Awake male, resting in bed in no apparent distress. Appears given age. RESP Clear to auscultation, no wheezes, rales or rhonchi. CARDIO/VASC -S1/S2 auscultated. Regular rate without appreciable murmurs, rubs, or gallops. Peripheral pulses equal bilaterally and palpable. No peripheral edema. GI Abdomen is soft without significant tenderness, masses, or guarding. Bowel sounds are normoactive. Rectal exam deferred.  Philippe catheter is not present. MSK No gross joint deformities. Spontaneous movement of all extremities  SKIN Normal coloration, warm, dry.     Medications:   Medications:    heparin (porcine)  5,000 Units SubCUTAneous 3 times per day    epoetin traci-epbx  6,000 Units SubCUTAneous Once per day on Mon Wed Fri    calcitRIOL  1.5 mcg Oral Once per day on Mon Wed Fri    cinacalcet  60 mg Oral Once per day on Mon Wed Fri    docusate sodium  100 mg Oral BID    ferrous sulfate  325 mg Oral Daily with breakfast    gabapentin  100 mg Oral TID    metoprolol  100 mg Oral BID    pantoprazole  40 mg Oral QAM AC    vitamin C  500 mg Oral Daily    polyethylene glycol  17 g Oral Daily    sodium chloride flush  5-40 mL IntraVENous 2 times per day    dexamethasone  6 mg Oral Daily    clopidogrel  75 mg Oral Daily      Infusions:    sodium chloride       PRN Meds: sodium chloride flush, 5-40 mL, PRN  sodium chloride, 25 mL, PRN  ondansetron, 4 mg, Q8H PRN   Or  ondansetron, 4 mg, Q6H PRN  polyethylene glycol, 17 g, Daily PRN  acetaminophen, 650 mg, Q6H PRN   Or  acetaminophen, 650 mg, Q6H PRN  guaiFENesin-dextromethorphan, 5 mL, Q4H PRN  albuterol sulfate HFA, 2 puff, Q6H PRN          Electronically signed by Jarek Nichols MD on 9/20/2021 at 2:28 PM

## 2021-09-20 NOTE — PROGRESS NOTES
Patient's daughter, Heather Cleveland, provided with update at this time. Questions/concerns addressed.

## 2021-09-20 NOTE — PROGRESS NOTES
[address] [address]  OCCUPATIONAL THERAPY MISSED TREATMENT NOTE  STR MED SURG 8A  8A-020-A      Date: 2021  Patient Name: Darrel Spine        CSN: [id number]   : 11/10/1930  (80 y.o.)  Gender: male   Referring Practitioner: Doris Chirinos MD  Diagnosis: Acute Pulmonary Edema         REASON FOR MISSED TREATMENT: Patient Off Floor for Dialysis. Will re-attempt next date.

## 2021-09-20 NOTE — PROGRESS NOTES
Daughter Sarah Tian updated on pt today. No questions from daughter at this time. Happy to hear good update.

## 2021-09-20 NOTE — PROGRESS NOTES
[address] [address]  INPATIENT PHYSICAL THERAPY  DAILY NOTE  STRZ MED SURG 8A - 8A-20/020-A    Time In: 0223  Time Out: 1098  Timed Code Treatment Minutes: 27 Minutes  Minutes: 30          Date: 2021  Patient Name: Lisa Sood,  Gender:  male        MRN: [id number]  : 11/10/1930  (80 y.o.)     Referring Practitioner: Dr. Emma Tsai  Diagnosis: acute pulmonary edema  Additional Pertinent Hx: admit with above diagnosis, ESRD on HD, COVID-19 pneumonia, HTN, COPD, anemia     Prior Level of Function:  Lives With: Daughter  Type of Home: House  Home Layout: One level  Home Access: Level entry  Home Equipment: Rolling walker   Bathroom Shower/Tub: Walk-in shower  Bathroom Toilet: Standard    ADL Assistance: Independent  Homemaking Assistance: Needs assistance  Ambulation Assistance: Independent  Transfer Assistance: Independent  Additional Comments: Pt using RW PTA. Daughter assists at home. Unsure of accuracy of above pt is slighly confused this date. Restrictions/Precautions:  Restrictions/Precautions: Fall Risk, Isolation  Position Activity Restriction  Other position/activity restrictions: droplet +     SUBJECTIVE: RN approved session. Pt in  Bed upon arrival and nods head in agreeance to therapy. Pt did not make any attempts to verbalize, moaning throughout session, flat, did not respond to much at all however if asked direct simple questions pt would answer with head nods/shakes. PAIN: denies 0/10 however moaning and groaning throughout entire session. Pt very shaky when up    Vitals: Oxygen: initially 94% supine in bed, after this reading unable to obtain any further- pts fingers were cold.      OBJECTIVE:  Bed Mobility:  Rolling to Left: Stand By Assistance, with head of bed raised, with rail, with verbal cues , with increased time for completion   Supine to Sit: Stand By Assistance, [employer], with head of bed raised, with rail, with verbal cues , with increased time for completion  Sit to Supine: Minimal Assistance, with head of bed flat, with rail, with verbal cues , with increased time for completion   Scooting: Stand By Assistance, with verbal cues , with increased time for completion    Transfers:  Sit to Stand: Contact Guard Assistance, Minimal Assistance, with increased time for completion, cues for hand placement, with verbal cues  Stand to [address] [id number], cues for hand placement, with verbal cues    Ambulation:  Not Tested due to unsteadiness while standing    Balance:  Static Standing Balance: Contact Guard Assistance, Minimal Assistance, with verbal cues   Pt stood with RW support ~3mins for PTA to complete pericare and depends change. Pt very shaky and unsteady pt bobbing up and down, swaying forward and retro, unsteady, asked pt if he felt okay/dizzy/SOB/if he needed to sit down and pt did not respond to therapist and cont stanidng. Once task was finished PTA cued pt to sit down and pt was compliant. Exercise:  Patient was guided in 1 set(s) 10 reps of exercise to both lower extremities. Ankle pumps, Heelslides, Hip abduction/adduction, Straight leg raises and AA for nayla LEs. constant cues to keep pt moving. Exercises were completed for increased independence with functional mobility. Functional Outcome Measures: Completed  -PAC Inpatient Mobility Raw Score : 15  -PAC Inpatient T-Scale Score : 39.45    ASSESSMENT:  Assessment: Patient progressing toward established goals. Activity Tolerance:  Patient tolerance of  treatment: fair. Pt demos unsteadiness on feet, pt will require cont PT and 24 hour care. Equipment Recommendations: Other: cont to assess needs  Discharge Recommendations:  Continue to assess pending progress, Subacute/Skilled Nursing Facility, 24 hour supervision or assist, Patient would benefit from continued therapy after discharge    Plan: Times per week: 5X GM  Times per day: Daily  Specific instructions for Next Treatment: therex and mobility    Patient Education  Patient Education: Plan of Care, Bed Mobility, Transfers, Verbal Exercise Instruction    Goals:  Patient goals : not stated  Short term goals  Time Frame for Short term goals: by discharge  Short term goal 1: bed mobility with S to get in/out of bed  Short term goal 2: transfer with S to get in/out of chairs  Short term goal 3: amb >50'x1 with RW and SBA, maintaining O2 sats >90% on </=4L O2, to walk safely in room  Long term goals  Time Frame for Long term goals : no LTGs set secondary to short ELOS    Following session, patient left in safe position with all fall risk precautions in place.

## 2021-09-20 NOTE — CARE COORDINATION
9/20/21, 10:00 AM EDT    DISCHARGE PLANNING EVALUATION    SW had a lengthy conversation with daughter Tomás Salazar regarding transportation to/from dialysis and that at this time the community has very limited resources at this time and there is currently no available transport that would be covered. Tomás Salazar stated that they are now showing some illness and are unsure if they will get tested or not. Tomás Salazar showed concern for him to return to her home and stated that she doesn't feel that he can return due to him being positive at this time. SW discussed that appropriate precautions could be taken. SW discussed who was transporting him prior to his admission and Tomás Salazar was doing so to/from dialysis. Tomás Salazar stated that this would require a lot more that she is unsure if she can do at this time. PAWAN explained that [facility] is full at this time and there are no open beds and Laura Velazco is able to review Patient but not till transportation is identified. SW informed Tomás Salazar that transportation may not be something that SW is able to solve as there are very few options and of those options he would have to pay out of pocket for. Tomás Salazar is unsure if they are able to transport Patient to/from dialysis due to the risk to them and also reliable transportation. Tomás Salazar will speak with her spouse and get back with SW.     PAWAN spoke with [facility] and they do have openings and it would be $356 a week. PAWAN updated Tomás Salazar and she stated that they would not be able to afford that and asked if [facility] would be paying that and PAWAN informed her that would not be something the hospital would be able to pay. PAWAN spoke with Catherine Wisdom at [facility] and Patient would need to be 10 days post diagnosis, fever free with no fever reducing medications for 24 hours and not showing symptoms. Patient was positive on 9/13 and would be eligible for ECF discharge till 9/23.      PAWAN spoke with Janna Arevalo at [employer] and Patient will need to be 21 days post diagnosis or a negative PCR test only prior to returning to [facility]. Gladys Carbajal is also not aware of any available transports at this time. PAWAN left message with Taylor Moreira from [facility] regarding transport. PAWAN spoke with Rosa M Rivera from [address] [place] [address] Ave does have bed but the family would have to commit to transport to/from dialysis and he would need to be medically clear before discharge (need to be 10 days post diagnosis, fever free for 24 hours without medications and no symptoms). [facility] will hold the referral at this time.

## 2021-09-21 NOTE — CARE COORDINATION
9/21/21, 3:19 PM EDT    DISCHARGE PLANNING EVALUATION    Spoke with Jennifer Rosenbaum and [facility] can accept patient if family can transport or pay [employer] to transport. Spoke with Jose Gonzalez and she stated that they can not pay [employer] to transport. Unsure if patient can ride in a car for daughter to transport. Spoke with Jose Gonzalez and discussed going to [facility] and they setting up transport to dialysis in [facility] while he has to go [employer]. She stated that she would consider that and then patient being transferred back to [facility] after that period that he could return to [email or web address]. She stated that once he returns to [facility] and goes to [facility] that she could transport patient to dialysis. Jennifer Rosenbaum stated that [facility] will not have a bed until Thursday at the earliest.  She will make referral there and monitor for a bed.

## 2021-09-21 NOTE — PROGRESS NOTES
Patient's sister Chantelle Pisano called and updated this morning on patient's condition. Chantelle Pisano states patient is planning on going to [facility]. No further questions or concerns at this time.

## 2021-09-21 NOTE — PROGRESS NOTES
failure) ([facility] [address]     Pneumonia due to COVID-19 virus      Subjective:   Admit Date: 9/13/2021    Interval History:   Seen for end-stage kidney disease on hemodialysis  Sleeping during rounds  Appears comfortable  Updated by the staff  No new issues  Blood pressure is good      Medications:   Scheduled Meds:   heparin (porcine)  5,000 Units SubCUTAneous 3 times per day    epoetin traci-epbx  6,000 Units SubCUTAneous Once per day on Mon Wed Fri    calcitRIOL  1.5 mcg Oral Once per day on Mon Wed Fri    cinacalcet  60 mg Oral Once per day on Mon Wed Fri    docusate sodium  100 mg Oral BID    ferrous sulfate  325 mg Oral Daily with breakfast    gabapentin  100 mg Oral TID    metoprolol  100 mg Oral BID    pantoprazole  40 mg Oral QAM AC    vitamin C  500 mg Oral Daily    polyethylene glycol  17 g Oral Daily    sodium chloride flush  5-40 mL IntraVENous 2 times per day    dexamethasone  6 mg Oral Daily    clopidogrel  75 mg Oral Daily     Continuous Infusions:   sodium chloride         CBC:   Recent Labs     09/19/21  0706 09/21/21  0710   WBC 5.1 5.9   HGB 8.5* 8.2*    248     CMP:    Recent Labs     09/19/21  0706 09/20/21  0915    135   K 4.9 4.3   CL 94* 93*   CO2 29 23   BUN 46* 62*   CREATININE 6.9* 8.7*   GLUCOSE 98 148*   CALCIUM 8.2* 8.1*   LABGLOM 9* 7*     Troponin: No results for input(s): TROPONINI in the last 72 hours. BNP: No results for input(s): BNP in the last 72 hours. INR: No results for input(s): INR in the last 72 hours. Lipids: No results for input(s): CHOL, LDLDIRECT, TRIG, HDL, AMYLASE, LIPASE in the last 72 hours. Liver:   Recent Labs     09/19/21  0706   AST 33   ALT 19   ALKPHOS 71   PROT 6.0*   LABALBU 3.1*   BILITOT 0.4     Iron:  No results for input(s): IRONS, FERRITIN in the last 72 hours. Invalid input(s): LABIRONS  CT HEAD WO CONTRAST   Final Result      1. Stable CT scan of the brain, no interval change since previous study dated 15th of June 2021. **This report has been created using voice recognition software. It may contain minor errors which are inherent in voice recognition technology. **      Final report electronically signed by DR Jd Farmer on 9/15/2021 4:26 PM      CT CERVICAL SPINE WO CONTRAST   Final Result    No evidence of acute osseous injury of the cervical spine. **This report has been created using voice recognition software. It may contain minor errors which are inherent in voice recognition technology. **      Final report electronically signed by Dr. Sharon Sawyer MD on 9/15/2021 4:29 PM      XR CHEST PORTABLE   Final Result   Cardiomegaly with vascular congestion and interstitial edema and effusions differential would include congestive heart failure or fluid overload. **This report has been created using voice recognition software. It may contain minor errors which are inherent in voice recognition technology. **      Final report electronically signed by Dr. Jovanni Choe on 9/13/2021 2:22 PM            Objective:   Vitals: BP (!) 141/41   Pulse 67   Temp 97.8 °F (36.6 °C) (Oral)   Resp 16   Ht 5' 6\" (1.676 m)   Wt 126 lb 8.7 oz (57.4 kg)   SpO2 97%   BMI 20.42 kg/m²    Wt Readings from Last 3 Encounters:   09/19/21 126 lb 8.7 oz (57.4 kg)   07/14/21 145 lb 8.1 oz (66 kg)   06/17/21 161 lb (73 kg)      24HR INTAKE/OUTPUT:      Intake/Output Summary (Last 24 hours) at 9/21/2021 1226  Last data filed at 9/21/2021 0326  Gross per 24 hour   Intake 650 ml   Output 1400 ml   Net -750 ml       Constitutional: Well-developed elderly gentleman comfortably asleep , no apparent distress   Skin:normal with no rash or lesions. HEENT: Head is normal. .Oral mucosa is moist  Neck:supple with no thyromegaly or carotid bruit  Cardiovascular:  S1, S2 without murmur or rubs   Respiratory:  Clear to ausculation without wheezes, rhonchi or rales in the anterior  Abdomen: Soft. Good bowel sounds.   Ext: No LE edema  Musculoskeletal:Intact  Neuro: Deferred      Electronically signed by Whitney Andrews MD on 9/21/2021 at 12:26 PM  **This report has been created using voice recognition software. It maycontain minor  errors which are inherent in voice recognition technology. **

## 2021-09-21 NOTE — PROGRESS NOTES
[address] [address]  INPATIENT PHYSICAL THERAPY  DAILY NOTE  [facility] MED SURG 8A - 8A-20/020-A   Time In: 8813  Time Out: 1244  Timed Code Treatment Minutes: 21 Minutes  Minutes: 20          Date: 2021  Patient Name: Lula Mosqueda,  Gender:  male        MRN: [id number]  : 11/10/1930  (80 y.o.)     Referring Practitioner: Dr. Vonda Patel  Diagnosis: acute pulmonary edema  Additional Pertinent Hx: admit with above diagnosis, ESRD on HD, COVID-19 pneumonia, HTN, COPD, anemia     Prior Level of Function:  Lives With: Daughter  Type of Home: House  Home Layout: One level  Home Access: Level entry  Home Equipment: Rolling walker   Bathroom Shower/Tub: Walk-in shower  Bathroom Toilet: Standard    ADL Assistance: Independent  Homemaking Assistance: Needs assistance  Ambulation Assistance: Independent  Transfer Assistance: Independent  Additional Comments: Pt using RW PTA. Daughter assists at home. Unsure of accuracy of above pt is slighly confused this date. Restrictions/Precautions:  Restrictions/Precautions: Fall Risk, Isolation  Position Activity Restriction  Other position/activity restrictions: droplet +    SUBJECTIVE: OK to see pt per nursing. Pt in bed when therapy arrived, agreeable to PT session at this time. Pt agreeable to sit in chair for lunch. After exercises, pt received phone call and then lunch came during session.      PAIN: denies    Vitals: Oxygen: 86-95% on room air    OBJECTIVE:  Bed Mobility:  Supine to Sit: Stand By Assistance, X 1, with head of bed raised, with rail, with verbal cues , with increased time for completion cues for proper technique of completion, good demo noted    Transfers:  Sit to Stand: Minimal Assistance, X 1, cues for hand placement, with verbal cues  Stand to Sit:Contact Guard Assistance, X 1, cues for hand placement, with verbal cues  Use of RW for support, cues for safety, fair demo, posterior lean in standing, requiring min A for improved COG over MARIS as well as cues for completion, fair demo  Ambulation:  Contact Guard Assistance, Minimal Assistance  Distance: 3 feet  Surface: Level Tile  Device:Rolling Walker  Gait Deviations: Forward Flexed Posture, Decreased Step Length Bilaterally, Decreased Gait Speed, Decreased Heel Strike Bilaterally, Unsteady Gait and Decreased Terminal Knee Extension, education on safety with use of RW for support, fair demo, no LOB noted, cue for proper sequencing for safety    Balance:  Static Sitting Balance:  Stand By Assistance, X 1, with cues for safety, with verbal cues   Static Standing Balance: Contact Guard Assistance, Minimal Assistance, X 1, with cues for safety, with verbal cues   Use of RW for support in standing, fair safety noted, cues for improved anterior lean to improve MARIS, fair demo  Exercise:  Patient was guided in 1 set(s) 15 reps of exercise to both lower extremities. Seated marches, Seated heel/toe raises, Long arc quads and Seated abduction/adduction. Exercises were completed for increased independence with functional mobility. VC and visual cues for proper technique of exercises for completion and cues for controlling the motion, fair-poor demo. Functional Outcome Measures: Completed  AM-PAC Inpatient Mobility Raw Score : 15  AM-PAC Inpatient T-Scale Score : 39.45    ASSESSMENT:  Assessment: Patient progressing toward established goals. Activity Tolerance:  Patient tolerance of  treatment: fair. Equipment Recommendations: Other: cont to assess needs  Discharge Recommendations: Continue to assess pending progress, Subacute/Skilled Nursing Facility, 24 hour supervision or assist, Patient would benefit from continued therapy after discharge    Plan: Times per week: 5X GM  Times per day: Daily  Specific instructions for Next Treatment: therex and mobility    Patient Education  Patient Education: Plan of Care, Home Exercise Program, Bed Mobility, Transfers, Gait, Use of Gait Marion, Verbal Exercise Instruction, education on proper breathing mechancis to improve O2 sats and energy conservation,  - Patient Verbalized Understanding, - Patient Requires Continued Education    Goals:  Patient goals : not stated  Short term goals  Time Frame for Short term goals: by discharge  Short term goal 1: bed mobility with S to get in/out of bed  Short term goal 2: transfer with S to get in/out of chairs  Short term goal 3: amb >50'x1 with RW and SBA, maintaining O2 sats >90% on </=4L O2, to walk safely in room  Long term goals  Time Frame for Long term goals : no LTGs set secondary to short ELOS    Following session, patient left in safe position with all fall risk precautions in place. Pt left chair with all needs and call light in reach following session, chair alarm on. Lunch in room.

## 2021-09-21 NOTE — PROGRESS NOTES
[address]  OCCUPATIONAL THERAPY MISSED TREATMENT NOTE  STR MED SURG 8A  8A-020-A      Date: 2021  Patient Name: Harriett Mcduffie        CSN: [id number]   : 11/10/1930  (80 y.o.)  Gender: male   Referring Practitioner: Sary Lainez MD  Diagnosis: Acute Pulmonary Edema         REASON FOR MISSED TREATMENT: Pt resting in bed upon arrival. Therapist spent extended time to awaken pt encouraging up to chair for breakfast tray. Unable to arouse pt to therapeutic level after sternal rub and he continues to keep eyes closed. Will re-attempt as time allows.

## 2021-09-21 NOTE — PROGRESS NOTES
Patient removed from [facility] per [facility] guidelines as approved by Dr. Bay Morris.

## 2021-09-21 NOTE — PROGRESS NOTES
Hospitalist Progress Note      Name:  Petrona Miles /Age/Sex: 11/10/1930  (80 y.o. male)   MRN & CSN:  [id number] & [id number] Admission Date/Time: 2021 12:59 PM   Location:  [facility] PCP: Arpita Cisneros MD         Hospital Day: 9    Assessment and Plan:   Petrona Miles is a 80 y.o.  male  who presents with ESRD on dialysis admitted with COVID infection and respiratory failure       COVID-19 pneumonia      Continue with Dexamethasone 6mg daily    First positive test on , repeat testing per family request was again positive  Monitor inflammatory markers  On 2 L nasal cannula     Possible superimopsed baterial pneumonia AP    Completed treatment with Rocephin and Azithromycin     Acute respiratory failure with hypoxia    Continue to wean O2 as able  Increase mobility, out of bed to chair    End-stage renal disease - HD per nephro via AVF    S/p unwitnessed fall - No apparent injuries, CT brain and cervical spine -unremarkable     Dark tarry stools - No further episodes, hemoglobin stable. Continue to monitor    Elevated troponin -  Likely from CKD     Patient not having chest pains, repeat troponins were improved. Not cardiac in nature. Heparin discontinued. CAD - S/p recent stents     Patient had 2 stents (LAD and Ramus) at [facility] on 21  Will continue with aspirin, Plavix, metoprolol and pravastatin. Advance care planning- Limited CODE STATUS no CPR, no resuscitation and no intubation. VTE prophylaxis: SCD - start heparin SQ    Dispo -Patient is medically stable for discharge but awaiting placement and coordination of transfer     Per Infection Control - patient can be out of isolation - as it is 10 days since start of symptoms - [facility] to [facility] ADULT DIET;  Regular; Low Potassium (Less than 3000 mg/day); 1200 ml   DVT Prophylaxis [] Lovenox, []  Heparin, [] SCDs, []No VTE prophylaxis, patient ambulating   GI Prophylaxis [] PPI, [] H2 Blocker, [] No GI prophylaxis, patient is receiving diet/Tube Feeds   Code Status Limited   Disposition Patient requires continued admission due to COVID, resp failure   MDM [] Low, [x] Moderate,[]  High     History of Present Illness: Subjective     Patient Seen & Examined at the bedside      He is resting in bed with no distress while on NC - denies any chest pain or discomfort   Discussed about discharge planning and he wants to go home  Denies any Shortness of breath     Ten point ROS reviewed negative, unless as noted above    Objective: Intake/Output Summary (Last 24 hours) at 9/21/2021 1157  Last data filed at 9/21/2021 0326  Gross per 24 hour   Intake 650 ml   Output 1400 ml   Net -750 ml      Vitals:   Vitals:    09/21/21 1100   BP: (!) 141/41   Pulse: 67   Resp: 16   Temp: 97.8 °F (36.6 °C)   SpO2: 97%     Physical Exam:    GEN Awake male, resting in bed in no apparent distress. Appears given age. RESP Clear to auscultation, no wheezes, rales or rhonchi. CARDIO/VASC -S1/S2 auscultated. Regular rate without appreciable murmurs, rubs, or gallops. Peripheral pulses equal bilaterally and palpable. No peripheral edema. GI Abdomen is soft without significant tenderness, masses, or guarding. Bowel sounds are normoactive. Rectal exam deferred.  Philippe catheter is not present. MSK No gross joint deformities. Spontaneous movement of all extremities  SKIN Normal coloration, warm, dry.     Medications:   Medications:    heparin (porcine)  5,000 Units SubCUTAneous 3 times per day    epoetin traci-epbx  6,000 Units SubCUTAneous Once per day on Mon Wed Fri    calcitRIOL  1.5 mcg Oral Once per day on Mon Wed Fri    cinacalcet  60 mg Oral Once per day on Mon Wed Fri    docusate sodium  100 mg Oral BID    ferrous sulfate  325 mg Oral Daily with breakfast    gabapentin  100 mg Oral TID    metoprolol  100 mg Oral BID    pantoprazole  40 mg Oral QAM AC    vitamin C  500 mg Oral Daily    polyethylene glycol  17 g Oral Daily    sodium chloride flush  5-40 mL IntraVENous 2 times per day    dexamethasone  6 mg Oral Daily    clopidogrel  75 mg Oral Daily      Infusions:    sodium chloride       PRN Meds: sodium chloride flush, 5-40 mL, PRN  sodium chloride, 25 mL, PRN  ondansetron, 4 mg, Q8H PRN   Or  ondansetron, 4 mg, Q6H PRN  polyethylene glycol, 17 g, Daily PRN  acetaminophen, 650 mg, Q6H PRN   Or  acetaminophen, 650 mg, Q6H PRN  guaiFENesin-dextromethorphan, 5 mL, Q4H PRN  albuterol sulfate HFA, 2 puff, Q6H PRN          Electronically signed by Jarek Nichols MD on 9/21/2021 at 11:57 AM

## 2021-09-21 NOTE — ACP (ADVANCE CARE PLANNING)
Advance Care Planning     Advance Care Planning Inpatient Note  Spiritual Care Department    Today's Date: 9/21/2021  Unit: [facility] MED SURG 8A    Received request from family. Upon review of chart and communication with care team, Spiritual Care will defer advance care planning with patient at this time. . Child/Children was/were present in the room during visit. Goals of ACP Conversation:   spoke with pt's daughter to arrange to complete forms tomorrow at 2pm, when she is visiting. Health Care Decision Makers:     No healthcare decision makers have been documented. Click here to complete [place] including selection of the Healthcare Decision Maker Relationship (ie \"Primary\")  Summary:  No Decision Maker named by patient at this time    Advance Care Planning Documents (Patient Wishes):  None     Assessment:   responded to consult that pt's daughter, Kandy Shaw, wanted to talk about AD forms.  spoke with Kandy Shaw over the phone and set up an appointment for a  to meet with her and her father together tomorrow to discuss AD forms. Interventions:  Provided education on documents for clarity and greater understanding    Care Preferences Communicated:   No    Outcomes/Plan:  Appointment set for 2pm tomorrow to hold conversation with pt and pt's daughter together.     Electronically signed by Chaplain William on 9/21/2021 at 4:08 PM

## 2021-09-22 PROBLEM — E43 SEVERE MALNUTRITION (HCC): Status: ACTIVE | Noted: 2021-01-01

## 2021-09-22 NOTE — FLOWSHEET NOTE
09/22/21 1330   Encounter Summary   Services provided to: Patient and family together   Referral/Consult From: Other ;Rounding   Support System Children   Continue Visiting Yes  (9/22 ACP)   Complexity of Encounter Moderate   Length of Encounter 30 minutes   Spiritual Assessment Completed Yes   Advance Care Planning Yes   Routine   Type Follow up   Assessment Calm; Approachable   Intervention Active listening;Nurtured hope   Outcome Comfort;Expressed gratitude;Engaged in conversation   Spiritual/Yazidism   Type Spiritual support   The  met with the 80 yr old and the pt's daughter Venkata Blanca to have a ACP discussion. The pt was not physically able to engage in meaningful dialogue. The pt's daughter had questions concerning Advance Directive so I addressed her concerns. Please see ACP notes more details.

## 2021-09-22 NOTE — FLOWSHEET NOTE
[address]  PHYSICAL THERAPY MISSED TREATMENT NOTE  STR ONC MED 5K    Date: 2021  Patient Name: Philly eRyez        MRN: [id number]   : 11/10/1930  (80 y.o.)  Gender: male   Referring Practitioner: Dr. Jorge Cowan  Diagnosis: acute pulmonary edema         REASON FOR MISSED TREATMENT:  Missed session per pt refusal. Pt had rapid response earlier today and nursing stated therapy could try but pt is fatigued.  Will try back at later date

## 2021-09-22 NOTE — PROGRESS NOTES
[facility]  Occupational Therapy  Daily Note  Time:   Time In: 2501  Time Out: 1343  Timed Code Treatment Minutes: 8 Minutes  Minutes: 8          Date: 2021  Patient Name: Leanne Sol,   Gender: male      Room: -14/014-A  MRN: [id number]  : 11/10/1930  (80 y.o.)  Referring Practitioner: Cammy Charles MD  Diagnosis: Acute Pulmonary Edema  Additional Pertinent Hx: Laenne Sol is a pleasant 80 y.o. male who presents to the emergency department from home with complaints of shortness of breath. The patient was sent in after a full dialysis run today. The patient is extremely poor historian, difficult to obtain a history. Apparently was concerned about shortness of breath and he was not feeling well during dialysis and therefore was sent to the ED for evaluation. Currently, he answer some questions although not consistently, denies any chest pain, lower extremity pain or swelling. Family is at the bedside, state the patient had cardiac stents done recently. Record review shows this to have happened at [facility] approximately a week ago. Restrictions/Precautions:  Restrictions/Precautions: Fall Risk, Isolation  Position Activity Restriction  Other position/activity restrictions: droplet +     SUBJECTIVE: Patient supine in bed upon arrival with patient daughter present. Patient declines out of bed activity and continues to moan throughout although agreeable to position in bed. PAIN: no numerical scale provided however when asked if his legs hurt, patient shook head yes and patient continuously moved while therapist present. Vitals: Oxygen: 2L, attempted to retrieve O2 stats however unable to achieve read d/t faulty pulse ox, patient demonstrates no sign of SOB. COGNITION: Decreased Recall, Decreased Insight, Impaired Memory, Decreased Problem Solving, Decreased Safety Awareness and Expressive Aphasia    ADL:   Upper Extremity Dressing: Maximum Assistance.   tie hospital gown supine in bed. BALANCE:  Sitting Balance:  Supervision. supine in bed with HOB elevated    BED MOBILITY:  Scooting: Dependent, X 1, with head of bed flat, with verbal cues  hercules    TRANSFERS:  Declined    FUNCTIONAL MOBILITY:  Declined    ASSESSMENT:     Activity Tolerance:  Patient tolerance of  treatment: poor. Discharge Recommendations: Patient would benefit from continued therapy after discharge, Continue to assess pending progress   Equipment Recommendations: Other: continue to assess  Plan: Times per week: 5x  Current Treatment Recommendations: Strengthening, Balance Training, Functional Mobility Training, Endurance Training, Safety Education & Training, Self-Care / ADL, Equipment Evaluation, Education, & procurement    Patient Education  Patient Education: Role of OT, Plan of Care, ADL's, IADL's, Energy Conservation, Home Exercise Program, Family Education, Home Safety and Importance of Increasing Activity    Goals  Short term goals  Time Frame for Short term goals: by discharge  Short term goal 1: Pt will complete functional mobility to/from BR with AD and S to increase indep with accessing environment for ADLs. Short term goal 2: Pt will complete dynamic standing task with B hand release x 4 min with S to increase balance required for ADLs. Short term goal 3: Pt will complete LB ADL with Robert to increase indep with self care. Following session, patient left in safe position with all fall risk precautions in place.

## 2021-09-22 NOTE — PROGRESS NOTES
Hospitalist Progress Note    Patient:  Eulogio Knowles    Unit/Bed:5K-14/014-A  YOB: 1930  MRN: [id number]   Acct: [de-identified]   PCP: Poppy Venegas MD  Code Status: Limited  Date of Admission: 9/13/2021    Expected Discharge: 1-2 days  Disposition: ECF, precert pending. Assessment/Plan:    1. COVID-19 pneumonia: treated with dexamethasone 6mg daily. First positive test 09/13. Pt stable on RA. 2. Possible superimposed bacterial PNA: Completed treatment with Rocephin and Azithromycin     3. Acute hypoxic respiratory failure: 2/2 above. Resolved. 4. ESRD on HD: Nephrology following. 5. S/p unwitnessed fall: No apparent injuries, CT brain and cervical spine -unremarkable     6. Dark tarry stools:  No further episodes, hemoglobin stable.  Continue to monitor    7. Elevated troponin: chronically elevated, likely d/t ESRD. No chest pain, repeat troponin improved, no acute ischemic changes on ekg. Pt had recent LHC. 8. CAD s/p PCI: Patient had 2 stents (LAD and Ramus) at [facility] on 08/31/21. Will continue with aspirin, Plavix, metoprolol and pravastatin. 9. Code status: Limited CODE STATUS no CPR, no resuscitation and no intubation    10. H/o NSVT / Sick sinus syndrome: noted, has PPM/ICD. Tele ordered. 11. Chronic HFrEF: EF 45% and grade 1 diastolic dysfunction on Echo 05/2021. Follows with Cardiology at [facility]. Fluid status stable, Nephrology following. Cont home meds. Chief Complaint: SOB    HPI / Hospital Course: Per HPI: \"The patient is a 80 y.o. male who presents with complaints of shortness of breath and cough for the last 2 days. Patient is accompanied by family who helps with history. They report that patient has had a poor appetite, fatigued, short of breath and coughing for last 2 days. He recently was at [facility] approximately 2 weeks ago where he had 2 stents placed. They state he was in his normal state of health until 2 days ago. Patient was seen in dialysis today and had a full session however continued to be short of breath and had a significant cough and was sent for evaluation. He denies any fevers or chills, nausea or vomiting. He denies any diarrhea or constipation. He denies any chest pain.     In the ED patient was found to be Covid positive. He was placed on 2 L nasal cannula. He was also found to have slightly elevated troponin and was started on heparin drip in the ED. Family updated on test results. \"    Pt has completed treatment for COVID and superimposed bacterial PNA. I took over care 9/22, per previous note, pt stable for discharge and awaiting precert to ECF. Subjective (past 24 hours): Rapid response called during dialysis. Pt had vomited, became lethargic and had episode of AF RVR on the monitor per report. Dialysis was stopped. Patient still lethargic but following commands and nods to answer questions appropriately. EKG showed NSR, does not appear to be AF. Patient is moaning which per nursing, he typically does during dialysis. Patient seen again later. He reports fatigue and wanting to sleep. He denies abdominal pain, n/v/d, CP, SOB, fever/chills. Pt had bleeding from dialysis fistulasite. ROS: Pertinent positives as noted in HPI. All other systems reviewed and negative. Past medical history, family history, social history and allergies reviewed again and is unchanged since admission. Medications:  Reviewed.   Infusion Medications    sodium chloride       Scheduled Medications    heparin (porcine)  5,000 Units SubCUTAneous 3 times per day    epoetin traci-epbx  6,000 Units SubCUTAneous Once per day on Mon Wed Fri    calcitRIOL  1.5 mcg Oral Once per day on Mon Wed Fri    cinacalcet  60 mg Oral Once per day on Mon Wed Fri    docusate sodium  100 mg Oral BID    ferrous sulfate  325 mg Oral Daily with breakfast    gabapentin  100 mg Oral TID    metoprolol  100 mg Oral BID    pantoprazole 40 mg Oral QAM AC    vitamin C  500 mg Oral Daily    polyethylene glycol  17 g Oral Daily    sodium chloride flush  5-40 mL IntraVENous 2 times per day    dexamethasone  6 mg Oral Daily    clopidogrel  75 mg Oral Daily     PRN Meds: sodium chloride flush, sodium chloride, ondansetron **OR** ondansetron, polyethylene glycol, acetaminophen **OR** acetaminophen, guaiFENesin-dextromethorphan, albuterol sulfate HFA    I/O:   No intake or output data in the 24 hours ending 09/22/21 1137    Diet:  ADULT DIET; Regular; Low Potassium (Less than 3000 mg/day); 1200 ml    Exam:  /64   Pulse 80   Temp 98.3 °F (36.8 °C)   Resp 18   Ht 5' 6\" (1.676 m)   Wt 126 lb 8.7 oz (57.4 kg)   SpO2 93%   BMI 20.42 kg/m²   General:  Chronically ill appearing male. NAD. HEENT:  normocephalic and atraumatic. No scleral icterus. PERR. Neck: supple. No JVD. No thyromegaly. Lungs: clear to auscultation. No retractions  Cardiac: RRR without murmur. Abdomen: soft. Nontender. Bowel sounds positive. Extremities:  No clubbing, cyanosis, or edema x 4. Vasculature: capillary refill < 3 seconds. Palpable LE pulses bilaterally. Skin:  warm and dry. Psych:  Alert and oriented x3. Lethargic. Lymph:  No supraclavicular adenopathy. Neurologic:  No focal deficit. No seizures. Data: (All radiographs, tracings, PFTs, and imaging are personally viewed and interpreted unless otherwise noted)  Labs:   Recent Labs     09/21/21  0710 09/22/21  1110   WBC 5.9 7.8   HGB 8.2* 9.8*   HCT 24.4* 27.4*    289     Recent Labs     09/20/21  0915      K 4.3   CL 93*   CO2 23   BUN 62*   CREATININE 8.7*   CALCIUM 8.1*     No results for input(s): AST, ALT, BILIDIR, BILITOT, ALKPHOS in the last 72 hours. No results for input(s): INR in the last 72 hours. No results for input(s): [place] in the last 72 hours.   Urinalysis:   Lab Results   Component Value Date    NITRU NEGATIVE 06/22/2019    WBCUA 50-75 06/22/2019 BACTERIA NONE 06/22/2019    RBCUA 25-50 06/22/2019    BLOODU LARGE 06/22/2019    SPECGRAV 1.013 05/03/2016    GLUCOSEU NEGATIVE 06/22/2019     Urine culture: No results found for: Huan Pill  Micro:   Blood culture #1:   Lab Results   Component Value Date    BC No growth-preliminary No growth  09/13/2021     Blood culture #2:No results found for: Shaniqua James  Organism:  Lab Results   Component Value Date    ORG Micrococcus species 07/12/2021       No results found for: LABGRAM  MRSA culture only:No results found for: tuul  Respiratory culture: No results found for: CULTRESP  Aerobic and Anaerobic :  No results found for: LABAERO  No results found for: [facility]    Radiology Reports:  CT HEAD WO CONTRAST   Final Result      1. Stable CT scan of the brain, no interval change since previous study dated 15th of June 2021. **This report has been created using voice recognition software. It may contain minor errors which are inherent in voice recognition technology. **      Final report electronically signed by DR Yeni Espitia on 9/15/2021 4:26 PM      CT CERVICAL SPINE WO CONTRAST   Final Result    No evidence of acute osseous injury of the cervical spine. **This report has been created using voice recognition software. It may contain minor errors which are inherent in voice recognition technology. **      Final report electronically signed by Dr. Nadeem Hughes MD on 9/15/2021 4:29 PM      XR CHEST PORTABLE   Final Result   Cardiomegaly with vascular congestion and interstitial edema and effusions differential would include congestive heart failure or fluid overload. **This report has been created using voice recognition software. It may contain minor errors which are inherent in voice recognition technology. **      Final report electronically signed by Dr. Govind Melvin on 9/13/2021 2:22 PM        XR CHEST PORTABLE    Result Date: 9/13/2021  PROCEDURE: XR CHEST PORTABLE CLINICAL INFORMATION: SOB . COMPARISON: 7/12/2021 TECHNIQUE: Portable upright FINDINGS: Heart size is mildly enlarged. Mediastinum is not widened. There is pulmonary vascular congestion. Interstitial edema. There is suggestion of small effusions. AICD over the left chest. EKG leads overlie the chest. Right upper extremity vascular stents are noted. Cardiomegaly with vascular congestion and interstitial edema and effusions differential would include congestive heart failure or fluid overload. **This report has been created using voice recognition software. It may contain minor errors which are inherent in voice recognition technology. ** Final report electronically signed by Dr. Dejon Roman on 9/13/2021 2:22 PM        Tele:   [] yes             [] no      Active Hospital Problems    Diagnosis Date Noted    Pneumonia due to COVID-19 virus [U07.1, J12.82] 09/13/2021       Electronically signed by Ronda Levy PA-C on 9/22/2021 at 11:37 AM

## 2021-09-22 NOTE — CARE COORDINATION
9/22/21, 10:54 AM EDT    DISCHARGE PLANNING EVALUATION      Spoke with [facility]. [facility] cannot accept if family will not transport for dialysis, and family is unable to do this. [facility] may have a bed tomorrow, but will need to determine at that time if able to accept.

## 2021-09-22 NOTE — PROGRESS NOTES
Left message for patients daughter Michael Jansen to call back for update on patient. Patient was rapid response in inpatient dialysis. Plan for patient to return to 5K on telemetry.

## 2021-09-22 NOTE — PROGRESS NOTES
Advance Care Planning     Advance Care Planning Inpatient Note  Spiritual Care Department    Today's Date: 9/22/2021  Unit: [facility] 5K    Received request from IDT Member. Upon review of chart and communication with care team, Spiritual Care will defer advance care planning with patient at this time. . Child/Children was/were present in the room during visit. Goals of ACP Conversation:  The  met with the pt to discuss ACP but he was not alert and was tired. Health Care Decision Makers:       Primary Decision Maker: Prasad Vásquez - Child - [phone]    Secondary Decision Maker: Irineo Galina - Child - [phone]    Summary:  No Decision Maker named by patient at this time    Advance Care Planning Documents (Patient Wishes):  The pt does not have Advance Directives on file. And is not attentive in order to complete the documents. His daughter Vipin was present and I answered her concerns. Assessment:  The  attempted to met with the patient and his daughter Prasad Vásquez) to discuss ACP. The pt's daughter had questions pertaining to the Advance Directive so I clarified her concerns. Interventions:  Provided education on documents for clarity and greater understanding  Encouraged ongoing ACP conversation with future decision makers and loved ones    Care Preferences Communicated:   No    Outcomes/Plan:  I encouraged that ongoing ACP conversation with the patient would be helpful.       Electronically signed by Joy Guzman, [employer] on 9/22/2021 at 5:23 PM

## 2021-09-22 NOTE — SIGNIFICANT EVENT
Responded to RR. Patient noted to be Limited no x3. Updated the primary on patients status and orders that have been given for patient. Care transitioned back to primary. Patient limited code status does not seem appropriate for ICU admission and can be managed by hospitalist on medical floor. Electronically signed by Maya Mart CNP on 9/22/2021 at 11:13 AM

## 2021-09-22 NOTE — PROGRESS NOTES
Renal Progress Note    Assessment and Plan:   1. End stage kidney disease on hemodialysis  2. SARS-CoV-2 pneumonia  3. Hypertension primary mostly controlled  4. Deconditioned state  5. Anemia of chronic disease normocytic  6. Status post rapid response during dialysis treatment today  7. Hypokalemia    PLAN:  1.  Labs reviewed  2. Medications reviewed  3. Potassium replacement therapy for hypokalemia  4. Labs in the morning  5. We will follow      Patient Active Problem List:     CAD (coronary artery disease)     COPD (chronic obstructive pulmonary disease) ([facility] [address]     Chronic renal insufficiency     Patient overweight     Arthritis-  low back and neck .      CKD (chronic kidney disease) stage 3, GFR 30-59 ml/min (HCC)     Dyspnea and respiratory abnormalities     ED (erectile dysfunction)     Degenerative joint disease of knee, left     Gout of big toe     Anemia, chronic disease     CKD (chronic kidney disease) stage 4, GFR 15-29 ml/min (HCC)     Essential hypertension     Monoclonal gammopathy     Leukopenia     Thrombocytopenia (HCC)     Chronic kidney disease (CKD), stage V (HCC)     Metabolic acidosis     Hyperkalemia     Iron deficiency anemia     ROSAURA (acute kidney injury) ([facility] [address]     Plasma cell dyscrasia     Idiopathic hypotension     Hypertensive urgency     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)     Systolic congestive heart failure (HCC)     Other fluid overload (CODE)     Hyperphosphatemia     Acute diastolic congestive heart failure (HCC)     Acute on chronic diastolic congestive heart failure (HCC)     Pulmonary hypertension (HCC)     Anemia due to chronic kidney disease, on chronic dialysis (HCC)     Volume overload     Secondary hyperparathyroidism of renal origin ([facility] [address]     Chest pain     Acute pulmonary edema (HCC)     Accelerated hypertension     Gastritis and duodenitis     Fall from slip, trip, or stumble, initial encounter     Closed fracture of left ankle     ESRD on hemodialysis [facility])     Hypertensive emergency     SOB (shortness of breath)     Chronic combined systolic and diastolic CHF (congestive heart failure) ([facility] [address]     Pneumonia due to COVID-19 virus      Subjective:   Admit Date: 9/13/2021    Interval History:   Seen for end-stage kidney disease on hemodialysis  Very awake and very alert this morning when I saw him  No complaint  Updated by the staff  Had some minimal bleeding from fistula site last night  The area was dressed with surgical gauze  No new issues  Blood pressure is good  Did have a rapid response during dialysis today      Medications:   Scheduled Meds:   heparin (porcine)  5,000 Units SubCUTAneous 3 times per day    epoetin traci-epbx  6,000 Units SubCUTAneous Once per day on Mon Wed Fri    calcitRIOL  1.5 mcg Oral Once per day on Mon Wed Fri    cinacalcet  60 mg Oral Once per day on Mon Wed Fri    docusate sodium  100 mg Oral BID    ferrous sulfate  325 mg Oral Daily with breakfast    gabapentin  100 mg Oral TID    metoprolol  100 mg Oral BID    pantoprazole  40 mg Oral QAM AC    vitamin C  500 mg Oral Daily    polyethylene glycol  17 g Oral Daily    sodium chloride flush  5-40 mL IntraVENous 2 times per day    dexamethasone  6 mg Oral Daily    clopidogrel  75 mg Oral Daily     Continuous Infusions:   sodium chloride         CBC:   Recent Labs     09/21/21  0710 09/22/21  1110   WBC 5.9 7.8   HGB 8.2* 9.8*    289     CMP:    Recent Labs     09/20/21  0915 09/22/21  1110    135   K 4.3 3.1*   CL 93* 95*   CO2 23 25   BUN 62* 17   CREATININE 8.7* 3.2*   GLUCOSE 148* 138*   CALCIUM 8.1* 7.8*   LABGLOM 7* 22*     Troponin: No results for input(s): TROPONINI in the last 72 hours. BNP: No results for input(s): BNP in the last 72 hours. INR: No results for input(s): INR in the last 72 hours. Lipids: No results for input(s): CHOL, LDLDIRECT, TRIG, HDL, AMYLASE, LIPASE in the last 72 hours.   Liver:   No results for input(s): AST, ALT, ALKPHOS, PROT, LABALBU, BILITOT in the last 72 hours. Invalid input(s): BILDIR  Iron:  No results for input(s): IRONS, FERRITIN in the last 72 hours. Invalid input(s): LABIRONS  CT HEAD WO CONTRAST   Final Result      1. Stable CT scan of the brain, no interval change since previous study dated 15th of June 2021. **This report has been created using voice recognition software. It may contain minor errors which are inherent in voice recognition technology. **      Final report electronically signed by DR Elena Leger on 9/15/2021 4:26 PM      CT CERVICAL SPINE WO CONTRAST   Final Result    No evidence of acute osseous injury of the cervical spine. **This report has been created using voice recognition software. It may contain minor errors which are inherent in voice recognition technology. **      Final report electronically signed by Dr. Sheilah Pallas, MD on 9/15/2021 4:29 PM      XR CHEST PORTABLE   Final Result   Cardiomegaly with vascular congestion and interstitial edema and effusions differential would include congestive heart failure or fluid overload. **This report has been created using voice recognition software. It may contain minor errors which are inherent in voice recognition technology. **      Final report electronically signed by Dr. Tay Nguyen on 9/13/2021 2:22 PM            Objective:   Vitals: /75   Pulse 98   Temp 97.9 °F (36.6 °C) (Oral)   Resp 20   Ht 5' 6\" (1.676 m)   Wt 126 lb 8.7 oz (57.4 kg)   SpO2 93%   BMI 20.42 kg/m²    Wt Readings from Last 3 Encounters:   09/19/21 126 lb 8.7 oz (57.4 kg)   07/14/21 145 lb 8.1 oz (66 kg)   06/17/21 161 lb (73 kg)      24HR INTAKE/OUTPUT:      Intake/Output Summary (Last 24 hours) at 9/22/2021 1444  Last data filed at 9/22/2021 1409  Gross per 24 hour   Intake 880 ml   Output 774 ml   Net 106 ml       Constitutional: Well-developed elderly gentleman very awake and very alert in no distress  Skin:normal with no rash or lesions. HEENT: Head is normal. .Oral mucosa is moist.  Throat is clear. Neck:supple with no thyromegaly or carotid bruit  Cardiovascular:  S1, S2 without murmur or rubs   Respiratory:  Clear to ausculation without wheezes, rhonchi or rales in the anterior  Abdomen: Soft. Good bowel sounds. .  Nontender. Ext: No LE edema. Right upper extremity brachiocephalic fistula is noted. It has a good bruit and good thrill. Very minimal bleeding. Musculoskeletal:Intact  Neuro: Awake and alert. Normal speech. No focal deficit. Electronically signed by Umu Hoang MD on 9/22/2021 at 2:44 PM  **This report has been created using voice recognition software. It maycontain minor  errors which are inherent in voice recognition technology. **

## 2021-09-22 NOTE — PROGRESS NOTES
Comprehensive Nutrition Assessment    Type and Reason for Visit:  Initial (LOS)    Nutrition Recommendations/Plan:   Recommend diet as tolerated. Will send Nepro ONS BID. Recommend renal MVI. Question if pt. Would benefit from appetite stimulant. Nutrition Assessment:    Pt. severely malnourished AEB criteria listed below. At risk for further nutritional compromise r/t advanced age, recent COVID (positive test 9/14) and underlying medical condition (hx CAD, CKD, CHF, COPD, HD). Nutrition recommendations/interventions as per above. Malnutrition Assessment:  Malnutrition Status:  Severe malnutrition    Context:  Acute Illness     Findings of the 6 clinical characteristics of malnutrition:  Energy Intake:  7 - 50% or less of estimated energy requirements for 5 or more days  Weight Loss:  Unable to assess (-13.1% in 2.5 months however difficult to evaluate extent d/t fluid, HD)     Body Fat Loss:  Unable to assess     Muscle Mass Loss:  7 - Moderate muscle mass loss Thigh (quadraceps)  Fluid Accumulation:  Unable to assess     Strength:  Not Performed    Estimated Daily Nutrient Needs:  Energy (kcal):  3965-4452 kcals (30-35); Weight Used for Energy Requirements:   (57 kgm)     Protein (g):  68-86 gm (1.2-1.5) - HD pt; Weight Used for Protein Requirements:   (57 kgm)          Nutrition Related Findings:   Pt. Seen in dialysis; s/p rapid response; per graphics -has been consuming 1-50% of most meals during LOS; pt. Very sleepy during visit; 2 BMs noted past 24 hours; 9/20: Glucose 148, BUN 62, Cr 8.7, Potassium 4.3; Rx includes Decadron, Vitamin C, Colace, Glycolax, Rocaltrol, Iron; plan ECF at discharge    Wounds:  None       Current Nutrition Therapies:    ADULT DIET; Regular; Low Potassium (Less than 3000 mg/day); 1200 ml  Adult Oral Nutrition Supplement;  Renal Oral Supplement    Anthropometric Measures:  · Height: 5' 6\" (167.6 cm)  · Current Body Weight: 126 lb 8.7 oz (57.4 kg) (9/19 no edema) · Admission Body Weight: 134 lb 9.6 oz (61.1 kg) (9/18 no edema; 9/15 162# 14.7 oz (estimated weight))    · Usual Body Weight:  (per EMR: 5/19/21: 176# 12.9 oz, 7/2/21; 145# 8.1 oz)     · Ideal Body Weight: 142 lbs;      · BMI: 20.4  · BMI Categories: Underweight (BMI less than 22) age over 72       Nutrition Diagnosis:   · Severe malnutrition related to inadequate protein-energy intake, catabolic illness as evidenced by intake 0-25%, intake 26-50%, moderate muscle loss      Nutrition Interventions:   Food and/or Nutrient Delivery:  Continue Current Diet, Start Oral Nutrition Supplement  Nutrition Education/Counseling:  Education not appropriate   Coordination of Nutrition Care:  Continue to monitor while inpatient    Goals:  Pt. will tolerate and consume 75% or more of meals and experience no significant weight loss during LOS. Nutrition Monitoring and Evaluation:   Behavioral-Environmental Outcomes:  None Identified   Food/Nutrient Intake Outcomes:  Diet Advancement/Tolerance, Food and Nutrient Intake, Supplement Intake  Physical Signs/Symptoms Outcomes:  Biochemical Data, Chewing or Swallowing, GI Status, Fluid Status or Edema, Nutrition Focused Physical Findings, Skin, Weight     Discharge Planning:     Too soon to determine     Electronically signed by Anderson Griffin RD, LD on 9/22/21 at 12:35 PM EDT    Contact: [phone]

## 2021-09-23 NOTE — CARE COORDINATION
9/23/21, 12:50 PM EDT    DISCHARGE ON GOING [facility] day: 10  Location: [facility]14/014- Reason for admit: Acute pulmonary edema ([address] [J81.0]  Hypoxia [R09.02]  Elevated troponin [R77.8]  Pneumonia due to COVID-19 virus [U07.1, J12.82]  COVID-19 [U07.1]   Procedure: M,W,F hemodialysis   Barriers to Discharge: Patient transferred to [place] [address] from [facility]. Nephrology following, PT/OT, SS, Dietician, Palliative Care, acapella, incentive spirometry, SCD's, telemetry, up as tolerated. PCP: Catracho Emerson MD  Readmission Risk Score: 51%  Patient Goals/Plan/Treatment Preferences: Jessica Choi was from home with his daughter. He was going to [address] for his hemodialysis. Per chart, he has to have two negative COVID results to return there. He is planned for [place] [facility]. SS working on SNF placement.

## 2021-09-23 NOTE — PROGRESS NOTES
During shift handoff patient was moaning, unable to answer orientation questions, and respirations were increased. /71, SPO2 92%, RR 20. Secure messaged Ricarda Meza at 2051 of change in status. Patient presented lethargic and able to follow simple commands and non verbal.       Blood sugar 69 at 2054, 2 Cups of OJ & Sugar given. Rechecked at 2136. Sugar 40. Ricarda Meza notified via perfect serve and hypoglycemic orders placed. Loss of IV access, administered IM glucagon. Resource nurse called to attempt IV insertion after X2 attempts by RN. Resource unsuccessful and ER attempted insertion. New IV access in Left AC. Blood sugar 199 at 2235, rechecked at 2308 126. Patient at whole yasir's chicken meal.     Blood sugar sugar 77 at 0036 9/23. Ricarda Meza notified and approved administration of IV D5W. Currently infusing at 100mL/hr. Blood sugar 117 at 0305. Will continue to monitor. Call light within reach, bed/chair alarm on.

## 2021-09-23 NOTE — PROCEDURES
A Bladder scan was performed at [place] [address] . The patient's last void was at unknown . The residual amount was measured to be 21 ML. Report of results was given to [facility].

## 2021-09-23 NOTE — PROGRESS NOTES
[address] [address]  INPATIENT PHYSICAL THERAPY  DAILY NOTE  [facility] 5K - 5K-14/014-A    Time In: 5814  Time Out: 1329  Timed Code Treatment Minutes: 8 Minutes  Minutes: 8          Date: 2021  Patient Name: Chrissy Oviedo,  Gender:  male        MRN: [id number]  : 11/10/1930  (80 y.o.)     Referring Practitioner: Dr. Abi Medel  Diagnosis: acute pulmonary edema  Additional Pertinent Hx: admit with above diagnosis, ESRD on HD, COVID-19 pneumonia, HTN, COPD, anemia     Prior Level of Function:  Lives With: Daughter  Type of Home: House  Home Layout: One level  Home Access: Level entry  Home Equipment: Rolling walker   Bathroom Shower/Tub: Walk-in shower  Bathroom Toilet: Standard    ADL Assistance: Independent  Homemaking Assistance: Needs assistance  Ambulation Assistance: Independent  Transfer Assistance: Independent  Additional Comments: Pt using RW PTA. Daughter assists at home. Unsure of accuracy of above pt is slighly confused this date. Restrictions/Precautions:  Restrictions/Precautions: Fall Risk, Isolation  Position Activity Restriction  Other position/activity restrictions: droplet +     SUBJECTIVE: RN approved session. Pt in bed upon arrival. Pt agreeable to bed exercise however too fatigued to get out of bed this date despite encouragement. Pt in bed upon leaving with all needs within reach. Pt very lethargic throughout session. PAIN: none indicated     Vitals: Vitals not assessed per clinical judgement, see nursing flowsheet    OBJECTIVE:  Bed Mobility:  Not Tested    Transfers:  Not Tested    Ambulation:  Not Tested      Exercise:  Patient was guided in 1 set(s) 10-15 reps of exercise to both lower extremities. Ankle pumps, Glut sets, Quad sets, Heelslides, Hip abduction/adduction and Straight leg raises. Exercises were completed for increased independence with functional mobility.     Functional Outcome Measures: Completed  AM-PAC Inpatient Mobility Raw Score : 15  AM-PAC Inpatient T-Scale Score : 39.45    ASSESSMENT:  Assessment: Patient progressing toward established goals. Activity Tolerance:  Patient tolerance of  treatment: good. Pt tolerated session well. Pt limited by fatigue. Pt very lethargic during session. Decreased strength and endurance. Pt would benefit from continued PT for improved functional mobility   Equipment Recommendations: Other: cont to assess needs  Discharge Recommendations:  Continue to assess pending progress, Subacute/Skilled Nursing Facility, 24 hour supervision or assist, Patient would benefit from continued therapy after discharge    Plan: Times per week: 5X GM  Times per day: Daily  Specific instructions for Next Treatment: therex and mobility    Patient Education  Patient Education: Plan of Care, Verbal Exercise Instruction    Goals:  Patient goals : not stated  Short term goals  Time Frame for Short term goals: by discharge  Short term goal 1: bed mobility with S to get in/out of bed  Short term goal 2: transfer with S to get in/out of chairs  Short term goal 3: amb >50'x1 with RW and SBA, maintaining O2 sats >90% on </=4L O2, to walk safely in room  Long term goals  Time Frame for Long term goals : no LTGs set secondary to short ELOS    Following session, patient left in safe position with all fall risk precautions in place.

## 2021-09-23 NOTE — PROGRESS NOTES
Hospitalist Progress Note    Patient:  Leanne Sol    Unit/Bed:5K-14/014-A  YOB: 1930  MRN: [id number]   Acct: [de-identified]   PCP: Kiersten Gonzalez MD  Code Status: Limited  Date of Admission: 9/13/2021    Expected Discharge: 1-2 days  Disposition: ECF, precert pending. Assessment/Plan:    1. COVID-19 pneumonia: treated with dexamethasone 6mg daily. First positive test 09/13. Pt stable on RA. 2. Possible superimposed bacterial PNA: Completed treatment with Rocephin and Azithromycin     3. Acute hypoxic respiratory failure: 2/2 above. Resolved. 4. ESRD on HD: Nephrology following. Pt did not tolerate diaylsis 9/22, rapid called. 9/23: Nephrology dc'ed IVF, plan for dialysis tomorrow. 5. S/p unwitnessed fall: No apparent injuries, CT brain and cervical spine -unremarkable     6. Dark tarry stools:  No further episodes, hemoglobin stable.  Continue to monitor    7. Elevated troponin: chronically elevated, likely d/t ESRD. No chest pain, repeat troponin improved, no acute ischemic changes on ekg. Pt had recent LHC. 8. CAD s/p PCI: Patient had 2 stents (LAD and Ramus) at [facility] on 08/31/21. Will continue with aspirin, Plavix, metoprolol and pravastatin. 9. Code status: Limited CODE STATUS no CPR, no resuscitation and no intubation    10. H/o NSVT / Sick sinus syndrome: noted, has PPM/ICD. Tele ordered. 11. Chronic HFrEF: EF 45% and grade 1 diastolic dysfunction on Echo 05/2021. Follows with Cardiology at [facility]. Fluid status stable, Nephrology following. Cont home meds. Chief Complaint: SOB    HPI / Hospital Course: Per HPI: \"The patient is a 80 y.o. male who presents with complaints of shortness of breath and cough for the last 2 days. Patient is accompanied by family who helps with history. They report that patient has had a poor appetite, fatigued, short of breath and coughing for last 2 days.   He recently was at [facility] approximately 2 weeks ago where he had 2 stents placed. They state he was in his normal state of health until 2 days ago. Patient was seen in dialysis today and had a full session however continued to be short of breath and had a significant cough and was sent for evaluation. He denies any fevers or chills, nausea or vomiting. He denies any diarrhea or constipation. He denies any chest pain.     In the ED patient was found to be Covid positive. He was placed on 2 L nasal cannula. He was also found to have slightly elevated troponin and was started on heparin drip in the ED. Family updated on test results. \"    Pt has completed treatment for COVID and superimposed bacterial PNA. I took over care 9/22, per previous note, pt stable for discharge and awaiting precert to ECF.      9/13-> Rapid response called during dialysis. Pt had vomited, became lethargic and had episode of AF RVR on the monitor per report. Dialysis was stopped. Patient still lethargic but following commands and nods to answer questions appropriately. EKG showed NSR, does not appear to be AF. Patient is moaning which per nursing, he typically does during dialysis. Patient seen again later. He reports fatigue and wanting to sleep. He denies abdominal pain, n/v/d, CP, SOB, fever/chills. Pt had bleeding from dialysis fistulasite. Subjective (past 24 hours): Patient alert to verbal stimuli, lethargic. Appears to be at his baseline. Denies fever/chills, sob, cp, abd pain, n/v/d. Nephrology started IVF for today and plan for dialysis tomorrow. Patient has been NSR on tele. ROS: Pertinent positives as noted in HPI. All other systems reviewed and negative. Past medical history, family history, social history and allergies reviewed again and is unchanged since admission. Medications:  Reviewed.   Infusion Medications    dextrose 100 mL/hr (09/23/21 1003)    sodium chloride       Scheduled Medications    [Held by provider] heparin (porcine)  5,000 Units SubCUTAneous 3 times per day    epoetin traci-epbx  6,000 Units SubCUTAneous Once per day on Mon Wed Fri    calcitRIOL  1.5 mcg Oral Once per day on Mon Wed Fri    cinacalcet  60 mg Oral Once per day on Mon Wed Fri    docusate sodium  100 mg Oral BID    ferrous sulfate  325 mg Oral Daily with breakfast    gabapentin  100 mg Oral TID    metoprolol  100 mg Oral BID    pantoprazole  40 mg Oral QAM AC    vitamin C  500 mg Oral Daily    polyethylene glycol  17 g Oral Daily    sodium chloride flush  5-40 mL IntraVENous 2 times per day    clopidogrel  75 mg Oral Daily     PRN Meds: glucose, dextrose, glucagon (rDNA), dextrose, sodium chloride flush, sodium chloride, ondansetron **OR** ondansetron, polyethylene glycol, acetaminophen **OR** acetaminophen, guaiFENesin-dextromethorphan, albuterol sulfate HFA    I/O:     Intake/Output Summary (Last 24 hours) at 9/23/2021 1147  Last data filed at 9/23/2021 1033  Gross per 24 hour   Intake 1239.9 ml   Output 0 ml   Net 1239.9 ml       Diet:  ADULT DIET; Regular; Low Potassium (Less than 3000 mg/day); 1200 ml  Adult Oral Nutrition Supplement; Renal Oral Supplement    Exam:  BP (!) 127/57   Pulse 73   Temp 97.8 °F (36.6 °C) (Oral)   Resp 16   Ht 5' 6\" (1.676 m)   Wt 126 lb 8.7 oz (57.4 kg)   SpO2 92%   BMI 20.42 kg/m²   General:  Chronically ill appearing male. NAD. HEENT:  normocephalic and atraumatic. No scleral icterus. PERR. Neck: supple. No JVD. No thyromegaly. Lungs: clear to auscultation. No retractions  Cardiac: RRR without murmur. Abdomen: soft. Nontender. Bowel sounds positive. Extremities:  No clubbing, cyanosis, or edema x 4. Vasculature: capillary refill < 3 seconds. Palpable LE pulses bilaterally. Skin:  warm and dry. Psych:  Alert and oriented x3. Lethargic. Lymph:  No supraclavicular adenopathy. Neurologic:  No focal deficit. No seizures.       Data: (All radiographs, tracings, PFTs, and imaging are personally viewed and interpreted unless otherwise noted)  Labs:   Recent Labs     09/21/21  0710 09/22/21  1110 09/23/21  0510   WBC 5.9 7.8 6.5   HGB 8.2* 9.8* 8.8*   HCT 24.4* 27.4* 26.6*    289 231     Recent Labs     09/22/21  1110 09/23/21  0510    136   K 3.1* 4.5   CL 95* 96*   CO2 25 26   BUN 17 29*   CREATININE 3.2* 5.0*   CALCIUM 7.8* 8.0*   PHOS 1.7*  --      No results for input(s): AST, ALT, BILIDIR, BILITOT, ALKPHOS in the last 72 hours. Recent Labs     09/23/21  0510   INR 0.96     No results for input(s): Gisele Highman in the last 72 hours. Urinalysis:   Lab Results   Component Value Date    NITRU NEGATIVE 06/22/2019    WBCUA 50-75 06/22/2019    BACTERIA NONE 06/22/2019    RBCUA 25-50 06/22/2019    BLOODU LARGE 06/22/2019    SPECGRAV 1.013 05/03/2016    GLUCOSEU NEGATIVE 06/22/2019     Urine culture: No results found for: Kesha Jose  Micro:   Blood culture #1:   Lab Results   Component Value Date    BC No growth-preliminary No growth  09/13/2021     Blood culture #2:No results found for: Saniya Mcgraw  Organism:  Lab Results   Component Value Date    ORG Micrococcus species 07/12/2021       No results found for: LABGRAM  MRSA culture only:No results found for: [address] [place] [address]  Respiratory culture: No results found for: CULTRESP  Aerobic and Anaerobic :  No results found for: LABAERO  No results found for: [facility]    Radiology Reports:  XR CHEST PORTABLE   Final Result   1. No acute intrathoracic process. 2. Mild stable cardiomegaly. **This report has been created using voice recognition software. It may contain minor errors which are inherent in voice recognition technology. **      Final report electronically signed by Dr. Dwaine Marques on 9/22/2021 9:52 PM      CT HEAD WO CONTRAST   Final Result      1. Stable CT scan of the brain, no interval change since previous study dated 15th of June 2021. **This report has been created using voice recognition software.  It may contain minor errors which are inherent in voice recognition technology. **      Final report electronically signed by  [facility] on 9/15/2021 4:26 PM      CT CERVICAL SPINE WO CONTRAST   Final Result    No evidence of acute osseous injury of the cervical spine. **This report has been created using voice recognition software. It may contain minor errors which are inherent in voice recognition technology. **      Final report electronically signed by Dr. Acosta Navarro MD on 9/15/2021 4:29 PM      XR CHEST PORTABLE   Final Result   Cardiomegaly with vascular congestion and interstitial edema and effusions differential would include congestive heart failure or fluid overload. **This report has been created using voice recognition software. It may contain minor errors which are inherent in voice recognition technology. **      Final report electronically signed by Dr. Carisa Reyes on 9/13/2021 2:22 PM        XR CHEST PORTABLE    Result Date: 9/13/2021  PROCEDURE: XR CHEST PORTABLE CLINICAL INFORMATION: SOB . COMPARISON: 7/12/2021 TECHNIQUE: Portable upright FINDINGS: Heart size is mildly enlarged. Mediastinum is not widened. There is pulmonary vascular congestion. Interstitial edema. There is suggestion of small effusions. AICD over the left chest. EKG leads overlie the chest. Right upper extremity vascular stents are noted. Cardiomegaly with vascular congestion and interstitial edema and effusions differential would include congestive heart failure or fluid overload. **This report has been created using voice recognition software. It may contain minor errors which are inherent in voice recognition technology. ** Final report electronically signed by Dr. Carisa Reyes on 9/13/2021 2:22 PM        Tele:   [] yes             [] no      Active Hospital Problems    Diagnosis Date Noted    Severe malnutrition ([address] [E43] 09/22/2021     Class: Acute    Pneumonia due to COVID-19 virus [U07.1, J12.82] 09/13/2021 Electronically signed by Taylor Shah PA-C on 9/23/2021 at 11:47 AM

## 2021-09-23 NOTE — PROGRESS NOTES
[address] [address] [facility] 5K  Occupational Therapy  Daily Note  Time:   Time In: 1100  Time Out: 1115  Timed Code Treatment Minutes: 15 Minutes  Minutes: 15          Date: 2021  Patient Name: Bora Guerra,   Gender: male      Room: -14/014-A  MRN: [id number]  : 11/10/1930  (80 y.o.)  Referring Practitioner: Nora Melendez MD  Diagnosis: Acute Pulmonary Edema  Additional Pertinent Hx: Bora Guerra is a pleasant 80 y.o. male who presents to the emergency department from home with complaints of shortness of breath. The patient was sent in after a full dialysis run today. The patient is extremely poor historian, difficult to obtain a history. Apparently was concerned about shortness of breath and he was not feeling well during dialysis and therefore was sent to the ED for evaluation. Currently, he answer some questions although not consistently, denies any chest pain, lower extremity pain or swelling. Family is at the bedside, state the patient had cardiac stents done recently. Record review shows this to have happened at [facility] approximately a week ago. Restrictions/Precautions:  Restrictions/Precautions: Fall Risk, Isolation  Position Activity Restriction  Other position/activity restrictions: droplet +     SUBJECTIVE: Pt. Nurse okayed OT treatment. Pt. In bed upon arrival agreeable to in bed treatment only this date. PAIN: Denies    Vitals: Vitals not assessed per clinical judgement, see nursing flowsheet    COGNITION: Decreased Recall, Decreased Insight, Impaired Memory, Decreased Problem Solving, Decreased Safety Awareness and Expressive Aphasia    ADL:   No ADL's completed this session. .    TRANSFERS:  Declined      ADDITIONAL ACTIVITIES:  Patient completed BUE strengthening exercises with skilled education on HEP: completed x10 reps x1 set with AROM  in all joints and all planes in order to improve UE strength and activity tolerance required for BADL routine and toilet / shower transfers. Patient tolerated , requiring frequent rest breaks. Patient also required visual and verbal cues for technique. ASSESSMENT:     Activity Tolerance:  Patient tolerance of  treatment: fair. Discharge Recommendations: Patient would benefit from continued therapy after discharge, Continue to assess pending progress   Equipment Recommendations: Other: continue to assess  Plan: Times per week: 5x  Current Treatment Recommendations: Strengthening, Balance Training, Functional Mobility Training, Endurance Training, Safety Education & Training, Self-Care / ADL, Equipment Evaluation, Education, & procurement    Patient Education  Patient Education: Home Exercise Program and Importance of Increasing Activity    Goals  Short term goals  Time Frame for Short term goals: by discharge  Short term goal 1: Pt will complete functional mobility to/from BR with AD and S to increase indep with accessing environment for ADLs. Short term goal 2: Pt will complete dynamic standing task with B hand release x 4 min with S to increase balance required for ADLs. Short term goal 3: Pt will complete LB ADL with Robert to increase indep with self care. Following session, patient left in safe position with all fall risk precautions in place.

## 2021-09-23 NOTE — PROGRESS NOTES
Renal Progress Note    Assessment and Plan:   1. End stage kidney disease on hemodialysis  2. SARS-CoV-2 pneumonia  3. Hypertension  controlled  4. Deconditioned state  5. Anemia of chronic disease normocytic  6. Hypokalemia resolved      PLAN:  1.  Labs reviewed  2. Serum potassium is back to normal  3. Medications reviewed  4. No changes  5. We will discontinue intravenous fluid however. 6.  Hemodialysis tomorrow. 7.  We will follow      Patient Active Problem List:     CAD (coronary artery disease)     COPD (chronic obstructive pulmonary disease) ([facility]     Chronic renal insufficiency     Patient overweight     Arthritis-  low back and neck .      CKD (chronic kidney disease) stage 3, GFR 30-59 ml/min (HCC)     Dyspnea and respiratory abnormalities     ED (erectile dysfunction)     Degenerative joint disease of knee, left     Gout of big toe     Anemia, chronic disease     CKD (chronic kidney disease) stage 4, GFR 15-29 ml/min (HCC)     Essential hypertension     Monoclonal gammopathy     Leukopenia     Thrombocytopenia (HCC)     Chronic kidney disease (CKD), stage V (HCC)     Metabolic acidosis     Hyperkalemia     Iron deficiency anemia     ROSAURA (acute kidney injury) ([facility]     Plasma cell dyscrasia     Idiopathic hypotension     Hypertensive urgency     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)     Systolic congestive heart failure (HCC)     Other fluid overload (CODE)     Hyperphosphatemia     Acute diastolic congestive heart failure (HCC)     Acute on chronic diastolic congestive heart failure (HCC)     Pulmonary hypertension (HCC)     Anemia due to chronic kidney disease, on chronic dialysis (HCC)     Volume overload     Secondary hyperparathyroidism of renal origin ([address]     Chest pain     Acute pulmonary edema (HCC)     Accelerated hypertension     Gastritis and duodenitis     Fall from slip, trip, or stumble, initial encounter     Closed fracture of left ankle     ESRD on hemodialysis ([address] hours.    Invalid input(s): LABIRONS  XR CHEST PORTABLE   Final Result   1. No acute intrathoracic process. 2. Mild stable cardiomegaly. **This report has been created using voice recognition software. It may contain minor errors which are inherent in voice recognition technology. **      Final report electronically signed by Dr. Andrey Cedeno on 9/22/2021 9:52 PM      CT HEAD WO CONTRAST   Final Result      1. Stable CT scan of the brain, no interval change since previous study dated 15th of June 2021. **This report has been created using voice recognition software. It may contain minor errors which are inherent in voice recognition technology. **      Final report electronically signed by DR Stephania Vazquez on 9/15/2021 4:26 PM      CT CERVICAL SPINE WO CONTRAST   Final Result    No evidence of acute osseous injury of the cervical spine. **This report has been created using voice recognition software. It may contain minor errors which are inherent in voice recognition technology. **      Final report electronically signed by Dr. Westley Rodriguez MD on 9/15/2021 4:29 PM      XR CHEST PORTABLE   Final Result   Cardiomegaly with vascular congestion and interstitial edema and effusions differential would include congestive heart failure or fluid overload. **This report has been created using voice recognition software. It may contain minor errors which are inherent in voice recognition technology. **      Final report electronically signed by Dr. Kathie Rodriguez on 9/13/2021 2:22 PM            Objective:   Vitals: BP (!) 127/57   Pulse 73   Temp 97.8 °F (36.6 °C) (Oral)   Resp 16   Ht 5' 6\" (1.676 m)   Wt 126 lb 8.7 oz (57.4 kg)   SpO2 92%   BMI 20.42 kg/m²    Wt Readings from Last 3 Encounters:   09/19/21 126 lb 8.7 oz (57.4 kg)   07/14/21 145 lb 8.1 oz (66 kg)   06/17/21 161 lb (73 kg)      24HR INTAKE/OUTPUT:      Intake/Output Summary (Last 24 hours) at 9/23/2021 0940  Last data filed at 9/23/2021 0253  Gross per 24 hour   Intake 995.7 ml   Output 774 ml   Net 221.7 ml       Constitutional: Well-developed elderly gentleman comfortably asleep in no distress  Skin:normal with no rash or lesions. HEENT: Head is normal. .  Throat is clear. Neck:supple with no carotid bruit  Cardiovascular:  S1, S2 without murmur or rubs   Respiratory:  Clear to ausculation without wheezes, rhonchi or rales in the anterior  Abdomen: Soft. Good bowel sounds. .   Ext: No LE edema. Musculoskeletal:Intact  Neuro: Deferred. .      Electronically signed by Yvonne Ang MD on 9/23/2021 at 9:40 AM  **This report has been created using voice recognition software. It maycontain minor  errors which are inherent in voice recognition technology. **

## 2021-09-23 NOTE — CARE COORDINATION
9/23/21, 12:57 PM EDT    DISCHARGE PLANNING EVALUATION      Spoke with [facility] admissions. Facility is unsure if there is a bed available for patient yet, and will update when they have more information.

## 2021-09-24 NOTE — PROGRESS NOTES
[address]  Occupational Therapy  Daily Note  Time:   Time In:   Time Out: 352  Timed Code Treatment Minutes: 23 Minutes  Minutes: 23          Date: 2021  Patient Name: Mathew Awan,   Gender: male      Room: -14/014-A  MRN: [id number]  : 11/10/1930  (80 y.o.)  Referring Practitioner: Lalitha Hess MD  Diagnosis: Acute Pulmonary Edema  Additional Pertinent Hx: Mathew Awan is a pleasant 80 y.o. male who presents to the emergency department from home with complaints of shortness of breath. The patient was sent in after a full dialysis run today. The patient is extremely poor historian, difficult to obtain a history. Apparently was concerned about shortness of breath and he was not feeling well during dialysis and therefore was sent to the ED for evaluation. Currently, he answer some questions although not consistently, denies any chest pain, lower extremity pain or swelling. Family is at the bedside, state the patient had cardiac stents done recently. Record review shows this to have happened at [facility] approximately a week ago. Restrictions/Precautions:  Restrictions/Precautions: Fall Risk, Isolation  Position Activity Restriction  Other position/activity restrictions: droplet +     SUBJECTIVE: Patient in bathroom with nursing upon arrival.  Patient non verbal with moaning throughout session. Upon return to EOB, patient hunched forward and not following direction of therapist to which nursing was notified and arrived; this therapist assisted nursing with retrieving vitals. PAIN: no numerical scale provided    Vitals: Blood Pressure: 151/69  Oxygen: 97%  Heart Rate: 101bpm    COGNITION: Slow Processing, Decreased Recall, Decreased Insight, Decreased Problem Solving, Decreased Safety Awareness, Difficulty Following Commands, Impulsive and Expressive Aphasia    ADL:   Lower Extremity Dressing: Maximum Assistance.   ebonie B socks  Toileting: Maximum Assistance, X 1, with verbal cues  and with increased time for completion. clothing management  Toilet Transfer: Minimal Assistance, X 1, with verbal cues  and with increased time for completion. Josie Mack BALANCE:  Sitting Balance:  Maximal Assistance with forward flex posture x 2, possible behavior episode d/t not wanting to reposition on EOB. Standing Balance: Contact Guard Assistance. with RW, no LOB    BED MOBILITY:  Sit to Supine: Dependent, X 2, with head of bed flat, with increased time for completion    Scooting: Dependent x 2 upward     TRANSFERS:  Stand to Sit: Contact Guard Assistance, X 1, with increased time for completion, cues for hand placement, with verbal cues. RW to EOB    FUNCTIONAL MOBILITY:  Assistive Device: Rolling Walker  Assist Level:  Contact Guard Assistance. Distance: To and from bathroom  Verbal cues for RW management, slow, forward flex posture     ASSESSMENT:     Activity Tolerance:  Patient tolerance of  treatment: fair. Discharge Recommendations: ECF with OT   Equipment Recommendations: Other: continue to assess  Plan: Times per week: 3-5x  Current Treatment Recommendations: Strengthening, Balance Training, Functional Mobility Training, Endurance Training, Safety Education & Training, Self-Care / ADL, Equipment Evaluation, Education, & procurement    Patient Education  Patient Education: Importance of Increasing Activity and Assistive Device Safety    Goals  Short term goals  Time Frame for Short term goals: by discharge  Short term goal 1: Pt will complete functional mobility to/from BR with AD and S to increase indep with accessing environment for ADLs. Short term goal 2: Pt will complete dynamic standing task with B hand release x 4 min with S to increase balance required for ADLs. Short term goal 3: Pt will complete LB ADL with Robert to increase indep with self care. Following session, patient left in safe position with all fall risk precautions in place.

## 2021-09-24 NOTE — FLOWSHEET NOTE
09/24/21 0858 09/24/21 1320   Vital Signs   BP (!) 125/55 (!) 143/83   Temp 98.6 °F (37 °C) 98.6 °F (37 °C)   Pulse 57 50   Resp 18 21   SpO2  --  97 %   Weight 102 lb 11.8 oz (46.6 kg) 102 lb 11.8 oz (46.6 kg)   Percent Weight Change -18.82 0   Post-Hemodialysis Assessment   Post-Treatment Procedures  --  Blood returned; Access bleeding time < 10 minutes   Machine Disinfection Process  --  Acid/Vinegar Clean;Heat Disinfect; Exterior Machine Disinfection   Rinseback Volume (ml)  --  400 ml   Total Liters Processed (l/min)  --  73.8 l/min   Dialyzer Clearance  --  Lightly streaked   Duration of Treatment (minutes)  --  240 minutes   Heparin amount administered during treatment (units)  --  0 units   Hemodialysis Intake (ml)  --  400 ml   Hemodialysis Output (ml)  --  400 ml   NET Removed (ml)  --  0 ml   Tolerated Treatment  --  Good   stable 4 hour treatment. 0 net uf. Held pressure to needle sites for ten minutes times two. Report given to primary RN.

## 2021-09-24 NOTE — PROGRESS NOTES
Comprehensive Nutrition Assessment    Type and Reason for Visit:  Reassess    Nutrition Recommendations/Plan:   Recommend diet as tolerated. Continue ONS: Nepro BID. Recommend renal MVI. Question if pt. Would benefit from appetite stimulant. Nutrition Assessment:    Pt. With slight improvement from nutritional standpoint AEB report of good appetite and acceptance of ONS however note variable meal intake per graphics. At risk for further nutritional compromise r/t severe malnutrition, advanced age, recent COVID (positive test 9/14) and underlying medical condition (hx CAD, CKD, CHF, COPD, HD). Nutrition recommendations/interventions as per above. Malnutrition Assessment:  Malnutrition Status:  Severe malnutrition    Context:  Acute Illness     Findings of the 6 clinical characteristics of malnutrition:  Energy Intake:  7 - 50% or less of estimated energy requirements for 5 or more days  Weight Loss:  Unable to assess (-13.1% in 2.5 months however difficult to evaluate extent d/t fluid, HD)     Body Fat Loss:  Unable to assess     Muscle Mass Loss:  7 - Moderate muscle mass loss Thigh (quadraceps)  Fluid Accumulation:  Unable to assess     Strength:  Not Performed    Estimated Daily Nutrient Needs:  Energy (kcal):  8255-0070 kcals (30-35); Weight Used for Energy Requirements:   (57 kgm)     Protein (g):  68-86 gm (1.2-1.5) - HD pt; Weight Used for Protein Requirements:   (57 kgm)          Nutrition Related Findings:     RN reports intake 100% breakfast today, intermittent lethargy, patient seen in hemodialysis- shakes head yes to good appetite and acceptance of vanilla Nepro shakes. BM x 1 today; today's glucose 104; (9/23) potassium 4.5, BUN 29, Creatinine 5; vitamin C, colace, glycolax, rocaltrol; note plan for ECF at discharge    Wounds:  None       Current Nutrition Therapies:    ADULT DIET; Regular; Low Potassium (Less than 3000 mg/day); 1200 ml  Adult Oral Nutrition Supplement;  Renal Oral Supplement    Anthropometric Measures:  · Height: 5' 6\" (167.6 cm)  · Current Body Weight: 102 lb 11.8 oz (46.6 kg) (9/24; no edema, hemodialysis patient)   · Admission Body Weight: 134 lb 9.6 oz (61.1 kg) (9/18 no edema; 9/15 162# 14.7 oz (estimated weight))    · Usual Body Weight:  (per EMR: 5/19/21: 176# 12.9 oz, 7/2/21; 145# 8.1 oz)     · Ideal Body Weight: 142 lbs;   · BMI: 16.6  · BMI Categories: Underweight (BMI less than 22) age over 72       Nutrition Diagnosis:   · Severe malnutrition related to inadequate protein-energy intake, catabolic illness as evidenced by intake 0-25%, intake 26-50%, moderate muscle loss      Nutrition Interventions:   Food and/or Nutrient Delivery:  Continue Current Diet, Continue Oral Nutrition Supplement  Nutrition Education/Counseling:  Education initiated (encouraged intake at best effort and use of ONS)   Coordination of Nutrition Care:  Continue to monitor while inpatient    Goals:  Patient will tolerate and consume 75% or more of meals during LOS       Nutrition Monitoring and Evaluation:   Behavioral-Environmental Outcomes:  None Identified   Food/Nutrient Intake Outcomes:  Diet Advancement/Tolerance, Food and Nutrient Intake, Supplement Intake  Physical Signs/Symptoms Outcomes:  Biochemical Data, Chewing or Swallowing, GI Status, Fluid Status or Edema, Nutrition Focused Physical Findings, Skin, Weight     Discharge Planning:     Too soon to determine     Electronically signed by Cristian Zhu RD, LD on 9/24/21 at 12:18 PM EDT    Contact: [phone]

## 2021-09-24 NOTE — PROGRESS NOTES
Nephrology Progress Note    Patient - Brandy Richmond   MRN -  [id number]   Acct # - [de-identified]      - 11/10/1930    80 y.o. Admit Date: 2021  Hospital Day: 11  Location: [facility]  Date of evaluation -  2021    Subjective:   CC: shortness of breath  Denies shortness of breath   Lethargic  Pt seen during hemodialysis, Hemodynamically stable, 3 K bath, No Ultrafiltration today  BP Range: Systolic (18HZC), ICA:151 , Min:123 , ZRE:542      Diastolic (00XMZ), NMF:56, Min:55, Max:96    Objective:   VITALS:  BP (!) 125/55   Pulse 57   Temp 98.6 °F (37 °C)   Resp 18   Ht 5' 6\" (1.676 m)   Wt 102 lb 11.8 oz (46.6 kg)   SpO2 94%   BMI 16.58 kg/m²    Patient Vitals for the past 24 hrs:   BP Temp Temp src Pulse Resp SpO2 Weight   21 0858 (!) 125/55 98.6 °F (37 °C) -- 57 18 -- 102 lb 11.8 oz (46.6 kg)   21 0800 123/65 98.9 °F (37.2 °C) Oral 73 16 94 % --   21 0230 (!) 130/96 97.9 °F (36.6 °C) Oral 68 18 96 % --   21 2115 134/63 98 °F (36.7 °C) Oral 66 18 98 % --   21 1436 129/68 97.7 °F (36.5 °C) Oral 58 16 98 % --   21 1130 (!) 162/70 98.8 °F (37.1 °C) Rectal 74 18 94 % --     2 L/min    Intake/Output Summary (Last 24 hours) at 2021 1037  Last data filed at 2021 0253  Gross per 24 hour   Intake 1953.81 ml   Output 0 ml   Net 1953.81 ml       Admission weight: 163 lb (73.9 kg)  Patient Vitals for the past 96 hrs (Last 3 readings):   Weight   21 0858 102 lb 11.8 oz (46.6 kg)     Body mass index is 16.58 kg/m². EXAM:  CONSTITUTIONAL:  No acute distress. HEENT:  Head is normocephalic, Extraocular movement intact. Neck is supple. CARDIOVASCULAR:  S1, S2  regular rate and rhythm. RESPIRATORY: Clear to ausculation bilaterally. Equal breath sounds. No wheezes. No shortness of breath noted at rest.  ABDOMEN: soft, non tender  NEUROLOGICAL: Patient is lethargic and oriented to person, place. Recent and remote memory partially intact.   SKIN: no rash, No significant bruises on exposed surfaces  MUSCULOSKELETAL: Movement is coordinated. Moves all extremities   EXTREMITIES: Distal lower extremity temp is warm, No bilateral symmetric lower extremity edema. Upper extremity AV Fistula   PSYCHIATRIC: mood and affect appropriate. Medications:   Med reviewed  Scheduled Meds:   heparin (porcine)  5,000 Units SubCUTAneous 3 times per day    epoetin traci-epbx  6,000 Units SubCUTAneous Once per day on Mon Wed Fri    calcitRIOL  1.5 mcg Oral Once per day on Mon Wed Fri    cinacalcet  60 mg Oral Once per day on Mon Wed Fri    docusate sodium  100 mg Oral BID    ferrous sulfate  325 mg Oral Daily with breakfast    gabapentin  100 mg Oral TID    metoprolol  100 mg Oral BID    pantoprazole  40 mg Oral QAM AC    vitamin C  500 mg Oral Daily    polyethylene glycol  17 g Oral Daily    sodium chloride flush  5-40 mL IntraVENous 2 times per day    clopidogrel  75 mg Oral Daily     Continuous Infusions:   dextrose 100 mL/hr (09/23/21 1003)    sodium chloride       PRN Meds glucose, dextrose, glucagon (rDNA), dextrose, sodium chloride flush, sodium chloride, ondansetron **OR** ondansetron, polyethylene glycol, acetaminophen **OR** acetaminophen, guaiFENesin-dextromethorphan, albuterol sulfate HFA   Labs:   Labs reviewed  Recent Labs     09/22/21  1110 09/23/21  0510   WBC 7.8 6.5   RBC 3.04* 2.73*   HGB 9.8* 8.8*   HCT 27.4* 26.6*   MCV 90.1 97.4*   MCH 32.2 32.2    231       Recent Labs     09/22/21 1110 09/22/21  1110 09/23/21  0510     --  136   K 3.1*  --  4.5   CL 95*  --  96*   CO2 25  --  26   BUN 17  --  29*   CREATININE 3.2*  --  5.0*   LABGLOM 22*   < > 13*   GLUCOSE 138*  --  86   MG 1.8  --  2.2   CALCIUM 7.8*  --  8.0*    < > = values in this interval not displayed. ASSESSMENT:  1. 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 AV fistula. 2. COVID 19 pneumonia  3.  Essential Hypertension with nephrosclerosis  4. Abdominal aortic aneurysm 3.7 cm   5. 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 Coronary artery disease Recent PCI 8/31/21 at [facility]  7. Anemia of chronic disease on ERYTHROPOIESIS-STIMULATING AGENT   8. Hypophosphatemia, Na Phos not available. Replace with K Phos 20 mmols today. Check K and Phos in AM  9. Secondary hyperparathyroidism of renal origin on rocaltrol and sensipar. Calcium ok with corrected for albumin  10. Debility, planned discharge to [facility]    Active Problems:    Pneumonia due to COVID-19 virus    Severe malnutrition ([address]  Resolved Problems:    * No resolved hospital problems.  XIN Sanchez CNP 10:37 AM 9/24/2021

## 2021-09-24 NOTE — PROGRESS NOTES
Hospitalist Progress Note    Patient:  Bora Guerra    Unit/Bed:5K-14/014-A  YOB: 1930  MRN: [id number]   Acct: [de-identified]   PCP: Mi Mariee MD  Code Status: Limited  Date of Admission: 9/13/2021    Expected Discharge: 1-2 days. Dialysis today. Disposition: ECF, precert pending. Assessment/Plan:    1. COVID-19 pneumonia: treated with dexamethasone 6mg daily. First positive test 09/13. Pt stable on RA. Isolation no longer required. 2. Possible superimposed bacterial PNA: Completed treatment with Rocephin and Azithromycin     3. Acute hypoxic respiratory failure: 2/2 above. Resolved. 4. ESRD on HD: Nephrology following. Pt did not tolerate diaylsis 9/22, rapid called. 9/23: Nephrology dc'ed IVF, plan for dialysis tomorrow. 5. S/p unwitnessed fall: No apparent injuries, CT brain and cervical spine -unremarkable     6. Dark tarry stools:  No further episodes, hemoglobin stable.  Continue to monitor    7. Elevated troponin: chronically elevated, likely d/t ESRD. No chest pain, repeat troponin improved, no acute ischemic changes on ekg. Pt had recent LHC. 8. CAD s/p PCI: Patient had 2 stents (LAD and Ramus) at [facility] on 08/31/21. Will continue with aspirin, Plavix, metoprolol and pravastatin. 9. Code status: Limited CODE STATUS no CPR, no resuscitation and no intubation. 10. H/o NSVT / Sick sinus syndrome: noted, has PPM/ICD. Tele ordered. 11. Chronic HFrEF: EF 45% and grade 1 diastolic dysfunction on Echo 05/2021. Follows with [facility]iology at [facility]. Fluid status stable, Nephrology following. Cont home meds. Chief Complaint: SOB    HPI / Hospital Course: Per HPI: \"The patient is a 80 y.o. male who presents with complaints of shortness of breath and cough for the last 2 days. Patient is accompanied by family who helps with history. They report that patient has had a poor appetite, fatigued, short of breath and coughing for last 2 days. He recently was at [facility] approximately 2 weeks ago where he had 2 stents placed. They state he was in his normal state of health until 2 days ago. Patient was seen in dialysis today and had a full session however continued to be short of breath and had a significant cough and was sent for evaluation. He denies any fevers or chills, nausea or vomiting. He denies any diarrhea or constipation. He denies any chest pain.     In the ED patient was found to be Covid positive. He was placed on 2 L nasal cannula. He was also found to have slightly elevated troponin and was started on heparin drip in the ED. Family updated on test results. \"    Pt has completed treatment for COVID and superimposed bacterial PNA. I took over care 9/22, per previous note, pt stable for discharge and awaiting precert to ECF.      4/09-> Rapid response called during dialysis. Pt had vomited, became lethargic and had episode of AF RVR on the monitor per report. Dialysis was stopped. Patient still lethargic but following commands and nods to answer questions appropriately. EKG showed NSR, does not appear to be AF. Patient is moaning which per nursing, he typically does during dialysis. Patient seen again later. He reports fatigue and wanting to sleep. He denies abdominal pain, n/v/d, CP, SOB, fever/chills. Pt had bleeding from dialysis fistulasite. 9/23-> Patient alert to verbal stimuli, lethargic. Appears to be at his baseline. Denies fever/chills, sob, cp, abd pain, n/v/d. Nephrology started IVF for today and plan for dialysis tomorrow. Patient has been NSR on tele. Subjective (past 24 hours):  Pt appears at baseline. He is more alert today than he has been. Tolerated dialysis well. Complaining of pain in arm at catheter site, nephrology was notified. ROS: Pertinent positives as noted in HPI. All other systems reviewed and negative.       Past medical history, family history, social history and allergies reviewed again and is unchanged since admission. Medications:  Reviewed. Infusion Medications    dextrose 100 mL/hr (09/23/21 1003)    sodium chloride       Scheduled Medications    heparin (porcine)  5,000 Units SubCUTAneous 3 times per day    epoetin traci-epbx  6,000 Units SubCUTAneous Once per day on Mon Wed Fri    calcitRIOL  1.5 mcg Oral Once per day on Mon Wed Fri    cinacalcet  60 mg Oral Once per day on Mon Wed Fri    docusate sodium  100 mg Oral BID    ferrous sulfate  325 mg Oral Daily with breakfast    gabapentin  100 mg Oral TID    metoprolol  100 mg Oral BID    pantoprazole  40 mg Oral QAM AC    vitamin C  500 mg Oral Daily    polyethylene glycol  17 g Oral Daily    sodium chloride flush  5-40 mL IntraVENous 2 times per day    clopidogrel  75 mg Oral Daily     PRN Meds: glucose, dextrose, glucagon (rDNA), dextrose, sodium chloride flush, sodium chloride, ondansetron **OR** ondansetron, polyethylene glycol, acetaminophen **OR** acetaminophen, guaiFENesin-dextromethorphan, albuterol sulfate HFA    I/O:     Intake/Output Summary (Last 24 hours) at 9/24/2021 0846  Last data filed at 9/24/2021 0253  Gross per 24 hour   Intake 2798.01 ml   Output 0 ml   Net 2798.01 ml       Diet:  ADULT DIET; Regular; Low Potassium (Less than 3000 mg/day); 1200 ml  Adult Oral Nutrition Supplement; Renal Oral Supplement    Exam:  /65   Pulse 73   Temp 98.9 °F (37.2 °C) (Oral)   Resp 16   Ht 5' 6\" (1.676 m)   Wt 126 lb 8.7 oz (57.4 kg)   SpO2 94%   BMI 20.42 kg/m²   General:  Chronically ill appearing male. NAD. HEENT:  normocephalic and atraumatic. No scleral icterus. PERR. Neck: supple. No JVD. No thyromegaly. Lungs: clear to auscultation. No retractions  Cardiac: RRR without murmur. Abdomen: soft. Nontender. Bowel sounds positive. Extremities:  No clubbing, cyanosis, or edema x 4. Vasculature: capillary refill < 3 seconds. Palpable LE pulses bilaterally. Skin:  warm and dry.   Psych: Alert and oriented x3. Lethargic. Lymph:  No supraclavicular adenopathy. Neurologic:  No focal deficit. No seizures. Data: (All radiographs, tracings, PFTs, and imaging are personally viewed and interpreted unless otherwise noted)  Labs:   Recent Labs     09/22/21  1110 09/23/21  0510   WBC 7.8 6.5   HGB 9.8* 8.8*   HCT 27.4* 26.6*    231     Recent Labs     09/22/21  1110 09/23/21  0510    136   K 3.1* 4.5   CL 95* 96*   CO2 25 26   BUN 17 29*   CREATININE 3.2* 5.0*   CALCIUM 7.8* 8.0*   PHOS 1.7*  --      No results for input(s): AST, ALT, BILIDIR, BILITOT, ALKPHOS in the last 72 hours. Recent Labs     09/23/21  0510   INR 0.96     No results for input(s): Assunta Luna in the last 72 hours. Urinalysis:   Lab Results   Component Value Date    NITRU NEGATIVE 06/22/2019    WBCUA 50-75 06/22/2019    BACTERIA NONE 06/22/2019    RBCUA 25-50 06/22/2019    BLOODU LARGE 06/22/2019    SPECGRAV 1.013 05/03/2016    GLUCOSEU NEGATIVE 06/22/2019     Urine culture: No results found for: Genna Crawford  Micro:   Blood culture #1:   Lab Results   Component Value Date    BC No growth-preliminary No growth  09/13/2021     Blood culture #2:No results found for: Gerhardt Hones  Organism:  Lab Results   Component Value Date    ORG Micrococcus species 07/12/2021       No results found for: LABGRAM  MRSA culture only:No results found for: [facility]  Respiratory culture: No results found for: CULTRESP  Aerobic and Anaerobic :  No results found for: LABAERO  No results found for: [facility]    Radiology Reports:  XR CHEST PORTABLE   Final Result   1. No acute intrathoracic process. 2. Mild stable cardiomegaly. **This report has been created using voice recognition software. It may contain minor errors which are inherent in voice recognition technology. **      Final report electronically signed by Dr. Reji Aldana on 9/22/2021 9:52 PM      CT HEAD WO CONTRAST   Final Result      1.  Stable CT scan of the brain, Hospital Problems    Diagnosis Date Noted    Severe malnutrition ([facility] [E43] 09/22/2021     Class: Acute    Pneumonia due to COVID-19 virus [U07.1, J12.82] 09/13/2021       Electronically signed by Ree Acevedo PA-C on 9/24/2021 at 8:46 AM

## 2021-09-24 NOTE — CARE COORDINATION
9/24/21, 11:54 AM EDT    DISCHARGE PLANNING EVALUATION      Spoke with [employer] [facility] admissions. Artem Souza questioned if patient can be skilled for medicare. He is still working with PT/OT and going to dialysis. Artem Souza will continue to review, no bed available yet.

## 2021-09-24 NOTE — PROGRESS NOTES
[address] 60  PHYSICAL THERAPY MISSED TREATMENT NOTE  [facility]    Date: 2021  Patient Name: Jimena Painter        MRN: [id number]   : 11/10/1930  (80 y.o.)  Gender: male   Referring Practitioner: Dr. Adarsh Tian  Diagnosis: acute pulmonary edema         REASON FOR MISSED TREATMENT:  Patient still off unit for dialysis. Will check back at a later date. Shelton Gusman

## 2021-09-25 NOTE — PLAN OF CARE
Problem: Airway Clearance - Ineffective  Goal: Achieve or maintain patent airway  Outcome: Ongoing  Note: 02 nasal cannula 3 liters , occasional cough, lung sounds diminished, encouraged use of acapella     Problem: Gas Exchange - Impaired  Goal: Absence of hypoxia  Outcome: Ongoing  Note: O2 nasal cannula, 3 liters 98%     Problem: Breathing Pattern - Ineffective  Goal: Ability to achieve and maintain a regular respiratory rate  Outcome: Ongoing  Note: Encourage deep breath and cough      Problem:  Body Temperature -  Risk of, Imbalanced  Goal: Ability to maintain a body temperature within defined limits  Outcome: Ongoing  Note: No fever      Problem: Nutrition Deficits  Goal: Optimize nutritional status  Outcome: Ongoing  Note: Monitor weight and intake and output as ordered      Problem: Risk for Fluid Volume Deficit  Goal: Maintain normal heart rhythm  Outcome: Ongoing     Problem: Fatigue  Goal: Verbalize increase energy and improved vitality  Outcome: Ongoing  Note: Take rest breaks as needed      Problem: Falls - Risk of:  Goal: Will remain free from falls  Description: Will remain free from falls  Outcome: Ongoing  Note: Fall precautions In place , up with 2 assist and walker, gait belt , non skid slippers      Problem: Falls - Risk of:  Goal: Absence of physical injury  Description: Absence of physical injury  Outcome: Ongoing     Problem: Skin Integrity:  Goal: Will show no infection signs and symptoms  Description: Will show no infection signs and symptoms  Outcome: Ongoing  Note: Monitor vs, monitor labs as ordered      Problem: Skin Integrity:  Goal: Absence of new skin breakdown  Description: Absence of new skin breakdown  Outcome: Ongoing     Problem: Musculor/Skeletal Functional Status  Goal: Highest potential functional level  Outcome: Ongoing     Problem: Pain:  Goal: Pain level will decrease  Description: Pain level will decrease  Outcome: Ongoing  Note: No pain at this time      Problem: Pain:  Goal: Control of acute pain  Description: Control of acute pain  Outcome: Ongoing

## 2021-09-25 NOTE — PROGRESS NOTES
Hospitalist Progress Note    Patient:  Becki Alexander    Unit/Bed:5K-14/014-A  YOB: 1930  MRN: [id number]   Acct: [de-identified]   PCP: Bill Ricardo MD  Code Status: Limited  Date of Admission: 9/13/2021    Expected Discharge: 1-2 days. Dialysis today. Disposition: ECF, precert pending. Assessment/Plan:    1. COVID-19 pneumonia: treated with dexamethasone 6mg daily. First positive test 09/13. Pt stable on RA. Isolation no longer required. 2. Possible superimposed bacterial PNA: Completed treatment with Rocephin and Azithromycin     3. Acute hypoxic respiratory failure: 2/2 above. Resolved. 9/25-> Pt placed on O2 overnight after rapid was called. PO2 on ABG was 170, no hypercapnea. No acute changes on EKG, no evidence of fluid overload. Pt is currently sating 98% on 3L - wean O2 as tolerated. 4. ESRD on HD: Nephrology following. Pt did not tolerate diaylsis 9/22, rapid called. 9/23: Nephrology dc'ed IVF, plan for dialysis tomorrow. 5. S/p unwitnessed fall: No apparent injuries, CT brain and cervical spine -unremarkable     6. Dark tarry stools:  No further episodes, hemoglobin stable.  Continue to monitor    7. Elevated troponin: chronically elevated, likely d/t ESRD. No chest pain, repeat troponin improved, no acute ischemic changes on ekg. Pt had recent LHC. 8. CAD s/p PCI: Patient had 2 stents (LAD and Ramus) at [facility] on 08/31/21. Will continue with aspirin, Plavix, metoprolol and pravastatin. 9. Code status: Limited CODE STATUS no CPR, no resuscitation and no intubation. 10. H/o NSVT / Sick sinus syndrome: noted, has PPM/ICD. Tele ordered. Per report, pt had AF overnight. However, after review of EKG, P waves were present and did not appear to be in a fib. Pt typically follows with Cardiology at [facility], unclear if he has a hx of AF. He is not on anticoagulation. If AF, the risks of anticoagulation may be greater than the benefit.  CHADSVASc 4, HAS-BLED 4. Will order pacer interrogation and further recs to follow. 11. Chronic HFrEF: EF 45% and grade 1 diastolic dysfunction on Echo 05/2021. Follows with Cardiology at [facility]. Fluid status stable, Nephrology following. Cont home meds. 12. Chronic anemia: likely anemia of chronic disease, will order anemia workup. Hgb is stable with baseline. He had drop in Hgb this AM; however, repeated and came up without intervention. Chief Complaint: SOB    HPI / Hospital Course: Per HPI: \"The patient is a 80 y.o. male who presents with complaints of shortness of breath and cough for the last 2 days. Patient is accompanied by family who helps with history. They report that patient has had a poor appetite, fatigued, short of breath and coughing for last 2 days. He recently was at [facility] approximately 2 weeks ago where he had 2 stents placed. They state he was in his normal state of health until 2 days ago. Patient was seen in dialysis today and had a full session however continued to be short of breath and had a significant cough and was sent for evaluation. He denies any fevers or chills, nausea or vomiting. He denies any diarrhea or constipation. He denies any chest pain.     In the ED patient was found to be Covid positive. He was placed on 2 L nasal cannula. He was also found to have slightly elevated troponin and was started on heparin drip in the ED. Family updated on test results. \"    Pt has completed treatment for COVID and superimposed bacterial PNA. I took over care 9/22, per previous note, pt stable for discharge and awaiting precert to ECF.      7/04-> Rapid response called during dialysis. Pt had vomited, became lethargic and had episode of AF RVR on the monitor per report. Dialysis was stopped. Patient still lethargic but following commands and nods to answer questions appropriately. EKG showed NSR, does not appear to be AF.  Patient is moaning which per nursing, he typically does during dialysis. Patient seen again later. He reports fatigue and wanting to sleep. He denies abdominal pain, n/v/d, CP, SOB, fever/chills. Pt had bleeding from dialysis fistulasite. 9/23-> Patient alert to verbal stimuli, lethargic. Appears to be at his baseline. Denies fever/chills, sob, cp, abd pain, n/v/d. Nephrology started IVF for today and plan for dialysis tomorrow. Patient has been NSR on tele. 9/24-> Pt appears at baseline. He is more alert today than he has been. Tolerated dialysis well. Subjective (past 24 hours): Patient is alert, oriented to self, place, time, and somewhat to situation. He denies fever/chills, SOB, CP, abd pain, n/v/d, cough. He reports of left arm pain at dialysis cath site. The patient states that he has been told before that his heart beat gets \"out of wack. \"   Chart reviewed and a rapid was called last night they were unable to get a reading of O2 levels. ABG results with pO2 179, no hypercapnea. Based on ABG did not appear to be truly hypoxic so he was put back on 6L NC and nursing instructed to wean. His EKG reported atrial fibrillation; however, after review, there were P waves present. Ordered pacer interrogate. ROS: Pertinent positives as noted in HPI. All other systems reviewed and negative. Past medical history, family history, social history and allergies reviewed again and is unchanged since admission. Medications:  Reviewed.   Infusion Medications    dextrose 100 mL/hr (09/23/21 1003)    sodium chloride       Scheduled Medications    ipratropium-albuterol  1 ampule Inhalation Q4H    heparin (porcine)  5,000 Units SubCUTAneous 3 times per day    epoetin traci-epbx  6,000 Units SubCUTAneous Once per day on Mon Wed Fri    calcitRIOL  1.5 mcg Oral Once per day on Mon Wed Fri    cinacalcet  60 mg Oral Once per day on Mon Wed Fri    docusate sodium  100 mg Oral BID    ferrous sulfate  325 mg Oral Daily with breakfast    gabapentin  100 mg Oral TID    metoprolol  100 mg Oral BID    pantoprazole  40 mg Oral QAM AC    vitamin C  500 mg Oral Daily    polyethylene glycol  17 g Oral Daily    sodium chloride flush  5-40 mL IntraVENous 2 times per day    clopidogrel  75 mg Oral Daily     PRN Meds: glucose, dextrose, glucagon (rDNA), dextrose, sodium chloride flush, sodium chloride, ondansetron **OR** ondansetron, polyethylene glycol, acetaminophen **OR** acetaminophen, guaiFENesin-dextromethorphan, albuterol sulfate HFA    I/O:     Intake/Output Summary (Last 24 hours) at 9/25/2021 0949  Last data filed at 9/24/2021 2231  Gross per 24 hour   Intake 410 ml   Output 400 ml   Net 10 ml       Diet:  ADULT DIET; Regular; Low Potassium (Less than 3000 mg/day); 1200 ml  Adult Oral Nutrition Supplement; Renal Oral Supplement    Exam:  /87   Pulse 72   Temp 98.4 °F (36.9 °C) (Oral)   Resp 18   Ht 5' 6\" (1.676 m)   Wt 102 lb 11.8 oz (46.6 kg)   SpO2 99%   BMI 16.58 kg/m²   General:  Chronically ill appearing male. NAD. HEENT:  normocephalic and atraumatic. No scleral icterus. PERR. Neck: supple. No JVD. No thyromegaly. Lungs: clear to auscultation. No retractions  Cardiac: RRR without murmur. Abdomen: soft. Nontender. Bowel sounds positive. Extremities:  No clubbing, cyanosis, or edema x 4. Vasculature: capillary refill < 3 seconds. Palpable LE pulses bilaterally. Skin:  warm and dry. Psych:  Alert and oriented x3. Lethargic. Lymph:  No supraclavicular adenopathy. Neurologic:  No focal deficit. No seizures.       Data: (All radiographs, tracings, PFTs, and imaging are personally viewed and interpreted unless otherwise noted)  Labs:   Recent Labs     09/23/21  0510 09/24/21  2212 09/25/21  0531   WBC 6.5 6.0 5.5   HGB 8.8* 8.1* 7.1*   HCT 26.6* 24.0* 21.1*    202 190     Recent Labs     09/22/21  1110 09/23/21  0510 09/24/21  1108 09/24/21  2212 09/25/21  0531   NA  --  136  --  132* 134*   K  --  4.5  --  4.3 4.3   CL  -- 96*  --  94* 96*   CO2  --  26  --  28 27   BUN  --  29*  --  15 18   CREATININE  --  5.0*  --  3.6* 4.3*   CALCIUM  --  8.0*  --  7.5* 7.2*   PHOS   < >  --  1.4* 2.2* 2.9    < > = values in this interval not displayed. No results for input(s): AST, ALT, BILIDIR, BILITOT, ALKPHOS in the last 72 hours. Recent Labs     09/23/21  0510   INR 0.96     No results for input(s): Cesia Acevedo in the last 72 hours. Urinalysis:   Lab Results   Component Value Date    NITRU NEGATIVE 06/22/2019    WBCUA 50-75 06/22/2019    BACTERIA NONE 06/22/2019    RBCUA 25-50 06/22/2019    BLOODU LARGE 06/22/2019    SPECGRAV 1.013 05/03/2016    GLUCOSEU NEGATIVE 06/22/2019     Urine culture: No results found for: Patricia Young  Micro:   Blood culture #1:   Lab Results   Component Value Date    BC No growth-preliminary No growth  09/13/2021     Blood culture #2:No results found for: Beatricechristel Ferrer  Organism:  Lab Results   Component Value Date    ORG Micrococcus species 07/12/2021       No results found for: LABGRAM  MRSA culture only:No results found for: [facility]  Respiratory culture: No results found for: CULTRESP  Aerobic and Anaerobic :  No results found for: LABAERO  No results found for: [facility]    Radiology Reports:  XR CHEST PORTABLE   Final Result   Impression:      Increasing consolidation and interstitial prominence. Question pulmonary edema versus pneumonia      This document has been electronically signed by: Maya Nelson MD on    09/24/2021 10:34 PM      XR CHEST PORTABLE   Final Result   1. No acute intrathoracic process. 2. Mild stable cardiomegaly. **This report has been created using voice recognition software. It may contain minor errors which are inherent in voice recognition technology. **      Final report electronically signed by Dr. Juliana Ponce on 9/22/2021 9:52 PM      CT HEAD WO CONTRAST   Final Result      1.  Stable CT scan of the brain, no interval change since previous study dated 15th malnutrition ([facility].) [E43] 09/22/2021     Class: Acute    Pneumonia due to COVID-19 virus [U07.1, J12.82] 09/13/2021       Electronically signed by Georges Evangelista PA-C on 9/25/2021 at 9:49 AM

## 2021-09-25 NOTE — PROGRESS NOTES
This nurse was getting vitals, could not get a reading on O2 levels, tried multiple machines, portal O@ monitor, and nose, ear and finger continuous pulse oxes. O@ level came up as 72% on 2L NC. This nurse bumped him up to 6L NC and told him to take deep breaths. His O2 went up to 73%. Patient was placed on nonrebreather at 15L and went up to 85%. Rapid called at 2146.     2150: Dr Amadeo Nguyen, Dr Judge Myers and José Sutherland. arrived, O2 at fluctuating between 74-77%. 2155: respiratory drawing ABG     2152: chest xray ordered and called to come up. EKG showed up     2157: nebulizer albuterol administered by respiratory. 2204: EKG came back showing a-fib. CBC, BMP, troponin, mag, and phosphorus ordered and drawn. O2 @ 100% on nonrebreather. 2205: xray resulted, no fluid found. 2208:  ordered nebulizer treatments every 4 hours. 2212 ABG redrawn     2218 O2 @ 96% on forehead    2220: ABG came back good,  said put on nasal canula 6L and wean throughout the night.

## 2021-09-25 NOTE — PROGRESS NOTES
Renal Progress Note  [facility]    Patient :  Marcos Barreto; 80 y.o. MRN# [id number]  Location:  5-14/014-A  Attending:  Elenita Gonzales PA-C  Admit Date:  9/13/2021   Hospital Day: 12      Subjective:     Nephrology is following the patient for ESRD. Patient seen and examined. He was hypoxic overnight. CXR showed increasing consolidation. He is on 3 L NC currently. Appears comfortable. Outpatient Medications:     Medications Prior to Admission: albuterol sulfate  (90 Base) MCG/ACT inhaler, Inhale 1 puff into the lungs every 4 hours as needed  albuterol sulfate  (90 Base) MCG/ACT inhaler, Inhale 1 puff into the lungs every 4 hours as needed  amLODIPine (NORVASC) 10 MG tablet, Take 10 mg by mouth daily  ASPIRIN LOW DOSE 81 MG EC tablet, Take 81 mg by mouth daily  atorvastatin (LIPITOR) 40 MG tablet, Take 40 mg by mouth daily  cloNIDine (CATAPRES) 0.1 MG tablet, Take 0.1 mg by mouth daily  clopidogrel (PLAVIX) 75 MG tablet, Take 75 mg by mouth daily  hydrALAZINE (APRESOLINE) 50 MG tablet, Take 50 mg by mouth daily  ferrous sulfate (IRON 325) 325 (65 Fe) MG tablet, Take 325 mg by mouth daily  metoprolol (LOPRESSOR) 100 MG tablet, Take 1 tablet by mouth 2 times daily  calcitRIOL (ROCALTROL) 0.25 MCG capsule, Take 1.5 mcg by mouth three times a week before dialysis on M,W,F  cyclobenzaprine (FLEXERIL) 10 MG tablet, Take 10 mg by mouth daily  metoprolol succinate (TOPROL XL) 50 MG extended release tablet, Take 50 mg by mouth daily  cinacalcet (SENSIPAR) 30 MG tablet, Take 60 mg by mouth three times a week before dialysis on M,W,F  [DISCONTINUED] traMADol (ULTRAM) 50 MG tablet, Take 50 mg by mouth every 8 hours as needed for Pain.   [DISCONTINUED] hydrALAZINE (APRESOLINE) 25 MG tablet, Take 1 tablet by mouth 2 times daily  pantoprazole (PROTONIX) 40 MG tablet, TAKE 1 TABLET DAILY BEFORE BREAKFAST  docusate sodium (COLACE, DULCOLAX) 100 MG CAPS, Take 100 mg by mouth 2 times daily  vitamin C (ASCORBIC ACID) 500 MG tablet, Take 1 tablet by mouth daily  [DISCONTINUED] ferrous sulfate 325 (65 Fe) MG tablet, Take 1 tablet by mouth daily (with breakfast)  polyethylene glycol (MIRALAX) powder, Renal Miralax Colonoscopy prep. Use as directed. Mix Miralax 238 g with 24 oz clear liquids. Drink 8 oz glass every 20-30 minutes until gone. [DISCONTINUED] lisinopril (PRINIVIL;ZESTRIL) 20 MG tablet, Take 1 tablet by mouth daily  B Complex-C-Folic Acid (RENAL) 1 MG CAPS, Take 1 capsule by mouth daily  [DISCONTINUED] isosorbide mononitrate (IMDUR) 30 MG extended release tablet, Take 30 mg by mouth daily  [DISCONTINUED] gabapentin (NEURONTIN) 100 MG capsule, Take 100 mg by mouth 3 times daily. [DISCONTINUED] pravastatin (PRAVACHOL) 80 MG tablet, Take 1 tablet by mouth daily    Current Medications:     Scheduled Meds:    aspirin  81 mg Oral Daily    ipratropium-albuterol  1 ampule Inhalation Q4H    heparin (porcine)  5,000 Units SubCUTAneous 3 times per day    epoetin traci-epbx  6,000 Units SubCUTAneous Once per day on Mon Wed Fri    calcitRIOL  1.5 mcg Oral Once per day on Mon Wed Fri    cinacalcet  60 mg Oral Once per day on Mon Wed Fri    docusate sodium  100 mg Oral BID    ferrous sulfate  325 mg Oral Daily with breakfast    gabapentin  100 mg Oral TID    metoprolol  100 mg Oral BID    pantoprazole  40 mg Oral QAM AC    vitamin C  500 mg Oral Daily    polyethylene glycol  17 g Oral Daily    sodium chloride flush  5-40 mL IntraVENous 2 times per day    clopidogrel  75 mg Oral Daily     Continuous Infusions:    dextrose 100 mL/hr (09/23/21 1003)    sodium chloride       PRN Meds:  glucose, dextrose, glucagon (rDNA), dextrose, sodium chloride flush, sodium chloride, ondansetron **OR** ondansetron, polyethylene glycol, acetaminophen **OR** acetaminophen, guaiFENesin-dextromethorphan, albuterol sulfate HFA    Input/Output:       I/O last 3 completed shifts:   In: 510 [P.O.:100; I.V.:10]  Out: 400 . Patient Vitals for the past 96 hrs (Last 3 readings):   Weight   21 1320 102 lb 11.8 oz (46.6 kg)   21 0858 102 lb 11.8 oz (46.6 kg)       Vital Signs:   Temperature:  Temp: 98.8 °F (37.1 °C)  TMax:   Temp (24hrs), Av.9 °F (37.2 °C), Min:98.4 °F (36.9 °C), Max:99.2 °F (37.3 °C)    Respirations:  Resp: 16  Pulse:   Pulse: 71  BP:    BP: 122/60  BP Range: Systolic (28EDY), WZF:544 , Min:121 , TVD:449       Diastolic (71BQZ), HJV:99, Min:56, Max:87      Physical Examination:     General:  Awake, no acute distress  HEENT: NC/AT/ MMM  Chest:               Faint rales b/l bases  Cardiac:  S1 S2   Abdomen: Soft, non-tender,  Neuro:  No facial droop, No Asterixis  SKIN:  No rashes, good skin turgor. Extremities:  No edema, no cyanosis +right arm AV fistula    Labs:       Recent Labs     21  0510 09/24/21  2212 09/25/21  0531   WBC 6.5 6.0 5.5   RBC 2.73* 2.57* 2.25*   HGB 8.8* 8.1* 7.1*   HCT 26.6* 24.0* 21.1*   MCV 97.4* 93.4 93.8   MCH 32.2 31.5 31.6   MCHC 33.1 33.8 33.6    202 190   MPV 9.7 9.9 9.8      BMP:   Recent Labs     21  0510 21  0531    132* 134*   K 4.5 4.3 4.3   CL 96* 94* 96*   CO2 26 28 27   BUN 29* 15 18   CREATININE 5.0* 3.6* 4.3*   GLUCOSE 86 90 84   CALCIUM 8.0* 7.5* 7.2*      Phosphorus:     Recent Labs     21  1108 21  0531   PHOS 1.4* 2.2* 2.9     Magnesium:    Recent Labs     21  0510 21  2212   MG 2.2 1.8     Albumin:  No results for input(s): LABALBU in the last 72 hours.   BNP:    No results found for: BNP  RANJITH:    No results found for: RANJITH  SPEP:  Lab Results   Component Value Date    PROT 6.0 2021     UPEP:   No results found for: LABPE  C3:   No results found for: C3  C4:   No results found for: C4  MPO ANCA:   No results found for: MPO  PR3 ANCA:   No results found for: PR3  Anti-GBM:   No results found for: GBMABIGG  Hep BsAg:         Lab Results   Component Value Date    HEPBSAG Nonreactive 08/30/2017     Hep C AB:        No results found for: HEPCAB    Urinalysis/Chemistries:      Lab Results   Component Value Date    NITRU NEGATIVE 06/22/2019    COLORU STRAW 06/22/2019    PHUR 8.5 06/22/2019    LABCAST NONE SEEN 05/03/2016    LABCAST NONE SEEN 05/03/2016    WBCUA 50-75 06/22/2019    RBCUA 25-50 06/22/2019    MUCUS THREADS 10/28/2017    YEAST NONE SEEN 06/22/2019    BACTERIA NONE 06/22/2019    SPECGRAV 1.013 05/03/2016    LEUKOCYTESUR LARGE 06/22/2019    UROBILINOGEN 0.2 06/22/2019    BILIRUBINUR NEGATIVE 06/22/2019    BLOODU LARGE 06/22/2019    GLUCOSEU NEGATIVE 06/22/2019    KETUA NEGATIVE 06/22/2019    AMORPHOUS NONE SEEN 10/28/2017     Urine Sodium:   No results found for: SCOTTY  Urine Potassium:  No results found for: KUR  Urine Chloride:  No results found for: CLUR  Urine Osmolarity:   Lab Results   Component Value Date    OSMOU 570 09/12/2011     Urine Protein:   No components found for: TOTALPROTEIN, URINE   Urine Creatinine:   No results found for: LABCREA  Urine Eosinophils:  No components found for: UEOS        Impression and Plan:  1. ESRD on HD MWF, had HD yesterday  2. Mild hyponatremia  3. Anemia, worsening. On ALEJANDRO  4. Secondary hyperparathyroidism  5. Hypophosphatemia, improved  6. Acute hypoxic resp failure  7. COVID 19 pneumonia  8. Systolic CHF         Please don't hesitate to call with any questions.   Electronically signed by Osmany Jeter DO on 9/25/2021 at 2:01 PM

## 2021-09-26 NOTE — PROGRESS NOTES
Renal Progress Note  [facility]    Patient :  Herber Obrien; 80 y.o. MRN# [id number]  Location:  [facility]14/014-A  Attending:  Chantelle Velasco PA-C  Admit Date:  9/13/2021   Hospital Day: 13      Subjective:     Nephrology is following the patient for ESRD. Patient seen and examined. Appears comfortable. On 2 L NC. Denies SOB. Outpatient Medications:     Medications Prior to Admission: albuterol sulfate  (90 Base) MCG/ACT inhaler, Inhale 1 puff into the lungs every 4 hours as needed  albuterol sulfate  (90 Base) MCG/ACT inhaler, Inhale 1 puff into the lungs every 4 hours as needed  amLODIPine (NORVASC) 10 MG tablet, Take 10 mg by mouth daily  ASPIRIN LOW DOSE 81 MG EC tablet, Take 81 mg by mouth daily  atorvastatin (LIPITOR) 40 MG tablet, Take 40 mg by mouth daily  cloNIDine (CATAPRES) 0.1 MG tablet, Take 0.1 mg by mouth daily  clopidogrel (PLAVIX) 75 MG tablet, Take 75 mg by mouth daily  hydrALAZINE (APRESOLINE) 50 MG tablet, Take 50 mg by mouth daily  ferrous sulfate (IRON 325) 325 (65 Fe) MG tablet, Take 325 mg by mouth daily  metoprolol (LOPRESSOR) 100 MG tablet, Take 1 tablet by mouth 2 times daily  calcitRIOL (ROCALTROL) 0.25 MCG capsule, Take 1.5 mcg by mouth three times a week before dialysis on M,W,F  cyclobenzaprine (FLEXERIL) 10 MG tablet, Take 10 mg by mouth daily  metoprolol succinate (TOPROL XL) 50 MG extended release tablet, Take 50 mg by mouth daily  cinacalcet (SENSIPAR) 30 MG tablet, Take 60 mg by mouth three times a week before dialysis on M,W,F  [DISCONTINUED] traMADol (ULTRAM) 50 MG tablet, Take 50 mg by mouth every 8 hours as needed for Pain.   [DISCONTINUED] hydrALAZINE (APRESOLINE) 25 MG tablet, Take 1 tablet by mouth 2 times daily  pantoprazole (PROTONIX) 40 MG tablet, TAKE 1 TABLET DAILY BEFORE BREAKFAST  docusate sodium (COLACE, DULCOLAX) 100 MG CAPS, Take 100 mg by mouth 2 times daily  vitamin C (ASCORBIC ACID) 500 MG tablet, Take 1 tablet by mouth daily  [DISCONTINUED] ferrous sulfate 325 (65 Fe) MG tablet, Take 1 tablet by mouth daily (with breakfast)  polyethylene glycol (MIRALAX) powder, Renal Miralax Colonoscopy prep. Use as directed. Mix Miralax 238 g with 24 oz clear liquids. Drink 8 oz glass every 20-30 minutes until gone. [DISCONTINUED] lisinopril (PRINIVIL;ZESTRIL) 20 MG tablet, Take 1 tablet by mouth daily  B Complex-C-Folic Acid (RENAL) 1 MG CAPS, Take 1 capsule by mouth daily  [DISCONTINUED] isosorbide mononitrate (IMDUR) 30 MG extended release tablet, Take 30 mg by mouth daily  [DISCONTINUED] gabapentin (NEURONTIN) 100 MG capsule, Take 100 mg by mouth 3 times daily. [DISCONTINUED] pravastatin (PRAVACHOL) 80 MG tablet, Take 1 tablet by mouth daily    Current Medications:     Scheduled Meds:    aspirin  81 mg Oral Daily    ipratropium-albuterol  1 ampule Inhalation Q4H    heparin (porcine)  5,000 Units SubCUTAneous 3 times per day    epoetin traci-epbx  6,000 Units SubCUTAneous Once per day on Mon Wed Fri    calcitRIOL  1.5 mcg Oral Once per day on Mon Wed Fri    cinacalcet  60 mg Oral Once per day on Mon Wed Fri    docusate sodium  100 mg Oral BID    ferrous sulfate  325 mg Oral Daily with breakfast    gabapentin  100 mg Oral TID    metoprolol  100 mg Oral BID    pantoprazole  40 mg Oral QAM AC    vitamin C  500 mg Oral Daily    polyethylene glycol  17 g Oral Daily    sodium chloride flush  5-40 mL IntraVENous 2 times per day    clopidogrel  75 mg Oral Daily     Continuous Infusions:    dextrose 100 mL/hr (09/23/21 1003)    sodium chloride       PRN Meds:  glucose, dextrose, glucagon (rDNA), dextrose, sodium chloride flush, sodium chloride, ondansetron **OR** ondansetron, polyethylene glycol, acetaminophen **OR** acetaminophen, guaiFENesin-dextromethorphan, albuterol sulfate HFA    Input/Output:       I/O last 3 completed shifts: In: 2311 [P.O.:850; I.V.:1461]  Out: 0 .       Patient Vitals for the past 96 hrs (Last 3 readings):   Weight   21 1320 102 lb 11.8 oz (46.6 kg)   21 0858 102 lb 11.8 oz (46.6 kg)       Vital Signs:   Temperature:  Temp: 98.5 °F (36.9 °C)  TMax:   Temp (24hrs), Av.6 °F (37 °C), Min:98.3 °F (36.8 °C), Max:98.8 °F (37.1 °C)    Respirations:  Resp: 16  Pulse:   Pulse: 64  BP:    BP: (!) 153/69  BP Range: Systolic (33HJI), OYN:309 , Min:111 , JB       Diastolic (03IHC), CPJ:45, Min:54, Max:72      Physical Examination:     General:  Awake, no acute distress  HEENT: NC/AT/ MMM  Chest:               Faint rales right lung base  Cardiac:  S1 S2   Abdomen: Soft, non-tender,  Neuro:  No facial droop, No Asterixis  SKIN:  No rashes, good skin turgor. Extremities:  No edema, no cyanosis +right arm AV fistula    Labs:       Recent Labs     21  0531 21  1853 21  0547   WBC 6.0  --  5.5  --  6.1   RBC 2.57*  --  2.25*  --  2.46*   HGB 8.1*   < > 7.1* 8.0* 7.7*   HCT 24.0*   < > 21.1* 24.7* 22.9*   MCV 93.4  --  93.8  --  93.1   MCH 31.5  --  31.6  --  31.3   MCHC 33.8  --  33.6  --  33.6     --  190  --  199   MPV 9.9  --  9.8  --  9.7    < > = values in this interval not displayed. BMP:   Recent Labs     21  0531 21  0547   * 134* 133*   K 4.3 4.3 4.8   CL 94* 96* 94*   CO2 28 27 25   BUN 15 18 33*   CREATININE 3.6* 4.3* 6.0*   GLUCOSE 90 84 81   CALCIUM 7.5* 7.2* 8.0*      Phosphorus:     Recent Labs     21  1108 21  2212 21  0531   PHOS 1.4* 2.2* 2.9     Magnesium:    Recent Labs     21   MG 1.8     Albumin:  No results for input(s): LABALBU in the last 72 hours.   BNP:    No results found for: BNP  RANJITH:    No results found for: RANJITH  SPEP:  Lab Results   Component Value Date    PROT 6.0 2021     UPEP:   No results found for: LABPE  C3:   No results found for: C3  C4:   No results found for: C4  MPO ANCA:   No results found for: MPO  PR3 ANCA:   No results found for: PR3  Anti-GBM:   No results found for: GBMABIGG  Hep BsAg:         Lab Results   Component Value Date    HEPBSAG Nonreactive 08/30/2017     Hep C AB:        No results found for: HEPCAB    Urinalysis/Chemistries:      Lab Results   Component Value Date    NITRU NEGATIVE 06/22/2019    COLORU STRAW 06/22/2019    PHUR 8.5 06/22/2019    LABCAST NONE SEEN 05/03/2016    LABCAST NONE SEEN 05/03/2016    WBCUA 50-75 06/22/2019    RBCUA 25-50 06/22/2019    MUCUS THREADS 10/28/2017    YEAST NONE SEEN 06/22/2019    BACTERIA NONE 06/22/2019    SPECGRAV 1.013 05/03/2016    LEUKOCYTESUR LARGE 06/22/2019    UROBILINOGEN 0.2 06/22/2019    BILIRUBINUR NEGATIVE 06/22/2019    BLOODU LARGE 06/22/2019    GLUCOSEU NEGATIVE 06/22/2019    KETUA NEGATIVE 06/22/2019    AMORPHOUS NONE SEEN 10/28/2017     Urine Sodium:   No results found for: SCOTTY  Urine Potassium:  No results found for: KUR  Urine Chloride:  No results found for: CLUR  Urine Osmolarity:   Lab Results   Component Value Date    OSMOU 570 09/12/2011     Urine Protein:   No components found for: TOTALPROTEIN, URINE   Urine Creatinine:   No results found for: LABCREA  Urine Eosinophils:  No components found for: UEOS        Impression and Plan:  1. ESRD on HD MWF, will plan for HD tomorrow  2. Mild hyponatremia  3. Anemia,continue  ALEJANDRO  4. Secondary hyperparathyroidism  5. Hypophosphatemia, improved  6. Acute hypoxic resp failure  7. COVID 19 pneumonia  8. Systolic CHF         Please don't hesitate to call with any questions.   Electronically signed by Kaye Musa DO on 9/26/2021 at 10:23 AM

## 2021-09-26 NOTE — PROGRESS NOTES
Hospitalist Progress Note    Patient:  Kendy Kelley    Unit/Bed:5K-14/014-A  YOB: 1930  MRN: [id number]   Acct: [de-identified]   PCP: Queta Palmer MD  Code Status: Limited  Date of Admission: 9/13/2021    Expected Discharge: 1-2 days. Dialysis today. Disposition: ECF, precert pending. Assessment/Plan:    1. COVID-19 pneumonia: treated with dexamethasone 6mg daily. First positive test 09/13. Pt stable on RA. Isolation no longer required. 2. Possible superimposed bacterial PNA: Completed treatment with Rocephin and Azithromycin     3. Acute hypoxic respiratory failure: 2/2 above. Resolved. 9/25-> Pt placed on O2 overnight after rapid was called. PO2 on ABG was 170, no hypercapnea. No acute changes on EKG, no evidence of fluid overload. Pt is currently sating 98% on 3L - wean O2 as tolerated. 4. ESRD on HD: Nephrology following. Pt did not tolerate diaylsis 9/22, rapid called. 9/23: Nephrology dc'ed IVF, plan for dialysis tomorrow. 5. S/p unwitnessed fall: No apparent injuries, CT brain and cervical spine -unremarkable     6. Dark tarry stools:  No further episodes, hemoglobin stable.  Continue to monitor    7. Elevated troponin: chronically elevated, likely d/t ESRD. No chest pain, repeat troponin improved, no acute ischemic changes on ekg. Pt had recent LHC. 8. CAD s/p PCI: Patient had 2 stents (LAD and Ramus) at [facility] on 08/31/21. Will continue with aspirin, Plavix, metoprolol and pravastatin. 9. Code status: Limited CODE STATUS no CPR, no resuscitation and no intubation. 10. H/o NSVT / Sick sinus syndrome: noted, has PPM/ICD. Tele ordered. Per report, pt had AF overnight. However, after review of EKG, P waves were present and did not appear to be in a fib. Pt typically follows with Cardiology at [facility], unclear if he has a hx of AF. He is not on anticoagulation. If AF, the risks of anticoagulation may be greater than the benefit.  CHADSVASc 4, HAS-BLED 4. Will order pacer interrogation and further recs to follow. 11. Chronic HFrEF: EF 45% and grade 1 diastolic dysfunction on Echo 05/2021. Follows with Cardiology at [facility]. Fluid status stable, Nephrology following. Cont home meds. 12. Chronic anemia: likely anemia of chronic disease, will order anemia workup. Hgb is stable with baseline. He had drop in Hgb this AM; however, repeated and came up without intervention. Chief Complaint: SOB    HPI / Hospital Course: Per HPI: \"The patient is a 80 y.o. male who presents with complaints of shortness of breath and cough for the last 2 days. Patient is accompanied by family who helps with history. They report that patient has had a poor appetite, fatigued, short of breath and coughing for last 2 days. He recently was at [facility] approximately 2 weeks ago where he had 2 stents placed. They state he was in his normal state of health until 2 days ago. Patient was seen in dialysis today and had a full session however continued to be short of breath and had a significant cough and was sent for evaluation. He denies any fevers or chills, nausea or vomiting. He denies any diarrhea or constipation. He denies any chest pain.     In the ED patient was found to be Covid positive. He was placed on 2 L nasal cannula. He was also found to have slightly elevated troponin and was started on heparin drip in the ED. Family updated on test results. \"    Pt has completed treatment for COVID and superimposed bacterial PNA. I took over care 9/22, per previous note, pt stable for discharge and awaiting precert to ECF.      6/81-> Rapid response called during dialysis. Pt had vomited, became lethargic and had episode of AF RVR on the monitor per report. Dialysis was stopped. Patient still lethargic but following commands and nods to answer questions appropriately. EKG showed NSR, does not appear to be AF.  Patient is moaning which per nursing, he typically does during dialysis. Patient seen again later. He reports fatigue and wanting to sleep. He denies abdominal pain, n/v/d, CP, SOB, fever/chills. Pt had bleeding from dialysis fistulasite. 9/23-> Patient alert to verbal stimuli, lethargic. Appears to be at his baseline. Denies fever/chills, sob, cp, abd pain, n/v/d. Nephrology started IVF for today and plan for dialysis tomorrow. Patient has been NSR on tele. 9/24-> Pt appears at baseline. He is more alert today than he has been. Tolerated dialysis well. 9/26-> Patient is alert, oriented to self, place, time, and somewhat to situation. He denies fever/chills, SOB, CP, abd pain, n/v/d, cough. He reports of left arm pain at dialysis cath site. The patient states that he has been told before that his heart beat gets \"out of wack. \"   Chart reviewed and a rapid was called last night they were unable to get a reading of O2 levels. ABG results with pO2 179, no hypercapnea. Based on ABG did not appear to be truly hypoxic so he was put back on 6L NC and nursing instructed to wean. His EKG reported atrial fibrillation; however, after review, there were P waves present. Ordered pacer interrogate. Subjective (past 24 hours): no acute events overnight. Patient denies sob, cp, abd pain, nvd. Nephrology planning HD tomorrow. Awaiting pacer interrogate results. ROS: Pertinent positives as noted in HPI. All other systems reviewed and negative. Past medical history, family history, social history and allergies reviewed again and is unchanged since admission. Medications:  Reviewed.   Infusion Medications    dextrose 100 mL/hr (09/23/21 1003)    sodium chloride       Scheduled Medications    aspirin  81 mg Oral Daily    ipratropium-albuterol  1 ampule Inhalation Q4H    heparin (porcine)  5,000 Units SubCUTAneous 3 times per day    epoetin traci-epbx  6,000 Units SubCUTAneous Once per day on Mon Wed Fri    calcitRIOL  1.5 mcg Oral Once per day on Mon Wed Fri    cinacalcet  60 mg Oral Once per day on Mon Wed Fri    docusate sodium  100 mg Oral BID    ferrous sulfate  325 mg Oral Daily with breakfast    gabapentin  100 mg Oral TID    metoprolol  100 mg Oral BID    pantoprazole  40 mg Oral QAM AC    vitamin C  500 mg Oral Daily    polyethylene glycol  17 g Oral Daily    sodium chloride flush  5-40 mL IntraVENous 2 times per day    clopidogrel  75 mg Oral Daily     PRN Meds: glucose, dextrose, glucagon (rDNA), dextrose, sodium chloride flush, sodium chloride, ondansetron **OR** ondansetron, polyethylene glycol, acetaminophen **OR** acetaminophen, guaiFENesin-dextromethorphan, albuterol sulfate HFA    I/O:     Intake/Output Summary (Last 24 hours) at 9/26/2021 1918  Last data filed at 9/26/2021 1356  Gross per 24 hour   Intake 930 ml   Output 0 ml   Net 930 ml       Diet:  ADULT DIET; Regular; Low Potassium (Less than 3000 mg/day); 1200 ml  Adult Oral Nutrition Supplement; Renal Oral Supplement    Exam:  BP (!) 108/55   Pulse 78   Temp 97.8 °F (36.6 °C) (Axillary)   Resp 16   Ht 5' 6\" (1.676 m)   Wt 102 lb 11.8 oz (46.6 kg)   SpO2 98%   BMI 16.58 kg/m²   General:  Chronically ill appearing male. NAD. HEENT:  normocephalic and atraumatic. No scleral icterus. PERR. Neck: supple. No JVD. No thyromegaly. Lungs: clear to auscultation. No retractions  Cardiac: RRR without murmur. Abdomen: soft. Nontender. Bowel sounds positive. Extremities:  No clubbing, cyanosis, or edema x 4. Vasculature: capillary refill < 3 seconds. Palpable LE pulses bilaterally. Skin:  warm and dry. Psych:  Alert and oriented x3. Lethargic. Lymph:  No supraclavicular adenopathy. Neurologic:  No focal deficit. No seizures.       Data: (All radiographs, tracings, PFTs, and imaging are personally viewed and interpreted unless otherwise noted)  Labs:   Recent Labs     09/24/21  2212 09/24/21  2212 09/25/21  0531 09/25/21  1853 09/26/21  0547   WBC 6.0  --  5.5  -- 6. 1   HGB 8.1*   < > 7.1* 8.0* 7.7*   HCT 24.0*   < > 21.1* 24.7* 22.9*     --  190  --  199    < > = values in this interval not displayed. Recent Labs     09/24/21  1108 09/24/21  2212 09/25/21  0531 09/26/21  0547   NA  --  132* 134* 133*   K  --  4.3 4.3 4.8   CL  --  94* 96* 94*   CO2  --  28 27 25   BUN  --  15 18 33*   CREATININE  --  3.6* 4.3* 6.0*   CALCIUM  --  7.5* 7.2* 8.0*   PHOS 1.4* 2.2* 2.9  --      No results for input(s): AST, ALT, BILIDIR, BILITOT, ALKPHOS in the last 72 hours. No results for input(s): INR in the last 72 hours. No results for input(s): Assunta Luna in the last 72 hours. Urinalysis:   Lab Results   Component Value Date    NITRU NEGATIVE 06/22/2019    WBCUA 50-75 06/22/2019    BACTERIA NONE 06/22/2019    RBCUA 25-50 06/22/2019    BLOODU LARGE 06/22/2019    SPECGRAV 1.013 05/03/2016    GLUCOSEU NEGATIVE 06/22/2019     Urine culture: No results found for: Genna Crawford  Micro:   Blood culture #1:   Lab Results   Component Value Date    BC No growth-preliminary No growth  09/13/2021     Blood culture #2:No results found for: Gerhardt Hones  Organism:  Lab Results   Component Value Date    ORG Micrococcus species 07/12/2021       No results found for: LABGRAM  MRSA culture only:No results found for: [address] [place] [address]  Respiratory culture: No results found for: CULTRESP  Aerobic and Anaerobic :  No results found for: LABAERO  No results found for: [facility]    Radiology Reports:  XR CHEST PORTABLE   Final Result   Impression:      Increasing consolidation and interstitial prominence. Question pulmonary edema versus pneumonia      This document has been electronically signed by: Ridge Smith MD on    09/24/2021 10:34 PM      XR CHEST PORTABLE   Final Result   1. No acute intrathoracic process. 2. Mild stable cardiomegaly. **This report has been created using voice recognition software. It may contain minor errors which are inherent in voice recognition technology. ** Final report electronically signed by Dr. Carl Murillo on 9/22/2021 9:52 PM      CT HEAD WO CONTRAST   Final Result      1. Stable CT scan of the brain, no interval change since previous study dated 15th of June 2021. **This report has been created using voice recognition software. It may contain minor errors which are inherent in voice recognition technology. **      Final report electronically signed by DR Bernard Sams on 9/15/2021 4:26 PM      CT CERVICAL SPINE WO CONTRAST   Final Result    No evidence of acute osseous injury of the cervical spine. **This report has been created using voice recognition software. It may contain minor errors which are inherent in voice recognition technology. **      Final report electronically signed by Dr. Esperanza Hernadez MD on 9/15/2021 4:29 PM      XR CHEST PORTABLE   Final Result   Cardiomegaly with vascular congestion and interstitial edema and effusions differential would include congestive heart failure or fluid overload. **This report has been created using voice recognition software. It may contain minor errors which are inherent in voice recognition technology. **      Final report electronically signed by Dr. India Her on 9/13/2021 2:22 PM        XR CHEST PORTABLE    Result Date: 9/13/2021  PROCEDURE: XR CHEST PORTABLE CLINICAL INFORMATION: SOB . COMPARISON: 7/12/2021 TECHNIQUE: Portable upright FINDINGS: Heart size is mildly enlarged. Mediastinum is not widened. There is pulmonary vascular congestion. Interstitial edema. There is suggestion of small effusions. AICD over the left chest. EKG leads overlie the chest. Right upper extremity vascular stents are noted. Cardiomegaly with vascular congestion and interstitial edema and effusions differential would include congestive heart failure or fluid overload. **This report has been created using voice recognition software.   It may contain minor errors which are inherent in voice recognition technology. ** Final report electronically signed by Dr. Cristina Valentin on 9/13/2021 2:22 PM        Tele:   [] yes             [] no      Active Hospital Problems    Diagnosis Date Noted    Severe malnutrition ([facility] 75.) [E43] 09/22/2021     Class: Acute    Pneumonia due to COVID-19 virus [U07.1, J12.82] 09/13/2021       Electronically signed by Milad Paul PA-C on 9/26/2021 at 7:18 PM

## 2021-09-27 NOTE — PROGRESS NOTES
Renal Progress Note    Assessment and Plan:   1. End-stage kidney disease on hemodialysis  2. SARS-CoV-2 pneumonia  3. Deconditioned state  4. Hypertension primary  5. Anemia of chronic disease normocytic    PLAN:  1. Recent labs reviewed  2. Medications reviewed  3. No changes  4. Seen in hemodialysis unit with procedure in progress  5. Tolerating treatment well  6. Hemodynamically stable  7. We will follow    Patient Active Problem List:     CAD (coronary artery disease)     COPD (chronic obstructive pulmonary disease) ([address]     Chronic renal insufficiency     Patient overweight     Arthritis-  low back and neck .      CKD (chronic kidney disease) stage 3, GFR 30-59 ml/min (HCC)     Dyspnea and respiratory abnormalities     ED (erectile dysfunction)     Degenerative joint disease of knee, left     Gout of big toe     Anemia, chronic disease     CKD (chronic kidney disease) stage 4, GFR 15-29 ml/min (HCC)     Essential hypertension     Monoclonal gammopathy     Leukopenia     Thrombocytopenia (HCC)     Chronic kidney disease (CKD), stage V (HCC)     Metabolic acidosis     Hyperkalemia     Iron deficiency anemia     ROSAURA (acute kidney injury) ([address]     Plasma cell dyscrasia     Idiopathic hypotension     Hypertensive urgency     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)     Systolic congestive heart failure (HCC)     Other fluid overload (CODE)     Hyperphosphatemia     Acute diastolic congestive heart failure (HCC)     Acute on chronic diastolic congestive heart failure (HCC)     Pulmonary hypertension (HCC)     Anemia due to chronic kidney disease, on chronic dialysis (HCC)     Volume overload     Secondary hyperparathyroidism of renal origin ([address]     Chest pain     Acute pulmonary edema (HCC)     Accelerated hypertension     Gastritis and duodenitis     Fall from slip, trip, or stumble, initial encounter     Closed fracture of left ankle     ESRD on hemodialysis ([address]     Hypertensive emergency     SOB (shortness of breath)     Chronic combined systolic and diastolic CHF (congestive heart failure) (HCC)     Pneumonia due to COVID-19 virus     Severe malnutrition (HCC)      Subjective:   Admit Date: 9/13/2021    Interval History:   Seen for end stage kidney disease on hemodialysis  Awake and alert  No complaint  Blood pressure is mostly stable      Medications:   Scheduled Meds:   aspirin  81 mg Oral Daily    heparin (porcine)  5,000 Units SubCUTAneous 3 times per day    epoetin traci-epbx  6,000 Units SubCUTAneous Once per day on Mon Wed Fri    calcitRIOL  1.5 mcg Oral Once per day on Mon Wed Fri    cinacalcet  60 mg Oral Once per day on Mon Wed Fri    docusate sodium  100 mg Oral BID    ferrous sulfate  325 mg Oral Daily with breakfast    gabapentin  100 mg Oral TID    metoprolol  100 mg Oral BID    pantoprazole  40 mg Oral QAM AC    vitamin C  500 mg Oral Daily    polyethylene glycol  17 g Oral Daily    sodium chloride flush  5-40 mL IntraVENous 2 times per day    clopidogrel  75 mg Oral Daily     Continuous Infusions:   dextrose 100 mL/hr (09/23/21 1003)    sodium chloride         CBC:   Recent Labs     09/24/21 2212 09/24/21 2212 09/25/21  0531 09/25/21  1853 09/26/21  0547   WBC 6.0  --  5.5  --  6.1   HGB 8.1*   < > 7.1* 8.0* 7.7*     --  190  --  199    < > = values in this interval not displayed. CMP:    Recent Labs     09/24/21 2212 09/25/21  0531 09/26/21  0547   * 134* 133*   K 4.3 4.3 4.8   CL 94* 96* 94*   CO2 28 27 25   BUN 15 18 33*   CREATININE 3.6* 4.3* 6.0*   GLUCOSE 90 84 81   CALCIUM 7.5* 7.2* 8.0*   LABGLOM 19* 16* 11*     Troponin: No results for input(s): TROPONINI in the last 72 hours. BNP: No results for input(s): BNP in the last 72 hours. INR: No results for input(s): INR in the last 72 hours. Lipids: No results for input(s): CHOL, LDLDIRECT, TRIG, HDL, AMYLASE, LIPASE in the last 72 hours.   Liver: No results for input(s): AST, ALT, ALKPHOS, PROT, LABALBU, BILITOT in the last 72 hours. Invalid input(s): BILDIR  Iron:    Recent Labs     09/26/21  0547   FERRITIN 2,607*     XR CHEST PORTABLE   Final Result   Impression:      Increasing consolidation and interstitial prominence. Question pulmonary edema versus pneumonia      This document has been electronically signed by: Elmer Cuevas MD on    09/24/2021 10:34 PM      XR CHEST PORTABLE   Final Result   1. No acute intrathoracic process. 2. Mild stable cardiomegaly. **This report has been created using voice recognition software. It may contain minor errors which are inherent in voice recognition technology. **      Final report electronically signed by Dr. Dwaine Marques on 9/22/2021 9:52 PM      CT HEAD WO CONTRAST   Final Result      1. Stable CT scan of the brain, no interval change since previous study dated 15th of June 2021. **This report has been created using voice recognition software. It may contain minor errors which are inherent in voice recognition technology. **      Final report electronically signed by DR Navjot Tavares on 9/15/2021 4:26 PM      CT CERVICAL SPINE WO CONTRAST   Final Result    No evidence of acute osseous injury of the cervical spine. **This report has been created using voice recognition software. It may contain minor errors which are inherent in voice recognition technology. **      Final report electronically signed by Dr. Kailyn Bah MD on 9/15/2021 4:29 PM      XR CHEST PORTABLE   Final Result   Cardiomegaly with vascular congestion and interstitial edema and effusions differential would include congestive heart failure or fluid overload. **This report has been created using voice recognition software. It may contain minor errors which are inherent in voice recognition technology. **      Final report electronically signed by Dr. Rosmery العلي on 9/13/2021 2:22 PM            Objective:   Vitals: BP (!) 153/67 Pulse 63   Temp 98 °F (36.7 °C) (Axillary)   Resp 18   Ht 5' 6\" (1.676 m)   Wt 102 lb 11.8 oz (46.6 kg)   SpO2 95%   BMI 16.58 kg/m²    Wt Readings from Last 3 Encounters:   09/24/21 102 lb 11.8 oz (46.6 kg)   07/14/21 145 lb 8.1 oz (66 kg)   06/17/21 161 lb (73 kg)      24HR INTAKE/OUTPUT:      Intake/Output Summary (Last 24 hours) at 9/27/2021 1825  Last data filed at 9/27/2021 1428  Gross per 24 hour   Intake 760 ml   Output 1400 ml   Net -640 ml       Constitutional: Well-developed elderly gentleman alert, awake, no apparent distress with hemodialysis in progress when I saw him. Skin:normal with no rash or lesions. HEENT:Pupils are reactive . Throat is clear . Oral mucosa is moist   Neck:supple with no thyromegaly or carotid bruit  Cardiovascular:  S1, S2 without murmur or rubs   Respiratory:  Clear to ausculation without wheezes, rhonchi or rales. Abdomen: +bs, soft, no tenderness to palpation and no palpable mass. No abdominal bruit   Ext: No LE edema  Musculoskeletal:Intact  Neuro:Alert and awake with no focal deficit. Speech is normal      Electronically signed by Alfonso Turner MD on 9/27/2021 at 6:25 PM  **This report has been created using voice recognition software. It maycontain minor  errors which are inherent in voice recognition technology. **

## 2021-09-27 NOTE — PROGRESS NOTES
[address] [address] 60  PHYSICAL THERAPY MISSED TREATMENT NOTE  [facility]    Date: 2021  Patient Name: Haven Colin        MRN: [id number]   : 11/10/1930  (80 y.o.)  Gender: male   Referring Practitioner: Dr. Sumeet Lin  Diagnosis: acute pulmonary edema         REASON FOR MISSED TREATMENT:  Missed treat. Pt off of floor for dialysis.  Will try back as time allows

## 2021-09-27 NOTE — PROGRESS NOTES
Hospitalist Progress Note    Patient:  Estrella Level    Unit/Bed:5K-14/014-A  YOB: 1930  MRN: [id number]   Acct: [de-identified]   PCP: Denice Brown MD  Code Status: Limited  Date of Admission: 9/13/2021    Expected Discharge: pending placement. Disposition: ECF, precert pending. Pt is medically stable for discharge, awaiting placement. Assessment/Plan:    1. COVID-19 pneumonia: treated with dexamethasone 6mg daily. First positive test 09/13. Pt stable on RA. Isolation no longer required. 2. Possible superimposed bacterial PNA: Completed treatment with Rocephin and Azithromycin     3. Acute hypoxic respiratory failure: 2/2 above. Resolved. 9/25-> Pt placed on O2 overnight after rapid was called. PO2 on ABG was 170, no hypercapnea. No acute changes on EKG, no evidence of fluid overload. Pt is currently sating 98% on 3L - wean O2 as tolerated. 4. ESRD on HD: Nephrology following. Pt did not tolerate diaylsis 9/22, rapid called. 9/23: Nephrology dc'ed IVF, plan for dialysis tomorrow. 5. S/p unwitnessed fall: No apparent injuries, CT brain and cervical spine -unremarkable     6. Dark tarry stools:  No further episodes, hemoglobin stable.  Continue to monitor    7. Elevated troponin: chronically elevated, likely d/t ESRD. No chest pain, repeat troponin improved, no acute ischemic changes on ekg. Pt had recent LHC. 8. CAD s/p PCI: Patient had 2 stents (LAD and Ramus) at [facility] on 08/31/21. Will continue with aspirin, Plavix, metoprolol and pravastatin. 9. Code status: Limited CODE STATUS no CPR, no resuscitation and no intubation. 10. H/o NSVT / Sick sinus syndrome: noted, has PPM/ICD. Tele ordered. Possible PAF. However, after review of EKGs, P waves were present and did not appear to be in a fib. Pt typically follows with Cardiology at [facility], unclear if he has a hx of AF. He is not on anticoagulation.  If AF, the risks of anticoagulation may be greater than the benefit. CHADSVASc 4, HAS-BLED 4. Will order pacer interrogation and further recs to follow. 11. Chronic HFrEF: EF 45% and grade 1 diastolic dysfunction on Echo 05/2021. Follows with Cardiology at [facility]. Fluid status stable, Nephrology following. Cont home meds. 12. Chronic anemia: likely anemia of chronic disease, will order anemia workup. Hgb is stable with baseline. He had drop in Hgb this AM; however, repeated and came up without intervention. Chief Complaint: SOB    HPI / Hospital Course: Per HPI: \"The patient is a 80 y.o. male who presents with complaints of shortness of breath and cough for the last 2 days. Patient is accompanied by family who helps with history. They report that patient has had a poor appetite, fatigued, short of breath and coughing for last 2 days. He recently was at [facility] approximately 2 weeks ago where he had 2 stents placed. They state he was in his normal state of health until 2 days ago. Patient was seen in dialysis today and had a full session however continued to be short of breath and had a significant cough and was sent for evaluation. He denies any fevers or chills, nausea or vomiting. He denies any diarrhea or constipation. He denies any chest pain.     In the ED patient was found to be Covid positive. He was placed on 2 L nasal cannula. He was also found to have slightly elevated troponin and was started on heparin drip in the ED. Family updated on test results. \"    Pt has completed treatment for COVID and superimposed bacterial PNA. I took over care 9/22, per previous note, pt stable for discharge and awaiting precert to ECF.      1/98-> Rapid response called during dialysis. Pt had vomited, became lethargic and had episode of AF RVR on the monitor per report. Dialysis was stopped. Patient still lethargic but following commands and nods to answer questions appropriately. EKG showed NSR, does not appear to be AF.  Patient is SubCUTAneous Once per day on Mon Wed Fri    calcitRIOL  1.5 mcg Oral Once per day on Mon Wed Fri    cinacalcet  60 mg Oral Once per day on Mon Wed Fri    docusate sodium  100 mg Oral BID    ferrous sulfate  325 mg Oral Daily with breakfast    gabapentin  100 mg Oral TID    metoprolol  100 mg Oral BID    pantoprazole  40 mg Oral QAM AC    vitamin C  500 mg Oral Daily    polyethylene glycol  17 g Oral Daily    sodium chloride flush  5-40 mL IntraVENous 2 times per day    clopidogrel  75 mg Oral Daily     PRN Meds: ipratropium-albuterol, glucose, dextrose, glucagon (rDNA), dextrose, sodium chloride flush, sodium chloride, ondansetron **OR** ondansetron, polyethylene glycol, acetaminophen **OR** acetaminophen, guaiFENesin-dextromethorphan, albuterol sulfate HFA    I/O:     Intake/Output Summary (Last 24 hours) at 9/27/2021 1639  Last data filed at 9/27/2021 1428  Gross per 24 hour   Intake 760 ml   Output 1400 ml   Net -640 ml       Diet:  ADULT DIET; Regular; Low Potassium (Less than 3000 mg/day); 1200 ml  Adult Oral Nutrition Supplement; Renal Oral Supplement    Exam:  BP (!) 153/67   Pulse 63   Temp 98 °F (36.7 °C) (Axillary)   Resp 18   Ht 5' 6\" (1.676 m)   Wt 102 lb 11.8 oz (46.6 kg)   SpO2 95%   BMI 16.58 kg/m²   General:  Chronically ill appearing male. NAD. HEENT:  normocephalic and atraumatic. No scleral icterus. PERR. Neck: supple. No JVD. No thyromegaly. Lungs: clear to auscultation. No retractions  Cardiac: RRR without murmur. Abdomen: soft. Nontender. Bowel sounds positive. Extremities:  No clubbing, cyanosis, or edema x 4. Vasculature: capillary refill < 3 seconds. Palpable LE pulses bilaterally. Skin:  warm and dry. Psych:  Alert and oriented x3. Lethargic. Lymph:  No supraclavicular adenopathy. Neurologic:  No focal deficit. No seizures.       Data: (All radiographs, tracings, PFTs, and imaging are personally viewed and interpreted unless otherwise noted)  Labs: Recent Labs     09/24/21 2212 09/24/21  2212 09/25/21  0531 09/25/21  1853 09/26/21  0547   WBC 6.0  --  5.5  --  6.1   HGB 8.1*   < > 7.1* 8.0* 7.7*   HCT 24.0*   < > 21.1* 24.7* 22.9*     --  190  --  199    < > = values in this interval not displayed. Recent Labs     09/24/21 2212 09/25/21  0531 09/26/21  0547   * 134* 133*   K 4.3 4.3 4.8   CL 94* 96* 94*   CO2 28 27 25   BUN 15 18 33*   CREATININE 3.6* 4.3* 6.0*   CALCIUM 7.5* 7.2* 8.0*   PHOS 2.2* 2.9  --      No results for input(s): AST, ALT, BILIDIR, BILITOT, ALKPHOS in the last 72 hours. No results for input(s): INR in the last 72 hours. No results for input(s): Melissa Fuchs in the last 72 hours. Urinalysis:   Lab Results   Component Value Date    NITRU NEGATIVE 06/22/2019    WBCUA 50-75 06/22/2019    BACTERIA NONE 06/22/2019    RBCUA 25-50 06/22/2019    BLOODU LARGE 06/22/2019    SPECGRAV 1.013 05/03/2016    GLUCOSEU NEGATIVE 06/22/2019     Urine culture: No results found for: Matilde Goyal  Micro:   Blood culture #1:   Lab Results   Component Value Date    BC No growth-preliminary No growth  09/13/2021     Blood culture #2:No results found for: Cristela Cordero  Organism:  Lab Results   Component Value Date    ORG Micrococcus species 07/12/2021       No results found for: LABGRAM  MRSA culture only:No results found for: [address] [place] [address]  Respiratory culture: No results found for: CULTRESP  Aerobic and Anaerobic :  No results found for: LABAERO  No results found for: [facility]    Radiology Reports:  XR CHEST PORTABLE   Final Result   Impression:      Increasing consolidation and interstitial prominence. Question pulmonary edema versus pneumonia      This document has been electronically signed by: Christiane Marin MD on    09/24/2021 10:34 PM      XR CHEST PORTABLE   Final Result   1. No acute intrathoracic process. 2. Mild stable cardiomegaly. **This report has been created using voice recognition software.  It may contain minor errors which are inherent in voice recognition technology. **      Final report electronically signed by Dr. Cosmo Skinner on 9/22/2021 9:52 PM      CT HEAD WO CONTRAST   Final Result      1. Stable CT scan of the brain, no interval change since previous study dated 15th of June 2021. **This report has been created using voice recognition software. It may contain minor errors which are inherent in voice recognition technology. **      Final report electronically signed by DR Katiuska Child on 9/15/2021 4:26 PM      CT CERVICAL SPINE WO CONTRAST   Final Result    No evidence of acute osseous injury of the cervical spine. **This report has been created using voice recognition software. It may contain minor errors which are inherent in voice recognition technology. **      Final report electronically signed by Dr. Jimy Spivey MD on 9/15/2021 4:29 PM      XR CHEST PORTABLE   Final Result   Cardiomegaly with vascular congestion and interstitial edema and effusions differential would include congestive heart failure or fluid overload. **This report has been created using voice recognition software. It may contain minor errors which are inherent in voice recognition technology. **      Final report electronically signed by Dr. Tanner Orr on 9/13/2021 2:22 PM        XR CHEST PORTABLE    Result Date: 9/13/2021  PROCEDURE: XR CHEST PORTABLE CLINICAL INFORMATION: SOB . COMPARISON: 7/12/2021 TECHNIQUE: Portable upright FINDINGS: Heart size is mildly enlarged. Mediastinum is not widened. There is pulmonary vascular congestion. Interstitial edema. There is suggestion of small effusions. AICD over the left chest. EKG leads overlie the chest. Right upper extremity vascular stents are noted. Cardiomegaly with vascular congestion and interstitial edema and effusions differential would include congestive heart failure or fluid overload.  **This report has been created using voice recognition software. It may contain minor errors which are inherent in voice recognition technology. ** Final report electronically signed by Dr. Blayne Kenny on 9/13/2021 2:22 PM        Tele:   [] yes             [] no      Active Hospital Problems    Diagnosis Date Noted    Severe malnutrition ([facility] [E43] 09/22/2021     Class: Acute    Pneumonia due to COVID-19 virus [U07.1, J12.82] 09/13/2021       Electronically signed by Joy Newsome PA-C on 9/27/2021 at 4:39 PM

## 2021-09-27 NOTE — FLOWSHEET NOTE
09/27/21 0740 09/27/21 1206   Vital Signs   BP (!) 140/72 (!) 177/75   Temp 97.8 °F (36.6 °C) 97.5 °F (36.4 °C)   Pulse 62 72   Resp 18 16   Post-Hemodialysis Assessment   Post-Treatment Procedures  --  Blood returned; Access bleeding time < 10 minutes   Machine Disinfection Process  --  Acid/Vinegar Clean;Heat Disinfect; Exterior Machine Disinfection   Rinseback Volume (ml)  --  400 ml   Total Liters Processed (l/min)  --  87 l/min   Dialyzer Clearance  --  Lightly streaked   Duration of Treatment (minutes)  --  240 minutes   Hemodialysis Intake (ml)  --  400 ml   Hemodialysis Output (ml)  --  1400 ml   NET Removed (ml)  --  1000 ml   Tolerated Treatment  --  Good   Stable 4hr tx. 1Lremoved during HD; patient tolerated well. Both needle sites held x10 min each dressing dry and intact. Report given to primary RN.

## 2021-09-28 NOTE — CARE COORDINATION
9/28/21, 3:15 PM EDT    DISCHARGE PLANNING EVALUATION    Spoke with Milinda Boxer and [facility] has denied patient.

## 2021-09-28 NOTE — PROGRESS NOTES
Nephrology Progress Note    Patient - Sherrill Root   MRN -  [id number]   Acct # - [de-identified]      - 11/10/1930    80 y.o. Admit Date: 2021  Hospital Day: 15  [facility]  Date of evaluation -  2021    Subjective:   CC: shortness of breath  Denies shortness of breath   Hemodialysis with 1 L Ultrafiltration   Good appetite, feels good  BP Range: Systolic (05YXU), SIJ:304 , Min:123 , WFJ:532      Diastolic (51FTT), HDM:02, Min:58, Max:75    Objective:   VITALS:  BP (!) 123/58   Pulse 94   Temp 98.5 °F (36.9 °C) (Oral)   Resp 18   Ht 5' 6\" (1.676 m)   Wt 102 lb 11.8 oz (46.6 kg)   SpO2 97%   BMI 16.58 kg/m²    Patient Vitals for the past 24 hrs:   BP Temp Temp src Pulse Resp SpO2   21 1015 (!) 123/58 98.5 °F (36.9 °C) Oral 94 18 97 %   21 0303 138/64 98.4 °F (36.9 °C) Oral 70 18 96 %   21 2145 (!) 155/71 98 °F (36.7 °C) Oral 80 18 95 %   21 1601 (!) 153/67 98 °F (36.7 °C) Axillary 63 18 95 %   21 1226 (!) 175/74 97.3 °F (36.3 °C) Oral 71 20 --   21 1206 (!) 177/75 97.5 °F (36.4 °C) -- 72 16 --       Intake/Output Summary (Last 24 hours) at 2021 1107  Last data filed at 2021 1428  Gross per 24 hour   Intake 640 ml   Output 1400 ml   Net -760 ml       Admission weight: 163 lb (73.9 kg)  Patient Vitals for the past 96 hrs (Last 3 readings):   Weight   21 1320 102 lb 11.8 oz (46.6 kg)     Body mass index is 16.58 kg/m². EXAM:  CONSTITUTIONAL:  No acute distress. HEENT:  Head is normocephalic, Extraocular movement intact. Neck is supple. CARDIOVASCULAR:  S1, S2  regular rate and rhythm. RESPIRATORY: Clear to ausculation bilaterally. Equal breath sounds. No wheezes. No shortness of breath noted at rest.  ABDOMEN: soft, non tender  NEUROLOGICAL: Patient is awake and oriented to person, place, and time. Recent and remote memory intact.   SKIN: no rash, No significant bruises on exposed surfaces  MUSCULOSKELETAL: Movement is coordinated. Moves all extremities   EXTREMITIES: Distal lower extremity temp is warm, No bilateral symmetric lower extremity edema. Upper extremity AV Fistula   PSYCHIATRIC: mood and affect appropriate. Medications:   Med reviewed  Scheduled Meds:   aspirin  81 mg Oral Daily    heparin (porcine)  5,000 Units SubCUTAneous 3 times per day    epoetin traci-epbx  6,000 Units SubCUTAneous Once per day on Mon Wed Fri    calcitRIOL  1.5 mcg Oral Once per day on Mon Wed Fri    cinacalcet  60 mg Oral Once per day on Mon Wed Fri    docusate sodium  100 mg Oral BID    ferrous sulfate  325 mg Oral Daily with breakfast    gabapentin  100 mg Oral TID    metoprolol  100 mg Oral BID    pantoprazole  40 mg Oral QAM AC    vitamin C  500 mg Oral Daily    polyethylene glycol  17 g Oral Daily    sodium chloride flush  5-40 mL IntraVENous 2 times per day    clopidogrel  75 mg Oral Daily     Continuous Infusions:   dextrose 100 mL/hr (09/23/21 1003)    sodium chloride       PRN Meds ipratropium-albuterol, glucose, dextrose, glucagon (rDNA), dextrose, sodium chloride flush, sodium chloride, ondansetron **OR** ondansetron, polyethylene glycol, acetaminophen **OR** acetaminophen, guaiFENesin-dextromethorphan, albuterol sulfate HFA   Labs:   Labs reviewed  Recent Labs     09/25/21  1853 09/26/21  0547 09/28/21  0605   WBC  --  6.1 5.3   RBC  --  2.46* 2.29*   HGB 8.0* 7.7* 7.2*   HCT 24.7* 22.9* 21.2*   MCV  --  93.1 92.6   MCH  --  31.3 31.4   PLT  --  199 188       Recent Labs     09/26/21  0547 09/26/21  0547 09/27/21  1838   *  --  135   K 4.8  --  4.6   CL 94*  --  97*   CO2 25  --  26   BUN 33*  --  19   CREATININE 6.0*  --  3.8*   LABGLOM 11*   < > 18*   GLUCOSE 81  --  82   CALCIUM 8.0*  --  8.1*    < > = values in this interval not displayed. ASSESSMENT:  1. 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 AV fistula. 2. COVID 19 pneumonia  3.  Essential Hypertension with nephrosclerosis  4. Abdominal aortic aneurysm 3.7 cm   5. Chronic combined systolic and diastolic HF with EF 74%, Pulm artery HTN  6. Coronary artery disease Recent PCI 8/31/21 at [facility]  7. Anemia of chronic disease on ERYTHROPOIESIS-STIMULATING AGENT   8. Hypophosphatemia, Resolved  9. Secondary hyperparathyroidism of renal origin on rocaltrol and sensipar. Calcium ok with corrected for albumin  10. Debility    Active Problems:    Pneumonia due to COVID-19 virus    Severe malnutrition ([address]  Resolved Problems:    * No resolved hospital problems.  Radha Borrego, APRN - CNP 11:07 AM 9/28/2021

## 2021-09-28 NOTE — PROGRESS NOTES
[facility]  INPATIENT PHYSICAL THERAPY  DAILY NOTE  [facility] ONC MED 5K - 5K-14/014-A    Time In: 5904  Time Out: 1125  Timed Code Treatment Minutes: 23 Minutes  Minutes: 23          Date: 2021  Patient Name: Mathew Awan,  Gender:  male        MRN: [id number]  : 11/10/1930  (80 y.o.)     Referring Practitioner: Dr. Melita Stallings  Diagnosis: acute pulmonary edema  Additional Pertinent Hx: admit with above diagnosis, ESRD on HD, COVID-19 pneumonia, HTN, COPD, anemia     Prior Level of Function:  Lives With: Daughter  Type of Home: House  Home Layout: One level  Home Access: Level entry  Home Equipment: Rolling walker   Bathroom Shower/Tub: Walk-in shower  Bathroom Toilet: Standard    ADL Assistance: Independent  Homemaking Assistance: Needs assistance  Ambulation Assistance: Independent  Transfer Assistance: Independent  Additional Comments: Pt using RW PTA. Daughter assists at home. Unsure of accuracy of above pt is slighly confused this date. Restrictions/Precautions:  Restrictions/Precautions: Fall Risk  Position Activity Restriction  Other position/activity restrictions: . SUBJECTIVE: RN approved session. Pt asleep in bed, wakes easily and agreeable to up to bedside chair for therapy. PAIN: 0/10: Denied pain    Vitals: Vitals not assessed per clinical judgement, see nursing flowsheet    OBJECTIVE:  Bed Mobility:  Rolling to Right: Stand By Assistance   Supine to Sit: Stand By Assistance, with rail    Transfers:  Sit to Stand: [employer], From EOB. Cuing for hand placement. Min instability upon first standing.    [facility], with verbal cues, Cuing to back all the way up to chair and reach back before sitting for decreased fall risk    Ambulation:  Contact Guard Assistance, Minimal Assistance  Distance: 3 feet x1   Surface: Level Tile  Device:Rolling Walker  Gait Deviations:  Min instability, cuing to stay within MARIS of AD, Decreased B step lengths, Decreased B heel strike. Pt declines any further distance at this time. Balance:  Static Sitting Balance:  Stand By Assistance  Static Standing Balance: Contact Guard Assistance    Exercise:  Patient was guided in 1 set(s) 10 reps of exercise to both lower extremities. Ankle pumps, Glut sets, Quad sets, Heelslides, Hip abduction/adduction, Straight leg raises, Seated marches, Seated heel/toe raises and Long arc quads. Exercises were completed for increased independence with functional mobility. Functional Outcome Measures: Completed  AM-PAC Inpatient Mobility Raw Score : 17  AM-PAC Inpatient T-Scale Score : 42.13    ASSESSMENT:  Assessment: Patient progressing toward established goals. and Pt tolerated session fairly well. Limited by decreased strenth, decreased balance, decreased endurance. Would benefit from continued therapy before returning home. Activity Tolerance:  Patient tolerance of  treatment: good. Equipment Recommendations: Other: cont to assess needs  Discharge Recommendations:  Continue to assess pending progress, Subacute/Skilled Nursing Facility, 24 hour supervision or assist, Patient would benefit from continued therapy after discharge    Plan: Times per week: 5X GM  Times per day: Daily  Specific instructions for Next Treatment: therex and mobility    Patient Education  Patient Education: Plan of Care, Bed Mobility, Transfers, Gait     Goals:  Patient goals : not stated  Short term goals  Time Frame for Short term goals: by discharge  Short term goal 1: bed mobility with S to get in/out of bed  Short term goal 2: transfer with S to get in/out of chairs  Short term goal 3: amb >50'x1 with RW and SBA, maintaining O2 sats >90% on </=4L O2, to walk safely in room  Long term goals  Time Frame for Long term goals : no LTGs set secondary to short ELOS    Following session, patient left in safe position with all fall risk precautions in place.

## 2021-09-28 NOTE — CARE COORDINATION
9/28/21, 10:48 AM EDT    DISCHARGE PLANNING EVALUATION    No beds at [facility] and rehab. Message left for [facility], no dialysis beds available. Spoke with Eddie Dinh at [facility] and he was reviewing. Awaiting an answer for [facility].

## 2021-09-28 NOTE — PROGRESS NOTES
[facility]K  Occupational Therapy  Daily Note  Time:   Time In:   Time Out: 1209  Timed Code Treatment Minutes: 24 Minutes  Minutes: 24          Date: 2021  Patient Name: Herber Obrien,   Gender: male      Room: -14/014-A  MRN: [id number]  : 11/10/1930  (80 y.o.)  Referring Practitioner: Yuli Thompson MD  Diagnosis: Acute Pulmonary Edema  Additional Pertinent Hx: Herber Obrien is a pleasant 80 y.o. male who presents to the emergency department from home with complaints of shortness of breath. The patient was sent in after a full dialysis run today. The patient is extremely poor historian, difficult to obtain a history. Apparently was concerned about shortness of breath and he was not feeling well during dialysis and therefore was sent to the ED for evaluation. Currently, he answer some questions although not consistently, denies any chest pain, lower extremity pain or swelling. Family is at the bedside, state the patient had cardiac stents done recently. Record review shows this to have happened at [facility] approximately a week ago. Restrictions/Precautions:  Restrictions/Precautions: Fall Risk  Position Activity Restriction  Other position/activity restrictions: . SUBJECTIVE: Patient in chair upon arrival agreeable to OT treatment     PAIN: no    Vitals: Vitals not assessed per clinical judgement, see nursing flowsheet    COGNITION: Slow Processing and Decreased Insight    ADL:   No ADL's completed this session. Rosie Walker BALANCE:  Sitting Balance:  Stand By Assistance. seated on edge of recliner   Standing Balance: Contact Guard Assistance, Minimal Assistance. min unsteady and shaky     BED MOBILITY:  Not Tested    TRANSFERS:  Sit to Stand:  Minimal Assistance, cues for hand placement. Stand to Sit: Minimal Assistance, cues for hand placement. FUNCTIONAL MOBILITY:  Assistive Device: Rolling Walker  Assist Level:  Minimal Assistance.    Distance: with in room ADDITIONAL ACTIVITIES:  Guided patient through BUE AROM this date x15 reps x1 set in all planes and joints available in order to increase BUE strength and improve activity tolerance for ADLs and homemaking tasks. ASSESSMENT:     Activity Tolerance:  Patient tolerance of  treatment: fair. Discharge Recommendations: ECF with OT   Equipment Recommendations: Other: continue to assess  Plan: Times per week: 3-5x  Current Treatment Recommendations: Strengthening, Balance Training, Functional Mobility Training, Endurance Training, Safety Education & Training, Self-Care / ADL, Equipment Evaluation, Education, & procurement    Patient Education  Patient Education: Home Exercise Program    Goals  Short term goals  Time Frame for Short term goals: by discharge  Short term goal 1: Pt will complete functional mobility to/from BR with AD and S to increase indep with accessing environment for ADLs. Short term goal 2: Pt will complete dynamic standing task with B hand release x 4 min with S to increase balance required for ADLs. Short term goal 3: Pt will complete LB ADL with Robert to increase indep with self care. Following session, patient left in safe position with all fall risk precautions in place.

## 2021-09-28 NOTE — CARE COORDINATION
Called Talisha. Patient needs to go to [address] [place] [address] for dialysis for 21 days which would be 10/4 with 2 negative COVID tests as well. We need therapy to review patients needs. Daughter said she can transport only in [facility] if patient is able to get in and out of the car. [facility] they can transport but daughter is unwilling to pay. Will discuss with team if patient ready for discharge. Pt needs to be seen by therapy for any type of placement and to review if patient could in and out of a car.

## 2021-09-29 NOTE — PROGRESS NOTES
[facility]  INPATIENT PHYSICAL THERAPY  DAILY NOTE  [facility] ONC MED 5K - 5K-14/014-A  Time In: 3273  Time Out: 4521  Timed Code Treatment Minutes: 24 Minutes  Minutes: 24          Date: 2021  Patient Name: Aishwarya Calderon,  Gender:  male        MRN: [id number]  : 11/10/1930  (80 y.o.)     Referring Practitioner: Dr. Soy Evans  Diagnosis: acute pulmonary edema  Additional Pertinent Hx: admit with above diagnosis, ESRD on HD, COVID-19 pneumonia, HTN, COPD, anemia     Prior Level of Function:  Lives With: Daughter  Type of Home: House  Home Layout: One level  Home Access: Level entry  Home Equipment: Rolling walker   Bathroom Shower/Tub: Walk-in shower  Bathroom Toilet: Standard    ADL Assistance: Independent  Homemaking Assistance: Needs assistance  Ambulation Assistance: Independent  Transfer Assistance: Independent  Additional Comments: Pt using RW PTA. Daughter assists at home. Unsure of accuracy of above pt is slighly confused this date. Restrictions/Precautions:  Restrictions/Precautions: Fall Risk  Position Activity Restriction  Other position/activity restrictions: . SUBJECTIVE: Ok to see pt per nursing. Pt in bed when therapy arrived, agreeable to PT session, even though noted to not be feeling well. PAIN: denies    Vitals: Oxygen: unable to get reading with pulse ox, pt on 2 L of O2    OBJECTIVE:  Bed Mobility:  Supine to Sit: Stand By Assistance, X 1, with head of bed raised, with rail, with verbal cues , with increased time for completion  Cues for proper technique of completion, required cues for scooting self to EOB to prepare for transfers with good demo, able to lift buttock off bed and scoot self FWD.    Transfers:  Sit to Stand: [address], Minimal Assistance, X 1, cues for hand placement, with verbal cues  Stand to Sit:Minimal Assistance, X 1, cues for hand placement, with verbal cues  Use of RW for support, cues for proper hand placement and to control descent into sitting with fair demo. Pt with fair safety, wanting to grab walker to pull self to stand. Pt completed from various surfaces and heights, reduced safety with increased fatigue noted. Ambulation:  Contact Guard Assistance, Minimal Assistance, X 1, with cues for safety, with verbal cues , with increased time for completion  Distance: 40 feet in room   Surface: Level Tile  Device:Rolling Walker  Gait Deviations: Forward Flexed Posture, Decreased Step Length Bilaterally, Decreased Gait Speed, Decreased Heel Strike Bilaterally, Wide Base of Support, Mild Path Deviations, Unsteady Gait and RW far from self, cues for maintaining in close proximity to self at all times, fair demo, cues for improved gait mechanics with decreased overall awareness of O2 cord during session requiring max A for management    Balance:  Static Standing Balance: Contact Guard Assistance, X 1, with cues for safety, with verbal cues   To help assist with ricky care due to small amount of feces on bed and on pt, no LOB, use of RW for support. Exercise:  Patient was guided in 1 set(s) 10 reps of exercise to both lower extremities. Ankle pumps, Glut sets, Quad sets, Heelslides, Short arc quads, Hip abduction/adduction and Straight leg raises. Exercises were completed for increased independence with functional mobility. Cues for 3 sec holds with quad and glute sets, fair demo requiring verbal cues and tactile cues for proper technique of completion to control the motion throughout with fair demo. Functional Outcome Measures: Completed  -PAC Inpatient Mobility Raw Score : 18  AM-PAC Inpatient T-Scale Score : 43.63    ASSESSMENT:  Assessment: Patient progressing toward established goals. Activity Tolerance:  Patient tolerance of  treatment: fair. Reduced endurance, increased fatigue following session     Equipment Recommendations: Other: cont to assess needs  Discharge Recommendations: Continue to assess pending progress, Subacute/Skilled Nursing Facility, 24 hour supervision or assist, Patient would benefit from continued therapy after discharge    Plan: Times per week: 3-5x GM  Times per day: Daily  Specific instructions for Next Treatment: therex and mobility    Patient Education  Patient Education: Plan of Care, Home Exercise Program, Bed Mobility, Equipment Education, Transfers, Gait, Use of Gait [place], Verbal Exercise Instruction,  - Patient Verbalized Understanding, - Patient Requires Continued Education energy conservation and proper breathing mechanics    Goals:  Patient goals : not stated  Short term goals  Time Frame for Short term goals: by discharge  Short term goal 1: bed mobility with S to get in/out of bed  Short term goal 2: transfer with S to get in/out of chairs  Short term goal 3: amb >50'x1 with RW and SBA, maintaining O2 sats >90% on </=4L O2, to walk safely in room  Long term goals  Time Frame for Long term goals : no LTGs set secondary to short ELOS    Following session, patient left in safe position with all fall risk precautions in place. Pt wanting in chair following session, all needs and call light in reach. Bed changed due to BM.

## 2021-09-29 NOTE — PROGRESS NOTES
Spoke with inpatient dialysis staff to notify them that patient was ready -- they stated they needed to adjust schedule and patient would likely go around 1130

## 2021-09-29 NOTE — PROGRESS NOTES
Pharmacy Renal Adjustment    Haven Colin is a 80 y.o. male. Pharmacy renally adjust the following medications per P&T approved policy: metoclopramide   Recent Labs     09/26/21  0547 09/27/21  1838   BUN 33* 19   CREATININE 6.0* 3.8*     CrCl cannot be calculated (Unknown ideal weight.).   Calculated CrCl:  10 ml/min    Height:   Ht Readings from Last 1 Encounters:   09/13/21 5' 6\" (1.676 m)     Weight:  Wt Readings from Last 1 Encounters:   09/24/21 102 lb 11.8 oz (46.6 kg)       Plan: Adjustments based on renal function:          Decrease dose from 1 mg q12h prn down to 5 mg q12h prn

## 2021-09-29 NOTE — FLOWSHEET NOTE
09/29/21 0014   Provider Notification   Reason for Communication Evaluate; Review case   Provider Name Staci Deutsch   Provider Notification Physician   Method of Communication Secure Message   Response Waiting for response   Notification Time 0014     0014: On tele monitor, patient's heart rate is reading 170's. Upon entering room, patient is vomiting. States he doesn't \"feel good. \" Stat EKG ordered per protocol. Blood sugar is 136. Rectal temp is 98.8. Denies chest pain. Will continue to monitor. 0024: Orders to give Lopressor 5mg IV. Notified physician of inability to get IV access. 6170: Orders to give oral cardizem 30 mg.     0105: Notified Dr. Staci Deutsch that patient's heart rate has been 60-70's on monitor. Orders given to hold. Will continue to monitor.

## 2021-09-29 NOTE — PROGRESS NOTES
Nephrology Progress Note    Patient - Raj Tomlinson   MRN -  [id number]   Acct # - [de-identified]      - 11/10/1930    80 y.o. Admit Date: 2021  Hospital Day: 16  Location: [facility]-A  Date of evaluation -  2021    Subjective:   CC: shortness of breath  Denies shortness of breath   Nausea last jennifer  Pt seen during hemodialysis, Hemodynamically stable, 3 K bath, goal Ultrafiltration    BP Range: Systolic (93OYF), TDK:809 , Min:131 , BAF:714      Diastolic (28FMM), JTN:28, Min:65, Max:86    Objective:   VITALS:  BP (!) 145/66   Pulse 72   Temp 97.9 °F (36.6 °C) (Oral)   Resp 20   Ht 5' 6\" (1.676 m)   Wt 102 lb 11.8 oz (46.6 kg)   SpO2 94%   BMI 16.58 kg/m²    Patient Vitals for the past 24 hrs:   BP Temp Temp src Pulse Resp SpO2   21 0804 (!) 145/66 97.9 °F (36.6 °C) Oral 72 -- 94 %   21 0330 (!) 145/65 98.6 °F (37 °C) Oral 75 20 99 %   21 2356 131/86 98.8 °F (37.1 °C) Rectal 112 24 --   21 2115 (!) 148/69 98.7 °F (37.1 °C) Oral 78 18 96 %   21 1552 (!) 154/69 98.5 °F (36.9 °C) Oral 71 16 --       Intake/Output Summary (Last 24 hours) at 2021 1153  Last data filed at 2021 0830  Gross per 24 hour   Intake 670 ml   Output 0 ml   Net 670 ml       Admission weight: 163 lb (73.9 kg)  No data found. Body mass index is 16.58 kg/m². EXAM:  CONSTITUTIONAL:  No acute distress. HEENT:  Head is normocephalic, Extraocular movement intact. Neck is supple. CARDIOVASCULAR:  S1, S2  regular rate and rhythm. RESPIRATORY: Clear to ausculation bilaterally. Equal breath sounds. No wheezes. No shortness of breath noted at rest.  ABDOMEN: soft, non tender  NEUROLOGICAL: Patient is awake and oriented to person, place, and time. Recent and remote memory intact. SKIN: no rash, No significant bruises on exposed surfaces  MUSCULOSKELETAL: Movement is coordinated.  Moves all extremities   EXTREMITIES: Distal lower extremity temp is warm, No bilateral symmetric lower extremity edema. Upper extremity AV Fistula   PSYCHIATRIC: mood and affect appropriate. Medications:   Med reviewed  Scheduled Meds:   dilTIAZem  30 mg Oral Once    aspirin  81 mg Oral Daily    heparin (porcine)  5,000 Units SubCUTAneous 3 times per day    epoetin traci-epbx  6,000 Units SubCUTAneous Once per day on Mon Wed Fri    calcitRIOL  1.5 mcg Oral Once per day on Mon Wed Fri    cinacalcet  60 mg Oral Once per day on Mon Wed Fri    docusate sodium  100 mg Oral BID    ferrous sulfate  325 mg Oral Daily with breakfast    gabapentin  100 mg Oral TID    metoprolol  100 mg Oral BID    pantoprazole  40 mg Oral QAM AC    vitamin C  500 mg Oral Daily    polyethylene glycol  17 g Oral Daily    sodium chloride flush  5-40 mL IntraVENous 2 times per day    clopidogrel  75 mg Oral Daily     Continuous Infusions:   dextrose 100 mL/hr (09/23/21 1003)    sodium chloride       PRN Meds metoprolol, metoclopramide, ipratropium-albuterol, glucose, dextrose, glucagon (rDNA), dextrose, sodium chloride flush, sodium chloride, ondansetron **OR** ondansetron, polyethylene glycol, acetaminophen **OR** acetaminophen, guaiFENesin-dextromethorphan, albuterol sulfate HFA   Labs:   Labs reviewed  Recent Labs     09/28/21  0605   WBC 5.3   RBC 2.29*   HGB 7.2*   HCT 21.2*   MCV 92.6   MCH 31.4          Recent Labs     09/27/21  1838      K 4.6   CL 97*   CO2 26   BUN 19   CREATININE 3.8*   LABGLOM 18*   GLUCOSE 82   CALCIUM 8.1*       ASSESSMENT:  1. 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 AV fistula. 2. COVID 19 pneumonia. Ok to follow at [facility] due to [place], till back at [facility]  3. Essential Hypertension with nephrosclerosis  4. Abdominal aortic aneurysm 3.7 cm   5. 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 Coronary artery disease Recent PCI 8/31/21 at [facility]  7.  Anemia of chronic disease on Erythropoiesis-Stimulating Agent 8. Hypophosphatemia, Resolved  9. Secondary hyperparathyroidism of renal origin on rocaltrol and sensipar. Calcium ok with corrected for albumin  10. Debility    Active Problems:    Pneumonia due to COVID-19 virus    Severe malnutrition ([facility] [address]  Resolved Problems:    * No resolved hospital problems.  Marian Oliveira, APRN - CNP 11:53 AM 9/29/2021

## 2021-09-29 NOTE — CARE COORDINATION
Perfect Serve message received from XIN Clayton CNP. She states patient is okay for discharge from Nephrology and Dr. Rudolph Monroy will follow patient at [facility] dialysis. [facility]. [facility] dialysis. Awaiting call back.  Electronically signed by Salima Romeo RN on 9/29/21 at 3:01 PM EDT

## 2021-09-29 NOTE — FLOWSHEET NOTE
09/29/21 1147 09/29/21 1608   Vital Signs   /64 (!) 168/72   Temp 97.6 °F (36.4 °C) 97.6 °F (36.4 °C)   Pulse 63 82   Resp 14 18   SpO2 100 %  --    Weight 146 lb 9.7 oz (66.5 kg) 145 lb 8.1 oz (66 kg)   Weight Method Bed scale Bed scale   Percent Weight Change 42.7 -0.75   Post-Hemodialysis Assessment   Post-Treatment Procedures  --  Blood returned; Access bleeding time < 10 minutes   Machine Disinfection Process  --  Acid/Vinegar Clean;Heat Disinfect; Exterior Machine Disinfection   Rinseback Volume (ml)  --  400 ml   Total Liters Processed (l/min)  --  91.1 l/min   Dialyzer Clearance  --  Lightly streaked   Duration of Treatment (minutes)  --  240 minutes   Heparin amount administered during treatment (units)  --  0 units   Hemodialysis Intake (ml)  --  400 ml   Hemodialysis Output (ml)  --  900 ml   NET Removed (ml)  --  500 ml   Tolerated Treatment  --  Good   Stable 4.0 hour tx. removed 500 ml of fluid. pt tolerated fluid removal well. Pressure held to each needle site x 10 min. drsg clean, dry, and intact upon leaving unit. TX record printed for scanning into EMR. Report called to primary RN.

## 2021-09-29 NOTE — PROGRESS NOTES
Assistance, with increased time for completion, to/from chair with arms. Stand to Sit: [employer], with increased time for completion. FUNCTIONAL MOBILITY:  Assistive Device: Rolling Walker  Assist Level:  Contact Guard Assistance - Min A . Distance: To and from bathroom  Pt completed with very slow pace, with posterior pelvic lean and forward flexed posture. Pt required min  A to maintain RW proximal to self at times. ASSESSMENT:     Activity Tolerance:  Patient tolerance of  treatment: fair. Pt demo fatigued towards end of session. Once supine, pt feel asleep. Discharge Recommendations: ECF with OT   Equipment Recommendations: Other: continue to assess  Plan: Times per week: 3-5x  Current Treatment Recommendations: Strengthening, Balance Training, Functional Mobility Training, Endurance Training, Safety Education & Training, Self-Care / ADL, Equipment Evaluation, Education, & procurement    Patient Education  Patient Education: Role of OT, Plan of Care, ADL's, Energy Conservation, Importance of Increasing Activity and Assistive Device Safety    Goals  Short term goals  Time Frame for Short term goals: by discharge  Short term goal 1: Pt will complete functional mobility to/from BR with AD and S to increase indep with accessing environment for ADLs. Short term goal 2: Pt will complete dynamic standing task with B hand release x 4 min with S to increase balance required for ADLs. Short term goal 3: Pt will complete LB ADL with Robert to increase indep with self care. Following session, patient left in safe position with all fall risk precautions in place.

## 2021-09-29 NOTE — CARE COORDINATION
Phone call received from Chantelle Bonilla at [facility]. She states they are not able to accept Merrill Peacockr since he is established with [facility]. Chantelle Bonilla states Tallmadge Nav must receive a discharge from [facility] with a referral from [facility] to see if they would be willing to accept him.  Electronically signed by Ani Cutler RN on 9/29/21 at 10:15 AM EDT

## 2021-09-29 NOTE — CARE COORDINATION
Perfect Serve message sent to Dr. Tri Quiroz to see if he is okay to follow patient at [facility] dialysis since W[facility]  [facility] Derrick Nelson is not able to see patient until 10/06/2021 at the earliest due to [place] dx and he is ready for discharge and has been for days.  Electronically signed by Azar Awan RN on 9/29/21 at 11:33 AM EDT

## 2021-09-29 NOTE — CARE COORDINATION
Phone call placed to [facility] dialysis. Spoke to [employer] Referral made, requested information faxed to their intake dept. [employer] states she will call this writer back after his case has been reviewed.  Electronically signed by Jose Luis Oh RN on 9/29/21 at 9:39 AM EDT

## 2021-09-29 NOTE — PROGRESS NOTES
not appear to be in a fib. Pt typically follows with Cardiology at [facility], unclear if he has a hx of AF. He is not on anticoagulation. Even if PAF, the risks of anticoagulation may be greater than the benefit. CHADSVASc 4, HAS-BLED 4. Pacer interrogated, awaiting report. Pt will need to arrange follow up with his Cardiologist at [facility]. 11. Chronic HFrEF: EF 45% and grade 1 diastolic dysfunction on Echo 05/2021. Follows with Cardiology at [facility]. Fluid status stable, Nephrology following. Cont home meds. 12. Disposition: Difficulty with discharge secondary to Covid diagnosis and hemodialysis schedule. Appreciate case management assistance. Chief Complaint: SOB    HPI / Hospital Course: Per HPI: \"The patient is a 80 y.o. male who presents with complaints of shortness of breath and cough for the last 2 days. Patient is accompanied by family who helps with history. They report that patient has had a poor appetite, fatigued, short of breath and coughing for last 2 days. He recently was at [facility] approximately 2 weeks ago where he had 2 stents placed. They state he was in his normal state of health until 2 days ago. Patient was seen in dialysis today and had a full session however continued to be short of breath and had a significant cough and was sent for evaluation. He denies any fevers or chills, nausea or vomiting. He denies any diarrhea or constipation. He denies any chest pain.     In the ED patient was found to be Covid positive. He was placed on 2 L nasal cannula. He was also found to have slightly elevated troponin and was started on heparin drip in the ED. Family updated on test results. \"    Pt has completed treatment for COVID and superimposed bacterial PNA. I took over care 9/22, per previous note, pt stable for discharge and awaiting precert to ECF.      8/84-> Rapid response called during dialysis. Pt had vomited, became lethargic and had episode of AF RVR on the monitor per report. Dialysis was stopped. Patient still lethargic but following commands and nods to answer questions appropriately. EKG showed NSR, does not appear to be AF. Patient is moaning which per nursing, he typically does during dialysis. Patient seen again later. He reports fatigue and wanting to sleep. He denies abdominal pain, n/v/d, CP, SOB, fever/chills. Pt had bleeding from dialysis fistulasite. 9/23-> Patient alert to verbal stimuli, lethargic. Appears to be at his baseline. Denies fever/chills, sob, cp, abd pain, n/v/d. Nephrology started IVF for today and plan for dialysis tomorrow. Patient has been NSR on tele. 9/24-> Pt appears at baseline. He is more alert today than he has been. Tolerated dialysis well. 9/26-> Patient is alert, oriented to self, place, time, and somewhat to situation. He denies fever/chills, SOB, CP, abd pain, n/v/d, cough. He reports of left arm pain at dialysis cath site. The patient states that he has been told before that his heart beat gets \"out of wack. \"   Chart reviewed and a rapid was called last night they were unable to get a reading of O2 levels. ABG results with pO2 179, no hypercapnea. Based on ABG did not appear to be truly hypoxic so he was put back on 6L NC and nursing instructed to wean. His EKG reported atrial fibrillation; however, after review, there were P waves present. Ordered pacer interrogate. 9/27-> no acute events overnight. Patient denies sob, cp, abd pain, nvd. Nephrology planning HD tomorrow. Awaiting pacer interrogate results. 9/28- Pt had dialysis today, tolerated well. Subjective (past 24 hours):   9/29 -> patient on HD. Patient is doing well. Denies fever/chills, sob, cp, palpitations, abd pain, n/v/d. Disposition has been difficult secondary to Covid diagnosis and HD facility/schedule. ROS: Pertinent positives as noted in HPI. All other systems reviewed and negative.       Past medical history, family history, social history and cyanosis, or edema x 4. Vasculature: capillary refill < 3 seconds. Palpable LE pulses bilaterally. Skin:  warm and dry. Psych:  Alert and oriented x3. Lymph:  No supraclavicular adenopathy. Neurologic:  No focal deficit. No seizures. Data: (All radiographs, tracings, PFTs, and imaging are personally viewed and interpreted unless otherwise noted)  Labs:   Recent Labs     09/28/21  0605   WBC 5.3   HGB 7.2*   HCT 21.2*        Recent Labs     09/27/21  1838      K 4.6   CL 97*   CO2 26   BUN 19   CREATININE 3.8*   CALCIUM 8.1*   PHOS 2.5     No results for input(s): AST, ALT, BILIDIR, BILITOT, ALKPHOS in the last 72 hours. No results for input(s): INR in the last 72 hours. No results for input(s): Charmaine Kaska in the last 72 hours. Urinalysis:   Lab Results   Component Value Date    NITRU NEGATIVE 06/22/2019    WBCUA 50-75 06/22/2019    BACTERIA NONE 06/22/2019    RBCUA 25-50 06/22/2019    BLOODU LARGE 06/22/2019    SPECGRAV 1.013 05/03/2016    GLUCOSEU NEGATIVE 06/22/2019     Urine culture: No results found for: Karine Rodgers  Micro:   Blood culture #1:   Lab Results   Component Value Date    BC No growth-preliminary No growth  09/13/2021     Blood culture #2:No results found for: Bethanne Dubin  Organism:  Lab Results   Component Value Date    ORG Micrococcus species 07/12/2021       No results found for: LABGRAM  MRSA culture only:No results found for: [address] [place] [address]  Respiratory culture: No results found for: CULTRESP  Aerobic and Anaerobic :  No results found for: LABAERO  No results found for: [facility]    Radiology Reports:  XR CHEST PORTABLE   Final Result   Impression:      Increasing consolidation and interstitial prominence. Question pulmonary edema versus pneumonia      This document has been electronically signed by: Smiley Patel MD on    09/24/2021 10:34 PM      XR CHEST PORTABLE   Final Result   1. No acute intrathoracic process. 2. Mild stable cardiomegaly. **This report has been created using voice recognition software. It may contain minor errors which are inherent in voice recognition technology. **      Final report electronically signed by Dr. Fish Correa on 9/22/2021 9:52 PM      CT HEAD WO CONTRAST   Final Result      1. Stable CT scan of the brain, no interval change since previous study dated 15th of June 2021. **This report has been created using voice recognition software. It may contain minor errors which are inherent in voice recognition technology. **      Final report electronically signed by DR Quincy Catsanon on 9/15/2021 4:26 PM      CT CERVICAL SPINE WO CONTRAST   Final Result    No evidence of acute osseous injury of the cervical spine. **This report has been created using voice recognition software. It may contain minor errors which are inherent in voice recognition technology. **      Final report electronically signed by Dr. Remi Ramirez MD on 9/15/2021 4:29 PM      XR CHEST PORTABLE   Final Result   Cardiomegaly with vascular congestion and interstitial edema and effusions differential would include congestive heart failure or fluid overload. **This report has been created using voice recognition software. It may contain minor errors which are inherent in voice recognition technology. **      Final report electronically signed by Dr. Cristina Valentin on 9/13/2021 2:22 PM        XR CHEST PORTABLE    Result Date: 9/13/2021  PROCEDURE: XR CHEST PORTABLE CLINICAL INFORMATION: SOB . COMPARISON: 7/12/2021 TECHNIQUE: Portable upright FINDINGS: Heart size is mildly enlarged. Mediastinum is not widened. There is pulmonary vascular congestion. Interstitial edema. There is suggestion of small effusions. AICD over the left chest. EKG leads overlie the chest. Right upper extremity vascular stents are noted.      Cardiomegaly with vascular congestion and interstitial edema and effusions differential would include congestive

## 2021-09-29 NOTE — CARE COORDINATION
9/29/21, 12:15 PM EDT    DISCHARGE PLANNING EVALUATION    SW left message with [facility] with [facility] regarding potential referral.  Await call back. 2:29 PM update:  SW received call from [facility] with [facility], referral completed. PAWAN spoke with pts daughter, Pedrito Alvarado. Explained referral to [facility] at this facility is in network with pts insurance. PAWAN explained that we are checking to see if pt is able to go to [facility]ialysis unit. PAWAN explained [facility] protocol with being out of isolation for 21 days and having 2 covid - tests. PAWAN confirmed with Rexinga Christiano that she would be able to transport pt to/fom dialysis. Pedrito Alvarado stated she is just not available daily 10 am-12 noon. FRANKIE Lopez updated.

## 2021-09-30 NOTE — PROGRESS NOTES
Renal Progress Note    Assessment and Plan:   1. End-stage kidney disease on hemodialysis  2. SARS-CoV-2 pneumonia  3. Deconditioned state  4. Hypertension primary  5. Anemia of chronic disease normocytic  6. Abdominal pain? 7. Hypoglycemia? PLAN:  1. Medications reviewed  2. No changes  3. KUB today ASAP for abdominal pain  4. If abnormal will do CT abdomen and pelvic   5. Discussed with the patient  6. We will follow    Patient Active Problem List:     CAD (coronary artery disease)     COPD (chronic obstructive pulmonary disease) ([address]     Chronic renal insufficiency     Patient overweight     Arthritis-  low back and neck .      CKD (chronic kidney disease) stage 3, GFR 30-59 ml/min (HCC)     Dyspnea and respiratory abnormalities     ED (erectile dysfunction)     Degenerative joint disease of knee, left     Gout of big toe     Anemia, chronic disease     CKD (chronic kidney disease) stage 4, GFR 15-29 ml/min (HCC)     Essential hypertension     Monoclonal gammopathy     Leukopenia     Thrombocytopenia (HCC)     Chronic kidney disease (CKD), stage V (HCC)     Metabolic acidosis     Hyperkalemia     Iron deficiency anemia     ROSAURA (acute kidney injury) ([address]     Plasma cell dyscrasia     Idiopathic hypotension     Hypertensive urgency     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)     Systolic congestive heart failure (HCC)     Other fluid overload (CODE)     Hyperphosphatemia     Acute diastolic congestive heart failure (HCC)     Acute on chronic diastolic congestive heart failure (HCC)     Pulmonary hypertension (HCC)     Anemia due to chronic kidney disease, on chronic dialysis (HCC)     Volume overload     Secondary hyperparathyroidism of renal origin ([address]     Chest pain     Acute pulmonary edema (HCC)     Accelerated hypertension     Gastritis and duodenitis     Fall from slip, trip, or stumble, initial encounter     Closed fracture of left ankle     ESRD on hemodialysis ([address]     Hypertensive emergency     SOB (shortness of breath)     Chronic combined systolic and diastolic CHF (congestive heart failure) (HCC)     Pneumonia due to COVID-19 virus     Severe malnutrition (HCC)      Subjective:   Admit Date: 9/13/2021    Interval History:   Seen for end stage kidney disease on hemodialysis  Awake and alert  Complains of abdominal pain with cramps since last night  Denies constipation  Blood pressure is good      Medications:   Scheduled Meds:   morphine  2 mg IntraVENous Once    dilTIAZem  30 mg Oral Once    aspirin  81 mg Oral Daily    heparin (porcine)  5,000 Units SubCUTAneous 3 times per day    epoetin traci-epbx  6,000 Units SubCUTAneous Once per day on Mon Wed Fri    calcitRIOL  1.5 mcg Oral Once per day on Mon Wed Fri    cinacalcet  60 mg Oral Once per day on Mon Wed Fri    docusate sodium  100 mg Oral BID    ferrous sulfate  325 mg Oral Daily with breakfast    gabapentin  100 mg Oral TID    metoprolol  100 mg Oral BID    pantoprazole  40 mg Oral QAM AC    vitamin C  500 mg Oral Daily    polyethylene glycol  17 g Oral Daily    sodium chloride flush  5-40 mL IntraVENous 2 times per day    clopidogrel  75 mg Oral Daily     Continuous Infusions:   dextrose      dextrose 100 mL/hr (09/23/21 1003)    sodium chloride         CBC:   Recent Labs     09/28/21  0605   WBC 5.3   HGB 7.2*        CMP:    Recent Labs     09/27/21  1838      K 4.6   CL 97*   CO2 26   BUN 19   CREATININE 3.8*   GLUCOSE 82   CALCIUM 8.1*   LABGLOM 18*     Troponin: No results for input(s): TROPONINI in the last 72 hours. BNP: No results for input(s): BNP in the last 72 hours. INR: No results for input(s): INR in the last 72 hours. Lipids: No results for input(s): CHOL, LDLDIRECT, TRIG, HDL, AMYLASE, LIPASE in the last 72 hours. Liver: No results for input(s): AST, ALT, ALKPHOS, PROT, LABALBU, BILITOT in the last 72 hours.     Invalid input(s): BILDIR  Iron:    No results for input(s): IRONS, include congestive heart failure or fluid overload. **This report has been created using voice recognition software. It may contain minor errors which are inherent in voice recognition technology. **      Final report electronically signed by Dr. Jo Ann De Leon on 9/13/2021 2:22 PM      XR ABDOMEN (KUB) (SINGLE AP VIEW)    (Results Pending)         Objective:   Vitals: /65   Pulse 80   Temp 98.2 °F (36.8 °C) (Oral)   Resp 28   Ht 5' 6\" (1.676 m)   Wt 145 lb 8.1 oz (66 kg)   SpO2 90%   BMI 23.48 kg/m²    Wt Readings from Last 3 Encounters:   09/29/21 145 lb 8.1 oz (66 kg)   07/14/21 145 lb 8.1 oz (66 kg)   06/17/21 161 lb (73 kg)      24HR INTAKE/OUTPUT:      Intake/Output Summary (Last 24 hours) at 9/30/2021 1041  Last data filed at 9/29/2021 1608  Gross per 24 hour   Intake 400 ml   Output 900 ml   Net -500 ml       Constitutional: Well-developed elderly gentleman alert, awake, no apparent distress   Skin:normal with no rash or lesions. HEENT:Pupils are reactive . Throat is clear . Oral mucosa is moist   Neck:supple with no carotid bruit  Cardiovascular:  S1, S2 without murmur or rubs   Respiratory:  Clear to ausculation without wheezes, rhonchi or rales. Abdomen: Soft. Decreased bowel sounds. Diffuse tenderness with  palpation and depression with no rebound or rigidity. Ext: No LE edema  Musculoskeletal:Intact  Neuro:Alert and awake with no focal deficit. Speech is normal      Electronically signed by Alfonso Turner MD on 9/30/2021 at 10:41 AM  **This report has been created using voice recognition software. It maycontain minor  errors which are inherent in voice recognition technology. **

## 2021-09-30 NOTE — CARE COORDINATION
21, 10:13 AM EDT    DISCHARGE ON GOING EVALUATION    Tim B[facility] day: 17  Location: [facility] Reason for admit: Acute pulmonary edema ([address] [J81.0]  Hypoxia [R09.02]  Elevated troponin [R77.8]  Pneumonia due to COVID-19 virus [U07.1, J12.82]  COVID-19 [U07.1]   Procedure: Hemodialysis [address] chair time 6:40 a.m. Barriers to Discharge: Patient BG levels low this mornin, 54, 41, 36, glucagon x 3 doses, glucose gel x 2 doses, ABG's drawn, orders for transfer to stepdown. Nephrology following, Dietician,SS, PT/OT, BMP, Cortisol, and COVID ordered. PCP: Deirdre Aleman MD  Readmission Risk Score: 52%  Patient Goals/Plan/Treatment Preferences: When patient is stable for discharge he needs a negative COVID PCR (per [facility] our COVID tests are PCR)  (ordered) to return to [address] [facility] is reviewing Tunde's case. If they are unable to accept him, he will be discharged to home and his daughter Vipin was made aware of this yesterday per Harshal Dior. She is in agreement to take him home. She is in agreement to transport Miladis to his dialysis appointments. Case discussed at length with Dr. Shannon Padilla this a.m. and PAWAN Chavez at [address] [place].

## 2021-09-30 NOTE — FLOWSHEET NOTE
09/30/21 1333   Provider Notification   Reason for Communication Review case   Provider Name MS [facility]   Provider Notification Physician   Method of Communication Secure Message   Response Waiting for response   Notification Time [phone]     RE: David Powell RM: [id number] MESSAGE SENT AT 1121 Patient here for pneumonia d/y COVID. Patient having issues with hypoglycemia this morning. Had issues with obtaining IV access and an IO was obtained. Patient has D10 running at this time. Gregorio Smith placed order for a PICC line. Patient is a hard stick and unable to obtain labs. May be trying to transfer to another floor if unable to stabilize if/when a bed is available. Are you ok with placing a PICC? (dialysis fistula RUE-goes M-W-F for dialysis.) UPDATE 1331 IV team said he likely isn't a candidate due to having the fistula in the RUE and his GFR. But said that maybe he could have a midline if that was ok with you. Please advise.

## 2021-09-30 NOTE — PROGRESS NOTES
Daughter Claudioelliot Wheaterlin called and updated on patient status, requested to have someone come in to advocate.

## 2021-09-30 NOTE — CARE COORDINATION
9/30/21, 1:43 PM EDT    DISCHARGE PLANNING EVALUATION    SW spoke with Adrian from [facility] and they are reviewing and should be able to accept as long as Patient has a negative PCR test or physician is able to document that Patient is recovered from [place] prior to them accepting in full. Family will also need to be willing to transport to/from dialysis.

## 2021-09-30 NOTE — PROGRESS NOTES
Midline insertion Procedure Note    Estrella Level   Admitted- 9/13/2021 12:59 PM  Admission diagnosis- Acute pulmonary edema ([address] [J81.0]  Hypoxia [R09.02]  Elevated troponin [R77.8]  Pneumonia due to COVID-19 virus [U07.1, J12.82]  COVID-19 [U07.1]      Attending Physician- Key Morrissey PA-C  Ordering Physician-same  Indication for Insertion: Poor Vascular Access    Catheter Insertion Date- 9/30/2021   Catheter Brand-ARROW  Lot Number- [id number]  Gauge-5  Lumen-dual    Insertion Site- ANAIS Basilic  Vein Diameter- 2.5 mm  Catheter Length- 15 cm  Internal Length- 13 cm  Exposed Catheter Length- 2cm   Midline Tip Terminates in the Axillary- Yes  Upper Arm Circumference- 26cm  Easy insertion- Yes  Able to Aspirate blood- Yes  Easy Flush- Yes    Midline insertion successful- Yes  Ultrasound- yes    Okay To Use Midline- Yes    Electronically signed by Harman Ross RN, RN on 9/30/2021 at 4:07 PM

## 2021-09-30 NOTE — PROGRESS NOTES
Hospitalist Progress Note    Patient:  Mathew Awan    Unit/Bed:-14/014-A  YOB: 1930  MRN: [id number]   Acct: [de-identified]   PCP: Pamela Chin MD  Code Status: Limited  Date of Admission: 9/13/2021    Expected Discharge: pending placement. Disposition: ECF, precert pending. Pt is medically stable for discharge, awaiting placement. Assessment/Plan:    1. Hypoglycemia: unclear etiology. Pt is not a diabetic, no DM medications listed. Check Hgb A1c. Recent tx for COVID with steroids x9 days. Check LA. Check random cortisol level. Cortisol stimulation test tomorrow am. S/p 2 dose of glucose oral gel and 3 doses of 1 mg Glucagon. Pt was noted as low as 23, continues to trend down despite D10 drip at 100 mL/hr. Continue Q2 hr checks. Trial 10 mg Decadron steroids once and assess response. 2. Abd pain: unclear etiology, overnight pt developed abd pain, generalized. Had one episode of diarrhea. Check CT A / P. Antiemetics. 3. COVID-19 pneumonia: treated with dexamethasone 6mg daily. First positive test 09/13. Pt stable on RA. Isolation no longer required, transferred to . 4. Possible superimposed bacterial PNA: Completed treatment with Rocephin and Azithromycin. Remains afebrile. No leukocytosis. 5. Acute hypoxic respiratory failure: 2/2 above. 9/25-> Pt placed on O2 overnight after rapid was called. PO2 on ABG was 170, no hypercapnea. No acute changes on EKG, no evidence of fluid overload. Pt is currently 93% on 6L, ABG showing respiratory alkalosis. Continue to monitor and wean as tolerated. 6. ESRD on HD:  Secondary to diabetic and hypertensive nephrosclerosis. Nephrology following. On HD MWF.     7. S/p unwitnessed fall: No apparent injuries, CT brain and cervical spine -unremarkable     8. Dark tarry stools / chronic normocytic anemia: hx INDRA. On Retacrit, ferrous sulfate. One episode, none further. Hemoglobin stable. Negative occult 9/19/2021.  Continue to monitor, transfuse for hemoglobin less than 7.    9. Elevated troponin: chronically elevated, likely d/t ESRD. No chest pain, repeat troponin improved, no acute ischemic changes on ekg. Pt had recent LHC. 10. CAD s/p PCI: Patient had 2 stents (LAD and Ramus) at [facility] on 08/31/21. Will continue with aspirin, Plavix, metoprolol and pravastatin. 11. Code status: Limited CODE STATUS no CPR, no resuscitation and no intubation. 12. H/o NSVT / Sick sinus syndrome: noted, has PPM/ICD. Tele ordered. Continue metoprolol. Possible PAF. However, after review of EKGs, P waves were present and did not appear to be in a fib. Pt typically follows with Cardiology at [facility], unclear if he has a hx of AF. He is not on anticoagulation. Even if PAF, the risks of anticoagulation may be greater than the benefit. CHADSVASc 4, HAS-BLED 4. Pacer interrogated, awaiting report. Pt will need to arrange follow up with his Cardiologist at [facility]. 13. Chronic HFrEF: EF 45% and grade 1 diastolic dysfunction on Echo 05/2021. Follows with Cardiology at [facility]. Fluid status stable, Nephrology following. Cont home meds. 14. Disposition: Difficulty with discharge secondary to Covid diagnosis and hemodialysis schedule. Appreciate case management assistance. Chief Complaint: SOB    HPI / Hospital Course: Per HPI: \"The patient is a 80 y.o. male who presents with complaints of shortness of breath and cough for the last 2 days. Patient is accompanied by family who helps with history. They report that patient has had a poor appetite, fatigued, short of breath and coughing for last 2 days. He recently was at [facility] approximately 2 weeks ago where he had 2 stents placed. They state he was in his normal state of health until 2 days ago. Patient was seen in dialysis today and had a full session however continued to be short of breath and had a significant cough and was sent for evaluation.   He denies any fevers or chills, nausea or vomiting. He denies any diarrhea or constipation. He denies any chest pain.     In the ED patient was found to be Covid positive. He was placed on 2 L nasal cannula. He was also found to have slightly elevated troponin and was started on heparin drip in the ED. Family updated on test results. \"    Pt has completed treatment for COVID and superimposed bacterial PNA. I took over care 9/22, per previous note, pt stable for discharge and awaiting precert to ECF.      1/43-> Rapid response called during dialysis. Pt had vomited, became lethargic and had episode of AF RVR on the monitor per report. Dialysis was stopped. Patient still lethargic but following commands and nods to answer questions appropriately. EKG showed NSR, does not appear to be AF. Patient is moaning which per nursing, he typically does during dialysis. Patient seen again later. He reports fatigue and wanting to sleep. He denies abdominal pain, n/v/d, CP, SOB, fever/chills. Pt had bleeding from dialysis fistulasite. 9/23-> Patient alert to verbal stimuli, lethargic. Appears to be at his baseline. Denies fever/chills, sob, cp, abd pain, n/v/d. Nephrology started IVF for today and plan for dialysis tomorrow. Patient has been NSR on tele. 9/24-> Pt appears at baseline. He is more alert today than he has been. Tolerated dialysis well. 9/26-> Patient is alert, oriented to self, place, time, and somewhat to situation. He denies fever/chills, SOB, CP, abd pain, n/v/d, cough. He reports of left arm pain at dialysis cath site. The patient states that he has been told before that his heart beat gets \"out of wack. \"   Chart reviewed and a rapid was called last night they were unable to get a reading of O2 levels. ABG results with pO2 179, no hypercapnea. Based on ABG did not appear to be truly hypoxic so he was put back on 6L NC and nursing instructed to wean.  His EKG reported atrial fibrillation; however, after review, there were P waves present. Ordered pacer interrogate. 9/27-> no acute events overnight. Patient denies sob, cp, abd pain, nvd. Nephrology planning HD tomorrow. Awaiting pacer interrogate results. 9/28- Pt had dialysis today, tolerated well. 9/29 -> patient on HD. Patient is doing well. Denies fever/chills, sob, cp, palpitations, abd pain, n/v/d. Disposition has been difficult secondary to Covid diagnosis and HD facility/schedule. Subjective (past 24 hours):   9/30-> pt had a rough morning. Called to bedside due to pulse ox being read as low as 50% and was very labile. Pt placed on non-re breather. Pt was c/o of abd pain and an episode of diarrhea that started last night. Had an episode of emesis s/p IO insertion, none since then. Afebrile. ROS: Pertinent positives as noted in HPI. All other systems reviewed and negative. Past medical history, family history, social history and allergies reviewed again and is unchanged since admission. Medications:  Reviewed.   Infusion Medications    dextrose      dextrose 100 mL/hr (09/23/21 1003)    sodium chloride       Scheduled Medications    morphine  2 mg IntraVENous Once    lidocaine 1 % injection  5 mL IntraDERmal Once    dexamethasone  10 mg Oral Once    dilTIAZem  30 mg Oral Once    aspirin  81 mg Oral Daily    heparin (porcine)  5,000 Units SubCUTAneous 3 times per day    epoetin traci-epbx  6,000 Units SubCUTAneous Once per day on Mon Wed Fri    calcitRIOL  1.5 mcg Oral Once per day on Mon Wed Fri    cinacalcet  60 mg Oral Once per day on Mon Wed Fri    docusate sodium  100 mg Oral BID    ferrous sulfate  325 mg Oral Daily with breakfast    gabapentin  100 mg Oral TID    metoprolol  100 mg Oral BID    pantoprazole  40 mg Oral QAM AC    vitamin C  500 mg Oral Daily    polyethylene glycol  17 g Oral Daily    sodium chloride flush  5-40 mL IntraVENous 2 times per day    clopidogrel  75 mg Oral Daily     PRN Meds: loperamide, metoprolol, metoclopramide, ipratropium-albuterol, glucose, dextrose, glucagon (rDNA), dextrose, sodium chloride flush, sodium chloride, ondansetron **OR** ondansetron, polyethylene glycol, acetaminophen **OR** acetaminophen, guaiFENesin-dextromethorphan, albuterol sulfate HFA    I/O:     Intake/Output Summary (Last 24 hours) at 9/30/2021 1131  Last data filed at 9/29/2021 1608  Gross per 24 hour   Intake 400 ml   Output 900 ml   Net -500 ml       Diet:  ADULT DIET; Regular; Low Potassium (Less than 3000 mg/day); 1200 ml  Adult Oral Nutrition Supplement; Renal Oral Supplement    Exam:  BP (!) 130/59   Pulse 71   Temp 97.6 °F (36.4 °C) (Oral)   Resp 19   Ht 5' 6\" (1.676 m)   Wt 145 lb 8.1 oz (66 kg)   SpO2 93%   BMI 23.48 kg/m²   General:  Chronically ill appearing male. NAD. HEENT:  normocephalic and atraumatic. No scleral icterus. PERR. Neck: supple. No JVD. No thyromegaly. Lungs: clear to auscultation. No retractions  Cardiac: RRR without murmur. Abdomen: soft. Nontender. Bowel sounds positive. Extremities:  No clubbing, cyanosis, or edema x 4. Vasculature: capillary refill < 3 seconds. Palpable LE pulses bilaterally. Skin:  warm and dry. Psych:  Alert and oriented x3. Lymph:  No supraclavicular adenopathy. Neurologic:  No focal deficit. No seizures. Data: (All radiographs, tracings, PFTs, and imaging are personally viewed and interpreted unless otherwise noted)  Labs:   Recent Labs     09/28/21  0605   WBC 5.3   HGB 7.2*   HCT 21.2*        Recent Labs     09/27/21  1838      K 4.6   CL 97*   CO2 26   BUN 19   CREATININE 3.8*   CALCIUM 8.1*   PHOS 2.5     No results for input(s): AST, ALT, BILIDIR, BILITOT, ALKPHOS in the last 72 hours. No results for input(s): INR in the last 72 hours. No results for input(s): Pham Kras in the last 72 hours.   Urinalysis:   Lab Results   Component Value Date    NITRU NEGATIVE 06/22/2019    WBCUA 50-75 06/22/2019    BACTERIA NONE 06/22/2019    RBCUA 25-50 06/22/2019    BLOODU LARGE 06/22/2019    SPECGRAV 1.013 05/03/2016    GLUCOSEU NEGATIVE 06/22/2019     Urine culture: No results found for: Ryan Eid  Micro:   Blood culture #1:   Lab Results   Component Value Date    BC No growth-preliminary No growth  09/13/2021     Blood culture #2:No results found for: Maria D Whitfieldum  Organism:  Lab Results   Component Value Date    ORG Micrococcus species 07/12/2021       No results found for: LABGRAM  MRSA culture only:No results found for: [facility]  Respiratory culture: No results found for: CULTRESP  Aerobic and Anaerobic :  No results found for: LABAERO  No results found for: [facility]    Radiology Reports:  XR CHEST PORTABLE   Final Result   1. Patchy multifocal airspace disease is similar to 9/24/2021 exam, interstitial and airspace edema versus atypical infection. **This report has been created using voice recognition software. It may contain minor errors which are inherent in voice recognition technology. **      Final report electronically signed by Dr Danilo Lynn on 9/30/2021 9:33 AM      XR CHEST PORTABLE   Final Result   Impression:      Increasing consolidation and interstitial prominence. Question pulmonary edema versus pneumonia      This document has been electronically signed by: Jorge Ware MD on    09/24/2021 10:34 PM      XR CHEST PORTABLE   Final Result   1. No acute intrathoracic process. 2. Mild stable cardiomegaly. **This report has been created using voice recognition software. It may contain minor errors which are inherent in voice recognition technology. **      Final report electronically signed by Dr. Navid Agrawal on 9/22/2021 9:52 PM      CT HEAD WO CONTRAST   Final Result      1. Stable CT scan of the brain, no interval change since previous study dated 15th of June 2021. **This report has been created using voice recognition software.  It may contain minor errors which are inherent in voice recognition technology. **      Final report electronically signed by DR Stephania Vazquez on 9/15/2021 4:26 PM      CT CERVICAL SPINE WO CONTRAST   Final Result    No evidence of acute osseous injury of the cervical spine. **This report has been created using voice recognition software. It may contain minor errors which are inherent in voice recognition technology. **      Final report electronically signed by Dr. Westley Rodriguez MD on 9/15/2021 4:29 PM      XR CHEST PORTABLE   Final Result   Cardiomegaly with vascular congestion and interstitial edema and effusions differential would include congestive heart failure or fluid overload. **This report has been created using voice recognition software. It may contain minor errors which are inherent in voice recognition technology. **      Final report electronically signed by Dr. Kathie Rodriguez on 9/13/2021 2:22 PM      XR ABDOMEN (KUB) (SINGLE AP VIEW)    (Results Pending)   CT ABDOMEN PELVIS WO CONTRAST Additional Contrast? None    (Results Pending)     XR CHEST PORTABLE    Result Date: 9/13/2021  PROCEDURE: XR CHEST PORTABLE CLINICAL INFORMATION: SOB . COMPARISON: 7/12/2021 TECHNIQUE: Portable upright FINDINGS: Heart size is mildly enlarged. Mediastinum is not widened. There is pulmonary vascular congestion. Interstitial edema. There is suggestion of small effusions. AICD over the left chest. EKG leads overlie the chest. Right upper extremity vascular stents are noted. Cardiomegaly with vascular congestion and interstitial edema and effusions differential would include congestive heart failure or fluid overload. **This report has been created using voice recognition software. It may contain minor errors which are inherent in voice recognition technology. ** Final report electronically signed by Dr. Kathie Rodriguez on 9/13/2021 2:22 PM        Tele:   [] yes             [] no      Active Hospital Problems    Diagnosis Date Noted  [facility] [E43] 09/22/2021     Class: Acute    Pneumonia due to COVID-19 virus [U07.1, J12.82] 09/13/2021       Electronically signed by Yue Nuñez PA-C on 9/30/2021 at 11:31 AM

## 2021-09-30 NOTE — PROGRESS NOTES
Nutrition Supplement;  Renal Oral Supplement    Anthropometric Measures:  · Height: 5' 6\" (167.6 cm)  · Current Body Weight: 145 lb 8.1 oz (66 kg) - last recorded wt was this morning  · Admission Body Weight: 134 lb 9.6 oz (61.1 kg) (9/18 no edema; 9/15 162# 14.7 oz (estimated weight))    · Usual Body Weight:  (per EMR: 5/19/21: 176# 12.9 oz, 7/2/21; 145# 8.1 oz)     · Ideal Body Weight: 142 lbs    · BMI Categories: Normal Weight (BMI 22.0 to 24.9) age over 72       Nutrition Diagnosis:   · Severe malnutrition related to inadequate protein-energy intake, catabolic illness as evidenced by intake 0-25%, intake 26-50%, moderate muscle loss    Nutrition Interventions:   Food and/or Nutrient Delivery:  Continue Current Diet, Discontinue Oral Nutrition Supplement  Nutrition Education/Counseling:  No recommendation at this time   Coordination of Nutrition Care:  Continue to monitor while inpatient    Goals:  Patient will tolerate and consume 75% or more of meals during LOS       Nutrition Monitoring and Evaluation:   Behavioral-Environmental Outcomes:  None Identified   Food/Nutrient Intake Outcomes:  Food and Nutrient Intake  Physical Signs/Symptoms Outcomes:  Biochemical Data, GI Status, Hemodynamic Status, Nutrition Focused Physical Findings, Skin, Weight     Discharge Planning:    Continue current diet     Electronically signed by Patrick Gtz RD, LD on 9/30/21 at 2:37 PM EDT    Contact: [phone]

## 2021-09-30 NOTE — PROGRESS NOTES
Joann Martinez 60  OCCUPATIONAL THERAPY MISSED TREATMENT NOTE  [facility] 5K  5K-14/014-A      Date: 2021  Patient Name: Milo Vanegas        CSN: [id number]   : 11/10/1930  (80 y.o.)  Gender: male   Referring Practitioner: Ximena Davis MD  Diagnosis: Acute Pulmonary Edema         REASON FOR MISSED TREATMENT: Hold per nursing request. Pt with low blood sugar this morning (36) and not appropriate for therapy at this time. Will try back as pt appropriate.

## 2021-09-30 NOTE — PROGRESS NOTES
Joann Martinez 60  PHYSICAL THERAPY MISSED TREATMENT NOTE  [facility] ONC MED 5K    Date: 2021  Patient Name: Vic Carballo        MRN: [id number]   : 11/10/1930  (80 y.o.)  Gender: male   Referring Practitioner: Dr. Marbella Davis  Diagnosis: acute pulmonary edema         REASON FOR MISSED TREATMENT:  Hold per nursing request. Pt with low blood sugar this morning (36) and not appropriate for therapy at this time. Will try back as pt appropriate.

## 2021-10-01 NOTE — CARE COORDINATION
10/1/21, 3:19 PM EDT    DISCHARGE ON GOING [facility] day: 18  Location: [facility]14/014-A Reason for admit: Acute pulmonary edema ([address] [J81.0]  Hypoxia [R09.02]  Elevated troponin [R77.8]  Pneumonia due to COVID-19 virus [U07.1, J12.82]  COVID-19 [U07.1]   Procedure: Hemodialysis Mon., Wed., Fri.  9/30/2021 Midline  Barriers to Discharge: H/H 6.9/20.0. Nephrology following, 1 unit PRBC's to be transfused, Nafisa Gonzalez., Wed., Fri., Heparin subq, prn medications, H/H post transfusion, K in a.m., low Potassium diet, SS, Palliative Care, PT/OT, acapella, oxygen, incentive spirometry, SCD's, up as tolerated. PCP: Fany Garcia MD  Readmission Risk Score: 52%  Patient Goals/Plan/Treatment Preferences: Alexus Richter was from home. He is planned for [facility]. His daughter will transfer him to his dialysis at [facility] when discharged. His chair time is 6:40 a.m.

## 2021-10-01 NOTE — PROGRESS NOTES
Joann Martinez 60  OCCUPATIONAL THERAPY MISSED TREATMENT NOTE  [facility]  5K-14/014-A      Date: 10/1/2021  Patient Name: Sameer Ashton        CSN: [id number]   : 11/10/1930  (80 y.o.)  Gender: male   Referring Practitioner: Rosalba Cisneros MD  Diagnosis: Acute Pulmonary Edema         REASON FOR MISSED TREATMENT: Patient Unavailable off floor for dialysis, will attempt again if time allows

## 2021-10-01 NOTE — PROGRESS NOTES
Joann Martinez 60  PHYSICAL THERAPY MISSED TREATMENT NOTE  [facility]C MED 5K    Date: 10/1/2021  Patient Name: Audrey Shah        MRN: [id number]   : 11/10/1930  (80 y.o.)  Gender: male   Referring Practitioner: Dr. Hannah Guerrero  Diagnosis: acute pulmonary edema         REASON FOR MISSED TREATMENT:  Missed treat due to pt off of floor for dialysis. Will check back later as time allows.

## 2021-10-01 NOTE — PROGRESS NOTES
This nurse received call from lab with critical H&H results of 6.9 and 20.0. Perfect serve message sent to Dr. Nathan Barrientos.

## 2021-10-01 NOTE — PROGRESS NOTES
This nurse received call from lab with critical K+ 6.1 and creatinine of 5.3. Perfect serve message sent to Dr. Ana Awan.

## 2021-10-01 NOTE — PROGRESS NOTES
Patient did not want breakfast or meds prior to dialysis. Dialysis RN to draw labs, tubes sent with chart.

## 2021-10-01 NOTE — PROGRESS NOTES
Hospitalist Progress Note    Patient:  Amelie Norman    Unit/Bed:-14/014-A  YOB: 1930  MRN: [id number]   Acct: [de-identified]   PCP: Natalie Soria MD  Code Status: Limited  Date of Admission: 9/13/2021    Expected Discharge: pending placement. Disposition: ECF, precert pending. Pt is medically stable for discharge, awaiting placement. Assessment/Plan:    1. Hypoglycemia, resolved: unclear etiology. Pt is not a diabetic, no DM medications listed. Check Hgb A1c. Recent tx for COVID with steroids x9 days. Check LA, elevated and now resolved. Check random cortisol level -> wnl. Cortisol stimulation test tomorrow am. S/p 2 dose of glucose oral gel and 3 doses of 1 mg Glucagon. Pt was noted as low as 23, continues to trend down despite D10 drip at 100 mL/hr. Continue Q2 hr checks. Trial 10 mg Decadron steroids once and assess response. 10/1-> stop D5 drip and monitor    2. Abd pain, resolved: unclear etiology, overnight pt developed abd pain, generalized. Had one episode of diarrhea. Check CT A / P -> unr. Antiemetics. 3. COVID-19 pneumonia: treated with dexamethasone 6mg daily. First positive test 09/13. Pt stable on RA. Isolation no longer required, transferred to . 4. Possible superimposed bacterial PNA: Completed treatment with Rocephin and Azithromycin. Remains afebrile. No leukocytosis. 5. Acute hypoxic respiratory failure: 2/2 above. 9/25-> Pt placed on O2 overnight after rapid was called. PO2 on ABG was 170, no hypercapnea. No acute changes on EKG, no evidence of fluid overload. Pt is currently 4L, ABG showing respiratory alkalosis. Continue to monitor and wean as tolerated. 6. ESRD on HD:  Secondary to diabetic and hypertensive nephrosclerosis. Nephrology following. On HD MWF.     7. S/p unwitnessed fall: No apparent injuries, CT brain and cervical spine -unremarkable     8. Dark tarry stools / chronic normocytic anemia: hx INDRA.  On Retacrit, ferrous sulfate. One episode, none further. Hemoglobin stable. Negative occult 9/19/2021.  Continue to monitor, transfuse for hemoglobin less than 7.    9. Elevated troponin: chronically elevated, likely d/t ESRD. No chest pain, repeat troponin improved, no acute ischemic changes on ekg. Pt had recent LHC. 10. CAD s/p PCI: Patient had 2 stents (LAD and Ramus) at [facility] on 08/31/21. Will continue with aspirin, Plavix, metoprolol and pravastatin. 11. Code status: Limited CODE STATUS no CPR, no resuscitation and no intubation. 12. H/o NSVT / Sick sinus syndrome: noted, has PPM/ICD. Tele ordered. Continue metoprolol. Possible PAF. However, after review of EKGs, P waves were present and did not appear to be in a fib. Pt typically follows with Cardiology at [facility], unclear if he has a hx of AF. He is not on anticoagulation. Even if PAF, the risks of anticoagulation may be greater than the benefit. CHADSVASc 4, HAS-BLED 4. Pacer interrogated, awaiting report. Pt will need to arrange follow up with his Cardiologist at [facility]. 13. Chronic HFrEF: EF 45% and grade 1 diastolic dysfunction on Echo 05/2021. Follows with Cardiology at [facility]. Fluid status stable, Nephrology following. Cont home meds. 14. Disposition: Difficulty with discharge secondary to Covid diagnosis and hemodialysis schedule. Appreciate case management assistance. Chief Complaint: SOB    HPI / Hospital Course: Per HPI: \"The patient is a 80 y.o. male who presents with complaints of shortness of breath and cough for the last 2 days. Patient is accompanied by family who helps with history. They report that patient has had a poor appetite, fatigued, short of breath and coughing for last 2 days. He recently was at [facility] approximately 2 weeks ago where he had 2 stents placed. They state he was in his normal state of health until 2 days ago.   Patient was seen in dialysis today and had a full session however continued to be short of breath and had a significant cough and was sent for evaluation. He denies any fevers or chills, nausea or vomiting. He denies any diarrhea or constipation. He denies any chest pain.     In the ED patient was found to be Covid positive. He was placed on 2 L nasal cannula. He was also found to have slightly elevated troponin and was started on heparin drip in the ED. Family updated on test results. \"    Pt has completed treatment for COVID and superimposed bacterial PNA. I took over care 9/22, per previous note, pt stable for discharge and awaiting precert to ECF.      2/66-> Rapid response called during dialysis. Pt had vomited, became lethargic and had episode of AF RVR on the monitor per report. Dialysis was stopped. Patient still lethargic but following commands and nods to answer questions appropriately. EKG showed NSR, does not appear to be AF. Patient is moaning which per nursing, he typically does during dialysis. Patient seen again later. He reports fatigue and wanting to sleep. He denies abdominal pain, n/v/d, CP, SOB, fever/chills. Pt had bleeding from dialysis fistulasite. 9/23-> Patient alert to verbal stimuli, lethargic. Appears to be at his baseline. Denies fever/chills, sob, cp, abd pain, n/v/d. Nephrology started IVF for today and plan for dialysis tomorrow. Patient has been NSR on tele. 9/24-> Pt appears at baseline. He is more alert today than he has been. Tolerated dialysis well. 9/26-> Patient is alert, oriented to self, place, time, and somewhat to situation. He denies fever/chills, SOB, CP, abd pain, n/v/d, cough. He reports of left arm pain at dialysis cath site. The patient states that he has been told before that his heart beat gets \"out of wack. \"   Chart reviewed and a rapid was called last night they were unable to get a reading of O2 levels. ABG results with pO2 179, no hypercapnea.  Based on ABG did not appear to be truly hypoxic so he was put back on 6L NC and nursing instructed to wean. His EKG reported atrial fibrillation; however, after review, there were P waves present. Ordered pacer interrogate. 9/27-> no acute events overnight. Patient denies sob, cp, abd pain, nvd. Nephrology planning HD tomorrow. Awaiting pacer interrogate results. 9/28- Pt had dialysis today, tolerated well. 9/29 -> patient on HD. Patient is doing well. Denies fever/chills, sob, cp, palpitations, abd pain, n/v/d. Disposition has been difficult secondary to Covid diagnosis and HD facility/schedule. 9/30-> pt had a rough morning. Called to bedside due to pulse ox being read as low as 50% and was very labile. Pt placed on non-re breather. Pt was c/o of abd pain and an episode of diarrhea that started last night. Had an episode of emesis s/p IO insertion, none since then. Afebrile. Subjective (past 24 hours):   10/1-> patient doing much better today, seen in dialysis. Patient denies any abdominal pain at all today. No nausea or vomiting. Hypoglycemia has resolved and remained stable. Afebrile. Has been weaned down to 4 L NC.      ROS: Pertinent positives as noted in HPI. All other systems reviewed and negative. Past medical history, family history, social history and allergies reviewed again and is unchanged since admission. Medications:  Reviewed.   Infusion Medications    sodium chloride      dextrose Stopped (10/01/21 1231)    sodium chloride       Scheduled Medications    lactulose  20 g Oral BID    morphine  2 mg IntraVENous Once    lidocaine 1 % injection  5 mL IntraDERmal Once    dicyclomine  10 mg Oral TID AC    dilTIAZem  30 mg Oral Once    aspirin  81 mg Oral Daily    heparin (porcine)  5,000 Units SubCUTAneous 3 times per day    epoetin traci-epbx  6,000 Units SubCUTAneous Once per day on Mon Wed Fri    calcitRIOL  1.5 mcg Oral Once per day on Mon Wed Fri    cinacalcet  60 mg Oral Once per day on Mon Wed Fri    docusate sodium  100 mg Oral BID  ferrous sulfate  325 mg Oral Daily with breakfast    gabapentin  100 mg Oral TID    metoprolol  100 mg Oral BID    pantoprazole  40 mg Oral QAM AC    vitamin C  500 mg Oral Daily    polyethylene glycol  17 g Oral Daily    sodium chloride flush  5-40 mL IntraVENous 2 times per day    clopidogrel  75 mg Oral Daily     PRN Meds: sodium chloride, loperamide, HYDROcodone-acetaminophen, metoprolol, metoclopramide, ipratropium-albuterol, glucose, dextrose, glucagon (rDNA), dextrose, sodium chloride flush, sodium chloride, ondansetron **OR** ondansetron, polyethylene glycol, acetaminophen **OR** acetaminophen, guaiFENesin-dextromethorphan, albuterol sulfate HFA    I/O:     Intake/Output Summary (Last 24 hours) at 10/1/2021 1658  Last data filed at 10/1/2021 1157  Gross per 24 hour   Intake 1456.26 ml   Output 1400 ml   Net 56.26 ml       Diet:  ADULT DIET; Regular; Low Potassium (Less than 3000 mg/day); 1200 ml    Exam:  BP (!) 167/84   Pulse 74   Temp 95.9 °F (35.5 °C) (Oral)   Resp 19   Ht 5' 6\" (1.676 m)   Wt 138 lb 10.7 oz (62.9 kg)   SpO2 93%   BMI 22.38 kg/m²   General:  Chronically ill appearing male. NAD. HEENT:  normocephalic and atraumatic. No scleral icterus. PERR. Neck: supple. No JVD. No thyromegaly. Lungs: clear to auscultation. No retractions  Cardiac: RRR without murmur. Abdomen: soft. Nontender. Bowel sounds positive. Extremities:  No clubbing, cyanosis, or edema x 4. Vasculature: capillary refill < 3 seconds. Palpable LE pulses bilaterally. Skin:  warm and dry. Psych:  Alert and oriented x3. Lymph:  No supraclavicular adenopathy. Neurologic:  No focal deficit. No seizures.       Data: (All radiographs, tracings, PFTs, and imaging are personally viewed and interpreted unless otherwise noted)  Labs:   Recent Labs     10/01/21  0730   HGB 6.9*   HCT 20.0*     Recent Labs     10/01/21  0730   *   K 6.1*   CL 92*   CO2 22*   BUN 35*   CREATININE 5.3*   CALCIUM 8.3* No results for input(s): AST, ALT, BILIDIR, BILITOT, ALKPHOS in the last 72 hours. No results for input(s): INR in the last 72 hours. No results for input(s): Leticia Mould in the last 72 hours. Urinalysis:   Lab Results   Component Value Date    NITRU NEGATIVE 06/22/2019    WBCUA 50-75 06/22/2019    BACTERIA NONE 06/22/2019    RBCUA 25-50 06/22/2019    BLOODU LARGE 06/22/2019    SPECGRAV 1.013 05/03/2016    GLUCOSEU NEGATIVE 06/22/2019     Urine culture: No results found for: Luis Filter  Micro:   Blood culture #1:   Lab Results   Component Value Date    BC No growth-preliminary No growth  09/13/2021     Blood culture #2:No results found for: Tamiko Waterford  Organism:  Lab Results   Component Value Date    ORG Micrococcus species 07/12/2021       No results found for: LABGRAM  MRSA culture only:No results found for: [address] [place] [address]  Respiratory culture: No results found for: CULTRESP  Aerobic and Anaerobic :  No results found for: LABAERO  No results found for: [facility]    Radiology Reports:  CT ABDOMEN PELVIS WO CONTRAST Additional Contrast? None   Final Result   1. Limited evaluation of the lung bases demonstrates small bilateral pleural effusions with patchy consolidative opacities at the lower lobes which may be related to pneumonia. 2. The gallbladder demonstrates a small gallstone near the gallbladder neck. This appears mildly distended. 3. Mild ascites in the perihepatic region is noted. **This report has been created using voice recognition software. It may contain minor errors which are inherent in voice recognition technology. **      Final report electronically signed by Dr. Noemi Carter on 9/30/2021 1:35 PM      XR ABDOMEN (KUB) (SINGLE AP VIEW)   Final Result   1. Overall nonobstructive bowel gas pattern. **This report has been created using voice recognition software. It may contain minor errors which are inherent in voice recognition technology. **      Final report electronically signed by Dr Mahamed Wilson on 9/30/2021 12:26 PM      XR CHEST PORTABLE   Final Result   1. Patchy multifocal airspace disease is similar to 9/24/2021 exam, interstitial and airspace edema versus atypical infection. **This report has been created using voice recognition software. It may contain minor errors which are inherent in voice recognition technology. **      Final report electronically signed by Dr Mahamed Wilson on 9/30/2021 9:33 AM      XR CHEST PORTABLE   Final Result   Impression:      Increasing consolidation and interstitial prominence. Question pulmonary edema versus pneumonia      This document has been electronically signed by: Alexx Weinstein MD on    09/24/2021 10:34 PM      XR CHEST PORTABLE   Final Result   1. No acute intrathoracic process. 2. Mild stable cardiomegaly. **This report has been created using voice recognition software. It may contain minor errors which are inherent in voice recognition technology. **      Final report electronically signed by Dr. Odette Navarro on 9/22/2021 9:52 PM      CT HEAD WO CONTRAST   Final Result      1. Stable CT scan of the brain, no interval change since previous study dated 15th of June 2021. **This report has been created using voice recognition software. It may contain minor errors which are inherent in voice recognition technology. **      Final report electronically signed by DR Alma Kat on 9/15/2021 4:26 PM      CT CERVICAL SPINE WO CONTRAST   Final Result    No evidence of acute osseous injury of the cervical spine. **This report has been created using voice recognition software. It may contain minor errors which are inherent in voice recognition technology. **      Final report electronically signed by Dr. Darryl Ray MD on 9/15/2021 4:29 PM      XR CHEST PORTABLE   Final Result   Cardiomegaly with vascular congestion and interstitial edema and effusions differential would include congestive heart failure or fluid overload. **This report has been created using voice recognition software. It may contain minor errors which are inherent in voice recognition technology. **      Final report electronically signed by Dr. Loyda Hall on 9/13/2021 2:22 PM        XR CHEST PORTABLE    Result Date: 9/13/2021  PROCEDURE: XR CHEST PORTABLE CLINICAL INFORMATION: SOB . COMPARISON: 7/12/2021 TECHNIQUE: Portable upright FINDINGS: Heart size is mildly enlarged. Mediastinum is not widened. There is pulmonary vascular congestion. Interstitial edema. There is suggestion of small effusions. AICD over the left chest. EKG leads overlie the chest. Right upper extremity vascular stents are noted. Cardiomegaly with vascular congestion and interstitial edema and effusions differential would include congestive heart failure or fluid overload. **This report has been created using voice recognition software. It may contain minor errors which are inherent in voice recognition technology. ** Final report electronically signed by Dr. Loyda Hall on 9/13/2021 2:22 PM        Tele:   [] yes             [] no      Active Hospital Problems    Diagnosis Date Noted    Severe malnutrition ([address] [E43] 09/22/2021     Class: Acute    Pneumonia due to COVID-19 virus [U07.1, J12.82] 09/13/2021       Electronically signed by Rex Valencia PA-C on 10/1/2021 at 4:58 PM

## 2021-10-01 NOTE — FLOWSHEET NOTE
10/01/21 1045 10/01/21 1157   Vital Signs   BP (!) 183/57 (!) 178/85   Temp 97.6 °F (36.4 °C) 97.6 °F (36.4 °C)   Pulse 72 76   Resp 18 19   Weight  --  138 lb 10.7 oz (62.9 kg)   Weight Method  --  Bed scale   Percent Weight Change  --  -1.56   Post-Hemodialysis Assessment   Post-Treatment Procedures  --  Blood returned; Access bleeding time < 10 minutes   Machine Disinfection Process  --  Acid/Vinegar Clean;Heat Disinfect; Exterior Machine Disinfection   Rinseback Volume (ml)  --  400 ml   Total Liters Processed (l/min)  --  92.1 l/min   Dialyzer Clearance  --  Lightly streaked   Duration of Treatment (minutes)  --  240 minutes   Heparin amount administered during treatment (units)  --  0 units   Hemodialysis Intake (ml)  --  400 ml   Hemodialysis Output (ml)  --  1400 ml   NET Removed (ml)  --  1000 ml   Tolerated Treatment  --  Good   Stable 4.0 hour tx. Removed 1.0 L of fluid. pt tolerated fluid removal well. one unit prbc given with treatment. . Pressure held to each needle site x 10 min. Drsg clean, dry, and intact. Report called to primary RN. [place] record printed for scanning into EMR.

## 2021-10-01 NOTE — FLOWSHEET NOTE
10/01/21 8934   Provider Notification   Reason for Communication Review case   Provider Name Dr. Yelitza Santoro   Provider Notification Physician   Method of Communication Secure Message   Response Waiting for response   Notification Time 0957   Shift Event Other (comment)     Brenna Delgado ordered a GI panel on patient yesterday and pathology called and said they only run if patient has been inpatient for less than 72 hours unless physicians call and talk directly to pathologist. Do we still need?

## 2021-10-01 NOTE — PROGRESS NOTES
to chronic kidney disease, on chronic dialysis (HCC)     Volume overload     Secondary hyperparathyroidism of renal origin [facility])     Chest pain     Acute pulmonary edema (HCC)     Accelerated hypertension     Gastritis and duodenitis     Fall from slip, trip, or stumble, initial encounter     Closed fracture of left ankle     ESRD on hemodialysis ([facility] [address]     Hypertensive emergency     SOB (shortness of breath)     Chronic combined systolic and diastolic CHF (congestive heart failure) ([facility] [address]     Pneumonia due to COVID-19 virus     Severe malnutrition (HCC)      Subjective:   Admit Date: 9/13/2021    Interval History:   Seen for end stage kidney disease on hemodialysis  Awake and alert  Abdominal pain is resolved  Complains of constipation now  Blood pressure is variable  No issues from staff      Medications:   Scheduled Meds:   lactulose  20 g Oral BID    morphine  2 mg IntraVENous Once    lidocaine 1 % injection  5 mL IntraDERmal Once    dicyclomine  10 mg Oral TID AC    dilTIAZem  30 mg Oral Once    aspirin  81 mg Oral Daily    heparin (porcine)  5,000 Units SubCUTAneous 3 times per day    epoetin traci-epbx  6,000 Units SubCUTAneous Once per day on Mon Wed Fri    calcitRIOL  1.5 mcg Oral Once per day on Mon Wed Fri    cinacalcet  60 mg Oral Once per day on Mon Wed Fri    docusate sodium  100 mg Oral BID    ferrous sulfate  325 mg Oral Daily with breakfast    gabapentin  100 mg Oral TID    metoprolol  100 mg Oral BID    pantoprazole  40 mg Oral QAM AC    vitamin C  500 mg Oral Daily    polyethylene glycol  17 g Oral Daily    sodium chloride flush  5-40 mL IntraVENous 2 times per day    clopidogrel  75 mg Oral Daily     Continuous Infusions:   sodium chloride      dextrose Stopped (10/01/21 1231)    sodium chloride         CBC:   Recent Labs     10/01/21  0730   HGB 6.9*     CMP:    Recent Labs     10/01/21  0730   *   K 6.1*   CL 92*   CO2 22*   BUN 35*   CREATININE 5.3*   GLUCOSE 116* XR CHEST PORTABLE   Final Result   Impression:      Increasing consolidation and interstitial prominence. Question pulmonary edema versus pneumonia      This document has been electronically signed by: Margaret Leon MD on    09/24/2021 10:34 PM      XR CHEST PORTABLE   Final Result   1. No acute intrathoracic process. 2. Mild stable cardiomegaly. **This report has been created using voice recognition software. It may contain minor errors which are inherent in voice recognition technology. **      Final report electronically signed by Dr. Rishi Jones on 9/22/2021 9:52 PM      CT HEAD WO CONTRAST   Final Result      1. Stable CT scan of the brain, no interval change since previous study dated 15th of June 2021. **This report has been created using voice recognition software. It may contain minor errors which are inherent in voice recognition technology. **      Final report electronically signed by DR Nissa Agosto on 9/15/2021 4:26 PM      CT CERVICAL SPINE WO CONTRAST   Final Result    No evidence of acute osseous injury of the cervical spine. **This report has been created using voice recognition software. It may contain minor errors which are inherent in voice recognition technology. **      Final report electronically signed by Dr. Mario Alberto Barnes MD on 9/15/2021 4:29 PM      XR CHEST PORTABLE   Final Result   Cardiomegaly with vascular congestion and interstitial edema and effusions differential would include congestive heart failure or fluid overload. **This report has been created using voice recognition software. It may contain minor errors which are inherent in voice recognition technology. **      Final report electronically signed by Dr. Martina Lobato on 9/13/2021 2:22 PM            Objective:   Vitals: BP (!) 167/84   Pulse 74   Temp 95.9 °F (35.5 °C) (Oral)   Resp 19   Ht 5' 6\" (1.676 m)   Wt 138 lb 10.7 oz (62.9 kg)   SpO2 93%   BMI

## 2021-10-02 NOTE — PROGRESS NOTES
Attempted to place IV patient states \"no more stick\". This RN stopped. Asked if it was ok to remove Midline patient states \"yes\".

## 2021-10-02 NOTE — PROGRESS NOTES
1430- IV therapy in room to remove infiltrated PICC, assess for IV placement. Patient refusing further sticks. 1415 [facility] provider Marlen Cornelius PA-C messaged and updated. [id number]- Patients daughter Sarah Tian called and updated. She would be on her way to the hospital at this time. 1620- Patient daughter at bedside- patient more agreeable to IV attempt with family present. 1700- IV team unable to place lines, family wishes to discuss central line placement. Information passed to Marlen Cornelius, [address], called family and updated patients care plan. Family not wishing to be aggressive given the likely rosas of surgery. [address] [facility] discuss plan of care with patient. Patient changed to DNR CC status. Patient wishes for us to manage glucose over night, if necessary placement of central line to do so. Floor RN is to contact ICU over night if line is required and patient still request it. Currently oral glucose administered for maintenance.

## 2021-10-02 NOTE — PROGRESS NOTES
Patient's midline not drawing blood at start of shift. Lab notified to draw blood work. Both ports to midline flushing with no issues, however, noted to be leaking at this time. Dressing saturated, and both ports flushed but appear to be leaking. IV therapy team notified via perfect serve.   Electronically signed by Pau Dumas RN on 10/2/2021 at 6:01 AM

## 2021-10-02 NOTE — PROGRESS NOTES
Hospitalist Progress Note    Patient:  Amelie Norman    Unit/Bed:-14/014-A  YOB: 1930  MRN: [id number]   Acct: [de-identified]   PCP: Natalie Soria MD  Code Status: Limited  Date of Admission: 9/13/2021    Expected Discharge: pending placement. Disposition: ECF, precert pending. Pt is medically stable for discharge, awaiting placement. Assessment/Plan:    1. Abd pain, generalized, unclear etiology: unclear etiology. Check CT A / P -> showing distended gallbladder. LA improved and resolved, however was noted to again be elevated as high as 8.7 which is improved to 4.9. Check CTA abdomen to rule out ischemia. Will consider Gen Surg consultation. 2. Hypoglycemia, resolved: unclear etiology. Pt is not a diabetic, no DM medications listed. Check Hgb A1c. Recent tx for COVID with steroids x9 days. Check LA, elevated, resolved, and then again elevated. Check random cortisol level -> wnl. S/p 2 dose of glucose oral gel and 3 doses of 1 mg Glucagon. Pt was noted as low as 23, continues to trend down despite D10 drip at 100 mL/hr. Continue Q2 hr checks. Trial 10 mg Decadron steroids once and assess response. 10/1-> stop D5 drip and monitor  10/2-> LA now elevated again, suspicion potentially from underlying infection. 3. COVID-19 pneumonia: treated with dexamethasone 6mg daily. First positive test 09/13. Pt stable on RA. Isolation no longer required, transferred to . 4. Possible superimposed bacterial PNA: Completed treatment with Rocephin and Azithromycin. Remains afebrile. No leukocytosis. 5. Acute hypoxic respiratory failure: 2/2 above. 9/25-> Pt placed on O2 overnight after rapid was called. PO2 on ABG was 170, no hypercapnea. No acute changes on EKG, no evidence of fluid overload. Pt is currently 4L, ABG showing respiratory alkalosis. Continue to monitor and wean as tolerated. 6. ESRD on HD:  Secondary to diabetic and hypertensive nephrosclerosis. Nephrology following. On HD MWF.     7. S/p unwitnessed fall: No apparent injuries, CT brain and cervical spine -unremarkable     8. Dark tarry stools / chronic normocytic anemia: hx INDRA. On Retacrit, ferrous sulfate. One episode, none further. Hemoglobin stable. Negative occult 9/19/2021.  Continue to monitor, transfuse for hemoglobin less than 7.    9. Elevated troponin: chronically elevated, likely d/t ESRD. No chest pain, repeat troponin improved, no acute ischemic changes on ekg. Pt had recent LHC. 10. CAD s/p PCI: Patient had 2 stents (LAD and Ramus) at [facility] on 08/31/21. Will continue with aspirin, Plavix, metoprolol and pravastatin. 11. Code status: Limited CODE STATUS no CPR, no resuscitation and no intubation. 12. H/o NSVT / Sick sinus syndrome: noted, has PPM/ICD. Tele ordered. Continue metoprolol. Possible PAF. However, after review of EKGs, P waves were present and did not appear to be in a fib. Pt typically follows with Cardiology at [facility], unclear if he has a hx of AF. He is not on anticoagulation. Even if PAF, the risks of anticoagulation may be greater than the benefit. CHADSVASc 4, HAS-BLED 4. Pacer interrogated, awaiting report. Pt will need to arrange follow up with his Cardiologist at [facility]. 13. Chronic HFrEF: EF 45% and grade 1 diastolic dysfunction on Echo 05/2021. Follows with Cardiology at [facility]. Fluid status stable, Nephrology following. Cont home meds. 14. Disposition: Difficulty with discharge secondary to Covid diagnosis and hemodialysis schedule. Appreciate case management assistance. Chief Complaint: SOB    HPI / Hospital Course: Per HPI: \"The patient is a 80 y.o. male who presents with complaints of shortness of breath and cough for the last 2 days. Patient is accompanied by family who helps with history. They report that patient has had a poor appetite, fatigued, short of breath and coughing for last 2 days.   He recently was at [facility] approximately 2 weeks ago where he had 2 stents placed. They state he was in his normal state of health until 2 days ago. Patient was seen in dialysis today and had a full session however continued to be short of breath and had a significant cough and was sent for evaluation. He denies any fevers or chills, nausea or vomiting. He denies any diarrhea or constipation. He denies any chest pain.     In the ED patient was found to be Covid positive. He was placed on 2 L nasal cannula. He was also found to have slightly elevated troponin and was started on heparin drip in the ED. Family updated on test results. \"    Pt has completed treatment for COVID and superimposed bacterial PNA. I took over care 9/22, per previous note, pt stable for discharge and awaiting precert to ECF.      4/72-> Rapid response called during dialysis. Pt had vomited, became lethargic and had episode of AF RVR on the monitor per report. Dialysis was stopped. Patient still lethargic but following commands and nods to answer questions appropriately. EKG showed NSR, does not appear to be AF. Patient is moaning which per nursing, he typically does during dialysis. Patient seen again later. He reports fatigue and wanting to sleep. He denies abdominal pain, n/v/d, CP, SOB, fever/chills. Pt had bleeding from dialysis fistulasite. 9/23-> Patient alert to verbal stimuli, lethargic. Appears to be at his baseline. Denies fever/chills, sob, cp, abd pain, n/v/d. Nephrology started IVF for today and plan for dialysis tomorrow. Patient has been NSR on tele. 9/24-> Pt appears at baseline. He is more alert today than he has been. Tolerated dialysis well. 9/26-> Patient is alert, oriented to self, place, time, and somewhat to situation. He denies fever/chills, SOB, CP, abd pain, n/v/d, cough. He reports of left arm pain at dialysis cath site. The patient states that he has been told before that his heart beat gets \"out of wack. \"   Chart reviewed and a rapid was called last night they were unable to get a reading of O2 levels. ABG results with pO2 179, no hypercapnea. Based on ABG did not appear to be truly hypoxic so he was put back on 6L NC and nursing instructed to wean. His EKG reported atrial fibrillation; however, after review, there were P waves present. Ordered pacer interrogate. 9/27-> no acute events overnight. Patient denies sob, cp, abd pain, nvd. Nephrology planning HD tomorrow. Awaiting pacer interrogate results. 9/28- Pt had dialysis today, tolerated well. 9/29 -> patient on HD. Patient is doing well. Denies fever/chills, sob, cp, palpitations, abd pain, n/v/d. Disposition has been difficult secondary to Covid diagnosis and HD facility/schedule. 9/30-> pt had a rough morning. Called to bedside due to pulse ox being read as low as 50% and was very labile. Pt placed on non-re breather. Pt was c/o of abd pain and an episode of diarrhea that started last night. Had an episode of emesis s/p IO insertion, none since then. Afebrile. 10/1-> patient doing much better today, seen in dialysis. Patient denies any abdominal pain at all today. No nausea or vomiting. Hypoglycemia has resolved and remained stable. Afebrile. Has been weaned down to 4 L NC. Subjective (past 24 hours):   10/2-> patient had a rapid response called overnight secondary to abdominal pain and hypotension (as low as the 66E systolic). Patient was noted to have an elevated lactic acid. He was given fluids and started on IV antibiotics. Today upon exam, patient again is complaining of severe abdominal pain all over. We will check a CTA of the abdomen. Consider general surgery consultation. ROS: Pertinent positives as noted in HPI. All other systems reviewed and negative. Past medical history, family history, social history and allergies reviewed again and is unchanged since admission. Medications:  Reviewed.   Infusion Medications    sodium chloride      dextrose Stopped (10/01/21 1231)    sodium chloride       Scheduled Medications    vancomycin (VANCOCIN) intermittent dosing (placeholder)   Other RX Placeholder    lactulose  20 g Oral BID    piperacillin-tazobactam  2,250 mg IntraVENous Q8H    lidocaine 1 % injection  5 mL IntraDERmal Once    dicyclomine  10 mg Oral TID AC    dilTIAZem  30 mg Oral Once    aspirin  81 mg Oral Daily    heparin (porcine)  5,000 Units SubCUTAneous 3 times per day    epoetin traci-epbx  6,000 Units SubCUTAneous Once per day on Mon Wed Fri    calcitRIOL  1.5 mcg Oral Once per day on Mon Wed Fri    cinacalcet  60 mg Oral Once per day on Mon Wed Fri    docusate sodium  100 mg Oral BID    ferrous sulfate  325 mg Oral Daily with breakfast    gabapentin  100 mg Oral TID    metoprolol  100 mg Oral BID    pantoprazole  40 mg Oral QAM AC    vitamin C  500 mg Oral Daily    polyethylene glycol  17 g Oral Daily    sodium chloride flush  5-40 mL IntraVENous 2 times per day    clopidogrel  75 mg Oral Daily     PRN Meds: sodium chloride, loperamide, HYDROcodone-acetaminophen, metoprolol, metoclopramide, ipratropium-albuterol, glucose, dextrose, glucagon (rDNA), dextrose, sodium chloride flush, sodium chloride, ondansetron **OR** ondansetron, polyethylene glycol, acetaminophen **OR** acetaminophen, guaiFENesin-dextromethorphan, albuterol sulfate HFA    I/O:     Intake/Output Summary (Last 24 hours) at 10/2/2021 1530  Last data filed at 10/2/2021 1346  Gross per 24 hour   Intake 1400 ml   Output --   Net 1400 ml       Diet:  ADULT DIET; Regular; Low Potassium (Less than 3000 mg/day); 1200 ml    Exam:  /88   Pulse 74   Temp 99.2 °F (37.3 °C) (Oral)   Resp 18   Ht 5' 6\" (1.676 m)   Wt 138 lb 10.7 oz (62.9 kg)   SpO2 96%   BMI 22.38 kg/m²   General:  Chronically ill appearing male. NAD. HEENT:  normocephalic and atraumatic. No scleral icterus. PERR. Neck: supple. No JVD. No thyromegaly.   Lungs: clear to auscultation. No retractions  Cardiac: RRR without murmur. Abdomen: soft. Generalized tenderness. Bowel sounds positive. Extremities:  No clubbing, cyanosis, or edema x 4. Vasculature: capillary refill < 3 seconds. Palpable LE pulses bilaterally. Skin:  warm and dry. Psych:  Alert and oriented x3. Lymph:  No supraclavicular adenopathy. Neurologic:  No focal deficit. No seizures. Data: (All radiographs, tracings, PFTs, and imaging are personally viewed and interpreted unless otherwise noted)  Labs:   Recent Labs     10/01/21  0730 10/01/21  2140 10/02/21  0430   WBC  --  10.0  --    HGB 6.9* 10.0*  9.9* 9.3*   HCT 20.0* 30.3*  30.2* 27.7*   PLT  --  145  --      Recent Labs     10/01/21  0730 10/01/21  2249 10/02/21  0430   * 134*  --    K 6.1* 5.0 5.2   CL 92* 91*  --    CO2 22* 19*  --    BUN 35* 18  --    CREATININE 5.3* 3.3*  --    CALCIUM 8.3* 8.4*  --      Recent Labs     10/01/21  2249   *   *   BILIDIR 0.3   BILITOT 0.6   ALKPHOS 177*     No results for input(s): INR in the last 72 hours. No results for input(s): Pham Kras in the last 72 hours.   Urinalysis:   Lab Results   Component Value Date    NITRU NEGATIVE 06/22/2019    WBCUA 50-75 06/22/2019    BACTERIA NONE 06/22/2019    RBCUA 25-50 06/22/2019    BLOODU LARGE 06/22/2019    SPECGRAV 1.013 05/03/2016    GLUCOSEU NEGATIVE 06/22/2019     Urine culture: No results found for: Leana Granado  Micro:   Blood culture #1:   Lab Results   Component Value Date    BC No growth-preliminary No growth  09/13/2021     Blood culture #2:No results found for: Curtis Mathews  Organism:  Lab Results   Component Value Date    ORG Micrococcus species 07/12/2021       No results found for: LABGRAM  MRSA culture only:No results found for: [address] [place] [address]  Respiratory culture: No results found for: CULTRESP  Aerobic and Anaerobic :  No results found for: LABAERO  No results found for: [address]    Radiology Reports:  XR ABDOMEN (KUB) (SINGLE AP VIEW) Final Result   A nonspecific but nonobstructive bowel gas pattern. This document has been electronically signed by: Margaret Jenkins DO on    10/02/2021 12:45 AM      XR CHEST PORTABLE   Final Result   1. Scattered patchy regions of predominantly interstitial densities    throughout the bilateral lungs grossly similar to the prior examination. Mild bibasilar pleural/parenchymal opacities most consistent with tiny    effusions with atelectasis and/or consolidation. 2. Cardiomegaly with the heart size unchanged from the prior examination. This document has been electronically signed by: Margaret Jenkins DO on    10/02/2021 12:47 AM      CT ABDOMEN PELVIS WO CONTRAST Additional Contrast? None   Final Result   1. Limited evaluation of the lung bases demonstrates small bilateral pleural effusions with patchy consolidative opacities at the lower lobes which may be related to pneumonia. 2. The gallbladder demonstrates a small gallstone near the gallbladder neck. This appears mildly distended. 3. Mild ascites in the perihepatic region is noted. **This report has been created using voice recognition software. It may contain minor errors which are inherent in voice recognition technology. **      Final report electronically signed by Dr. Shazia Cook on 9/30/2021 1:35 PM      XR ABDOMEN (KUB) (SINGLE AP VIEW)   Final Result   1. Overall nonobstructive bowel gas pattern. **This report has been created using voice recognition software. It may contain minor errors which are inherent in voice recognition technology. **      Final report electronically signed by Dr Thad Branch on 9/30/2021 12:26 PM      XR CHEST PORTABLE   Final Result   1. Patchy multifocal airspace disease is similar to 9/24/2021 exam, interstitial and airspace edema versus atypical infection. **This report has been created using voice recognition software.   It may contain minor errors which are inherent in voice recognition technology. **      Final report electronically signed by Dr Temo Bright on 9/30/2021 9:33 AM      XR CHEST PORTABLE   Final Result   Impression:      Increasing consolidation and interstitial prominence. Question pulmonary edema versus pneumonia      This document has been electronically signed by: Ninoska Agrawal MD on    09/24/2021 10:34 PM      XR CHEST PORTABLE   Final Result   1. No acute intrathoracic process. 2. Mild stable cardiomegaly. **This report has been created using voice recognition software. It may contain minor errors which are inherent in voice recognition technology. **      Final report electronically signed by Dr. Savannah Hurt on 9/22/2021 9:52 PM      CT HEAD WO CONTRAST   Final Result      1. Stable CT scan of the brain, no interval change since previous study dated 15th of June 2021. **This report has been created using voice recognition software. It may contain minor errors which are inherent in voice recognition technology. **      Final report electronically signed by DR Kiley Nash on 9/15/2021 4:26 PM      CT CERVICAL SPINE WO CONTRAST   Final Result    No evidence of acute osseous injury of the cervical spine. **This report has been created using voice recognition software. It may contain minor errors which are inherent in voice recognition technology. **      Final report electronically signed by Dr. Radha Pichardo MD on 9/15/2021 4:29 PM      XR CHEST PORTABLE   Final Result   Cardiomegaly with vascular congestion and interstitial edema and effusions differential would include congestive heart failure or fluid overload. **This report has been created using voice recognition software. It may contain minor errors which are inherent in voice recognition technology. **      Final report electronically signed by Dr. Camelia Barnhart on 9/13/2021 2:22 PM      CTA [address] [place] [address]    (Results Pending) XR CHEST PORTABLE    Result Date: 9/13/2021  PROCEDURE: XR CHEST PORTABLE CLINICAL INFORMATION: SOB . COMPARISON: 7/12/2021 TECHNIQUE: Portable upright FINDINGS: Heart size is mildly enlarged. Mediastinum is not widened. There is pulmonary vascular congestion. Interstitial edema. There is suggestion of small effusions. AICD over the left chest. EKG leads overlie the chest. Right upper extremity vascular stents are noted. Cardiomegaly with vascular congestion and interstitial edema and effusions differential would include congestive heart failure or fluid overload. **This report has been created using voice recognition software. It may contain minor errors which are inherent in voice recognition technology. ** Final report electronically signed by Dr. Socorro Pena on 9/13/2021 2:22 PM        Tele:   [] yes             [] no      Active Hospital Problems    Diagnosis Date Noted    Severe malnutrition ([address] [E43] 09/22/2021     Class: Acute    Pneumonia due to COVID-19 virus [U07.1, J12.82] 09/13/2021       Electronically signed by Gerri Arrington PA-C on 10/2/2021 at 3:30 PM

## 2021-10-02 NOTE — PROGRESS NOTES
[facility]         Renal Inpatient Follow-Up note         10/2/2021 12:37 PM    Pt Name:   Eileen Quiros  YOB: 1930  Attending:   Gregorio Smith PA-C    Chief Complaint : Eileen Quiros is a 80 y.o. male being followed by nephrology for ESRD on hemodialysis    Interval History :   Patient seen and examined by me.  No distress  Is a poor historian appears comfortable  Was hypotensive and was receiving a fluid bolus     Scheduled Medications :    vancomycin (VANCOCIN) intermittent dosing (placeholder)   Other RX Placeholder    lactulose  20 g Oral BID    piperacillin-tazobactam  2,250 mg IntraVENous Q8H    lidocaine 1 % injection  5 mL IntraDERmal Once    dicyclomine  10 mg Oral TID AC    dilTIAZem  30 mg Oral Once    aspirin  81 mg Oral Daily    heparin (porcine)  5,000 Units SubCUTAneous 3 times per day    epoetin traci-epbx  6,000 Units SubCUTAneous Once per day on Mon Wed Fri    calcitRIOL  1.5 mcg Oral Once per day on Mon Wed Fri    cinacalcet  60 mg Oral Once per day on Mon Wed Fri    docusate sodium  100 mg Oral BID    ferrous sulfate  325 mg Oral Daily with breakfast    gabapentin  100 mg Oral TID    metoprolol  100 mg Oral BID    pantoprazole  40 mg Oral QAM AC    vitamin C  500 mg Oral Daily    polyethylene glycol  17 g Oral Daily    sodium chloride flush  5-40 mL IntraVENous 2 times per day    clopidogrel  75 mg Oral Daily      sodium chloride      dextrose Stopped (10/01/21 1231)    sodium chloride         Vitals :  /88   Pulse 74   Temp 99.2 °F (37.3 °C) (Oral)   Resp 18   Ht 5' 6\" (1.676 m)   Wt 138 lb 10.7 oz (62.9 kg)   SpO2 96%   BMI 22.38 kg/m²     24HR INTAKE/OUTPUT:      Intake/Output Summary (Last 24 hours) at 10/2/2021 1237  Last data filed at 10/2/2021 0431  Gross per 24 hour   Intake 1400 ml   Output --   Net 1400 ml     Last 3 weights  Wt Readings from Last 3 Encounters:   10/01/21 138 lb 10.7 oz (62.9 kg)   07/14/21 145 lb 8.1 oz (66 kg)   06/17/21 161 lb (73 kg)           Physical Exam :  General Appearance:  Well developed. No distress  Mouth/Throat:  Oral mucosa moist  Neck:  Supple, no JVD  Lungs:  Breath sounds: clear  Heart[de-identified]  S1,S2 heard  Abdomen:  Soft, non - tender  Musculoskeletal:  Edema -no significant edema noted         Last 3 CBC   Recent Labs     10/01/21  0730 10/01/21  2140 10/02/21  0430   WBC  --  10.0  --    RBC  --  3.19*  --    HGB 6.9* 10.0*  9.9* 9.3*   HCT 20.0* 30.3*  30.2* 27.7*   PLT  --  145  --      Last 3 CMP  Recent Labs     10/01/21  0730 10/01/21  2249 10/02/21  0430   * 134*  --    K 6.1* 5.0 5.2   CL 92* 91*  --    CO2 22* 19*  --    BUN 35* 18  --    CREATININE 5.3* 3.3*  --    CALCIUM 8.3* 8.4*  --    LABALBU  --  2.9*  --    BILITOT  --  0.6  --              ASSESSMENT / Plan   1 Renal -ESRD on hemodialysis Monday Wednesday Friday  ? Volume status appears reasonable no acute need for dialysis today  ? Check labs in the morning    2 Electrolytes -hyperkalemia appears to be better  3 Mild acidosis stable  4 Hypotension status post bolus  5 Status post COVID-19 pneumonia superimposed on bacterial pneumonia currently getting antibiotics  6 Meds reviewed     Dr. Naya Robin MD, M,D.  [facility].

## 2021-10-02 NOTE — PROGRESS NOTES
Pharmacy Consult      Jimena Painter is a 80 y.o. male for whom pharmacy has been consulted to dose Zosyn. Patient Active Problem List   Diagnosis    CAD (coronary artery disease)    COPD (chronic obstructive pulmonary disease) ([address]    Chronic renal insufficiency    Patient overweight    Arthritis-  low back and neck .     CKD (chronic kidney disease) stage 3, GFR 30-59 ml/min (HCC)    Dyspnea and respiratory abnormalities    ED (erectile dysfunction)    Degenerative joint disease of knee, left    Gout of big toe    Anemia, chronic disease    CKD (chronic kidney disease) stage 4, GFR 15-29 ml/min (HCC)    Essential hypertension    Monoclonal gammopathy    Leukopenia    Thrombocytopenia (HCC)    Chronic kidney disease (CKD), stage V (HCC)    Metabolic acidosis    Hyperkalemia    Iron deficiency anemia    ROSAURA (acute kidney injury) ([address]    Plasma cell dyscrasia    Idiopathic hypotension    Hypertensive urgency    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)    Systolic congestive heart failure (HCC)    Other fluid overload (CODE)    Hyperphosphatemia    Acute diastolic congestive heart failure (HCC)    Acute on chronic diastolic congestive heart failure (HCC)    Pulmonary hypertension (HCC)    Anemia due to chronic kidney disease, on chronic dialysis (HCC)    Volume overload    Secondary hyperparathyroidism of renal origin ([address]    Chest pain    Acute pulmonary edema (HCC)    Accelerated hypertension    Gastritis and duodenitis    Fall from slip, trip, or stumble, initial encounter    Closed fracture of left ankle    ESRD on hemodialysis ([address]    Hypertensive emergency    SOB (shortness of breath)    Chronic combined systolic and diastolic CHF (congestive heart failure) ([address]    Pneumonia due to COVID-19 virus    Severe malnutrition (HCC)       Allergies:  No known allergies     Recent Labs     10/01/21  0730   CREATININE 5.3*       Ht/Wt:   Ht Readings from Last 1 Encounters:   09/13/21 5' 6\" (1.676 m) Wt Readings from Last 1 Encounters:   10/01/21 138 lb 10.7 oz (62.9 kg)         Estimated Creatinine Clearance: 8 mL/min (A) (based on SCr of 5.3 mg/dL [facility])). Assessment/Plan:    Patient has hemodialysis ordered MWF. Will change Zosyn to 2250mg q8h. Thank you for the consult. Will continue to follow.     Brooklynn Colón RP, BCPS, BCGP  10/1/2021     10:57 PM

## 2021-10-02 NOTE — PROGRESS NOTES
Pharmacy Note  Vancomycin Consult    Amelie Norman is a 80 y.o. male started on Vancomycin for bloodstream infection; consult received from Dr. Guerda Ortega to manage therapy. Also receiving the following antibiotics: Zosyn. Patient Active Problem List   Diagnosis    CAD (coronary artery disease)    COPD (chronic obstructive pulmonary disease) ([address]    Chronic renal insufficiency    Patient overweight    Arthritis-  low back and neck .     CKD (chronic kidney disease) stage 3, GFR 30-59 ml/min (HCC)    Dyspnea and respiratory abnormalities    ED (erectile dysfunction)    Degenerative joint disease of knee, left    Gout of big toe    Anemia, chronic disease    CKD (chronic kidney disease) stage 4, GFR 15-29 ml/min (HCC)    Essential hypertension    Monoclonal gammopathy    Leukopenia    Thrombocytopenia (HCC)    Chronic kidney disease (CKD), stage V (HCC)    Metabolic acidosis    Hyperkalemia    Iron deficiency anemia    ROSAURA (acute kidney injury) ([address]    Plasma cell dyscrasia    Idiopathic hypotension    Hypertensive urgency    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)    Systolic congestive heart failure (HCC)    Other fluid overload (CODE)    Hyperphosphatemia    Acute diastolic congestive heart failure (HCC)    Acute on chronic diastolic congestive heart failure (HCC)    Pulmonary hypertension (HCC)    Anemia due to chronic kidney disease, on chronic dialysis (HCC)    Volume overload    Secondary hyperparathyroidism of renal origin ([address]    Chest pain    Acute pulmonary edema (HCC)    Accelerated hypertension    Gastritis and duodenitis    Fall from slip, trip, or stumble, initial encounter    Closed fracture of left ankle    ESRD on hemodialysis ([address]    Hypertensive emergency    SOB (shortness of breath)    Chronic combined systolic and diastolic CHF (congestive heart failure) ([address]    Pneumonia due to COVID-19 virus    Severe malnutrition (HCC)     Allergies:  No known allergies     Temp max: 99.2    Recent

## 2021-10-02 NOTE — FLOWSHEET NOTE
responded to a rapid response. No family was present. Pt was being cared for by medical staff.  said a silent prayer.  alerted the nurse supervisor that spiritual care remained available if needed.      10/01/21 3040   Encounter Summary   Services provided to: Patient   Referral/Consult From: Multi-disciplinary team   Continue Visiting Yes  (10/1)   Complexity of Encounter Low   Length of Encounter 15 minutes   Crisis   Type Rapid response   Assessment Unable to respond   Intervention Prayer   Outcome Did not respond

## 2021-10-02 NOTE — PLAN OF CARE
Night hospitalist event note        Rapid response was called on patient due to continued hypotension and abdominal pain. Bedside evaluation revealed systolic blood pressures in the 80s with severe abdominal pain out of proportion of patients physical exam.  At least 2 normal saline IV fluid bolus was given at 500 cc and 250 cc with improved blood pressures. Patient also initiated on IV broad-spectrum antibiotic coverage with vancomycin and Zosyn given the findings on his CT scan of the abdomen showing a distended gallbladder. Also patient had an elevated lactic acid of 7.8 which did show a decreasing trend down to 4.8. Patient met criteria for sepsis with hypotension. Laboratory procedures otherwise were unremarkable.

## 2021-10-02 NOTE — PROGRESS NOTES
Patient moaning complaining of 10/10 abdominal pain. PRN zofran and Norco administered per orders. Noted to have small green-yellow emesis x1. PRN's ineffective and patient continued to moan out in pain. BP 89/24, blood sugar 66. Patient immediately placed in Trendelenburg. Automatic BP's continued to be low. Manual BP 90/45. Rapid response team called. 2231: Dr. Debby Miranda arrived. 2233: Abdominal xray and CXR ordered. Gurvinder cerna RN and [facility] supervisor arrived. 2233: Dextrose given per orders for low BS. 2235: 500 mL wide open bolus started. 2236: EKG completed. /52 HR 81, SpO2 84% on 5L, increased to 6L at this time. 2238: Zosyn ordered per Dr. Debby Miranda.  2239: /54, HR 67, SpO2 90% on 6L O2 via NC.   2244: Chart reviewed. Gullstones noted on previous CT. Labs ordered. 2248: BS rechecked and 68 and rest of D50 given at this time. 2252: Xrays completedd at this time. 2257: 2mg Morphine given. Order to keep fluids at 50 mL for 6 hours. BS rechecked and 86. Patient to remain on unit and monitored closely.    Electronically signed by Williams Lewis RN on 10/2/2021 at 2:57 AM

## 2021-10-03 NOTE — PROGRESS NOTES
Met with patient Miladis, dtr Faizan Burns, & camilla in pt's room 9U35. Pt's sister was listening by phone. Hospice concepts, services, philosophies, & levels of care discussed. Dtr Tammy Recinos clarified with pt that if he stopped dialysis, he would die. Pt states he understood that if he \"got a procedure then I wouldn't need dialysis\". Pt wishes to continue to live with dialysis, discharge from hospital to [place] [id number] for therapy. I explained that because of his age & medical conditions that he may continue to decline. Asked family & pt to consider [address] in the future. Explained it is patients choice but that there are other hospice agencies in the area. Hospice info & contact # left with pt's dtr Tammy Recinos. She thanked me and states her mother passed about 1 year ago with [address] care as an inpatient.

## 2021-10-03 NOTE — PROGRESS NOTES
[facility]         Renal Inpatient Follow-Up note         10/3/2021 12:24 PM    Pt Name:   Henny Hayes  YOB: 1930  Attending:   Jose Wheeler PA-C    Chief Complaint : Henny Hayes is a 80 y.o. male being followed by nephrology for ESRD on hemodialysis    Interval History :   Patient seen and examined by me. No distress  Denies any chest pain or shortness of breath  Patient was having abdominal pain and a CT showed distended gallbladder he had elevated lactic acid  And patient refusing to do any interventions.   He wants to consider hospice     Scheduled Medications :    lactulose  20 g Oral BID    lidocaine 1 % injection  5 mL IntraDERmal Once    dicyclomine  10 mg Oral TID AC    dilTIAZem  30 mg Oral Once    aspirin  81 mg Oral Daily    heparin (porcine)  5,000 Units SubCUTAneous 3 times per day    epoetin traci-epbx  6,000 Units SubCUTAneous Once per day on Mon Wed Fri    calcitRIOL  1.5 mcg Oral Once per day on Mon Wed Fri    cinacalcet  60 mg Oral Once per day on Mon Wed Fri    docusate sodium  100 mg Oral BID    ferrous sulfate  325 mg Oral Daily with breakfast    gabapentin  100 mg Oral TID    metoprolol  100 mg Oral BID    pantoprazole  40 mg Oral QAM AC    vitamin C  500 mg Oral Daily    polyethylene glycol  17 g Oral Daily    sodium chloride flush  5-40 mL IntraVENous 2 times per day    clopidogrel  75 mg Oral Daily      sodium chloride      dextrose Stopped (10/01/21 1231)    sodium chloride         Vitals :  /70   Pulse 79   Temp 98 °F (36.7 °C) (Oral)   Resp 18   Ht 5' 6\" (1.676 m)   Wt 138 lb 10.7 oz (62.9 kg)   SpO2 95%   BMI 22.38 kg/m²     24HR INTAKE/OUTPUT:      Intake/Output Summary (Last 24 hours) at 10/3/2021 1224  Last data filed at 10/2/2021 1346  Gross per 24 hour   Intake 0 ml   Output --   Net 0 ml     Last 3 weights  Wt Readings from Last 3 Encounters:   10/01/21 138 lb 10.7 oz (62.9 kg)   07/14/21 145 lb 8.1 oz (66 kg)   06/17/21 161 lb (73 kg)           Physical Exam :  General Appearance:  Well developed. No distress  Mouth/Throat:  Oral mucosa moist  Neck:  Supple, no JVD  Lungs:  Breath sounds: clear  Heart[de-identified]  S1,S2 heard  Abdomen:  Soft, non - tender  Musculoskeletal:  Edema -no significant edema noted         Last 3 CBC   Recent Labs     10/01/21  0730 10/01/21  2140 10/02/21  0430   WBC  --  10.0  --    RBC  --  3.19*  --    HGB 6.9* 10.0*  9.9* 9.3*   HCT 20.0* 30.3*  30.2* 27.7*   PLT  --  145  --      Last 3 CMP  Recent Labs     10/01/21  0730 10/01/21  0730 10/01/21  2249 10/02/21  0430 10/03/21  0547   *  --  134*  --  134*   K 6.1*   < > 5.0 5.2 5.7*   CL 92*  --  91*  --  95*   CO2 22*  --  19*  --  25   BUN 35*  --  18  --  33*   CREATININE 5.3*  --  3.3*  --  5.2*   CALCIUM 8.3*  --  8.4*  --  8.0*   LABALBU  --   --  2.9*  --   --    BILITOT  --   --  0.6  --   --     < > = values in this interval not displayed. ASSESSMENT / Plan   1 Renal -ESRD on hemodialysis Monday Wednesday Friday  ? Volume status appears stable no acute need for dialysis today    2 Electrolytes -hyperkalemia slightly worse today we will give a dose of Lokelma  3 Mild acidosis stable improved  4 Hypotension status post bolus  5 Status post COVID-19 pneumonia superimposed on bacterial pneumonia currently getting antibiotics  6 Abdominal pain patient refusing any other intervention  7 Meds reviewed. Discussed with hospitalist team  8 Patient most likely is going home on hospice    Dr. Yann Payan MD, M,D.  [facility].

## 2021-10-03 NOTE — PROGRESS NOTES
Hospitalist Progress Note    Patient:  Jeancarlos Sers    Unit/Bed:[facility]14/014-A  YOB: 1930  MRN: [id number]   Acct: [de-identified]   PCP: Candance Nett, MD  Code Status: DNR-CC  Date of Admission: 9/13/2021    Expected Discharge: pending placement. Disposition: ECF, precert pending. Pt is medically stable for discharge, awaiting placement. Assessment/Plan:    1. Recurrent hypoglycemia: unclear etiology, possible adrenal insufficiency. Pt is not a diabetic, no DM medications listed. Check Hgb A1c - 4.5%. Recent tx for COVID with steroids x9 days. Check LA, elevated, resolved, and then again elevated. Check random cortisol level -> wnl. S/p 2 dose of glucose oral gel and 3 doses of 1 mg Glucagon. Pt was noted as low as 23, continues to trend down despite D10 drip at 100 mL/hr. Continue Q2 hr checks. Trial 10 mg Decadron steroids once and assess response. 10/1-> stop D5 drip and monitor   10/2-> LA now elevated again, suspicion potentially from underlying infection. 10/3-> pt given Decadron 10 mg x 1 dose last night when noted to again be hypoglycemic. Adrenal insufficiency? Pending hospice eval will consider Cortisol stimulation testing. 2. Abd pain, generalized, unclear etiology: unclear etiology. Check CT A / P -> showing distended gallbladder. LA improved and resolved, however was noted to again be elevated as high as 8.7 which is improved to 4.9. Check CTA abdomen to rule out ischemia. Will consider Gen Surg consultation. 10/3-> discussed with pt and family. Surgery is not an option they wish to consider should it be necessary. Family / pt requesting Hospice eval.       3. COVID-19 pneumonia: treated with dexamethasone 6mg daily. First positive test 09/13. Pt stable on RA. Isolation no longer required, transferred to . 4. Possible superimposed bacterial PNA: Completed treatment with Rocephin and Azithromycin. Remains afebrile. No leukocytosis.     5. Acute hypoxic respiratory failure: 2/2 above. 9/25-> Pt placed on O2 overnight after rapid was called. PO2 on ABG was 170, no hypercapnea. No acute changes on EKG, no evidence of fluid overload. Pt is currently 4L, ABG showing respiratory alkalosis. Continue to monitor and wean as tolerated. 6. ESRD on HD:  Secondary to diabetic and hypertensive nephrosclerosis. Nephrology following. On HD MWF.     7. S/p unwitnessed fall: No apparent injuries, CT brain and cervical spine -unremarkable     8. Dark tarry stools / chronic normocytic anemia: hx INDRA. On Retacrit, ferrous sulfate. One episode, none further. Hemoglobin stable. Negative occult 9/19/2021.  Continue to monitor, transfuse for hemoglobin less than 7.    9. Elevated troponin: chronically elevated, likely d/t ESRD. No chest pain, repeat troponin improved, no acute ischemic changes on ekg. Pt had recent LHC. 10. CAD s/p PCI: Patient had 2 stents (LAD and Ramus) at [facility] on 08/31/21. Will continue with aspirin, Plavix, metoprolol and pravastatin. 11. H/o NSVT / Sick sinus syndrome: noted, has PPM/ICD. Tele ordered. Continue metoprolol. Possible PAF. However, after review of EKGs, P waves were present and did not appear to be in a fib. Pt typically follows with Cardiology at [facility], unclear if he has a hx of AF. He is not on anticoagulation. Even if PAF, the risks of anticoagulation may be greater than the benefit. CHADSVASc 4, HAS-BLED 4. Pacer interrogated, awaiting report. Pt will need to arrange follow up with his Cardiologist at [facility]. 12. Chronic HFrEF: EF 45% and grade 1 diastolic dysfunction on Echo 05/2021. Follows with Cardiology at [facility]. Fluid status stable, Nephrology following. Cont home meds. 13. Disposition: Difficulty with discharge secondary to Covid diagnosis and hemodialysis schedule. Appreciate case management assistance. 14. Goals of care: Hospice consulted. Per pt and family wishes, code status changed to [facility]. Chief Complaint: SOB    HPI / Hospital Course: Per HPI: \"The patient is a 80 y.o. male who presents with complaints of shortness of breath and cough for the last 2 days. Patient is accompanied by family who helps with history. They report that patient has had a poor appetite, fatigued, short of breath and coughing for last 2 days. He recently was at [facility] approximately 2 weeks ago where he had 2 stents placed. They state he was in his normal state of health until 2 days ago. Patient was seen in dialysis today and had a full session however continued to be short of breath and had a significant cough and was sent for evaluation. He denies any fevers or chills, nausea or vomiting. He denies any diarrhea or constipation. He denies any chest pain.     In the ED patient was found to be Covid positive. He was placed on 2 L nasal cannula. He was also found to have slightly elevated troponin and was started on heparin drip in the ED. Family updated on test results. \"    Pt has completed treatment for COVID and superimposed bacterial PNA. I took over care 9/22, per previous note, pt stable for discharge and awaiting precert to ECF.      3/29-> Rapid response called during dialysis. Pt had vomited, became lethargic and had episode of AF RVR on the monitor per report. Dialysis was stopped. Patient still lethargic but following commands and nods to answer questions appropriately. EKG showed NSR, does not appear to be AF. Patient is moaning which per nursing, he typically does during dialysis. Patient seen again later. He reports fatigue and wanting to sleep. He denies abdominal pain, n/v/d, CP, SOB, fever/chills. Pt had bleeding from dialysis fistulasite. 9/23-> Patient alert to verbal stimuli, lethargic. Appears to be at his baseline. Denies fever/chills, sob, cp, abd pain, n/v/d. Nephrology started IVF for today and plan for dialysis tomorrow. Patient has been NSR on tele.     9/24-> Pt appears at baseline. He is more alert today than he has been. Tolerated dialysis well. 9/26-> Patient is alert, oriented to self, place, time, and somewhat to situation. He denies fever/chills, SOB, CP, abd pain, n/v/d, cough. He reports of left arm pain at dialysis cath site. The patient states that he has been told before that his heart beat gets \"out of wack. \"   Chart reviewed and a rapid was called last night they were unable to get a reading of O2 levels. ABG results with pO2 179, no hypercapnea. Based on ABG did not appear to be truly hypoxic so he was put back on 6L NC and nursing instructed to wean. His EKG reported atrial fibrillation; however, after review, there were P waves present. Ordered pacer interrogate. 9/27-> no acute events overnight. Patient denies sob, cp, abd pain, nvd. Nephrology planning HD tomorrow. Awaiting pacer interrogate results. 9/28- Pt had dialysis today, tolerated well. 9/29 -> patient on HD. Patient is doing well. Denies fever/chills, sob, cp, palpitations, abd pain, n/v/d. Disposition has been difficult secondary to Covid diagnosis and HD facility/schedule. 9/30-> pt had a rough morning. Called to bedside due to pulse ox being read as low as 50% and was very labile. Pt placed on non-re breather. Pt was c/o of abd pain and an episode of diarrhea that started last night. Had an episode of emesis s/p IO insertion, none since then. Afebrile. 10/1-> patient doing much better today, seen in dialysis. Patient denies any abdominal pain at all today. No nausea or vomiting. Hypoglycemia has resolved and remained stable. Afebrile. Has been weaned down to 4 L NC.    10/2-> patient had a rapid response called overnight secondary to abdominal pain and hypotension (as low as the 98Z systolic). Patient was noted to have an elevated lactic acid. He was given fluids and started on IV antibiotics. Today upon exam, patient again is complaining of severe abdominal pain all over. We will check a CTA of the abdomen. Consider general surgery consultation. Subjective (past 24 hours):   10/3-> patient again had a rough day complaining of intense 10/10 abdominal pain. Patient stated \"I want to be poked no more \"and we discussed the concept of moving forward with comfort care versus further diagnostic testing. Patient and family agreed that comfort care was the route they wish to take. Hospice evaluation placed. Overnight, patient's blood glucose has been stable. Again discussed with patient who stated he wanted to proceed with comfort care. Pt is A&Ox4 on this exam. Discussed stopping any lab draws and also fingerstick checks. Discussed potential adrenal insufficiency which cannot be confirmed without further lab testing. Pt is aware that this could lead to death. Discussed with Bard Andrews via telephone. Plan to meet today with Hospice. They are now unsure if they wish to proceed with comfort care as they were unaware that pt would not be a dialysis pt any longer. Await hospice eval.     Patient reporting that he still has generalized abdominal pain, though it is much improved from yesterday. Blood pressure again noted to be in the 80s last night, has maintained stability today. ROS: Pertinent positives as noted in HPI. All other systems reviewed and negative. Past medical history, family history, social history and allergies reviewed again and is unchanged since admission. Medications:  Reviewed.   Infusion Medications    sodium chloride      dextrose Stopped (10/01/21 1231)    sodium chloride       Scheduled Medications    lactulose  20 g Oral BID    lidocaine 1 % injection  5 mL IntraDERmal Once    dicyclomine  10 mg Oral TID AC    dilTIAZem  30 mg Oral Once    aspirin  81 mg Oral Daily    heparin (porcine)  5,000 Units SubCUTAneous 3 times per day    epoetin traci-epbx  6,000 Units SubCUTAneous Once per day on Mon Wed Fri    calcitRIOL  1.5 mcg Oral Once per day on Mon Wed Fri    cinacalcet  60 mg Oral Once per day on Mon Wed Fri    docusate sodium  100 mg Oral BID    ferrous sulfate  325 mg Oral Daily with breakfast    gabapentin  100 mg Oral TID    metoprolol  100 mg Oral BID    pantoprazole  40 mg Oral QAM AC    vitamin C  500 mg Oral Daily    polyethylene glycol  17 g Oral Daily    sodium chloride flush  5-40 mL IntraVENous 2 times per day    clopidogrel  75 mg Oral Daily     PRN Meds: HYDROcodone-acetaminophen, morphine, LORazepam, sodium chloride, loperamide, metoprolol, metoclopramide, ipratropium-albuterol, glucose, dextrose, glucagon (rDNA), dextrose, sodium chloride flush, sodium chloride, ondansetron **OR** ondansetron, polyethylene glycol, acetaminophen **OR** acetaminophen, guaiFENesin-dextromethorphan, albuterol sulfate HFA    I/O:     Intake/Output Summary (Last 24 hours) at 10/3/2021 0746  Last data filed at 10/2/2021 1346  Gross per 24 hour   Intake 0 ml   Output --   Net 0 ml       Diet:  ADULT DIET; Regular; Low Potassium (Less than 3000 mg/day); 1200 ml    Exam:  BP (!) 115/93   Pulse 78   Temp 98 °F (36.7 °C) (Oral)   Resp 20   Ht 5' 6\" (1.676 m)   Wt 138 lb 10.7 oz (62.9 kg)   SpO2 99%   BMI 22.38 kg/m²   General:  Chronically ill appearing male. NAD. HEENT:  normocephalic and atraumatic. No scleral icterus. PERR. Neck: supple. No JVD. No thyromegaly. Lungs: clear to auscultation. No retractions  Cardiac: RRR without murmur. Abdomen: soft. Generalized tenderness. Bowel sounds positive. Extremities:  No clubbing, cyanosis, or edema x 4. Vasculature: capillary refill < 3 seconds. Palpable LE pulses bilaterally. Skin:  warm and dry. Psych:  Alert and oriented x4. Lymph:  No supraclavicular adenopathy. Neurologic:  No focal deficit. No seizures.       Data: (All radiographs, tracings, PFTs, and imaging are personally viewed and interpreted unless otherwise noted)  Labs:   Recent Labs     10/01/21  0730 10/01/21  2140 10/02/21  0430   WBC  --  10.0  --    HGB 6.9* 10.0*  9.9* 9.3*   HCT 20.0* 30.3*  30.2* 27.7*   PLT  --  145  --      Recent Labs     10/01/21  0730 10/01/21  2249 10/02/21  0430   * 134*  --    K 6.1* 5.0 5.2   CL 92* 91*  --    CO2 22* 19*  --    BUN 35* 18  --    CREATININE 5.3* 3.3*  --    CALCIUM 8.3* 8.4*  --      Recent Labs     10/01/21  2249   *   *   BILIDIR 0.3   BILITOT 0.6   ALKPHOS 177*     No results for input(s): INR in the last 72 hours. No results for input(s): Kathyrn Belch in the last 72 hours. Urinalysis:   Lab Results   Component Value Date    NITRU NEGATIVE 06/22/2019    WBCUA 50-75 06/22/2019    BACTERIA NONE 06/22/2019    RBCUA 25-50 06/22/2019    BLOODU LARGE 06/22/2019    SPECGRAV 1.013 05/03/2016    GLUCOSEU NEGATIVE 06/22/2019     Urine culture: No results found for: Ryan Rodon  Micro:   Blood culture #1:   Lab Results   Component Value Date    BC No growth-preliminary  10/02/2021     Blood culture #2:No results found for: Derryl Drum  Organism:  Lab Results   Component Value Date    ORG Micrococcus species 07/12/2021       No results found for: LABGRAM  MRSA culture only:No results found for: [address] [place] [address]  Respiratory culture: No results found for: CULTRESP  Aerobic and Anaerobic :  No results found for: LABAERO  No results found for: [address]    Radiology Reports:  XR ABDOMEN (KUB) (SINGLE AP VIEW)   Final Result   A nonspecific but nonobstructive bowel gas pattern. This document has been electronically signed by: Severa Pfeiffer, DO on    10/02/2021 12:45 AM      XR CHEST PORTABLE   Final Result   1. Scattered patchy regions of predominantly interstitial densities    throughout the bilateral lungs grossly similar to the prior examination. Mild bibasilar pleural/parenchymal opacities most consistent with tiny    effusions with atelectasis and/or consolidation. 2. Cardiomegaly with the heart size unchanged from the prior examination.       This document has been electronically signed by: Alex Jasso DO on    10/02/2021 12:47 AM      CT ABDOMEN PELVIS WO CONTRAST Additional Contrast? None   Final Result   1. Limited evaluation of the lung bases demonstrates small bilateral pleural effusions with patchy consolidative opacities at the lower lobes which may be related to pneumonia. 2. The gallbladder demonstrates a small gallstone near the gallbladder neck. This appears mildly distended. 3. Mild ascites in the perihepatic region is noted. **This report has been created using voice recognition software. It may contain minor errors which are inherent in voice recognition technology. **      Final report electronically signed by Dr. Santo Ferrer on 9/30/2021 1:35 PM      XR ABDOMEN (KUB) (SINGLE AP VIEW)   Final Result   1. Overall nonobstructive bowel gas pattern. **This report has been created using voice recognition software. It may contain minor errors which are inherent in voice recognition technology. **      Final report electronically signed by Dr Marie Alexander on 9/30/2021 12:26 PM      XR CHEST PORTABLE   Final Result   1. Patchy multifocal airspace disease is similar to 9/24/2021 exam, interstitial and airspace edema versus atypical infection. **This report has been created using voice recognition software. It may contain minor errors which are inherent in voice recognition technology. **      Final report electronically signed by Dr Marie Alexander on 9/30/2021 9:33 AM      XR CHEST PORTABLE   Final Result   Impression:      Increasing consolidation and interstitial prominence. Question pulmonary edema versus pneumonia      This document has been electronically signed by: Marisela Calderon MD on    09/24/2021 10:34 PM      XR CHEST PORTABLE   Final Result   1. No acute intrathoracic process. 2. Mild stable cardiomegaly. **This report has been created using voice recognition software.  It may contain minor errors which are inherent in voice recognition technology. **      Final report electronically signed by Dr. Herminia Son on 9/22/2021 9:52 PM      CT HEAD WO CONTRAST   Final Result      1. Stable CT scan of the brain, no interval change since previous study dated 15th of June 2021. **This report has been created using voice recognition software. It may contain minor errors which are inherent in voice recognition technology. **      Final report electronically signed by DR Latoya Hurt on 9/15/2021 4:26 PM      CT CERVICAL SPINE WO CONTRAST   Final Result    No evidence of acute osseous injury of the cervical spine. **This report has been created using voice recognition software. It may contain minor errors which are inherent in voice recognition technology. **      Final report electronically signed by Dr. Bonita Rodriguez MD on 9/15/2021 4:29 PM      XR CHEST PORTABLE   Final Result   Cardiomegaly with vascular congestion and interstitial edema and effusions differential would include congestive heart failure or fluid overload. **This report has been created using voice recognition software. It may contain minor errors which are inherent in voice recognition technology. **      Final report electronically signed by Dr. Idalia Diego on 9/13/2021 2:22 PM      CTA ABDOMEN PELVIS [address]    (Results Pending)     XR CHEST PORTABLE    Result Date: 9/13/2021  PROCEDURE: XR CHEST PORTABLE CLINICAL INFORMATION: SOB . COMPARISON: 7/12/2021 TECHNIQUE: Portable upright FINDINGS: Heart size is mildly enlarged. Mediastinum is not widened. There is pulmonary vascular congestion. Interstitial edema. There is suggestion of small effusions. AICD over the left chest. EKG leads overlie the chest. Right upper extremity vascular stents are noted.      Cardiomegaly with vascular congestion and interstitial edema and effusions differential would include congestive heart failure or fluid overload. **This report has been created using voice recognition software. It may contain minor errors which are inherent in voice recognition technology. ** Final report electronically signed by Dr. Martina Lobato on 9/13/2021 2:22 PM        Tele:   [] yes             [] no      Active Hospital Problems    Diagnosis Date Noted    [facility] [E43] 09/22/2021     Class: Acute    Pneumonia due to COVID-19 virus [U07.1, J12.82] 09/13/2021       Electronically signed by Gregorio Smith PA-C on 10/3/2021 at 7:46 AM

## 2021-10-03 NOTE — PLAN OF CARE
This is a late entry. I did discuss with the patient and the patient's daughter Chantelle Pisano ( in separate instances) regarding plan of care with patient and his current course. IV access was lost after PICC infiltration. Central line was being discussed and pt states \"I am tired of being poked\". This information was relayed to me. I spoke on the telephone with daughter Chantelle Pisano and two other family members and at that time discussed current condition, potential future tests/labs, differentials and the possible need for surgery if etiology of abd pain was deemed to be secondary to source necessitating surgical intervention. It was stated that surgery is not an option family wishes to persue should it be required. This was also confirmed by pt himself. We discussed comfort care and hospice evaluation to which patient and family both agreed. When discussing that meant stopping all aggressive measures, including dialysis, pt stated \"I wouldn't have to do dialysis no more\" to which I told him he would not. Pt responded with \"Good. \"    Pt was noted to have hypoglycemia again thereafter. Pt wished to have his blood glucose managed until hospice could evaluate. Discussed potential need for central line placement in order to maintain his blood glucose should PO options not provide a remedy. Pt was agreeable. Code status changed to [facility]. Hospice consult placed.      Electronically signed by Cuauhtemoc Rodriges PA-C on 10/2/2021 at 10:37 PM

## 2021-10-03 NOTE — PROGRESS NOTES
Received call from Nini Ohara RN with hospice referral - terminal dx: ESRD per MARCELA Hurst PA-C. Aneta Adrian reports pt is stable and symptoms well controlled. Review of chart reveals VSS, denies pain, recent change in code status to [facility], and pt request of no more dialysis or IV \"sticks\". Pt alert & oriented in bed at this time. Denies pain or needs at this time. States dtr will be visiting later today. Call placed to dtr Faizan Like and left message requesting call back to arrange time for referral/presentation.

## 2021-10-04 NOTE — PROGRESS NOTES
Renal Progress Note    Assessment and Plan:   1. End-stage kidney disease on hemodialysis. 2. SARS-CoV-2 pneumonia. 3. Hypertension primary  4. Hypoglycemia likely due to poor oral intake  5. Deconditioning  6. Anemia of chronic disease    PLAN:  1. I discussed my thoughts with the patient  2. He understood  3. I addressed his questions  4. Seen in hemodialysis unit  5. Procedure in progress  6. Tolerating treatment well at that time  7. Hemodynamically stable  8. We will continue to follow    Patient Active Problem List:     CAD (coronary artery disease)     COPD (chronic obstructive pulmonary disease) ([address]     Chronic renal insufficiency     Patient overweight     Arthritis-  low back and neck .      CKD (chronic kidney disease) stage 3, GFR 30-59 ml/min (HCC)     Dyspnea and respiratory abnormalities     ED (erectile dysfunction)     Degenerative joint disease of knee, left     Gout of big toe     Anemia, chronic disease     CKD (chronic kidney disease) stage 4, GFR 15-29 ml/min (HCC)     Essential hypertension     Monoclonal gammopathy     Leukopenia     Thrombocytopenia (HCC)     Chronic kidney disease (CKD), stage V (HCC)     Metabolic acidosis     Hyperkalemia     Iron deficiency anemia     ROSAURA (acute kidney injury) ([address]     Plasma cell dyscrasia     Idiopathic hypotension     Hypertensive urgency     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)     Systolic congestive heart failure (HCC)     Other fluid overload (CODE)     Hyperphosphatemia     Acute diastolic congestive heart failure (HCC)     Acute on chronic diastolic congestive heart failure (HCC)     Pulmonary hypertension (HCC)     Anemia due to chronic kidney disease, on chronic dialysis (HCC)     Volume overload     Secondary hyperparathyroidism of renal origin ([address]     Chest pain     Acute pulmonary edema (HCC)     Accelerated hypertension     Gastritis and duodenitis     Fall from slip, trip, or stumble, initial encounter     Closed fracture of left ankle     ESRD on hemodialysis (HCC)     Hypertensive emergency     SOB (shortness of breath)     Chronic combined systolic and diastolic CHF (congestive heart failure) (HCC)     Pneumonia due to COVID-19 virus     Severe malnutrition (HCC)      Subjective:   Admit Date: 9/13/2021    Interval History:   Seen for end-stage kidney disease on hemodialysis  Awake and alert when I saw him this morning  Had a good weekend  No complaint  Updated by the staff  No new issues  Good blood pressure      Medications:   Scheduled Meds:   [START ON 10/5/2021] cosyntropin  250 mcg IntraVENous Once    lactulose  20 g Oral BID    lidocaine 1 % injection  5 mL IntraDERmal Once    dicyclomine  10 mg Oral TID AC    dilTIAZem  30 mg Oral Once    aspirin  81 mg Oral Daily    heparin (porcine)  5,000 Units SubCUTAneous 3 times per day    epoetin traci-epbx  6,000 Units SubCUTAneous Once per day on Mon Wed Fri    calcitRIOL  1.5 mcg Oral Once per day on Mon Wed Fri    cinacalcet  60 mg Oral Once per day on Mon Wed Fri    docusate sodium  100 mg Oral BID    ferrous sulfate  325 mg Oral Daily with breakfast    gabapentin  100 mg Oral TID    metoprolol  100 mg Oral BID    pantoprazole  40 mg Oral QAM AC    vitamin C  500 mg Oral Daily    polyethylene glycol  17 g Oral Daily    sodium chloride flush  5-40 mL IntraVENous 2 times per day    clopidogrel  75 mg Oral Daily     Continuous Infusions:   sodium chloride      dextrose Stopped (10/01/21 1231)    sodium chloride         CBC:   Recent Labs     10/01/21  2140 10/02/21  0430   WBC 10.0  --    HGB 10.0*  9.9* 9.3*     --      CMP:    Recent Labs     10/01/21  2249 10/02/21  0430 10/03/21  0547 10/03/21  1437 10/04/21  0603   *  --  134*  --  133*   K 5.0   < > 5.7* 5.7* 5.6*   CL 91*  --  95*  --  96*   CO2 19*  --  25  --  24   BUN 18  --  33*  --  48*   CREATININE 3.3*  --  5.2*  --  6.3*   GLUCOSE 106  --  103  --  99   CALCIUM 8.4*  -- 8.0*  --  8.1*   LABGLOM 21*  --  13*  --  10*    < > = values in this interval not displayed. Troponin: No results for input(s): TROPONINI in the last 72 hours. BNP: No results for input(s): BNP in the last 72 hours. INR: No results for input(s): INR in the last 72 hours. Lipids:   Recent Labs     10/01/21  2249   LIPASE 34.1     Liver:   Recent Labs     10/01/21  2249   *   *   ALKPHOS 177*   PROT 5.9*   LABALBU 2.9*   BILITOT 0.6     Iron:  No results for input(s): IRONS, FERRITIN in the last 72 hours. Invalid input(s): LABIRONS  IR FLUORO GUIDED CVA DEVICE PLMT/REPLACE/REMOVAL   Final Result   Status post successful midline insertion. **This report has been created using voice recognition software. It may contain minor errors which are inherent in voice recognition technology. **      Final report electronically signed by Dr Rhett Menezes on 10/4/2021 1:54 PM      XR ABDOMEN (KUB) (SINGLE AP VIEW)   Final Result   A nonspecific but nonobstructive bowel gas pattern. This document has been electronically signed by: Deirdre Cortez DO on    10/02/2021 12:45 AM      XR CHEST PORTABLE   Final Result   1. Scattered patchy regions of predominantly interstitial densities    throughout the bilateral lungs grossly similar to the prior examination. Mild bibasilar pleural/parenchymal opacities most consistent with tiny    effusions with atelectasis and/or consolidation. 2. Cardiomegaly with the heart size unchanged from the prior examination. This document has been electronically signed by: Deirdre Cortez DO on    10/02/2021 12:47 AM      CT ABDOMEN PELVIS WO CONTRAST Additional Contrast? None   Final Result   1. Limited evaluation of the lung bases demonstrates small bilateral pleural effusions with patchy consolidative opacities at the lower lobes which may be related to pneumonia. 2. The gallbladder demonstrates a small gallstone near the gallbladder neck.  This appears mildly distended. 3. Mild ascites in the perihepatic region is noted. **This report has been created using voice recognition software. It may contain minor errors which are inherent in voice recognition technology. **      Final report electronically signed by Dr. Miguel Griffiths on 9/30/2021 1:35 PM      XR ABDOMEN (KUB) (SINGLE AP VIEW)   Final Result   1. Overall nonobstructive bowel gas pattern. **This report has been created using voice recognition software. It may contain minor errors which are inherent in voice recognition technology. **      Final report electronically signed by Dr Sandra Campuzano on 9/30/2021 12:26 PM      XR CHEST PORTABLE   Final Result   1. Patchy multifocal airspace disease is similar to 9/24/2021 exam, interstitial and airspace edema versus atypical infection. **This report has been created using voice recognition software. It may contain minor errors which are inherent in voice recognition technology. **      Final report electronically signed by Dr Sandra Campuzano on 9/30/2021 9:33 AM      XR CHEST PORTABLE   Final Result   Impression:      Increasing consolidation and interstitial prominence. Question pulmonary edema versus pneumonia      This document has been electronically signed by: Hay Yan MD on    09/24/2021 10:34 PM      XR CHEST PORTABLE   Final Result   1. No acute intrathoracic process. 2. Mild stable cardiomegaly. **This report has been created using voice recognition software. It may contain minor errors which are inherent in voice recognition technology. **      Final report electronically signed by Dr. Marixa Blake on 9/22/2021 9:52 PM      CT HEAD WO CONTRAST   Final Result      1. Stable CT scan of the brain, no interval change since previous study dated 15th of June 2021. **This report has been created using voice recognition software.  It may contain minor errors which are inherent in voice recognition technology. **      Final report electronically signed by DR Sukumar Umaña on 9/15/2021 4:26 PM      CT CERVICAL SPINE WO CONTRAST   Final Result    No evidence of acute osseous injury of the cervical spine. **This report has been created using voice recognition software. It may contain minor errors which are inherent in voice recognition technology. **      Final report electronically signed by Dr. Emilia Willingham MD on 9/15/2021 4:29 PM      XR CHEST PORTABLE   Final Result   Cardiomegaly with vascular congestion and interstitial edema and effusions differential would include congestive heart failure or fluid overload. **This report has been created using voice recognition software. It may contain minor errors which are inherent in voice recognition technology. **      Final report electronically signed by Dr. Nate Howell on 9/13/2021 2:22 PM      CTA ABDOMEN PELVIS W WO CONTRAST    (Results Pending)         Objective:   Vitals: /69   Pulse 106   Temp 97.8 °F (36.6 °C) (Oral)   Resp 16   Ht 5' 6\" (1.676 m)   Wt 138 lb 3.7 oz (62.7 kg)   SpO2 92%   BMI 22.31 kg/m²    Wt Readings from Last 3 Encounters:   10/04/21 138 lb 3.7 oz (62.7 kg)   07/14/21 145 lb 8.1 oz (66 kg)   06/17/21 161 lb (73 kg)      24HR INTAKE/OUTPUT:      Intake/Output Summary (Last 24 hours) at 10/4/2021 1724  Last data filed at 10/4/2021 1218  Gross per 24 hour   Intake 400 ml   Output 1400 ml   Net -1000 ml       Constitutional: Well-developed elderly gentleman alert, awake, no apparent distress   Skin:normal with no rash or lesions. HEENT:Pupils are reactive . Throat is clear . Oral mucosa is moist   Neck:supple with no thyromegaly or carotid bruit  Cardiovascular:  S1, S2 without murmur or rubs   Respiratory:  Clear to ausculation without wheezes, rhonchi or rales. Abdomen: +bs, soft, no tenderness to palpation and no palpable mass. No abdominal bruit   Ext: No LE edema  Musculoskeletal:Intact  Neuro:Alert and awake. Well oriented  with no focal deficit. Speech is normal      Electronically signed by Awilda Maza MD on 10/4/2021 at 5:24 PM  **This report has been created using voice recognition software. It maycontain minor  errors which are inherent in voice recognition technology. **

## 2021-10-04 NOTE — PROGRESS NOTES
[address]  PHYSICAL THERAPY MISSED TREATMENT NOTE  [facility]    Date: 10/4/2021  Patient Name: Raj Tomlinson        MRN: [id number]   : 11/10/1930  (80 y.o.)  Gender: male   Referring Practitioner: Dr. Cody Tijerina  Diagnosis: acute pulmonary edema         REASON FOR MISSED TREATMENT:  Patient just returning from PICC line procedure, states he is tired so treatment declined for today. PT to check back on later date.

## 2021-10-04 NOTE — OP NOTE
Department of Radiology  Post Procedure Progress Note      Pre-Procedure Diagnosis:  Poor IV access    Procedure Performed:  Midline placement    Anesthesia: local     Findings: successful    Immediate Complications:  None    Estimated Blood Loss: minimal    SEE DICTATED PROCEDURE NOTE FOR COMPLETE DETAILS.     Electronically signed by Tim Santizo MD on 10/4/2021 at 5:37 PM

## 2021-10-04 NOTE — PROGRESS NOTES
5K.    4. Possible superimposed bacterial PNA: Completed treatment with Rocephin and Azithromycin. Remains afebrile. No leukocytosis. 5. Acute hypoxic respiratory failure: 2/2 above. 9/25-> Pt placed on O2 overnight after rapid was called. PO2 on ABG was 170, no hypercapnea. No acute changes on EKG, no evidence of fluid overload. Pt is currently 4L, ABG showing respiratory alkalosis. Continue to monitor and wean as tolerated. 6. ESRD on HD:  Secondary to diabetic and hypertensive nephrosclerosis. Nephrology following. On HD MWF.     7. S/p unwitnessed fall: No apparent injuries, CT brain and cervical spine -unremarkable     8. Dark tarry stools / chronic normocytic anemia: hx INDRA. On Retacrit, ferrous sulfate. One episode, none further. Hemoglobin stable. Negative occult 9/19/2021.  Continue to monitor, transfuse for hemoglobin less than 7.    9. Elevated troponin: chronically elevated, likely d/t ESRD. No chest pain, repeat troponin improved, no acute ischemic changes on ekg. Pt had recent LHC. 10. CAD s/p PCI: Patient had 2 stents (LAD and Ramus) at [facility] on 08/31/21. Will continue with aspirin, Plavix, metoprolol and pravastatin. 11. H/o NSVT / Sick sinus syndrome: noted, has PPM/ICD. Tele ordered. Continue metoprolol. Possible PAF. However, after review of EKGs, P waves were present and did not appear to be in a fib. Pt typically follows with Cardiology at [facility], unclear if he has a hx of AF. He is not on anticoagulation. Even if PAF, the risks of anticoagulation may be greater than the benefit. CHADSVASc 4, HAS-BLED 4. Pacer interrogated, awaiting report. Pt will need to arrange follow up with his Cardiologist at [facility]. 12. Chronic HFrEF: EF 45% and grade 1 diastolic dysfunction on Echo 05/2021. Follows with Cardiology at [facility]. Fluid status stable, Nephrology following. Cont home meds.      13. Disposition: Difficulty with discharge secondary to Covid diagnosis and hemodialysis schedule. Appreciate case management assistance. 14. Goals of care: Hospice consulted. Per pt and family wishes, code status changed to [facility]. Chief Complaint: SOB    HPI / Hospital Course: Per HPI: \"The patient is a 80 y.o. male who presents with complaints of shortness of breath and cough for the last 2 days. Patient is accompanied by family who helps with history. They report that patient has had a poor appetite, fatigued, short of breath and coughing for last 2 days. He recently was at [facility] approximately 2 weeks ago where he had 2 stents placed. They state he was in his normal state of health until 2 days ago. Patient was seen in dialysis today and had a full session however continued to be short of breath and had a significant cough and was sent for evaluation. He denies any fevers or chills, nausea or vomiting. He denies any diarrhea or constipation. He denies any chest pain.     In the ED patient was found to be Covid positive. He was placed on 2 L nasal cannula. He was also found to have slightly elevated troponin and was started on heparin drip in the ED. Family updated on test results. \"    Pt has completed treatment for COVID and superimposed bacterial PNA. I took over care 9/22, per previous note, pt stable for discharge and awaiting precert to ECF.      5/71-> Rapid response called during dialysis. Pt had vomited, became lethargic and had episode of AF RVR on the monitor per report. Dialysis was stopped. Patient still lethargic but following commands and nods to answer questions appropriately. EKG showed NSR, does not appear to be AF. Patient is moaning which per nursing, he typically does during dialysis. Patient seen again later. He reports fatigue and wanting to sleep. He denies abdominal pain, n/v/d, CP, SOB, fever/chills. Pt had bleeding from dialysis fistulasite. 9/23-> Patient alert to verbal stimuli, lethargic. Appears to be at his baseline.  Denies fever/chills, sob, cp, abd pain, n/v/d. Nephrology started IVF for today and plan for dialysis tomorrow. Patient has been NSR on tele. 9/24-> Pt appears at baseline. He is more alert today than he has been. Tolerated dialysis well. 9/26-> Patient is alert, oriented to self, place, time, and somewhat to situation. He denies fever/chills, SOB, CP, abd pain, n/v/d, cough. He reports of left arm pain at dialysis cath site. The patient states that he has been told before that his heart beat gets \"out of wack. \"   Chart reviewed and a rapid was called last night they were unable to get a reading of O2 levels. ABG results with pO2 179, no hypercapnea. Based on ABG did not appear to be truly hypoxic so he was put back on 6L NC and nursing instructed to wean. His EKG reported atrial fibrillation; however, after review, there were P waves present. Ordered pacer interrogate. 9/27-> no acute events overnight. Patient denies sob, cp, abd pain, nvd. Nephrology planning HD tomorrow. Awaiting pacer interrogate results. 9/28- Pt had dialysis today, tolerated well. 9/29 -> patient on HD. Patient is doing well. Denies fever/chills, sob, cp, palpitations, abd pain, n/v/d. Disposition has been difficult secondary to Covid diagnosis and HD facility/schedule. 9/30-> pt had a rough morning. Called to bedside due to pulse ox being read as low as 50% and was very labile. Pt placed on non-re breather. Pt was c/o of abd pain and an episode of diarrhea that started last night. Had an episode of emesis s/p IO insertion, none since then. Afebrile. 10/1-> patient doing much better today, seen in dialysis. Patient denies any abdominal pain at all today. No nausea or vomiting. Hypoglycemia has resolved and remained stable. Afebrile. Has been weaned down to 4 L NC.    10/2-> patient had a rapid response called overnight secondary to abdominal pain and hypotension (as low as the 17Z systolic).   Patient was noted to have an elevated history and allergies reviewed again and is unchanged since admission. Medications:  Reviewed. Infusion Medications    sodium chloride      dextrose Stopped (10/01/21 1231)    sodium chloride       Scheduled Medications    [START ON 10/5/2021] cosyntropin  250 mcg IntraVENous Once    lactulose  20 g Oral BID    lidocaine 1 % injection  5 mL IntraDERmal Once    dicyclomine  10 mg Oral TID AC    dilTIAZem  30 mg Oral Once    aspirin  81 mg Oral Daily    heparin (porcine)  5,000 Units SubCUTAneous 3 times per day    epoetin traci-epbx  6,000 Units SubCUTAneous Once per day on Mon Wed Fri    calcitRIOL  1.5 mcg Oral Once per day on Mon Wed Fri    cinacalcet  60 mg Oral Once per day on Mon Wed Fri    docusate sodium  100 mg Oral BID    ferrous sulfate  325 mg Oral Daily with breakfast    gabapentin  100 mg Oral TID    metoprolol  100 mg Oral BID    pantoprazole  40 mg Oral QAM AC    vitamin C  500 mg Oral Daily    polyethylene glycol  17 g Oral Daily    sodium chloride flush  5-40 mL IntraVENous 2 times per day    clopidogrel  75 mg Oral Daily     PRN Meds: HYDROcodone-acetaminophen, sodium chloride, loperamide, metoprolol, metoclopramide, ipratropium-albuterol, glucose, dextrose, glucagon (rDNA), dextrose, sodium chloride flush, sodium chloride, ondansetron **OR** ondansetron, polyethylene glycol, acetaminophen **OR** acetaminophen, guaiFENesin-dextromethorphan, albuterol sulfate HFA    I/O:     Intake/Output Summary (Last 24 hours) at 10/4/2021 1546  Last data filed at 10/4/2021 1218  Gross per 24 hour   Intake 400 ml   Output 1400 ml   Net -1000 ml       Diet:  ADULT DIET; Regular; Low Potassium (Less than 3000 mg/day); 1200 ml    Exam:  /69   Pulse 106   Temp 97.8 °F (36.6 °C) (Oral)   Resp 16   Ht 5' 6\" (1.676 m)   Wt 138 lb 3.7 oz (62.7 kg)   SpO2 92%   BMI 22.31 kg/m²   General:  Chronically ill appearing male. NAD. HEENT:  normocephalic and atraumatic. No scleral icterus. PERR.  Neck: supple. No JVD. No thyromegaly. Lungs: clear to auscultation. No retractions  Cardiac: RRR without murmur. Abdomen: soft. Generalized tenderness. Bowel sounds positive. Extremities:  No clubbing, cyanosis, or edema x 4. Vasculature: capillary refill < 3 seconds. Palpable LE pulses bilaterally. Skin:  warm and dry. Psych:  Alert and oriented x4. Lymph:  No supraclavicular adenopathy. Neurologic:  No focal deficit. No seizures. Data: (All radiographs, tracings, PFTs, and imaging are personally viewed and interpreted unless otherwise noted)  Labs:   Recent Labs     10/01/21  2140 10/02/21  0430   WBC 10.0  --    HGB 10.0*  9.9* 9.3*   HCT 30.3*  30.2* 27.7*     --      Recent Labs     10/01/21  2249 10/02/21  0430 10/03/21  0547 10/03/21  1437 10/04/21  0603   *  --  134*  --  133*   K 5.0   < > 5.7* 5.7* 5.6*   CL 91*  --  95*  --  96*   CO2 19*  --  25  --  24   BUN 18  --  33*  --  48*   CREATININE 3.3*  --  5.2*  --  6.3*   CALCIUM 8.4*  --  8.0*  --  8.1*    < > = values in this interval not displayed. Recent Labs     10/01/21  2249   *   *   BILIDIR 0.3   BILITOT 0.6   ALKPHOS 177*     No results for input(s): INR in the last 72 hours. No results for input(s): Gisele Highman in the last 72 hours.   Urinalysis:   Lab Results   Component Value Date    NITRU NEGATIVE 06/22/2019    WBCUA 50-75 06/22/2019    BACTERIA NONE 06/22/2019    RBCUA 25-50 06/22/2019    BLOODU LARGE 06/22/2019    SPECGRAV 1.013 05/03/2016    GLUCOSEU NEGATIVE 06/22/2019     Urine culture: No results found for: Kesha [place]  Micro:   Blood culture #1:   Lab Results   Component Value Date    BC No growth-preliminary  10/02/2021     Blood culture #2:No results found for: Saniya Mcgraw  Organism:  Lab Results   Component Value Date    ORG Micrococcus species 07/12/2021       No results found for: LABGRAM  MRSA culture only:No results found for: [facility]  Respiratory culture: No results found for: CULTRESP  Aerobic and Anaerobic :  No results found for: LABAERO  No results found for: [address]    Radiology Reports:  IR FLUORO GUIDED CVA DEVICE PLMT/REPLACE/REMOVAL   Final Result   Status post successful midline insertion. **This report has been created using voice recognition software. It may contain minor errors which are inherent in voice recognition technology. **      Final report electronically signed by Dr Marquita Velazco on 10/4/2021 1:54 PM      XR ABDOMEN (KUB) (SINGLE AP VIEW)   Final Result   A nonspecific but nonobstructive bowel gas pattern. This document has been electronically signed by: Claudell Aguas, DO on    10/02/2021 12:45 AM      XR CHEST PORTABLE   Final Result   1. Scattered patchy regions of predominantly interstitial densities    throughout the bilateral lungs grossly similar to the prior examination. Mild bibasilar pleural/parenchymal opacities most consistent with tiny    effusions with atelectasis and/or consolidation. 2. Cardiomegaly with the heart size unchanged from the prior examination. This document has been electronically signed by: Claudell Aguas, DO on    10/02/2021 12:47 AM      CT ABDOMEN PELVIS WO CONTRAST Additional Contrast? None   Final Result   1. Limited evaluation of the lung bases demonstrates small bilateral pleural effusions with patchy consolidative opacities at the lower lobes which may be related to pneumonia. 2. The gallbladder demonstrates a small gallstone near the gallbladder neck. This appears mildly distended. 3. Mild ascites in the perihepatic region is noted. **This report has been created using voice recognition software. It may contain minor errors which are inherent in voice recognition technology. **      Final report electronically signed by Dr. Erick Patiño on 9/30/2021 1:35 PM      XR ABDOMEN (KUB) (SINGLE AP VIEW)   Final Result   1. Overall nonobstructive bowel gas pattern.             **This report has been created using voice recognition software. It may contain minor errors which are inherent in voice recognition technology. **      Final report electronically signed by Dr Osborn Due on 9/30/2021 12:26 PM      XR CHEST PORTABLE   Final Result   1. Patchy multifocal airspace disease is similar to 9/24/2021 exam, interstitial and airspace edema versus atypical infection. **This report has been created using voice recognition software. It may contain minor errors which are inherent in voice recognition technology. **      Final report electronically signed by Dr Osborn Due on 9/30/2021 9:33 AM      XR CHEST PORTABLE   Final Result   Impression:      Increasing consolidation and interstitial prominence. Question pulmonary edema versus pneumonia      This document has been electronically signed by: Masood Jose MD on    09/24/2021 10:34 PM      XR CHEST PORTABLE   Final Result   1. No acute intrathoracic process. 2. Mild stable cardiomegaly. **This report has been created using voice recognition software. It may contain minor errors which are inherent in voice recognition technology. **      Final report electronically signed by Dr. Herminia Son on 9/22/2021 9:52 PM      CT HEAD WO CONTRAST   Final Result      1. Stable CT scan of the brain, no interval change since previous study dated 15th of June 2021. **This report has been created using voice recognition software. It may contain minor errors which are inherent in voice recognition technology. **      Final report electronically signed by DR Latoya Hurt on 9/15/2021 4:26 PM      CT CERVICAL SPINE WO CONTRAST   Final Result    No evidence of acute osseous injury of the cervical spine. **This report has been created using voice recognition software. It may contain minor errors which are inherent in voice recognition technology. **      Final report electronically signed by Dr. Florina Ibrahim Cristin Rollins MD on 9/15/2021 4:29 PM      XR CHEST PORTABLE   Final Result   Cardiomegaly with vascular congestion and interstitial edema and effusions differential would include congestive heart failure or fluid overload. **This report has been created using voice recognition software. It may contain minor errors which are inherent in voice recognition technology. **      Final report electronically signed by Dr. Socorro Pena on 9/13/2021 2:22 PM      CTA ABDOMEN PELVIS [address]    (Results Pending)     XR CHEST PORTABLE    Result Date: 9/13/2021  PROCEDURE: XR CHEST PORTABLE CLINICAL INFORMATION: SOB . COMPARISON: 7/12/2021 TECHNIQUE: Portable upright FINDINGS: Heart size is mildly enlarged. Mediastinum is not widened. There is pulmonary vascular congestion. Interstitial edema. There is suggestion of small effusions. AICD over the left chest. EKG leads overlie the chest. Right upper extremity vascular stents are noted. Cardiomegaly with vascular congestion and interstitial edema and effusions differential would include congestive heart failure or fluid overload. **This report has been created using voice recognition software. It may contain minor errors which are inherent in voice recognition technology. ** Final report electronically signed by Dr. Socorro Pena on 9/13/2021 2:22 PM        Tele:   [] yes             [] no      Active Hospital Problems    Diagnosis Date Noted    Severe malnutrition ([facility] [address] [E43] 09/22/2021     Class: Acute    Pneumonia due to COVID-19 virus [U07.1, J12.82] 09/13/2021       Electronically signed by Gerri Arrington PA-C on 10/4/2021 at 3:46 PM

## 2021-10-04 NOTE — FLOWSHEET NOTE
10/04/21 0750 10/04/21 1218   Vital Signs   /68 (!) 146/59   Temp 97.8 °F (36.6 °C) 97.5 °F (36.4 °C)   Pulse 71 64   Resp 18 16   Weight 140 lb 6.9 oz (63.7 kg) 138 lb 3.7 oz (62.7 kg)   Weight Method Bed scale Bed scale   Percent Weight Change 1.27 -0.32   Post-Hemodialysis Assessment   Post-Treatment Procedures  --  Blood returned; Access bleeding time < 10 minutes   Machine Disinfection Process  --  Exterior Machine Disinfection   Total Liters Processed (l/min)  --  90.5 l/min   Dialyzer Clearance  --  Lightly streaked   Duration of Treatment (minutes)  --  240 minutes   Heparin amount administered during treatment (units)  --  0 units   Hemodialysis Intake (ml)  --  400 ml   Hemodialysis Output (ml)  --  1400 ml   NET Removed (ml)  --  1000 ml   Tolerated Treatment  --  Good   Stable 4.0 hour of tx. Removed 1 liter of fluid per order. Tolerated fluid removal well. Pressure held to needle sites times ten minutes times two. Reported to primary RN. Treatment record printed to be scanned into EMR.

## 2021-10-04 NOTE — PROGRESS NOTES
Unable to successfully place Central Line at this time. Patient will need to go to IR for placement.   Electronically signed by Chris Bhandari RN on 10/4/2021 at 5:48 AM

## 2021-10-04 NOTE — H&P
Formulation and discussion of sedation / procedure plans, risks, benefits, side effects and alternatives with patient and/or responsible adult completed.     Electronically signed by Alisson Solorio MD on 10/4/2021 at 5:36 PM

## 2021-10-04 NOTE — PROGRESS NOTES
1245 Pt arrived in IR for PICC placement  1258 Pt prepped for procedure with chloraprep. 1304 Procedure started with Dr. Ijeoma Vizcarra  [phone] Message left with Gregg Craig to see if midline PICC is ok. [address] with Gregg Craig, she is ok with midline. 1333 Procedure complete. Pt tolerated well. Quick clot to left neck puncture site per Dr. Ijeoma Vizcarra, covered with gauze and opsite. 1350 Pt transported back to  via bed. Skin natural for race, warm, dry.  Respirations even at rest. No complaints voiced at this time

## 2021-10-04 NOTE — PROGRESS NOTES
[address]ann [address] 60  OCCUPATIONAL THERAPY MISSED TREATMENT NOTE  [facility] 5K  [id number]/014-A      Date: 10/4/2021  Patient Name: Audrey Shah        CSN: [id number]   : 11/10/1930  (80 y.o.)  Gender: male   Referring Practitioner: Jayme Steel MD  Diagnosis: Acute Pulmonary Edema         REASON FOR MISSED TREATMENT: Patient Unavailable Upon first attempt, pt in dialysis, second attempt pain receiving PICC line. Will attempt back as time allows.

## 2021-10-04 NOTE — CARE COORDINATION
10/4/21, 11:02 AM EDT    DISCHARGE PLANNING EVALUATION    Spoke with Adrian at [facility] and provided update. Staff will review today and she will get back with PAWAN.

## 2021-10-05 NOTE — PROGRESS NOTES
[address] [facility]  PHYSICAL THERAPY MISSED TREATMENT NOTE  [facility] 5K    Date: 10/5/2021  Patient Name: Becki Alexander        MRN: [id number]   : 11/10/1930  (80 y.o.)  Gender: male   Referring Practitioner: Dr. Prashant Delvalle  Diagnosis: acute pulmonary edema         REASON FOR MISSED TREATMENT:  Patient refused treatment this visit d/t bad stomach cramps. Despite education about not recieving therapy for a week, patient still refusing to get up out of the chair until his stomach feels better- he states he will get up with therapy tomorrow.

## 2021-10-05 NOTE — CARE COORDINATION
10/5/21, 10:28 AM EDT    DISCHARGE PLANNING EVALUATION    Call to Adrian at [facility] message for her to contact .     11:05 AM Received call from Cora with [facility] will able to accept patient when discharged. Plan is for daughter to transport for dialysis appointments. She advised that Doc Jordan was waving pre certs through yesterday-she will check with staff to see if it was already initiated. If any issues, staff will get back with .      12:23 PM Received call from Adrian at [facility] is good clinically and financially for their facility. They will need to do a pre cert and will begin process. She would like confirmation that daughter is going to transport for dialysis. Call to daughter Negrita Ing message for her to contact  re: discharge plans. Received call from Adrian with [facility] will need updated therapy notes for pre cert. Danielle Givens with PT notified and staff will attempt to see today. Call to OT and message left re: need for updated OT notes. Received call from daughter Germaine Diallo and confirmed with her that she will be transporting patient to his dialysis appointments. Call to Adrian at [facility] with update.

## 2021-10-05 NOTE — CARE COORDINATION
10/5/21, 10:10 AM EDT    DISCHARGE ON GOING EVALUATION    Droota Marin day: 22  Location: [facility]14/014- Reason for admit: Acute pulmonary edema ([address] [J81.0]  Hypoxia [R09.02]  Elevated troponin [R77.8]  Pneumonia due to COVID-19 virus [U07.1, J12.82]  COVID-19 [U07.1]   Procedure: Hemodialysis Mon., Wed., Fri.   10/04/2021 Midline placed. Barriers to Discharge: Nephrology following, Hospice, Palliative Care, SS, Dietician,  PT/OT, Cortisol testing complete,  Retacrit, Heparin subq, prn medications, CTA of abdomen, oxygen, acapella, incentive spirometry, SCD's, up as tolerated. PCP: Pamela Chin MD  Readmission Risk Score: 54%  Patient Goals/Plan/Treatment Preferences: Merrill Chopra is planned for SNF at [facility] when medically stable. He will return to [facility] with his chair time of 6:40 a.m. His daughter to transport.

## 2021-10-05 NOTE — PROGRESS NOTES
Hospitalist Progress Note    Patient:  Tita Camilo    Unit/Bed:5K-14/014-A  YOB: 1930  MRN: [id number]   Acct: [de-identified]   PCP: Renetta Quevedo MD  Code Status: Limited  Date of Admission: 9/13/2021    Expected Discharge: pending placement. Disposition: ECF, precert pending. Pt is medically stable for discharge, awaiting placement. Assessment/Plan:    1. Recurrent hypoglycemic episodes: unclear etiology, possible adrenal insufficiency. Pt is not a diabetic, no DM medications listed. Check Hgb A1c - 4.5%. Recent tx for COVID with steroids x9 days. Check LA, elevated, resolved, and then again elevated. Check random cortisol level -> wnl. S/p 2 dose of glucose oral gel and 3 doses of 1 mg Glucagon. Pt was noted as low as 23, continues to trend down despite D10 drip at 100 mL/hr. Continue Q2 hr checks. Trial 10 mg Decadron steroids once and assess response. 10/1-> stop D5 drip and monitor   10/2-> LA now elevated again, suspicion potentially from underlying infection. 10/3-> pt given Decadron 10 mg x 1 dose last night when noted to again be hypoglycemic. Adrenal insufficiency? Pending hospice eval will consider Cortisol stimulation testing. 10/4-> Cosyntropin was not given this morning. Cortisol levels that were drawn are not accurate. We will repeat test tomorrow morning. Midline placed. 10/5-> Cortisol at 60 minutes showed 23.57. BG remains stable. 2. Abd pain, generalized, unclear etiology:. Check CT A / P -> showing distended gallbladder. LA improved and resolved, however was noted to again be elevated as high as 8.7 which is improved to 4.9. Check CTA abdomen to rule out ischemia. Will consider Gen Surg consultation. 10/3-> discussed with pt and family. Surgery is not an option they wish to consider should it be necessary. 10/5-> c/o continued belly pain, continue to trend LA till normalized. CTA of abd today.     3. COVID-19 pneumonia: treated with dexamethasone 6mg daily. First positive test 09/13. Pt stable on RA. Isolation no longer required, transferred to . 4. Possible superimposed bacterial PNA: Completed treatment with Rocephin and Azithromycin. Remains afebrile. No leukocytosis. 5. Acute hypoxic respiratory failure: 2/2 above. 9/25-> Pt placed on O2 overnight after rapid was called. PO2 on ABG was 170, no hypercapnea. No acute changes on EKG, no evidence of fluid overload. Pt is currently 4L, ABG showing respiratory alkalosis. Continue to monitor and wean as tolerated. 6. ESRD on HD:  Secondary to diabetic and hypertensive nephrosclerosis. Nephrology following. On HD MWF.     7. S/p unwitnessed fall: No apparent injuries, CT brain and cervical spine -unremarkable     8. Dark tarry stools / chronic normocytic anemia: hx INDRA. On Retacrit, ferrous sulfate. One episode, none further. Hemoglobin stable. Negative occult 9/19/2021.  Continue to monitor, transfuse for hemoglobin less than 7.    9. Elevated troponin: chronically elevated, likely d/t ESRD. No chest pain, repeat troponin improved, no acute ischemic changes on ekg. Pt had recent LHC. 10. CAD s/p PCI: Patient had 2 stents (LAD and Ramus) at [facility] on 08/31/21. Will continue with aspirin, Plavix, metoprolol and pravastatin. 11. H/o NSVT / Sick sinus syndrome: noted, has PPM/ICD. Tele ordered. Continue metoprolol. Possible PAF. However, after review of EKGs, P waves were present and did not appear to be in a fib. Pt typically follows with Cardiology at [facility], unclear if he has a hx of AF. He is not on anticoagulation. Even if PAF, the risks of anticoagulation may be greater than the benefit. CHADSVASc 4, HAS-BLED 4. Pacer interrogated, awaiting report. Pt will need to arrange follow up with his Cardiologist at [facility]. 12. Chronic HFrEF: EF 45% and grade 1 diastolic dysfunction on Echo 05/2021. Follows with Cardiology at [facility].  Fluid status stable, Nephrology following. Cont home meds. 13. Disposition: Difficulty with discharge secondary to Covid diagnosis and hemodialysis schedule. Appreciate case management assistance. 14. Goals of care: Hospice consulted. Per pt and family wishes, code status changed to [facility] and then back to Limited No x3, yes to defib / cardioversion after family discussion with hospice. 15. Lactic acidosis: LA was noted to 8.7 on 10/1, markedly improved with repeat down to 2.5 today. Continue to check every 2 hours to normalize. Unclear etiology, potentially from abdominal source as patient complains of severe discomfort in his abdomen at times. CTA of abdomen pending    16. Disposition: Patient is a pre-CERT, likely to initiate 10/5    Chief Complaint: SOB    HPI / Hospital Course: Per HPI: \"The patient is a 80 y.o. male who presents with complaints of shortness of breath and cough for the last 2 days. Patient is accompanied by family who helps with history. They report that patient has had a poor appetite, fatigued, short of breath and coughing for last 2 days. He recently was at [facility] approximately 2 weeks ago where he had 2 stents placed. They state he was in his normal state of health until 2 days ago. Patient was seen in dialysis today and had a full session however continued to be short of breath and had a significant cough and was sent for evaluation. He denies any fevers or chills, nausea or vomiting. He denies any diarrhea or constipation. He denies any chest pain.     In the ED patient was found to be Covid positive. He was placed on 2 L nasal cannula. He was also found to have slightly elevated troponin and was started on heparin drip in the ED. Family updated on test results. \"    Pt has completed treatment for COVID and superimposed bacterial PNA. I took over care 9/22, per previous note, pt stable for discharge and awaiting precert to ECF.      4/55-> Rapid response called during dialysis.  Pt had in bed having returned from dialysis. Patient denies as intense abdominal pain today, however notes that he does have some generalized pain especially with palpation. Status post midline placement with IR. Cosyntropin was not given this morning, although cortisol was drawn. Labs will need to be redrawn tomorrow morning after appropriate stimulation. Patient denies chest pain or shortness of breath. Subjective (past 24 hours):   10/5-> patient laying in bed, refused physical therapy today secondary to abdominal pain. When asked, patient replies that this pain is only a 2 out of 10 in severity, however at times it is much worse. CTA of the abdomen pending. Patient is a pre-CERT which will be initiated hopefully today. ROS: Pertinent positives as noted in HPI. All other systems reviewed and negative. Past medical history, family history, social history and allergies reviewed again and is unchanged since admission. Medications:  Reviewed.   Infusion Medications    sodium chloride      dextrose Stopped (10/01/21 1231)    sodium chloride       Scheduled Medications    lactulose  20 g Oral BID    lidocaine 1 % injection  5 mL IntraDERmal Once    dicyclomine  10 mg Oral TID AC    dilTIAZem  30 mg Oral Once    aspirin  81 mg Oral Daily    heparin (porcine)  5,000 Units SubCUTAneous 3 times per day    epoetin traci-epbx  6,000 Units SubCUTAneous Once per day on Mon Wed Fri    calcitRIOL  1.5 mcg Oral Once per day on Mon Wed Fri    cinacalcet  60 mg Oral Once per day on Mon Wed Fri    docusate sodium  100 mg Oral BID    ferrous sulfate  325 mg Oral Daily with breakfast    gabapentin  100 mg Oral TID    metoprolol  100 mg Oral BID    pantoprazole  40 mg Oral QAM AC    vitamin C  500 mg Oral Daily    polyethylene glycol  17 g Oral Daily    sodium chloride flush  5-40 mL IntraVENous 2 times per day    clopidogrel  75 mg Oral Daily     PRN Meds: HYDROcodone-acetaminophen, sodium chloride, loperamide, metoprolol, metoclopramide, ipratropium-albuterol, glucose, dextrose, glucagon (rDNA), dextrose, sodium chloride flush, sodium chloride, ondansetron **OR** ondansetron, polyethylene glycol, acetaminophen **OR** acetaminophen, guaiFENesin-dextromethorphan, albuterol sulfate HFA    I/O:     Intake/Output Summary (Last 24 hours) at 10/5/2021 1329  Last data filed at 10/5/2021 1031  Gross per 24 hour   Intake 20 ml   Output --   Net 20 ml       Diet:  ADULT DIET; Regular; Low Potassium (Less than 3000 mg/day); 1200 ml    Exam:  BP (!) 133/58   Pulse 76   Temp 97.3 °F (36.3 °C) (Oral)   Resp 16   Ht 5' 6\" (1.676 m)   Wt 138 lb 3.7 oz (62.7 kg)   SpO2 99%   BMI 22.31 kg/m²   General:  Chronically ill appearing male. NAD. HEENT:  normocephalic and atraumatic. No scleral icterus. PERR. Neck: supple. No JVD. No thyromegaly. Lungs: clear to auscultation. No retractions  Cardiac: RRR without murmur. Abdomen: soft. Generalized tenderness. Bowel sounds positive. Extremities:  No clubbing, cyanosis, or edema x 4. Vasculature: capillary refill < 3 seconds. Palpable LE pulses bilaterally. Skin:  warm and dry. Psych:  Alert and oriented x4. Lymph:  No supraclavicular adenopathy. Neurologic:  No focal deficit. No seizures. Data: (All radiographs, tracings, PFTs, and imaging are personally viewed and interpreted unless otherwise noted)  Labs:   No results for input(s): WBC, HGB, HCT, PLT in the last 72 hours. Recent Labs     10/03/21  0547 10/03/21  0547 10/03/21  1437 10/04/21  0603 10/05/21  0852   *  --   --  133* 132*   K 5.7*   < > 5.7* 5.6* 4.4   CL 95*  --   --  96* 95*   CO2 25  --   --  24 24   BUN 33*  --   --  48* 24*   CREATININE 5.2*  --   --  6.3* 4.5*   CALCIUM 8.0*  --   --  8.1* 8.1*    < > = values in this interval not displayed. No results for input(s): AST, ALT, BILIDIR, BILITOT, ALKPHOS in the last 72 hours.   No results for input(s): INR in the last 72 Demetrioona Fearing on 9/22/2021 9:52 PM      CT HEAD WO CONTRAST   Final Result      1. Stable CT scan of the brain, no interval change since previous study dated 15th of June 2021. **This report has been created using voice recognition software. It may contain minor errors which are inherent in voice recognition technology. **      Final report electronically signed by DR Jose Winchester on 9/15/2021 4:26 PM      CT CERVICAL SPINE WO CONTRAST   Final Result    No evidence of acute osseous injury of the cervical spine. **This report has been created using voice recognition software. It may contain minor errors which are inherent in voice recognition technology. **      Final report electronically signed by Dr. Milena Morales MD on 9/15/2021 4:29 PM      XR CHEST PORTABLE   Final Result   Cardiomegaly with vascular congestion and interstitial edema and effusions differential would include congestive heart failure or fluid overload. **This report has been created using voice recognition software. It may contain minor errors which are inherent in voice recognition technology. **      Final report electronically signed by Dr. Bhat Seen on 9/13/2021 2:22 PM      CTA ABDOMEN PELVIS [address]    (Results Pending)     XR CHEST PORTABLE    Result Date: 9/13/2021  PROCEDURE: XR CHEST PORTABLE CLINICAL INFORMATION: SOB . COMPARISON: 7/12/2021 TECHNIQUE: Portable upright FINDINGS: Heart size is mildly enlarged. Mediastinum is not widened. There is pulmonary vascular congestion. Interstitial edema. There is suggestion of small effusions. AICD over the left chest. EKG leads overlie the chest. Right upper extremity vascular stents are noted. Cardiomegaly with vascular congestion and interstitial edema and effusions differential would include congestive heart failure or fluid overload. **This report has been created using voice recognition software.   It may contain minor errors which are inherent in voice recognition technology. ** Final report electronically signed by Dr. Tammy Monroe on 9/13/2021 2:22 PM        Tele:   [] yes             [] no      Active Hospital Problems    Diagnosis Date Noted    Severe malnutrition ([facility] [E43] 09/22/2021     Class: Acute    Pneumonia due to COVID-19 virus [U07.1, J12.82] 09/13/2021       Electronically signed by Gregg Craig PA-C on 10/5/2021 at 1:29 PM

## 2021-10-05 NOTE — PROGRESS NOTES
Nephrology Progress Note    Patient - Kori Mccann   MRN -  [id number]   Acct # - [de-identified]      - 11/10/1930    80 y.o. Admit Date: 2021  Hospital Day:   [facility]: [facility]  Date of evaluation -  10/5/2021    Subjective:   CC: shortness of breath  Denies shortness of breath   Hemodialysis 10/4 with 1 L Ultrafiltration   BP Range: Systolic (61OGP), DJP:068 , Min:109 , ALEX:532      Diastolic (03KFD), IRH:93, Min:55, Max:69    Objective:   VITALS:  BP (!) 117/59   Pulse 72   Temp 97.9 °F (36.6 °C) (Oral)   Resp 16   Ht 5' 6\" (1.676 m)   Wt 138 lb 3.7 oz (62.7 kg)   SpO2 98%   BMI 22.31 kg/m²    Patient Vitals for the past 24 hrs:   BP Temp Temp src Pulse Resp SpO2 Weight   10/05/21 0306 (!) 117/59 97.9 °F (36.6 °C) Oral 72 16 98 % --   10/04/21 2031 (!) 109/55 97.9 °F (36.6 °C) Oral 86 18 93 % --   10/04/21 1404 122/69 97.8 °F (36.6 °C) Oral 106 16 92 % --   10/04/21 1218 (!) 146/59 97.5 °F (36.4 °C) -- 64 16 -- 138 lb 3.7 oz (62.7 kg)       Intake/Output Summary (Last 24 hours) at 10/5/2021 0813  Last data filed at 10/4/2021 1218  Gross per 24 hour   Intake 400 ml   Output 1400 ml   Net -1000 ml       Admission weight: 163 lb (73.9 kg)  Patient Vitals for the past 96 hrs (Last 3 readings):   Weight   10/04/21 1218 138 lb 3.7 oz (62.7 kg)   10/04/21 0750 140 lb 6.9 oz (63.7 kg)   10/01/21 1157 138 lb 10.7 oz (62.9 kg)     Body mass index is 22.31 kg/m². EXAM:  CONSTITUTIONAL:  No acute distress. HEENT:  Head is normocephalic, Extraocular movement intact. Neck is supple. CARDIOVASCULAR:  S1, S2  regular rate and rhythm. RESPIRATORY: Clear to ausculation bilaterally. Equal breath sounds. No wheezes. No shortness of breath noted at rest.  ABDOMEN: soft, non tender  NEUROLOGICAL: Patient is awake and oriented to person, place, and time. Recent and remote memory intact. SKIN: no rash, No significant bruises on exposed surfaces  MUSCULOSKELETAL: Movement is coordinated.  Moves all extremities EXTREMITIES: Distal lower extremity temp is warm, No bilateral symmetric lower extremity edema. Upper extremity AV Fistula   PSYCHIATRIC: mood and affect appropriate. Medications:   Med reviewed  Scheduled Meds:   cosyntropin  250 mcg IntraVENous Once    lactulose  20 g Oral BID    lidocaine 1 % injection  5 mL IntraDERmal Once    dicyclomine  10 mg Oral TID AC    dilTIAZem  30 mg Oral Once    aspirin  81 mg Oral Daily    heparin (porcine)  5,000 Units SubCUTAneous 3 times per day    epoetin traci-epbx  6,000 Units SubCUTAneous Once per day on Mon Wed Fri    calcitRIOL  1.5 mcg Oral Once per day on Mon Wed Fri    cinacalcet  60 mg Oral Once per day on Mon Wed Fri    docusate sodium  100 mg Oral BID    ferrous sulfate  325 mg Oral Daily with breakfast    gabapentin  100 mg Oral TID    metoprolol  100 mg Oral BID    pantoprazole  40 mg Oral QAM AC    vitamin C  500 mg Oral Daily    polyethylene glycol  17 g Oral Daily    sodium chloride flush  5-40 mL IntraVENous 2 times per day    clopidogrel  75 mg Oral Daily     Continuous Infusions:   sodium chloride      dextrose Stopped (10/01/21 1231)    sodium chloride       PRN Meds HYDROcodone-acetaminophen, sodium chloride, loperamide, metoprolol, metoclopramide, ipratropium-albuterol, glucose, dextrose, glucagon (rDNA), dextrose, sodium chloride flush, sodium chloride, ondansetron **OR** ondansetron, polyethylene glycol, acetaminophen **OR** acetaminophen, guaiFENesin-dextromethorphan, albuterol sulfate HFA   Labs:   Labs reviewed  No results for input(s): WBC, RBC, HGB, HCT, MCV, MCH, RDW, PLT in the last 72 hours.     Recent Labs     10/03/21  0547 10/03/21  0547 10/03/21  1437 10/04/21  0603   *  --   --  133*   K 5.7*  --  5.7* 5.6*   CL 95*  --   --  96*   CO2 25  --   --  24   BUN 33*  --   --  48*   CREATININE 5.2*  --   --  6.3*   LABGLOM 13*   < >  --  10*   GLUCOSE 103  --   --  99   CALCIUM 8.0*  --   --  8.1*    < > = values in this interval not displayed. ASSESSMENT:  1. 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 AV fistula. Will plan for Hemodialysis in AM  2. COVID 19 pneumonia. 3. Essential Hypertension with nephrosclerosis at control  4. Abdominal aortic aneurysm 3.7 cm   5. Chronic combined systolic and diastolic HF with EF 24%, Pulm artery HTN  6. Coronary artery disease Recent PCI 8/31/21 at [facility]  7. Anemia of chronic disease on Erythropoiesis-Stimulating Agent   8. Hypophosphatemia, Resolved  9. Secondary hyperparathyroidism of renal origin on rocaltrol and sensipar. Calcium ok with corrected for albumin  10. Abdominal pain, CTA of abd pending  11. Debility    Active Problems:    Pneumonia due to COVID-19 virus    Severe malnutrition ([address]  Resolved Problems:    * No resolved hospital problems.  Lieutenant Tate, XIN - CNP 8:13 AM 10/5/2021

## 2021-10-06 NOTE — PROGRESS NOTES
[address] [address]  INPATIENT PHYSICAL THERAPY  DAILY NOTE  [facility] ONC MED 5K - 5K-14/014-A    Time In: 1809  Time Out: 1345  Timed Code Treatment Minutes: 32 Minutes  Minutes: 31          Date: 10/6/2021  Patient Name: Ashlyn Mcmahon,  Gender:  male        MRN: [id number]  : 11/10/1930  (80 y.o.)     Referring Practitioner: Dr. Belkis Jaquez  Diagnosis: acute pulmonary edema  Additional Pertinent Hx: admit with above diagnosis, ESRD on HD, COVID-19 pneumonia, HTN, COPD, anemia     Prior Level of Function:  Lives With: Daughter  Type of Home: House  Home Layout: One level  Home Access: Level entry  Home Equipment: Rolling walker   Bathroom Shower/Tub: Walk-in shower  Bathroom Toilet: Standard    ADL Assistance: Independent  Homemaking Assistance: Needs assistance  Ambulation Assistance: Independent  Transfer Assistance: Independent  Additional Comments: Pt using RW PTA. Daughter assists at home. Unsure of accuracy of above pt is slighly confused this date. Restrictions/Precautions:  Restrictions/Precautions: Fall Risk  Position Activity Restriction  Other position/activity restrictions: . SUBJECTIVE: Patient just returned from dialysis upon arrival, agreeable to therapy. RN about to take patient to bathroom, so this therapist took over. PAIN: Denies. Vitals: Vitals not assessed per clinical judgement, see nursing flowsheet   RN rene and Nursing student unable to get a read on the pulse ox. Pt on 2L of O2. OBJECTIVE:  Bed Mobility:  Supine to Sit: Supervision  Sit to Supine: Supervision     Transfers:  Sit to Stand: Stand By Assistance  Stand to Sit:Stand By Assistance   SBA required for overall safety of patient.     Ambulation:  Contact Guard Assistance, with cues for safety  Distance: 16ftx1, 12ftx1  Surface: Level Tile  Device:Rolling Walker  Gait Deviations:  Decreased Step Length Bilaterally, Decreased Heel Strike Bilaterally, Wide Base of Support and Unsteady Gait  Patient required verbal cues to stay in close proximity to RW when ambulating, and to stay within RW when turning. CGA required d/t wide base of support, therefore patient \"hobbles\" from side to side when ambulating. Balance:  Static Sitting Balance:  Modified Independent  Dynamic Sitting Balance: Supervision  Static Standing Balance: Stand By Assistance    Exercise:  Patient was guided in 1 set(s) 12 reps of exercise to both lower extremities. Seated marches, Seated hamstring curls, Seated heel/toe raises, Long arc quads and Seated abduction/adduction. Exercises were completed for increased independence with functional mobility. Functional Outcome Measures: Completed  AM-PAC Inpatient Mobility Raw Score : 19  AM-PAC Inpatient T-Scale Score : 45.44    ASSESSMENT:  Assessment: Patient progressing toward established goals. and Patient able to tolerate treatment well. No complaints of pain and no observable signs of exersion demonstrated by patient. Continued therapy upon discharge recommended to improve patient's overall strength, endurance, and functional independence. Activity Tolerance:  Patient tolerance of  treatment: good. Equipment Recommendations: Other: cont to assess needs  Discharge Recommendations:  Continue to assess pending progress, Subacute/Skilled Nursing Facility, 24 hour supervision or assist, Patient would benefit from continued therapy after discharge    Plan: Times per week: 3-5x GM  Times per day: Daily  Specific instructions for Next Treatment: therex and mobility    Patient Education  Patient Education: Plan of Care, Transfers, Gait    Goals:  Patient goals : not stated  Short term goals  Time Frame for Short term goals: by discharge  Short term goal 1: bed mobility with S to get in/out of bed  Short term goal 2: transfer with S to get in/out of chairs  Short term goal 3: amb >50'x1 with RW and SBA, maintaining O2 sats >90% on </=4L O2, to walk safely in room  Long term goals  Time Frame for Long term goals : no LTGs set secondary to short ELOS    Following session, patient left in safe position with all fall risk precautions in place.

## 2021-10-06 NOTE — PROGRESS NOTES
Nephrology Progress Note    Patient - Lula Mosqueda   MRN -  [id number]   Acct # - [de-identified]      - 11/10/1930    80 y.o. Admit Date: 2021  Hospital Day: 21  Location: [facility]  Date of evaluation -  10/6/2021    Subjective:   CC: shortness of breath  Denies shortness of breath on 2 lpm  Pt seen during hemodialysis, Hemodynamically stable 3 K bath, goal Ultrafiltration 1.5 L  Denies abdominal pain  BP Range: Systolic (87LQQ), FUW:085 , Min:150 , NQK:684      Diastolic (30AJC), QNR:51, Min:69, Max:72    Objective:   VITALS:  BP (!) 150/72   Pulse 60   Temp 98.2 °F (36.8 °C) (Oral)   Resp 18   Ht 5' 6\" (1.676 m)   Wt 138 lb 3.7 oz (62.7 kg)   SpO2 95%   BMI 22.31 kg/m²    Patient Vitals for the past 24 hrs:   BP Temp Temp src Pulse Resp SpO2   10/06/21 0630 (!) 150/72 -- -- -- -- --   10/06/21 0400 (!) 150/70 98.2 °F (36.8 °C) Oral 60 18 95 %   10/05/21 2000 (!) 159/72 98 °F (36.7 °C) Oral 107 18 94 %   10/05/21 1617 (!) 150/69 97.5 °F (36.4 °C) Oral 60 16 97 %       Intake/Output Summary (Last 24 hours) at 10/6/2021 0834  Last data filed at 10/5/2021 2021  Gross per 24 hour   Intake 120 ml   Output --   Net 120 ml       Admission weight: 163 lb (73.9 kg)  Patient Vitals for the past 96 hrs (Last 3 readings):   Weight   10/04/21 1218 138 lb 3.7 oz (62.7 kg)   10/04/21 0750 140 lb 6.9 oz (63.7 kg)     Body mass index is 22.31 kg/m². EXAM:  CONSTITUTIONAL:  No acute distress. HEENT:  Head is normocephalic, Extraocular movement intact. Neck is supple. CARDIOVASCULAR:  S1, S2  regular rate and rhythm. RESPIRATORY: Clear to ausculation bilaterally. Equal breath sounds. No wheezes. No shortness of breath noted at rest.  ABDOMEN: soft, non tender  NEUROLOGICAL: Patient is awake and oriented to person, place, and time. Recent and remote memory intact. SKIN: no rash, No significant bruises on exposed surfaces  MUSCULOSKELETAL: Movement is coordinated.  Moves all extremities   EXTREMITIES: Distal lower extremity temp is warm, No bilateral symmetric lower extremity edema. Upper extremity AV Fistula   PSYCHIATRIC: mood and affect appropriate.     Medications:   Med reviewed  Scheduled Meds:   lactulose  20 g Oral BID    lidocaine 1 % injection  5 mL IntraDERmal Once    dicyclomine  10 mg Oral TID AC    dilTIAZem  30 mg Oral Once    aspirin  81 mg Oral Daily    heparin (porcine)  5,000 Units SubCUTAneous 3 times per day    epoetin traci-epbx  6,000 Units SubCUTAneous Once per day on Mon Wed Fri    calcitRIOL  1.5 mcg Oral Once per day on Mon Wed Fri    cinacalcet  60 mg Oral Once per day on Mon Wed Fri    docusate sodium  100 mg Oral BID    ferrous sulfate  325 mg Oral Daily with breakfast    gabapentin  100 mg Oral TID    metoprolol  100 mg Oral BID    pantoprazole  40 mg Oral QAM AC    vitamin C  500 mg Oral Daily    polyethylene glycol  17 g Oral Daily    sodium chloride flush  5-40 mL IntraVENous 2 times per day    clopidogrel  75 mg Oral Daily     Continuous Infusions:   sodium chloride      dextrose Stopped (10/01/21 1231)    sodium chloride       PRN Meds HYDROcodone-acetaminophen, sodium chloride, loperamide, metoprolol, metoclopramide, ipratropium-albuterol, glucose, dextrose, glucagon (rDNA), dextrose, sodium chloride flush, sodium chloride, ondansetron **OR** ondansetron, polyethylene glycol, acetaminophen **OR** acetaminophen, guaiFENesin-dextromethorphan, albuterol sulfate HFA   Labs:   Labs reviewed  Recent Labs     10/06/21  0415   WBC 4.7*   RBC 2.29*   HGB 7.1*   HCT 21.5*   MCV 93.9   MCH 31.0   *       Recent Labs     10/04/21  0603 10/04/21  0603 10/05/21  0852 10/05/21  0852 10/06/21  0415   *  --  132*  --  137   K 5.6*  --  4.4  --  4.0   CL 96*  --  95*  --  100   CO2 24  --  24  --  25   BUN 48*  --  24*  --  28*   CREATININE 6.3*  --  4.5*  --  5.5*   LABGLOM 10*   < > 15*   < > 12*   GLUCOSE 99  --  91  --  86   CALCIUM 8.1*  --  8.1*  --  7.8*    < > = values in this interval not displayed. ASSESSMENT:  1. 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 AV fistula. 2. COVID 19 pneumonia. 3. Essential Hypertension with nephrosclerosis at control  4. Abdominal aortic aneurysm 3.7 cm   5. 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 Coronary artery disease Recent PCI 8/31/21 [facility]  7. Anemia of chronic disease on Erythropoiesis-Stimulating Agent   8. Secondary hyperparathyroidism of renal origin on rocaltrol and sensipar. Calcium ok with corrected for albumin  9. Abdominal pain, resolved  10. Debility    Active Problems:    Pneumonia due to COVID-19 virus    Severe malnutrition ([address]  Resolved Problems:    * No resolved hospital problems.  XIN Gutierrez - CNP 8:33 AM 10/6/2021

## 2021-10-06 NOTE — PROGRESS NOTES
[facility]  Occupational Therapy  Daily Note  Time:   Time In: 6783  Time Out: 1458  Timed Code Treatment Minutes: 24 Minutes  Minutes: 24          Date: 10/6/2021  Patient Name: Becki Alexander,   Gender: male      Room: -14/014-A  MRN: [id number]  : 11/10/1930  (80 y.o.)  Referring Practitioner: Harshad Glez MD  Diagnosis: Acute Pulmonary Edema  Additional Pertinent Hx: Becki Alexander is a pleasant 80 y.o. male who presents to the emergency department from home with complaints of shortness of breath. The patient was sent in after a full dialysis run today. The patient is extremely poor historian, difficult to obtain a history. Apparently was concerned about shortness of breath and he was not feeling well during dialysis and therefore was sent to the ED for evaluation. Currently, he answer some questions although not consistently, denies any chest pain, lower extremity pain or swelling. Family is at the bedside, state the patient had cardiac stents done recently. Record review shows this to have happened at [facility] approximately a week ago. Restrictions/Precautions:  Restrictions/Precautions: Fall Risk  Position Activity Restriction  Other position/activity restrictions: . SUBJECTIVE: Pt resting in bed upon arrival requesting therapist to return d/t just finishing PT session. Second attempt, pt supine upon arrival, agreeable to OT session with daughter arriving mid session. PAIN: Denies    Vitals: Oxygen & Heart Rate: Pt on 2L O2 via nasal cannula throughout session. Multiple attempts for pulse ox reading without success. COGNITION: Slow Processing    ADL:   Lower Extremity Dressing: Stand By Assistance. [place]/donning socks onto B feet seated EOB. BALANCE:  Sitting Balance:  Stand By Assistance, [employer]. EOB  Standing Balance: Not Tested. Pt declined d/t fatigue.     BED MOBILITY:  Supine to Sit: Contact Guard Assistance    Sit to Supine:

## 2021-10-06 NOTE — DISCHARGE INSTR - COC
Continuity of Care Form    Patient Name: Bora Guerra   :  11/10/1930  MRN:  [id number]    Admit date:  2021  Discharge date:  10/15/21    Code Status Order: Limited   Advance Directives:     Admitting Physician:  Nora Melendez MD  PCP: Mi Mariee MD    Discharging Nurse: Diane Campbell 23 Unit/Room#: 4J-24/459-G  Discharging Unit Phone Number: [phone]    Emergency Contact:   Extended Emergency Contact Information  Primary Emergency Contact: Jenniffer [address] Phone: [phone]  Relation: Child  Secondary Emergency Contact: Nam Tran  Mobile Phone: [phone]  Relation: Child  Preferred language: English   needed?  No    Past Surgical History:  Past Surgical History:   Procedure Laterality Date    CARDIAC SURGERY      COLONOSCOPY  [id number]    COLONOSCOPY N/A 2019    COLONOSCOPY DIAGNOSTIC performed by Georgina Carreno MD at [facility]   [facility] 2004    DIALYSIS FISTULA CREATION  2016    right arm fistula placement    ENDOSCOPY, COLON, DIAGNOSTIC      PACEMAKER PLACEMENT  2013    [place] ESOPHAGOGASTRODUODENOSCOPY TRANSORAL DIAGNOSTIC N/A 2018    EGD performed by Yosef Cisneros MD at [address] [place] [address] ENDOSCOPY  ,    UPPER GASTROINTESTINAL ENDOSCOPY Left 2019    EGD DIAGNOSTIC ONLY performed by Georgina Carreno MD at [address] [place] [address] ENDOSCOPY N/A 10/9/2021    EGD performed by Georgina Carreno MD at [facility]    VASCULAR SURGERY         Immunization History:   Immunization History   Administered Date(s) Administered    Influenza Virus Vaccine 10/01/2018    Pneumococcal Conjugate 13-valent (Sheri Sidles) 2017    Pneumococcal Polysaccharide ([id number]) 10/16/2017       Active Problems:  Patient Active Problem List   Diagnosis Code    CAD (coronary artery disease) I25.10    COPD (chronic obstructive pulmonary disease) ([facility] J44.9    Chronic renal insufficiency N18.9    Patient overweight E66.3    Arthritis-  low back and neck . M19.90    CKD (chronic kidney disease) stage 3, GFR 30-59 ml/min ([facility]) N18.30    Dyspnea and respiratory abnormalities R06.00, R06.89    ED (erectile dysfunction) N52.9    Degenerative joint disease of knee, left M17.12    Gout of big toe M10.9    Anemia, chronic disease D63.8    CKD (chronic kidney disease) stage 4, GFR 15-29 ml/min ([facility]) N18.4    Essential hypertension I10    Monoclonal gammopathy D47.2    Leukopenia D72.819    Thrombocytopenia ([facility]) D69.6    Chronic kidney disease (CKD), stage V ([facility]) X93.9    Metabolic acidosis I09.6    Hyperkalemia E87.5    Iron deficiency anemia D50.9    ROSAURA (acute kidney injury) ([facility]) N17.9    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    Idiopathic hypotension I95.0    Hypertensive urgency I16.0    ESRD (end stage renal disease) on dialysis ([facility]) N18.6, E95.3    Systolic congestive heart failure (HCC) I50.20    Other fluid overload (CODE) E87.79    Hyperphosphatemia E83.39    Acute diastolic congestive heart failure (HCC) I50.31    Elevated troponin R77.8    Acute on chronic diastolic congestive heart failure (HCC) I50.33    Pulmonary hypertension (HCC) I27.20    Anemia due to chronic kidney disease, on chronic dialysis ([facility]) N18.6, D63.1, Z99.2    Volume overload E87.70    Secondary hyperparathyroidism of renal origin ([facility] N25.81    Chest pain R07.9    Acute pulmonary edema ([facility]) J81.0    Accelerated hypertension I10    Gastritis and duodenitis K29.90    Fall from slip, trip, or stumble, initial encounter W01. 0XXA    Closed fracture of left ankle S82.892A    ESRD on hemodialysis (HCC) N18.6, Z99.2    Hypertensive emergency I16.1    SOB (shortness of breath) R06.02    Chronic combined systolic and diastolic CHF (congestive heart failure) ([facility]) I50.42    COVID-19 U07.1    Severe malnutrition ([facility]) E43    Hypoxia R09.02       Isolation/Infection:   Isolation          No Isolation        Patient Infection Status     Infection Onset Added Last Indicated Last Indicated By Review Planned Expiration Resolved Resolved By    None active    Resolved    C-diff Rule Out 09/30/21 09/30/21 09/30/21 Gastrointestinal Panel, Molecular (Ordered)   10/04/21 Marianna Michael RN    COVID-19 09/13/21 09/13/21 09/14/21 COVID-19, Rapid   09/21/21 Marianna Michael RN    S/S 9/11, + 9/13    COVID-19 Rule Out 09/13/21 09/13/21 09/13/21 COVID-19, Rapid (Ordered)   09/13/21 Rule-Out Test Resulted    COVID-19 Rule Out 04/30/21 04/30/21 04/30/21 COVID-19, Rapid (Ordered)   04/30/21 Rule-Out Test Resulted          Nurse Assessment:  Last Vital Signs: BP (!) 165/71   Pulse 73   Temp 98.5 °F (36.9 °C) (Oral)   Resp 18   Ht 5' 6\" (1.676 m)   Wt 148 lb 13 oz (67.5 kg)   SpO2 93%   BMI 24.02 kg/m²     Last documented pain score (0-10 scale): Pain Level: 0  Last Weight:   Wt Readings from Last 1 Encounters:   10/13/21 148 lb 13 oz (67.5 kg)     Mental Status:  oriented and alert    IV Access:  - Dialysis fistula  BIBIANA    Nursing Mobility/ADLs:  Walking   Assisted  Transfer  Assisted  Bathing  Assisted  Dressing  Assisted  Toileting  Assisted  Feeding  Independent  Med Admin  Independent  Med Delivery   whole    Wound Care Documentation and Therapy:        Elimination:  Continence:   · Bowel: Yes  · Bladder: Yes  Urinary Catheter: None   Colostomy/Ileostomy/Ileal Conduit: No       Date of Last BM: 10/14    Intake/Output Summary (Last 24 hours) at 10/14/2021 1533  Last data filed at 10/14/2021 1211  Gross per 24 hour   Intake 1060 ml   Output --   Net 1060 ml     I/O last 3 completed shifts:   In: 1060 [P.O.:1060]  Out: -     Safety Concerns:     History of Falls (last 30 days) and At Risk for Falls    Impairments/Disabilities:      None    Nutrition Therapy:  Current Nutrition Therapy:   - Oral Diet:  regular, low potassium     Routes of Feeding: Oral  Liquids: No Restrictions  Daily Fluid Restriction: yes - amount 1200 ml  Last Modified Barium Swallow with Video (Video Swallowing Test): not done    Treatments at the Time of Hospital Discharge:   Respiratory Treatments: PRN breathing treatments   Oxygen Therapy:  is on oxygen at 3 L/min per nasal cannula. Ventilator:    - No ventilator support    Rehab Therapies: Physical Therapy and Occupational Therapy  Weight Bearing Status/Restrictions: No weight bearing restirctions  Other Medical Equipment (for information only, NOT a DME order):  walker  Other Treatments: Hemodialysis M W F     Patient's personal belongings (please select all that are sent with patient):  None    RN SIGNATURE:  Electronically signed by Valentina Goldstein RN on 10/15/21 at 9:15 AM EDT    CASE MANAGEMENT/SOCIAL WORK SECTION    Inpatient Status Date: 9/13/21    Readmission Risk Assessment Score:  Readmission Risk              Risk of Unplanned Readmission:  59           Discharging to Facility/ Agency   · Name: [facility]  · Address:  [address] [place] [address]  · Phone: [phone]  · Fax: [phone]    Dialysis Facility (if applicable)   · Name: [facility]  · Address:  · Dialysis Schedule:MWF @ 6:40 am  · Phone:  · Fax:    / signature: Electronically signed by SARAHI Castañeda on 10/6/21 at 10:53 AM EDT    PHYSICIAN SECTION    Prognosis: Fair    Condition at Discharge: Stable    Rehab Potential (if transferring to Rehab): Fair    Recommended Labs or Other Treatments After Discharge: CBC and BMP in 1 week; f/u with PCP     Physician Certification: I certify the above information and transfer of Vic Carballo  is necessary for the continuing treatment of the diagnosis listed and that he requires [facility] for greater 30 days.      Update Admission H&P: No change in H&P    PHYSICIAN SIGNATURE:  Electronically signed by Veronica Ng MD on 10/11/21 at 11:28 AM EDT

## 2021-10-06 NOTE — PROGRESS NOTES
Hospitalist Progress Note    Patient:  Petrona Miles    Unit/Bed:5K-14/014-A  YOB: 1930  MRN: [id number]   Acct: [de-identified]   PCP: Arpita Cisneros MD  Code Status: Limited  Date of Admission: 9/13/2021    Expected Discharge: pending placement. Disposition: ECF, precert pending. Pt is medically stable for discharge, awaiting placement. Assessment/Plan:    1. Recurrent hypoglycemic episodes: unclear etiology, possible adrenal insufficiency. Pt is not a diabetic, no DM medications listed. Check Hgb A1c - 4.5%. Recent tx for COVID with steroids x9 days. Check LA, elevated, resolved, and then again elevated. Check random cortisol level -> wnl. S/p 2 dose of glucose oral gel and 3 doses of 1 mg Glucagon. Pt was noted as low as 23, continues to trend down despite D10 drip at 100 mL/hr. Continue Q2 hr checks. Trial 10 mg Decadron steroids once and assess response. 10/1-> stop D5 drip and monitor    10/2-> LA now elevated again, suspicion potentially from underlying infection. 10/3-> pt given Decadron 10 mg x 1 dose last night when noted to again be hypoglycemic. Adrenal insufficiency? Pending hospice eval will consider Cortisol stimulation testing. 10/4-> Cosyntropin was not given this morning. Cortisol levels that were drawn are not accurate. We will repeat test tomorrow morning. Midline placed. 10/5-> Cortisol at 60 minutes showed 23.57. BG remains stable. 10/6 -> Patient had HD this morning. BG remaining stable. 2. Abd pain, generalized, unclear etiology:. Check CT A / P -> showing distended gallbladder. LA improved and resolved, however was noted to again be elevated as high as 8.7 which is improved to 4.9. Check CTA abdomen to rule out ischemia. Will consider Gen Surg consultation. 10/3-> discussed with pt and family. Surgery is not an option they wish to consider should it be necessary.    10/5-> c/o continued belly pain, continue to trend LA till normalized. CTA of abd today. 10/6 -> LA 1.6 - WNL. 3. COVID-19 pneumonia: treated with dexamethasone 6mg daily. First positive test 09/13. Pt stable on RA. Isolation no longer required, transferred to . 4. Possible superimposed bacterial PNA: Completed treatment with Rocephin and Azithromycin. Remains afebrile. No leukocytosis. 5. Acute hypoxic respiratory failure: 2/2 above. 9/25-> Pt placed on O2 overnight after rapid was called. PO2 on ABG was 170, no hypercapnea. No acute changes on EKG, no evidence of fluid overload. Pt is currently 4L, ABG showing respiratory alkalosis. Continue to monitor and wean as tolerated. 10/6 -> patient currently on 2lpm NC.     6. ESRD on HD:  Secondary to diabetic and hypertensive nephrosclerosis. Nephrology following. On HD MWF. Had successful HD this morning 10/6 with 1.5 L removed    7. S/p unwitnessed fall: No apparent injuries, CT brain and cervical spine -unremarkable     8. Dark tarry stools / chronic normocytic anemia: hx INDRA. On Retacrit, ferrous sulfate. One episode, none further. Hemoglobin stable. Negative occult 9/19/2021.  Continue to monitor, transfuse for hemoglobin less than 7. Globin this morning was noted to be 7.1. Repeat H&H after hemodialysis showed to be 8.2.    9. Elevated troponin: chronically elevated, likely d/t ESRD. No chest pain, repeat troponin improved, no acute ischemic changes on ekg. 10. CAD s/p PCI: Patient had 2 stents (LAD and Ramus) at [facility] [place], operated by [facility] on 08/31/21. Will continue with aspirin, Plavix, metoprolol and pravastatin. 11. H/o NSVT / Sick sinus syndrome: noted, has PPM/ICD. Tele ordered. Continue metoprolol. Possible PAF. However, after review of EKGs, P waves were present and did not appear to be in a fib. Pt typically follows with Cardiology at [facility], unclear if he has a hx of AF. He is not on anticoagulation. Even if PAF, the risks of anticoagulation may be greater than the benefit.  CHADSVASc 4, HAS-BLED 4. Pacer interrogated, awaiting report. Pt will need to arrange follow up with his Cardiologist at [facility]. 12. Chronic HFrEF: EF 45% and grade 1 diastolic dysfunction on Echo 05/2021. Follows with Cardiology at [facility]. Fluid status stable, Nephrology following. Cont home meds. 13. Disposition: Difficulty with discharge secondary to Covid diagnosis and hemodialysis schedule. Appreciate case management assistance. 14. Goals of care: Hospice consulted. Per pt and family wishes, code status changed to [facility] and then back to Limited No x3, yes to defib / cardioversion after family discussion with hospice. 15. Lactic acidosis: LA was noted to 8.7 on 10/1, markedly improved with repeat down to 2.5 today. Continue to check every 2 hours to normalize. Unclear etiology, potentially from abdominal source as patient complains of severe discomfort in his abdomen at times. LA today 10/6 is WNL 1.6. Patient denies abdominal pain today. i.  CTA of abdomen shows :  1. Bilateral airspace infiltrates and pleural effusions right greater than left. 2. Cardiomegaly. 3. Atrophic kidneys bilaterally. 4.  liquid stool throughout much of the colon and rectum consistent with diarrhea no evidence of bowel obstruction. 5. Although there is diffuse calcific atherosclerosis of the aorta and mesenteric vessels there is no significant stenosis of the mesenteric vessels. However the renal arteries are bilaterally extremely diffusely narrow. 16. Disposition: Patient is a pre-CERT. Needs evaluation by PT as NH will not accept and patient is not cooperative with PT. Explained to patient this morning (10/6) the importance of cooperating and participating with PT in order for them to certify him to go to NH. He states he will try. Chief Complaint: SOB    HPI / Hospital Course: Per HPI: \"The patient is a 80 y.o. male who presents with complaints of shortness of breath and cough for the last 2 days.   Patient is accompanied by family who helps with history. They report that patient has had a poor appetite, fatigued, short of breath and coughing for last 2 days. He recently was at [facility] approximately 2 weeks ago where he had 2 stents placed. They state he was in his normal state of health until 2 days ago. Patient was seen in dialysis today and had a full session however continued to be short of breath and had a significant cough and was sent for evaluation. He denies any fevers or chills, nausea or vomiting. He denies any diarrhea or constipation. He denies any chest pain.     In the ED patient was found to be Covid positive. He was placed on 2 L nasal cannula. He was also found to have slightly elevated troponin and was started on heparin drip in the ED. Family updated on test results. \"    Pt has completed treatment for COVID and superimposed bacterial PNA. I took over care 9/22, per previous note, pt stable for discharge and awaiting precert to ECF.      6/77-> Rapid response called during dialysis. Pt had vomited, became lethargic and had episode of AF RVR on the monitor per report. Dialysis was stopped. Patient still lethargic but following commands and nods to answer questions appropriately. EKG showed NSR, does not appear to be AF. Patient is moaning which per nursing, he typically does during dialysis. Patient seen again later. He reports fatigue and wanting to sleep. He denies abdominal pain, n/v/d, CP, SOB, fever/chills. Pt had bleeding from dialysis fistulasite. 9/23-> Patient alert to verbal stimuli, lethargic. Appears to be at his baseline. Denies fever/chills, sob, cp, abd pain, n/v/d. Nephrology started IVF for today and plan for dialysis tomorrow. Patient has been NSR on tele. 9/24-> Pt appears at baseline. He is more alert today than he has been. Tolerated dialysis well. 9/26-> Patient is alert, oriented to self, place, time, and somewhat to situation.  He denies fever/chills, SOB, CP, abd pain, n/v/d, cough. He reports of left arm pain at dialysis cath site. The patient states that he has been told before that his heart beat gets \"out of wack. \"   Chart reviewed and a rapid was called last night they were unable to get a reading of O2 levels. ABG results with pO2 179, no hypercapnea. Based on ABG did not appear to be truly hypoxic so he was put back on 6L NC and nursing instructed to wean. His EKG reported atrial fibrillation; however, after review, there were P waves present. Ordered pacer interrogate. 9/27-> no acute events overnight. Patient denies sob, cp, abd pain, nvd. Nephrology planning HD tomorrow. Awaiting pacer interrogate results. 9/28- Pt had dialysis today, tolerated well. 9/29 -> patient on HD. Patient is doing well. Denies fever/chills, sob, cp, palpitations, abd pain, n/v/d. Disposition has been difficult secondary to Covid diagnosis and HD facility/schedule. 9/30-> pt had a rough morning. Called to bedside due to pulse ox being read as low as 50% and was very labile. Pt placed on non-re breather. Pt was c/o of abd pain and an episode of diarrhea that started last night. Had an episode of emesis s/p IO insertion, none since then. Afebrile. 10/1-> patient doing much better today, seen in dialysis. Patient denies any abdominal pain at all today. No nausea or vomiting. Hypoglycemia has resolved and remained stable. Afebrile. Has been weaned down to 4 L NC.    10/2-> patient had a rapid response called overnight secondary to abdominal pain and hypotension (as low as the 88P systolic). Patient was noted to have an elevated lactic acid. He was given fluids and started on IV antibiotics. Today upon exam, patient again is complaining of severe abdominal pain all over. We will check a CTA of the abdomen. Consider general surgery consultation. 10/3-> patient again had a rough day complaining of intense 10/10 abdominal pain.  Patient stated \"I want to be poked no more \"and we discussed the concept of moving forward with comfort care versus further diagnostic testing. Patient and family agreed that comfort care was the route they wish to take. Hospice evaluation placed. Overnight, patient's blood glucose has been stable. Again discussed with patient who stated he wanted to proceed with comfort care. Pt is A&Ox4 on this exam. Discussed stopping any lab draws and also fingerstick checks. Discussed potential adrenal insufficiency which cannot be confirmed without further lab testing. Pt is aware that this could lead to death. Discussed with Natalie Oseguera via telephone. Plan to meet today with Hospice. They are now unsure if they wish to proceed with comfort care as they were unaware that pt would not be a dialysis pt any longer. Await hospice eval.   Patient reporting that he still has generalized abdominal pain, though it is much improved from yesterday. Blood pressure again noted to be in the 80s last night, has maintained stability today. 10/4-> patient lying in bed having returned from dialysis. Patient denies as intense abdominal pain today, however notes that he does have some generalized pain especially with palpation. Status post midline placement with IR. Cosyntropin was not given this morning, although cortisol was drawn. Labs will need to be redrawn tomorrow morning after appropriate stimulation. Patient denies chest pain or shortness of breath. 10/5-> patient laying in bed, refused physical therapy today secondary to abdominal pain. When asked, patient replies that this pain is only a 2 out of 10 in severity, however at times it is much worse. CTA of the abdomen pending. Patient is a pre-CERT which will be initiated hopefully today. \"    Subjective (past 24 hours):     Patient examined at bedside during HD this morning. Successful stable 4hr tx with 1.5L removed. Alert and oriented x3.   Noticed patient to have a drop in Hgb from 9.3 (10/2) 7.1 (10.6). Repeat H&H after dialysis and prepare 1 unit PRBC for transfusion. Given patients cardiac history his Hgb ideally should be >8. He denies any pain, fever, chill, chest pain, sob, abdominal pain. Patient is pending discharge to NH currently waiting pre-cert. Repeat H&H today showed hemoglobin greater than 8. Will hold on transfusing 1 unit PRBCs. ROS: Pertinent positives as noted in HPI. All other systems reviewed and negative. Past medical history, family history, social history and allergies reviewed again and is unchanged since admission. Medications:  Reviewed.   Infusion Medications    sodium chloride      sodium chloride      dextrose Stopped (10/01/21 1231)    sodium chloride       Scheduled Medications    lactulose  20 g Oral BID    lidocaine 1 % injection  5 mL IntraDERmal Once    dicyclomine  10 mg Oral TID AC    dilTIAZem  30 mg Oral Once    aspirin  81 mg Oral Daily    heparin (porcine)  5,000 Units SubCUTAneous 3 times per day    epoetin traci-epbx  6,000 Units SubCUTAneous Once per day on Mon Wed Fri    calcitRIOL  1.5 mcg Oral Once per day on Mon Wed Fri    cinacalcet  60 mg Oral Once per day on Mon Wed Fri    docusate sodium  100 mg Oral BID    ferrous sulfate  325 mg Oral Daily with breakfast    gabapentin  100 mg Oral TID    metoprolol  100 mg Oral BID    pantoprazole  40 mg Oral QAM AC    vitamin C  500 mg Oral Daily    polyethylene glycol  17 g Oral Daily    sodium chloride flush  5-40 mL IntraVENous 2 times per day    clopidogrel  75 mg Oral Daily     PRN Meds: sodium chloride, HYDROcodone-acetaminophen, sodium chloride, loperamide, metoprolol, metoclopramide, ipratropium-albuterol, glucose, dextrose, glucagon (rDNA), dextrose, sodium chloride flush, sodium chloride, ondansetron **OR** ondansetron, polyethylene glycol, acetaminophen **OR** acetaminophen, guaiFENesin-dextromethorphan, albuterol sulfate HFA    I/O:     Intake/Output Summary (Last 24 hours) at 10/6/2021 1646  Last data filed at 10/6/2021 1409  Gross per 24 hour   Intake 1240 ml   Output 1900 ml   Net -660 ml       Diet:  ADULT DIET; Regular; Low Potassium (Less than 3000 mg/day); 1200 ml    Exam:  BP (!) 160/82   Pulse 61   Temp 97.6 °F (36.4 °C) (Oral)   Resp 16   Ht 5' 6\" (1.676 m)   Wt 144 lb 6.4 oz (65.5 kg)   SpO2 100%   BMI 23.31 kg/m²      General:  Chronically ill appearing male. NAD. Alert and oriented x3   HEENT:  normocephalic and atraumatic. No scleral icterus. PERRLA  Neck: supple. No JVD. No thyromegaly. Lungs: CTA no wheezes noted. No respiratory distress noted. Cardiac: S1 and S2 present, RRR no murmurs/rubs/gallops  Abdomen: soft. Generalized tenderness. Bowel sounds positive. Extremities:  No clubbing, cyanosis, or edema x 4. Vasculature: capillary refill < 3 seconds. Palpable LE pulses bilaterally. Skin:  warm and dry. Psych:  Alert and oriented x4. Lymph:  No supraclavicular adenopathy. Neurologic:  No focal deficit. No seizures. Data: (All radiographs, tracings, PFTs, and imaging are personally viewed and interpreted unless otherwise noted)  Labs:   Recent Labs     10/06/21  0415 10/06/21  1421   WBC 4.7*  --    HGB 7.1* 8.4*   HCT 21.5* 24.6*   *  --      Recent Labs     10/04/21  0603 10/05/21  0852 10/06/21  0415   * 132* 137   K 5.6* 4.4 4.0   CL 96* 95* 100   CO2 24 24 25   BUN 48* 24* 28*   CREATININE 6.3* 4.5* 5.5*   CALCIUM 8.1* 8.1* 7.8*     No results for input(s): AST, ALT, BILIDIR, BILITOT, ALKPHOS in the last 72 hours. No results for input(s): INR in the last 72 hours. No results for input(s): Ardelle Chalk in the last 72 hours. Urinalysis:   Lab Results   Component Value Date    NITRU NEGATIVE 06/22/2019    WBCUA 50-75 06/22/2019    BACTERIA NONE 06/22/2019    RBCUA 25-50 06/22/2019    BLOODU LARGE 06/22/2019    SPECGRAV 1.013 05/03/2016    GLUCOSEU NEGATIVE 06/22/2019     Urine culture:  No results found for: Apryl Wynn:   Blood culture #1:   Lab Results   Component Value Date    BC No growth-preliminary  10/02/2021     Blood culture #2:No results found for: [address] [place]  Organism:  Lab Results   Component Value Date    ORG Micrococcus species 07/12/2021       No results found for: LABGRAM  MRSA culture only:No results found for: [facility]  Respiratory culture: No results found for: CULTRESP  Aerobic and Anaerobic :  No results found for: LABAERO  No results found for: [facility]    Radiology Reports:  CTA ABDOMEN PELVIS W WO CONTRAST   Final Result   1. Bilateral airspace infiltrates and pleural effusions right greater than left. 2. Cardiomegaly. 3. Atrophic kidneys bilaterally. 4 liquid stool throughout much of the colon and rectum consistent with diarrhea no evidence of bowel obstruction. 5. Although there is diffuse calcific atherosclerosis of the aorta and mesenteric vessels there is no significant stenosis of the mesenteric vessels. However the renal arteries are bilaterally extremely diffusely narrow. **This report has been created using voice recognition software. It may contain minor errors which are inherent in voice recognition technology. **      Final report electronically signed by Dr. Dez Bryan on 10/5/2021 3:33 PM      IR FLUORO GUIDED CVA DEVICE PLMT/REPLACE/REMOVAL   Final Result   Status post successful midline insertion. **This report has been created using voice recognition software. It may contain minor errors which are inherent in voice recognition technology. **      Final report electronically signed by Dr Urvashi Gasca on 10/4/2021 1:54 PM      XR ABDOMEN (KUB) (SINGLE AP VIEW)   Final Result   A nonspecific but nonobstructive bowel gas pattern. This document has been electronically signed by: Atif Maier DO on    10/02/2021 12:45 AM      XR CHEST PORTABLE   Final Result   1.  Scattered patchy regions of predominantly interstitial densities    throughout the bilateral lungs grossly similar to the prior examination. Mild bibasilar pleural/parenchymal opacities most consistent with tiny    effusions with atelectasis and/or consolidation. 2. Cardiomegaly with the heart size unchanged from the prior examination. This document has been electronically signed by: Dion Corral DO on    10/02/2021 12:47 AM      CT ABDOMEN PELVIS WO CONTRAST Additional Contrast? None   Final Result   1. Limited evaluation of the lung bases demonstrates small bilateral pleural effusions with patchy consolidative opacities at the lower lobes which may be related to pneumonia. 2. The gallbladder demonstrates a small gallstone near the gallbladder neck. This appears mildly distended. 3. Mild ascites in the perihepatic region is noted. **This report has been created using voice recognition software. It may contain minor errors which are inherent in voice recognition technology. **      Final report electronically signed by Dr. Genevieve Rashid on 9/30/2021 1:35 PM      XR ABDOMEN (KUB) (SINGLE AP VIEW)   Final Result   1. Overall nonobstructive bowel gas pattern. **This report has been created using voice recognition software. It may contain minor errors which are inherent in voice recognition technology. **      Final report electronically signed by Dr Efraín Roche on 9/30/2021 12:26 PM      XR CHEST PORTABLE   Final Result   1. Patchy multifocal airspace disease is similar to 9/24/2021 exam, interstitial and airspace edema versus atypical infection. **This report has been created using voice recognition software. It may contain minor errors which are inherent in voice recognition technology. **      Final report electronically signed by Dr Efraín Roche on 9/30/2021 9:33 AM      XR CHEST PORTABLE   Final Result   Impression:      Increasing consolidation and interstitial prominence.       Question pulmonary edema versus effusions. AICD over the left chest. EKG leads overlie the chest. Right upper extremity vascular stents are noted. Cardiomegaly with vascular congestion and interstitial edema and effusions differential would include congestive heart failure or fluid overload. **This report has been created using voice recognition software. It may contain minor errors which are inherent in voice recognition technology. ** Final report electronically signed by Dr. Cristino Ocampo on 9/13/2021 2:22 PM        Tele:   [x] yes             [] no      Active Hospital Problems    Diagnosis Date Noted    Severe malnutrition ([facility] [address] [E43] 09/22/2021     Class: Acute    Pneumonia due to COVID-19 virus [U07.1, J12.82] 09/13/2021       Electronically signed by Neena Calderon DO on 10/6/2021 at 4:46 PM

## 2021-10-06 NOTE — PROGRESS NOTES
Joann [address] 60  OCCUPATIONAL THERAPY MISSED TREATMENT NOTE  [facility] 5K  5K-14/014-A      Date: 10/6/2021  Patient Name: Haven Colin        CSN: [id number]   : 11/10/1930  (80 y.o.)  Gender: male   Referring Practitioner: Juventino Jerome MD  Diagnosis: Acute Pulmonary Edema         REASON FOR MISSED TREATMENT: Patient Off Floor for Dialysis Will attempt later today.

## 2021-10-06 NOTE — FLOWSHEET NOTE
10/06/21 1231   Vital Signs   BP (!) 168/77   Temp 97.5 °F (36.4 °C)   Pulse 65   Weight 144 lb 6.4 oz (65.5 kg)   Weight Method Bed scale   Percent Weight Change 4.47   Post-Hemodialysis Assessment   Post-Treatment Procedures Blood returned; Access bleeding time < 10 minutes   Machine Disinfection Process Acid/Vinegar Clean;Heat Disinfect; Exterior Machine Disinfection   Rinseback Volume (ml) 400 ml   Total Liters Processed (l/min) 90.5 l/min   Dialyzer Clearance Lightly streaked   Duration of Treatment (minutes) 240 minutes   Hemodialysis Intake (ml) 400 ml   Hemodialysis Output (ml) 1900 ml   NET Removed (ml) 1500 ml   Tolerated Treatment Good   Stable 4 hr tx. 1.5L removed during HD; patient tolerated well. Both needle sites held x10 min each dressings dry and intact, upon leaving unit. Report given to primary RN. Treatment record printed for scanning.

## 2021-10-07 NOTE — CARE COORDINATION
10/7/21, 9:25 AM EDT    DISCHARGE PLANNING EVALUATION    Spoke with Adrian Mosley and she is aware of updated PT/OT notes. Stated that they will update pre-cert.

## 2021-10-07 NOTE — PROGRESS NOTES
Hospitalist Progress Note    Patient:  Raj Tomlinson    Unit/Bed:5K-14/014-A  YOB: 1930  MRN: [id number]   Acct: [de-identified]   PCP: Megan Moya MD  Code Status: Limited  Date of Admission: 9/13/2021    Expected Discharge: pending placement. Disposition: ECF, precert pending. Pt is medically stable for discharge, awaiting placement. Assessment/Plan:    1. Recurrent hypoglycemic episodes: unclear etiology, possible adrenal insufficiency. Pt is not a diabetic, no DM medications listed. Check Hgb A1c - 4.5%. Recent tx for COVID with steroids x9 days. Check LA, elevated, resolved, and then again elevated. Check random cortisol level -> wnl. S/p 2 dose of glucose oral gel and 3 doses of 1 mg Glucagon. Pt was noted as low as 23, continues to trend down despite D10 drip at 100 mL/hr. Continue Q2 hr checks. Trial 10 mg Decadron steroids once and assess response. 10/1-> stop D5 drip and monitor    10/2-> LA now elevated again, suspicion potentially from underlying infection. 10/3-> pt given Decadron 10 mg x 1 dose last night when noted to again be hypoglycemic. Adrenal insufficiency? Pending hospice eval will consider Cortisol stimulation testing. 10/4-> Cosyntropin was not given this morning. Cortisol levels that were drawn are not accurate. We will repeat test tomorrow morning. Midline placed. 10/5-> Cortisol at 60 minutes showed 23.57. BG remains stable. 10/6 -> Patient had HD this morning. BG remaining stable. 2. Abd pain, generalized, unclear etiology:. Check CT A / P -> showing distended gallbladder. LA improved and resolved, however was noted to again be elevated as high as 8.7 which is improved to 4.9. Check CTA abdomen to rule out ischemia. Will consider Gen Surg consultation. 10/3-> discussed with pt and family. Surgery is not an option they wish to consider should it be necessary.    10/5-> c/o continued belly pain, continue to trend LA till normalized. CTA of abd today. 10/6 -> LA 1.6 - WNL. 3. COVID-19 pneumonia: treated with dexamethasone 6mg daily. First positive test 09/13. Pt stable on RA. Isolation no longer required, transferred to . 4. Possible superimposed bacterial PNA: Completed treatment with Rocephin and Azithromycin. Remains afebrile. No leukocytosis. 5. Acute hypoxic respiratory failure: 2/2 above. 9/25-> Pt placed on O2 overnight after rapid was called. PO2 on ABG was 170, no hypercapnea. No acute changes on EKG, no evidence of fluid overload. Pt is currently 4L, ABG showing respiratory alkalosis. Continue to monitor and wean as tolerated. 10/6 -> patient currently on 2lpm NC.     6. ESRD on HD:  Secondary to diabetic and hypertensive nephrosclerosis. Nephrology following. On HD MWF. Had successful HD this morning 10/6 with 1.5 L removed    7. S/p unwitnessed fall: No apparent injuries, CT brain and cervical spine -unremarkable     8. Dark tarry stools / chronic normocytic anemia: hx INDRA. On Retacrit, ferrous sulfate. One episode, none further. Hemoglobin stable. Negative occult 9/19/2021.  Continue to monitor, transfuse for hemoglobin less than 7. Prior to dialysis on Wednesday patient had hemoglobin 7.1. Postdialysis hemoglobin increased over 8. However following day hemoglobin was noted to be 7.0. Repeat H&H showed 6.6. Plavix, aspirin, heparin held in setting of potential GI bleed. Transfuse 1 unit PRBCs. Consult was placed and plans for EGD tomorrow. Pain to monitor    9. Elevated troponin: chronically elevated, likely d/t ESRD. No chest pain, repeat troponin improved, no acute ischemic changes on ekg. 10. CAD s/p PCI: Patient had 2 stents (LAD and Ramus) at [facility] on 08/31/21. Will continue with aspirin, Plavix, metoprolol and pravastatin. 11. H/o NSVT / Sick sinus syndrome: noted, has PPM/ICD. Tele ordered. Continue metoprolol. Possible PAF.  However, after review of EKGs, P waves were present and did not appear to be in a fib. Pt typically follows with Cardiology at [facility], unclear if he has a hx of AF. He is not on anticoagulation. Even if PAF, the risks of anticoagulation may be greater than the benefit. CHADSVASc 4, HAS-BLED 4. Pacer interrogated, awaiting report. Pt will need to arrange follow up with his Cardiologist at [facility]. 12. Chronic HFrEF: EF 45% and grade 1 diastolic dysfunction on Echo 05/2021. Follows with Cardiology at [facility]. Fluid status stable, Nephrology following. Cont home meds. 13. Disposition: Difficulty with discharge secondary to Covid diagnosis and hemodialysis schedule. Appreciate case management assistance. 14. Goals of care: Hospice consulted. Per pt and family wishes, code status changed to [facility] and then back to Limited No x3, yes to defib / cardioversion after family discussion with hospice. 15. Lactic acidosis: LA was noted to 8.7 on 10/1, markedly improved with repeat down to 2.5 today. Continue to check every 2 hours to normalize. Unclear etiology, potentially from abdominal source as patient complains of severe discomfort in his abdomen at times. LA today 10/6 is WNL 1.6. Patient denies abdominal pain today. i.  CTA of abdomen shows :  1. Bilateral airspace infiltrates and pleural effusions right greater than left. 2. Cardiomegaly. 3. Atrophic kidneys bilaterally. 4.  liquid stool throughout much of the colon and rectum consistent with diarrhea no evidence of bowel obstruction. 5. Although there is diffuse calcific atherosclerosis of the aorta and mesenteric vessels there is no significant stenosis of the mesenteric vessels. However the renal arteries are bilaterally extremely diffusely narrow. 16. Disposition: Patient is a pre-CERT. Needs evaluation by PT as NH will not accept and patient is not cooperative with PT.   Explained to patient this morning (10/6) the importance of cooperating and participating with PT in order for them to certify him to go to NH. Today (10/7) patient has been much more cooperative with participating in PT OT. Chief Complaint: SOB    HPI / Hospital Course: Per HPI: \"The patient is a 80 y.o. male who presents with complaints of shortness of breath and cough for the last 2 days. Patient is accompanied by family who helps with history. They report that patient has had a poor appetite, fatigued, short of breath and coughing for last 2 days. He recently was at [facility] approximately 2 weeks ago where he had 2 stents placed. They state he was in his normal state of health until 2 days ago. Patient was seen in dialysis today and had a full session however continued to be short of breath and had a significant cough and was sent for evaluation. He denies any fevers or chills, nausea or vomiting. He denies any diarrhea or constipation. He denies any chest pain.     In the ED patient was found to be Covid positive. He was placed on 2 L nasal cannula. He was also found to have slightly elevated troponin and was started on heparin drip in the ED. Family updated on test results. \"    Pt has completed treatment for COVID and superimposed bacterial PNA. I took over care 9/22, per previous note, pt stable for discharge and awaiting precert to ECF.      7/46-> Rapid response called during dialysis. Pt had vomited, became lethargic and had episode of AF RVR on the monitor per report. Dialysis was stopped. Patient still lethargic but following commands and nods to answer questions appropriately. EKG showed NSR, does not appear to be AF. Patient is moaning which per nursing, he typically does during dialysis. Patient seen again later. He reports fatigue and wanting to sleep. He denies abdominal pain, n/v/d, CP, SOB, fever/chills. Pt had bleeding from dialysis fistulasite. 9/23-> Patient alert to verbal stimuli, lethargic. Appears to be at his baseline.  Denies fever/chills, sob, cp, abd pain, n/v/d. Nephrology started IVF for today and plan for dialysis tomorrow. Patient has been NSR on tele. 9/24-> Pt appears at baseline. He is more alert today than he has been. Tolerated dialysis well. 9/26-> Patient is alert, oriented to self, place, time, and somewhat to situation. He denies fever/chills, SOB, CP, abd pain, n/v/d, cough. He reports of left arm pain at dialysis cath site. The patient states that he has been told before that his heart beat gets \"out of wack. \"   Chart reviewed and a rapid was called last night they were unable to get a reading of O2 levels. ABG results with pO2 179, no hypercapnea. Based on ABG did not appear to be truly hypoxic so he was put back on 6L NC and nursing instructed to wean. His EKG reported atrial fibrillation; however, after review, there were P waves present. Ordered pacer interrogate. 9/27-> no acute events overnight. Patient denies sob, cp, abd pain, nvd. Nephrology planning HD tomorrow. Awaiting pacer interrogate results. 9/28- Pt had dialysis today, tolerated well. 9/29 -> patient on HD. Patient is doing well. Denies fever/chills, sob, cp, palpitations, abd pain, n/v/d. Disposition has been difficult secondary to Covid diagnosis and HD facility/schedule. 9/30-> pt had a rough morning. Called to bedside due to pulse ox being read as low as 50% and was very labile. Pt placed on non-re breather. Pt was c/o of abd pain and an episode of diarrhea that started last night. Had an episode of emesis s/p IO insertion, none since then. Afebrile. 10/1-> patient doing much better today, seen in dialysis. Patient denies any abdominal pain at all today. No nausea or vomiting. Hypoglycemia has resolved and remained stable. Afebrile. Has been weaned down to 4 L NC.    10/2-> patient had a rapid response called overnight secondary to abdominal pain and hypotension (as low as the 25T systolic). Patient was noted to have an elevated lactic acid.   He was given fluids and started on IV antibiotics. Today upon exam, patient again is complaining of severe abdominal pain all over. We will check a CTA of the abdomen. Consider general surgery consultation. 10/3-> patient again had a rough day complaining of intense 10/10 abdominal pain. Patient stated \"I want to be poked no more \"and we discussed the concept of moving forward with comfort care versus further diagnostic testing. Patient and family agreed that comfort care was the route they wish to take. Hospice evaluation placed. Overnight, patient's blood glucose has been stable. Again discussed with patient who stated he wanted to proceed with comfort care. Pt is A&Ox4 on this exam. Discussed stopping any lab draws and also fingerstick checks. Discussed potential adrenal insufficiency which cannot be confirmed without further lab testing. Pt is aware that this could lead to death. Discussed with Glo Craig via telephone. Plan to meet today with Hospice. They are now unsure if they wish to proceed with comfort care as they were unaware that pt would not be a dialysis pt any longer. Await hospice eval.   Patient reporting that he still has generalized abdominal pain, though it is much improved from yesterday. Blood pressure again noted to be in the 80s last night, has maintained stability today. 10/4-> patient lying in bed having returned from dialysis. Patient denies as intense abdominal pain today, however notes that he does have some generalized pain especially with palpation. Status post midline placement with IR. Cosyntropin was not given this morning, although cortisol was drawn. Labs will need to be redrawn tomorrow morning after appropriate stimulation. Patient denies chest pain or shortness of breath. 10/5-> patient laying in bed, refused physical therapy today secondary to abdominal pain.   When asked, patient replies that this pain is only a 2 out of 10 in severity, however at times it is much worse.  CTA of the abdomen pending. Patient is a pre-CERT which will be initiated hopefully today. \"    Subjective (past 24 hours):     Patient was noted to have decreased hemoglobin today to value of 7.0. Repeat H&H showed a value of 6.6. Patient does have a history of melanotic stools. Transfuse 1 PRBCs. GI was consulted plans for EGD tomorrow morning. Patient was examined at bedside. No acute distress. Denies fever, chills, chest pain, shortness of breath, deirdre pain. Patient is more cooperative PT OT. He will need to be evaluated for potential GI bleed prior to being discharged. Continue to monitor closely. ROS: Pertinent positives as noted in HPI. All other systems reviewed and negative. Past medical history, family history, social history and allergies reviewed again and is unchanged since admission. Medications:  Reviewed.   Infusion Medications    sodium chloride      sodium chloride      sodium chloride      dextrose Stopped (10/01/21 1231)    sodium chloride       Scheduled Medications    pantoprazole  40 mg IntraVENous BID    lactulose  20 g Oral BID    lidocaine 1 % injection  5 mL IntraDERmal Once    dicyclomine  10 mg Oral TID AC    dilTIAZem  30 mg Oral Once    [Held by provider] aspirin  81 mg Oral Daily    [Held by provider] heparin (porcine)  5,000 Units SubCUTAneous 3 times per day    epoetin traci-epbx  6,000 Units SubCUTAneous Once per day on Mon Wed Fri    calcitRIOL  1.5 mcg Oral Once per day on Mon Wed Fri    cinacalcet  60 mg Oral Once per day on Mon Wed Fri    docusate sodium  100 mg Oral BID    ferrous sulfate  325 mg Oral Daily with breakfast    gabapentin  100 mg Oral TID    metoprolol  100 mg Oral BID    vitamin C  500 mg Oral Daily    polyethylene glycol  17 g Oral Daily    sodium chloride flush  5-40 mL IntraVENous 2 times per day    [Held by provider] clopidogrel  75 mg Oral Daily     PRN Meds: sodium chloride, sodium chloride, HYDROcodone-acetaminophen, sodium chloride, loperamide, metoprolol, metoclopramide, ipratropium-albuterol, glucose, dextrose, glucagon (rDNA), dextrose, sodium chloride flush, sodium chloride, ondansetron **OR** ondansetron, polyethylene glycol, acetaminophen **OR** acetaminophen, guaiFENesin-dextromethorphan, albuterol sulfate HFA    I/O:     Intake/Output Summary (Last 24 hours) at 10/7/2021 1904  Last data filed at 10/7/2021 1819  Gross per 24 hour   Intake 840 ml   Output 0 ml   Net 840 ml       Diet:  ADULT DIET; Regular; Low Potassium (Less than 3000 mg/day); 1200 ml  Adult Oral Nutrition Supplement; Renal Oral Supplement    Exam:  BP (!) 164/74   Pulse 64   Temp 98.4 °F (36.9 °C)   Resp 16   Ht 5' 6\" (1.676 m)   Wt 144 lb 6.4 oz (65.5 kg)   SpO2 99%   BMI 23.31 kg/m²      General:  Chronically ill appearing male. NAD. Alert and oriented x3   HEENT:  normocephalic and atraumatic. No scleral icterus. PERRLA  Neck: supple. No JVD. No thyromegaly. Lungs: CTA no wheezes noted. No respiratory distress noted. Cardiac: S1 and S2 present, RRR no murmurs/rubs/gallops  Abdomen: soft. Bowel sounds positive. No guarding noted  Extremities:  No clubbing, cyanosis, or edema x 4. Vasculature: capillary refill < 3 seconds. Palpable LE pulses bilaterally. Skin:  warm and dry. Psych:  Alert and oriented x4. Lymph:  No supraclavicular adenopathy. Neurologic:  No focal deficit. No seizures. Data: (All radiographs, tracings, PFTs, and imaging are personally viewed and interpreted unless otherwise noted)  Labs:   Recent Labs     10/06/21  0415 10/06/21  0415 10/06/21  1421 10/07/21  0450 10/07/21  0950   WBC 4.7*  --   --  4.9  --    HGB 7.1*   < > 8.4* 7.0* 6.6*   HCT 21.5*   < > 24.6* 21.4* 19.9*   *  --   --  139  --     < > = values in this interval not displayed.      Recent Labs     10/05/21  0852 10/06/21  0415 10/07/21  0450   * 137 135   K 4.4 4.0 3.8   CL 95* 100 97*   CO2 24 25 28 BUN 24* 28* 13   CREATININE 4.5* 5.5* 3.9*   CALCIUM 8.1* 7.8* 7.8*     No results for input(s): AST, ALT, BILIDIR, BILITOT, ALKPHOS in the last 72 hours. No results for input(s): INR in the last 72 hours. No results for input(s): Kaleen Hope in the last 72 hours. Urinalysis:   Lab Results   Component Value Date    NITRU NEGATIVE 06/22/2019    WBCUA 50-75 06/22/2019    BACTERIA NONE 06/22/2019    RBCUA 25-50 06/22/2019    BLOODU LARGE 06/22/2019    SPECGRAV 1.013 05/03/2016    GLUCOSEU NEGATIVE 06/22/2019     Urine culture: No results found for: Huan Pill  Micro:   Blood culture #1:   Lab Results   Component Value Date    BC No growth-preliminary No growth  10/02/2021     Blood culture #2:No results found for: Shaniqua James  Organism:  Lab Results   Component Value Date    ORG Micrococcus species 07/12/2021       No results found for: LABGRAM  MRSA culture only:No results found for: [address] [place] [address]  Respiratory culture: No results found for: CULTRESP  Aerobic and Anaerobic :  No results found for: LABAERO  No results found for: [facility]    Radiology Reports:  CTA ABDOMEN PELVIS W WO CONTRAST   Final Result   1. Bilateral airspace infiltrates and pleural effusions right greater than left. 2. Cardiomegaly. 3. Atrophic kidneys bilaterally. 4 liquid stool throughout much of the colon and rectum consistent with diarrhea no evidence of bowel obstruction. 5. Although there is diffuse calcific atherosclerosis of the aorta and mesenteric vessels there is no significant stenosis of the mesenteric vessels. However the renal arteries are bilaterally extremely diffusely narrow. **This report has been created using voice recognition software. It may contain minor errors which are inherent in voice recognition technology. **      Final report electronically signed by Dr. Govind Melvin on 10/5/2021 3:33 PM      IR FLUORO GUIDED CVA DEVICE PLMT/REPLACE/REMOVAL   Final Result   Status post successful midline insertion. **This report has been created using voice recognition software. It may contain minor errors which are inherent in voice recognition technology. **      Final report electronically signed by Dr Loreta Hernandes on 10/4/2021 1:54 PM      XR ABDOMEN (KUB) (SINGLE AP VIEW)   Final Result   A nonspecific but nonobstructive bowel gas pattern. This document has been electronically signed by: Kuldip Guzmán DO on    10/02/2021 12:45 AM      XR CHEST PORTABLE   Final Result   1. Scattered patchy regions of predominantly interstitial densities    throughout the bilateral lungs grossly similar to the prior examination. Mild bibasilar pleural/parenchymal opacities most consistent with tiny    effusions with atelectasis and/or consolidation. 2. Cardiomegaly with the heart size unchanged from the prior examination. This document has been electronically signed by: Kuldip Guzmán DO on    10/02/2021 12:47 AM      CT ABDOMEN PELVIS WO CONTRAST Additional Contrast? None   Final Result   1. Limited evaluation of the lung bases demonstrates small bilateral pleural effusions with patchy consolidative opacities at the lower lobes which may be related to pneumonia. 2. The gallbladder demonstrates a small gallstone near the gallbladder neck. This appears mildly distended. 3. Mild ascites in the perihepatic region is noted. **This report has been created using voice recognition software. It may contain minor errors which are inherent in voice recognition technology. **      Final report electronically signed by Dr. Miguel Griffiths on 9/30/2021 1:35 PM      XR ABDOMEN (KUB) (SINGLE AP VIEW)   Final Result   1. Overall nonobstructive bowel gas pattern. **This report has been created using voice recognition software. It may contain minor errors which are inherent in voice recognition technology. **      Final report electronically signed by Dr Sandra Campuzano on 9/30/2021 12:26 PM XR CHEST PORTABLE   Final Result   1. Patchy multifocal airspace disease is similar to 9/24/2021 exam, interstitial and airspace edema versus atypical infection. **This report has been created using voice recognition software. It may contain minor errors which are inherent in voice recognition technology. **      Final report electronically signed by Dr Lotus Cardona on 9/30/2021 9:33 AM      XR CHEST PORTABLE   Final Result   Impression:      Increasing consolidation and interstitial prominence. Question pulmonary edema versus pneumonia      This document has been electronically signed by: Malachi Jimenez MD on    09/24/2021 10:34 PM      XR CHEST PORTABLE   Final Result   1. No acute intrathoracic process. 2. Mild stable cardiomegaly. **This report has been created using voice recognition software. It may contain minor errors which are inherent in voice recognition technology. **      Final report electronically signed by Dr. Fish Correa on 9/22/2021 9:52 PM      CT HEAD WO CONTRAST   Final Result      1. Stable CT scan of the brain, no interval change since previous study dated 15th of June 2021. **This report has been created using voice recognition software. It may contain minor errors which are inherent in voice recognition technology. **      Final report electronically signed by DR Quincy Castanon on 9/15/2021 4:26 PM      CT CERVICAL SPINE WO CONTRAST   Final Result    No evidence of acute osseous injury of the cervical spine. **This report has been created using voice recognition software. It may contain minor errors which are inherent in voice recognition technology. **      Final report electronically signed by Dr. Remi Ramirez MD on 9/15/2021 4:29 PM      XR CHEST PORTABLE   Final Result   Cardiomegaly with vascular congestion and interstitial edema and effusions differential would include congestive heart failure or fluid overload. **This report has been created using voice recognition software. It may contain minor errors which are inherent in voice recognition technology. **      Final report electronically signed by Dr. Socorro Pena on 9/13/2021 2:22 PM        XR CHEST PORTABLE    Result Date: 9/13/2021  PROCEDURE: XR CHEST PORTABLE CLINICAL INFORMATION: SOB . COMPARISON: 7/12/2021 TECHNIQUE: Portable upright FINDINGS: Heart size is mildly enlarged. Mediastinum is not widened. There is pulmonary vascular congestion. Interstitial edema. There is suggestion of small effusions. AICD over the left chest. EKG leads overlie the chest. Right upper extremity vascular stents are noted. Cardiomegaly with vascular congestion and interstitial edema and effusions differential would include congestive heart failure or fluid overload. **This report has been created using voice recognition software. It may contain minor errors which are inherent in voice recognition technology. ** Final report electronically signed by Dr. Socorro Pena on 9/13/2021 2:22 PM        Tele:   [x] yes             [] no      Active Hospital Problems    Diagnosis Date Noted    Hypoxia [R09.02]     Severe malnutrition ([address] [E43] 09/22/2021     Class: Acute    COVID-19 [U07.1] 09/13/2021    Elevated troponin [R77.8]        Electronically signed by Maritza Watson DO on 10/7/2021 at 7:04 PM

## 2021-10-07 NOTE — PROGRESS NOTES
[address] [address]  INPATIENT PHYSICAL THERAPY  DAILY NOTE  [facility] 5K - 5K-14/014-A      Time In: 0900  Time Out: 926  Timed Code Treatment Minutes: 26 Minutes  Minutes: 26          Date: 10/7/2021  Patient Name: Aishwarya Calderon,  Gender:  male        MRN: [id number]  : 11/10/1930  (80 y.o.)     Referring Practitioner: Dr. Soy Evans  Diagnosis: acute pulmonary edema  Additional Pertinent Hx: admit with above diagnosis, ESRD on HD, COVID-19 pneumonia, HTN, COPD, anemia     Prior Level of Function:  Lives With: Daughter  Type of Home: House  Home Layout: One level  Home Access: Level entry  Home Equipment: Rolling walker   Bathroom Shower/Tub: Walk-in shower  Bathroom Toilet: Standard    ADL Assistance: Independent  Homemaking Assistance: Needs assistance  Ambulation Assistance: Independent  Transfer Assistance: Independent  Additional Comments: Pt using RW PTA. Daughter assists at home. Unsure of accuracy of above pt is slighly confused this date. Restrictions/Precautions:  Restrictions/Precautions: Fall Risk  Position Activity Restriction  Other position/activity restrictions: . SUBJECTIVE: pt up in chair on arrival he was very soft spoken yet agreeable for therapy     PAIN: 0/10:       Vitals: Oxygen: pt on 2L O2- had the monitor the entire time but only got a reading one rupal at 91% however he wasn't SOB with any activity     OBJECTIVE:  Bed Mobility:  Not Tested    Transfers:  Sit to Stand: Contact Guard Assistance  Stand to Sit:Contact Guard Assistance        Balance:  standing w/ one UE at support pt completed reaching activity just slighlty out of base with CGA noted wide base of support     Exercise:  Patient was guided in 2 set(s) 10 reps of exercise to both lower extremities.   Ankle pumps, Glut sets, Quad sets, Heelslides, Seated marches, Seated hamstring curls, Long arc quads, Seated abduction/adduction and  and 10 reps UE shoulder horz abd/add, shoulder shrugs, sholder punches, elbow flex/ext . Exercises were completed for increased independence with functional mobility. Functional Outcome Measures: Completed  AM-PAC Inpatient Mobility Raw Score : 17  AM-PAC Inpatient T-Scale Score : 42.13    ASSESSMENT:  Assessment: Patient progressing toward established goals. and However he cont to demonstrate generalized weakness and decreased endurance. Pt would benefit from cont skilled therapy   Activity Tolerance:  Patient tolerance of  treatment: fair. Equipment Recommendations: Other: cont to assess needs  Discharge Recommendations:    Continue to assess pending progress, Subacute/Skilled Nursing Facility, 24 hour supervision or assist, Patient would benefit from continued therapy after discharge    Plan: Times per week: 3-5x GM  Times per day: Daily  Specific instructions for Next Treatment: therex and mobility    Patient Education  Patient Education: Plan of Care    Goals:  Patient goals : not stated  Short term goals  Time Frame for Short term goals: by discharge  Short term goal 1: bed mobility with S to get in/out of bed  Short term goal 2: transfer with S to get in/out of chairs  Short term goal 3: amb >50'x1 with RW and SBA, maintaining O2 sats >90% on </=4L O2, to walk safely in room  Long term goals  Time Frame for Long term goals : no LTGs set secondary to short ELOS    Following session, patient left in safe position with all fall risk precautions in place.

## 2021-10-07 NOTE — PROGRESS NOTES
Renal Progress Note    Assessment and Plan:   1. End-stage kidney disease on hemodialysis. 2. SARS-CoV-2 pneumonia. 3. Hypertension primary with variable control  4. Deconditioning  5. Anemia of chronic disease with macrocytosis    PLAN:  1. Labs reviewed  2. Medications reviewed  3. No changes  4. Monitor hemoglobin closely  5. Hemodialysis tomorrow  6. We will continue to follow    Patient Active Problem List:     CAD (coronary artery disease)     COPD (chronic obstructive pulmonary disease) ([facility] [address]     Chronic renal insufficiency     Patient overweight     Arthritis-  low back and neck .      CKD (chronic kidney disease) stage 3, GFR 30-59 ml/min (HCC)     Dyspnea and respiratory abnormalities     ED (erectile dysfunction)     Degenerative joint disease of knee, left     Gout of big toe     Anemia, chronic disease     CKD (chronic kidney disease) stage 4, GFR 15-29 ml/min (HCC)     Essential hypertension     Monoclonal gammopathy     Leukopenia     Thrombocytopenia (HCC)     Chronic kidney disease (CKD), stage V (HCC)     Metabolic acidosis     Hyperkalemia     Iron deficiency anemia     ROSAURA (acute kidney injury) ([address]     Plasma cell dyscrasia     Idiopathic hypotension     Hypertensive urgency     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)     Systolic congestive heart failure (HCC)     Other fluid overload (CODE)     Hyperphosphatemia     Acute diastolic congestive heart failure (HCC)     Acute on chronic diastolic congestive heart failure (HCC)     Pulmonary hypertension (HCC)     Anemia due to chronic kidney disease, on chronic dialysis (HCC)     Volume overload     Secondary hyperparathyroidism of renal origin ([address]     Chest pain     Acute pulmonary edema (HCC)     Accelerated hypertension     Gastritis and duodenitis     Fall from slip, trip, or stumble, initial encounter     Closed fracture of left ankle     ESRD on hemodialysis ([address]     Hypertensive emergency     SOB (shortness of breath) Chronic combined systolic and diastolic CHF (congestive heart failure) (HCC)     Pneumonia due to COVID-19 virus     Severe malnutrition (HCC)      Subjective:   Admit Date: 9/13/2021    Interval History:   Seen for end-stage kidney disease on hemodialysis  Awake and alert when I saw him this morning  Still sleepy according to him  Did not sleep well last night  No complaint otherwise  Updated by the staff  No new issues  Blood pressure is mostly stable      Medications:   Scheduled Meds:   lactulose  20 g Oral BID    lidocaine 1 % injection  5 mL IntraDERmal Once    dicyclomine  10 mg Oral TID AC    dilTIAZem  30 mg Oral Once    aspirin  81 mg Oral Daily    heparin (porcine)  5,000 Units SubCUTAneous 3 times per day    epoetin traci-epbx  6,000 Units SubCUTAneous Once per day on Mon Wed Fri    calcitRIOL  1.5 mcg Oral Once per day on Mon Wed Fri    cinacalcet  60 mg Oral Once per day on Mon Wed Fri    docusate sodium  100 mg Oral BID    ferrous sulfate  325 mg Oral Daily with breakfast    gabapentin  100 mg Oral TID    metoprolol  100 mg Oral BID    pantoprazole  40 mg Oral QAM AC    vitamin C  500 mg Oral Daily    polyethylene glycol  17 g Oral Daily    sodium chloride flush  5-40 mL IntraVENous 2 times per day    clopidogrel  75 mg Oral Daily     Continuous Infusions:   sodium chloride      sodium chloride      sodium chloride      dextrose Stopped (10/01/21 1231)    sodium chloride         CBC:   Recent Labs     10/06/21  0415 10/06/21  1421 10/07/21  0450   WBC 4.7*  --  4.9   HGB 7.1* 8.4* 7.0*   *  --  139     CMP:    Recent Labs     10/05/21  0852 10/06/21  0415 10/07/21  0450   * 137 135   K 4.4 4.0 3.8   CL 95* 100 97*   CO2 24 25 28   BUN 24* 28* 13   CREATININE 4.5* 5.5* 3.9*   GLUCOSE 91 86 85   CALCIUM 8.1* 7.8* 7.8*   LABGLOM 15* 12* 18*     Troponin: No results for input(s): TROPONINI in the last 72 hours.   BNP: No results for input(s): BNP in the last 72 hours.  INR: No results for input(s): INR in the last 72 hours. Lipids:   No results for input(s): CHOL, LDLDIRECT, TRIG, HDL, AMYLASE, LIPASE in the last 72 hours. Liver:   No results for input(s): AST, ALT, ALKPHOS, PROT, LABALBU, BILITOT in the last 72 hours. Invalid input(s): BILDIR  Iron:  No results for input(s): IRONS, FERRITIN in the last 72 hours. Invalid input(s): LABIRONS  CTA ABDOMEN PELVIS W WO CONTRAST   Final Result   1. Bilateral airspace infiltrates and pleural effusions right greater than left. 2. Cardiomegaly. 3. Atrophic kidneys bilaterally. 4 liquid stool throughout much of the colon and rectum consistent with diarrhea no evidence of bowel obstruction. 5. Although there is diffuse calcific atherosclerosis of the aorta and mesenteric vessels there is no significant stenosis of the mesenteric vessels. However the renal arteries are bilaterally extremely diffusely narrow. **This report has been created using voice recognition software. It may contain minor errors which are inherent in voice recognition technology. **      Final report electronically signed by Dr. Dejon Roman on 10/5/2021 3:33 PM      IR FLUORO GUIDED CVA DEVICE PLMT/REPLACE/REMOVAL   Final Result   Status post successful midline insertion. **This report has been created using voice recognition software. It may contain minor errors which are inherent in voice recognition technology. **      Final report electronically signed by Dr Abiola Jonse on 10/4/2021 1:54 PM      XR ABDOMEN (KUB) (SINGLE AP VIEW)   Final Result   A nonspecific but nonobstructive bowel gas pattern. This document has been electronically signed by: Gaby Greene DO on    10/02/2021 12:45 AM      XR CHEST PORTABLE   Final Result   1. Scattered patchy regions of predominantly interstitial densities    throughout the bilateral lungs grossly similar to the prior examination.     Mild bibasilar pleural/parenchymal opacities most consistent with tiny    effusions with atelectasis and/or consolidation. 2. Cardiomegaly with the heart size unchanged from the prior examination. This document has been electronically signed by: Judy Meza DO on    10/02/2021 12:47 AM      CT ABDOMEN PELVIS WO CONTRAST Additional Contrast? None   Final Result   1. Limited evaluation of the lung bases demonstrates small bilateral pleural effusions with patchy consolidative opacities at the lower lobes which may be related to pneumonia. 2. The gallbladder demonstrates a small gallstone near the gallbladder neck. This appears mildly distended. 3. Mild ascites in the perihepatic region is noted. **This report has been created using voice recognition software. It may contain minor errors which are inherent in voice recognition technology. **      Final report electronically signed by Dr. Veronika Luciano on 9/30/2021 1:35 PM      XR ABDOMEN (KUB) (SINGLE AP VIEW)   Final Result   1. Overall nonobstructive bowel gas pattern. **This report has been created using voice recognition software. It may contain minor errors which are inherent in voice recognition technology. **      Final report electronically signed by Dr Romel Gusman on 9/30/2021 12:26 PM      XR CHEST PORTABLE   Final Result   1. Patchy multifocal airspace disease is similar to 9/24/2021 exam, interstitial and airspace edema versus atypical infection. **This report has been created using voice recognition software. It may contain minor errors which are inherent in voice recognition technology. **      Final report electronically signed by Dr Romel Gusman on 9/30/2021 9:33 AM      XR CHEST PORTABLE   Final Result   Impression:      Increasing consolidation and interstitial prominence.       Question pulmonary edema versus pneumonia      This document has been electronically signed by: Christina Jasso MD on    09/24/2021 10:34 PM      XR CHEST PORTABLE   Final Result   1. No acute intrathoracic process. 2. Mild stable cardiomegaly. **This report has been created using voice recognition software. It may contain minor errors which are inherent in voice recognition technology. **      Final report electronically signed by Dr. Reji Aldana on 9/22/2021 9:52 PM      CT HEAD WO CONTRAST   Final Result      1. Stable CT scan of the brain, no interval change since previous study dated 15th of June 2021. **This report has been created using voice recognition software. It may contain minor errors which are inherent in voice recognition technology. **      Final report electronically signed by DR Catalina Murphy on 9/15/2021 4:26 PM      CT CERVICAL SPINE WO CONTRAST   Final Result    No evidence of acute osseous injury of the cervical spine. **This report has been created using voice recognition software. It may contain minor errors which are inherent in voice recognition technology. **      Final report electronically signed by Dr. Jerris Gottron, MD on 9/15/2021 4:29 PM      XR CHEST PORTABLE   Final Result   Cardiomegaly with vascular congestion and interstitial edema and effusions differential would include congestive heart failure or fluid overload. **This report has been created using voice recognition software. It may contain minor errors which are inherent in voice recognition technology. **      Final report electronically signed by Dr. Ronna Reyes on 9/13/2021 2:22 PM            Objective:   Vitals: /63   Pulse 91   Temp 99 °F (37.2 °C) (Oral)   Resp 18   Ht 5' 6\" (1.676 m)   Wt 144 lb 6.4 oz (65.5 kg)   SpO2 100%   BMI 23.31 kg/m²    Wt Readings from Last 3 Encounters:   10/06/21 144 lb 6.4 oz (65.5 kg)   07/14/21 145 lb 8.1 oz (66 kg)   06/17/21 161 lb (73 kg)      24HR INTAKE/OUTPUT:      Intake/Output Summary (Last 24 hours) at 10/7/2021 0940  Last data filed at 10/7/2021 0902  Gross per 24 hour   Intake 1180 ml   Output 1900 ml   Net -720 ml       Constitutional: Well-developed elderly gentleman alert, awake, no apparent distress   Skin:normal with no rash or lesions. HEENT:Pupils are reactive . Throat is clear . Oral mucosa is moist   Neck:supple with no thyromegaly or carotid bruit  Cardiovascular:  S1, S2 without murmur or rubs   Respiratory:  Clear to ausculation without wheezes, rhonchi or rales. Abdomen: +bs, soft, no tenderness to palpation and no palpable mass. No abdominal bruit   Ext: No LE edema  Musculoskeletal:Intact  Neuro:Alert and awake. Well oriented  with no focal deficit. Speech is normal      Electronically signed by Liliana Cox MD on 10/7/2021 at 9:40 AM  **This report has been created using voice recognition software. It maycontain minor  errors which are inherent in voice recognition technology. **

## 2021-10-07 NOTE — PROGRESS NOTES
Comprehensive Nutrition Assessment    Type and Reason for Visit:  Reassess    Nutrition Recommendations/Plan:   Will send Nepro once/day per pt. Request.  Encouraged po, good nutrition at best efforts. Consider liberalize diet. Recommend renal MVI. Nutrition Assessment:    Pt improving from a nutritional standpoint AEB reported improving appetite and intake (although intake varies per graphics). Remains at risk for further nutritional compromise r/t severe malnutrition, recent COVID (9/14), advanced age and underlying medical condition (hx CAD, ESRD-HD, CHF, COPD). Nutrition recommendations/interventions as per above. Malnutrition Assessment:  Malnutrition Status:  Severe malnutrition    Context:  Acute Illness     Findings of the 6 clinical characteristics of malnutrition:  Energy Intake:  7 - 50% or less of estimated energy requirements for 5 or more days  Weight Loss:  Unable to assess (-13.1% in 2.5 months however difficult to evaluate extent d/t fluid, HD)     Body Fat Loss:  Unable to assess     Muscle Mass Loss:  7 - Moderate muscle mass loss Thigh (quadraceps)  Fluid Accumulation:  Unable to assess     Strength:  Not Performed    Estimated Daily Nutrient Needs:  Energy (kcal):  7399-8817 kcals (30-35); Weight Used for Energy Requirements:   (57 kgm)     Protein (g):  68-86 gm (1.2-1.5) - HD pt; Weight Used for Protein Requirements:   (57 kgm)          Nutrition Related Findings:    Pt. Seen this afternoon - eating [employer] chicken; reports good appetite and intake; family bringing in food from home; po intake 1-25%, 25-50% 50-75% and % of meals; 2 BMs noted past 24 hours; 10/7: Glucose 85, BUN 13, Cr 3.9, Potassium 3.8; Rx includes Lactulose, Bentyl, Imodium, Reglan, Glycolax, Iron, Vitamin C, Rocaltrol; states likes Nepro and agrees to receive once/day     Wounds:  None       Current Nutrition Therapies:    ADULT DIET;  Regular; Low Potassium (Less than 3000 mg/day); 1200 ml  Adult Oral Nutrition Supplement; Renal Oral Supplement    Anthropometric Measures:  · Height: 5' 6\" (167.6 cm)  · Current Body Weight: 144 lb 6.4 oz (65.5 kg) (10/6 no edema)   · Admission Body Weight: 134 lb 9.6 oz (61.1 kg) (9/18 no edema; 9/15 162# 14.7 oz (estimated weight))    · Usual Body Weight:  (per EMR: 5/19/21: 176# 12.9 oz, 7/2/21; 145# 8.1 oz)     · Ideal Body Weight: 142 lbs;      · BMI: 23.3  · BMI Categories: Normal Weight (BMI 22.0 to 24.9) age over 72       Nutrition Diagnosis:   · Severe malnutrition related to inadequate protein-energy intake, catabolic illness as evidenced by intake 0-25%, intake 26-50%, moderate muscle loss      Nutrition Interventions:   Food and/or Nutrient Delivery:  Continue Current Diet, Start Oral Nutrition Supplement  Nutrition Education/Counseling:  Education initiated (10/7 Encouraged po, good nutrition at best efforts.)   Coordination of Nutrition Care:  Continue to monitor while inpatient    Goals:  Patient will tolerate and consume 75% or more of meals during LOS       Nutrition Monitoring and Evaluation:   Behavioral-Environmental Outcomes:  None Identified   Food/Nutrient Intake Outcomes:  Diet Advancement/Tolerance, Food and Nutrient Intake, Supplement Intake  Physical Signs/Symptoms Outcomes:  Biochemical Data, Chewing or Swallowing, GI Status, Fluid Status or Edema, Nutrition Focused Physical Findings, Skin, Weight     Discharge Planning:     Too soon to determine     Electronically signed by Renee Her RD, LD on 10/7/21 at 3:37 PM EDT    Contact: [phone]

## 2021-10-08 NOTE — PROGRESS NOTES
[address]  PHYSICAL THERAPY MISSED TREATMENT NOTE  [facility]K    Date: 10/8/2021  Patient Name: Eren Robison        MRN: [id number]   : 11/10/1930  (80 y.o.)  Gender: male   Referring Practitioner: Dr. Kleber Julien  Diagnosis: acute pulmonary edema         REASON FOR MISSED TREATMENT:  Patient with other ancillary department. Pt at dialysis.

## 2021-10-08 NOTE — PROGRESS NOTES
Valeri, Decreased Step Length Bilaterally, Decreased Gait Speed, Decreased Heel Strike Bilaterally and Min instability, especially when turning. Cues to stay within MARIS of AD for decreased fall risk. Balance:  Dynamic Sitting Balance: Stand By Assistance  Dynamic Standing Balance: Minimal Assistance, Pt standing at AD and completing functional activity. Pt able to release one UE but does have several small posterior LOB requiring Min A to correct. Exercise:  Patient was guided in 1 set(s) 10 reps of exercise to both lower extremities. Seated marches, Seated hamstring curls, Seated heel/toe raises, Long arc quads and Seated abduction/adduction. Exercises were completed for increased independence with functional mobility. Functional Outcome Measures: Completed  -PAC Inpatient Mobility Raw Score : 17  AM-PAC Inpatient T-Scale Score : 42.13    ASSESSMENT:  Assessment: Patient progressing toward established goals. and Pt tolerated session well. Limited by decreased strength, decreased balance, decreased mobility. Will require continued therapy at discharge. Activity Tolerance:  Patient tolerance of  treatment: good. Equipment Recommendations: Other: cont to assess needs  Discharge Recommendations:  Continue to assess pending progress, Subacute/Skilled Nursing Facility, 24 hour supervision or assist, Patient would benefit from continued therapy after discharge    Plan: Times per week: 3-5x GM  Times per day: Daily  Specific instructions for Next Treatment: therex and mobility    Patient Education  Patient Education: Plan of Care, Bed Mobility, Transfers, Gait    Goals:  Patient goals : not stated  Short term goals  Time Frame for Short term goals: by discharge  Short term goal 1: bed mobility with S to get in/out of bed  Short term goal 2: transfer with S to get in/out of chairs  Short term goal 3: amb >50'x1 with RW and SBA, maintaining O2 sats >90% on </=4L O2, to walk safely in room  Long term goals  Time Frame for Long term goals : no LTGs set secondary to short ELOS    Following session, patient left in safe position with all fall risk precautions in place.

## 2021-10-08 NOTE — FLOWSHEET NOTE
10/08/21 0730 10/08/21 1135   Vital Signs   BP (!) 138/59 128/62   Temp 98.2 °F (36.8 °C) 97.5 °F (36.4 °C)   Pulse 65 70   Resp 18 20   Weight  --  114 lb 13.8 oz (52.1 kg)   Weight Method  --  Bed scale   Percent Weight Change  --  -20.46   Post-Hemodialysis Assessment   Post-Treatment Procedures  --  Blood returned; Access bleeding time < 10 minutes   Machine Disinfection Process  --  Acid/Vinegar Clean;Heat Disinfect; Exterior Machine Disinfection   Dialyzer Clearance  --  Lightly streaked   Duration of Treatment (minutes)  --  240 minutes   Heparin amount administered during treatment (units)  --  0 units   Hemodialysis Intake (ml)  --  400 ml   Hemodialysis Output (ml)  --  1400 ml   NET Removed (ml)  --  1000 ml   Tolerated Treatment  --  Good   Stable 4 hr tx. 1L removed during HD; patient tolerated well. Both needle sites held x10 min each with dressings dry and intact upon leaving unit. Report given to primary RN.  Treatment record printed for scanning

## 2021-10-08 NOTE — PROGRESS NOTES
Nephrology Progress Note    Patient - Philly Stager   MRN -  [id number]   Acct # - [de-identified]      - 11/10/1930    80 y.o. Admit Date: 2021  Hospital Day: 25  [facility]  Date of evaluation -  10/8/2021    Subjective:   CC: shortness of breath  Denies shortness of breath  Pt seen during hemodialysis, Hemodynamically stable 3 K bath, goal Ultrafiltration 1.5 L  BP Range: Systolic (23DBH), FEK:386 , Min:133 , ITB:834      Diastolic (46AIU), DKC:36, Min:59, Max:81    Objective:   VITALS:  BP (!) 138/59   Pulse 65   Temp 98.2 °F (36.8 °C)   Resp 18   Ht 5' 6\" (1.676 m)   Wt 144 lb 6.4 oz (65.5 kg)   SpO2 98%   BMI 23.31 kg/m²    Patient Vitals for the past 24 hrs:   BP Temp Temp src Pulse Resp SpO2   10/08/21 0730 (!) 138/59 98.2 °F (36.8 °C) -- 65 18 --   10/08/21 0310 (!) 153/70 97.9 °F (36.6 °C) Oral 60 18 98 %   10/07/21 2005 (!) 165/72 98.1 °F (36.7 °C) Oral 70 16 98 %   10/07/21 1809 (!) 164/74 98.4 °F (36.9 °C) -- 64 16 99 %   10/07/21 1748 (!) 175/80 98 °F (36.7 °C) Oral 63 14 99 %   10/07/21 1519 133/81 97.9 °F (36.6 °C) Oral 62 18 100 %   10/07/21 1301 (!) 151/69 98.6 °F (37 °C) Oral 61 18 100 %   10/07/21 0922 138/63 -- -- 91 -- --       Intake/Output Summary (Last 24 hours) at 10/8/2021 0842  Last data filed at 10/7/2021 2001  Gross per 24 hour   Intake 950 ml   Output 0 ml   Net 950 ml       Admission weight: 163 lb (73.9 kg)  Patient Vitals for the past 96 hrs (Last 3 readings):   Weight   10/06/21 1231 144 lb 6.4 oz (65.5 kg)   10/04/21 1218 138 lb 3.7 oz (62.7 kg)     Body mass index is 23.31 kg/m². EXAM:  CONSTITUTIONAL:  No acute distress. HEENT:  Head is normocephalic, Extraocular movement intact. Neck is supple. CARDIOVASCULAR:  S1, S2  regular rate and rhythm. RESPIRATORY: Clear to ausculation bilaterally. Equal breath sounds. No wheezes.  No shortness of breath noted at rest.  ABDOMEN: soft, non tender  NEUROLOGICAL: Patient is awake and oriented to person, place, and time. Recent and remote memory intact. SKIN: no rash, No significant bruises on exposed surfaces  MUSCULOSKELETAL: Movement is coordinated. Moves all extremities   EXTREMITIES: Distal lower extremity temp is warm, No bilateral symmetric lower extremity edema. Upper extremity AV Fistula   PSYCHIATRIC: mood and affect appropriate.     Medications:   Med reviewed  Scheduled Meds:   pantoprazole  40 mg IntraVENous BID    lactulose  20 g Oral BID    lidocaine 1 % injection  5 mL IntraDERmal Once    dicyclomine  10 mg Oral TID AC    dilTIAZem  30 mg Oral Once    [Held by provider] aspirin  81 mg Oral Daily    [Held by provider] heparin (porcine)  5,000 Units SubCUTAneous 3 times per day    epoetin traci-epbx  6,000 Units SubCUTAneous Once per day on Mon Wed Fri    calcitRIOL  1.5 mcg Oral Once per day on Mon Wed Fri    cinacalcet  60 mg Oral Once per day on Mon Wed Fri    docusate sodium  100 mg Oral BID    ferrous sulfate  325 mg Oral Daily with breakfast    gabapentin  100 mg Oral TID    metoprolol  100 mg Oral BID    vitamin C  500 mg Oral Daily    polyethylene glycol  17 g Oral Daily    sodium chloride flush  5-40 mL IntraVENous 2 times per day    [Held by provider] clopidogrel  75 mg Oral Daily     Continuous Infusions:   sodium chloride      sodium chloride      sodium chloride      dextrose Stopped (10/01/21 1231)    sodium chloride       PRN Meds sodium chloride, sodium chloride, HYDROcodone-acetaminophen, sodium chloride, loperamide, metoprolol, metoclopramide, ipratropium-albuterol, glucose, dextrose, glucagon (rDNA), dextrose, sodium chloride flush, sodium chloride, ondansetron **OR** ondansetron, polyethylene glycol, acetaminophen **OR** acetaminophen, guaiFENesin-dextromethorphan, albuterol sulfate HFA   Labs:   Labs reviewed  Recent Labs     10/06/21  0415 10/06/21  1421 10/07/21  0450 10/07/21  0950 10/08/21  0730   WBC 4.7*  --  4.9  --   --    RBC 2.29*  --  2.26*  --   --    HGB 7.1*   < > 7.0* 6.6* 9.4*   HCT 21.5*   < > 21.4* 19.9* 27.8*   MCV 93.9  --  94.7*  --   --    MCH 31.0  --  31.0  --   --    *  --  139  --   --     < > = values in this interval not displayed. Recent Labs     10/05/21  0852 10/05/21  0852 10/06/21  0415 10/06/21  0415 10/07/21  0450   *  --  137  --  135   K 4.4  --  4.0  --  3.8   CL 95*  --  100  --  97*   CO2 24  --  25  --  28   BUN 24*  --  28*  --  13   CREATININE 4.5*  --  5.5*  --  3.9*   LABGLOM 15*   < > 12*   < > 18*   GLUCOSE 91  --  86  --  85   CALCIUM 8.1*  --  7.8*  --  7.8*    < > = values in this interval not displayed. ASSESSMENT:  1. 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 AV fistula. 2. COVID 19 pneumonia. 3. Essential Hypertension with nephrosclerosis at control  4. Abdominal aortic aneurysm 3.7 cm   5. 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 Coronary artery disease Recent PCI 8/31/21 at [facility]  7. Anemia of chronic disease on Erythropoiesis-Stimulating Agent. Acute blood loss, Planned EGD  8. Secondary hyperparathyroidism of renal origin on rocaltrol and sensipar. Calcium ok with corrected for albumin  9. Debility, Planned discharge to extended care facility     Active Problems:    Elevated troponin    COVID-19    Severe malnutrition ([address]    Hypoxia  Resolved Problems:    * No resolved hospital problems.  Ron Gomes, APRN - CNP 8:42 AM 10/8/2021

## 2021-10-08 NOTE — PROGRESS NOTES
Hospitalist Progress Note    Patient:  Kori Mccann    Unit/Bed:5K-14/014-A  YOB: 1930  MRN: [id number]   Acct: [de-identified]   PCP: Catracho Emerson MD  Code Status: Limited  Date of Admission: 9/13/2021    Expected Discharge: pending placement. Disposition: ECF, precert pending. Pt is medically stable for discharge, awaiting placement. Assessment/Plan:    1. Recurrent hypoglycemic episodes: unclear etiology, possible adrenal insufficiency. Pt is not a diabetic, no DM medications listed. Check Hgb A1c - 4.5%. Recent tx for COVID with steroids x9 days. Check LA, elevated, resolved, and then again elevated. Check random cortisol level -> wnl. S/p 2 dose of glucose oral gel and 3 doses of 1 mg Glucagon. Pt was noted as low as 23, continues to trend down despite D10 drip at 100 mL/hr. Continue Q2 hr checks. Trial 10 mg Decadron steroids once and assess response. 10/1-> stop D5 drip and monitor    10/2-> LA now elevated again, suspicion potentially from underlying infection. 10/3-> pt given Decadron 10 mg x 1 dose last night when noted to again be hypoglycemic. Adrenal insufficiency? Pending hospice eval will consider Cortisol stimulation testing. 10/4-> Cosyntropin was not given this morning. Cortisol levels that were drawn are not accurate. We will repeat test tomorrow morning. Midline placed. 10/5-> Cortisol at 60 minutes showed 23.57. BG remains stable. 10/6 -> Patient had HD this morning. BG remaining stable. 10/7 - Glucose stable. 2. Abd pain, generalized, unclear etiology:. Check CT A / P -> showing distended gallbladder. LA improved and resolved, however was noted to again be elevated as high as 8.7 which is improved to 4.9. Check CTA abdomen to rule out ischemia. Will consider Gen Surg consultation. 10/3-> discussed with pt and family. Surgery is not an option they wish to consider should it be necessary.    10/5-> c/o continued belly pain, continue to trend LA till normalized. CTA of abd today. 10/6 -> LA 1.6 - WNL    3. COVID-19 pneumonia (resolved): treated with dexamethasone 6mg daily. First positive test 09/13. Pt stable on RA. Isolation no longer required, transferred to . 4. Possible superimposed bacterial PNA: Completed treatment with Rocephin and Azithromycin. Remains afebrile. No leukocytosis. 5. Acute hypoxic respiratory failure: 2/2 above. 9/25-> Pt placed on O2 overnight after rapid was called. PO2 on ABG was 170, no hypercapnea. No acute changes on EKG, no evidence of fluid overload. Pt is currently 4L, ABG showing respiratory alkalosis. Continue to monitor and wean as tolerated. 10/6 -> patient currently on 2lpm NC.     6. ESRD on HD:  Secondary to diabetic and hypertensive nephrosclerosis. Nephrology following. On HD MWF. Had successful HD this morning 10/6 with 1.5 L removed    7. S/p unwitnessed fall: No apparent injuries, CT brain and cervical spine -unremarkable     8. Dark tarry stools / chronic normocytic anemia: hx INDRA. On Retacrit, ferrous sulfate. One episode, none further. Hemoglobin stable. Negative occult 9/19/2021.  Continue to monitor, transfuse for hemoglobin less than 7. Prior to dialysis on Wednesday patient had hemoglobin 7.1. Postdialysis hemoglobin increased over 8. However following day hemoglobin was noted to be 7.0. Repeat H&H showed 6.6. Plavix, aspirin, heparin held in setting of potential GI bleed. Transfuse 1 unit PRBCs. Consult was placed and plans for EGD 10/9. Repeat H&H this morning (10/8) was 9.4. We will continue to monitor closely. 9. Elevated troponin: chronically elevated, likely d/t ESRD. No chest pain, repeat troponin improved, no acute ischemic changes on ekg. 10. CAD s/p PCI: Patient had 2 stents (LAD and Ramus) at [facility] on 08/31/21. Will continue with aspirin, Plavix, metoprolol and pravastatin.     11. H/o NSVT / Sick sinus PT.  Explained to patient this morning (10/6) the importance of cooperating and participating with PT in order for them to certify him to go to NH. Today (10/7) patient has been much more cooperative with participating in PT OT. Chief Complaint: SOB    HPI / Hospital Course: Per HPI: \"The patient is a 80 y.o. male who presents with complaints of shortness of breath and cough for the last 2 days. Patient is accompanied by family who helps with history. They report that patient has had a poor appetite, fatigued, short of breath and coughing for last 2 days. He recently was at [facility] approximately 2 weeks ago where he had 2 stents placed. They state he was in his normal state of health until 2 days ago. Patient was seen in dialysis today and had a full session however continued to be short of breath and had a significant cough and was sent for evaluation. He denies any fevers or chills, nausea or vomiting. He denies any diarrhea or constipation. He denies any chest pain.     In the ED patient was found to be Covid positive. He was placed on 2 L nasal cannula. He was also found to have slightly elevated troponin and was started on heparin drip in the ED. Family updated on test results. \"    Pt has completed treatment for COVID and superimposed bacterial PNA. I took over care 9/22, per previous note, pt stable for discharge and awaiting precert to ECF.      7/79-> Rapid response called during dialysis. Pt had vomited, became lethargic and had episode of AF RVR on the monitor per report. Dialysis was stopped. Patient still lethargic but following commands and nods to answer questions appropriately. EKG showed NSR, does not appear to be AF. Patient is moaning which per nursing, he typically does during dialysis. Patient seen again later. He reports fatigue and wanting to sleep. He denies abdominal pain, n/v/d, CP, SOB, fever/chills. Pt had bleeding from dialysis fistulasite.      9/23-> Patient alert to verbal stimuli, lethargic. Appears to be at his baseline. Denies fever/chills, sob, cp, abd pain, n/v/d. Nephrology started IVF for today and plan for dialysis tomorrow. Patient has been NSR on tele. 9/24-> Pt appears at baseline. He is more alert today than he has been. Tolerated dialysis well. 9/26-> Patient is alert, oriented to self, place, time, and somewhat to situation. He denies fever/chills, SOB, CP, abd pain, n/v/d, cough. He reports of left arm pain at dialysis cath site. The patient states that he has been told before that his heart beat gets \"out of wack. \"   Chart reviewed and a rapid was called last night they were unable to get a reading of O2 levels. ABG results with pO2 179, no hypercapnea. Based on ABG did not appear to be truly hypoxic so he was put back on 6L NC and nursing instructed to wean. His EKG reported atrial fibrillation; however, after review, there were P waves present. Ordered pacer interrogate. 9/27-> no acute events overnight. Patient denies sob, cp, abd pain, nvd. Nephrology planning HD tomorrow. Awaiting pacer interrogate results. 9/28- Pt had dialysis today, tolerated well. 9/29 -> patient on HD. Patient is doing well. Denies fever/chills, sob, cp, palpitations, abd pain, n/v/d. Disposition has been difficult secondary to Covid diagnosis and HD facility/schedule. 9/30-> pt had a rough morning. Called to bedside due to pulse ox being read as low as 50% and was very labile. Pt placed on non-re breather. Pt was c/o of abd pain and an episode of diarrhea that started last night. Had an episode of emesis s/p IO insertion, none since then. Afebrile. 10/1-> patient doing much better today, seen in dialysis. Patient denies any abdominal pain at all today. No nausea or vomiting. Hypoglycemia has resolved and remained stable. Afebrile.   Has been weaned down to 4 L NC.    10/2-> patient had a rapid response called overnight secondary to abdominal pain and hypotension (as low as the 90M systolic). Patient was noted to have an elevated lactic acid. He was given fluids and started on IV antibiotics. Today upon exam, patient again is complaining of severe abdominal pain all over. We will check a CTA of the abdomen. Consider general surgery consultation. 10/3-> patient again had a rough day complaining of intense 10/10 abdominal pain. Patient stated \"I want to be poked no more \"and we discussed the concept of moving forward with comfort care versus further diagnostic testing. Patient and family agreed that comfort care was the route they wish to take. Hospice evaluation placed. Overnight, patient's blood glucose has been stable. Again discussed with patient who stated he wanted to proceed with comfort care. Pt is A&Ox4 on this exam. Discussed stopping any lab draws and also fingerstick checks. Discussed potential adrenal insufficiency which cannot be confirmed without further lab testing. Pt is aware that this could lead to death. Discussed with Theresa Posadas via telephone. Plan to meet today with Hospice. They are now unsure if they wish to proceed with comfort care as they were unaware that pt would not be a dialysis pt any longer. Await hospice eval.   Patient reporting that he still has generalized abdominal pain, though it is much improved from yesterday. Blood pressure again noted to be in the 80s last night, has maintained stability today. 10/4-> patient lying in bed having returned from dialysis. Patient denies as intense abdominal pain today, however notes that he does have some generalized pain especially with palpation. Status post midline placement with IR. Cosyntropin was not given this morning, although cortisol was drawn. Labs will need to be redrawn tomorrow morning after appropriate stimulation. Patient denies chest pain or shortness of breath.     10/5-> patient laying in bed, refused physical therapy today secondary to abdominal pain.  When asked, patient replies that this pain is only a 2 out of 10 in severity, however at times it is much worse. CTA of the abdomen pending. Patient is a pre-CERT which will be initiated hopefully today. \"    10/7 Patient was noted to have decreased hemoglobin today to value of 7.0. Repeat H&H showed a value of 6.6. Patient does have a history of melanotic stools. Transfuse 1 PRBCs. GI was consulted plans for EGD tomorrow morning. Subjective (past 24 hours):  Patient was examined at bedside during HD. No acute distress. Denies fever, chills, chest pain, shortness of breath, abd pain. States his fistula is itchy otherwise has no complaints. Patient is more cooperative PT OT. GI was consulted due to persistent decreasing hemoglobin with a history of melanotic stools. Plans for EGD tomorrow morning. Patient is status post transfusion of 1 PRBCs. Repeat H&H this morning showed a hemoglobin of 9.4. Continue to monitor closely we will transfuse as necessary. Patient had stable 4 hour treatment with 1 L removed during HD.        ROS: Pertinent positives as noted in HPI. All other systems reviewed and negative. Past medical history, family history, social history and allergies reviewed again and is unchanged since admission. Medications:  Reviewed.   Infusion Medications    sodium chloride      sodium chloride      sodium chloride      dextrose Stopped (10/01/21 1231)    sodium chloride       Scheduled Medications    pantoprazole  40 mg IntraVENous BID    lactulose  20 g Oral BID    lidocaine 1 % injection  5 mL IntraDERmal Once    dicyclomine  10 mg Oral TID AC    dilTIAZem  30 mg Oral Once    [Held by provider] aspirin  81 mg Oral Daily    [Held by provider] heparin (porcine)  5,000 Units SubCUTAneous 3 times per day    epoetin traci-epbx  6,000 Units SubCUTAneous Once per day on Mon Wed Fri    calcitRIOL  1.5 mcg Oral Once per day on Mon Wed Fri    cinacalcet  60 mg Oral interpreted unless otherwise noted)  Labs:   Recent Labs     10/06/21  0415 10/06/21  1421 10/07/21  0450 10/07/21  0950 10/08/21  0730   WBC 4.7*  --  4.9  --   --    HGB 7.1*   < > 7.0* 6.6* 9.4*   HCT 21.5*   < > 21.4* 19.9* 27.8*   *  --  139  --   --     < > = values in this interval not displayed. Recent Labs     10/06/21  0415 10/07/21  0450    135   K 4.0 3.8    97*   CO2 25 28   BUN 28* 13   CREATININE 5.5* 3.9*   CALCIUM 7.8* 7.8*     No results for input(s): AST, ALT, BILIDIR, BILITOT, ALKPHOS in the last 72 hours. No results for input(s): INR in the last 72 hours. No results for input(s): Makenna Dross in the last 72 hours. Urinalysis:   Lab Results   Component Value Date    NITRU NEGATIVE 06/22/2019    WBCUA 50-75 06/22/2019    BACTERIA NONE 06/22/2019    RBCUA 25-50 06/22/2019    BLOODU LARGE 06/22/2019    SPECGRAV 1.013 05/03/2016    GLUCOSEU NEGATIVE 06/22/2019     Urine culture: No results found for: Hosea Soriano  Micro:   Blood culture #1:   Lab Results   Component Value Date    BC No growth-preliminary No growth  10/02/2021     Blood culture #2:No results found for: Khang Larry  Organism:  Lab Results   Component Value Date    ORG Micrococcus species 07/12/2021       No results found for: LABGRAM  MRSA culture only:No results found for: [address] [place] [address]  Respiratory culture: No results found for: CULTRESP  Aerobic and Anaerobic :  No results found for: LABAERO  No results found for: [facility]    Radiology Reports:  CTA ABDOMEN PELVIS W WO CONTRAST   Final Result   1. Bilateral airspace infiltrates and pleural effusions right greater than left. 2. Cardiomegaly. 3. Atrophic kidneys bilaterally. 4 liquid stool throughout much of the colon and rectum consistent with diarrhea no evidence of bowel obstruction. 5. Although there is diffuse calcific atherosclerosis of the aorta and mesenteric vessels there is no significant stenosis of the mesenteric vessels.  However the renal arteries are bilaterally extremely diffusely narrow. **This report has been created using voice recognition software. It may contain minor errors which are inherent in voice recognition technology. **      Final report electronically signed by Dr. Cristina Valentin on 10/5/2021 3:33 PM      IR FLUORO GUIDED CVA DEVICE PLMT/REPLACE/REMOVAL   Final Result   Status post successful midline insertion. **This report has been created using voice recognition software. It may contain minor errors which are inherent in voice recognition technology. **      Final report electronically signed by Dr Rhett Menezes on 10/4/2021 1:54 PM      XR ABDOMEN (KUB) (SINGLE AP VIEW)   Final Result   A nonspecific but nonobstructive bowel gas pattern. This document has been electronically signed by: Deirdre Cortez DO on    10/02/2021 12:45 AM      XR CHEST PORTABLE   Final Result   1. Scattered patchy regions of predominantly interstitial densities    throughout the bilateral lungs grossly similar to the prior examination. Mild bibasilar pleural/parenchymal opacities most consistent with tiny    effusions with atelectasis and/or consolidation. 2. Cardiomegaly with the heart size unchanged from the prior examination. This document has been electronically signed by: Deirdre Cortez DO on    10/02/2021 12:47 AM      CT ABDOMEN PELVIS WO CONTRAST Additional Contrast? None   Final Result   1. Limited evaluation of the lung bases demonstrates small bilateral pleural effusions with patchy consolidative opacities at the lower lobes which may be related to pneumonia. 2. The gallbladder demonstrates a small gallstone near the gallbladder neck. This appears mildly distended. 3. Mild ascites in the perihepatic region is noted. **This report has been created using voice recognition software. It may contain minor errors which are inherent in voice recognition technology. **      Final report electronically signed by Dr. Chuck Amos on 9/30/2021 1:35 PM      XR ABDOMEN (KUB) (SINGLE AP VIEW)   Final Result   1. Overall nonobstructive bowel gas pattern. **This report has been created using voice recognition software. It may contain minor errors which are inherent in voice recognition technology. **      Final report electronically signed by Dr Korey Cisneros on 9/30/2021 12:26 PM      XR CHEST PORTABLE   Final Result   1. Patchy multifocal airspace disease is similar to 9/24/2021 exam, interstitial and airspace edema versus atypical infection. **This report has been created using voice recognition software. It may contain minor errors which are inherent in voice recognition technology. **      Final report electronically signed by Dr Korey Cisneros on 9/30/2021 9:33 AM      XR CHEST PORTABLE   Final Result   Impression:      Increasing consolidation and interstitial prominence. Question pulmonary edema versus pneumonia      This document has been electronically signed by: Em Hu MD on    09/24/2021 10:34 PM      XR CHEST PORTABLE   Final Result   1. No acute intrathoracic process. 2. Mild stable cardiomegaly. **This report has been created using voice recognition software. It may contain minor errors which are inherent in voice recognition technology. **      Final report electronically signed by Dr. Carl Murillo on 9/22/2021 9:52 PM      CT HEAD WO CONTRAST   Final Result      1. Stable CT scan of the brain, no interval change since previous study dated 15th of June 2021. **This report has been created using voice recognition software. It may contain minor errors which are inherent in voice recognition technology. **      Final report electronically signed by DR Bernard Sams on 9/15/2021 4:26 PM      CT CERVICAL SPINE WO CONTRAST   Final Result    No evidence of acute osseous injury of the cervical spine.                **This report has been created using voice recognition software. It may contain minor errors which are inherent in voice recognition technology. **      Final report electronically signed by Dr. Mynor Macias MD on 9/15/2021 4:29 PM      XR CHEST PORTABLE   Final Result   Cardiomegaly with vascular congestion and interstitial edema and effusions differential would include congestive heart failure or fluid overload. **This report has been created using voice recognition software. It may contain minor errors which are inherent in voice recognition technology. **      Final report electronically signed by Dr. Kang Larsen on 9/13/2021 2:22 PM        XR CHEST PORTABLE    Result Date: 9/13/2021  PROCEDURE: XR CHEST PORTABLE CLINICAL INFORMATION: SOB . COMPARISON: 7/12/2021 TECHNIQUE: Portable upright FINDINGS: Heart size is mildly enlarged. Mediastinum is not widened. There is pulmonary vascular congestion. Interstitial edema. There is suggestion of small effusions. AICD over the left chest. EKG leads overlie the chest. Right upper extremity vascular stents are noted. Cardiomegaly with vascular congestion and interstitial edema and effusions differential would include congestive heart failure or fluid overload. **This report has been created using voice recognition software. It may contain minor errors which are inherent in voice recognition technology. ** Final report electronically signed by Dr. Kang Larsen on 9/13/2021 2:22 PM        Tele:   [x] yes             [] no      Active Hospital Problems    Diagnosis Date Noted    Hypoxia [R09.02]     Severe malnutrition ([facility] [address] [E43] 09/22/2021     Class: Acute    COVID-19 [U07.1] 09/13/2021    Elevated troponin [R77.8]        Electronically signed by Jcarlos Martinez DO on 10/8/2021 at 4:10 PM

## 2021-10-08 NOTE — CONSULTS
be better. PAST MEDICAL HISTORY:  Significant for anemia of chronic kidney disease,  coronary artery disease with angioplasty, chronic kidney disease; on  hemodialysis, history of blood transfusion more than one time,  hyperphosphatemia, hypertension, sick sinus syndrome, systolic  congestive heart failure, history of peptic ulcer disease. PAST SURGICAL HISTORY:  Significant for angioplasty, colonoscopy done,  dialysis fistula formation, upper endoscopy. SOCIAL HISTORY:  Ex-smoker. Quit smoking more than 40 years ago. No  smokeless tobacco.  No history of alcohol abuse. No drug abuse. FAMILY HISTORY:  Diabetes, hypertension, coronary artery disease. ALLERGIES:  NONE. MEDICATIONS:  He is on Protonix, Norco, lactulose as needed, Imodium as  needed, Bentyl as needed, metoprolol, Cardizem, Duoneb nebulizer,  vitamin D, Colace as needed, Neurontin, Tylenol. PHYSICAL EXAMINATION:  GENERAL:  Appeared to be healthy, not short of breath, younger age. VITAL SIGNS:  His blood pressure is 165/72, respirations 18. HEENT:  His head is atraumatic. Sclerae anicteric. His oral cavity, no  lesions seen. NECK:  Supple. CHEST:  Good air entry bilaterally. CARDIOVASCULAR:  S1, S2. No murmur. ABDOMEN:  Soft. Very mild tenderness in the left upper quadrant. No  organomegaly. No stigmata of chronic liver disease. EXTREMITIES:  +1 edema. LABORATORY DATA:  His sodium is 135, potassium is 3.8, his BUN is 13,  creatinine 3.9. His white blood cell is 4.9. His H and H are 6.6 and  19.9. MCV 84 and RDW in normal range. Platelets within normal range. His stool is positive for blood. CT scan of the abdomen and pelvis showed atrophic kidney bilateral;  cardiomegaly; bilateral airspace infiltrate and pleural effusion, right  more than left. Also seen an excess amount of stool. IMPRESSION:  1. Anemia of chronic disease, also with acute component.   Seemingly,  his H and H had dropped more lately with intermittent black stool. 2.  Chronic kidney disease, on hemodialysis. 3.  Congestive heart failure. 4.  AFib. 5.  Coronary artery disease, status post angioplasty. 6.  Elevated troponin. PLAN:  1. Continue PPI. 2.  Transfuse. 3.  Upper endoscopy to be done tomorrow to evaluate. 4.  Avoid NSAIDs. 5.  Transfuse as needed. Thank you for allowing me to participate in this patient's care.         Renny Kan M.D.    D: 10/08/2021 0:30:58       T: 10/08/2021 2:30:43     [email or web address]  Job#: [id number]     Doc#: [id number]    CC:  Asiya lOiver MD

## 2021-10-09 NOTE — PROGRESS NOTES
Hospitalist Progress Note    Patient:  Henny Hayes    Unit/Bed:5K-14/014-A  YOB: 1930  MRN: [id number]   Acct: [de-identified]   PCP: Segun Valencia MD  Code Status: Full Code  Date of Admission: 9/13/2021    Expected Discharge: pending placement. Disposition: ECF, precert pending. Pt is medically stable for discharge, awaiting placement. Assessment/Plan:    1. Recurrent hypoglycemic episodes: unclear etiology, possible adrenal insufficiency. Pt is not a diabetic, no DM medications listed. Check Hgb A1c - 4.5%. Recent tx for COVID with steroids x9 days. Check LA, elevated, resolved, and then again elevated. Check random cortisol level -> wnl. S/p 2 dose of glucose oral gel and 3 doses of 1 mg Glucagon. Pt was noted as low as 23, continues to trend down despite D10 drip at 100 mL/hr. Continue Q2 hr checks. Trial 10 mg Decadron steroids once and assess response. 10/1-> stop D5 drip and monitor    10/2-> LA now elevated again, suspicion potentially from underlying infection. 10/3-> pt given Decadron 10 mg x 1 dose last night when noted to again be hypoglycemic. Adrenal insufficiency? Pending hospice eval will consider Cortisol stimulation testing. 10/4-> Cosyntropin was not given this morning. Cortisol levels that were drawn are not accurate. We will repeat test tomorrow morning. Midline placed. 10/5-> Cortisol at 60 minutes showed 23.57. BG remains stable. 10/6 -> Patient had HD this morning. BG remaining stable. 10/7 - Glucose stable. 2. Abd pain, generalized, unclear etiology:. Check CT A / P -> showing distended gallbladder. LA improved and resolved, however was noted to again be elevated as high as 8.7 which is improved to 4.9. Check CTA abdomen to rule out ischemia. Will consider Gen Surg consultation. 10/3-> discussed with pt and family. Surgery is not an option they wish to consider should it be necessary.    10/5-> c/o continued belly pain, continue to trend LA till normalized. CTA of abd today. 10/6 -> LA 1.6 - WNL  10/9: Patient is status post EGD today which showed erosive esophagitis and duodenitis. We will continue PPI. Diet resumed. Monitor H&H. Daughter updated over the phone on results of EGD. 3. COVID-19 pneumonia (resolved): treated with dexamethasone 6mg daily. First positive test 09/13. Pt stable on RA. Isolation no longer required, transferred to . 4. Possible superimposed bacterial PNA: Completed treatment with Rocephin and Azithromycin. Remains afebrile. No leukocytosis. 5. Acute hypoxic respiratory failure: 2/2 above. 9/25-> Pt placed on O2 overnight after rapid was called. PO2 on ABG was 170, no hypercapnea. No acute changes on EKG, no evidence of fluid overload. Pt is currently 4L, ABG showing respiratory alkalosis. Continue to monitor and wean as tolerated. 10/6 -> patient currently on 2lpm NC.     6. ESRD on HD:  Secondary to diabetic and hypertensive nephrosclerosis. Nephrology following. On HD MWF. Had successful HD this morning 10/6 with 1.5 L removed    7. S/p unwitnessed fall: No apparent injuries, CT brain and cervical spine -unremarkable     8. Dark tarry stools / chronic normocytic anemia: hx INDRA. On Retacrit, ferrous sulfate. One episode, none further. Hemoglobin stable. Negative occult 9/19/2021.  Continue to monitor, transfuse for hemoglobin less than 7. Prior to dialysis on Wednesday patient had hemoglobin 7.1. Postdialysis hemoglobin increased over 8. However following day hemoglobin was noted to be 7.0. Repeat H&H showed 6.6. Plavix, aspirin, heparin held in setting of potential GI bleed. Transfuse 1 unit PRBCs. Consult was placed and plans for EGD 10/9. Repeat H&H this morning (10/8) was 9.4. We will continue to monitor closely. 9. Elevated troponin: chronically elevated, likely d/t ESRD.  No chest pain, repeat troponin improved, no acute ischemic changes on ekg. 10. CAD s/p PCI: Patient had 2 stents (LAD and Ramus) at [facility] on 08/31/21. Will continue with aspirin, Plavix, metoprolol and pravastatin. 11. H/o NSVT / Sick sinus syndrome: noted, has PPM/ICD. Tele ordered. Continue metoprolol. Possible PAF. However, after review of EKGs, P waves were present and did not appear to be in a fib. Pt typically follows with Cardiology at [facility], unclear if he has a hx of AF. He is not on anticoagulation. Even if PAF, the risks of anticoagulation may be greater than the benefit. CHADSVASc 4, HAS-BLED 4. Pacer interrogated, awaiting report. Pt will need to arrange follow up with his Cardiologist at [facility]. 12. Chronic HFrEF: EF 45% and grade 1 diastolic dysfunction on Echo 05/2021. Follows with Cardiology at [facility]. Fluid status stable, Nephrology following. Cont home meds. 13. Disposition: Difficulty with discharge secondary to Covid diagnosis and hemodialysis schedule. Appreciate case management assistance. 14. Goals of care: Hospice consulted. Per pt and family wishes, code status changed to [facility] and then back to Limited No x3, yes to defib / cardioversion after family discussion with hospice. 15. Lactic acidosis: LA was noted to 8.7 on 10/1, markedly improved with repeat down to 2.5 today. Continue to check every 2 hours to normalize. Unclear etiology, potentially from abdominal source as patient complains of severe discomfort in his abdomen at times. LA today 10/6 is WNL 1.6. Patient denies abdominal pain today. i.  CTA of abdomen shows :  1. Bilateral airspace infiltrates and pleural effusions right greater than left. 2. Cardiomegaly. 3. Atrophic kidneys bilaterally. 4.  liquid stool throughout much of the colon and rectum consistent with diarrhea no evidence of bowel obstruction. 5. Although there is diffuse calcific atherosclerosis of the aorta and mesenteric vessels there is no significant stenosis of the mesenteric vessels.  However the renal arteries are bilaterally extremely diffusely narrow. 16. Disposition: Patient is a pre-CERT. DC planning to nursing home likely on Monday. Chief Complaint: SOB    HPI / Hospital Course: Per HPI: \"The patient is a 80 y.o. male who presents with complaints of shortness of breath and cough for the last 2 days. Patient is accompanied by family who helps with history. They report that patient has had a poor appetite, fatigued, short of breath and coughing for last 2 days. He recently was at [facility] approximately 2 weeks ago where he had 2 stents placed. They state he was in his normal state of health until 2 days ago. Patient was seen in dialysis today and had a full session however continued to be short of breath and had a significant cough and was sent for evaluation. He denies any fevers or chills, nausea or vomiting. He denies any diarrhea or constipation. He denies any chest pain.     In the ED patient was found to be Covid positive. He was placed on 2 L nasal cannula. He was also found to have slightly elevated troponin and was started on heparin drip in the ED. Family updated on test results. \"    Pt has completed treatment for COVID and superimposed bacterial PNA. I took over care 9/22, per previous note, pt stable for discharge and awaiting precert to ECF.      5/70-> Rapid response called during dialysis. Pt had vomited, became lethargic and had episode of AF RVR on the monitor per report. Dialysis was stopped. Patient still lethargic but following commands and nods to answer questions appropriately. EKG showed NSR, does not appear to be AF. Patient is moaning which per nursing, he typically does during dialysis. Patient seen again later. He reports fatigue and wanting to sleep. He denies abdominal pain, n/v/d, CP, SOB, fever/chills. Pt had bleeding from dialysis fistulasite. 9/23-> Patient alert to verbal stimuli, lethargic. Appears to be at his baseline.  Denies fever/chills, sob, cp, abd pain, n/v/d. Nephrology started IVF for today and plan for dialysis tomorrow. Patient has been NSR on tele. 9/24-> Pt appears at baseline. He is more alert today than he has been. Tolerated dialysis well. 9/26-> Patient is alert, oriented to self, place, time, and somewhat to situation. He denies fever/chills, SOB, CP, abd pain, n/v/d, cough. He reports of left arm pain at dialysis cath site. The patient states that he has been told before that his heart beat gets \"out of wack. \"   Chart reviewed and a rapid was called last night they were unable to get a reading of O2 levels. ABG results with pO2 179, no hypercapnea. Based on ABG did not appear to be truly hypoxic so he was put back on 6L NC and nursing instructed to wean. His EKG reported atrial fibrillation; however, after review, there were P waves present. Ordered pacer interrogate. 9/27-> no acute events overnight. Patient denies sob, cp, abd pain, nvd. Nephrology planning HD tomorrow. Awaiting pacer interrogate results. 9/28- Pt had dialysis today, tolerated well. 9/29 -> patient on HD. Patient is doing well. Denies fever/chills, sob, cp, palpitations, abd pain, n/v/d. Disposition has been difficult secondary to Covid diagnosis and HD facility/schedule. 9/30-> pt had a rough morning. Called to bedside due to pulse ox being read as low as 50% and was very labile. Pt placed on non-re breather. Pt was c/o of abd pain and an episode of diarrhea that started last night. Had an episode of emesis s/p IO insertion, none since then. Afebrile. 10/1-> patient doing much better today, seen in dialysis. Patient denies any abdominal pain at all today. No nausea or vomiting. Hypoglycemia has resolved and remained stable. Afebrile. Has been weaned down to 4 L NC.    10/2-> patient had a rapid response called overnight secondary to abdominal pain and hypotension (as low as the 66R systolic).   Patient was noted to have an elevated lactic acid. He was given fluids and started on IV antibiotics. Today upon exam, patient again is complaining of severe abdominal pain all over. We will check a CTA of the abdomen. Consider general surgery consultation. 10/3-> patient again had a rough day complaining of intense 10/10 abdominal pain. Patient stated \"I want to be poked no more \"and we discussed the concept of moving forward with comfort care versus further diagnostic testing. Patient and family agreed that comfort care was the route they wish to take. Hospice evaluation placed. Overnight, patient's blood glucose has been stable. Again discussed with patient who stated he wanted to proceed with comfort care. Pt is A&Ox4 on this exam. Discussed stopping any lab draws and also fingerstick checks. Discussed potential adrenal insufficiency which cannot be confirmed without further lab testing. Pt is aware that this could lead to death. Discussed with Ayad Shelley via telephone. Plan to meet today with Hospice. They are now unsure if they wish to proceed with comfort care as they were unaware that pt would not be a dialysis pt any longer. Await hospice eval.   Patient reporting that he still has generalized abdominal pain, though it is much improved from yesterday. Blood pressure again noted to be in the 80s last night, has maintained stability today. 10/4-> patient lying in bed having returned from dialysis. Patient denies as intense abdominal pain today, however notes that he does have some generalized pain especially with palpation. Status post midline placement with IR. Cosyntropin was not given this morning, although cortisol was drawn. Labs will need to be redrawn tomorrow morning after appropriate stimulation. Patient denies chest pain or shortness of breath. 10/5-> patient laying in bed, refused physical therapy today secondary to abdominal pain.   When asked, patient replies that this pain is only a 2 out of 10 in severity, however at times it is much worse. CTA of the abdomen pending. Patient is a pre-CERT which will be initiated hopefully today. \"    10/7 Patient was noted to have decreased hemoglobin today to value of 7.0. Repeat H&H showed a value of 6.6. Patient does have a history of melanotic stools. Transfuse 1 PRBCs. GI was consulted plans for EGD tomorrow morning. Subjective (past 24 hours):  Patient was examined at bedside. Patient states he is doing well has no complaints at this time. Had an EGD earlier today which showed esophagitis. No complaints of abdominal pain, nausea or vomiting. No new complaints or acute overnight events. ROS: Pertinent positives as noted in HPI. All other systems reviewed and negative. Past medical history, family history, social history and allergies reviewed again and is unchanged since admission. Medications:  Reviewed.   Infusion Medications    sodium chloride      sodium chloride      sodium chloride      sodium chloride      dextrose Stopped (10/01/21 1231)     Scheduled Medications    sodium chloride flush  5-40 mL IntraVENous 2 times per day    pantoprazole  40 mg IntraVENous BID    lactulose  20 g Oral BID    lidocaine 1 % injection  5 mL IntraDERmal Once    dicyclomine  10 mg Oral TID AC    dilTIAZem  30 mg Oral Once    [Held by provider] aspirin  81 mg Oral Daily    [Held by provider] heparin (porcine)  5,000 Units SubCUTAneous 3 times per day    epoetin traci-epbx  6,000 Units SubCUTAneous Once per day on Mon Wed Fri    calcitRIOL  1.5 mcg Oral Once per day on Mon Wed Fri    cinacalcet  60 mg Oral Once per day on Mon Wed Fri    docusate sodium  100 mg Oral BID    ferrous sulfate  325 mg Oral Daily with breakfast    gabapentin  100 mg Oral TID    metoprolol  100 mg Oral BID    vitamin C  500 mg Oral Daily    polyethylene glycol  17 g Oral Daily    [Held by provider] clopidogrel  75 mg Oral Daily     PRN Meds: sodium chloride flush, sodium chloride, sodium chloride, sodium chloride, HYDROcodone-acetaminophen, sodium chloride, loperamide, metoprolol, metoclopramide, ipratropium-albuterol, glucose, dextrose, glucagon (rDNA), dextrose, ondansetron **OR** ondansetron, polyethylene glycol, acetaminophen **OR** acetaminophen, guaiFENesin-dextromethorphan, albuterol sulfate HFA    I/O:     Intake/Output Summary (Last 24 hours) at 10/9/2021 1515  Last data filed at 10/9/2021 1325  Gross per 24 hour   Intake 50 ml   Output 0 ml   Net 50 ml       Diet:  ADULT DIET; Regular; Low Potassium (Less than 3000 mg/day)    Exam:  BP (!) 176/81   Pulse 66   Temp 98 °F (36.7 °C) (Oral)   Resp 18   Ht 5' 6\" (1.676 m)   Wt 114 lb 13.8 oz (52.1 kg)   SpO2 100%   BMI 18.54 kg/m²      General:  Chronically ill appearing male. NAD. Alert and oriented x3   HEENT:  normocephalic and atraumatic. No scleral icterus. PERRLA  Neck: supple. No JVD. No thyromegaly. Lungs: CTA no wheezes noted. No respiratory distress noted. Cardiac: S1 and S2 present, RRR no murmurs/rubs/gallops  Abdomen: soft. Bowel sounds positive. No guarding noted  Extremities:  No clubbing, cyanosis, or edema x 4. Vasculature: capillary refill < 3 seconds. Palpable LE pulses bilaterally. Skin:  warm and dry. Psych:  Alert and oriented x4. Lymph:  No supraclavicular adenopathy. Neurologic:  No focal deficit. No seizures. Data: (All radiographs, tracings, PFTs, and imaging are personally viewed and interpreted unless otherwise noted)  Labs:   Recent Labs     10/07/21  0450 10/07/21  0450 10/07/21  0950 10/08/21  0730 10/09/21  0600   WBC 4.9  --   --   --  4.8   HGB 7.0*   < > 6.6* 9.4* 8.6*   HCT 21.4*   < > 19.9* 27.8* 26.2*     --   --   --  154    < > = values in this interval not displayed.      Recent Labs     10/07/21  0450 10/09/21  0600    137   K 3.8 3.9   CL 97* 98   CO2 28 29   BUN 13 11   CREATININE 3.9* 3.6*   CALCIUM 7.8* 7.6*     No results for input(s): AST, ALT, BILIDIR, BILITOT, ALKPHOS in the last 72 hours. No results for input(s): INR in the last 72 hours. No results for input(s): Patrisha Meigs in the last 72 hours. Urinalysis:   Lab Results   Component Value Date    NITRU NEGATIVE 06/22/2019    WBCUA 50-75 06/22/2019    BACTERIA NONE 06/22/2019    RBCUA 25-50 06/22/2019    BLOODU LARGE 06/22/2019    SPECGRAV 1.013 05/03/2016    GLUCOSEU NEGATIVE 06/22/2019     Urine culture: No results found for: Annalisa Goodson  Micro:   Blood culture #1:   Lab Results   Component Value Date    BC No growth-preliminary No growth  10/02/2021     Blood culture #2:No results found for: Catarina Coleman  Organism:  Lab Results   Component Value Date    ORG Micrococcus species 07/12/2021       No results found for: LABGRAM  MRSA culture only:No results found for: [facility]  Respiratory culture: No results found for: CULTRESP  Aerobic and Anaerobic :  No results found for: LABAERO  No results found for: [facility]    Radiology Reports:  CTA ABDOMEN PELVIS W WO CONTRAST   Final Result   1. Bilateral airspace infiltrates and pleural effusions right greater than left. 2. Cardiomegaly. 3. Atrophic kidneys bilaterally. 4 liquid stool throughout much of the colon and rectum consistent with diarrhea no evidence of bowel obstruction. 5. Although there is diffuse calcific atherosclerosis of the aorta and mesenteric vessels there is no significant stenosis of the mesenteric vessels. However the renal arteries are bilaterally extremely diffusely narrow. **This report has been created using voice recognition software. It may contain minor errors which are inherent in voice recognition technology. **      Final report electronically signed by Dr. Cristino Ocampo on 10/5/2021 3:33 PM      IR FLUORO GUIDED CVA DEVICE PLMT/REPLACE/REMOVAL   Final Result   Status post successful midline insertion. **This report has been created using voice recognition software.   It may contain minor errors which are inherent in voice recognition technology. **      Final report electronically signed by Dr Luc Aburto on 10/4/2021 1:54 PM      XR ABDOMEN (KUB) (SINGLE AP VIEW)   Final Result   A nonspecific but nonobstructive bowel gas pattern. This document has been electronically signed by: Margaret Jenkins DO on    10/02/2021 12:45 AM      XR CHEST PORTABLE   Final Result   1. Scattered patchy regions of predominantly interstitial densities    throughout the bilateral lungs grossly similar to the prior examination. Mild bibasilar pleural/parenchymal opacities most consistent with tiny    effusions with atelectasis and/or consolidation. 2. Cardiomegaly with the heart size unchanged from the prior examination. This document has been electronically signed by: Margaret Jenkins DO on    10/02/2021 12:47 AM      CT ABDOMEN PELVIS WO CONTRAST Additional Contrast? None   Final Result   1. Limited evaluation of the lung bases demonstrates small bilateral pleural effusions with patchy consolidative opacities at the lower lobes which may be related to pneumonia. 2. The gallbladder demonstrates a small gallstone near the gallbladder neck. This appears mildly distended. 3. Mild ascites in the perihepatic region is noted. **This report has been created using voice recognition software. It may contain minor errors which are inherent in voice recognition technology. **      Final report electronically signed by Dr. Shazia Cook on 9/30/2021 1:35 PM      XR ABDOMEN (KUB) (SINGLE AP VIEW)   Final Result   1. Overall nonobstructive bowel gas pattern. **This report has been created using voice recognition software. It may contain minor errors which are inherent in voice recognition technology. **      Final report electronically signed by Dr Thad Branch on 9/30/2021 12:26 PM      XR CHEST PORTABLE   Final Result   1.  Patchy multifocal airspace disease is similar to 9/24/2021 exam, interstitial and airspace edema versus atypical infection. **This report has been created using voice recognition software. It may contain minor errors which are inherent in voice recognition technology. **      Final report electronically signed by Dr Gage Rowland on 9/30/2021 9:33 AM      XR CHEST PORTABLE   Final Result   Impression:      Increasing consolidation and interstitial prominence. Question pulmonary edema versus pneumonia      This document has been electronically signed by: Maya Nelson MD on    09/24/2021 10:34 PM      XR CHEST PORTABLE   Final Result   1. No acute intrathoracic process. 2. Mild stable cardiomegaly. **This report has been created using voice recognition software. It may contain minor errors which are inherent in voice recognition technology. **      Final report electronically signed by Dr. Juliana Ponce on 9/22/2021 9:52 PM      CT HEAD WO CONTRAST   Final Result      1. Stable CT scan of the brain, no interval change since previous study dated 15th of June 2021. **This report has been created using voice recognition software. It may contain minor errors which are inherent in voice recognition technology. **      Final report electronically signed by DR Rell Ramos on 9/15/2021 4:26 PM      CT CERVICAL SPINE WO CONTRAST   Final Result    No evidence of acute osseous injury of the cervical spine. **This report has been created using voice recognition software. It may contain minor errors which are inherent in voice recognition technology. **      Final report electronically signed by Dr. Maria T Cope MD on 9/15/2021 4:29 PM      XR CHEST PORTABLE   Final Result   Cardiomegaly with vascular congestion and interstitial edema and effusions differential would include congestive heart failure or fluid overload. **This report has been created using voice recognition software.   It may contain minor errors which are inherent in voice recognition technology. **      Final report electronically signed by Dr. Cristina Valentin on 9/13/2021 2:22 PM        XR CHEST PORTABLE    Result Date: 9/13/2021  PROCEDURE: XR CHEST PORTABLE CLINICAL INFORMATION: SOB . COMPARISON: 7/12/2021 TECHNIQUE: Portable upright FINDINGS: Heart size is mildly enlarged. Mediastinum is not widened. There is pulmonary vascular congestion. Interstitial edema. There is suggestion of small effusions. AICD over the left chest. EKG leads overlie the chest. Right upper extremity vascular stents are noted. Cardiomegaly with vascular congestion and interstitial edema and effusions differential would include congestive heart failure or fluid overload. **This report has been created using voice recognition software. It may contain minor errors which are inherent in voice recognition technology. ** Final report electronically signed by Dr. Cristina Valentin on 9/13/2021 2:22 PM        Tele:   [x] yes             [] no      Active Hospital Problems    Diagnosis Date Noted    Hypoxia [R09.02]     Severe malnutrition ([address] [E43] 09/22/2021     Class: Acute    COVID-19 [U07.1] 09/13/2021    Elevated troponin [R77.8]        Electronically signed by Vashti Israel MD on 10/9/2021 at 3:15 PM

## 2021-10-09 NOTE — PROGRESS NOTES
4631:  Pt arrived to endo recovery room, no family present, pt arouses to verbal stimuli. Pt denies pain at this time.     0760:  Report called to floor RN, Patient ID: Christi Dominguez is a 29year old female. HPI  Patient presents with:  ER F/U: cat bite     She works at a veterinary clinic. On July 2, 2017 she was bit in the left hand by the cat that they were going to euthanize.   She was placed on Aug the lungs 2 (two) times daily. Disp: 1 Can Rfl: 3   Cholecalciferol (VITAMIN D-3) 5000 UNITS Oral Tab Take 1 tablet by mouth. Disp:  Rfl:    triamcinolone acetonide 0.1 % External Ointment Apply 1 Application topically 2 (two) times daily.  Disp: 1 Tube Rfl into the lungs 2 (two) times daily. While here, just needed a refill of her fluticasone and albuterol. Mild persistent asthma without complication  -     Fluticasone Propionate HFA (FLOVENT HFA) 110 MCG/ACT Inhalation Aerosol;  Inhale 2 puffs into the abeba

## 2021-10-09 NOTE — ANESTHESIA PRE PROCEDURE
Department of Anesthesiology  Preprocedure Note       Name:  Bora Guerra   Age:  80 y.o.  :  11/10/1930                                          MRN:  [id number]         Date:  10/9/2021      Surgeon: Sheyla Cervantes):  Georgina Carreno MD    Procedure: Procedure(s):  EGD    Medications prior to admission:   Prior to Admission medications    Medication Sig Start Date End Date Taking?  Authorizing Provider   albuterol sulfate  (90 Base) MCG/ACT inhaler Inhale 1 puff into the lungs every 4 hours as needed 21  Yes Historical Provider, MD   albuterol sulfate  (90 Base) MCG/ACT inhaler Inhale 1 puff into the lungs every 4 hours as needed 21  Yes Historical Provider, MD   amLODIPine (NORVASC) 10 MG tablet Take 10 mg by mouth daily 19  Yes Historical Provider, MD   ASPIRIN LOW DOSE 81 MG EC tablet Take 81 mg by mouth daily 21  Yes Historical Provider, MD   atorvastatin (LIPITOR) 40 MG tablet Take 40 mg by mouth daily 21  Yes Historical Provider, MD   cloNIDine (CATAPRES) 0.1 MG tablet Take 0.1 mg by mouth daily 21  Yes Historical Provider, MD   clopidogrel (PLAVIX) 75 MG tablet Take 75 mg by mouth daily 21  Yes Historical Provider, MD   hydrALAZINE (APRESOLINE) 50 MG tablet Take 50 mg by mouth daily 21  Yes Historical Provider, MD   ferrous sulfate (IRON 325) 325 (65 Fe) MG tablet Take 325 mg by mouth daily 19  Yes Historical Provider, MD   metoprolol (LOPRESSOR) 100 MG tablet Take 1 tablet by mouth 2 times daily 21  Yes Martina Marcum MD   calcitRIOL (ROCALTROL) 0.25 MCG capsule Take 1.5 mcg by mouth three times a week before dialysis on M,W,F   Yes Historical Provider, MD   cyclobenzaprine (FLEXERIL) 10 MG tablet Take 10 mg by mouth daily 17   Historical Provider, MD   metoprolol succinate (TOPROL XL) 50 MG extended release tablet Take 50 mg by mouth daily 21   Historical Provider, MD   cinacalcet (SENSIPAR) 30 MG tablet Take 60 mg by mouth three times a week before dialysis on M,W,F    Historical Provider, MD   pantoprazole (PROTONIX) 40 MG tablet TAKE 1 TABLET DAILY BEFORE BREAKFAST 4/28/20   [place] XIN Torres - CNP   docusate sodium (COLACE, DULCOLAX) 100 MG CAPS Take 100 mg by mouth 2 times daily 6/24/19   Delmon Masker, DO   vitamin C (ASCORBIC ACID) 500 MG tablet Take 1 tablet by mouth daily 6/24/19   Delmon Masker, DO   polyethylene glycol (MIRALAX) powder Renal Miralax Colonoscopy prep. Use as directed. Mix Miralax 238 g with 24 oz clear liquids. Drink 8 oz glass every 20-30 minutes until gone.  6/6/19   [place] XIN Torres - CNP   B Complex-C-Folic Acid (RENAL) 1 MG CAPS Take 1 capsule by mouth daily    Historical Provider, MD       Current medications:    Current Facility-Administered Medications   Medication Dose Route Frequency Provider Last Rate Last Admin    sodium chloride flush 0.9 % injection 5-40 mL  5-40 mL IntraVENous 2 times per day Landon Carey MD   10 mL at 10/08/21 2134    sodium chloride flush 0.9 % injection 5-40 mL  5-40 mL IntraVENous PRN Landon Carey MD        0.9 % sodium chloride infusion  25 mL IntraVENous PRN Landon Carey MD        0.9 % sodium chloride infusion   IntraVENous PRN Iggy Castillo DO        pantoprazole (PROTONIX) injection 40 mg  40 mg IntraVENous BID Nelsy Bux, DO   40 mg at 10/08/21 2134    0.9 % sodium chloride infusion   IntraVENous PRN Nelsy Bux, DO        HYDROcodone-acetaminophen (NORCO) 5-325 MG per tablet 1 tablet  1 tablet Oral Q4H PRN Chetna Hurst PA-C        lactulose (CHRONULAC) 10 GM/15ML solution 20 g  20 g Oral BID Inés John MD   20 g at 10/06/21 2115    0.9 % sodium chloride infusion   IntraVENous PRN Benjamin Canada MD        loperamide (IMODIUM) capsule 2 mg  2 mg Oral 4x Daily PRN Liudmila Pérez DO   2 mg at 10/07/21 0922    lidocaine PF 1 % injection 5 mL  5 mL IntraDERmal Once Rl Hedrick PA-C        dicyclomine (BENTYL) capsule 10 mg  10 mg Oral TID AC Radha Scales PA-C   10 mg at 10/07/21 0500    metoprolol (LOPRESSOR) injection 5 mg  5 mg IntraVENous Q8H PRN Sweta Marie MD        dilTIAZem (CARDIZEM) tablet 30 mg  30 mg Oral Once Sweta Marie MD        metoclopramide [facility]) injection 5 mg  5 mg IntraVENous Q12H PRN Sweta Marie MD   5 mg at 10/01/21 1648    ipratropium-albuterol (DUONEB) nebulizer solution 1 ampule  1 ampule Inhalation Q4H PRN Josh Mcginnis PA-C        [Held by provider] aspirin EC tablet 81 mg  81 mg Oral Daily Diann Coyle PA-C   81 mg at 10/07/21 0921    glucose (GLUTOSE) 40 % oral gel 15 g  15 g Oral PRN Colonel FoilPÉREZ   15 g at 10/02/21 1800    dextrose 50 % IV solution  12.5 g IntraVENous PRN Colonel FoilPÉREZ   12.5 g at 10/01/21 2230    glucagon (rDNA) injection 1 mg  1 mg IntraMUSCular PRN Colonel FoilPÉREZ   1 mg at 09/30/21 1021    dextrose 5 % solution  100 mL/hr IntraVENous PRN Colonel FoilPÉREZ   Stopped at 10/01/21 1231    [Held by provider] heparin (porcine) injection 5,000 Units  5,000 Units SubCUTAneous 3 times per day Renetta Galaviz MD   5,000 Units at 10/07/21 0455    epoetin traci-epbx (RETACRIT) injection 6,000 Units  6,000 Units SubCUTAneous Once per day on Mon Wed Fri Johnny Cardoso MD   6,000 Units at 10/06/21 1401    calcitRIOL (ROCALTROL) capsule 1.5 mcg  1.5 mcg Oral Once per day on Mon Wed Fri Martin Philip MD   1.5 mcg at 10/08/21 1301    cinacalcet (SENSIPAR) tablet 60 mg  60 mg Oral Once per day on Mon Wed Fri Martin Philip MD   60 mg at 10/08/21 1302    docusate sodium (COLACE) capsule 100 mg  100 mg Oral BID Martin Philip MD   100 mg at 10/07/21 2015    ferrous sulfate (IRON 325) tablet 325 mg  325 mg Oral Daily with breakfast Matrin Philip MD   325 mg at 10/07/21 0709    gabapentin (NEURONTIN) capsule 100 mg  100 mg Oral TID Martin Philip MD   100 mg at 10/08/21 2134    metoprolol (LOPRESSOR) tablet 100 mg  100 mg Oral BID Martin Philip MD 100 mg at 10/08/21 2134    ascorbic acid (VITAMIN C) tablet 500 mg  500 mg Oral Daily Sravan Cabrera MD   500 mg at 10/08/21 1303    polyethylene glycol (GLYCOLAX) powder 17 g  17 g Oral Daily Sravan Cabrera MD   17 g at 09/22/21 1239    ondansetron (ZOFRAN-ODT) disintegrating tablet 4 mg  4 mg Oral Q8H PRN Sravan Cabrera MD   4 mg at 10/02/21 0441    Or    ondansetron (ZOFRAN) injection 4 mg  4 mg IntraVENous Q6H PRN Sravan Cabrera MD   4 mg at 10/01/21 2106    polyethylene glycol (GLYCOLAX) packet 17 g  17 g Oral Daily PRN Sravan Cabrera MD   17 g at 10/03/21 0940    acetaminophen (TYLENOL) tablet 650 mg  650 mg Oral Q6H PRN Sravan Cabrera MD   650 mg at 10/06/21 2148    Or    acetaminophen (TYLENOL) suppository 650 mg  650 mg Rectal Q6H PRN Sravan Cabrera MD        guaiFENesin-dextromethorphan (ROBITUSSIN DM) 100-10 MG/5ML syrup 5 mL  5 mL Oral Q4H PRN Sravan Cabrera MD   5 mL at 09/20/21 2023    albuterol sulfate  (90 Base) MCG/ACT inhaler 2 puff  2 puff Inhalation Q6H PRN Sravan Cabrera MD        [Held by provider] clopidogrel (PLAVIX) tablet 75 mg  75 mg Oral Daily Sravan Cabrera MD   75 mg at 10/07/21 5854       Allergies: Allergies   Allergen Reactions    No Known Allergies        Problem List:    Patient Active Problem List   Diagnosis Code    CAD (coronary artery disease) I25.10    COPD (chronic obstructive pulmonary disease) ([facility] J44.9    Chronic renal insufficiency N18.9    Patient overweight E66.3    Arthritis-  low back and neck .  M19.90    CKD (chronic kidney disease) stage 3, GFR 30-59 ml/min ([facility]) N18.30    Dyspnea and respiratory abnormalities R06.00, R06.89    ED (erectile dysfunction) N52.9    Degenerative joint disease of knee, left M17.12    Gout of big toe M10.9    Anemia, chronic disease D63.8    CKD (chronic kidney disease) stage 4, GFR 15-29 ml/min ([facility]) N18.4    Essential hypertension I10    Monoclonal gammopathy D47.2    Leukopenia D72.819  Thrombocytopenia (HCC) D69.6    Chronic kidney disease (CKD), stage V (HCC) E00.1    Metabolic acidosis X04.7    Hyperkalemia E87.5    Iron deficiency anemia D50.9    ROSAURA (acute kidney injury) (HCC) N17.9    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    Idiopathic hypotension I95.0    Hypertensive urgency I16.0    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) N18.6, Y20.3    Systolic congestive heart failure (HCC) I50.20    Other fluid overload (CODE) E87.79    Hyperphosphatemia E83.39    Acute diastolic congestive heart failure (HCC) I50.31    Elevated troponin R77.8    Acute on chronic diastolic congestive heart failure (HCC) I50.33    Pulmonary hypertension (HCC) I27.20    Anemia due to chronic kidney disease, on chronic dialysis (HCC) N18.6, D63.1, Z99.2    Volume overload E87.70    Secondary hyperparathyroidism of renal origin ([facility] [address] N25.81    Chest pain R07.9    Acute pulmonary edema (HCC) J81.0    Accelerated hypertension I10    Gastritis and duodenitis K29.90    Fall from slip, trip, or stumble, initial encounter W01. 0XXA    Closed fracture of left ankle S82.892A    ESRD on hemodialysis (HCC) N18.6, Z99.2    Hypertensive emergency I16.1    SOB (shortness of breath) R06.02    Chronic combined systolic and diastolic CHF (congestive heart failure) (HCC) I50.42    COVID-19 U07.1    Severe malnutrition (HCC) E43    Hypoxia R09.02       Past Medical History:        Diagnosis Date    Anemia in chronic kidney disease(285.21)     Arthritis     CAD (coronary artery disease)     Chronic kidney disease     Chronic kidney disease, stage IV (severe) (HCC)     CKD (chronic kidney disease) stage 3, GFR 30-59 ml/min (HCC)     COPD (chronic obstructive pulmonary disease) (HCC)     GI bleed     Hemod[facility] 07/26/2016    [facility]    History of blood transfusion     6/2019    Hyperlipidemia     Hyperphosphatemia 5/22/2018    Hypertension     Sick sinus syndrome (HCC)     Systolic congestive heart failure [facility])        Past Surgical History:        Procedure Laterality Date   Bakari Payton CARDIAC SURGERY      COLONOSCOPY  ,    COLONOSCOPY N/A 2019    [facility]    DIALYSIS FISTULA CREATION  2016    right arm fistula placement    ENDOSCOPY, COLON, DIAGNOSTIC      PACEMAKER PLACEMENT  2013    NE ESOPHAGOGASTRODUODENOSCOPY TRANSORAL DIAGNOSTIC N/A 2018    EGD performed by Ramos Riley MD at [facility]  ,    UPPER GASTROINTESTINAL ENDOSCOPY Left 2019    EGD DIAGNOSTIC ONLY performed by [facility] Perez MD [facility]    VASCULAR SURGERY         Social History:    Social History     Tobacco Use    Smoking status: Former Smoker     Packs/day: 1.00     Years: 40.00     Pack years: 40.00     Quit date: 1982     Years since quittin.7    Smokeless tobacco: Never Used   Substance Use Topics    Alcohol use:  No                                Counseling given: Not Answered      Vital Signs (Current):   Vitals:    10/08/21 1813 10/08/21 2130 10/09/21 0453 10/09/21 0659   BP: (!) 175/78 136/69 (!) 158/74 (!) 188/82   Pulse: 58 58 75 54   Resp: 18 18 18 18   Temp: 36.7 °C (98.1 °F) 36.7 °C (98 °F) 36.8 °C (98.3 °F)    TempSrc: Oral Oral Oral    SpO2: 95% 95% 95% 94%   Weight:       Height:                                                  BP Readings from Last 3 Encounters:   10/09/21 (!) 188/82   21 (!) 147/66   21 (!) 147/66       NPO Status: Time of last liquid consumption:                         Time of last solid consumption: 1800                        Date of last liquid consumption: 10/08/21                        Date of last solid food consumption: 10/08/21    BMI:   Wt Readings from Last 3 Encounters:   10/08/21 114 lb 13.8 oz (52.1 kg)   21 145 lb 8.1 oz (66 kg)   06/17/21 161 lb (73 kg)     Body mass index is 18.54 kg/m². CBC:   Lab Results   Component Value Date    WBC 4.8 10/09/2021    RBC 2.82 10/09/2021    RBC 2.95 09/19/2011    HGB 8.6 10/09/2021    HCT 26.2 10/09/2021    MCV 92.9 10/09/2021    RDW 19.0 08/26/2021     10/09/2021       CMP:   Lab Results   Component Value Date     10/09/2021    K 3.9 10/09/2021    K 4.9 09/19/2021    CL 98 10/09/2021    CO2 29 10/09/2021    BUN 11 10/09/2021    CREATININE 3.6 10/09/2021    LABGLOM 19 10/09/2021    GLUCOSE 83 10/09/2021    GLUCOSE 90 08/26/2021    PROT 5.9 10/01/2021    CALCIUM 7.6 10/09/2021    BILITOT 0.6 10/01/2021    ALKPHOS 177 10/01/2021     10/01/2021     10/01/2021       POC Tests:   Recent Labs     10/08/21  2100   POCGLU 98       Coags:   Lab Results   Component Value Date    PROTIME 11.5 12/10/2013    INR 0.96 09/23/2021    APTT 28.2 09/23/2021       HCG (If Applicable): No results found for: PREGTESTUR, PREGSERUM, HCG, HCGQUANT     ABGs: No results found for: PHART, PO2ART, QKN3CAT, IEN0ZMV, BEART, Z1SBTARN     Type & Screen (If Applicable):  Lab Results   Component Value Date    LABRH POS 10/06/2021       Drug/Infectious Status (If Applicable):  No results found for: HIV, HEPCAB    COVID-19 Screening (If Applicable):   Lab Results   Component Value Date    COVID19 NOT  DETECTED 09/30/2021           Anesthesia Evaluation  Patient summary reviewed  Airway: Mallampati: III  TM distance: >3 FB     Mouth opening: > = 3 FB Dental:          Pulmonary:   (+) COPD:  shortness of breath:                             Cardiovascular:    (+) hypertension:, CAD:, CHF:,                   Neuro/Psych:   (+) neuromuscular disease:,             GI/Hepatic/Renal:             Endo/Other:                     Abdominal:             Vascular: Other Findings:             Anesthesia Plan      MAC     ASA 4       Induction: intravenous.       Anesthetic plan and risks discussed with patient. Plan discussed with attending.                   Mattie Pineda, APRN - CRNA   10/9/2021

## 2021-10-09 NOTE — ANESTHESIA POSTPROCEDURE EVALUATION
Department of Anesthesiology  Postprocedure Note    Patient: Aishwarya Calderon  MRN: [id number]  YOB: 1930  Date of evaluation: 10/9/2021  Time:  8:09 AM     Procedure Summary     Date: 10/09/21 Room / Location: [address] [place] [address] / [address] [place] [address]    Anesthesia Start: 0911 Anesthesia Stop: Tinnie Ko    Procedure: EGD (N/A ) Diagnosis: (GI BLEED)    Surgeons: Cliff Zamroano MD Responsible Provider: Annette Boyd MD    Anesthesia Type: MAC ASA Status: 4          Anesthesia Type: MAC    Reg Phase I: Reg Score: 10    Reg Phase II:      Last vitals: Reviewed and per EMR flowsheets.        Anesthesia Post Evaluation    Patient location during evaluation: bedside  Patient participation: complete - patient participated  Level of consciousness: awake  Airway patency: patent  Nausea & Vomiting: no vomiting and no nausea  Complications: no  Cardiovascular status: hemodynamically stable  Respiratory status: acceptable  Hydration status: stable

## 2021-10-09 NOTE — OP NOTE
[address] [place] [address]                                OPERATIVE REPORT    PATIENT NAME: Tobi Morales                      :        11/10/1930  MED REC NO:   [id number]                           ROOM:       0014  ACCOUNT NO:   [de-identified]                           ADMIT DATE: 2021  PROVIDER:     DANIKA Conner OF PROCEDURE:  10/09/2021    INDICATION:  The patient with history of melenic stool with drop in H  and H.  Plan today for EGD to evaluate. DESCRIPTION OF PROCEDURE:  The patient was brought to the GI lab. Consent was obtained. Risks involved with the procedure were explained  to the patient. Informed consent was obtained. The patient was  monitored during the procedure with pulse oximetry, blood pressure  monitoring, and oxygen by nasal cannula. Sedation by incremental doses  of IV propofol given by Anesthesia Service to achieve total IV  anesthesia. For ASA classification and medication given during the  procedure, please see Anesthesia note. SURGEON:  Cliff Zamorano MD    ASA CLASSIFICATION:  Please see anesthesia note. ESTIMATED BLOOD LOSS:  None. PROCEDURE PERFORMED:  EGD. DESCRIPTION OF PROCEDURE:  A standard video upper endoscope was advanced  under direct vision from the oral cavity up to the third part of the  duodenum. The esophagus featured grade 2 distal esophagitis with  features of just recent GI bleed; however, no active bleeding. Scope  was advanced to the stomach. Retroflex examination of the cardia  revealed small hiatus hernia. Mild gastritis seen in the antrum as well  as deformity in the pylorus, likely indicative of previous history of  ulcer in that area, but not active at the stomach. Mild duodenitis  Seen. Scope was withdrawn with no immediate complications. IMPRESSION:  1. Erosive esophagitis grade 2, likely source of GI bleed. 2.  Gastritis.   3.  Small hiatus hernia. 4.  Deformity of the pylorus. 5.  Duodenitis. PLAN:  1. Continue PPI. 2.  Resume diet. 3.  Avoid NSAID. 4.  Long-term therapy with H2 blockers on discharge. Leon Ruff M.D.    D: 10/09/2021 8:29:24       T: 10/09/2021 12:46:45     [id number]  Job#: [id number]     Doc#: [id number]    CC:  Mike Locke.  Alan Abraham M.D.

## 2021-10-09 NOTE — PRE SEDATION
6/27/2019).   Additional information:       ALLERGIES   Allergies as of 09/13/2021 - Fully Reviewed 09/13/2021   Allergen Reaction Noted    No known allergies  12/19/2013     Additional information:       MEDICATIONS   Coumadin Use Last 7 Days [x]No []Yes  Antiplatelet drug therapy use last 7 days  [x]No []Yes  Other anticoagulant use last 7 days  [x]No []Yes    Current Facility-Administered Medications:     sodium chloride flush 0.9 % injection 5-40 mL, 5-40 mL, IntraVENous, 2 times per day, Valencia Primrose, MD, 10 mL at 10/08/21 2134    sodium chloride flush 0.9 % injection 5-40 mL, 5-40 mL, IntraVENous, PRN, Valencia Primrose, MD    0.9 % sodium chloride infusion, 25 mL, IntraVENous, PRN, Valencia Primrose, MD, New Bag at 10/09/21 0749    0.9 % sodium chloride infusion, , IntraVENous, PRN, Deniz Mcnulty DO    pantoprazole (PROTONIX) injection 40 mg, 40 mg, IntraVENous, BID, Nelsy Aquino DO, 40 mg at 10/08/21 2134    0.9 % sodium chloride infusion, , IntraVENous, PRN, Nelsy Aquino, DO    HYDROcodone-acetaminophen (NORCO) 5-325 MG per tablet 1 tablet, 1 tablet, Oral, Q4H PRN, Chetna Hurst PA-C    lactulose (CHRONULAC) 10 GM/15ML solution 20 g, 20 g, Oral, BID, Aneta Newman MD, 20 g at 10/06/21 2115    0.9 % sodium chloride infusion, , IntraVENous, PRN, Aneta Newman MD    loperamide (IMODIUM) capsule 2 mg, 2 mg, Oral, 4x Daily PRN, Shaw King DO, 2 mg at 10/07/21 0922    lidocaine PF 1 % injection 5 mL, 5 mL, IntraDERmal, Once, CosmosID PÉREZ Santana    dicyclomine (BENTYL) capsule 10 mg, 10 mg, Oral, TID AC, Chetna Hurst PA-C, 10 mg at 10/07/21 0500    metoprolol (LOPRESSOR) injection 5 mg, 5 mg, IntraVENous, Q8H PRN, Natalia Floyd MD    dilTIAZem (CARDIZEM) tablet 30 mg, 30 mg, Oral, Once, Natalia Floyd MD    metoclopramide [facility]) injection 5 mg, 5 mg, IntraVENous, Q12H PRN, Natalia Floyd MD, 5 mg at 10/01/21 3418    ipratropium-albuterol (DUONEB) nebulizer solution 1 ampule, 1 ampule, Inhalation, Q4H PRN, Diann Coyle PA-C    [Held by provider] aspirin EC tablet 81 mg, 81 mg, Oral, Daily, Diann Coyle PA-C, 81 mg at 10/07/21 0921    glucose (GLUTOSE) 40 % oral gel 15 g, 15 g, Oral, PRN, [employer], PA-C, 15 g at 10/02/21 1800    dextrose 50 % IV solution, 12.5 g, IntraVENous, PRN, [employer], PA-C, 12.5 g at 10/01/21 2230    glucagon (rDNA) injection 1 mg, 1 mg, IntraMUSCular, PRN, [employer], PA-C, 1 mg at 09/30/21 1021    dextrose 5 % solution, 100 mL/hr, IntraVENous, PRN, [employer], PA-C, Stopped at 10/01/21 1231    [Held by provider] heparin (porcine) injection 5,000 Units, 5,000 Units, SubCUTAneous, 3 times per day, Anita Madrigal MD, 5,000 Units at 10/07/21 0455    epoetin traci-epbx (RETACRIT) injection 6,000 Units, 6,000 Units, SubCUTAneous, Once per day on Mon Wed Fri, Yvonne Ang MD, 6,000 Units at 10/06/21 1401    calcitRIOL (ROCALTROL) capsule 1.5 mcg, 1.5 mcg, Oral, Once per day on Mon Wed Fri, Shannon Sena MD, 1.5 mcg at 10/08/21 1301    cinacalcet (SENSIPAR) tablet 60 mg, 60 mg, Oral, Once per day on Mon Wed Fri, Shannon Sena MD, 60 mg at 10/08/21 1302    docusate sodium (COLACE) capsule 100 mg, 100 mg, Oral, BID, Shannon Sena MD, 100 mg at 10/07/21 2015    ferrous sulfate (IRON 325) tablet 325 mg, 325 mg, Oral, Daily with breakfast, Shannon Sena MD, 325 mg at 10/07/21 2788    gabapentin (NEURONTIN) capsule 100 mg, 100 mg, Oral, TID, Shannon Sena MD, 100 mg at 10/08/21 2134    metoprolol (LOPRESSOR) tablet 100 mg, 100 mg, Oral, BID, Shannon Sena MD, 100 mg at 10/08/21 2134    ascorbic acid (VITAMIN C) tablet 500 mg, 500 mg, Oral, Daily, Shannon Sena MD, 500 mg at 10/08/21 1303    polyethylene glycol (GLYCOLAX) powder 17 g, 17 g, Oral, Daily, Shannon Sena MD, 17 g at 09/22/21 1239    ondansetron (ZOFRAN-ODT) disintegrating tablet 4 mg, 4 mg, Oral, Q8H PRN, 4 mg at 10/02/21 0441 **OR** ondansetron ([employer]) Kenton Gomez MD   calcitRIOL (ROCALTROL) 0.25 MCG capsule Take 1.5 mcg by mouth three times a week before dialysis on M,W,F   Yes Historical Provider, MD   cyclobenzaprine (FLEXERIL) 10 MG tablet Take 10 mg by mouth daily 8/28/17   Historical Provider, MD   metoprolol succinate (TOPROL XL) 50 MG extended release tablet Take 50 mg by mouth daily 9/1/21   Historical Provider, MD   cinacalcet (SENSIPAR) 30 MG tablet Take 60 mg by mouth three times a week before dialysis on M,W,F    Historical Provider, MD   pantoprazole (PROTONIX) 40 MG tablet TAKE 1 TABLET DAILY BEFORE BREAKFAST 4/28/20   Shaniqua Sherman APRN - CNP   docusate sodium (COLACE, DULCOLAX) 100 MG CAPS Take 100 mg by mouth 2 times daily 6/24/19   Foster Alice, DO   vitamin C (ASCORBIC ACID) 500 MG tablet Take 1 tablet by mouth daily 6/24/19   Foster Alice, DO   polyethylene glycol (MIRALAX) powder Renal Miralax Colonoscopy prep. Use as directed. Mix Miralax 238 g with 24 oz clear liquids. Drink 8 oz glass every 20-30 minutes until gone. 6/6/19   XIN Sams - CNP   B Complex-C-Folic Acid (RENAL) 1 MG CAPS Take 1 capsule by mouth daily    Historical Provider, MD     Additional information:       PHYSICAL:   Heart:  [x]Regular rate and rhythm  []Other:    Lungs:  [x]Clear    []Other:    Abdomen: [x]Soft    []Other:    Mental Status: [x]Alert & Oriented  []Other:        PLANNED PROCEDURE   [x]EGD  []Colonoscopy []Flex Sigmoid     Consent: I have discussed with the patient and/or the patient representative the indication, alternatives, and the possible risks and/or complications of the planned procedure and the anesthesia methods. The patient and/or patient representative appear to understand and agree to proceed. SEDATION  Please see anesthesia note. The medication Planned :  Planned agent:[]Midazolam []Meperidine []Sublimaze []Morphine  []Diazepam  [x]Propofol     Airway Assessment:   See anesthesia no please     Monitoring and Safety:  The patient will be placed on a cardiac monitor and vital signs, pulse oximetry and level of consciousness will be continuously evaluated throughout the procedure. The patient will be closely monitored until recovery from the medications is complete and the patient has returned to baseline status. Respiratory therapy will be on standby during the procedure. [x]Pre-procedure diagnostic studies complete and results available. Comment:    [x]Previous sedation/anesthesia experiences assessed. Comment:    [x]The patient is an appropriate candidate to undergo the planned procedure sedation and anesthesia. (Refer to nursing sedation/analgesia documentation record)  [x]Formulation and discussion of sedation/procedure plan, risks, and expectations with patient and/or responsible adult completed. [x]Patient examined immediately prior to the procedure.  (Refer to nursing sedation/analgesia documentation record)    Aisha Andersen MD, MD   Electronically signed

## 2021-10-09 NOTE — PROGRESS NOTES
[facility]   Nephrology progress note  10/9/2021, 1:21 PM      Pt Name:    Amelie Norman  MRN:     [id number]     [place]:    11/10/1930  Admit Date:    9/13/2021 12:59 PM  Primary Care Physician:  Natalie Soria MD   Room number  7I-74/493-Q    Chief Complaint: Nephrology following for ESRD on hemodialysis    Subjective:  Patient seen and examined  This is late entry  Seen and examined earlier today  Had EGD earlier today      Objective:  24HR INTAKE/OUTPUT:    Intake/Output Summary (Last 24 hours) at 10/9/2021 1321  Last data filed at 10/9/2021 0808  Gross per 24 hour   Intake 729 ml   Output 0 ml   Net 729 ml     I/O last 3 completed shifts: In: 8858 [P.O.:420; I.V.:439]  Out: 1400   I/O this shift: In: 50 [I.V.:50]  Out: -   Admission weight: 163 lb (73.9 kg)  Wt Readings from Last 3 Encounters:   10/08/21 114 lb 13.8 oz (52.1 kg)   07/14/21 145 lb 8.1 oz (66 kg)   06/17/21 161 lb (73 kg)     Body mass index is 18.54 kg/m². Physical examination  VITALS:     Vitals:    10/09/21 0659 10/09/21 0810 10/09/21 0825 10/09/21 0850   BP: (!) 188/82 132/67 (!) 141/68 (!) 176/81   Pulse: 54 57 60 66   Resp: 18 16 16 18   Temp:       TempSrc:       SpO2: 94% 100% 100% 100%   Weight:       Height:         General Appearance:appears comfortable, no distress  Mouth/Throat: Oral mucosa moist  Neck: No JVD  Lungs: Air entry B/L, no rales, no use of accessory muscles  Heart:  S1, S2 heard  GI: soft, non-tender, no guarding      Lab Data  CBC:   Recent Labs     10/07/21  0450 10/07/21  0450 10/07/21  0950 10/08/21  0730 10/09/21  0600   WBC 4.9  --   --   --  4.8   HGB 7.0*   < > 6.6* 9.4* 8.6*   HCT 21.4*   < > 19.9* 27.8* 26.2*     --   --   --  154    < > = values in this interval not displayed.      BMP:  Recent Labs     10/07/21  0450 10/09/21  0600    137   K 3.8 3.9   CL 97* 98   CO2 28 29   BUN 13 11   CREATININE 3.9* 3.6*   GLUCOSE 85 83   CALCIUM 7.8* 7.6*     Hepatic: No results for input(s): LABALBU, AST, ALT, ALB, BILITOT, ALKPHOS in the last 72 hours. Meds:  Infusion:    sodium chloride      sodium chloride      sodium chloride      sodium chloride      dextrose Stopped (10/01/21 1231)     Meds:    sodium chloride flush  5-40 mL IntraVENous 2 times per day    pantoprazole  40 mg IntraVENous BID    lactulose  20 g Oral BID    lidocaine 1 % injection  5 mL IntraDERmal Once    dicyclomine  10 mg Oral TID AC    dilTIAZem  30 mg Oral Once    [Held by provider] aspirin  81 mg Oral Daily    [Held by provider] heparin (porcine)  5,000 Units SubCUTAneous 3 times per day    epoetin traci-epbx  6,000 Units SubCUTAneous Once per day on Mon Wed Fri    calcitRIOL  1.5 mcg Oral Once per day on Mon Wed Fri    cinacalcet  60 mg Oral Once per day on Mon Wed Fri    docusate sodium  100 mg Oral BID    ferrous sulfate  325 mg Oral Daily with breakfast    gabapentin  100 mg Oral TID    metoprolol  100 mg Oral BID    vitamin C  500 mg Oral Daily    polyethylene glycol  17 g Oral Daily    [Held by provider] clopidogrel  75 mg Oral Daily     Meds prn: sodium chloride flush, sodium chloride, sodium chloride, sodium chloride, HYDROcodone-acetaminophen, sodium chloride, loperamide, metoprolol, metoclopramide, ipratropium-albuterol, glucose, dextrose, glucagon (rDNA), dextrose, ondansetron **OR** ondansetron, polyethylene glycol, acetaminophen **OR** acetaminophen, guaiFENesin-dextromethorphan, albuterol sulfate HFA       Impression and Plan:  1. ESRD on hemodialysis  Had dialysis treatment yesterday with 1 kg of ultrafiltration  No acute need for renal replacement therapy today    2. Status post EGD showing grade 2 erosive esophagitis, mild gastritis and duodenitis  3. Essential hypertension  4. Chronic systolic dysfunction  5.   Secondary hyperparathyroidism      Rodriguez Roe MD  [facility]

## 2021-10-10 NOTE — SIGNIFICANT EVENT
I was told the patient is limited code< Defibrillation only   But he has not been changed back from his pre-procedure full code from his EGD on 10/9

## 2021-10-10 NOTE — PROGRESS NOTES
Hospitalist Progress Note    Patient:  Marbella Howard    Unit/Bed:5K-14/014-A  YOB: 1930  MRN: [id number]   Acct: [de-identified]   PCP: Carol Jones MD  Code Status: Limited  Date of Admission: 9/13/2021    Expected Discharge: pending placement. Disposition: ECF, precert pending. Pt is medically stable for discharge, awaiting placement. Assessment/Plan:    1. Recurrent hypoglycemic episodes: unclear etiology, possible adrenal insufficiency. Pt is not a diabetic, no DM medications listed. Check Hgb A1c - 4.5%. Recent tx for COVID with steroids x9 days. Check LA, elevated, resolved, and then again elevated. Check random cortisol level -> wnl. S/p 2 dose of glucose oral gel and 3 doses of 1 mg Glucagon. Pt was noted as low as 23, continues to trend down despite D10 drip at 100 mL/hr. Continue Q2 hr checks. Trial 10 mg Decadron steroids once and assess response. 10/1-> stop D5 drip and monitor    10/2-> LA now elevated again, suspicion potentially from underlying infection. 10/3-> pt given Decadron 10 mg x 1 dose last night when noted to again be hypoglycemic. Adrenal insufficiency? Pending hospice eval will consider Cortisol stimulation testing. 10/4-> Cosyntropin was not given this morning. Cortisol levels that were drawn are not accurate. We will repeat test tomorrow morning. Midline placed. 10/5-> Cortisol at 60 minutes showed 23.57. BG remains stable. 10/6 -> Patient had HD this morning. BG remaining stable. 10/7 - Glucose stable. 2. Abd pain, generalized, unclear etiology:. Check CT A / P -> showing distended gallbladder. LA improved and resolved, however was noted to again be elevated as high as 8.7 which is improved to 4.9. Check CTA abdomen to rule out ischemia. Will consider Gen Surg consultation. 10/3-> discussed with pt and family. Surgery is not an option they wish to consider should it be necessary.    10/5-> c/o continued belly pain, continue to trend LA till normalized. CTA of abd today. 10/6 -> LA 1.6 - WNL  10/9: Patient is status post EGD today which showed erosive esophagitis and duodenitis. We will continue PPI. Diet resumed. Monitor H&H. Daughter updated over the phone on results of EGD. 10/10: Repeat H/H pending, no signs of active bleed. Tolerating diet well. 3. COVID-19 pneumonia (resolved): treated with dexamethasone 6mg daily. First positive test 09/13. Pt stable on RA. Isolation no longer required, transferred to . 4. Possible superimposed bacterial PNA: Completed treatment with Rocephin and Azithromycin. Remains afebrile. No leukocytosis. 5. Acute hypoxic respiratory failure: 2/2 above. 9/25-> Pt placed on O2 overnight after rapid was called. PO2 on ABG was 170, no hypercapnea. No acute changes on EKG, no evidence of fluid overload. Pt is currently 4L, ABG showing respiratory alkalosis. Continue to monitor and wean as tolerated. 10/6 -> patient currently on 2lpm NC.  10/10:  Continue to wean O2     6. ESRD on HD:  Secondary to diabetic and hypertensive nephrosclerosis. Nephrology following. On HD MWF.      7. S/p unwitnessed fall: No apparent injuries, CT brain and cervical spine -unremarkable     8. Dark tarry stools / chronic normocytic anemia: hx INDRA. On Retacrit, ferrous sulfate. One episode, none further. Hemoglobin stable. Negative occult 9/19/2021.  Continue to monitor, transfuse for hemoglobin less than 7. Prior to dialysis on Wednesday patient had hemoglobin 7.1. Postdialysis hemoglobin increased over 8. However following day hemoglobin was noted to be 7.0. Repeat H&H showed 6.6. Plavix, aspirin, heparin held in setting of potential GI bleed. Transfuse 1 unit PRBCs. Consult was placed and plans for EGD 10/9. Repeat H&H this morning (10/8) was 9.4. We will continue to monitor closely. EGD shows esophagitis and duodenitis.       9. Elevated troponin: chronically elevated, likely d/t ESRD. No chest pain, repeat troponin improved, no acute ischemic changes on ekg. 10. CAD s/p PCI: Patient had 2 stents (LAD and Ramus) at [facility] on 08/31/21. Will continue with aspirin, Plavix, metoprolol and pravastatin. 11. H/o NSVT / Sick sinus syndrome: noted, has PPM/ICD. Tele ordered. Continue metoprolol. Possible PAF. However, after review of EKGs, P waves were present and did not appear to be in a fib. Pt typically follows with Cardiology at [facility], unclear if he has a hx of AF. He is not on anticoagulation. Even if PAF, the risks of anticoagulation may be greater than the benefit. CHADSVASc 4, HAS-BLED 4. Pacer interrogated, awaiting report. Pt will need to arrange follow up with his Cardiologist at [facility]. 12. Chronic HFrEF: EF 45% and grade 1 diastolic dysfunction on Echo 05/2021. Follows with Cardiology at [facility]. Fluid status stable, Nephrology following. Cont home meds. 13. Disposition: Difficulty with discharge secondary to Covid diagnosis and hemodialysis schedule. Appreciate case management assistance. 14. Goals of care: Hospice consulted. Per pt and family wishes, code status changed to [facility] and then back to Limited No x3, yes to defib / cardioversion after family discussion with hospice. 15. Lactic acidosis: LA was noted to 8.7 on 10/1, markedly improved with repeat down to 2.5 today. Continue to check every 2 hours to normalize. Unclear etiology, potentially from abdominal source as patient complains of severe discomfort in his abdomen at times. LA today 10/6 is WNL 1.6. Patient denies abdominal pain today. i.  CTA of abdomen shows :  1. Bilateral airspace infiltrates and pleural effusions right greater than left. 2. Cardiomegaly. 3. Atrophic kidneys bilaterally. 4.  liquid stool throughout much of the colon and rectum consistent with diarrhea no evidence of bowel obstruction.   5. Although there is diffuse calcific atherosclerosis of the aorta and mesenteric vessels there is no significant stenosis of the mesenteric vessels. However the renal arteries are bilaterally extremely diffusely narrow. 16. Disposition: Patient is a pre-CERT. DC planning to nursing home likely on Monday. Chief Complaint: SOB    HPI / Hospital Course: Per HPI: \"The patient is a 80 y.o. male who presents with complaints of shortness of breath and cough for the last 2 days. Patient is accompanied by family who helps with history. They report that patient has had a poor appetite, fatigued, short of breath and coughing for last 2 days. He recently was at [facility] approximately 2 weeks ago where he had 2 stents placed. They state he was in his normal state of health until 2 days ago. Patient was seen in dialysis today and had a full session however continued to be short of breath and had a significant cough and was sent for evaluation. He denies any fevers or chills, nausea or vomiting. He denies any diarrhea or constipation. He denies any chest pain.     In the ED patient was found to be Covid positive. He was placed on 2 L nasal cannula. He was also found to have slightly elevated troponin and was started on heparin drip in the ED. Family updated on test results. \"    Pt has completed treatment for COVID and superimposed bacterial PNA. I took over care 9/22, per previous note, pt stable for discharge and awaiting precert to ECF.      5/06-> Rapid response called during dialysis. Pt had vomited, became lethargic and had episode of AF RVR on the monitor per report. Dialysis was stopped. Patient still lethargic but following commands and nods to answer questions appropriately. EKG showed NSR, does not appear to be AF. Patient is moaning which per nursing, he typically does during dialysis. Patient seen again later. He reports fatigue and wanting to sleep. He denies abdominal pain, n/v/d, CP, SOB, fever/chills. Pt had bleeding from dialysis fistulasite. 9/23-> Patient alert to verbal stimuli, lethargic. Appears to be at his baseline. Denies fever/chills, sob, cp, abd pain, n/v/d. Nephrology started IVF for today and plan for dialysis tomorrow. Patient has been NSR on tele. 9/24-> Pt appears at baseline. He is more alert today than he has been. Tolerated dialysis well. 9/26-> Patient is alert, oriented to self, place, time, and somewhat to situation. He denies fever/chills, SOB, CP, abd pain, n/v/d, cough. He reports of left arm pain at dialysis cath site. The patient states that he has been told before that his heart beat gets \"out of wack. \"   Chart reviewed and a rapid was called last night they were unable to get a reading of O2 levels. ABG results with pO2 179, no hypercapnea. Based on ABG did not appear to be truly hypoxic so he was put back on 6L NC and nursing instructed to wean. His EKG reported atrial fibrillation; however, after review, there were P waves present. Ordered pacer interrogate. 9/27-> no acute events overnight. Patient denies sob, cp, abd pain, nvd. Nephrology planning HD tomorrow. Awaiting pacer interrogate results. 9/28- Pt had dialysis today, tolerated well. 9/29 -> patient on HD. Patient is doing well. Denies fever/chills, sob, cp, palpitations, abd pain, n/v/d. Disposition has been difficult secondary to Covid diagnosis and HD facility/schedule. 9/30-> pt had a rough morning. Called to bedside due to pulse ox being read as low as 50% and was very labile. Pt placed on non-re breather. Pt was c/o of abd pain and an episode of diarrhea that started last night. Had an episode of emesis s/p IO insertion, none since then. Afebrile. 10/1-> patient doing much better today, seen in dialysis. Patient denies any abdominal pain at all today. No nausea or vomiting. Hypoglycemia has resolved and remained stable. Afebrile.   Has been weaned down to 4 L NC.    10/2-> patient had a rapid response called overnight secondary to abdominal pain and hypotension (as low as the 17N systolic). Patient was noted to have an elevated lactic acid. He was given fluids and started on IV antibiotics. Today upon exam, patient again is complaining of severe abdominal pain all over. We will check a CTA of the abdomen. Consider general surgery consultation. 10/3-> patient again had a rough day complaining of intense 10/10 abdominal pain. Patient stated \"I want to be poked no more \"and we discussed the concept of moving forward with comfort care versus further diagnostic testing. Patient and family agreed that comfort care was the route they wish to take. Hospice evaluation placed. Overnight, patient's blood glucose has been stable. Again discussed with patient who stated he wanted to proceed with comfort care. Pt is A&Ox4 on this exam. Discussed stopping any lab draws and also fingerstick checks. Discussed potential adrenal insufficiency which cannot be confirmed without further lab testing. Pt is aware that this could lead to death. Discussed with Joanna Ramirez via telephone. Plan to meet today with Hospice. They are now unsure if they wish to proceed with comfort care as they were unaware that pt would not be a dialysis pt any longer. Await hospice eval.   Patient reporting that he still has generalized abdominal pain, though it is much improved from yesterday. Blood pressure again noted to be in the 80s last night, has maintained stability today. 10/4-> patient lying in bed having returned from dialysis. Patient denies as intense abdominal pain today, however notes that he does have some generalized pain especially with palpation. Status post midline placement with IR. Cosyntropin was not given this morning, although cortisol was drawn. Labs will need to be redrawn tomorrow morning after appropriate stimulation. Patient denies chest pain or shortness of breath.     10/5-> patient laying in bed, refused physical therapy today secondary to abdominal pain. When asked, patient replies that this pain is only a 2 out of 10 in severity, however at times it is much worse. CTA of the abdomen pending. Patient is a pre-CERT which will be initiated hopefully today. \"    10/7 Patient was noted to have decreased hemoglobin today to value of 7.0. Repeat H&H showed a value of 6.6. Patient does have a history of melanotic stools. Transfuse 1 PRBCs. GI was consulted plans for EGD tomorrow morning. Subjective (past 24 hours):  Patient was examined at bedside. Patient states he is doing well has no complaints at this time. Tolerating diet well. No acute overnight events. ROS: Pertinent positives as noted in HPI. All other systems reviewed and negative. Past medical history, family history, social history and allergies reviewed again and is unchanged since admission. Medications:  Reviewed.   Infusion Medications    sodium chloride      sodium chloride      sodium chloride      sodium chloride      dextrose Stopped (10/01/21 1231)     Scheduled Medications    sodium chloride flush  5-40 mL IntraVENous 2 times per day    pantoprazole  40 mg IntraVENous BID    lactulose  20 g Oral BID    lidocaine 1 % injection  5 mL IntraDERmal Once    dicyclomine  10 mg Oral TID AC    dilTIAZem  30 mg Oral Once    [Held by provider] aspirin  81 mg Oral Daily    [Held by provider] heparin (porcine)  5,000 Units SubCUTAneous 3 times per day    epoetin traci-epbx  6,000 Units SubCUTAneous Once per day on Mon Wed Fri    calcitRIOL  1.5 mcg Oral Once per day on Mon Wed Fri    cinacalcet  60 mg Oral Once per day on Mon Wed Fri    docusate sodium  100 mg Oral BID    ferrous sulfate  325 mg Oral Daily with breakfast    gabapentin  100 mg Oral TID    metoprolol  100 mg Oral BID    vitamin C  500 mg Oral Daily    polyethylene glycol  17 g Oral Daily    [Held by provider] clopidogrel  75 mg Oral Daily     PRN Meds: sodium chloride flush, sodium chloride, sodium chloride, sodium chloride, HYDROcodone-acetaminophen, sodium chloride, loperamide, metoprolol, metoclopramide, ipratropium-albuterol, glucose, dextrose, glucagon (rDNA), dextrose, ondansetron **OR** ondansetron, polyethylene glycol, acetaminophen **OR** acetaminophen, guaiFENesin-dextromethorphan, albuterol sulfate HFA    I/O:     Intake/Output Summary (Last 24 hours) at 10/10/2021 0949  Last data filed at 10/9/2021 2021  Gross per 24 hour   Intake 200 ml   Output 0 ml   Net 200 ml       Diet:  ADULT DIET; Regular; Low Potassium (Less than 3000 mg/day)    Exam:  /72   Pulse 99   Temp 98.3 °F (36.8 °C) (Oral)   Resp 16   Ht 5' 6\" (1.676 m)   Wt 114 lb 13.8 oz (52.1 kg)   SpO2 96%   BMI 18.54 kg/m²      General:  Chronically ill appearing male. NAD. Alert and oriented x3   Lungs: CTA no wheezes noted. No respiratory distress noted. Cardiac: S1 and S2 present, RRR no murmurs/rubs/gallops  Abdomen: soft. Bowel sounds positive. No guarding noted  Extremities:  No clubbing, cyanosis, or edema x 4. Skin:  warm and dry. Data: (All radiographs, tracings, PFTs, and imaging are personally viewed and interpreted unless otherwise noted)  Labs:   Recent Labs     10/07/21  0950 10/08/21  0730 10/09/21  0600   WBC  --   --  4.8   HGB 6.6* 9.4* 8.6*   HCT 19.9* 27.8* 26.2*   PLT  --   --  154     Recent Labs     10/09/21  0600      K 3.9   CL 98   CO2 29   BUN 11   CREATININE 3.6*   CALCIUM 7.6*     No results for input(s): AST, ALT, BILIDIR, BILITOT, ALKPHOS in the last 72 hours. No results for input(s): INR in the last 72 hours. No results for input(s): Jossie Farhat in the last 72 hours.   Urinalysis:   Lab Results   Component Value Date    NITRU NEGATIVE 06/22/2019    WBCUA 50-75 06/22/2019    BACTERIA NONE 06/22/2019    RBCUA 25-50 06/22/2019    BLOODU LARGE 06/22/2019    SPECGRAV 1.013 05/03/2016    GLUCOSEU NEGATIVE 06/22/2019     Urine culture: No results found for: Christa Romero:   Blood culture #1:   Lab Results   Component Value Date    BC No growth-preliminary No growth  10/02/2021     Blood culture #2:No results found for: Noe Faye  Organism:  Lab Results   Component Value Date    ORG Micrococcus species 07/12/2021       No results found for: LABGRAM  MRSA culture only:No results found for: [facility]  Respiratory culture: No results found for: CULTRESP  Aerobic and Anaerobic :  No results found for: LABAERO  No results found for: [address]    Radiology Reports:  CTA ABDOMEN PELVIS W WO CONTRAST   Final Result   1. Bilateral airspace infiltrates and pleural effusions right greater than left. 2. Cardiomegaly. 3. Atrophic kidneys bilaterally. 4 liquid stool throughout much of the colon and rectum consistent with diarrhea no evidence of bowel obstruction. 5. Although there is diffuse calcific atherosclerosis of the aorta and mesenteric vessels there is no significant stenosis of the mesenteric vessels. However the renal arteries are bilaterally extremely diffusely narrow. **This report has been created using voice recognition software. It may contain minor errors which are inherent in voice recognition technology. **      Final report electronically signed by Dr. Tanner Orr on 10/5/2021 3:33 PM      IR FLUORO GUIDED CVA DEVICE PLMT/REPLACE/REMOVAL   Final Result   Status post successful midline insertion. **This report has been created using voice recognition software. It may contain minor errors which are inherent in voice recognition technology. **      Final report electronically signed by Dr Jose Cardona on 10/4/2021 1:54 PM      XR ABDOMEN (KUB) (SINGLE AP VIEW)   Final Result   A nonspecific but nonobstructive bowel gas pattern. This document has been electronically signed by: Bianca Rock DO on    10/02/2021 12:45 AM      XR CHEST PORTABLE   Final Result   1.  Scattered patchy regions of predominantly interstitial densities    throughout the bilateral lungs grossly similar to the prior examination. Mild bibasilar pleural/parenchymal opacities most consistent with tiny    effusions with atelectasis and/or consolidation. 2. Cardiomegaly with the heart size unchanged from the prior examination. This document has been electronically signed by: Gregg Mike DO on    10/02/2021 12:47 AM      CT ABDOMEN PELVIS WO CONTRAST Additional Contrast? None   Final Result   1. Limited evaluation of the lung bases demonstrates small bilateral pleural effusions with patchy consolidative opacities at the lower lobes which may be related to pneumonia. 2. The gallbladder demonstrates a small gallstone near the gallbladder neck. This appears mildly distended. 3. Mild ascites in the perihepatic region is noted. **This report has been created using voice recognition software. It may contain minor errors which are inherent in voice recognition technology. **      Final report electronically signed by Dr. Clive Wiley on 9/30/2021 1:35 PM      XR ABDOMEN (KUB) (SINGLE AP VIEW)   Final Result   1. Overall nonobstructive bowel gas pattern. **This report has been created using voice recognition software. It may contain minor errors which are inherent in voice recognition technology. **      Final report electronically signed by Dr Jena Nelson on 9/30/2021 12:26 PM      XR CHEST PORTABLE   Final Result   1. Patchy multifocal airspace disease is similar to 9/24/2021 exam, interstitial and airspace edema versus atypical infection. **This report has been created using voice recognition software. It may contain minor errors which are inherent in voice recognition technology. **      Final report electronically signed by Dr Jena Nelson on 9/30/2021 9:33 AM      XR CHEST PORTABLE   Final Result   Impression:      Increasing consolidation and interstitial prominence.       Question pulmonary edema versus pneumonia      This document has been electronically signed by: Em Hu MD on    09/24/2021 10:34 PM      XR CHEST PORTABLE   Final Result   1. No acute intrathoracic process. 2. Mild stable cardiomegaly. **This report has been created using voice recognition software. It may contain minor errors which are inherent in voice recognition technology. **      Final report electronically signed by Dr. Carl Murillo on 9/22/2021 9:52 PM      CT HEAD WO CONTRAST   Final Result      1. Stable CT scan of the brain, no interval change since previous study dated 15th of June 2021. **This report has been created using voice recognition software. It may contain minor errors which are inherent in voice recognition technology. **      Final report electronically signed by DR Bernard Sams on 9/15/2021 4:26 PM      CT CERVICAL SPINE WO CONTRAST   Final Result    No evidence of acute osseous injury of the cervical spine. **This report has been created using voice recognition software. It may contain minor errors which are inherent in voice recognition technology. **      Final report electronically signed by Dr. Esperanza Hernadez MD on 9/15/2021 4:29 PM      XR CHEST PORTABLE   Final Result   Cardiomegaly with vascular congestion and interstitial edema and effusions differential would include congestive heart failure or fluid overload. **This report has been created using voice recognition software. It may contain minor errors which are inherent in voice recognition technology. **      Final report electronically signed by Dr. India Her on 9/13/2021 2:22 PM        XR CHEST PORTABLE    Result Date: 9/13/2021  PROCEDURE: XR CHEST PORTABLE CLINICAL INFORMATION: SOB . COMPARISON: 7/12/2021 TECHNIQUE: Portable upright FINDINGS: Heart size is mildly enlarged. Mediastinum is not widened. There is pulmonary vascular congestion. Interstitial edema.  There is suggestion of small effusions. AICD over the left chest. EKG leads overlie the chest. Right upper extremity vascular stents are noted. Cardiomegaly with vascular congestion and interstitial edema and effusions differential would include congestive heart failure or fluid overload. **This report has been created using voice recognition software. It may contain minor errors which are inherent in voice recognition technology. ** Final report electronically signed by Dr. Dez Bryan on 9/13/2021 2:22 PM        Tele:   [x] yes             [] no      Active Hospital Problems    Diagnosis Date Noted    Hypoxia [R09.02]     Severe malnutrition ([facility] [address] [E43] 09/22/2021     Class: Acute    COVID-19 [U07.1] 09/13/2021    Elevated troponin [R77.8]        Electronically signed by Effie Ballard MD on 10/10/2021 at 9:49 AM

## 2021-10-10 NOTE — PROGRESS NOTES
Gastroenterology  Progress Note    10/10/2021 10:27 AM  Subjective:   Admit Date: 9/13/2021    Interval History: Patient admitted with COVID-19 pneumonia seen GI consultation anemia drop in H&H his blood count stable today his upper endoscopy showed erosive esophagitis can be source of the GI bleed patient stable to discharge on H2 blocker PPI  Diet: ADULT DIET; Regular; Low Potassium (Less than 3000 mg/day)    Medications:   Scheduled Meds:    sodium chloride flush  5-40 mL IntraVENous 2 times per day    pantoprazole  40 mg IntraVENous BID    lactulose  20 g Oral BID    lidocaine 1 % injection  5 mL IntraDERmal Once    dicyclomine  10 mg Oral TID AC    dilTIAZem  30 mg Oral Once    [Held by provider] aspirin  81 mg Oral Daily    [Held by provider] heparin (porcine)  5,000 Units SubCUTAneous 3 times per day    epoetin traci-epbx  6,000 Units SubCUTAneous Once per day on Mon Wed Fri    calcitRIOL  1.5 mcg Oral Once per day on Mon Wed Fri    cinacalcet  60 mg Oral Once per day on Mon Wed Fri    docusate sodium  100 mg Oral BID    ferrous sulfate  325 mg Oral Daily with breakfast    gabapentin  100 mg Oral TID    metoprolol  100 mg Oral BID    vitamin C  500 mg Oral Daily    polyethylene glycol  17 g Oral Daily    [Held by provider] clopidogrel  75 mg Oral Daily     Continuous Infusions:    sodium chloride      sodium chloride      sodium chloride      sodium chloride      dextrose Stopped (10/01/21 1231)       CBC:   Recent Labs     10/08/21  0730 10/09/21  0600   WBC  --  4.8   HGB 9.4* 8.6*   PLT  --  154     BMP:    Recent Labs     10/09/21  0600      K 3.9   CL 98   CO2 29   BUN 11   CREATININE 3.6*   GLUCOSE 83     Hepatic: No results for input(s): AST, ALT, ALB, BILITOT, ALKPHOS in the last 72 hours. INR: No results for input(s): INR in the last 72 hours.     Imaging:  Results for orders placed during the hospital encounter of 09/13/21    XR ABDOMEN (KUB) (SINGLE AP VIEW)    Narrative  1 view abdomen    Comparison: CT abdomen/pelvis September 30, 2021    Findings:  Nonspecific but nonobstructive bowel gas pattern. No pneumoperitoneum or pneumatosis. Calcified phleboliths within the right lower pelvis. Degenerative changes of the lumbar spine. Atherosclerosis of the abdominal  aorta. Impression  A nonspecific but nonobstructive bowel gas pattern. This document has been electronically signed by: Darren Rutherford DO on  10/02/2021 12:45 AM    Results for orders placed during the hospital encounter of 07/12/21    CT ABDOMEN PELVIS W IV CONTRAST Additional Contrast? None    Narrative  PROCEDURE: CTA CHEST W WO CONTRAST, CT ABDOMEN PELVIS W IV CONTRAST    CLINICAL INFORMATION: sob . Periumbilical pain. Loss of appetite. COMPARISON: CTA chest, abdomen and pelvis dated 5/21/2018. TECHNIQUE: Helical CT of the chest during fast bolus administration of 80 mL Isovue-370 injected in the left [facility] with thick sagittal and coronal maximum intensity projection reconstructions. Subsequent helical CT of the abdomen and pelvis with sagittal  coronal reconstructions. All CT scans at this facility use dose modulation, iterative reconstruction, and/or weight-based dosing when appropriate to reduce radiation dose to as low as reasonably achievable. FINDINGS:  Chest:  There is a good contrast bolus within the pulmonary arteries, adequate for evaluation to the subsegmental level. There are no focal filling defects within the pulmonary arteries to suggest pulmonary embolus. The main pulmonary arteries are enlarged. There is mural calcification in the aorta. The ascending aorta is mildly enlarged measuring 3.7 cm in greatest short axis diameter the level of the main pulmonary artery. There is no evidence of dissection. There are moderate right and small left pleural  effusions with mild adjacent atelectasis.  There are moderate emphysematous changes predominantly in the upper lungs which have worsened in the interval compared to prior exam. There are scattered distal lymph nodes some which are calcified, stable  compared to prior exam. The heart is enlarged, increased compared to previous CT. There is a left-sided cardiac pacer/defibrillator generator in the subcutaneous fat overlying the left pectoralis muscle, similar to prior exam. There are stable leads in  the right atrium and right ventricle, respectively. There are mild anterior wedge shape configuration's of the T6-T8 vertebral bodies with approximately 10% anterior height loss, stable compared to prior exam. There are stable degenerative changes of the  thoracic spine. Abdomen/pelvis: There are few scattered cysts in the liver, stable compared to prior exam. There is a calcified stone in the gallbladder, stable compared to prior exam. Adrenal glands are unremarkable. The kidneys are atrophied, stable compared to prior exam. The spleen  is unremarkable. There is mild ductal prominence in the pancreas, unchanged compared to prior exam. No definite pancreatic lesion is identified. No retroperitoneal or mesenteric lymphadenopathy is identified. There is mural calcification in the aorta  without evidence of aneurysm. There is moderately prominent stool within the large bowel. Small bowel appears within normal limits. The appendix is normal in appearance. The bladder is nondistended. There is diffuse wall thickening the bladder which may represent pseudothickening  from underdistention. The prostate is prominently enlarged, increased compared to prior exam. There is an asymmetrically enhancing area in the left prostate. No free fluid is identified. There are stable degenerative changes of lumbar spine. There is  stable bone island in the right ilium. Impression  1. No evidence of pulmonary embolus. 2. Moderate right and small left pleural effusions with adjacent atelectasis.   3. Ascending aortic aneurysm measuring 3.7 cm in greatest diameter. 4. Pulmonary artery hypertension. 5. Cardiomegaly, increased compared to prior CT. 6. Prostatomegaly with enhancing component on the left. 7. Cholelithiasis. **This report has been created using voice recognition software. It may contain minor errors which are inherent in voice recognition technology. **    Final report electronically signed by Dr. Yulisa Flor MD on 2021 11:43 AM    No results found for this or any previous visit. No results found for this or any previous visit. [place] [address]                                 OPERATIVE REPORT     PATIENT NAME: Bharati Schneider                      :        11/10/1930  MED REC NO:   [id number]                           ROOM:       0014  ACCOUNT NO:   [de-identified]                           ADMIT DATE: 2021  PROVIDER:     Luis Angel Franco M.D.     DATE OF PROCEDURE:  10/09/2021     INDICATION:  The patient with history of melenic stool with drop in H  and H.  Plan today for EGD to evaluate.     DESCRIPTION OF PROCEDURE:  The patient was brought to the GI lab. Consent was obtained. Risks involved with the procedure were explained  to the patient. Informed consent was obtained. The patient was  monitored during the procedure with pulse oximetry, blood pressure  monitoring, and oxygen by nasal cannula. Sedation by incremental doses  of IV propofol given by Anesthesia Service to achieve total IV  anesthesia. For ASA classification and medication given during the  procedure, please see Anesthesia note.     SURGEON:  Luis Angel Franco MD     ASA CLASSIFICATION:  Please see anesthesia note.     ESTIMATED BLOOD LOSS:  None.     PROCEDURE PERFORMED:  EGD.     DESCRIPTION OF PROCEDURE:  A standard video upper endoscope was advanced  under direct vision from the oral cavity up to the third part of the  duodenum.   The esophagus featured grade 2 distal esophagitis with  features of just recent GI bleed; however, no active bleeding. Scope  was advanced to the stomach. Retroflex examination of the cardia  revealed small hiatus hernia. Mild gastritis seen in the antrum as well  as deformity in the pylorus, likely indicative of previous history of  ulcer in that area, but not active at the stomach. Mild duodenitis  Seen. Scope was withdrawn with no immediate complications.     IMPRESSION:  1. Erosive esophagitis grade 2, likely source of GI bleed. 2.  Gastritis. 3.  Small hiatus hernia. 4.  Deformity of the pylorus. 5.  Duodenitis.     PLAN:  1. Continue PPI. 2.  Resume diet. 3.  Avoid NSAID. 4.  Long-term therapy with H2 blockers on discharge.           Sae Almazan M.D. Objective:   Vitals: /72   Pulse 99   Temp 98.3 °F (36.8 °C) (Oral)   Resp 16   Ht 5' 6\" (1.676 m)   Wt 114 lb 13.8 oz (52.1 kg)   SpO2 96%   BMI 18.54 kg/m²     Intake/Output Summary (Last 24 hours) at 10/10/2021 1027  Last data filed at 10/9/2021 2021  Gross per 24 hour   Intake 200 ml   Output 0 ml   Net 200 ml     General appearance: alert and cooperative with exam  Lungs: clear to auscultation bilaterally  Heart: regular rate and rhythm, S1, S2 normal, no murmur, click, rub or gallop  Abdomen: soft, non-tender; bowel sounds normal; no masses,  no organomegaly  Extremities: extremities normal, atraumatic, no cyanosis or edema    Assessment and Plan:   1. Anemia with acute GI blood loss due to erosive esophagitis  2. Stable to be discharged on H2 blocker or PPI      Follow up in GI Clinic after discharge in 4 weeks week(s)    Patient Active Problem List:     CAD (coronary artery disease)     COPD (chronic obstructive pulmonary disease) ([facility])     Chronic renal insufficiency     Patient overweight     Arthritis-  low back and neck .      CKD (chronic kidney disease) stage 3, GFR 30-59 ml/min ([facility])     Dyspnea and respiratory abnormalities     ED (erectile dysfunction)     Degenerative joint disease of knee, left     Gout of big toe     Anemia, chronic disease     CKD (chronic kidney disease) stage 4, GFR 15-29 ml/min (HCC)     Essential hypertension     Monoclonal gammopathy     Leukopenia     Thrombocytopenia (HCC)     Chronic kidney disease (CKD), stage V (HCC)     Metabolic acidosis     Hyperkalemia     Iron deficiency anemia     ROSAURA (acute kidney injury) ([address]     Plasma cell dyscrasia     Idiopathic hypotension     Hypertensive urgency     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)     Systolic congestive heart failure (HCC)     Other fluid overload (CODE)     Hyperphosphatemia     Acute diastolic congestive heart failure (HCC)     Elevated troponin     Acute on chronic diastolic congestive heart failure (HCC)     Pulmonary hypertension (HCC)     Anemia due to chronic kidney disease, on chronic dialysis (HCC)     Volume overload     Secondary hyperparathyroidism of renal origin ([address]     Chest pain     Acute pulmonary edema (HCC)     Accelerated hypertension     Gastritis and duodenitis     Fall from slip, trip, or stumble, initial encounter     Closed fracture of left ankle     ESRD on hemodialysis ([address]     Hypertensive emergency     SOB (shortness of breath)     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

## 2021-10-10 NOTE — PROGRESS NOTES
[facility]   Nephrology progress note  10/10/2021, 11:37 AM      Pt Name:    Henny Hayes  MRN:     [id number]     [place]:    11/10/1930  Admit Date:    9/13/2021 12:59 PM  Primary Care Physician:  Segun Valencia MD   Room number  4D-28/060-T    Chief Complaint: Nephrology following for ESRD on hemodialysis    Subjective:  Patient seen and examined  This is late entry  Seen and examined earlier today  Had EGD yesterday  Not a very good historian  No acute distress      Objective:  24HR INTAKE/OUTPUT:      Intake/Output Summary (Last 24 hours) at 10/10/2021 1137  Last data filed at 10/9/2021 2021  Gross per 24 hour   Intake 200 ml   Output 0 ml   Net 200 ml     I/O last 3 completed shifts: In: 250 [P.O.:200; I.V.:50]  Out: 0   No intake/output data recorded. Admission weight: 163 lb (73.9 kg)  Wt Readings from Last 3 Encounters:   10/08/21 114 lb 13.8 oz (52.1 kg)   07/14/21 145 lb 8.1 oz (66 kg)   06/17/21 161 lb (73 kg)     Body mass index is 18.54 kg/m².     Physical examination  VITALS:     Vitals:    10/09/21 2121 10/10/21 0034 10/10/21 0328 10/10/21 0815   BP: (!) 145/67 (!) 150/89 (!) 149/68 133/72   Pulse: 78 69 70 99   Resp: 18 18 16 16   Temp: 97.6 °F (36.4 °C) 98.3 °F (36.8 °C) 98.2 °F (36.8 °C) 98.3 °F (36.8 °C)   TempSrc: Oral Oral Oral Oral   SpO2: 92% 95% 99% 96%   Weight:       Height:         General Appearance:appears comfortable, no distress  Mouth/Throat: Oral mucosa moist  Neck: No JVD  Lungs: Air entry B/L, no rales, no use of accessory muscles  Heart:  S1, S2 heard  GI: soft, non-tender, no guarding      Lab Data  CBC:   Recent Labs     10/08/21  0730 10/09/21  0600   WBC  --  4.8   HGB 9.4* 8.6*   HCT 27.8* 26.2*   PLT  --  154     BMP:  Recent Labs     10/09/21  0600      K 3.9   CL 98   CO2 29   BUN 11   CREATININE 3.6*   GLUCOSE 83   CALCIUM 7.6*     Hepatic: No results for input(s): LABALBU, AST, ALT, ALB, BILITOT, ALKPHOS in the last 72 hours.      Meds:  Infusion:    sodium chloride      sodium chloride      sodium chloride      sodium chloride      dextrose Stopped (10/01/21 1231)     Meds:    sodium chloride flush  5-40 mL IntraVENous 2 times per day    pantoprazole  40 mg IntraVENous BID    lactulose  20 g Oral BID    lidocaine 1 % injection  5 mL IntraDERmal Once    dicyclomine  10 mg Oral TID AC    dilTIAZem  30 mg Oral Once    [Held by provider] aspirin  81 mg Oral Daily    [Held by provider] heparin (porcine)  5,000 Units SubCUTAneous 3 times per day    epoetin traci-epbx  6,000 Units SubCUTAneous Once per day on Mon Wed Fri    calcitRIOL  1.5 mcg Oral Once per day on Mon Wed Fri    cinacalcet  60 mg Oral Once per day on Mon Wed Fri    docusate sodium  100 mg Oral BID    ferrous sulfate  325 mg Oral Daily with breakfast    gabapentin  100 mg Oral TID    metoprolol  100 mg Oral BID    vitamin C  500 mg Oral Daily    polyethylene glycol  17 g Oral Daily    [Held by provider] clopidogrel  75 mg Oral Daily     Meds prn: sodium chloride flush, sodium chloride, sodium chloride, sodium chloride, HYDROcodone-acetaminophen, sodium chloride, loperamide, metoprolol, metoclopramide, ipratropium-albuterol, glucose, dextrose, glucagon (rDNA), dextrose, ondansetron **OR** ondansetron, polyethylene glycol, acetaminophen **OR** acetaminophen, guaiFENesin-dextromethorphan, albuterol sulfate HFA       Impression and Plan:  1. ESRD on hemodialysis  Clinically stable  Electrolytes have been stable  No new labs available today  Plan hemodialysis treatment tomorrow  Check labs in a.m.    2.  Status post EGD showing grade 2 erosive esophagitis, mild gastritis and duodenitis  3. Essential hypertension  4. Chronic systolic dysfunction  5. Secondary hyperparathyroidism  6.   Disposition awaiting placement      Gilbert Trivedi MD  [facility]

## 2021-10-11 NOTE — FLOWSHEET NOTE
10/11/21 0800 10/11/21 1225   Vital Signs   BP (!) 149/86 (!) 182/90   Temp 97.3 °F (36.3 °C) 97.5 °F (36.4 °C)   Pulse 80 66   Resp 18  --    Post-Hemodialysis Assessment   Post-Treatment Procedures  --  Blood returned;Catheter Capped, clamped with Saline x2 ports   Machine Disinfection Process  --  Acid/Vinegar Clean;Heat Disinfect; Exterior Machine Disinfection   Rinseback Volume (ml)  --  400 ml   Total Liters Processed (l/min)  --  91.4 l/min   Dialyzer Clearance  --  Lightly streaked   Duration of Treatment (minutes)  --  240 minutes   Hemodialysis Intake (ml)  --  400 ml   Hemodialysis Output (ml)  --  1400 ml   NET Removed (ml)  --  1000 ml   Tolerated Treatment  --  Good   Stable 4 hour treatment. Tolerated well. Removed 1 liter of fluid as per order. Pressure held to needle sites times ten minutes times two. Dressings dry and intact. Report given to primary RN. Treatment record printed to be scanned into EMR.

## 2021-10-11 NOTE — PROGRESS NOTES
Gastroenterology  Progress Note    10/11/2021 4:18 PM  Subjective:   Admit Date: 9/13/2021    Interval History: Patient admitted with COVID-19 pneumonia seen GI consultation anemia drop in H&H his blood count stable today his upper endoscopy showed erosive esophagitis can be source of the GI bleed patient stable to discharge on H2 blocker PPI  10/11:  Labs reviewed; H&H low but stable. Pt c/o \"arm bothering me\" - pt c/o right arm \"itchy\" and sore from dialysis. C/o fatigue. Denies abdominal pain, N/V or constipation or diarrhea. Diet: ADULT DIET;  Regular; Low Potassium (Less than 3000 mg/day)    Medications:   Scheduled Meds:    sodium chloride flush  5-40 mL IntraVENous 2 times per day    pantoprazole  40 mg IntraVENous BID    lactulose  20 g Oral BID    lidocaine 1 % injection  5 mL IntraDERmal Once    dicyclomine  10 mg Oral TID AC    dilTIAZem  30 mg Oral Once    [Held by provider] aspirin  81 mg Oral Daily    [Held by provider] heparin (porcine)  5,000 Units SubCUTAneous 3 times per day    epoetin traci-epbx  6,000 Units SubCUTAneous Once per day on Mon Wed Fri    calcitRIOL  1.5 mcg Oral Once per day on Mon Wed Fri    cinacalcet  60 mg Oral Once per day on Mon Wed Fri    docusate sodium  100 mg Oral BID    ferrous sulfate  325 mg Oral Daily with breakfast    gabapentin  100 mg Oral TID    metoprolol  100 mg Oral BID    vitamin C  500 mg Oral Daily    polyethylene glycol  17 g Oral Daily    [Held by provider] clopidogrel  75 mg Oral Daily     Continuous Infusions:    sodium chloride      sodium chloride      sodium chloride      sodium chloride      dextrose Stopped (10/01/21 1231)       CBC:   Recent Labs     10/09/21  0600 10/10/21  1952 10/11/21  0800   WBC 4.8 7.2 8.4   HGB 8.6* 9.8* 9.5*    207 198     BMP:    Recent Labs     10/09/21  0600 10/10/21  1952 10/11/21  0800    138 140   K 3.9 3.4* 4.4   CL 98 100 102   CO2 29 25 23   BUN 11 21 27*   CREATININE 3.6* 6. 0* 6.5*   GLUCOSE 83 120* 142*     Hepatic: No results for input(s): AST, ALT, ALB, BILITOT, ALKPHOS in the last 72 hours. INR: No results for input(s): INR in the last 72 hours. Imaging:  Results for orders placed during the hospital encounter of 09/13/21    XR ABDOMEN (KUB) (SINGLE AP VIEW)    Narrative  1 view abdomen    Comparison: CT abdomen/pelvis September 30, 2021    Findings:  Nonspecific but nonobstructive bowel gas pattern. No pneumoperitoneum or pneumatosis. Calcified phleboliths within the right lower pelvis. Degenerative changes of the lumbar spine. Atherosclerosis of the abdominal  aorta. Impression  A nonspecific but nonobstructive bowel gas pattern. This document has been electronically signed by: Dion Corral DO on  10/02/2021 12:45 AM    Results for orders placed during the hospital encounter of 07/12/21    CT ABDOMEN PELVIS W IV CONTRAST Additional Contrast? None    Narrative  PROCEDURE: CTA CHEST W WO CONTRAST, CT ABDOMEN PELVIS W IV CONTRAST    CLINICAL INFORMATION: sob . Periumbilical pain. Loss of appetite. COMPARISON: CTA chest, abdomen and pelvis dated 5/21/2018. TECHNIQUE: Helical CT of the chest during fast bolus administration of 80 mL Isovue-370 injected in the left [facility] with thick sagittal and coronal maximum intensity projection reconstructions. Subsequent helical CT of the abdomen and pelvis with sagittal  coronal reconstructions. All CT scans at this facility use dose modulation, iterative reconstruction, and/or weight-based dosing when appropriate to reduce radiation dose to as low as reasonably achievable. FINDINGS:  Chest:  There is a good contrast bolus within the pulmonary arteries, adequate for evaluation to the subsegmental level. There are no focal filling defects within the pulmonary arteries to suggest pulmonary embolus. The main pulmonary arteries are enlarged. There is mural calcification in the aorta.  The ascending aorta is mildly enlarged measuring 3.7 cm in greatest short axis diameter the level of the main pulmonary artery. There is no evidence of dissection. There are moderate right and small left pleural  effusions with mild adjacent atelectasis. There are moderate emphysematous changes predominantly in the upper lungs which have worsened in the interval compared to prior exam. There are scattered distal lymph nodes some which are calcified, stable  compared to prior exam. The heart is enlarged, increased compared to previous CT. There is a left-sided cardiac pacer/defibrillator generator in the subcutaneous fat overlying the left pectoralis muscle, similar to prior exam. There are stable leads in  the right atrium and right ventricle, respectively. There are mild anterior wedge shape configuration's of the T6-T8 vertebral bodies with approximately 10% anterior height loss, stable compared to prior exam. There are stable degenerative changes of the  thoracic spine. Abdomen/pelvis: There are few scattered cysts in the liver, stable compared to prior exam. There is a calcified stone in the gallbladder, stable compared to prior exam. Adrenal glands are unremarkable. The kidneys are atrophied, stable compared to prior exam. The spleen  is unremarkable. There is mild ductal prominence in the pancreas, unchanged compared to prior exam. No definite pancreatic lesion is identified. No retroperitoneal or mesenteric lymphadenopathy is identified. There is mural calcification in the aorta  without evidence of aneurysm. There is moderately prominent stool within the large bowel. Small bowel appears within normal limits. The appendix is normal in appearance. The bladder is nondistended. There is diffuse wall thickening the bladder which may represent pseudothickening  from underdistention. The prostate is prominently enlarged, increased compared to prior exam. There is an asymmetrically enhancing area in the left prostate. No free fluid is identified. There are stable degenerative changes of lumbar spine. There is  stable bone island in the right ilium. Impression  1. No evidence of pulmonary embolus. 2. Moderate right and small left pleural effusions with adjacent atelectasis. 3. Ascending aortic aneurysm measuring 3.7 cm in greatest diameter. 4. Pulmonary artery hypertension. 5. Cardiomegaly, increased compared to prior CT. 6. Prostatomegaly with enhancing component on the left. 7. Cholelithiasis. **This report has been created using voice recognition software. It may contain minor errors which are inherent in voice recognition technology. **    Final report electronically signed by Dr. Lesly Ordaz MD on 2021 11:43 AM    No results found for this or any previous visit. No results found for this or any previous visit[place] [address]                                 OPERATIVE REPORT     PATIENT NAME: Sukhi Cabrera                      :        11/10/1930  MED REC NO:   [id number]                           ROOM:       0014  ACCOUNT NO:   [de-identified]                           ADMIT DATE: 2021  PROVIDER:     Acacia Clifford M.D.     DATE OF PROCEDURE:  10/09/2021     INDICATION:  The patient with history of melenic stool with drop in H  and H.  Plan today for EGD to evaluate.     DESCRIPTION OF PROCEDURE:  The patient was brought to the GI lab. Consent was obtained. Risks involved with the procedure were explained  to the patient. Informed consent was obtained. The patient was  monitored during the procedure with pulse oximetry, blood pressure  monitoring, and oxygen by nasal cannula. Sedation by incremental doses  of IV propofol given by Anesthesia Service to achieve total IV  anesthesia.   For ASA classification and medication given during the  procedure, please see Anesthesia note.     SURGEON:  Acacia Clifford MD     ASA plans for colonoscopy. Follow up in GI Clinic after discharge in 4 weeks week(s)    Patient Active Problem List:     CAD (coronary artery disease)     COPD (chronic obstructive pulmonary disease) (HCC)     Chronic renal insufficiency     Patient overweight     Arthritis-  low back and neck .      CKD (chronic kidney disease) stage 3, GFR 30-59 ml/min (HCC)     Dyspnea and respiratory abnormalities     ED (erectile dysfunction)     Degenerative joint disease of knee, left     Gout of big toe     Anemia, chronic disease     CKD (chronic kidney disease) stage 4, GFR 15-29 ml/min (HCC)     Essential hypertension     Monoclonal gammopathy     Leukopenia     Thrombocytopenia (HCC)     Chronic kidney disease (CKD), stage V (HCC)     Metabolic acidosis     Hyperkalemia     Iron deficiency anemia     ROSAURA (acute kidney injury) ([address]     Plasma cell dyscrasia     Idiopathic hypotension     Hypertensive urgency     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)     Systolic congestive heart failure (HCC)     Other fluid overload (CODE)     Hyperphosphatemia     Acute diastolic congestive heart failure (HCC)     Elevated troponin     Acute on chronic diastolic congestive heart failure (HCC)     Pulmonary hypertension (HCC)     Anemia due to chronic kidney disease, on chronic dialysis (HCC)     Volume overload     Secondary hyperparathyroidism of renal origin ([address]     Chest pain     Acute pulmonary edema (HCC)     Accelerated hypertension     Gastritis and duodenitis     Fall from slip, trip, or stumble, initial encounter     Closed fracture of left ankle     ESRD on hemodialysis ([address]     Hypertensive emergency     SOB (shortness of breath)     Chronic combined systolic and diastolic CHF (congestive heart failure) ([address]     COVID-19     Severe malnutrition ([address]     Hypoxia      Electronically signed by XIN Kent CNP on 10/11/2021 at 4:18 PM     Patient is seen independently from the nurse practitioner and all  the pertinent data along with physical examination and assessment and plans are all obtained by my self and  Laboratory data, Radiology results, medications all are reviewed by my self and care is discussed extensively with the patient  and the patients nurse and all agree with plan and in addition see orders and plans    Electronically signed by Princess Coats MD on 10/11/2021 at 5:45 PM

## 2021-10-11 NOTE — PROGRESS NOTES
Joann [facility] 60  OCCUPATIONAL THERAPY MISSED TREATMENT NOTE  [facility] 5K  [id number]      Date: 10/11/2021  Patient Name: Haven Colin        CSN: [id number]   : 11/10/1930  (80 y.o.)  Gender: male   Referring Practitioner: Juventino Jerome MD  Diagnosis: Acute Pulmonary Edema         REASON FOR MISSED TREATMENT: Patient Off Floor for Dialysis Will attempt at later time if time allows.

## 2021-10-11 NOTE — PROGRESS NOTES
Post-Fall Assessment  Date of Fall:   10/11/2021  Time of Fall:   0555   Yes No N/A Comment   Was Patient on [place] Program? [x] [] []    Was the Fall Witnessed? [] [x] []    Were Clothes a Factor? [] [x] []    Was Patient Wearing Corrective Footwear? [x] [] []    Other Environmental Factors Involved? [] [x] []      Description of Fall  Who found the patient: Leo Quintero. Hollis Ken RN  Where was the patient at the time of the fall:  bathroom  Brief description of fall: fernando Burns ambulated patient to restroom using gait belt, walker and non skid socks. Toilet had seat over it with arm rests for patient. Patient was using bathroom while Katy waited outside bathroom door. Katy heard a crash and entered bathroom. Patient was found of floor in front of toilet walker on ground as well. Denies hitting head, denies pain in head or neck. Patient assisted up with help of RNs and tech, ambulated to bed safely. VS and BS obtained. Patient comments regarding fall:   When asked if he hit his head he said no. Medications potentially contributing to fall risk (such as Sedatives, Hypnotics, Antihypertensives, Narcotics, Psychotropics, Anticonvulsants):   Did have bowel movement. Antihypertensives. Patient Assessment of Injury    (Please document Vital Signs in Doc Flowsheet)     Yes No   Patient hit his/her head [] [x]   Patient is taking an anticoagulant [] [x]   CT of Head requested [] [x]     Neurological Assessment Protocol: If the patient has hit his/her head during this fall,   a Neurological Assessment must be completed every 2 hours for 12 hours;   every 3 hours for 24 hours;   then every 4 hours for 24 hours. Document in Doc Flowsheets. Neurological Assessment Protocol initiated  no    If the patient did not hit his/her head during this fall, monitor vital signs every 8 hours. Notify the physician within 24 hours and document.       Physician Notification  Please document under Provider Notification group within the Assessment (Complex Assessment) template of Doc Flowsheets.      Physician notified yes    House Supervisor/Clinical Manager Notified:   Lupillo Rodriguez RN  Date:   10/11/2021 Time:   2404  Family Member Notified:   Faizan Burns   Date:   10/11/2021 Time:   1792

## 2021-10-11 NOTE — DISCHARGE SUMMARY
Hospital Medicine Discharge Summary      Patient Identification:  Name: Sameer Ashton  : 11/10/1930  MRN: [id number]  Account: [de-identified]    Patient's PCP: Denice Brown MD    Admit Date: 2021    Discharge Date:   10/11/21    Admitting Physician: Rosalba Cisneros MD    Discharge Physician: Dayami Oakes MD    Hospital Course:   Sameer Ashton is a 80 y.o. male who \"presented with complaints of shortness of breath and cough for the last 2 days.  Patient is accompanied by family who helps with history. [facility] report that patient has had a poor appetite, fatigued, short of breath and coughing for last 2 days. [facility] recently was at [facility] approximately 2 weeks ago where he had 2 stents placed. [facility] state he was in his normal state of health until 2 days ago. Hung Lopez was seen in dialysis today and had a full session however continued to be short of breath and had a significant cough and was sent for evaluation. [facility] denies any fevers or chills, nausea or vomiting.  He denies any diarrhea or constipation.  He denies any chest pain.     In the ED patient was found to be Covid positive.  He was placed on 2 L nasal cannula.   He was also found to have slightly elevated troponin and was started on heparin drip in the ED.  Family updated on test results. \"     Pt has completed treatment for COVID and superimposed bacterial PNA. I took over care , per previous note, pt stable for discharge and awaiting precert to ECF.     -> Rapid response called during dialysis. Pt had vomited, became lethargic and had episode of AF RVR on the monitor per report. Dialysis was stopped. Patient still lethargic but following commands and nods to answer questions appropriately. EKG showed NSR, does not appear to be AF. Patient is moaning which per nursing, he typically does during dialysis. Patient seen again later. He reports fatigue and wanting to sleep. He denies abdominal pain, n/v/d, CP, SOB, fever/chills.  Pt had bleeding from dialysis fistulasite.      9/23-> Patient alert to verbal stimuli, lethargic. Appears to be at his baseline. Denies fever/chills, sob, cp, abd pain, n/v/d. Nephrology started IVF for today and plan for dialysis tomorrow. Patient has been NSR on tele.     9/24-> Pt appears at baseline. He is more alert today than he has been. Tolerated dialysis well.      9/26-> Patient is alert, oriented to self, place, time, and somewhat to situation. He denies fever/chills, SOB, CP, abd pain, n/v/d, cough. He reports of left arm pain at dialysis cath site. The patient states that he has been told before that his heart beat gets \"out of wack. \"   Chart reviewed and a rapid was called last night they were unable to get a reading of O2 levels. ABG results with pO2 179, no hypercapnea. Based on ABG did not appear to be truly hypoxic so he was put back on 6L NC and nursing instructed to wean. His EKG reported atrial fibrillation; however, after review, there were P waves present. Ordered pacer interrogate.      9/27-> no acute events overnight. Patient denies sob, cp, abd pain, nvd. Nephrology planning HD tomorrow. Awaiting pacer interrogate results.      9/28- Pt had dialysis today, tolerated well.      9/29 -> patient on HD. Patient is doing well. Denies fever/chills, sob, cp, palpitations, abd pain, n/v/d. Disposition has been difficult secondary to Covid diagnosis and HD facility/schedule.     9/30-> pt had a rough morning. Called to bedside due to pulse ox being read as low as 50% and was very labile. Pt placed on non-re breather. Pt was c/o of abd pain and an episode of diarrhea that started last night. Had an episode of emesis s/p IO insertion, none since then. Afebrile.      10/1-> patient doing much better today, seen in dialysis. Patient denies any abdominal pain at all today. No nausea or vomiting. Hypoglycemia has resolved and remained stable. Afebrile.   Has been weaned down to 4 L NC.     10/2-> patient had a rapid response called overnight secondary to abdominal pain and hypotension (as low as the 97Z systolic). Patient was noted to have an elevated lactic acid. He was given fluids and started on IV antibiotics. Today upon exam, patient again is complaining of severe abdominal pain all over. We will check a CTA of the abdomen. Consider general surgery consultation.     10/3-> patient again had a rough day complaining of intense 10/10 abdominal pain. Patient stated \"I want to be poked no more \"and we discussed the concept of moving forward with comfort care versus further diagnostic testing. Patient and family agreed that comfort care was the route they wish to take. Hospice evaluation placed. Overnight, patient's blood glucose has been stable. Again discussed with patient who stated he wanted to proceed with comfort care. Pt is A&Ox4 on this exam. Discussed stopping any lab draws and also fingerstick checks. Discussed potential adrenal insufficiency which cannot be confirmed without further lab testing. Pt is aware that this could lead to death. Discussed with Natalie Oseguera via telephone. Plan to meet today with Hospice. They are now unsure if they wish to proceed with comfort care as they were unaware that pt would not be a dialysis pt any longer. Await hospice eval.   Patient reporting that he still has generalized abdominal pain, though it is much improved from yesterday. Blood pressure again noted to be in the 80s last night, has maintained stability today.     10/4-> patient lying in bed having returned from dialysis. Patient denies as intense abdominal pain today, however notes that he does have some generalized pain especially with palpation. Status post midline placement with IR. Cosyntropin was not given this morning, although cortisol was drawn. Labs will need to be redrawn tomorrow morning after appropriate stimulation.   Patient denies chest pain or shortness of breath.     10/5-> 10/7 - Glucose stable.                 2. Abd pain, generalized, unclear etiology:. Check CT A / P -> showing distended gallbladder. LA improved and resolved, however was noted to again be elevated as high as 8.7 which is improved to 4.9. Check CTA abdomen to rule out ischemia. Will consider Gen Surg consultation. 10/3-> discussed with pt and family. Surgery is not an option they wish to consider should it be necessary. 10/5-> c/o continued belly pain, continue to trend LA till normalized. CTA of abd today. 10/6 -> LA 1.6 - WNL  10/9: Patient is status post EGD today which showed erosive esophagitis and duodenitis. We will continue PPI. Diet resumed. Monitor H&H. Daughter updated over the phone on results of EGD. 10/10: Repeat H/H pending, no signs of active bleed. Tolerating diet well.     3. COVID-19 pneumonia (resolved): treated with dexamethasone 6mg daily. First positive test 09/13. Pt stable on RA. Isolation no longer required, transferred to .     4. Possible superimposed bacterial PNA: Completed treatment with Rocephin and Azithromycin. Remains afebrile. No leukocytosis.     5. Acute hypoxic respiratory failure: 2/2 above. 9/25-> Pt placed on O2 overnight after rapid was called. PO2 on ABG was 170, no hypercapnea. No acute changes on EKG, no evidence of fluid overload. Pt is currently 4L, ABG showing respiratory alkalosis. Continue to monitor and wean as tolerated. 10/6 -> patient currently on 2lpm NC.  10/10:  Continue to wean O2     6. ESRD on HD:  Secondary to diabetic and hypertensive nephrosclerosis. Nephrology following. On HD MWF.       7. S/p unwitnessed fall: No apparent injuries, CT brain and cervical spine -unremarkable      8. Dark tarry stools / chronic normocytic anemia: hx INDRA. On Retacrit, ferrous sulfate. One episode, none further. Hemoglobin stable. Negative occult 9/19/2021.  Continue to monitor, transfuse for hemoglobin less than 7.   Prior to dialysis on Wednesday patient had hemoglobin 7.1. Postdialysis hemoglobin increased over 8. However following day hemoglobin was noted to be 7.0. Repeat H&H showed 6.6. Plavix, aspirin, heparin held in setting of potential GI bleed. Transfuse 1 unit PRBCs. Consult was placed and plans for EGD 10/9. Repeat H&H this morning (10/8) was 9.4. We will continue to monitor closely. EGD shows esophagitis and duodenitis.       9. Elevated troponin: chronically elevated, likely d/t ESRD. No chest pain, repeat troponin improved, no acute ischemic changes on ekg.      10. CAD s/p PCI: Patient had 2 stents (LAD and Ramus) at [facility] on 08/31/21. Will continue with aspirin, Plavix, metoprolol and pravastatin.     11. H/o NSVT / Sick sinus syndrome: noted, has PPM/ICD. Tele ordered. Continue metoprolol. Possible PAF. However, after review of EKGs, P waves were present and did not appear to be in a fib. Pt typically follows with Cardiology at [facility], unclear if he has a hx of AF. He is not on anticoagulation. Even if PAF, the risks of anticoagulation may be greater than the benefit. CHADSVASc 4, HAS-BLED 4. Pacer interrogated, awaiting report. Pt will need to arrange follow up with his Cardiologist at [facility].      12. Chronic HFrEF: EF 45% and grade 1 diastolic dysfunction on Echo 05/2021. Follows with Cardiology at [facility]. Fluid status stable, Nephrology following. Cont home meds.      13. Disposition: Difficulty with discharge secondary to Covid diagnosis and hemodialysis schedule. Appreciate case management assistance.     14. Goals of care: Hospice consulted. Per pt and family wishes, code status changed to [facility] and then back to Limited No x3, yes to defib / cardioversion after family discussion with hospice.      15. Lactic acidosis: LA was noted to 8.7 on 10/1, markedly improved with repeat down to 2.5 today. Continue to check every 2 hours to normalize.   Unclear etiology, potentially from abdominal source as patient complains of severe discomfort in his abdomen at times. LA today 10/6 is WNL 1.6. Patient denies abdominal pain today. i.  CTA of abdomen shows :  1. Bilateral airspace infiltrates and pleural effusions right greater than left. 2. Cardiomegaly. 3. Atrophic kidneys bilaterally. 4.  liquid stool throughout much of the colon and rectum consistent with diarrhea no evidence of bowel obstruction. 5. Although there is diffuse calcific atherosclerosis of the aorta and mesenteric vessels there is no significant stenosis of the mesenteric vessels. However the renal arteries are bilaterally extremely diffusely narrow. The patient was seen and examined on day of discharge and this discharge summary is in conjunction with any daily progress note from day of discharge. The patient understood, was in agreement, and verbalized the plan of care at time of discharge. Exam:    Vitals:   Vitals:    10/10/21 0328 10/10/21 0815 10/10/21 2231 10/11/21 0552   BP: (!) 149/68 133/72 139/71 101/69   Pulse: 70 99 87 92   Resp: 16 16 16 15   Temp: 98.2 °F (36.8 °C) 98.3 °F (36.8 °C) 98.5 °F (36.9 °C)    TempSrc: Oral Oral Oral    SpO2: 99% 96% 95% 97%   Weight:       Height:         Weight: Weight: 114 lb 13.8 oz (52.1 kg)    24 hour intake/output:No intake or output data in the 24 hours ending 10/11/21 1130      Physical Exam  Constitutional:       Appearance: Normal appearance. He is obese. He is not diaphoretic. HENT:      Head: Normocephalic and atraumatic. Mouth/Throat:      Mouth: Mucous membranes are moist.      Pharynx: Oropharynx is clear. No oropharyngeal exudate or posterior oropharyngeal erythema. Eyes:      General: No scleral icterus. Extraocular Movements: Extraocular movements intact. Pupils: Pupils are equal, round, and reactive to light. Cardiovascular:      Rate and Rhythm: Normal rate and regular rhythm. Pulses: Normal pulses. Heart sounds: Normal heart sounds. No murmur heard.    No friction rub. No gallop. Pulmonary:      Effort: Pulmonary effort is normal.      Breath sounds: Normal breath sounds. No wheezing, rhonchi or rales. Abdominal:      General: Bowel sounds are normal.      Palpations: Abdomen is soft. Tenderness: There is no abdominal tenderness. There is no guarding or rebound. Musculoskeletal:      Cervical back: Normal range of motion and neck supple. Right lower leg: No edema. Left lower leg: No edema. Skin:     General: Skin is warm and dry. Capillary Refill: Capillary refill takes less than 2 seconds. Neurological:      General: No focal deficit present. Mental Status: He is alert and oriented to person, place, and time. Psychiatric:         Mood and Affect: Mood normal.         Behavior: Behavior normal.       Labs: For convenience and continuity at follow-up the following most recent labs are provided:    CBC:    Lab Results   Component Value Date    WBC 8.4 10/11/2021    HGB 9.5 10/11/2021    HCT 27.6 10/11/2021     10/11/2021       Renal:    Lab Results   Component Value Date     10/11/2021    K 4.4 10/11/2021    K 4.9 09/19/2021     10/11/2021    CO2 23 10/11/2021    BUN 27 10/11/2021    CREATININE 6.5 10/11/2021    CALCIUM 8.0 10/11/2021    PHOS 2.5 09/27/2021         Significant Diagnostic Studies    Radiology:   CTA ABDOMEN PELVIS W WO CONTRAST   Final Result   1. Bilateral airspace infiltrates and pleural effusions right greater than left. 2. Cardiomegaly. 3. Atrophic kidneys bilaterally. 4 liquid stool throughout much of the colon and rectum consistent with diarrhea no evidence of bowel obstruction. 5. Although there is diffuse calcific atherosclerosis of the aorta and mesenteric vessels there is no significant stenosis of the mesenteric vessels. However the renal arteries are bilaterally extremely diffusely narrow. **This report has been created using voice recognition software.  It may contain minor errors which are inherent in voice recognition technology. **      Final report electronically signed by Dr. Donte Wilson on 10/5/2021 3:33 PM      IR FLUORO GUIDED CVA DEVICE PLMT/REPLACE/REMOVAL   Final Result   Status post successful midline insertion. **This report has been created using voice recognition software. It may contain minor errors which are inherent in voice recognition technology. **      Final report electronically signed by Dr Luisito George on 10/4/2021 1:54 PM      XR ABDOMEN (KUB) (SINGLE AP VIEW)   Final Result   A nonspecific but nonobstructive bowel gas pattern. This document has been electronically signed by: Mattie Skaggs DO on    10/02/2021 12:45 AM      XR CHEST PORTABLE   Final Result   1. Scattered patchy regions of predominantly interstitial densities    throughout the bilateral lungs grossly similar to the prior examination. Mild bibasilar pleural/parenchymal opacities most consistent with tiny    effusions with atelectasis and/or consolidation. 2. Cardiomegaly with the heart size unchanged from the prior examination. This document has been electronically signed by: Mattie Skaggs DO on    10/02/2021 12:47 AM      CT ABDOMEN PELVIS WO CONTRAST Additional Contrast? None   Final Result   1. Limited evaluation of the lung bases demonstrates small bilateral pleural effusions with patchy consolidative opacities at the lower lobes which may be related to pneumonia. 2. The gallbladder demonstrates a small gallstone near the gallbladder neck. This appears mildly distended. 3. Mild ascites in the perihepatic region is noted. **This report has been created using voice recognition software. It may contain minor errors which are inherent in voice recognition technology. **      Final report electronically signed by Dr. Luisa Velásquez on 9/30/2021 1:35 PM      XR ABDOMEN (KUB) (SINGLE AP VIEW)   Final Result   1.  Overall nonobstructive bowel gas electronically signed by Dr. Myranda Yusuf MD on 9/15/2021 4:29 PM      XR CHEST PORTABLE   Final Result   Cardiomegaly with vascular congestion and interstitial edema and effusions differential would include congestive heart failure or fluid overload. **This report has been created using voice recognition software. It may contain minor errors which are inherent in voice recognition technology. **      Final report electronically signed by Dr. Nadia Justin on 9/13/2021 2:22 PM            Consults:     [address] CONSULT TO SPIRITUAL SERVICES  IP CONSULT TO NEPHROLOGY  IP CONSULT TO PHARMACY  IP CONSULT TO SOCIAL WORK  IP CONSULT TO SPIRITUAL SERVICES  IP CONSULT TO PHARMACY  IP CONSULT TO HOSPICE  IP CONSULT TO GI    Disposition:    [] Home       [] TCU       [] Rehab       [] Psych       [x] SNF       [] [facility]       [] Other-    Condition at Discharge: fair    Code Status:  Limited  Tobacco:  DVT Prophylaxis:  Vaccinations:  Admitted for: ([facility])    Patient Instructions:  Discharge lab work: CBC and BMP in 1 week; f/u with PCP  Activity: activity as tolerated  Diet: ADULT DIET; Regular; Low Potassium (Less than 3000 mg/day)    Follow-up visits:   Rhett Palomino MD  [facility] [address] [id number] [phone]    On 10/11/2021  FOLLOW UP APPT @ 11:00AM, arrive 15 minutes early, please bring photo ID and medications    [facility] [address]  [phone]    Critical access hospital physician to follow        Discharge Medications:        Medication List      START taking these medications    dilTIAZem 30 MG tablet  Commonly known as: CARDIZEM  Take 1 tablet by mouth once for 1 dose        CHANGE how you take these medications    albuterol sulfate  (90 Base) MCG/ACT inhaler  What changed: Another medication with the same name was removed. Continue taking this medication, and follow the directions you see here. pantoprazole 40 MG tablet  Commonly known as: PROTONIX  Take 1 tablet by mouth 2 times daily (before meals)  What changed: See the new instructions. CONTINUE taking these medications    Aspirin Low Dose 81 MG EC tablet  Generic drug: aspirin     atorvastatin 40 MG tablet  Commonly known as: LIPITOR     calcitRIOL 0.25 MCG capsule  Commonly known as: ROCALTROL     cinacalcet 30 MG tablet  Commonly known as: SENSIPAR     clopidogrel 75 MG tablet  Commonly known as: PLAVIX     cyclobenzaprine 10 MG tablet  Commonly known as: FLEXERIL     docusate 100 MG Caps  Commonly known as: COLACE, DULCOLAX  Take 100 mg by mouth 2 times daily     ferrous sulfate 325 (65 Fe) MG tablet  Commonly known as: IRON 325     metoprolol 100 MG tablet  Commonly known as: LOPRESSOR  Take 1 tablet by mouth 2 times daily     polyethylene glycol 17 GM/SCOOP powder  Commonly known as: MiraLax  Renal Miralax Colonoscopy prep. Use as directed. Mix Miralax 238 g with 24 oz clear liquids. Drink 8 oz glass every 20-30 minutes until gone. Renal 1 MG Caps     vitamin C 500 MG tablet  Commonly known as: ASCORBIC ACID  Take 1 tablet by mouth daily        STOP taking these medications    amLODIPine 10 MG tablet  Commonly known as: NORVASC     cloNIDine 0.1 MG tablet  Commonly known as: CATAPRES     hydrALAZINE 50 MG tablet  Commonly known as: APRESOLINE     metoprolol succinate 50 MG extended release tablet  Commonly known as: TOPROL XL           Where to Get Your Medications      These medications were sent to [facility] [address], [address]  [address] [place] [address]  [address] [place] [address], [address] [place] [address] [address] [address]    Phone: [phone]   · dilTIAZem 30 MG tablet  · pantoprazole 40 MG tablet         Discharge Time:  Time spent on discharge is >35 minutes in the examination, evaluation, counseling, and review of medications and discharge plan.  The hospital course was discussed with the patient and

## 2021-10-11 NOTE — CARE COORDINATION
10/11/21, 11:12 AM EDT    DISCHARGE PLANNING EVALUATION      [facility] with [facility]. Facility does not have a bed available today. Confirmed that [facility] does plan to accept when they have bed available, and will continue to update precert.

## 2021-10-12 NOTE — PROGRESS NOTES
Medicine Progress Note    Patient:  Lisa Sood    Unit/Bed:5K-14/014-A  YOB: 1930  MRN: [id number]   Acct: [de-identified]   PCP: Holden Perkins MD  Date of Admission: 9/13/2021    Assessment / Plan     Briefly, pt Lisa Sood is a 80 y.o. male with a PMH SSS s/p PPM + AICD, Anemia of chronic disease, COPD, HLD< CAD, ESRD on HD, Systolic HF EF 76%, who presented with AHRF 2/2 COVID-PNA, sustained UGIB 2/2 erosive esophagitis and duodenitis and is now recovered. #AHRF 2/2 COVID PNA: Resolved  09/25 - required increased. O2. Escalated to 4L the weaned to 2L throughout hospitalization. Now on 3L which is new baseline. Baseline 2L prior to hospitalization. Briefly suspected superimposed bacterial PNA d/t leukocytosis and consoitdaiotnt/w course of ceftriaxone and azithromycin    #Recurrent Hypoglycemia: Resolved  No h/o DM. A1c 4.5% 10/01. S/p decadron x9 days for COVID. Cosyntropin test wnl. Currently 83-140s and pt asymptomatic  Plan: Continue hypoglycemia protocol    #Non-Variceal Upper GIB: Resolved  Bleeding source: esophagitis and duodenitis. H/o EGD on 10/09/2021 demonstrating the above findings. Plan:  -GI signed off; pantoprazole 40 mg BID, f/u in 4 weeks after discharge. -If elevated INR, liver cirrhosis, and any bleeding, Give IV Vitamin K 10 mg x1.  -H&H stat if re-bleeding.   -Transfuse if Hgb <7.0 mg/dL or symptomatic <8 mg/dL    #ABLA on Anemia of Chronic Disease: Resolved  10/07 Hgb 6.6 on baseline 9.0-10.0. 2/2 UGIB. Now 9.5. Continuing iron supplementation. #Anuric ESRD on HD 2/2 hypertensive nephrosclerosis: Stable   Schedule: M/W/F. Access: Right AV Fistula. Nephrologist: Dr. Pineda Li, [place] [id number]: [facility]. On phosphate binders: none. On Vitamin D: calcitriol. On EPO: retacrit. On midodrine: no. Kidney transplant candidate: no.   Plan:  -Nephrology; following  -HD while in hospital.   -BMP, Mg, Phos daily    #Chronically elevated troponin: Stable  H/o ESRD. Baseline 0.1-0.3    #SSS s/p AICD + PPM: Stable  HR 69-70. Plan: Noted    #Lactic Acidosis: Stable  8.7 10/01, improved to 2.5, resolved to 1.6. 2/2 GIB    #Recurrent Hypoglycemia: Resolved  No h/o DM. A1c 4.5% 10/01. S/p decadron x9 days for COVID. Cosyntropin test wnl. Currently 83-140s and pt asymptomatic  Plan: Continue hypoglycemia protocol    #Chronic Systolic + Diastolic Heart Failure with EF 45% on 05/19/2021, NYHA Class III: Stable  Pro-BNP chronically elevated >70,000. CXR on 09/13/2021 demonstrated cardiomegaly and pulmonary vascular congestion. GDMT: metoprolol tartrate. Diuretics: ESRD. Follows in HF Clinic: No.   Plan:   -Strict I&Os  -Daily weights  -HD for volume removal    #Non-obstructive CAD s/p JAXON on mid LAD and ramus in 2018: Stable  No recent LHC. Cardiac stress test 05/19/2021 not suggestive of myocardial ischemia. On ASA 81 mg.  Plan: ASA and plavix held in the setting of UGIB    Dispo: Med/Surg pending placement to LTAC    Fluids: Encourage PO fluid intake  Electrolyte: Replete as needed  Nutrition: Regularm low potassium, low phosphate  GI: Protonix 40 mg BID  DVT ppx:  SCD  PT/OT/SLP: PT/OT    Code status: Limited Limited Code details: Intubation/Re-intubation No; Defibrillation/Cardioversion No; Chest Compressions No; Resuscitative Medications No; Other No Comment    Subjective     Pt sitting in bed comfortably with excellent appetite. No fevers, chills, nausea, vomiting, headaches, or dizziness overnight. Shahrzad Azar is a 80 y.o. male with a PMH SSS s/p PPM + AICD, Anemia of chronic disease, COPD, HLD< CAD, ESRD on HD, Systolic HF EF 49%, who presented with AHRF 2/2 COVID-PNA, sustained UGIB 2/2 erosive esophagitis and duodenitis and is now recovered.      Objective     Vitals:     BP (!) 165/69   Pulse 69   Temp 98.5 °F (36.9 °C) (Oral)   Resp 16   Ht 5' 6\" (1.676 m)   Wt 114 lb 13.8 oz (52.1 kg)   SpO2 98%   BMI 18.54 kg/m²     Intake and Output: Intake/Output Summary (Last 24 hours) at 10/12/2021 1805  Last data filed at 10/12/2021 1359  Gross per 24 hour   Intake 400 ml   Output 0 ml   Net 400 ml       Outpatient Medications:     Scheduled Meds:   sodium chloride flush  5-40 mL IntraVENous 2 times per day    pantoprazole  40 mg IntraVENous BID    lactulose  20 g Oral BID    lidocaine 1 % injection  5 mL IntraDERmal Once    dicyclomine  10 mg Oral TID AC    dilTIAZem  30 mg Oral Once    [Held by provider] aspirin  81 mg Oral Daily    [Held by provider] heparin (porcine)  5,000 Units SubCUTAneous 3 times per day    epoetin traci-epbx  6,000 Units SubCUTAneous Once per day on Mon Wed Fri    calcitRIOL  1.5 mcg Oral Once per day on Mon Wed Fri    cinacalcet  60 mg Oral Once per day on Mon Wed Fri    docusate sodium  100 mg Oral BID    ferrous sulfate  325 mg Oral Daily with breakfast    gabapentin  100 mg Oral TID    metoprolol  100 mg Oral BID    vitamin C  500 mg Oral Daily    polyethylene glycol  17 g Oral Daily    [Held by provider] clopidogrel  75 mg Oral Daily     Continuous Infusions:   sodium chloride      sodium chloride      sodium chloride      sodium chloride      dextrose Stopped (10/01/21 1231)     PRN Meds:white petrolatum, sodium chloride flush, sodium chloride, sodium chloride, sodium chloride, HYDROcodone-acetaminophen, sodium chloride, loperamide, metoprolol, metoclopramide, ipratropium-albuterol, glucose, dextrose, glucagon (rDNA), dextrose, ondansetron **OR** ondansetron, polyethylene glycol, acetaminophen **OR** acetaminophen, guaiFENesin-dextromethorphan, albuterol sulfate HFA    Physical Exam:     Physical Exam  Constitutional:       Appearance: Normal appearance. He is not diaphoretic. HENT:      Head: Normocephalic and atraumatic. Mouth/Throat:      Mouth: Mucous membranes are moist.      Pharynx: Oropharynx is clear. No oropharyngeal exudate or posterior oropharyngeal erythema.    Eyes:      General: No scleral icterus. Extraocular Movements: Extraocular movements intact. Pupils: Pupils are equal, round, and reactive to light. Cardiovascular:      Rate and Rhythm: Normal rate and regular rhythm. Pulses: Normal pulses. Heart sounds: Normal heart sounds. No murmur heard. No friction rub. No gallop. Pulmonary:      Effort: Pulmonary effort is normal.      Breath sounds: Normal breath sounds. No wheezing, rhonchi or rales. Abdominal:      General: Bowel sounds are normal.      Palpations: Abdomen is soft. Tenderness: There is no abdominal tenderness. There is no guarding or rebound. Musculoskeletal:      Cervical back: Normal range of motion and neck supple. Right lower leg: No edema. Left lower leg: No edema. Skin:     General: Skin is warm and dry. Capillary Refill: Capillary refill takes less than 2 seconds. Neurological:      General: No focal deficit present. Mental Status: He is alert and oriented to person, place, and time.    Psychiatric:         Mood and Affect: Mood normal.         Behavior: Behavior normal.         Labs:     Results for orders placed or performed during the hospital encounter of 09/13/21   COVID-19, Rapid    Specimen: Nasopharyngeal Swab   Result Value Ref Range    SARS-CoV-2, NAAT DETECTED (AA) NOT DETECTED   Culture, Blood 1    Specimen: Blood   Result Value Ref Range    Blood Culture, Routine No growth-preliminary No growth     COVID-19, Rapid   Result Value Ref Range    SARS-CoV-2, NAAT DETECTED (AA) NOT DETECTED   COVID-19, Rapid    Specimen: Nasopharyngeal Swab   Result Value Ref Range    SARS-CoV-2, NAAT NOT  DETECTED NOT DETECTED   Culture, Blood 1    Specimen: Blood   Result Value Ref Range    Blood Culture, Routine No growth-preliminary No growth     Anion Gap   Result Value Ref Range    Anion Gap 10.0 8.0 - 16.0 meq/L   Glomerular Filtration Rate, Estimated   Result Value Ref Range    Est, Glom Filt Rate 19 (A) HW/CWA/7.46N8   Basic Metabolic Panel   Result Value Ref Range    Sodium 138 135 - 145 meq/L    Potassium 3.4 (L) 3.5 - 5.2 meq/L    Chloride 100 98 - 111 meq/L    CO2 25 23 - 33 meq/L    Glucose 120 (H) 70 - 108 mg/dL    BUN 21 7 - 22 mg/dL    CREATININE 6.0 (HH) 0.4 - 1.2 mg/dL    Calcium 7.9 (L) 8.5 - 10.5 mg/dL   CBC auto differential   Result Value Ref Range    WBC 7.2 4.8 - 10.8 thou/mm3    RBC 3.12 (L) 4.70 - 6.10 mill/mm3    Hemoglobin 9.8 (L) 14.0 - 18.0 gm/dl    Hematocrit 29.8 (L) 42.0 - 52.0 %    MCV 95.5 (H) 80.0 - 94.0 fL    MCH 31.4 26.0 - 33.0 pg    MCHC 32.9 32.2 - 35.5 gm/dl    RDW-CV 18.6 (H) 11.5 - 14.5 %    RDW-SD 62.1 (H) 35.0 - 45.0 fL    Platelets 885 746 - 800 thou/mm3    MPV 10.1 9.4 - 12.4 fL    Seg Neutrophils 77.4 %    Lymphocytes 10.6 %    Monocytes 6.6 %    Eosinophils 4.0 %    Basophils 0.4 %    Immature Granulocytes 1.0 %    Segs Absolute 5.6 1 - 7 thou/mm3    Lymphocytes Absolute 0.8 (L) 1.0 - 4.8 thou/mm3    Monocytes Absolute 0.5 0.4 - 1.3 thou/mm3    Eosinophils Absolute 0.3 0.0 - 0.4 thou/mm3    Basophils Absolute 0.0 0.0 - 0.1 thou/mm3    Immature Grans (Abs) 0.07 0.00 - 0.07 thou/mm3    nRBC 0 /100 wbc   Basic Metabolic Panel   Result Value Ref Range    Sodium 140 135 - 145 meq/L    Potassium 4.4 3.5 - 5.2 meq/L    Chloride 102 98 - 111 meq/L    CO2 23 23 - 33 meq/L    Glucose 142 (H) 70 - 108 mg/dL    BUN 27 (H) 7 - 22 mg/dL    CREATININE 6.5 (HH) 0.4 - 1.2 mg/dL    Calcium 8.0 (L) 8.5 - 10.5 mg/dL   CBC auto differential   Result Value Ref Range    WBC 8.4 4.8 - 10.8 thou/mm3    RBC 3.00 (L) 4.70 - 6.10 mill/mm3    Hemoglobin 9.5 (L) 14.0 - 18.0 gm/dl    Hematocrit 27.6 (L) 42.0 - 52.0 %    MCV 92.0 80.0 - 94.0 fL    MCH 31.7 26.0 - 33.0 pg    MCHC 34.4 32.2 - 35.5 gm/dl    RDW-CV 18.5 (H) 11.5 - 14.5 %    RDW-SD 59.9 (H) 35.0 - 45.0 fL    Platelets 079 670 - 167 thou/mm3    MPV 9.9 9.4 - 12.4 fL    Seg Neutrophils 81.5 %    Lymphocytes 9.2 %    Monocytes 4.9 % Eosinophils 2.9 %    Basophils 0.4 %    Immature Granulocytes 1.1 %    Segs Absolute 6.8 1 - 7 thou/mm3    Lymphocytes Absolute 0.8 (L) 1.0 - 4.8 thou/mm3    Monocytes Absolute 0.4 0.4 - 1.3 thou/mm3    Eosinophils Absolute 0.2 0.0 - 0.4 thou/mm3    Basophils Absolute 0.0 0.0 - 0.1 thou/mm3    Immature Grans (Abs) 0.09 (H) 0.00 - 0.07 thou/mm3    nRBC 0 /100 wbc   Anion Gap   Result Value Ref Range    Anion Gap 13.0 8.0 - 16.0 meq/L   Glomerular Filtration Rate, Estimated   Result Value Ref Range    Est, Glom Filt Rate 11 (A) ml/min/1.73m2   Anion Gap   Result Value Ref Range    Anion Gap 15.0 8.0 - 16.0 meq/L   Glomerular Filtration Rate, Estimated   Result Value Ref Range    Est, Glom Filt Rate 10 (A) ml/min/1.73m2   POCT Glucose   Result Value Ref Range    POC Glucose 85 70 - 108 mg/dl   POCT Glucose   Result Value Ref Range    POC Glucose 69 (L) 70 - 108 mg/dl   POCT Glucose   Result Value Ref Range    POC Glucose 40 (LL) 70 - 108 mg/dl   EKG SOB   Result Value Ref Range    Ventricular Rate 79 BPM    Atrial Rate 72 BPM    P-R Interval 180 ms    QRS Duration 74 ms    Q-T Interval 386 ms    QTc Calculation (Bazett) 442 ms    P Axis 54 degrees    R Axis 10 degrees    T Axis 40 degrees   EKG 12 Lead   Result Value Ref Range    Ventricular Rate 83 BPM    Atrial Rate 83 BPM    P-R Interval 154 ms    QRS Duration 84 ms    Q-T Interval 410 ms    QTc Calculation (Bazett) 481 ms    P Axis 89 degrees    R Axis -17 degrees    T Axis 27 degrees   EKG 12 Lead   Result Value Ref Range    Ventricular Rate 64 BPM    Atrial Rate 64 BPM    P-R Interval 188 ms    QRS Duration 76 ms    Q-T Interval 436 ms    QTc Calculation (Bazett) 449 ms    R Axis -16 degrees    T Axis 9 degrees   Result Value Ref Range    Ventricular Rate 76 BPM    QRS Duration 84 ms    Q-T Interval 422 ms    QTc Calculation (Bazett) 474 ms    R Axis 37 degrees    T Axis -6 degrees   TYPE AND SCREEN   Result Value Ref Range    ABO A     Rh Factor POS     Antibody Screen NEG    TYPE AND SCREEN   Result Value Ref Range    ABO A     Rh Factor POS     Antibody Screen NEG        Diagnostics:     Imaging:  CT ABDOMEN PELVIS WO CONTRAST Additional Contrast? None  Result Date: 9/30/2021  1. Limited evaluation of the lung bases demonstrates small bilateral pleural effusions with patchy consolidative opacities at the lower lobes which may be related to pneumonia. 2. The gallbladder demonstrates a small gallstone near the gallbladder neck. This appears mildly distended. 3. Mild ascites in the perihepatic region is noted. **This report has been created using voice recognition software. It may contain minor errors which are inherent in voice recognition technology. ** Final report electronically signed by Dr. Salbador Pablo on 9/30/2021 1:35 PM    XR ABDOMEN (KUB) (SINGLE AP VIEW)  Result Date: 10/2/2021  A nonspecific but nonobstructive bowel gas pattern. This document has been electronically signed by: Camilo Durham DO on 10/02/2021 12:45 AM    CT HEAD WO CONTRAST  Result Date: 9/15/2021  1. Stable CT scan of the brain, no interval change since previous study dated 15th of June 2021. **This report has been created using voice recognition software. It may contain minor errors which are inherent in voice recognition technology. ** Final report electronically signed by DR Catalina Murphy on 9/15/2021 4:26 PM    CT CERVICAL SPINE WO CONTRAST  Result Date: 9/15/2021   No evidence of acute osseous injury of the cervical spine. **This report has been created using voice recognition software. It may contain minor errors which are inherent in voice recognition technology. ** Final report electronically signed by Dr. Jerris Gottron, MD on 9/15/2021 4:29 PM    IR FLUORO GUIDED CVA DEVICE PLMT/REPLACE/REMOVAL  Result Date: 10/4/2021  Status post successful midline insertion. **This report has been created using voice recognition software.   It may contain minor errors which are inherent in voice recognition technology. ** Final report electronically signed by Dr Lolis Alfredo on 10/4/2021 1:54 PM    XR CHEST PORTABLE  Result Date: 10/2/2021  1. Scattered patchy regions of predominantly interstitial densities throughout the bilateral lungs grossly similar to the prior examination. Mild bibasilar pleural/parenchymal opacities most consistent with tiny effusions with atelectasis and/or consolidation. 2. Cardiomegaly with the heart size unchanged from the prior examination. This document has been electronically signed by: Cira So DO on 10/02/2021 12:47 AM    CTA ABDOMEN PELVIS W WO CONTRAST  Result Date: 10/5/2021  1. Bilateral airspace infiltrates and pleural effusions right greater than left. 2. Cardiomegaly. 3. Atrophic kidneys bilaterally. 4 liquid stool throughout much of the colon and rectum consistent with diarrhea no evidence of bowel obstruction. 5. Although there is diffuse calcific atherosclerosis of the aorta and mesenteric vessels there is no significant stenosis of the mesenteric vessels. However the renal arteries are bilaterally extremely diffusely narrow. **This report has been created using voice recognition software. It may contain minor errors which are inherent in voice recognition technology. ** Final report electronically signed by Dr. Daron Schmitt on 10/5/2021 3:33 PM    EKG:   No new    Signed:  Elle Awan MD  Internal Medicine, PGY-1  10/12/2021  6:05 PM    Staff: Dr. Lara Gallagher MD

## 2021-10-12 NOTE — CARE COORDINATION
I reached out to Jess at [facility] to try to get a hard date on acceptance since they started a pre-cert. Will wait for response. Due to patient hemodialysis and insurance he is a difficult placement and has been denied by all HCF. We may need to make arrangements for home.

## 2021-10-12 NOTE — PROGRESS NOTES
Medicine Progress Note    Patient:  Iram Rider    Unit/Bed:5K-14/014-A  YOB: 1930  MRN: [id number]   Acct: [de-identified]   PCP: Aly Shelton MD  Date of Admission: 9/13/2021    Assessment / Plan     Briefly, pt Iram Rider is a 80 y.o. male with a PMH SSS s/p PPM + AICD, Anemia of chronic disease, COPD, HLD< CAD, ESRD on HD, Systolic HF EF 70%, who presented with AHRF 2/2 COVID-PNA, sustained UGIB 2/2 erosive esophagitis and duodenitis and is now recovered. #AHRF 2/2 COVID PNA: Resolved  09/25 - required increased. O2. Escalated to 4L the weaned to 2L throughout hospitalization. Now on 3L which is new baseline. Baseline 2L prior to hospitalization. Briefly suspected superimposed bacterial PNA d/t leukocytosis and consoitdaiotnt/w course of ceftriaxone and azithromycin    #Recurrent Hypoglycemia: Resolved  No h/o DM. A1c 4.5% 10/01. S/p decadron x9 days for COVID. Cosyntropin test wnl. Currently 83-140s and pt asymptomatic  Plan: Continue hypoglycemia protocol    #Non-Variceal Upper GIB: Resolved  Bleeding source: esophagitis and duodenitis. H/o EGD on 10/09/2021 demonstrating the above findings. Plan:  -GI signed off; pantoprazole 40 mg BID, f/u in 4 weeks after discharge. -If elevated INR, liver cirrhosis, and any bleeding, Give IV Vitamin K 10 mg x1.  -H&H stat if re-bleeding.   -Transfuse if Hgb <7.0 mg/dL or symptomatic <8 mg/dL    #ABLA on Anemia of Chronic Disease: Resolved  10/07 Hgb 6.6 on baseline 9.0-10.0. 2/2 UGIB. Now 9.5. Continuing iron supplementation. #Anuric ESRD on HD 2/2 hypertensive nephrosclerosis: Stable   Schedule: M/W/F. Access: Right AV Fistula. Nephrologist: Dr. Nolan Shaffer, [place] [address]: [facility]. On phosphate binders: none. On Vitamin D: calcitriol. On EPO: retacrit. On midodrine: no. Kidney transplant candidate: no.   Plan:  -Nephrology; following  -HD while in hospital.   -BMP, Mg, Phos daily    #Chronically elevated troponin: Stable  H/o ESRD. Baseline 0.1-0.3    #SSS s/p AICD + PPM: Stable  HR 69-70. Plan: Noted    #Lactic Acidosis: Stable  8.7 10/01, improved to 2.5, resolved to 1.6. 2/2 GIB    #Recurrent Hypoglycemia: Resolved  No h/o DM. A1c 4.5% 10/01. S/p decadron x9 days for COVID. Cosyntropin test wnl. Currently 83-140s and pt asymptomatic  Plan: Continue hypoglycemia protocol    #Chronic Systolic + Diastolic Heart Failure with EF 45% on 05/19/2021, NYHA Class III: Stable  Pro-BNP chronically elevated >70,000. CXR on 09/13/2021 demonstrated cardiomegaly and pulmonary vascular congestion. GDMT: metoprolol tartrate. Diuretics: ESRD. Follows in HF Clinic: No.   Plan:   -Strict I&Os  -Daily weights  -HD for volume removal    #Non-obstructive CAD s/p JAXON on mid LAD and ramus in 2018: Stable  No recent LHC. Cardiac stress test 05/19/2021 not suggestive of myocardial ischemia. On ASA 81 mg.  Plan: ASA and plavix held in the setting of UGIB    Dispo: Med/Surg pending placement to LTAC    Fluids: Encourage PO fluid intake  Electrolyte: Replete as needed  Nutrition: Regularm low potassium, low phosphate  GI: Protonix 40 mg BID  DVT ppx:  SCD  PT/OT/SLP: PT/OT    Code status: Limited Limited Code details: Intubation/Re-intubation No; Defibrillation/Cardioversion No; Chest Compressions No; Resuscitative Medications No; Other No Comment    Subjective     Pt sitting in bed comfortably with excellent appetite. No fevers, chills, nausea, vomiting, headaches, or dizziness overnight. Kathi Dillon is a 80 y.o. male with a PMH SSS s/p PPM + AICD, Anemia of chronic disease, COPD, HLD< CAD, ESRD on HD, Systolic HF EF 54%, who presented with AHRF 2/2 COVID-PNA, sustained UGIB 2/2 erosive esophagitis and duodenitis and is now recovered.      Objective     Vitals:     BP (!) 165/69   Pulse 69   Temp 98.5 °F (36.9 °C) (Oral)   Resp 16   Ht 5' 6\" (1.676 m)   Wt 114 lb 13.8 oz (52.1 kg)   SpO2 98%   BMI 18.54 kg/m²     Intake and Output: Intake/Output Summary (Last 24 hours) at 10/12/2021 1705  Last data filed at 10/12/2021 1359  Gross per 24 hour   Intake 400 ml   Output 0 ml   Net 400 ml       Outpatient Medications:     Scheduled Meds:   sodium chloride flush  5-40 mL IntraVENous 2 times per day    pantoprazole  40 mg IntraVENous BID    lactulose  20 g Oral BID    lidocaine 1 % injection  5 mL IntraDERmal Once    dicyclomine  10 mg Oral TID AC    dilTIAZem  30 mg Oral Once    [Held by provider] aspirin  81 mg Oral Daily    [Held by provider] heparin (porcine)  5,000 Units SubCUTAneous 3 times per day    epoetin traci-epbx  6,000 Units SubCUTAneous Once per day on Mon Wed Fri    calcitRIOL  1.5 mcg Oral Once per day on Mon Wed Fri    cinacalcet  60 mg Oral Once per day on Mon Wed Fri    docusate sodium  100 mg Oral BID    ferrous sulfate  325 mg Oral Daily with breakfast    gabapentin  100 mg Oral TID    metoprolol  100 mg Oral BID    vitamin C  500 mg Oral Daily    polyethylene glycol  17 g Oral Daily    [Held by provider] clopidogrel  75 mg Oral Daily     Continuous Infusions:   sodium chloride      sodium chloride      sodium chloride      sodium chloride      dextrose Stopped (10/01/21 1231)     PRN Meds:white petrolatum, sodium chloride flush, sodium chloride, sodium chloride, sodium chloride, HYDROcodone-acetaminophen, sodium chloride, loperamide, metoprolol, metoclopramide, ipratropium-albuterol, glucose, dextrose, glucagon (rDNA), dextrose, ondansetron **OR** ondansetron, polyethylene glycol, acetaminophen **OR** acetaminophen, guaiFENesin-dextromethorphan, albuterol sulfate HFA    Physical Exam:     Physical Exam  Constitutional:       Appearance: Normal appearance. He is not diaphoretic. HENT:      Head: Normocephalic and atraumatic. Mouth/Throat:      Mouth: Mucous membranes are moist.      Pharynx: Oropharynx is clear. No oropharyngeal exudate or posterior oropharyngeal erythema.    Eyes:      General: No scleral icterus. Extraocular Movements: Extraocular movements intact. Pupils: Pupils are equal, round, and reactive to light. Cardiovascular:      Rate and Rhythm: Normal rate and regular rhythm. Pulses: Normal pulses. Heart sounds: Normal heart sounds. No murmur heard. No friction rub. No gallop. Pulmonary:      Effort: Pulmonary effort is normal.      Breath sounds: Normal breath sounds. No wheezing, rhonchi or rales. Abdominal:      General: Bowel sounds are normal.      Palpations: Abdomen is soft. Tenderness: There is no abdominal tenderness. There is no guarding or rebound. Musculoskeletal:      Cervical back: Normal range of motion and neck supple. Right lower leg: No edema. Left lower leg: No edema. Skin:     General: Skin is warm and dry. Capillary Refill: Capillary refill takes less than 2 seconds. Neurological:      General: No focal deficit present. Mental Status: He is alert and oriented to person, place, and time.    Psychiatric:         Mood and Affect: Mood normal.         Behavior: Behavior normal.         Labs:     Results for orders placed or performed during the hospital encounter of 09/13/21   COVID-19, Rapid    Specimen: Nasopharyngeal Swab   Result Value Ref Range    SARS-CoV-2, NAAT DETECTED (AA) NOT DETECTED   Culture, Blood 1    Specimen: Blood   Result Value Ref Range    Blood Culture, Routine No growth-preliminary No growth     COVID-19, Rapid   Result Value Ref Range    SARS-CoV-2, NAAT DETECTED (AA) NOT DETECTED   COVID-19, Rapid    Specimen: Nasopharyngeal Swab   Result Value Ref Range    SARS-CoV-2, NAAT NOT  DETECTED NOT DETECTED   Culture, Blood 1    Specimen: Blood   Result Value Ref Range    Blood Culture, Routine No growth-preliminary No growth     Anion Gap   Result Value Ref Range    Anion Gap 10.0 8.0 - 16.0 meq/L   Glomerular Filtration Rate, Estimated   Result Value Ref Range    Est, Glom Filt Rate 19 (A) GA/JGG/1.27E2   Basic Metabolic Panel   Result Value Ref Range    Sodium 138 135 - 145 meq/L    Potassium 3.4 (L) 3.5 - 5.2 meq/L    Chloride 100 98 - 111 meq/L    CO2 25 23 - 33 meq/L    Glucose 120 (H) 70 - 108 mg/dL    BUN 21 7 - 22 mg/dL    CREATININE 6.0 (HH) 0.4 - 1.2 mg/dL    Calcium 7.9 (L) 8.5 - 10.5 mg/dL   CBC auto differential   Result Value Ref Range    WBC 7.2 4.8 - 10.8 thou/mm3    RBC 3.12 (L) 4.70 - 6.10 mill/mm3    Hemoglobin 9.8 (L) 14.0 - 18.0 gm/dl    Hematocrit 29.8 (L) 42.0 - 52.0 %    MCV 95.5 (H) 80.0 - 94.0 fL    MCH 31.4 26.0 - 33.0 pg    MCHC 32.9 32.2 - 35.5 gm/dl    RDW-CV 18.6 (H) 11.5 - 14.5 %    RDW-SD 62.1 (H) 35.0 - 45.0 fL    Platelets 644 189 - 977 thou/mm3    MPV 10.1 9.4 - 12.4 fL    Seg Neutrophils 77.4 %    Lymphocytes 10.6 %    Monocytes 6.6 %    Eosinophils 4.0 %    Basophils 0.4 %    Immature Granulocytes 1.0 %    Segs Absolute 5.6 1 - 7 thou/mm3    Lymphocytes Absolute 0.8 (L) 1.0 - 4.8 thou/mm3    Monocytes Absolute 0.5 0.4 - 1.3 thou/mm3    Eosinophils Absolute 0.3 0.0 - 0.4 thou/mm3    Basophils Absolute 0.0 0.0 - 0.1 thou/mm3    Immature Grans (Abs) 0.07 0.00 - 0.07 thou/mm3    nRBC 0 /100 wbc   Basic Metabolic Panel   Result Value Ref Range    Sodium 140 135 - 145 meq/L    Potassium 4.4 3.5 - 5.2 meq/L    Chloride 102 98 - 111 meq/L    CO2 23 23 - 33 meq/L    Glucose 142 (H) 70 - 108 mg/dL    BUN 27 (H) 7 - 22 mg/dL    CREATININE 6.5 (HH) 0.4 - 1.2 mg/dL    Calcium 8.0 (L) 8.5 - 10.5 mg/dL   CBC auto differential   Result Value Ref Range    WBC 8.4 4.8 - 10.8 thou/mm3    RBC 3.00 (L) 4.70 - 6.10 mill/mm3    Hemoglobin 9.5 (L) 14.0 - 18.0 gm/dl    Hematocrit 27.6 (L) 42.0 - 52.0 %    MCV 92.0 80.0 - 94.0 fL    MCH 31.7 26.0 - 33.0 pg    MCHC 34.4 32.2 - 35.5 gm/dl    RDW-CV 18.5 (H) 11.5 - 14.5 %    RDW-SD 59.9 (H) 35.0 - 45.0 fL    Platelets 720 521 - 794 thou/mm3    MPV 9.9 9.4 - 12.4 fL    Seg Neutrophils 81.5 %    Lymphocytes 9.2 %    Monocytes 4.9 % Eosinophils 2.9 %    Basophils 0.4 %    Immature Granulocytes 1.1 %    Segs Absolute 6.8 1 - 7 thou/mm3    Lymphocytes Absolute 0.8 (L) 1.0 - 4.8 thou/mm3    Monocytes Absolute 0.4 0.4 - 1.3 thou/mm3    Eosinophils Absolute 0.2 0.0 - 0.4 thou/mm3    Basophils Absolute 0.0 0.0 - 0.1 thou/mm3    Immature Grans (Abs) 0.09 (H) 0.00 - 0.07 thou/mm3    nRBC 0 /100 wbc   Anion Gap   Result Value Ref Range    Anion Gap 13.0 8.0 - 16.0 meq/L   Glomerular Filtration Rate, Estimated   Result Value Ref Range    Est, Glom Filt Rate 11 (A) ml/min/1.73m2   Anion Gap   Result Value Ref Range    Anion Gap 15.0 8.0 - 16.0 meq/L   Glomerular Filtration Rate, Estimated   Result Value Ref Range    Est, Glom Filt Rate 10 (A) ml/min/1.73m2   POCT Glucose   Result Value Ref Range    POC Glucose 85 70 - 108 mg/dl   POCT Glucose   Result Value Ref Range    POC Glucose 69 (L) 70 - 108 mg/dl   POCT Glucose   Result Value Ref Range    POC Glucose 40 (LL) 70 - 108 mg/dl   EKG SOB   Result Value Ref Range    Ventricular Rate 79 BPM    Atrial Rate 72 BPM    P-R Interval 180 ms    QRS Duration 74 ms    Q-T Interval 386 ms    QTc Calculation (Bazett) 442 ms    P Axis 54 degrees    R Axis 10 degrees    T Axis 40 degrees   EKG 12 Lead   Result Value Ref Range    Ventricular Rate 83 BPM    Atrial Rate 83 BPM    P-R Interval 154 ms    QRS Duration 84 ms    Q-T Interval 410 ms    QTc Calculation (Bazett) 481 ms    P Axis 89 degrees    R Axis -17 degrees    T Axis 27 degrees   EKG 12 Lead   Result Value Ref Range    Ventricular Rate 64 BPM    Atrial Rate 64 BPM    P-R Interval 188 ms    QRS Duration 76 ms    Q-T Interval 436 ms    QTc Calculation (Bazett) 449 ms    R Axis -16 degrees    T Axis 9 degrees   Result Value Ref Range    Ventricular Rate 76 BPM    QRS Duration 84 ms    Q-T Interval 422 ms    QTc Calculation (Bazett) 474 ms    R Axis 37 degrees    T Axis -6 degrees   TYPE AND SCREEN   Result Value Ref Range    ABO A     Rh Factor POS     Antibody Screen NEG    TYPE AND SCREEN   Result Value Ref Range    ABO A     Rh Factor POS     Antibody Screen NEG        Diagnostics:     Imaging:  CT ABDOMEN PELVIS WO CONTRAST Additional Contrast? None  Result Date: 9/30/2021  1. Limited evaluation of the lung bases demonstrates small bilateral pleural effusions with patchy consolidative opacities at the lower lobes which may be related to pneumonia. 2. The gallbladder demonstrates a small gallstone near the gallbladder neck. This appears mildly distended. 3. Mild ascites in the perihepatic region is noted. **This report has been created using voice recognition software. It may contain minor errors which are inherent in voice recognition technology. ** Final report electronically signed by Dr. Shazia Cook on 9/30/2021 1:35 PM    XR ABDOMEN (KUB) (SINGLE AP VIEW)  Result Date: 10/2/2021  A nonspecific but nonobstructive bowel gas pattern. This document has been electronically signed by: Margaret Jenkins DO on 10/02/2021 12:45 AM    CT HEAD WO CONTRAST  Result Date: 9/15/2021  1. Stable CT scan of the brain, no interval change since previous study dated 15th of June 2021. **This report has been created using voice recognition software. It may contain minor errors which are inherent in voice recognition technology. ** Final report electronically signed by DR Sukumar Umaña on 9/15/2021 4:26 PM    CT CERVICAL SPINE WO CONTRAST  Result Date: 9/15/2021   No evidence of acute osseous injury of the cervical spine. **This report has been created using voice recognition software. It may contain minor errors which are inherent in voice recognition technology. ** Final report electronically signed by Dr. Emilia Willingham MD on 9/15/2021 4:29 PM    IR FLUORO GUIDED CVA DEVICE PLMT/REPLACE/REMOVAL  Result Date: 10/4/2021  Status post successful midline insertion. **This report has been created using voice recognition software.   It may contain minor errors which are inherent in voice recognition technology. ** Final report electronically signed by Dr Deisi Orozco on 10/4/2021 1:54 PM    XR CHEST PORTABLE  Result Date: 10/2/2021  1. Scattered patchy regions of predominantly interstitial densities throughout the bilateral lungs grossly similar to the prior examination. Mild bibasilar pleural/parenchymal opacities most consistent with tiny effusions with atelectasis and/or consolidation. 2. Cardiomegaly with the heart size unchanged from the prior examination. This document has been electronically signed by: Estephania Kaur DO on 10/02/2021 12:47 AM    CTA ABDOMEN PELVIS W WO CONTRAST  Result Date: 10/5/2021  1. Bilateral airspace infiltrates and pleural effusions right greater than left. 2. Cardiomegaly. 3. Atrophic kidneys bilaterally. 4 liquid stool throughout much of the colon and rectum consistent with diarrhea no evidence of bowel obstruction. 5. Although there is diffuse calcific atherosclerosis of the aorta and mesenteric vessels there is no significant stenosis of the mesenteric vessels. However the renal arteries are bilaterally extremely diffusely narrow. **This report has been created using voice recognition software. It may contain minor errors which are inherent in voice recognition technology. ** Final report electronically signed by Dr. Cristino Ocampo on 10/5/2021 3:33 PM    EKG:   No new    Signed:  Norma Bradley MD  Internal Medicine, PGY-1  10/12/2021  5:05 PM    Staff: Dr. Rickie Reyes MD

## 2021-10-12 NOTE — ACP (ADVANCE CARE PLANNING)
Advance Care Planning     Advance Care Planning Inpatient Note  [facility] Department    Today's Date: 10/12/2021  Unit: [facility] 5K    Received request from IDT Member. Upon review of chart and communication with care team, patient's decision making abilities are not in question. . Patient and Child/Children was/were present in the room during visit. Goals of ACP Conversation:  Discuss advance care planning documents  Facilitate a discussion related to patient's goals of care as they align with the patient's values and beliefs. Health Care Decision Makers:       Primary Decision Maker: Sadiq Altamirano - Child - [phone]    Secondary Decision Maker: Daryle Seeds - Child - [phone]    Summary:  Completed New Documents    Advance Care Planning Documents (Patient Wishes):  Healthcare Power of /Advance Directive Appointment of Health Care Agent  Living Will/Advance Directive     Assessment:  HCPOA and Living will completed with pt. Pt placed his daughter Mukesh Romero as his sole agent. Interventions:  Provided education on documents for clarity and greater understanding  Assisted in the completion of documents according to patient's wishes at this time  Encouraged ongoing ACP conversation with future decision makers and loved ones    Care Preferences Communicated:   No    Outcomes/Plan:  New advance directive completed. Returned original document(s) to patient, as well as copies for distribution to appointed agents  Copy of advance directive given to staff to scan into medical record. Routed ACP note to attending provider or other IDT member.     Electronically signed by Chaplain Simba on 10/12/2021 at 7:58 PM

## 2021-10-12 NOTE — CARE COORDINATION
10/12/21, 8:14 AM EDT    DISCHARGE PLANNING EVALUATION    Left a message for [facility] for an update on bed availability.

## 2021-10-12 NOTE — PROGRESS NOTES
[facility]  INPATIENT PHYSICAL THERAPY  DAILY NOTE  STR ONC MED 5K - 5K-14/014-A    Time In: 0248  Time Out: 1415  Timed Code Treatment Minutes: 26 Minutes  Minutes: 26          Date: 10/12/2021  Patient Name: Chrissy Oviedo,  Gender:  male        MRN: [id number]  : 11/10/1930  (80 y.o.)     Referring Practitioner: Dr. Abi Medel  Diagnosis: acute pulmonary edema  Additional Pertinent Hx: admit with above diagnosis, ESRD on HD, COVID-19 pneumonia, HTN, COPD, anemia     Prior Level of Function:  Lives With: Daughter  Type of Home: House  Home Layout: One level  Home Access: Level entry  Home Equipment: Rolling walker   Bathroom Shower/Tub: Walk-in shower  Bathroom Toilet: Standard    ADL Assistance: Independent  Homemaking Assistance: Needs assistance  Ambulation Assistance: Independent  Transfer Assistance: Independent  Additional Comments: Pt using RW PTA. Daughter assists at home. Unsure of accuracy of above pt is slighly confused this date. Restrictions/Precautions:  Restrictions/Precautions: Fall Risk  Position Activity Restriction  Other position/activity restrictions: . SUBJECTIVE: Patient in bedside chair with daughter present upon arrival, pleasant and agreeable to therapy. RN approved session. PAIN: Denies. Vitals: Vitals not assessed per clinical judgement, see nursing flowsheet    OBJECTIVE:  Bed Mobility:  Not Tested    Transfers:  Sit to Stand: Contact Guard Assistance  Stand to [facility] 68   1 assist always required for safety with functional mobility.     Ambulation:  Contact Guard Assistance, with cues for safety  Distance: 40ft  Surface: Level Tile  Device:Rolling Walker  Gait Deviations:  Slow Valeri, Decreased Step Length Bilaterally, Decreased Heel Strike Bilaterally, Wide Base of Support and Unsteady Gait  Patient requires cues for increased safety with turns, as patient demonstrates stepping outside of the walker until walker is turned completely around before stepping back inside walker. Patient demonstrates no SOB, SpO2 read attempt, however could not get a read. Balance:  Dynamic Sitting Balance: Supervision  Static Standing Balance: Contact Guard Assistance- without UE support patient is more unstable with static stand, requiring CGA of 1 and verbal cues to decrease any anterior/posterior leans. Exercise:  Patient was guided in 1 set(s) 12 reps of exercise to both lower extremities. Seated marches, Seated heel/toe raises, Long arc quads, Seated abduction/adduction, Standing heel/toe raises and Standing marches. Exercises were completed for increased independence with functional mobility. Functional Outcome Measures: Completed  AM-PAC Inpatient Mobility Raw Score : 17  AM-PAC Inpatient T-Scale Score : 42.13    ASSESSMENT:  Assessment: Patient progressing toward established goals. Patient remains unsafe to go home prior to rehab stay d/t decreased endurance and overall strength. Would benefit from continued therapy to address above deficits to improve safety upon discharge. Daughter present for session and is educated on functional activity that requires hands on assist to ensure safety to decrease fall risk of patient. Activity Tolerance:  Patient tolerance of  treatment: good. Equipment Recommendations: Other: cont to assess needs  Discharge Recommendations:  Continue to assess pending progress, Subacute/Skilled Nursing Facility, 24 hour supervision or assist, Patient would benefit from continued therapy after discharge    Plan: Times per week: 3-5x GM  Times per day: Daily  Specific instructions for Next Treatment: therex and mobility    Patient Education  Patient Education: Plan of Care, Family Education, Transfers, Gait    Goals:  Patient goals : not stated  Short term goals  Time Frame for Short term goals: by discharge  Short term goal 1: bed mobility with S to get in/out of bed  Short term goal 2: transfer with S to get in/out of chairs  Short term goal 3: amb >50'x1 with RW and SBA, maintaining O2 sats >90% on </=4L O2, to walk safely in room  Long term goals  Time Frame for Long term goals : no LTGs set secondary to short ELOS    Following session, patient left in safe position with all fall risk precautions in place.

## 2021-10-12 NOTE — PROGRESS NOTES
Nephrology Progress Note    Patient - Iram Rider   MRN -  [id number]   Acct # - [de-identified]      - 11/10/1930    80 y.o. Admit Date: 2021  Hospital Day: 34  Location: [facility]  Date of evaluation -  10/12/2021    Subjective:   CC: shortness of breath  Denies shortness of breath  BP Range: Systolic (96DHC), VMX:237 , Min:128 , VWL:901      Diastolic (93CAR), CPD:08, Min:60, Max:72    Objective:   VITALS:  BP (!) 165/69   Pulse 69   Temp 98.5 °F (36.9 °C) (Oral)   Resp 16   Ht 5' 6\" (1.676 m)   Wt 114 lb 13.8 oz (52.1 kg)   SpO2 98%   BMI 18.54 kg/m²    Patient Vitals for the past 24 hrs:   BP Temp Temp src Pulse Resp SpO2   10/12/21 0755 (!) 165/69 98.5 °F (36.9 °C) Oral 69 16 98 %   10/12/21 0623 (!) 158/60 -- -- 68 16 99 %   10/11/21 2042 (!) 148/72 98.3 °F (36.8 °C) Oral 70 16 95 %   10/11/21 1558 128/61 97.4 °F (36.3 °C) Oral 70 16 96 %       Intake/Output Summary (Last 24 hours) at 10/12/2021 1337  Last data filed at 10/11/2021 2035  Gross per 24 hour   Intake 100 ml   Output --   Net 100 ml       Admission weight: 163 lb (73.9 kg)  No data found. Body mass index is 18.54 kg/m². EXAM:  CONSTITUTIONAL:  No acute distress. HEENT:  Head is normocephalic, Extraocular movement intact. Neck is supple. CARDIOVASCULAR:  S1, S2  regular rate and rhythm. RESPIRATORY: Clear to ausculation bilaterally. Equal breath sounds. No wheezes. No shortness of breath noted at rest.  ABDOMEN: soft, non tender  NEUROLOGICAL: Patient is awake and oriented to person, place, and time. Recent and remote memory intact. SKIN: no rash, No significant bruises on exposed surfaces  MUSCULOSKELETAL: Movement is coordinated. Moves all extremities   EXTREMITIES: Distal lower extremity temp is warm, No bilateral symmetric lower extremity edema. Upper extremity AV Fistula   PSYCHIATRIC: mood and affect appropriate.     Medications:   Med reviewed  Scheduled Meds:   sodium chloride flush  5-40 mL IntraVENous 2 times per day    pantoprazole  40 mg IntraVENous BID    lactulose  20 g Oral BID    lidocaine 1 % injection  5 mL IntraDERmal Once    dicyclomine  10 mg Oral TID AC    dilTIAZem  30 mg Oral Once    [Held by provider] aspirin  81 mg Oral Daily    [Held by provider] heparin (porcine)  5,000 Units SubCUTAneous 3 times per day    epoetin traci-epbx  6,000 Units SubCUTAneous Once per day on Mon Wed Fri    calcitRIOL  1.5 mcg Oral Once per day on Mon Wed Fri    cinacalcet  60 mg Oral Once per day on Mon Wed Fri    docusate sodium  100 mg Oral BID    ferrous sulfate  325 mg Oral Daily with breakfast    gabapentin  100 mg Oral TID    metoprolol  100 mg Oral BID    vitamin C  500 mg Oral Daily    polyethylene glycol  17 g Oral Daily    [Held by provider] clopidogrel  75 mg Oral Daily     Continuous Infusions:    PRN Meds white petrolatum, sodium chloride flush, sodium chloride, sodium chloride, sodium chloride, HYDROcodone-acetaminophen, sodium chloride, loperamide, metoprolol, metoclopramide, ipratropium-albuterol, glucose, dextrose, glucagon (rDNA), dextrose, ondansetron **OR** ondansetron, polyethylene glycol, acetaminophen **OR** acetaminophen, guaiFENesin-dextromethorphan, albuterol sulfate HFA   Labs:   Labs reviewed  Recent Labs     10/10/21  1952 10/11/21  0800   WBC 7.2 8.4   RBC 3.12* 3.00*   HGB 9.8* 9.5*   HCT 29.8* 27.6*   MCV 95.5* 92.0   MCH 31.4 31.7    198       Recent Labs     10/10/21  1952 10/10/21  1952 10/11/21  0800     --  140   K 3.4*  --  4.4     --  102   CO2 25  --  23   BUN 21  --  27*   CREATININE 6.0*  --  6.5*   LABGLOM 11*   < > 10*   GLUCOSE 120*  --  142*   CALCIUM 7.9*  --  8.0*    < > = values in this interval not displayed. ASSESSMENT:  1. 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 AV fistula. 2. COVID 19 pneumonia. 3. Essential Hypertension with nephrosclerosis at control  4.  Abdominal aortic aneurysm 3.7 cm   5. Chronic combined systolic and diastolic HF with EF 86%, Pulm artery HTN  6. Coronary artery disease Recent PCI 8/31/21 at [facility]  7. Anemia of chronic disease on Erythropoiesis-Stimulating Agent. And Acute blood loss,   8. Erosive gastritis per EGD  9. Secondary hyperparathyroidism of renal origin on rocaltrol and sensipar. Calcium ok with corrected for albumin  10. Debility, Planned discharge to extended care facility     Active Problems:    Elevated troponin    COVID-19    Severe malnutrition ([address]    Hypoxia  Resolved Problems:    * No resolved hospital problems.  Juliette Gregory, APRN - CNP 1:37 PM 10/12/2021

## 2021-10-12 NOTE — PROGRESS NOTES
[facility]  Occupational Therapy  Daily Note  Time:   Time In: 3048  Time Out: 1141  Timed Code Treatment Minutes: 27 Minutes  Minutes: 27          Date: 10/12/2021  Patient Name: Lisa Sood,   Gender: male      Room: -14/014-A  MRN: [id number]  : 11/10/1930  (80 y.o.)  Referring Practitioner: Souleymane Deutsch MD  Diagnosis: Acute Pulmonary Edema  Additional Pertinent Hx: Lisa Sood is a pleasant 80 y.o. male who presents to the emergency department from home with complaints of shortness of breath. The patient was sent in after a full dialysis run today. The patient is extremely poor historian, difficult to obtain a history. Apparently was concerned about shortness of breath and he was not feeling well during dialysis and therefore was sent to the ED for evaluation. Currently, he answer some questions although not consistently, denies any chest pain, lower extremity pain or swelling. Family is at the bedside, state the patient had cardiac stents done recently. Record review shows this to have happened at [facility] approximately a week ago. Restrictions/Precautions:  Restrictions/Precautions: Fall Risk  Position Activity Restriction  Other position/activity restrictions: . SUBJECTIVE: RN approved of OT session. Pt supine in bed and agreeable to OT    PAIN: denies     Vitals: Oxygen: pt on 3 L O2 NC. Unable to attain O2 stats on 2 different pulse ox, however pt reported SOB at end of session. Therapist provided education on deep breathing/pursed lip breathing. RN notified that pt is feeling SOB and unable to attain O2 stats. * extended time taken during session to attain O2 stats    COGNITION: Slow Processing, Decreased Insight, Impaired Memory, Decreased Problem Solving, Decreased Safety Awareness and Decreased Arousal    ADL:   Grooming: Minimal Assistance. to complete skin care. Pt applied lotion to BUE sitting EOB and applied lotion to BLE layign supine in bed.  Pt required A for distal LE  Lower Extremity Dressing: Supervision. to adjust bilateral socks . BALANCE:  Sitting Balance:  Supervision. sitting EOB for 5 minutes   Standing Balance: Stand By Assistance. with 2 UE on RW. pt stood for 3 minutes     BED MOBILITY:  Supine to Sit: Modified Independent, with increased time for completion    Sit to Supine: Modified Independent, with increased time for completion      TRANSFERS:  Sit to Stand:  Stand By Assistance, with increased time for completion, cues for hand placement. from EOB   Stand to Sit: Stand By Assistance, with increased time for completion, cues for hand placement. to recliner & to EOB     FUNCTIONAL MOBILITY:  Assistive Device: Rolling Walker  Assist Level:  Stand By [address]. With increased time to complete   Distance: within room   Pt knee buckled x1 during ambulation and had LOB x1 during lateral step to recliner resulting in CGA. ASSESSMENT:     Activity Tolerance:  Patient tolerance of  treatment: fair. Pt required min encouragement to complete ADL independently. Discharge Recommendations: ECF with OT   Equipment Recommendations: Other: continue to assess  Plan: Times per week: 3-5x  Current Treatment Recommendations: Strengthening, Balance Training, Functional Mobility Training, Endurance Training, Safety Education & Training, Self-Care / ADL, Equipment Evaluation, Education, & procurement    Patient Education  Patient Education: Role of OT, Plan of Care, ADL's, Energy Conservation, Importance of Increasing Activity and Assistive Device Safety    Goals  Short term goals  Time Frame for Short term goals: by discharge  Short term goal 1: Pt will complete functional mobility to/from BR with AD and S to increase indep with accessing environment for ADLs. Short term goal 2: Pt will complete dynamic standing task with B hand release x 4 min with S to increase balance required for ADLs.   Short term goal 3: Pt will complete LB ADL with Robert to increase indep with self care. Following session, patient left in safe position with all fall risk precautions in place.

## 2021-10-13 NOTE — PROGRESS NOTES
Comprehensive Nutrition Assessment    Type and Reason for Visit:  Reassess; Nutrition FU Note    Nutrition Recommendations/Plan:   1. Continue Regular Diet with Low Potassium (<3000 mg/day). 2. Pt does not want the Nepro. Doesn't like it. 3. Family continues to bring in food to help encourage po intake. Nutrition Assessment:   Pt improving from a nutritional standpoint AEB reported improving appetite and intake (although intake varies per graphics). Remains at risk for further nutritional compromise r/t severe malnutrition, recent COVID (9/14), advanced age and underlying medical condition (hx CAD, ESRD-HD, CHF, COPD). Nutrition recommendations/interventions as per above    Malnutrition Assessment:  Malnutrition Status:  Severe malnutrition    Context:  Acute Illness     Findings of the 6 clinical characteristics of malnutrition:  Energy Intake:  7 - 50% or less of estimated energy requirements for 5 or more days  Weight Loss:  Unable to assess (-13.1% in 2.5 months however difficult to evaluate extent d/t fluid, HD)     Body Fat Loss:  Unable to assess     Muscle Mass Loss:  7 - Moderate muscle mass loss Thigh (quadraceps)  Fluid Accumulation:  Unable to assess     Strength:  Not Performed    Estimated Daily Nutrient Needs:  Energy (kcal):  1209-7408 kcals (30-35); Weight Used for Energy Requirements:   (57 kgm)     Protein (g):  68-86 gm (1.2-1.5) - HD pt; Weight Used for Protein Requirements:   (57 kgm)          Nutrition Related Findings:   Pt seen after dialysis. Just finished with lunch tray (50% eaten). Was laying in bed just shaking. Stated that he was cold. Went to blanket warmer and got patient a blanker to help get him warm. Stated that it was helping. Asked pt about meal intake. Nodded yes when I asked him if he ate at least half of his meals most of the time. Asked pt if he liked the nepro and he stated no once again. Cancelled nepro shake orders for the second time.  Asked pt if his family was still bringing in food and he again nodded his head yes. Encouraged pt to continue eating at best effort. Discharge plans are being discussed. Glucose -34-56-77--82. BUN 31 with Cr 5.4, elevated liver enzymes, hgb 9.9 with hct of 30.4. A1C was only 4.5% on 10/1/21. T4 was low on 9/12 at 0.84. No edema noted today. No wounds. Rx includes: Vitamin C, Calcitriol, Sensipar, Bentyl, Colace, Retacrit, Iron, Laculose, Protonix, Glycolax. Last BM was on 10/8/21. Wounds:  None       Current Nutrition Therapies:    ADULT DIET; Regular; Low Potassium (Less than 3000 mg/day)    Anthropometric Measures:  · Height: 5' 6\" (167.6 cm)  · Current Body Weight: 148 lb 13 oz (67.5 kg) - recorded wt after dialysis this morning; no edema noted on 10/13/21  · Admission Body Weight: 134 lb 9.6 oz (61.1 kg) (9/18 no edema; 9/15 162# 14.7 oz (estimated weight))    · Usual Body Weight:  (per EMR: 5/19/21: 176# 12.9 oz, 7/2/21; 145# 8.1 oz)     · Ideal Body Weight: 142 lbs   · BMI: 23.3  · BMI Categories: Normal Weight (BMI 22.0 to 24.9) age over 72       Nutrition Diagnosis:   · Severe malnutrition related to inadequate protein-energy intake, catabolic illness as evidenced by intake 0-25%, intake 26-50%, moderate muscle loss    Nutrition Interventions:   Food and/or Nutrient Delivery:  Continue Current Diet, Discontinue Oral Nutrition Supplement  Nutrition Education/Counseling:  Education not indicated   Coordination of Nutrition Care:  Continue to monitor while inpatient    Goals:  Patient will tolerate and consume 75% or more of meals during LOS       Nutrition Monitoring and Evaluation:   Behavioral-Environmental Outcomes:  None Identified   Food/Nutrient Intake Outcomes:  Food and Nutrient Intake  Physical Signs/Symptoms Outcomes:  Biochemical Data, GI Status, Hemodynamic Status, Nutrition Focused Physical Findings, Skin, Weight     Discharge Planning:     Too soon to determine     Electronically signed by Angie Stone MURPHY [place] on 10/13/21 at 2:07 PM EDT    Contact: [phone]

## 2021-10-13 NOTE — PROGRESS NOTES
Medicine Progress Note    Patient:  Lisa Sood    Unit/Bed:5K-14/014-A  YOB: 1930  MRN: [id number]   Acct: [de-identified]   PCP: Holden Perkins MD  Date of Admission: 9/13/2021    Assessment / Plan     Briefly, pt Lisa Sood is a 80 y.o. male with a PMH SSS s/p PPM + AICD, Anemia of chronic disease, COPD, HLD< CAD, ESRD on HD, Systolic HF EF 53%, who presented with AHRF 2/2 COVID-PNA, sustained UGIB 2/2 erosive esophagitis and duodenitis and is now recovered. #AHRF 2/2 COVID PNA: Resolved  09/25 - required increased. O2. Escalated to 4L the weaned to 2L throughout hospitalization. Now on 3L which is new baseline. Baseline 2L prior to hospitalization. Briefly suspected superimposed bacterial PNA d/t leukocytosis and consoitdaiotnt/w course of ceftriaxone and azithromycin    #Recurrent Hypoglycemia: Resolved  No h/o DM. A1c 4.5% 10/01. S/p decadron x9 days for COVID. Cosyntropin test wnl. Currently 83-140s and pt asymptomatic  Plan: Continue hypoglycemia protocol    #Non-Variceal Upper GIB: Resolved  Bleeding source: esophagitis and duodenitis. H/o EGD on 10/09/2021 demonstrating the above findings. Plan:  -GI signed off; pantoprazole 40 mg BID, f/u in 4 weeks after discharge. -If elevated INR, liver cirrhosis, and any bleeding, Give IV Vitamin K 10 mg x1.  -H&H stat if re-bleeding.   -Transfuse if Hgb <7.0 mg/dL or symptomatic <8 mg/dL    #ABLA on Anemia of Chronic Disease: Resolved  10/07 Hgb 6.6 on baseline 9.0-10.0. 2/2 UGIB. Now 9.5. Continuing iron supplementation. #Anuric ESRD on HD 2/2 hypertensive nephrosclerosis: Stable   Schedule: M/W/F. Access: Right AV Fistula. Nephrologist: Dr. Pineda Li, [place] [address]: [facility]. On phosphate binders: none. On Vitamin D: calcitriol. On EPO: retacrit. On midodrine: no. Kidney transplant candidate: no.   Plan:  -Nephrology; following  -HD while in hospital.   -BMP, Mg, Phos daily    #Chronically elevated troponin: Stable  H/o ESRD. Baseline 0.1-0.3    #SSS s/p AICD + PPM: Stable  HR 69-70. Plan: Noted    #Lactic Acidosis: Stable  8.7 10/01, improved to 2.5, resolved to 1.6. 2/2 GIB    #Recurrent Hypoglycemia: Resolved  No h/o DM. A1c 4.5% 10/01. S/p decadron x9 days for COVID. Cosyntropin test wnl. Currently 83-140s and pt asymptomatic  Plan: Continue hypoglycemia protocol    #Chronic Systolic + Diastolic Heart Failure with EF 45% on 05/19/2021, NYHA Class III: Stable  Pro-BNP chronically elevated >70,000. CXR on 09/13/2021 demonstrated cardiomegaly and pulmonary vascular congestion. GDMT: metoprolol tartrate. Diuretics: ESRD. Follows in HF Clinic: No.   Plan:   -Strict I&Os  -Daily weights  -HD for volume removal    #Non-obstructive CAD s/p JAXON on mid LAD and ramus in 2018: Stable  No recent LHC. Cardiac stress test 05/19/2021 not suggestive of myocardial ischemia. On ASA 81 mg.  Plan: ASA and plavix held in the setting of UGIB    Dispo: Med/Surg. [id number], Po[facility] and daughter above progress on medicaid paperwork. She said that she will be taking one of his statements to the bank per medicaid request.    Fluids: Encourage PO fluid intake  Electrolyte: Replete as needed  Nutrition: Regularm low potassium, low phosphate  GI: Protonix 40 mg BID  DVT ppx:  SCD  PT/OT/SLP: PT/OT    Code status: Limited Limited Code details: Intubation/Re-intubation No; Defibrillation/Cardioversion No; Chest Compressions No; Resuscitative Medications No; Other No Comment    Subjective     Pt sitting in bed comfortably with excellent appetite. No fevers, chills, nausea, vomiting, headaches, or dizziness overnight. Ann Marie Jacob is a 80 y.o. male with a PMH SSS s/p PPM + AICD, Anemia of chronic disease, COPD, HLD< CAD, ESRD on HD, Systolic HF EF 65%, who presented with AHRF 2/2 COVID-PNA, sustained UGIB 2/2 erosive esophagitis and duodenitis and is now recovered.      Objective     Vitals:     /61   Pulse 66   Temp 98 °F (36.7 °C)   Resp 20   Ht 5' 6\" (1.676 m)   Wt 148 lb 13 oz (67.5 kg)   SpO2 96%   BMI 24.02 kg/m²     Intake and Output:       Intake/Output Summary (Last 24 hours) at 10/13/2021 1444  Last data filed at 10/13/2021 1359  Gross per 24 hour   Intake 1020 ml   Output 1600 ml   Net -580 ml       Outpatient Medications:     Scheduled Meds:   sodium chloride flush  5-40 mL IntraVENous 2 times per day    pantoprazole  40 mg IntraVENous BID    lactulose  20 g Oral BID    lidocaine 1 % injection  5 mL IntraDERmal Once    dicyclomine  10 mg Oral TID AC    dilTIAZem  30 mg Oral Once    [Held by provider] aspirin  81 mg Oral Daily    [Held by provider] heparin (porcine)  5,000 Units SubCUTAneous 3 times per day    epoetin traci-epbx  6,000 Units SubCUTAneous Once per day on Mon Wed Fri    calcitRIOL  1.5 mcg Oral Once per day on Mon Wed Fri    cinacalcet  60 mg Oral Once per day on Mon Wed Fri    docusate sodium  100 mg Oral BID    ferrous sulfate  325 mg Oral Daily with breakfast    gabapentin  100 mg Oral TID    metoprolol  100 mg Oral BID    vitamin C  500 mg Oral Daily    polyethylene glycol  17 g Oral Daily    [Held by provider] clopidogrel  75 mg Oral Daily     Continuous Infusions:   sodium chloride      sodium chloride      sodium chloride      sodium chloride      dextrose Stopped (10/01/21 1231)     PRN Meds:white petrolatum, sodium chloride flush, sodium chloride, sodium chloride, sodium chloride, HYDROcodone-acetaminophen, sodium chloride, loperamide, metoprolol, metoclopramide, ipratropium-albuterol, glucose, dextrose, glucagon (rDNA), dextrose, ondansetron **OR** ondansetron, polyethylene glycol, acetaminophen **OR** acetaminophen, guaiFENesin-dextromethorphan, albuterol sulfate HFA    Physical Exam:     Physical Exam  Constitutional:       Appearance: Normal appearance. He is not diaphoretic. HENT:      Head: Normocephalic and atraumatic.       Mouth/Throat:      Mouth: Mucous membranes are moist.      Pharynx: Oropharynx is clear. No oropharyngeal exudate or posterior oropharyngeal erythema. Eyes:      General: No scleral icterus. Extraocular Movements: Extraocular movements intact. Pupils: Pupils are equal, round, and reactive to light. Cardiovascular:      Rate and Rhythm: Normal rate and regular rhythm. Pulses: Normal pulses. Heart sounds: Normal heart sounds. No murmur heard. No friction rub. No gallop. Pulmonary:      Effort: Pulmonary effort is normal.      Breath sounds: Normal breath sounds. No wheezing, rhonchi or rales. Abdominal:      General: Bowel sounds are normal.      Palpations: Abdomen is soft. Tenderness: There is no abdominal tenderness. There is no guarding or rebound. Musculoskeletal:      Cervical back: Normal range of motion and neck supple. Right lower leg: No edema. Left lower leg: No edema. Skin:     General: Skin is warm and dry. Capillary Refill: Capillary refill takes less than 2 seconds. Neurological:      General: No focal deficit present. Mental Status: He is alert and oriented to person, place, and time.    Psychiatric:         Mood and Affect: Mood normal.         Behavior: Behavior normal.         Labs:     Results for orders placed or performed during the hospital encounter of 09/13/21   COVID-19, Rapid    Specimen: Nasopharyngeal Swab   Result Value Ref Range    SARS-CoV-2, NAAT DETECTED (AA) NOT DETECTED   Culture, Blood 1    Specimen: Blood   Result Value Ref Range    Blood Culture, Routine No growth-preliminary No growth     COVID-19, Rapid   Result Value Ref Range    SARS-CoV-2, NAAT DETECTED (AA) NOT DETECTED   COVID-19, Rapid    Specimen: Nasopharyngeal Swab   Result Value Ref Range    SARS-CoV-2, NAAT NOT  DETECTED NOT DETECTED   Culture, Blood 1    Specimen: Blood   Result Value Ref Range    Blood Culture, Routine No growth-preliminary No growth     Anion Gap   Result Value Ref Range Anion Gap 10.0 8.0 - 16.0 meq/L   Glomerular Filtration Rate, Estimated   Result Value Ref Range    Est, Glom Filt Rate 19 (A) NB/PQL/3.51W9   Basic Metabolic Panel   Result Value Ref Range    Sodium 138 135 - 145 meq/L    Potassium 3.4 (L) 3.5 - 5.2 meq/L    Chloride 100 98 - 111 meq/L    CO2 25 23 - 33 meq/L    Glucose 120 (H) 70 - 108 mg/dL    BUN 21 7 - 22 mg/dL    CREATININE 6.0 (HH) 0.4 - 1.2 mg/dL    Calcium 7.9 (L) 8.5 - 10.5 mg/dL   CBC auto differential   Result Value Ref Range    WBC 7.2 4.8 - 10.8 thou/mm3    RBC 3.12 (L) 4.70 - 6.10 mill/mm3    Hemoglobin 9.8 (L) 14.0 - 18.0 gm/dl    Hematocrit 29.8 (L) 42.0 - 52.0 %    MCV 95.5 (H) 80.0 - 94.0 fL    MCH 31.4 26.0 - 33.0 pg    MCHC 32.9 32.2 - 35.5 gm/dl    RDW-CV 18.6 (H) 11.5 - 14.5 %    RDW-SD 62.1 (H) 35.0 - 45.0 fL    Platelets 753 765 - 637 thou/mm3    MPV 10.1 9.4 - 12.4 fL    Seg Neutrophils 77.4 %    Lymphocytes 10.6 %    Monocytes 6.6 %    Eosinophils 4.0 %    Basophils 0.4 %    Immature Granulocytes 1.0 %    Segs Absolute 5.6 1 - 7 thou/mm3    Lymphocytes Absolute 0.8 (L) 1.0 - 4.8 thou/mm3    Monocytes Absolute 0.5 0.4 - 1.3 thou/mm3    Eosinophils Absolute 0.3 0.0 - 0.4 thou/mm3    Basophils Absolute 0.0 0.0 - 0.1 thou/mm3    Immature Grans (Abs) 0.07 0.00 - 0.07 thou/mm3    nRBC 0 /100 wbc   Basic Metabolic Panel   Result Value Ref Range    Sodium 140 135 - 145 meq/L    Potassium 4.4 3.5 - 5.2 meq/L    Chloride 102 98 - 111 meq/L    CO2 23 23 - 33 meq/L    Glucose 142 (H) 70 - 108 mg/dL    BUN 27 (H) 7 - 22 mg/dL    CREATININE 6.5 (HH) 0.4 - 1.2 mg/dL    Calcium 8.0 (L) 8.5 - 10.5 mg/dL   CBC auto differential   Result Value Ref Range    WBC 8.4 4.8 - 10.8 thou/mm3    RBC 3.00 (L) 4.70 - 6.10 mill/mm3    Hemoglobin 9.5 (L) 14.0 - 18.0 gm/dl    Hematocrit 27.6 (L) 42.0 - 52.0 %    MCV 92.0 80.0 - 94.0 fL    MCH 31.7 26.0 - 33.0 pg    MCHC 34.4 32.2 - 35.5 gm/dl    RDW-CV 18.5 (H) 11.5 - 14.5 %    RDW-SD 59.9 (H) 35.0 - 45.0 fL Platelets 218 054 - 550 thou/mm3    MPV 9.9 9.4 - 12.4 fL    Seg Neutrophils 81.5 %    Lymphocytes 9.2 %    Monocytes 4.9 %    Eosinophils 2.9 %    Basophils 0.4 %    Immature Granulocytes 1.1 %    Segs Absolute 6.8 1 - 7 thou/mm3    Lymphocytes Absolute 0.8 (L) 1.0 - 4.8 thou/mm3    Monocytes Absolute 0.4 0.4 - 1.3 thou/mm3    Eosinophils Absolute 0.2 0.0 - 0.4 thou/mm3    Basophils Absolute 0.0 0.0 - 0.1 thou/mm3    Immature Grans (Abs) 0.09 (H) 0.00 - 0.07 thou/mm3    nRBC 0 /100 wbc   Anion Gap   Result Value Ref Range    Anion Gap 13.0 8.0 - 16.0 meq/L   Glomerular Filtration Rate, Estimated   Result Value Ref Range    Est, Glom Filt Rate 11 (A) ml/min/1.73m2   Anion Gap   Result Value Ref Range    Anion Gap 15.0 8.0 - 16.0 meq/L   Glomerular Filtration Rate, Estimated   Result Value Ref Range    Est, Glom Filt Rate 10 (A) ml/min/1.73m2   POCT Glucose   Result Value Ref Range    POC Glucose 85 70 - 108 mg/dl   POCT Glucose   Result Value Ref Range    POC Glucose 69 (L) 70 - 108 mg/dl   POCT Glucose   Result Value Ref Range    POC Glucose 40 (LL) 70 - 108 mg/dl   EKG SOB   Result Value Ref Range    Ventricular Rate 79 BPM    Atrial Rate 72 BPM    P-R Interval 180 ms    QRS Duration 74 ms    Q-T Interval 386 ms    QTc Calculation (Bazett) 442 ms    P Axis 54 degrees    R Axis 10 degrees    T Axis 40 degrees   EKG 12 Lead   Result Value Ref Range    Ventricular Rate 83 BPM    Atrial Rate 83 BPM    P-R Interval 154 ms    QRS Duration 84 ms    Q-T Interval 410 ms    QTc Calculation (Bazett) 481 ms    P Axis 89 degrees    R Axis -17 degrees    T Axis 27 degrees   EKG 12 Lead   Result Value Ref Range    Ventricular Rate 64 BPM    Atrial Rate 64 BPM    P-R Interval 188 ms    QRS Duration 76 ms    Q-T Interval 436 ms    QTc Calculation (Bazett) 449 ms    R Axis -16 degrees    T Axis 9 degrees   Result Value Ref Range    Ventricular Rate 76 BPM    QRS Duration 84 ms    Q-T Interval 422 ms    QTc Calculation (Bazett) 474 ms    R Axis 37 degrees    T Axis -6 degrees   TYPE AND SCREEN   Result Value Ref Range    ABO A     Rh Factor POS     Antibody Screen NEG    TYPE AND SCREEN   Result Value Ref Range    ABO A     Rh Factor POS     Antibody Screen NEG        Diagnostics:     Imaging:  CT ABDOMEN PELVIS WO CONTRAST Additional Contrast? None  Result Date: 9/30/2021  1. Limited evaluation of the lung bases demonstrates small bilateral pleural effusions with patchy consolidative opacities at the lower lobes which may be related to pneumonia. 2. The gallbladder demonstrates a small gallstone near the gallbladder neck. This appears mildly distended. 3. Mild ascites in the perihepatic region is noted. **This report has been created using voice recognition software. It may contain minor errors which are inherent in voice recognition technology. ** Final report electronically signed by Dr. Emily Hauser on 9/30/2021 1:35 PM    XR ABDOMEN (KUB) (SINGLE AP VIEW)  Result Date: 10/2/2021  A nonspecific but nonobstructive bowel gas pattern. This document has been electronically signed by: Catina Perez DO on 10/02/2021 12:45 AM    CT HEAD WO CONTRAST  Result Date: 9/15/2021  1. Stable CT scan of the brain, no interval change since previous study dated 15th of June 2021. **This report has been created using voice recognition software. It may contain minor errors which are inherent in voice recognition technology. ** Final report electronically signed by DR Alexia Peralta on 9/15/2021 4:26 PM    CT CERVICAL SPINE WO CONTRAST  Result Date: 9/15/2021   No evidence of acute osseous injury of the cervical spine. **This report has been created using voice recognition software. It may contain minor errors which are inherent in voice recognition technology. ** Final report electronically signed by Dr. Leticia Gonzalez MD on 9/15/2021 4:29 PM    IR FLUORO GUIDED CVA DEVICE PLMT/REPLACE/REMOVAL  Result Date: 10/4/2021  Status post successful midline insertion. **This report has been created using voice recognition software. It may contain minor errors which are inherent in voice recognition technology. ** Final report electronically signed by Dr Jr Wing on 10/4/2021 1:54 PM    XR CHEST PORTABLE  Result Date: 10/2/2021  1. Scattered patchy regions of predominantly interstitial densities throughout the bilateral lungs grossly similar to the prior examination. Mild bibasilar pleural/parenchymal opacities most consistent with tiny effusions with atelectasis and/or consolidation. 2. Cardiomegaly with the heart size unchanged from the prior examination. This document has been electronically signed by: Mahi Alberto DO on 10/02/2021 12:47 AM    CTA ABDOMEN PELVIS W WO CONTRAST  Result Date: 10/5/2021  1. Bilateral airspace infiltrates and pleural effusions right greater than left. 2. Cardiomegaly. 3. Atrophic kidneys bilaterally. 4 liquid stool throughout much of the colon and rectum consistent with diarrhea no evidence of bowel obstruction. 5. Although there is diffuse calcific atherosclerosis of the aorta and mesenteric vessels there is no significant stenosis of the mesenteric vessels. However the renal arteries are bilaterally extremely diffusely narrow. **This report has been created using voice recognition software. It may contain minor errors which are inherent in voice recognition technology. ** Final report electronically signed by Dr. Socorro Pena on 10/5/2021 3:33 PM    EKG:   No new    Signed:  Trcay Philip MD  Internal Medicine, PGY-1  10/13/2021  2:44 PM    Staff: Dr. Jovana Ernst MD

## 2021-10-13 NOTE — CARE COORDINATION
10/13/21, 12:24 PM EDT    DISCHARGE PLANNING EVALUATION    Spoke with daughter and she is getting paperwork to bring up for public benefits to work on [employer] applications. Also updated on [facility] and reaching out to [facility] also.

## 2021-10-13 NOTE — PROGRESS NOTES
[address] [place] [address]  INPATIENT PHYSICAL THERAPY  DAILY NOTE  [facility] ON[facility] MED 5K - 5K-14/014-A    Time In: 1430  Time Out: 1457  Timed Code Treatment Minutes: 32 Minutes  Minutes: 27          Date: 10/13/2021  Patient Name: Yasmin Cooper,  Gender:  male        MRN: [id number]  : 11/10/1930  (80 y.o.)     Referring Practitioner: Dr. Mallory More  Diagnosis: acute pulmonary edema  Additional Pertinent Hx: admit with above diagnosis, ESRD on HD, COVID-19 pneumonia, HTN, COPD, anemia     Prior Level of Function:  Lives With: Daughter  Type of Home: House  Home Layout: One level  Home Access: Level entry  Home Equipment: Rolling walker   Bathroom Shower/Tub: Walk-in shower  Bathroom Toilet: Standard    ADL Assistance: Independent  Homemaking Assistance: Needs assistance  Ambulation Assistance: Independent  Transfer Assistance: Independent  Additional Comments: Pt using RW PTA. Daughter assists at home. Unsure of accuracy of above pt is slighly confused this date. Restrictions/Precautions:  Restrictions/Precautions: Fall Risk  Position Activity Restriction  Other position/activity restrictions: . SUBJECTIVE: Patient in bed awake upon arrival, agreeable to therapy. PAIN: Denies    Vitals: Vitals not assessed per clinical judgement, see nursing flowsheet    OBJECTIVE:  Bed Mobility:  Supine to Sit: Modified Independent  Sit to Supine: Modified Independent     Transfers:  Sit to Stand: [employer], cues for hand placement  Stand to [place] [address], cues for hand placement   CGA required for patient safety as he occasionally \"tremors\" once standing, however does not experience LOB. Ambulation:  Contact Guard Assistance, with cues for safety  Distance: 40ft  Surface: Level Tile  Device:Rolling Walker  Gait Deviations:   Forward Flexed Posture, Slow Valeri, Decreased Step Length Bilaterally, Decreased Heel Strike Bilaterally, Wide Base of Support and Unsteady Gait  Patient requires cues to improve proximity to AD and to improve upright posture when ambulating- patient requires increased cues for safety with turns as patient tends to drag AD around. Slight SOB noted post ambulation, however SpO2 reads 89%, and patient able to recover to 93% quickly with seated rest break. Balance:  Dynamic Sitting Balance: Stand By Assistance  Static Standing Balance: Contact Guard Assistance   No UE or back support provided to patient doing LE exercises sitting edge of bed. Exercise:  Patient was guided in 1 set(s) 12 reps of exercise to both lower extremities. Seated hamstring curls, Long arc quads, Seated abduction/adduction, Standing marches and Standing UE alternating reaching across midline and overhead. Exercises were completed for increased independence with functional mobility. Functional Outcome Measures: Completed  AM-PAC Inpatient Mobility Raw Score : 18  AM-PAC Inpatient T-Scale Score : 43.63    ASSESSMENT:  Assessment: Patient progressing toward established goals. and Patient remains unsafe to go home prior to rehab stay d/t decreased endurance and overall strength. Would benefit from continued therapy to address above deficits to improve safety upon discharge. Activity Tolerance:  Patient tolerance of  treatment: good. Equipment Recommendations: Other: cont to assess needs  Discharge Recommendations:  Continue to assess pending progress, Subacute/Skilled Nursing Facility, 24 hour supervision or assist, Patient would benefit from continued therapy after discharge    Plan: Times per week: 3-5x GM  Times per day: Daily  Specific instructions for Next Treatment: therex and mobility    Patient Education  Patient Education: Plan of Care, Transfers, Gait    Goals:  Patient goals : not stated  Short term goals  Time Frame for Short term goals: by discharge  Short term goal 1: bed mobility with S to get in/out of bed  Short term goal 2: transfer with S to get in/out of chairs  Short term goal 3: amb >50'x1 with RW and SBA, maintaining O2 sats >90% on </=4L O2, to walk safely in room  Long term goals  Time Frame for Long term goals : no LTGs set secondary to short ELOS    Following session, patient left in safe position with all fall risk precautions in place.

## 2021-10-13 NOTE — FLOWSHEET NOTE
10/13/21 0824 10/13/21 1238   Vital Signs   BP (!) 104/47 136/61   Temp 98.1 °F (36.7 °C) 98 °F (36.7 °C)   Pulse 82 66   Resp 20 20   SpO2 96 %  --    Weight 151 lb 0.2 oz (68.5 kg) 148 lb 13 oz (67.5 kg)   Weight Method Bed scale Bed scale   Percent Weight Change 31.48 -1.46   Post-Hemodialysis Assessment   Post-Treatment Procedures  --  Access bleeding time < 10 minutes; Blood returned   Machine Disinfection Process  --  Acid/Vinegar Clean;Heat Disinfect; Exterior Machine Disinfection   Rinseback Volume (ml)  --  400 ml   Total Liters Processed (l/min)  --  1600 l/min   Dialyzer Clearance  --  Lightly streaked   Duration of Treatment (minutes)  --  1252 minutes   Hemodialysis Intake (ml)  --  400 ml   Hemodialysis Output (ml)  --  1600 ml   NET Removed (ml)  --  1000 ml   Tolerated Treatment  --  Good   Stable 4.0 hour tx. Removed 1.0 L of fluid. pt tolerated fluid removal well. Pressure held to each needle site x 10 min. Drsg clean, dry, and intact. Report called to primary RN. [place] record printed for scanning into EMR.

## 2021-10-13 NOTE — PROGRESS NOTES
Renal Progress Note    Assessment and Plan:   1. End stage kidney disease on hemodialysis  2. Hypertension primary reasonably controlled  3. Deconditioning  4. COPD  5. SARS-CoV-2 infection  6. Anemia of chronic disease  7. Hypocalcemia corrects for low serum albumin level. PLAN:  1. Labs reviewed  2. Medications reviewed  3. No changes  4. Seen in hemodialysis unit with procedure in progress. 5. Doing well with treatment   6. Hemodynamically stable  7. We will follow    Patient Active Problem List:     CAD (coronary artery disease)     COPD (chronic obstructive pulmonary disease) ([address]     Chronic renal insufficiency     Patient overweight     Arthritis-  low back and neck .      CKD (chronic kidney disease) stage 3, GFR 30-59 ml/min (HCC)     Dyspnea and respiratory abnormalities     ED (erectile dysfunction)     Degenerative joint disease of knee, left     Gout of big toe     Anemia, chronic disease     CKD (chronic kidney disease) stage 4, GFR 15-29 ml/min (HCC)     Essential hypertension     Monoclonal gammopathy     Leukopenia     Thrombocytopenia (HCC)     Chronic kidney disease (CKD), stage V (HCC)     Metabolic acidosis     Hyperkalemia     Iron deficiency anemia     ROSAURA (acute kidney injury) ([address]     Plasma cell dyscrasia     Idiopathic hypotension     Hypertensive urgency     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)     Systolic congestive heart failure (HCC)     Other fluid overload (CODE)     Hyperphosphatemia     Acute diastolic congestive heart failure (HCC)     Elevated troponin     Acute on chronic diastolic congestive heart failure (HCC)     Pulmonary hypertension (HCC)     Anemia due to chronic kidney disease, on chronic dialysis (HCC)     Volume overload     Secondary hyperparathyroidism of renal origin ([address]     Chest pain     Acute pulmonary edema (HCC)     Accelerated hypertension     Gastritis and duodenitis     Fall from slip, trip, or stumble, initial encounter     Closed fracture of left ankle     ESRD on hemodialysis ([facility]     Hypertensive emergency     SOB (shortness of breath)     Chronic combined systolic and diastolic CHF (congestive heart failure) (HCC)     COVID-19     Severe malnutrition (HCC)     Hypoxia      Subjective:   Admit Date: 9/13/2021    Interval History:   Seen for end stage kidney disease on hemodialysis  Very awake and alert  Denies any complaint  Updated by the staff  No issues of concern  Blood pressure is good      Medications:   Scheduled Meds:   sodium chloride flush  5-40 mL IntraVENous 2 times per day    pantoprazole  40 mg IntraVENous BID    lactulose  20 g Oral BID    lidocaine 1 % injection  5 mL IntraDERmal Once    dicyclomine  10 mg Oral TID AC    dilTIAZem  30 mg Oral Once    [Held by provider] aspirin  81 mg Oral Daily    [Held by provider] heparin (porcine)  5,000 Units SubCUTAneous 3 times per day    epoetin traci-epbx  6,000 Units SubCUTAneous Once per day on Mon Wed Fri    calcitRIOL  1.5 mcg Oral Once per day on Mon Wed Fri    cinacalcet  60 mg Oral Once per day on Mon Wed Fri    docusate sodium  100 mg Oral BID    ferrous sulfate  325 mg Oral Daily with breakfast    gabapentin  100 mg Oral TID    metoprolol  100 mg Oral BID    vitamin C  500 mg Oral Daily    polyethylene glycol  17 g Oral Daily    [Held by provider] clopidogrel  75 mg Oral Daily     Continuous Infusions:   sodium chloride      sodium chloride      sodium chloride      sodium chloride      dextrose Stopped (10/01/21 1231)       CBC:   Recent Labs     10/10/21  1952 10/11/21  0800 10/12/21  2008   WBC 7.2 8.4 8.5   HGB 9.8* 9.5* 9.9*    198 203     CMP:    Recent Labs     10/11/21  0800 10/12/21  2008 10/13/21  0539    135 135   K 4.4 4.4 4.6    98 98   CO2 23 24 28   BUN 27* 25* 31*   CREATININE 6.5* 4.8* 5.4*   GLUCOSE 142* 127* 86   CALCIUM 8.0* 8.1* 8.1*   LABGLOM 10* 14* 12*     Troponin: No results for input(s): TROPONINI in the last 72 hours. BNP: No results for input(s): BNP in the last 72 hours. INR: No results for input(s): INR in the last 72 hours. Lipids: No results for input(s): CHOL, LDLDIRECT, TRIG, HDL, AMYLASE, LIPASE in the last 72 hours. Liver: No results for input(s): AST, ALT, ALKPHOS, PROT, LABALBU, BILITOT in the last 72 hours. Invalid input(s): BILDIR  Iron:  No results for input(s): IRONS, FERRITIN in the last 72 hours. Invalid input(s): LABIRONS  CTA ABDOMEN PELVIS W WO CONTRAST   Final Result   1. Bilateral airspace infiltrates and pleural effusions right greater than left. 2. Cardiomegaly. 3. Atrophic kidneys bilaterally. 4 liquid stool throughout much of the colon and rectum consistent with diarrhea no evidence of bowel obstruction. 5. Although there is diffuse calcific atherosclerosis of the aorta and mesenteric vessels there is no significant stenosis of the mesenteric vessels. However the renal arteries are bilaterally extremely diffusely narrow. **This report has been created using voice recognition software. It may contain minor errors which are inherent in voice recognition technology. **      Final report electronically signed by Dr. Amparo Rico on 10/5/2021 3:33 PM      IR FLUORO GUIDED CVA DEVICE PLMT/REPLACE/REMOVAL   Final Result   Status post successful midline insertion. **This report has been created using voice recognition software. It may contain minor errors which are inherent in voice recognition technology. **      Final report electronically signed by Dr Heidy Stallings on 10/4/2021 1:54 PM      XR ABDOMEN (KUB) (SINGLE AP VIEW)   Final Result   A nonspecific but nonobstructive bowel gas pattern. This document has been electronically signed by: Gregg Mike DO on    10/02/2021 12:45 AM      XR CHEST PORTABLE   Final Result   1.  Scattered patchy regions of predominantly interstitial densities    throughout the bilateral lungs grossly similar to the prior examination. Mild bibasilar pleural/parenchymal opacities most consistent with tiny    effusions with atelectasis and/or consolidation. 2. Cardiomegaly with the heart size unchanged from the prior examination. This document has been electronically signed by: Jaron Lewis DO on    10/02/2021 12:47 AM      CT ABDOMEN PELVIS WO CONTRAST Additional Contrast? None   Final Result   1. Limited evaluation of the lung bases demonstrates small bilateral pleural effusions with patchy consolidative opacities at the lower lobes which may be related to pneumonia. 2. The gallbladder demonstrates a small gallstone near the gallbladder neck. This appears mildly distended. 3. Mild ascites in the perihepatic region is noted. **This report has been created using voice recognition software. It may contain minor errors which are inherent in voice recognition technology. **      Final report electronically signed by Dr. Michelle Knight on 9/30/2021 1:35 PM      XR ABDOMEN (KUB) (SINGLE AP VIEW)   Final Result   1. Overall nonobstructive bowel gas pattern. **This report has been created using voice recognition software. It may contain minor errors which are inherent in voice recognition technology. **      Final report electronically signed by Dr Kia Calvin on 9/30/2021 12:26 PM      XR CHEST PORTABLE   Final Result   1. Patchy multifocal airspace disease is similar to 9/24/2021 exam, interstitial and airspace edema versus atypical infection. **This report has been created using voice recognition software. It may contain minor errors which are inherent in voice recognition technology. **      Final report electronically signed by Dr Kia Calvin on 9/30/2021 9:33 AM      XR CHEST PORTABLE   Final Result   Impression:      Increasing consolidation and interstitial prominence.       Question pulmonary edema versus pneumonia      This document has been electronically signed by: 10/13/2021 [id number]  Last data filed at 10/13/2021 1359  Gross per 24 hour   Intake 1020 ml   Output 1600 ml   Net -580 ml       Constitutional: Well-developed elderly gentleman alert, awake eating breakfast, no apparent distress   Skin:normal with no rash or lesions. HEENT:Pupils are reactive . Throat is clear . Oral mucosa is moist   Neck:supple with no thyromegaly or carotid bruit  Cardiovascular:  S1, S2 without murmur or rubs   Respiratory:  Clear to ausculation without wheezes, rhonchi or rales. Abdomen: +bs, soft, no tenderness to palpation and no palpable mass. No abdominal bruit   Ext: No LE edema  Musculoskeletal:Intact  Neuro:Alert and awake. Well oriented  with no focal deficit. Speech is normal      Electronically signed by Sixto Blankenship MD on 10/13/2021 at 4:19 PM  **This report has been created using voice recognition software. It maycontain minor  errors which are inherent in voice recognition technology. **

## 2021-10-14 NOTE — CARE COORDINATION
10/14/21, 3:34 PM EDT    DISCHARGE PLANNING EVALUATION    SW received call from [facility] with [facility], they can accept pt tomorrow after dialysis. SW updated pts daughter, Dean Alcaraz. Dean Alcaraz is aware that she will need to be to [facility] on Monday morning to transport pt for dialysis (MWF @ 6:40 am). RN and CM updated. Pt will need ambulette for transportation.

## 2021-10-14 NOTE — CARE COORDINATION
10/14/21, 11:16 AM EDT    DISCHARGE PLANNING EVALUATION    PAWAN spoke with [facility], due to pt not being vaccinated and not having an appropriate bed. At this time they are unsure of when a bed would be available. PAWAN spoke with Dilcia Blackmon with Public benefits, she stated daughter, Key Londono did not bring in requested paperwork yesterday. SW placed call to Key Londono, she stated she would be to the hospital after 12 pm.  Key Londono would not speak with SW on phone as she was at work. 2:55 PM update:  PAWAN spoke with Key Londono, she left copy of bank statement in pts room. PAWAN spoke with Key Londono via phone. Key Londono stated she is not able to take pt home at discharge. PAWAN spoke with Sravan More with [facility], they are considering pt at discharge. Sravan More stated ECF is needing to speak with Key Londono to confirm financial plan and transportation for dialysis. Key Londono is aware and has spoken with Brenna Moreno at [facility]. PAWAN spoke with Key Londono about back up plan should [facility] deny. Key Londono stated she didn't have one. PAWAN discussed [facility]. Await decision from [facility].

## 2021-10-14 NOTE — CARE COORDINATION
Discharge Planning Update:  Nephrology following, hemodialysis Mon., Wed., Fri., PT/OT, SS, Dietician, Aspirin, Plavix, and Heparin on hold, Chronulac, IV Protonix, prn medications,  Low potassium diet, acapella, incentive spirometry, SCD'sDmitriy Redding was from home residing with his daughter. She REFUSES to take him home and she has not been compliant with applying for Medicaid. He is a difficult placement.  Electronically signed by Elisha Boland RN on 10/14/21 at 3:12 PM EDT

## 2021-10-14 NOTE — PROGRESS NOTES
Medicine Progress Note    Patient:  Raj Tomlinson    Unit/Bed:5K-14/014-A  YOB: 1930  MRN: [id number]   Acct: [de-identified]   PCP: Megan Moya MD  Date of Admission: 9/13/2021    Assessment / Plan     Briefly, pt Raj Tomlinson is a 80 y.o. male with a PMH SSS s/p PPM + AICD, Anemia of chronic disease, COPD, HLD< CAD, ESRD on HD, Systolic HF EF 27%, who presented with AHRF 2/2 COVID-PNA, sustained UGIB 2/2 erosive esophagitis and duodenitis and is now recovered. #AHRF 2/2 COVID PNA: Resolved  09/25 - required increased. O2. Escalated to 4L the weaned to 2L throughout hospitalization. Now on 3L which is new baseline. Baseline 2L prior to hospitalization. Briefly suspected superimposed bacterial PNA d/t leukocytosis and consoitdaiotnt/w course of ceftriaxone and azithromycin    #Recurrent Hypoglycemia: Resolved  No h/o DM. A1c 4.5% 10/01. S/p decadron x9 days for COVID. Cosyntropin test wnl. Currently 83-140s and pt asymptomatic  Plan: Continue hypoglycemia protocol    #Non-Variceal Upper GIB: Resolved  Bleeding source: esophagitis and duodenitis. H/o EGD on 10/09/2021 demonstrating the above findings. Plan:  -GI signed off; pantoprazole 40 mg BID, f/u in 4 weeks after discharge. -If elevated INR, liver cirrhosis, and any bleeding, Give IV Vitamin K 10 mg x1.  -H&H stat if re-bleeding.   -Transfuse if Hgb <7.0 mg/dL or symptomatic <8 mg/dL    #ABLA on Anemia of Chronic Disease: Resolved  10/07 Hgb 6.6 on baseline 9.0-10.0. 2/2 UGIB. Now 9.5. Continuing iron supplementation. #Anuric ESRD on HD 2/2 hypertensive nephrosclerosis: Stable   Schedule: M/W/F. Access: Right AV Fistula. Nephrologist: Dr. Tika Saba, [place] [id number]: [facility]. On phosphate binders: none. On Vitamin D: calcitriol. On EPO: retacrit. On midodrine: no. Kidney transplant candidate: no.   Plan:  -Nephrology; following  -HD while in hospital.   -BMP, Mg, Phos drawn during dialysis.      #Chronically elevated troponin: Stable  H/o ESRD. Baseline 0.1-0.3    #SSS s/p AICD + PPM: Stable  HR 69-70. Plan: Noted    #Lactic Acidosis: Stable  8.7 10/01, improved to 2.5, resolved to 1.6. 2/2 GIB    #Recurrent Hypoglycemia: Resolved  No h/o DM. A1c 4.5% 10/01. S/p decadron x9 days for COVID. Cosyntropin test wnl. Currently 83-140s and pt asymptomatic  Plan: Continue hypoglycemia protocol    #Chronic Systolic + Diastolic Heart Failure with EF 45% on 05/19/2021, NYHA Class III: Stable  Pro-BNP chronically elevated >70,000. CXR on 09/13/2021 demonstrated cardiomegaly and pulmonary vascular congestion. GDMT: metoprolol tartrate. Diuretics: ESRD. Follows in HF Clinic: No.   Plan:   -Strict I&Os  -Daily weights  -HD for volume removal    #Non-obstructive CAD s/p JAXON on mid LAD and ramus in 2018: Stable  No recent LHC. Cardiac stress test 05/19/2021 not suggestive of myocardial ischemia. On ASA 81 mg.  Plan: ASA and plavix held in the setting of UGIB    Dispo: Med/Surg. Per social work, [facility] can accept the patient tomorrow after dialysis, Wade Smith is aware. Fluids: Encourage PO fluid intake  Electrolyte: Replete as needed  Nutrition: Regularm low potassium, low phosphate  GI: Protonix 40 mg BID  DVT ppx:  SCD  PT/OT/SLP: PT/OT    Code status: Limited Limited Code details: Intubation/Re-intubation No; Defibrillation/Cardioversion No; Chest Compressions No; Resuscitative Medications No; Other No Comment    Subjective     Pt sitting in bed comfortably with excellent appetite. No fevers, chills, nausea, vomiting, headaches, or dizziness overnight. Lauryn Sewell is a 80 y.o. male with a PMH SSS s/p PPM + AICD, Anemia of chronic disease, COPD, HLD< CAD, ESRD on HD, Systolic HF EF 94%, who presented with AHRF 2/2 COVID-PNA, sustained UGIB 2/2 erosive esophagitis and duodenitis and is now recovered.      Objective     Vitals:     BP (!) 165/71   Pulse 73   Temp 98.5 °F (36.9 °C) (Oral)   Resp 18   Ht 5' 6\" (1.676 m) Wt 148 lb 13 oz (67.5 kg)   SpO2 93%   BMI 24.02 kg/m²     Intake and Output:       Intake/Output Summary (Last 24 hours) at 10/14/2021 1628  Last data filed at 10/14/2021 1211  Gross per 24 hour   Intake 1060 ml   Output --   Net 1060 ml       Outpatient Medications:     Scheduled Meds:   sodium chloride flush  5-40 mL IntraVENous 2 times per day    pantoprazole  40 mg IntraVENous BID    lactulose  20 g Oral BID    lidocaine 1 % injection  5 mL IntraDERmal Once    dicyclomine  10 mg Oral TID AC    dilTIAZem  30 mg Oral Once    [Held by provider] aspirin  81 mg Oral Daily    [Held by provider] heparin (porcine)  5,000 Units SubCUTAneous 3 times per day    epoetin traci-epbx  6,000 Units SubCUTAneous Once per day on Mon Wed Fri    calcitRIOL  1.5 mcg Oral Once per day on Mon Wed Fri    cinacalcet  60 mg Oral Once per day on Mon Wed Fri    docusate sodium  100 mg Oral BID    ferrous sulfate  325 mg Oral Daily with breakfast    gabapentin  100 mg Oral TID    metoprolol  100 mg Oral BID    vitamin C  500 mg Oral Daily    polyethylene glycol  17 g Oral Daily    [Held by provider] clopidogrel  75 mg Oral Daily     Continuous Infusions:   sodium chloride      sodium chloride      sodium chloride      sodium chloride      dextrose Stopped (10/01/21 1231)     PRN Meds:white petrolatum, sodium chloride flush, sodium chloride, sodium chloride, sodium chloride, HYDROcodone-acetaminophen, sodium chloride, loperamide, metoprolol, metoclopramide, ipratropium-albuterol, glucose, dextrose, glucagon (rDNA), dextrose, ondansetron **OR** ondansetron, polyethylene glycol, acetaminophen **OR** acetaminophen, guaiFENesin-dextromethorphan, albuterol sulfate HFA    Physical Exam:     Physical Exam  Constitutional:       Appearance: Normal appearance. He is not diaphoretic. HENT:      Head: Normocephalic and atraumatic. Mouth/Throat:      Mouth: Mucous membranes are moist.      Pharynx: Oropharynx is clear. No oropharyngeal exudate or posterior oropharyngeal erythema. Eyes:      General: No scleral icterus. Extraocular Movements: Extraocular movements intact. Pupils: Pupils are equal, round, and reactive to light. Cardiovascular:      Rate and Rhythm: Normal rate and regular rhythm. Pulses: Normal pulses. Heart sounds: Normal heart sounds. No murmur heard. No friction rub. No gallop. Pulmonary:      Effort: Pulmonary effort is normal.      Breath sounds: Normal breath sounds. No wheezing, rhonchi or rales. Abdominal:      General: Bowel sounds are normal.      Palpations: Abdomen is soft. Tenderness: There is no abdominal tenderness. There is no guarding or rebound. Musculoskeletal:      Cervical back: Normal range of motion and neck supple. Right lower leg: No edema. Left lower leg: No edema. Skin:     General: Skin is warm and dry. Capillary Refill: Capillary refill takes less than 2 seconds. Neurological:      General: No focal deficit present. Mental Status: He is alert and oriented to person, place, and time.    Psychiatric:         Mood and Affect: Mood normal.         Behavior: Behavior normal.         Labs:     Results for orders placed or performed during the hospital encounter of 09/13/21   COVID-19, Rapid    Specimen: Nasopharyngeal Swab   Result Value Ref Range    SARS-CoV-2, NAAT DETECTED (AA) NOT DETECTED   Culture, Blood 1    Specimen: Blood   Result Value Ref Range    Blood Culture, Routine No growth-preliminary No growth     COVID-19, Rapid   Result Value Ref Range    SARS-CoV-2, NAAT DETECTED (AA) NOT DETECTED   COVID-19, Rapid    Specimen: Nasopharyngeal Swab   Result Value Ref Range    SARS-CoV-2, NAAT NOT  DETECTED NOT DETECTED   Culture, Blood 1    Specimen: Blood   Result Value Ref Range    Blood Culture, Routine No growth-preliminary No growth     Anion Gap   Result Value Ref Range    Anion Gap 10.0 8.0 - 16.0 meq/L   Glomerular Filtration Rate, Estimated   Result Value Ref Range    Est, Glom Filt Rate 19 (A) ZD/NTU/4.14C6   Basic Metabolic Panel   Result Value Ref Range    Sodium 138 135 - 145 meq/L    Potassium 3.4 (L) 3.5 - 5.2 meq/L    Chloride 100 98 - 111 meq/L    CO2 25 23 - 33 meq/L    Glucose 120 (H) 70 - 108 mg/dL    BUN 21 7 - 22 mg/dL    CREATININE 6.0 (HH) 0.4 - 1.2 mg/dL    Calcium 7.9 (L) 8.5 - 10.5 mg/dL   CBC auto differential   Result Value Ref Range    WBC 7.2 4.8 - 10.8 thou/mm3    RBC 3.12 (L) 4.70 - 6.10 mill/mm3    Hemoglobin 9.8 (L) 14.0 - 18.0 gm/dl    Hematocrit 29.8 (L) 42.0 - 52.0 %    MCV 95.5 (H) 80.0 - 94.0 fL    MCH 31.4 26.0 - 33.0 pg    MCHC 32.9 32.2 - 35.5 gm/dl    RDW-CV 18.6 (H) 11.5 - 14.5 %    RDW-SD 62.1 (H) 35.0 - 45.0 fL    Platelets 526 545 - 471 thou/mm3    MPV 10.1 9.4 - 12.4 fL    Seg Neutrophils 77.4 %    Lymphocytes 10.6 %    Monocytes 6.6 %    Eosinophils 4.0 %    Basophils 0.4 %    Immature Granulocytes 1.0 %    Segs Absolute 5.6 1 - 7 thou/mm3    Lymphocytes Absolute 0.8 (L) 1.0 - 4.8 thou/mm3    Monocytes Absolute 0.5 0.4 - 1.3 thou/mm3    Eosinophils Absolute 0.3 0.0 - 0.4 thou/mm3    Basophils Absolute 0.0 0.0 - 0.1 thou/mm3    Immature Grans (Abs) 0.07 0.00 - 0.07 thou/mm3    nRBC 0 /100 wbc   Basic Metabolic Panel   Result Value Ref Range    Sodium 140 135 - 145 meq/L    Potassium 4.4 3.5 - 5.2 meq/L    Chloride 102 98 - 111 meq/L    CO2 23 23 - 33 meq/L    Glucose 142 (H) 70 - 108 mg/dL    BUN 27 (H) 7 - 22 mg/dL    CREATININE 6.5 (HH) 0.4 - 1.2 mg/dL    Calcium 8.0 (L) 8.5 - 10.5 mg/dL   CBC auto differential   Result Value Ref Range    WBC 8.4 4.8 - 10.8 thou/mm3    RBC 3.00 (L) 4.70 - 6.10 mill/mm3    Hemoglobin 9.5 (L) 14.0 - 18.0 gm/dl    Hematocrit 27.6 (L) 42.0 - 52.0 %    MCV 92.0 80.0 - 94.0 fL    MCH 31.7 26.0 - 33.0 pg    MCHC 34.4 32.2 - 35.5 gm/dl    RDW-CV 18.5 (H) 11.5 - 14.5 %    RDW-SD 59.9 (H) 35.0 - 45.0 fL    Platelets 100 993 - 605 thou/mm3    MPV 9.9 9.4 - 12.4 fL    Seg Neutrophils 81.5 %    Lymphocytes 9.2 %    Monocytes 4.9 %    Eosinophils 2.9 %    Basophils 0.4 %    Immature Granulocytes 1.1 %    Segs Absolute 6.8 1 - 7 thou/mm3    Lymphocytes Absolute 0.8 (L) 1.0 - 4.8 thou/mm3    Monocytes Absolute 0.4 0.4 - 1.3 thou/mm3    Eosinophils Absolute 0.2 0.0 - 0.4 thou/mm3    Basophils Absolute 0.0 0.0 - 0.1 thou/mm3    Immature Grans (Abs) 0.09 (H) 0.00 - 0.07 thou/mm3    nRBC 0 /100 wbc   Anion Gap   Result Value Ref Range    Anion Gap 13.0 8.0 - 16.0 meq/L   Glomerular Filtration Rate, Estimated   Result Value Ref Range    Est, Glom Filt Rate 11 (A) ml/min/1.73m2   Anion Gap   Result Value Ref Range    Anion Gap 15.0 8.0 - 16.0 meq/L   Glomerular Filtration Rate, Estimated   Result Value Ref Range    Est, Glom Filt Rate 10 (A) ml/min/1.73m2   POCT Glucose   Result Value Ref Range    POC Glucose 85 70 - 108 mg/dl   POCT Glucose   Result Value Ref Range    POC Glucose 69 (L) 70 - 108 mg/dl   POCT Glucose   Result Value Ref Range    POC Glucose 40 (LL) 70 - 108 mg/dl   EKG SOB   Result Value Ref Range    Ventricular Rate 79 BPM    Atrial Rate 72 BPM    P-R Interval 180 ms    QRS Duration 74 ms    Q-T Interval 386 ms    QTc Calculation (Bazett) 442 ms    P Axis 54 degrees    R Axis 10 degrees    T Axis 40 degrees   EKG 12 Lead   Result Value Ref Range    Ventricular Rate 83 BPM    Atrial Rate 83 BPM    P-R Interval 154 ms    QRS Duration 84 ms    Q-T Interval 410 ms    QTc Calculation (Bazett) 481 ms    P Axis 89 degrees    R Axis -17 degrees    T Axis 27 degrees   EKG 12 Lead   Result Value Ref Range    Ventricular Rate 64 BPM    Atrial Rate 64 BPM    P-R Interval 188 ms    QRS Duration 76 ms    Q-T Interval 436 ms    QTc Calculation (Bazett) 449 ms    R Axis -16 degrees    T Axis 9 degrees   Result Value Ref Range    Ventricular Rate 76 BPM    QRS Duration 84 ms    Q-T Interval 422 ms    QTc Calculation (Bazett) 474 ms    R Axis 37 degrees    T Axis -6 degrees   TYPE AND SCREEN   Result Value Ref Range    ABO A     Rh Factor POS     Antibody Screen NEG    TYPE AND SCREEN   Result Value Ref Range    ABO A     Rh Factor POS     Antibody Screen NEG        Diagnostics:     Imaging:  CT ABDOMEN PELVIS WO CONTRAST Additional Contrast? None  Result Date: 9/30/2021  1. Limited evaluation of the lung bases demonstrates small bilateral pleural effusions with patchy consolidative opacities at the lower lobes which may be related to pneumonia. 2. The gallbladder demonstrates a small gallstone near the gallbladder neck. This appears mildly distended. 3. Mild ascites in the perihepatic region is noted. **This report has been created using voice recognition software. It may contain minor errors which are inherent in voice recognition technology. ** Final report electronically signed by Dr. Leana Recio on 9/30/2021 1:35 PM    XR ABDOMEN (KUB) (SINGLE AP VIEW)  Result Date: 10/2/2021  A nonspecific but nonobstructive bowel gas pattern. This document has been electronically signed by: Atif Maier DO on 10/02/2021 12:45 AM    CT HEAD WO CONTRAST  Result Date: 9/15/2021  1. Stable CT scan of the brain, no interval change since previous study dated 15th of June 2021. **This report has been created using voice recognition software. It may contain minor errors which are inherent in voice recognition technology. ** Final report electronically signed by DR Janas Opitz on 9/15/2021 4:26 PM    CT CERVICAL SPINE WO CONTRAST  Result Date: 9/15/2021   No evidence of acute osseous injury of the cervical spine. **This report has been created using voice recognition software. It may contain minor errors which are inherent in voice recognition technology. ** Final report electronically signed by Dr. Stephani Morse MD on 9/15/2021 4:29 PM    IR FLUORO GUIDED CVA DEVICE PLMT/REPLACE/REMOVAL  Result Date: 10/4/2021  Status post successful midline insertion.  **This report has been created using voice recognition

## 2021-10-14 NOTE — PROGRESS NOTES
98 98   CO2 24 28 28   BUN 25* 31* 19   CREATININE 4.8* 5.4* 3.8*   GLUCOSE 127* 86 84     Hepatic: No results for input(s): AST, ALT, ALB, BILITOT, ALKPHOS in the last 72 hours. INR: No results for input(s): INR in the last 72 hours. Imaging:  Results for orders placed during the hospital encounter of 09/13/21    XR ABDOMEN (KUB) (SINGLE AP VIEW)    Narrative  1 view abdomen    Comparison: CT abdomen/pelvis September 30, 2021    Findings:  Nonspecific but nonobstructive bowel gas pattern. No pneumoperitoneum or pneumatosis. Calcified phleboliths within the right lower pelvis. Degenerative changes of the lumbar spine. Atherosclerosis of the abdominal  aorta. Impression  A nonspecific but nonobstructive bowel gas pattern. This document has been electronically signed by: Nia Gifford DO on  10/02/2021 12:45 AM    Results for orders placed during the hospital encounter of 07/12/21    CT ABDOMEN PELVIS W IV CONTRAST Additional Contrast? None    Narrative  PROCEDURE: CTA CHEST W WO CONTRAST, CT ABDOMEN PELVIS W IV CONTRAST    CLINICAL INFORMATION: sob . Periumbilical pain. Loss of appetite. COMPARISON: CTA chest, abdomen and pelvis dated 5/21/2018. TECHNIQUE: Helical CT of the chest during fast bolus administration of 80 mL Isovue-370 injected in the left [facility] with thick sagittal and coronal maximum intensity projection reconstructions. Subsequent helical CT of the abdomen and pelvis with sagittal  coronal reconstructions. All CT scans at this facility use dose modulation, iterative reconstruction, and/or weight-based dosing when appropriate to reduce radiation dose to as low as reasonably achievable. FINDINGS:  Chest:  There is a good contrast bolus within the pulmonary arteries, adequate for evaluation to the subsegmental level. There are no focal filling defects within the pulmonary arteries to suggest pulmonary embolus. The main pulmonary arteries are enlarged.   There is mural calcification in the aorta. The ascending aorta is mildly enlarged measuring 3.7 cm in greatest short axis diameter the level of the main pulmonary artery. There is no evidence of dissection. There are moderate right and small left pleural  effusions with mild adjacent atelectasis. There are moderate emphysematous changes predominantly in the upper lungs which have worsened in the interval compared to prior exam. There are scattered distal lymph nodes some which are calcified, stable  compared to prior exam. The heart is enlarged, increased compared to previous CT. There is a left-sided cardiac pacer/defibrillator generator in the subcutaneous fat overlying the left pectoralis muscle, similar to prior exam. There are stable leads in  the right atrium and right ventricle, respectively. There are mild anterior wedge shape configuration's of the T6-T8 vertebral bodies with approximately 10% anterior height loss, stable compared to prior exam. There are stable degenerative changes of the  thoracic spine. Abdomen/pelvis: There are few scattered cysts in the liver, stable compared to prior exam. There is a calcified stone in the gallbladder, stable compared to prior exam. Adrenal glands are unremarkable. The kidneys are atrophied, stable compared to prior exam. The spleen  is unremarkable. There is mild ductal prominence in the pancreas, unchanged compared to prior exam. No definite pancreatic lesion is identified. No retroperitoneal or mesenteric lymphadenopathy is identified. There is mural calcification in the aorta  without evidence of aneurysm. There is moderately prominent stool within the large bowel. Small bowel appears within normal limits. The appendix is normal in appearance. The bladder is nondistended. There is diffuse wall thickening the bladder which may represent pseudothickening  from underdistention.  The prostate is prominently enlarged, increased compared to prior exam. There is an asymmetrically enhancing area in the left prostate. No free fluid is identified. There are stable degenerative changes of lumbar spine. There is  stable bone island in the right ilium. Impression  1. No evidence of pulmonary embolus. 2. Moderate right and small left pleural effusions with adjacent atelectasis. 3. Ascending aortic aneurysm measuring 3.7 cm in greatest diameter. 4. Pulmonary artery hypertension. 5. Cardiomegaly, increased compared to prior CT. 6. Prostatomegaly with enhancing component on the left. 7. Cholelithiasis. **This report has been created using voice recognition software. It may contain minor errors which are inherent in voice recognition technology. **    Final report electronically signed by Dr. Cindy Jang MD on 2021 11:43 AM    No results found for this or any previous visit. No results found for this or any previous visit. [place] [address]                                 OPERATIVE REPORT     PATIENT NAME: Lenor Oppenheim                      :        11/10/1930  MED REC NO:   [id number]                           ROOM:       0014  ACCOUNT NO:   [de-identified]                           ADMIT DATE: 2021  PROVIDER:     Satish Rubin M.D.     DATE OF PROCEDURE:  10/09/2021     INDICATION:  The patient with history of melenic stool with drop in H  and H.  Plan today for EGD to evaluate.     DESCRIPTION OF PROCEDURE:  The patient was brought to the GI lab. Consent was obtained. Risks involved with the procedure were explained  to the patient. Informed consent was obtained. The patient was  monitored during the procedure with pulse oximetry, blood pressure  monitoring, and oxygen by nasal cannula. Sedation by incremental doses  of IV propofol given by Anesthesia Service to achieve total IV  anesthesia.   For ASA classification and medication given during the  procedure, please see Anesthesia note.     SURGEON:  Merry Reyes MD     ASA CLASSIFICATION:  Please see anesthesia note.     ESTIMATED BLOOD LOSS:  None.     PROCEDURE PERFORMED:  EGD.     DESCRIPTION OF PROCEDURE:  A standard video upper endoscope was advanced  under direct vision from the oral cavity up to the third part of the  duodenum. The esophagus featured grade 2 distal esophagitis with  features of just recent GI bleed; however, no active bleeding. Scope  was advanced to the stomach. Retroflex examination of the cardia  revealed small hiatus hernia. Mild gastritis seen in the antrum as well  as deformity in the pylorus, likely indicative of previous history of  ulcer in that area, but not active at the stomach. Mild duodenitis  Seen. Scope was withdrawn with no immediate complications.     IMPRESSION:  1. Erosive esophagitis grade 2, likely source of GI bleed. 2.  Gastritis. 3.  Small hiatus hernia. 4.  Deformity of the pylorus. 5.  Duodenitis.     PLAN:  1. Continue PPI. 2.  Resume diet. 3.  Avoid NSAID. 4.  Long-term therapy with H2 blockers on discharge.           Leon Ruff M.D. Objective:   Vitals: BP (!) 165/71   Pulse 73   Temp 98.5 °F (36.9 °C) (Oral)   Resp 18   Ht 5' 6\" (1.676 m)   Wt 148 lb 13 oz (67.5 kg)   SpO2 93%   BMI 24.02 kg/m²     Intake/Output Summary (Last 24 hours) at 10/14/2021 1709  Last data filed at 10/14/2021 1211  Gross per 24 hour   Intake 1060 ml   Output --   Net 1060 ml     General appearance: alert and cooperative with exam  Lungs: clear to auscultation bilaterally  Heart: regular rate and rhythm, S1, S2 normal, no murmur, click, rub or gallop  Abdomen: soft, non-tender; bowel sounds normal; no masses,  no organomegaly  Extremities: extremities normal, atraumatic, no cyanosis or edema    Assessment and Plan:   1. Anemia with acute GI blood loss due to erosive esophagitis in addition to chronic renal disease. H&H stable.   Continue to trend and transfuse prn.    2. Erosive esophagitis on EGD - continue PPI. No immediate plans for colonoscopy. Follow up in GI Clinic after discharge in 4 weeks week(s)    Patient Active Problem List:     CAD (coronary artery disease)     COPD (chronic obstructive pulmonary disease) (HCC)     Chronic renal insufficiency     Patient overweight     Arthritis-  low back and neck .      CKD (chronic kidney disease) stage 3, GFR 30-59 ml/min (HCC)     Dyspnea and respiratory abnormalities     ED (erectile dysfunction)     Degenerative joint disease of knee, left     Gout of big toe     Anemia, chronic disease     CKD (chronic kidney disease) stage 4, GFR 15-29 ml/min (HCC)     Essential hypertension     Monoclonal gammopathy     Leukopenia     Thrombocytopenia (HCC)     Chronic kidney disease (CKD), stage V (HCC)     Metabolic acidosis     Hyperkalemia     Iron deficiency anemia     ROSAURA (acute kidney injury) ([address]     Plasma cell dyscrasia     Idiopathic hypotension     Hypertensive urgency     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)     Systolic congestive heart failure (HCC)     Other fluid overload (CODE)     Hyperphosphatemia     Acute diastolic congestive heart failure (HCC)     Elevated troponin     Acute on chronic diastolic congestive heart failure (HCC)     Pulmonary hypertension (HCC)     Anemia due to chronic kidney disease, on chronic dialysis (HCC)     Volume overload     Secondary hyperparathyroidism of renal origin ([address]     Chest pain     Acute pulmonary edema (HCC)     Accelerated hypertension     Gastritis and duodenitis     Fall from slip, trip, or stumble, initial encounter     Closed fracture of left ankle     ESRD on hemodialysis ([address]     Hypertensive emergency     SOB (shortness of breath)     Chronic combined systolic and diastolic CHF (congestive heart failure) ([address]     COVID-19     Severe malnutrition ([address]     Hypoxia      Electronically signed by Mik Santillan MD on 10/14/2021 at 5:09 PM

## 2021-10-14 NOTE — PROGRESS NOTES
Renal Progress Note    Assessment and Plan:   1. End stage kidney disease on hemodialysis  2. Hypertension primary   3. Deconditioning  4. COPD  5. Recent SARS-CoV-2 infection  6. Anemia of chronic disease    PLAN:  1. Labs reviewed  2. Medications reviewed  3. No changes  4. Hemodialysis tomorrow  5. We will follow    Patient Active Problem List:     CAD (coronary artery disease)     COPD (chronic obstructive pulmonary disease) ([address]     Chronic renal insufficiency     Patient overweight     Arthritis-  low back and neck .      CKD (chronic kidney disease) stage 3, GFR 30-59 ml/min (HCC)     Dyspnea and respiratory abnormalities     ED (erectile dysfunction)     Degenerative joint disease of knee, left     Gout of big toe     Anemia, chronic disease     CKD (chronic kidney disease) stage 4, GFR 15-29 ml/min (HCC)     Essential hypertension     Monoclonal gammopathy     Leukopenia     Thrombocytopenia (HCC)     Chronic kidney disease (CKD), stage V (HCC)     Metabolic acidosis     Hyperkalemia     Iron deficiency anemia     ROSAURA (acute kidney injury) ([address]     Plasma cell dyscrasia     Idiopathic hypotension     Hypertensive urgency     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)     Systolic congestive heart failure (HCC)     Other fluid overload (CODE)     Hyperphosphatemia     Acute diastolic congestive heart failure (HCC)     Elevated troponin     Acute on chronic diastolic congestive heart failure (HCC)     Pulmonary hypertension (HCC)     Anemia due to chronic kidney disease, on chronic dialysis (HCC)     Volume overload     Secondary hyperparathyroidism of renal origin ([address]     Chest pain     Acute pulmonary edema (HCC)     Accelerated hypertension     Gastritis and duodenitis     Fall from slip, trip, or stumble, initial encounter     Closed fracture of left ankle     ESRD on hemodialysis ([address]     Hypertensive emergency     SOB (shortness of breath)     Chronic combined systolic and diastolic CHF (congestive heart failure) (HCC)     COVID-19     Severe malnutrition (HCC)     Hypoxia      Subjective:   Admit Date: 9/13/2021    Interval History:   Seen for end stage kidney disease on hemodialysis  Very awake and alert this morning  Denies any complaint  Updated by the staff  No issues this morning  Good blood pressure      Medications:   Scheduled Meds:   sodium chloride flush  5-40 mL IntraVENous 2 times per day    pantoprazole  40 mg IntraVENous BID    lactulose  20 g Oral BID    lidocaine 1 % injection  5 mL IntraDERmal Once    dicyclomine  10 mg Oral TID AC    dilTIAZem  30 mg Oral Once    [Held by provider] aspirin  81 mg Oral Daily    [Held by provider] heparin (porcine)  5,000 Units SubCUTAneous 3 times per day    epoetin traci-epbx  6,000 Units SubCUTAneous Once per day on Mon Wed Fri    calcitRIOL  1.5 mcg Oral Once per day on Mon Wed Fri    cinacalcet  60 mg Oral Once per day on Mon Wed Fri    docusate sodium  100 mg Oral BID    ferrous sulfate  325 mg Oral Daily with breakfast    gabapentin  100 mg Oral TID    metoprolol  100 mg Oral BID    vitamin C  500 mg Oral Daily    polyethylene glycol  17 g Oral Daily    [Held by provider] clopidogrel  75 mg Oral Daily     Continuous Infusions:   sodium chloride      sodium chloride      sodium chloride      sodium chloride      dextrose Stopped (10/01/21 1231)       CBC:   Recent Labs     10/12/21  2008 10/14/21  0545   WBC 8.5 6.3   HGB 9.9* 8.5*    181     CMP:    Recent Labs     10/12/21  2008 10/13/21  0539 10/14/21  0545    135 135   K 4.4 4.6 4.4   CL 98 98 98   CO2 24 28 28   BUN 25* 31* 19   CREATININE 4.8* 5.4* 3.8*   GLUCOSE 127* 86 84   CALCIUM 8.1* 8.1* 8.2*   LABGLOM 14* 12* 18*     Troponin: No results for input(s): TROPONINI in the last 72 hours. BNP: No results for input(s): BNP in the last 72 hours. INR: No results for input(s): INR in the last 72 hours.   Lipids: No results for input(s): CHOL, LDLDIRECT, TRIG, HDL, AMYLASE, LIPASE in the last 72 hours. Liver: No results for input(s): AST, ALT, ALKPHOS, PROT, LABALBU, BILITOT in the last 72 hours. Invalid input(s): BILDIR  Iron:  No results for input(s): IRONS, FERRITIN in the last 72 hours. Invalid input(s): LABIRONS  CTA ABDOMEN PELVIS W WO CONTRAST   Final Result   1. Bilateral airspace infiltrates and pleural effusions right greater than left. 2. Cardiomegaly. 3. Atrophic kidneys bilaterally. 4 liquid stool throughout much of the colon and rectum consistent with diarrhea no evidence of bowel obstruction. 5. Although there is diffuse calcific atherosclerosis of the aorta and mesenteric vessels there is no significant stenosis of the mesenteric vessels. However the renal arteries are bilaterally extremely diffusely narrow. **This report has been created using voice recognition software. It may contain minor errors which are inherent in voice recognition technology. **      Final report electronically signed by Dr. Tanner Orr on 10/5/2021 3:33 PM      IR FLUORO GUIDED CVA DEVICE PLMT/REPLACE/REMOVAL   Final Result   Status post successful midline insertion. **This report has been created using voice recognition software. It may contain minor errors which are inherent in voice recognition technology. **      Final report electronically signed by Dr Jose Cardona on 10/4/2021 1:54 PM      XR ABDOMEN (KUB) (SINGLE AP VIEW)   Final Result   A nonspecific but nonobstructive bowel gas pattern. This document has been electronically signed by: Bianca Rock DO on    10/02/2021 12:45 AM      XR CHEST PORTABLE   Final Result   1. Scattered patchy regions of predominantly interstitial densities    throughout the bilateral lungs grossly similar to the prior examination. Mild bibasilar pleural/parenchymal opacities most consistent with tiny    effusions with atelectasis and/or consolidation.    2. Cardiomegaly with the heart size unchanged from the prior examination. This document has been electronically signed by: Mau Sifuentes DO on    10/02/2021 12:47 AM      CT ABDOMEN PELVIS WO CONTRAST Additional Contrast? None   Final Result   1. Limited evaluation of the lung bases demonstrates small bilateral pleural effusions with patchy consolidative opacities at the lower lobes which may be related to pneumonia. 2. The gallbladder demonstrates a small gallstone near the gallbladder neck. This appears mildly distended. 3. Mild ascites in the perihepatic region is noted. **This report has been created using voice recognition software. It may contain minor errors which are inherent in voice recognition technology. **      Final report electronically signed by Dr. Jian Michaels on 9/30/2021 1:35 PM      XR ABDOMEN (KUB) (SINGLE AP VIEW)   Final Result   1. Overall nonobstructive bowel gas pattern. **This report has been created using voice recognition software. It may contain minor errors which are inherent in voice recognition technology. **      Final report electronically signed by Dr Rufina Hernandez on 9/30/2021 12:26 PM      XR CHEST PORTABLE   Final Result   1. Patchy multifocal airspace disease is similar to 9/24/2021 exam, interstitial and airspace edema versus atypical infection. **This report has been created using voice recognition software. It may contain minor errors which are inherent in voice recognition technology. **      Final report electronically signed by Dr Rufina Hernandez on 9/30/2021 9:33 AM      XR CHEST PORTABLE   Final Result   Impression:      Increasing consolidation and interstitial prominence. Question pulmonary edema versus pneumonia      This document has been electronically signed by: Juvencio Fitzpatrick MD on    09/24/2021 10:34 PM      XR CHEST PORTABLE   Final Result   1. No acute intrathoracic process. 2. Mild stable cardiomegaly.                **This report has been created using voice recognition software. It may contain minor errors which are inherent in voice recognition technology. **      Final report electronically signed by Dr. Navid Agrawal on 9/22/2021 9:52 PM      CT HEAD WO CONTRAST   Final Result      1. Stable CT scan of the brain, no interval change since previous study dated 15th of June 2021. **This report has been created using voice recognition software. It may contain minor errors which are inherent in voice recognition technology. **      Final report electronically signed by DR Kishore Sampson on 9/15/2021 4:26 PM      CT CERVICAL SPINE WO CONTRAST   Final Result    No evidence of acute osseous injury of the cervical spine. **This report has been created using voice recognition software. It may contain minor errors which are inherent in voice recognition technology. **      Final report electronically signed by Dr. Zulma Kellogg MD on 9/15/2021 4:29 PM      XR CHEST PORTABLE   Final Result   Cardiomegaly with vascular congestion and interstitial edema and effusions differential would include congestive heart failure or fluid overload. **This report has been created using voice recognition software. It may contain minor errors which are inherent in voice recognition technology. **      Final report electronically signed by Dr. Evita Marquez on 9/13/2021 2:22 PM            Objective:   Vitals: BP (!) 155/67   Pulse 66   Temp 98.3 °F (36.8 °C) (Oral)   Resp 16   Ht 5' 6\" (1.676 m)   Wt 148 lb 13 oz (67.5 kg)   SpO2 98%   BMI 24.02 kg/m²    Wt Readings from Last 3 Encounters:   10/13/21 148 lb 13 oz (67.5 kg)   07/14/21 145 lb 8.1 oz (66 kg)   06/17/21 161 lb (73 kg)      24HR INTAKE/OUTPUT:      Intake/Output Summary (Last 24 hours) at 10/14/2021 1100  Last data filed at 10/14/2021 0544  Gross per 24 hour   Intake 740 ml   Output 1600 ml   Net -860 ml       Constitutional: Well-developed elderly gentleman alert, awake eating breakfast, no apparent distress   Skin:normal with no rash or lesions. HEENT:Pupils are reactive . Throat is clear . Oral mucosa is moist   Neck:supple with no thyromegaly or carotid bruit  Cardiovascular:  S1, S2 without murmur or rubs   Respiratory:  Clear to ausculation without wheezes, rhonchi or rales. Abdomen: +bs, soft, no tenderness to palpation and no palpable mass. No abdominal bruit   Ext: No LE edema  Musculoskeletal:Intact  Neuro:Alert and awake. Well oriented  with no focal deficit. Speech is normal      Electronically signed by Leora Morocho MD on 10/14/2021 at 11:00 AM  **This report has been created using voice recognition software. It maycontain minor  errors which are inherent in voice recognition technology. **

## 2021-10-15 NOTE — PROGRESS NOTES
All discharge instructions given to patient and family with no further questions at this time. Patient discharged off unit via ambulette. Chart contents placed in yellow bin.

## 2021-10-15 NOTE — PROGRESS NOTES
Report called to [facility]. Copy of negative COVID Rapid results faxed to facility. No further questions at this time.

## 2021-10-15 NOTE — CARE COORDINATION
10/15/21, 10:38 AM EDT    DISCHARGE PLANNING EVALUATION    Patient is to be discharged today once he returns from dialysis. Call to [facility] with [facility] admissions to advise her of discharge for today. Patient will still need a COVID test. Spoke with RN Lynda-DANIELLE completed. SW faxed it to [facility]. Call to daughter Tomás Salazar and advised her of discharge for today. She is still wanting transport arranged for patient. Call to [facility] ([facility])-next available transport by ambulette is for 5 pm (daughrer Tomás Salazar updated). Sophy with [facility] with updated. Patient will be skilled under his [address] [facility] [address] waived until 69/98/80. No other needs at this time. 10/15/21, 11:03 AM EDT    Patient goals/plan/ treatment preferences discussed by  and . Patient goals/plan/ treatment preferences reviewed with patient/ family. Patient/ family verbalize understanding of discharge plan and are in agreement with goal/plan/treatment preferences. Understanding was demonstrated using the teach back method. AVS provided by RN at time of discharge, which includes all necessary medical information pertaining to the patients current course of illness, treatment, post-discharge goals of care, and treatment preferences. Services After Discharge  Services At/After Discharge: Transport, In [facility], Skilled Therapy (Terese Macias/[facility])   IMM Letter  IMM Letter given to Patient/Family/Significant other/Guardian/POA/by[de-identified] Copy delivered to patient by mgr. [place]  IMM Letter date given[de-identified] 10/15/21  IMM Letter time given[de-identified] 4138

## 2021-10-15 NOTE — PROGRESS NOTES
Nephrology Progress Note    Patient - Philly Stager   MRN -  [id number]   Acct # - [de-identified]      - 11/10/1930    80 y.o. Admit Date: 2021  Hospital Day: 28  [facility]  Date of evaluation -  10/15/2021    Subjective:   CC: shortness of breath  Denies shortness of breath  Pt seen during hemodialysis, Hemodynamically stable, 3 K bath, c/o of itching at AV fistula  BP Range: Systolic (32TSL), BETHEL:921 , Min:138 , ZLV:083      Diastolic (95WOT), TVP:67, Min:67, Max:71    Objective:   VITALS:  /67   Pulse 83   Temp 98.6 °F (37 °C) (Oral)   Resp 18   Ht 5' 6\" (1.676 m)   Wt 148 lb 13 oz (67.5 kg)   SpO2 91%   BMI 24.02 kg/m²    Patient Vitals for the past 24 hrs:   BP Temp Temp src Pulse Resp SpO2   10/15/21 0244 138/67 98.6 °F (37 °C) Oral 83 18 91 %   10/14/21 1945 (!) 149/70 98.5 °F (36.9 °C) Oral 77 16 94 %   10/14/21 1445 (!) 165/71 98.5 °F (36.9 °C) Oral 73 18 93 %       Intake/Output Summary (Last 24 hours) at 10/15/2021 0831  Last data filed at 10/15/2021 0308  Gross per 24 hour   Intake 830 ml   Output --   Net 830 ml       Admission weight: 163 lb (73.9 kg)  Patient Vitals for the past 96 hrs (Last 3 readings):   Weight   10/13/21 1238 148 lb 13 oz (67.5 kg)   10/13/21 0824 151 lb 0.2 oz (68.5 kg)     Body mass index is 24.02 kg/m². EXAM:  CONSTITUTIONAL:  No acute distress. HEENT:  Head is normocephalic, Extraocular movement intact. Neck is supple. CARDIOVASCULAR:  S1, S2  regular rate and rhythm. RESPIRATORY: Clear to ausculation bilaterally. Equal breath sounds. No wheezes. No shortness of breath noted at rest.  ABDOMEN: soft, non tender  NEUROLOGICAL: Patient is awake and oriented to person, place, and time. Recent and remote memory intact. SKIN: no rash, No significant bruises on exposed surfaces  MUSCULOSKELETAL: Movement is coordinated. Moves all extremities   EXTREMITIES: Distal lower extremity temp is warm, No bilateral symmetric lower extremity edema.  Upper extremity AV Fistula   PSYCHIATRIC: mood and affect appropriate.     Medications:   Med reviewed  Scheduled Meds:   lidocaine   Topical Once per day on Mon Wed Fri    sodium chloride flush  5-40 mL IntraVENous 2 times per day    pantoprazole  40 mg IntraVENous BID    lactulose  20 g Oral BID    lidocaine 1 % injection  5 mL IntraDERmal Once    dicyclomine  10 mg Oral TID AC    dilTIAZem  30 mg Oral Once    [Held by provider] aspirin  81 mg Oral Daily    [Held by provider] heparin (porcine)  5,000 Units SubCUTAneous 3 times per day    epoetin traci-epbx  6,000 Units SubCUTAneous Once per day on Mon Wed Fri    calcitRIOL  1.5 mcg Oral Once per day on Mon Wed Fri    cinacalcet  60 mg Oral Once per day on Mon Wed Fri    docusate sodium  100 mg Oral BID    ferrous sulfate  325 mg Oral Daily with breakfast    gabapentin  100 mg Oral TID    metoprolol  100 mg Oral BID    vitamin C  500 mg Oral Daily    polyethylene glycol  17 g Oral Daily    [Held by provider] clopidogrel  75 mg Oral Daily     Continuous Infusions:    PRN Meds white petrolatum, sodium chloride flush, sodium chloride, sodium chloride, sodium chloride, HYDROcodone-acetaminophen, sodium chloride, loperamide, metoprolol, metoclopramide, ipratropium-albuterol, glucose, dextrose, glucagon (rDNA), dextrose, ondansetron **OR** ondansetron, polyethylene glycol, acetaminophen **OR** acetaminophen, guaiFENesin-dextromethorphan, albuterol sulfate HFA   Labs:   Labs reviewed  Recent Labs     10/12/21  2008 10/14/21  0545 10/15/21  0554   WBC 8.5 6.3 6.1   RBC 3.15* 2.80* 3.08*   HGB 9.9* 8.5* 9.6*   HCT 30.4* 26.6* 30.0*   MCV 96.5* 95.0* 97.4*   MCH 31.4 30.4 31.2    181 190       Recent Labs     10/13/21  0539 10/13/21  0539 10/14/21  0545 10/14/21  0545 10/15/21  0554     --  135  --  135   K 4.6  --  4.4  --  4.6   CL 98  --  98  --  99   CO2 28  --  28  --  27   BUN 31*  --  19  --  31*   CREATININE 5.4*  --  3.8*  --  5.3*   LABGLOM 12*   < > 18*   < > 12*   GLUCOSE 86  --  84  --  87   MG 1.8  --  1.7  --  1.8   CALCIUM 8.1*  --  8.2*  --  8.6    < > = values in this interval not displayed. ASSESSMENT:  1. 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 AV fistula. Apply Emla cream to AV fistula site 30 min prior to Hemodialysis   2. COVID 19 pneumonia. 3. Essential Hypertension with nephrosclerosis at control  4. Abdominal aortic aneurysm 3.7 cm   5. Chronic combined systolic and diastolic HF with EF 05%, Pulm artery HTN, Perm Pacer  6. Coronary artery disease Recent PCI 8/31/21 at [facility]  7. Anemia of chronic disease on Erythropoiesis-Stimulating Agent. And Acute blood loss,   8. Erosive gastritis per EGD  9. Secondary hyperparathyroidism of renal origin on rocaltrol and sensipar. Calcium ok with corrected for albumin  10. Debility, Planned discharge to extended care facility. [id number] [address] Whitfield for discharge from renal aspect. follow [facility] per out pt schedule     Active Problems:    Elevated troponin    COVID-19    Severe malnutrition ([address]    Hypoxia  Resolved Problems:    * No resolved hospital problems.  XIN Dixon - CNP 8:31 AM 10/15/2021

## 2021-10-15 NOTE — DISCHARGE SUMMARY
Hospital Medicine Discharge Summary      Patient Identification:  Name: Milo Vanegas  : 11/10/1930  MRN: [id number]  Account: [de-identified]    Patient's PCP: Fany Garcia MD    Admit Date: 2021    Discharge Date:   10/15/21    Admitting Physician: Ximena Davis MD    Discharge Physician: Cherelle Rodriges MD    HPI:  Milo Vanegas is a 80 y.o. male with \"presents with complaints of shortness of breath and cough for the last 2 days. Patient is accompanied by family who helps with history. They report that patient has had a poor appetite, fatigued, short of breath and coughing for last 2 days. He recently was at [facility] approximately 2 weeks ago where he had 2 stents placed. They state he was in his normal state of health until 2 days ago. Patient was seen in dialysis today and had a full session however continued to be short of breath and had a significant cough and was sent for evaluation. He denies any fevers or chills, nausea or vomiting. He denies any diarrhea or constipation. He denies any chest pain. \"     In the ED patient was found to be Covid positive. He was placed on 2 L nasal cannula. He was also found to have slightly elevated troponin and was started on heparin drip in the ED. Family updated on test results. Please see chart for more details regarding HPI. Hospital Course:   Milo Vanegas is a 80 y.o. male with a PMH SSS s/p PPM + AICD, Anemia of chronic disease, COPD, HLD< CAD, ESRD on HD, Systolic HF EF 12%, who presented with AHRF 2/2 COVID-PNA, sustained UGIB 2/2 erosive esophagitis and duodenitis and is now recovered. The patient was stable for discharge - all consultants were contacted and in agreement with plan for discharge. Appropriate follow up appointment was arranged prior to discharge. Please see below or view chart for more details from hospital course. Discharge Diagnoses:    #AHRF 2/2 COVID PNA: Resolved   - required increased. O2. Escalated to 4L the weaned to 2L throughout hospitalization. Now on 3L which is new baseline. Baseline 2L prior to hospitalization. Briefly suspected superimposed bacterial PNA d/t leukocytosis and consolidation treated with a course of ceftriaxone and azithromycin     #Recurrent Hypoglycemia: Resolved  No h/o DM. A1c 4.5% 10/01. S/p decadron x9 days for COVID. Cosyntropin test wnl. Currently 83-140s and pt asymptomatic.      #Non-Variceal Upper GIB: Resolved  Bleeding source: esophagitis and duodenitis. H/o EGD on 10/09/2021 demonstrating the above findings. Pantoprazole 40 mg twice daily. Follow up with GI in 4 weeks.      #ABLA on Anemia of Chronic Disease: Resolved  10/07 Hgb 6.6 on baseline 9.0-10.0. 2/2 UGIB. Now 9.5. Continuing iron supplementation.     #Anuric ESRD on HD 2/2 hypertensive nephrosclerosis: Stable   Schedule: M/W/F. Access: Right AV Fistula. Nephrologist: Dr. Rachel Parks, [place] [id number]: [facility]. On phosphate binders: none. On Vitamin D: calcitriol. On EPO: retacrit. On midodrine: no. Kidney transplant candidate: no. Continue HD.     #Chronically elevated troponin: Stable  H/o ESRD. Baseline 0.1-0.3     #SSS s/p AICD + PPM: Stable  HR 69-70.      #Lactic Acidosis: Stable  8.7 10/01, improved to 2.5, resolved to 1.6. 2/2 GIB     #Recurrent Hypoglycemia: Resolved  No h/o DM. A1c 4.5% 10/01. S/p decadron x9 days for COVID. Cosyntropin test wnl. Currently 83-140s and pt asymptomatic     #Chronic Systolic + Diastolic Heart Failure with EF 45% on 05/19/2021, NYHA Class III: Stable  Pro-BNP chronically elevated >70,000. CXR on 09/13/2021 demonstrated cardiomegaly and pulmonary vascular congestion. GDMT: metoprolol tartrate. Diuretics: ESRD. Follows in HF Clinic: No.      #Non-obstructive CAD s/p JAXON on mid LAD and ramus in 2018: Stable  No recent LHC. Cardiac stress test 05/19/2021 not suggestive of myocardial ischemia. On ASA 81 mg. Continue ASA and plavix.     The patient was seen and examined on day of discharge and this discharge summary is in conjunction with any daily progress note from day of discharge. The patient understood, was in agreement, and verbalized the plan of care at time of discharge. Exam:    Vitals:   Vitals:    10/14/21 1445 10/14/21 1945 10/15/21 0244 10/15/21 0824   BP: (!) 165/71 (!) 149/70 138/67 135/60   Pulse: 73 77 83 73   Resp: 18 16 18 16   Temp: 98.5 °F (36.9 °C) 98.5 °F (36.9 °C) 98.6 °F (37 °C) 98.1 °F (36.7 °C)   TempSrc: Oral Oral Oral    SpO2: 93% 94% 91% 92%   Weight:    151 lb 7.3 oz (68.7 kg)   Height:         Weight: Weight: 151 lb 7.3 oz (68.7 kg)    24 hour intake/output:    Intake/Output Summary (Last 24 hours) at 10/15/2021 0857  Last data filed at 10/15/2021 0308  Gross per 24 hour   Intake 830 ml   Output --   Net 830 ml       Physical Exam  Constitutional:       Appearance: Normal appearance. He is obese. He is not diaphoretic. HENT:      Head: Normocephalic and atraumatic. Mouth/Throat:      Mouth: Mucous membranes are moist.      Pharynx: Oropharynx is clear. No oropharyngeal exudate or posterior oropharyngeal erythema. Eyes:      General: No scleral icterus. Extraocular Movements: Extraocular movements intact. Pupils: Pupils are equal, round, and reactive to light. Cardiovascular:      Rate and Rhythm: Normal rate and regular rhythm. Pulses: Normal pulses. Heart sounds: Normal heart sounds. No murmur heard. No friction rub. No gallop. Pulmonary:      Effort: Pulmonary effort is normal.      Breath sounds: Normal breath sounds. No wheezing, rhonchi or rales. Abdominal:      General: Bowel sounds are normal.      Palpations: Abdomen is soft. Tenderness: There is no abdominal tenderness. There is no guarding or rebound. Musculoskeletal:      Cervical back: Normal range of motion and neck supple. Right lower leg: No edema. Left lower leg: No edema. Skin:     General: Skin is warm and dry.       Capillary ABDOMEN (KUB) (SINGLE AP VIEW)   Final Result   A nonspecific but nonobstructive bowel gas pattern. This document has been electronically signed by: Darren Rutherford DO on    10/02/2021 12:45 AM      XR CHEST PORTABLE   Final Result   1. Scattered patchy regions of predominantly interstitial densities    throughout the bilateral lungs grossly similar to the prior examination. Mild bibasilar pleural/parenchymal opacities most consistent with tiny    effusions with atelectasis and/or consolidation. 2. Cardiomegaly with the heart size unchanged from the prior examination. This document has been electronically signed by: Darren Rutherford DO on    10/02/2021 12:47 AM      CT ABDOMEN PELVIS WO CONTRAST Additional Contrast? None   Final Result   1. Limited evaluation of the lung bases demonstrates small bilateral pleural effusions with patchy consolidative opacities at the lower lobes which may be related to pneumonia. 2. The gallbladder demonstrates a small gallstone near the gallbladder neck. This appears mildly distended. 3. Mild ascites in the perihepatic region is noted. **This report has been created using voice recognition software. It may contain minor errors which are inherent in voice recognition technology. **      Final report electronically signed by Dr. Sam Moncada on 9/30/2021 1:35 PM      XR ABDOMEN (KUB) (SINGLE AP VIEW)   Final Result   1. Overall nonobstructive bowel gas pattern. **This report has been created using voice recognition software. It may contain minor errors which are inherent in voice recognition technology. **      Final report electronically signed by Dr Anurag Stewart on 9/30/2021 12:26 PM      XR CHEST PORTABLE   Final Result   1. Patchy multifocal airspace disease is similar to 9/24/2021 exam, interstitial and airspace edema versus atypical infection. **This report has been created using voice recognition software.   It may contain minor errors which are inherent in voice recognition technology. **      Final report electronically signed by Dr Rk Silva on 9/30/2021 9:33 AM      XR CHEST PORTABLE   Final Result   Impression:      Increasing consolidation and interstitial prominence. Question pulmonary edema versus pneumonia      This document has been electronically signed by: Marisabel Ohara MD on    09/24/2021 10:34 PM      XR CHEST PORTABLE   Final Result   1. No acute intrathoracic process. 2. Mild stable cardiomegaly. **This report has been created using voice recognition software. It may contain minor errors which are inherent in voice recognition technology. **      Final report electronically signed by Dr. Coleen Ga on 9/22/2021 9:52 PM      CT HEAD WO CONTRAST   Final Result      1. Stable CT scan of the brain, no interval change since previous study dated 15th of June 2021. **This report has been created using voice recognition software. It may contain minor errors which are inherent in voice recognition technology. **      Final report electronically signed by DR Charmayne Ferdinand on 9/15/2021 4:26 PM      CT CERVICAL SPINE WO CONTRAST   Final Result    No evidence of acute osseous injury of the cervical spine. **This report has been created using voice recognition software. It may contain minor errors which are inherent in voice recognition technology. **      Final report electronically signed by Dr. Anthony Rangel MD on 9/15/2021 4:29 PM      XR CHEST PORTABLE   Final Result   Cardiomegaly with vascular congestion and interstitial edema and effusions differential would include congestive heart failure or fluid overload. **This report has been created using voice recognition software. It may contain minor errors which are inherent in voice recognition technology. **      Final report electronically signed by Dr. Jeff Arango on 9/13/2021 2:22 PM        Consults: PALLIATIVE CARE EVAL  IP CONSULT TO SPIRITUAL SERVICES  IP CONSULT TO NEPHROLOGY  IP CONSULT TO PHARMACY  IP CONSULT TO SOCIAL WORK  IP CONSULT TO SPIRITUAL SERVICES  IP CONSULT TO PHARMACY  IP CONSULT TO HOSPICE  IP CONSULT TO GI    Disposition:    [] Home       [] TCU       [] Rehab       [] Psych       [x] SNF       [] [facility]       [] Other-    Condition at Discharge: fair    Code Status:  Limited   Tobacco:  DVT Prophylaxis:  Vaccinations:  Admitted for: (COVID PNA)    Patient Instructions:  Discharge lab work: CBC and BMP with mag and phos in 1 week. Activity: activity as tolerated  Diet: ADULT DIET; Regular; Low Potassium (Less than 3000 mg/day)    Follow-up visits:   Pamela Chin MD  [address] [place] [address]  [phone]    On 10/11/2021  FOLLOW UP APPT @ 11:00AM, arrive 15 minutes early, please bring photo ID and medications    Luis Angel Franco MD  [address] [place] [address]. [address]   [phone]    On 11/11/2021  Your appointment time is at 12:30, , Arrive 15 minutes early, please bring photo ID and medications        Discharge Medications:        Medication List      START taking these medications    dilTIAZem 30 MG tablet  Commonly known as: CARDIZEM  Take 1 tablet by mouth once for 1 dose        CHANGE how you take these medications    albuterol sulfate  (90 Base) MCG/ACT inhaler  What changed: Another medication with the same name was removed. Continue taking this medication, and follow the directions you see here. pantoprazole 40 MG tablet  Commonly known as: PROTONIX  Take 1 tablet by mouth 2 times daily (before meals)  What changed: See the new instructions.         CONTINUE taking these medications    Aspirin Low Dose 81 MG EC tablet  Generic drug: aspirin     atorvastatin 40 MG tablet  Commonly known as: LIPITOR     calcitRIOL 0.25 MCG capsule  Commonly known as: ROCALTROL     cinacalcet 30 MG tablet  Commonly known as: SENSIPAR clopidogrel 75 MG tablet  Commonly known as: PLAVIX     cyclobenzaprine 10 MG tablet  Commonly known as: FLEXERIL     docusate 100 MG Caps  Commonly known as: COLACE, DULCOLAX  Take 100 mg by mouth 2 times daily     ferrous sulfate 325 (65 Fe) MG tablet  Commonly known as: IRON 325     metoprolol 100 MG tablet  Commonly known as: LOPRESSOR  Take 1 tablet by mouth 2 times daily     polyethylene glycol 17 GM/SCOOP powder  Commonly known as: MiraLax  Renal Miralax Colonoscopy prep. Use as directed. Mix Miralax 238 g with 24 oz clear liquids. Drink 8 oz glass every 20-30 minutes until gone. Renal 1 MG Caps     vitamin C 500 MG tablet  Commonly known as: ASCORBIC ACID  Take 1 tablet by mouth daily        STOP taking these medications    amLODIPine 10 MG tablet  Commonly known as: NORVASC     cloNIDine 0.1 MG tablet  Commonly known as: CATAPRES     hydrALAZINE 50 MG tablet  Commonly known as: APRESOLINE     metoprolol succinate 50 MG extended release tablet  Commonly known as: TOPROL XL           Where to Get Your Medications      These medications were sent to [facility] , [address]  [address] [place] [address]    Phone: [phone]   · dilTIAZem 30 MG tablet  · pantoprazole 40 MG tablet         Discharge Time:  Time spent on discharge is >35 minutes in the examination, evaluation, counseling, and review of medications and discharge plan. The hospital course was discussed with the patient and all questions and concerns were addressed at that time. The patient was in agreement with and verbalized understanding of the plan of care and had no additional questions or complaints. The patient was instructed to follow-up with any scheduled outpatient appointments or to report to the nearest Emergency Department if new or worsening symptoms should arise.     Thank you Ashley Fisher MD for the opportunity to be involved in this patient's care.     Signed:    Electronically signed by Robson Abad MD on 10/15/2021 at 8:57 AM

## 2021-10-15 NOTE — FLOWSHEET NOTE
10/15/21 0824 10/15/21 1244   Vital Signs   /60 (!) 150/56   Temp 98.1 °F (36.7 °C) 97.5 °F (36.4 °C)   Pulse 73 64   Resp 16 19   SpO2 92 % 93 %   Weight 151 lb 7.3 oz (68.7 kg) 149 lb 4 oz (67.7 kg)   Weight Method  --  Bed scale   Post-Hemodialysis Assessment   Post-Treatment Procedures  --  Blood returned; Access bleeding time < 10 minutes   Machine Disinfection Process  --  Acid/Vinegar Clean;Heat Disinfect; Exterior Machine Disinfection   Total Liters Processed (l/min)  --  90.5 l/min   Dialyzer Clearance  --  Lightly streaked   Duration of Treatment (minutes)  --  240 minutes   Heparin amount administered during treatment (units)  --  0 units   Hemodialysis Intake (ml)  --  400 ml   Hemodialysis Output (ml)  --  1400 ml   NET Removed (ml)  --  1000 ml   Tolerated Treatment  --  Good   4 hour treatment completed. 1L of fluid removed. Patient tolerated treatment fairly well with minimal issues. Pressure held for 10 mins x2. Dressing clean/dry and intact upon leaving the unit. Report called to primary nurse.

## 2021-10-17 PROBLEM — N18.6 ESRD (END STAGE RENAL DISEASE) ON DIALYSIS (HCC): Status: RESOLVED | Noted: 2018-05-21 | Resolved: 2021-01-01

## 2021-10-17 PROBLEM — I16.0 HYPERTENSIVE URGENCY: Status: RESOLVED | Noted: 2018-05-21 | Resolved: 2021-01-01

## 2021-10-17 PROBLEM — Z99.2 ESRD (END STAGE RENAL DISEASE) ON DIALYSIS (HCC): Status: RESOLVED | Noted: 2018-05-21 | Resolved: 2021-01-01

## 2021-10-17 NOTE — PROGRESS NOTES
H&P (Admission H&P at [facility])      NAME: Herber Obrien  DATE: 10/20/21  ROOM #: [id number]  CODE STATUS: FULL CODE  REASON FOR ADMISSION: Weakness after prolonged hospitalization  : 11/10/1930  ADMISSION DATE: 10/15/2021  SKILLED PATIENT: Yes    History obtained from chart review, the patient and nursing staff. SUBJECTIVE:  HPI: Herber Obrien is a 80 y.o. male. Pt seen and examined at bedside. Patient admitted to [facility] from  to 10/15 for issues as noted below. D/c summary per hospital team:  \"HPI:  Herber Obrien is a 80 y.o. male with \"presents with complaints of shortness of breath and cough for the last 2 days.  Patient is accompanied by family who helps with history. Kitty Alfred report that patient has had a poor appetite, fatigued, short of breath and coughing for last 2 days. [facility] recently was at [facility] approximately 2 weeks ago where he had 2 stents placed. Kitty Alfred state he was in his normal state of health until 2 days ago. Luis Fernando Hunt was seen in dialysis today and had a full session however continued to be short of breath and had a significant cough and was sent for evaluation.  He denies any fevers or chills, nausea or vomiting.  He denies any diarrhea or constipation.  He denies any chest pain. \"     In the ED patient was found to be Covid positive.  He was placed on 2 L nasal cannula.   He was also found to have slightly elevated troponin and was started on heparin drip in the ED.  Family updated on test results.     Please see chart for more details regarding HPI.     Hospital Course:   Herber Obrien is a 80 y.o. male with a PMH SSS s/p PPM + AICD, Anemia of chronic disease, COPD, HLD< CAD, ESRD on HD, Systolic HF EF 14%, who presented with AHRF 2/2 COVID-PNA, sustained UGIB 2/2 erosive esophagitis and duodenitis and is now recovered.      The patient was stable for discharge - all consultants were contacted and in agreement with plan for discharge.   Appropriate follow up appointment was arranged prior to discharge.     Please see below or view chart for more details from hospital course.     Discharge Diagnoses:     #AHRF 2/2 COVID PNA: Resolved  09/25 - required increased. O2. Escalated to 4L the weaned to 2L throughout hospitalization. Now on 3L which is new baseline. Baseline 2L prior to hospitalization. Briefly suspected superimposed bacterial PNA d/t leukocytosis and consolidation treated with a course of ceftriaxone and azithromycin     #Recurrent Hypoglycemia: Resolved  No h/o DM. A1c 4.5% 10/01. S/p decadron x9 days for COVID. Cosyntropin test wnl. Currently 83-140s and pt asymptomatic.      #Non-Variceal Upper GIB: Resolved  Bleeding source: esophagitis and duodenitis. H/o EGD on 10/09/2021 demonstrating the above findings. Pantoprazole 40 mg twice daily. Follow up with GI in 4 weeks.      #ABLA on Anemia of Chronic Disease: Resolved  10/07 Hgb 6.6 on baseline 9.0-10.0. 2/2 UGIB. Now 9.5. Continuing iron supplementation.     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 Access: Right AV Fistula. Nephrologist: Dr. Tone Orellana, [facility] [address] [facility]. On phosphate binders: none. On Vitamin D: calcitriol. On EPO: retacrit. On midodrine: no. Kidney transplant candidate: no. Continue HD.     #Chronically elevated troponin: Stable  H/o ESRD. Baseline 0.1-0.3     #SSS s/p AICD + PPM: Stable  HR 69-70.      #Lactic Acidosis: Stable  8.7 10/01, improved to 2.5, resolved to 1.6. 2/2 GIB     #Recurrent Hypoglycemia: Resolved  No h/o DM. A1c 4.5% 10/01. S/p decadron x9 days for COVID. Cosyntropin test wnl. Currently 83-140s and pt asymptomatic     #Chronic Systolic + Diastolic Heart Failure with EF 45% on 05/19/2021, NYHA Class III: Stable  Pro-BNP chronically elevated >70,000. CXR on 09/13/2021 demonstrated cardiomegaly and pulmonary vascular congestion. GDMT: metoprolol tartrate. Diuretics: ESRD.  Follows in HF Clinic: No.      #Non-obstructive CAD s/p JAXON on mid LAD and 11/11/2017    Idiopathic hypotension 11/11/2017    Iron deficiency anemia 05/23/2016    Chronic kidney disease (CKD), stage V ([facility])     Metabolic acidosis     Leukopenia 03/30/2016    Thrombocytopenia (HCC) 03/30/2016    Monoclonal gammopathy 02/08/2016    Essential hypertension 01/11/2016    Anemia, chronic disease 04/23/2015    Gout of big toe 01/19/2015    Degenerative joint disease of knee, left 07/14/2014    Dyspnea and respiratory abnormalities 03/17/2014    ED (erectile dysfunction) 03/17/2014    Arthritis-  low back and neck .  01/10/2013    CAD (coronary artery disease) 07/30/2012    COPD (chronic obstructive pulmonary disease) ([address] 07/30/2012    Chronic renal insufficiency 07/30/2012    Patient overweight 07/30/2012     Updating deleted diagnoses         Past Medical History:   Diagnosis Date    Anemia in chronic kidney disease(285.21)     Arthritis     CAD (coronary artery disease)     Chronic kidney disease     Chronic kidney disease, stage IV (severe) (HCC)     CKD (chronic kidney disease) stage 3, GFR 30-59 ml/min (HCC)     COPD (chronic obstructive pulmonary disease) ([facility])     GI bleed     Hemodialysis patient [facility]) 07/26/2016    @ [facility]    History of blood transfusion     6/2019    Hyperlipidemia     Hyperphosphatemia 5/22/2018    Hypertension     Sick sinus syndrome ([facility]     Systolic congestive heart failure [facility])        Past Surgical History:   Procedure Laterality Date   [facility] CARDIAC SURGERY      COLONOSCOPY  2006,2009    COLONOSCOPY N/A 6/27/2019    COLONOSCOPY DIAGNOSTIC performed by Annel Johnson MD at [address] [place] [address]  2004    DIALYSIS FISTULA CREATION  5/6/2016    right arm fistula placement    ENDOSCOPY, COLON, DIAGNOSTIC      PACEMAKER PLACEMENT  2013    AL ESOPHAGOGASTRODUODENOSCOPY TRANSORAL DIAGNOSTIC N/A 1/18/2018    EGD performed by Doreen Andrews MD at [facility]    UPPER GASTROINTESTINAL ENDOSCOPY  ,    UPPER GASTROINTESTINAL ENDOSCOPY Left 2019    EGD DIAGNOSTIC ONLY performed by Wesley Davila MD at [address] [facility] N/A 10/9/2021    EGD performed by Wesley Davila MD at [address] [facility]    VASCULAR SURGERY         Allergies   Allergen Reactions    No Known Allergies        Social History     Tobacco Use    Smoking status: Former Smoker     Packs/day: 1.00     Years: 40.00     Pack years: 40.00     Quit date: 1982     Years since quittin.7    Smokeless tobacco: Never Used   Substance Use Topics    Alcohol use: No        Family History   Problem Relation Age of Onset    Diabetes Mother     Heart Disease Mother     Diabetes Sister     Mental Illness Paternal Aunt          I have reviewed the patient's past medical history, past surgical history, allergies, medications, social and family history and I have made updates where appropriate.       Review of Systems  Positive responses are highlighted in bold    Constitutional:  Fever, Chills, Night Sweats, Fatigue, Unexpected changes in weight  Eyes:  Eye discharge, Eye pain, Eye redness, Visual disturbances   HENT:  Ear pain, Tinnitus, Nosebleeds, Trouble swallowing, Hearing loss, Sore throat  Cardiovascular:  Chest Pain, Palpitations, Orthopnea, Paroxysmal Nocturnal Dyspnea  Respiratory:  Cough, Wheezing, Shortness of breath, Chest tightness, Apnea  Gastrointestinal:  Nausea, Vomiting, Diarrhea, Constipation, Heartburn, Blood in stool  Genitourinary:  Difficulty or painful urination, Flank pain, Change in frequency, Urgency  Skin:  Color change, Rash, Itching, Wound  Psychiatric:  Hallucinations, Anxiety, Depression, Suicidal ideation  Hematological:  Enlarged glands, Easy bleeding, Easily bruising  Musculoskeletal:  Joint pain, Back pain, Gait problems, Joint swelling, Myalgias  Neurological:  Dizziness, Headaches, Presyncope, Numbness, Seizures, Tremors  Allergy: Environmental allergies, Food allergies  Endocrine:  Heat Intolerance, Cold Intolerance, Polydipsia, Polyphagia, Polyuria      PHYSICAL EXAM:  Vitals:    10/20/21 0500   BP: 130/74   Pulse: 84   Resp: 16   Temp: 97.6 °F (36.4 °C)   Weight: 151 lb (68.5 kg)   Height: 5' 6\" (1.676 m)     Body mass index is 24.37 kg/m². Pain Score:   0 - No pain    VS Reviewed  General Appearance: well developed and well- nourished, in no acute distress  Head: normocephalic and atraumatic  Eyes: pupils equal, round, and reactive to light, conjunctivae and eye lids without erythema  ENT: external ear and ear canal normal bilaterally, nose without deformity, nasal mucosa and turbinates normal without polyps, oropharynx normal, dentition is normal for age, no lip or gum lesions noted  Neck: supple and non-tender without mass, no thyromegaly or thyroid nodules, no cervical lymphadenopathy  Pulmonary/Chest: clear to auscultation bilaterally- no wheezes, rales or rhonchi, normal air movement, no respiratory distress or retractions  Cardiovascular: normal rate, regular rhythm, normal S1 and S2, no murmurs, rubs, clicks, or gallops, distal pulses intact  Abdomen: soft, non-tender, non-distended, bowel sounds physiologic,  no rebound or guarding, no masses or hernias noted. Liver and spleen without enlargement.    Extremities: no cyanosis, clubbing or edema of the lower extremities  Musculoskeletal: No joint swelling or gross deformity   Neuro:  Alert, 5/5 strength globally and symmetrically, 2+ patellar reflexes b/l,  normal speech, no focal findings or movement disorder noted  Psych:  Normal affect without evidence of depression or anxiety, insight and judgement are appropriate, memory appears intact  Skin: warm and dry, no rash or erythema  Lymph:  No cervical, auricular or supraclavicular lymph nodes palpated      LABS/IMAGING    Recent Labs     10/15/21  0554 10/14/21  0545 10/12/21  2008   WBC 6.1 6.3 8.5   HGB 9.6* 8.5* 9.9*   HCT 30.0* 26.6* 30.4*   MCV 97.4* 95.0* 96.5*    181 203     Lab Results   Component Value Date    TSH 2.600 07/13/2021     Lab Results   Component Value Date     10/15/2021    K 4.6 10/15/2021    K 4.9 09/19/2021    CL 99 10/15/2021    CO2 27 10/15/2021    BUN 31 10/15/2021    CREATININE 5.3 10/15/2021    GLUCOSE 87 10/15/2021    GLUCOSE 90 08/26/2021    CALCIUM 8.6 10/15/2021        Narrative   PROCEDURE: CTA ABD PELVIS W WO CONTRAST       CLINICAL INFORMATION: Abdominal pain           COMPARISON: 9/30/2021       TECHNIQUE: Prior to and following IV contrast administration, spiral scanning was performed from the lung bases to the ischial tuberosities with axial images acquired at 3 mm intervals and slice thickness. Sagittal and coronal MIP 3-D reconstructions    were created and reviewed.       All CT scans at this facility use dose modulation, iterative reconstruction, and/or weight-based dosing when appropriate to reduce radiation dose to as low as reasonably achievable.       FINDINGS:       Lung bases, cardiomediastinum, and chest wall: Bilateral pleural effusions right greater than left bilateral lower lobe infiltrates. This is superimposed on interstitial lung disease. Heart size is enlarged. Pacer leads are present. Marked coronary artery calcific atherosclerosis.       Liver: Stable appearance. No focal masses identified.       Gallbladder, bile ducts: Gallstones are present.       Spleen: Unremarkable       Pancreas: Unremarkable. No mass or ductal dilatation.       Adrenal glands: Unremarkable       Kidneys and ureters: Marked renal cortical thinning bilateral kidneys.       Bladder: Bladder is decompressed.  No gross abnormality.       Reproductive: Unremarkable       Stomach, small bowel, colon:   The stomach and duodenum are grossly normal.   Liquid stool in colon suggesting presence of diarrhea   No bowel wall thickening or evidence of bowel obstruction.       Appendix: The appendix is visualized and appears normal without evidence of periappendiceal inflammatory changes.       Omentum and mesentery: Unremarkable.       Peritoneum:  There is no ascites, abscess, adenopathy, or mass. No pneumoperitoneum.       Abdominal and pelvic body wall soft tissues: Unremarkable.       Musculoskeletal:  Unremarkable       Aorta and iliac arteries:   No evidence of an aortic aneurysm or dissection. There is no hemodynamically significant stenosis or occlusion in the common, internal, or external iliac arteries.       Mesenteric Arteries: There is no hemodynamically significant stenosis in the celiac artery, SMA, or MIGUE.       Renal Arteries: There are single renal arteries bilaterally. There is calcific plaque at the origin of the right femoral artery however stenosis the artery itself becomes thin and narrowed distally without a focal stenosis left is virtually the same           Impression   1. Bilateral airspace infiltrates and pleural effusions right greater than left. 2. Cardiomegaly. 3. Atrophic kidneys bilaterally.    4 liquid stool throughout much of the colon and rectum consistent with diarrhea no evidence of bowel obstruction. 5. Although there is diffuse calcific atherosclerosis of the aorta and mesenteric vessels there is no significant stenosis of the mesenteric vessels. However the renal arteries are bilaterally extremely diffusely narrow.                         **This report has been created using voice recognition software. It may contain minor errors which are inherent in voice recognition technology. **       Final report electronically signed by Dr. Donte Wilson on 10/5/2021 3:33 PM       Narrative   1 view chest x-ray       Comparison: September 30, 2020       Findings:   Left chest pacer device.       Patchy interstitial densities throughout the bilateral lungs Simmerman    compared to the prior examination dated September 30th, 2021.  Bibasilar    pleural and parenchymal opacities may represent tiny small effusions.       Cardiomegaly with heart size similar to the prior examination. Mild    retrocardiac densities.       No acute fracture. Spinal a change of the thoracic spine.       Partially visualized stent graft within the right axillary region.           Impression   1. Scattered patchy regions of predominantly interstitial densities    throughout the bilateral lungs grossly similar to the prior examination. Mild bibasilar pleural/parenchymal opacities most consistent with tiny    effusions with atelectasis and/or consolidation. 2. Cardiomegaly with the heart size unchanged from the prior examination.       This document has been electronically signed by: Adonis Marcos DO on    10/02/2021 12:47 AM       ECHO 20 MAY 2021   Summary   Left ventricle size is normal.   Normal left ventricular wall thickness. There was mild global hypokinesis of the left ventricle. Systolic function was mildly reduced. Ejection fraction is visually estimated at 45%. Doppler parameters were consistent with abnormal left ventricular   relaxation (grade 1 diastolic dysfunction). The left atrium is Moderately dilated. Moderate mitral regurgitation is present. Moderate tricuspid regurgitation visualized. Right ventricular systolic pressure measures 55 mmhg. When this echo is compared with the echo from 05/22/2018, the EF dropped   from 60 % to 45% now    ASSESSMENT & PLAN   Diagnosis Orders   1. Physical deconditioning     2. Pneumonia due to COVID-19 virus     3. Acute respiratory failure with hypoxia (HCC)     4. UGI bleed     5. Erosive esophagitis     6. Acute blood loss anemia     7. Hypoglycemia     8. Severe malnutrition ([address]     9. ESRD on hemodialysis ([address]     10. ASHD (arteriosclerotic heart disease)     11. Heart failure with reduced ejection fraction ([address]     12. SSS (sick sinus syndrome) ([address]     13. Hypertension, essential     14.  Dyslipidemia       -Physical deconditioning: stable thus far and improving, con't  PT/OT. -COVID 19/Hypoxic respiratory failure: stable and improving, con't prn albuterol and O2. Wean O2 as able  -UGI Bleed/Erosive esophagitis/Anemia: no s/s bleeding, stable thus far, con't pantoprazole. Check cbc tomorrow  -Hypoglycemia: no s/s recurrence. Monitor.   -Malnutrition: eating pretty well, nutrition consult. -ESRD: stable, con't HD MWF.   -CAD/HFrEF/SSS: stable, con't DAPT, Lopressor and lipitor. Has pacer and AICD. -HTN: stable, con't Lopressor. -HLD: stable, con't Lipitor. Disposition: FULL CODE. Con't PT/OT. Reassess 30 days, sooner prn.      Future Appointments   Date Time Provider Yakelin Goodwin   11/11/2021 12:30 PM Morene Primrose, APRN - CNP [facility]       Electronically signed by Ana Menard DO on 10/20/2021 at 1:12 PM

## 2021-10-26 PROBLEM — R06.00 DYSPNEA: Status: ACTIVE | Noted: 2021-01-01

## 2021-10-26 NOTE — PROGRESS NOTES
Abg ordered due to not having a good pleth. When I walked into room pt pleth was good and matched my hand held pulse ox reading 95%.   Abg not done

## 2021-10-26 NOTE — ED TRIAGE NOTES
Pt to ED from nursing home with complaints of shortness of breath. Nursing home states that patient was started on oxygen on Friday at 3 L nasal cannula. Today while walking patient dipped into the 80s on oxygen so they called the ambulance. On arrival patient was in the [de-identified] on 3L and patient was placed on 6L nasal cannula. Rn unable to get a good oxygen saturation on patient on arrival so patient remained on 6L nasal cannula.

## 2021-10-27 NOTE — PROGRESS NOTES
Pharmacy Note  Vancomycin Consult    Raj Tomlinson is a 80 y.o. male started on Vancomycin for Pneumonia (HAP); consult received from Dr. Lexi Teresa to manage therapy. Also receiving the following antibiotics: Zosyn. Patient Active Problem List   Diagnosis    CAD (coronary artery disease)    COPD (chronic obstructive pulmonary disease) ([facility]    Chronic renal insufficiency    Patient overweight    Arthritis-  low back and neck . Dyspnea and respiratory abnormalities    ED (erectile dysfunction)    Degenerative joint disease of knee, left    Gout of big toe    Anemia, chronic disease    Essential hypertension    Monoclonal gammopathy    Leukopenia    Thrombocytopenia (HCC)    Chronic kidney disease (CKD), stage V (HCC)    Metabolic acidosis    Iron deficiency anemia    Plasma cell dyscrasia    Idiopathic hypotension    Systolic congestive heart failure (HCC)    Other fluid overload (CODE)    Hyperphosphatemia    Elevated troponin    Pulmonary hypertension ([address]    Anemia due to chronic kidney disease, on chronic dialysis (HCC)    Volume overload    Secondary hyperparathyroidism of renal origin ([address]    Chest pain    Acute pulmonary edema (HCC)    Gastritis and duodenitis    Fall from slip, trip, or stumble, initial encounter    Closed fracture of left ankle    ESRD on hemodialysis ([address]    Hypertensive emergency    SOB (shortness of breath)    Chronic combined systolic and diastolic CHF (congestive heart failure) ([address]    COVID-19    Severe malnutrition ([address]    Hypoxia    Dyspnea     Allergies:  Patient has no known allergies.      Temp max: 98.1    Recent Labs     10/26/21  1910 10/26/21  2014   BUN  --  20   CREATININE  --  4.8*   WBC 5.7  --      No intake or output data in the 24 hours ending 10/27/21 0123    Culture Date      Source                       Results  10/26/2021          BC#1&2                      Sent    Ht Readings from Last 1 Encounters:   09/13/21 5' 6\" (1.676 m)        Wt Readings from Last 1 Encounters:   10/15/21 149 lb 4 oz (67.7 kg)       There is no height or weight on file to calculate BMI. Estimated Creatinine Clearance: 9 mL/min (A) (based on SCr of 4.8 mg/dL [facility])). Goal Trough Level: 15-20 mcg/mL    Assessment/Plan:  Will initiate Vancomycin IV with a one time loading dose of 1750 mg x1. Timing of trough level will be determined based on culture results, renal function, and clinical response. Will review for trough after one time dose. Thank you for the consult. Will continue to follow.   Rekha Johnson [place] 10/27/2021 1:42 AM

## 2021-10-27 NOTE — PLAN OF CARE
Consult received-see SW note for 10/27/21-return to [facility] at discharge.

## 2021-10-27 NOTE — ACP (ADVANCE CARE PLANNING)
Advance Care Planning     Advance Care Planning Activator (Inpatient)  Conversation Note      Date of ACP Conversation: 10/26/2021     Conversation Conducted with: Patient with Decision Making Capacity    ACP Activator: Wicho Cook RN    Health Care Decision Maker:     Current Designated Health Care Decision Maker:     Primary Decision Maker: Dyana Alcala - Child - [phone]    Secondary Decision Maker: Mc Marie - Child - [phone]    Care Preferences    Ventilation: \"If you were in your present state of health and suddenly became very ill and were unable to breathe on your own, what would your preference be about the use of a ventilator (breathing machine) if it were available to you? \"      Would the patient desire the use of ventilator (breathing machine)?: no    \"If your health worsens and it becomes clear that your chance of recovery is unlikely, what would your preference be about the use of a ventilator (breathing machine) if it were available to you? \"     Would the patient desire the use of ventilator (breathing machine)?: No      Resuscitation  \"CPR works best to restart the heart when there is a sudden event, like a heart attack, in someone who is otherwise healthy. Unfortunately, CPR does not typically restart the heart for people who have serious health conditions or who are very sick. \"    \"In the event your heart stopped as a result of an underlying serious health condition, would you want attempts to be made to restart your heart (answer \"yes\" for attempt to resuscitate) or would you prefer a natural death (answer \"no\" for do not attempt to resuscitate)? \" yes       [] Yes   [x] No   Educated Patient / David Perez regarding differences between Advance Directives and portable DNR orders.     Length of ACP Conversation in minutes:  23  Conversation Outcomes:  [x] ACP discussion completed  [] Existing advance directive reviewed with patient; no changes to patient's previously recorded wishes  [] New Advance Directive completed  [] Portable Do Not Rescitate prepared for Provider review and signature  [] POLST/POST/MOLST/MOST prepared for Provider review and signature      Follow-up plan:    [] Schedule follow-up conversation to continue planning  [] Referred individual to Provider for additional questions/concerns   [] Advised patient/agent/surrogate to review completed ACP document and update if needed with changes in condition, patient preferences or care setting    [x] This note routed to one or more involved healthcare providers     Pt here from [facility]. Has had multiple admissions in the past month. Recently completed Advance Directives and changed Code Status to a Limited with No Intubation. Verbal Orders received to place orders here also. Dtr Jemma/TAMI at bedside and confirms that code status is correct on ECF paperwork. No other concerns at this time. History of COVID in September.

## 2021-10-27 NOTE — CARE COORDINATION
10/27/21, 8:49 AM EDT  Discharge Planning Evaluation  Social work consult received, patient from [facility]. Patient/Family preference-SW spoke with patient and daughter Mukesh Romero on this date re: discharge plans. Both state that plan is for patient to return to [facility] to complete his rehab. .    The patient's current payor source at the facility is [facility]. Medicare skilled days available: yes  Insurance precert:  yes  Spoke with Pat with [facility]. Patient bed hold: bed will be available. Anticipated transport plan: to be determined.    Do they require COVID 19 test to return to ECF: no  Is there a required time frame which which COVID test needs done: N/A

## 2021-10-27 NOTE — ED PROVIDER NOTES
[address] [address]          Pt Name: Darrel Wynne  MRN: [id number]  [place] 11/10/1930  Date of evaluation: 10/26/2021  Treating Resident Physician: DO Erin  Supervising Physician: Dr. Harrison Mendoza       Chief Complaint   Patient presents with    Shortness of Breath     History obtained from the patient and his daughter. HISTORY OF PRESENT ILLNESS    HPI  Darrel Wynne is a 80 y.o. male who presents to the emergency department for evaluation of shortness of breath. Patient has a history of end-stage renal disease on dialysis, coronary artery disease, COPD, CHF with EF 45%. Patient was diagnosed with COVID-19 pneumonia last month and was sent to [facility] on 3 L nasal cannula. Patient has been experiencing increasing shortness of breath today. His last dialysis session was yesterday. He is not complaining of any chest pain, headache, shortness of breath, nausea, vomiting, diarrhea, constipation, abdominal pain. The patient has no other acute complaints at this time. REVIEW OF SYSTEMS   Review of Systems   Constitutional: Negative for chills, diaphoresis, fatigue and fever. HENT: Negative for sneezing and sore throat. Respiratory: Positive for shortness of breath. Negative for wheezing. Cardiovascular: Negative for chest pain, palpitations and leg swelling. Gastrointestinal: Negative for abdominal pain, constipation, diarrhea, nausea and vomiting. Endocrine: Negative for polydipsia and polyuria. Genitourinary: Negative for dysuria, flank pain, hematuria and urgency. Musculoskeletal: Negative for back pain and myalgias. Neurological: Negative for dizziness, syncope, weakness, light-headedness and headaches.          PAST MEDICAL AND SURGICAL HISTORY     Past Medical History:   Diagnosis Date    Anemia in chronic kidney disease(285.21)     Arthritis     CAD (coronary artery disease)     Chronic kidney disease     [facility], stage IV (severe) [facility]     CKD (chronic kidney disease) stage 3, GFR 30-59 ml/min ([facility])     COPD [facility]) [facility]     GI bleed     Hemodialysis patient ([facility] [address] 07/26/2016    @ [facility]    History of blood transfusion     6/2019    Hyperlipidemia     Hyperphosphatemia 5/22/2018    Hypertension     Sick sinus syndrome ([facility] [address]     Systolic congestive heart failure [facility])      Past Surgical History:   Procedure Laterality Date   Lula Estrada CARDIAC SURGERY      COLONOSCOPY  2006,2009    COLONOSCOPY N/A 6/27/2019    COLONOSCOPY DIAGNOSTIC performed by Robert Anderson MD at [address] [facility]  2004    DIALYSIS FISTULA CREATION  5/6/2016    right arm fistula placement    ENDOSCOPY, COLON, DIAGNOSTIC      PACEMAKER PLACEMENT  2013    [place] ESOPHAGOGASTRODUODENOSCOPY TRANSORAL DIAGNOSTIC N/A 1/18/2018    EGD performed by Dary Barragan MD at [facility]  2006,2009    UPPER GASTROINTESTINAL ENDOSCOPY Left 6/23/2019    EGD DIAGNOSTIC ONLY performed by Robert Anderson MD at [address] [facility] ENDOSCOPY N/A 10/9/2021    EGD performed by Robert Anderson MD at [address]     Current Facility-Administered Medications:     cefTRIAXone (ROCEPHIN) 1000 mg IVPB in 50 mL D5W minibag, 1,000 mg, IntraVENous, Once **AND** azithromycin (ZITHROMAX) 500 mg in D5W 250ml addavial, 500 mg, IntraVENous, Once, [employer], DO    Current Outpatient Medications:     white petrolatum GEL, Apply 28 g topically as needed for Dry Skin, Disp: , Rfl: 0    dilTIAZem (CARDIZEM) 30 MG tablet, Take 1 tablet by mouth once for 1 dose, Disp: 120 tablet, Rfl: 3    pantoprazole (PROTONIX) 40 MG tablet, Take 1 tablet by mouth 2 times daily (before meals), Disp: 60 tablet, Rfl: 0    albuterol sulfate  (90 Base) MCG/ACT inhaler, Inhale 1 puff into the lungs every 4 hours as needed, Disp: , Rfl:     ASPIRIN LOW DOSE 81 MG EC tablet, Take 81 mg by mouth daily, Disp: , Rfl:     atorvastatin (LIPITOR) 40 MG tablet, Take 40 mg by mouth daily, Disp: , Rfl:     cyclobenzaprine (FLEXERIL) 10 MG tablet, Take 10 mg by mouth daily, Disp: , Rfl:     clopidogrel (PLAVIX) 75 MG tablet, Take 75 mg by mouth daily, Disp: , Rfl:     ferrous sulfate (IRON 325) 325 (65 Fe) MG tablet, Take 325 mg by mouth daily, Disp: , Rfl:     metoprolol (LOPRESSOR) 100 MG tablet, Take 1 tablet by mouth 2 times daily, Disp: 60 tablet, Rfl: 3    cinacalcet (SENSIPAR) 30 MG tablet, Take 60 mg by mouth three times a week before dialysis on ,,, Disp: , Rfl:     calcitRIOL (ROCALTROL) 0.25 MCG capsule, Take 1.5 mcg by mouth three times a week before dialysis on ,,, Disp: , Rfl:     docusate sodium (COLACE, DULCOLAX) 100 MG CAPS, Take 100 mg by mouth 2 times daily, Disp: 60 capsule, Rfl: 0    vitamin C (ASCORBIC ACID) 500 MG tablet, Take 1 tablet by mouth daily, Disp: 30 tablet, Rfl: 3    polyethylene glycol (MIRALAX) powder, Renal Miralax Colonoscopy prep. Use as directed. Mix Miralax 238 g with 24 oz clear liquids.   Drink 8 oz glass every 20-30 minutes until gone., Disp: 238 g, Rfl: 0    B Complex-C-Folic Acid (RENAL) 1 MG CAPS, Take 1 capsule by mouth daily, Disp: , Rfl:       SOCIAL HISTORY     Social History     Social History Narrative    Not on file     Social History     Tobacco Use    Smoking status: Former Smoker     Packs/day: 1.00     Years: 40.00     Pack years: 40.00     Quit date: 1982     Years since quittin.7    Smokeless tobacco: Never Used   Vaping Use    Vaping Use: Never used   Substance Use Topics    Alcohol use: No    Drug use: No         ALLERGIES   No Known Allergies      FAMILY HISTORY     Family History   Problem Relation Age of Onset    Diabetes Mother     Heart Disease Mother     Diabetes Sister     Mental Illness Paternal Aunt          PREVIOUS RECORDS   Previous records reviewed: Patient was last seen at [facility] [address] [facility] ED on 6/7/2021 for essential hypertension. PHYSICAL EXAM     ED Triage Vitals   BP Temp Temp Source Pulse Resp SpO2 Height Weight   10/26/21 1913 10/26/21 1913 10/26/21 1913 10/26/21 1913 10/26/21 1913 10/26/21 1916 -- --   (!) 163/72 98.2 °F (36.8 °C) Axillary 70 20 100 %       Initial vital signs and nursing assessment reviewed and Hypertension. There is no height or weight on file to calculate BMI. Pulsoximetry is abnormal per my interpretation. Additional Vital Signs:  Vitals:    10/26/21 2120   BP: (!) 176/89   Pulse: 68   Resp: 20   Temp:    SpO2: 96%       Physical Exam  Constitutional:       General: He is not in acute distress. Appearance: He is well-developed and normal weight. He is not ill-appearing, toxic-appearing or diaphoretic. Interventions: He is not intubated. HENT:      Head: Normocephalic and atraumatic. Mouth/Throat:      Mouth: Mucous membranes are moist.      Pharynx: Oropharynx is clear. Eyes:      Extraocular Movements: Extraocular movements intact. Pupils: Pupils are equal, round, and reactive to light. Cardiovascular:      Rate and Rhythm: Normal rate and regular rhythm. Pulses: Normal pulses. Heart sounds: No murmur heard. No friction rub. No gallop. Pulmonary:      Effort: Pulmonary effort is normal. Tachypnea present. No accessory muscle usage. He is not intubated. Breath sounds: Rales present. No decreased breath sounds, wheezing or rhonchi. Comments: Crackles in bilateral lung bases. Decreased airflow in bilateral lungs. Abdominal:      General: Bowel sounds are normal.      Palpations: Abdomen is soft. Musculoskeletal:         General: Normal range of motion. Cervical back: Normal range of motion and neck supple. Right lower leg: No edema. Left lower leg: No edema.    Skin: General: Skin is warm and dry. Capillary Refill: Capillary refill takes less than 2 seconds. Neurological:      Mental Status: He is alert and oriented to person, place, and time. Psychiatric:         Mood and Affect: Mood normal.         Behavior: Behavior normal.       MEDICAL DECISION MAKING   Initial Assessment:   1. Pneumonia   2. Pleural effusion  3. Acute on chronic combined congestive heart failure  4. ESRD on dialysis  5. Elevated troponin  Plan:    Vancomycin and Zosyn   Admit to hospitalist   Plan for dialysis tomorrow   CTA chest negative for pulmonary embolism.     ED RESULTS   Laboratory results:  Labs Reviewed   BLOOD GAS, ARTERIAL - Abnormal; Notable for the following components:       Result Value    pH, Blood Gas 7.50 (*)     PO2 67 (*)     HCO3 29 (*)     Base Excess (Calculated) 5.6 (*)     All other components within normal limits   CBC WITH AUTO DIFFERENTIAL - Abnormal; Notable for the following components:    RBC 3.28 (*)     Hemoglobin 10.2 (*)     Hematocrit 31.0 (*)     MCV 94.5 (*)     RDW-CV 19.1 (*)     RDW-SD 64.5 (*)     Lymphocytes Absolute 0.7 (*)     All other components within normal limits   LACTIC ACID, PLASMA - Abnormal; Notable for the following components:    Lactic Acid 4.1 (*)     All other components within normal limits   BASIC METABOLIC PANEL - Abnormal; Notable for the following components:    Chloride 97 (*)     CREATININE 4.8 (*)     All other components within normal limits   HEPATIC FUNCTION PANEL - Abnormal; Notable for the following components:    Albumin 2.9 (*)     Alkaline Phosphatase 139 (*)     AST 47 (*)     All other components within normal limits   BRAIN NATRIURETIC PEPTIDE - Abnormal; Notable for the following components:    Pro-BNP > 28990.0 (*)     All other components within normal limits   TROPONIN - Abnormal; Notable for the following components:    Troponin T 0.254 (*)     All other components within normal limits   PROCALCITONIN - Abnormal; Notable for the following components:    Procalcitonin 0.95 (*)     All other components within normal limits   BLOOD GAS, VENOUS - Abnormal; Notable for the following components:    PCO2, MIXED VENOUS 54 (*)     PO2, Mixed 9 (*)     HCO3, Mixed 32 (*)     Base Exc, Mixed 5.5 (*)     All other components within normal limits   GLOMERULAR FILTRATION RATE, ESTIMATED - Abnormal; Notable for the following components:    Est, Glom Filt Rate 14 (*)     All other components within normal limits   COVID-19, RAPID   CULTURE, BLOOD 1   CULTURE, BLOOD 2   AMMONIA   SPECIMEN REJECTION   MAGNESIUM   ANION GAP   OSMOLALITY       Radiologic studies results:  XR CHEST PORTABLE   Final Result   1. The diffuse airspace opacities have progressed from the prior chest radiograph. Findings can relate to pneumonia or pulmonary edema. 2. Small bilateral pleural effusions. 3. Cardiomegaly. **This report has been created using voice recognition software. It may contain minor errors which are inherent in voice recognition technology. **      Final report electronically signed by Dr Marcelo Davis on 10/26/2021 8:31 PM      CTA CHEST [address]    (Results Pending)       ED Medications administered this visit:   Medications   cefTRIAXone (ROCEPHIN) 1000 mg IVPB in 50 mL D5W minibag (has no administration in time range)     And   azithromycin (ZITHROMAX) 500 mg in D5W 250ml addavial (has no administration in time range)   iopamidol (ISOVUE-370) 76 % injection 80 mL (80 mLs IntraVENous Given 10/26/21 2119)         ED COURSE     ED Course as of Oct 26 2220   Tue Oct 26, 2021   2122 Troponin T(!): 0.254 [HR]      ED Course User Index  [HR] Orquidea Amend, DO       MEDICATION CHANGES     New Prescriptions    No medications on file         FINAL DISPOSITION     Final diagnoses:   Acute on chronic combined systolic and diastolic CHF (congestive heart failure) ([facility] [address]   Community acquired pneumonia, unspecified laterality 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)   Pleural effusion   Elevated troponin     Condition: condition: stable  Dispo: Admit to telemetry      This transcription was electronically signed. Parts of this transcriptions may have been dictated by use of voice recognition software and electronically transcribed, and parts may have been transcribed with the assistance of an ED scribe. The transcription may contain errors not detected in proofreading. Please refer to my supervising physician's documentation if my documentation differs.     Electronically Signed: Eden Favre, DO, 10/26/21, 10:20 PM          Eden Favre, DO  Resident  10/26/21 [id number]

## 2021-10-27 NOTE — H&P
History & Physical        Patient:  Henny Hayes  YOB: 1930    MRN: [id number]     Acct: [de-identified]    PCP: Segun Valencia MD    Date of Admission: 10/26/2021    Date of Service: Pt seen/examined on 10/26/21  and Admitted to inpatient with expected LOS greater than two midnights due to medical therapy. Chief Complaint: Shortness of breath    History Of Present Illness:  Henny Hayes is a 80 y.o. male with PMHx of ESRD on HD Monday, Wednesday, and Friday who presented to [facility] with progressive dyspnea for the past two weeks. Patient was recently admitted to our hospital for COVID pneumonia a month ago. He was admitted on 9/13/2021 and was discharged on 10/15/2021. He was discharged on supplemental O2 at 3L NC. Patient was at a SNF, and complained of progressive dyspnea for the past two weeks. Per ED notes, patient was walking today, and was found to have hypoxia down to the mid 80s while on supplemental O2. Patient denied chest pain. He denied coughing. He denied diaphoresis. He denied fever. Staff at the SNF called EMS. Here, patient again was observed by staff ED RN to have hypoxia as low as the mid 80s while on 3L NC. His oxygen was increased to 6L NC, and he showed improvement on his oxygenation. CXR in the ED demonstrated   diffuse airspace opacities, which have progressed from the prior chest radiograph, concerning for  pneumonia or pulmonary edema. Blood cultures pending. Procalcitonin elevated at 0.95. He was empirically started on IV antibiotics to cover potential HCAP. We were asked to admit this patient for further management.      Past Medical History:          Diagnosis Date    Anemia in chronic kidney disease(285.21)     Arthritis     CAD (coronary artery disease)     Chronic kidney disease     [facility], [facility] IV [facility]severe) [facility]     CKD (chronic kidney disease) stage 3, GFR 30-59 ml/min ([facility])     [facility]     GI bleed     Hemodialysis patient [facility]) 07/26/2016    @ [facility]    History of blood transfusion     6/2019    Hyperlipidemia     Hyperphosphatemia 5/22/2018    Hypertension     Sick sinus syndrome ([facility]     Systolic congestive heart failure [facility])        Past Surgical History:          Procedure Laterality Date   [employer] CARDIAC SURGERY      COLONOSCOPY  2006,2009    COLONOSCOPY N/A 6/27/2019    COLONOSCOPY DIAGNOSTIC performed by Luis Angel Franco MD at [address] [facility]  2004    DIALYSIS FISTULA CREATION  5/6/2016    right arm fistula placement    ENDOSCOPY, COLON, DIAGNOSTIC      PACEMAKER PLACEMENT  2013    RI ESOPHAGOGASTRODUODENOSCOPY TRANSORAL DIAGNOSTIC N/A 1/18/2018    [facility]  2006,2009    UPPER GASTROINTESTINAL ENDOSCOPY Left 6/23/2019    EGD DIAGNOSTIC ONLY performed by Luis Angel Franco MD at [address] [facility] N/A 10/9/2021    EGD performed by Luis Angel Franco MD at [address] [place] [address]         Medications Prior to Admission:      Prior to Admission medications    Medication Sig Start Date End Date Taking?  Authorizing Provider   white petrolatum GEL Apply 28 g topically as needed for Dry Skin 10/15/21   Anabell Almendarez MD   dilTIAZem (CARDIZEM) 30 MG tablet Take 1 tablet by mouth once for 1 dose 10/11/21 10/11/21  Anabell Almendarez MD   pantoprazole (PROTONIX) 40 MG tablet Take 1 tablet by mouth 2 times daily (before meals) 10/11/21   Anabell Almendarez MD   albuterol sulfate  (90 Base) MCG/ACT inhaler Inhale 1 puff into the lungs every 4 hours as needed 9/8/21   Historical Provider, MD   ASPIRIN LOW DOSE 81 MG EC tablet Take 81 mg by mouth daily 9/1/21   Historical Provider, MD   atorvastatin (LIPITOR) 40 MG tablet Take 40 mg by mouth daily 9/1/21   Historical Provider, MD   cyclobenzaprine (FLEXERIL) 10 MG tablet Take 10 mg by mouth daily 17   Historical Provider, MD   clopidogrel (PLAVIX) 75 MG tablet Take 75 mg by mouth daily 21   Historical Provider, MD   ferrous sulfate (IRON 325) 325 (65 Fe) MG tablet Take 325 mg by mouth daily 19   Historical Provider, MD   metoprolol (LOPRESSOR) 100 MG tablet Take 1 tablet by mouth 2 times daily 21   Tia Ramirez MD   cinacalcet (SENSIPAR) 30 MG tablet Take 60 mg by mouth three times a week before dialysis on ,,    Historical Provider, MD   calcitRIOL (ROCALTROL) 0.25 MCG capsule Take 1.5 mcg by mouth three times a week before dialysis on ,,    Historical Provider, MD   docusate sodium (COLACE, DULCOLAX) 100 MG CAPS Take 100 mg by mouth 2 times daily 19   Kojo Pastures, DO   vitamin C (ASCORBIC ACID) 500 MG tablet Take 1 tablet by mouth daily 19   [facility], DO   polyethylene glycol (MIRALAX) powder Renal Miralax Colonoscopy prep. Use as directed. Mix Miralax 238 g with 24 oz clear liquids. Drink 8 oz glass every 20-30 minutes until gone. 19   Nafisa Hayward, APRN - CNP   B Complex-C-Folic Acid (RENAL) 1 MG CAPS Take 1 capsule by mouth daily    Historical Provider, MD       Allergies:  Patient has no known allergies. Social History:      The patient currently lives at [facility] post discharge. TOBACCO:   reports that he quit smoking about 39 years ago. He has a 40.00 pack-year smoking history. He has never used smokeless tobacco.  ETOH:   reports no history of alcohol use. DRUG USE HISTORY: Denies. EMPLOYMENT HISTORY:  Retired. Family History: Mother:  from heart disease. Father: Unknown cause of death. Siblings:  No medical issues reported. Children: No medical issues reported. REVIEW OF SYSTEMS:   12 points of systems reviewed and otherwise negative except for that which already was noted above.      PHYSICAL EXAM:    BP (!) 157/69   Pulse 78   Temp 98.2 °F (36.8 °C) (Axillary)   Resp 20   SpO2 96%     General appearance: No apparent distress, well developed, appears stated age. Eyes:  Pupils equal, round, and reactive to light. Conjunctivae/corneas clear. HENT: Head normal in appearance. External nares normal.  Oral mucosa moist without lesions. Hearing grossly intact. Neck: Supple, with full range of motion. Trachea midline. No gross JVD appreciated. Respiratory:  Normal respiratory effort. Clear to auscultation, bilaterally without rales or wheezes or rhonchi. Cardiovascular: Normal rate, regular rhythm with normal S1/S2 without murmurs. No lower extremity edema. PPM present. No erythema on exam.  Abdomen: Soft, non-tender, non-distended with normal bowel sounds. Musculoskeletal: There is no joint swelling or tenderness. Normal tone. No abnormal movements. Skin: Warm and dry. No rashes or lesions. Neurologic:  No focal sensory/motor deficits in the upper and lower extremities. Cranial nerves:  grossly non-focal 2-12. Psychiatric: Alert and oriented, normal insight and though content. Capillary Refill: Brisk,< 3 seconds. Peripheral Pulses: +2 palpable, equal bilaterally. Labs:     Recent Labs     10/26/21  1910   WBC 5.7   HGB 10.2*   HCT 31.0*        Recent Labs     10/26/21  2014      K 4.5   CL 97*   CO2 28   BUN 20   CREATININE 4.8*   CALCIUM 8.7     Recent Labs     10/26/21  2014   AST 47*   ALT 27   BILIDIR <0.2   BILITOT 0.5   ALKPHOS 139*     No results for input(s): INR in the last 72 hours. No results for input(s): Remi Shorts in the last 72 hours. Urinalysis:      Lab Results   Component Value Date    NITRU NEGATIVE 06/22/2019    WBCUA 50-75 06/22/2019    BACTERIA NONE 06/22/2019    RBCUA 25-50 06/22/2019    BLOODU LARGE 06/22/2019    SPECGRAV 1.013 05/03/2016    GLUCOSEU NEGATIVE 06/22/2019       Intake & Output:  No intake/output data recorded. No intake/output data recorded.           CTA CHEST W WO CONTRAST   Final Result      1. CT evidence of pulmonary embolus is seen involving the lingula and left upper lobe. 2. Emphysema. 3. There is diffuse groundglass opacification of both lungs with areas of bronchial wall thickening. Findings can relate to underlying interstitial edema versus infectious process. Clinical correlation is recommended. 4. A large right pleural effusion is seen. A moderate left pleural effusion   5. Body wall edema relating to anasarca. 6. A 1.3 cm right upper lobe pulmonary nodule is noted. 7. Other findings as described above. **This report has been created using voice recognition software. It may contain minor errors which are inherent in voice recognition technology. **      Final report electronically signed by Dr Adelia Saucedo on 10/26/2021 10:21 PM      XR CHEST PORTABLE   Final Result   1. The diffuse airspace opacities have progressed from the prior chest radiograph. Findings can relate to pneumonia or pulmonary edema. 2. Small bilateral pleural effusions. 3. Cardiomegaly. **This report has been created using voice recognition software. It may contain minor errors which are inherent in voice recognition technology. **      Final report electronically signed by Dr Adelia Saucedo on 10/26/2021 8:31 PM               Assessment And Plan:    1. Acute respiratory failure with hypoxia: Per ED notes, patient was noted to have O2 desaturate as low as in the mid 80s on 3L on arrival. He was placed on 6L NC with improvement. Imaging studies concerning for pneumonia vs. Pulmonary edema. Patient recently admitted for COVID pneumonia. His COVID tested negative on this encounter. We will aim to treat underlying causes and continue with oxygen therapy. 2. Suspect HCAP: CXR demonstrates diffuse airspace opacities, which have progressed from the prior chest radiograph, concerning for  pneumonia or pulmonary edema.  Patient did not complain of fever, and had no leukocytosis. However, his procalcitonin is elevated at 0.95. Patient did not meet sepsis criteria. However, this may be concerning for underlying bacterial infection. Given recent admission for COVID pneumonia, patient is at risk for HCAP. He empirically received vancomycin and zosyn in the ED. We will follow patient's clinical course and follow culture results. We may consider repeat CXR once he received HD to determine if patient's CXR findings may correlate more with fluid overload vs true bacterial pneumonia. 3. Acute fluid overload: We will discuss with nephrology to assist with dialysis in am.     4. ESRD on HD: We will plan for dialysis with nephrology in am.     5. HTN: We will continue with patient's home medications. We will cover with hydralazine PRN SBP> 160.     6. Recent non variceal upper GI bleed: Currently demonstrates no stigmata of active bleeding. He did have recent EGD during last hospitalization demonstrating esophagitis and duodenitis. We will continue PPI. 7. Recent COVID pneumonia: Repeated COVID assay negative. Patient still recommended to consider vaccine after his acute issues resolves. 8. Anemia of chronic disease: Stable. We will follow. 9. Chronic systolic heart failure, NYHA class III: Recent echo on 5/19/2021 demonstrating EF of 45%. Patient is anuric, on hemodialysis. As a result, he is not on diuretics. Patient is not on ARB/ACEI. There may still be a role with ARB/ACEI as part of patient's heart failure treatment. We will continue with his metoprolol. 10. CAD:  s/p JAXON on mid LAD and ramus in 2018. Recent cardiac stress test on 5/19/2021 did not demonstrate concern for MI. Current troponin 0.254. EKG shows some PAC. It did not show pacer spikes. It was not a good study, repeat EKG pending. Patient currently chest pain free. Elevated troponin may be reflective of chronic renal failure.  We will continue home medical management with ASA, Plavix, atorvastatin, metoprolol, and will trend troponin studies. Code Status: DNR-CC    Thank you Hayden Stewart MD for the opportunity to be involved in this patient's care.     Electronically signed by Staci Hartman MD on 10/26/2021 at 11:51 PM

## 2021-10-27 NOTE — PROGRESS NOTES
Joann Martinez 60  OCCUPATIONAL THERAPY MISSED TREATMENT NOTE  [facility] 5K  5K-15/015-A      Date: 10/27/2021  Patient Name: Jimena Painter        CSN: [id number]   : 11/10/1930  (80 y.o.)  Gender: male                REASON FOR MISSED TREATMENT: attmepted this am with pt off the floor for dialysis. Will complete oT eval on 10/28.

## 2021-10-27 NOTE — PROGRESS NOTES
Pt admitted to  5K15 via via cart/stretcher from ED. Complaints: Shortness of breath. IV none infusing into the antecubital left, condition patent and no redness at a rate of 0 mls/ hour with about N/A mls in the bag still. IV site free of s/s of infection or infiltration. Vital signs obtained. Assessment and data collection initiated. Two nurse skin assessment performed by Bonilla العلي RN and Sandra Cote. Oriented to room. Policies and procedures for 5 explained. All questions answered with no further questions at this time. Fall prevention and safety brochure discussed with patient. Bed alarm on. Call light in reach. Oriented to room. Haley Royal, RN, RN 10/27/2021 2:07 AM     Explained patients right to have family, representative or physician notified of their admission. Patient has N/A for physician to be notified. Patient has Declined for family/representative to be notified.

## 2021-10-27 NOTE — CARE COORDINATION
10/27/21 1519   Readmission Assessment   Number of Days since last admission? 8-30 days   Previous Disposition SNF   Who is being Interviewed Patient   What was the patient's/caregiver's perception as to why they think they needed to return back to the hospital? Other (Comment)  (shortness of breath)   Did you visit your Primary Care Physician after you left the hospital, before you returned this time? Yes   Did you see a specialist, such as Cardiac, Pulmonary, Orthopedic Physician, etc. after you left the hospital? No   Who advised the patient to return to the hospital? Skilled Unit   Does the patient report anything that got in the way of taking their medications? No   In our efforts to provide the best possible care to you and others like you, can you think of anything that we could have done to help you after you left the hospital the first time, so that you might not have needed to return so soon?  Other (Comment)  (no none)

## 2021-10-27 NOTE — FLOWSHEET NOTE
Hemodialysis 4 hrs, removed 1 liter      10/27/21 1421   Vital Signs   BP (!) 164/89   Temp 98.1 °F (36.7 °C)   Pulse 59   Resp 16   Weight 149 lb 7.6 oz (67.8 kg)   Weight Method Bed scale   Percent Weight Change -2.02   Post-Hemodialysis Assessment   Post-Treatment Procedures Blood returned; Access bleeding time < 10 minutes   Machine Disinfection Process Acid/Vinegar Clean;Heat Disinfect   Rinseback Volume (ml) 300 ml   Total Liters Processed (l/min) 84.3 l/min   Dialyzer Clearance Lightly streaked   Duration of Treatment (minutes) 240 minutes   Hemodialysis Output (ml) 1300 ml   NET Removed (ml) 1000 ml   fluid, verito well

## 2021-10-27 NOTE — PROGRESS NOTES
Hospitalist Progress Note      Patient:  Eren Robison    Unit/Bed:5K-15/015-A  YOB: 1930  MRN: [id number]   Acct: [de-identified]   PCP: Joan Garcia MD  Date of Admission: 10/26/2021    Assessment/Plan:    1. Acute on Chronic respiratory failure, hypoxia: Multifactorial.  PE, pneumonia, fluid overload. Patient's baseline is 3 L. Patient required 6 L on admission. CXR in the AM.   2. Acute PE: CTA of chest shows PE and lingula. Heparin drip started. Will need to have discussion with patient and family about risks and benefits of oral anticoagulation. Echo pending. 3. Possible Bacterial PNA: CTA shows possible pneumonia. Chest x-ray in the a.m. Zosyn and vancomycin. Patient was recently admitted for COVID-19 pneumonia. 4. ESRD on HD: Nephrology consulted. Dialysis today. Patient tolerated well. 5. Recent upper GI bleed: We will need to watch very carefully on heparin. Continue PPI. 6. Anemia of chronic disease: Hemoglobin stable at this time. Continue to watch on heparin. 7. HFrEF, acute on chronic: Echo pending. May 2021 EF 45%. Hemodialysis today. Continue home medications. 8. CAD: PCI in 2018. Stress test in May 2021 did not show concern for MI. Patient is chest pain-free. Continue home medications. Chief Complaint: Shortness of breath    Initial H and P:-    Initial H&P \"Tunde Rondon is a 80 y.o. male with PMHx of ESRD on HD Monday, Wednesday, and Friday who presented to [facility] with progressive dyspnea for the past two weeks. Patient was recently admitted to our hospital for COVID pneumonia a month ago. He was admitted on 9/13/2021 and was discharged on 10/15/2021. He was discharged on supplemental O2 at 3L NC.     Patient was at a SNF, and complained of progressive dyspnea for the past two weeks.  Per ED notes, patient was walking today, and was found to have hypoxia down to the mid 80s 1300 ml   Net -1300 ml       Diet:  ADULT DIET; Regular; Low Phosphorus (Less than 1000 mg); 60 to 80 gm    Exam:  BP (!) 161/75   Pulse 68   Temp 97.4 °F (36.3 °C) (Oral)   Resp 24   Wt 149 lb 7.6 oz (67.8 kg)   SpO2 99%   BMI 24.13 kg/m²   General appearance: Chronically ill-appearing male  HEENT: Pupils equal, round, and reactive to light. Conjunctivae/corneas clear. Neck: Supple, with full range of motion. No jugular venous distention. Trachea midline. Respiratory:  Normal respiratory effort on NC. Breath sounds diminished. Cardiovascular: Regular rate and rhythm with normal S1/S2 without murmurs, rubs or gallops. Abdomen: Soft, non-tender, non-distended with normal bowel sounds. Musculoskeletal: passive and active ROM x 4 extremities. Skin: Skin color, texture, turgor normal.  No rashes or lesions. Neurologic:  Neurovascularly intact without any focal sensory/motor deficits. Cranial nerves: II-XII intact, grossly non-focal.  Psychiatric: Alert, pleasantly confused  Capillary Refill: Brisk,< 3 seconds   Peripheral Pulses: +2 palpable, equal bilaterally     Labs:   Recent Labs     10/26/21  1910 10/27/21  0342 10/27/21  1100   WBC 5.7 5.2 5.9   HGB 10.2* 8.8* 8.3*   HCT 31.0* 26.3* 24.4*    125* 122*     Recent Labs     10/26/21  2014 10/27/21  0342    141   K 4.5 4.4   CL 97* 100   CO2 28 28   BUN 20 22   CREATININE 4.8* 5.3*   CALCIUM 8.7 8.2*     Recent Labs     10/26/21  2014   AST 47*   ALT 27   BILIDIR <0.2   BILITOT 0.5   ALKPHOS 139*     Microbiology:    Blood culture #1:   Lab Results   Component Value Date    BC No growth-preliminary  10/26/2021     Organism:  Lab Results   Component Value Date    ORG Micrococcus species 07/12/2021     Radiology:  CTA CHEST [address]   Final Result      1. CT evidence of pulmonary embolus is seen involving the lingula and left upper lobe. 2. Emphysema.    3. There is diffuse groundglass opacification of both lungs with areas of bronchial wall thickening. Findings can relate to underlying interstitial edema versus infectious process. Clinical correlation is recommended. 4. A large right pleural effusion is seen. A moderate left pleural effusion   5. Body wall edema relating to anasarca. 6. A 1.3 cm right upper lobe pulmonary nodule is noted. 7. Other findings as described above. **This report has been created using voice recognition software. It may contain minor errors which are inherent in voice recognition technology. **      Final report electronically signed by Dr Donna Ballesteros on 10/26/2021 10:21 PM      XR CHEST PORTABLE   Final Result   1. The diffuse airspace opacities have progressed from the prior chest radiograph. Findings can relate to pneumonia or pulmonary edema. 2. Small bilateral pleural effusions. 3. Cardiomegaly. **This report has been created using voice recognition software. It may contain minor errors which are inherent in voice recognition technology. **      Final report electronically signed by Dr Donna Ballesteros on 10/26/2021 8:31 PM        Electronically signed by SHANELL King on 10/27/2021 at 5:10 PM

## 2021-10-27 NOTE — CARE COORDINATION
10/27/21, 7:44 AM EDT  DISCHARGE PLANNING EVALUATION:    Audrey Shah       Admitted: 10/26/2021/ [address] Breanna Haywood day: 1   Location: [address] [place] [address]- Reason for admit: Dyspnea [R06.00]  Pleural effusion [J90]  Elevated troponin [R77.8]  ESRD (end stage renal disease) on dialysis ([facility] [N18.6, Z99.2]  Acute on chronic combined systolic and diastolic CHF (congestive heart failure) ([address] [I50.43]  Community acquired pneumonia, unspecified laterality [J18.9]   PMH:  has a past medical history of Anemia in chronic kidney disease(285.21), Arthritis, CAD (coronary artery disease), Chronic kidney disease, Chronic kidney disease, stage IV (severe) ([address] CKD (chronic kidney disease) stage 3, GFR 30-59 ml/min ([facility]), COPD (chronic obstructive pulmonary disease) ([facility] [address] GI bleed, Hemodialysis patient ([address] History of blood transfusion, Hyperlipidemia, Hyperphosphatemia, Hypertension, Sick sinus syndrome ([address] and Systolic congestive heart failure ([address] Procedure: na  CTA chest:  1. CT evidence of pulmonary embolus is seen involving the lingula and left upper lobe. 2. Emphysema. 3. There is diffuse groundglass opacification of both lungs with areas of bronchial wall thickening. Findings can relate to underlying interstitial edema versus infectious process. Clinical correlation is recommended. 4. A large right pleural effusion is seen. A moderate left pleural effusion   5. Body wall edema relating to anasarca. 6. A 1.3 cm right upper lobe pulmonary nodule is noted. Barriers to Discharge:  IV Zosyn and Vancomycin. HD as ordered by nephrology. Added PT/OT evals   Oxygen 3L/min sats 98%. IS.  PCP: Zoey Fisher MD  Readmission Risk Score: 24.2%    Patient Goals/Plan/Treatment Preferences: Lore Sena is from [facility]: he attends HD [employer] on Mon, Wed, Fri. With daughter transporting pt. Patient will be precert back to [facility] per Prince.       Transportation/Food Security/Housekeeping Addressed:  No issues identified.

## 2021-10-27 NOTE — ED PROVIDER NOTES
[address]    Pt Name: Leanne Sol  MRN: [id number]  [place] 11/10/1930  Date of evaluation: 9/12/20      I personally saw and examined the patient. I have reviewed and agree with the Resident findings, including all diagnostic interpretations and treatment plans as written. I was present for the key portion of any procedures performed and the inclusive time noted in any critical care statement. History:     Patient was seen with Mushtaq Lr, resident physican. This is a chronically debilitated 60-year-old male who is here for acute dyspnea. He was found to be hypoxemic on arrival. He reportedly had Covid about a month ago. Was believed to be over it. Has not had a fever. Multiple comorbidities. We ended up putting him on 6 L of nasal cannula. The patient is noted to be on dialysis. He is due for dialysis tomorrow and has not missed any appointments. He does not have any significant CO2 retention on the blood gas. Beta natruretic peptide of too high to read    Troponin of 0.254    Procalcitonin of 0.95, lactic of 4.1    Ammonia 23    wbc 5.7    Since he has previous Covid it is concerning that the findings suggestive of pneumonia could be a superimposed bacterial pneumonia and we are covering him with antibiotics. I have looked at his CT. We are still awaiting the final report but he appears to have significant bilateral pleural effusions that are likely contributing to his dyspnea and he still has evidence of groundglass type changes. We will be admitting.     Diagnosis: Hypoxic respiratory failure, pleural effusions, pneumonia, end-stage renal disease           DO Fer Pretty DO  10/26/21 [id number]

## 2021-10-27 NOTE — DISCHARGE INSTR - COC
Continuity of Care Form    Patient Name: Herber Obrien   :  11/10/1930  MRN:  [id number]    Admit date:  10/26/2021  Discharge date:  2021    Code Status Order: Limited   Advance Directives:      Admitting Physician: Anita Louis MD  PCP: Glida Butler MD    Discharging Nurse: Lazaro Cisneros RN   Discharging Hospital Unit/Room#: 9L-69/545-W  Discharging Unit Phone Number: [phone]    Emergency Contact:   Extended Emergency Contact Information  Primary Emergency Contact: [address] Phone: [phone]  Relation: Child  Secondary Emergency Contact: Alexus Murcia  Mobile Phone: [phone]  Relation: Child  Preferred language: English   needed?  No    Past Surgical History:  Past Surgical History:   Procedure Laterality Date    CARDIAC SURGERY      COLONOSCOPY  ,    COLONOSCOPY N/A 2019    COLONOSCOPY DIAGNOSTIC performed by Emelyn Li MD at [facility]  2004    DIALYSIS FISTULA CREATION  2016    right arm fistula placement    ENDOSCOPY, COLON, DIAGNOSTIC      PACEMAKER PLACEMENT  2013    ID ESOPHAGOGASTRODUODENOSCOPY TRANSORAL DIAGNOSTIC N/A 2018    EGD performed by Darylene Brazier, MD at [address] [place] [address]  ,    UPPER GASTROINTESTINAL ENDOSCOPY Left 2019    EGD DIAGNOSTIC ONLY performed by Emelyn Li MD at [address] [place] [address] N/A 10/9/2021    EGD performed by Emelyn Li MD at [address] Endoscopy    VASCULAR SURGERY         Immunization History:   Immunization History   Administered Date(s) Administered    Influenza Virus Vaccine 10/01/2018    Pneumococcal Conjugate 13-valent ([id number]) 2017    Pneumococcal Polysaccharide ([id number]) 10/16/2017       Active Problems:  Patient Active Problem List   Diagnosis Code    CAD (coronary artery disease) I25.10    COPD (chronic obstructive pulmonary disease) ([facility] J44.9    Chronic renal insufficiency N18.9    Patient overweight E66.3    Arthritis-  low back and neck . 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 0XXA    Closed fracture of left ankle S82.892A    ESRD on hemodialysis ([facility] N18.6, Z99.2    Hypertensive emergency I16.1    SOB (shortness of breath) R06.02    Chronic combined systolic and diastolic CHF (congestive heart failure) (HCC) I50.42    COVID-19 U07.1    Severe malnutrition ([facility]) E43    Hypoxia R09.02    Dyspnea R06.00       Isolation/Infection:   Isolation            No Isolation          Patient Infection Status       Infection Onset Added Last Indicated Last Indicated By Review Planned Expiration Resolved Resolved By    None active    Resolved    COVID-19 Rule Out 10/26/21 10/26/21 10/26/21 COVID-19, Rapid (Ordered)   10/26/21 Rule-Out Test Resulted    C-diff Rule Out 09/30/21 09/30/21 09/30/21 Gastrointestinal Panel, Molecular (Ordered)   10/04/21 Nola Michael RN    COVID-19 09/13/21 09/13/21 09/14/21 COVID-19, Rapid   09/21/21 75 Jorge Danielle RN    S/S 9/11, + 9/13    COVID-19 Rule Out 09/13/21 09/13/21 09/13/21 COVID-19, Rapid (Ordered)   09/13/21 Rule-Out Test Resulted    COVID-19 Rule Out 04/30/21 04/30/21 04/30/21 COVID-19, Rapid (Ordered)   04/30/21 Rule-Out Test Resulted            Nurse Assessment:  Last Vital Signs: BP (!) 153/73   Pulse 69   Temp 98.2 °F (36.8 °C)   Resp 16   Wt 152 lb 8.9 oz (69.2 kg)   SpO2 97%   BMI 24.62 kg/m²     Last documented pain score (0-10 scale): Pain Level: 0  Last Weight:   Wt Readings from Last 1 Encounters:   10/27/21 152 lb 8.9 oz (69.2 kg)     Mental Status:  oriented and alert    IV Access:  Fistula right arm     Nursing Mobility/ADLs:  Walking   Assisted  Transfer  Assisted  Bathing  Assisted  Dressing  Assisted  Toileting  Assisted  Feeding  Independent  Med Admin  Assisted  Med Delivery   whole    Wound Care Documentation and Therapy:        Elimination:  Continence: Bowel: Yes  Bladder: Yes  Urinary Catheter: None   Colostomy/Ileostomy/Ileal Conduit: No       Date of Last BM: 11-1-21  No intake or output data in the 24 hours ending 10/27/21 1248  No intake/output data recorded. Safety Concerns: At Risk for Falls    Impairments/Disabilities:      None    Nutrition Therapy:  Current Nutrition Therapy:   - Oral Diet:  General    Routes of Feeding: Oral  Liquids: No Restrictions  Daily Fluid Restriction: no  Last Modified Barium Swallow with Video (Video Swallowing Test): not done    Treatments at the Time of Hospital Discharge:   Respiratory Treatments: n/a  Oxygen Therapy:  is on oxygen at 3 L/min per nasal cannula.   Ventilator:    - No ventilator support    Rehab Therapies: Physical Therapy and Occupational Therapy  Weight Bearing Status/Restrictions: No weight bearing restirctions  Other Medical Equipment (for information only, NOT a DME order):  walker, bath bench, bedside commode, and hospital bed  Other Treatments: n/a    Patient's personal belongings (please select all that are sent with patient):  None    RN SIGNATURE:  Electronically signed by Venkata Villar RN on 11/1/21 at 11:02 AM EDT    CASE MANAGEMENT/SOCIAL WORK SECTION    Inpatient Status Date: 10/26/21    Readmission Risk Assessment Score:  Readmission Risk              Risk of Unplanned Readmission:  44           Discharging to Facility/ Agency   Name: [facility]  Address: [facility] [address] , [address]  Phone: [phone]  Fax: [phone]    Dialysis Facility (if applicable)   Name:  Address:  Dialysis Schedule:  Phone:  Fax:    / signature: Electronically signed by SARAHI Devries on 10/27/21 at 12:49 PM EDT    PHYSICIAN SECTION    Prognosis: Fair    Condition at Discharge: Stable    Rehab Potential (if transferring to Rehab): Fair    Recommended Labs or Other Treatments After Discharge: HD as already scheduled with ECF. Physician Certification: I certify the above information and transfer of Philly Reyez  is necessary for the continuing treatment of the diagnosis listed and that he requires [facility] for less 30 days. Update Admission H&P: Changes in H&P as follows - ABX completed for PNA.      PHYSICIAN SIGNATURE:  Electronically signed by Elinor Fothergill, PA on 11/1/21 at 1:37 PM EDT - Collaborating Physician: Jose Angel Thomson MD

## 2021-10-27 NOTE — CONSULTS
Nephrology Consult Note  Patient's Name: Haven Colin  11:30 AM  10/27/2021    Nephrologist: Roxana Butterfield    Reason for Consult: End-stage kidney disease. Hemodialysis management. Requesting Physician:  SHANELL Ireland  PCP: Gen Benitez MD    Chief Complaint: None  Assessment  1. End-stage kidney disease on hemodialysis. 2. Hypertension primary. 3. Recent SARS-CoV-2 pneumonia. 4. COPD. 5. Anemia of chronic disease normocytic. 6. Thrombocytopenia. 7. Chronic deconditioning  8. Pneumonia? Plan    1. I discussed my thoughts with the patient. 2. He understood. 3. Chest x-ray report reviewed  4. Medications reviewed  5. Retacrit 6000 units subcu or IV 3 days a week   6. Hemodialysis today  7. We will follow      History Obtained From: Patient, staff and electronic medical record  History of Present Ilness:    Haven Colin is a 80 y.o. male with history of hypertension, COPD, anemia of chronic disease among other multiple medical entities listed below admitted to the emergency department after the patient presented there with shortness of breath. Chest x-ray possible pneumonia and/or pulmonary edema. .  Admitted for evaluation and management. Patient was recently admitted to this institution for SARS-CoV-2 pneumonia and had a prolonged hospital course. Was discharge not long ago on 15 October 2021. Vincenzo Gates Has done well since discharge until last evening when he developed progressive shortness of breath exacerbated by activity and relieved by rest.  No chest pain however. Denies any fever or chills. No nausea vomiting. No headaches. In the emergency department he was found to be quite hypoxic with oxygen saturation in the 80s. Vincenzo Gates He receives hemodialysis at [facility] in [place] [facility] on Mondays, Wednesdays and Fridays respectively. He was last dialyzed two days ago.     Past Medical History:   Diagnosis Date    Anemia in chronic kidney disease(285.21)     Arthritis     CAD (coronary artery disease)     Chronic kidney disease     Chronic kidney disease, stage IV (severe) (HCC)     CKD (chronic kidney disease) stage 3, GFR 30-59 ml/min ([facility])     COPD (chronic obstructive pulmonary disease) ([address]     GI bleed     Hemodialysis patient [facility] 07/26/2016    [facility]    History of blood transfusion     6/2019    Hyperlipidemia     Hyperphosphatemia 5/22/2018    Hypertension     [facility]     Systolic congestive heart failure [facility])        Past Surgical History:   Procedure Laterality Date    CARDIAC SURGERY      COLONOSCOPY  2006,2009    COLONOSCOPY N/A 6/27/2019    COLONOSCOPY DIAGNOSTIC performed by Arturo Brink MD at [address] [place] [address]  2004    DIALYSIS FISTULA CREATION  5/6/2016    right arm fistula placement    ENDOSCOPY, COLON, DIAGNOSTIC      PACEMAKER PLACEMENT  2013    [place] ESOPHAGOGASTRODUODENOSCOPY TRANSORAL DIAGNOSTIC N/A 1/18/2018    [facility] performed [facility] Forest Rogers [facility] [place] [facility]    UPPER GASTROINTESTINAL ENDOSCOPY  2006,2009    UPPER GASTROINTESTINAL ENDOSCOPY Left 6/23/2019    [facility] [place] [facility]    UPPER GASTROINTESTINAL ENDOSCOPY N/A 10/9/2021    [facility] [place] [facility]    VASCULAR SURGERY         Family History   Problem Relation Age of Onset    Diabetes Mother     Heart Disease Mother     Diabetes Sister     Mental Illness Paternal Aunt         reports that he quit smoking about 39 years ago. He has a 40.00 pack-year smoking history. He has never used smokeless tobacco. He reports that he does not drink alcohol and does not use drugs. Allergies:  Patient has no known allergies.     Current Medications:    aspirin chewable tablet 81 mg, Daily  clopidogrel (PLAVIX) tablet 75 mg, Daily  metoprolol (LOPRESSOR) tablet 100 mg, BID  atorvastatin (LIPITOR) tablet 40 mg, Nightly  hydrALAZINE (APRESOLINE) injection 10 mg, Q6H PRN  vancomycin (VANCOCIN) intermittent dosing (placeholder), RX Placeholder  heparin (porcine) injection 80 Units/kg, Once  heparin (porcine) injection 80 Units/kg, PRN  heparin (porcine) injection 40 Units/kg, PRN  heparin 25,000 units in dextrose 5% 250 mL (premix) infusion, Continuous  piperacillin-tazobactam (ZOSYN) 2,250 mg in dextrose 5 % 50 mL IVPB (mini-bag), Q8H  sodium chloride flush 0.9 % injection 5-40 mL, 2 times per day  sodium chloride flush 0.9 % injection 5-40 mL, PRN  heparin (porcine) injection 5,000 Units, 3 times per day  ondansetron (ZOFRAN-ODT) disintegrating tablet 4 mg, Q8H PRN   Or  ondansetron (ZOFRAN) injection 4 mg, Q6H PRN  polyethylene glycol (GLYCOLAX) packet 17 g, Daily PRN  acetaminophen (TYLENOL) tablet 650 mg, Q6H PRN   Or  acetaminophen (TYLENOL) suppository 650 mg, Q6H PRN        Review of Systems:   Review of Systems   Pertinent positives stated above in HPI. All other systems were reviewed and were negative. ROS: As in HPI    Physical exam:   Physical Exam   Constitutional: Well-developed elderly gentleman very awake and alert in no distress. Vitals:   Vitals:    10/27/21 1011   BP: (!) 153/73   Pulse: 69   Resp: 16   Temp: 98.2 °F (36.8 °C)   SpO2:        Skin: no rash, turgor is normal  Heent: Pupils are reactive to light. Throat is clear. Oral mucosa is moist.  Neck: no bruits or jvd noted   Cardiovascular: Regular sinus rhythm without murmur or rubs. Respiratory: Decreased breath sound bilaterally. Abdomen: soft,non tender,good bowel sound and no palpable mass  Ext: No lower extremity edema  Psychiatric: mood and affect appropriate  Musculoskeletal:  Rom, muscular strength intact   CNS: Very awake and very alert. Well-oriented. Normal speech. Good motor strength. No obvious focal deficit.     Data:   Labs:  Lab Results   Component Value Date     10/27/2021     10/26/2021     10/15/2021    K 4.4 10/27/2021    K 4.5 10/26/2021    K 4.6 10/15/2021     10/27/2021    CO2 28 10/27/2021    CO2 28 10/26/2021    CO2 27 10/15/2021    CREATININE 5.3 (HH) 10/27/2021    CREATININE 4.8 (HH) 10/26/2021    CREATININE 5.3 (HH) 10/15/2021    BUN 22 10/27/2021    BUN 20 10/26/2021    BUN 31 (H) 10/15/2021    GLUCOSE 79 10/27/2021    GLUCOSE 94 10/26/2021    GLUCOSE 87 10/15/2021    PHOS 2.9 10/15/2021    PHOS 2.5 10/14/2021    PHOS 2.4 10/13/2021    WBC 5.9 10/27/2021    WBC 5.2 10/27/2021    WBC 5.7 10/26/2021    HGB 8.3 (L) 10/27/2021    HGB 8.8 (L) 10/27/2021    HGB 10.2 (L) 10/26/2021    HCT 24.4 (L) 10/27/2021    HCT 26.3 (L) 10/27/2021    HCT 31.0 (L) 10/26/2021    MCV 91.0 10/27/2021     (L) 10/27/2021     {Labs reviewed    Imaging:  CXR results: Reviewed        Thank you Dr. Ella Butt MD for allowing us to participate in care of Yasmin Cooper   **This report has been created using voice recognition software. It maycontain minor  errors which are inherent in voice recognition technology. **    Electronically signed by Dorota Michael MD on 10/27/2021 at 11:30 AM

## 2021-10-28 NOTE — PROGRESS NOTES
clear.  CARDIOVASCULAR:  S1, S2  regular rate and rhythm. RESPIRATORY: Clear to ausculation bilaterally. Equal breath sounds. No wheezes. No shortness of breath noted at rest.  ABDOMEN: soft, non tender  NEUROLOGICAL: Patient is alert and oriented to person, place, and time. Recent and remote memory intact. Thought is coherant. SKIN: no rash, No significant bruises on exposed surfaces  MUSCULOSKELETAL: Movement is coordinated. Moves all extremities   EXTREMITIES: Distal lower extremity temp is warm, No lower extremity edema. Upper extremity AV Fistula   PSYCHIATRIC: mood and affect appropriate. Medications:   Med reviewed  Scheduled Meds:   aspirin  81 mg Oral Daily    clopidogrel  75 mg Oral Daily    metoprolol tartrate  100 mg Oral BID    atorvastatin  40 mg Oral Nightly    vancomycin (VANCOCIN) intermittent dosing (placeholder)   Other RX Placeholder    piperacillin-tazobactam  2,250 mg IntraVENous Q8H    sodium chloride flush  5-40 mL IntraVENous 2 times per day     Continuous Infusions:   heparin (PORCINE) Infusion 15 Units/kg/hr (10/28/21 0135)     Labs:   Labs reviewed    Recent Labs     10/26/21  1910 10/27/21  0342 10/27/21  1100   WBC 5.7 5.2 5.9   HGB 10.2* 8.8* 8.3*   HCT 31.0* 26.3* 24.4*    125* 122*     Recent Labs     10/26/21  2014 10/27/21  0342 10/28/21  0707    141 136   K 4.5 4.4 4.1   CL 97* 100 96*   CO2 28 28 27   BUN 20 22 11   CREATININE 4.8* 5.3* 3.7*   CALCIUM 8.7 8.2* 8.2*   LABALBU 2.9*  --   --    ANIONGAP 16.0 13.0 13.0      Summary 5/20/2021   Left ventricle size is normal.   Normal left ventricular wall thickness. There was mild global hypokinesis of the left ventricle. Systolic function was mildly reduced. Ejection fraction is visually estimated at 45%. Doppler parameters were consistent with abnormal left ventricular   relaxation (grade 1 diastolic dysfunction). The left atrium is Moderately dilated. Moderate mitral regurgitation is present. Moderate tricuspid regurgitation visualized. Right ventricular systolic pressure measures 55 mmhg. When this echo is compared with the echo from 05/22/2018, the EF dropped   from 60 % to 45% now    CTA CHEST W WO CONTRAST  Result Date: 10/26/2021  1. CT evidence of pulmonary embolus is seen involving the lingula and left upper lobe. 2. Emphysema. 3. There is diffuse groundglass opacification of both lungs with areas of bronchial wall thickening. Findings can relate to underlying interstitial edema versus infectious process. Clinical correlation is recommended. 4. A large right pleural effusion is seen. A moderate left pleural effusion 5. Body wall edema relating to anasarca. 6. A 1.3 cm right upper lobe pulmonary nodule is noted. 7. Other findings as described above. ASSESSMENT:  1. 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 AV fistula. Apply Emla cream to AV fistula site 30 min prior to Hemodialysis   2. Pulm embolus lingula and Left  Upper lobe, with acute hypoxic resp failure. Heparin drip  3. Recent COVID 19 pneumonia. 4. Essential Hypertension with nephrosclerosis, will monitor for now  5. Abdominal aortic aneurysm 3.7 cm   6. 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 Coronary artery disease Recent PCI 8/31/21 at [facility]  8. Anemia of chronic disease on Erythropoiesis-Stimulating Agent. And Acute blood loss,   9. Erosive gastritis per EGD  10. Secondary hyperparathyroidism of renal origin on rocaltrol  11. Debility,     BMP in AM  Active Problems:    Dyspnea  Resolved Problems:    * No resolved hospital problems.  XIN Rocha - CNP 8:53 AM 10/28/2021

## 2021-10-28 NOTE — PROGRESS NOTES
Joann Martinez 60  INPATIENT OCCUPATIONAL THERAPY  [facility] ONC MED 5K  EVALUATION    Time:    Time In: 901  Time Out: 935  Timed Code Treatment Minutes: 23 Minutes  Minutes: 34          Date: 10/28/2021  Patient Name: Mathew Awan,   Gender: male      MRN: [id number]  : 11/10/1930  ([address]  Referring Practitioner: SHANELL Gillis  Diagnosis: Dyspnea  Additional Pertinent Hx: [address] male who presents to the emergency department for evaluation of shortness of breath. Patient has a history of end-stage renal disease on dialysis, coronary artery disease, COPD, CHF with EF 45%. Patient was diagnosed with COVID-19 pneumonia last month and was sent to [facility] on 3 L nasal cannula. Patient has been experiencing increasing shortness of breath today. His last dialysis session was yesterday    Restrictions/Precautions:  Restrictions/Precautions: General Precautions, Fall Risk  Position Activity Restriction  Other position/activity restrictions: dialysis MWF, 3L O2 at home    Subjective  Chart Reviewed: Yes, Orders, Progress Notes  Patient assessed for rehabilitation services?: Yes  Family / Caregiver Present: Yes    Subjective: RN okayed session, pleasant and cooperative with mod encouragement from family. Pain:  Pain Assessment  Patient Currently in Pain: Denies    Vitals: Oxygen:  Moderate SOB with mobility, unable to get O2 reading, SOB resided after moderate seated break with VC for breathing technique    Social/Functional History:  Lives With: Daughter  Type of Home: House  Home Layout: One level  Home Access: Stairs to enter without rails  Entrance Stairs - Number of Steps: 1 SHAKA  Home Equipment: Rolling walker           ADL Assistance: Independent  Homemaking Assistance: Needs assistance  Ambulation Assistance: Independent  Transfer Assistance: Independent    Active : No  Patient's  Info: family provides transportation services     Additional Comments: Pt was living at home with daughter prior. Pt was recently d/c to SNF with plans to return prior to going home. VISION:WFL    HEARING:  WFL    COGNITION: Slow Processing, Decreased Recall, Decreased Insight, Impaired Memory, Decreased Problem Solving, Decreased Safety Awareness and Impulsive    RANGE OF MOTION:  Bilateral Upper Extremity:  WFL    STRENGTH:  Bilateral Upper Extremity:  deconditioning noted    SENSATION:   WFL    ADL:   Grooming: Contact Guard Assistance. standing at sink for hand hygiene  Lower Extremity Dressing: Stand By Assistance. pulling socks up while supine in bed  [address] for clothing mgmt, increased time for pt to have BM  Toilet Transfer: [address] with VC for safety. BALANCE:  Sitting Balance:  Stand By Assistance. Standing Balance: Contact Guard Assistance. pt very impulsive    BED MOBILITY:  Supine to Sit: Stand By Assistance    Scooting: Stand By Assistance      TRANSFERS:  Sit to Stand:  [address] to Min A at times  Stand to Sit: Minimal Assistance. to control descent    FUNCTIONAL MOBILITY:  Assistive Device: Rolling Walker  Assist Level:  Contact Guard Assistance. Distance: To and from bathroom  Pt very impulsive, slow pace, no LOB. Moderate SOB noted, VC required to encourage breathing in through nose. Activity Tolerance:  Patient tolerance of  treatment: good. Assessment:  Assessment: Pt presented with the listed deficits s/p admission with dyspnea. Pt with decreased endurance and activity tolerance and currently requires increased A for ADLs and IADLs. Pt would benefit from continued OT to restore PLOF maximize pt's indep with ADLs and IADLs in prep for a safe return home at max level of indep.     Performance deficits / Impairments: Decreased functional mobility , Decreased ADL status, Decreased endurance, Decreased strength, Decreased high-level IADLs, Decreased posture, Decreased balance, Decreased safe awareness  Prognosis: Fair  REQUIRES OT FOLLOW UP: Yes  Decision Making: Medium Complexity  Safety Devices in place: Yes  Type of devices: All fall risk precautions in place, Call light within reach, Chair alarm in place, Left in chair    Treatment Initiated: Treatment and education initiated within context of evaluation. Evaluation time included review of current medical information, gathering information related to past medical, social and functional history, completion of standardized testing, formal and informal observation of tasks, assessment of data and development of plan of care and goals. Treatment time included skilled education and facilitation of tasks to increase safety and independence with ADL's for improved functional independence and quality of life. Discharge Recommendations:  Subacute/Skilled Nursing Facility    Patient Education:  OT Education: OT Role, Plan of Care, ADL Adaptive Strategies, Transfer Training, Energy Conservation, IADL Safety    Equipment Recommendations:  Equipment Needed: No    Plan:  Times per week: 3-5x  Times per day: Daily  Current Treatment Recommendations: Strengthening, Balance Training, Functional Mobility Training, Endurance Training, Home Management Training, Patient/Caregiver Education & Training, Self-Care / ADL, Safety Education & Training. See long-term goal time frame for expected duration of plan of care. If no long-term goals established, a short length of stay is anticipated.     Goals:  Patient goals : not stated  Short term goals  Time Frame for Short term goals: by discharge  Short term goal 1: Pt will complete functional mobility to/from bathroom with SBA and min SOB for ease with ADLs  Short term goal 2: Pt will tolerate dynamic standing > 4 min with SBA and B hand release for ease with grooming  Short term goal 3: Pt will complete BADL routine with min and no safety cues for ease with bathing  Short term goal 4: Pt will demo use of ECT throughout BADL routine to decrease SOB during grooming         Following session, patient left in safe position with all fall risk precautions in place.

## 2021-10-28 NOTE — PROGRESS NOTES
[facility]  INPATIENT PHYSICAL THERAPY  EVALUATION  [facility] ONC MED 5K - 5K-15/015-A    Time In: 1000  Time Out: 9470  Timed Code Treatment Minutes: 19 Minutes  Minutes: 28          Date: 10/28/2021  Patient Name: Sherrill Root,  Gender:  male        MRN: [id number]  : 11/10/1930  (80 y.o.)      Referring Practitioner: SHANELL Hatfield  Diagnosis: dyspnea  Additional Pertinent Hx: Per chart \"Tunde Bautista is a 80 y.o. male with PMHx of ESRD on HD Monday, Wednesday, and Friday who presented to [facility] with progressive dyspnea for the past two weeks. Patient was recently admitted to our hospital for COVID pneumonia a month ago. He was admitted on 2021 and was discharged on 10/15/2021. He was discharged on supplemental O2 at 3L NC. Patient was at a SNF, and complained of progressive dyspnea for the past two weeks. Per ED notes, patient was walking today, and was found to have hypoxia down to the mid 80s while on supplemental O2. Patient denied chest pain. He denied coughing. He denied diaphoresis. He denied fever. Staff at the SNF called EMS. Here, patient again was observed by staff ED RN to have hypoxia as low as the mid 80s while on 3L NC. His oxygen was increased to 6L NC, and he showed improvement on his oxygenation. CXR in the ED demonstrated diffuse airspace opacities, which have progressed from the prior chest radiograph, concerning for  pneumonia or pulmonary edema. Blood cultures pending. Procalcitonin elevated at 0.95. He was empirically started on IV antibiotics to cover potential HCAP. \"     Restrictions/Precautions:  Restrictions/Precautions: General Precautions, Fall Risk  Position Activity Restriction  Other position/activity restrictions: dialysis MWF    Subjective:  Chart Reviewed: Yes  Patient assessed for rehabilitation services?: Yes  Family / Caregiver Present: No  Subjective: Ok to see pt per nursing.  Pt asked PT at entry and end of session to make sure RW is closer to him so he can get up and move when he has to move quickly. Pt agreeable to PT session at this time with family present at end of session, pt wanting to use bathroom. General:  Overall Orientation Status: Within Functional Limits  Follows Commands: Within Functional Limits    Vision: Within Functional Limits    Hearing: Within functional limits         Pain: denies    Vitals: Oxygen: 3 L of O2 >90%, SOB noted    Social/Functional History:    Lives With: Daughter  Type of Home: House  Home Layout: One level  Home Access: Stairs to enter without rails  Entrance Stairs - Number of Steps: 1 SHAKA  Home Equipment: Rolling walker             ADL Assistance: Independent  Homemaking Assistance: Needs assistance  Ambulation Assistance: Independent  Transfer Assistance: Independent    Active : No     Additional Comments: Pt was living at home with daughter prior. Pt was recently d/c to SNF with plans to return prior to going home. OBJECTIVE:  Range of Motion:  Bilateral Lower Extremity: WNL    Strength:  Bilateral Lower Extremity: Impaired - 4-/5    Balance:  Static Sitting Balance:  Supervision  Dynamic Sitting Balance: Stand By Assistance, X 1, with cues for safety, with verbal cues   Static Standing Balance: Contact Guard Assistance, X 1, with cues for safety, with verbal cues   RW for support in standing. Pt completed ricky care in sitting with no A required.  Pt required assist for donning and doffing brief in standing for safety, no LOB noted required UE support on RW for safety  Bed Mobility:  Not Tested    Transfers:  Sit to Stand: Contact Guard Assistance, X 1, with increased time for completion, cues for hand placement, with verbal cues  Stand to Sit:Contact Guard Assistance, X 1, with increased time for completion, cues for hand placement, with verbal cues  Cues for safety with use of RW for support, cues for improved sequencing to decrease falls with fair demo as pt impulsive at times during session. Ambulation:  Contact Guard Assistance, X 1, with cues for safety, with verbal cues   Distance: 12 feet x2  Surface: Level Tile  Device:Rolling Walker  Gait Deviations: Forward Flexed Posture, Slow Valeri, Decreased Step Length Bilaterally, Decreased Gait Speed, Decreased Heel Strike Bilaterally, Unsteady Gait, Decreased Terminal Knee Extension and reduced safety noted with being impulsive trying to hurry to restroom due to BM. Pt educated on safety and required assist for O2 and IV pole management, poor safety noted. Functional Outcome Measures: Completed  AM-PAC Inpatient Mobility Raw Score : 16  AM-PAC Inpatient T-Scale Score : 40.78    ASSESSMENT:  Activity Tolerance:  Patient tolerance of  treatment: fair. Reduced endurance, increased fatigue      Treatment Initiated: Treatment and education initiated within context of evaluation. Evaluation time included review of current medical information, gathering information related to past medical, social and functional history, completion of standardized testing, formal and informal observation of tasks, assessment of data and development of plan of care and goals. Treatment time included skilled education and facilitation of tasks to increase safety and independence with functional mobility for improved independence and quality of life. Assessment: Body structures, Functions, Activity limitations: Decreased functional mobility , Decreased balance, Decreased posture, Decreased strength, Decreased safe awareness, Decreased cognition, Decreased endurance  Assessment: pt cont to require skilled PT services to increase strength, progress with endurance, improve IND with functional tasks to increase safety and decrease risk for falls.   Prognosis: Good    REQUIRES PT FOLLOW UP: Yes    Discharge Recommendations:  Discharge Recommendations: Continue to assess pending progress, Subacute/Skilled Nursing Facility, F with PT, Patient would benefit from continued therapy after discharge    Patient Education:  PT Education: Goals, General Safety, Gait Training, PT Role, Plan of Care, Energy Conservation, Transfer Training, Functional Mobility Training, Equipment    Equipment Recommendations:  Equipment Needed: No    Plan:  Times per week: 3-5x GM  Current Treatment Recommendations: Strengthening, Neuromuscular Re-education, Home Exercise Program, Safety Education & Training, Balance Training, Endurance Training, Patient/Caregiver Education & Training, Functional Mobility Training, Equipment Evaluation, Education, & procurement, Transfer Training, Gait Training, Stair training    Goals:  Patient goals : to get better  Short term goals  Time Frame for Short term goals: by discharge  Short term goal 1: Pt will amb with RW with S for safety for >100 feet to progress with endurance and overall mobility. Short term goal 2: Pt will complete transfers with S wtih RW for support with improved safety to decrease falls. Short term goal 3: Pt will complete 10-20 reps of ther ex to increase overall mobility. Long term goals  Time Frame for Long term goals : NA due to short ELOS    Following session, patient left in safe position with all fall risk precautions in place. Pt wanting back in bedside chair following session, all needs and call light in reach with education on use of call light for safety, family present, RW away from pt for safety.

## 2021-10-28 NOTE — PROGRESS NOTES
attempted to draw heparin studies and could not get enough blood. Patient refused for her to try again and is requesting a line to be put in as he has limited access and had a PICC last admission. A second  was able to draw labs and it resulted as a critical value of 1.02. Heparin drip stopped for 60 minutes per protocol.    Electronically signed by Wilfred Conti RN on 10/28/2021 at 12:51 AM

## 2021-10-28 NOTE — CARE COORDINATION
10/28/21, 11:49 AM EDT    DISCHARGE ON GOING EVALUATION    + PE left upper lobe on CTA chest  10/28 CXR:   Worsening bilateral patchy pulmonary opacities with small bilateral pleural effusions.           Barriers to Discharge:  Heparin drip, Plavix, Zosyn and Vancomycin IV for PNA. Creatinine 3.7. PT/OT. Nephrology and hospitalist. O2 3L/min. Sat 98% PPI while on Heparin gtt. Patient Goals/Plan/Treatment Preferences: from [facility] and attends HD on M,W,F. Will follow.

## 2021-10-28 NOTE — PROGRESS NOTES
Hospitalist Progress Note      Patient:  Lisa Sood    Unit/Bed:5K-15/015-A  YOB: 1930  MRN: [id number]   Acct: [de-identified]   PCP: Holden Perkins MD  Date of Admission: 10/26/2021    Assessment/Plan:    1. Acute on Chronic respiratory failure, hypoxia: Multifactorial.  PE, pneumonia, fluid overload. Patient's baseline is 3 L. Patient required 6 L on admission. CXR in the AM.   2. Acute PE: CTA of chest shows PE and lingula. Heparin drip started. Will need to have discussion with patient and family about risks and benefits of oral anticoagulation. Echo pending. 3. Possible Bacterial PNA: CTA shows possible pneumonia. Chest x-ray in the a.m. Zosyn and vancomycin. Patient was recently admitted for COVID-19 pneumonia. CXR (10/28) shows worsening bilateral patchy pulmonary opacities. 4. ESRD on HD: Nephrology consulted. Dialysis today. Patient tolerated well. 5. Recent upper GI bleed: We will need to watch very carefully on heparin. Continue PPI. 6. Anemia of chronic disease: Hemoglobin stable at this time. Continue to watch on heparin. 7. HFrEF, acute on chronic: Echo pending. May 2021 EF 45%. Hemodialysis today. Continue home medications. 8. CAD: PCI in 2018. Stress test in May 2021 did not show concern for MI. Patient is chest pain-free. Continue home medications. Chief Complaint: Shortness of breath    Initial H and P:-    Initial H&P \"Tunde Ng is a 80 y.o. male with PMHx of ESRD on HD Monday, Wednesday, and Friday who presented to [address] [place] [address] with progressive dyspnea for the past two weeks. Patient was recently admitted to our hospital for COVID pneumonia a month ago. He was admitted on 9/13/2021 and was discharged on 10/15/2021. He was discharged on supplemental O2 at 3L NC.     Patient was at a SNF, and complained of progressive dyspnea for the past two weeks.  Per ED notes, patient was walking today, and was found to have hypoxia down to the mid 80s while on supplemental O2. Patient denied chest pain. He denied coughing. He denied diaphoresis. He denied fever. Staff at the SNF called EMS.    Here, patient again was observed by staff ED RN to have hypoxia as low as the mid 80s while on 3L NC. His oxygen was increased to 6L NC, and he showed improvement on his oxygenation. CXR in the ED demonstrated   diffuse airspace opacities, which have progressed from the prior chest radiograph, concerning for  pneumonia or pulmonary edema. Blood cultures pending. Procalcitonin elevated at 0.95. He was empirically started on IV antibiotics to cover potential HCAP.      We were asked to admit this patient for further management. \"     10/27: No acute events overnight. Patient was admitted to the night. Patient underwent hemodialysis today and tolerated it well. Patient was sleeping upon arrival.  Patient was back on baseline 3 L. Subjective (past 24 hours):   No acute events overnight. Pt sitting in the chair. Pt complaining of abd pain. Pt states that he is not hungry. Past medical history, family history, social history and allergies reviewed again and is unchanged since admission. ROS (All review of systems completed. Pertinent positives noted.  Otherwise All other systems reviewed and negative.)     Medications:  Reviewed    Infusion Medications    heparin (PORCINE) Infusion 15.029 Units/kg/hr (10/28/21 0955)     Scheduled Medications    [START ON 10/29/2021] calcitRIOL  1.5 mcg Oral Once per day on Mon Wed Fri    [START ON 10/29/2021] lidocaine   Topical Once per day on Mon Wed Fri    [START ON 10/29/2021] epoetin traci-epbx  10,000 Units SubCUTAneous Once per day on Mon Wed Fri    vancomycin  1,750 mg IntraVENous Once    aspirin  81 mg Oral Daily    clopidogrel  75 mg Oral Daily    metoprolol tartrate  100 mg Oral BID    atorvastatin  40 mg Oral Nightly    vancomycin (VANCOCIN) intermittent dosing (placeholder)   Other RX Placeholder    piperacillin-tazobactam  2,250 mg IntraVENous Q8H    sodium chloride flush  5-40 mL IntraVENous 2 times per day     PRN Meds: hydrALAZINE, heparin (porcine), heparin (porcine), sodium chloride flush, ondansetron **OR** ondansetron, polyethylene glycol, acetaminophen **OR** acetaminophen      Intake/Output Summary (Last 24 hours) at 10/28/2021 1428  Last data filed at 10/28/2021 0232  Gross per 24 hour   Intake 782 ml   Output --   Net 782 ml       Diet:  ADULT DIET; Regular; Low Phosphorus (Less than 1000 mg); 60 to 80 gm    Exam:  BP (!) 130/59   Pulse 69   Temp 98.4 °F (36.9 °C) (Oral)   Resp 18   Wt 155 lb 10.3 oz (70.6 kg)   SpO2 98%   BMI 25.12 kg/m²   General appearance: Chronically ill-appearing male  HEENT: Pupils equal, round, and reactive to light. Conjunctivae/corneas clear. Neck: Supple, with full range of motion. No jugular venous distention. Trachea midline. Respiratory:  Normal respiratory effort on NC. Breath sounds diminished. Cardiovascular: Regular rate and rhythm with normal S1/S2 without murmurs, rubs or gallops. Abdomen: Soft, non-tender, non-distended with normal bowel sounds. Musculoskeletal: passive and active ROM x 4 extremities. Skin: Skin color, texture, turgor normal.  No rashes or lesions. Neurologic:  Neurovascularly intact without any focal sensory/motor deficits.  Cranial nerves: II-XII intact, grossly non-focal.  Psychiatric: Alert, pleasantly confused  Capillary Refill: Brisk,< 3 seconds   Peripheral Pulses: +2 palpable, equal bilaterally     Labs:   Recent Labs     10/26/21  1910 10/27/21  0342 10/27/21  1100   WBC 5.7 5.2 5.9   HGB 10.2* 8.8* 8.3*   HCT 31.0* 26.3* 24.4*    125* 122*     Recent Labs     10/26/21  2014 10/27/21  0342 10/28/21  0707    141 136   K 4.5 4.4 4.1   CL 97* 100 96*   CO2 28 28 27   BUN 20 22 11   CREATININE 4.8* 5.3* 3.7*   CALCIUM 8.7 8.2* 8.2*     Recent Labs 10/26/21  2014   AST 47*   ALT 27   BILIDIR <0.2   BILITOT 0.5   ALKPHOS 139*     Microbiology:    Blood culture #1:   Lab Results   Component Value Date    BC No growth-preliminary  10/26/2021     Organism:  Lab Results   Component Value Date    ORG Micrococcus species 07/12/2021     Radiology:  XR CHEST PORTABLE   Final Result   Worsening bilateral patchy pulmonary opacities with small bilateral pleural effusions. **This report has been created using voice recognition software. It may contain minor errors which are inherent in voice recognition technology. **      Final report electronically signed by Dr. Javier Rocha on 10/28/2021 7:16 AM      CTA CHEST [address]   Final Result      1. CT evidence of pulmonary embolus is seen involving the lingula and left upper lobe. 2. Emphysema. 3. There is diffuse groundglass opacification of both lungs with areas of bronchial wall thickening. Findings can relate to underlying interstitial edema versus infectious process. Clinical correlation is recommended. 4. A large right pleural effusion is seen. A moderate left pleural effusion   5. Body wall edema relating to anasarca. 6. A 1.3 cm right upper lobe pulmonary nodule is noted. 7. Other findings as described above. **This report has been created using voice recognition software. It may contain minor errors which are inherent in voice recognition technology. **      Final report electronically signed by Dr Adrienne Rangel on 10/26/2021 10:21 PM      XR CHEST PORTABLE   Final Result   1. The diffuse airspace opacities have progressed from the prior chest radiograph. Findings can relate to pneumonia or pulmonary edema. 2. Small bilateral pleural effusions. 3. Cardiomegaly. **This report has been created using voice recognition software. It may contain minor errors which are inherent in voice recognition technology. **      Final report electronically signed by  Donna Ballesteros on 10/26/2021 8:31 PM        Electronically signed by SHANELL King on 10/28/2021 at 2:28 PM

## 2021-10-29 NOTE — CARE COORDINATION
DISCHARGE PLANNING UPDATE    Hgb 8.7 (was 9.6), on Eliquis. O2 3L/min sat 93%. PT/OT. For HD today. Creat 4.8. Plan is return [facility] start prior authorization today. See SW note.

## 2021-10-29 NOTE — PROGRESS NOTES
Pharmacy Vancomycin Consult     Vancomycin Day: 3  Current Dosing: HD intermittent  Current indication: HAP    Temp max:  98.7    Recent Labs     10/27/21  1100 10/28/21  0707 10/28/21  1821 10/29/21  0516   BUN  --  11  --  17   CREATININE  --  3.7*  --  4.8*   WBC   < >  --  6.3 6.5    < > = values in this interval not displayed. Intake/Output Summary (Last 24 hours) at 10/29/2021 0814  Last data filed at 10/29/2021 5137  Gross per 24 hour   Intake 250 ml   Output 0 ml   Net 250 ml     Culture Date Source Results   10/26/21 BC x 2 NGTD   - - -   - - -         Ht Readings from Last 1 Encounters:   09/13/21 5' 6\" (1.676 m)        Wt Readings from Last 1 Encounters:   10/29/21 161 lb 9.6 oz (73.3 kg)       Body mass index is 26.08 kg/m². Estimated Creatinine Clearance: 9 mL/min (A) (based on SCr of 4.8 mg/dL [facility] Date Time Vancomycin dose Vancomycin Random   10/27/2021 0441 1750mg     10/27/2021   HD      10/28/2021 0707    10.3   10/28/2021   1400  1750mg      10/29/2021  0516    24.5    10/29/2021 1700  1750mg                   Assessment/Plan:  Will order vancomycin 1750mg x 1 for after dialysis today. Patient's level is in range for a pre-dialysis level.     Reminded RN to take MRSA PCR    Georgina Chen, PharmD  8:21 AM  10/29/2021

## 2021-10-29 NOTE — PROGRESS NOTES
Joann Martinez 60  PHYSICAL THERAPY MISSED TREATMENT NOTE  [facility]    Date: 10/29/2021  Patient Name: Brandy Richmond        MRN: [id number]   : 11/10/1930  (80 y.o.)  Gender: male   Referring Practitioner: SHANELL Bush  Diagnosis: dyspnea         REASON FOR MISSED TREATMENT:  Second attempt in pm. Transport present in pts room, pt just back from dialysis and staff in to get vitals. Will allow to rest at this time. Will check back on next available date.

## 2021-10-29 NOTE — FLOWSHEET NOTE
10/29/21 0810 10/29/21 0829 10/29/21 1237   Vital Signs   BP (!) 157/75  --  (!) 147/59   Temp 98.1 °F (36.7 °C)  --  97.8 °F (36.6 °C)   Pulse 71  --  81   Resp 18  --  16   Weight 147 lb 0.8 oz (66.7 kg)  --  140 lb 10.5 oz (63.8 kg)   Weight Method Bed scale  --  Bed scale   Percent Weight Change -9  --  -4.35   Pain Assessment   Pain Level  --  5  --    Post-Hemodialysis Assessment   Post-Treatment Procedures  --   --  Blood returned; Access bleeding time < 10 minutes   Machine Disinfection Process  --   --  Acid/Vinegar Clean;Heat Disinfect; Exterior Machine Disinfection   Rinseback Volume (ml)  --   --  500 ml   Total Liters Processed (l/min)  --   --  90.3 l/min   Dialyzer Clearance  --   --  Lightly streaked   Duration of Treatment (minutes)  --   --  240 minutes   Hemodialysis Intake (ml)  --   --  400 ml   Hemodialysis Output (ml)  --   --  3000 ml   NET Removed (ml)  --   --  2500 ml   Tolerated Treatment  --   --  Good   Stable 4 hour treatment completed. Removed 2.5 liter of fluid as per order. Pressure held to needle sites for ten minutes each. Dressing clean, dry and intact. Report given to primary RN. Treatment record printed for scanning into EMR.

## 2021-10-29 NOTE — PROGRESS NOTES
Renal Progress Note    Assessment and Plan:   1. End-stage kidney disease on hemodialysis. 2. Hypertension primary  3. Recent SARS-CoV-2 pneumonia  4. Deconditioning  5. Normocytic anemia    PLAN:  1. Labs reviewed  2. Medications reviewed  3. Add losartan 50 mg once a day for better blood pressure control  4. Hemodialysis in progress  5. Tolerating treatment well so far  6. Hemodynamically stable  7. We will continue to follow    Patient Active Problem List:     CAD (coronary artery disease)     COPD (chronic obstructive pulmonary disease) ([address]     Chronic renal insufficiency     Patient overweight     Arthritis-  low back and neck .      Dyspnea and respiratory abnormalities     ED (erectile dysfunction)     Degenerative joint disease of knee, left     Gout of big toe     Anemia, chronic disease     Essential hypertension     Monoclonal gammopathy     Leukopenia     Thrombocytopenia (HCC)     Chronic kidney disease (CKD), stage V (HCC)     Metabolic acidosis     Iron deficiency anemia     Plasma cell dyscrasia     Idiopathic hypotension     Systolic congestive heart failure (HCC)     Other fluid overload (CODE)     Hyperphosphatemia     Elevated troponin     Pulmonary hypertension (HCC)     Anemia due to chronic kidney disease, on chronic dialysis (HCC)     Volume overload     Secondary hyperparathyroidism of renal origin ([address]     Chest pain     Acute pulmonary edema (HCC)     Gastritis and duodenitis     Fall from slip, trip, or stumble, initial encounter     Closed fracture of left ankle     ESRD on hemodialysis ([address]     Hypertensive emergency     SOB (shortness of breath)     Chronic combined systolic and diastolic CHF (congestive heart failure) ([address]     COVID-19     Severe malnutrition ([address]     Hypoxia     Dyspnea      Subjective:   Admit Date: 10/26/2021    Interval History:   Seen for end-stage kidney disease on hemodialysis  Awake and alert  No new complaints  No issues from staff  Seen in hemodialysis unit  Blood pressure is variable but mostly high      Medications:   Scheduled Meds:   vancomycin  1,750 mg IntraVENous Once    calcitRIOL  1.5 mcg Oral Once per day on Mon Wed Fri    lidocaine   Topical Once per day on Mon Wed Fri    epoetin traci-epbx  10,000 Units SubCUTAneous Once per day on Mon Wed Fri    apixaban  10 mg Oral BID    Followed by   Terrell Nations ON 11/4/2021] apixaban  5 mg Oral BID    aspirin  81 mg Oral Daily    clopidogrel  75 mg Oral Daily    metoprolol tartrate  100 mg Oral BID    atorvastatin  40 mg Oral Nightly    vancomycin (VANCOCIN) intermittent dosing (placeholder)   Other RX Placeholder    piperacillin-tazobactam  2,250 mg IntraVENous Q8H    sodium chloride flush  5-40 mL IntraVENous 2 times per day     Continuous Infusions:    CBC:   Recent Labs     10/27/21  1100 10/28/21  1821 10/29/21  0516   WBC 5.9 6.3 6.5   HGB 8.3* 9.6* 8.7*   * 138 144     CMP:    Recent Labs     10/27/21  0342 10/28/21  0707 10/29/21  0516    136 139   K 4.4 4.1 4.1    96* 96*   CO2 28 27 24   BUN 22 11 17   CREATININE 5.3* 3.7* 4.8*   GLUCOSE 79 89 100   CALCIUM 8.2* 8.2* 8.4*   LABGLOM 12* 19* 14*     Troponin: No results for input(s): TROPONINI in the last 72 hours. BNP: No results for input(s): BNP in the last 72 hours. INR: No results for input(s): INR in the last 72 hours. Lipids: No results for input(s): CHOL, LDLDIRECT, TRIG, HDL, AMYLASE, LIPASE in the last 72 hours. Liver:   Recent Labs     10/26/21  2014   AST 47*   ALT 27   ALKPHOS 139*   PROT 6.8   LABALBU 2.9*   BILITOT 0.5     Iron:  No results for input(s): IRONS, FERRITIN in the last 72 hours. Invalid input(s): LABIRONS  XR CHEST PORTABLE   Final Result   Worsening bilateral patchy pulmonary opacities with small bilateral pleural effusions. **This report has been created using voice recognition software. It may contain minor errors which are inherent in voice recognition technology. **      Final report electronically signed by Dr. Linnea Henry on 10/28/2021 7:16 AM      CTA CHEST [address]   Final Result      1. CT evidence of pulmonary embolus is seen involving the lingula and left upper lobe. 2. Emphysema. 3. There is diffuse groundglass opacification of both lungs with areas of bronchial wall thickening. Findings can relate to underlying interstitial edema versus infectious process. Clinical correlation is recommended. 4. A large right pleural effusion is seen. A moderate left pleural effusion   5. Body wall edema relating to anasarca. 6. A 1.3 cm right upper lobe pulmonary nodule is noted. 7. Other findings as described above. **This report has been created using voice recognition software. It may contain minor errors which are inherent in voice recognition technology. **      Final report electronically signed by Dr Reyes Panning on 10/26/2021 10:21 PM      XR CHEST PORTABLE   Final Result   1. The diffuse airspace opacities have progressed from the prior chest radiograph. Findings can relate to pneumonia or pulmonary edema. 2. Small bilateral pleural effusions. 3. Cardiomegaly. **This report has been created using voice recognition software. It may contain minor errors which are inherent in voice recognition technology. **      Final report electronically signed by Dr Reyes Panning on 10/26/2021 8:31 PM            Objective:   Vitals: BP (!) 157/75   Pulse 71   Temp 98.1 °F (36.7 °C)   Resp 18   Wt 147 lb 0.8 oz (66.7 kg)   SpO2 97%   BMI 23.73 kg/m²    Wt Readings from Last 3 Encounters:   10/29/21 147 lb 0.8 oz (66.7 kg)   10/15/21 149 lb 4 oz (67.7 kg)   07/14/21 145 lb 8.1 oz (66 kg)      24HR INTAKE/OUTPUT:      Intake/Output Summary (Last 24 hours) at 10/29/2021 0842  Last data filed at 10/29/2021 8303  Gross per 24 hour   Intake 250 ml   Output 0 ml   Net 250 ml       Constitutional: Well-developed elderly gentleman alert, awake, no apparent distress   Skin:normal with no rash or lesions. HEENT:Pupils are reactive . Throat is clear . Oral mucosa is moist   Neck:supple with no thyromegaly or carotid bruit  Cardiovascular:  S1, S2 without murmur or rubs   Respiratory:  Clear to ausculation without wheezes, rhonchi or rales. Abdomen: +bs, soft, no tenderness to palpation and no palpable mass. No abdominal bruit   Ext: No LE edema  Musculoskeletal:Intact  Neuro:Alert and awake. Well oriented  with no focal deficit. Speech is normal      Electronically signed by Adonis Kam MD on 10/29/2021 at 8:42 AM  **This report has been created using voice recognition software. It maycontain minor  errors which are inherent in voice recognition technology. **

## 2021-10-29 NOTE — PROGRESS NOTES
Joann Martinez 60  OCCUPATIONAL THERAPY MISSED TREATMENT NOTE  [facility]  5K-15/015-A      Date: 10/29/2021  Patient Name: Philly Reyez        CSN: [id number]   : 11/10/1930  (80 y.o.)  Gender: male   Referring Practitioner: Elinor Fothergill, PA  Diagnosis: Dyspnea         REASON FOR MISSED TREATMENT: Patient Off Floor for Dialysis Will attempt later today if time allows.

## 2021-10-29 NOTE — PROGRESS NOTES
Hospitalist Progress Note      Patient:  Ashlyn Mcmahon    Unit/Bed:5K-15/015-A  YOB: 1930  MRN: [id number]   Acct: [de-identified]   PCP: Holli Brink MD  Date of Admission: 10/26/2021    Assessment/Plan:    1. Acute on Chronic respiratory failure, hypoxia: Multifactorial.  PE, pneumonia, fluid overload. Patient's baseline is 3 L. Patient is back to baseline. CXR shows \"Worsening opacities\". 2. Acute PE: CTA of chest shows PE and lingula. [place] [facility] pending. 3. Possible Bacterial PNA: CTA shows possible pneumonia. Chest x-ray in the a.m. Zosyn and vancomycin. Patient was recently admitted for COVID-19 pneumonia. CXR (10/28) shows worsening bilateral patchy pulmonary opacities. 4. ESRD on HD: Nephrology consulted. Dialysis today. Patient tolerated well. 5. Recent upper GI bleed: Continue PPI. 6. Anemia of chronic disease: Hemoglobin stable at this time. 7. HFrEF, acute on chronic: Echo pending. May 2021 EF 45%. Hemodialysis today. Continue home medications. 8. CAD: PCI in 2018. Stress test in May 2021 did not show concern for MI. Patient is chest pain-free. Continue home medications. Chief Complaint: Shortness of breath    Initial H and P:-    Initial H&P \"Tunde Walker is a 80 y.o. male with PMHx of ESRD on HD Monday, Wednesday, and Friday who presented to [facility] with progressive dyspnea for the past two weeks. Patient was recently admitted to our hospital for COVID pneumonia a month ago. He was admitted on 9/13/2021 and was discharged on 10/15/2021. He was discharged on supplemental O2 at 3L NC.     Patient was at a SNF, and complained of progressive dyspnea for the past two weeks. Per ED notes, patient was walking today, and was found to have hypoxia down to the mid 80s while on supplemental O2. Patient denied chest pain. He denied coughing. He denied diaphoresis.  He denied fever. Staff at the SNF called EMS.    Here, patient again was observed by staff ED RN to have hypoxia as low as the mid 80s while on 3L NC. His oxygen was increased to 6L NC, and he showed improvement on his oxygenation. CXR in the ED demonstrated   diffuse airspace opacities, which have progressed from the prior chest radiograph, concerning for  pneumonia or pulmonary edema. Blood cultures pending. Procalcitonin elevated at 0.95. He was empirically started on IV antibiotics to cover potential HCAP.      We were asked to admit this patient for further management. \"     10/27: No acute events overnight. Patient was admitted to the night. Patient underwent hemodialysis today and tolerated it well. Patient was sleeping upon arrival.  Patient was back on baseline 3 L.    10/28: No acute events overnight. Pt sitting in the chair. Pt complaining of abd pain. Pt states that he is not hungry. Subjective (past 24 hours):   No acute events overnight. Patient eating lunch during evaluation. Patient states that breathing is better. No abdominal pain. Past medical history, family history, social history and allergies reviewed again and is unchanged since admission. ROS (All review of systems completed. Pertinent positives noted.  Otherwise All other systems reviewed and negative.)     Medications:  Reviewed    Infusion Medications     Scheduled Medications    vancomycin  1,750 mg IntraVENous Once    losartan  50 mg Oral Daily    calcitRIOL  1.5 mcg Oral Once per day on Mon Wed Fri    lidocaine   Topical Once per day on Mon Wed Fri    epoetin traci-epbx  10,000 Units SubCUTAneous Once per day on Mon Wed Fri    apixaban  10 mg Oral BID    Followed by   Jnen Frey ON 11/4/2021] apixaban  5 mg Oral BID    aspirin  81 mg Oral Daily    clopidogrel  75 mg Oral Daily    metoprolol tartrate  100 mg Oral BID    atorvastatin  40 mg Oral Nightly    vancomycin (VANCOCIN) intermittent dosing (placeholder)   Other RX Microbiology:    Blood culture #1:   Lab Results   Component Value Date    BC No growth-preliminary  10/26/2021     Organism:  Lab Results   Component Value Date    ORG Micrococcus species 07/12/2021     Radiology:  XR CHEST PORTABLE   Final Result   Worsening bilateral patchy pulmonary opacities with small bilateral pleural effusions. **This report has been created using voice recognition software. It may contain minor errors which are inherent in voice recognition technology. **      Final report electronically signed by Dr. Milka Garcia on 10/28/2021 7:16 AM      CTA CHEST [address]   Final Result      1. CT evidence of pulmonary embolus is seen involving the lingula and left upper lobe. 2. Emphysema. 3. There is diffuse groundglass opacification of both lungs with areas of bronchial wall thickening. Findings can relate to underlying interstitial edema versus infectious process. Clinical correlation is recommended. 4. A large right pleural effusion is seen. A moderate left pleural effusion   5. Body wall edema relating to anasarca. 6. A 1.3 cm right upper lobe pulmonary nodule is noted. 7. Other findings as described above. **This report has been created using voice recognition software. It may contain minor errors which are inherent in voice recognition technology. **      Final report electronically signed by Dr Jacinda Mack on 10/26/2021 10:21 PM      XR CHEST PORTABLE   Final Result   1. The diffuse airspace opacities have progressed from the prior chest radiograph. Findings can relate to pneumonia or pulmonary edema. 2. Small bilateral pleural effusions. 3. Cardiomegaly. **This report has been created using voice recognition software. It may contain minor errors which are inherent in voice recognition technology. **      Final report electronically signed by Dr Jacinda Mack on 10/26/2021 8:31 PM        Electronically signed by Chacorta Bryson [place] on 10/29/2021 at 5:51 PM

## 2021-10-29 NOTE — PROGRESS NOTES
[address] [address]  PHYSICAL THERAPY MISSED TREATMENT NOTE  ACUTE CARE  [facility] 5K              Missed Treatment  Pt. At dialysis at time of attempt will check back again later if time allows.

## 2021-10-29 NOTE — CARE COORDINATION
10/29/21, 8:20 AM EDT    DISCHARGE PLANNING EVALUATION    Call to [facility] with [facility] and requested that [facility] start prior authorization.

## 2021-10-30 NOTE — PROGRESS NOTES
[facility]         Renal Inpatient Follow-Up note         10/30/2021 1:59 PM    Pt Name:   Lula Mosqueda  YOB: 1930  Attending:   [employer], PA    Chief Complaint : Lula Mosqueda is a 80 y.o. male being followed by nephrology for ESRD on hemodialysis    Interval History :   Patient seen and examined by me. No distress  Feels okay complains of some mild shortness of breath  No chest pain or other complaints. Scheduled Medications :    losartan  50 mg Oral Daily    calcitRIOL  1.5 mcg Oral Once per day on Mon Wed Fri    lidocaine   Topical Once per day on Mon Wed Fri    epoetin traci-epbx  10,000 Units SubCUTAneous Once per day on Mon Wed Fri    apixaban  10 mg Oral BID    Followed by   Dc Kumar ON 11/4/2021] apixaban  5 mg Oral BID    aspirin  81 mg Oral Daily    clopidogrel  75 mg Oral Daily    metoprolol tartrate  100 mg Oral BID    atorvastatin  40 mg Oral Nightly    vancomycin (VANCOCIN) intermittent dosing (placeholder)   Other RX Placeholder    piperacillin-tazobactam  2,250 mg IntraVENous Q8H    sodium chloride flush  5-40 mL IntraVENous 2 times per day         Vitals :  BP (!) 154/72   Pulse 66   Temp 98.7 °F (37.1 °C) (Oral)   Resp 16   Wt 149 lb 14.6 oz (68 kg)   SpO2 98%   BMI 24.20 kg/m²     24HR INTAKE/OUTPUT:      Intake/Output Summary (Last 24 hours) at 10/30/2021 1359  Last data filed at 10/29/2021 2245  Gross per 24 hour   Intake 230 ml   Output --   Net 230 ml     Last 3 weights  Wt Readings from Last 3 Encounters:   10/30/21 149 lb 14.6 oz (68 kg)   10/15/21 149 lb 4 oz (67.7 kg)   07/14/21 145 lb 8.1 oz (66 kg)           Physical Exam :  General Appearance:  Well developed.  No distress  Mouth/Throat:  Oral mucosa moist  Neck:  Supple, no JVD  Lungs:  Breath sounds: clear  Heart[de-identified]  S1,S2 heard  Abdomen:  Soft, non - tender  Musculoskeletal:  Edema -no significant edema noted         Last 3 CBC   Recent Labs     10/28/21  1821 10/29/21  0516   WBC 6.3 6.5   RBC 3.07* 2.79*   HGB 9.6* 8.7*   HCT 28.2* 25.8*    144     Last 3 CMP  Recent Labs     10/28/21  0707 10/29/21  0516 10/30/21  0801    139 135   K 4.1 4.1 4.0   CL 96* 96* 96*   CO2 27 24 27   BUN 11 17 9   CREATININE 3.7* 4.8* 3.4*   CALCIUM 8.2* 8.4* 8.2*             ASSESSMENT / Plan   1 Renal -ESRD on hemodialysis Monday Wednesday Friday  ? Volume status and electrolytes appears to be reasonable  ? No acute need for dialysis today    2 Electrolytes -electrolytes appear to be within normal limits  3 Essential hypertension reasonable continue current medications  4 Anemia of end-stage renal disease  5 Possible pneumonia on antibiotics renal dosing as appropriate  6 Meds reviewed and discussed with patient    Dr. Enriqueta Lopez MD, M,D.  [facility].

## 2021-10-30 NOTE — PROGRESS NOTES
Hospitalist Progress Note      Patient:  Milo Labor    Unit/Bed:5K-15/015-A  YOB: 1930  MRN: [id number]   Acct: [de-identified]   PCP: Fany Garcia MD  Date of Admission: 10/26/2021    Assessment/Plan:    1. Acute on Chronic respiratory failure, hypoxia: Multifactorial.  PE, pneumonia, fluid overload. Patient's baseline is 3 L. Patient is back to baseline. CXR shows \"Worsening opacities\". 2. Acute PE: CTA of chest shows PE and lingula. Pt switched to [address] [place] [address]. Echo pending. 3. Possible Bacterial PNA: CTA shows possible pneumonia. Chest x-ray in the a.m. Zosyn and vancomycin. Patient was recently admitted for COVID-19 pneumonia. CXR (10/28) shows worsening bilateral patchy pulmonary opacities. 4. ESRD on HD: Nephrology consulted. Dialysis today. Patient tolerated well. 5. Recent upper GI bleed: Continue PPI. 6. Anemia of chronic disease: Hemoglobin stable at this time. 7. HFrEF, acute on chronic: Echo pending. May 2021 EF 45%. Hemodialysis today. Continue home medications. 8. CAD: PCI in 2018. Stress test in May 2021 did not show concern for MI. Patient is chest pain-free. Continue home medications. Chief Complaint: Shortness of breath    Initial H and P:-    Initial H&P \"Ben Camellia Lennox is a 80 y.o. male with PMHx of ESRD on HD Monday, Wednesday, and Friday who presented to [facility] with progressive dyspnea for the past two weeks. Patient was recently admitted to our hospital for COVID pneumonia a month ago. He was admitted on 9/13/2021 and was discharged on 10/15/2021. He was discharged on supplemental O2 at 3L NC.     Patient was at a SNF, and complained of progressive dyspnea for the past two weeks. Per ED notes, patient was walking today, and was found to have hypoxia down to the mid 80s while on supplemental O2. Patient denied chest pain. He denied coughing. He denied diaphoresis.  He denied fever. Staff at the SNF called EMS.    Here, patient again was observed by staff ED RN to have hypoxia as low as the mid 80s while on 3L NC. His oxygen was increased to 6L NC, and he showed improvement on his oxygenation. CXR in the ED demonstrated   diffuse airspace opacities, which have progressed from the prior chest radiograph, concerning for  pneumonia or pulmonary edema. Blood cultures pending. Procalcitonin elevated at 0.95. He was empirically started on IV antibiotics to cover potential HCAP.      We were asked to admit this patient for further management. \"     10/27: No acute events overnight. Patient was admitted to the night. Patient underwent hemodialysis today and tolerated it well. Patient was sleeping upon arrival.  Patient was back on baseline 3 L.    10/28: No acute events overnight. Pt sitting in the chair. Pt complaining of abd pain. Pt states that he is not hungry. 10/29: No acute events overnight. Patient eating lunch during evaluation. Patient states that breathing is better. No abdominal pain. Subjective (past 24 hours):   No acute events overnight. Patient states that he is feeling weaker today. Patient sitting up in chair during evaluation. No issues or concerns at this time. Awaiting pre-CERT. Past medical history, family history, social history and allergies reviewed again and is unchanged since admission. ROS (All review of systems completed. Pertinent positives noted.  Otherwise All other systems reviewed and negative.)     Medications:  Reviewed    Infusion Medications     Scheduled Medications    losartan  50 mg Oral Daily    calcitRIOL  1.5 mcg Oral Once per day on Mon Wed Fri    lidocaine   Topical Once per day on Mon Wed Fri    epoetin traci-epbx  10,000 Units SubCUTAneous Once per day on Mon Wed Fri    apixaban  10 mg Oral BID    Followed by   Jesus Ram ON 11/4/2021] apixaban  5 mg Oral BID    aspirin  81 mg Oral Daily    clopidogrel  75 mg Oral Daily  metoprolol tartrate  100 mg Oral BID    atorvastatin  40 mg Oral Nightly    vancomycin (VANCOCIN) intermittent dosing (placeholder)   Other RX Placeholder    piperacillin-tazobactam  2,250 mg IntraVENous Q8H    sodium chloride flush  5-40 mL IntraVENous 2 times per day     PRN Meds: traMADol, hydrALAZINE, sodium chloride flush, ondansetron **OR** ondansetron, polyethylene glycol, acetaminophen **OR** acetaminophen      Intake/Output Summary (Last 24 hours) at 10/30/2021 1412  Last data filed at 10/29/2021 2245  Gross per 24 hour   Intake 130 ml   Output --   Net 130 ml       Diet:  ADULT DIET; Regular; Low Phosphorus (Less than 1000 mg); 60 to 80 gm    Exam:  BP (!) 154/72   Pulse 66   Temp 98.7 °F (37.1 °C) (Oral)   Resp 16   Wt 149 lb 14.6 oz (68 kg)   SpO2 98%   BMI 24.20 kg/m²   General appearance: Chronically ill-appearing male  HEENT: Pupils equal, round, and reactive to light. Conjunctivae/corneas clear. Neck: Supple, with full range of motion. No jugular venous distention. Trachea midline. Respiratory:  Normal respiratory effort on NC. Breath sounds diminished. Cardiovascular: Regular rate and rhythm with normal S1/S2 without murmurs, rubs or gallops. Abdomen: Soft, non-tender, non-distended with normal bowel sounds. Musculoskeletal: passive and active ROM x 4 extremities. Skin: Skin color, texture, turgor normal.  No rashes or lesions. Neurologic:  Neurovascularly intact without any focal sensory/motor deficits.  Cranial nerves: II-XII intact, grossly non-focal.  Psychiatric: Alert, pleasantly confused  Capillary Refill: Brisk,< 3 seconds   Peripheral Pulses: +2 palpable, equal bilaterally     Labs:   Recent Labs     10/28/21  1821 10/29/21  0516   WBC 6.3 6.5   HGB 9.6* 8.7*   HCT 28.2* 25.8*    144     Recent Labs     10/28/21  0707 10/29/21  0516 10/30/21  0801    139 135   K 4.1 4.1 4.0   CL 96* 96* 96*   CO2 27 24 27   BUN 11 17 9   CREATININE 3.7* 4.8* 3.4*   CALCIUM 8.2* 8.4* 8.2*     No results for input(s): AST, ALT, BILIDIR, BILITOT, ALKPHOS in the last 72 hours. Microbiology:    Blood culture #1:   Lab Results   Component Value Date    BC No growth-preliminary  10/26/2021     Organism:  Lab Results   Component Value Date    ORG Micrococcus species 07/12/2021     Radiology:  XR CHEST PORTABLE   Final Result   Worsening bilateral patchy pulmonary opacities with small bilateral pleural effusions. **This report has been created using voice recognition software. It may contain minor errors which are inherent in voice recognition technology. **      Final report electronically signed by Dr. Jesenia Sun on 10/28/2021 7:16 AM      CTA CHEST W [address]   Final Result      1. CT evidence of pulmonary embolus is seen involving the lingula and left upper lobe. 2. Emphysema. 3. There is diffuse groundglass opacification of both lungs with areas of bronchial wall thickening. Findings can relate to underlying interstitial edema versus infectious process. Clinical correlation is recommended. 4. A large right pleural effusion is seen. A moderate left pleural effusion   5. Body wall edema relating to anasarca. 6. A 1.3 cm right upper lobe pulmonary nodule is noted. 7. Other findings as described above. **This report has been created using voice recognition software. It may contain minor errors which are inherent in voice recognition technology. **      Final report electronically signed by Dr Karl Quevedo on 10/26/2021 10:21 PM      XR CHEST PORTABLE   Final Result   1. The diffuse airspace opacities have progressed from the prior chest radiograph. Findings can relate to pneumonia or pulmonary edema. 2. Small bilateral pleural effusions. 3. Cardiomegaly. **This report has been created using voice recognition software. It may contain minor errors which are inherent in voice recognition technology. **      Final report electronically signed by Dr Jon Morton on 10/26/2021 8:31 PM        Electronically signed by SHANELL Bush on 10/30/2021 at 2:12 PM

## 2021-10-31 NOTE — PROGRESS NOTES
Hospitalist Progress Note      Patient:  Haven Colin    Unit/Bed:5K-15/015-A  YOB: 1930  MRN: [id number]   Acct: [de-identified]   PCP: Gen Benitez MD  Date of Admission: 10/26/2021    Assessment/Plan:    1. Acute on Chronic respiratory failure, hypoxia: Multifactorial.  PE, pneumonia, fluid overload. Patient's baseline is 3 L. Patient is back to baseline. CXR shows \"Worsening opacities\". 2. Acute PE: CTA of chest shows PE and lingula. [place] [facility] pending. 3. Possible Bacterial PNA: CTA shows possible pneumonia. Chest x-ray in the a.m. Zosyn and vancomycin. Patient was recently admitted for COVID-19 pneumonia. CXR (10/28) shows worsening bilateral patchy pulmonary opacities. 4. ESRD on HD: Nephrology consulted. Dialysis today. Patient tolerated well. 5. Recent upper GI bleed: Continue PPI. 6. Anemia of chronic disease: Hemoglobin stable at this time. 7. HFrEF, acute on chronic: Echo pending. May 2021 EF 45%. Hemodialysis today. Continue home medications. 8. CAD: PCI in 2018. Stress test in May 2021 did not show concern for MI. Patient is chest pain-free. Continue home medications. Chief Complaint: Shortness of breath    Initial H and P:-    Initial H&P \"Tunde Hathaway is a 80 y.o. male with PMHx of ESRD on HD Monday, Wednesday, and Friday [facility] two weeks. Patient was recently admitted to our hospital for COVID pneumonia a month ago. He was admitted on 9/13/2021 and was discharged on 10/15/2021. He was discharged on supplemental O2 at 3L NC.     Patient was at a SNF, and complained of progressive dyspnea for the past two weeks. Per ED notes, patient was walking today, and was found to have hypoxia down to the mid 80s while on supplemental O2. Patient denied chest pain. He denied coughing. He denied diaphoresis.  He denied fever. Staff at the SNF called EMS.    Here, patient again was observed by staff ED RN to have hypoxia as low as the mid 80s while on 3L NC. His oxygen was increased to 6L NC, and he showed improvement on his oxygenation. CXR in the ED demonstrated   diffuse airspace opacities, which have progressed from the prior chest radiograph, concerning for  pneumonia or pulmonary edema. Blood cultures pending. Procalcitonin elevated at 0.95. He was empirically started on IV antibiotics to cover potential HCAP.      We were asked to admit this patient for further management. \"     10/27: No acute events overnight. Patient was admitted to the night. Patient underwent hemodialysis today and tolerated it well. Patient was sleeping upon arrival.  Patient was back on baseline 3 L.    10/28: No acute events overnight. Pt sitting in the chair. Pt complaining of abd pain. Pt states that he is not hungry. 10/29: No acute events overnight. Patient eating lunch during evaluation. Patient states that breathing is better. No abdominal pain. 10/30: No acute events overnight. Patient states that he is feeling weaker today. Patient sitting up in chair during evaluation. No issues or concerns at this time. Awaiting pre-CERT. Subjective (past 24 hours):   No acute events overnight. Patient lying in bed during evaluation. No issues or concerns. Past medical history, family history, social history and allergies reviewed again and is unchanged since admission. ROS (All review of systems completed. Pertinent positives noted.  Otherwise All other systems reviewed and negative.)     Medications:  Reviewed    Infusion Medications     Scheduled Medications    losartan  50 mg Oral Daily    calcitRIOL  1.5 mcg Oral Once per day on Mon Wed Fri    lidocaine   Topical Once per day on Mon Wed Fri    epoetin traci-epbx  10,000 Units SubCUTAneous Once per day on Mon Wed Fri    apixaban  10 mg Oral BID    Followed by   Amber Aleman ON 11/4/2021] apixaban  5 mg Oral BID    aspirin  81 mg Oral Daily    clopidogrel  75 mg Oral Daily    metoprolol tartrate  100 mg Oral BID    atorvastatin  40 mg Oral Nightly    vancomycin (VANCOCIN) intermittent dosing (placeholder)   Other RX Placeholder    piperacillin-tazobactam  2,250 mg IntraVENous Q8H    sodium chloride flush  5-40 mL IntraVENous 2 times per day     PRN Meds: traMADol, hydrALAZINE, sodium chloride flush, ondansetron **OR** ondansetron, polyethylene glycol, acetaminophen **OR** acetaminophen      Intake/Output Summary (Last 24 hours) at 10/31/2021 1544  Last data filed at 10/31/2021 0326  Gross per 24 hour   Intake 563.51 ml   Output --   Net 563.51 ml       Diet:  ADULT DIET; Regular; Low Phosphorus (Less than 1000 mg); 60 to 80 gm    Exam:  BP (!) 169/72   Pulse 63   Temp 98.2 °F (36.8 °C) (Oral)   Resp 18   Wt 146 lb 9.7 oz (66.5 kg)   SpO2 99%   BMI 23.66 kg/m²   General appearance: Chronically ill-appearing male  HEENT: Pupils equal, round, and reactive to light. Conjunctivae/corneas clear. Neck: Supple, with full range of motion. No jugular venous distention. Trachea midline. Respiratory:  Normal respiratory effort on NC. Breath sounds diminished. Cardiovascular: Regular rate and rhythm with normal S1/S2 without murmurs, rubs or gallops. Abdomen: Soft, non-tender, non-distended with normal bowel sounds. Musculoskeletal: passive and active ROM x 4 extremities. Skin: Skin color, texture, turgor normal.  No rashes or lesions. Neurologic:  Neurovascularly intact without any focal sensory/motor deficits.  Cranial nerves: II-XII intact, grossly non-focal.  Psychiatric: Alert, pleasantly confused  Capillary Refill: Brisk,< 3 seconds   Peripheral Pulses: +2 palpable, equal bilaterally     Labs:   Recent Labs     10/28/21  1821 10/29/21  0516 10/31/21  0850   WBC 6.3 6.5 7.4   HGB 9.6* 8.7* 8.9*   HCT 28.2* 25.8* 26.5*    144 166     Recent Labs     10/29/21  0516 10/29/21  0516 10/30/21  0801 10/31/21  0850     --  135 135   K 4.1   < > 4.0 4.2  4.2   CL 96*  --  96* 97*   CO2 24  --  27 28   BUN 17  --  9 16   CREATININE 4.8*  --  3.4* 4.7*   CALCIUM 8.4*  --  8.2* 8.6    < > = values in this interval not displayed. No results for input(s): AST, ALT, BILIDIR, BILITOT, ALKPHOS in the last 72 hours. Microbiology:    Blood culture #1:   Lab Results   Component Value Date    BC No growth-preliminary  10/26/2021     Organism:  Lab Results   Component Value Date    ORG Micrococcus species 07/12/2021     Radiology:  XR CHEST PORTABLE   Final Result   Worsening bilateral patchy pulmonary opacities with small bilateral pleural effusions. **This report has been created using voice recognition software. It may contain minor errors which are inherent in voice recognition technology. **      Final report electronically signed by Dr. Rosei Romero on 10/28/2021 7:16 AM      CTA CHEST [address]   Final Result      1. CT evidence of pulmonary embolus is seen involving the lingula and left upper lobe. 2. Emphysema. 3. There is diffuse groundglass opacification of both lungs with areas of bronchial wall thickening. Findings can relate to underlying interstitial edema versus infectious process. Clinical correlation is recommended. 4. A large right pleural effusion is seen. A moderate left pleural effusion   5. Body wall edema relating to anasarca. 6. A 1.3 cm right upper lobe pulmonary nodule is noted. 7. Other findings as described above. **This report has been created using voice recognition software. It may contain minor errors which are inherent in voice recognition technology. **      Final report electronically signed by Dr Stanislav Tlaamantes on 10/26/2021 10:21 PM      XR CHEST PORTABLE   Final Result   1. The diffuse airspace opacities have progressed from the prior chest radiograph. Findings can relate to pneumonia or pulmonary edema.

## 2021-10-31 NOTE — PROGRESS NOTES
[facility]         Renal Inpatient Follow-Up note         10/31/2021 1:15 PM    Pt Name:   Barbara Goldman  YOB: 1930  Attending:   SHANELL Glynn    Chief Complaint : Barbara Goldman is a 80 y.o. male being followed by nephrology for ESRD on hemodialysis    Interval History :   Patient seen and examined by me. No distress  Feels okay complains of some mild shortness of breath  No chest pain or other complaints. Scheduled Medications :    losartan  50 mg Oral Daily    calcitRIOL  1.5 mcg Oral Once per day on Mon Wed Fri    lidocaine   Topical Once per day on Mon Wed Fri    epoetin traci-epbx  10,000 Units SubCUTAneous Once per day on Mon Wed Fri    apixaban  10 mg Oral BID    Followed by   Amber Aleman ON 11/4/2021] apixaban  5 mg Oral BID    aspirin  81 mg Oral Daily    clopidogrel  75 mg Oral Daily    metoprolol tartrate  100 mg Oral BID    atorvastatin  40 mg Oral Nightly    vancomycin (VANCOCIN) intermittent dosing (placeholder)   Other RX Placeholder    piperacillin-tazobactam  2,250 mg IntraVENous Q8H    sodium chloride flush  5-40 mL IntraVENous 2 times per day         Vitals :  BP (!) 169/72   Pulse 63   Temp 98.2 °F (36.8 °C) (Oral)   Resp 18   Wt 146 lb 9.7 oz (66.5 kg)   SpO2 99%   BMI 23.66 kg/m²     24HR INTAKE/OUTPUT:      Intake/Output Summary (Last 24 hours) at 10/31/2021 1315  Last data filed at 10/31/2021 0326  Gross per 24 hour   Intake 664.22 ml   Output --   Net 664.22 ml     Last 3 weights  Wt Readings from Last 3 Encounters:   10/31/21 146 lb 9.7 oz (66.5 kg)   10/15/21 149 lb 4 oz (67.7 kg)   07/14/21 145 lb 8.1 oz (66 kg)           Physical Exam :  General Appearance:  Well developed.  No distress  Mouth/Throat:  Oral mucosa moist  Neck:  Supple, no JVD  Lungs:  Breath sounds: clear  Heart[de-identified]  S1,S2 heard  Abdomen:  Soft, non - tender  Musculoskeletal:  Edema -no significant edema noted         Last 3 CBC   Recent Labs 10/28/21  1821 10/29/21  0516 10/31/21  0850   WBC 6.3 6.5 7.4   RBC 3.07* 2.79* 2.88*   HGB 9.6* 8.7* 8.9*   HCT 28.2* 25.8* 26.5*    144 166     Last 3 CMP  Recent Labs     10/29/21  0516 10/29/21  0516 10/30/21  0801 10/31/21  0850     --  135 135   K 4.1   < > 4.0 4.2  4.2   CL 96*  --  96* 97*   CO2 24  --  27 28   BUN 17  --  9 16   CREATININE 4.8*  --  3.4* 4.7*   CALCIUM 8.4*  --  8.2* 8.6    < > = values in this interval not displayed. ASSESSMENT / Plan   1 Renal -ESRD on hemodialysis Monday Wednesday Friday  ? Volume status and electrolytes appears to be reasonable  ? No acute need for dialysis today    2 Electrolytes -electrolytes appear to be within normal limits  3 Essential hypertension reasonable continue current medications  4 Anemia of end-stage renal disease - Hb maintaining stable. 5 Possible pneumonia on antibiotics renal dosing  6 Meds reviewed and discussed with patient    Dr. Neo Dan MD, M,D.  [facility].

## 2021-11-01 NOTE — PROGRESS NOTES
Patients daughter does not have oxygen with her to transport patient. [facility]ACP on their way to transport patient to [facility].

## 2021-11-01 NOTE — PROGRESS NOTES
H&P (Readmission H&P at [facility])      NAME: Umm Navarro  DATE: 21  ROOM #: [id number]  CODE STATUS: FULL CODE  REASON FOR ADMISSION: Weakness after prolonged hospitalization  : 11/10/1930  ADMISSION DATE: 10/15/2021  SKILLED PATIENT: Yes    History obtained from chart review, the patient and nursing staff. SUBJECTIVE:  HPI: Umm Navarro is a 80 y.o. male. Pt seen and examined at bedside. Patient admitted to [facility] from 10/26/201 to 2021 for issues as noted below. D/c summary: \"Discharge Assessment and Plan:-   1. Acute on Chronic respiratory failure, hypoxia: Multifactorial.  PE, pneumonia, fluid overload.  Patient's baseline is 3 L.  Patient is back to baseline. CXR shows \"Worsening opacities\". 2. Acute PE: CTA of chest shows PE and lingula.  Pt switched to [address] [place] [address].  Echo completed. 3. Possible Bacterial PNA: CTA shows possible pneumonia.  Chest x-ray in the a.m.  Zosyn and vancomycin- treatments were completed prior to Layla Kenny was recently admitted for COVID-19 pneumonia. 4. ESRD on HD: Nephrology consulted. Normal HD schedule.   5. Recent upper GI bleed: Continue PPI. 6. Anemia of chronic disease: Hemoglobin stable at this time.    7. HFrEF, acute on chronic: Echo pending.  May 2021 EF 45%.  Hemodialysis today.  Continue home medications. 8. CAD: PCI in 2018.  Stress test in May 2021 did not show concern for MI.  Patient is chest pain-free.  Continue home medications.     Initial H and P and Hospital course:-  Initial H&P \"Tunde Shelton is a 80 y. o. male with PMHx of ESRD on HD Monday, Wednesday, and Sherrel Blind presented to [facility] with progressive dyspnea for the past two weeks. Patient was recently admitted to our hospital for COVID pneumonia a month ago. He was admitted on 2021 and was discharged on 10/15/2021. He was discharged on supplemental O2 at 3L NC.     Patient was at a SNF, and complained of progressive dyspnea for the past two weeks.  Per ED notes, patient was walking today, and was found to have hypoxia down to the mid 80s while on supplemental O2. Patient denied chest pain. He denied coughing. He denied diaphoresis. He denied fever. Staff at the SNF called EMS.    Here, patient again was observed by staff ED RN to have hypoxia as low as the mid 80s while on 3L NC. His oxygen was increased to 6L NC, and he showed improvement on his oxygenation. CXR in the ED demonstrated   diffuse airspace opacities, which have progressed from the prior chest radiograph, concerning for  pneumonia or pulmonary edema. Blood cultures pending. Procalcitonin elevated at 0.95.  He was empirically started on IV antibiotics to cover potential HCAP.      We were asked to admit this patient for further management. \"      10/27: No acute events overnight.  Patient was admitted to the night.  Patient underwent hemodialysis today and tolerated it well.  Patient was sleeping upon arrival.  Patient was back on baseline 3 L.    10/28: No acute events overnight. Pt sitting in the chair. Pt complaining of abd pain. Pt states that he is not hungry.      10/29: No acute events overnight. Patient eating lunch during evaluation. Patient states that breathing is better. No abdominal pain.     10/30: No acute events overnight.  Patient states that he is feeling weaker today.  Patient sitting up in chair during evaluation.  No issues or concerns at this time.  Awaiting pre-CERT.     07/98: No acute events overnight.  Patient lying in bed during evaluation.  No issues or concerns.     53/4: Pre-cert returned. On the day of discharge, it was explained to the patient that it was very important to follow up with his PCP and Nephrology to have continued care. Appointments were made and information was given. \"      Since readmission:  Doing ok thus far  Breathing improved  On 2 to 3 liters and SpO2 WNL  New dx PE, placed on eliquis, tolerating well. Denies CP.    Good but slow progress with PT        -LAST VISIT:  Patient admitted to [facility] from 9/13 to 10/15 for issues as noted below. D/c summary per hospital team:  \"HPI:  Iram Rider is a [address] y.[address]. male with \"presents with complaints of shortness of breath and cough for the last 2 days.  Patient is accompanied by family who helps with history. Jordin Brown report that patient has had a poor appetite, fatigued, short of breath and coughing for last 2 days. [facility] recently was at [facility] approximately 2 weeks ago where he had 2 stents placed. Jordin Brown state he was in his normal state of health until 2 days ago. Alva Teixeira was seen in dialysis today and had a full session however continued to be short of breath and had a significant cough and was sent for evaluation.  He denies any fevers or chills, nausea or vomiting.  He denies any diarrhea or constipation.  He denies any chest pain. \"     In the ED patient was found to be Covid positive.  He was placed on 2 L nasal cannula.   He was also found to have slightly elevated troponin and was started on heparin drip in the ED.  Family updated on test results.     Please see chart for more details regarding HPI.     Hospital Course:   Iram Rider is a [address] y.[address]. male with a PMH SSS s/p PPM + AICD, Anemia of chronic disease, COPD, HLD< CAD, ESRD on HD, Systolic HF EF 42%, who presented with AHRF 2/2 COVID-PNA, sustained UGIB 2/2 erosive esophagitis and duodenitis and is now recovered.      The patient was stable for discharge - all consultants were contacted and in agreement with plan for discharge. Appropriate follow up appointment was arranged prior to discharge.     Please see below or view chart for more details from hospital course.     Discharge Diagnoses:     #AHRF 2/2 COVID PNA: Resolved  09/25 - required increased. O2. Escalated to 4L the weaned to 2L throughout hospitalization. Now on 3L which is new baseline. Baseline 2L prior to hospitalization.  Briefly suspected superimposed bacterial PNA d/t leukocytosis and consolidation treated with a course of ceftriaxone and azithromycin     #Recurrent Hypoglycemia: Resolved  No h/o DM. A1c 4.5% 10/01. S/p decadron x9 days for COVID. Cosyntropin test wnl. Currently 83-140s and pt asymptomatic.      #Non-Variceal Upper GIB: Resolved  Bleeding source: esophagitis and duodenitis. H/o EGD on 10/09/2021 demonstrating the above findings. Pantoprazole 40 mg twice daily. Follow up with GI in 4 weeks.      #ABLA on Anemia of Chronic Disease: Resolved  10/07 Hgb 6.6 on baseline 9.0-10.0. 2/2 UGIB. Now 9.5. Continuing iron supplementation.     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 Access: Right AV Fistula. Nephrologist: Dr. Elvira Nash, [place] [place] [address]: [facility]. On phosphate binders: none. On Vitamin D: calcitriol. On EPO: retacrit. On midodrine: no. Kidney transplant candidate: no. Continue HD.     #Chronically elevated troponin: Stable  H/o ESRD. Baseline 0.1-0.3     #SSS s/p AICD + PPM: Stable  HR 69-70.      #Lactic Acidosis: Stable  8.7 10/01, improved to 2.5, resolved to 1.6. 2/2 GIB     #Recurrent Hypoglycemia: Resolved  No h/o DM. A1c 4.5% 10/01. S/p decadron x9 days for COVID. Cosyntropin test wnl. Currently 83-140s and pt asymptomatic     #Chronic Systolic + Diastolic Heart Failure with EF 45% on 05/19/2021, NYHA Class III: Stable  Pro-BNP chronically elevated >70,000. CXR on 09/13/2021 demonstrated cardiomegaly and pulmonary vascular congestion. GDMT: metoprolol tartrate. Diuretics: ESRD. Follows in HF Clinic: No.      #Non-obstructive CAD s/p JAXON on mid LAD and ramus in 2018: Stable  No recent LHC. Cardiac stress test 05/19/2021 not suggestive of myocardial ischemia. On ASA 81 mg. Continue ASA and plavix.     The patient was seen and examined on day of discharge and this discharge summary is in conjunction with any daily progress note from day of discharge.  The patient understood, was in agreement, and verbalized the plan of care at time of discharge. \"    Since admission:  Doing ok thus far  Making progress with PT  No falls  Breathing at baseline  On O2 on and off. No abd pain or s/s GI bleeding  Appetite has been good  No s/s hypoglycemia  Nutrition following. UPDATE TODAY:   Stable as above  Slow but steady progress with PT  On O2 as above  Appetite ok  No s/s GI bleeding  No s/s hypoglycemia  Nutrition following. -ESRD: on HD, MWF. Tolerating w/o issues. -CAD/HFrEF/SSS: on DAPT, lopressor, cozaar and lipitor. Has pacemaker and AICD. HD for fluid management. Denies CP/SOB. -HTN: on Lopressor and Cozaar, cozaar added during this most recent hospitalizatioin. BP's since admission have been <140/90. -HLD: on lipitor. Tolerating well. Allergies and Medications were reviewed through the [facility] EMR. All medications reviewed and reconciled, including OTC and herbal medications.        Patient Active Problem List    Diagnosis Date Noted    Dyspnea 10/26/2021    Hypoxia     Severe malnutrition ([address] 09/22/2021     Class: Acute    COVID-19 09/13/2021    Chronic combined systolic and diastolic CHF (congestive heart failure) ([address] 08/05/2021    SOB (shortness of breath) 07/12/2021    Hypertensive emergency 06/15/2021    ESRD on hemodialysis ([address]     Fall from slip, trip, or stumble, initial encounter 05/19/2021    Closed fracture of left ankle 05/19/2021    Acute pulmonary edema (HCC)     Gastritis and duodenitis     Chest pain     Volume overload 06/21/2019    Secondary hyperparathyroidism of renal origin ([address]     Anemia due to chronic kidney disease, on chronic dialysis ([address] 12/07/2018    Hyperphosphatemia 05/22/2018    Elevated troponin     Systolic congestive heart failure (HCC)     Other fluid overload (CODE)     Pulmonary hypertension ([address] 05/20/2018    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    Idiopathic hypotension 11/11/2017    Iron deficiency anemia 05/23/2016    Chronic kidney disease (CKD), stage V [facility]     Metabolic acidosis     Leukopenia 03/30/2016    Thrombocytopenia (HCC) 03/30/2016    Monoclonal gammopathy 02/08/2016    Essential hypertension 01/11/2016    Anemia, chronic disease 04/23/2015    Gout of big toe 01/19/2015    Degenerative joint disease of knee, left 07/14/2014    Dyspnea and respiratory abnormalities 03/17/2014    ED (erectile dysfunction) 03/17/2014    Arthritis-  low back and neck .  01/10/2013    CAD (coronary artery disease) 07/30/2012    COPD (chronic obstructive pulmonary disease) ([facility] 07/30/2012    Chronic renal insufficiency 07/30/2012    Patient overweight 07/30/2012     Updating deleted diagnoses         Past Medical History:   Diagnosis Date    Anemia in chronic kidney disease(285.21)     Arthritis     CAD (coronary artery disease)     Chronic kidney disease     Chronic kidney disease, stage IV (severe) (HCC)     CKD (chronic kidney disease) stage 3, GFR 30-59 ml/min (HCC)     [facility]) [facility]     GI bleed     [facility]od[facility]26[facility]2016    [facility]    History of blood transfusion     6/2019    Hyperlipidemia     Hyperphosphatemia 5/22/2018    Hypertension     Sick sinus syndrome ([facility]     Systolic congestive heart failure [facility])        Past Surgical History:   Procedure Laterality Date   Courtenay Spurling CARDIAC SURGERY      COLONOSCOPY  2006,2009    COLONOSCOPY N/A 6/27/2019    COLONOSCOPY DIAGNOSTIC performed by Margarita Markham MD at [address] [facility]  2004    DIALYSIS FISTULA CREATION  5/6/2016    right arm fistula placement    ENDOSCOPY, COLON, DIAGNOSTIC      PACEMAKER PLACEMENT  2013    GA ESOPHAGOGASTRODUODENOSCOPY TRANSORAL DIAGNOSTIC N/A 1/18/2018    EGD performed by Shoaib Gill MD at [address] [place] [address]    UPPER GASTROINTESTINAL ENDOSCOPY Left 6/23/2019    EGD DIAGNOSTIC ONLY performed by [facility]anna MD at [facility]    UPPER GASTROINTESTINAL ENDOSCOPY N/A 10/9/2021    EGD performed by [facility] at [facility]    VASCULAR SURGERY         No Known Allergies    Social History     Tobacco Use    Smoking status: Former Smoker     Packs/day: 1.00     Years: 40.00     Pack years: 40.00     Quit date: 1982     Years since quittin.7    Smokeless tobacco: Never Used   Substance Use Topics    Alcohol use: No        Family History   Problem Relation Age of Onset    Diabetes Mother     Heart Disease Mother     Diabetes Sister     Mental Illness Paternal Aunt          I have reviewed the patient's past medical history, past surgical history, allergies, medications, social and family history and I have made updates where appropriate.       Review of Systems  Positive responses are highlighted in bold    Constitutional:  Fever, Chills, Night Sweats, Fatigue, Unexpected changes in weight  Eyes:  Eye discharge, Eye pain, Eye redness, Visual disturbances   HENT:  Ear pain, Tinnitus, Nosebleeds, Trouble swallowing, Hearing loss, Sore throat  Cardiovascular:  Chest Pain, Palpitations, Orthopnea, Paroxysmal Nocturnal Dyspnea  Respiratory:  Cough, Wheezing, Shortness of breath, Chest tightness, Apnea  Gastrointestinal:  Nausea, Vomiting, Diarrhea, Constipation, Heartburn, Blood in stool  Genitourinary:  Difficulty or painful urination, Flank pain, Change in frequency, Urgency  Skin:  Color change, Rash, Itching, Wound  Psychiatric:  Hallucinations, Anxiety, Depression, Suicidal ideation  Hematological:  Enlarged glands, Easy bleeding, Easily bruising  Musculoskeletal:  Joint pain, Back pain, Gait problems, Joint swelling, Myalgias  Neurological:  Dizziness, Headaches, Presyncope, Numbness, Seizures, Tremors  Allergy:  Environmental allergies, Food allergies  Endocrine:  Heat Intolerance, Cold Intolerance, Polydipsia, Polyphagia, Polyuria      PHYSICAL EXAM:  Vitals:    21 0500   BP: 129/75   Pulse: 70   Resp: 16   Temp: 98 °F (36.7 °C)   Weight: 151 lb (68.5 kg)   Height: 5' 6\" (1.676 m)     Body mass index is 24.37 kg/m². VS Reviewed  General Appearance: well developed and well- nourished, in no acute distress  Head: normocephalic and atraumatic  Eyes: pupils equal, round, and reactive to light, conjunctivae and eye lids without erythema  ENT: external ear and ear canal normal bilaterally, nose without deformity, nasal mucosa and turbinates normal without polyps, oropharynx normal, dentition is normal for age, no lip or gum lesions noted  Neck: supple and non-tender without mass, no thyromegaly or thyroid nodules, no cervical lymphadenopathy  Pulmonary/Chest: good air movement. Scattered rhonchi. No wheezing or crackles. No accessory muscle use. No distress. Cardiovascular: normal rate, regular rhythm, normal S1 and S2, no murmurs, rubs, clicks, or gallops, distal pulses intact  Abdomen: soft, non-tender, non-distended, bowel sounds physiologic,  no rebound or guarding, no masses or hernias noted. Liver and spleen without enlargement.    Extremities: no cyanosis, clubbing or edema of the lower extremities  Musculoskeletal: No joint swelling or gross deformity   Neuro:  Alert, 5/5 strength globally and symmetrically, 2+ patellar reflexes b/l,  normal speech, no focal findings or movement disorder noted  Psych:  Normal affect without evidence of depression or anxiety, insight and judgement are appropriate, memory appears intact  Skin: warm and dry, no rash or erythema  Lymph:  No cervical, auricular or supraclavicular lymph nodes palpated      LABS/IMAGING    Recent Labs     10/31/21  0850 10/29/21  0516 10/28/21  1821   WBC 7.4 6.5 6.3   HGB 8.9* 8.7* 9.6*   HCT 26.5* 25.8* 28.2*   MCV 92.0 92.5 91.9    144 138     Lab Results   Component Value Date    TSH 2.600 07/13/2021     Lab Results   Component Value Date     11/01/2021    K 4.2 11/01/2021    CL 99 11/01/2021    CO2 24 11/01/2021    BUN 20 11/01/2021    CREATININE 5.6 11/01/2021    GLUCOSE 91 11/01/2021    GLUCOSE 90 08/26/2021    CALCIUM 9.0 11/01/2021      Narrative   PROCEDURE: CTA CHEST W WO CONTRAST       CLINICAL INFORMATION: hypoxic, post covid, fell on 10/22, ESRD on HD       COMPARISON: CT 7/12/2021       TECHNIQUE:    1.5 mm axial images were obtained through the chest after the administration of IV contrast. A non-contrast localizer was obtained. 2mm MIP reconstructions of the chest performed in coronal and sagittal planes.        All CT scans at this facility use dose modulation, iterative reconstruction, and/or weight based dosing when appropriate to reduce the radiation dose to as low as reasonably achievable.       CONTRAST: 80 mL of Isovue-370           FINDINGS:       There is adequate opacification of the main pulmonary artery and its branches. . Filling defect seen in the artery to the lingula and in the left upper lobe consistent with CT evidence of pulmonary embolus.       Emphysematous changes are seen. There is a large right pleural effusion with compressive atelectasis and marked decrease in the volume of the right lower lobe. There is moderate left pleural effusion with compressive atelectasis and marked decrease in    the volume of the left lower lobe. There are diffuse groundglass opacities in both lungs that are nonspecific. Bronchiolar wall thickening is seen. Peripheral reticular opacities are seen in the upper lung zones.       The heart is enlarged. Aortocoronary calcifications. The main pulmonary artery is dilated relating to pulmonary artery hypertension. Pacemaker with leads noted. Body wall edema is seen. Mild bilateral gynecomastia. .       A 1.3 cm right upper lobe pulmonary nodule is seen (image 63). Mild pericardial effusions.           No significantly enlarged mediastinal or  axillary lymph node.  Calcified mediastinal and hilar granulomas are seen.       The thyroid is not enlarged.     No focal masses identified.       Gallbladder, bile ducts: Gallstones are present.       Spleen: Unremarkable       Pancreas: Unremarkable. No mass or ductal dilatation.       Adrenal glands: Unremarkable       Kidneys and ureters: Marked renal cortical thinning bilateral kidneys.       Bladder: Bladder is decompressed. No gross abnormality.       Reproductive: Unremarkable       Stomach, small bowel, colon:   The stomach and duodenum are grossly normal.   Liquid stool in colon suggesting presence of diarrhea   No bowel wall thickening or evidence of bowel obstruction.       Appendix: The appendix is visualized and appears normal without evidence of periappendiceal inflammatory changes.       Omentum and mesentery: Unremarkable.       Peritoneum:  There is no ascites, abscess, adenopathy, or mass. No pneumoperitoneum.       Abdominal and pelvic body wall soft tissues: Unremarkable.       Musculoskeletal:  Unremarkable       Aorta and iliac arteries:   No evidence of an aortic aneurysm or dissection. There is no hemodynamically significant stenosis or occlusion in the common, internal, or external iliac arteries.       Mesenteric Arteries: There is no hemodynamically significant stenosis in the celiac artery, SMA, or MIGUE.       Renal Arteries: There are single renal arteries bilaterally. There is calcific plaque at the origin of the right femoral artery however stenosis the artery itself becomes thin and narrowed distally without a focal stenosis left is virtually the same           Impression   1. Bilateral airspace infiltrates and pleural effusions right greater than left. 2. Cardiomegaly. 3. Atrophic kidneys bilaterally.    4 liquid stool throughout much of the colon and rectum consistent with diarrhea no evidence of bowel obstruction. 5. Although there is diffuse calcific atherosclerosis of the aorta and mesenteric vessels there is no significant stenosis of the mesenteric vessels.  However the renal Bleed/Erosive esophagitis/Anemia: no s/s bleeding, stable thus far, con't pantoprazole. Check cbc tomorrow, higher risk d/t now on [address]. If develops s/s recurrent bleeding, will have to reassess coagulation plan. -Hypoglycemia: no s/s recurrence. Monitor.   -Malnutrition: eating pretty well, nutrition consult in place. -ESRD: stable, con't HD MWF.   -CAD/HFrEF/SSS: stable, con't DAPT, Lopressor, cozaar and lipitor. Has pacer and AICD. -HTN: stable, con't Lopressor and addition of Cozaar started in hospital.   -HLD: stable, con't Lipitor. Disposition: FULL CODE. Con't PT/OT. Reassess 30 days, sooner prn. Resident has multiple chronic conditions, including ESRD, CAD and HTN, and they are expected to last 12 or more months. These chronic conditions place the resident at a significant risk of death, acute exacerbation or functional decline. The patients comprehensive plan was monitored today. I spent 20 minutes reviewing the plan.        Future Appointments   Date Time Provider Yakelin Goodwin   11/11/2021 12:30 PM XIN Rodney - CNP [facility]       Electronically signed by Christie Adam DO on 11/3/2021 at 3:36 PM

## 2021-11-01 NOTE — PROGRESS NOTES
Pharmacy Vancomycin Consult     Vancomycin Day: 6  Current Dosing: intermittent  Current indication: pneumonia    Date Time Vancomycin dose Vancomycin Random   10/27/2021 4:41 1750 mg     10/27/2021 10:21-14:21 Hemodialysis     10/28/2021 7:07   10.3   10/28/2021 15:00 1750 mg     10/29/2021 5:16   24.5   10/29/2021 8:10-12:37 Hemodialysis    10/29/2021 16:10 1750 mg                 Temp max:  afebrile    Recent Labs     10/31/21  0850 11/01/21  0424   BUN 16 20   CREATININE 4.7* 5.6*   WBC 7.4  --        Intake/Output Summary (Last 24 hours) at 11/1/2021 0717  Last data filed at 10/31/2021 2327  Gross per 24 hour   Intake 130 ml   Output --   Net 130 ml     Culture Date Source Results   10/26/21 BC x 2 NGTD       Ht Readings from Last 1 Encounters:   09/13/21 5' 6\" (1.676 m)        Wt Readings from Last 1 Encounters:   11/01/21 147 lb 0.8 oz (66.7 kg)       Body mass index is 23.73 kg/m². Estimated Creatinine Clearance: 8 mL/min (A) (based on SCr of 5.6 mg/dL [facility])). Trough: 28.6    Assessment/Plan:  Hold vancomycin today. Will plan on rechecking vancomycin trough prior to hemodialysis on Wednesday (11/3/21). No levels ordered at this time.     Tania NeelyD, BCPS  11/1/2021  7:30 AM

## 2021-11-01 NOTE — PROGRESS NOTES
[facility] [address]  PHYSICAL THERAPY MISSED TREATMENT NOTE  STR ONC MED 5K    Date: 2021  Patient Name: Darrel Wynne        MRN: [id number]   : 11/10/1930  (80 y.o.)  Gender: male   Referring Practitioner: SHANELL Amador  Diagnosis: dyspnea         REASON FOR MISSED TREATMENT:  Missed treat. Pt just back from dialysis and now eating lunch. Will try back as time allows.

## 2021-11-01 NOTE — CARE COORDINATION
11/1/21, 9:23 AM EDT    DISCHARGE PLANNING EVALUATION    Received call from Pat with [facility]-they have authorization on patient and can admit today. 12:09 pm-spoke with daughter Rian Olson and advised her of discharge for today. She plans to transport and will be here around 2 pm.     1:46 PM DANIELLE faxed to [facility] and number provided for RN for report. No other needs at this time. 11/1/21, 1:47 PM EDT    Patient goals/plan/ treatment preferences discussed by  and . Patient goals/plan/ treatment preferences reviewed with patient/ family. Patient/ family verbalize understanding of discharge plan and are in agreement with goal/plan/treatment preferences. Understanding was demonstrated using the teach back method. AVS provided by RN at time of discharge, which includes all necessary medical information pertaining to the patients current course of illness, treatment, post-discharge goals of care, and treatment preferences. Services After Discharge  Services At/After Discharge: Nursing Services, Aide Services [facility])   IMM Letter  IMM Letter given to Patient/Family/Significant other/Guardian/POA/by[de-identified] Copy delivered to patient by mgr. Overton  IMM Letter date given[de-identified] 11/01/21  IMM Letter time given[de-identified] 9504

## 2021-11-01 NOTE — PROGRESS NOTES
Oral Daily    calcitRIOL  1.5 mcg Oral Once per day on Mon Wed Fri    lidocaine   Topical Once per day on Mon Wed Fri    epoetin traci-epbx  10,000 Units SubCUTAneous Once per day on Mon Wed Fri    apixaban  10 mg Oral BID    Followed by   Melvina Escobar ON 11/4/2021] apixaban  5 mg Oral BID    aspirin  81 mg Oral Daily    clopidogrel  75 mg Oral Daily    metoprolol tartrate  100 mg Oral BID    atorvastatin  40 mg Oral Nightly    vancomycin (VANCOCIN) intermittent dosing (placeholder)   Other RX Placeholder    piperacillin-tazobactam  2,250 mg IntraVENous Q8H    sodium chloride flush  5-40 mL IntraVENous 2 times per day     Continuous Infusions:    CBC:   Recent Labs     10/31/21  0850   WBC 7.4   HGB 8.9*        CMP:    Recent Labs     10/30/21  0801 10/30/21  0801 10/31/21  0850 11/01/21  0424     --  135 136   K 4.0   < > 4.2  4.2 4.2   CL 96*  --  97* 99   CO2 27  --  28 24   BUN 9  --  16 20   CREATININE 3.4*  --  4.7* 5.6*   GLUCOSE 89  --  84 91   CALCIUM 8.2*  --  8.6 9.0   LABGLOM 21*  --  14* 12*    < > = values in this interval not displayed. Troponin: No results for input(s): TROPONINI in the last 72 hours. BNP: No results for input(s): BNP in the last 72 hours. INR: No results for input(s): INR in the last 72 hours. Lipids: No results for input(s): CHOL, LDLDIRECT, TRIG, HDL, AMYLASE, LIPASE in the last 72 hours. Liver: No results for input(s): AST, ALT, ALKPHOS, PROT, LABALBU, BILITOT in the last 72 hours. Invalid input(s): BILDIR  Iron:  No results for input(s): IRONS, FERRITIN in the last 72 hours. Invalid input(s): LABIRONS  CT HEAD WO CONTRAST   Final Result   1. No mass effect or acute hemorrhage. 2. Chronic periventricular small vessel ischemic changes and cerebral atrophy. **This report has been created using voice recognition software. It may contain minor errors which are inherent in voice recognition technology. **      Final report electronically signed by Dr. Livier Walker on 10/31/2021 6:24 PM      XR CHEST PORTABLE   Final Result   Worsening bilateral patchy pulmonary opacities with small bilateral pleural effusions. **This report has been created using voice recognition software. It may contain minor errors which are inherent in voice recognition technology. **      Final report electronically signed by Dr. Josiane Espinoza on 10/28/2021 7:16 AM      CTA CHEST [address]   Final Result      1. CT evidence of pulmonary embolus is seen involving the lingula and left upper lobe. 2. Emphysema. 3. There is diffuse groundglass opacification of both lungs with areas of bronchial wall thickening. Findings can relate to underlying interstitial edema versus infectious process. Clinical correlation is recommended. 4. A large right pleural effusion is seen. A moderate left pleural effusion   5. Body wall edema relating to anasarca. 6. A 1.3 cm right upper lobe pulmonary nodule is noted. 7. Other findings as described above. **This report has been created using voice recognition software. It may contain minor errors which are inherent in voice recognition technology. **      Final report electronically signed by Dr Tess Eid on 10/26/2021 10:21 PM      XR CHEST PORTABLE   Final Result   1. The diffuse airspace opacities have progressed from the prior chest radiograph. Findings can relate to pneumonia or pulmonary edema. 2. Small bilateral pleural effusions. 3. Cardiomegaly. **This report has been created using voice recognition software. It may contain minor errors which are inherent in voice recognition technology. **      Final report electronically signed by Dr Tess Eid on 10/26/2021 8:31 PM            Objective:   Vitals: BP (!) 156/73   Pulse 68   Temp 97.8 °F (36.6 °C)   Resp 18   Wt 139 lb 8.8 oz (63.3 kg)   SpO2 97%   BMI 22.52 kg/m²    Wt Readings from Last 3 Encounters:   11/01/21 139 lb 8.8 oz (63.3 kg)   10/15/21 149 lb 4 oz (67.7 kg)   07/14/21 145 lb 8.1 oz (66 kg)      24HR INTAKE/OUTPUT:      Intake/Output Summary (Last 24 hours) at 11/1/2021 1208  Last data filed at 10/31/2021 2327  Gross per 24 hour   Intake 130 ml   Output --   Net 130 ml       Constitutional: Well-developed elderly gentleman comfortably asleep , no apparent distress   Skin:normal with no rash or lesions. HEENT:Pupils are reactive . Throat is clear . Oral mucosa is moist   Neck:supple with no  carotid bruit  Cardiovascular:  S1, S2 without murmur or rubs   Respiratory:  Clear to ausculation without wheezes, rhonchi or rales in the anterior  Abdomen: Soft. Good bowel sounds. Ext: No LE edema  Musculoskeletal:Intact  Neuro: Deferred      Electronically signed by Yvonne Ang MD on 11/1/2021 at 12:08 PM  **This report has been created using voice recognition software. It maycontain minor  errors which are inherent in voice recognition technology. **

## 2021-11-01 NOTE — PROGRESS NOTES
Joann Martinez 60  OCCUPATIONAL THERAPY MISSED TREATMENT NOTE  [facility] 5K  [id number]      Date: 2021  Patient Name: Marbella Howard        CSN: [id number]   : 11/10/1930  (80 y.o.)  Gender: male   Referring Practitioner: SHANELL Jefferson  Diagnosis: Dyspnea         REASON FOR MISSED TREATMENT: Patient Off Floor for Dialysis

## 2021-11-01 NOTE — PROGRESS NOTES
Report called to nurse Bahman Cedillo) at [facility]. Last dose [facility] faxed to [phone]. Daughter here to transport patient to facility.

## 2021-11-01 NOTE — CARE COORDINATION
11/1/21, 2:29 PM EDT    DISCHARGE PLANNING EVALUATION    JULI Ramirez advised that daughter unable to transport due to 02 needs (no portable tank)-call to [facility] and transport arranged via ambulette. Message left for Pat with [facility]. JULI Ramirez updated.

## 2021-11-02 NOTE — DISCHARGE SUMMARY
Hospitalist Discharge Summary        Patient: Umm Navarro  YOB: 1930  MRN: [id number]   Acct: [de-identified]    Primary Care Physician: Hung Swain MD    Admit date  10/26/2021    Discharge date:  11/1/2021    Chief Complaint on presentation :-  SOB     Discharge Assessment and Plan:-   1. Acute on Chronic respiratory failure, hypoxia: Multifactorial.  PE, pneumonia, fluid overload. Patient's baseline is 3 L. Patient is back to baseline. CXR shows \"Worsening opacities\". 2. Acute PE: CTA of chest shows PE and lingula. Pt switched to [address] [place] [address]. Echo completed. 3. Possible Bacterial PNA: CTA shows possible pneumonia. Chest x-ray in the a.m. Zosyn and vancomycin- treatments were completed prior to DC. Patient was recently admitted for COVID-19 pneumonia. 4. ESRD on HD: Nephrology consulted. Normal HD schedule. 5. Recent upper GI bleed: Continue PPI. 6. Anemia of chronic disease: Hemoglobin stable at this time. 7. HFrEF, acute on chronic: Echo pending. May 2021 EF 45%. Hemodialysis today. Continue home medications. 8. CAD: PCI in 2018. Stress test in May 2021 did not show concern for MI. Patient is chest pain-free. Continue home medications. Initial H and P and Hospital course:-  Initial H&P \"Tunde Shelton is a 80 y. o. male with PMHx of ESRD on HD Monday, Wednesday, and Sherrel Blind presented to [facility] with progressive dyspnea for the past two weeks. Patient was recently admitted to our hospital for COVID pneumonia a month ago. He was admitted on 9/13/2021 and was discharged on 10/15/2021. He was discharged on supplemental O2 at 3L NC.     Patient was at a SNF, and complained of progressive dyspnea for the past two weeks. Per ED notes, patient was walking today, and was found to have hypoxia down to the mid 80s while on supplemental O2. Patient denied chest pain. He denied coughing. He denied diaphoresis. He denied fever.  Staff at the SNF called EMS.      Here, patient again was observed by staff ED RN to have hypoxia as low as the mid 80s while on 3L NC. His oxygen was increased to 6L NC, and he showed improvement on his oxygenation. CXR in the ED demonstrated   diffuse airspace opacities, which have progressed from the prior chest radiograph, concerning for  pneumonia or pulmonary edema. Blood cultures pending. Procalcitonin elevated at 0.95.  He was empirically started on IV antibiotics to cover potential HCAP.      We were asked to admit this patient for further management. \"      10/27: No acute events overnight. Patient was admitted to the night. Patient underwent hemodialysis today and tolerated it well. Patient was sleeping upon arrival.  Patient was back on baseline 3 L.    10/28: No acute events overnight. Pt sitting in the chair. Pt complaining of abd pain. Pt states that he is not hungry.      10/29: No acute events overnight. Patient eating lunch during evaluation. Patient states that breathing is better. No abdominal pain.     10/30: No acute events overnight. Patient states that he is feeling weaker today. Patient sitting up in chair during evaluation. No issues or concerns at this time. Awaiting pre-CERT. 10/31: No acute events overnight. Patient lying in bed during evaluation. No issues or concerns. 79/1: Pre-cert returned. On the day of discharge, it was explained to the patient that it was very important to follow up with his PCP and Nephrology to have continued care. Appointments were made and information was given. Physical Exam:-  General appearance: Chronically ill-appearing male  HEENT: Pupils equal, round, and reactive to light. Conjunctivae/corneas clear. Neck: Supple, with full range of motion. No jugular venous distention. Trachea midline. Respiratory:  Normal respiratory effort on NC. Breath sounds diminished.   Cardiovascular: Regular rate and rhythm with normal S1/S2 without murmurs, rubs or gallops. Abdomen: Soft, non-tender, non-distended with normal bowel sounds. Musculoskeletal: passive and active ROM x 4 extremities. Skin: Skin color, texture, turgor normal.  No rashes or lesions. Neurologic:  Neurovascularly intact without any focal sensory/motor deficits. Cranial nerves: II-XII intact, grossly non-focal.  Psychiatric: Alert, pleasantly confused  Capillary Refill: Brisk,< 3 seconds   Peripheral Pulses: +2 palpable, equal bilaterally     Vitals:   No data found. Weight:   Weight: 138 lb 3.7 oz (62.7 kg)   24 hour intake/output:   No intake or output data in the 24 hours ending 11/02/21 1746    Discharge Medications:-      Medication List      START taking these medications    * apixaban 5 MG Tabs tablet  Commonly known as: ELIQUIS  Take 2 tablets by mouth 2 times daily for 6 doses     * apixaban 5 MG Tabs tablet  Commonly known as: ELIQUIS  Take 1 tablet by mouth 2 times daily  Start taking on: November 4, 2021     epoetin traci-epbx 64225 UNIT/ML Soln injection  Commonly known as: RETACRIT  Inject 1 mL into the skin three times a week     losartan 50 MG tablet  Commonly known as: COZAAR  Take 1 tablet by mouth daily         * This list has 2 medication(s) that are the same as other medications prescribed for you. Read the directions carefully, and ask your doctor or other care provider to review them with you.             CONTINUE taking these medications    albuterol sulfate  (90 Base) MCG/ACT inhaler     Aspirin Low Dose 81 MG EC tablet  Generic drug: aspirin     atorvastatin 40 MG tablet  Commonly known as: LIPITOR     calcitRIOL 0.25 MCG capsule  Commonly known as: ROCALTROL     cinacalcet 30 MG tablet  Commonly known as: SENSIPAR     clopidogrel 75 MG tablet  Commonly known as: PLAVIX     cyclobenzaprine 10 MG tablet  Commonly known as: FLEXERIL     docusate 100 MG Caps  Commonly known as: COLACE, DULCOLAX  Take 100 mg by mouth 2 times daily     ferrous sulfate 325 (65 Fe) MG tablet  Commonly known as: IRON 325     metoprolol 100 MG tablet  Commonly known as: LOPRESSOR  Take 1 tablet by mouth 2 times daily     pantoprazole 40 MG tablet  Commonly known as: PROTONIX  Take 1 tablet by mouth 2 times daily (before meals)     polyethylene glycol 17 GM/SCOOP powder  Commonly known as: MiraLax  Renal Miralax Colonoscopy prep. Use as directed. Mix Miralax 238 g with 24 oz clear liquids. Drink 8 oz glass every 20-30 minutes until gone.      Renal 1 MG Caps     vitamin C 500 MG tablet  Commonly known as: ASCORBIC ACID  Take 1 tablet by mouth daily     white petrolatum Gel  Apply 28 g topically as needed for Dry Skin        STOP taking these medications    dilTIAZem 30 MG tablet  Commonly known as: CARDIZEM           Where to Get Your Medications      Information about where to get these medications is not yet available    Ask your nurse or doctor about these medications  · apixaban 5 MG Tabs tablet  · apixaban 5 MG Tabs tablet  · epoetin traci-epbx 63391 UNIT/ML Soln injection  · losartan 50 MG tablet          Labs :-  Recent Results (from the past 72 hour(s))   CBC    Collection Time: 10/31/21  8:50 AM   Result Value Ref Range    WBC 7.4 4.8 - 10.8 thou/mm3    RBC 2.88 (L) 4.70 - 6.10 mill/mm3    Hemoglobin 8.9 (L) 14.0 - 18.0 gm/dl    Hematocrit 26.5 (L) 42.0 - 52.0 %    MCV 92.0 80.0 - 94.0 fL    MCH 30.9 26.0 - 33.0 pg    MCHC 33.6 32.2 - 35.5 gm/dl    RDW-CV 20.1 (H) 11.5 - 14.5 %    RDW-SD 65.6 (H) 35.0 - 45.0 fL    Platelets 298 405 - 539 thou/mm3    MPV 9.7 9.4 - 12.4 fL   Basic Metabolic Panel w/ Reflex to MG    Collection Time: 10/31/21  8:50 AM   Result Value Ref Range    Potassium reflex Magnesium 4.2 3.5 - 5.2 meq/L   Basic Metabolic Panel    Collection Time: 10/31/21  8:50 AM   Result Value Ref Range    Sodium 135 135 - 145 meq/L    Potassium 4.2 3.5 - 5.2 meq/L    Chloride 97 (L) 98 - 111 meq/L    CO2 28 23 - 33 meq/L    Glucose 84 70 - 108 mg/dL    BUN 16 7 - 22 mg/dL    CREATININE 4.7 (HH) 0.4 - 1.2 mg/dL    Calcium 8.6 8.5 - 10.5 mg/dL   Anion Gap    Collection Time: 10/31/21  8:50 AM   Result Value Ref Range    Anion Gap 10.0 8.0 - 16.0 meq/L   Glomerular Filtration Rate, Estimated    Collection Time: 10/31/21  8:50 AM   Result Value Ref Range    Est, Glom Filt Rate 14 (A) QQ/MTD/8.97M5   Basic Metabolic Panel w/ Reflex to MG    Collection Time: 11/01/21  4:24 AM   Result Value Ref Range    Sodium 136 135 - 145 meq/L    Potassium reflex Magnesium 4.2 3.5 - 5.2 meq/L    Chloride 99 98 - 111 meq/L    CO2 24 23 - 33 meq/L    Glucose 91 70 - 108 mg/dL    BUN 20 7 - 22 mg/dL    CREATININE 5.6 (HH) 0.4 - 1.2 mg/dL    Calcium 9.0 8.5 - 10.5 mg/dL   Vancomycin, Random    Collection Time: 11/01/21  4:24 AM   Result Value Ref Range    Vancomycin Rm 28.6 0.1 - 39.9 ug/mL   Glomerular Filtration Rate, Estimated    Collection Time: 11/01/21  4:24 AM   Result Value Ref Range    Est, Glom Filt Rate 12 (A) ml/min/1.73m2   Anion Gap    Collection Time: 11/01/21  4:24 AM   Result Value Ref Range    Anion Gap 13.0 8.0 - 16.0 meq/L        Microbiology:    Blood culture #1:   Lab Results   Component Value Date    BC No growth-preliminary No growth  10/26/2021     Lab Results   Component Value Date    ORG Micrococcus species 07/12/2021     Radiology:-  No results found.      Follow-up scheduled after discharge :-    in the next few days with Hadyen Stewart MD  in the next few weeks with Nephrology     Consultations during this hospital stay:-  [] NONE [] Cardiology  [x] Nephrology  [] Hemo onco   [] GI   [] ID  [] Endocrine  [] Pulm    [] Neuro    [] Psych   [] Urology  [] ENT   [] G SURGERY   []Ortho    []CV surg    [] Palliative  [] Hospice [] Pain management   []    []TCU   [] PT/OT  OTHERS:-    Disposition: SNF  Condition at Discharge: Stable    Time Spent:- 40 minutes    Electronically signed by SHANELL Mosley on 11/2/21 at 5:46 PM EDT   Discharging Hospitalist

## 2021-11-05 PROBLEM — K92.2 ACUTE UPPER GASTROINTESTINAL HEMORRHAGE: Status: ACTIVE | Noted: 2021-01-01

## 2021-11-05 NOTE — PROGRESS NOTES
Patient admitted into 3B29, family at bedside. Patient oriented to person, place, and year. Follows commands. Pt complaints of being cold. Attempted to get pulse oxygen saturations on fingers and forehead without success. Pt denies shortness of breath, currently on 3 liters nasal cannula. Patient in a-fib, attempted to notify Dr. Heide Larose, awaiting response. Patient does not have packet from [facility] to fax over last dose mar. 2 person skin assessment completed with Rica Mcclain. No skin issues assessed at this time. Dr. Julia Robertson notified of consult.

## 2021-11-05 NOTE — ED NOTES
Patient JOSE GUADALUPE to inpatient Dialysis by transport tech.      Maribel Riojas RN  11/05/21 9857

## 2021-11-05 NOTE — SIGNIFICANT EVENT
Responded to rapid response on inpatient dialysis. ESRD patient. HR >140 in afib RVR noted on monitor. Patient notably disoriented and not responding to commands. Recent hx of PE w/ GI bleed. Hb 6.1 on last CBC. Patient BP 62/25 during dialysis. Staff returned blood and volume with return of SBP >120. New onset Afib noted on EKG. CXR pending. Repeat H&H pending. Suspect 2/2 volume shift during HD. Returned to NSR following volume resuscitation. Call made to Dr Tone tinajero/ decision to transfuse blood and then finish HD. Patient now hemodynamically stable and resting comfortably.      Physical Exam  Heart RRR no murmur  Lungs CTA no rales, wheezes  Extremities pulses 2+ cap refill <3s  Neuro: alert to self purposeful movements all four extremities

## 2021-11-05 NOTE — FLOWSHEET NOTE
11/05/21 1230 11/05/21 1706   Vital Signs   BP (!) 151/71 (!) 151/75   Temp 98 °F (36.7 °C) 97.7 °F (36.5 °C)   Pulse 79 90   Resp 18 18   SpO2  --  100 %   Weight   (unable to weigh on er strecher. )   (unable to weigh on er strecher. )   Post-Hemodialysis Assessment   Post-Treatment Procedures  --  Blood returned; Access bleeding time < 10 minutes   Machine Disinfection Process  --  Acid/Vinegar Clean;Heat Disinfect; Exterior Machine Disinfection   Total Liters Processed (l/min)  --  69.8 l/min   Dialyzer Clearance  --  Lightly streaked   Duration of Treatment (minutes)  --  240 minutes   Heparin amount administered during treatment (units)  --  0 units   Hemodialysis Intake (ml)  --  1100 ml   Hemodialysis Output (ml)  --  1100 ml   NET Removed (ml)  --  0 ml   Tolerated Treatment  --  Fair   at 1347 afib with 's noted. uf off at that time. 1349- rapid response called. treatment paused, blood returned. 1351- rapid team arrived 1357- Vital signs:155/67, 93 1400- called Dr. Grzegorz Quiñones [phone]- ekg obtained at bedside. 26- Dr. Grzegorz Quiñones returned call, orders received to resume treatment, infuse prbc at slower rate, decrease BFR and remove no fluid. TX resumed per order with no further AFIB noted. pt completed 4.0 hour TX. Removed no fluid as ordered. Gave 1 unit of PRBCs with dialysis. Pressure held to each needle site x 10 min. Drsg clean, dry, and intact upon leaving unit. TX record printed for scanning into EMR.

## 2021-11-05 NOTE — ED PROVIDER NOTES
[address] [facility] [address] [address] EMERGENCY DEPT  EMERGENCY MEDICINE     Pt Name: Marcos Barreto  MRN: [id number]  [place] 11/10/1930  Date of evaluation: 11/5/2021  PCP:    Gavin Cervantes MD  Provider: XIN Jimenez CNP    CHIEF COMPLAINT       Chief Complaint   Patient presents with    Hematemesis       Location/Symptom: abdominal pain  Timing/Onset: today  Context/Setting: Patient states that today he woke up with abdominal pain, nausea and vomited at dialysis. Quality: Ache  Duration: Constant  Modifying Factors: None  Severity: 10 out of 10    HISTORY OF PRESENT ILLNESS    Sade Ciu is a 70-year-old male patient that presents to the ER from dialysis where he had bright red bloody emesis. Per dialysis they obtained a hemoglobin which was 6.8. Patient's hemoglobin normally runs between 8.6 and 8.9. Patient states that he started having nausea and abdominal pain this morning. He states that it came on suddenly. He denies fever, chest pain and diarrhea. He does complain of shortness of breath that started today as well. He was just discharged from the hospital on 11/1/2021 where he was admitted for 6 days for pneumonia. Triage notes and Nursing notes were reviewed by myself. Any discrepancies are addressed above.     PAST MEDICAL HISTORY     Past Medical History:   Diagnosis Date    Anemia in chronic kidney disease(285.21)     Arthritis     CAD (coronary artery disease)     Chronic kidney disease     Chronic kidney disease, stage IV (severe) (HCC)     CKD (chronic kidney disease) stage 3, GFR 30-59 ml/min (HCC)     COPD (chronic obstructive pulmonary disease) ([address]     GI bleed     Hemodialysis patient [facility]) 07/26/2016    [facility]    History of blood transfusion     6/2019    Hyperlipidemia     Hyperphosphatemia 5/22/2018    Hypertension     Sick sinus syndrome (HCC)     Systolic congestive heart failure ([address]        SURGICAL HISTORY       Past Surgical History: Procedure Laterality Date   [facility] CARDIAC SURGERY      COLONOSCOPY  [id number]    COLONOSCOPY N/A 6/27/2019    COLONOSCOPY DIAGNOSTIC performed by Satish Rubin MD at [facility] Patrice Motnicole  2004    DIALYSIS FISTULA CREATION  5/6/2016    right arm fistula placement    ENDOSCOPY, COLON, DIAGNOSTIC      PACEMAKER PLACEMENT  2013    MI ESOPHAGOGASTRODUODENOSCOPY TRANSORAL DIAGNOSTIC N/A 1/18/2018    EGD performed by Gustavo Mantilla MD at [address] [place] [address]  2006,2009    UPPER GASTROINTESTINAL ENDOSCOPY Left 6/23/2019    EGD DIAGNOSTIC ONLY performed by Satish Rubin MD at [address] [place] [address] N/A 10/9/2021    EGD performed by Satish Rubin MD at [facility]       Previous Medications    ALBUTEROL SULFATE  (90 BASE) MCG/ACT INHALER    Inhale 1 puff into the lungs every 4 hours as needed    APIXABAN (ELIQUIS) 5 MG TABS TABLET    Take 1 tablet by mouth 2 times daily    ASPIRIN LOW DOSE 81 MG EC TABLET    Take 81 mg by mouth daily    ATORVASTATIN (LIPITOR) 40 MG TABLET    Take 40 mg by mouth daily    B COMPLEX-C-FOLIC ACID (RENAL) 1 MG CAPS    Take 1 capsule by mouth daily    CALCITRIOL (ROCALTROL) 0.25 MCG CAPSULE    Take 1.5 mcg by mouth three times a week before dialysis on M,W,F    CINACALCET (SENSIPAR) 30 MG TABLET    Take 60 mg by mouth three times a week before dialysis on M,W,F    CLOPIDOGREL (PLAVIX) 75 MG TABLET    Take 75 mg by mouth daily    CYCLOBENZAPRINE (FLEXERIL) 10 MG TABLET    Take 10 mg by mouth daily    DOCUSATE SODIUM (COLACE, DULCOLAX) 100 MG CAPS    Take 100 mg by mouth 2 times daily    EPOETIN SAGAR-EPBX (RETACRIT) 93112 UNIT/ML SOLN INJECTION    Inject 1 mL into the skin three times a week    FERROUS SULFATE (IRON 325) 325 (65 FE) MG TABLET    Take 325 mg by mouth daily    LOSARTAN (COZAAR) 50 MG TABLET    Take 1 tablet by mouth daily METOPROLOL (LOPRESSOR) 100 MG TABLET    Take 1 tablet by mouth 2 times daily    PANTOPRAZOLE (PROTONIX) 40 MG TABLET    Take 1 tablet by mouth 2 times daily (before meals)    POLYETHYLENE GLYCOL (MIRALAX) POWDER    Renal Miralax Colonoscopy prep. Use as directed. Mix Miralax 238 g with 24 oz clear liquids. Drink 8 oz glass every 20-30 minutes until gone. VITAMIN C (ASCORBIC ACID) 500 MG TABLET    Take 1 tablet by mouth daily    WHITE PETROLATUM GEL    Apply 28 g topically as needed for Dry Skin       ALLERGIES     No Known Allergies    FAMILY HISTORY       Family History   Problem Relation Age of Onset    Diabetes Mother     Heart Disease Mother     Diabetes Sister     Mental Illness Paternal Aunt         SOCIAL HISTORY       Social History     Socioeconomic History    Marital status:      Spouse name: Rc Portillo Number of children: None    Years of education: None    Highest education level: None   Occupational History    None   Tobacco Use    Smoking status: Former Smoker     Packs/day: 1.00     Years: 40.00     Pack years: 40.00     Quit date: 1982     Years since quittin.8    Smokeless tobacco: Never Used   Vaping Use    Vaping Use: Never used   Substance and Sexual Activity    Alcohol use: No    Drug use: No    Sexual activity: None   Other Topics Concern    None   Social History Narrative    None     Social Determinants of Health     Financial Resource Strain:     Difficulty of Paying Living Expenses:    Food Insecurity:     Worried About Running Out of Food in the Last Year:     Ran Out of Food in the Last Year:    Transportation Needs:     Lack of Transportation (Medical):      Lack of Transportation (Non-Medical):    Physical Activity:     Days of Exercise per Week:     Minutes of Exercise per Session:    Stress:     Feeling of Stress :    Social Connections:     Frequency of Communication with Friends and Family:     Frequency of Social Gatherings with Friends and Family:     Attends Sikhism Services:     Active Member of Clubs or Organizations:     Attends Club or Organization Meetings:     Marital Status:    Intimate Partner Violence:     Fear of Current or Ex-Partner:     Emotionally Abused:     Physically Abused:     Sexually Abused:        REVIEW OF SYSTEMS     Review of Systems   Constitutional: Negative for appetite change, chills, fatigue, fever and unexpected weight change. HENT: Negative for ear pain, rhinorrhea and sinus pressure. Eyes: Negative for pain and visual disturbance. Respiratory: Positive for shortness of breath. Negative for cough and wheezing. Cardiovascular: Negative for chest pain, palpitations and leg swelling. Gastrointestinal: Positive for abdominal pain, nausea and vomiting. Negative for blood in stool, constipation and diarrhea. Genitourinary: Negative for dysuria, frequency and hematuria. Musculoskeletal: Negative for arthralgias and joint swelling. Skin: Negative for rash. Neurological: Negative for dizziness, syncope, weakness, light-headedness and headaches. Hematological: Does not bruise/bleed easily. Except as noted above the remainder of the review of systems was reviewed and is negative. SCREENINGS                        PHYSICAL EXAM    (up to 7 for level 4, 8 or more for level 5)     ED Triage Vitals [11/05/21 0755]   BP Temp Temp Source Pulse Resp SpO2 Height Weight   (!) 133/58 97.7 °F (36.5 °C) Axillary 90 16 100 % 5' 6\" (1.676 m) 163 lb (73.9 kg)       Physical Exam  Vitals and nursing note reviewed. Constitutional:       Appearance: He is not diaphoretic. HENT:      Head: Normocephalic and atraumatic. Right Ear: External ear normal.      Left Ear: External ear normal.   Eyes:      Conjunctiva/sclera: Conjunctivae normal.   Pulmonary:      Effort: Pulmonary effort is normal. No respiratory distress.    Abdominal:      General: Bowel sounds are normal. There is no distension. Palpations: There is no mass. Tenderness: There is abdominal tenderness (left upper quadrant). There is guarding. There is no rebound. Hernia: No hernia is present. Musculoskeletal:      Cervical back: Normal range of motion. Skin:     General: Skin is warm and dry. Neurological:      Mental Status: He is alert. DIAGNOSTIC RESULTS     EKG:(none if blank)  All EKGs are interpreted by the Emergency Department Physician who either signs or Co-signs this chart in the absence of a cardiologist.    ***    RADIOLOGY: (none if blank)   I directly visualized the following images and reviewed the radiologist interpretations. Interpretation per the Radiologist below, if available at the time of this note:  XR CHEST (2 VW)    (Results Pending)       LABS:  Labs Reviewed   POCT GLUCOSE - Abnormal; Notable for the following components:       Result Value    POC Glucose 63 (*)     All other components within normal limits   COMPREHENSIVE METABOLIC PANEL W/ REFLEX TO MG FOR LOW K   LIPASE   TROPONIN   LACTIC ACID, PLASMA   TYPE AND SCREEN       All other labs were within normal range or not returned as of this dictation. Please note, any cultures that may have been sent were not resulted at the time of this patient visit. EMERGENCY DEPARTMENT COURSE and Medical Decision Making:     Vitals:    Vitals:    11/05/21 0755   BP: (!) 133/58   Pulse: 90   Resp: 16   Temp: 97.7 °F (36.5 °C)   TempSrc: Axillary   SpO2: 100%   Weight: 163 lb (73.9 kg)   Height: 5' 6\" (1.676 m)       PROCEDURES: (None if blank)  Procedures       MDM    Strict return precautions and follow up instructions were discussed with the patient with which the patient agrees    ED Medications administered this visit:    Medications   ondansetron (ZOFRAN) injection 4 mg (has no administration in time range)         FINAL IMPRESSION    No diagnosis found.       DISPOSITION/PLAN   DISPOSITION        PATIENT REFERRED TO:  No follow-up provider specified.     DISCHARGE MEDICATIONS:  New Prescriptions    No medications on file              XIN Arreola CNP (electronically signed)

## 2021-11-05 NOTE — CONSULTS
Nephrology Consult Note    Patient - Eulogio Knowles   MRN -  [id number]      - 11/10/1930   80 y.o. Date of Admission -  2021  7:46 AM        Date of evaluation -  2021 12:58 PM    Reason for Consult  Management of End Stage Renal Disease    Requesting Physician:  No att. providers found  History Information Obtained From: Nursing staff, Electronic medical records, Patient, family member    [facility] / HPI:   The patient is a 80 y.o. male with past medical history of DM II, HTN, CAD, ESRD on HD who presented with c/o of gradually worsening weakness that began last few days, associated with mild shortness of breath. On arrival to Hemodialysis this AM, the pt had emesis of red blood and was sent to Emergency Department. Hgb on admission was 6.1. His hgb was 8.9 on 10/31/2021  He had recent hospital admission for dyspnea from  till 10/15. He was then re admitted with c/o of shortness of breath opn 10/26 and discharged 2021. Had previous COVID 19 pneumonia, detected 2021. Pt was seen during Hemodialysis with 3 K bath, goal Ultrafiltration 2 L. REVIEW OF SYSTEMS:   GENERAL: Pleasant,   Denies fever  HEENT: Denies Upper respiratory complaints  CARDIOVASCULAR: Denies chest pain/angina. RESPIRATORY:Denies cough, wheezing  ABDOMEN: Denies nausea, vomiting, diarrhea  CNS:Denies headache, dizziness  MUSCULOSKELETAL: Reports joint arthralgia  SKIN: Denies rash  HEMATOLOGIC: Denies bruising     EXAM:  VITALS:  BP (!) 151/71   Pulse 79   Temp 98 °F (36.7 °C)   Resp 18   Ht 5' 6\" (1.676 m)   Wt 211 lb 13.8 oz (96.1 kg)   SpO2 98%   BMI 34.20 kg/m²      Constitutional:  No acute distress. Appears fatigued  Skin: No rash on exposed surfaces, No significant bruises  Heent:  Head is normocephalic, Extraocular movement intact, Voice is clear. Neck: no jugular venous distention noted, Neck is supple  Cardiovascular:  S1, S2  regular rate and rhythm.    Respiratory: Clear to ausculation bilaterally. Equal breath sounds, No crackles, No wheezes. No shortness of breath. Abdomen: Soft, non tender  Ext: Distal lower extremity temp is warm, No lower extremity edema. upper extremity AV Fistula   Musculoskeletal: Movement is coordinated. Moves all extremities. Psychiatric: mood and affect appropriate  Neurological: Patient is alert and oriented to person, place, and time. Recent and remote   memory intact, Thought is coherant. Home Medications:  Not in a hospital admission. Current Medications:     pantoprazole (PROTONIX) bolus  80 mg IntraVENous Once     Continuous Infusions:   sodium chloride      pantoprazole       Med and Labs reviewed  24HR INTAKE/OUTPUT:  No intake or output data in the 24 hours ending 11/05/21 1258    No intake/output data recorded. Patient Vitals for the past 96 hrs (Last 3 readings):   Weight   11/05/21 1230 211 lb 13.8 oz (96.1 kg)   11/05/21 0755 163 lb (73.9 kg)     Body mass index is 34.2 kg/m². BP Readings from Last 5 Encounters:   11/05/21 (!) 151/71   11/01/21 (!) 184/84   10/15/21 126/67   10/09/21 (!) 163/77   07/14/21 (!) 147/66       Allergies:  Patient has no known allergies. Allergies/Intolerances: ALLERGIES: Patient has no known allergies. Past Medical, Family, Social History:   has a past medical history of Anemia in chronic kidney disease(285.21), Arthritis, CAD (coronary artery disease), Chronic kidney disease, Chronic kidney disease, stage IV (severe) ([address] CKD (chronic kidney disease) stage 3, GFR 30-59 ml/min (HCC), COPD (chronic obstructive pulmonary disease) ([address] GI bleed, Hemodialysis patient ([address] History of blood transfusion, Hyperlipidemia, Hyperphosphatemia, Hypertension, Sick sinus syndrome ([address] and Systolic congestive heart failure ([address] has a past surgical history that includes pacemaker placement (2013); Endoscopy, colon, diagnostic; Dialysis fistula creation (5/6/2016); Colonoscopy (3611,1869);  Upper gastrointestinal endoscopy (5185,2570); Coronary angioplasty with stent (2004); pr esophagogastroduodenoscopy transoral diagnostic (N/A, 1/18/2018); vascular surgery; Cardiac surgery; Upper gastrointestinal endoscopy (Left, 6/23/2019); Colonoscopy (N/A, 6/27/2019); and Upper gastrointestinal endoscopy (N/A, 10/9/2021). family history includes Diabetes in his mother and sister; Heart Disease in his mother; Mental Illness in his paternal aunt. reports that he quit smoking about 39 years ago. He has a 40.00 pack-year smoking history. He has never used smokeless tobacco. He reports that he does not drink alcohol and does not use drugs. Diagnostic Data:  Recent Labs     11/05/21  0840      K 4.6      CO2 26   BUN 36*   CREATININE 5.3*   LABGLOM 12*   GLUCOSE 88   CALCIUM 9.6     Recent Labs     11/05/21  0840   WBC 10.5   RBC 1.93*   HGB 6.1*   HCT 19.0*         XR CHEST (2 VW)  Result Date: 11/5/2021  1. Mild cardiomegaly and probable congestive heart failure in this patient status post pacemaker placement. 2. Thickening off the interstitial lung markings and increased density at both lung bases. 3. Small right pleural effusion. 4. Shunt in the right arm. Summary 5/21/2021   Left ventricle size is normal.   Normal left ventricular wall thickness. There was mild global hypokinesis of the left ventricle. Systolic function was mildly reduced. Ejection fraction is visually estimated at 45%. Doppler parameters were consistent with abnormal left ventricular   relaxation (grade 1 diastolic dysfunction). The left atrium is Moderately dilated. Moderate mitral regurgitation is present. Moderate tricuspid regurgitation visualized. Right ventricular systolic pressure measures 55 mmhg. When this echo is compared with the echo from 05/22/2018, the EF dropped   from 60 % to 45% now     ASSESSMENT  1.  End Stage Renal Disease 2nd to diabetic and hypertensive nephrosclerosis on Hemodialysis MW,JAYCE. AV fistula. Apply Emla cream to AV fistula site 30 min prior to Hemodialysis. 2. Hematemesis Acute on chronic anemia, Planned transfusion in Hemodialysis   3. Recent history of Pulm embolus lingula and Left  Upper lobe, with acute hypoxic resp failure. 4. Recent COVID 19 pneumonia. 9/13/2021  5. Essential Hypertension with nephrosclerosis,   6. Abdominal aortic aneurysm 3.7 cm   7. 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 Coronary artery disease Recent PCI 8/31/21 at [facility]  9. Anemia of chronic disease and acute blood loss, Start Retacrit 10,000 units 3x/week  10. Erosive gastritis per EGD  11. Secondary hyperparathyroidism of renal origin on rocaltrol  12. Debility  13. Secondary hyperparathyroidism of renal origin  Active Problems:    Acute upper gastrointestinal hemorrhage  Resolved Problems:    * No resolved hospital problems. *    Discussed with Dr. Grzegorz Quiñones. Patient was advised about impression and plan. Patient verbalizes understanding and agrees with plan of care.     Thank you  att. providers found  for allowing us to participate in care of XIN Stacy CNP 11/5/2021 12:58 PM

## 2021-11-05 NOTE — H&P
course    Chief Complaint:  Hematemesis    History Of Present Illness:      80 y.o. male with a past medical history of ESRD on hemodialysis, COPD with chronic hypoxic respiratory failure, CAD status post PCI, pulmonary embolism recently diagnosed, history of GI bleeds, HFrEF, hypertension, hyperlipidemia, osteoarthritis who is admitted to [facility] on 11/5/2021 for hematemesis and acute blood loss anemia. Pt's daughters are present at bedside who state pt was at HD today and vomited blood. Hgb at that time was 6, pt was brought into ED for further evaluation. ED course: Stable vitals. Hgb 6.1, Lactic 2.9, procal 0.59 without any other signs of infection/sepsis. 2 units PRBCs ordered in ED - will be given to patient in HD today.        Past Medical History:          Diagnosis Date    Anemia in chronic kidney disease(285.21)     Arthritis     CAD (coronary artery disease)     Chronic kidney disease     [facility], stage IV (severe) [facility]     CKD (chronic kidney disease) stage 3, GFR 30-59 ml/min ([facility])     COPD (chronic obstructive pulmonary disease) ([facility]     GI bleed     Hemodialysis patient ([facility] 07/26/2016    @ [facility]    History of blood transfusion     6/2019    Hyperlipidemia     Hyperphosphatemia 5/22/2018    Hypertension     Sick sinus syndrome ([facility]     Systolic congestive heart failure [facility])        Past Surgical History:          Procedure Laterality Date   Cyndi Steven CARDIAC SURGERY      COLONOSCOPY  2006,2009    COLONOSCOPY N/A 6/27/2019    COLONOSCOPY DIAGNOSTIC performed by Armando Loving MD at [address] [facility] [place]  2004    DIALYSIS FISTULA CREATION  5/6/2016    right arm fistula placement    ENDOSCOPY, COLON, DIAGNOSTIC      PACEMAKER PLACEMENT  2013    MA ESOPHAGOGASTRODUODENOSCOPY TRANSORAL DIAGNOSTIC N/A 1/18/2018    EGD performed by Pooja Garces MD at [address] [place] [address] [id number]    UPPER GASTROINTESTINAL ENDOSCOPY Left 6/23/2019    EGD DIAGNOSTIC ONLY performed by Robert Anderson MD at [address] [facility] ENDOSCOPY N/A 10/9/2021    EGD performed by Robert Anderson MD at [facility]    VASCULAR SURGERY         Medications Prior to Admission:      Prior to Admission medications    Medication Sig Start Date End Date Taking?  Authorizing Provider   losartan (COZAAR) 50 MG tablet Take 1 tablet by mouth daily 11/1/21   SHANELL Gudino   epoetin traci-epbx (RETACRIT) 69018 UNIT/ML SOLN injection Inject 1 mL into the skin three times a week 11/1/21   SHANELL Gudino   apixaban (ELIQUIS) 5 MG TABS tablet Take 1 tablet by mouth 2 times daily 11/4/21   SHANELL Gudino   white petrolatum GEL Apply 28 g topically as needed for Dry Skin 10/15/21   Carmen Kang MD   pantoprazole (PROTONIX) 40 MG tablet Take 1 tablet by mouth 2 times daily (before meals) 10/11/21   Carmen Kang MD   albuterol sulfate  (90 Base) MCG/ACT inhaler Inhale 1 puff into the lungs every 4 hours as needed 9/8/21   Historical Provider, MD   ASPIRIN LOW DOSE 81 MG EC tablet Take 81 mg by mouth daily 9/1/21   Historical Provider, MD   atorvastatin (LIPITOR) 40 MG tablet Take 40 mg by mouth daily 9/1/21   Historical Provider, MD   cyclobenzaprine (FLEXERIL) 10 MG tablet Take 10 mg by mouth daily 8/28/17   Historical Provider, MD   clopidogrel (PLAVIX) 75 MG tablet Take 75 mg by mouth daily 8/13/21   Historical Provider, MD   ferrous sulfate (IRON 325) 325 (65 Fe) MG tablet Take 325 mg by mouth daily 6/24/19   Historical Provider, MD   metoprolol (LOPRESSOR) 100 MG tablet Take 1 tablet by mouth 2 times daily 7/14/21   Carmen Kang MD   cinacalcet (SENSIPAR) 30 MG tablet Take 60 mg by mouth three times a week before dialysis on M,W,F    Historical Provider, MD   calcitRIOL (ROCALTROL) 0.25 MCG capsule Take 1.5 mcg by mouth three times a week before dialysis on M,W,F    Historical Provider, MD   docusate sodium (COLACE, DULCOLAX) 100 MG CAPS Take 100 mg by mouth 2 times daily 6/24/19   Delmon Masker, DO   vitamin C (ASCORBIC ACID) 500 MG tablet Take 1 tablet by mouth daily 6/24/19   Delmon Masker, DO   polyethylene glycol [facility]) powder Renal Miralax Colonoscopy prep. Use as directed. Mix Miralax 238 g with 24 oz clear liquids. Drink 8 oz glass every 20-30 minutes until gone. 6/6/19   China Torres, APRN - CNP   B Complex-C-Folic Acid (RENAL) 1 MG CAPS Take 1 capsule by mouth daily    Historical Provider, MD       Allergies:  Patient has no known allergies. Social History:      TOBACCO:   reports that he quit smoking about 39 years ago. He has a 40.00 pack-year smoking history. He has never used smokeless tobacco.  ETOH:   reports no history of alcohol use. Family History:      Positive as follows:        Problem Relation Age of Onset    Diabetes Mother     Heart Disease Mother     Diabetes Sister     Mental Illness Paternal Aunt        Diet: NPO    REVIEW OF SYSTEMS:   Pertinent positives as noted in the HPI. All other systems reviewed and negative. PHYSICAL EXAM:    BP (!) 151/71   Pulse 79   Temp 98 °F (36.7 °C)   Resp 18   Ht 5' 6\" (1.676 m)   Wt 211 lb 13.8 oz (96.1 kg)   SpO2 98%   BMI 34.20 kg/m²     General appearance:  No apparent distress, appears stated age and cooperative. HEENT:  Normal cephalic, atraumatic without obvious deformity. Pupils equal, round, and reactive to light. Extra ocular muscles intact. Conjunctivae/corneas clear. Neck: Supple, with full range of motion. No jugular venous distention. Trachea midline. Respiratory:  Normal respiratory effort. Clear to auscultation, bilaterally without Rales/Wheezes/Rhonchi. On 3 lpm O2 via NC. Cardiovascular:  Regular rate and rhythm with normal S1/S2 without murmurs, rubs or gallops. Abdomen: Soft, non-tender, non-distended with normal bowel sounds.   Musculoskeletal:  No clubbing, cyanosis or edema bilaterally. Full range of motion without deformity. Skin: Skin color, texture, turgor normal.  No rashes or lesions. Neurologic:  Neurovascularly intact without any focal sensory/motor deficits. Cranial nerves: II-XII intact, grossly non-focal.  Psychiatric:  Thought content appropriate, normal insight  Capillary Refill: Brisk,< 3 seconds   Peripheral Pulses: +2 palpable, equal bilaterally       Labs:     Recent Labs     11/05/21  0840   WBC 10.5   HGB 6.1*   HCT 19.0*        Recent Labs     11/05/21  0840      K 4.6      CO2 26   BUN 36*   CREATININE 5.3*   CALCIUM 9.6     Recent Labs     11/05/21  0840   AST 20   ALT 14   BILITOT 0.6   ALKPHOS 99     No results for input(s): INR in the last 72 hours. No results for input(s): [place] in the last 72 hours. Urinalysis:      Lab Results   Component Value Date    NITRU NEGATIVE 06/22/2019    WBCUA 50-75 06/22/2019    BACTERIA NONE 06/22/2019    RBCUA 25-50 06/22/2019    BLOODU LARGE 06/22/2019    SPECGRAV 1.013 05/03/2016    GLUCOSEU NEGATIVE 06/22/2019       Intake & Output:  No intake/output data recorded. No intake/output data recorded. Radiology:       XR CHEST (2 VW)   Final Result   1. Mild cardiomegaly and probable congestive heart failure in this patient status post pacemaker placement. 2. Thickening off the interstitial lung markings and increased density at both lung bases. 3. Small right pleural effusion. 4. Shunt in the right arm. **This report has been created using voice recognition software. It may contain minor errors which are inherent in voice recognition technology. **      Final report electronically signed by DR Sukumar Umaña on 11/5/2021 9:09 AM               DVT prophylaxis: [] Lovenox                                 [x] SCDs                                 [] SQ Heparin                                 [] Encourage ambulation           [] Already on Anticoagulation      PT/OT Eval Status: n/a    Disposition:    [x] Home       [] TCU       [] Rehab       [] Psych       [] SNF       [] [place]       [] Other-    Case discussed with Dr. Shakeel Love    Thank you Holli Brink MD for the opportunity to be involved in this patient's care.     Electronically signed by Krzysztof Arvizu MD on 11/5/2021 at 12:51 PM

## 2021-11-05 NOTE — PLAN OF CARE
Spoke with family as patient is resting during the exam, unable to be aroused. Discussed the challenge of treating the PE from last admission with anticoagulation in light of current GI bleed. Presented option of IVF filter which we mutually agreed would not lead to good quality of life. Discussed the fact that his course from here is unclear, that he may do ok for a time off anticoagulation for PE, but that he might not. Daughters agree to palliative care, hospice visit. Will dialyze today and give 2 units PRBCs, holding anticoagulation. protonix IV started in the ED which will continue.

## 2021-11-05 NOTE — ED PROVIDER NOTES
[address]  EMERGENCY MEDICINE     Pt Name: Marcos Barreto  MRN: [id number]  [place] 11/10/1930  Date of evaluation: 11/5/2021  PCP:    Gavin Cervantes MD  Provider: XIN Jimenez CNP    CHIEF COMPLAINT       Chief Complaint   Patient presents with    Hematemesis       Location/Symptom: abdominal pain  Timing/Onset: today  Context/Setting: Patient states that today he woke up with abdominal pain, nausea and vomited at dialysis. Quality: Ache  Duration: Constant  Modifying Factors: None  Severity: 10 out of 10    HISTORY OF PRESENT ILLNESS    Sade Cui is a 54-year-old male patient that presents to the ER from dialysis where he had bright red bloody emesis. Per dialysis they obtained a hemoglobin which was 6.8. Patient's hemoglobin normally runs between 8.6 and 8.9. Patient states that he started having nausea and abdominal pain this morning. He states that it came on suddenly. He denies fever, chest pain and diarrhea. He does complain of shortness of breath that started today as well. Triage notes and Nursing notes were reviewed by myself. Any discrepancies are addressed above.     PAST MEDICAL HISTORY     Past Medical History:   Diagnosis Date    Anemia in chronic kidney disease(285.21)     Arthritis     CAD (coronary artery disease)     Chronic kidney disease     [facility] kidney disease, stage IV (severe) [facility]     CKD (chronic kidney disease) stage 3, GFR 30-59 ml/min ([facility])     [facility]ch[facility] obstructive [facility] disease) [facility] [address]     GI bleed     Hemodialysis patient [facility]) 07/26/2016    @ [facility]    History of blood transfusion     6/2019    Hyperlipidemia     Hyperphosphatemia 5/22/2018    Hypertension     Sick sinus syndrome ([facility] [address]     Systolic congestive heart failure [facility])        SURGICAL HISTORY       Past Surgical History:   Procedure Laterality Date   Reina Alu CARDIAC SURGERY      COLONOSCOPY  [id number]    COLONOSCOPY N/A 6/27/2019 COLONOSCOPY DIAGNOSTIC performed by Satish Rubin MD at [address]  2004    DIALYSIS FISTULA CREATION  5/6/2016    right arm fistula placement    ENDOSCOPY, COLON, DIAGNOSTIC      PACEMAKER PLACEMENT  2013    VT ESOPHAGOGASTRODUODENOSCOPY TRANSORAL DIAGNOSTIC N/A 1/18/2018    EGD performed by Gustavo Mantilla MD at [facility]  2006,2009    UPPER GASTROINTESTINAL ENDOSCOPY Left 6/23/2019    EGD DIAGNOSTIC ONLY performed by Satish Rubin MD at [address] [facility]   [address] N/A 10/9/2021    EGD performed by Satish Rubin MD at [facility]   [address]       Current Discharge Medication List      CONTINUE these medications which have NOT CHANGED    Details   losartan (COZAAR) 50 MG tablet Take 1 tablet by mouth daily  Qty: 30 tablet, Refills: 3      epoetin traci-epbx (RETACRIT) 51526 UNIT/ML SOLN injection Inject 1 mL into the skin three times a week  Qty: 6.6 mL      apixaban (ELIQUIS) 5 MG TABS tablet Take 1 tablet by mouth 2 times daily  Qty: 60 tablet      white petrolatum GEL Apply 28 g topically as needed for Dry Skin  Refills: 0      pantoprazole (PROTONIX) 40 MG tablet Take 1 tablet by mouth 2 times daily (before meals)  Qty: 60 tablet, Refills: 0      albuterol sulfate  (90 Base) MCG/ACT inhaler Inhale 1 puff into the lungs every 4 hours as needed      ASPIRIN LOW DOSE 81 MG EC tablet Take 81 mg by mouth daily      atorvastatin (LIPITOR) 40 MG tablet Take 40 mg by mouth daily      cyclobenzaprine (FLEXERIL) 10 MG tablet Take 10 mg by mouth daily      clopidogrel (PLAVIX) 75 MG tablet Take 75 mg by mouth daily      ferrous sulfate (IRON 325) 325 (65 Fe) MG tablet Take 325 mg by mouth daily      metoprolol (LOPRESSOR) 100 MG tablet Take 1 tablet by mouth 2 times daily  Qty: 60 tablet, Refills: 3      cinacalcet (SENSIPAR) 30 MG tablet Take 60 mg by mouth three times a week before dialysis on M,W,F      calcitRIOL (ROCALTROL) 0.25 MCG capsule Take 1.5 mcg by mouth three times a week before dialysis on M,W,F      docusate sodium (COLACE, DULCOLAX) 100 MG CAPS Take 100 mg by mouth 2 times daily  Qty: 60 capsule, Refills: 0      vitamin C (ASCORBIC ACID) 500 MG tablet Take 1 tablet by mouth daily  Qty: 30 tablet, Refills: 3      polyethylene glycol (MIRALAX) powder Renal Miralax Colonoscopy prep. Use as directed. Mix Miralax 238 g with 24 oz clear liquids. Drink 8 oz glass every 20-30 minutes until gone. Qty: 238 g, Refills: 0    Associated Diagnoses: Iron deficiency anemia due to chronic blood loss;  Positive fecal occult blood test; Encounter for diagnostic colonoscopy due to change in bowel habits; Diarrhea, unspecified type      B Complex-C-Folic Acid (RENAL) 1 MG CAPS Take 1 capsule by mouth daily             ALLERGIES     No Known Allergies    FAMILY HISTORY       Family History   Problem Relation Age of Onset    Diabetes Mother     Heart Disease Mother     Diabetes Sister     Mental Illness Paternal Aunt         SOCIAL HISTORY       Social History     Socioeconomic History    Marital status:      Spouse name: Herminia Villa Number of children: None    Years of education: None    Highest education level: None   Occupational History    None   Tobacco Use    Smoking status: Former Smoker     Packs/day: 1.00     Years: 40.00     Pack years: 40.00     Quit date: 1982     Years since quittin.8    Smokeless tobacco: Never Used   Vaping Use    Vaping Use: Never used   Substance and Sexual Activity    Alcohol use: No    Drug use: No    Sexual activity: None   Other Topics Concern    None   Social History Narrative    None     Social Determinants of Health     Financial Resource Strain:     Difficulty of Paying Living Expenses: Not on file   Food Insecurity:     Worried About Running Out of Food in the Last Year: Not on file    Ran Out of Food in the Last Year: Not on file   Transportation Needs:     Lack of Transportation (Medical): Not on file    Lack of Transportation (Non-Medical): Not on file   Physical Activity:     Days of Exercise per Week: Not on file    Minutes of Exercise per Session: Not on file   Stress:     Feeling of Stress : Not on file   Social Connections:     Frequency of Communication with Friends and Family: Not on file    Frequency of Social Gatherings with Friends and Family: Not on file    Attends Yazdanism Services: Not on file    Active Member of [address] [place] [address] or Organizations: Not on file    Attends Club or Organization Meetings: Not on file    Marital Status: Not on file   Intimate Partner Violence:     Fear of Current or Ex-Partner: Not on file    Emotionally Abused: Not on file    Physically Abused: Not on file    Sexually Abused: Not on file   Housing Stability:     Unable to Pay for Housing in the Last Year: Not on file    Number of [place] in the Last Year: Not on file    Unstable Housing in the Last Year: Not on file       REVIEW OF SYSTEMS     Review of Systems   Constitutional: Negative for appetite change, chills, fatigue, fever and unexpected weight change. HENT: Negative for ear pain, rhinorrhea and sinus pressure. Eyes: Negative for pain and visual disturbance. Respiratory: Negative for cough, shortness of breath and wheezing. Cardiovascular: Negative for chest pain, palpitations and leg swelling. Gastrointestinal: Positive for abdominal pain, nausea and vomiting. Negative for blood in stool, constipation and diarrhea. Genitourinary: Negative for dysuria, frequency and hematuria. Musculoskeletal: Negative for arthralgias and joint swelling. Skin: Negative for rash. Neurological: Negative for dizziness, syncope, weakness, light-headedness and headaches. Hematological: Does not bruise/bleed easily.        Except as noted above the remainder of the review of systems was reviewed and is negative. SCREENINGS        Arben Coma Scale  Eye Opening: Spontaneous  Best Verbal Response: Confused  Best Motor Response: Obeys commands  [place] Coma Scale Score: 14               PHYSICAL EXAM    (up to 7 for level 4, 8 or more for level 5)     ED Triage Vitals [11/05/21 0755]   BP Temp Temp Source Pulse Resp SpO2 Height Weight   (!) 133/58 97.7 °F (36.5 °C) Axillary 90 16 100 % 5' 6\" (1.676 m) 163 lb (73.9 kg)       Physical Exam  Vitals and nursing note reviewed. Constitutional:       Appearance: He is not diaphoretic. HENT:      Head: Normocephalic and atraumatic. Right Ear: External ear normal.      Left Ear: External ear normal.   Eyes:      Conjunctiva/sclera: Conjunctivae normal.   Cardiovascular:      Heart sounds: Normal heart sounds. Pulmonary:      Effort: Pulmonary effort is normal. No respiratory distress. Breath sounds: Normal breath sounds. Abdominal:      General: Bowel sounds are normal. There is no distension. Tenderness: There is abdominal tenderness. Musculoskeletal:      Cervical back: Normal range of motion. Skin:     General: Skin is warm and dry. Neurological:      Mental Status: He is alert. DIAGNOSTIC RESULTS     EKG:(none if blank)  All EKGs are interpreted by the Emergency Department Physician who either signs or Co-signs this chart in the absence of a cardiologist.        RADIOLOGY: (none if blank)   I directly visualized the following images and reviewed the radiologist interpretations. Interpretation per the Radiologist below, if available at the time of this note:  XR CHEST (2 VW)   Final Result   1. Mild cardiomegaly and probable congestive heart failure in this patient status post pacemaker placement. 2. Thickening off the interstitial lung markings and increased density at both lung bases. 3. Small right pleural effusion. 4. Shunt in the right arm.          **This report has been created using voice recognition software. It may contain minor errors which are inherent in voice recognition technology. **      Final report electronically signed by DR Marley Harris on 11/5/2021 9:09 AM          LABS:  Labs Reviewed   COMPREHENSIVE METABOLIC PANEL W/ REFLEX TO MG FOR LOW K - Abnormal; Notable for the following components:       Result Value    CREATININE 5.3 (*)     BUN 36 (*)     Albumin 3.1 (*)     All other components within normal limits   TROPONIN - Abnormal; Notable for the following components:    Troponin T 0.172 (*)     All other components within normal limits   LACTIC ACID, PLASMA - Abnormal; Notable for the following components:    Lactic Acid 2.9 (*)     All other components within normal limits   PROCALCITONIN - Abnormal; Notable for the following components:    Procalcitonin 0.59 (*)     All other components within normal limits   CBC WITH AUTO DIFFERENTIAL - Abnormal; Notable for the following components:    RBC 1.93 (*)     Hemoglobin 6.1 (*)     Hematocrit 19.0 (*)     MCV 98.4 (*)     MCHC 32.1 (*)     RDW-CV 22.9 (*)     RDW-SD 81.6 (*)     Segs Absolute 8.9 (*)     Lymphocytes Absolute 0.7 (*)     All other components within normal limits   GLOMERULAR FILTRATION RATE, ESTIMATED - Abnormal; Notable for the following components:    Est, Glom Filt Rate 12 (*)     All other components within normal limits   BASIC METABOLIC PANEL - Abnormal; Notable for the following components:    CREATININE 3.1 (*)     All other components within normal limits   CBC WITH AUTO DIFFERENTIAL - Abnormal; Notable for the following components:    RBC 2.31 (*)     Hemoglobin 7.5 (*)     Hematocrit 22.6 (*)     MCV 97.8 (*)     RDW-CV 20.4 (*)     RDW-SD 71.0 (*)     Lymphocytes Absolute 0.9 (*)     All other components within normal limits   TROPONIN - Abnormal; Notable for the following components:    Troponin T 0.158 (*)     All other components within normal limits   HEMOGLOBIN AND HEMATOCRIT, BLOOD - Abnormal; Notable for the following components:    Hemoglobin 7.7 (*)     Hematocrit 24.9 (*)     All other components within normal limits   HEMOGLOBIN AND HEMATOCRIT, BLOOD - Abnormal; Notable for the following components:    Hemoglobin 7.7 (*)     Hematocrit 24.6 (*)     All other components within normal limits   GLOMERULAR FILTRATION RATE, ESTIMATED - Abnormal; Notable for the following components:    Est, Glom Filt Rate 23 (*)     All other components within normal limits   POCT GLUCOSE - Abnormal; Notable for the following components:    POC Glucose 63 (*)     All other components within normal limits   COVID-19, RAPID   LIPASE   ANION GAP   SCAN OF BLOOD SMEAR   BASIC METABOLIC PANEL W/ REFLEX TO MG FOR LOW K   LACTIC ACID, PLASMA   ANION GAP   HEMOGLOBIN AND HEMATOCRIT, BLOOD   HEMOGLOBIN AND HEMATOCRIT, BLOOD   TYPE AND SCREEN   PREPARE RBC (CROSSMATCH)    Narrative:     [id number]     transfused       All other labs were within normal range or not returned as of this dictation. Please note, any cultures that may have been sent were not resulted at the time of this patient visit. EMERGENCY DEPARTMENT COURSE and Medical Decision Making:     Vitals:    Vitals:    11/06/21 0830 11/06/21 1027 11/06/21 1105 11/06/21 1115   BP: (!) 152/69 (!) 157/72 134/65 (!) 148/70   Pulse: 73 71 67    Resp: 16 16 16    Temp: 97.7 °F (36.5 °C)      TempSrc: Oral      SpO2: 100% 100% 100% 100%   Weight:       Height:           PROCEDURES: (None if blank)  Procedures    ED Course as of 11/06/21 1547   Fri Nov 05, 2021   0800 Assessment complete and history taken. Plan labs, CXR and work-up of abdomen. Has recent history of cholelithiasis and pneumonia. [NW]   1035 Creatinine(!!): 5.3  Contacted Dr Pineda Li about pt labs and events from hemodialysis. He states he will consult on the case.  Need to admit to hospitalist.  [NW]   1042 Hemoglobin Quant(!!): 6.1 [NW]   1042 Hematocrit(!!): 19.0 [NW]   1042 Troponin T(!): 0.172 [NW]   [address] messaged through PS.  They have accepted the pt.  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 [facility]): established, worsening  Upper GI bleed: new, needed workup     Amount and/or Complexity of Data Reviewed  Clinical lab tests: ordered  Tests in the radiology section of CPT®: ordered  Review and summarize past medical records: yes  Independent visualization of images, tracings, or specimens: yes    Risk of Complications, Morbidity, and/or Mortality  Presenting problems: moderate  Diagnostic procedures: moderate  Management options: moderate    Patient Progress  Patient progress: improved      Strict return precautions and follow up instructions were discussed with the patient with which the patient agrees    ED Medications administered this visit:    Medications   pantoprazole (PROTONIX) 80 mg in sodium chloride 0.9 % 100 mL infusion (8 mg/hr IntraVENous Rate/Dose Verify 11/6/21 0531)   atorvastatin (LIPITOR) tablet 40 mg (40 mg Oral Not Given 11/5/21 2007)   folbee plus tablet 1 tablet (1 tablet Oral Given 11/6/21 0900)   calcitRIOL (ROCALTROL) capsule 1.5 mcg (1.5 mcg Oral Not Given 11/5/21 2007)   cinacalcet (SENSIPAR) tablet 60 mg (60 mg Oral Not Given 11/5/21 2007)   cyclobenzaprine (FLEXERIL) tablet 10 mg (10 mg Oral Not Given 11/5/21 2030)   ferrous sulfate (IRON 325) tablet 325 mg (325 mg Oral Given 11/6/21 0900)   losartan (COZAAR) tablet 50 mg (50 mg Oral Given 11/6/21 0900)   metoprolol (LOPRESSOR) tablet 100 mg (100 mg Oral Given 11/6/21 0900)   ascorbic acid (VITAMIN C) tablet 500 mg (500 mg Oral Given 11/6/21 0859)   albuterol sulfate  (90 Base) MCG/ACT inhaler 1 puff (has no administration in time range)   sodium chloride flush 0.9 % injection 5-40 mL (5 mLs IntraVENous Not Given 11/6/21 0858)   sodium chloride flush 0.9 % injection 5-40 mL (has no administration in time range)   0.9 % sodium chloride infusion (has no administration in time range)   ondansetron (ZOFRAN-ODT) disintegrating tablet 4 mg (has no administration in time range)     Or   ondansetron (ZOFRAN) injection 4 mg (has no administration in time range)   polyethylene glycol (GLYCOLAX) packet 17 g (has no administration in time range)   acetaminophen (TYLENOL) tablet 650 mg (has no administration in time range)     Or   acetaminophen (TYLENOL) suppository 650 mg (has no administration in time range)   lidocaine (LMX) 4 % cream (has no administration in time range)   epoetin traci-epbx (RETACRIT) injection 10,000 Units (10,000 Units SubCUTAneous Not Given 11/5/21 2030)   ondansetron (ZOFRAN) injection 4 mg (4 mg IntraVENous Given 11/5/21 0908)   pantoprazole (PROTONIX) 80 mg in sodium chloride 0.9 % 50 mL bolus (0 mg IntraVENous Stopped 11/5/21 1848)         FINAL IMPRESSION      1. Upper GI bleed    2. 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     DISCHARGE MEDICATIONS:  Current Discharge Medication List                 XIN Bah CNP (electronically signed)           XIN Bah CNP  11/06/21 [address] [place] [address], APRN - CNP  11/15/21 4720

## 2021-11-06 NOTE — BRIEF OP NOTE
Brief Postoperative Note      Patient: Raj Tomlinson  YOB: 1930  MRN: [id number]    Date of Procedure: 11/6/2021    Pre-Op Diagnosis: GI BLEED    Post-Op Diagnosis:  Bleeding AVM treated with APC        Procedure(s):  EGD ESOPHAGOGASTRODUODENOSCOPY  EGD CONTROL HEMORRHAGE    Surgeon(s):  Doris Valdez MD    Assistant:  * No surgical staff found *    Anesthesia: Monitor Anesthesia Care    Estimated Blood Loss (mL): none    Complications: None    Specimens:   * No specimens in log *    Implants:  * No implants in log *      Drains: * No LDAs found *    Findings:  Bleeding AVM treated with APC     Electronically signed by Doris Valdez MD on 11/6/2021 at 11:10 AM

## 2021-11-06 NOTE — PROGRESS NOTES
Progress Note       Patient:  Marcos Barreto  YOB: 1930    MRN: [id number]     Acct: [de-identified]    PCP: Gavin Cervantes MD    Date of Admission: 11/5/2021      ASSESSMENT and PLAN:    KEVIN/Benedict Liu 1106 Problems    Diagnosis Date Noted    Acute upper gastrointestinal hemorrhage [K92.2] 11/05/2021     Acute blood loss anemia  Anemia of chronic disease  -Hematemesis POA; hgb 6.1 on admission, received 1u PRBCs in dialysis 1 day ago, Hgb improved to 7.5  -Recent PE, was discharged 11/1, was started on [place],  -Hx of GI bleeds  -ESRD patient  -Stop [address] and DAPT, transfuse PRN, trend H/H  -GI following, EGD with bleeding AVM treated with APC,   - Continue protonix gtt until Hgb stable. -Transfuse if hgb PRN or hemodynamically unstable    Pulmonary embolism  -Recently diagnosed during patient's previous admission. Was discharged on 11/1  -Was started on Eliquis 5 mg twice daily, currently on hold due to #1. Will likely hold indefinitely, given patient's high bleeding risk  -Consider IVC filter placement    New onset afib RVR - resolved   - Secondary to anemia and hypovolemia during dialysis  - Resolved with 1 unit transfusion  - No OAC at this time considering #1  - Continue BB  - NSR with occasional PVCs on telemetry this morning, continue monitor     Lactic acidosis   -Likely d/t demand.  Resolved     Chronically elevated troponin  -Due to ESRD  -OF note, trop is lower on admission as compared to recent values  -Trop trended down    ESRD on HD  -Going to HD today, usual schedule M/W/F  -Dr. Giovanna Hendrix primary; Nephro following     HFrEF  -No clinical signs of acute decompensation  -CXR findings noted however volume status to be managed in HD  -Continue home meds    CAD s/p PCI 08/31/21 at [facility]  -DAPT on hold considering #1, restart when hemoglobin stable    COPD  Chronic hypoxic respiratory failure  -On baseline 3 L/min oxygen via nasal cannula continuously    HTN  HLD  OA  -Continue home meds    Code status: Limited x3 per previous documentation. Daughter states should be limited without intubation or chest compressions and that patient has documentation of this. Palliative care consulted to discuss code status in further detail and to help family with decision-making    Discharge planning: Pending clinical course    Chief Complaint:  Hematemesis    History Of Present Illness:    From H&P  \"80 y.o. male with a past medical history of ESRD on hemodialysis, COPD with chronic hypoxic respiratory failure, CAD status post PCI, pulmonary embolism recently diagnosed, history of GI bleeds, HFrEF, hypertension, hyperlipidemia, osteoarthritis who is admitted to [facility] on 11/5/2021 for hematemesis and acute blood loss anemia. Pt's daughters are present at bedside who state pt was at HD today and vomited blood. Hgb at that time was 6, pt was brought into ED for further evaluation. ED course: Stable vitals. Hgb 6.1, Lactic 2.9, procal 0.59 without any other signs of infection/sepsis. 2 units PRBCs ordered in ED - will be given to patient in HD today. \"     11/06: While on dialysis yesterday the patient developed A. fib RVR. Rapid response was called. The patient received 1 unit of packed red blood cells and volume repletion which resolved his A. fib RVR. Was able to complete dialysis. Plan for EGD with GI today. Subjective   The patient was seen and evaluated this morning during rounds. He is resting comfortably in bed. States that he feels short of breath. Does not wear oxygen at home. He also has some abdominal pain, which has been going on for a while. Otherwise has no other complaints. Denies any recent episodes of hemoptysis, chest pain, nausea, vomiting, melena, hematochezia, diarrhea. Afebrile. Vitals stable.     Past Medical History:          Diagnosis Date    Anemia in chronic kidney disease(285.21)     Arthritis     CAD (coronary artery disease)     Chronic kidney disease     Chronic kidney disease, stage IV (severe) ([facility]     CKD (chronic kidney disease) stage 3, GFR 30-59 ml/min ([facility])     COPD [facility] obstructive [facility] disease) [facility]     GI bleed     Hemod[facility] 07/26/2016    [facility]    History of blood transfusion     6/2019    Hyperlipidemia     Hyperphosphatemia 5/22/2018    Hypertension     Sick sinus syndrome ([facility]     Systolic congestive heart failure [facility])        Past Surgical History:          Procedure Laterality Date   [employer] CARDIAC SURGERY      COLONOSCOPY  2006,2009    COLONOSCOPY N/A 6/27/2019    COLONOSCOPY DIAGNOSTIC performed by Henry Marques MD at [facility]  2004    DIALYSIS FISTULA CREATION  5/6/2016    right arm fistula placement    ENDOSCOPY, COLON, DIAGNOSTIC      PACEMAKER PLACEMENT  2013    PA ESOPHAGOGASTRODUODENOSCOPY TRANSORAL DIAGNOSTIC N/A 1/18/2018    EGD performed by Saleem Love MD at [facility]  2006,2009    UPPER GASTROINTESTINAL ENDOSCOPY Left 6/23/2019    EGD DIAGNOSTIC ONLY performed by Henry Marques MD at [facility] ENDOSCOPY N/A 10/9/2021    EGD performed by Henry Marques MD at [facility] Endoscopy    VASCULAR SURGERY         Medications Prior to Admission:      Prior to Admission medications    Medication Sig Start Date End Date Taking?  Authorizing Provider   losartan (COZAAR) 50 MG tablet Take 1 tablet by mouth daily 11/1/21   SHANELL Gudino   epoetin traci-epbx (RETACRIT) 07945 UNIT/ML SOLN injection Inject 1 mL into the skin three times a week 11/1/21   SHANELL Gudino   apixaban (ELIQUIS) 5 MG TABS tablet Take 1 tablet by mouth 2 times daily 11/4/21   SHANELL Gudino   white petrolatum GEL Apply 28 g topically as needed for Dry Skin 10/15/21   Leighton Mirza MD   pantoprazole (PROTONIX) 40 MG tablet Take 1 tablet by mouth 2 times daily (before meals) 10/11/21   Erica Guido MD   albuterol sulfate  (90 Base) MCG/ACT inhaler Inhale 1 puff into the lungs every 4 hours as needed 9/8/21   Historical Provider, MD   ASPIRIN LOW DOSE 81 MG EC tablet Take 81 mg by mouth daily 9/1/21   Historical Provider, MD   atorvastatin (LIPITOR) 40 MG tablet Take 40 mg by mouth daily 9/1/21   Historical Provider, MD   cyclobenzaprine (FLEXERIL) 10 MG tablet Take 10 mg by mouth daily 8/28/17   Historical Provider, MD   clopidogrel (PLAVIX) 75 MG tablet Take 75 mg by mouth daily 8/13/21   Historical Provider, MD   ferrous sulfate (IRON 325) 325 (65 Fe) MG tablet Take 325 mg by mouth daily 6/24/19   Historical Provider, MD   metoprolol (LOPRESSOR) 100 MG tablet Take 1 tablet by mouth 2 times daily 7/14/21   Erica Guido MD   cinacalcet (SENSIPAR) 30 MG tablet Take 60 mg by mouth three times a week before dialysis on M,W,F    Historical Provider, MD   calcitRIOL (ROCALTROL) 0.25 MCG capsule Take 1.5 mcg by mouth three times a week before dialysis on M,W,F    Historical Provider, MD   docusate sodium (COLACE, DULCOLAX) 100 MG CAPS Take 100 mg by mouth 2 times daily 6/24/19   Rubye Pulse, DO   vitamin C (ASCORBIC ACID) 500 MG tablet Take 1 tablet by mouth daily 6/24/19   Rubye Pulse, DO   polyethylene glycol (MIRALAX) powder Renal Miralax Colonoscopy prep. Use as directed. Mix Miralax 238 g with 24 oz clear liquids. Drink 8 oz glass every 20-30 minutes until gone. 6/6/19   XIN Matute - CNP   B Complex-C-Folic Acid (RENAL) 1 MG CAPS Take 1 capsule by mouth daily    Historical Provider, MD       Allergies:  Patient has no known allergies. Social History:      TOBACCO:   reports that he quit smoking about 39 years ago. He has a 40.00 pack-year smoking history. He has never used smokeless tobacco.  ETOH:   reports no history of alcohol use.       Family History:      Positive as follows:        Problem Relation Age of Onset    Diabetes Mother     Heart Disease Mother     Diabetes Sister     Mental Illness Paternal Aunt        Diet: NPO    REVIEW OF SYSTEMS:   Pertinent positives as noted in the HPI. All other systems reviewed and negative. PHYSICAL EXAM:    BP (!) 152/69   Pulse 73   Temp 97.7 °F (36.5 °C) (Oral)   Resp 16   Ht 5' 6\" (1.676 m)   Wt 132 lb 4.8 oz (60 kg)   SpO2 100%   BMI 21.35 kg/m²     General appearance:  No apparent distress, appears stated age and cooperative. HEENT:  Normal cephalic, atraumatic without obvious deformity. Pupils equal, round, and reactive to light. Extra ocular muscles intact. Conjunctivae/corneas clear. Neck: Supple, with full range of motion. No jugular venous distention. Trachea midline. Respiratory:  Normal respiratory effort. Clear to auscultation, bilaterally without Rales/Wheezes/Rhonchi. On 3 lpm O2 via NC. Cardiovascular:  Regular rate and rhythm with normal S1/S2 without murmurs, rubs or gallops. Abdomen: Soft, non-tender, non-distended with normal bowel sounds. Musculoskeletal:  No clubbing, cyanosis or edema bilaterally. Full range of motion without deformity. Skin: Skin color, texture, turgor normal.  No rashes or lesions. Neurologic:  Neurovascularly intact without any focal sensory/motor deficits. Cranial nerves: II-XII intact, grossly non-focal.  Psychiatric:  Thought content appropriate, normal insight  Capillary Refill: Brisk,< 3 seconds   Peripheral Pulses: +2 palpable, equal bilaterally       Labs:     Recent Labs     11/05/21  0840 11/05/21  1848 11/06/21  0420   WBC 10.5  --  7.2   HGB 6.1* 7.7* 7.5*   HCT 19.0* 24.9* 22.6*     --  165     Recent Labs     11/05/21  0840 11/06/21  0420    140   K 4.6 4.4  4.4    101   CO2 26 27   BUN 36* 18   CREATININE 5.3* 3.1*   CALCIUM 9.6 8.7     Recent Labs     11/05/21  0840   AST 20   ALT 14   BILITOT 0.6   ALKPHOS 99     No results for input(s): INR in the last 72 hours.   No results for input(s): Jossie Farhat in the last 72 hours. Urinalysis:      Lab Results   Component Value Date    NITRU NEGATIVE 06/22/2019    WBCUA 50-75 06/22/2019    BACTERIA NONE 06/22/2019    RBCUA 25-50 06/22/2019    BLOODU LARGE 06/22/2019    SPECGRAV 1.013 05/03/2016    GLUCOSEU NEGATIVE 06/22/2019       Intake & Output:  I/O last 3 completed shifts: In: 9889 [I.V.:144; Blood:310]  Out: 1100   No intake/output data recorded. Radiology:       XR CHEST (2 VW)   Final Result   1. Mild cardiomegaly and probable congestive heart failure in this patient status post pacemaker placement. 2. Thickening off the interstitial lung markings and increased density at both lung bases. 3. Small right pleural effusion. 4. Shunt in the right arm. **This report has been created using voice recognition software. It may contain minor errors which are inherent in voice recognition technology. **      Final report electronically signed by DR Catalina Murphy on 11/5/2021 9:09 AM               DVT prophylaxis: [] Lovenox                                 [x] SCDs                                 [] SQ Heparin                                 [] Encourage ambulation           [] Already on Anticoagulation      PT/OT Eval Status: n/a    Disposition:    [x] Home       [] TCU       [] Rehab       [] Psych       [] SNF       [] [place]       [] Other-    Case discussed with Dr. Jennifer Kiser    Thank you Sylvester Schlatter, MD for the opportunity to be involved in this patient's care.     Electronically signed by Hosea Damico MD on 11/6/2021 at 9:04 AM

## 2021-11-06 NOTE — CONSULTS
[address] [id number]                                  CONSULTATION    PATIENT NAME: Emely Calixto                      :        11/10/1930  MED REC NO:   [id number]                           ROOM:       0002  ACCOUNT NO:   [de-identified]                           ADMIT DATE: 2021  PROVIDER:     Ramona Leija M.D.    CONSULT DATE:  2021    HISTORY OF PRESENT ILLNESS:  The patient is known to the practice,  admitted with severe anemia as well as hematemesis. The patient was  discharged recently on anticoagulation with Eliquis with a history of  recent PE diagnosed with imaging study done on 10/26/2021. The patient  was admitted with nausea and vomit. He has had coffee-ground material.   He had abdominal discomfort in the epigastric area. He had intermittent  dysphagia, not really severe. He said he is not sure if he lost any  weight or not. He had abdominal discomfort. He thought it was distention. He said his  stool has been black lately. He has not seen red blood with bowel  movement. Stool is softer than usual.  He has no clots with the bowel  movement. He is not sure if he lost weight or not at this time. He is a known patient with hemodialysis, end-stage renal disease, COPD,  stopped smoking in ; coronary artery disease, recent PE, on  anticoagulation; he is also on antiplatelet therapy. PAST MEDICAL HISTORY:  Significant for anemia of chronic disease. The  patient also has gastritis, arthritis, COPD, coronary artery disease,  kidney disease with hemodialysis, history of multiple blood  transfusions, history of GI bleed, history of hypercholesterolemia,  hypertension, sick sinus syndrome, systolic congestive heart failure.     PAST SURGICAL HISTORY:  Significant for upper endoscopy and colonoscopy  done with erosive esophagitis, coronary angioplasty, hemodialysis with  fistula formation, pacemaker placement. MEDICATIONS:  Before admission, he was on losartan, Eliquis, Protonix 40  mg daily, albuterol inhaler, aspirin 81 mg daily, Lipitor, Flexeril,  Plavix 75 mg daily, iron supplement, metoprolol, Rocaltrol, Colace,  vitamin C, MiraLax as needed, and multivitamins. ALLERGIES:  None. SOCIAL HISTORY:  Quit smoking 39 years ago. No smokeless tobacco.  No  alcohol abuse. FAMILY HISTORY:  Diabetes, coronary artery disease. PHYSICAL EXAMINATION:  GENERAL:  Pleasant, slightly confused, not short of breath. He answers  questions appropriately. VITAL SIGNS:  His blood pressure is 162/70, respirations 20, his heart  rate 81, his temperature 97. 6. HEENT:  His head is atraumatic. Sclerae anicteric. His pupils are  equal, round and reactive to light and accommodation. He has denture. NECK:  Supple. CHEST:  Good air entry bilaterally. No crepitation. No rales. CARDIOVASCULAR:  S1, S2, regular. No murmur. ABDOMEN:  Soft. Slight epigastric tenderness. No rebound. No  guarding. No organomegaly. No stigmata of liver disease. EXTREMITIES:  No clubbing. No cyanosis. LABORATORY DATA:  His sodium and potassium are normal.  His BUN is 18,  creatinine 3.1. His ALT and AST are normal.  His albumin is 3.1. His H  and H on admission were 6.1 and 19. Post transfusion, 7.5 and 22.6. MCV is normal.  Platelets are within normal range. His imaging study, his chest x-ray done showed mild cardiomegaly,  probable congestive heart failure in a patient with status post  pacemaker placement, showed thickening of interstitial lung markings,  right pleural effusions. IMPRESSION:  1.  GI bleed likely due to combination of antiplatelet therapy as well  as anticoagulation in a patient with history of erosive gastritis whose  anticoagulation is appropriate because of PE as well as coronary  angioplasty. 2.  Chronic kidney disease, on hemodialysis. 3.  COPD, stable.   4.  Congestive heart failure, stable. 5.  Coronary artery disease, status post angioplasty. PLAN:  1. Continue PPI. 2.  Upper endoscopy indicated. We will talk with his daughter to  discuss the benefit, potential complications, which I believe very  minimum to proceed with a diagnostic upper endoscopy. 3.  Transfuse as needed. 4.  Hold anticoagulation at this time until the patient is clinically  stable. Thank you for allowing me to participate in this patient's care.         Tamiko Velarde M.D.    D: 11/06/2021 8:24:59       T: 11/06/2021 11:28:58     [email or web address]  Job#: [id number]     Doc#: [id number]    CC:  Jonny Weeks D.O.

## 2021-11-06 NOTE — PROGRESS NOTES
Called to talk with patient's daughter Sravanthi Rousseau. She reports having had a hospice referral in the past and is aware of our services. She then deferred the phone call to her aunt. Aunt reports that Lisandro Andrade is having a \"hard time making any decisions\" and she wants to \"meet with her siblings before making any decisions. \" The aunt went on to say that \"stopping the dialysis is a for sure thing\" and \"Jemma does not want to be the one who decides to let her father pass\". Offered emotional support and to answer any questions family had. Family will discuss later today and would like to talk with patient's physicians as well. Encouraged family to reach out to hospital staff so meetings could be arranged. Voiced understanding. Update patient's primary nurse Winsome Mcgraw.

## 2021-11-06 NOTE — ANESTHESIA POSTPROCEDURE EVALUATION
Department of Anesthesiology  Postprocedure Note    Patient: Brandy Richmond  MRN: [id number]  YOB: 1930  Date of evaluation: 11/6/2021  Time:  3:06 PM     Procedure Summary     Date: 11/06/21 Room / Location: [facility] / [address] [place] [address]    Anesthesia Start: 7208 Anesthesia Stop: 1104    Procedures:       EGD ESOPHAGOGASTRODUODENOSCOPY (N/A )      EGD CONTROL HEMORRHAGE Diagnosis: (GI BLEED)    Surgeons: Safia Weinberg MD Responsible Provider: Debby Toledo MD    Anesthesia Type: MAC ASA Status: 3          Anesthesia Type: MAC    Reg Phase I: Reg Score: 8    Reg Phase II: Reg Score: 9    Last vitals: Reviewed and per EMR flowsheets.        Anesthesia Post Evaluation

## 2021-11-06 NOTE — PROGRESS NOTES
EGD complete, photos taken, no specimens taken  pt tolerated procedure well    Scope Number gif [id number] used.       apc used on small bowel settings     Report called to floor

## 2021-11-06 NOTE — ANESTHESIA PRE PROCEDURE
Department of Anesthesiology  Preprocedure Note       Name:  Lisa Sood   Age:  80 y.o.  :  11/10/1930                                          MRN:  [id number]         Date:  2021      Surgeon: Melvina Leary):  Merry Reyes MD    Procedure: Procedure(s):  EGD ESOPHAGOGASTRODUODENOSCOPY    Medications prior to admission:   Prior to Admission medications    Medication Sig Start Date End Date Taking?  Authorizing Provider   losartan (COZAAR) 50 MG tablet Take 1 tablet by mouth daily 21   SHANELL Gudino   epoetin traci-epbx (RETACRIT) 95861 UNIT/ML SOLN injection Inject 1 mL into the skin three times a week 21   SHANELL Gudino   apixaban (ELIQUIS) 5 MG TABS tablet Take 1 tablet by mouth 2 times daily 21   SHANELL Gudino   white petrolatum GEL Apply 28 g topically as needed for Dry Skin 10/15/21   Sondra Kasper MD   pantoprazole (PROTONIX) 40 MG tablet Take 1 tablet by mouth 2 times daily (before meals) 10/11/21   Sondra Kasper MD   albuterol sulfate  (90 Base) MCG/ACT inhaler Inhale 1 puff into the lungs every 4 hours as needed 21   Historical Provider, MD   ASPIRIN LOW DOSE 81 MG EC tablet Take 81 mg by mouth daily 21   Historical Provider, MD   atorvastatin (LIPITOR) 40 MG tablet Take 40 mg by mouth daily 21   Historical Provider, MD   cyclobenzaprine (FLEXERIL) 10 MG tablet Take 10 mg by mouth daily 17   Historical Provider, MD   clopidogrel (PLAVIX) 75 MG tablet Take 75 mg by mouth daily 21   Historical Provider, MD   ferrous sulfate (IRON 325) 325 (65 Fe) MG tablet Take 325 mg by mouth daily 19   Historical Provider, MD   metoprolol (LOPRESSOR) 100 MG tablet Take 1 tablet by mouth 2 times daily 21   Sondra Kasper MD   cinacalcet (SENSIPAR) 30 MG tablet Take 60 mg by mouth three times a week before dialysis on M,W,F    Historical Provider, MD   calcitRIOL (ROCALTROL) 0.25 MCG capsule Take 1.5 mcg by mouth three times a week before IntraVENous 2 times per day Wellington Restrepo MD        sodium chloride flush 0.9 % injection 5-40 mL  5-40 mL IntraVENous PRN Wellington Restrepo MD        0.9 % sodium chloride infusion  25 mL IntraVENous PRN Wellington Restrepo MD        ondansetron (ZOFRAN-ODT) disintegrating tablet 4 mg  4 mg Oral Q8H PRN Wellington Restrepo MD        Or    ondansetron (ZOFRAN) injection 4 mg  4 mg IntraVENous Q6H PRN Wellington Restrepo MD        polyethylene glycol (GLYCOLAX) packet 17 g  17 g Oral Daily PRN Wellington Restrepo MD        acetaminophen (TYLENOL) tablet 650 mg  650 mg Oral Q6H PRN Wellington Restrepo MD        Or   Reina Alu acetaminophen (TYLENOL) suppository 650 mg  650 mg Rectal Q6H PRN Wellington Restrepo MD       Reina Alu [START ON 11/8/2021] lidocaine (LMX) 4 % cream   Topical Once per day on Mon Wed Fri Katherin Kokateagi, APRN - CNP        epoetin traci-epbx (RETACRIT) injection 10,000 Units  10,000 Units SubCUTAneous Once per day on Mon Wed Fri Katherin Koyanagi, APRN - CNP           Allergies:  No Known Allergies    Problem List:    Patient Active Problem List   Diagnosis Code    CAD (coronary artery disease) I25.10    COPD (chronic obstructive pulmonary disease) ([facility] [address] J44.9    Chronic renal insufficiency N18.9    Patient overweight E66.3    Arthritis-  low back and neck .  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 [address] N25.81    Chest pain R07.9    Acute pulmonary edema ([facility]) J81.0    Gastritis and duodenitis K29.90    Fall from slip, trip, or stumble, initial encounter W01. 0XXA    Closed fracture of left ankle S82.892A    ESRD on hemodialysis (HCC) N18.6, Z99.2    Hypertensive emergency I16.1    SOB (shortness of breath) R06.02    Chronic combined systolic and diastolic CHF (congestive heart failure) ([facility]) I50.42    COVID-19 U07.1    Severe malnutrition ([facility]) E43    Hypoxia R09.02    Dyspnea R06.00    Acute upper gastrointestinal hemorrhage K92.2       Past Medical History:        Diagnosis Date    Anemia in chronic kidney disease(285.21)     Arthritis     CAD (coronary artery disease)     Chronic kidney disease     Chronic kidney disease, stage IV (severe) ([facility])     CKD (chronic kidney disease) stage 3, GFR 30-59 ml/min ([facility])     COPD (chronic obstructive pulmonary disease) ([facility])     GI bleed     Hemod[facility] patient [facility]) 07/26/2016    [facility]    History of blood transfusion     6/2019    Hyperlipidemia     Hyperphosphatemia 5/22/2018    Hypertension     Sick sinus syndrome ([facility])     Systolic congestive heart failure [facility])        Past Surgical History:        Procedure Laterality Date   [facility] CARDIAC SURGERY      COLONOSCOPY  2006,2009    COLONOSCOPY N/A 6/27/2019    COLONOSCOPY DIAGNOSTIC performed by Ramona Leija MD at [address]  2004    DIALYSIS FISTULA CREATION  5/6/2016    right arm fistula placement    ENDOSCOPY, COLON, DIAGNOSTIC      PACEMAKER PLACEMENT  2013    NC ESOPHAGOGASTRODUODENOSCOPY TRANSORAL DIAGNOSTIC N/A 1/18/2018    EGD performed by Beulah Charles MD at [address] [place] [address]    UPPER GASTROINTESTINAL ENDOSCOPY Left 2019    EGD DIAGNOSTIC ONLY performed by Misty Philip MD at [address] [facility] [address] N/A 10/9/2021    EGD performed by Misty Philip MD at [facility]    VASCULAR SURGERY         Social History:    Social History     Tobacco Use    Smoking status: Former Smoker     Packs/day: 1.00     Years: 40.00     Pack years: 40.00     Quit date: 1982     Years since quittin.8    Smokeless tobacco: Never Used   Substance Use Topics    Alcohol use: No                                Counseling given: Not Answered      Vital Signs (Current):   Vitals:    21 2138 21 2348 21 0413 21 0830   BP:  (!) 151/58 (!) 162/70 (!) 152/69   Pulse: 95 78 81 73   Resp:     Temp:  36.5 °C (97.7 °F) 36.4 °C (97.6 °F) 36.5 °C (97.7 °F)   TempSrc:  Oral Oral Oral   SpO2:  100% 100% 100%   Weight:       Height:                                                  BP Readings from Last 3 Encounters:   21 (!) 152/69   21 (!) 184/84   10/15/21 126/67       NPO Status:                                                                                 BMI:   Wt Readings from Last 3 Encounters:   21 132 lb 4.8 oz (60 kg)   21 138 lb 3.7 oz (62.7 kg)   10/15/21 149 lb 4 oz (67.7 kg)     Body mass index is 21.35 kg/m².     CBC:   Lab Results   Component Value Date    WBC 7.2 2021    RBC 2.31 2021    RBC 2.95 2011    HGB 7.5 2021    HCT 22.6 2021    MCV 97.8 2021    RDW 19.0 2021     2021       CMP:   Lab Results   Component Value Date     2021    K 4.4 2021    K 4.4 2021     2021    CO2 27 2021    BUN 18 2021    CREATININE 3.1 2021    LABGLOM 23 2021    GLUCOSE 80 2021    GLUCOSE 90 2021    PROT 6.4 2021    CALCIUM 8.7 2021    BILITOT 0.6 2021    ALKPHOS 99 2021    AST 20 2021    ALT 14 2021 POC Tests:   Recent Labs     11/05/21  0751   POCGLU 63*       Coags:   Lab Results   Component Value Date    PROTIME 11.5 12/10/2013    INR 0.96 09/23/2021    APTT 32.6 10/27/2021       HCG (If Applicable): No results found for: PREGTESTUR, PREGSERUM, HCG, HCGQUANT     ABGs: No results found for: PHART, PO2ART, KWS6TWG, EPX1NBH, BEART, E0VMDFRF     Type & Screen (If Applicable):  Lab Results   Component Value Date    LABRH POS 11/05/2021       Drug/Infectious Status (If Applicable):  No results found for: HIV, HEPCAB    COVID-19 Screening (If Applicable):   Lab Results   Component Value Date    COVID19 NOT  DETECTED 10/26/2021           Anesthesia Evaluation    Airway: Mallampati: II  TM distance: >3 FB   Neck ROM: full  Mouth opening: > = 3 FB Dental:    (+) lower dentures and upper dentures      Pulmonary:   (+) pneumonia:  COPD:  shortness of breath:  decreased breath sounds,                             Cardiovascular:    (+) hypertension:, CAD:, dysrhythmias: atrial fibrillation, CHF: systolic and diastolic,         Rhythm: irregular                      Neuro/Psych:   (+) neuromuscular disease:,             GI/Hepatic/Renal:   (+) renal disease: ESRD and dialysis,           Endo/Other:                     Abdominal:             Vascular:   + PE. Other Findings:             Anesthesia Plan      MAC     ASA 3       Induction: intravenous. Anesthetic plan and risks discussed with patient. Plan discussed with CRNA.                   XIN Jacome - KIARA   11/6/2021

## 2021-11-06 NOTE — PRE SEDATION
Sedation/Analgesia History & Physical    Patient: Koby Nascimento: 11/10/1930  Med Rec#: [id number] Acc#: [id number]   Provider Performing Procedure: Princess Coats MD  Primary Care Physician: Sophia Chu MD    PRE-PROCEDURE   Full CODE [x]Yes  DNR-CCA/DNR-CC []Yes   Brief History/Pre-Procedure Diagnosis: GI bleeding           MEDICAL HISTORY    []Additional information:       has a past medical history of Anemia in chronic kidney disease(285.21), Arthritis, CAD (coronary artery disease), Chronic kidney disease, Chronic kidney disease, stage IV (severe) ([facility] [address] CKD (chronic kidney disease) stage 3, GFR 30-59 ml/min ([facility] [address] COPD (chronic obstructive pulmonary disease) ([facility] [address] GI bleed, Hemodialysis patient ([facility] [address] History of blood transfusion, Hyperlipidemia, Hyperphosphatemia, Hypertension, Sick sinus syndrome ([facility] [address] and Systolic congestive heart failure ([facility] [address] SOCIAL HISTORY  Social History     Tobacco History     Smoking Status  Former Smoker Quit date  1/27/1982 Smoking Frequency  1 pack/day for 40 years (36 pk yrs)    Smokeless Tobacco Use  Never Used          Alcohol History     Alcohol Use Status  No          Drug Use     Drug Use Status  No          Sexual Activity     Sexually Active  Not Asked                FAMILY HISTORY     Family History   Problem Relation Age of Onset    Diabetes Mother     Heart Disease Mother     Diabetes Sister     Mental Illness Paternal Aunt        SURGICAL HISTORY   has a past surgical history that includes pacemaker placement (2013); Endoscopy, colon, diagnostic; Dialysis fistula creation (5/6/2016); Colonoscopy (9052,6653); Upper gastrointestinal endoscopy (6055,8173); Coronary angioplasty with stent (2004); pr esophagogastroduodenoscopy transoral diagnostic (N/A, 1/18/2018); vascular surgery; Cardiac surgery; Upper gastrointestinal endoscopy (Left, 6/23/2019); Colonoscopy (N/A, 6/27/2019); and Upper gastrointestinal endoscopy (N/A, 10/9/2021).   Additional Salvador Jacques MD    polyethylene glycol [facility]) packet 17 g, 17 g, Oral, Daily PRN, Salvador Jacques MD    acetaminophen (TYLENOL) tablet 650 mg, 650 mg, Oral, Q6H PRN **OR** acetaminophen (TYLENOL) suppository 650 mg, 650 mg, Rectal, Q6H PRN, Salvador Jacques MD  [place]  [START ON 11/8/2021] lidocaine (LMX) 4 % cream, , Topical, Once per day on Mon Wed Fri, Marisol Nuñez, APRN - CNP    epoetin traci-epbx (RETACRIT) injection 10,000 Units, 10,000 Units, SubCUTAneous, Once per day on Mon Wed Fri, Marisol Nuñez, APRN - CNP  Prior to Admission medications    Medication Sig Start Date End Date Taking?  Authorizing Provider   losartan (COZAAR) 50 MG tablet Take 1 tablet by mouth daily 11/1/21   SHANELL Gudino   epoetin traci-epbx (RETACRIT) 28343 UNIT/ML SOLN injection Inject 1 mL into the skin three times a week 11/1/21   SHANELL Gudino   apixaban (ELIQUIS) 5 MG TABS tablet Take 1 tablet by mouth 2 times daily 11/4/21   SHANELL Gudino   white petrolatum GEL Apply 28 g topically as needed for Dry Skin 10/15/21   Erica Guido MD   pantoprazole (PROTONIX) 40 MG tablet Take 1 tablet by mouth 2 times daily (before meals) 10/11/21   Erica Guido MD   albuterol sulfate  (90 Base) MCG/ACT inhaler Inhale 1 puff into the lungs every 4 hours as needed 9/8/21   Historical Provider, MD   ASPIRIN LOW DOSE 81 MG EC tablet Take 81 mg by mouth daily 9/1/21   Historical Provider, MD   atorvastatin (LIPITOR) 40 MG tablet Take 40 mg by mouth daily 9/1/21   Historical Provider, MD   cyclobenzaprine (FLEXERIL) 10 MG tablet Take 10 mg by mouth daily 8/28/17   Historical Provider, MD   clopidogrel (PLAVIX) 75 MG tablet Take 75 mg by mouth daily 8/13/21   Historical Provider, MD   ferrous sulfate (IRON 325) 325 (65 Fe) MG tablet Take 325 mg by mouth daily 6/24/19   Historical Provider, MD   metoprolol (LOPRESSOR) 100 MG tablet Take 1 tablet by mouth 2 times daily 7/14/21   Erica Guido MD   cinacalcet (SENSIPAR) 30 MG tablet Take 60 mg by mouth three times a week before dialysis on M,W,F    Historical Provider, MD   calcitRIOL (ROCALTROL) 0.25 MCG capsule Take 1.5 mcg by mouth three times a week before dialysis on M,W,F    Historical Provider, MD   docusate sodium (COLACE, DULCOLAX) 100 MG CAPS Take 100 mg by mouth 2 times daily 6/24/19   Darus Lard, DO   vitamin C (ASCORBIC ACID) 500 MG tablet Take 1 tablet by mouth daily 6/24/19   Darus Lard, DO   polyethylene glycol (MIRALAX) powder Renal Miralax Colonoscopy prep. Use as directed. Mix Miralax 238 g with 24 oz clear liquids. Drink 8 oz glass every 20-30 minutes until gone. 6/6/19   XIN Leon - CNP   B Complex-C-Folic Acid (RENAL) 1 MG CAPS Take 1 capsule by mouth daily    Historical Provider, MD     Additional information:       PHYSICAL:   Heart:  [x]Regular rate and rhythm  []Other:    Lungs:  [x]Clear    []Other:    Abdomen: [x]Soft    []Other:    Mental Status: [x]Alert & Oriented  []Other:        PLANNED PROCEDURE   [x]EGD  []Colonoscopy []Flex Sigmoid     Consent: I have discussed with the patient and/or the patient representative the indication, alternatives, and the possible risks and/or complications of the planned procedure and the anesthesia methods. The patient and/or patient representative appear to understand and agree to proceed. SEDATION  Please see anesthesia note. The medication Planned :  Planned agent:[x]Midazolam []Meperidine [x]Sublimaze []Morphine  []Diazepam  [x]Propofol     Airway Assessment:   See anesthesia no please     Monitoring and Safety: The patient will be placed on a cardiac monitor and vital signs, pulse oximetry and level of consciousness will be continuously evaluated throughout the procedure. The patient will be closely monitored until recovery from the medications is complete and the patient has returned to baseline status. Respiratory therapy will be on standby during the procedure.     [x]Pre-procedure diagnostic studies complete and results available. Comment:    [x]Previous sedation/anesthesia experiences assessed. Comment:    [x]The patient is an appropriate candidate to undergo the planned procedure sedation and anesthesia. (Refer to nursing sedation/analgesia documentation record)  [x]Formulation and discussion of sedation/procedure plan, risks, and expectations with patient and/or responsible adult completed. [x]Patient examined immediately prior to the procedure.  (Refer to nursing sedation/analgesia documentation record)    Merry Reyes MD, MD   Electronically signed

## 2021-11-06 NOTE — CONSULTS
Patient seen evaluated consult dictated , discuss care with patient daughter Tyrese Hendrix who consent for EGD.

## 2021-11-06 NOTE — PROGRESS NOTES
[facility]   Nephrology progress note  11/6/2021, 12:02 PM      Pt Name:    Audrey Shah  MRN:     [id number]     [place]:    11/10/1930  Admit Date:    11/5/2021  7:46 AM  Primary Care Physician:  Zoey Fisher MD   Room number  [facility]    Chief Complaint: Nephrology following for ESRD and HD    Subjective:  Patient seen and examined  No chest pain or shortness of breath  Feels okay  Late entry  Poor historian  Awaiting EGD    Objective:  24HR INTAKE/OUTPUT:    Intake/Output Summary (Last 24 hours) at 11/6/2021 1202  Last data filed at 11/6/2021 1104  Gross per 24 hour   Intake 1953.99 ml   Output 1100 ml   Net 853.99 ml     I/O last 3 completed shifts: In: 6684 [I.V.:144; Blood:310]  Out: 1100   I/O this shift: In: 400 [I.V.:400]  Out: -   Admission weight: 163 lb (73.9 kg)  Wt Readings from Last 3 Encounters:   11/05/21 132 lb 4.8 oz (60 kg)   11/01/21 138 lb 3.7 oz (62.7 kg)   10/15/21 149 lb 4 oz (67.7 kg)     Body mass index is 21.35 kg/m².     Physical examination  VITALS:     Vitals:    11/06/21 0830 11/06/21 1027 11/06/21 1105 11/06/21 1115   BP: (!) 152/69 (!) 157/72 134/65 (!) 148/70   Pulse: 73 71 67    Resp: 16 16 16    Temp: 97.7 °F (36.5 °C)      TempSrc: Oral      SpO2: 100% 100% 100% 100%   Weight:       Height:         General Appearance: alert and cooperative with exam, appears comfortable, no distress  Mouth/Throat: Oral mucosa moist  Neck: No JVD  Lungs:  no use of accessory muscles  GI: soft, non-tender, no guarding  Extremities: No significant LE edema      Lab Data  CBC:   Recent Labs     11/05/21  0840 11/05/21  1848 11/06/21  0420   WBC 10.5  --  7.2   HGB 6.1* 7.7* 7.5*   HCT 19.0* 24.9* 22.6*     --  165     BMP:  Recent Labs     11/05/21  0840 11/06/21  0420    140   K 4.6 4.4  4.4    101   CO2 26 27   BUN 36* 18   CREATININE 5.3* 3.1*   GLUCOSE 88 80   CALCIUM 9.6 8.7     Hepatic:   Recent Labs     11/05/21  0840 LABALBU 3.1*   AST 20   ALT 14   BILITOT 0.6   ALKPHOS 99         Meds:  Infusion:    pantoprazole 8 mg/hr (11/06/21 0531)    sodium chloride       Meds:    atorvastatin  40 mg Oral Nightly    folbee plus  1 tablet Oral Daily    calcitRIOL  1.5 mcg Oral Once per day on Mon Wed Fri    cinacalcet  60 mg Oral Once per day on Mon Wed Fri    cyclobenzaprine  10 mg Oral Daily    ferrous sulfate  325 mg Oral Daily    losartan  50 mg Oral Daily    metoprolol  100 mg Oral BID    vitamin C  500 mg Oral Daily    sodium chloride flush  5-40 mL IntraVENous 2 times per day    [START ON 11/8/2021] lidocaine   Topical Once per day on Mon Wed Fri    epoetin traci-epbx  10,000 Units SubCUTAneous Once per day on Mon Wed Fri     Meds prn: albuterol sulfate HFA, sodium chloride flush, sodium chloride, ondansetron **OR** ondansetron, polyethylene glycol, acetaminophen **OR** acetaminophen       Impression and Plan:  1. ESRD on hemodialysis  Had dialysis treatment yesterday  No acute need for renal replacement therapy today  Going for EGD later today  Monitor electrolytes  2. GI bleeding. Patient going for EGD  3. Anemia in ESRD. Patient is on ALEJANDRO  4. History of hypertension  5. History of systolic dysfunction  6. Secondary hyperparathyroidism.   On cinacalcet    D/W patient and RN    Carlee Sotelo MD  [facility]

## 2021-11-07 NOTE — PROGRESS NOTES
Gastroenterology  Progress Note    11/7/2021 11:02 AM  Subjective:   Admit Date: 11/5/2021    Interval History: Patient with no discomfort at this time, no melena want to eat discussed the patient the finding with the EGD he continue to be on PPI we will hold antiplatelet therapy until patient taken stable with H&H improved with no transfusions. Patient in the meanwhile is very high risk for recurrent GI bleed  Diet: ADULT DIET; Clear Liquid    Medications:   Scheduled Meds:    atorvastatin  40 mg Oral Nightly    folbee plus  1 tablet Oral Daily    calcitRIOL  1.5 mcg Oral Once per day on Mon Wed Fri    cinacalcet  60 mg Oral Once per day on Mon Wed Fri    cyclobenzaprine  10 mg Oral Daily    ferrous sulfate  325 mg Oral Daily    losartan  50 mg Oral Daily    metoprolol  100 mg Oral BID    vitamin C  500 mg Oral Daily    sodium chloride flush  5-40 mL IntraVENous 2 times per day    [START ON 11/8/2021] lidocaine   Topical Once per day on Mon Wed Fri    epoetin traci-epbx  10,000 Units SubCUTAneous Once per day on Mon Wed Fri     Continuous Infusions:    sodium chloride      pantoprazole 8 mg/hr (11/07/21 0407)    sodium chloride         CBC:   Recent Labs     11/05/21  0840 11/05/21  1848 11/06/21  0420 11/06/21  1251 11/06/21  2256   WBC 10.5  --  7.2  --   --    HGB 6.1*   < > 7.5* 7.7* 7.1*     --  165  --   --     < > = values in this interval not displayed. BMP:    Recent Labs     11/05/21  0840 11/06/21  0420    140   K 4.6 4.4  4.4    101   CO2 26 27   BUN 36* 18   CREATININE 5.3* 3.1*   GLUCOSE 88 80     Hepatic:   Recent Labs     11/05/21  0840   AST 20   ALT 14   BILITOT 0.6   ALKPHOS 99     INR: No results for input(s): INR in the last 72 hours.     Imaging:  Results for orders placed during the hospital encounter of 09/13/21    XR ABDOMEN (KUB) (SINGLE AP VIEW)    Narrative  1 view abdomen    Comparison: CT abdomen/pelvis September 30, 2021    Findings:  Nonspecific but nonobstructive bowel gas pattern. No pneumoperitoneum or pneumatosis. Calcified phleboliths within the right lower pelvis. Degenerative changes of the lumbar spine. Atherosclerosis of the abdominal  aorta. Impression  A nonspecific but nonobstructive bowel gas pattern. This document has been electronically signed by: Mattie Skaggs DO on  10/02/2021 12:45 AM    Results for orders placed during the hospital encounter of 07/12/21    CT ABDOMEN PELVIS W IV CONTRAST Additional Contrast? None    Narrative  PROCEDURE: CTA CHEST W WO CONTRAST, CT ABDOMEN PELVIS W IV CONTRAST    CLINICAL INFORMATION: sob . Periumbilical pain. Loss of appetite. COMPARISON: CTA chest, abdomen and pelvis dated 5/21/2018. TECHNIQUE: Helical CT of the chest during fast bolus administration of 80 mL Isovue-370 injected in the left [facility] with thick sagittal and coronal maximum intensity projection reconstructions. Subsequent helical CT of the abdomen and pelvis with sagittal  coronal reconstructions. All CT scans at this facility use dose modulation, iterative reconstruction, and/or weight-based dosing when appropriate to reduce radiation dose to as low as reasonably achievable. FINDINGS:  Chest:  There is a good contrast bolus within the pulmonary arteries, adequate for evaluation to the subsegmental level. There are no focal filling defects within the pulmonary arteries to suggest pulmonary embolus. The main pulmonary arteries are enlarged. There is mural calcification in the aorta. The ascending aorta is mildly enlarged measuring 3.7 cm in greatest short axis diameter the level of the main pulmonary artery. There is no evidence of dissection. There are moderate right and small left pleural  effusions with mild adjacent atelectasis.  There are moderate emphysematous changes predominantly in the upper lungs which have worsened in the interval compared to prior exam. There are scattered distal lymph nodes some which are calcified, stable  compared to prior exam. The heart is enlarged, increased compared to previous CT. There is a left-sided cardiac pacer/defibrillator generator in the subcutaneous fat overlying the left pectoralis muscle, similar to prior exam. There are stable leads in  the right atrium and right ventricle, respectively. There are mild anterior wedge shape configuration's of the T6-T8 vertebral bodies with approximately 10% anterior height loss, stable compared to prior exam. There are stable degenerative changes of the  thoracic spine. Abdomen/pelvis: There are few scattered cysts in the liver, stable compared to prior exam. There is a calcified stone in the gallbladder, stable compared to prior exam. Adrenal glands are unremarkable. The kidneys are atrophied, stable compared to prior exam. The spleen  is unremarkable. There is mild ductal prominence in the pancreas, unchanged compared to prior exam. No definite pancreatic lesion is identified. No retroperitoneal or mesenteric lymphadenopathy is identified. There is mural calcification in the aorta  without evidence of aneurysm. There is moderately prominent stool within the large bowel. Small bowel appears within normal limits. The appendix is normal in appearance. The bladder is nondistended. There is diffuse wall thickening the bladder which may represent pseudothickening  from underdistention. The prostate is prominently enlarged, increased compared to prior exam. There is an asymmetrically enhancing area in the left prostate. No free fluid is identified. There are stable degenerative changes of lumbar spine. There is  stable bone island in the right ilium. Impression  1. No evidence of pulmonary embolus. 2. Moderate right and small left pleural effusions with adjacent atelectasis. 3. Ascending aortic aneurysm measuring 3.7 cm in greatest diameter. 4. Pulmonary artery hypertension.   5. Cardiomegaly, increased compared to prior CT. 6. Prostatomegaly with enhancing component on the left. 7. Cholelithiasis. **This report has been created using voice recognition software. It may contain minor errors which are inherent in voice recognition technology. **    Final report electronically signed by Dr. Anthony Rangel MD on 2021 11:43 AM    No results found for this or any previous visit. No results found for this or any previous visit[place] [address]                                 OPERATIVE REPORT     PATIENT NAME: Concetta Ramos                      :        11/10/1930  MED REC NO:   [id number]                           ROOM:       [facility]  ACCOUNT NO:   [de-identified]                           ADMIT DATE: 2021  PROVIDER:     Curtis Khanna M.D.     DATE OF PROCEDURE:  2021     INDICATIONS:  The patient with history of melena; hematemesis; acute GI  blood loss anemia; the patient is on hemodialysis; coronary artery  disease; on aspirin, Plavix as well as Eliquis, on hold at this time. Plan today for EGD to evaluate.     SURGEON:  Curtis Khanna MD     ASA CLASSIFICATION:  Please see Anesthesia note.     ESTIMATED BLOOD LOSS:  Minimum.     DESCRIPTION OF PROCEDURE:  The patient was brought to GI lab. Consent  was obtained. Risks involved with the procedure were explained to the  patient. Informed consent was obtained. The patient was monitored  during the procedure with pulse oximetry, blood pressure monitoring, and  oxygen by nasal cannula. Sedation by incremental doses of IV propofol  given by the Anesthesia service to achieve total IV anesthesia.   For ASA  classification and medications given during the procedure, please see  Anesthesia note.     PROCEDURE PERFORMED:  EGD with control of bleeding using argon plasma  coagulator.     A standard video 190 Olympus upper adult scope was advanced under direct  vision from oral cavity up to the third part of the duodenum. Gastric  reflux seen with feature of mild acid reflux in the distal esophagus. No erosions or ulcerations seen. Scope was advanced into the stomach. Retroflexion exam of the cardia revealed normal cardia. Antrum showed  erythema and gastritis, not significant. Scope was advanced to the  third part of the duodenum. Seen in the second part of the duodenum,  AVM which was actively bleeding, treated with argon plasma coagulator  which controlled the bleeding. Scope was withdrawn with no immediate  complication.     IMPRESSION:  AVM, treated with argon plasma coagulator with active  bleeding second part of the duodenum.     PLAN:  1. Avoid NSAID. 2.  Continue PPI. 3.  My recommendation to the patient is not to be on three  anticoagulation agents at the same time.           Vivian Laughlin M.D.     D: 11/06/2021 11:24:11       T: 11/06/2021 14:36:31     [id number]  Job#: [id number]     Doc#: [id number]     CC:  Natalie Lopez. Cathye Cheadle, M.D. Adair Offer, D.O.    Objective:   Vitals: /62   Pulse 65   Temp 96.2 °F (35.7 °C) (Oral)   Resp 16   Ht 5' 6\" (1.676 m)   Wt 132 lb 4.8 oz (60 kg)   SpO2 97%   BMI 21.35 kg/m²     Intake/Output Summary (Last 24 hours) at 11/7/2021 1102  Last data filed at 11/7/2021 0958  Gross per 24 hour   Intake 1145.96 ml   Output    Net 1145.96 ml     General appearance: alert and cooperative with exam  Lungs: clear to auscultation bilaterally  Heart: regular rate and rhythm, S1, S2 normal, no murmur, click, rub or gallop  Abdomen: soft, non-tender; bowel sounds normal; no masses,  no organomegaly  Extremities: extremities normal, atraumatic, no cyanosis or edema    Assessment and Plan:   1.  Acute GI blood loss anemia to vascular from each of the duodenum to argon plasma coagulator patient went on to antiplatelet therapy Plavix and aspirin as well as Eliquis due to A. fib with kidney failure he is very high risk for recurrent GI bleed suggest to reduce the anticoagulations taking considerations HPI fever on hemodialysis. 2. Will continue clear liquid diet until he clinically stable      Follow up in GI Clinic after discharge in 2 week(s)    Patient Active Problem List:     CAD (coronary artery disease)     COPD (chronic obstructive pulmonary disease) (HCC)     Chronic renal insufficiency     Patient overweight     Arthritis-  low back and neck .      Dyspnea and respiratory abnormalities     ED (erectile dysfunction)     Degenerative joint disease of knee, left     Gout of big toe     Anemia, chronic disease     Essential hypertension     Monoclonal gammopathy     Leukopenia     Thrombocytopenia (HCC)     Chronic kidney disease (CKD), stage V (HCC)     Metabolic acidosis     Iron deficiency anemia     Plasma cell dyscrasia     Idiopathic hypotension     Systolic congestive heart failure (HCC)     Other fluid overload (CODE)     Hyperphosphatemia     Pulmonary hypertension (HCC)     Anemia due to chronic kidney disease, on chronic dialysis (HCC)     Volume overload     Secondary hyperparathyroidism of renal origin ([address]     Chest pain     Acute pulmonary edema (HCC)     Gastritis and duodenitis     Fall from slip, trip, or stumble, initial encounter     Closed fracture of left ankle     ESRD on hemodialysis ([address]     Hypertensive emergency     SOB (shortness of breath)     Chronic combined systolic and diastolic CHF (congestive heart failure) ([address]     COVID-19     Severe malnutrition ([address]     Hypoxia     Dyspnea     Acute upper gastrointestinal hemorrhage      Electronically signed by Mik Santillan MD on 11/7/2021 at 11:02 AM

## 2021-11-07 NOTE — OP NOTE
[address] [place] [address]                                OPERATIVE REPORT    PATIENT NAME: Sukhi Cabrera                      :        11/10/1930  MED REC NO:   [id number]                           ROOM:       [id number]  ACCOUNT NO:   [de-identified]                           ADMIT DATE: 2021  PROVIDER:     Acacia Clifford M.D.    DATE OF PROCEDURE:  2021    INDICATIONS:  The patient with history of melena; hematemesis; acute GI  blood loss anemia; the patient is on hemodialysis; coronary artery  disease; on aspirin, Plavix as well as Eliquis, on hold at this time. Plan today for EGD to evaluate. SURGEON:  Acacia Clifford MD    ASA CLASSIFICATION:  Please see Anesthesia note. ESTIMATED BLOOD LOSS:  Minimum. DESCRIPTION OF PROCEDURE:  The patient was brought to GI lab. Consent  was obtained. Risks involved with the procedure were explained to the  patient. Informed consent was obtained. The patient was monitored  during the procedure with pulse oximetry, blood pressure monitoring, and  oxygen by nasal cannula. Sedation by incremental doses of IV propofol  given by the Anesthesia service to achieve total IV anesthesia. For ASA  classification and medications given during the procedure, please see  Anesthesia note. PROCEDURE PERFORMED:  EGD with control of bleeding using argon plasma  coagulator. A standard video 190 Olympus upper adult scope was advanced under direct  vision from oral cavity up to the third part of the duodenum. Gastric  reflux seen with feature of mild acid reflux in the distal esophagus. No erosions or ulcerations seen. Scope was advanced into the stomach. Retroflexion exam of the cardia revealed normal cardia. Antrum showed  erythema and gastritis, not significant. Scope was advanced to the  third part of the duodenum.   Seen in the second part of the duodenum,  AVM which was actively bleeding, treated with argon plasma coagulator  which controlled the bleeding. Scope was withdrawn with no immediate  complication. IMPRESSION:  AVM, treated with argon plasma coagulator with active  bleeding second part of the duodenum. PLAN:  1. Avoid NSAID. 2.  Continue PPI. 3.  My recommendation to the patient is not to be on three  anticoagulation agents at the same time. Kavitha Carrero M.D.    D: 11/06/2021 11:24:11       T: 11/06/2021 14:36:31     [id number]  Job#: [id number]     Doc#: [id number]    CC:  DANIKA Raines D.O.

## 2021-11-07 NOTE — PROGRESS NOTES
PROGRESS NOTE      Patient:  Keith Norris      Unit/Bed:3B-29/029-A    YOB: 1930    MRN: [id number]       Acct: [de-identified]     PCP: Laurel Wilson MD    Date of Admission: 11/5/2021      Assessment/Plan:    Active Hospital Problems    Diagnosis Date Noted    Acute upper gastrointestinal hemorrhage [K92.2] 11/05/2021       Acute blood loss anemia  Anemia of chronic disease  -Hematemesis POA; hgb 6.1 on admission, received 2 units PRBCs on admission, Hgb improved to 7.5. another unit ordered 11/7 as hgb decreased to 7.1 overnight.   -Recent PE, was discharged 11/1, was started on [employer],  -Hx of GI bleeds  -ESRD patient  -Stop [address] and DAPT, transfuse PRN, trend H/H  -GI following, EGD planned  - Continue protonix gtt until Hgb stable. -Transfuse if hgb < 8 or hemodynamically unstable     Pulmonary embolism  -Recently diagnosed during patient's previous admission. Was discharged on 11/1  -Was started on Eliquis 5 mg twice daily, currently on hold due to #1. Will likely hold indefinitely, given patient's high bleeding risk  -Not a good candidate for IVC filter. Hospice is following.      New onset afib RVR - resolved   - Secondary to anemia and hypovolemia during dialysis  - Resolved with PRBC transfusion  - No OAC at this time considering #1  - Continue BB  - NSR on telemetry this morning, continue to monitor     Lactic acidosis   -Likely d/t demand.  Resolved      Chronically elevated troponin  -Due to ESRD  -Of note, trop is lower on admission as compared to recent values  -Trop trended down     ESRD on HD  -M/W/F HD as OP; continued while IP  -Dr. Nicole Sutherland primary; Nephro following      HFrEF  -No clinical signs of acute decompensation  -CXR findings noted however volume status to be managed in HD  -Continue home meds     CAD s/p PCI 08/31/21 at [facility]  -DAPT on hold considering #1, consider restarting when hemoglobin stable     COPD  Chronic hypoxic respiratory failure  -On baseline 3 L/min oxygen via nasal cannula continuously     HTN  HLD  OA  -Continue home meds     Code status: Limited x3 per previous documentation. Daughter states should be limited without intubation or chest compressions and that patient has documentation of this. Palliative care consulted to discuss code status in further detail and to help family with decision-making     Discharge planning: Pending clinical course. Chief Complaint:  Hematemesis     History Of Present Illness:    From H&P  \"80 y.o. male with a past medical history of ESRD on hemodialysis, COPD with chronic hypoxic respiratory failure, CAD status post PCI, pulmonary embolism recently diagnosed, history of GI bleeds, HFrEF, hypertension, hyperlipidemia, osteoarthritis who is admitted to [address] [facility] on 11/5/2021 for hematemesis and acute blood loss anemia. Pt's daughters are present at bedside who state pt was at HD today and vomited blood. Hgb at that time was 6, pt was brought into ED for further evaluation.      ED course: Stable vitals. Hgb 6.1, Lactic 2.9, procal 0.59 without any other signs of infection/sepsis. 2 units PRBCs ordered in ED - will be given to patient in HD today. \"      11/06: While on dialysis yesterday the patient developed A. fib RVR. Rapid response was called. The patient received 1 unit of packed red blood cells and volume repletion which resolved his A. fib RVR. Was able to complete dialysis. Plan for EGD with GI today.     11/7: VSS, in NSR. Hgb decreased to 7.1 overnight, another unit PRBCs ordered (third so far on admission)     Subjective   The patient was seen and evaluated this morning during rounds. He is resting comfortably in bed. Denies any complaints. Denies any overt bleeding.        Medications:  Reviewed    Infusion Medications    sodium chloride      pantoprazole 8 mg/hr (11/07/21 0409)    sodium chloride       Scheduled Medications    atorvastatin  40 mg Oral Nightly    folbee plus  1 tablet Oral Daily    calcitRIOL  1.5 mcg Oral Once per day on Mon Wed Fri    cinacalcet  60 mg Oral Once per day on Mon Wed Fri    cyclobenzaprine  10 mg Oral Daily    ferrous sulfate  325 mg Oral Daily    losartan  50 mg Oral Daily    metoprolol  100 mg Oral BID    vitamin C  500 mg Oral Daily    sodium chloride flush  5-40 mL IntraVENous 2 times per day    [START ON 11/8/2021] lidocaine   Topical Once per day on Mon Wed Fri    epoetin traci-epbx  10,000 Units SubCUTAneous Once per day on Mon Wed Fri     PRN Meds: sodium chloride, albuterol sulfate HFA, sodium chloride flush, sodium chloride, ondansetron **OR** ondansetron, polyethylene glycol, acetaminophen **OR** acetaminophen      Intake/Output Summary (Last 24 hours) at 11/7/2021 0366  Last data filed at 11/7/2021 0252  Gross per 24 hour   Intake 1085.96 ml   Output    Net 1085.96 ml       Diet:  ADULT DIET; Clear Liquid    Exam:  BP (!) 153/70   Pulse 83   Temp 97.5 °F (36.4 °C) (Oral)   Resp 16   Ht 5' 6\" (1.676 m)   Wt 132 lb 4.8 oz (60 kg)   SpO2 96%   BMI 21.35 kg/m²        General appearance:  No apparent distress, appears stated age and cooperative. HEENT:  Normal cephalic, atraumatic without obvious deformity. Pupils equal, round, and reactive to light. Extra ocular muscles intact. Conjunctivae/corneas clear. Neck: Supple, with full range of motion. No jugular venous distention. Trachea midline. Respiratory:  Normal respiratory effort. Clear to auscultation, bilaterally without Rales/Wheezes/Rhonchi. On 3 lpm O2 via NC. Cardiovascular:  Regular rate and rhythm with normal S1/S2 without murmurs, rubs or gallops. Abdomen: Soft, non-tender, non-distended with normal bowel sounds. Musculoskeletal:  No clubbing, cyanosis or edema bilaterally. Full range of motion without deformity. Skin: Skin color, texture, turgor normal.  No rashes or lesions. Neurologic:  Neurovascularly intact without any focal sensory/motor deficits.  Cranial nerves: II-XII intact, grossly non-focal.  Psychiatric:  Thought content appropriate, normal insight  Capillary Refill: Brisk,< 3 seconds   Peripheral Pulses: +2 palpable, equal bilaterally       Labs:   Recent Labs     11/05/21  0840 11/05/21  1848 11/06/21  0420 11/06/21  1251 11/06/21  2256   WBC 10.5  --  7.2  --   --    HGB 6.1*   < > 7.5* 7.7* 7.1*   HCT 19.0*   < > 22.6* 24.6* 21.9*     --  165  --   --     < > = values in this interval not displayed. Recent Labs     11/05/21  0840 11/06/21  0420    140   K 4.6 4.4  4.4    101   CO2 26 27   BUN 36* 18   CREATININE 5.3* 3.1*   CALCIUM 9.6 8.7     Recent Labs     11/05/21  0840   AST 20   ALT 14   BILITOT 0.6   ALKPHOS 99     No results for input(s): INR in the last 72 hours. No results for input(s): Pham Kras in the last 72 hours. Urinalysis:      Lab Results   Component Value Date    NITRU NEGATIVE 06/22/2019    WBCUA 50-75 06/22/2019    BACTERIA NONE 06/22/2019    RBCUA 25-50 06/22/2019    BLOODU LARGE 06/22/2019    SPECGRAV 1.013 05/03/2016    GLUCOSEU NEGATIVE 06/22/2019       Radiology:  XR CHEST (2 VW)   Final Result   1. Mild cardiomegaly and probable congestive heart failure in this patient status post pacemaker placement. 2. Thickening off the interstitial lung markings and increased density at both lung bases. 3. Small right pleural effusion. 4. Shunt in the right arm. **This report has been created using voice recognition software. It may contain minor errors which are inherent in voice recognition technology. **      Final report electronically signed by DR Bernard Sams on 11/5/2021 9:09 AM          Diet: ADULT DIET;  Clear Liquid    PT/OT Eval Status: n/a    DVT prophylaxis: [] Lovenox                                 [x] SCDs                                 [] SQ Heparin                                 [] Encourage ambulation           [] Already on Anticoagulation     Disposition:    [x] Home       [] TCU       [] Rehab       [] Psych       [] SNF       [] [place]       [] Other-    Case discussed with Dr. Nazario Cordero    Electronically signed by Alivia Wolf MD on 11/7/2021 at 8:33 AM

## 2021-11-07 NOTE — PROGRESS NOTES
[facility]   Nephrology progress note  11/7/2021, 11:35 AM      Pt Name:    Umm Navarro  MRN:     [id number]     [place]:    11/10/1930  Admit Date:    11/5/2021  7:46 AM  Primary Care Physician:  Hung Swain MD   Room number  3B-29/029-A    Chief Complaint: Nephrology following for ESRD and HD    Subjective:  Patient seen and examined  Poor historian  Awake and alert  Seen and examined earlier today during rounds    Objective:  24HR INTAKE/OUTPUT:      Intake/Output Summary (Last 24 hours) at 11/7/2021 1135  Last data filed at 11/7/2021 0958  Gross per 24 hour   Intake 745.96 ml   Output    Net 745.96 ml     I/O last 3 completed shifts: In: 1086 [P.O.:480; I.V.:606]  Out: -   I/O this shift:  In: 60 [P.O.:60]  Out: -   Admission weight: 163 lb (73.9 kg)  Wt Readings from Last 3 Encounters:   11/07/21 132 lb 4.8 oz (60 kg)   11/01/21 138 lb 3.7 oz (62.7 kg)   10/15/21 149 lb 4 oz (67.7 kg)     Body mass index is 21.35 kg/m². Physical examination  VITALS:     Vitals:    11/07/21 0249 11/07/21 0252 11/07/21 0750 11/07/21 1043   BP:  (!) 154/65 (!) 153/70 134/62   Pulse:  64 83 65   Resp:  18 16 16   Temp:  97.7 °F (36.5 °C) 97.5 °F (36.4 °C) 96.2 °F (35.7 °C)   TempSrc:  Oral Oral Oral   SpO2: 100%  96% 97%   Weight: 132 lb 4.8 oz (60 kg)      Height:         General Appearance: alert and cooperative with exam, appears comfortable, no distress  Mouth/Throat: Oral mucosa moist  Neck: No JVD  Lungs:  no use of accessory muscles  GI: soft, non-tender, no guarding  Extremities: No significant LE edema      Lab Data  CBC:   Recent Labs     11/05/21  0840 11/05/21  1848 11/06/21  0420 11/06/21  1251 11/06/21  2256   WBC 10.5  --  7.2  --   --    HGB 6.1*   < > 7.5* 7.7* 7.1*   HCT 19.0*   < > 22.6* 24.6* 21.9*     --  165  --   --     < > = values in this interval not displayed.      BMP:  Recent Labs     11/05/21  0840 11/06/21  0420    140   K 4.6 4.4  4.4    101   CO2 26 27   BUN 36* 18   CREATININE 5.3* 3.1*   GLUCOSE 88 80   CALCIUM 9.6 8.7     Hepatic:   Recent Labs     11/05/21  0840   LABALBU 3.1*   AST 20   ALT 14   BILITOT 0.6   ALKPHOS 99         Meds:  Infusion:    sodium chloride      pantoprazole 8 mg/hr (11/07/21 0407)    sodium chloride       Meds:    atorvastatin  40 mg Oral Nightly    folbee plus  1 tablet Oral Daily    calcitRIOL  1.5 mcg Oral Once per day on Mon Wed Fri    cinacalcet  60 mg Oral Once per day on Mon Wed Fri    cyclobenzaprine  10 mg Oral Daily    ferrous sulfate  325 mg Oral Daily    losartan  50 mg Oral Daily    metoprolol  100 mg Oral BID    vitamin C  500 mg Oral Daily    sodium chloride flush  5-40 mL IntraVENous 2 times per day    [START ON 11/8/2021] lidocaine   Topical Once per day on Mon Wed Fri    epoetin traci-epbx  10,000 Units SubCUTAneous Once per day on Mon Wed Fri     Meds prn: sodium chloride, albuterol sulfate HFA, sodium chloride flush, sodium chloride, ondansetron **OR** ondansetron, polyethylene glycol, acetaminophen **OR** acetaminophen       Impression and Plan:  1. ESRD on hemodialysis  Electrolytes stable  Plan hemodialysis treatment tomorrow    2. GI bleeding. Status post EGD. Monitor H&H  3. AVM status post APC  4. Anemia in ESRD. Patient is on ALEJANDRO  5. History of hypertension  6. History of systolic dysfunction  7. Secondary hyperparathyroidism.   On cinacalcet    D/W patient    Arun Fong MD  [facility]

## 2021-11-08 NOTE — PROGRESS NOTES
[address] [address]  INPATIENT PHYSICAL THERAPY  EVALUATION  [facility]    Time In: 2376  Time Out: 3182  Timed Code Treatment Minutes: 15 Minutes  Minutes: 28          Date: 2021  Patient Name: Ashlyn Mcmahon,  Gender:  male        MRN: [id number]  : 11/10/1930  (80 y.o.)      Referring Practitioner: Bryson Hernandez DO  Diagnosis: Acute upper gastrointestinal hemorrhage  Additional Pertinent Hx: Per H&P:89 y.o. male with a past medical history of ESRD on hemodialysis, COPD with chronic hypoxic respiratory failure, CAD status post PCI, pulmonary embolism recently diagnosed, history of GI bleeds, HFrEF, hypertension, hyperlipidemia, osteoarthritis who is admitted to [facility] on 2021 for hematemesis and acute blood loss anemia. Pt's daughters are present at bedside who state pt was at HD today and vomited blood. Hgb at that time was 6, pt was brought into ED for further evaluation. Restrictions/Precautions:  Restrictions/Precautions: General Precautions, Fall Risk    Subjective:  Chart Reviewed: Yes  Patient assessed for rehabilitation services?: Yes  Subjective: Pt sitting up in recliner and reporting that he is trying to comfortable and get some rest. Pt agreed to participate with getting up to return to bed to be able to rest.    General:  Overall Orientation Status: Impaired  Orientation Level: Oriented to person, Oriented to place         Hearing: Within functional limits         Pain: 5/10: Left wrist due to old IV site and tightness of glove wristband - requested help to remove the glove.     Vitals: Vitals not assessed per clinical judgement, see nursing flowsheet    Social/Functional History:    Type of Home: Facility [facility])             ADL Assistance: Needs assistance     Ambulation Assistance: Needs assistance  Transfer Assistance: Needs assistance          Additional Comments: Per chart review, pt had been residing at home with daughter prior to  admission, but since then has been at [facility]. Pt denied was receiving any therapy services although question accuracy due to confusion. OBJECTIVE:  Range of Motion:  Bilateral Lower Extremity: WFL    Strength:  Bilateral Lower Extremity: grossly 3+/5    Balance:  Static Sitting Balance:  Supervision  Dynamic Sitting Balance: Supervision  Static Standing Balance: Contact Guard Assistance  Dynamic Standing Balance: Contact Guard Assistance    Bed Mobility:  Sit to Supine: Minimal Assistance, with head of bed flat, with rail, with increased time for completion     Transfers:  Sit to Stand: Minimal Assistance, with increased time for completion  Stand to Sit:Contact Guard Assistance    Ambulation:  Contact Guard Assistance  Distance: 4 ft  Surface: Level Tile  Device:Rolling Walker  Gait Deviations: Forward Flexed Posture, Decreased Step Length Bilaterally, Decreased Gait Speed, Decreased Heel Strike Bilaterally, Narrow Base of Support and Unsteady Gait    Exercise:  Patient was guided in 1 set(s) 5 reps of exercise to both lower extremities. Ankle pumps and Long arc quads. Exercises were completed for increased independence with functional mobility. Functional Outcome Measures: Completed  AM-PAC Inpatient Mobility Raw Score : 16  -PAC Inpatient T-Scale Score : 40.78    ASSESSMENT:  Activity Tolerance:  Patient tolerance of  treatment: good. Minus. Multiple rest breaks. Pt needs extra time to process and then to complete tasks. Treatment Initiated: Treatment and education initiated within context of evaluation. Evaluation time included review of current medical information, gathering information related to past medical, social and functional history, completion of standardized testing, formal and informal observation of tasks, assessment of data and development of plan of care and goals.   Treatment time included skilled education and facilitation of tasks to increase safety and independence with functional mobility for improved independence and quality of life. Assessment: Body structures, Functions, Activity limitations: Decreased functional mobility , Decreased strength, Decreased endurance, Decreased balance, Increased pain  Assessment: Pt is a 80 (nearly 80) yo male that is deconditioned and requiring CGA to light Min A for safety with mobility. Pt would benefit from continued skilled PT to address strengthening, endurance building, balance, and functional mobility training. Prognosis: Good    REQUIRES PT FOLLOW UP: Yes    Discharge Recommendations:  Discharge Recommendations: [address] with PT, 24 hour supervision or assist    Patient Education:  PT Education: Goals, PT Role, Plan of Care    Equipment Recommendations:  Equipment Needed: No    Plan:  Times per week: 3-5x GM  Current Treatment Recommendations: Strengthening, Home Exercise Program, Safety Education & Training, Balance Training, Endurance Training, Functional Mobility Training, Transfer Training, Gait Training, Patient/Caregiver Education & Training    Goals:  Patient goals : go home  Short term goals  Time Frame for Short term goals: at discharge  Short term goal 1: Pt to be Supervision for supine <> sit to get in/out of bed  Short term goal 2: Pt to be Supervision for sit <> stand to get up to ambulate  Short term goal 3: Pt to ambulate > 40 ft with RW with SBA for household distances  Long term goals  Time Frame for Long term goals : not set due to short ELOS    Following session, patient left in safe position with all fall risk precautions in place. Delfin Dove.  Brady Baer, [place] [address]

## 2021-11-08 NOTE — FLOWSHEET NOTE
11/08/21 0800 11/08/21 1230   Vital Signs   BP (!) 168/76 (!) 159/72   Temp 97.4 °F (36.3 °C) 97.8 °F (36.6 °C)   Pulse 81 82   Resp 22 18   SpO2 94 % 95 %   Weight 134 lb 14.7 oz (61.2 kg) 132 lb 11.5 oz (60.2 kg)   Weight Method Bed scale Bed scale   Percent Weight Change -0.06 -1.63   Post-Hemodialysis Assessment   Post-Treatment Procedures  --  Blood returned; Access bleeding time < 10 minutes   Machine Disinfection Process  --  Acid/Vinegar Clean; Heat Disinfect; Exterior Machine Disinfection   Total Liters Processed (l/min)  --  92.1 l/min   Dialyzer Clearance  --  Lightly streaked   Duration of Treatment (minutes)  --  240 minutes   Hemodialysis Intake (ml)  --  400 ml   Hemodialysis Output (ml)  --  1400 ml   NET Removed (ml)  --  1000 ml   Tolerated Treatment  --  Good   Stable 4.0 hour tx. Removed 1L of fluid. pt tolerated fluid removal well. Pressure held to each needle site x 10 min. Drsg clean, dry, and intact. Report called to primary RN. TX record printed for scanning into EMR.

## 2021-11-08 NOTE — PROGRESS NOTES
Palliative care eval received to assist with goals of care. Pt admitted for GI bleed. EGD showed AVM with APC done. Case discussed with Dr Donny Elder prior to seeing pt. Case discussed with pt's COURT [facility]AMILLA RF. Per report, pt doesn't have much insight into his condition. Call placed to pt's Heber Fong. Pt condition reviewed, writer updated on pt continued risk for GI bleed but also risk from clots due to not being able to be on [address]. Reviewed pt needs continued antiplatelet therapy, risk to stent outweighs risk of GI bleed. Pt overall condition reviewed, Kandy Shaw states that she realizes pt will likely continue to decline but states that she will not stop dialysis at this point. Code status options explained, along with what is entailed with each level. Discussed possible complications of rib fractures, brain and organ damage associated with resuscitative measures. Intubation and MIV for resp failure also explained along with what is entailed with that. All questions answered and Kandy Shaw states understanding. Kandy Shaw states that pt is not to be \"put on ventilator\" for any reason, Kandy Shaw also states that pt would not want \"revived\" if his heart were to stop but would be \"okay\" with a \"quick peaceful' death. Kandy Shaw requests code status be changed to reflect no intubation for resp failure and no resuscitation for cardiac arrest.  Kandy Shaw requests that this code status be continued at [facility]. Writer explained [facility] and will have physician complete for pt. Kandy Shaw states no further questions or needs. Emotional support and encouragement given. [facility] DNR-CCA and script for \"no intubation' placed in pt's chart cubby for physician signature. Secure text sent to Dr Donny Elder to request code status change. Order received to change code status to Limited, no x4-placed into Epic. Pt's WGOZE,WCBCZ,AX updated.    Palliative care will continue to follow as needed, floor to notify PRN for urgent needs.

## 2021-11-08 NOTE — PROGRESS NOTES
blood pressure      Medications:   Scheduled Meds:   atorvastatin  40 mg Oral Nightly    folbee plus  1 tablet Oral Daily    calcitRIOL  1.5 mcg Oral Once per day on Mon Wed Fri    cinacalcet  60 mg Oral Once per day on Mon Wed Fri    cyclobenzaprine  10 mg Oral Daily    ferrous sulfate  325 mg Oral Daily    losartan  50 mg Oral Daily    metoprolol  100 mg Oral BID    vitamin C  500 mg Oral Daily    sodium chloride flush  5-40 mL IntraVENous 2 times per day    lidocaine   Topical Once per day on Mon Wed Fri    epoetin traci-epbx  10,000 Units SubCUTAneous Once per day on Mon Wed Fri     Continuous Infusions:   sodium chloride      pantoprazole 8 mg/hr (11/08/21 0656)    sodium chloride         CBC:   Recent Labs     11/05/21  0840 11/05/21  1848 11/06/21  0420 11/06/21  1251 11/06/21  2256 11/07/21  1519 11/07/21  2050   WBC 10.5  --  7.2  --   --   --   --    HGB 6.1*   < > 7.5*   < > 7.1* 9.6* 9.3*     --  165  --   --   --   --     < > = values in this interval not displayed. CMP:    Recent Labs     11/05/21  0840 11/06/21  0420    140   K 4.6 4.4  4.4    101   CO2 26 27   BUN 36* 18   CREATININE 5.3* 3.1*   GLUCOSE 88 80   CALCIUM 9.6 8.7   LABGLOM 12* 23*     Troponin: No results for input(s): TROPONINI in the last 72 hours. BNP: No results for input(s): BNP in the last 72 hours. INR: No results for input(s): INR in the last 72 hours. Lipids:   Recent Labs     11/05/21  0840   LIPASE 50.2     Liver:   Recent Labs     11/05/21  0840   AST 20   ALT 14   ALKPHOS 99   PROT 6.4   LABALBU 3.1*   BILITOT 0.6     Iron:  No results for input(s): IRONS, FERRITIN in the last 72 hours. Invalid input(s): LABIRONS  XR CHEST (2 VW)   Final Result   1. Mild cardiomegaly and probable congestive heart failure in this patient status post pacemaker placement. 2. Thickening off the interstitial lung markings and increased density at both lung bases. 3. Small right pleural effusion. 4. Shunt in the right arm. **This report has been created using voice recognition software. It may contain minor errors which are inherent in voice recognition technology. **      Final report electronically signed by DR Catalina Murphy on 11/5/2021 9:09 AM            Objective:   Vitals: BP (!) 128/58   Pulse 60   Temp 98.7 °F (37.1 °C) (Oral)   Resp 18   Ht 5' 6\" (1.676 m)   Wt 135 lb (61.2 kg)   SpO2 99%   BMI 21.79 kg/m²    Wt Readings from Last 3 Encounters:   11/08/21 135 lb (61.2 kg)   11/01/21 138 lb 3.7 oz (62.7 kg)   10/15/21 149 lb 4 oz (67.7 kg)      24HR INTAKE/OUTPUT:      Intake/Output Summary (Last 24 hours) at 11/8/2021 2010  Last data filed at 11/8/2021 0500  Gross per 24 hour   Intake 1184.37 ml   Output    Net 1184.37 ml       Constitutional: Well-developed elderly gentleman alert, awake, no apparent distress   Skin:normal with no rash or lesions. HEENT:Pupils are reactive . Throat is clear . Oral mucosa is moist   Neck:supple with no thyromegaly or carotid bruit  Cardiovascular:  S1, S2 without murmur or rubs   Respiratory:  Clear to ausculation without wheezes, rhonchi or rales. Abdomen: +bs, soft, no tenderness to palpation and no palpable mass. No abdominal bruit   Ext: No LE edema  Musculoskeletal:Intact  Neuro:Alert and awake with no focal deficit. Speech is normal      Electronically signed by Liliana Cox MD on 11/8/2021 at 7:28 AM

## 2021-11-08 NOTE — PLAN OF CARE
11/8/21, 2:34 PM EST  Discharge Planning Evaluation  Social work consult received, patient from [facility]. Patient and daughter Jemma's preference is to return to [facility]. The patient's current payor source at the facility is [facility]. Medicare skilled days available: Yes  Insurance precert:  Yes  Spoke with Pat at the facility. Patient bed hold:  Yes  Anticipated transport plan: Potential family/unsure  Do they require COVID 19 test to return to ECF: Yes if symptomatic  Is there a required time frame which which COVID test needs done: N/A    Seen by social work student, [employer]

## 2021-11-08 NOTE — PROGRESS NOTES
Joann Martinez 60  INPATIENT OCCUPATIONAL THERAPY  STRZ CCU-STEPDOWN 3B  EVALUATION    Time:   Time In: 11  Time Out: 1426  Timed Code Treatment Minutes: 8 Minutes  Minutes: 23          Date: 2021  Patient Name: Umm Navarro,   Gender: male      MRN: [id number]  : 11/10/1930  (80 y.o.)  Referring Practitioner: Rabia Wick DO  Diagnosis: Acute upper gastrointestinal hemorrhage  Additional Pertinent Hx: Per H&P:89 y.o. male with a past medical history of ESRD on hemodialysis, COPD with chronic hypoxic respiratory failure, CAD status post PCI, pulmonary embolism recently diagnosed, history of GI bleeds, HFrEF, hypertension, hyperlipidemia, osteoarthritis who is admitted to [address] on 2021 for hematemesis and acute blood loss anemia. Pt's daughters are present at bedside who state pt was at HD today and vomited blood. Hgb at that time was 6, pt was brought into ED for further evaluation. Restrictions/Precautions:  Restrictions/Precautions: General Precautions, Fall Risk    Subjective  Chart Reviewed: Yes, Orders, Progress Notes, History and Physical, Previous Admission  Patient assessed for rehabilitation services?: Yes  Family / Caregiver Present: No    Subjective: Pt supine in bed upon arrival, agreeable to OT session with minimal encouragement stating \"that dialysis took everything out of me. \"  Comments: Had attempted earier although pt was off floor for dialysis.     Pain:  Pain Assessment  Patient Currently in Pain: Yes  Pain Assessment: 0-10  Pain Level: 6  Pain Type: Acute pain  Pain Location: Wrist (pt reporting from IV site during HD)  Pain Orientation: Left    Vitals: Vitals not assessed per clinical judgement, see nursing flowsheet    Social/Functional History:  Type of Home: Facility [facility]           ADL Assistance: Needs assistance  Ambulation Assistance: Needs assistance  Transfer Assistance: Needs assistance          Additional Comments: Per chart review, pt had been residing at home with daughter prior to 9/21 admission, but since then has been at [facility]. Pt denied was receiving any therapy services although question accuracy due to confusion. VISION:WFL    HEARING:  WFL    COGNITION: Slow Processing, Decreased Recall, Decreased Insight, Impaired Memory, Decreased Problem Solving, Decreased Safety Awareness and Impulsive    RANGE OF MOTION:  Bilateral Upper Extremity:  WFL    STRENGTH:  Bilateral Upper Extremity:  Impaired - grossly deconditioned    ADL:   Grooming: Independent. donning bloves. BALANCE:  Sitting Balance:  Contact Guard Assistance. at EOB, able to progress to SBA  Standing Balance: Minimal Assistance. BED MOBILITY:  Supine to Sit: Minimal Assistance    Scooting: Contact Guard Assistance ; advancing hips forward to EOB    TRANSFERS:  Sit to Stand:  Minimal Assistance, with increased time for completion, cues for hand placement. from EOB  Stand to Sit: Contact Guard Assistance. to bedside chair    FUNCTIONAL MOBILITY:  Assistive Device: hand held assist (refused RW)  Assist Level:  Minimal Assistance. Distance: EOB>bedside chair  Unsteady, cues for safety. Would benefit from use of RW although refused. Activity Tolerance:  Patient tolerance of  treatment: fair. Assessment:  Assessment: Pt presents requiring increased assistance for ADLs, transfers, and functional mobility compared to PLOF. Pt will continue to benefit from OT services to improve independence with these tasks, in addition to overall strength/endurance to facilitate return to PLOF. Performance deficits / Impairments: Decreased functional mobility , Decreased ADL status, Decreased strength, Decreased safe awareness, Decreased cognition, Decreased endurance, Decreased balance  Prognosis: Fair  REQUIRES OT FOLLOW UP: Yes  Decision Making: Medium Complexity    Treatment Initiated: Treatment and education initiated within context of evaluation.   Evaluation time included review of current medical information, gathering information related to past medical, social and functional history, completion of standardized testing, formal and informal observation of tasks, assessment of data and development of plan of care and goals. Treatment time included skilled education and facilitation of tasks to increase safety and independence with ADL's for improved functional independence and quality of life. Discharge Recommendations:  ECF with OT    Patient Education:  OT Education: OT Role, Plan of Care, Transfer Training, Orientation    Equipment Recommendations:  Equipment Needed: No    Plan:  Times per week: 3-5x  Current Treatment Recommendations: Strengthening, ROM, Balance Training, Functional Mobility Training, Safety Education & Training, Patient/Caregiver Education & Training, Self-Care / ADL, Cognitive Reorientation. See long-term goal time frame for expected duration of plan of care. If no long-term goals established, a short length of stay is anticipated. Goals:     Short term goals  Time Frame for Short term goals: by discharge  Short term goal 1: Pt will increase activity tolerance for functional mobility to/from bathroom with SBA in prep for ADL completion. Short term goal 2: Pt will complete functional transfers with SBA in prep for BSC/toilet transfers. Short term goal 3: Pt will complete BADL tasks with moderate assistance to increase independence with self care tasks. Short term goal 4: Pt will tolerate dynamic standing X2-3 minutes with SBA in prep for sinkside grooming tasks. Following session, patient left in safe position with all fall risk precautions in place.

## 2021-11-08 NOTE — PROGRESS NOTES
PROGRESS NOTE      Patient:  Yasmin Cooper      Unit/Bed:3B-29/029-A    YOB: 1930    MRN: [id number]       Acct: [de-identified]     PCP: Ella Butt MD    Date of Admission: 11/5/2021      Assessment/Plan:    Active Hospital Problems    Diagnosis Date Noted    Acute upper gastrointestinal hemorrhage [K92.2] 11/05/2021       Acute blood loss anemia  Anemia of chronic disease  -Hematemesis POA; hgb 6.1 on admission, received 2 units PRBCs on admission, Hgb improved to 7.5. another unit ordered 11/7 as hgb decreased to 7.1 overnight.   -Recent PE, was discharged 11/1, was started on [place],  -Hx of GI bleeds  -ESRD patient  -Held [address] and DAPT  -GI following, EGD w/ APC of bleeding AVM 11/06/21  - Hgb 9.3 today after receiving another unit of PRBCs, total 2 units   -Continue to monitor. Transfuse if hgb < 8 or hemodynamically unstable     Pulmonary embolism  -Recently diagnosed during patient's previous admission. Was discharged on 11/1  -Was started on Eliquis 5 mg twice daily, currently on hold due to #1. Will likely hold indefinitely, given patient's high bleeding risk  -Not a good candidate for IVC filter. Hospice is following.      New onset afib RVR - resolved   - Secondary to anemia and hypovolemia during dialysis  - Resolved with PRBC transfusion  - No OAC at this time considering #1  - Continue BB  - NSR on telemetry this morning, continue to monitor     Lactic acidosis   -Likely d/t demand.  Resolved      Chronically elevated troponin  -Due to ESRD  -Of note, trop is lower on admission as compared to recent values  -Trop trended down     ESRD on HD  -M/W/F HD as OP; continued while IP  -Dr. Carlos Li primary; Nephro following      HFrEF  -No clinical signs of acute decompensation  -CXR findings noted however volume status to be managed in HD  -Continue home meds     CAD s/p PCI 08/31/21 at [facility]  -DAPT on hold considering #1, consider restarting when hemoglobin stable     COPD  Chronic hypoxic respiratory failure  -On baseline 3 L/min oxygen via nasal cannula continuously     HTN  HLD  OA  -Continue home meds     Code status: Limited x3 per previous documentation. Daughter states should be limited without intubation or chest compressions and that patient has documentation of this. Palliative care consulted to discuss code status in further detail and to help family with decision-making     Discharge planning: Pending clinical course. Chief Complaint:  Hematemesis     History Of Present Illness:    From H&P  \"80 y.o. male with a past medical history of ESRD on hemodialysis, COPD with chronic hypoxic respiratory failure, CAD status post PCI, pulmonary embolism recently diagnosed, history of GI bleeds, HFrEF, hypertension, hyperlipidemia, osteoarthritis who is admitted to [facility] [address] [facility] on 11/5/2021 for hematemesis and acute blood loss anemia. Pt's daughters are present at bedside who state pt was at HD today and vomited blood. Hgb at that time was 6, pt was brought into ED for further evaluation.      ED course: Stable vitals. Hgb 6.1, Lactic 2.9, procal 0.59 without any other signs of infection/sepsis. 2 units PRBCs ordered in ED - will be given to patient in HD today. \"      11/06: While on dialysis yesterday the patient developed A. fib RVR. Rapid response was called. The patient received 1 unit of packed red blood cells and volume repletion which resolved his A. fib RVR. Was able to complete dialysis. Plan for EGD with GI today.     11/7: VSS, in NSR. Hgb decreased to 7.1 overnight, another unit PRBCs ordered (2nd so far on admission)    11/08: Stable. NSR. Hgb improved to 9.3 this morning. S/p total of 2 units of PRBCs. No active bleeding. Discharge pending pre-cert      Subjective   The patient was seen and evaluated this morning during rounds. He is resting comfortably in bed. Currently getting dialysis. Does feel a little SOB, which happens during dialysis. Denies any overt bleeding.  No chest pain, nausea, vomiting. Afebrile. Medications:  Reviewed    Infusion Medications    sodium chloride      pantoprazole 8 mg/hr (11/08/21 0656)    sodium chloride       Scheduled Medications    atorvastatin  40 mg Oral Nightly    folbee plus  1 tablet Oral Daily    calcitRIOL  1.5 mcg Oral Once per day on Mon Wed Fri    cinacalcet  60 mg Oral Once per day on Mon Wed Fri    cyclobenzaprine  10 mg Oral Daily    ferrous sulfate  325 mg Oral Daily    losartan  50 mg Oral Daily    metoprolol  100 mg Oral BID    vitamin C  500 mg Oral Daily    sodium chloride flush  5-40 mL IntraVENous 2 times per day    lidocaine   Topical Once per day on Mon Wed Fri    epoetin traci-epbx  10,000 Units SubCUTAneous Once per day on Mon Wed Fri     PRN Meds: sodium chloride, albuterol sulfate HFA, sodium chloride flush, sodium chloride, ondansetron **OR** ondansetron, polyethylene glycol, acetaminophen **OR** acetaminophen      Intake/Output Summary (Last 24 hours) at 11/8/2021 0912  Last data filed at 11/8/2021 0730  Gross per 24 hour   Intake 1424.37 ml   Output    Net 1424.37 ml       Diet:  ADULT DIET; Regular; Low Fat/Low Chol/High Fiber/JOCY; Low Potassium (Less than 3000 mg/day); Low Phosphorus (Less than 1000 mg)    Exam:  BP (!) 168/76   Pulse 81   Temp 97.4 °F (36.3 °C)   Resp 22   Ht 5' 6\" (1.676 m)   Wt 134 lb 14.7 oz (61.2 kg)   SpO2 94%   BMI 21.78 kg/m²        General appearance:  No apparent distress, appears stated age and cooperative. HEENT:  Normal cephalic, atraumatic without obvious deformity. Extra ocular muscles intact. Conjunctivae/corneas clear. Neck: Supple, with full range of motion. No jugular venous distention. Trachea midline. Respiratory:  Normal respiratory effort. Clear to auscultation, bilaterally without Rales/Wheezes/Rhonchi. Cardiovascular:  Regular rate and rhythm with normal S1/S2 without murmurs, rubs or gallops.   Abdomen: Soft, non-tender, non-distended with normal bowel sounds. Musculoskeletal:  No clubbing, cyanosis or edema bilaterally. Full range of motion without deformity. Skin: Skin color, texture, turgor normal.  No rashes or lesions. Neurologic:  Neurovascularly intact without any focal sensory/motor deficits. Cranial nerves: II-XII intact, grossly non-focal.  Psychiatric:  Thought content appropriate, normal insight  Capillary Refill: Brisk,< 3 seconds   Peripheral Pulses: +2 palpable, equal bilaterally       Labs:   Recent Labs     11/06/21  0420 11/06/21  1251 11/07/21  1519 11/07/21  2050 11/08/21  0816   WBC 7.2  --   --   --  7.3   HGB 7.5*   < > 9.6* 9.3* 9.0*   HCT 22.6*   < > 29.3* 26.7* 26.1*     --   --   --  186    < > = values in this interval not displayed. Recent Labs     11/06/21  0420 11/08/21  0816    136   K 4.4  4.4 4.3    103   CO2 27 23   BUN 18 31*   CREATININE 3.1* 6.1*   CALCIUM 8.7 8.9     No results for input(s): AST, ALT, BILIDIR, BILITOT, ALKPHOS in the last 72 hours. No results for input(s): INR in the last 72 hours. No results for input(s): Bibi Shanks in the last 72 hours. Urinalysis:      Lab Results   Component Value Date    NITRU NEGATIVE 06/22/2019    WBCUA 50-75 06/22/2019    BACTERIA NONE 06/22/2019    RBCUA 25-50 06/22/2019    BLOODU LARGE 06/22/2019    SPECGRAV 1.013 05/03/2016    GLUCOSEU NEGATIVE 06/22/2019       Radiology:  XR CHEST (2 VW)   Final Result   1. Mild cardiomegaly and probable congestive heart failure in this patient status post pacemaker placement. 2. Thickening off the interstitial lung markings and increased density at both lung bases. 3. Small right pleural effusion. 4. Shunt in the right arm. **This report has been created using voice recognition software. It may contain minor errors which are inherent in voice recognition technology. **      Final report electronically signed by DR Abby Munoz on 11/5/2021 9:09 AM          Diet: ADULT DIET; Regular;  Low Fat/Low Chol/High Fiber/JOCY; Low Potassium (Less than 3000 mg/day);  Low Phosphorus (Less than 1000 mg)    PT/OT Eval Status: n/a    DVT prophylaxis: [] Lovenox                                 [x] SCDs                                 [] SQ Heparin                                 [] Encourage ambulation           [] Already on Anticoagulation     Disposition:    [x] Home       [] TCU       [] Rehab       [] Psych       [] SNF       [] [place]       [] Other-    Case discussed with Dr. Joyice Nissen    Electronically signed by Tracey Greer MD on 11/8/2021 at 9:12 AM

## 2021-11-08 NOTE — DISCHARGE INSTR - DIET

## 2021-11-08 NOTE — DISCHARGE INSTR - COC
Continuity of Care Form    Patient Name: Mathew Awan   :  11/10/1930  MRN:  [id number]    Admit date:  2021  Discharge date:      Code Status Order: Limited   Advance Directives:      Admitting Physician:  Graciela Weir DO  PCP: Pamela Chin MD    Discharging Nurse: Memo Luna [facility] Unit/Room#: 3B-29/029-A  Discharging Unit Phone Number: [phone]    Emergency Contact:   Extended Emergency Contact Information  Primary Emergency Contact: Jennifer Mcdonnellandrew [address] Phone: [phone]  Relation: Child  Secondary Emergency Contact: Christiane Garza  Mobile Phone: [phone]  Relation: Child  Preferred language: English   needed?  No    Past Surgical History:  Past Surgical History:   Procedure Laterality Date    CARDIAC SURGERY      COLONOSCOPY  ,    COLONOSCOPY N/A 2019    COLONOSCOPY DIAGNOSTIC performed by Luis Angel Franco MD at [address] [place] [address]    DIALYSIS FISTULA CREATION  2016    right arm fistula placement    ENDOSCOPY, COLON, DIAGNOSTIC      PACEMAKER PLACEMENT  2013    IN ESOPHAGOGASTRODUODENOSCOPY TRANSORAL DIAGNOSTIC N/A 2018    EGD performed by Ronna Newton MD at [address] [place] [address]  ,    UPPER GASTROINTESTINAL ENDOSCOPY Left 2019    EGD DIAGNOSTIC ONLY performed by Luis Angel Franco MD at [address] [place] [address] N/A 10/9/2021    EGD performed by Luis Angel Franco MD at [facility]    VASCULAR SURGERY         Immunization History:   Immunization History   Administered Date(s) Administered    Influenza Virus Vaccine 10/01/2018    Pneumococcal Conjugate 13-valent ([id number]) 2017    Pneumococcal Polysaccharide ([id number]) 10/16/2017       Active Problems:  Patient Active Problem List   Diagnosis Code    CAD (coronary artery disease) I25.10    COPD (chronic obstructive pulmonary disease) ([facility] J44.9    Chronic renal insufficiency N18.9    Patient overweight E66.3    Arthritis-  low back and neck . 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 0XXA    Closed fracture of left ankle S82.892A    ESRD on hemodialysis ([facility] N18.6, Z99.2    Hypertensive emergency I16.1    SOB (shortness of breath) R06.02    Chronic combined systolic and diastolic CHF (congestive heart failure) (HCC) I50.42    COVID-19 U07.1    Severe malnutrition ([facility]) E43    Hypoxia R09.02    Dyspnea R06.00    Acute upper gastrointestinal hemorrhage K92.2       Isolation/Infection:   Isolation            No Isolation          Patient Infection Status       Infection Onset Added Last Indicated Last Indicated By Review Planned Expiration Resolved Resolved By    COVID-19 Rule Out 11/05/21 11/05/21 11/05/21 COVID-19, Rapid (Ordered) 11/12/21 11/19/21      Resolved    COVID-19 Rule Out 10/26/21 10/26/21 10/26/21 COVID-19, Rapid (Ordered)   10/26/21 Rule-Out Test Resulted    C-diff Rule Out 09/30/21 09/30/21 09/30/21 Barium Swallow with Video (Video Swallowing Test): not done    Treatments at the Time of Hospital Discharge:   Respiratory Treatments: see MAR  Oxygen Therapy:  is not on home oxygen therapy. Ventilator:    - No ventilator support    Rehab Therapies: Physical Therapy and Occupational Therapy  Weight Bearing Status/Restrictions: No weight bearing restirctions  Other Medical Equipment (for information only, NOT a DME order):  walker  Other Treatments:Hemodialysis Mon, Wed., Friday. - went today 11/10- 1 liter fluid removed. Patient's personal belongings (please select all that are sent with patient):  Cell phone    RN SIGNATURE:  Electronically signed by Rubio Garcia RN on 11/8/21 at 11:56 AM EST    CASE MANAGEMENT/SOCIAL WORK SECTION    Inpatient Status Date: 11/05/2021    Readmission Risk Assessment Score:  Readmission Risk              Risk of Unplanned Readmission:  49           Discharging to Facility/ Agency   Name: [facility]  Address: [address] [place] [address]. [address]  Phone: [phone]  Fax: [phone]    Dialysis Facility (if applicable)   Name:  Address:  Dialysis Schedule:  Phone:  Fax:    / signature: Electronically signed by SARAHI Maxwell on 11/8/21 at 2:39 PM EST    PHYSICIAN SECTION    Prognosis: Fair    Condition at Discharge: Stable    Rehab Potential (if transferring to Rehab): Fair    Recommended Labs or Other Treatments After Discharge: CBC in 1 week    Physician Certification: I certify the above information and transfer of Yasmin Cooper  is necessary for the continuing treatment of the diagnosis listed and that he requires [facility] for greater 30 than days.      Update Admission H&P: No change in H&P    PHYSICIAN SIGNATURE:  Electronically signed by Fatoumata Chaves MD on 11/10/21 at 2:11 PM EST

## 2021-11-09 NOTE — PROGRESS NOTES
[address] [address]  INPATIENT PHYSICAL THERAPY  DAILY NOTE  [facility] CCU-STEPDOWN 3B - 3B-29/029-A    Time In: 9329  Time Out: 1350  Timed Code Treatment Minutes: 23 Minutes  Minutes: 23          Date: 2021  Patient Name: Henny Hayes,  Gender:  male        MRN: [id number]  : 11/10/1930  (80 y.o.)     Referring Practitioner: Eulalia Valenzuela DO  Diagnosis: Acute upper gastrointestinal hemorrhage  Additional Pertinent Hx: Per H&P:89 y.o. male with a past medical history of ESRD on hemodialysis, COPD with chronic hypoxic respiratory failure, CAD status post PCI, pulmonary embolism recently diagnosed, history of GI bleeds, HFrEF, hypertension, hyperlipidemia, osteoarthritis who is admitted to [place] [facility] on 2021 for hematemesis and acute blood loss anemia. Pt's daughters are present at bedside who state pt was at HD today and vomited blood. Hgb at that time was 6, pt was brought into ED for further evaluation. Prior Level of Function:  Type of Home: Facility [facility])        ADL Assistance: Needs assistance  Ambulation Assistance: Needs assistance  Transfer Assistance: Needs assistance  Additional Comments: Per chart review, pt had been residing at home with daughter prior to  admission, but since then has been at [facility]. Pt denied was receiving any therapy services although question accuracy due to confusion. Restrictions/Precautions:  Restrictions/Precautions: General Precautions, Fall Risk     SUBJECTIVE: Patient awake in bed and agreeable to therapy. RN approved session. Patient very concerned throughout session about finding his dentures in bed. PAIN: States he does have pain but does not specify where.     Vitals: Vitals not assessed per clinical judgement, see nursing flowsheet    OBJECTIVE:  Bed Mobility:  Supine to Sit: Stand By Assistance, with head of bed raised, with increased time for completion    Transfers:  Sit to Stand: Contact Guard Assistance, X 1, cues for hand placement  Stand to Sit:Contact Guard Assistance    Ambulation:  Contact Guard Assistance  Distance: 60ft  Surface: Level Tile  Device:Rolling Walker  Gait Deviations: Forward Flexed Posture, Decreased Step Length Bilaterally, Decreased Gait Speed, Decreased Heel Strike Bilaterally, Narrow Base of Support and Unsteady Gait  Patient required cues to keep walker on the ground when he turns for improved safety, as patient demonstrates lifting the walker off the ground as he turns all the way around. Balance:  Static Standing Balance: Contact Guard Assistance    Exercise:  Patient was guided in 1 set(s) 12 reps of exercise to both lower extremities. Seated heel/toe raises and Long arc quads. Exercises were completed for increased independence with functional mobility. Functional Outcome Measures: Completed  -PAC Inpatient Mobility Raw Score : 17  AM-PAC Inpatient T-Scale Score : 42.13    ASSESSMENT:  Assessment: Patient progressing toward established goals. and Patient performed functional mobility well, however he is very fatigued at end of session. slight SOB noted after ambulation, however SpO2 reads 94%. Continued PT recommended to improve patient's overall safety awareness, strength, activty tolerance, and functional independence. Activity Tolerance:  Patient tolerance of  treatment: good.       Equipment Recommendations:Equipment Needed: No  Discharge Recommendations: Subacte/Skilled Nursing Facility, ECF with PT and 24 hour assistance or supervision  Plan: Times per week: 3-5x GM  Current Treatment Recommendations: Strengthening, Home Exercise Program, Safety Education & Training, Balance Training, Endurance Training, Functional Mobility Training, Transfer Training, Gait Training, Patient/Caregiver Education & Training    Patient Education  Patient Education: Plan of Care, Transfers, Gait    Goals:  Patient goals : go home  Short term goals  Time Frame for Short term goals: at discharge  Short term goal 1: Pt to be Supervision for supine <> sit to get in/out of bed  Short term goal 2: Pt to be Supervision for sit <> stand to get up to ambulate  Short term goal 3: Pt to ambulate > 40 ft with RW with SBA for household distances  Long term goals  Time Frame for Long term goals : not set due to short ELOS    Following session, patient left in safe position with all fall risk precautions in place.

## 2021-11-09 NOTE — PROGRESS NOTES
This RN got in report that patient was complaining of left arm pain from his IV that has possibly infiltrated. This RN went in to assess the patient for oncoming shift assessment and patient does not want to move or allow me to take a radial pulse at this time. Pt screams out when arm is touched.  This RN called pharmacy to see if there is an antidote for protonix gtt that had been running

## 2021-11-09 NOTE — PROGRESS NOTES
Renal Progress Note    Assessment and Plan:   1. End stage kidney disease on hemodialysis  2. Hypertension  3. COPD  4. Anemia of chronic disease with macrocytosis  5. Deconditioning  6. GI bleed  7. Gastric malformation    PLAN:  1. Labs reviewed  2. Hemoglobin is stable  3. Medications reviewed  4. No changes  5. Hemodialysis tomorrow  6. We will follow    Patient Active Problem List:     CAD (coronary artery disease)     COPD (chronic obstructive pulmonary disease) ([address]     Chronic renal insufficiency     Patient overweight     Arthritis-  low back and neck .      Dyspnea and respiratory abnormalities     ED (erectile dysfunction)     Degenerative joint disease of knee, left     Gout of big toe     Anemia, chronic disease     Essential hypertension     Monoclonal gammopathy     Leukopenia     Thrombocytopenia (HCC)     Chronic kidney disease (CKD), stage V (HCC)     Metabolic acidosis     Iron deficiency anemia     Plasma cell dyscrasia     Idiopathic hypotension     Systolic congestive heart failure (HCC)     Other fluid overload (CODE)     Hyperphosphatemia     Pulmonary hypertension (HCC)     Anemia due to chronic kidney disease, on chronic dialysis (HCC)     Volume overload     Secondary hyperparathyroidism of renal origin ([address]     Chest pain     Acute pulmonary edema (HCC)     Gastritis and duodenitis     Fall from slip, trip, or stumble, initial encounter     Closed fracture of left ankle     ESRD on hemodialysis ([address]     Hypertensive emergency     SOB (shortness of breath)     Chronic combined systolic and diastolic CHF (congestive heart failure) ([address]     COVID-19     Severe malnutrition ([address]     Hypoxia     Dyspnea     Acute upper gastrointestinal hemorrhage      Subjective:   Admit Date: 11/5/2021    Interval History:   Seen for end-stage kidney disease on hemodialysis  Sleeping this morning   Updated by the staff  No new issues  Patient is now DNR CCA  Family do want hemodialysis to continue        Medications:   Scheduled Meds:   atorvastatin  40 mg Oral Nightly    folbee plus  1 tablet Oral Daily    calcitRIOL  1.5 mcg Oral Once per day on Mon Wed Fri    cinacalcet  60 mg Oral Once per day on Mon Wed Fri    cyclobenzaprine  10 mg Oral Daily    ferrous sulfate  325 mg Oral Daily    losartan  50 mg Oral Daily    metoprolol  100 mg Oral BID    vitamin C  500 mg Oral Daily    sodium chloride flush  5-40 mL IntraVENous 2 times per day    lidocaine   Topical Once per day on Mon Wed Fri    epoetin traci-epbx  10,000 Units SubCUTAneous Once per day on Mon Wed Fri     Continuous Infusions:   sodium chloride      sodium chloride         CBC:   Recent Labs     11/08/21  0816 11/08/21  0816 11/08/21  1516 11/08/21  2109 11/09/21  0449   WBC 7.3  --   --   --   --    HGB 9.0*   < > 10.3* 10.1* 9.1*     --   --   --   --     < > = values in this interval not displayed. CMP:    Recent Labs     11/08/21 0816      K 4.3      CO2 23   BUN 31*   CREATININE 6.1*   GLUCOSE 131*   CALCIUM 8.9   LABGLOM 11*     Troponin: No results for input(s): TROPONINI in the last 72 hours. BNP: No results for input(s): BNP in the last 72 hours. INR: No results for input(s): INR in the last 72 hours. Lipids: No results for input(s): CHOL, LDLDIRECT, TRIG, HDL, AMYLASE, LIPASE in the last 72 hours. Liver: No results for input(s): AST, ALT, ALKPHOS, PROT, LABALBU, BILITOT in the last 72 hours. Invalid input(s): BILDIR  Iron:    Recent Labs     11/08/21  0816   FERRITIN 1,455*     XR CHEST (2 VW)   Final Result   1. Mild cardiomegaly and probable congestive heart failure in this patient status post pacemaker placement. 2. Thickening off the interstitial lung markings and increased density at both lung bases. 3. Small right pleural effusion. 4. Shunt in the right arm. **This report has been created using voice recognition software.  It may contain minor errors which are

## 2021-11-09 NOTE — PROGRESS NOTES
PROGRESS NOTE      Patient:  Mathew Awan      Unit/Bed:3B-29/029-A    YOB: 1930    MRN: [id number]       Acct: [de-identified]     PCP: Pamela Chin MD    Date of Admission: 11/5/2021      Assessment/Plan:    Active Hospital Problems    Diagnosis Date Noted    Acute upper gastrointestinal hemorrhage [K92.2] 11/05/2021       Acute blood loss anemia  Anemia of chronic disease  -Hematemesis POA; hgb 6.1 on admission, received 2 units PRBCs on admission, Hgb improved to 7.5. another unit ordered 11/7 as hgb decreased to 7.1 overnight.   -Recent PE, was discharged 11/1, was started on Turpitude,  -Hx of GI bleeds  -ESRD patient  -Held [address] and DAPT  -GI following, EGD w/ APC of bleeding AVM 11/06/21  - Hgb 9.0 today, received a total of 2 units PRBCs,   -Continue to monitor. Transfuse if hgb < 8 or hemodynamically unstable     Pulmonary embolism  -Recently diagnosed during patient's previous admission. Was discharged on 11/1  -Was started on Eliquis 5 mg twice daily, currently on hold due to #1. Will likely hold indefinitely, given patient's high bleeding risk  -Not a good candidate for IVC filter. Hospice is following.      New onset afib RVR - resolved   - Secondary to anemia and hypovolemia during dialysis  - Resolved with PRBC transfusion  - No OAC at this time considering #1  - Continue BB  - NSR on telemetry this morning, continue to monitor     Lactic acidosis   -Likely d/t demand.  Resolved      Chronically elevated troponin  -Due to ESRD  -Of note, trop is lower on admission as compared to recent values  -Trop trended down     ESRD on HD  -M/W/F HD as OP; continued while IP  -Dr. Ana Awan primary; Nephro following      HFrEF  -No clinical signs of acute decompensation  -CXR findings noted however volume status to be managed in HD  -Continue home meds     CAD s/p PCI 08/31/21 at [facility]  -DAPT on hold considering #1, consider restarting when hemoglobin stable     COPD  Chronic hypoxic respiratory failure  -On baseline 3 L/min oxygen via nasal cannula continuously     HTN  HLD  OA  -Continue home meds     Code status: Limited x3 per previous documentation. Daughter states should be limited without intubation or chest compressions and that patient has documentation of this. Palliative care consulted to discuss code status in further detail and to help family with decision-making     Discharge planning: Pending clinical course. Chief Complaint:  Hematemesis     History Of Present Illness:    From H&P  \"80 y.o. male with a past medical history of ESRD on hemodialysis, COPD with chronic hypoxic respiratory failure, CAD status post PCI, pulmonary embolism recently diagnosed, history of GI bleeds, HFrEF, hypertension, hyperlipidemia, osteoarthritis who is admitted to [facility] on 11/5/2021 for hematemesis and acute blood loss anemia. Pt's daughters are present at bedside who state pt was at HD today and vomited blood. Hgb at that time was 6, pt was brought into ED for further evaluation.      ED course: Stable vitals. Hgb 6.1, Lactic 2.9, procal 0.59 without any other signs of infection/sepsis. 2 units PRBCs ordered in ED - will be given to patient in HD today. \"      11/06: While on dialysis yesterday the patient developed A. fib RVR. Rapid response was called. The patient received 1 unit of packed red blood cells and volume repletion which resolved his A. fib RVR. Was able to complete dialysis. Plan for EGD with GI today.     11/7: VSS, in NSR. Hgb decreased to 7.1 overnight, another unit PRBCs ordered (2nd so far on admission)    11/08: Stable. NSR. Hgb improved to 9.3 this morning. S/p total of 2 units of PRBCs. No active bleeding. Discharge pending pre-cert     69/75: Stable. NSR. Hgb 9.0 this morning. Discharge pending pre-cert      Subjective   The patient was seen and evaluated this morning during rounds. He is resting comfortably in bed. Denies any overt bleeding. No chest pain, nausea, vomiting. Afebrile. Medications:  Reviewed    Infusion Medications    sodium chloride      sodium chloride       Scheduled Medications    atorvastatin  40 mg Oral Nightly    folbee plus  1 tablet Oral Daily    calcitRIOL  1.5 mcg Oral Once per day on Mon Wed Fri    cinacalcet  60 mg Oral Once per day on Mon Wed Fri    cyclobenzaprine  10 mg Oral Daily    ferrous sulfate  325 mg Oral Daily    losartan  50 mg Oral Daily    metoprolol  100 mg Oral BID    vitamin C  500 mg Oral Daily    sodium chloride flush  5-40 mL IntraVENous 2 times per day    lidocaine   Topical Once per day on Mon Wed Fri    epoetin traci-epbx  10,000 Units SubCUTAneous Once per day on Mon Wed Fri     PRN Meds: sodium chloride, albuterol sulfate HFA, sodium chloride flush, sodium chloride, ondansetron **OR** ondansetron, polyethylene glycol, acetaminophen **OR** acetaminophen      Intake/Output Summary (Last 24 hours) at 11/9/2021 0913  Last data filed at 11/8/2021 2003  Gross per 24 hour   Intake 400 ml   Output 1400 ml   Net -1000 ml       Diet:  ADULT DIET; Regular; Low Fat/Low Chol/High Fiber/JOCY; Low Potassium (Less than 3000 mg/day); Low Phosphorus (Less than 1000 mg)    Exam:  BP (!) 167/61   Pulse 72   Temp 98.7 °F (37.1 °C) (Oral)   Resp 16   Ht 5' 6\" (1.676 m)   Wt 132 lb 11.5 oz (60.2 kg)   SpO2 98%   BMI 21.42 kg/m²        General appearance:  No apparent distress, appears stated age and cooperative. HEENT:  Normal cephalic, atraumatic without obvious deformity. Extra ocular muscles intact. Conjunctivae/corneas clear. Neck: Supple, with full range of motion. No jugular venous distention. Trachea midline. Respiratory:  Normal respiratory effort. Clear to auscultation, bilaterally without Rales/Wheezes/Rhonchi. Cardiovascular:  Regular rate and rhythm with normal S1/S2 without murmurs, rubs or gallops. Abdomen: Soft, non-tender, non-distended with normal bowel sounds.   Musculoskeletal:  No clubbing, cyanosis or edema bilaterally. Full range of motion without deformity. Skin: Skin color, texture, turgor normal.  No rashes or lesions. Neurologic:  Neurovascularly intact without any focal sensory/motor deficits. Cranial nerves: II-XII intact, grossly non-focal.  Psychiatric:  Thought content appropriate, normal insight  Capillary Refill: Brisk,< 3 seconds   Peripheral Pulses: +2 palpable, equal bilaterally       Labs:   Recent Labs     11/08/21  0816 11/08/21  1516 11/08/21  2109 11/09/21  0449 11/09/21  0843   WBC 7.3  --   --   --  5.7   HGB 9.0*   < > 10.1* 9.1* 9.0*   HCT 26.1*   < > 31.5* 26.9* 26.4*     --   --   --  166    < > = values in this interval not displayed. Recent Labs     11/08/21  0816      K 4.3      CO2 23   BUN 31*   CREATININE 6.1*   CALCIUM 8.9     No results for input(s): AST, ALT, BILIDIR, BILITOT, ALKPHOS in the last 72 hours. No results for input(s): INR in the last 72 hours. No results for input(s): Risa [place] in the last 72 hours. Urinalysis:      Lab Results   Component Value Date    NITRU NEGATIVE 06/22/2019    WBCUA 50-75 06/22/2019    BACTERIA NONE 06/22/2019    RBCUA 25-50 06/22/2019    BLOODU LARGE 06/22/2019    SPECGRAV 1.013 05/03/2016    GLUCOSEU NEGATIVE 06/22/2019       Radiology:  XR CHEST (2 VW)   Final Result   1. Mild cardiomegaly and probable congestive heart failure in this patient status post pacemaker placement. 2. Thickening off the interstitial lung markings and increased density at both lung bases. 3. Small right pleural effusion. 4. Shunt in the right arm. **This report has been created using voice recognition software. It may contain minor errors which are inherent in voice recognition technology. **      Final report electronically signed by DR Ulysses Valentin on 11/5/2021 9:09 AM          Diet: ADULT DIET; Regular; Low Fat/Low Chol/High Fiber/JOCY; Low Potassium (Less than 3000 mg/day);  Low Phosphorus (Less than 1000 mg)    PT/OT Eval Status: n/a    DVT prophylaxis: [] Lovenox                                 [x] SCDs                                 [] SQ Heparin                                 [] Encourage ambulation           [] Already on Anticoagulation     Disposition:    [x] Home       [] TCU       [] Rehab       [] Psych       [] SNF       [] [place]       [] Other-    Case discussed with Dr. Marlee Duff    Electronically signed by Kacey Ronquillo MD on 11/9/2021 at 9:13 AM

## 2021-11-10 NOTE — CARE COORDINATION
11/08/21 1002   Readmission Assessment   Number of Days since last admission?  8-30 days   Previous Disposition SNF   Who advised the patient to return to the hospital? Skilled Unit
11/8/21, 10:03 AM EST  DISCHARGE PLANNING EVALUATION:    Bora Guerra       Admitted: 11/5/2021/ 0746   Hospital day: 3   Location: -29/029-A Reason for admit: Acute upper gastrointestinal hemorrhage [K92.2]  Upper GI bleed [K92.2]  Chronic kidney disease with end stage renal failure on dialysis ([facility] [N18.6, Z99.2]   PMH:  has a past medical history of Anemia in chronic kidney disease(285.21), Arthritis, CAD (coronary artery disease), Chronic kidney disease, Chronic kidney disease, stage IV (severe) ([facility] CKD (chronic kidney disease) stage 3, GFR 30-59 ml/min (HCC), COPD (chronic obstructive pulmonary disease) ([facility] GI bleed, Hemodialysis patient ([facility] History of blood transfusion, Hyperlipidemia, Hyperphosphatemia, Hypertension, Sick sinus syndrome ([facility] and Systolic congestive heart failure ([facility] Procedure:   11/6 EGD  Barriers to Discharge:  GI bleed. Hospitalist and GI following. Protonix gtt to transition to PO. Hgb 9. Creatinine 6.1, chronic HD. PT/OT. PCP: Mi Mariee MD  Readmission Risk Score: 28.5 ( )%    Patient Goals/Plan/Treatment Preferences: Await precert to return to [facility]. Transportation/Food Security/Housekeeping Addressed:  No issues identified.
11/9/21, 8:06 AM EST    DISCHARGE PLANNING EVALUATION    Spoke with Pat at [facility]. Precert was started yesterday.
Discharge Planning Update:    Hospitalist following. Continues to work with therapy. Await precert for [facility].
is ready to accept.

## 2021-11-10 NOTE — PROGRESS NOTES
PROGRESS NOTE      Patient:  Vic Carballo      Unit/Bed:3B-29/029-A    YOB: 1930    MRN: [id number]       Acct: [de-identified]     PCP: Hayden Stewart MD    Date of Admission: 11/5/2021      Assessment/Plan:    Active Hospital Problems    Diagnosis Date Noted    Acute upper gastrointestinal hemorrhage [K92.2] 11/05/2021       Acute blood loss anemia  Anemia of chronic disease  -Stable   -Hematemesis POA; hgb 6.1 on admission, received 2 units PRBCs on admission, Hgb now stable  -Recent PE, was discharged 11/1, was started on [address] [place] [address],  -Hx of GI bleeds  -GI following, EGD w/ APC of bleeding AVM 11/06/21  -Restarted DAPT 11/09, repeat Hgb stable. -Continue to hold eliquis due to high risk of bleeding, family aware and is agreeable  -Continue to monitor. Transfuse if hgb < 8 or hemodynamically unstable     Pulmonary embolism  -Recently diagnosed during patient's previous admission. Was discharged on 11/1  -Was started on Eliquis 5 mg twice daily, currently on hold due to #1. Will likely hold indefinitely, given patient's high bleeding risk  -Not a good candidate for IVC filter. Palliative is following.      New onset afib RVR - resolved   - Secondary to anemia and hypovolemia during dialysis  - Resolved with PRBC transfusion  - No OAC at this time considering #1  - Continue BB  - NSR on telemetry this morning, continue to monitor     Lactic acidosis   -Likely d/t demand.  Resolved      Chronically elevated troponin  -Due to ESRD  -Of note, trop is lower on admission as compared to recent values  -Trop trended down     ESRD on HD  -M/W/F HD as OP; continued while IP  -Dr. Sunitha Abbasi primary; Nephro following      HFrEF  -No clinical signs of acute decompensation  -CXR findings noted however volume status to be managed in HD  -Continue home meds     CAD s/p PCI 08/31/21 at [facility]  -DAPT restarted, Hgb currently stable      COPD  Chronic hypoxic respiratory failure  -On baseline 3 L/min oxygen via nasal cannula continuously     HTN  HLD  OA  -Continue home meds     Code status: Limited x3 per previous documentation. Daughter states should be limited without intubation or chest compressions and that patient has documentation of this. Palliative care consulted to discuss code status in further detail and to help family with decision-making     Discharge planning: Pending clinical course. Chief Complaint:  Hematemesis     History Of Present Illness:    From H&P  \"80 y.o. male with a past medical history of ESRD on hemodialysis, COPD with chronic hypoxic respiratory failure, CAD status post PCI, pulmonary embolism recently diagnosed, history of GI bleeds, HFrEF, hypertension, hyperlipidemia, osteoarthritis who is admitted to [facility] [address] [address] [facility] on 11/5/2021 for hematemesis and acute blood loss anemia. Pt's daughters are present at bedside who state pt was at HD today and vomited blood. Hgb at that time was 6, pt was brought into ED for further evaluation.      ED course: Stable vitals. Hgb 6.1, Lactic 2.9, procal 0.59 without any other signs of infection/sepsis. 2 units PRBCs ordered in ED - will be given to patient in HD today. \"      11/06: While on dialysis yesterday the patient developed A. fib RVR. Rapid response was called. The patient received 1 unit of packed red blood cells and volume repletion which resolved his A. fib RVR. Was able to complete dialysis. Plan for EGD with GI today.     11/7: VSS, in NSR. Hgb decreased to 7.1 overnight, another unit PRBCs ordered (2nd so far on admission)    11/08: Stable. NSR. Hgb improved to 9.3 this morning. S/p total of 2 units of PRBCs. No active bleeding. Discharge pending pre-cert     18/49: Stable. NSR. Hgb 9.0 this morning. Discharge pending pre-cert     37/15: Stable. NSR. Hgb stable this morning. Discharge pending pre-cert      Subjective   The patient was seen and evaluated this morning during rounds. He is resting comfortably in bed. Denies any overt bleeding.  No chest pain, nausea, vomiting. Afebrile. VSS. No acute events overnight. Medications:  Reviewed    Infusion Medications    sodium chloride      sodium chloride       Scheduled Medications    pantoprazole  40 mg Oral BID AC    aspirin  81 mg Oral Daily    clopidogrel  75 mg Oral Daily    atorvastatin  40 mg Oral Nightly    folbee plus  1 tablet Oral Daily    calcitRIOL  1.5 mcg Oral Once per day on Mon Wed Fri    cinacalcet  60 mg Oral Once per day on Mon Wed Fri    cyclobenzaprine  10 mg Oral Daily    ferrous sulfate  325 mg Oral Daily    losartan  50 mg Oral Daily    metoprolol  100 mg Oral BID    vitamin C  500 mg Oral Daily    sodium chloride flush  5-40 mL IntraVENous 2 times per day    lidocaine   Topical Once per day on Mon Wed Fri    epoetin traci-epbx  10,000 Units SubCUTAneous Once per day on Mon Wed Fri     PRN Meds: sodium chloride, albuterol sulfate HFA, sodium chloride flush, sodium chloride, ondansetron **OR** ondansetron, polyethylene glycol, acetaminophen **OR** acetaminophen      Intake/Output Summary (Last 24 hours) at 11/10/2021 0943  Last data filed at 11/9/2021 1838  Gross per 24 hour   Intake 480 ml   Output    Net 480 ml       Diet:  ADULT DIET; Regular; Low Fat/Low Chol/High Fiber/JOCY; Low Potassium (Less than 3000 mg/day); Low Phosphorus (Less than 1000 mg)    Exam:  BP (!) 149/73   Pulse 72   Temp (!) 32 °F (0 °C)   Resp 22   Ht 5' 6\" (1.676 m)   Wt 131 lb 6.3 oz (59.6 kg)   SpO2 98%   BMI 21.21 kg/m²        General appearance:  No apparent distress, appears stated age and cooperative. HEENT:  Normal cephalic, atraumatic without obvious deformity. Extra ocular muscles intact. Conjunctivae/corneas clear. Neck: Supple, with full range of motion. No jugular venous distention. Trachea midline. Respiratory:  Normal respiratory effort. Clear to auscultation, bilaterally without Rales/Wheezes/Rhonchi.   Cardiovascular:  Regular rate and rhythm with normal S1/S2 without murmurs, rubs or gallops. Abdomen: Soft, non-tender, non-distended with normal bowel sounds. Musculoskeletal:  No clubbing, cyanosis or edema bilaterally. Full range of motion without deformity. Skin: Skin color, texture, turgor normal.  No rashes or lesions. Neurologic:  Neurovascularly intact without any focal sensory/motor deficits. Cranial nerves: II-XII intact, grossly non-focal.  Psychiatric:  Thought content appropriate, normal insight  Capillary Refill: Brisk,< 3 seconds   Peripheral Pulses: +2 palpable, equal bilaterally       Labs:   Recent Labs     11/08/21  0816 11/08/21  1516 11/09/21  0449 11/09/21  0843 11/10/21  0727   WBC 7.3  --   --  5.7 5.8   HGB 9.0*   < > 9.1* 9.0* 9.2*   HCT 26.1*   < > 26.9* 26.4* 26.7*     --   --  166 175    < > = values in this interval not displayed. Recent Labs     11/08/21  0816 11/09/21  0843    137   K 4.3 4.3    100   CO2 23 27   BUN 31* 10   CREATININE 6.1* 3.7*   CALCIUM 8.9 8.6     No results for input(s): AST, ALT, BILIDIR, BILITOT, ALKPHOS in the last 72 hours. No results for input(s): INR in the last 72 hours. No results for input(s): Risa [place] in the last 72 hours. Urinalysis:      Lab Results   Component Value Date    NITRU NEGATIVE 06/22/2019    WBCUA 50-75 06/22/2019    BACTERIA NONE 06/22/2019    RBCUA 25-50 06/22/2019    BLOODU LARGE 06/22/2019    SPECGRAV 1.013 05/03/2016    GLUCOSEU NEGATIVE 06/22/2019       Radiology:  XR CHEST (2 VW)   Final Result   1. Mild cardiomegaly and probable congestive heart failure in this patient status post pacemaker placement. 2. Thickening off the interstitial lung markings and increased density at both lung bases. 3. Small right pleural effusion. 4. Shunt in the right arm. **This report has been created using voice recognition software. It may contain minor errors which are inherent in voice recognition technology. **      Final report electronically signed by  Jd Farmer on 11/5/2021 9:09 AM          Diet: ADULT DIET; Regular; Low Fat/Low Chol/High Fiber/JOCY; Low Potassium (Less than 3000 mg/day);  Low Phosphorus (Less than 1000 mg)    PT/OT Eval Status: n/a    DVT prophylaxis: [] Lovenox                                 [x] SCDs                                 [] SQ Heparin                                 [] Encourage ambulation           [] Already on Anticoagulation     Disposition:    [x] Home       [] TCU       [] Rehab       [] Psych       [] SNF       [] [place]       [] Other-    Case discussed with Dr. Palumbo Pro    Electronically signed by Ana Mckenzie MD on 11/10/2021 at 9:43 AM

## 2021-11-10 NOTE — PROGRESS NOTES
Nephrology Progress Note    Patient - Philly Stager   MRN -  [id number]   Ancelmo # - [de-identified]      - 11/10/1930    80 y.o. Admit Date: 2021  Hospital Day: 5  [facility]  Date of evaluation -  11/10/2021    Subjective:   CC: weakness  Pt seen during hemodialysis, Hemodynamically stable 3 K bath, goal Ultrafiltration 1 L  Denies shortness of breath   BP Range: Systolic (09FNY), CQR:192 , Min:139 , OTK:335      Diastolic (16WBY), ZOC:10, Min:53, Max:78    Objective:   VITALS:  BP (!) 164/70   Pulse 67   Temp 98.5 °F (36.9 °C) (Oral)   Resp 16   Ht 5' 6\" (1.676 m)   Wt 132 lb 11.5 oz (60.2 kg)   SpO2 97%   BMI 21.42 kg/m²    Patient Vitals for the past 24 hrs:   BP Temp Temp src Pulse Resp SpO2 Weight   11/10/21 0413 (!) 164/70 98.5 °F (36.9 °C) Oral 67 16 97 % 132 lb 11.5 oz (60.2 kg)   21 (!) 164/78 98.8 °F (37.1 °C) Oral 66 18 96 %    21 1642 (!) 142/62 98.7 °F (37.1 °C) Oral 63 16 97 %    21 1158 (!) 139/53 98.6 °F (37 °C) Oral 74 14 96 %    21 0818 (!) 167/61 98.7 °F (37.1 °C) Oral 72 16 98 %        Intake/Output Summary (Last 24 hours) at 11/10/2021 0756  Last data filed at 2021 1838  Gross per 24 hour   Intake 480 ml   Output    Net 480 ml       Admission weight: 163 lb (73.9 kg) Body mass index is 21.42 kg/m². Patient Vitals for the past 96 hrs (Last 3 readings):   Weight   11/10/21 0413 132 lb 11.5 oz (60.2 kg)   21 1230 132 lb 11.5 oz (60.2 kg)   21 0800 134 lb 14.7 oz (61.2 kg)       EXAM:  CONSTITUTIONAL:  No acute distress. Pleasant  HEENT:  Head is normocephalic, Extraocular movement intact. Neck is supple. Voice is clear. CARDIOVASCULAR:  S1, S2  regular rate and rhythm. RESPIRATORY: Clear to ausculation bilaterally. Equal breath sounds. No wheezes. No shortness of breath noted at rest.  ABDOMEN: soft, non tender  NEUROLOGICAL: Patient is alert and oriented to person, place, and time. Recent and remote memory intact.  Thought is coherant. SKIN: no rash, No significant bruises on exposed surfaces  MUSCULOSKELETAL: Movement is coordinated. Moves all extremities   EXTREMITIES: Distal lower extremity temp is warm, No lower extremity edema. Upper extremity AV Fistula   PSYCHIATRIC: mood and affect appropriate. Medications:   Med reviewed  Scheduled Meds:   pantoprazole  40 mg Oral BID AC    aspirin  81 mg Oral Daily    clopidogrel  75 mg Oral Daily    atorvastatin  40 mg Oral Nightly    folbee plus  1 tablet Oral Daily    calcitRIOL  1.5 mcg Oral Once per day on Mon Wed Fri    cinacalcet  60 mg Oral Once per day on Mon Wed Fri    cyclobenzaprine  10 mg Oral Daily    ferrous sulfate  325 mg Oral Daily    losartan  50 mg Oral Daily    metoprolol  100 mg Oral BID    vitamin C  500 mg Oral Daily    sodium chloride flush  5-40 mL IntraVENous 2 times per day    lidocaine   Topical Once per day on Mon Wed Fri    epoetin traci-epbx  10,000 Units SubCUTAneous Once per day on Mon Wed Fri     Continuous Infusions:   sodium chloride      sodium chloride       Labs:   Labs reviewed    Recent Labs     11/08/21  0816 11/08/21  1516 11/09/21  0449 11/09/21  0843 11/10/21  0727   WBC 7.3  --   --  5.7 5.8   HGB 9.0*   < > 9.1* 9.0* 9.2*   HCT 26.1*   < > 26.9* 26.4* 26.7*     --   --  166 175    < > = values in this interval not displayed. Recent Labs     11/08/21  0816 11/09/21  0843    137   K 4.3 4.3    100   CO2 23 27   BUN 31* 10   CREATININE 6.1* 3.7*   CALCIUM 8.9 8.6   ANIONGAP 10.0 10.0     Summary 5/20/2021   Left ventricle size is normal.   Normal left ventricular wall thickness. There was mild global hypokinesis of the left ventricle. Systolic function was mildly reduced. Ejection fraction is visually estimated at 45%. Doppler parameters were consistent with abnormal left ventricular   relaxation (grade 1 diastolic dysfunction). The left atrium is Moderately dilated.    Moderate mitral regurgitation is present. Moderate tricuspid regurgitation visualized. Right ventricular systolic pressure measures 55 mmhg. When this echo is compared with the echo from 05/22/2018, the EF dropped   from 60 % to 45% now     ASSESSMENT:  1. 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 AV fistula  2. Acute on chronic anemia, s/p transfusion  3. Hematemesis, GI bleed, S/P EGD with PAC of AVM  4. Recent history of Pulm embolus lingula and Left  Upper lobe  5. Recent COVID 19 pneumonia. 9/13/2021  6. Essential Hypertension with nephrosclerosis,   7. Abdominal aortic aneurysm 3.7 cm   8. 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 Coronary artery disease Recent PCI 8/31/21 at [facility]  10. Anemia of chronic disease and acute blood loss, Retacrit 10,000 units 3x/week  11. Erosive gastritis per EGD  12. Secondary hyperparathyroidism of renal origin on rocaltrol  13. Debility, Planning discharge to [facility]       Active Problems:    Acute upper gastrointestinal hemorrhage  Resolved Problems:    * No resolved hospital problems.  Angelique Murray, APRN - CNP 7:56 AM 11/10/2021

## 2021-11-10 NOTE — DISCHARGE SUMMARY
DISCHARGE SUMMARY      Patient Identification:   Bora Guerra   : 11/10/1930  MRN: [id number]   Account: [de-identified]      Patient's PCP: Mi Mariee MD    Admit Date: 2021     Discharge Date: 11/10/2021      Admitting Physician: Ally Summers DO     Discharge Physician: Spencer Livingston MD     Discharge Diagnoses: Active Hospital Problems    Diagnosis Date Noted    Acute upper gastrointestinal hemorrhage [K92.2] 2021     Acute blood loss anemia  Anemia of chronic disease  - Stable   Pulmonary embolism  - Breathing is stable   - D/c eliquis given patient's high bleeding risk  - Not a good candidate for IVC filter  New onset afib RVR - resolved   Lactic acidosis - Resolved   Chronically elevated troponin  ESRD on HD  - M/W/F HD as OP  - Dr. Rudolph Monroy primary  HFrEF  - Continue home meds  CAD s/p PCI 21 at [facility]  - continue DAPT  COPD  Chronic hypoxic respiratory failure  HTN  HLD  OA      The patient was seen and examined on day of discharge and this discharge summary is in conjunction with any daily progress note from day of discharge. Hospital Course:   Bora Guerra is a 80 y.o. male admitted to [facility] on 2021 for acute blood loss anemia secondary to UGI. The patient was treated with conventional therapy including 2 units of packed red blood cells, and Protonix GTT. the patient was recently diagnosed with PE and placed on Eliquis, however his anticoagulation was stopped due to bleeding. Also held the patient's DAPT. Did have EGD on 21 which showed a bleeding AVM that was treated with APC. Afterwards the patient's hemoglobin did stabilize, without evidence of recurrent overt bleeding. Patient's DAPT was restarted to prevent restenosis of recently placed coronary stents. Eliquis discontinued due to high risk for bleeding. This was discussed with the patient's family who was in agreement.   The patient continued to do well and was eventually transferred back to [facility]' in stable condition. Of note the patient did receive his hemodialysis per his usual schedule while admitted. Patient did have an episode of A. fib RVR while receiving dialysis, which resolved after his second unit of packed red blood cells. For more information please see progress note done earlier today. Exam:     Vitals:  Vitals:    11/09/21 2008 11/10/21 0413 11/10/21 0828 11/10/21 1330   BP: (!) 164/78 (!) 164/70 (!) 149/73 (!) 184/75   Pulse: 66 67 72 92   Resp: 18 16 22 20   Temp:  98.5 °F (36.9 °C) 97.4 °F (36.3 °C) 97.4 °F (36.3 °C)   TempSrc: Oral Oral  Oral   SpO2: 96% 97% 98% 94%   Weight:  132 lb 11.5 oz (60.2 kg) 131 lb 6.3 oz (59.6 kg)    Height:         Weight: Weight: 131 lb 6.3 oz (59.6 kg)     24 hour intake/output:    Intake/Output Summary (Last 24 hours) at 11/10/2021 1716  Last data filed at 11/10/2021 1446  Gross per 24 hour   Intake 340 ml   Output    Net 340 ml         General appearance:  No apparent distress, appears stated age and cooperative. HEENT:  Normal cephalic, atraumatic without obvious deformity. Pupils equal, round, and reactive to light. Extra ocular muscles intact. Conjunctivae/corneas clear. Neck: Supple, with full range of motion. No jugular venous distention. Trachea midline. Respiratory:  Normal respiratory effort. Clear to auscultation, bilaterally without Rales/Wheezes/Rhonchi. Cardiovascular:  Regular rate and rhythm with normal S1/S2 without murmurs, rubs or gallops. Abdomen: Soft, non-tender, non-distended with normal bowel sounds. Musculoskeletal:  No clubbing, cyanosis or edema bilaterally. Full range of motion without deformity. Skin: Skin color, texture, turgor normal.  No rashes or lesions. Neurologic:  Neurovascularly intact without any focal sensory/motor deficits.  Cranial nerves: II-XII intact, grossly non-focal.  Psychiatric:  Alert and oriented, thought content appropriate, normal insight  Capillary Refill: [place],< [address] [facility] [address]  [phone]      follow up needed in 2 weeks after discharge from the hospital         Discharge Medications:     Discharge Medication List as of 11/10/2021  3:24 PM           Details   losartan (COZAAR) 50 MG tablet Take 1 tablet by mouth daily, Disp-30 tablet, R-3DC to SNF      epoetin traci-epbx (RETACRIT) 49135 UNIT/ML SOLN injection Inject 1 mL into the skin three times a week, Disp-6.6 mLDC to [facility]      white petrolatum GEL PRN Starting Fri 10/15/2021, R-0, DC to SNF      pantoprazole (PROTONIX) 40 MG tablet Take 1 tablet by mouth 2 times daily (before meals), Disp-60 tablet, R-0Normal      albuterol sulfate  (90 Base) MCG/ACT inhaler Inhale 1 puff into the lungs every 4 hours as neededHistorical Med      ASPIRIN LOW DOSE 81 MG EC tablet Take 81 mg by mouth daily, DAWHistorical Med      atorvastatin (LIPITOR) 40 MG tablet Take 40 mg by mouth dailyHistorical Med      cyclobenzaprine (FLEXERIL) 10 MG tablet Take 10 mg by mouth dailyHistorical Med      clopidogrel (PLAVIX) 75 MG tablet Take 75 mg by mouth dailyHistorical Med      ferrous sulfate (IRON 325) 325 (65 Fe) MG tablet Take 325 mg by mouth dailyHistorical Med      metoprolol (LOPRESSOR) 100 MG tablet Take 1 tablet by mouth 2 times daily, Disp-60 tablet, R-3Normal      cinacalcet (SENSIPAR) 30 MG tablet Take 60 mg by mouth three times a week before dialysis on M,W,FHistorical Med      calcitRIOL (ROCALTROL) 0.25 MCG capsule Take 1.5 mcg by mouth three times a week before dialysis on M,W,FHistorical Med      docusate sodium (COLACE, DULCOLAX) 100 MG CAPS Take 100 mg by mouth 2 times daily, Disp-60 capsule, R-0Normal      vitamin C (ASCORBIC ACID) 500 MG tablet Take 1 tablet by mouth daily, Disp-30 tablet, R-3Normal      polyethylene glycol (MIRALAX) powder Renal Miralax Colonoscopy prep. Use as directed. Mix Miralax 238 g with 24 oz clear liquids.   Drink 8 oz glass every 20-30 minutes until gone., Disp-238 g, R-0Normal      B Complex-C-Folic Acid (RENAL) 1 MG CAPS Take 1 capsule by mouth dailyHistorical Med               Time Spent on discharge is more than 30 minutes in the examination, evaluation, counseling and review of medications and discharge plan. Signed: Thank you Hayden Stewart MD for the opportunity to be involved in this patient's care.     Electronically signed by Ellie Sotelo MD on 11/10/2021 at 5:16 PM

## 2021-11-10 NOTE — PROGRESS NOTES
CLINICAL PHARMACY: DISCHARGE MED RECONCILIATION/REVIEW    [facility] ([facility] Select Patient?: No  Total # of Interventions Recommended: 0   -   Total # Interventions Accepted: 0  Intervention Severity:   - Level 1 Intervention Present?: No   - Level 2 #: 0   - Level 3 #: 0   Time Spent (min): 15    Additional Documentation:  Patient to go to [address] [place] [address] [address] stopped for active GI bleed      Jennifer Lindsay PharmD  4:00 PM  11/10/2021

## 2021-11-10 NOTE — PROGRESS NOTES
Nabila Myers, and Dr. Janie Martino that the pre-certification was approved for the pt. To return to [facility]. The pt. Returned from Hemodialysis at 1330. Lunch was served to him. Vitals stable. Medications restarted for the day. His daughter Lisandro Andrade was contacted to ask her to bring pants for him to wear. Attempted to call [facility] for report. The nurse was unable to come to the phone and the  stated that she would call me when available. She took  my name and phone number. The pt. was tired from having dialysis and had to be assisted out of the bed and dressed. [address] arrived to transport her father to the nursing home. Blue envelope was given to her, to give to the nurse at [facility]. 1-  Transport arrived to take the pt. to  [facility]. Pt. Assisted into the wheel chair. Pt. Stable.

## 2021-11-10 NOTE — PROGRESS NOTES
Joann Martinez 60  PHYSICAL THERAPY MISSED TREATMENT NOTE  [facility]STE[facility]OWN [facility]B    Date: 11/10/2021  Patient Name: Keith Norris        MRN: [id number]   : 11/10/1930  (80 y.o.)  Gender: male   Referring Practitioner: Karo Steven DO  Diagnosis: Acute upper gastrointestinal hemorrhage         REASON FOR MISSED TREATMENT:    Pt off unit for dialysis will check back later or next available date.

## 2021-11-13 NOTE — PROGRESS NOTES
H&P (Readmission H&P at [facility])      NAME: Henny Hayes  DATE: 21  ROOM #: [id number]  CODE STATUS: FULL CODE  REASON FOR ADMISSION: Weakness after prolonged hospitalization  : 11/10/1930  ADMISSION DATE: 10/15/2021  SKILLED PATIENT: Yes    History obtained from chart review, the patient and nursing staff. SUBJECTIVE:  HPI: Henny Hayes is a 80 y.o. male. Pt seen and examined at bedside.     -Patient admitted to [facility] from  to 11/10 for issues as noted below. D/c summary: \"Hospital Course:   Henny Hayes is a 80 y.o. male admitted to [address] [place] [address] on 2021 for acute blood loss anemia secondary to UGI. The patient was treated with conventional therapy including 2 units of packed red blood cells, and Protonix GTT. the patient was recently diagnosed with PE and placed on Eliquis, however his anticoagulation was stopped due to bleeding. Also held the patient's DAPT. Did have EGD on 21 which showed a bleeding AVM that was treated with APC. Afterwards the patient's hemoglobin did stabilize, without evidence of recurrent overt bleeding. Patient's DAPT was restarted to prevent restenosis of recently placed coronary stents. Eliquis discontinued due to high risk for bleeding. This was discussed with the patient's family who was in agreement. The patient continued to do well and was eventually transferred back to [facility] in stable condition. Of note the patient did receive his hemodialysis per his usual schedule while admitted. Patient did have an episode of A. fib RVR while receiving dialysis, which resolved after his second unit of packed red blood cells. \"    Since readmission:  Doing well thus far  No s/s GI bleeding per pt/staff. No abd pain  Appetite ok          -LAST VISIT:  Patient admitted to [facility] from 10/26/201 to 2021 for issues as noted below. D/c summary:   \"Discharge Assessment and Plan:-   1. Acute on Chronic respiratory failure, hypoxia: Multifactorial.  PE, pneumonia, fluid overload.  Patient's baseline is 3 L.  Patient is back to baseline. CXR shows \"Worsening opacities\". 2. Acute PE: CTA of chest shows PE and lingula.  Pt switched to [address] [place] [address] Road.  Echo completed. 3. Possible Bacterial PNA: CTA shows possible pneumonia.  Chest x-ray in the a.m.  Zosyn and vancomycin- treatments were completed prior to [facility] was recently admitted for COVID-19 pneumonia. 4. ESRD on HD: Nephrology consulted. Normal HD schedule.   5. Recent upper GI bleed: Continue PPI. 6. Anemia of chronic disease: Hemoglobin stable at this time.    7. HFrEF, acute on chronic: Echo pending.  May 2021 EF 45%.  Hemodialysis today.  Continue home medications. 8. CAD: PCI in 2018.  Stress test in May 2021 did not show concern for MI.  Patient is chest pain-free.  Continue home medications.     Initial H and P and Hospital course:-  Initial H&P \"Tunde Shelton is a 80 y. o. male with PMHx of ESRD on HD Monday, Wednesday, and Tina Lane presented to [facility] with progressive dyspnea for the past two weeks. Patient was recently admitted to our hospital for COVID pneumonia a month ago. He was admitted on 9/13/2021 and was discharged on 10/15/2021. He was discharged on supplemental O2 at 3L NC.     Patient was at a SNF, and complained of progressive dyspnea for the past two weeks. Per ED notes, patient was walking today, and was found to have hypoxia down to the mid 80s while on supplemental O2. Patient denied chest pain. He denied coughing. He denied diaphoresis. He denied fever. Staff at the SNF called EMS.    Here, patient again was observed by staff ED RN to have hypoxia as low as the mid 80s while on 3L NC. His oxygen was increased to 6L NC, and he showed improvement on his oxygenation. CXR in the ED demonstrated   diffuse airspace opacities, which have progressed from the prior chest radiograph, concerning for  pneumonia or pulmonary edema. Blood cultures pending. Procalcitonin elevated at 0.95.  He was empirically started on IV antibiotics to cover potential HCAP.      We were asked to admit this patient for further management. \"      10/27: No acute events overnight.  Patient was admitted to the night.  Patient underwent hemodialysis today and tolerated it well.  Patient was sleeping upon arrival.  Patient was back on baseline 3 L.    10/28: No acute events overnight. Pt sitting in the chair. Pt complaining of abd pain. Pt states that he is not hungry.      10/29: No acute events overnight. Patient eating lunch during evaluation. Patient states that breathing is better. No abdominal pain.     10/30: No acute events overnight.  Patient states that he is feeling weaker today.  Patient sitting up in chair during evaluation.  No issues or concerns at this time.  Awaiting pre-CERT.     27/85: No acute events overnight.  Patient lying in bed during evaluation.  No issues or concerns.     09/0: Pre-cert returned. On the day of discharge, it was explained to the patient that it was very important to follow up with his PCP and Nephrology to have continued care. Appointments were made and information was given. \"      Since readmission:  Doing ok thus far  Breathing improved  On 2 to 3 liters and SpO2 WNL  New dx PE, placed on eliquis, tolerating well. Denies CP. Good but slow progress with PT      UPDATE TODAY:   Doing well thus far per pt/staff  Breathing remains at baseline  On O2 but weaning  No cough. No hemoptysis  Off [facility] as above for PE         -INITIAL VISIT:  Patient admitted to [address] [place] [address] from 9/13 to 10/15 for issues as noted below.    D/c summary per hospital team:  \"HPI:  Barbara Goldman is a 80 y.o. male with \"presents with complaints of shortness of breath and cough for the last 2 days.  Patient is accompanied by family who helps with history. Annika Phillips report that patient has had a poor appetite, fatigued, short of breath and coughing for last 2 days. Alivia Antonio recently was at [facility] approximately 2 weeks ago where he had 2 stents placed. Dustin Ronquillo state he was in his normal state of health until 2 days ago. Sandra Sotelo was seen in dialysis today and had a full session however continued to be short of breath and had a significant cough and was sent for evaluation. Sharon Rocha denies any fevers or chills, nausea or vomiting.  He denies any diarrhea or constipation.  He denies any chest pain. \"     In the ED patient was found to be Covid positive.  He was placed on 2 L nasal cannula.   He was also found to have slightly elevated troponin and was started on heparin drip in the ED.  Family updated on test results.     Please see chart for more details regarding HPI.     Hospital Course:   Vic Carballo is a 80 y.o. male with a PMH SSS s/p PPM + AICD, Anemia of chronic disease, COPD, HLD< CAD, ESRD on HD, Systolic HF EF 12%, who presented with AHRF 2/2 COVID-PNA, sustained UGIB 2/2 erosive esophagitis and duodenitis and is now recovered.      The patient was stable for discharge - all consultants were contacted and in agreement with plan for discharge. Appropriate follow up appointment was arranged prior to discharge.     Please see below or view chart for more details from hospital course.     Discharge Diagnoses:     #AHRF 2/2 COVID PNA: Resolved  09/25 - required increased. O2. Escalated to 4L the weaned to 2L throughout hospitalization. Now on 3L which is new baseline. Baseline 2L prior to hospitalization. Briefly suspected superimposed bacterial PNA d/t leukocytosis and consolidation treated with a course of ceftriaxone and azithromycin     #Recurrent Hypoglycemia: Resolved  No h/o DM. A1c 4.5% 10/01. S/p decadron x9 days for COVID. Cosyntropin test wnl. Currently 83-140s and pt asymptomatic.      #Non-Variceal Upper GIB: Resolved  Bleeding source: esophagitis and duodenitis. H/o EGD on 10/09/2021 demonstrating the above findings. Pantoprazole 40 mg twice daily. Follow up with GI in 4 weeks.    #ABLA on Anemia of Chronic Disease: Resolved  10/07 Hgb 6.6 on baseline 9.0-10.0. 2/2 UGIB. Now 9.5. Continuing iron supplementation.     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 Access: Right AV Fistula. Nephrologist: Dr. Nathan Barrientos, [address] [address]: [facility]. On phosphate binders: none. On Vitamin D: calcitriol. On EPO: retacrit. On midodrine: no. Kidney transplant candidate: no. Continue HD.     #Chronically elevated troponin: Stable  H/o ESRD. Baseline 0.1-0.3     #SSS s/p AICD + PPM: Stable  HR 69-70.      #Lactic Acidosis: Stable  8.7 10/01, improved to 2.5, resolved to 1.6. 2/2 GIB     #Recurrent Hypoglycemia: Resolved  No h/o DM. A1c 4.5% 10/01. S/p decadron x9 days for COVID. Cosyntropin test wnl. Currently 83-140s and pt asymptomatic     #Chronic Systolic + Diastolic Heart Failure with EF 45% on 05/19/2021, NYHA Class III: Stable  Pro-BNP chronically elevated >70,000. CXR on 09/13/2021 demonstrated cardiomegaly and pulmonary vascular congestion. GDMT: metoprolol tartrate. Diuretics: ESRD. Follows in HF Clinic: No.      #Non-obstructive CAD s/p JAXON on mid LAD and ramus in 2018: Stable  No recent LHC. Cardiac stress test 05/19/2021 not suggestive of myocardial ischemia. On ASA 81 mg. Continue ASA and plavix.     The patient was seen and examined on day of discharge and this discharge summary is in conjunction with any daily progress note from day of discharge. The patient understood, was in agreement, and verbalized the plan of care at time of discharge. \"    Since admission:  Doing ok thus far  Making progress with PT  No falls  Breathing at baseline  On O2 on and off. No abd pain or s/s GI bleeding  Appetite has been good  No s/s hypoglycemia  Nutrition following. UPDATE LAST VISIT:   Stable as above  Slow but steady progress with PT  On O2 as above  Appetite ok  No s/s GI bleeding  No s/s hypoglycemia  Nutrition following.      UPDATE TODAY:   Making progress with PT  No falls  Appetite ok  No s.s hypoglycemia  Nutrition following       -ESRD: on HD, MWF. Tolerating w/o issues. -CAD/HFrEF/SSS: on DAPT, lopressor, cozaar and lipitor. Has pacemaker and AICD. HD for fluid management. Denies CP/SOB. -HTN: on Lopressor and Cozaar, cozaar added during this most recent hospitalizatioin. BP's since admission have been higher than before, 140s/90s, no CP/SOB     -HLD: on lipitor. Tolerating well. Allergies and Medications were reviewed through the [facility] EMR. All medications reviewed and reconciled, including OTC and herbal medications.        Patient Active Problem List    Diagnosis Date Noted    Paroxysmal atrial fibrillation ([address]     History of pulmonary embolus (PE)     Upper GI bleed 11/05/2021    Dyspnea 10/26/2021    Hypoxia     Severe malnutrition ([address] 09/22/2021     Class: Acute    COVID-19 09/13/2021    Chronic combined systolic and diastolic CHF (congestive heart failure) ([address] 08/05/2021    SOB (shortness of breath) 07/12/2021    Hypertensive emergency 06/15/2021    ESRD on dialysis [facility])     Fall from slip, trip, or stumble, initial encounter 05/19/2021    Closed fracture of left ankle 05/19/2021    Acute pulmonary edema (HCC)     Gastritis and duodenitis     Chest pain     Volume overload 06/21/2019    Secondary hyperparathyroidism of renal origin ([address]     Anemia due to chronic kidney disease, on chronic dialysis ([address] 12/07/2018    Hyperphosphatemia 87/79/4519    Systolic congestive heart failure (HCC)     Other fluid overload (CODE)     Pulmonary hypertension ([address] 05/20/2018    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  01/10/2013    CAD (coronary artery disease) 07/30/2012    COPD (chronic obstructive pulmonary disease) ([address] 07/30/2012    Chronic renal insufficiency 07/30/2012    Patient overweight 07/30/2012     Updating deleted diagnoses         Past Medical History:   Diagnosis Date    Anemia in chronic kidney disease(285.21)     Arthritis     CAD (coronary artery disease)     Chronic kidney disease     Chronic kidney disease, stage IV (severe) (HCC)     CKD (chronic kidney disease) stage 3, GFR 30-59 ml/min (HCC)     [facility]) ([facility]     GI bleed     [facility]od[facility]26[facility]2016    [facility]    History of blood transfusion     6/2019    Hyperlipidemia     Hyperphosphatemia 5/22/2018    Hypertension     [facility]ick [facility] syndrome [facility]     Systolic congestive heart failure [facility])        Past Surgical History:   Procedure Laterality Date   [facility] CARDIAC SURGERY      COLONOSCOPY  2006,2009    COLONOSCOPY N/A 6/27/2019    COLONOSCOPY DIAGNOSTIC performed by Safia Weinberg MD at [facility]  2004    DIALYSIS FISTULA CREATION  5/6/2016    right arm fistula placement    ENDOSCOPY, COLON, DIAGNOSTIC      PACEMAKER PLACEMENT  2013    NC ESOPHAGOGASTRODUODENOSCOPY TRANSORAL DIAGNOSTIC N/A 1/18/2018    EGD performed by Maritza Hoffman MD at [facility]   [facility] [address]  [id number]    UPPER GASTROINTESTINAL ENDOSCOPY Left 6/23/2019    EGD DIAGNOSTIC ONLY performed by Safia Weinberg MD at [facility]    UPPER GASTROINTESTINAL ENDOSCOPY N/A 10/9/2021    EGD performed by Safia Weinberg MD at [facility] N/A 11/6/2021    EGD ESOPHAGOGASTRODUODENOSCOPY performed by Luis Angel Franco MD at [facility] ENDOSCOPY  2021    EGD CONTROL HEMORRHAGE performed by Luis Angel Franco MD at [facility]    VASCULAR SURGERY         No Known Allergies    Social History     Tobacco Use    Smoking status: Former Smoker     Packs/day: 1.00     Years: 40.00     Pack years: 40.00     Quit date: 1982     Years since quittin.8    Smokeless tobacco: Never Used   Substance Use Topics    Alcohol use: No        Family History   Problem Relation Age of Onset    Diabetes Mother     Heart Disease Mother     Diabetes Sister     Mental Illness Paternal Aunt          I have reviewed the patient's past medical history, past surgical history, allergies, medications, social and family history and I have made updates where appropriate.       Review of Systems  Positive responses are highlighted in bold    Constitutional:  Fever, Chills, Night Sweats, Fatigue, Unexpected changes in weight  Eyes:  Eye discharge, Eye pain, Eye redness, Visual disturbances   HENT:  Ear pain, Tinnitus, Nosebleeds, Trouble swallowing, Hearing loss, Sore throat  Cardiovascular:  Chest Pain, Palpitations, Orthopnea, Paroxysmal Nocturnal Dyspnea  Respiratory:  Cough, Wheezing, Shortness of breath, Chest tightness, Apnea  Gastrointestinal:  Nausea, Vomiting, Diarrhea, Constipation, Heartburn, Blood in stool  Genitourinary:  Difficulty or painful urination, Flank pain, Change in frequency, Urgency  Skin:  Color change, Rash, Itching, Wound  Psychiatric:  Hallucinations, Anxiety, Depression, Suicidal ideation  Hematological:  Enlarged glands, Easy bleeding, Easily bruising  Musculoskeletal:  Joint pain, Back pain, Gait problems, Joint swelling, Myalgias  Neurological:  Dizziness, Headaches, Presyncope, Numbness, Seizures, Tremors  Allergy:  Environmental allergies, Food allergies  Endocrine:  Heat Intolerance, Cold Intolerance, Polydipsia, Polyphagia, Polyuria      PHYSICAL EXAM:  Vitals:    11/17/21 1615   BP: (!) 149/79   Pulse: 75   Resp: 12   Temp: 97.5 °F (36.4 °C)   Weight: 151 lb (68.5 kg)   Height: 5' 6\" (1.676 m)     Body mass index is 24.37 kg/m². Pain Score:   2 (Back, chronic)    VS Reviewed  General Appearance: well developed and well- nourished, in no acute distress  Head: normocephalic and atraumatic  Eyes: pupils equal, round, and reactive to light, conjunctivae and eye lids without erythema  ENT: external ear and ear canal normal bilaterally, nose without deformity, nasal mucosa and turbinates normal without polyps, oropharynx normal, dentition is normal for age, no lip or gum lesions noted  Neck: supple and non-tender without mass, no thyromegaly or thyroid nodules, no cervical lymphadenopathy  Pulmonary/Chest: good air movement. Scattered rhonchi. No wheezing or crackles. No accessory muscle use. No distress. Cardiovascular: normal rate, regular rhythm, normal S1 and S2, no murmurs, rubs, clicks, or gallops, distal pulses intact  Abdomen: soft, non-tender, non-distended, bowel sounds physiologic,  no rebound or guarding, no masses or hernias noted. Liver and spleen without enlargement.    Extremities: no cyanosis, clubbing or edema of the lower extremities  Musculoskeletal: No joint swelling or gross deformity   Neuro:  Alert, 5/5 strength globally and symmetrically, 2+ patellar reflexes b/l,  normal speech, no focal findings or movement disorder noted  Psych:  Normal affect without evidence of depression or anxiety, insight and judgement are appropriate, memory appears intact  Skin: warm and dry, no rash or erythema  Lymph:  No cervical, auricular or supraclavicular lymph nodes palpated      LABS/IMAGING    Recent Labs     11/10/21  0727 11/09/21  0843 11/09/21  0449 11/08/21  1516 11/08/21  0816   WBC 5.8 5.7  --   --  7.3   HGB 9.2* 9.0* 9.1*   < > 9.0*   HCT 26.7* 26.4* 26.9*   < > 26.1*   MCV 94.3* 95.7*  --   --  94.6*    166  -- --  186    < > = values in this interval not displayed. Lab Results   Component Value Date    TSH 2.600 07/13/2021     Lab Results   Component Value Date     11/09/2021    K 4.3 11/09/2021    K 4.4 11/06/2021     11/09/2021    CO2 27 11/09/2021    BUN 10 11/09/2021    CREATININE 3.7 11/09/2021    GLUCOSE 86 11/09/2021    GLUCOSE 90 08/26/2021    CALCIUM 8.6 11/09/2021      Narrative   PROCEDURE: CTA CHEST W WO CONTRAST       CLINICAL INFORMATION: hypoxic, post covid, fell on 10/22, ESRD on HD       COMPARISON: CT 7/12/2021       TECHNIQUE:    1.5 mm axial images were obtained through the chest after the administration of IV contrast. A non-contrast localizer was obtained. 2mm MIP reconstructions of the chest performed in coronal and sagittal planes.        All CT scans at this facility use dose modulation, iterative reconstruction, and/or weight based dosing when appropriate to reduce the radiation dose to as low as reasonably achievable.       CONTRAST: 80 mL of Isovue-370           FINDINGS:       There is adequate opacification of the main pulmonary artery and its branches. . Filling defect seen in the artery to the lingula and in the left upper lobe consistent with CT evidence of pulmonary embolus.       Emphysematous changes are seen. There is a large right pleural effusion with compressive atelectasis and marked decrease in the volume of the right lower lobe. There is moderate left pleural effusion with compressive atelectasis and marked decrease in    the volume of the left lower lobe. There are diffuse groundglass opacities in both lungs that are nonspecific. Bronchiolar wall thickening is seen. Peripheral reticular opacities are seen in the upper lung zones.       The heart is enlarged. Aortocoronary calcifications. The main pulmonary artery is dilated relating to pulmonary artery hypertension. Pacemaker with leads noted. Body wall edema is seen. Mild bilateral gynecomastia. .       A 1.3 cm right upper lobe pulmonary nodule is seen (image 63). Mild pericardial effusions.           No significantly enlarged mediastinal or  axillary lymph node. Calcified mediastinal and hilar granulomas are seen.       The thyroid is not enlarged.       Limited evaluation below the diaphragm show that the kidneys are markedly atrophic. Small ascites is seen. . Gallstone       Bones: Degenerative changes of the thoracic spine. .                   Impression       1. CT evidence of pulmonary embolus is seen involving the lingula and left upper lobe. 2. Emphysema. 3. There is diffuse groundglass opacification of both lungs with areas of bronchial wall thickening. Findings can relate to underlying interstitial edema versus infectious process. Clinical correlation is recommended. 4. A large right pleural effusion is seen. A moderate left pleural effusion   5. Body wall edema relating to anasarca. 6. A 1.3 cm right upper lobe pulmonary nodule is noted. 7. Other findings as described above.               **This report has been created using voice recognition software.  It may contain minor errors which are inherent in voice recognition technology. **       Final report electronically signed by Dr Rusty Aquino on 10/26/2021 10:21 PM       Narrative   PROCEDURE: CTA ABD PELVIS W WO CONTRAST       CLINICAL INFORMATION: Abdominal pain           COMPARISON: 9/30/2021       TECHNIQUE: Prior to and following IV contrast administration, spiral scanning was performed from the lung bases to the ischial tuberosities with axial images acquired at 3 mm intervals and slice thickness.  Sagittal and coronal MIP 3-D reconstructions    were created and reviewed.       All CT scans at this facility use dose modulation, iterative reconstruction, and/or weight-based dosing when appropriate to reduce radiation dose to as low as reasonably achievable.       FINDINGS:       Lung bases, cardiomediastinum, and chest wall: Bilateral pleural effusions right greater than left bilateral lower lobe infiltrates. This is superimposed on interstitial lung disease. Heart size is enlarged. Pacer leads are present. Marked coronary artery calcific atherosclerosis.       Liver: Stable appearance. No focal masses identified.       Gallbladder, bile ducts: Gallstones are present.       Spleen: Unremarkable       Pancreas: Unremarkable. No mass or ductal dilatation.       Adrenal glands: Unremarkable       Kidneys and ureters: Marked renal cortical thinning bilateral kidneys.       Bladder: Bladder is decompressed. No gross abnormality.       Reproductive: Unremarkable       Stomach, small bowel, colon:   The stomach and duodenum are grossly normal.   Liquid stool in colon suggesting presence of diarrhea   No bowel wall thickening or evidence of bowel obstruction.       Appendix: The appendix is visualized and appears normal without evidence of periappendiceal inflammatory changes.       Omentum and mesentery: Unremarkable.       Peritoneum:  There is no ascites, abscess, adenopathy, or mass. No pneumoperitoneum.       Abdominal and pelvic body wall soft tissues: Unremarkable.       Musculoskeletal:  Unremarkable       Aorta and iliac arteries:   No evidence of an aortic aneurysm or dissection. There is no hemodynamically significant stenosis or occlusion in the common, internal, or external iliac arteries.       Mesenteric Arteries: There is no hemodynamically significant stenosis in the celiac artery, SMA, or MIGUE.       Renal Arteries: There are single renal arteries bilaterally. There is calcific plaque at the origin of the right femoral artery however stenosis the artery itself becomes thin and narrowed distally without a focal stenosis left is virtually the same           Impression   1. Bilateral airspace infiltrates and pleural effusions right greater than left. 2. Cardiomegaly.    3. Atrophic kidneys bilaterally.    4 liquid stool throughout much of the colon and rectum consistent with diarrhea no evidence of bowel obstruction. 5. Although there is diffuse calcific atherosclerosis of the aorta and mesenteric vessels there is no significant stenosis of the mesenteric vessels. However the renal arteries are bilaterally extremely diffusely narrow.                         **This report has been created using voice recognition software. It may contain minor errors which are inherent in voice recognition technology. **       Final report electronically signed by Dr. Carisa Reyes on 10/5/2021 3:33 PM       ECHO 20 MAY 2021   Summary   Left ventricle size is normal.   Normal left ventricular wall thickness. There was mild global hypokinesis of the left ventricle. Systolic function was mildly reduced. Ejection fraction is visually estimated at 45%. Doppler parameters were consistent with abnormal left ventricular   relaxation (grade 1 diastolic dysfunction). The left atrium is Moderately dilated. Moderate mitral regurgitation is present. Moderate tricuspid regurgitation visualized. Right ventricular systolic pressure measures 55 mmhg. When this echo is compared with the echo from 05/22/2018, the EF dropped   from 60 % to 45% now      ASSESSMENT & PLAN   Diagnosis Orders   1. UGI bleed     2. Gastric AVM     3. Erosive esophagitis     4. Acute blood loss anemia     5. Acute respiratory failure with hypoxia (HCC)     6. Other acute pulmonary embolism without acute cor pulmonale ([address]     7. Bacterial pneumonia     8. Physical deconditioning     9. Pneumonia due to COVID-19 virus     10. Hypoglycemia     11. Severe malnutrition ([address]     12. ESRD on hemodialysis ([address]     13. ASHD (arteriosclerotic heart disease)     14. Heart failure with reduced ejection fraction ([address]     15. SSS (sick sinus syndrome) ([address]     16. Hypertension, essential     17.  Dyslipidemia        -Recurrent UGI bleed/Gastric AVM/Erosive esophagitis/Acute blood loss anemia: stable thus far. con't bid pantoprazole. Monitor s/s bleed. Cbc tomorrow. F/u GI, [place]. Avoid OAC. Will con't DAPT, but needs monitored closely.   -Acute respiratory failure/PE/PNA: improving, con't O2 (but wean as able) and prn albuterol. Off ATB. F/u PCP at discharge. Will need pulm and cardio f/u as well. Hospital team stopped [address] and recommend not resuming. No IVC filter either. Family aware and agreeable as above in d/c summary  -Physical deconditioning: stable thus far and improving, con't  PT/OT. -COVID 19: stable and improving, con't prn albuterol and O2. Wean O2 as able  -Hypoglycemia: no s/s recurrence. Monitor.   -Malnutrition: eating pretty well, nutrition consult in place. -ESRD: stable, con't HD MWF.   -CAD/HFrEF/SSS: stable, con't DAPT, Lopressor, cozaar and lipitor. Has pacer and AICD. -HTN: above goal thus far but early in readmission, con't Lopressor and addition of Cozaar started in hospital. Trend BP's and reassess this weekend. -HLD: stable, con't Lipitor. **high risk readmission d/t recurrent bleeding, ESRD**    Disposition: FULL CODE. Con't PT/OT. Reassess 30 days, sooner prn.        Electronically signed by Reid Morillo DO on 11/17/2021 at 4:35 PM

## 2021-12-09 NOTE — PROGRESS NOTES
[facility] Progress Note  Acute visit for   Chief Complaint   Patient presents with    Hip Pain    ED Follow-up   . NAME: Darrel Wynne  DATE: 21  : 11/10/1930  Code Status:DNRCCA      History obtained from chart review, staff, resident. SUBJECTIVE:  HPI: Darrel Wynne is a 80 y.o. male. Pt seen and examined at bedside. Resident had a fall earlier this week. He cannot state when the fall was. Family brought resident to [facility] ER last night for imaging. CT of hip, spine and head were negative for acute findings. CT of cervical spine showed a 1 cm pulmonary nodule in the right lung apex with diffuse scarring in lung fields with and at the emphysema appearance. This pulmonary nodule is not new. X-ray of hip was negative for acute fracture or dislocation. Patient states he has pain while sitting and while moving his hip. Pain with flexion and extension of the leg. Will initiate 14 days of tizanidine. Tylenol and Aspercreme as needed. Allergies and Medications were reviewed through the [facility] EMR. All medications reviewed and reconciled, including OTC and herbal medications.      Patient Active Problem List    Diagnosis Date Noted    Paroxysmal atrial fibrillation ([address]     History of pulmonary embolus (PE)     Upper GI bleed 2021    Dyspnea 10/26/2021    Hypoxia     Severe malnutrition ([address] 2021     Class: Acute    COVID-19 2021    Chronic combined systolic and diastolic CHF (congestive heart failure) ([address] 2021    SOB (shortness of breath) 2021    Hypertensive emergency 06/15/2021    ESRD on dialysis [facility])     Fall from slip, trip, or stumble, initial encounter 2021    Closed fracture of left ankle 2021    Acute pulmonary edema (HCC)     Gastritis and duodenitis     Chest pain     Volume overload 2019    Secondary hyperparathyroidism of renal origin ([address]     Anemia due to chronic kidney disease, on chronic dialysis ([address] 12/07/2018    Hyperphosphatemia 14/90/7910    Systolic congestive heart failure (HCC)     Other fluid overload (CODE)     Pulmonary hypertension (HCC) 05/20/2018    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    Idiopathic hypotension 11/11/2017    Iron deficiency anemia 05/23/2016    Chronic kidney disease (CKD), stage V (HCC)     Metabolic acidosis     Leukopenia 03/30/2016    Thrombocytopenia (HCC) 03/30/2016    Monoclonal gammopathy 02/08/2016    Essential hypertension 01/11/2016    Anemia, chronic disease 04/23/2015    Gout of big toe 01/19/2015    Degenerative joint disease of knee, left 07/14/2014    Dyspnea and respiratory abnormalities 03/17/2014    ED (erectile dysfunction) 03/17/2014    Arthritis-  low back and neck .  01/10/2013    CAD (coronary artery disease) 07/30/2012    COPD (chronic obstructive pulmonary disease) ([address] 07/30/2012    Chronic renal insufficiency 07/30/2012    Patient overweight 07/30/2012     Updating deleted diagnoses         Past Medical History:   Diagnosis Date    Acute posthemorrhagic anemia     Anemia in chronic kidney disease     Anemia in chronic kidney disease(285[address]21)     Arthritis     Atrial fibrillation (HCC)     CAD (coronary artery disease)     Cardiomegaly     Chronic kidney disease     Chronic kidney disease, stage IV (severe) (HCC)     Chronic respiratory failure with hypoxia (HCC)     CKD (chronic kidney disease) stage 3, GFR 30-59 ml/min (HCC)     COPD (chronic obstructive pulmonary disease) (HCC)     COVID-19     End stage renal disease ([address]     GERD without esophagitis     GI bleed     Gout     Hemodialysis patient [facility]) [facility]26/2016    [facility]    History of blood transfusion     6/2019    Hyperlipidemia     Hyperphosphatemia 5/22/2018    Hypertension     Monoclonal gammopathy     Pneumonia due to coronavirus disease 2019     Pulmonary embolism ([address]     Pulmonary hypertension ([address]  Sick sinus syndrome ([facility]     Systolic congestive heart failure [facility])        Past Surgical History:   Procedure Laterality Date    CARDIAC SURGERY      COLONOSCOPY  ,    COLONOSCOPY N/A 2019    COLONOSCOPY DIAGNOSTIC performed by Doris Valdez MD at [address]  2004    DIALYSIS FISTULA CREATION  2016    right arm fistula placement    ENDOSCOPY, COLON, DIAGNOSTIC      PACEMAKER PLACEMENT  2013    [place] ESOPHAGOGASTRODUODENOSCOPY TRANSORAL DIAGNOSTIC N/A 2018    EGD performed by Dot Rodriguez MD at [address] [place] [address]  ,    UPPER GASTROINTESTINAL ENDOSCOPY Left 2019    EGD DIAGNOSTIC ONLY performed by Doris Valdez MD at [facility] ENDOSCOPY N/A 10/9/2021    EGD performed by Doris Valdez MD at [address] [place] [address] N/A 2021    EGD ESOPHAGOGASTRODUODENOSCOPY performed by Doris Valdez MD at [address] [place] [address]  2021    EGD CONTROL HEMORRHAGE performed by Doris Valdez MD at [address] [place] [address]         No Known Allergies    Social History     Tobacco Use    Smoking status: Former Smoker     Packs/day: 1.00     Years: 40.00     Pack years: 40.00     Quit date: 1982     Years since quittin.8    Smokeless tobacco: Never Used   Substance Use Topics    Alcohol use: No        Family History   Problem Relation Age of Onset    Diabetes Mother     Heart Disease Mother     Diabetes Sister     Mental Illness Paternal Aunt        Review of Systems  Positive responses are highlighted in bold    Constitutional:  Fever, Chills, Night Sweats, Fatigue, Unexpected changes in weight  Eyes:  Eye discharge, Eye pain, Eye redness, Visual disturbances   HENT:  Ear pain, Tinnitus, Nosebleeds, Trouble swallowing, Hearing loss, Sore throat  Cardiovascular:  Chest Pain, Palpitations, enlargement. Extremities: no cyanosis, clubbing or edema of the lower extremities  Musculoskeletal: No joint swelling or gross deformity. Pain with flexion and extension of left leg and hip area. Neuro:  Alert, 4/5 strength globally and symmetrically, 2+ patellar reflexes b/l,  normal speech, no focal findings or movement disorder noted  Psych:  Normal affect without evidence of depression or anxiety, insight and judgement are intact, memory appears intact. Skin: pink, warm and dry, no rash or erythema  Lymph:  No cervical, auricular or supraclavicular lymph nodes palpated    ASSESSMENT & PLAN  1. Left hip pain  Initiate acetaminophen 650 mg as needed every 6 hours. Initiate Aspercreme topical up to 3 times daily for hip pain. Initiate tizanidine 2 mg p.o. every 6 hours as needed for specificity of hip for 14 days. 2. Fall, initial encounter  No acute findings. Pain to hip area is likely related to fall. Fall and safety precautions in place. No future appointments. Disposition:  Assessment and plan as stated above. Follow up per routine and as warranted. .   Total time spent on date of service was 35 minutes. Time spent includes some or all of the following, both face-to-face time and non face-to-face, but is not limited to: reviewing records or discussing history or plan with colleagues; obtaining and/or reviewing the history; performing a medically appropriate examination/evaluation; counseling patient and/or caregiver; documentation, placing orders/referrals, and coordinating care. **This report has been created using voice recognition software. It may contain minor errors which are inherent in voice recognition technology. **       Plan of care reviewed with Dr. Daksha Cuenca DO.   Electronically signed by XIN Quinonez CNP on 12/9/2021 at 3:46 PM

## 2021-12-14 NOTE — PROGRESS NOTES
[facility] Progress Note    NAME: Herber Obrien  DATE: 21  ROOM #: [id number] 22-1  CODE STATUS: DNR/CCA  CHIEF COMPLAINT:  Routine visit  : 11/10/1930    History obtained from chart review, the patient and staff. SUBJECTIVE:  HPI: Herber Obrien is a 80 y.o. male. PMH documented below. He was seen at [facility] ER on  for acute left hip pain s/p fall. Assessment was negative and he returned back to [facility]. He was seen by Melodie Su CNP, upon return to [facility]. She initiated prn Tizanidine and continued prn Tylenol and prn Aspercreme as needed. Staff report no concerns since then. He has no complaints of pain this morning. He was admitted to [facility] on 10/15 for sob and cough x 2 days at hemodialysis. He has had frequent hospitalizations for various reasons prior to admitting to [facility]. He tested positive for COVID 19 in . He was admitted to [facility] from  to 11/10. Discharge summary: \"Hospital Course:   James Braswell a 80 y. o. male admitted to [address] [place] [address] on 2021 for acute blood loss anemia secondary to UGI.   The patient was treated with conventional therapy including 2 units of packed red blood cells, and Protonix GTT. the patient was recently diagnosed with PE and placed on Eliquis, however his anticoagulation was stopped due to bleeding.  Also held the patient's DAPT. Did have EGD on 21 which showed a bleeding AVM that was treated with APC.  Afterwards the patient's hemoglobin did stabilize, without evidence of recurrent overt bleeding.  Patient's DAPT was restarted to prevent restenosis of recently placed coronary stents.  Eliquis discontinued due to high risk for bleeding.  This was discussed with the patient's family who was in agreement.  The patient continued to do well and was eventually transferred back to [facility]' in stable condition.  Of note the patient did receive his hemodialysis per his usual schedule while admitted. Luis Fernando Hunt did have an episode of A. fib RVR while receiving dialysis, which resolved after his second unit of packed red blood cells. \"        Allergies and Medications were reviewed through the [facility] EMR. All medications reviewed and reconciled, including OTC and herbal medications. Patient Active Problem List    Diagnosis Date Noted    Paroxysmal atrial fibrillation ([address]     History of pulmonary embolus (PE)     Upper GI bleed 11/05/2021    Dyspnea 10/26/2021    Hypoxia     Severe malnutrition ([address] 09/22/2021     Class: Acute    COVID-19 09/13/2021    Chronic combined systolic and diastolic CHF (congestive heart failure) ([address] 08/05/2021    SOB (shortness of breath) 07/12/2021    Hypertensive emergency 06/15/2021    ESRD on dialysis [facility])     Fall from slip, trip, or stumble, initial encounter 05/19/2021    Closed fracture of left ankle 05/19/2021    Acute pulmonary edema (HCC)     Gastritis and duodenitis     Chest pain     Volume overload 06/21/2019    Secondary hyperparathyroidism of renal origin ([address]     Anemia due to chronic kidney disease, on chronic dialysis ([address] 12/07/2018    Hyperphosphatemia 22/55/7900    Systolic congestive heart failure (HCC)     Other fluid overload (CODE)     Pulmonary hypertension (HCC) 05/20/2018    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    Idiopathic hypotension 11/11/2017    Iron deficiency anemia 05/23/2016    Chronic kidney disease (CKD), stage V (HCC)     Metabolic acidosis     Leukopenia 03/30/2016    Thrombocytopenia ([address] 03/30/2016    Monoclonal gammopathy 02/08/2016    Essential hypertension 01/11/2016    Anemia, chronic disease 04/23/2015    Gout of big toe 01/19/2015    Degenerative joint disease of knee, left 07/14/2014    Dyspnea and respiratory abnormalities 03/17/2014    ED (erectile dysfunction) 03/17/2014    Arthritis-  low back and neck .  01/10/2013    CAD (coronary artery disease) 07/30/2012    COPD (chronic obstructive pulmonary disease) (HCC) 07/30/2012    Chronic renal insufficiency 07/30/2012    Patient overweight 07/30/2012     Updating deleted diagnoses         Past Medical History:   Diagnosis Date    Acute posthemorrhagic anemia     Anemia in chronic kidney disease     Anemia in chronic kidney disease(285.21)     Arthritis     Atrial fibrillation (HCC)     CAD (coronary artery disease)     Cardiomegaly     Chronic kidney disease     Chronic kidney disease, stage IV (severe) (HCC)     Chronic respiratory failure with hypoxia (HCC)     CKD (chronic kidney disease) stage 3, GFR 30-59 ml/min (HCC)     COPD (chronic obstructive pulmonary disease) (HCC)     COVID-19     End stage renal disease ([address]     GERD without esophagitis     GI bleed     Gout     [facility]od[facility]    [facility]    History of blood transfusion     6/2019    Hyperlipidemia     Hyperphosphatemia 5/22/2018    Hypertension     Monoclonal gammopathy     Pneumonia due to coronavirus disease 2019     Pulmonary embolism ([address]     Pulmonary hypertension ([address]     Sick sinus syndrome ([address]     Systolic congestive heart failure [facility])        Past Surgical History:   Procedure Laterality Date   Roberts CARDIAC SURGERY      COLONOSCOPY  2006,2009    COLONOSCOPY N/A 6/27/2019    COLONOSCOPY DIAGNOSTIC performed by Valencia Primrose, MD at [address] [facility]  2004    DIALYSIS FISTULA CREATION  5/6/2016    right arm fistula placement    ENDOSCOPY, COLON, DIAGNOSTIC      PACEMAKER PLACEMENT  2013    [place] ESOPHAGOGASTRODUODENOSCOPY TRANSORAL DIAGNOSTIC N/A 1/18/2018    EGD performed by Annamaria Dean MD at [facility] ENDOSCOPY  [id number]    UPPER GASTROINTESTINAL ENDOSCOPY Left 6/23/2019    EGD DIAGNOSTIC ONLY performed by Valencia Primrose, MD at [facility] End[facility]    UPPER GASTROINTESTINAL ENDOSCOPY N/A 10/9/2021    EGD performed by Valencia Primrose, MD at [facility] Denies    VS Reviewed  General Appearance: Debilitated. Appears stated age. He appears fatigued this morning. Head: normocephalic and atraumatic  Eyes: pupils equal, round, and reactive to light, conjunctivae and eye lids without erythema  ENT: external ear and ear canal normal bilaterally, nose without deformity, nasal mucosa and turbinates normal without polyps, oropharynx normal, dentition is normal for age, no lip or gum lesions noted  Neck: supple and non-tender without mass, no thyromegaly or thyroid nodules, no cervical lymphadenopathy  Pulmonary/Chest: clear to auscultation bilaterally- no wheezes, rales or rhonchi, normal air movement, no respiratory distress or retractions. Requires no supplemental oxygen at time of assessment. Cardiovascular: normal rate, regular rhythm, normal S1 and S2, no murmurs, rubs, clicks, or gallops, distal pulses intact  Abdomen: soft, non-tender, non-distended, bowel sounds physiologic,  no rebound or guarding, no masses or hernias noted. Liver and spleen without enlargement. Extremities: no cyanosis, clubbing or edema of the lower extremities  Musculoskeletal: No joint swelling or gross deformity   Neuro:  Alert, 3/5 strength globally and symmetrically, 2+ patellar reflexes b/l,  normal speech, no focal findings or movement disorder noted  Psych:  Normal affect without evidence of depression or anxiety, insight and judgement are intact, memory appears intact. Skin: pink, warm and dry, no rash or erythema  Lymph:  No cervical, auricular or supraclavicular lymph nodes palpated    LABS/IMAGING    CBC scheduled 12/17/21.  12/9/21:  WBC 5.7, Hgb 9.9, platelets 253.  25/7/03:  WBC 5.4, Hgb 9.4, platelets 274.  75/00/44:  WBC 5.8, Hgb 9.2, platelets 101.  43/0/69:  Na 137, K 4.3, chl 100, CO2 27, BUN 10, Creat 3.7, calcium 8.6.  07/3/21:  TSH 2.6    ASSESSMENT & PLAN    1. Chronic UGI bleed  Hx of recurrent UGI bleed. Stable. Continue routine lab monitoring.   Avoid [employer] prollie.  Continue DAPT. EGD on 11/6/21. Bleeding AVM treated with APC. Post op diagnosis Grade 2 erosive esophagitis with features suggestive of recent GI bleed: Mild gastritis with deformity of the pylorus and duodenitis no active GI bleed seen no biopsy obtained  Continue Protonix. F/U with Dr. Lexie Potts as indicated. 2.  Gastric AVM  As per above. 3.  Erosive esophagitis  Stable. As per above. 4.  Acute respiratory failure with hypoxia ([facility])  Stable. Oxygen weaned to off. Continue prn Albuterol MDI. F/U with Pulm as indicated. 5.  Other acute pulmonary embolism without acute cor pulmonale ([facility])  Stable. Not a candidate for Wowboard. No IVC Filter. Family aware and agreeable. Continue Plavix, ASA. 6.  Physical deconditioning  Continue PT/OT as tolerates. 7.  Severe malnutrition  Dietary on board. Weights stable. Continue Novasource. 8.  ESRD on hemodialysis [facility])  Current with Dr. Virgilio Rodriguez, Nephrology. Continue calcitriol, renal caps. 9.  ASHD (atherosclerotic heart disease)  ASA, statin. 10.  Heart failure with reduces ejection fraction ([facility]  Current with Dr. Gigi Perrin, Cardiology. Hx of AICD pacemaker. Stable. No s/s of CHF. 11.  SSS (sick sinus syndrome) ([facility])  Stable. 12.  Hypertension, essential  BP at goal with metoprolol tartrate, losartan potassium. 13.  Dyslipidemia  ASA, statin. Routine lab monitoring. 14.  Pneumonia due to COVID 19 virus  Resolved. 15.  Hypoglycemia  Resolved. 16. Paroxysmal atrial fibrillation ([facility]  Not a candidate for Wowboard. 17. Other iron deficiency anemia  [employer]. Lab monitoring is stable. 18. Chronic obstructive pulmonary disease, unspecified COPD type ([address]  Stable. Asymptomatic. 19. Arthritis-  low back and neck . Continue prn Tylenol, Tizanidine, Aspercreme. No complaints today at time of assessment. Disposition:  Assessment and plan as stated above. Follow up per routine and as warranted.     Total time spent on

## 2021-12-15 NOTE — ED PROVIDER NOTES
[facility] Emergency Department    CHIEF COMPLAINT       Chief Complaint   Patient presents with    Fall    Hip Pain       Nurses Notes reviewed and I agree except as noted in the HPI. HISTORY OF PRESENT ILLNESS    Kori Mccann elza 80 y.o. male who presents to the ED for evaluation of left hip pain. He fell on Monday. He reportedly was seen on Monday after the fall. Nursing home staff called and reported that the patient does not normally c/o pain. He can stand and pivot, which is his baseline. No shortening or rotation. Unsure if he hit his head. HPI was provided by the patient    REVIEW OF SYSTEMS     Review of Systems   Constitutional: Negative for chills, fatigue and fever. HENT: Negative for congestion, ear discharge, ear pain, postnasal drip and rhinorrhea. Eyes: Negative for redness. Respiratory: Negative for cough and chest tightness. Cardiovascular: Negative for chest pain and leg swelling. Gastrointestinal: Negative for abdominal pain, nausea and vomiting. Genitourinary: Negative for difficulty urinating, dysuria, enuresis, flank pain and hematuria. Musculoskeletal: Positive for arthralgias and gait problem. Negative for back pain and joint swelling. Skin: Negative for rash. Neurological: Negative for dizziness, light-headedness, numbness and headaches. Psychiatric/Behavioral: Negative for agitation, behavioral problems and confusion. All other systems negative except as noted.       PAST MEDICAL HISTORY     Past Medical History:   Diagnosis Date    Acute posthemorrhagic anemia     Anemia in chronic kidney disease     Anemia in chronic kidney disease(285.21)     Arthritis     Atrial fibrillation (HCC)     CAD (coronary artery disease)     Cardiomegaly     Chronic kidney disease     Chronic [facility], [facility] IV (severe) ([facility])     Chronic respiratory failure with hypoxia ([facility])     CKD (chronic kidney disease) stage 3, GFR 30-59 ml/min ([facility]) Smoker     Packs/day: 1.00     Years: 40.00     Pack years: 40.00     Quit date: 1982     Years since quittin.9    Smokeless tobacco: Never Used   Vaping Use    Vaping Use: Never used   Substance and Sexual Activity    Alcohol use: No    Drug use: No    Sexual activity: Not on file   Other Topics Concern    Not on file   Social History Narrative    Not on file     Social Determinants of Health     Financial Resource Strain:     Difficulty of Paying Living Expenses: Not on file   Food Insecurity:     Worried About Running Out of Food in the Last Year: Not on file    Jelani of Food in the Last Year: Not on file   Transportation Needs:     Lack of Transportation (Medical): Not on file    Lack of Transportation (Non-Medical): Not on file   Physical Activity:     Days of Exercise per Week: Not on file    Minutes of Exercise per Session: Not on file   Stress:     Feeling of Stress : Not on file   Social Connections:     Frequency of Communication with Friends and Family: Not on file    Frequency of Social Gatherings with Friends and Family: Not on file    Attends Worship Services: Not on file    Active Member of [employer] or Organizations: Not on file    Attends Club or Organization Meetings: Not on file    Marital Status: Not on file   Intimate Partner Violence:     Fear of Current or Ex-Partner: Not on file    Emotionally Abused: Not on file    Physically Abused: Not on file    Sexually Abused: Not on file   Housing Stability:     Unable to Pay for Housing in the Last Year: Not on file    Number of [place] in the Last Year: Not on file    Unstable Housing in the Last Year: Not on file       PHYSICAL EXAM     INITIAL VITALS:  height is 5' 6\" (1.676 m) and weight is 136 lb (61.7 kg). His temperature is 97.8 °F (36.6 °C). His blood pressure is 145/79 (abnormal) and his pulse is 73. His respiration is 18 and oxygen saturation is 94%.     Physical Exam  Constitutional: Appearance: Normal appearance. He is well-developed. He is not ill-appearing. HENT:      Head: Normocephalic and atraumatic. Nose: Nose normal.      Mouth/Throat:      Mouth: Mucous membranes are moist.      Pharynx: Oropharynx is clear. Eyes:      Conjunctiva/sclera: Conjunctivae normal.   Cardiovascular:      Rate and Rhythm: Normal rate. Pulses: Normal pulses. Pulmonary:      Effort: Pulmonary effort is normal.   Abdominal:      Palpations: Abdomen is soft. Musculoskeletal:         General: Tenderness and signs of injury present. No deformity. Cervical back: Normal range of motion. Left hip: Tenderness and bony tenderness present. Decreased range of motion. Legs:    Skin:     General: Skin is warm and dry. Capillary Refill: Capillary refill takes less than 2 seconds. Neurological:      General: No focal deficit present. Mental Status: He is alert and oriented to person, place, and time. Psychiatric:         Behavior: Behavior normal.         DIFFERENTIAL DIAGNOSIS:   Fracture, head injury    DIAGNOSTIC RESULTS     EKG: All EKG's are interpreted by the Emergency Department Physician who eithersigns or Co-signs this chart in the absence of a cardiologist.        RADIOLOGY: non-plainfilm images(s) such as CT, Ultrasound and MRI are read by the radiologist.  Plain radiographic images are visualized and preliminarily interpreted by the emergency physician unless otherwise stated below. CT HEAD WO CONTRAST   Final Result   No acute intracranial hemorrhage, mass effect or midline shift. **This report has been created using voice recognition software. It may contain minor errors which are inherent in voice recognition technology. **      Final report electronically signed by Dr Malik Echeverria on 12/8/2021 10:24 PM      CT CERVICAL SPINE WO CONTRAST   Final Result      1. Degenerative changes with no acute fracture or subluxation in the cervical spine.    2. There is 1 cm pulmonary nodule in the right lung apex which is only visualized on the coronal images. There is diffuse scarring of both upper lung fields with an emphysematous appearance. 3. There is partial visualization of a right-sided pleural effusion. **This report has been created using voice recognition software. It may contain minor errors which are inherent in voice recognition technology. **      Final report electronically signed by Dr Elizabeth Menchaca on 12/8/2021 10:31 PM      CT HIP LEFT WO CONTRAST   Final Result   Impression:   No acute bony pathology. This document has been electronically signed by: Bobo Lau MD on    12/08/2021 11:03 PM      All CTs at this facility use dose modulation techniques and iterative    reconstructions, and/or weight-based dosing   when appropriate to reduce radiation to a low as reasonably achievable. XR HIP 2-3 VW W PELVIS LEFT   Final Result   Degenerative changes with no acute fracture or dislocation. **This report has been created using voice recognition software. It may contain minor errors which are inherent in voice recognition technology. **      Final report electronically signed by Dr Elizabeth Menchaca on 12/8/2021 8:57 PM            LABS:   Labs Reviewed - No data to display    EMERGENCY DEPARTMENT COURSE:   Vitals:    Vitals:    12/08/21 2037 12/08/21 2309   BP: (!) 161/73 (!) 145/79   Pulse: 101 73   Resp: 18 18   Temp: 97.8 °F (36.6 °C)    SpO2: 98% 94%   Weight: 136 lb (61.7 kg)    Height: 5' 6\" (1.676 m)                                MDM    Patient was seen in the ER for injuries sustained in a fall. Patient is treated with tylenol. Appropriate imaging is ordered and reviewed. No fractures are noted. Head CT negative. Patient is discharged back to NH with family.       Medications   acetaminophen (TYLENOL) tablet 650 mg (650 mg Oral Given 12/8/21 2100)       Please note that the patient was evaluated during a pandemic. All efforts were made for HIPPA compliance as well as provision of appropriate care. Patient was seen independently by myself. The patient's final impression and disposition and plan was determined by myself. Strict return precautions and follow up instructions were discussed with the patient prior to discharge, with which the patient agrees. Physical assessment findings, diagnostic testing(s) if applicable, and vital signs reviewed with patient/patient representative. Questions answered. Medications asdirected, including OTC medications for supportive care. Education provided on medications. Differential diagnosis(s) discussed with patient/patient representative. Home care/self care instructions reviewed withpatient/patient representative. Patient is to follow-up with family care provider in 2-3 days if no improvement. Patient is to go to the emergency department if symptoms worsen. Patient/patient representative isaware of care plan, questions answered, verbalizes understanding and is in agreement. CRITICAL CARE:   None    CONSULTS:  None    PROCEDURES:  None    FINAL IMPRESSION     1. Fall, initial encounter    2.  Left hip pain          DISPOSITION/PLAN   DISPOSITION Decision To Discharge 12/08/2021 11:15:35 PM      PATIENT REFERREDTO:  Laurel Wislon MD  [address] [place] [address]    Schedule an appointment as soon as possible for a visit in 2 days  For follow up      [address]:  Discharge Medication List as of 12/8/2021 11:19 PM          (Please note that portions of this note were completed with a voice recognition program.  Efforts were made to edit the dictations but occasionally words are mis-transcribed.)         XIN Vernon - XIN Arthur - CNP  12/15/21 0167

## 2022-01-01 ENCOUNTER — HOSPITAL ENCOUNTER (OUTPATIENT)
Dept: INTERVENTIONAL RADIOLOGY/VASCULAR | Age: 87
Discharge: HOME | End: 2022-03-01
Payer: COMMERCIAL

## 2022-01-01 ENCOUNTER — APPOINTMENT (OUTPATIENT)
Dept: CT IMAGING | Age: 87
DRG: 291 | End: 2022-01-01
Payer: COMMERCIAL

## 2022-01-01 ENCOUNTER — HOSPITAL ENCOUNTER (EMERGENCY)
Age: 87
Discharge: HOME | End: 2022-04-27
Attending: EMERGENCY MEDICINE
Payer: COMMERCIAL

## 2022-01-01 ENCOUNTER — APPOINTMENT (OUTPATIENT)
Dept: GENERAL RADIOLOGY | Age: 87
DRG: 283 | End: 2022-01-01
Payer: COMMERCIAL

## 2022-01-01 ENCOUNTER — HOSPITAL ENCOUNTER (INPATIENT)
Age: 87
LOS: 3 days | Discharge: NURSING, REHAB OR LONG-TERM CARE | DRG: 291 | End: 2022-05-09
Attending: EMERGENCY MEDICINE
Payer: COMMERCIAL

## 2022-01-01 ENCOUNTER — APPOINTMENT (OUTPATIENT)
Dept: CT IMAGING | Age: 87
DRG: 283 | End: 2022-01-01
Payer: COMMERCIAL

## 2022-01-01 ENCOUNTER — HOSPITAL ENCOUNTER (INPATIENT)
Age: 87
LOS: 1 days | DRG: 283 | End: 2022-05-20
Attending: EMERGENCY MEDICINE | Admitting: HOSPITALIST
Payer: COMMERCIAL

## 2022-01-01 ENCOUNTER — APPOINTMENT (OUTPATIENT)
Dept: GENERAL RADIOLOGY | Age: 87
DRG: 291 | End: 2022-01-01
Payer: COMMERCIAL

## 2022-01-01 VITALS
WEIGHT: 146.16 LBS | TEMPERATURE: 97.6 F | OXYGEN SATURATION: 94 % | HEIGHT: 66 IN | SYSTOLIC BLOOD PRESSURE: 113 MMHG | BODY MASS INDEX: 23.49 KG/M2 | HEART RATE: 85 BPM | RESPIRATION RATE: 20 BRPM | DIASTOLIC BLOOD PRESSURE: 51 MMHG

## 2022-01-01 VITALS
OXYGEN SATURATION: 99 % | HEIGHT: 66 IN | RESPIRATION RATE: 18 BRPM | SYSTOLIC BLOOD PRESSURE: 178 MMHG | TEMPERATURE: 97.5 F | WEIGHT: 146.39 LBS | DIASTOLIC BLOOD PRESSURE: 86 MMHG | HEART RATE: 80 BPM | BODY MASS INDEX: 23.53 KG/M2

## 2022-01-01 VITALS
OXYGEN SATURATION: 93 % | RESPIRATION RATE: 16 BRPM | BODY MASS INDEX: 21.69 KG/M2 | TEMPERATURE: 97.8 F | SYSTOLIC BLOOD PRESSURE: 159 MMHG | DIASTOLIC BLOOD PRESSURE: 78 MMHG | HEART RATE: 88 BPM | HEIGHT: 66 IN | WEIGHT: 135 LBS

## 2022-01-01 VITALS
TEMPERATURE: 97.5 F | HEIGHT: 66 IN | RESPIRATION RATE: 18 BRPM | BODY MASS INDEX: 21.69 KG/M2 | SYSTOLIC BLOOD PRESSURE: 169 MMHG | HEART RATE: 75 BPM | WEIGHT: 135 LBS | DIASTOLIC BLOOD PRESSURE: 75 MMHG | OXYGEN SATURATION: 98 %

## 2022-01-01 DIAGNOSIS — N18.6 ESRD (END STAGE RENAL DISEASE) (HCC): ICD-10-CM

## 2022-01-01 DIAGNOSIS — Z99.2 ESRD ON DIALYSIS (HCC): ICD-10-CM

## 2022-01-01 DIAGNOSIS — I50.42 CHRONIC COMBINED SYSTOLIC AND DIASTOLIC CHF (CONGESTIVE HEART FAILURE) (HCC): ICD-10-CM

## 2022-01-01 DIAGNOSIS — I10 ESSENTIAL HYPERTENSION: ICD-10-CM

## 2022-01-01 DIAGNOSIS — D63.1 ANEMIA DUE TO CHRONIC KIDNEY DISEASE, ON CHRONIC DIALYSIS (HCC): ICD-10-CM

## 2022-01-01 DIAGNOSIS — R10.30 LOWER ABDOMINAL PAIN: ICD-10-CM

## 2022-01-01 DIAGNOSIS — N18.6 ESRD ON DIALYSIS (HCC): ICD-10-CM

## 2022-01-01 DIAGNOSIS — N18.6 ANEMIA DUE TO CHRONIC KIDNEY DISEASE, ON CHRONIC DIALYSIS (HCC): ICD-10-CM

## 2022-01-01 DIAGNOSIS — Z99.2 ANEMIA DUE TO CHRONIC KIDNEY DISEASE, ON CHRONIC DIALYSIS (HCC): ICD-10-CM

## 2022-01-01 DIAGNOSIS — R41.82 ALTERED MENTAL STATUS, UNSPECIFIED ALTERED MENTAL STATUS TYPE: ICD-10-CM

## 2022-01-01 DIAGNOSIS — T82.838A HEMORRHAGE OF ARTERIOVENOUS FISTULA, INITIAL ENCOUNTER (HCC): Primary | ICD-10-CM

## 2022-01-01 DIAGNOSIS — E87.5 HYPERKALEMIA: Primary | ICD-10-CM

## 2022-01-01 DIAGNOSIS — R07.9 CHEST PAIN, UNSPECIFIED TYPE: Primary | ICD-10-CM

## 2022-01-01 LAB
ACETAMINOPHEN LEVEL: < 5 UG/ML (ref 0–20)
ALBUMIN SERPL-MCNC: 2.7 G/DL (ref 3.5–5.1)
ALBUMIN SERPL-MCNC: 3.1 G/DL (ref 3.5–5.1)
ALBUMIN SERPL-MCNC: 3.3 G/DL (ref 3.5–5.1)
ALBUMIN SERPL-MCNC: 3.6 G/DL (ref 3.5–5.1)
ALBUMIN SERPL-MCNC: 3.7 G/DL (ref 3.5–5.1)
ALBUMIN SERPL-MCNC: 3.9 G/DL (ref 3.5–5.1)
ALLEN TEST: POSITIVE
ALP BLD-CCNC: 112 U/L (ref 38–126)
ALP BLD-CCNC: 115 U/L (ref 38–126)
ALP BLD-CCNC: 117 U/L (ref 38–126)
ALP BLD-CCNC: 118 U/L (ref 38–126)
ALP BLD-CCNC: 156 U/L (ref 38–126)
ALT SERPL-CCNC: 14 U/L (ref 11–66)
ALT SERPL-CCNC: 16 U/L (ref 11–66)
ALT SERPL-CCNC: 17 U/L (ref 11–66)
ALT SERPL-CCNC: 17 U/L (ref 11–66)
ALT SERPL-CCNC: 31 U/L (ref 11–66)
AMMONIA: 36 UMOL/L (ref 11–60)
ANION GAP SERPL CALCULATED.3IONS-SCNC: 11 MEQ/L (ref 8–16)
ANION GAP SERPL CALCULATED.3IONS-SCNC: 13 MEQ/L (ref 8–16)
ANION GAP SERPL CALCULATED.3IONS-SCNC: 18 MEQ/L (ref 8–16)
ANION GAP SERPL CALCULATED.3IONS-SCNC: 19 MEQ/L (ref 8–16)
ANION GAP SERPL CALCULATED.3IONS-SCNC: 19 MEQ/L (ref 8–16)
ANION GAP SERPL CALCULATED.3IONS-SCNC: 28 MEQ/L (ref 8–16)
ANISOCYTOSIS: PRESENT
APTT: 33.7 SECONDS (ref 22–38)
AST SERPL-CCNC: 21 U/L (ref 5–40)
AST SERPL-CCNC: 28 U/L (ref 5–40)
AST SERPL-CCNC: 30 U/L (ref 5–40)
AST SERPL-CCNC: 32 U/L (ref 5–40)
AST SERPL-CCNC: 71 U/L (ref 5–40)
BASE EXCESS (CALCULATED): 1.8 MMOL/L (ref -2.5–2.5)
BASE EXCESS MIXED: 3.9 MMOL/L (ref -2–3)
BASOPHILS # BLD: 0.1 %
BASOPHILS # BLD: 0.3 %
BASOPHILS # BLD: 0.3 %
BASOPHILS ABSOLUTE: 0 THOU/MM3 (ref 0–0.1)
BILIRUB SERPL-MCNC: 0.7 MG/DL (ref 0.3–1.2)
BILIRUB SERPL-MCNC: 0.7 MG/DL (ref 0.3–1.2)
BILIRUB SERPL-MCNC: 0.9 MG/DL (ref 0.3–1.2)
BILIRUB SERPL-MCNC: 1 MG/DL (ref 0.3–1.2)
BILIRUB SERPL-MCNC: 1.2 MG/DL (ref 0.3–1.2)
BILIRUBIN DIRECT: 0.3 MG/DL (ref 0–0.3)
BILIRUBIN DIRECT: 0.4 MG/DL (ref 0–0.3)
BUN BLDV-MCNC: 23 MG/DL (ref 7–22)
BUN BLDV-MCNC: 28 MG/DL (ref 7–22)
BUN BLDV-MCNC: 30 MG/DL (ref 7–22)
BUN BLDV-MCNC: 32 MG/DL (ref 7–22)
BUN BLDV-MCNC: 35 MG/DL (ref 7–22)
BUN BLDV-MCNC: 35 MG/DL (ref 7–22)
BUN BLDV-MCNC: 38 MG/DL (ref 7–22)
BUN BLDV-MCNC: 39 MG/DL (ref 7–22)
BUN BLDV-MCNC: 57 MG/DL (ref 7–22)
CALCIUM IONIZED SERUM: 1.08 MMOL/L (ref 1.12–1.32)
CALCIUM IONIZED: 0.8 MMOL/L (ref 1.12–1.32)
CALCIUM SERPL-MCNC: 8.2 MG/DL (ref 8.5–10.5)
CALCIUM SERPL-MCNC: 8.3 MG/DL (ref 8.5–10.5)
CALCIUM SERPL-MCNC: 8.6 MG/DL (ref 8.5–10.5)
CALCIUM SERPL-MCNC: 8.7 MG/DL (ref 8.5–10.5)
CALCIUM SERPL-MCNC: 8.7 MG/DL (ref 8.5–10.5)
CALCIUM SERPL-MCNC: 8.8 MG/DL (ref 8.5–10.5)
CALCIUM SERPL-MCNC: 9.1 MG/DL (ref 8.5–10.5)
CALCIUM SERPL-MCNC: 9.3 MG/DL (ref 8.5–10.5)
CALCIUM SERPL-MCNC: 9.4 MG/DL (ref 8.5–10.5)
CHLORIDE BLD-SCNC: 90 MEQ/L (ref 98–111)
CHLORIDE BLD-SCNC: 92 MEQ/L (ref 98–111)
CHLORIDE BLD-SCNC: 93 MEQ/L (ref 98–111)
CHLORIDE BLD-SCNC: 93 MEQ/L (ref 98–111)
CHLORIDE BLD-SCNC: 94 MEQ/L (ref 98–111)
CHLORIDE BLD-SCNC: 94 MEQ/L (ref 98–111)
CHLORIDE BLD-SCNC: 97 MEQ/L (ref 98–111)
CHLORIDE, WHOLE BLOOD: 96 MEQ/L (ref 98–109)
CO2: 17 MEQ/L (ref 23–33)
CO2: 23 MEQ/L (ref 23–33)
CO2: 25 MEQ/L (ref 23–33)
CO2: 25 MEQ/L (ref 23–33)
CO2: 26 MEQ/L (ref 23–33)
CO2: 27 MEQ/L (ref 23–33)
CO2: 27 MEQ/L (ref 23–33)
COLLECTED BY:: ABNORMAL
COLLECTED BY:: ABNORMAL
CREAT SERPL-MCNC: 4 MG/DL (ref 0.4–1.2)
CREAT SERPL-MCNC: 4.3 MG/DL (ref 0.4–1.2)
CREAT SERPL-MCNC: 4.6 MG/DL (ref 0.4–1.2)
CREAT SERPL-MCNC: 5.3 MG/DL (ref 0.4–1.2)
CREAT SERPL-MCNC: 6.1 MG/DL (ref 0.4–1.2)
CREAT SERPL-MCNC: 7.4 MG/DL (ref 0.4–1.2)
DEVICE: ABNORMAL
DEVICE: ABNORMAL
EKG ATRIAL RATE: 120 BPM
EKG Q-T INTERVAL: 282 MS
EKG Q-T INTERVAL: 312 MS
EKG Q-T INTERVAL: 320 MS
EKG Q-T INTERVAL: 348 MS
EKG Q-T INTERVAL: 358 MS
EKG Q-T INTERVAL: 362 MS
EKG QRS DURATION: 72 MS
EKG QRS DURATION: 76 MS
EKG QRS DURATION: 82 MS
EKG QRS DURATION: 86 MS
EKG QRS DURATION: 86 MS
EKG QRS DURATION: 94 MS
EKG QTC CALCULATION (BAZETT): 432 MS
EKG QTC CALCULATION (BAZETT): 443 MS
EKG QTC CALCULATION (BAZETT): 450 MS
EKG QTC CALCULATION (BAZETT): 472 MS
EKG QTC CALCULATION (BAZETT): 473 MS
EKG QTC CALCULATION (BAZETT): 474 MS
EKG R AXIS: 15 DEGREES
EKG R AXIS: 28 DEGREES
EKG R AXIS: 39 DEGREES
EKG R AXIS: 64 DEGREES
EKG T AXIS: -123 DEGREES
EKG T AXIS: -19 DEGREES
EKG T AXIS: 63 DEGREES
EKG T AXIS: 66 DEGREES
EKG T AXIS: 68 DEGREES
EKG T AXIS: 76 DEGREES
EKG VENTRICULAR RATE: 103 BPM
EKG VENTRICULAR RATE: 105 BPM
EKG VENTRICULAR RATE: 121 BPM
EKG VENTRICULAR RATE: 131 BPM
EKG VENTRICULAR RATE: 153 BPM
EKG VENTRICULAR RATE: 93 BPM
EOSINOPHIL # BLD: 0.1 %
EOSINOPHIL # BLD: 0.1 %
EOSINOPHIL # BLD: 0.5 %
EOSINOPHIL # BLD: 0.8 %
EOSINOPHIL # BLD: 0.9 %
EOSINOPHIL # BLD: 1.3 %
EOSINOPHILS ABSOLUTE: 0 THOU/MM3 (ref 0–0.4)
EOSINOPHILS ABSOLUTE: 0.1 THOU/MM3 (ref 0–0.4)
ERYTHROCYTE [DISTWIDTH] IN BLOOD BY AUTOMATED COUNT: 19.2 % (ref 11.5–14.5)
ERYTHROCYTE [DISTWIDTH] IN BLOOD BY AUTOMATED COUNT: 19.2 % (ref 11.5–14.5)
ERYTHROCYTE [DISTWIDTH] IN BLOOD BY AUTOMATED COUNT: 19.9 % (ref 11.5–14.5)
ERYTHROCYTE [DISTWIDTH] IN BLOOD BY AUTOMATED COUNT: 20.3 % (ref 11.5–14.5)
ERYTHROCYTE [DISTWIDTH] IN BLOOD BY AUTOMATED COUNT: 20.3 % (ref 11.5–14.5)
ERYTHROCYTE [DISTWIDTH] IN BLOOD BY AUTOMATED COUNT: 21.1 % (ref 11.5–14.5)
ERYTHROCYTE [DISTWIDTH] IN BLOOD BY AUTOMATED COUNT: 60.9 FL (ref 35–45)
ERYTHROCYTE [DISTWIDTH] IN BLOOD BY AUTOMATED COUNT: 63.6 FL (ref 35–45)
ERYTHROCYTE [DISTWIDTH] IN BLOOD BY AUTOMATED COUNT: 66 FL (ref 35–45)
ERYTHROCYTE [DISTWIDTH] IN BLOOD BY AUTOMATED COUNT: 67.7 FL (ref 35–45)
ERYTHROCYTE [DISTWIDTH] IN BLOOD BY AUTOMATED COUNT: 68.1 FL (ref 35–45)
ERYTHROCYTE [DISTWIDTH] IN BLOOD BY AUTOMATED COUNT: 71.3 FL (ref 35–45)
ETHYL ALCOHOL, SERUM: < 0.01 %
FIO2, MIXED VENOUS: 4
FLU A ANTIGEN: NEGATIVE
FLU B ANTIGEN: NEGATIVE
GFR SERPL CREATININE-BSD FRML MDRD: 11 ML/MIN/1.73M2
GFR SERPL CREATININE-BSD FRML MDRD: 12 ML/MIN/1.73M2
GFR SERPL CREATININE-BSD FRML MDRD: 15 ML/MIN/1.73M2
GFR SERPL CREATININE-BSD FRML MDRD: 16 ML/MIN/1.73M2
GFR SERPL CREATININE-BSD FRML MDRD: 17 ML/MIN/1.73M2
GFR SERPL CREATININE-BSD FRML MDRD: 8 ML/MIN/1.73M2
GLUCOSE BLD-MCNC: 100 MG/DL (ref 70–108)
GLUCOSE BLD-MCNC: 101 MG/DL (ref 70–108)
GLUCOSE BLD-MCNC: 103 MG/DL (ref 70–108)
GLUCOSE BLD-MCNC: 106 MG/DL (ref 70–108)
GLUCOSE BLD-MCNC: 115 MG/DL (ref 70–108)
GLUCOSE BLD-MCNC: 132 MG/DL (ref 70–108)
GLUCOSE BLD-MCNC: 138 MG/DL (ref 70–108)
GLUCOSE BLD-MCNC: 140 MG/DL (ref 70–108)
GLUCOSE BLD-MCNC: 154 MG/DL (ref 70–108)
GLUCOSE BLD-MCNC: 290 MG/DL (ref 70–108)
GLUCOSE BLD-MCNC: 35 MG/DL (ref 70–108)
GLUCOSE BLD-MCNC: 47 MG/DL (ref 70–108)
GLUCOSE BLD-MCNC: 55 MG/DL (ref 70–108)
GLUCOSE BLD-MCNC: 60 MG/DL (ref 70–108)
GLUCOSE BLD-MCNC: 60 MG/DL (ref 70–108)
GLUCOSE BLD-MCNC: 63 MG/DL (ref 70–108)
GLUCOSE BLD-MCNC: 65 MG/DL (ref 70–108)
GLUCOSE BLD-MCNC: 67 MG/DL (ref 70–108)
GLUCOSE BLD-MCNC: 84 MG/DL (ref 70–108)
GLUCOSE BLD-MCNC: 86 MG/DL (ref 70–108)
GLUCOSE BLD-MCNC: 90 MG/DL (ref 70–108)
GLUCOSE BLD-MCNC: 92 MG/DL (ref 70–108)
GLUCOSE BLD-MCNC: 96 MG/DL (ref 70–108)
GLUCOSE BLD-MCNC: 97 MG/DL (ref 70–108)
GLUCOSE BLD-MCNC: 97 MG/DL (ref 70–108)
GLUCOSE, WHOLE BLOOD: 117 MG/DL (ref 70–108)
GLUCOSE, WHOLE BLOOD: 84 MG/DL (ref 70–108)
HCO3, MIXED: 29 MMOL/L (ref 23–28)
HCO3: 25 MMOL/L (ref 23–28)
HCT VFR BLD CALC: 22.3 % (ref 42–52)
HCT VFR BLD CALC: 24 % (ref 42–52)
HCT VFR BLD CALC: 24.6 % (ref 42–52)
HCT VFR BLD CALC: 27.5 % (ref 42–52)
HCT VFR BLD CALC: 29.4 % (ref 42–52)
HCT VFR BLD CALC: 30.7 % (ref 42–52)
HEMOGLOBIN: 10.2 GM/DL (ref 14–18)
HEMOGLOBIN: 10.6 GM/DL (ref 14–18)
HEMOGLOBIN: 7.9 GM/DL (ref 14–18)
HEMOGLOBIN: 8 GM/DL (ref 14–18)
HEMOGLOBIN: 8.3 GM/DL (ref 14–18)
HEMOGLOBIN: 9.5 GM/DL (ref 14–18)
IFIO2: 100
IMMATURE GRANS (ABS): 0.02 THOU/MM3 (ref 0–0.07)
IMMATURE GRANS (ABS): 0.03 THOU/MM3 (ref 0–0.07)
IMMATURE GRANS (ABS): 0.03 THOU/MM3 (ref 0–0.07)
IMMATURE GRANS (ABS): 0.05 THOU/MM3 (ref 0–0.07)
IMMATURE GRANS (ABS): 0.06 THOU/MM3 (ref 0–0.07)
IMMATURE GRANS (ABS): 0.08 THOU/MM3 (ref 0–0.07)
IMMATURE GRANULOCYTES: 0.3 %
IMMATURE GRANULOCYTES: 0.4 %
IMMATURE GRANULOCYTES: 0.7 %
IMMATURE GRANULOCYTES: 0.8 %
INR BLD: 1.1 (ref 0.85–1.13)
LACTIC ACID, SEPSIS: 0.7 MMOL/L (ref 0.5–1.9)
LACTIC ACID, SEPSIS: 1.4 MMOL/L (ref 0.5–1.9)
LACTIC ACID, SEPSIS: 1.9 MMOL/L (ref 0.5–1.9)
LACTIC ACID: 3.2 MMOL/L (ref 0.5–2)
LACTIC ACID: 6.9 MMOL/L (ref 0.5–2)
LACTIC ACID: 8.6 MMOL/L (ref 0.5–2)
LIPASE: 17.4 U/L (ref 5.6–51.3)
LIPASE: 42.1 U/L (ref 5.6–51.3)
LV EF: 50 %
LVEF MODALITY: NORMAL
LYMPHOCYTES # BLD: 3.5 %
LYMPHOCYTES # BLD: 5.2 %
LYMPHOCYTES # BLD: 6 %
LYMPHOCYTES # BLD: 6.6 %
LYMPHOCYTES # BLD: 6.6 %
LYMPHOCYTES # BLD: 8.2 %
LYMPHOCYTES ABSOLUTE: 0.4 THOU/MM3 (ref 1–4.8)
LYMPHOCYTES ABSOLUTE: 0.4 THOU/MM3 (ref 1–4.8)
LYMPHOCYTES ABSOLUTE: 0.5 THOU/MM3 (ref 1–4.8)
LYMPHOCYTES ABSOLUTE: 0.8 THOU/MM3 (ref 1–4.8)
MAGNESIUM: 1.9 MG/DL (ref 1.6–2.4)
MAGNESIUM: 2.2 MG/DL (ref 1.6–2.4)
MCH RBC QN AUTO: 30.5 PG (ref 26–33)
MCH RBC QN AUTO: 31 PG (ref 26–33)
MCH RBC QN AUTO: 31.7 PG (ref 26–33)
MCH RBC QN AUTO: 31.8 PG (ref 26–33)
MCHC RBC AUTO-ENTMCNC: 33.3 GM/DL (ref 32.2–35.5)
MCHC RBC AUTO-ENTMCNC: 33.7 GM/DL (ref 32.2–35.5)
MCHC RBC AUTO-ENTMCNC: 34.5 GM/DL (ref 32.2–35.5)
MCHC RBC AUTO-ENTMCNC: 34.5 GM/DL (ref 32.2–35.5)
MCHC RBC AUTO-ENTMCNC: 34.7 GM/DL (ref 32.2–35.5)
MCHC RBC AUTO-ENTMCNC: 35.4 GM/DL (ref 32.2–35.5)
MCV RBC AUTO: 87.5 FL (ref 80–94)
MCV RBC AUTO: 89.9 FL (ref 80–94)
MCV RBC AUTO: 90.4 FL (ref 80–94)
MCV RBC AUTO: 91.6 FL (ref 80–94)
MCV RBC AUTO: 91.9 FL (ref 80–94)
MCV RBC AUTO: 93 FL (ref 80–94)
MODE: ABNORMAL
MONOCYTES # BLD: 4.4 %
MONOCYTES # BLD: 4.8 %
MONOCYTES # BLD: 6.2 %
MONOCYTES # BLD: 6.8 %
MONOCYTES # BLD: 8.3 %
MONOCYTES # BLD: 9.6 %
MONOCYTES ABSOLUTE: 0.4 THOU/MM3 (ref 0.4–1.3)
MONOCYTES ABSOLUTE: 0.5 THOU/MM3 (ref 0.4–1.3)
MONOCYTES ABSOLUTE: 0.6 THOU/MM3 (ref 0.4–1.3)
MONOCYTES ABSOLUTE: 0.6 THOU/MM3 (ref 0.4–1.3)
MONOCYTES ABSOLUTE: 0.7 THOU/MM3 (ref 0.4–1.3)
MONOCYTES ABSOLUTE: 0.7 THOU/MM3 (ref 0.4–1.3)
NUCLEATED RED BLOOD CELLS: 0 /100 WBC
NUCLEATED RED BLOOD CELLS: 1 /100 WBC
O2 SAT, MIXED: 33 %
O2 SATURATION: 100 %
OSMOLALITY CALCULATION: 277.6 MOSMOL/KG (ref 275–300)
OSMOLALITY CALCULATION: 286.1 MOSMOL/KG (ref 275–300)
PCO2, MIXED VENOUS: 44 MMHG (ref 41–51)
PCO2: 33 MMHG (ref 35–45)
PH BLOOD GAS: 7.49 (ref 7.35–7.45)
PH, MIXED: 7.42 (ref 7.31–7.41)
PHOSPHORUS: 2.3 MG/DL (ref 2.4–4.7)
PHOSPHORUS: 3.2 MG/DL (ref 2.4–4.7)
PHOSPHORUS: 3.5 MG/DL (ref 2.4–4.7)
PHOSPHORUS: 4.1 MG/DL (ref 2.4–4.7)
PIP: 22 CMH2O
PLATELET # BLD: 108 THOU/MM3 (ref 130–400)
PLATELET # BLD: 152 THOU/MM3 (ref 130–400)
PLATELET # BLD: 222 THOU/MM3 (ref 130–400)
PLATELET # BLD: 254 THOU/MM3 (ref 130–400)
PLATELET # BLD: 266 THOU/MM3 (ref 130–400)
PLATELET # BLD: 289 THOU/MM3 (ref 130–400)
PLATELET ESTIMATE: ADEQUATE
PLATELET ESTIMATE: ADEQUATE
PMV BLD AUTO: 10.2 FL (ref 9.4–12.4)
PMV BLD AUTO: 10.4 FL (ref 9.4–12.4)
PMV BLD AUTO: 10.4 FL (ref 9.4–12.4)
PMV BLD AUTO: 10.6 FL (ref 9.4–12.4)
PMV BLD AUTO: 11 FL (ref 9.4–12.4)
PMV BLD AUTO: 9.6 FL (ref 9.4–12.4)
PO2 MIXED: 20 MMHG (ref 25–40)
PO2: 451 MMHG (ref 71–104)
POC LACTIC ACID: 1.6 MMOL/L (ref 0.5–1.9)
POC LACTIC ACID: 5.4 MMOL/L (ref 0.5–1.9)
POIKILOCYTES: ABNORMAL
POTASSIUM REFLEX MAGNESIUM: 4.6 MEQ/L (ref 3.5–5.2)
POTASSIUM REFLEX MAGNESIUM: 4.7 MEQ/L (ref 3.5–5.2)
POTASSIUM REFLEX MAGNESIUM: 5.8 MEQ/L (ref 3.5–5.2)
POTASSIUM REFLEX MAGNESIUM: 5.9 MEQ/L (ref 3.5–5.2)
POTASSIUM SERPL-SCNC: 4.5 MEQ/L (ref 3.5–5.2)
POTASSIUM SERPL-SCNC: 4.6 MEQ/L (ref 3.5–5.2)
POTASSIUM SERPL-SCNC: 4.8 MEQ/L (ref 3.5–5.2)
POTASSIUM SERPL-SCNC: 5 MEQ/L (ref 3.5–5.2)
POTASSIUM SERPL-SCNC: 5.2 MEQ/L (ref 3.5–5.2)
POTASSIUM SERPL-SCNC: 5.4 MEQ/L (ref 3.5–5.2)
POTASSIUM SERPL-SCNC: 5.8 MEQ/L (ref 3.5–5.2)
POTASSIUM SERPL-SCNC: 5.9 MEQ/L (ref 3.5–5.2)
POTASSIUM SERPL-SCNC: 6.5 MEQ/L (ref 3.5–5.2)
POTASSIUM, WHOLE BLOOD: 4.5 MEQ/L (ref 3.5–4.9)
POTASSIUM, WHOLE BLOOD: 5.3 MEQ/L (ref 3.5–4.9)
PRO-BNP: ABNORMAL PG/ML (ref 0–1800)
PRO-BNP: ABNORMAL PG/ML (ref 0–1800)
PROCALCITONIN: 1.61 NG/ML (ref 0.01–0.09)
PROCALCITONIN: 11.39 NG/ML (ref 0.01–0.09)
PROCALCITONIN: 11.94 NG/ML (ref 0.01–0.09)
RBC # BLD: 2.55 MILL/MM3 (ref 4.7–6.1)
RBC # BLD: 2.58 MILL/MM3 (ref 4.7–6.1)
RBC # BLD: 2.72 MILL/MM3 (ref 4.7–6.1)
RBC # BLD: 3.06 MILL/MM3 (ref 4.7–6.1)
RBC # BLD: 3.21 MILL/MM3 (ref 4.7–6.1)
RBC # BLD: 3.34 MILL/MM3 (ref 4.7–6.1)
REASON FOR REJECTION: NORMAL
REJECTED TEST: NORMAL
SALICYLATE, SERUM: < 0.3 MG/DL (ref 2–10)
SARS-COV-2, NAAT: NOT  DETECTED
SCAN OF BLOOD SMEAR: NORMAL
SCAN OF BLOOD SMEAR: NORMAL
SEG NEUTROPHILS: 82.6 %
SEG NEUTROPHILS: 82.8 %
SEG NEUTROPHILS: 84.5 %
SEG NEUTROPHILS: 85.8 %
SEG NEUTROPHILS: 89.5 %
SEG NEUTROPHILS: 90.9 %
SEGMENTED NEUTROPHILS ABSOLUTE COUNT: 10.5 THOU/MM3 (ref 1.8–7.7)
SEGMENTED NEUTROPHILS ABSOLUTE COUNT: 6 THOU/MM3 (ref 1.8–7.7)
SEGMENTED NEUTROPHILS ABSOLUTE COUNT: 6.3 THOU/MM3 (ref 1.8–7.7)
SEGMENTED NEUTROPHILS ABSOLUTE COUNT: 6.4 THOU/MM3 (ref 1.8–7.7)
SEGMENTED NEUTROPHILS ABSOLUTE COUNT: 7.4 THOU/MM3 (ref 1.8–7.7)
SEGMENTED NEUTROPHILS ABSOLUTE COUNT: 8.2 THOU/MM3 (ref 1.8–7.7)
SET PEEP: 8 MMHG
SET PRESS SUPP: 14 CMH2O
SITE: ABNORMAL
SODIUM BLD-SCNC: 130 MEQ/L (ref 135–145)
SODIUM BLD-SCNC: 132 MEQ/L (ref 135–145)
SODIUM BLD-SCNC: 133 MEQ/L (ref 135–145)
SODIUM BLD-SCNC: 133 MEQ/L (ref 135–145)
SODIUM BLD-SCNC: 134 MEQ/L (ref 135–145)
SODIUM BLD-SCNC: 135 MEQ/L (ref 135–145)
SODIUM BLD-SCNC: 135 MEQ/L (ref 135–145)
SODIUM, WHOLE BLOOD: 133 MEQ/L (ref 138–146)
SOURCE, BLOOD GAS: ABNORMAL
TARGET CELLS: ABNORMAL
TOTAL PROTEIN: 6.2 G/DL (ref 6.1–8)
TOTAL PROTEIN: 6.2 G/DL (ref 6.1–8)
TOTAL PROTEIN: 6.3 G/DL (ref 6.1–8)
TOTAL PROTEIN: 6.8 G/DL (ref 6.1–8)
TOTAL PROTEIN: 7.5 G/DL (ref 6.1–8)
TROPONIN T: 0.2 NG/ML
TROPONIN T: 0.34 NG/ML
TROPONIN T: 0.44 NG/ML
WBC # BLD: 11.6 THOU/MM3 (ref 4.8–10.8)
WBC # BLD: 7.3 THOU/MM3 (ref 4.8–10.8)
WBC # BLD: 7.4 THOU/MM3 (ref 4.8–10.8)
WBC # BLD: 7.6 THOU/MM3 (ref 4.8–10.8)
WBC # BLD: 8.3 THOU/MM3 (ref 4.8–10.8)
WBC # BLD: 9.9 THOU/MM3 (ref 4.8–10.8)

## 2022-01-01 PROCEDURE — 6360000002 HC RX W HCPCS: Performed by: PHYSICIAN ASSISTANT

## 2022-01-01 PROCEDURE — 1200000003 HC TELEMETRY R&B

## 2022-01-01 PROCEDURE — 84484 ASSAY OF TROPONIN QUANT: CPT

## 2022-01-01 PROCEDURE — 6360000002 HC RX W HCPCS: Performed by: RADIOLOGY

## 2022-01-01 PROCEDURE — 80048 BASIC METABOLIC PNL TOTAL CA: CPT

## 2022-01-01 PROCEDURE — 83605 ASSAY OF LACTIC ACID: CPT

## 2022-01-01 PROCEDURE — 2580000003 HC RX 258: Performed by: PHYSICIAN ASSISTANT

## 2022-01-01 PROCEDURE — 82948 REAGENT STRIP/BLOOD GLUCOSE: CPT

## 2022-01-01 PROCEDURE — 93010 ELECTROCARDIOGRAM REPORT: CPT | Performed by: INTERNAL MEDICINE

## 2022-01-01 PROCEDURE — 99232 SBSQ HOSP IP/OBS MODERATE 35: CPT | Performed by: INTERNAL MEDICINE

## 2022-01-01 PROCEDURE — 2580000003 HC RX 258: Performed by: RADIOLOGY

## 2022-01-01 PROCEDURE — 36415 COLL VENOUS BLD VENIPUNCTURE: CPT

## 2022-01-01 PROCEDURE — 84100 ASSAY OF PHOSPHORUS: CPT

## 2022-01-01 PROCEDURE — 96366 THER/PROPH/DIAG IV INF ADDON: CPT

## 2022-01-01 PROCEDURE — 84295 ASSAY OF SERUM SODIUM: CPT

## 2022-01-01 PROCEDURE — 90935 HEMODIALYSIS ONE EVALUATION: CPT

## 2022-01-01 PROCEDURE — 84145 PROCALCITONIN (PCT): CPT

## 2022-01-01 PROCEDURE — 80076 HEPATIC FUNCTION PANEL: CPT

## 2022-01-01 PROCEDURE — 87804 INFLUENZA ASSAY W/OPTIC: CPT

## 2022-01-01 PROCEDURE — 96372 THER/PROPH/DIAG INJ SC/IM: CPT

## 2022-01-01 PROCEDURE — 80179 DRUG ASSAY SALICYLATE: CPT

## 2022-01-01 PROCEDURE — 2580000003 HC RX 258: Performed by: STUDENT IN AN ORGANIZED HEALTH CARE EDUCATION/TRAINING PROGRAM

## 2022-01-01 PROCEDURE — 36907 BALO ANGIOP CTR DIALYSIS SEG: CPT

## 2022-01-01 PROCEDURE — 85025 COMPLETE CBC W/AUTO DIFF WBC: CPT

## 2022-01-01 PROCEDURE — 80053 COMPREHEN METABOLIC PANEL: CPT

## 2022-01-01 PROCEDURE — 6370000000 HC RX 637 (ALT 250 FOR IP): Performed by: PHYSICIAN ASSISTANT

## 2022-01-01 PROCEDURE — 6360000002 HC RX W HCPCS: Performed by: STUDENT IN AN ORGANIZED HEALTH CARE EDUCATION/TRAINING PROGRAM

## 2022-01-01 PROCEDURE — 2700000000 HC OXYGEN THERAPY PER DAY

## 2022-01-01 PROCEDURE — 6360000002 HC RX W HCPCS: Performed by: EMERGENCY MEDICINE

## 2022-01-01 PROCEDURE — 96375 TX/PRO/DX INJ NEW DRUG ADDON: CPT

## 2022-01-01 PROCEDURE — G0378 HOSPITAL OBSERVATION PER HR: HCPCS

## 2022-01-01 PROCEDURE — 6360000002 HC RX W HCPCS: Performed by: INTERNAL MEDICINE

## 2022-01-01 PROCEDURE — 93005 ELECTROCARDIOGRAM TRACING: CPT | Performed by: PHYSICIAN ASSISTANT

## 2022-01-01 PROCEDURE — 90935 HEMODIALYSIS ONE EVALUATION: CPT | Performed by: NURSE PRACTITIONER

## 2022-01-01 PROCEDURE — 99233 SBSQ HOSP IP/OBS HIGH 50: CPT | Performed by: HOSPITALIST

## 2022-01-01 PROCEDURE — 93005 ELECTROCARDIOGRAM TRACING: CPT | Performed by: INTERNAL MEDICINE

## 2022-01-01 PROCEDURE — 51798 US URINE CAPACITY MEASURE: CPT

## 2022-01-01 PROCEDURE — 82330 ASSAY OF CALCIUM: CPT

## 2022-01-01 PROCEDURE — 83690 ASSAY OF LIPASE: CPT

## 2022-01-01 PROCEDURE — P9047 ALBUMIN (HUMAN), 25%, 50ML: HCPCS | Performed by: NURSE PRACTITIONER

## 2022-01-01 PROCEDURE — 82077 ASSAY SPEC XCP UR&BREATH IA: CPT

## 2022-01-01 PROCEDURE — 2580000003 HC RX 258: Performed by: FAMILY MEDICINE

## 2022-01-01 PROCEDURE — 71045 X-RAY EXAM CHEST 1 VIEW: CPT

## 2022-01-01 PROCEDURE — 6360000004 HC RX CONTRAST MEDICATION: Performed by: EMERGENCY MEDICINE

## 2022-01-01 PROCEDURE — 87635 SARS-COV-2 COVID-19 AMP PRB: CPT

## 2022-01-01 PROCEDURE — 2500000003 HC RX 250 WO HCPCS: Performed by: STUDENT IN AN ORGANIZED HEALTH CARE EDUCATION/TRAINING PROGRAM

## 2022-01-01 PROCEDURE — 94640 AIRWAY INHALATION TREATMENT: CPT

## 2022-01-01 PROCEDURE — G0257 UNSCHED DIALYSIS ESRD PT HOS: HCPCS

## 2022-01-01 PROCEDURE — 2709999900 IR ANGIOGRAM ARTERIOVENOUS SHUNT

## 2022-01-01 PROCEDURE — 99226 PR SBSQ OBSERVATION CARE/DAY 35 MINUTES: CPT | Performed by: HOSPITALIST

## 2022-01-01 PROCEDURE — 93307 TTE W/O DOPPLER COMPLETE: CPT

## 2022-01-01 PROCEDURE — 2500000003 HC RX 250 WO HCPCS: Performed by: FAMILY MEDICINE

## 2022-01-01 PROCEDURE — 6370000000 HC RX 637 (ALT 250 FOR IP): Performed by: STUDENT IN AN ORGANIZED HEALTH CARE EDUCATION/TRAINING PROGRAM

## 2022-01-01 PROCEDURE — 99283 EMERGENCY DEPT VISIT LOW MDM: CPT

## 2022-01-01 PROCEDURE — 2500000003 HC RX 250 WO HCPCS: Performed by: INTERNAL MEDICINE

## 2022-01-01 PROCEDURE — 2580000003 HC RX 258: Performed by: EMERGENCY MEDICINE

## 2022-01-01 PROCEDURE — 6360000002 HC RX W HCPCS: Performed by: FAMILY MEDICINE

## 2022-01-01 PROCEDURE — 93005 ELECTROCARDIOGRAM TRACING: CPT | Performed by: STUDENT IN AN ORGANIZED HEALTH CARE EDUCATION/TRAINING PROGRAM

## 2022-01-01 PROCEDURE — 96376 TX/PRO/DX INJ SAME DRUG ADON: CPT

## 2022-01-01 PROCEDURE — G0378 HOSPITAL OBSERVATION PER HR: HCPCS | Performed by: FAMILY MEDICINE

## 2022-01-01 PROCEDURE — 96374 THER/PROPH/DIAG INJ IV PUSH: CPT

## 2022-01-01 PROCEDURE — 82040 ASSAY OF SERUM ALBUMIN: CPT

## 2022-01-01 PROCEDURE — 87040 BLOOD CULTURE FOR BACTERIA: CPT

## 2022-01-01 PROCEDURE — 94660 CPAP INITIATION&MGMT: CPT

## 2022-01-01 PROCEDURE — 94760 N-INVAS EAR/PLS OXIMETRY 1: CPT

## 2022-01-01 PROCEDURE — 83735 ASSAY OF MAGNESIUM: CPT

## 2022-01-01 PROCEDURE — 82803 BLOOD GASES ANY COMBINATION: CPT

## 2022-01-01 PROCEDURE — 6370000000 HC RX 637 (ALT 250 FOR IP): Performed by: INTERNAL MEDICINE

## 2022-01-01 PROCEDURE — 36901 INTRO CATH DIALYSIS CIRCUIT: CPT

## 2022-01-01 PROCEDURE — 82140 ASSAY OF AMMONIA: CPT

## 2022-01-01 PROCEDURE — 6370000000 HC RX 637 (ALT 250 FOR IP): Performed by: EMERGENCY MEDICINE

## 2022-01-01 PROCEDURE — 6360000002 HC RX W HCPCS: Performed by: NURSE PRACTITIONER

## 2022-01-01 PROCEDURE — 99239 HOSP IP/OBS DSCHRG MGMT >30: CPT | Performed by: INTERNAL MEDICINE

## 2022-01-01 PROCEDURE — 2500000003 HC RX 250 WO HCPCS: Performed by: EMERGENCY MEDICINE

## 2022-01-01 PROCEDURE — 96365 THER/PROPH/DIAG IV INF INIT: CPT

## 2022-01-01 PROCEDURE — 99223 1ST HOSP IP/OBS HIGH 75: CPT | Performed by: PHYSICIAN ASSISTANT

## 2022-01-01 PROCEDURE — 94761 N-INVAS EAR/PLS OXIMETRY MLT: CPT

## 2022-01-01 PROCEDURE — 99214 OFFICE O/P EST MOD 30 MIN: CPT | Performed by: NURSE PRACTITIONER

## 2022-01-01 PROCEDURE — 84132 ASSAY OF SERUM POTASSIUM: CPT

## 2022-01-01 PROCEDURE — 99285 EMERGENCY DEPT VISIT HI MDM: CPT

## 2022-01-01 PROCEDURE — 99223 1ST HOSP IP/OBS HIGH 75: CPT | Performed by: INTERNAL MEDICINE

## 2022-01-01 PROCEDURE — 85730 THROMBOPLASTIN TIME PARTIAL: CPT

## 2022-01-01 PROCEDURE — 74177 CT ABD & PELVIS W/CONTRAST: CPT

## 2022-01-01 PROCEDURE — 83880 ASSAY OF NATRIURETIC PEPTIDE: CPT

## 2022-01-01 PROCEDURE — 2500000003 HC RX 250 WO HCPCS: Performed by: RADIOLOGY

## 2022-01-01 PROCEDURE — 36600 WITHDRAWAL OF ARTERIAL BLOOD: CPT

## 2022-01-01 PROCEDURE — 96367 TX/PROPH/DG ADDL SEQ IV INF: CPT

## 2022-01-01 PROCEDURE — 96361 HYDRATE IV INFUSION ADD-ON: CPT

## 2022-01-01 PROCEDURE — P9047 ALBUMIN (HUMAN), 25%, 50ML: HCPCS | Performed by: INTERNAL MEDICINE

## 2022-01-01 PROCEDURE — 80143 DRUG ASSAY ACETAMINOPHEN: CPT

## 2022-01-01 PROCEDURE — C9113 INJ PANTOPRAZOLE SODIUM, VIA: HCPCS | Performed by: EMERGENCY MEDICINE

## 2022-01-01 PROCEDURE — 5A1D70Z PERFORMANCE OF URINARY FILTRATION, INTERMITTENT, LESS THAN 6 HOURS PER DAY: ICD-10-PCS | Performed by: INTERNAL MEDICINE

## 2022-01-01 PROCEDURE — 70450 CT HEAD/BRAIN W/O DYE: CPT

## 2022-01-01 PROCEDURE — 2580000003 HC RX 258: Performed by: INTERNAL MEDICINE

## 2022-01-01 PROCEDURE — 82248 BILIRUBIN DIRECT: CPT

## 2022-01-01 PROCEDURE — 99205 OFFICE O/P NEW HI 60 MIN: CPT | Performed by: INTERNAL MEDICINE

## 2022-01-01 PROCEDURE — 74176 CT ABD & PELVIS W/O CONTRAST: CPT

## 2022-01-01 PROCEDURE — 85610 PROTHROMBIN TIME: CPT

## 2022-01-01 PROCEDURE — 82947 ASSAY GLUCOSE BLOOD QUANT: CPT

## 2022-01-01 PROCEDURE — 99220 PR INITIAL OBSERVATION CARE/DAY 70 MINUTES: CPT | Performed by: PHYSICIAN ASSISTANT

## 2022-01-01 PROCEDURE — 90935 HEMODIALYSIS ONE EVALUATION: CPT | Performed by: INTERNAL MEDICINE

## 2022-01-01 PROCEDURE — 82435 ASSAY OF BLOOD CHLORIDE: CPT

## 2022-01-01 PROCEDURE — 6370000000 HC RX 637 (ALT 250 FOR IP): Performed by: RADIOLOGY

## 2022-01-01 PROCEDURE — 93005 ELECTROCARDIOGRAM TRACING: CPT | Performed by: FAMILY MEDICINE

## 2022-01-01 PROCEDURE — 6360000002 HC RX W HCPCS

## 2022-01-01 PROCEDURE — 2060000000 HC ICU INTERMEDIATE R&B

## 2022-01-01 PROCEDURE — 99226 PR SBSQ OBSERVATION CARE/DAY 35 MINUTES: CPT | Performed by: INTERNAL MEDICINE

## 2022-01-01 PROCEDURE — 93306 TTE W/DOPPLER COMPLETE: CPT

## 2022-01-01 PROCEDURE — A4216 STERILE WATER/SALINE, 10 ML: HCPCS | Performed by: EMERGENCY MEDICINE

## 2022-01-01 PROCEDURE — 6360000004 HC RX CONTRAST MEDICATION: Performed by: RADIOLOGY

## 2022-01-01 PROCEDURE — 93005 ELECTROCARDIOGRAM TRACING: CPT | Performed by: EMERGENCY MEDICINE

## 2022-01-01 RX ORDER — ASPIRIN 81 MG/1
81 TABLET ORAL DAILY
Status: DISCONTINUED | OUTPATIENT
Start: 2022-01-01 | End: 2022-01-01 | Stop reason: HOSPADM

## 2022-01-01 RX ORDER — ALBUTEROL SULFATE 90 UG/1
1 AEROSOL, METERED RESPIRATORY (INHALATION) EVERY 4 HOURS PRN
Status: DISCONTINUED | OUTPATIENT
Start: 2022-01-01 | End: 2022-01-01 | Stop reason: HOSPADM

## 2022-01-01 RX ORDER — ACETAMINOPHEN 650 MG/1
650 SUPPOSITORY RECTAL EVERY 6 HOURS PRN
Status: DISCONTINUED | OUTPATIENT
Start: 2022-01-01 | End: 2022-01-01

## 2022-01-01 RX ORDER — CYCLOBENZAPRINE HCL 10 MG
10 TABLET ORAL DAILY
Status: DISCONTINUED | OUTPATIENT
Start: 2022-01-01 | End: 2022-01-01

## 2022-01-01 RX ORDER — SODIUM CHLORIDE 9 MG/ML
INJECTION, SOLUTION INTRAVENOUS PRN
Status: DISCONTINUED | OUTPATIENT
Start: 2022-01-01 | End: 2022-01-01 | Stop reason: HOSPADM

## 2022-01-01 RX ORDER — HYDROCODONE BITARTRATE AND ACETAMINOPHEN 5; 325 MG/1; MG/1
1 TABLET ORAL EVERY 8 HOURS
COMMUNITY

## 2022-01-01 RX ORDER — LIDOCAINE 40 MG/G
CREAM TOPICAL
Status: DISCONTINUED | OUTPATIENT
Start: 2022-01-01 | End: 2022-01-01 | Stop reason: HOSPADM

## 2022-01-01 RX ORDER — MORPHINE SULFATE 2 MG/ML
2 INJECTION, SOLUTION INTRAMUSCULAR; INTRAVENOUS
Status: DISCONTINUED | OUTPATIENT
Start: 2022-01-01 | End: 2022-05-21 | Stop reason: HOSPADM

## 2022-01-01 RX ORDER — LOSARTAN POTASSIUM 50 MG/1
50 TABLET ORAL DAILY
Status: DISCONTINUED | OUTPATIENT
Start: 2022-01-01 | End: 2022-01-01 | Stop reason: HOSPADM

## 2022-01-01 RX ORDER — SODIUM CHLORIDE 0.9 % (FLUSH) 0.9 %
10 SYRINGE (ML) INJECTION PRN
Status: DISCONTINUED | OUTPATIENT
Start: 2022-01-01 | End: 2022-01-01

## 2022-01-01 RX ORDER — ONDANSETRON 2 MG/ML
4 INJECTION INTRAMUSCULAR; INTRAVENOUS ONCE
Status: COMPLETED | OUTPATIENT
Start: 2022-01-01 | End: 2022-01-01

## 2022-01-01 RX ORDER — SODIUM CHLORIDE 450 MG/100ML
INJECTION, SOLUTION INTRAVENOUS CONTINUOUS
Status: DISCONTINUED | OUTPATIENT
Start: 2022-01-01 | End: 2022-01-01 | Stop reason: HOSPADM

## 2022-01-01 RX ORDER — MAGNESIUM SULFATE IN WATER 40 MG/ML
2000 INJECTION, SOLUTION INTRAVENOUS PRN
Status: DISCONTINUED | OUTPATIENT
Start: 2022-01-01 | End: 2022-01-01

## 2022-01-01 RX ORDER — POLYETHYLENE GLYCOL 3350 17 G/17G
17 POWDER, FOR SOLUTION ORAL DAILY PRN
Status: DISCONTINUED | OUTPATIENT
Start: 2022-01-01 | End: 2022-01-01 | Stop reason: HOSPADM

## 2022-01-01 RX ORDER — CLOPIDOGREL BISULFATE 75 MG/1
75 TABLET ORAL DAILY
Status: DISCONTINUED | OUTPATIENT
Start: 2022-01-01 | End: 2022-01-01 | Stop reason: HOSPADM

## 2022-01-01 RX ORDER — LOSARTAN POTASSIUM 50 MG/1
50 TABLET ORAL DAILY
Status: DISCONTINUED | OUTPATIENT
Start: 2022-01-01 | End: 2022-01-01

## 2022-01-01 RX ORDER — ONDANSETRON 2 MG/ML
4 INJECTION INTRAMUSCULAR; INTRAVENOUS EVERY 6 HOURS PRN
Status: DISCONTINUED | OUTPATIENT
Start: 2022-01-01 | End: 2022-01-01 | Stop reason: HOSPADM

## 2022-01-01 RX ORDER — ACETAMINOPHEN 650 MG/1
650 SUPPOSITORY RECTAL EVERY 6 HOURS PRN
Status: DISCONTINUED | OUTPATIENT
Start: 2022-01-01 | End: 2022-01-01 | Stop reason: HOSPADM

## 2022-01-01 RX ORDER — ASPIRIN 81 MG/1
81 TABLET ORAL DAILY
Status: DISCONTINUED | OUTPATIENT
Start: 2022-01-01 | End: 2022-01-01

## 2022-01-01 RX ORDER — ATORVASTATIN CALCIUM 40 MG/1
40 TABLET, FILM COATED ORAL DAILY
Status: DISCONTINUED | OUTPATIENT
Start: 2022-01-01 | End: 2022-01-01 | Stop reason: HOSPADM

## 2022-01-01 RX ORDER — CALCITRIOL 0.25 UG/1
1.5 CAPSULE, LIQUID FILLED ORAL
Status: DISCONTINUED | OUTPATIENT
Start: 2022-01-01 | End: 2022-01-01 | Stop reason: HOSPADM

## 2022-01-01 RX ORDER — LACTULOSE 10 G/15ML
20 SOLUTION ORAL 2 TIMES DAILY
Status: DISCONTINUED | OUTPATIENT
Start: 2022-01-01 | End: 2022-01-01 | Stop reason: HOSPADM

## 2022-01-01 RX ORDER — ONDANSETRON 4 MG/1
4 TABLET, ORALLY DISINTEGRATING ORAL EVERY 8 HOURS PRN
Status: DISCONTINUED | OUTPATIENT
Start: 2022-01-01 | End: 2022-01-01 | Stop reason: HOSPADM

## 2022-01-01 RX ORDER — ACETAMINOPHEN 325 MG/1
650 TABLET ORAL EVERY 6 HOURS PRN
Status: DISCONTINUED | OUTPATIENT
Start: 2022-01-01 | End: 2022-01-01 | Stop reason: HOSPADM

## 2022-01-01 RX ORDER — DEXTROSE MONOHYDRATE 100 MG/ML
INJECTION, SOLUTION INTRAVENOUS CONTINUOUS
Status: ACTIVE | OUTPATIENT
Start: 2022-01-01 | End: 2022-01-01

## 2022-01-01 RX ORDER — DEXTROSE MONOHYDRATE 50 MG/ML
100 INJECTION, SOLUTION INTRAVENOUS PRN
Status: DISCONTINUED | OUTPATIENT
Start: 2022-01-01 | End: 2022-01-01

## 2022-01-01 RX ORDER — CLINDAMYCIN PHOSPHATE 600 MG/50ML
600 INJECTION INTRAVENOUS
Status: COMPLETED | OUTPATIENT
Start: 2022-01-01 | End: 2022-01-01

## 2022-01-01 RX ORDER — POLYETHYLENE GLYCOL 3350 17 G/17G
17 POWDER, FOR SOLUTION ORAL DAILY
Status: DISCONTINUED | OUTPATIENT
Start: 2022-01-01 | End: 2022-01-01

## 2022-01-01 RX ORDER — AMOXICILLIN AND CLAVULANATE POTASSIUM 500; 125 MG/1; MG/1
1 TABLET, FILM COATED ORAL EVERY 12 HOURS SCHEDULED
DISCHARGE
Start: 2022-01-01 | End: 2022-01-01

## 2022-01-01 RX ORDER — FERROUS SULFATE 325(65) MG
325 TABLET ORAL DAILY
Status: DISCONTINUED | OUTPATIENT
Start: 2022-01-01 | End: 2022-01-01 | Stop reason: HOSPADM

## 2022-01-01 RX ORDER — ACETAMINOPHEN 325 MG/1
650 TABLET ORAL EVERY 6 HOURS PRN
Status: DISCONTINUED | OUTPATIENT
Start: 2022-01-01 | End: 2022-01-01

## 2022-01-01 RX ORDER — AMOXICILLIN AND CLAVULANATE POTASSIUM 500; 125 MG/1; MG/1
TABLET, FILM COATED ORAL EVERY 12 HOURS SCHEDULED
Status: DISCONTINUED | OUTPATIENT
Start: 2022-01-01 | End: 2022-01-01 | Stop reason: HOSPADM

## 2022-01-01 RX ORDER — CALCITRIOL 0.25 UG/1
1.5 CAPSULE, LIQUID FILLED ORAL
Status: DISCONTINUED | OUTPATIENT
Start: 2022-01-01 | End: 2022-01-01

## 2022-01-01 RX ORDER — HEPARIN SODIUM 5000 [USP'U]/ML
5000 INJECTION, SOLUTION INTRAVENOUS; SUBCUTANEOUS 3 TIMES DAILY
Status: DISCONTINUED | OUTPATIENT
Start: 2022-01-01 | End: 2022-01-01

## 2022-01-01 RX ORDER — SODIUM CHLORIDE 0.9 % (FLUSH) 0.9 %
5-40 SYRINGE (ML) INJECTION EVERY 12 HOURS SCHEDULED
Status: DISCONTINUED | OUTPATIENT
Start: 2022-01-01 | End: 2022-05-21 | Stop reason: HOSPADM

## 2022-01-01 RX ORDER — HEPARIN SODIUM 5000 [USP'U]/ML
5000 INJECTION, SOLUTION INTRAVENOUS; SUBCUTANEOUS 3 TIMES DAILY
Status: DISCONTINUED | OUTPATIENT
Start: 2022-01-01 | End: 2022-01-01 | Stop reason: HOSPADM

## 2022-01-01 RX ORDER — MORPHINE SULFATE 2 MG/ML
2 INJECTION, SOLUTION INTRAMUSCULAR; INTRAVENOUS EVERY 4 HOURS PRN
Status: DISCONTINUED | OUTPATIENT
Start: 2022-01-01 | End: 2022-01-01

## 2022-01-01 RX ORDER — LORAZEPAM 2 MG/ML
1 INJECTION INTRAMUSCULAR ONCE
Status: COMPLETED | OUTPATIENT
Start: 2022-01-01 | End: 2022-01-01

## 2022-01-01 RX ORDER — ATORVASTATIN CALCIUM 40 MG/1
40 TABLET, FILM COATED ORAL DAILY
Status: DISCONTINUED | OUTPATIENT
Start: 2022-01-01 | End: 2022-01-01

## 2022-01-01 RX ORDER — SODIUM CHLORIDE 0.9 % (FLUSH) 0.9 %
5-40 SYRINGE (ML) INJECTION PRN
Status: DISCONTINUED | OUTPATIENT
Start: 2022-01-01 | End: 2022-05-21 | Stop reason: HOSPADM

## 2022-01-01 RX ORDER — FENTANYL CITRATE 50 UG/ML
12.5 INJECTION, SOLUTION INTRAMUSCULAR; INTRAVENOUS ONCE
Status: COMPLETED | OUTPATIENT
Start: 2022-01-01 | End: 2022-01-01

## 2022-01-01 RX ORDER — SCOLOPAMINE TRANSDERMAL SYSTEM 1 MG/1
1 PATCH, EXTENDED RELEASE TRANSDERMAL
Status: DISCONTINUED | OUTPATIENT
Start: 2022-01-01 | End: 2022-05-21 | Stop reason: HOSPADM

## 2022-01-01 RX ORDER — SODIUM POLYSTYRENE SULFONATE 15 G/60ML
15 SUSPENSION ORAL; RECTAL ONCE
Status: COMPLETED | OUTPATIENT
Start: 2022-01-01 | End: 2022-01-01

## 2022-01-01 RX ORDER — CLINDAMYCIN PHOSPHATE 600 MG/50ML
600 INJECTION INTRAVENOUS
Status: CANCELLED | OUTPATIENT
Start: 2022-01-01

## 2022-01-01 RX ORDER — ALBUTEROL SULFATE 2.5 MG/3ML
2.5 SOLUTION RESPIRATORY (INHALATION) 3 TIMES DAILY
Status: DISCONTINUED | OUTPATIENT
Start: 2022-01-01 | End: 2022-01-01

## 2022-01-01 RX ORDER — SODIUM CHLORIDE 9 MG/ML
INJECTION, SOLUTION INTRAVENOUS PRN
Status: DISCONTINUED | OUTPATIENT
Start: 2022-01-01 | End: 2022-01-01

## 2022-01-01 RX ORDER — DOCUSATE SODIUM 100 MG/1
100 CAPSULE, LIQUID FILLED ORAL 2 TIMES DAILY
Status: DISCONTINUED | OUTPATIENT
Start: 2022-01-01 | End: 2022-01-01 | Stop reason: HOSPADM

## 2022-01-01 RX ORDER — ASCORBIC ACID 500 MG
500 TABLET ORAL DAILY
Status: DISCONTINUED | OUTPATIENT
Start: 2022-01-01 | End: 2022-01-01 | Stop reason: HOSPADM

## 2022-01-01 RX ORDER — MORPHINE SULFATE 2 MG/ML
2 INJECTION, SOLUTION INTRAMUSCULAR; INTRAVENOUS ONCE
Status: COMPLETED | OUTPATIENT
Start: 2022-01-01 | End: 2022-01-01

## 2022-01-01 RX ORDER — METOPROLOL TARTRATE 50 MG/1
100 TABLET, FILM COATED ORAL ONCE
Status: COMPLETED | OUTPATIENT
Start: 2022-01-01 | End: 2022-01-01

## 2022-01-01 RX ORDER — CALCIUM CHLORIDE 100 MG/ML
1000 INJECTION INTRAVENOUS; INTRAVENTRICULAR ONCE
Status: COMPLETED | OUTPATIENT
Start: 2022-01-01 | End: 2022-01-01

## 2022-01-01 RX ORDER — CINACALCET 30 MG/1
60 TABLET, FILM COATED ORAL
Status: DISCONTINUED | OUTPATIENT
Start: 2022-01-01 | End: 2022-01-01

## 2022-01-01 RX ORDER — DEXTROSE MONOHYDRATE 25 G/50ML
25 INJECTION, SOLUTION INTRAVENOUS ONCE
Status: COMPLETED | OUTPATIENT
Start: 2022-01-01 | End: 2022-01-01

## 2022-01-01 RX ORDER — MORPHINE SULFATE 4 MG/ML
4 INJECTION, SOLUTION INTRAMUSCULAR; INTRAVENOUS
Status: DISCONTINUED | OUTPATIENT
Start: 2022-01-01 | End: 2022-05-21 | Stop reason: HOSPADM

## 2022-01-01 RX ORDER — ALBUMIN (HUMAN) 12.5 G/50ML
25 SOLUTION INTRAVENOUS ONCE
Status: COMPLETED | OUTPATIENT
Start: 2022-01-01 | End: 2022-01-01

## 2022-01-01 RX ORDER — DIGOXIN 0.25 MG/ML
250 INJECTION INTRAMUSCULAR; INTRAVENOUS ONCE
Status: COMPLETED | OUTPATIENT
Start: 2022-01-01 | End: 2022-01-01

## 2022-01-01 RX ORDER — METOPROLOL TARTRATE 100 MG/1
100 TABLET ORAL 2 TIMES DAILY
Status: DISCONTINUED | OUTPATIENT
Start: 2022-01-01 | End: 2022-01-01

## 2022-01-01 RX ORDER — ONDANSETRON 4 MG/1
4 TABLET, ORALLY DISINTEGRATING ORAL EVERY 8 HOURS PRN
Status: DISCONTINUED | OUTPATIENT
Start: 2022-01-01 | End: 2022-01-01

## 2022-01-01 RX ORDER — FOLIC ACID/VIT B COMPLEX AND C 5 MG
1 TABLET ORAL DAILY
Status: DISCONTINUED | OUTPATIENT
Start: 2022-01-01 | End: 2022-01-01

## 2022-01-01 RX ORDER — SODIUM CHLORIDE 0.9 % (FLUSH) 0.9 %
10 SYRINGE (ML) INJECTION PRN
Status: DISCONTINUED | OUTPATIENT
Start: 2022-01-01 | End: 2022-01-01 | Stop reason: HOSPADM

## 2022-01-01 RX ORDER — HEPARIN SODIUM 1000 [USP'U]/ML
2000 INJECTION, SOLUTION INTRAVENOUS; SUBCUTANEOUS ONCE
Status: COMPLETED | OUTPATIENT
Start: 2022-01-01 | End: 2022-01-01

## 2022-01-01 RX ORDER — CLOPIDOGREL BISULFATE 75 MG/1
75 TABLET ORAL DAILY
Status: DISCONTINUED | OUTPATIENT
Start: 2022-01-01 | End: 2022-01-01

## 2022-01-01 RX ORDER — MORPHINE SULFATE 2 MG/ML
1 INJECTION, SOLUTION INTRAMUSCULAR; INTRAVENOUS
Status: DISCONTINUED | OUTPATIENT
Start: 2022-01-01 | End: 2022-01-01

## 2022-01-01 RX ORDER — FERROUS SULFATE 325(65) MG
325 TABLET ORAL DAILY
Status: DISCONTINUED | OUTPATIENT
Start: 2022-01-01 | End: 2022-01-01

## 2022-01-01 RX ORDER — LORAZEPAM 2 MG/ML
INJECTION INTRAMUSCULAR
Status: COMPLETED
Start: 2022-01-01 | End: 2022-01-01

## 2022-01-01 RX ORDER — ALBUTEROL SULFATE 90 UG/1
1 AEROSOL, METERED RESPIRATORY (INHALATION) EVERY 4 HOURS PRN
Status: DISCONTINUED | OUTPATIENT
Start: 2022-01-01 | End: 2022-01-01

## 2022-01-01 RX ORDER — CALCIUM GLUCONATE 20 MG/ML
2000 INJECTION, SOLUTION INTRAVENOUS ONCE
Status: COMPLETED | OUTPATIENT
Start: 2022-01-01 | End: 2022-01-01

## 2022-01-01 RX ORDER — POLYETHYLENE GLYCOL 3350 17 G/17G
17 POWDER, FOR SOLUTION ORAL DAILY PRN
Status: DISCONTINUED | OUTPATIENT
Start: 2022-01-01 | End: 2022-01-01

## 2022-01-01 RX ORDER — DIGOXIN 0.25 MG/ML
500 INJECTION INTRAMUSCULAR; INTRAVENOUS ONCE
Status: DISCONTINUED | OUTPATIENT
Start: 2022-01-01 | End: 2022-01-01

## 2022-01-01 RX ORDER — MIDAZOLAM HYDROCHLORIDE 1 MG/ML
1 INJECTION INTRAMUSCULAR; INTRAVENOUS ONCE
Status: COMPLETED | OUTPATIENT
Start: 2022-01-01 | End: 2022-01-01

## 2022-01-01 RX ORDER — SODIUM CHLORIDE 0.9 % (FLUSH) 0.9 %
10 SYRINGE (ML) INJECTION EVERY 12 HOURS SCHEDULED
Status: DISCONTINUED | OUTPATIENT
Start: 2022-01-01 | End: 2022-01-01

## 2022-01-01 RX ORDER — 0.9 % SODIUM CHLORIDE 0.9 %
1000 INTRAVENOUS SOLUTION INTRAVENOUS ONCE
Status: COMPLETED | OUTPATIENT
Start: 2022-01-01 | End: 2022-01-01

## 2022-01-01 RX ORDER — MORPHINE SULFATE 2 MG/ML
1 INJECTION, SOLUTION INTRAMUSCULAR; INTRAVENOUS EVERY 4 HOURS PRN
Status: DISCONTINUED | OUTPATIENT
Start: 2022-01-01 | End: 2022-01-01

## 2022-01-01 RX ORDER — SODIUM CHLORIDE 0.9 % (FLUSH) 0.9 %
10 SYRINGE (ML) INJECTION EVERY 12 HOURS SCHEDULED
Status: DISCONTINUED | OUTPATIENT
Start: 2022-01-01 | End: 2022-01-01 | Stop reason: HOSPADM

## 2022-01-01 RX ORDER — DIGOXIN 0.25 MG/ML
INJECTION INTRAMUSCULAR; INTRAVENOUS
Status: COMPLETED
Start: 2022-01-01 | End: 2022-01-01

## 2022-01-01 RX ORDER — ALBUTEROL SULFATE 2.5 MG/3ML
2.5 SOLUTION RESPIRATORY (INHALATION) 2 TIMES DAILY
Status: DISCONTINUED | OUTPATIENT
Start: 2022-01-01 | End: 2022-01-01 | Stop reason: HOSPADM

## 2022-01-01 RX ORDER — ONDANSETRON 2 MG/ML
4 INJECTION INTRAMUSCULAR; INTRAVENOUS EVERY 6 HOURS PRN
Status: DISCONTINUED | OUTPATIENT
Start: 2022-01-01 | End: 2022-01-01

## 2022-01-01 RX ORDER — METHOXY POLYETHYLENE GLYCOL-EPOETIN BETA 75 UG/.3ML
75 INJECTION, SOLUTION INTRAVENOUS
COMMUNITY

## 2022-01-01 RX ORDER — METOPROLOL TARTRATE 100 MG/1
100 TABLET ORAL 2 TIMES DAILY
Status: DISCONTINUED | OUTPATIENT
Start: 2022-01-01 | End: 2022-01-01 | Stop reason: HOSPADM

## 2022-01-01 RX ORDER — POTASSIUM CHLORIDE 7.45 MG/ML
10 INJECTION INTRAVENOUS PRN
Status: DISCONTINUED | OUTPATIENT
Start: 2022-01-01 | End: 2022-01-01

## 2022-01-01 RX ORDER — MIDAZOLAM HYDROCHLORIDE 1 MG/ML
1 INJECTION INTRAMUSCULAR; INTRAVENOUS ONCE
Status: CANCELLED | OUTPATIENT
Start: 2022-01-01 | End: 2022-01-01

## 2022-01-01 RX ORDER — DOCUSATE SODIUM 100 MG/1
100 CAPSULE, LIQUID FILLED ORAL 2 TIMES DAILY
Status: DISCONTINUED | OUTPATIENT
Start: 2022-01-01 | End: 2022-01-01

## 2022-01-01 RX ORDER — CYCLOBENZAPRINE HCL 10 MG
10 TABLET ORAL DAILY
Status: DISCONTINUED | OUTPATIENT
Start: 2022-01-01 | End: 2022-01-01 | Stop reason: HOSPADM

## 2022-01-01 RX ORDER — SODIUM CHLORIDE 450 MG/100ML
INJECTION, SOLUTION INTRAVENOUS CONTINUOUS
Status: CANCELLED | OUTPATIENT
Start: 2022-01-01

## 2022-01-01 RX ORDER — POTASSIUM CHLORIDE 20 MEQ/1
40 TABLET, EXTENDED RELEASE ORAL PRN
Status: DISCONTINUED | OUTPATIENT
Start: 2022-01-01 | End: 2022-01-01

## 2022-01-01 RX ORDER — LORAZEPAM 2 MG/ML
1 INJECTION INTRAMUSCULAR EVERY 4 HOURS PRN
Status: DISCONTINUED | OUTPATIENT
Start: 2022-01-01 | End: 2022-05-21 | Stop reason: HOSPADM

## 2022-01-01 RX ORDER — PANTOPRAZOLE SODIUM 40 MG/1
40 TABLET, DELAYED RELEASE ORAL
Status: DISCONTINUED | OUTPATIENT
Start: 2022-01-01 | End: 2022-01-01

## 2022-01-01 RX ORDER — CINACALCET 30 MG/1
60 TABLET, FILM COATED ORAL
Status: DISCONTINUED | OUTPATIENT
Start: 2022-01-01 | End: 2022-01-01 | Stop reason: HOSPADM

## 2022-01-01 RX ORDER — LIDOCAINE 40 MG/G
CREAM TOPICAL ONCE
Status: COMPLETED | OUTPATIENT
Start: 2022-01-01 | End: 2022-01-01

## 2022-01-01 RX ORDER — MORPHINE SULFATE 2 MG/ML
1 INJECTION, SOLUTION INTRAMUSCULAR; INTRAVENOUS EVERY 4 HOURS PRN
Status: DISCONTINUED | OUTPATIENT
Start: 2022-01-01 | End: 2022-01-01 | Stop reason: HOSPADM

## 2022-01-01 RX ORDER — HYDROCODONE BITARTRATE AND ACETAMINOPHEN 7.5; 325 MG/1; MG/1
1 TABLET ORAL EVERY 6 HOURS PRN
Status: DISCONTINUED | OUTPATIENT
Start: 2022-01-01 | End: 2022-01-01

## 2022-01-01 RX ORDER — FOLIC ACID/VIT B COMPLEX AND C 5 MG
1 TABLET ORAL DAILY
Status: DISCONTINUED | OUTPATIENT
Start: 2022-01-01 | End: 2022-01-01 | Stop reason: HOSPADM

## 2022-01-01 RX ORDER — ASCORBIC ACID 500 MG
500 TABLET ORAL DAILY
Status: DISCONTINUED | OUTPATIENT
Start: 2022-01-01 | End: 2022-01-01

## 2022-01-01 RX ORDER — LIDOCAINE 40 MG/G
CREAM TOPICAL ONCE
Status: CANCELLED | OUTPATIENT
Start: 2022-01-01 | End: 2022-01-01

## 2022-01-01 RX ORDER — PANTOPRAZOLE SODIUM 40 MG/1
40 TABLET, DELAYED RELEASE ORAL
Status: DISCONTINUED | OUTPATIENT
Start: 2022-01-01 | End: 2022-01-01 | Stop reason: HOSPADM

## 2022-01-01 RX ADMIN — METOPROLOL TARTRATE 100 MG: 100 TABLET, FILM COATED ORAL at 18:49

## 2022-01-01 RX ADMIN — OXYCODONE HYDROCHLORIDE AND ACETAMINOPHEN 500 MG: 500 TABLET ORAL at 13:14

## 2022-01-01 RX ADMIN — DEXTROSE MONOHYDRATE 25 G: 25 INJECTION, SOLUTION INTRAVENOUS at 12:02

## 2022-01-01 RX ADMIN — AMOXICILLIN AND CLAVULANATE POTASSIUM 1 TABLET: 500; 125 TABLET, FILM COATED ORAL at 21:32

## 2022-01-01 RX ADMIN — MORPHINE SULFATE 2 MG: 2 INJECTION, SOLUTION INTRAMUSCULAR; INTRAVENOUS at 03:22

## 2022-01-01 RX ADMIN — DOCUSATE SODIUM 100 MG: 100 CAPSULE, LIQUID FILLED ORAL at 09:05

## 2022-01-01 RX ADMIN — HEPARIN SODIUM 5000 UNITS: 5000 INJECTION INTRAVENOUS; SUBCUTANEOUS at 09:22

## 2022-01-01 RX ADMIN — ACETAMINOPHEN 650 MG: 325 TABLET ORAL at 13:44

## 2022-01-01 RX ADMIN — CALCITRIOL CAPSULES 0.25 MCG 1.5 MCG: 0.25 CAPSULE ORAL at 18:48

## 2022-01-01 RX ADMIN — HEPARIN SODIUM 2000 UNITS: 1000 INJECTION, SOLUTION INTRAVENOUS; SUBCUTANEOUS at 11:08

## 2022-01-01 RX ADMIN — CYCLOBENZAPRINE 10 MG: 10 TABLET, FILM COATED ORAL at 13:22

## 2022-01-01 RX ADMIN — PANTOPRAZOLE SODIUM 40 MG: 40 TABLET, DELAYED RELEASE ORAL at 06:08

## 2022-01-01 RX ADMIN — MORPHINE SULFATE 2 MG: 2 INJECTION, SOLUTION INTRAMUSCULAR; INTRAVENOUS at 23:22

## 2022-01-01 RX ADMIN — EPOETIN ALFA-EPBX 6000 UNITS: 3000 INJECTION, SOLUTION INTRAVENOUS; SUBCUTANEOUS at 15:13

## 2022-01-01 RX ADMIN — OXYCODONE HYDROCHLORIDE AND ACETAMINOPHEN 500 MG: 500 TABLET ORAL at 18:49

## 2022-01-01 RX ADMIN — HEPARIN SODIUM 5000 UNITS: 5000 INJECTION INTRAVENOUS; SUBCUTANEOUS at 16:18

## 2022-01-01 RX ADMIN — CLOPIDOGREL BISULFATE 75 MG: 75 TABLET ORAL at 22:16

## 2022-01-01 RX ADMIN — CINACALCET HYDROCHLORIDE 60 MG: 30 TABLET, FILM COATED ORAL at 18:49

## 2022-01-01 RX ADMIN — METOPROLOL TARTRATE 100 MG: 100 TABLET, FILM COATED ORAL at 18:00

## 2022-01-01 RX ADMIN — CALCITRIOL CAPSULES 0.25 MCG 1.5 MCG: 0.25 CAPSULE ORAL at 22:16

## 2022-01-01 RX ADMIN — MORPHINE SULFATE 2 MG: 2 INJECTION, SOLUTION INTRAMUSCULAR; INTRAVENOUS at 01:11

## 2022-01-01 RX ADMIN — FENTANYL CITRATE 12.5 MCG: 50 INJECTION, SOLUTION INTRAMUSCULAR; INTRAVENOUS at 05:23

## 2022-01-01 RX ADMIN — MORPHINE SULFATE 1 MG: 2 INJECTION, SOLUTION INTRAMUSCULAR; INTRAVENOUS at 17:58

## 2022-01-01 RX ADMIN — ASPIRIN 81 MG: 81 TABLET, COATED ORAL at 15:52

## 2022-01-01 RX ADMIN — DOCUSATE SODIUM 100 MG: 100 CAPSULE, LIQUID FILLED ORAL at 20:26

## 2022-01-01 RX ADMIN — METOPROLOL TARTRATE 100 MG: 100 TABLET, FILM COATED ORAL at 13:14

## 2022-01-01 RX ADMIN — CINACALCET HYDROCHLORIDE 60 MG: 30 TABLET, FILM COATED ORAL at 22:16

## 2022-01-01 RX ADMIN — HYDROMORPHONE HYDROCHLORIDE 0.5 MG: 1 INJECTION, SOLUTION INTRAMUSCULAR; INTRAVENOUS; SUBCUTANEOUS at 10:56

## 2022-01-01 RX ADMIN — ATORVASTATIN CALCIUM 40 MG: 40 TABLET, FILM COATED ORAL at 20:34

## 2022-01-01 RX ADMIN — DOCUSATE SODIUM 100 MG: 100 CAPSULE, LIQUID FILLED ORAL at 11:53

## 2022-01-01 RX ADMIN — MORPHINE SULFATE 1 MG: 2 INJECTION, SOLUTION INTRAMUSCULAR; INTRAVENOUS at 12:20

## 2022-01-01 RX ADMIN — ACETAMINOPHEN 650 MG: 325 TABLET ORAL at 11:53

## 2022-01-01 RX ADMIN — PANTOPRAZOLE SODIUM 40 MG: 40 TABLET, DELAYED RELEASE ORAL at 18:48

## 2022-01-01 RX ADMIN — MORPHINE SULFATE 2 MG: 2 INJECTION, SOLUTION INTRAMUSCULAR; INTRAVENOUS at 20:28

## 2022-01-01 RX ADMIN — MORPHINE SULFATE 1 MG: 2 INJECTION, SOLUTION INTRAMUSCULAR; INTRAVENOUS at 21:31

## 2022-01-01 RX ADMIN — SODIUM CHLORIDE: 4.5 INJECTION, SOLUTION INTRAVENOUS at 10:20

## 2022-01-01 RX ADMIN — ONDANSETRON 4 MG: 2 INJECTION INTRAMUSCULAR; INTRAVENOUS at 18:53

## 2022-01-01 RX ADMIN — MIDAZOLAM 0.5 MG: 1 INJECTION INTRAMUSCULAR; INTRAVENOUS at 10:55

## 2022-01-01 RX ADMIN — CLOPIDOGREL BISULFATE 75 MG: 75 TABLET ORAL at 15:13

## 2022-01-01 RX ADMIN — SODIUM CHLORIDE, PRESERVATIVE FREE 10 ML: 5 INJECTION INTRAVENOUS at 20:26

## 2022-01-01 RX ADMIN — AMOXICILLIN AND CLAVULANATE POTASSIUM 1 TABLET: 500; 125 TABLET, FILM COATED ORAL at 22:15

## 2022-01-01 RX ADMIN — FERROUS SULFATE TAB 325 MG (65 MG ELEMENTAL FE) 325 MG: 325 (65 FE) TAB at 22:17

## 2022-01-01 RX ADMIN — LORAZEPAM 1 MG: 2 INJECTION INTRAMUSCULAR; INTRAVENOUS at 23:21

## 2022-01-01 RX ADMIN — HEPARIN SODIUM 5000 UNITS: 5000 INJECTION INTRAVENOUS; SUBCUTANEOUS at 15:14

## 2022-01-01 RX ADMIN — HYDROCODONE BITARTRATE AND ACETAMINOPHEN 1 TABLET: 7.5; 325 TABLET ORAL at 20:34

## 2022-01-01 RX ADMIN — PANTOPRAZOLE SODIUM 40 MG: 40 TABLET, DELAYED RELEASE ORAL at 15:59

## 2022-01-01 RX ADMIN — OXYCODONE HYDROCHLORIDE AND ACETAMINOPHEN 500 MG: 500 TABLET ORAL at 11:51

## 2022-01-01 RX ADMIN — LACTULOSE 20 G: 20 SOLUTION ORAL at 13:23

## 2022-01-01 RX ADMIN — CALCIUM CHLORIDE 1000 MG: 100 INJECTION, SOLUTION INTRAVENOUS at 05:35

## 2022-01-01 RX ADMIN — CLINDAMYCIN PHOSPHATE 600 MG: 600 INJECTION, SOLUTION INTRAVENOUS at 10:19

## 2022-01-01 RX ADMIN — FERROUS SULFATE TAB 325 MG (65 MG ELEMENTAL FE) 325 MG: 325 (65 FE) TAB at 15:13

## 2022-01-01 RX ADMIN — HEPARIN SODIUM 5000 UNITS: 5000 INJECTION INTRAVENOUS; SUBCUTANEOUS at 08:58

## 2022-01-01 RX ADMIN — CLOPIDOGREL BISULFATE 75 MG: 75 TABLET ORAL at 11:51

## 2022-01-01 RX ADMIN — DEXTROSE MONOHYDRATE: 100 INJECTION, SOLUTION INTRAVENOUS at 16:50

## 2022-01-01 RX ADMIN — SODIUM CHLORIDE 40 MG: 9 INJECTION, SOLUTION INTRAMUSCULAR; INTRAVENOUS; SUBCUTANEOUS at 04:41

## 2022-01-01 RX ADMIN — CYCLOBENZAPRINE 10 MG: 10 TABLET, FILM COATED ORAL at 11:53

## 2022-01-01 RX ADMIN — HYDROCODONE BITARTRATE AND ACETAMINOPHEN 1 TABLET: 7.5; 325 TABLET ORAL at 15:54

## 2022-01-01 RX ADMIN — PANTOPRAZOLE SODIUM 40 MG: 40 TABLET, DELAYED RELEASE ORAL at 15:14

## 2022-01-01 RX ADMIN — ALBUTEROL SULFATE 2.5 MG: 2.5 SOLUTION RESPIRATORY (INHALATION) at 20:53

## 2022-01-01 RX ADMIN — ALBUTEROL SULFATE 2.5 MG: 2.5 SOLUTION RESPIRATORY (INHALATION) at 08:04

## 2022-01-01 RX ADMIN — ATORVASTATIN CALCIUM 40 MG: 40 TABLET, FILM COATED ORAL at 21:32

## 2022-01-01 RX ADMIN — LOSARTAN POTASSIUM 50 MG: 50 TABLET, FILM COATED ORAL at 18:49

## 2022-01-01 RX ADMIN — IOPAMIDOL 80 ML: 755 INJECTION, SOLUTION INTRAVENOUS at 07:17

## 2022-01-01 RX ADMIN — GLUCAGON HYDROCHLORIDE 1 MG: KIT at 18:24

## 2022-01-01 RX ADMIN — CLOPIDOGREL BISULFATE 75 MG: 75 TABLET ORAL at 09:22

## 2022-01-01 RX ADMIN — METOPROLOL TARTRATE 100 MG: 100 TABLET, FILM COATED ORAL at 20:23

## 2022-01-01 RX ADMIN — ALBUTEROL SULFATE 1 PUFF: 90 AEROSOL, METERED RESPIRATORY (INHALATION) at 02:47

## 2022-01-01 RX ADMIN — Medication 16 G: at 18:43

## 2022-01-01 RX ADMIN — MORPHINE SULFATE 2 MG: 2 INJECTION, SOLUTION INTRAMUSCULAR; INTRAVENOUS at 18:11

## 2022-01-01 RX ADMIN — ONDANSETRON 4 MG: 2 INJECTION INTRAMUSCULAR; INTRAVENOUS at 05:25

## 2022-01-01 RX ADMIN — DOCUSATE SODIUM 100 MG: 100 CAPSULE, LIQUID FILLED ORAL at 13:22

## 2022-01-01 RX ADMIN — LIDOCAINE 4%: 4 CREAM TOPICAL at 10:20

## 2022-01-01 RX ADMIN — Medication 1 TABLET: at 11:51

## 2022-01-01 RX ADMIN — CYCLOBENZAPRINE 10 MG: 10 TABLET, FILM COATED ORAL at 12:23

## 2022-01-01 RX ADMIN — LORAZEPAM 1 MG: 2 INJECTION INTRAMUSCULAR; INTRAVENOUS at 09:06

## 2022-01-01 RX ADMIN — LIDOCAINE HYDROCHLORIDE: 20 SOLUTION ORAL; TOPICAL at 04:37

## 2022-01-01 RX ADMIN — DEXTROSE MONOHYDRATE 100 ML/HR: 50 INJECTION, SOLUTION INTRAVENOUS at 10:02

## 2022-01-01 RX ADMIN — ASPIRIN 81 MG: 81 TABLET, COATED ORAL at 11:52

## 2022-01-01 RX ADMIN — HYDROCODONE BITARTRATE AND ACETAMINOPHEN 1 TABLET: 7.5; 325 TABLET ORAL at 15:12

## 2022-01-01 RX ADMIN — OXYCODONE HYDROCHLORIDE AND ACETAMINOPHEN 500 MG: 500 TABLET ORAL at 22:15

## 2022-01-01 RX ADMIN — AMOXICILLIN AND CLAVULANATE POTASSIUM 1 TABLET: 500; 125 TABLET, FILM COATED ORAL at 09:05

## 2022-01-01 RX ADMIN — Medication 1 TABLET: at 13:14

## 2022-01-01 RX ADMIN — Medication 1 TABLET: at 22:17

## 2022-01-01 RX ADMIN — SODIUM POLYSTYRENE SULFONATE 15 G: 15 SUSPENSION ORAL; RECTAL at 05:55

## 2022-01-01 RX ADMIN — HYDROCODONE BITARTRATE AND ACETAMINOPHEN 1 TABLET: 7.5; 325 TABLET ORAL at 10:50

## 2022-01-01 RX ADMIN — PANTOPRAZOLE SODIUM 40 MG: 40 TABLET, DELAYED RELEASE ORAL at 05:43

## 2022-01-01 RX ADMIN — MORPHINE SULFATE 2 MG: 2 INJECTION, SOLUTION INTRAMUSCULAR; INTRAVENOUS at 14:27

## 2022-01-01 RX ADMIN — HYDROCODONE BITARTRATE AND ACETAMINOPHEN 1 TABLET: 7.5; 325 TABLET ORAL at 01:34

## 2022-01-01 RX ADMIN — CYCLOBENZAPRINE 10 MG: 10 TABLET, FILM COATED ORAL at 15:52

## 2022-01-01 RX ADMIN — MORPHINE SULFATE 2 MG: 2 INJECTION, SOLUTION INTRAMUSCULAR; INTRAVENOUS at 17:03

## 2022-01-01 RX ADMIN — DOCUSATE SODIUM 100 MG: 100 CAPSULE, LIQUID FILLED ORAL at 22:17

## 2022-01-01 RX ADMIN — DOCUSATE SODIUM 100 MG: 100 CAPSULE, LIQUID FILLED ORAL at 21:32

## 2022-01-01 RX ADMIN — SODIUM BICARBONATE 50 MEQ: 84 INJECTION, SOLUTION INTRAVENOUS at 05:35

## 2022-01-01 RX ADMIN — DOCUSATE SODIUM 100 MG: 100 CAPSULE, LIQUID FILLED ORAL at 20:25

## 2022-01-01 RX ADMIN — SODIUM CHLORIDE 1000 ML: 9 INJECTION, SOLUTION INTRAVENOUS at 12:54

## 2022-01-01 RX ADMIN — MORPHINE SULFATE 2 MG: 2 INJECTION, SOLUTION INTRAMUSCULAR; INTRAVENOUS at 07:09

## 2022-01-01 RX ADMIN — ALBUMIN (HUMAN) 25 G: 0.25 INJECTION, SOLUTION INTRAVENOUS at 02:01

## 2022-01-01 RX ADMIN — LORAZEPAM 1 MG: 2 INJECTION INTRAMUSCULAR; INTRAVENOUS at 18:53

## 2022-01-01 RX ADMIN — MORPHINE SULFATE 1 MG: 2 INJECTION, SOLUTION INTRAMUSCULAR; INTRAVENOUS at 19:34

## 2022-01-01 RX ADMIN — Medication 1 TABLET: at 15:13

## 2022-01-01 RX ADMIN — METOPROLOL TARTRATE 100 MG: 100 TABLET, FILM COATED ORAL at 09:00

## 2022-01-01 RX ADMIN — DEXTROSE MONOHYDRATE: 100 INJECTION, SOLUTION INTRAVENOUS at 05:54

## 2022-01-01 RX ADMIN — LOSARTAN POTASSIUM 50 MG: 50 TABLET, FILM COATED ORAL at 11:51

## 2022-01-01 RX ADMIN — FERROUS SULFATE TAB 325 MG (65 MG ELEMENTAL FE) 325 MG: 325 (65 FE) TAB at 09:06

## 2022-01-01 RX ADMIN — ATORVASTATIN CALCIUM 40 MG: 40 TABLET, FILM COATED ORAL at 20:23

## 2022-01-01 RX ADMIN — CINACALCET HYDROCHLORIDE 60 MG: 30 TABLET, FILM COATED ORAL at 13:18

## 2022-01-01 RX ADMIN — LOSARTAN POTASSIUM 50 MG: 50 TABLET, FILM COATED ORAL at 22:17

## 2022-01-01 RX ADMIN — OXYCODONE HYDROCHLORIDE AND ACETAMINOPHEN 500 MG: 500 TABLET ORAL at 09:06

## 2022-01-01 RX ADMIN — ATORVASTATIN CALCIUM 40 MG: 40 TABLET, FILM COATED ORAL at 20:25

## 2022-01-01 RX ADMIN — Medication 1 TABLET: at 09:06

## 2022-01-01 RX ADMIN — MORPHINE SULFATE 2 MG: 2 INJECTION, SOLUTION INTRAMUSCULAR; INTRAVENOUS at 06:09

## 2022-01-01 RX ADMIN — CLOPIDOGREL BISULFATE 75 MG: 75 TABLET ORAL at 09:04

## 2022-01-01 RX ADMIN — DIGOXIN 250 MCG: 0.25 INJECTION INTRAMUSCULAR; INTRAVENOUS at 23:23

## 2022-01-01 RX ADMIN — METOPROLOL TARTRATE 100 MG: 100 TABLET, FILM COATED ORAL at 21:32

## 2022-01-01 RX ADMIN — MORPHINE SULFATE 1 MG: 2 INJECTION, SOLUTION INTRAMUSCULAR; INTRAVENOUS at 01:28

## 2022-01-01 RX ADMIN — HEPARIN SODIUM 5000 UNITS: 5000 INJECTION INTRAVENOUS; SUBCUTANEOUS at 15:52

## 2022-01-01 RX ADMIN — ATORVASTATIN CALCIUM 40 MG: 40 TABLET, FILM COATED ORAL at 22:15

## 2022-01-01 RX ADMIN — DOCUSATE SODIUM 100 MG: 100 CAPSULE, LIQUID FILLED ORAL at 09:22

## 2022-01-01 RX ADMIN — LORAZEPAM 1 MG: 2 INJECTION INTRAMUSCULAR at 23:21

## 2022-01-01 RX ADMIN — DOCUSATE SODIUM 100 MG: 100 CAPSULE, LIQUID FILLED ORAL at 20:34

## 2022-01-01 RX ADMIN — PANTOPRAZOLE SODIUM 40 MG: 40 TABLET, DELAYED RELEASE ORAL at 05:23

## 2022-01-01 RX ADMIN — ASPIRIN 81 MG: 81 TABLET, COATED ORAL at 09:04

## 2022-01-01 RX ADMIN — ASPIRIN 81 MG: 81 TABLET, COATED ORAL at 13:22

## 2022-01-01 RX ADMIN — CLOPIDOGREL BISULFATE 75 MG: 75 TABLET ORAL at 15:52

## 2022-01-01 RX ADMIN — ALBUTEROL SULFATE 2.5 MG: 2.5 SOLUTION RESPIRATORY (INHALATION) at 17:46

## 2022-01-01 RX ADMIN — HEPARIN SODIUM 5000 UNITS: 5000 INJECTION INTRAVENOUS; SUBCUTANEOUS at 21:50

## 2022-01-01 RX ADMIN — ONDANSETRON 4 MG: 2 INJECTION INTRAMUSCULAR; INTRAVENOUS at 04:31

## 2022-01-01 RX ADMIN — SODIUM CHLORIDE, PRESERVATIVE FREE 10 ML: 5 INJECTION INTRAVENOUS at 20:25

## 2022-01-01 RX ADMIN — CLOPIDOGREL BISULFATE 75 MG: 75 TABLET ORAL at 13:22

## 2022-01-01 RX ADMIN — CYCLOBENZAPRINE 10 MG: 10 TABLET, FILM COATED ORAL at 15:14

## 2022-01-01 RX ADMIN — ALBUTEROL SULFATE 2.5 MG: 2.5 SOLUTION RESPIRATORY (INHALATION) at 08:23

## 2022-01-01 RX ADMIN — HEPARIN SODIUM 5000 UNITS: 5000 INJECTION INTRAVENOUS; SUBCUTANEOUS at 09:00

## 2022-01-01 RX ADMIN — AMOXICILLIN AND CLAVULANATE POTASSIUM 1 TABLET: 500; 125 TABLET, FILM COATED ORAL at 20:23

## 2022-01-01 RX ADMIN — SODIUM CHLORIDE, PRESERVATIVE FREE 10 ML: 5 INJECTION INTRAVENOUS at 21:50

## 2022-01-01 RX ADMIN — OXYCODONE HYDROCHLORIDE AND ACETAMINOPHEN 500 MG: 500 TABLET ORAL at 15:14

## 2022-01-01 RX ADMIN — AMOXICILLIN AND CLAVULANATE POTASSIUM 1 TABLET: 500; 125 TABLET, FILM COATED ORAL at 13:14

## 2022-01-01 RX ADMIN — ALBUTEROL SULFATE 2.5 MG: 2.5 SOLUTION RESPIRATORY (INHALATION) at 20:26

## 2022-01-01 RX ADMIN — MORPHINE SULFATE 2 MG: 2 INJECTION, SOLUTION INTRAMUSCULAR; INTRAVENOUS at 04:32

## 2022-01-01 RX ADMIN — CALCITRIOL CAPSULES 0.25 MCG 1.5 MCG: 0.25 CAPSULE ORAL at 13:14

## 2022-01-01 RX ADMIN — SODIUM CHLORIDE, PRESERVATIVE FREE 10 ML: 5 INJECTION INTRAVENOUS at 08:58

## 2022-01-01 RX ADMIN — MORPHINE SULFATE 2 MG: 2 INJECTION, SOLUTION INTRAMUSCULAR; INTRAVENOUS at 11:10

## 2022-01-01 RX ADMIN — SODIUM CHLORIDE, PRESERVATIVE FREE 10 ML: 5 INJECTION INTRAVENOUS at 22:23

## 2022-01-01 RX ADMIN — ALBUTEROL SULFATE 2.5 MG: 2.5 SOLUTION RESPIRATORY (INHALATION) at 14:30

## 2022-01-01 RX ADMIN — MORPHINE SULFATE 2 MG: 2 INJECTION, SOLUTION INTRAMUSCULAR; INTRAVENOUS at 11:11

## 2022-01-01 RX ADMIN — LOSARTAN POTASSIUM 50 MG: 50 TABLET, FILM COATED ORAL at 13:14

## 2022-01-01 RX ADMIN — AMOXICILLIN AND CLAVULANATE POTASSIUM 1 TABLET: 500; 125 TABLET, FILM COATED ORAL at 09:00

## 2022-01-01 RX ADMIN — FERROUS SULFATE TAB 325 MG (65 MG ELEMENTAL FE) 325 MG: 325 (65 FE) TAB at 13:14

## 2022-01-01 RX ADMIN — HEPARIN SODIUM 5000 UNITS: 5000 INJECTION INTRAVENOUS; SUBCUTANEOUS at 20:38

## 2022-01-01 RX ADMIN — PANTOPRAZOLE SODIUM 40 MG: 40 TABLET, DELAYED RELEASE ORAL at 16:18

## 2022-01-01 RX ADMIN — HEPARIN SODIUM 5000 UNITS: 5000 INJECTION INTRAVENOUS; SUBCUTANEOUS at 20:34

## 2022-01-01 RX ADMIN — SODIUM CHLORIDE, PRESERVATIVE FREE 10 ML: 5 INJECTION INTRAVENOUS at 09:00

## 2022-01-01 RX ADMIN — LOSARTAN POTASSIUM 50 MG: 50 TABLET, FILM COATED ORAL at 09:04

## 2022-01-01 RX ADMIN — MORPHINE SULFATE 2 MG: 2 INJECTION, SOLUTION INTRAMUSCULAR; INTRAVENOUS at 23:52

## 2022-01-01 RX ADMIN — LOSARTAN POTASSIUM 50 MG: 50 TABLET, FILM COATED ORAL at 15:20

## 2022-01-01 RX ADMIN — SODIUM CHLORIDE, PRESERVATIVE FREE 10 ML: 5 INJECTION INTRAVENOUS at 20:35

## 2022-01-01 RX ADMIN — METOPROLOL TARTRATE 100 MG: 50 TABLET, FILM COATED ORAL at 14:53

## 2022-01-01 RX ADMIN — ASPIRIN 81 MG: 81 TABLET, COATED ORAL at 22:15

## 2022-01-01 RX ADMIN — CYCLOBENZAPRINE 10 MG: 10 TABLET, FILM COATED ORAL at 22:16

## 2022-01-01 RX ADMIN — HEPARIN SODIUM 5000 UNITS: 5000 INJECTION INTRAVENOUS; SUBCUTANEOUS at 15:30

## 2022-01-01 RX ADMIN — FERROUS SULFATE TAB 325 MG (65 MG ELEMENTAL FE) 325 MG: 325 (65 FE) TAB at 09:22

## 2022-01-01 RX ADMIN — Medication: at 08:56

## 2022-01-01 RX ADMIN — METOPROLOL TARTRATE 100 MG: 100 TABLET, FILM COATED ORAL at 22:17

## 2022-01-01 RX ADMIN — HEPARIN SODIUM 5000 UNITS: 5000 INJECTION INTRAVENOUS; SUBCUTANEOUS at 20:25

## 2022-01-01 RX ADMIN — PANTOPRAZOLE SODIUM 40 MG: 40 TABLET, DELAYED RELEASE ORAL at 06:03

## 2022-01-01 RX ADMIN — SODIUM CHLORIDE, PRESERVATIVE FREE 10 ML: 5 INJECTION INTRAVENOUS at 20:38

## 2022-01-01 RX ADMIN — CALCITRIOL CAPSULES 0.25 MCG 1.5 MCG: 0.25 CAPSULE ORAL at 15:14

## 2022-01-01 RX ADMIN — IOPAMIDOL 50 ML: 510 INJECTION, SOLUTION INTRAVASCULAR at 11:14

## 2022-01-01 RX ADMIN — OXYCODONE HYDROCHLORIDE AND ACETAMINOPHEN 500 MG: 500 TABLET ORAL at 09:22

## 2022-01-01 RX ADMIN — ASPIRIN 81 MG: 81 TABLET, COATED ORAL at 15:14

## 2022-01-01 RX ADMIN — SODIUM CHLORIDE, PRESERVATIVE FREE 10 ML: 5 INJECTION INTRAVENOUS at 09:23

## 2022-01-01 RX ADMIN — FERROUS SULFATE TAB 325 MG (65 MG ELEMENTAL FE) 325 MG: 325 (65 FE) TAB at 11:51

## 2022-01-01 RX ADMIN — CALCIUM GLUCONATE 2000 MG: 20 INJECTION, SOLUTION INTRAVENOUS at 04:21

## 2022-01-01 RX ADMIN — HEPARIN SODIUM 5000 UNITS: 5000 INJECTION INTRAVENOUS; SUBCUTANEOUS at 09:03

## 2022-01-01 RX ADMIN — DIGOXIN 250 MCG: 250 INJECTION, SOLUTION INTRAMUSCULAR; INTRAVENOUS; PARENTERAL at 23:23

## 2022-01-01 RX ADMIN — Medication 1 TABLET: at 18:49

## 2022-01-01 RX ADMIN — SODIUM CHLORIDE, PRESERVATIVE FREE 10 ML: 5 INJECTION INTRAVENOUS at 11:10

## 2022-01-01 RX ADMIN — ACETAMINOPHEN 650 MG: 325 TABLET ORAL at 22:07

## 2022-01-01 RX ADMIN — ALBUMIN (HUMAN) 25 G: 0.25 INJECTION, SOLUTION INTRAVENOUS at 09:51

## 2022-01-01 RX ADMIN — HEPARIN SODIUM 5000 UNITS: 5000 INJECTION INTRAVENOUS; SUBCUTANEOUS at 20:23

## 2022-01-01 RX ADMIN — ASPIRIN 81 MG: 81 TABLET, COATED ORAL at 09:22

## 2022-01-01 RX ADMIN — LORAZEPAM 1 MG: 2 INJECTION INTRAMUSCULAR; INTRAVENOUS at 00:44

## 2022-01-01 RX ADMIN — FERROUS SULFATE TAB 325 MG (65 MG ELEMENTAL FE) 325 MG: 325 (65 FE) TAB at 18:49

## 2022-01-01 RX ADMIN — SODIUM CHLORIDE, PRESERVATIVE FREE 10 ML: 5 INJECTION INTRAVENOUS at 13:18

## 2022-01-01 RX ADMIN — LORAZEPAM 1 MG: 2 INJECTION, SOLUTION INTRAMUSCULAR; INTRAVENOUS at 23:36

## 2022-01-01 RX ADMIN — ALBUTEROL SULFATE 1 PUFF: 90 AEROSOL, METERED RESPIRATORY (INHALATION) at 17:02

## 2022-01-01 RX ADMIN — HYDROCODONE BITARTRATE AND ACETAMINOPHEN 1 TABLET: 7.5; 325 TABLET ORAL at 06:03

## 2022-01-01 ASSESSMENT — PAIN DESCRIPTION - LOCATION
LOCATION: CHEST
LOCATION: MOUTH;TEETH
LOCATION: CHEST
LOCATION: MOUTH;TEETH
LOCATION: ABDOMEN;MEDIASTINUM
LOCATION: ABDOMEN;CHEST
LOCATION: ABDOMEN;CHEST
LOCATION: ABDOMEN
LOCATION: ABDOMEN
LOCATION: GENERALIZED
LOCATION: ABDOMEN
LOCATION: CHEST

## 2022-01-01 ASSESSMENT — PAIN DESCRIPTION - ORIENTATION: ORIENTATION: LEFT

## 2022-01-01 ASSESSMENT — PAIN SCALES - WONG BAKER
WONGBAKER_NUMERICALRESPONSE: 0
WONGBAKER_NUMERICALRESPONSE: 4
WONGBAKER_NUMERICALRESPONSE: 0
WONGBAKER_NUMERICALRESPONSE: 4
WONGBAKER_NUMERICALRESPONSE: 0
WONGBAKER_NUMERICALRESPONSE: 4
WONGBAKER_NUMERICALRESPONSE: 0

## 2022-01-01 ASSESSMENT — ENCOUNTER SYMPTOMS
CHEST TIGHTNESS: 0
BLOOD IN STOOL: 0
CONSTIPATION: 0
SINUS PRESSURE: 0
NAUSEA: 0
EYE ITCHING: 0
VOMITING: 0
WHEEZING: 0
PHOTOPHOBIA: 0
ABDOMINAL PAIN: 1
VOMITING: 0
SHORTNESS OF BREATH: 0
TROUBLE SWALLOWING: 0
PHOTOPHOBIA: 0
WHEEZING: 0
TROUBLE SWALLOWING: 0
BACK PAIN: 0
COUGH: 0
SINUS PRESSURE: 0
ABDOMINAL PAIN: 1
EYE PAIN: 0
RHINORRHEA: 0
DIARRHEA: 0
VOICE CHANGE: 0
CONSTIPATION: 0
ABDOMINAL DISTENTION: 0
ABDOMINAL PAIN: 0
NAUSEA: 0
SORE THROAT: 0
CHOKING: 0
EYE DISCHARGE: 0
BACK PAIN: 0
SHORTNESS OF BREATH: 0
EYE REDNESS: 0
RHINORRHEA: 0
COUGH: 0
VOICE CHANGE: 0
SHORTNESS OF BREATH: 0
SORE THROAT: 0
CHEST TIGHTNESS: 0
DIARRHEA: 0
COUGH: 0

## 2022-01-01 ASSESSMENT — PAIN SCALES - GENERAL
PAINLEVEL_OUTOF10: 10
PAINLEVEL_OUTOF10: 9
PAINLEVEL_OUTOF10: 10
PAINLEVEL_OUTOF10: 5
PAINLEVEL_OUTOF10: 10
PAINLEVEL_OUTOF10: 0
PAINLEVEL_OUTOF10: 10
PAINLEVEL_OUTOF10: 6
PAINLEVEL_OUTOF10: 10
PAINLEVEL_OUTOF10: 0
PAINLEVEL_OUTOF10: 0
PAINLEVEL_OUTOF10: 10
PAINLEVEL_OUTOF10: 0
PAINLEVEL_OUTOF10: 10
PAINLEVEL_OUTOF10: 10

## 2022-01-01 ASSESSMENT — PAIN DESCRIPTION - FREQUENCY
FREQUENCY: INTERMITTENT
FREQUENCY: CONTINUOUS
FREQUENCY: OTHER (COMMENT)
FREQUENCY: CONTINUOUS

## 2022-01-01 ASSESSMENT — PAIN DESCRIPTION - PAIN TYPE
TYPE: ACUTE PAIN

## 2022-01-01 ASSESSMENT — PAIN DESCRIPTION - DESCRIPTORS
DESCRIPTORS: ACHING
DESCRIPTORS: ACHING;THROBBING

## 2022-01-01 ASSESSMENT — PAIN DESCRIPTION - ONSET
ONSET: ON-GOING
ONSET: UNABLE TO COMMUNICATE

## 2022-01-01 ASSESSMENT — PAIN - FUNCTIONAL ASSESSMENT
PAIN_FUNCTIONAL_ASSESSMENT: WONG-BAKER FACES
PAIN_FUNCTIONAL_ASSESSMENT: WONG-BAKER FACES
PAIN_FUNCTIONAL_ASSESSMENT: 0-10

## 2022-02-08 PROBLEM — E87.70 VOLUME OVERLOAD: Status: RESOLVED | Noted: 2019-06-21 | Resolved: 2022-01-01

## 2022-02-08 PROBLEM — I16.1 HYPERTENSIVE EMERGENCY: Status: RESOLVED | Noted: 2021-01-01 | Resolved: 2022-01-01

## 2022-02-08 PROBLEM — I48.91 ATRIAL FIBRILLATION (HCC): Status: ACTIVE | Noted: 2022-01-01

## 2022-02-08 PROBLEM — R06.02 SOB (SHORTNESS OF BREATH): Status: RESOLVED | Noted: 2021-01-01 | Resolved: 2022-01-01

## 2022-02-08 PROBLEM — U07.1 COVID-19: Status: RESOLVED | Noted: 2021-01-01 | Resolved: 2022-01-01
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
## 2022-02-08 PROBLEM — E43 SEVERE MALNUTRITION (HCC): Status: RESOLVED | Noted: 2021-01-01 | Resolved: 2022-01-01

## 2022-02-08 PROBLEM — I27.20 PULMONARY HYPERTENSION (HCC): Status: RESOLVED | Noted: 2018-05-20 | Resolved: 2022-01-01

## 2022-02-08 PROBLEM — M25.552 CHRONIC LEFT HIP PAIN: Status: ACTIVE | Noted: 2022-01-01

## 2022-02-08 PROBLEM — R06.00 DYSPNEA: Status: RESOLVED | Noted: 2021-01-01 | Resolved: 2022-01-01

## 2022-02-08 PROBLEM — G89.29 CHRONIC LEFT HIP PAIN: Status: ACTIVE | Noted: 2022-01-01

## 2022-02-08 PROBLEM — M85.89 OSTEOPENIA OF MULTIPLE SITES: Status: ACTIVE | Noted: 2022-01-01

## 2022-03-01 NOTE — PROGRESS NOTES
1045 Pt in specials radiology for fistulogram with possible intervention. Explained procedure to pt and pt verbalizes understanding. Consent signed. [address] to table and attached to monitor. 1054 Dr Deidra Sutton to speak to pt. Unable to  SPO2 at times. 1055 Right arm prepped and draped. 1107 Prepping for intervention. 1110 Angioplasty of subclavian vein with 12 x 40 [place] 35 balloon. 1115 Procedure complete. Prepping for slip-nots. 1120 Sheaths x 2 removed as slip-nots inserted. Sites without redness, swelling or hematoma. Op-site dressing applied. 1127 Pt positioned on cart for comfort. Transferred to [facility] per cart. Report called to Bella Carmona.

## 2022-03-01 NOTE — PROGRESS NOTES
0930: Patient arrived in wheelchair with family for fistulagram. PT RIGHTS AND RESPONSIBILITIES OFFERED TO PT. Called nursing home and stated that he takes aspirin and plavix last night, Rae in IR made aware. Patient has been NPO since last night. Fistula in right upper arm, +thrill noted. IV fluid infusing.  Lidocaine cream applied to fistula.                    _m___ Safety:       (Environmental)  [facility] to environment   Ensure ID band is correct and in place/ allergy band as needed   Assess for fall risk   Initiate fall precautions as applicable (fall band, side rails, etc.)   Call light within reach   Bed in low position/ wheels locked    _m___ Pain:        Assess pain level and characteristics   Administer analgesics as ordered   Assess effectiveness of pain management and report to MD as needed    _m___ Knowledge Deficit:   Assess baseline knowledge   Provide teaching at level of understanding   Provide teaching via preferred learning method   Evaluate teaching effectiveness    _m___ Hemodynamic/Respiratory Status:       (Pre and Post Procedure Monitoring)   Assess/Monitor vital signs and LOC   Assess Baseline SpO2 prior to any sedation   Obtain weight/height   Assess vital signs/ LOC until patient meets discharge criteria   Monitor procedure site and notify MD of any issues

## 2022-03-01 NOTE — OP NOTE
Department of Radiology  Post Procedure Progress Note      Pre-Procedure Diagnosis: Malfunctioning dialysis fistula/graft     Procedure Performed:  Dialysis fistulagram with angioplasty     Anesthesia: local / versed and dilaudid    Findings: successful    Immediate Complications:  None    Estimated Blood Loss: minimal    SEE DICTATED PROCEDURE NOTE FOR COMPLETE DETAILS.     Estephania Yuan MD   3/1/2022 11:26 AM

## 2022-03-01 NOTE — H&P
[address] [place] [address]  Sedation/Analgesia History & Physical    Pt Name: Marino Prather  MRN: [id number]  YOB: 1930  Provider Performing Procedure: Leora Rivera MD, MD  Primary Care Physician: Sandy Hsu MD    PRE-PROCEDURE   DNR-CCA/DNR-CC []Yes [x]No  Brief History/Pre-Procedure Diagnosis: Malfunctioning dialysis fistula/graft           MEDICAL HISTORY  []CAD/Valve  []Liver Disease  []Lung Disease []Diabetes  []Hypertension []Renal Disease  []Additional information:       has a past medical history of Anemia in chronic kidney disease, Arthritis, Atrial fibrillation ([address] CAD (coronary artery disease), Cardiomegaly, Chronic kidney disease, Chronic kidney disease, stage IV (severe) ([address] Chronic respiratory failure with hypoxia ([address] CKD (chronic kidney disease) stage 3, GFR 30-59 ml/min ([address] COPD (chronic obstructive pulmonary disease) ([address] COVID-19, End stage renal disease ([address] GERD without esophagitis, GI bleed, Gout, Hemodialysis patient ([address] History of blood transfusion, Hyperlipidemia, Hyperphosphatemia, Hypertension, Monoclonal gammopathy, Pneumonia due to coronavirus disease 2019, Pulmonary embolism ([address] Sick sinus syndrome ([address] and Systolic congestive heart failure ([address] SURGICAL HISTORY   has a past surgical history that includes pacemaker placement (2013); Endoscopy, colon, diagnostic; Dialysis fistula creation (5/6/2016); Colonoscopy (2250,1466); Upper gastrointestinal endoscopy (6723,0266); Coronary angioplasty with stent (2004); pr esophagogastroduodenoscopy transoral diagnostic (N/A, 1/18/2018); vascular surgery; Cardiac surgery; Upper gastrointestinal endoscopy (Left, 6/23/2019); Colonoscopy (N/A, 6/27/2019); Upper gastrointestinal endoscopy (N/A, 10/9/2021); Upper gastrointestinal endoscopy (N/A, 11/6/2021); and Upper gastrointestinal endoscopy (11/6/2021).   Additional information:       ALLERGIES   Allergies as of 03/01/2022    (No Known Allergies)     Additional information:       MEDICATIONS   Coumadin Use Last 5 Days [x]No []Yes  Antiplatelet drug therapy use last 5 days  [x]No []Yes  Other anticoagulant use last 5 days  [x]No []Yes    Current Outpatient Medications:     losartan (COZAAR) 50 MG tablet, Take 1 tablet by mouth daily, Disp: 30 tablet, Rfl: 3    epoetin traci-epbx (RETACRIT) 36428 UNIT/ML SOLN injection, Inject 1 mL into the skin three times a week, Disp: 6.6 mL, Rfl:     white petrolatum GEL, Apply 28 g topically as needed for Dry Skin, Disp: , Rfl: 0    pantoprazole (PROTONIX) 40 MG tablet, Take 1 tablet by mouth 2 times daily (before meals), Disp: 60 tablet, Rfl: 0    albuterol sulfate  (90 Base) MCG/ACT inhaler, Inhale 1 puff into the lungs every 4 hours as needed, Disp: , Rfl:     ASPIRIN LOW DOSE 81 MG EC tablet, Take 81 mg by mouth daily, Disp: , Rfl:     atorvastatin (LIPITOR) 40 MG tablet, Take 40 mg by mouth daily, Disp: , Rfl:     cyclobenzaprine (FLEXERIL) 10 MG tablet, Take 10 mg by mouth daily, Disp: , Rfl:     clopidogrel (PLAVIX) 75 MG tablet, Take 75 mg by mouth daily, Disp: , Rfl:     ferrous sulfate (IRON 325) 325 (65 Fe) MG tablet, Take 325 mg by mouth daily, Disp: , Rfl:     metoprolol (LOPRESSOR) 100 MG tablet, Take 1 tablet by mouth 2 times daily, Disp: 60 tablet, Rfl: 3    cinacalcet (SENSIPAR) 30 MG tablet, Take 60 mg by mouth three times a week before dialysis on M,W,F, Disp: , Rfl:     calcitRIOL (ROCALTROL) 0.25 MCG capsule, Take 1.5 mcg by mouth three times a week before dialysis on M,W,F, Disp: , Rfl:     docusate sodium (COLACE, DULCOLAX) 100 MG CAPS, Take 100 mg by mouth 2 times daily, Disp: 60 capsule, Rfl: 0    vitamin C (ASCORBIC ACID) 500 MG tablet, Take 1 tablet by mouth daily, Disp: 30 tablet, Rfl: 3    polyethylene glycol (MIRALAX) powder, Renal Miralax Colonoscopy prep. Use as directed. Mix Miralax 238 g with 24 oz clear liquids.   Drink 8 oz glass every 20-30 minutes until gone., Disp: 238 g, Rfl: 0    B Complex-C-Folic Acid (RENAL) 1 MG CAPS, Take 1 capsule by mouth daily, Disp: , Rfl:     Current Facility-Administered Medications:     0.45 % sodium chloride infusion, , IntraVENous, Continuous, Maggy Short MD, Last Rate: 20 mL/hr at 03/01/22 1020, New Bag at 03/01/22 1020  Prior to Admission medications    Medication Sig Start Date End Date Taking?  Authorizing Provider   losartan (COZAAR) 50 MG tablet Take 1 tablet by mouth daily 11/1/21   SHANELL Gudino   epoetin traci-epbx (RETACRIT) 28561 UNIT/ML SOLN injection Inject 1 mL into the skin three times a week 11/1/21   SHANELL Gudino   white petrolatum GEL Apply 28 g topically as needed for Dry Skin 10/15/21   Margaux Ovalle MD   pantoprazole (PROTONIX) 40 MG tablet Take 1 tablet by mouth 2 times daily (before meals) 10/11/21   Margaux Ovalle MD   albuterol sulfate  (90 Base) MCG/ACT inhaler Inhale 1 puff into the lungs every 4 hours as needed 9/8/21   Historical Provider, MD   ASPIRIN LOW DOSE 81 MG EC tablet Take 81 mg by mouth daily 9/1/21   Historical Provider, MD   atorvastatin (LIPITOR) 40 MG tablet Take 40 mg by mouth daily 9/1/21   Historical Provider, MD   cyclobenzaprine (FLEXERIL) 10 MG tablet Take 10 mg by mouth daily 8/28/17   Historical Provider, MD   clopidogrel (PLAVIX) 75 MG tablet Take 75 mg by mouth daily 8/13/21   Historical Provider, MD   ferrous sulfate (IRON 325) 325 (65 Fe) MG tablet Take 325 mg by mouth daily 6/24/19   Historical Provider, MD   metoprolol (LOPRESSOR) 100 MG tablet Take 1 tablet by mouth 2 times daily 7/14/21   Margaux Ovalle MD   cinacalcet (SENSIPAR) 30 MG tablet Take 60 mg by mouth three times a week before dialysis on M,W,F    Historical Provider, MD   calcitRIOL (ROCALTROL) 0.25 MCG capsule Take 1.5 mcg by mouth three times a week before dialysis on M,W,F    Historical Provider, MD   docusate sodium (COLACE, DULCOLAX) 100 MG CAPS Take 100 mg by mouth 2 times responsible adult completed. [x]Patient examined immediately prior to the procedure.  (Refer to nursing sedation/analgesia documentation record)    Dahiana Garces MD, MD  Electronically signed 3/1/2022 at 11:25 AM

## 2022-03-01 NOTE — PROGRESS NOTES
1130: Patient back from procedure. States 0/10 pain at this time. Dressing bloody--reinforced. Vitals as charted. Provided with snack and beverage. 1145: Patient tolerating food well, no nausea noted. Vitals as charted. Dressing remains bloody. 1200: Vitals as charted. 1215: Vitals as charted. 1245: Vitals as charted. Patient resting in bed comfortably. 1300: Slipknots removed, manual pressure applied. Gauze and transparent dressing applied. Dressing clean, dry, intact. 1330: Dressing remains clean, dry, intact. Patient discharged in wheelchair. AVS reviewed with Angela at [facility].

## 2022-04-27 NOTE — ED PROVIDER NOTES
[address] [address]        Room # 08/008A    CHIEF COMPLAINT    Chief Complaint   Patient presents with    Other     fistula bleeding       Nurses Notes reviewed and I agree except as noted in the HPI. HPI    Aly Hope is a 80 y.o. male who presents for evaluation of bleeding on the right antecubital fistula for dialysis. Patient had a history of dementia and ESRD on dialysis 3 times a week. Patient had a dialysis earlier and when the catheter was pulled out, the patient been bleeding on the antecubital fossa for 1-1/2-hour for which the patient was sent here to be evaluated. Patient when seen, a C-clamp was the right forearm and bleeding has been stopped. Patient however noted to have blood pressure 180. Patient had history of dementia, otherwise doing well. Patient's daughter and son-in-law is at the bedside. REVIEW OF SYSTEMS    Review of Systems   Constitutional: Negative for appetite change, chills, diaphoresis, fatigue and fever. HENT: Negative for congestion, ear discharge, ear pain, postnasal drip, rhinorrhea, sinus pressure, sore throat, trouble swallowing and voice change. Respiratory: Negative for cough, chest tightness, shortness of breath and wheezing. Cardiovascular: Negative for chest pain, palpitations and leg swelling. Gastrointestinal: Negative for abdominal pain, constipation, diarrhea, nausea and vomiting. Musculoskeletal: Negative for arthralgias, back pain, joint swelling, myalgias, neck pain and neck stiffness. Bleeding on the right forearm/fistula right antecubital   Skin: Negative for rash. Neurological: Negative for dizziness, syncope, weakness, light-headedness, numbness and headaches.        PAST MEDICAL HISTORY     has a past medical history of Anemia in chronic kidney disease, Arthritis, Atrial fibrillation ([address] CAD (coronary artery disease), Cardiomegaly, Chronic kidney disease, Chronic kidney disease, stage IV (severe) ([facility]), Chronic respiratory failure with hypoxia ([facility] CKD (chronic kidney disease) stage 3, GFR 30-59 ml/min ([facility]), COPD (chronic obstructive pulmonary disease) ([facility] COVID-19, End stage renal disease ([facility] GERD without esophagitis, GI bleed, Gout, Hemodialysis patient ([facility] History of blood transfusion, Hyperlipidemia, Hyperphosphatemia, Hypertension, Monoclonal gammopathy, Pneumonia due to coronavirus disease 2019, Pulmonary embolism ([facility] Sick sinus syndrome ([facility] and Systolic congestive heart failure ([facility] SURGICAL HISTORY   has a past surgical history that includes pacemaker placement (2013); Endoscopy, colon, diagnostic; Dialysis fistula creation (5/6/2016); Colonoscopy (3561,1419); Upper gastrointestinal endoscopy (9754,5831); Coronary angioplasty with stent (2004); pr esophagogastroduodenoscopy transoral diagnostic (N/A, 1/18/2018); vascular surgery; Cardiac surgery; Upper gastrointestinal endoscopy (Left, 6/23/2019); Colonoscopy (N/A, 6/27/2019); Upper gastrointestinal endoscopy (N/A, 10/9/2021); Upper gastrointestinal endoscopy (N/A, 11/6/2021); and Upper gastrointestinal endoscopy (11/6/2021).     CURRENT MEDICATIONS    Discharge Medication List as of 4/27/2022  5:25 PM      CONTINUE these medications which have NOT CHANGED    Details   losartan (COZAAR) 50 MG tablet Take 1 tablet by mouth daily, Disp-30 tablet, R-3DC to SNF      epoetin traci-epbx (RETACRIT) 11893 UNIT/ML SOLN injection Inject 1 mL into the skin three times a week, Disp-6.6 mLDC to SNF      white petrolatum GEL PRN Starting Fri 10/15/2021, R-0, DC to SNF      pantoprazole (PROTONIX) 40 MG tablet Take 1 tablet by mouth 2 times daily (before meals), Disp-60 tablet, R-0Normal      albuterol sulfate  (90 Base) MCG/ACT inhaler Inhale 1 puff into the lungs every 4 hours as neededHistorical Med      ASPIRIN LOW DOSE 81 MG EC tablet Take 81 mg by mouth daily, DAWHistorical Med atorvastatin (LIPITOR) 40 MG tablet Take 40 mg by mouth dailyHistorical Med      cyclobenzaprine (FLEXERIL) 10 MG tablet Take 10 mg by mouth dailyHistorical Med      clopidogrel (PLAVIX) 75 MG tablet Take 75 mg by mouth dailyHistorical Med      ferrous sulfate (IRON 325) 325 (65 Fe) MG tablet Take 325 mg by mouth dailyHistorical Med      metoprolol (LOPRESSOR) 100 MG tablet Take 1 tablet by mouth 2 times daily, Disp-60 tablet, R-3Normal      cinacalcet (SENSIPAR) 30 MG tablet Take 60 mg by mouth three times a week before dialysis on ,,FHistorical Med      calcitRIOL (ROCALTROL) 0.25 MCG capsule Take 1.5 mcg by mouth three times a week before dialysis on ,,FHistorical Med      docusate sodium (COLACE, DULCOLAX) 100 MG CAPS Take 100 mg by mouth 2 times daily, Disp-60 capsule, R-0Normal      vitamin C (ASCORBIC ACID) 500 MG tablet Take 1 tablet by mouth daily, Disp-30 tablet, R-3Normal      polyethylene glycol (MIRALAX) powder Renal Miralax Colonoscopy prep. Use as directed. Mix Miralax 238 g with 24 oz clear liquids. Drink 8 oz glass every 20-30 minutes until gone., Disp-238 g, R-0Normal      B Complex-C-Folic Acid (RENAL) 1 MG CAPS Take 1 capsule by mouth dailyHistorical Med             ALLERGIES    has No Known Allergies. FAMILY HISTORY    He indicated that his mother is . He indicated that his father is . He indicated that his sister is . He indicated that his brother is . He indicated that the status of his paternal aunt is unknown.   family history includes Diabetes in his mother and sister; Heart Disease in his mother; Mental Illness in his paternal aunt. SOCIAL HISTORY     reports that he quit smoking about 40 years ago. He has a 40.00 pack-year smoking history. He has never used smokeless tobacco. He reports that he does not drink alcohol and does not use drugs.     PHYSICAL EXAM      INITIAL VITALS: BP (!) 159/78   Pulse 88   Temp 97.8 °F (36.6 °C) (Oral) Resp 16   Ht 5' 6\" (1.676 m)   Wt 135 lb (61.2 kg)   SpO2 93%   BMI 21.79 kg/m² Estimated body mass index is 21.79 kg/m² as calculated from the following:    Height as of this encounter: 5' 6\" (1.676 m). Weight as of this encounter: 135 lb (61.2 kg). Physical Exam  Vitals reviewed. Constitutional:       Appearance: He is well-developed. He is not toxic-appearing or diaphoretic. HENT:      Head: Normocephalic and atraumatic. Right Ear: External ear normal.      Left Ear: External ear normal.      Nose: Nose normal.   Eyes:      General: No scleral icterus. Conjunctiva/sclera: Conjunctivae normal.      Pupils: Pupils are equal, round, and reactive to light. Neck:      Thyroid: No thyromegaly. Vascular: No JVD. Cardiovascular:      Rate and Rhythm: Normal rate and regular rhythm. Heart sounds: No murmur heard. No friction rub. Pulmonary:      Effort: Pulmonary effort is normal.      Breath sounds: Normal breath sounds. No wheezing or rales. Chest:      Chest wall: No tenderness. Abdominal:      General: Bowel sounds are normal.      Palpations: Abdomen is soft. There is no mass. Tenderness: There is no abdominal tenderness. Musculoskeletal:      Cervical back: Normal range of motion and neck supple. Comments: Right upper extremity showed a fistula on the antecubital fossa, there is no active bleeding at this time,   Lymphadenopathy:      Cervical: No cervical adenopathy. Skin:     Findings: No rash. Neurological:      Mental Status: He is alert and oriented to person, place, and time. Psychiatric:         Behavior: Behavior is cooperative.          MEDICAL DECISION MAKING    DIFFERENTIAL DIAGNOSIS:  Bleeding at the fistula site, ESRD on dialysis, history of dementia      DIAGNOSTIC RESULTS      Vitals:    Vitals:    04/27/22 1452 04/27/22 1453 04/27/22 1537 04/27/22 1639   BP: (!) 180/98 (!) 182/137 (!) 166/91 (!) 181/94   Pulse: 118  80 90   Resp: 16  16 Temp:       TempSrc:       SpO2: 97%  93%    Weight:       Height:           EMERGENCY DEPARTMENT COURSE:    Medications   losartan (COZAAR) tablet 50 mg (50 mg Oral Given 4/27/22 1520)   metoprolol tartrate (LOPRESSOR) tablet 100 mg (100 mg Oral Given 4/27/22 1453)       The pt was seen and evaluated by me. Within the department, I observed the pt's vitalsigns to be within acceptable range except for hypertension Laboratory and Radiological studies were performed, results were reviewed with the patient. Within the department, the pt was treated with patient's usual medications Lopressor and losartan. Patient's blood pressure gradually improved. There is no bleeding on the right antecubital fistula. . I observed the pt's condition to be hemodynamically stable during the duration of their stay. I explained my proposed course of treatment to the pt, and they were amenable to my decision. They were discharged home, and they will return to the ED if their symptoms become more severein nature, or otherwise change. Controlled Substances Monitoring:        CRITICAL CARE:   None. CONSULTS:  None    PROCEDURES:  None. FINAL IMPRESSION       1. Hemorrhage of arteriovenous fistula, initial [facility]    2. ESRD on dialysis ([facility]    3. Essential hypertension          DISPOSITION/PLAN  PATIENT REFERRED TO:  Azul Murry MD  [address] [place] [address]  [phone]    Schedule an appointment as soon as possible for a visit in 2 days      Kingston Treviño MD  [facility], [address]  [address] [place] [address] [address]    Schedule an appointment as soon as possible for a visit in 2 days      DISCHARGE MEDICATIONS:  Discharge Medication List as of 4/27/2022  5:25 PM            (Please note that portions of this note were completed with a voice recognition program and electronically transcribed. Efforts were [facility] edit the dictations but occasionally words are mis-transcribed .  The transcription may contain errors not detected in proofreading.   This transcription was electronically signed.)     05/02/22 8:37 AM      Timur Gill MD      Emergency room physician           Timur Gill MD  05/02/22 5740

## 2022-04-27 NOTE — ED NOTES
Patient and family updated about transport time back to [facility] by Karlene. Estimated time 1930.      Ruthie Shelley RN  04/27/22 6632

## 2022-04-27 NOTE — ED TRIAGE NOTES
Patient presents to ED via [facility] with complaints of fistula bleeding. EMS reports patient was actively bleeding upon removal of dialysis needle. EMS states dialysis nurse applied pressure for an hour and a half and upon removing pressure, patient was still actively bleeding. Patient arrived with clamp on right arm fistula.

## 2022-05-03 PROBLEM — K06.8 PAIN IN GUMS: Status: ACTIVE | Noted: 2022-01-01

## 2022-05-06 PROBLEM — R41.82 AMS (ALTERED MENTAL STATUS): Status: ACTIVE | Noted: 2022-01-01

## 2022-05-06 NOTE — ED PROVIDER NOTES
Transfer of Care Note:   Physician Signing out: Dr Smiley Magdaleno    Receiving Physician: Ayla Cao MD     Sign out time: 0700      Brief history:  70-year-old male with past medical history of end-stage renal disease on dialysis, CAD, systolic heart failure, GERD, anemia fibrillation, pulmonary embolism who presents with acute onset abdominal pain this a.m. periumbilical was sent here for evaluation by the nursing facility. Items pending that need to be checked:  CT abdomen pelvis with IV contrast      Tentative Impression of patient:  1. Pending pain control as well as imaging results for disposition    Expected disposition of patient:  Pending results, Depending on imaging results and pain control patient can be potentially disposition back to the nursing facility. .        Additional Assessment and results:   I have personally performed a face to face diagnostic evaluation on this patient. The patient's initial evaluation and plan have been discussed with the prior physician who initially evaluated the patient. Nursing Notes, Past Medical Hx, Past Surgical Hx, Social Hx, Allergies, vital signs and Family Hx were all reviewed.       Vitals:    05/06/22 1042   BP:    Pulse: 73   Resp: 15   Temp:    SpO2: 100%     Physical Exam      Labs Reviewed   CBC WITH AUTO DIFFERENTIAL - Abnormal; Notable for the following components:       Result Value    RBC 3.34 (*)     Hemoglobin 10.6 (*)     Hematocrit 30.7 (*)     RDW-CV 21.1 (*)     RDW-SD 71.3 (*)     Lymphocytes Absolute 0.5 (*)     All other components within normal limits   BASIC METABOLIC PANEL W/ REFLEX TO MG FOR LOW K - Abnormal; Notable for the following components:    Sodium 134 (*)     Chloride 94 (*)     BUN 39 (*)     CREATININE 6.1 (*)     All other components within normal limits   TROPONIN - Abnormal; Notable for the following components:    Troponin T 0.202 (*)     All other components within normal limits   GLOMERULAR FILTRATION RATE, ESTIMATED - Abnormal; Notable for the following components:    Est, Glom Filt Rate 11 (*)     All other components within normal limits   SALICYLATE LEVEL - Abnormal; Notable for the following components:    Salicylate, Serum < 0.3 (*)     All other components within normal limits   POTASSIUM, WHOLE BLOOD - Abnormal; Notable for the following components:    Potassium, Whole Blood 5.3 (*)     All other components within normal limits   CHLORIDE, WHOLE BLOOD - Abnormal; Notable for the following components:    Chloride, Whole Blood 96 (*)     All other components within normal limits   CALCIUM IONIZED SERUM - Abnormal; Notable for the following components:    Calcium,Ionized 1.08 (*)     All other components within normal limits   SODIUM, WHOLE BLOOD - Abnormal; Notable for the following components:    Sodium, Whole Blood 133 (*)     All other components within normal limits   BLOOD GAS, VENOUS - Abnormal; Notable for the following components:    PH MIXED 7.42 (*)     PO2, Mixed 20 (*)     HCO3, Mixed 29 (*)     Base Exc, Mixed 3.9 (*)     All other components within normal limits   SPECIMEN REJECTION   HEPATIC FUNCTION PANEL   LIPASE   ANION GAP   OSMOLALITY   SCAN OF BLOOD SMEAR   AMMONIA   ACETAMINOPHEN LEVEL   ETHANOL   POC LACTIC ACID   GLUCOSE, WHOLE BLOOD   URINALYSIS WITH REFLEX TO CULTURE   BLOOD GAS, ARTERIAL   LACTATE, SEPSIS   LACTATE, SEPSIS         Medications   fentaNYL (SUBLIMAZE) injection 12.5 mcg (12.5 mcg IntraVENous Given 5/6/22 0523)   ondansetron (ZOFRAN) injection 4 mg (4 mg IntraVENous Given 5/6/22 0525)   iopamidol (ISOVUE-370) 76 % injection 80 mL (80 mLs IntraVENous Given 5/6/22 0717)         CT Head WO Contrast   Final Result    No evidence of an acute process. No change from prior. **This report has been created using voice recognition software. It may contain minor errors which are inherent in voice recognition technology. **      Final report electronically signed by  Richard Acevedo on 2022 9:47 AM      CT ABDOMEN PELVIS W IV CONTRAST Additional Contrast? None   Final Result       1. Trace pelvic free fluid. 2. Layering bilateral pleural effusions. 3. Cardiomegaly. 4. Cholelithiasis. 5. Possible nodular density associated with the prostate or bladder. **This report has been created using voice recognition software. It may contain minor errors which are inherent in voice recognition technology. **      Final report electronically signed by Dr. Richard Acevedo on 2022 8:01 AM      XR CHEST PORTABLE   Final Result   The findings are suggestive of CHF/fluid overload, are similar to multiple    previous chest x-rays. This document has been electronically signed by: Anjelica Schaeffer MD    on 2022 06:16 AM            ED Course as of 22 1059   Fri May 06, 2022   0433 Troponin T(!): 0.202  Troponin baseline [EM]   5017 Chest x-ray consistent with prior, fluid overload. [EM]   0710 Creatinine(!!): 6.1  Within patient's baseline range, he is in end-stage renal disease patient on dialysis. [AL]   0710 Troponin T(!): 0.202  Within patient's normal baseline. [AL]   0710 Hepatic Function Panel:    Albumin 3.9   Bilirubin 0.7   Bilirubin, Direct 0.3   Alk Phos 115   AST 21   ALT 14   Total Protein 6.8  Within normal limits [AL]   0710 Osmolality Ca.6 [AL]   0710 Lipase: 42.1 [AL]   0710 Hemoglobin Quant(!): 10.6  Better than patient's typical baseline. [AL]   0711 XR CHEST PORTABLE  \"   IMPRESSION:  The findings are suggestive of CHF/fluid overload, are similar to multiple   previous chest x-rays. \" [AL]   [id number] Patient was reevaluated is still significantly altered as well as having diffuse abdominal tenderness palpation with voluntary guarding. Currently on 4 L nasal cannula, daughter in room states he is occasionally on oxygen but not consistently is unsure of his exact baseline of his oxygenation requirements.  [AL]   3839 Has persistent abdominal pain and ultimately status will be admitted for further management work-up. [AL]      ED Course User Index  [AL] Dee Dunn MD  [EM] Junaid Sotelo, DO         Further MDM and disposition:   Assessment: This is a 31-year-old male with past medical history of hypertension, CHF, end-stage renal disease on dialysis who presents with intractable abdominal pain over the past day. [place] He sent in from the nursing facility after waking this morning having persistent pain. He has significant intractable abdominal pain no significant findings on his CT abdomen pelvis. Plan: Patient is also more altered than his typical baseline is typically able to converse however currently somnolent rolling around in significant pain. Final diagnoses:   Chest pain, unspecified type   Lower abdominal pain   ESRD (end stage renal disease) ([facility]   Altered mental status, unspecified altered mental status type     Current Discharge Medication List            Condition: condition: stable  Dispo: Admit to telemetry    This transcription was electronically signed. It was dictated by use of voice recognition software and electronically transcribed. The transcription may contain errors not detected in proofreading.         Dee Dunn MD  Resident  05/06/22 2777

## 2022-05-06 NOTE — FLOWSHEET NOTE
05/06/22 1245 05/06/22 1645   Vital Signs   BP (!) 170/90 (!) 170/90   Temp 99.2 °F (37.3 °C) 99 °F (37.2 °C)   Pulse 79 77   Resp 17 17   Post-Hemodialysis Assessment   Post-Treatment Procedures  --  Blood returned; Access bleeding time < 10 minutes   Machine Disinfection Process  --  Acid/Vinegar Clean;Heat Disinfect; Exterior Machine Disinfection   Total Liters Processed (l/min)  --  90 l/min   Dialyzer Clearance  --  Lightly streaked   Duration of Treatment (minutes)  --  240 minutes   Heparin amount administered during treatment (units)  --  0 units   Hemodialysis Intake (ml)  --  400 ml   Hemodialysis Output (ml)  --  400 ml   NET Removed (ml)  --  0 ml   Tolerated Treatment  --  Good   Stable 4 hour treatment complete. Removed no fluid as per order. Tolerated treatment well. Pressure held to needle sites for 20 minutes each due to excessive bleeding from bottom site after ten minutes. Dressing clean, dry and intact upon leaving treatment site. Report given to primary RN. Treatment record printed for scanning into EMR.

## 2022-05-06 NOTE — ED NOTES
ED nurse-to-nurse bedside report    Chief Complaint   Patient presents with    Chest Pain      LOC: alert and orientated to name, place, date  Vital signs   Vitals:    05/06/22 0456 05/06/22 0602 05/06/22 0712   BP: (!) 171/98 (!) 168/96 (!) 152/68   Pulse: 98 87 76   Resp: 16 18 16   Temp: 98.8 °F (37.1 °C)     TempSrc: Oral     SpO2: 96% 96% 98%   Weight: 135 lb (61.2 kg)     Height: 5' 6\" (1.676 m)        Pain:    Pain Interventions: fentanyl  Pain Goal: 4  Oxygen: 4 lpm nasal cannula    Current needs required 4 lpm nasal cannula   Telemetry: Yes  LDAs:   Peripheral IV 05/06/22 Left Antecubital (Active)   Site Assessment Clean, dry & intact 05/06/22 0510     Continuous Infusions:   Mobility: Requires assistance * 2  Escalera Fall Risk Score:    Fall Risk 8/5/2014   2 or more falls in past year? no   Fall with injury in past year? no     Fall Interventions: side rails raised  Report given to: [employer], [place]  05/06/22 7799

## 2022-05-06 NOTE — CONSULTS
Nephrology Consult Note    Patient - Josefina Service   MRN -  [id number]      - 11/10/1930   80 y.o. Date of Admission -  2022  4:52 AM        Date of evaluation -  2022 11:42 AM    Reason for Consult  Management of End Stage Renal Disease     Requesting Physician:  Mike Pinon PA-C  History Information Obtained From: Nursing staff, Electronic medical records    [facility] / HPI:   The patient is a 80 y.o. male with past medical history of DM II, HTN, CAD, ESRD on HD who presented from [facility] with c/o of bilateral lower abdominal and chest pain. Other associated symptoms not available from pt or ECF documentation. Noted that the pt was seen by Gemma William NP at [facility]. The pt had c/o of Right upper gum pain, which appeared edematous with tenderness and foul odor. Treated with Amoxicillin Clavulanate with referral to dentist.   Pt had his last Hemodialysis treatment on Wed per schedule  Rest of [place] and ROS not available from pt  Pt was seen during Hemodialysis with 3 K bath, without Ultrafiltration. BP stable. REVIEW OF SYSTEMS:      EXAM:  VITALS:  /62   Pulse 75   Temp 98.8 °F (37.1 °C) (Oral)   Resp 13   Ht 5' 6\" (1.676 m)   Wt 135 lb (61.2 kg)   SpO2 98%   BMI 21.79 kg/m²      Constitutional:  No acute distress. Lethargic. Arouses to touch  Skin: No rash on exposed surfaces, No significant bruises  Heent:  Head is normocephalic, Extraocular movement intact, Minimal speech  Neck: no jugular venous distention noted, Neck is supple. Face symmetric. Cardiovascular:  S1, S2  regular rate and rhythm. Respiratory: Clear to ausculation bilaterally. Equal breath sounds, No crackles, No wheezes. No shortness of breath. Abdomen: Soft, non tender  Ext: Distal lower extremity temp is warm, No lower extremity edema. upper extremity AV Fistula   Musculoskeletal: Movement is coordinated. Moves all extremities.   Psychiatric: mood and affect appropriate  Neurological: Patient is alert and oriented to person. Recent and remote   memory is not intact    Home Medications:  Home meds reviewed    Current Medications:      Med and Labs reviewed  24HR INTAKE/OUTPUT:  No intake or output data in the 24 hours ending 05/06/22 1142    No intake/output data recorded. Patient Vitals for the past 96 hrs (Last 3 readings):   Weight   05/06/22 0456 135 lb (61.2 kg)     Body mass index is 21.79 kg/m². BP Readings from Last 5 Encounters:   05/06/22 119/62   04/27/22 (!) 159/78   03/01/22 (!) 169/75   02/09/22 137/65   12/09/21 118/70       Allergies:  Patient has no known allergies. Allergies/Intolerances: ALLERGIES: Patient has no known allergies. Past Medical, Family, Social History:   has a past medical history of Anemia in chronic kidney disease, Arthritis, Atrial fibrillation ([address] CAD (coronary artery disease), Cardiomegaly, Chronic kidney disease, Chronic kidney disease, stage IV (severe) ([address] Chronic respiratory failure with hypoxia ([address] CKD (chronic kidney disease) stage 3, GFR 30-59 ml/min ([address] COPD (chronic obstructive pulmonary disease) ([address] COVID-19, End stage renal disease ([address] GERD without esophagitis, GI bleed, Gout, Hemodialysis patient ([address] History of blood transfusion, Hyperlipidemia, Hyperphosphatemia, Hypertension, Monoclonal gammopathy, Pneumonia due to coronavirus disease 2019, Pulmonary embolism ([address] Sick sinus syndrome ([address] and Systolic congestive heart failure ([address] has a past surgical history that includes pacemaker placement (2013); Endoscopy, colon, diagnostic; Dialysis fistula creation (5/6/2016); Colonoscopy (4512,9790); Upper gastrointestinal endoscopy (6809,2903); Coronary angioplasty with stent (2004); pr esophagogastroduodenoscopy transoral diagnostic (N/A, 1/18/2018); vascular surgery; Cardiac surgery; Upper gastrointestinal endoscopy (Left, 6/23/2019); Colonoscopy (N/A, 6/27/2019);  Upper gastrointestinal endoscopy (N/A, 10/9/2021); Upper gastrointestinal endoscopy (N/A, 11/6/2021); and Upper gastrointestinal endoscopy (11/6/2021). family history includes Diabetes in his mother and sister; Heart Disease in his mother; Mental Illness in his paternal aunt. reports that he quit smoking about 40 years ago. He has a 40.00 pack-year smoking history. He has never used smokeless tobacco. He reports that he does not drink alcohol and does not use drugs. Diagnostic Data:  Recent Labs     05/06/22  0540 05/06/22  0839   * 133*   K 4.7 5.3*   CL 94*  --    CO2 27  --    BUN 39*  --    CREATININE 6.1*  --    LABGLOM 11*  --    GLUCOSE 97  --    CALCIUM 9.3  --      Recent Labs     05/06/22  0500   WBC 7.4   RBC 3.34*   HGB 10.6*   HCT 30.7*         CT Head WO Contrast  Result Date: 5/6/2022   No evidence of an acute process. No change from prior. CT ABDOMEN PELVIS W IV CONTRAST Additional Contrast? None  Result Date: 5/6/2022   1. Trace pelvic free fluid. 2. Layering bilateral pleural effusions. 3. Cardiomegaly. 4. Cholelithiasis. 5. Possible nodular density associated with the prostate or bladder. XR CHEST PORTABLE  Result Date: 5/6/2022  The findings are suggestive of CHF/fluid overload, are similar to multiple previous chest x-rays.     Summary 5/21/2021   Left ventricle size is normal.   Normal left ventricular wall thickness.   There was mild global hypokinesis of the left ventricle.   Systolic function was mildly reduced.   Ejection fraction is visually estimated at 45%.   Doppler parameters were consistent with abnormal left ventricular   relaxation (grade 1 diastolic dysfunction).   The left atrium is Moderately dilated.   Moderate mitral regurgitation is present.   Moderate tricuspid regurgitation visualized.   Right ventricular systolic pressure measures 55 mmhg. ASSESSMENT  1. End Stage Renal Disease 2nd to diabetic and hypertensive nephrosclerosis on Hemodialysis M,W,F. AV fistula. Apply Emla cream to AV fistula site 30 min prior to Hemodialysis. 2. Hyponatremia 2nd to End Stage Renal Disease and decreased ability to excrete free water   3. Essential Hypertension with nephrosclerosis  4. 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 Coronary artery disease Recent PCI 8/31/21 at [facility]  6. Anemia of chronic disease and acute blood loss, Hgb at goal  7. Secondary hyperparathyroidism of renal origin on rocaltrol and Sensipar, I deacon 10.8  8. Debility  9. Recent history of Right gum pain and tenderness, with foul odor. treated with Augmentin starting 5/3. FU per Hospitalist.     Principal Problem:    AMS (altered mental status)  Resolved Problems:    * No resolved hospital problems. *    Discussed with Dr. Stephany Moura.    Thank you Carrie Hernandez PA-C  for allowing us to participate in care of Ivette RubioXIN - CNP 5/6/2022 11:42 AM    CC: Brandon Coffman DO

## 2022-05-06 NOTE — ED NOTES
Patient back from CT. Patient is resting in bed with easy and unlabored respirations. Call light in reach. Side rails up x2. Patient denies further complaints or concerns. Will monitor.         Violeta Cleveland RN  05/06/22 5350

## 2022-05-06 NOTE — RT PROTOCOL NOTE
RT Inhaler-Nebulizer Bronchodilator Protocol Note    There is a bronchodilator order in the chart from a provider indicating to follow the RT Bronchodilator Protocol and there is an Initiate RT Inhaler-Nebulizer Bronchodilator Protocol order as well (see protocol at bottom of note). CXR Findings:  XR CHEST PORTABLE    Result Date: 5/6/2022  The findings are suggestive of CHF/fluid overload, are similar to multiple previous chest x-rays. This document has been electronically signed by: Jovani Hurtado MD on 05/06/2022 06:16 AM      The findings from the last RT Protocol Assessment were as follows:   History Pulmonary Disease: Chronic pulmonary disease  Respiratory Pattern: Regular pattern and RR 12-20 bpm  Breath Sounds: Intermittent or unilateral wheezes  Cough: Weak, non-productive  Indication for Bronchodilator Therapy:    Bronchodilator Assessment Score: 9    Aerosolized bronchodilator medication orders have been revised according to the RT Inhaler-Nebulizer Bronchodilator Protocol below. Respiratory Therapist to perform RT Therapy Protocol Assessment initially then follow the protocol. Repeat RT Therapy Protocol Assessment PRN for score 0-3 or on second treatment, BID, and PRN for scores above 3. No Indications - adjust the frequency to every 6 hours PRN wheezing or bronchospasm, if no treatments needed after 48 hours then discontinue using Per Protocol order mode. If indication present, adjust the RT bronchodilator orders based on the Bronchodilator Assessment Score as indicated below. Use Inhaler orders unless patient has one or more of the following: on home nebulizer, not able to hold breath for 10 seconds, is not alert and oriented, cannot activate and use MDI correctly, or respiratory rate 25 breaths per minute or more, then use the equivalent nebulizer order(s) with same Frequency and PRN reasons based on the score.   If a patient is on this medication at home then do not decrease Frequency below that used at home. 0-3 - enter or revise RT bronchodilator order(s) to equivalent RT Bronchodilator order with Frequency of every 4 hours PRN for wheezing or increased work of breathing using Per Protocol order mode. 4-6 - enter or revise RT Bronchodilator order(s) to two equivalent RT bronchodilator orders with one order with BID Frequency and one order with Frequency of every 4 hours PRN wheezing or increased work of breathing using Per Protocol order mode. 7-10 - enter or revise RT Bronchodilator order(s) to two equivalent RT bronchodilator orders with one order with TID Frequency and one order with Frequency of every 4 hours PRN wheezing or increased work of breathing using Per Protocol order mode. 11-13 - enter or revise RT Bronchodilator order(s) to one equivalent RT bronchodilator order with QID Frequency and an Albuterol order with Frequency of every 4 hours PRN wheezing or increased work of breathing using Per Protocol order mode. Greater than 13 - enter or revise RT Bronchodilator order(s) to one equivalent RT bronchodilator order with every 4 hours Frequency and an Albuterol order with Frequency of every 2 hours PRN wheezing or increased work of breathing using Per Protocol order mode. RT to enter RT Home Evaluation for COPD & MDI Assessment order using Per Protocol order mode.     Electronically signed by Samaria Aiken RCP on 5/6/2022 at 6:20 PM

## 2022-05-06 NOTE — ED PROVIDER NOTES
[place] ENCOUNTER        Pt Name: Sally Adam  MRN: [id number]  [place] 11/10/1930  Date of evaluation: 5/6/2022  Treating Resident Physician: Laura Trujillo DO  Supervising Physician: [facility]       Chief Complaint   Patient presents with    Chest Pain     History obtained from the patient. HISTORY OF PRESENT ILLNESS    HPI  Sally Adam is a 80 y.o. male who presents to the emergency department for evaluation of abdominal pain and chest discomfort. Patient is a poor historian and does not readily answer questions. Minimal nursing home and EMS report. Nursing states that been began complaining of abdominal and chest pain and sent to the emergency department. Does not provide pain level but states the discomfort is primarily in the lower aspect of his abdomen on both sides. Daughter at bedside reports that Chantal Camarena gets dialysis Monday Wednesday Friday and has not missed any occurrences. Denies any fevers, headache. No other identified exacerbating or alleviating factors. Reports still making a small amount of urine. The patient has no other acute complaints at this time. REVIEW OF SYSTEMS   Review of Systems   Constitutional: Negative for chills and fever. HENT: Negative for congestion and postnasal drip. Eyes: Negative for photophobia and visual disturbance. Respiratory: Negative for cough and shortness of breath. Cardiovascular: Positive for chest pain. Gastrointestinal: Positive for abdominal pain. Genitourinary: Positive for decreased urine volume and difficulty urinating. Musculoskeletal: Negative for neck pain and neck stiffness. Further review of systems difficult secondary to patient's pain and mental status.       PAST MEDICAL AND SURGICAL HISTORY     Past Medical History:   Diagnosis Date    Anemia in chronic kidney disease     Arthritis     Atrial fibrillation ([facility]     CAD (coronary artery disease)     Cardiomegaly     Chronic kidney disease     Chronic kidney disease, stage IV (severe) (HCC)     Chronic respiratory failure with hypoxia (HCC)     CKD (chronic kidney disease) stage 3, GFR 30-59 ml/min (HCC)     COPD (chronic obstructive pulmonary disease) ([facility]     COVID-19     End stage renal disease ([facility]     GERD without esophagitis     GI bleed     Gout     Hemodialysis patient ([facility] 07/26/2016    @ [facility]    History of blood transfusion     6/2019    Hyperlipidemia     Hyperphosphatemia 05/22/2018    Hypertension     Monoclonal gammopathy     Pneumonia due to coronavirus disease 2019     Pulmonary embolism (HCC)     Sick sinus syndrome (HCC)     Systolic congestive heart failure [facility])      Past Surgical History:   Procedure Laterality Date    CARDIAC SURGERY      COLONOSCOPY  2006,2009    COLONOSCOPY N/A 6/27/2019    COLONOSCOPY DIAGNOSTIC performed by Armando Gandhi MD at [address] [place] [address]    DIALYSIS FISTULA CREATION  5/6/2016    right arm fistula placement    ENDOSCOPY, COLON, DIAGNOSTIC      PACEMAKER PLACEMENT  2013    VT ESOPHAGOGASTRODUODENOSCOPY TRANSORAL DIAGNOSTIC N/A 1/18/2018    EGD performed by Saúl Alcala MD at [facility]  2006,2009    UPPER GASTROINTESTINAL ENDOSCOPY Left 6/23/2019    EGD DIAGNOSTIC ONLY performed by Armando Gandhi MD at [facility] 10/9/2021    EGD performed by Armando Gandhi MD at [facility] 11/6/2021    EGD ESOPHAGOGASTRODUODENOSCOPY performed by Armando Gandhi MD at [facility]  11/6/2021    EGD CONTROL HEMORRHAGE performed by Armando Gandhi MD at [address] [place] [address]   No current facility-administered medications for this encounter.     Current Outpatient Medications:     losartan (COZAAR) 50 MG tablet, Take 1 tablet by mouth daily, Disp: 30 tablet, Rfl: 3    epoetin traci-epbx (RETACRIT) 98588 UNIT/ML SOLN injection, Inject 1 mL into the skin three times a week, Disp: 6.6 mL, Rfl:     white petrolatum GEL, Apply 28 g topically as needed for Dry Skin, Disp: , Rfl: 0    pantoprazole (PROTONIX) 40 MG tablet, Take 1 tablet by mouth 2 times daily (before meals), Disp: 60 tablet, Rfl: 0    albuterol sulfate  (90 Base) MCG/ACT inhaler, Inhale 1 puff into the lungs every 4 hours as needed, Disp: , Rfl:     ASPIRIN LOW DOSE 81 MG EC tablet, Take 81 mg by mouth daily, Disp: , Rfl:     atorvastatin (LIPITOR) 40 MG tablet, Take 40 mg by mouth daily, Disp: , Rfl:     cyclobenzaprine (FLEXERIL) 10 MG tablet, Take 10 mg by mouth daily, Disp: , Rfl:     clopidogrel (PLAVIX) 75 MG tablet, Take 75 mg by mouth daily, Disp: , Rfl:     ferrous sulfate (IRON 325) 325 (65 Fe) MG tablet, Take 325 mg by mouth daily, Disp: , Rfl:     metoprolol (LOPRESSOR) 100 MG tablet, Take 1 tablet by mouth 2 times daily, Disp: 60 tablet, Rfl: 3    cinacalcet (SENSIPAR) 30 MG tablet, Take 60 mg by mouth three times a week before dialysis on M,W,F, Disp: , Rfl:     calcitRIOL (ROCALTROL) 0.25 MCG capsule, Take 1.5 mcg by mouth three times a week before dialysis on M,W,F, Disp: , Rfl:     docusate sodium (COLACE, DULCOLAX) 100 MG CAPS, Take 100 mg by mouth 2 times daily, Disp: 60 capsule, Rfl: 0    vitamin C (ASCORBIC ACID) 500 MG tablet, Take 1 tablet by mouth daily, Disp: 30 tablet, Rfl: 3    polyethylene glycol (MIRALAX) powder, Renal Miralax Colonoscopy prep. Use as directed. Mix Miralax 238 g with 24 oz clear liquids.   Drink 8 oz glass every 20-30 minutes until gone., Disp: 238 g, Rfl: 0    B Complex-C-Folic Acid (RENAL) 1 MG CAPS, Take 1 capsule by mouth daily, Disp: , Rfl:       SOCIAL HISTORY     Social History     Social History Narrative    Not on file     Social History     Tobacco Use    Smoking status: Former Smoker     Packs/day: 1.00     Years: 40.00     Pack years: 40.00     Quit date: 1982     Years since quittin.2    Smokeless tobacco: Never Used   Vaping Use    Vaping Use: Never used   Substance Use Topics    Alcohol use: No    Drug use: No         ALLERGIES   No Known Allergies      FAMILY HISTORY     Family History   Problem Relation Age of Onset    Diabetes Mother     Heart Disease Mother     Diabetes Sister     Mental Illness Paternal Aunt          PREVIOUS RECORDS   Previous records reviewed:  Patient last seen 1 week ago for hemorrhage of AV fistula. Emilee Espinal PHYSICAL EXAM     ED Triage Vitals [22 0456]   BP Temp Temp Source Pulse Resp SpO2 Height Weight   -- 98.8 °F (37.1 °C) Oral 98 16 96 % 5' 6\" (1.676 m) 135 lb (61.2 kg)     Initial vital signs and nursing assessment reviewed and normal. Body mass index is 21.79 kg/m². Pulsoximetry is normal per my interpretation. Additional Vital Signs:  Vitals:    22 0602   BP: (!) 168/96   Pulse: 87   Resp: 18   Temp:    SpO2: 96%   Temperature 98.8 °F    Physical Exam  Constitutional:       General: He is not in acute distress. Appearance: Normal appearance. He is ill-appearing (Chronic). He is not diaphoretic. HENT:      Head: Normocephalic and atraumatic. Mouth/Throat:      Mouth: Mucous membranes are dry. Pharynx: Oropharynx is clear. No oropharyngeal exudate or posterior oropharyngeal erythema. Eyes:      Extraocular Movements: Extraocular movements intact. Conjunctiva/sclera: Conjunctivae normal.      Pupils: Pupils are equal, round, and reactive to light. Cardiovascular:      Rate and Rhythm: Normal rate and regular rhythm. Pulses: Normal pulses. Heart sounds: Normal heart sounds. No murmur heard. No friction rub. No gallop. Pulmonary:      Effort: Pulmonary effort is normal.      Breath sounds: Normal breath sounds. No wheezing, rhonchi or rales. Abdominal:      General: There is no distension. Palpations: Abdomen is soft. There is no mass. Tenderness: There is abdominal tenderness (Generalized, most severe right lower quadrant). There is no guarding or rebound. Musculoskeletal:      Cervical back: Normal range of motion. No rigidity. No muscular tenderness. Right lower leg: No edema. Left lower leg: No edema. Comments: Right AC AV fistula with palpable thrill   Skin:     General: Skin is warm and dry. Capillary Refill: Capillary refill takes less than 2 seconds. Findings: No bruising, erythema or lesion. Neurological:      General: No focal deficit present. Mental Status: He is alert and oriented to person, place, and time. MEDICAL DECISION MAKING   Initial Assessment:   Chronically ill-appearing 80-year-old male DNR CCA. Presenting from [facility] for reported abdominal and chest pain. 88% on room air on arrival.  Daughter reports chronic oxygen use. Discomfort began overnight. History significant for CAD, COPD, hypertension, end-stage renal disease on dialysis, heart failure and atrial fibrillation. Takes Plavix. Monday Wednesday Friday dialysis. Differential diagnosis includes was not limited to gastritis, gastroenteritis, ACS, electrolyte imbalance, urinary tract infection, appendicitis, cholecystitis, pancreatitis, diverticulitis, peptic ulcer disease, gastroesophageal reflux. Generalized tenderness to the abdomen without pulsating mass or signs of peritonitis.   Plan:   Labs  Zofran and middle dose of fentanyl for pain  EKG  Chest x-ray  CT abdomen pelvis with IV contrast  Expected disposition pending pain control and lab/imaging findings        ED RESULTS   Laboratory results:  Labs Reviewed   CBC WITH AUTO DIFFERENTIAL - Abnormal; Notable for the following components:       Result Value    RBC 3.34 (*)     Hemoglobin 10.6 (*)     Hematocrit 30.7 (*)     RDW-CV 21.1 (*)     RDW-SD 71.3 (*)     All other components within normal limits   BASIC METABOLIC PANEL W/ REFLEX TO MG FOR LOW K - Abnormal; Notable for the following components:    Sodium 134 (*)     Chloride 94 (*)     BUN 39 (*)     CREATININE 6.1 (*)     All other components within normal limits   TROPONIN - Abnormal; Notable for the following components:    Troponin T 0.202 (*)     All other components within normal limits   GLOMERULAR FILTRATION RATE, ESTIMATED - Abnormal; Notable for the following components:    Est, Glom Filt Rate 11 (*)     All other components within normal limits   SPECIMEN REJECTION   HEPATIC FUNCTION PANEL   LIPASE   ANION GAP   OSMOLALITY   URINALYSIS WITH REFLEX TO CULTURE       Radiologic studies results:  XR CHEST PORTABLE   Final Result   The findings are suggestive of CHF/fluid overload, are similar to multiple    previous chest x-rays. This document has been electronically signed by: Anjelica Schaeffer MD    on 2022 06:16 AM      CT ABDOMEN PELVIS W IV CONTRAST Additional Contrast? None    (Results Pending)       ED Medications administered this visit:   Medications   fentaNYL (SUBLIMAZE) injection 12.5 mcg (12.5 mcg IntraVENous Given 22 05)   ondansetron (ZOFRAN) injection 4 mg (4 mg IntraVENous Given 22 05)         ED COURSE     ED Course as of 05/06/22 2233   Fri May 06, 2022   2945 Troponin T(!): 0.202  Troponin baseline [EM]   0717 Chest x-ray consistent with prior, fluid overload. [EM]   0710 Creatinine(!!): 6.1  Within patient's baseline range, he is in end-stage renal disease patient on dialysis.   [AL]   0710 Troponin T(!): 0.202  Within patient's normal baseline. [AL]   0710 Hepatic Function Panel:    Albumin 3.9   Bilirubin 0.7   Bilirubin, Direct 0.3   Alk Phos 115   AST 21   ALT 14   Total Protein 6.8  Within normal limits [AL]   0710 Osmolality Ca.6 [AL]   0710 Lipase: 42.1 [AL]   0710 Hemoglobin Quant(!): 10.6  Better than patient's typical baseline. [AL]   0711 XR CHEST PORTABLE  \"   IMPRESSION:  The findings are suggestive of CHF/fluid overload, are similar to multiple   previous chest x-rays. \" [AL]   [id number] Patient was reevaluated is still significantly altered as well as having diffuse abdominal tenderness palpation with voluntary guarding. Currently on 4 L nasal cannula, daughter in room states he is occasionally on oxygen but not consistently is unsure of his exact baseline of his oxygenation requirements. [AL]   8421 Has persistent abdominal pain and ultimately status will be admitted for further management work-up. [AL]      ED Course User Index  [AL] Moi Olson MD  [EM] Rishi Evans DO               MEDICATION CHANGES     Current Discharge Medication List            FINAL DISPOSITION     Final diagnoses:   Chest pain, unspecified type   Lower abdominal pain   ESRD (end stage renal disease) ([facility]     Condition: condition: stable  Dispo: Pending imaging, patient signed out to Dr. Laith Salinas      This transcription was electronically signed. Parts of this transcriptions may have been dictated by use of voice recognition software and electronically transcribed, and parts may have been transcribed with the assistance of an ED scribe. The transcription may contain errors not detected in proofreading. Please refer to my supervising physician's documentation if my documentation differs.     Electronically Signed: Rishi Evans DO, 05/06/22, 6:55 AM       Rishi Evans DO  Resident  05/06/22 [address], DO  05/06/22 5561

## 2022-05-06 NOTE — ED TRIAGE NOTES
Pt presents to the ED from [facility] c/o chest pain that started earlier this evening. Pt is not normally on O2 at home but was placed on 02 earlier this evening due to acute hypoxia. Pt was 88% on room air upon arrival. Pt is alert and oriented to self.  Pt intermittently is able to answer questions and is able to follow some commands

## 2022-05-06 NOTE — ED NOTES
Upon first contact with patient this RN receives bedside shift report Rahul Pike RN.       Charlie Gudino RN  05/06/22 [id number]

## 2022-05-06 NOTE — H&P
Hospitalist History & Physical    Patient:  Lindbergh Gaucher    Unit/Bed:JOSE GUADALUPE /JOSE GUADALUPE  YOB: 1930  MRN: [id number]   Acct: [de-identified]   PCP: Stefan Esparza DO  Code Status: Prior    Date of Service: Pt seen/examined on 05/06/22 and admitted to Inpatient with expected LOS greater than two midnights due to medical therapy. Chief Complaint: abdominal pain    Assessment/Plan:    1. Generalized abdominal pain:  Unclear etiology, see HPI. CT abdomen pelvis is unimpressive for acute findings. Serial abdominal exams. Clear liquid diet. Analgesics, antiemetics. 2. Acute oral pain: Per PCP note, pt has right upper cheek tenderness, edematous. Pt was started on Augmentin and referred to dentist, will continue. 3. ESRD on HD:  Consult nephrology    4. Chronic A. Fib: Rate controlled on metoprolol, PPM.  Patient is not on anticoagulation due to risks outweighing benefit. 5. Essential HTN: BP stable, resume home meds. Monitor. 6. Anemia of chronic disease:  No signs of acute bleeding, hemoglobin stable at baseline. 7. SSS s/p AICD, PPM: noted. Tele. 8. Chronic HFrEF:  EF 45% on echo 05/2021. No overt signs of decompensation. Continue home meds. 9. Dementia: Noted, patient at baseline. Patient from [facility]      History of Present Illness:  51-year-old male with past medical history of ESRD on dialysis, A. fib, CAD, chronic anemia, COPD, GERD, hypertension who presented to [facility] ED from nursing home for evaluation of abdominal pain. Patient with history of dementia, difficult to gather accurate history. Patient fatigued, falls asleep repeatedly throughout exam.  Patient awakes to verbal stimuli and, he is oriented to self place and time, somewhat to situation. He reports abdominal pain, states that he does not have pain any where else.   Patient noted to have right facial droop and sleeping, however resolves when he is talking and smile is symmetrical.  No other focal neurodeficits, patient follows commands. Physical exam was difficult. During exam, patient jumps in pain with palpation abdomen. He also jumps when I touch his legs or palpate his chest. He did not have pain when palpating with stethoscope. The patient denies nausea/vomiting, diarrhea, chest pain, shortness of breath. Patient mated to hospitalist service with nephrology consult. Review of Systems: Pertinent positives as noted in the HPI. All other systems reviewed and negative.     Past Medical History:        Diagnosis Date    Anemia in chronic kidney disease     Arthritis     Atrial fibrillation ([address]     CAD (coronary artery disease)     Cardiomegaly     Chronic kidney disease     [facility], stage IV (severe) [facility]     Chronic respiratory failure with hypoxia ([facility])     CKD (chronic kidney disease) stage 3, GFR 30-59 ml/min ([facility])     COPD (chronic obstructive pulmonary disease) ([address]     COVID-19     [facility] disease [facility]     GERD without esophagitis     GI bleed     Gout     Hemodialysis patient [facility]) 07/26/2016    [facility]    History of blood transfusion     6/2019    Hyperlipidemia     Hyperphosphatemia 05/22/2018    Hypertension     Monoclonal gammopathy     Pneumonia due to coronavirus disease 2019     Pulmonary embolism [facility]     Sick sinus syndrome ([facility]     Systolic congestive heart failure [facility])        Past Surgical History:        Procedure Laterality Date   [facility] CARDIAC SURGERY      COLONOSCOPY  2006,2009    COLONOSCOPY N/A 6/27/2019    COLONOSCOPY DIAGNOSTIC performed by Ivette Hoang MD at [address] [place] [address]  2004    DIALYSIS FISTULA CREATION  5/6/2016    right arm fistula placement    ENDOSCOPY, COLON, DIAGNOSTIC      PACEMAKER PLACEMENT  2013    ND ESOPHAGOGASTRODUODENOSCOPY TRANSORAL DIAGNOSTIC N/A 1/18/2018    EGD performed by Karen Tovar MD at [facility]    UPPER GASTROINTESTINAL ENDOSCOPY  2006,2009    UPPER GASTROINTESTINAL ENDOSCOPY Left 6/23/2019    EGD DIAGNOSTIC ONLY performed by Dilip Solis MD at [facility] N/A 10/9/2021    E[facility] performed by Dilip [facility]is [facility]    UPPER GASTROINTESTINAL ENDOSCOPY N/A 11/6/2021    EGD ESOPHAGOGASTRODUODENOSCOPY performed by Dilip Solis MD at [address] [place] [address]  11/6/2021    EGD CONTROL HEMORRHAGE performed by Dilip Solis MD at [place] [address] Medications:   No current facility-administered medications on file prior to encounter.      Current Outpatient Medications on File Prior to Encounter   Medication Sig Dispense Refill    losartan (COZAAR) 50 MG tablet Take 1 tablet by mouth daily 30 tablet 3    epoetin traci-epbx (RETACRIT) 60318 UNIT/ML SOLN injection Inject 1 mL into the skin three times a week 6.6 mL     white petrolatum GEL Apply 28 g topically as needed for Dry Skin  0    pantoprazole (PROTONIX) 40 MG tablet Take 1 tablet by mouth 2 times daily (before meals) 60 tablet 0    albuterol sulfate  (90 Base) MCG/ACT inhaler Inhale 1 puff into the lungs every 4 hours as needed      ASPIRIN LOW DOSE 81 MG EC tablet Take 81 mg by mouth daily      atorvastatin (LIPITOR) 40 MG tablet Take 40 mg by mouth daily      cyclobenzaprine (FLEXERIL) 10 MG tablet Take 10 mg by mouth daily      clopidogrel (PLAVIX) 75 MG tablet Take 75 mg by mouth daily      ferrous sulfate (IRON 325) 325 (65 Fe) MG tablet Take 325 mg by mouth daily      metoprolol (LOPRESSOR) 100 MG tablet Take 1 tablet by mouth 2 times daily 60 tablet 3    cinacalcet (SENSIPAR) 30 MG tablet Take 60 mg by mouth three times a week before dialysis on M,W,F      calcitRIOL (ROCALTROL) 0.25 MCG capsule Take 1.5 mcg by mouth three times a week before dialysis on M,W,F      docusate sodium (COLACE, DULCOLAX) 100 MG CAPS Take 100 mg by mouth 2 times daily 60 capsule 0    vitamin C (ASCORBIC ACID) 500 MG tablet Take 1 tablet by mouth daily 30 tablet 3    polyethylene glycol (MIRALAX) powder Renal Miralax Colonoscopy prep. Use as directed. Mix Miralax 238 g with 24 oz clear liquids. Drink 8 oz glass every 20-30 minutes until gone. 238 g 0    B Complex-C-Folic Acid (RENAL) 1 MG CAPS Take 1 capsule by mouth daily         Allergies:    Patient has no known allergies. Social History:    reports that he quit smoking about 40 years ago. He has a 40.00 pack-year smoking history. He has never used smokeless tobacco. He reports that he does not drink alcohol and does not use drugs. Family History:       Problem Relation Age of Onset    Diabetes Mother     Heart Disease Mother     Diabetes Sister     Mental Illness Paternal Aunt        Diet:  No diet orders on file      Physical Exam:  BP (!) 170/90   Pulse 79   Temp 99.2 °F (37.3 °C)   Resp 17   Ht 5' 6\" (1.676 m)   Wt 135 lb (61.2 kg)   SpO2 98%   BMI 21.79 kg/m²   General:   Chronically ill-appearing male. NAD  HEENT:  normocephalic and atraumatic. No scleral icterus. PERR. Neck: supple. No JVD. No thyromegaly. Lungs: clear to auscultation. No retractions  Cardiac: RRR without murmur. Abdomen: soft. Generalized abdominal tenderness to palpation. Bowel sounds positive. Extremities:  No clubbing, cyanosis, or edema x 4. Lower extremities tender to palpation. Vasculature: capillary refill < 3 seconds. Palpable LE pulses bilaterally. Skin:  warm and dry. No rashes or lesions. Psych:  Alert and oriented x2. Fatigued  Lymph:  No supraclavicular adenopathy. Neurologic:  No focal deficit. No seizures.     Data: (All radiographs, tracings, PFTs, and imaging are personally viewed and interpreted unless otherwise noted)  Labs:   Recent Labs     05/06/22  0500   WBC 7.4   HGB 10.6*   HCT 30.7*        Recent Labs     05/06/22  0540 05/06/22  0839 05/06/22  1315   * 133*  --    K 4.7 5.3*  --    CL 94*  --   --    CO2 27  --   --    BUN 39*  --   --    CREATININE 6.1*  --   --    CALCIUM 9.3  --   --    PHOS  --   --  2.3*     Recent Labs     05/06/22  0540   AST 21   ALT 14   BILIDIR 0.3   BILITOT 0.7   ALKPHOS 115     No results for input(s): INR in the last 72 hours. No results for input(s): Liliana Maldonado in the last 72 hours. Urinalysis:   Lab Results   Component Value Date    NITRU NEGATIVE 06/22/2019    WBCUA 50-75 06/22/2019    BACTERIA NONE 06/22/2019    RBCUA 25-50 06/22/2019    BLOODU LARGE 06/22/2019    SPECGRAV 1.013 05/03/2016    GLUCOSEU NEGATIVE 06/22/2019       EKG: atrial fibrillation    Radiology:  CT Head WO Contrast   Final Result    No evidence of an acute process. No change from prior. **This report has been created using voice recognition software. It may contain minor errors which are inherent in voice recognition technology. **      Final report electronically signed by Dr. Junaid Espinoza on 5/6/2022 9:47 AM      CT ABDOMEN PELVIS W IV CONTRAST Additional Contrast? None   Final Result       1. Trace pelvic free fluid. 2. Layering bilateral pleural effusions. 3. Cardiomegaly. 4. Cholelithiasis. 5. Possible nodular density associated with the prostate or bladder. **This report has been created using voice recognition software. It may contain minor errors which are inherent in voice recognition technology. **      Final report electronically signed by Dr. Junaid Espinoza on 5/6/2022 8:01 AM      XR CHEST PORTABLE   Final Result   The findings are suggestive of CHF/fluid overload, are similar to multiple    previous chest x-rays. This document has been electronically signed by: Sourav Cordon MD    on 05/06/2022 06:16 AM        CT Head WO Contrast    Result Date: 5/6/2022  PROCEDURE: CT HEAD WO CONTRAST CLINICAL INFORMATION: ams. Altered mental status. Lethargy. History of renal insufficiency.  The patient had a contrast-enhanced CT of the abdomen and pelvis earlier today. COMPARISON: Head CT 12/8/2021. TECHNIQUE: Noncontrast 5 mm axial images were obtained through the brain. Sagittal and coronal reconstructions were obtained. All CT scans at this facility use dose modulation, iterative reconstruction, and/or weight-based dosing when appropriate to reduce radiation dose to as low as reasonably achievable. FINDINGS: There is global volume loss. There are some vascular calcifications. There is no gross hemorrhage. There are no intra-or extra-axial collections. There is no hydrocephalus, midline shift or mass effect. There is a mild amount of abnormal low attenuation in the white matter the brain consistent with chronic small vessel ischemic changes. The paranasal sinuses and mastoid air cells are normally aerated. There is no suspicious calvarial abnormality. No evidence of an acute process. No change from prior. **This report has been created using voice recognition software. It may contain minor errors which are inherent in voice recognition technology. ** Final report electronically signed by Dr. Dalila Hdez on 5/6/2022 9:47 AM    CT ABDOMEN PELVIS W IV CONTRAST Additional Contrast? None    Result Date: 5/6/2022  PROCEDURE: CT ABDOMEN PELVIS W IV CONTRAST CLINICAL INFORMATION: lower quadrant abdominal pain . COMPARISON: CT abdomen pelvis 10/5/2021. TECHNIQUE: Axial 5 mm CT images were obtained through the abdomen and pelvis after the administration of intravenous contrast. Coronal and sagittal reconstructions were obtained. All CT scans at this facility use dose modulation, iterative reconstruction, and/or weight-based dosing when appropriate to reduce radiation dose to as low as reasonably achievable. FINDINGS: There are small layering bilateral pleural effusions. This is moderate-sized on the right and smaller on the left. There is associated bibasilar atelectasis. There is cardiomegaly.   There is a pacemaker wire in the heart. The liver is grossly normal. The spleen is normal. The adrenals and pancreas are normal. There is a gallstone in the gallbladder. There is severe cortical atrophy of both kidneys. There is no hydronephrosis. No abnormalities of the small bowel loops are noted. The IVC and aorta are of normal caliber. There is calcified atheromatosis of the aorta. There is no adenopathy. The urinary bladder is empty. There is a enhancing nodular density on the left. This is located at the level the junction of the prostate gland and bladder. Its unknown if this is associated with the bladder or the prostate gland. The bladder is not filled. There is some trace pelvic free fluid. The colon is within normal limits. No suspicious osseous lesions are identified. 1. Trace pelvic free fluid. 2. Layering bilateral pleural effusions. 3. Cardiomegaly. 4. Cholelithiasis. 5. Possible nodular density associated with the prostate or bladder. **This report has been created using voice recognition software. It may contain minor errors which are inherent in voice recognition technology. ** Final report electronically signed by Dr. Alexus Bustillos on 5/6/2022 8:01 AM    XR CHEST PORTABLE    Result Date: 5/6/2022  EXAM: CHEST X-RAY, SINGLE VIEW PORTABLE: COMPARISON:  CR,SR - XR CHEST PORTABLE - 10/28/2021 12:57 AM EDT FINDINGS: Moderate cardiomegaly, unchanged. Pacemaker and lead wires remain in place. Right pleural effusion suspected. Extensive interstitial prominence throughout both lungs is similar to multiple previous studies. No new focal abnormality. The osseous structures are unremarkable. The findings are suggestive of CHF/fluid overload, are similar to multiple previous chest x-rays.  This document has been electronically signed by: Jerardo Merida MD on 05/06/2022 06:16 AM        Tele:   [x] yes             [] no      Thank you Magalis Nicolas DO for the opportunity to be involved in this patient's care.    Electronically signed by Armida Jennings PA-C on 5/6/2022 at 2:19 PM

## 2022-05-06 NOTE — PROGRESS NOTES
05/06/22 1820   RT Protocol   History Pulmonary Disease 2   Respiratory pattern 0   Breath sounds 4   Cough 3   Bronchodilator Assessment Score 9

## 2022-05-06 NOTE — PROGRESS NOTES
Two nurse skin assessment completed by Licha Browne and Iqra Wesley RN. No skin issues noted. Patient is RUE limb restriction d/t fistula. Bed alarm placed on patient and patient cleaned/washed and adjusted in bed.

## 2022-05-06 NOTE — PROGRESS NOTES
This nurse contacted [facility] and talked with Nurse Candance about patient meds. Candance stated patient had not taken any home meds and only received norco at 0200. Patien is normally on 2L O2 and is normally verbal with staff. Patient currently non-verbal with this nurse when asking questions. Patient is only moaning. This nurse will complete admission with information given by Candance and update on-coming nurse.

## 2022-05-06 NOTE — PROGRESS NOTES
Pharmacy Renal Adjustment    Deidra Naik is a 80 y.o. male. Pharmacy renally adjust the following medications per P&T approved policy: Augmentin    Height:   Ht Readings from Last 1 Encounters:   05/06/22 5' 6\" (1.676 m)     Weight:  Wt Readings from Last 1 Encounters:   05/06/22 135 lb (61.2 kg)     Recent Labs     05/06/22  0540   BUN 39*   CREATININE 6.1*     Estimated Creatinine Clearance: 7 mL/min (A) (based on SCr of 6.1 mg/dL [facility])).   Calculated CrCl:    Assessment:  ESRD on HD    Plan:   Decrease Augmentin 875/125 mg Q12H  to Augmentin 500/125 mg Q12H                   Vincent Costello PharmD  5/6/2022  6:21 PM

## 2022-05-06 NOTE — PROGRESS NOTES
V/O received from [facility] for 1mg morphine q4h prn for 7-10 pain. No other orders at this time.

## 2022-05-07 NOTE — PROGRESS NOTES
[facility]         Renal Inpatient Follow-Up note         5/7/2022 10:51 AM    Pt Name:   Diana Sands  YOB: 1930  Attending:   Andrey Hayward MD    Chief Complaint : Diana Sands is a 80 y.o. male being followed by nephrology for ESRD on hemodialysis    Interval History :   Patient seen and examined by me. No distress  Much more awake and alert  Denies any chest pain or shortness of breath     Scheduled Medications :    lidocaine   Topical Once per day on Mon Wed Fri    aspirin  81 mg Oral Daily    atorvastatin  40 mg Oral Daily    folbee plus  1 tablet Oral Daily    calcitRIOL  1.5 mcg Oral Once per day on Mon Wed Fri    cinacalcet  60 mg Oral Once per day on Mon Wed Fri    clopidogrel  75 mg Oral Daily    cyclobenzaprine  10 mg Oral Daily    docusate sodium  100 mg Oral BID    ferrous sulfate  325 mg Oral Daily    losartan  50 mg Oral Daily    metoprolol  100 mg Oral BID    pantoprazole  40 mg Oral BID AC    vitamin C  500 mg Oral Daily    sodium chloride flush  10 mL IntraVENous 2 times per day    heparin (porcine)  5,000 Units SubCUTAneous TID    amoxicillin-clavulanate   Oral 2 times per day    albuterol  2.5 mg Nebulization TID      sodium chloride         Vitals :  /72   Pulse 86   Temp 99 °F (37.2 °C) (Oral)   Resp 18   Ht 5' 6\" (1.676 m)   Wt 135 lb (61.2 kg)   SpO2 94%   BMI 21.79 kg/m²     24HR INTAKE/OUTPUT:      Intake/Output Summary (Last 24 hours) at 5/7/2022 1051  Last data filed at 5/7/2022 0546  Gross per 24 hour   Intake 900 ml   Output 400 ml   Net 500 ml     Last 3 weights  Wt Readings from Last 3 Encounters:   05/06/22 135 lb (61.2 kg)   04/27/22 135 lb (61.2 kg)   03/01/22 135 lb (61.2 kg)           Physical Exam :  General Appearance:  Well developed.  No distress  Mouth/Throat:  Oral mucosa moist  Neck:  Supple, no JVD  Lungs:  Breath sounds: clear  Heart[de-identified]  S1,S2 heard  Abdomen:  Soft, non - tender  Musculoskeletal:  Edema -edema         Last 3 CBC   Recent Labs     05/06/22  0500 05/07/22  0725   WBC 7.4 7.3   RBC 3.34* 3.21*   HGB 10.6* 10.2*   HCT 30.7* 29.4*    108*     Last 3 CMP  Recent Labs     05/06/22  0540 05/06/22  0839 05/07/22  0725   * 133* 132*   K 4.7 5.3* 5.8*   CL 94*  --  93*   CO2 27  --  26   BUN 39*  --  28*   CREATININE 6.1*  --  4.0*   CALCIUM 9.3  --  8.7   LABALBU 3.9  --  3.1*   BILITOT 0.7  --  1.0             ASSESSMENT / Plan   1 Renal -ESRD on hemodialysis Monday Wednesday Friday  ? Volume status appears to be stable  ? However potassium is high at 5.8 so we will get the patient dialyzed again today    2 Electrolytes -mild hyponatremia  3 Hyperkalemia we will we will dialyze him again today with 2K bath  4 Essential hypertension  5 Hx of diabetes mellitus  6 Anemia of renal dysfunction  7 Or hyperparathyroidism  8 Meds reviewed and discussed with patient and HD staff    Dr. Lacy Fong MD, M,D.  [facility].

## 2022-05-07 NOTE — PROGRESS NOTES
[address] [place] [address]  SPEECH THERAPY MISSED TREATMENT NOTE  [facility] RENAL TELEMETRY 6K      Date: 2022  Patient Name: Maciej Mendoza        MRN: [id number]    : 11/10/1930  (80 y.o.)    REASON FOR MISSED TREATMENT:  ST received novel orders for completion of ST evaluation and treatment. Attempts to complete CSE. Per chart review, patient with h/o dementia, high levels of fatigue and poor alertness levels. Spoke with RN, April who reports patient JOSE GUADALUPE for inpatient dialysis. Will check back Monday,  as able.      Ginger De M.S. [place] [id number] 2022

## 2022-05-07 NOTE — FLOWSHEET NOTE
05/07/22 1430   Vital Signs   /75   Temp 97.9 °F (36.6 °C)   Pulse 90   Resp 20   SpO2 96 %   Post-Hemodialysis Assessment   Post-Treatment Procedures Blood returned; Access bleeding time < 10 minutes   Machine Disinfection Process Acid/Vinegar Clean;Heat Disinfect; Exterior Machine Disinfection   Total Liters Processed (l/min) 57.3 l/min   Dialyzer Clearance Lightly streaked   Duration of Treatment (minutes) 150 minutes   Heparin amount administered during treatment (units) 0 units   Hemodialysis Intake (ml) 400 ml   Hemodialysis Output (ml) 2400 ml   NET Removed (ml) 2000 ml   Tolerated Treatment Good   Stable 2.5 hour TX. Removed 2 L of fluid. pt tolerated fluid removal well. Pressure held to each needle site x 10 min. Drsg clean, dry, and intact upon leaving unit. TX record printed for scanning into EMR.

## 2022-05-07 NOTE — RT PROTOCOL NOTE
RT Inhaler-Nebulizer Bronchodilator Protocol Note    There is a bronchodilator order in the chart from a provider indicating to follow the RT Bronchodilator Protocol and there is an Initiate RT Inhaler-Nebulizer Bronchodilator Protocol order as well (see protocol at bottom of note). CXR Findings:  XR CHEST PORTABLE    Result Date: 5/6/2022  The findings are suggestive of CHF/fluid overload, are similar to multiple previous chest x-rays. This document has been electronically signed by: Yanna Jerome MD on 05/06/2022 06:16 AM      The findings from the last RT Protocol Assessment were as follows:   History Pulmonary Disease: Chronic pulmonary disease  Respiratory Pattern: Regular pattern and RR 12-20 bpm  Breath Sounds: Slightly diminished and/or crackles  Cough: Weak, non-productive  Indication for Bronchodilator Therapy: Decreased or absent breath sounds  Bronchodilator Assessment Score: 7    Aerosolized bronchodilator medication orders have been revised according to the RT Inhaler-Nebulizer Bronchodilator Protocol below. Respiratory Therapist to perform RT Therapy Protocol Assessment initially then follow the protocol. Repeat RT Therapy Protocol Assessment PRN for score 0-3 or on second treatment, BID, and PRN for scores above 3. No Indications - adjust the frequency to every 6 hours PRN wheezing or bronchospasm, if no treatments needed after 48 hours then discontinue using Per Protocol order mode. If indication present, adjust the RT bronchodilator orders based on the Bronchodilator Assessment Score as indicated below. Use Inhaler orders unless patient has one or more of the following: on home nebulizer, not able to hold breath for 10 seconds, is not alert and oriented, cannot activate and use MDI correctly, or respiratory rate 25 breaths per minute or more, then use the equivalent nebulizer order(s) with same Frequency and PRN reasons based on the score.   If a patient is on this medication at home then do not decrease Frequency below that used at home. 0-3 - enter or revise RT bronchodilator order(s) to equivalent RT Bronchodilator order with Frequency of every 4 hours PRN for wheezing or increased work of breathing using Per Protocol order mode. 4-6 - enter or revise RT Bronchodilator order(s) to two equivalent RT bronchodilator orders with one order with BID Frequency and one order with Frequency of every 4 hours PRN wheezing or increased work of breathing using Per Protocol order mode. 7-10 - enter or revise RT Bronchodilator order(s) to two equivalent RT bronchodilator orders with one order with TID Frequency and one order with Frequency of every 4 hours PRN wheezing or increased work of breathing using Per Protocol order mode. 11-13 - enter or revise RT Bronchodilator order(s) to one equivalent RT bronchodilator order with QID Frequency and an Albuterol order with Frequency of every 4 hours PRN wheezing or increased work of breathing using Per Protocol order mode. Greater than 13 - enter or revise RT Bronchodilator order(s) to one equivalent RT bronchodilator order with every 4 hours Frequency and an Albuterol order with Frequency of every 2 hours PRN wheezing or increased work of breathing using Per Protocol order mode. RT to enter RT Home Evaluation for COPD & MDI Assessment order using Per Protocol order mode.     Electronically signed by Jaiden Loya RCP on 5/7/2022 at 8:10 AM

## 2022-05-07 NOTE — PLAN OF CARE
Problem: Respiratory - Adult  Goal: Clear lung sounds  5/7/2022 0810 by Mendez Yeung RCP  Outcome: Progressing   Continuing albuterol per protocol, breath sounds diminished.

## 2022-05-07 NOTE — PLAN OF CARE
Problem: Discharge Planning  Goal: Discharge to home or other facility with appropriate resources  Outcome: Progressing  Flowsheets  Taken 5/7/2022 0232  Discharge to home or other facility with appropriate resources:   Identify barriers to discharge with patient and caregiver   Identify discharge learning needs (meds, wound care, etc)   Refer to discharge planning if patient needs post-hospital services based on physician order or complex needs related to functional status, cognitive ability or social support system  Taken 5/6/2022 1952  Discharge to home or other facility with appropriate resources: Identify barriers to discharge with patient and caregiver  Note: Pt will return to ecf when GI workup completed     Problem: Pain  Goal: Verbalizes/displays adequate comfort level or baseline comfort level  Outcome: Progressing  Note: Pt moans and grimaces and grabs ABD however denies pain when asked. D/T dementia dx and pt unable to answer questions appropriately PRN tylenol and morphine given     Problem: Skin/Tissue Integrity  Goal: Absence of new skin breakdown  Description: 1. Monitor for areas of redness and/or skin breakdown  2. Assess vascular access sites hourly  3. Every 4-6 hours minimum:  Change oxygen saturation probe site  4. Every 4-6 hours:  If on nasal continuous positive airway pressure, respiratory therapy assess nares and determine need for appliance change or resting period. Outcome: Progressing  Note: No new signs or symptoms of skin breakdown noted this shift, encouraging patient to turn and reposition self in bed q2h       Problem: Safety - Adult  Goal: Free from fall injury  Outcome: Progressing  Note: Call light within reach. Side rails up x2. Bed alarm on. Non skid slippers available.        Problem: Chronic Conditions and Co-morbidities  Goal: Patient's chronic conditions and co-morbidity symptoms are monitored and maintained or improved  Flowsheets (Taken 5/6/2022 1952)  Care Plan - Patient's Chronic Conditions and Co-Morbidity Symptoms are Monitored and Maintained or Improved: Monitor and assess patient's chronic conditions and comorbid symptoms for stability, deterioration, or improvement  Note: Pt assessed Q4 hours for any changes

## 2022-05-07 NOTE — PROGRESS NOTES
Hospitalist Progress Note       Patient:  Stanislaw Melchor      Unit/Bed:6K-20/020-A    YOB: 1930    MRN: [id number]       Acct: [de-identified]     PCP: José Joe DO    Date of Admission: 5/6/2022    Assessment/Plan:     #ESRD on HD 2/2 DM/HTN Nephrosclerosis w/HyperKalemia: HD M,W,F; w/subsequent HypoNa and 2/2 HyperPTH on Rocaltrol/Sensipar. Follows OP w/Nephrology. Chronically elevated Troponins, on arrival 0.202 (baseline range 0.158-0.218). INDRA/ACD 2/2 to ESRD. HD w/3K bath w/o UF (no hypotensive episodes) on arrival. K+ again 5.8 today, per Nephro pt going again to HD.   - Nephrology on board for additional HD / electrolyte mgmt  - S/p Stable 2.5 hour TX. Removed 2L of fluid today  - C/w Rocaltrol, Sensipar, Iron 325mg + Vitamin C  - Monitor and evaluate the need for HD tomorrow     #Chronic Syst/Myers CHF: HFmrEF, EF45% w/G1DD, Mod MR/TR, RVSP 55mmHg - Echo 5/2021 on GDMT. Follows OP w/Cardiology. Appears fluid overload, but not in CHFe. Likely a contributing factor to pt's volume status although not in exacerbation.   - C/w current home medications at this time     #Gingival Pain likely gingivitis currently on Augmentin therapy: Noted from [facility] facility. Was prescribed Abx and referral for dentist sent out to have pt seen. Appears erythematous, no abscess/fluid collection seen. - C/w Augmentin at this time   - Oral care     #? 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  Hx of 40PY smoking history, yet quit over 40 years ago  - Pt placed on 4L in ED although SpO2 >95%  - Back to baseline 2LNC already (unsure if pt actually needed 4L in first place)  - C/w Oxygen adjunctive therapy at this time  - C/w Albuterol Neb solutions TID    #Hx of recurrent UGI bleed: EGD on 11/6/21 Bleeding AVM treated w/APC - Post op diagnosis Grade 2 erosive esophagitis with features suggestive of recent GI bleed: Mild gastritis with deformity of the pylorus and duodenitis no active GI bleed seen no biopsy obtained, On PPI at home   - C/w PPI therapy - Protonix 40mg BID  - No current signs of bleeding     #Hx of Plasma cell Dyscrasia: noted to have slight increase in K/L free light chains. 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 + chronic Hip pain: C/w Flexeril   #Hx of SSS s/p Pacemaker 2013 and Chronic A-fib rate-controlled: PPM routinely interrogated; C/w Metoprolol 100mg BID  #Chronically debilitated/deconditioned, current ECF resident: Malnutrition, ill-appearing     Expected discharge date: TBD    Disposition:    [] Home       [] TCU       [] Rehab       [] Psych       [] SNF       [x] [facility]       [] Other-    Chief Complaint: Abdominal pain / Gingival pain    Hospital Course:     79yo M, Chronically debilitated/deconditioned, current ECF resident w/PMHx HLD, HTN w/Hx of HTN emergency, INDRA/ACD 2/2 ESRD, DJD (knee, left) w/arthritis in Low back/neck + chronic Hip pain, Plasma cell Dyscrasia (noted to have slight increase in K/L free light chains. BM Bx found erythroid hyperplasia with 2% plasma cells, rare kappa (+) plasma cells; normocellular marrow w/full active trilineage hematopoiesis w/erythroid hyperplasia; Flow cytometry found a small population of atypical plasma cells, representing 0.53% of total leukocytes;  Follows OP w/Heme/Onc), Hx of recurrent UGI bleed (EGD on 11/6/21 Bleeding AVM treated w/APC - Post op diagnosis Grade 2 erosive esophagitis with features suggestive of recent GI bleed: Mild gastritis with deformity of the pylorus and duodenitis no active GI bleed seen no biopsy obtained, On PPI), Hx of PE (not a candidate for [address], No IVC filter), Hx of SSS s/p Pacemaker 2013, Chronic A-fib rate-controlled, CAD s/p PCI w/stents 2004 on DAPT, ? 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 Pt noted to have tenderness to palpation in upper gums w/redness at [facility], was given renally-dosed Augmentin + to have Dentist see him (5/3). Limited Hx from pt, mostly from chart review and assisted individuals. In ED: Afebrile, /98, HR 98, Rr16 w/SpO2 >90% on 4LNC. Labs remarkable for Na 134, BUN 39, Cr 6.1, eGFR 11, Lactic acid wnl, Ca 9.3 w/iCa 1.08, Ph 2.3, K 5.3, Plts 152, Troponin 0.202 (baseline range 0.158-0.218), Hb/Hct 10.6/30.7, RDW 71.3. CXR findings suggestive of CHF/Fluid overload. CT A/P trace pelvic free fluid, b/l pleural effusions, cholelithiasis. CT head No evidence of acute process. Appears volume overloaded, Nephro consulted - taken to HD w/3K bath w/o UF (no hypotensive episodes). Subjective (past 24 hours):      Pt seen and examined today at bedside today in no acute distress. Arousable w/minimal speech in return. Denies CP, SOB, fever/chills.      Medications:  Reviewed    Infusion Medications    sodium chloride       Scheduled Medications    lidocaine Topical Once per day on Mon Wed Fri    aspirin  81 mg Oral Daily    atorvastatin  40 mg Oral Daily    folbee plus  1 tablet Oral Daily    calcitRIOL  1.5 mcg Oral Once per day on Mon Wed Fri    cinacalcet  60 mg Oral Once per day on Mon Wed Fri    clopidogrel  75 mg Oral Daily    cyclobenzaprine  10 mg Oral Daily    docusate sodium  100 mg Oral BID    ferrous sulfate  325 mg Oral Daily    losartan  50 mg Oral Daily    metoprolol  100 mg Oral BID    pantoprazole  40 mg Oral BID AC    vitamin C  500 mg Oral Daily    sodium chloride flush  10 mL IntraVENous 2 times per day    heparin (porcine)  5,000 Units SubCUTAneous TID    amoxicillin-clavulanate   Oral 2 times per day    albuterol  2.5 mg Nebulization TID     PRN Meds: albuterol sulfate HFA, sodium chloride flush, sodium chloride, ondansetron **OR** ondansetron, polyethylene glycol, acetaminophen **OR** acetaminophen, morphine      Intake/Output Summary (Last 24 hours) at 5/7/2022 1737  Last data filed at 5/7/2022 1430  Gross per 24 hour   Intake 900 ml   Output 2400 ml   Net -1500 ml       Diet:  ADULT DIET; Clear Liquid    Exam:  /69   Pulse 79   Temp 97.7 °F (36.5 °C) (Axillary)   Resp 18   Ht 5' 6\" (1.676 m)   Wt 135 lb (61.2 kg)   SpO2 100%   BMI 21.79 kg/m²     General appearance: No apparent distress, appears stated age and cooperative. Chronically ill-appearing, fatigued, arousable to verbal stimuli  HEENT: Pupils equal, round, and reactive to light. Conjunctivae/corneas clear. Neck: Supple, with full range of motion. No jugular venous distention. Trachea midline. Respiratory:  Normal respiratory effort. Clear to auscultation, bilaterally without Rales/Wheezes/Rhonchi. Cardiovascular: Regular rate and rhythm with normal S1/S2 without murmurs, rubs or gallops. Abdomen: Soft, non-distended with normal bowel sounds.  Generalized tenderness, negative vazquez's, no rebound/gaurding  Musculoskeletal: passive and active ROM x 4 extremities. Skin: Skin color, texture, turgor normal.    Neurologic:  Neurovascularly intact without any focal sensory/motor deficits. Cranial nerves: II-XII intact, grossly non-focal.  Psychiatric: A/Ox2, thought content appropriate  Capillary Refill: Brisk,< 3 seconds   Peripheral Pulses: +2 palpable, equal bilaterally       Labs:   Recent Labs     05/06/22  0500 05/07/22  0725   WBC 7.4 7.3   HGB 10.6* 10.2*   HCT 30.7* 29.4*    108*     Recent Labs     05/06/22  0540 05/06/22  0839 05/06/22  1315 05/07/22  0725   * 133*  --  132*   K 4.7 5.3*  --  5.8*  5.8*   CL 94*  --   --  93*   CO2 27  --   --  26   BUN 39*  --   --  28*   CREATININE 6.1*  --   --  4.0*   CALCIUM 9.3  --   --  8.7   PHOS  --   --  2.3*  --      Recent Labs     05/06/22  0540 05/07/22  0725   AST 21 30   ALT 14 17   BILIDIR 0.3  --    BILITOT 0.7 1.0   ALKPHOS 115 117     No results for input(s): INR in the last 72 hours. No results for input(s): Main Clap in the last 72 hours. No results for input(s): PROCAL in the last 72 hours. Microbiology:      Urinalysis:      Lab Results   Component Value Date    NITRU NEGATIVE 06/22/2019    WBCUA 50-75 06/22/2019    BACTERIA NONE 06/22/2019    RBCUA 25-50 06/22/2019    BLOODU LARGE 06/22/2019    SPECGRAV 1.013 05/03/2016    GLUCOSEU NEGATIVE 06/22/2019       Radiology:  CT Head WO Contrast   Final Result    No evidence of an acute process. No change from prior. **This report has been created using voice recognition software. It may contain minor errors which are inherent in voice recognition technology. **      Final report electronically signed by Dr. Sara Rivera on 5/6/2022 9:47 AM      CT ABDOMEN PELVIS W IV CONTRAST Additional Contrast? None   Final Result       1. Trace pelvic free fluid. 2. Layering bilateral pleural effusions. 3. Cardiomegaly. 4. Cholelithiasis. 5. Possible nodular density associated with the prostate or bladder. **This report has been created using voice recognition software. It may contain minor errors which are inherent in voice recognition technology. **      Final report electronically signed by Dr. Manuel Schuster on 5/6/2022 8:01 AM      XR CHEST PORTABLE   Final Result   The findings are suggestive of CHF/fluid overload, are similar to multiple    previous chest x-rays.       This document has been electronically signed by: Ken Leach MD    on 05/06/2022 06:16 AM          DVT prophylaxis: [] Lovenox                                 [] SCDs                                 [x] SQ Heparin                                 [] Encourage ambulation           [] Already on Anticoagulation     Code Status: Limited    Tele:   [x] yes             [] no    Active Hospital Problems    Diagnosis Date Noted    AMS (altered mental status) [R41.82] 05/06/2022     Priority: Medium       Electronically signed by Kaiden Patel MD on 5/7/2022 at 5:37 PM

## 2022-05-07 NOTE — PLAN OF CARE
Problem: Respiratory - Adult  Goal: Clear lung sounds  5/7/2022 1751 by Michelle Mortensen RCP  Outcome: Progressing   Patient receiving breathing treatments to maintain and manage respiratory status

## 2022-05-08 NOTE — PLAN OF CARE
Problem: Pain  Goal: Verbalizes/displays adequate comfort level or baseline comfort level  Outcome: Progressing  Flowsheets (Taken 5/8/2022 0249)  Verbalizes/displays adequate comfort level or baseline comfort level:   Assess pain using appropriate pain scale   Encourage patient to monitor pain and request assistance  Note: Patient denies any pain at this time. Pain scale explained.  This nurse will contiue to monitor and assess pain levels and provide emotional support and rest.

## 2022-05-08 NOTE — PROGRESS NOTES
Hospitalist Progress Note       Patient:  Geoffrey Phalen      Unit/Bed:6K-20/020-A    YOB: 1930    MRN: [id number]       Acct: [de-identified]     PCP: Claude Pilot, DO    Date of Admission: 5/6/2022    Assessment/Plan:     #ESRD on HD 2/2 DM/HTN Nephrosclerosis: HD M,W,F; w/subsequent HypoNa and 2/2 HyperPTH on Rocaltrol/Sensipar. Follows OP w/Nephrology. Chronically elevated Troponins, on arrival 0.202 (baseline range 0.158-0.218). INDRA/ACD 2/2 to ESRD. HD w/3K bath w/o UF (no hypotensive episodes) on arrival. K+ again 5.8 today, per Nephro pt going again to HD.   - Nephrology on board for additional HD / electrolyte mgmt  - C/w Rocaltrol, Sensipar, Iron 325mg + Vitamin C  - Plan for dialysis in AM prior to discharge     #Hyperkalemia: Resolved. 5.8 on 05/07 in setting of ESRD. Resolved with dialysis. Nephrology managing. 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 Follows OP w/Cardiology. Appears fluid overload, but not in CHFe. Likely a contributing factor to pt's volume status although not in exacerbation.   - C/w current home medications at this time     #Gingival Pain likely gingivitis currently on Augmentin therapy: Noted from [facility] facility. Was prescribed Abx and referral for dentist sent out to have pt seen. Appears erythematous, no abscess/fluid collection seen. - C/w Augmentin at this time   - Oral care     #? COPD w/Chronic Hypoxic RF w/Home 2LNC O2 use, Pulmonary HTN: PFTs in 2012 demonstrated elevated FEV1/FVC ratio of 112% predicted consistent with RLD; worked in a [employer] for 27yrs; no OP Pulm visits, Likely combination of WHO group 2+3 PAH.  Hx of 40PY smoking history, yet quit over 40 years ago  - Pt placed on 4L in ED although SpO2 >95%  - Back to baseline 2LNC already (unsure if pt actually needed 4L in first place)  - C/w Oxygen adjunctive therapy at this time  - C/w Albuterol Neb solutions TID    #Hx of recurrent UGI bleed: EGD on 11/6/21 Bleeding AVM treated w/APC - Post op diagnosis Grade 2 erosive esophagitis with features suggestive of recent GI bleed: Mild gastritis with deformity of the pylorus and duodenitis no active GI bleed seen no biopsy obtained, On PPI at home   - C/w PPI therapy - Protonix 40mg BID  - No current signs of bleeding     #Hx of Plasma cell Dyscrasia: noted to have slight increase in K/L free light chains. 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 + chronic Hip pain: C/w Flexeril   #Hx of SSS s/p Pacemaker 2013 and Chronic A-fib rate-controlled: PPM routinely interrogated; C/w Metoprolol 100mg BID  #Chronically debilitated/deconditioned, current ECF resident: Malnutrition, ill-appearing     Expected discharge date: Tomorrow after dialysis     Disposition:    [] Home       [] TCU       [] Rehab       [] Psych       [] SNF       [x] [place]       [] Other-    Chief Complaint: Abdominal pain / Gingival pain    Hospital Course:     81yo M, Chronically debilitated/deconditioned, current ECF resident w/PMHx HLD, HTN w/Hx of HTN emergency, INDRA/ACD 2/2 ESRD, DJD (knee, left) w/arthritis in Low back/neck + chronic Hip pain, Plasma cell Dyscrasia (noted to have slight increase in K/L free light chains. BM Bx found erythroid hyperplasia with 2% plasma cells, rare kappa (+) plasma cells; normocellular marrow w/full active trilineage hematopoiesis w/erythroid hyperplasia; Flow cytometry found a small population of atypical plasma cells, representing 0.53% of total leukocytes;  Follows OP w/Heme/Onc), Hx of recurrent UGI bleed (EGD on 11/6/21 Bleeding AVM treated w/APC - Post op diagnosis Grade 2 erosive esophagitis with features suggestive of recent GI bleed: Mild gastritis with deformity of the pylorus and duodenitis no active GI bleed seen no biopsy obtained, On PPI), Hx of PE (not a candidate for 93Limundo, No IVC filter), Hx of SSS s/p Pacemaker 2013, Chronic A-fib rate-controlled, CAD s/p PCI w/stents 2004 on DAPT, ? COPD (PFTs in 2012 demonstrated elevated FEV1/FVC ratio of 112% predicted consistent with RLD; worked in a [employer] works for 27yrs; no OP Pulm visits), Chronic Hypoxic RF w/Home 2LNC O2 use, Pulmonary HTN (Likely combination of WHO group 2+3), Chronic Syst/Myers CHF (HFmrEF, 45% w/G1DD, Mod MR/TR, RVSP 55mmHg - Echo 5/2021) on GDMT, chronically elevated Troponins, and ESRD on HD 2/2 DM/HTN Nephrosclerosis (HD M,W,F; w/subsequent HypoNa and 2/2 HyperPTH on Rocaltrol/Sensipar) who presented to the [facility] [address] from ECF due to Abdominal pain and pain in Rt upper gums. Pt noted to have tenderness to palpation in upper gums w/redness at [facility], was given renally-dosed Augmentin + to have Dentist see him (5/3). Limited Hx from pt, mostly from chart review and assisted individuals. In ED: Afebrile, /98, HR 98, Rr16 w/SpO2 >90% on 4LNC. Labs remarkable for Na 134, BUN 39, Cr 6.1, eGFR 11, Lactic acid wnl, Ca 9.3 w/iCa 1.08, Ph 2.3, K 5.3, Plts 152, Troponin 0.202 (baseline range 0.158-0.218), Hb/Hct 10.6/30.7, RDW 71.3. CXR findings suggestive of CHF/Fluid overload. CT A/P trace pelvic free fluid, b/l pleural effusions, cholelithiasis. CT head No evidence of acute process. Appears volume overloaded, Nephro consulted - taken to HD w/3K bath w/o UF (no hypotensive episodes). Subjective (past 24 hours):      Patient laying in bed complaining of shoulder and abdominal pain. Asking for pain meds. Rates pain 8/10 but says it's chronic for him. Still no clear etiology, could be gallstones.  Will continue conservative management at this time with 1 mg morphine q4h prn with supplemental OTC pain relief. Denies CP, SOB, n/v/d. Medications:  Reviewed    Infusion Medications    sodium chloride       Scheduled Medications    lidocaine   Topical Once per day on Mon Wed Fri    aspirin  81 mg Oral Daily    atorvastatin  40 mg Oral Daily    folbee plus  1 tablet Oral Daily    calcitRIOL  1.5 mcg Oral Once per day on Mon Wed Fri    cinacalcet  60 mg Oral Once per day on Mon Wed Fri    clopidogrel  75 mg Oral Daily    cyclobenzaprine  10 mg Oral Daily    docusate sodium  100 mg Oral BID    ferrous sulfate  325 mg Oral Daily    losartan  50 mg Oral Daily    metoprolol  100 mg Oral BID    pantoprazole  40 mg Oral BID AC    vitamin C  500 mg Oral Daily    sodium chloride flush  10 mL IntraVENous 2 times per day    heparin (porcine)  5,000 Units SubCUTAneous TID    amoxicillin-clavulanate   Oral 2 times per day    albuterol  2.5 mg Nebulization TID     PRN Meds: albuterol sulfate HFA, sodium chloride flush, sodium chloride, ondansetron **OR** ondansetron, polyethylene glycol, acetaminophen **OR** acetaminophen, morphine    No intake or output data in the 24 hours ending 05/08/22 1614    Diet:  ADULT DIET; Clear Liquid    Exam:  /80   Pulse 64   Temp 98.1 °F (36.7 °C) (Oral)   Resp 18   Ht 5' 6\" (1.676 m)   Wt 135 lb (61.2 kg)   SpO2 (!) 87%   BMI 21.79 kg/m²     General appearance: No apparent distress, appears stated age and cooperative. Chronically ill-appearing, fatigued, arousable to verbal stimuli  HEENT: Pupils equal, round, and reactive to light. Conjunctivae/corneas clear. Neck: Supple, with full range of motion. No jugular venous distention. Trachea midline. Respiratory:  Normal respiratory effort. Clear to auscultation, bilaterally without Rales/Wheezes/Rhonchi. Cardiovascular: Regular rate and rhythm with normal S1/S2 without murmurs, rubs or gallops.   Abdomen: Soft, non-distended with normal bowel sounds. Generalized tenderness, no rebound, negative vazquez's  Musculoskeletal: passive and active ROM x 4 extremities. Skin: Skin color, texture, turgor normal.    Neurologic:  Neurovascularly intact without any focal sensory/motor deficits. Cranial nerves: II-XII intact, grossly non-focal.  Psychiatric: A/Ox2, thought content appropriate  Capillary Refill: Brisk,< 3 seconds   Peripheral Pulses: +2 palpable, equal bilaterally       Labs:   Recent Labs     05/06/22  0500 05/07/22  0725   WBC 7.4 7.3   HGB 10.6* 10.2*   HCT 30.7* 29.4*    108*     Recent Labs     05/06/22  0540 05/06/22  0540 05/06/22  0839 05/06/22  1315 05/07/22  0725 05/08/22  0557   *  --  133*  --  132* 133*   K 4.7   < > 5.3*  --  5.8*  5.8* 4.5   CL 94*  --   --   --  93* 97*   CO2 27  --   --   --  26 25   BUN 39*  --   --   --  28* 23*   CREATININE 6.1*  --   --   --  4.0* 4.0*   CALCIUM 9.3  --   --   --  8.7 8.6   PHOS  --   --   --  2.3*  --   --     < > = values in this interval not displayed. Recent Labs     05/06/22  0540 05/07/22  0725   AST 21 30   ALT 14 17   BILIDIR 0.3  --    BILITOT 0.7 1.0   ALKPHOS 115 117     No results for input(s): INR in the last 72 hours. No results for input(s): Darin Seed in the last 72 hours. No results for input(s): PROCAL in the last 72 hours. Microbiology:      Urinalysis:      Lab Results   Component Value Date    NITRU NEGATIVE 06/22/2019    WBCUA 50-75 06/22/2019    BACTERIA NONE 06/22/2019    RBCUA 25-50 06/22/2019    BLOODU LARGE 06/22/2019    SPECGRAV 1.013 05/03/2016    GLUCOSEU NEGATIVE 06/22/2019       Radiology:  CT Head WO Contrast   Final Result    No evidence of an acute process. No change from prior. **This report has been created using voice recognition software. It may contain minor errors which are inherent in voice recognition technology. **      Final report electronically signed by Dr. Chan Vogt on 5/6/2022 9:47 AM CT ABDOMEN PELVIS W IV CONTRAST Additional Contrast? None   Final Result       1. Trace pelvic free fluid. 2. Layering bilateral pleural effusions. 3. Cardiomegaly. 4. Cholelithiasis. 5. Possible nodular density associated with the prostate or bladder. **This report has been created using voice recognition software. It may contain minor errors which are inherent in voice recognition technology. **      Final report electronically signed by Dr. Adelaida Lewis on 5/6/2022 8:01 AM      XR CHEST PORTABLE   Final Result   The findings are suggestive of CHF/fluid overload, are similar to multiple    previous chest x-rays.       This document has been electronically signed by: Jovani Hurtado MD    on 05/06/2022 06:16 AM          DVT prophylaxis: [] Lovenox                                 [] SCDs                                 [x] SQ Heparin                                 [] Encourage ambulation           [] Already on Anticoagulation     Code Status: Limited    Tele:   [x] yes             [] no    Active Hospital Problems    Diagnosis Date Noted    AMS (altered mental status) [R41.82] 05/06/2022     Priority: Medium    Chest pain [R07.9]        Electronically signed by Remy Sood DO on 5/8/2022 at 4:14 PM

## 2022-05-08 NOTE — PLAN OF CARE
Problem: Respiratory - Adult  Goal: Clear lung sounds  Outcome: Adequate for Discharge  Note: Patient receiving breathing treatments to maintain and manage respiratory status. Will be continued as ordered to improve aeration.

## 2022-05-08 NOTE — PROGRESS NOTES
[facility]         Renal Inpatient Follow-Up note         5/8/2022 12:03 PM    Pt Name:   Tess Daniels  YOB: 1930  Attending:   Filiberto Kapadia MD    Chief Complaint : Tess Daniels is a [place] 26 y.o. male being followed by nephrology for ESRD on hemodialysis    Interval History :   Patient seen and examined by me. No distress  Is awake alert but appears slightly confused again today     Scheduled Medications :    lidocaine   Topical Once per day on Mon Wed Fri    aspirin  81 mg Oral Daily    atorvastatin  40 mg Oral Daily    folbee plus  1 tablet Oral Daily    calcitRIOL  1.5 mcg Oral Once per day on Mon Wed Fri    cinacalcet  60 mg Oral Once per day on Mon Wed Fri    clopidogrel  75 mg Oral Daily    cyclobenzaprine  10 mg Oral Daily    docusate sodium  100 mg Oral BID    ferrous sulfate  325 mg Oral Daily    losartan  50 mg Oral Daily    metoprolol  100 mg Oral BID    pantoprazole  40 mg Oral BID AC    vitamin C  500 mg Oral Daily    sodium chloride flush  10 mL IntraVENous 2 times per day    heparin (porcine)  5,000 Units SubCUTAneous TID    amoxicillin-clavulanate   Oral 2 times per day    albuterol  2.5 mg Nebulization TID      sodium chloride         Vitals :  /64   Pulse 87   Temp 98.2 °F (36.8 °C) (Oral)   Resp 16   Ht 5' 6\" (1.676 m)   Wt 135 lb (61.2 kg)   SpO2 99%   BMI 21.79 kg/m²     24HR INTAKE/OUTPUT:      Intake/Output Summary (Last 24 hours) at 5/8/2022 1203  Last data filed at 5/7/2022 1430  Gross per 24 hour   Intake 400 ml   Output 2400 ml   Net -2000 ml     Last 3 weights  Wt Readings from Last 3 Encounters:   05/06/22 135 lb (61.2 kg)   04/27/22 135 lb (61.2 kg)   03/01/22 135 lb (61.2 kg)           Physical Exam :  General Appearance:  Well developed.  No distress  Mouth/Throat:  Oral mucosa moist  Neck:  Supple, no JVD  Lungs:  Breath sounds: clear  Heart[de-identified]  S1,S2 heard  Abdomen:  Soft, non - tender  Musculoskeletal:  Edema - no edema         Last 3 CBC   Recent Labs     05/06/22  0500 05/07/22  0725   WBC 7.4 7.3   RBC 3.34* 3.21*   HGB 10.6* 10.2*   HCT 30.7* 29.4*    108*     Last 3 CMP  Recent Labs     05/06/22  0540 05/06/22  0540 05/06/22  0839 05/07/22  0725 05/08/22  0557   *  --  133* 132* 133*   K 4.7   < > 5.3* 5.8*  5.8* 4.5   CL 94*  --   --  93* 97*   CO2 27  --   --  26 25   BUN 39*  --   --  28* 23*   CREATININE 6.1*  --   --  4.0* 4.0*   CALCIUM 9.3  --   --  8.7 8.6   LABALBU 3.9  --   --  3.1*  --    BILITOT 0.7  --   --  1.0  --     < > = values in this interval not displayed. ASSESSMENT / Plan   1 Renal -ESRD on hemodialysis Monday Wednesday Friday  ? Volume status appears to be stable  ? No acute need for dialysis today    2 Electrolytes -mild hyponatremia, slightly improved with dialysis  3 Hyperkalemia status post another round of dialysis on 5/7/2022  4 Essential hypertension  5 Hx of diabetes mellitus  6 Anemia of renal dysfunction  7 Secondary hyperparathyroidism  8 Meds reviewed     Dr. Samuel Dee MD, M,D.  [facility].

## 2022-05-09 NOTE — CARE COORDINATION
5/9/22, 8:15 AM EDT  DISCHARGE PLANNING EVALUATION:    Marbella Mojica       Admitted: 5/6/2022/ 0452   Hospital day: 3   Location: 6-20/020-A Reason for admit: ESRD (end stage renal disease) ([facility] [address] [N18.6]  Lower abdominal pain [R10.30]  Altered mental status, unspecified altered mental status type [R41.82]  Chest pain, unspecified type [R07.9]  AMS (altered mental status) [R41.82]   PMH:  has a past medical history of Anemia in chronic kidney disease, Arthritis, Atrial fibrillation ([facility] [address] CAD (coronary artery disease), Cardiomegaly, Chronic kidney disease, Chronic kidney disease, stage IV (severe) ([facility] [facility] Chronic respiratory failure with hypoxia ([facility] [facility] CKD (chronic kidney disease) stage 3, GFR 30-59 ml/min ([facility]), COPD (chronic obstructive pulmonary disease) ([facility] [address] COVID-19, End stage renal disease ([facility] [address] GERD without esophagitis, GI bleed, Gout, Hemodialysis patient ([facility] [address] History of blood transfusion, Hyperlipidemia, Hyperphosphatemia, Hypertension, Monoclonal gammopathy, Pneumonia due to coronavirus disease 2019, Pulmonary embolism ([facility] [address] Sick sinus syndrome ([facility] [address] and Systolic congestive heart failure ([facility] [address] Procedure: none  Barriers to Discharge:  100% on 1L oxygen. PT/OT. Plan discharge after HD today. PCP: Angelina Hyman DO  Readmission Risk Score: 27.3 ( )%  Patient's Healthcare Decision Maker: Named in [address] [place] [address]    Patient Goals/Plan/Treatment Preferences: Return to [facility]. Transportation/Food Security/Housekeeping Addressed:  No issues identified.

## 2022-05-09 NOTE — FLOWSHEET NOTE
Stable 4 hour treatment complete. Removed 2 liters of fluid as per order. Tolerated fluid removal well. Pressure held to needle sites times ten minutes each. Dressing clean, dry and intact. Report given to primary RN. Treatment record printed for scanning into EMR. 05/09/22 0740 05/09/22 1215   Vital Signs   BP (!) 173/86 (!) 187/97   Temp 98 °F (36.7 °C) 97.6 °F (36.4 °C)   Pulse 79 73   Resp 16 18   Weight 150 lb 12.7 oz (68.4 kg) 146 lb 6.2 oz (66.4 kg)   Weight Method Bed scale Bed scale   Post-Hemodialysis Assessment   Post-Treatment Procedures  --  Blood returned; Access bleeding time < 10 minutes   Machine Disinfection Process  --  Acid/Vinegar Clean;Heat Disinfect; Exterior Machine Disinfection   Total Liters Processed (l/min)  --  77.5 l/min   Dialyzer Clearance  --  Lightly streaked   Duration of Treatment (minutes)  --  240 minutes   Heparin amount administered during treatment (units)  --  0 units   Hemodialysis Intake (ml)  --  400 ml   Hemodialysis Output (ml)  --  2400 ml   NET Removed (ml)  --  2000 ml   Tolerated Treatment  --  Good

## 2022-05-09 NOTE — PLAN OF CARE
Problem: Discharge Planning  Goal: Discharge to home or other facility with appropriate resources  Outcome: Completed  Flowsheets (Taken 5/9/2022 1126)  Discharge to home or other facility with appropriate resources: Identify barriers to discharge with patient and caregiver  Note: Returning to [facility]. See  notes 5/9/22.

## 2022-05-09 NOTE — PROGRESS NOTES
Joann Martinez 60  PHYSICAL THERAPY MISSED TREATMENT NOTE  [facility] TELEMET[facility] 6K    Date: 2022  Patient Name: Dennis Bolton        MRN: [id number]   : 11/10/1930  (80 y.o.)  Gender: male                REASON FOR MISSED TREATMENT:  Pt at hemodialysis. Per Kishore Estrada CM, pt to be discharged to [facility] afterwards and PT may defer evaluation. Vicky Anderson.  Abril Kim, [facility]

## 2022-05-09 NOTE — PROGRESS NOTES
Report called to [facility] and this RN spoke to [place]. AVS and last dose MAR sent per this RN.

## 2022-05-09 NOTE — PROGRESS NOTES
Renal Progress Note    Assessment and Plan:   1. End stage kidney disease on hemodialysis   2. Hypertension  3. COPD  4. Deconditioning   5. Hyponatremia   6. Anemia of chronic disease   7. Mental confusion    PLAN:  1. Reviewed labs   2. Reviewed medications   3. No change  4. Hemodialysis today   5. Will follow     Patient Active Problem List:     CAD (coronary artery disease)     COPD (chronic obstructive pulmonary disease) (HCC)     Chronic renal insufficiency     Arthritis-  low back and neck .      ED (erectile dysfunction)     Degenerative joint disease of knee, left     Anemia, chronic disease     Essential hypertension     Monoclonal gammopathy     Leukopenia     Thrombocytopenia ([facility])     Chronic kidney disease (CKD), stage V ([facility])     Iron deficiency anemia     Plasma cell dyscrasia     Idiopathic hypotension     Systolic congestive heart failure ([facility])     Hyperphosphatemia     Anemia due to chronic kidney disease, on chronic dialysis ([facility]     Secondary hyperparathyroidism of renal origin ([facility]     Chest pain     Gastritis and duodenitis     Closed fracture of left ankle     ESRD on dialysis (HCC)     Chronic combined systolic and diastolic CHF (congestive heart failure) ([facility])     Hypoxia     Upper GI bleed     History of pulmonary embolus (PE)     Osteopenia of multiple sites     Chronic left hip pain     Atrial fibrillation ([facility])     Pain in gums     AMS (altered mental status)      Subjective:   Admit Date: 5/6/2022    Interval History:   Seeing for end stage kidney disease   Sleeping during round   Updated by the staff  No issues  Variable blood pressure       Medications:   Scheduled Meds:   lactulose  20 g Oral BID    albuterol  2.5 mg Nebulization BID    lidocaine   Topical Once per day on Mon Wed Fri    aspirin  81 mg Oral Daily    atorvastatin  40 mg Oral Daily    folbee plus  1 tablet Oral Daily    calcitRIOL  1.5 mcg Oral Once per day on Mon Wed Fri    cinacalcet  60 mg Oral Once per day on Mon Wed Fri    clopidogrel  75 mg Oral Daily    cyclobenzaprine  10 mg Oral Daily    docusate sodium  100 mg Oral BID    ferrous sulfate  325 mg Oral Daily    losartan  50 mg Oral Daily    metoprolol  100 mg Oral BID    pantoprazole  40 mg Oral BID AC    vitamin C  500 mg Oral Daily    sodium chloride flush  10 mL IntraVENous 2 times per day    heparin (porcine)  5,000 Units SubCUTAneous TID    amoxicillin-clavulanate   Oral 2 times per day     Continuous Infusions:   sodium chloride         CBC:   Recent Labs     05/07/22  0725   WBC 7.3   HGB 10.2*   *     CMP:    Recent Labs     05/07/22  0725 05/08/22  0557 05/09/22  0544   * 133* 130*   K 5.8*  5.8* 4.5 4.8   CL 93* 97* 94*   CO2 26 25 23   BUN 28* 23* 30*   CREATININE 4.0* 4.0* 5.3*   GLUCOSE 86 92 90   CALCIUM 8.7 8.6 8.7   LABGLOM 17* 17* 12*     Troponin: No results for input(s): TROPONINI in the last 72 hours. BNP: No results for input(s): BNP in the last 72 hours. INR: No results for input(s): INR in the last 72 hours. Lipids: No results for input(s): CHOL, LDLDIRECT, TRIG, HDL, AMYLASE, LIPASE in the last 72 hours. Liver:   Recent Labs     05/07/22  0725   AST 30   ALT 17   ALKPHOS 117   PROT 6.2   LABALBU 3.1*   BILITOT 1.0     Iron:  No results for input(s): IRONS, FERRITIN in the last 72 hours. Invalid input(s): LABIRONS  CT Head WO Contrast   Final Result    No evidence of an acute process. No change from prior. **This report has been created using voice recognition software. It may contain minor errors which are inherent in voice recognition technology. **      Final report electronically signed by Dr. Nancy Perry on 5/6/2022 9:47 AM      CT ABDOMEN PELVIS W IV CONTRAST Additional Contrast? None   Final Result       1. Trace pelvic free fluid. 2. Layering bilateral pleural effusions. 3. Cardiomegaly. 4. Cholelithiasis. 5. Possible nodular density associated with the prostate or bladder. **This report has been created using voice recognition software. It may contain minor errors which are inherent in voice recognition technology. **      Final report electronically signed by Dr. Chan Vogt on 5/6/2022 8:01 AM      XR CHEST PORTABLE   Final Result   The findings are suggestive of CHF/fluid overload, are similar to multiple    previous chest x-rays. This document has been electronically signed by: Maru Trejo MD    on 05/06/2022 06:16 AM            Objective:   Vitals: BP (!) 173/86   Pulse 79   Temp 98 °F (36.7 °C) (Oral)   Resp 16   Ht 5' 6\" (1.676 m)   Wt 150 lb 12.7 oz (68.4 kg)   SpO2 97%   BMI 24.34 kg/m²    Wt Readings from Last 3 Encounters:   05/09/22 150 lb 12.7 oz (68.4 kg)   04/27/22 135 lb (61.2 kg)   03/01/22 135 lb (61.2 kg)      24HR INTAKE/OUTPUT:  No intake or output data in the 24 hours ending 05/09/22 1154    Constitutional: Comfortably asleep  Skin:normal with no rash or lesions. HEENT:Pupils are reactive . Throat is clear . Oral mucosa is moist   Neck:supple with no carotid bruit  Cardiovascular:  S1, S2 without murmur or rubs   Respiratory:  Clear to ausculation without wheezes, rhonchi or rales. Abdomen: soft . Good bowel sounds  Ext: No LE edema  Musculoskeletal:Intact  Neuro:Deferred      Electronically signed by Maritza Sewell MD on 5/9/2022 at 11:54 AM  **This report has been created using voice recognition software. It maycontain minor  errors which are inherent in voice recognition technology. **

## 2022-05-09 NOTE — PROGRESS NOTES
CLINICAL PHARMACY: DISCHARGE MED RECONCILIATION/REVIEW    [facility]) Select Patient?: No  Total # of Interventions Recommended: 0   -   Total # Interventions Accepted: 0  Intervention Severity:   - Level 1 Intervention Present?: No   - Level 2 #: 0   - Level 3 #: 0   Time Spent (min): 15    Additional Documentation:    Tania IbarraD, BCPS  5/9/2022  10:43 AM

## 2022-05-09 NOTE — CARE COORDINATION
5/9/22, 11:25 AM EDT    Patient goals/plan/ treatment preferences discussed by  and . Patient goals/plan/ treatment preferences reviewed with patient/ family. Patient/ family verbalize understanding of discharge plan and are in agreement with goal/plan/treatment preferences. Understanding was demonstrated using the teach back method. AVS provided by RN at time of discharge, which includes all necessary medical information pertaining to the patients current course of illness, treatment, post-discharge goals of care, and treatment preferences. IMM Letter  IMM Letter given to Patient/Family/Significant other/Guardian/POA/by[de-identified] Verónica Garcia CM  IMM Letter date given[de-identified] 05/09/22  IMM Letter time given[de-identified] 8878     Patient is from [facility] and returning today. Ambulette scheduled for 3pm today. RN updated. PAWAN spoke with daughter Jose D Moscoso and she is aware of his return to [facility] today and Tess with [facility] admissions was also updated. AVS faxed and blue envelope placed on chart.

## 2022-05-09 NOTE — PROGRESS NOTES
Joann Martinez 60  OCCUPATIONAL THERAPY MISSED TREATMENT NOTE  [facility] RENAL TELEMETRY 6K  6K-20/020-A      Date: 2022  Patient Name: Stanislaw eMlchor        CSN: [id number]   : 11/10/1930  (80 y.o.)  Gender: male                REASON FOR MISSED TREATMENT: Attempt x 1, Pt eating breakfast then going to dialysis. Will check back this pm as time allows.

## 2022-05-09 NOTE — PROGRESS NOTES
Joann Martinez 60  OCCUPATIONAL THERAPY MISSED TREATMENT NOTE  [facility] RENAL TELEMETRY 6K  6K-20/020-A      Date: 2022  Patient Name: Kristian Herrera        CSN: [id number]   : 11/10/1930  (80 y.o.)  Gender: male                REASON FOR MISSED TREATMENT: Pt at hemodialysis. Per Asiya Ariza CM, pt to be discharged to [facility] afterwards and PT & OT may defer evaluation. Abel Domingo

## 2022-05-09 NOTE — PLAN OF CARE
Problem: Respiratory - Adult  Goal: Clear lung sounds  5/8/2022 2028 by Apryl Payne RCP  Outcome: Progressing   Patient receiving breathing treatments and oxygen therapy to maintain and improve respiratory status.

## 2022-05-09 NOTE — RT PROTOCOL NOTE
RT Inhaler-Nebulizer Bronchodilator Protocol Note    There is a bronchodilator order in the chart from a provider indicating to follow the RT Bronchodilator Protocol and there is an Initiate RT Inhaler-Nebulizer Bronchodilator Protocol order as well (see protocol at bottom of note). CXR Findings:  No results found. The findings from the last RT Protocol Assessment were as follows:   History Pulmonary Disease: Chronic pulmonary disease  Respiratory Pattern: Regular pattern and RR 12-20 bpm  Breath Sounds: Slightly diminished and/or crackles  Cough: strong, nonproductive cough  Indication for Bronchodilator Therapy: Decreased or absent breath sounds  Bronchodilator Assessment Score: 4    Aerosolized bronchodilator medication orders have been revised according to the RT Inhaler-Nebulizer Bronchodilator Protocol below. Respiratory Therapist to perform RT Therapy Protocol Assessment initially then follow the protocol. Repeat RT Therapy Protocol Assessment PRN for score 0-3 or on second treatment, BID, and PRN for scores above 3. No Indications - adjust the frequency to every 6 hours PRN wheezing or bronchospasm, if no treatments needed after 48 hours then discontinue using Per Protocol order mode. If indication present, adjust the RT bronchodilator orders based on the Bronchodilator Assessment Score as indicated below. Use Inhaler orders unless patient has one or more of the following: on home nebulizer, not able to hold breath for 10 seconds, is not alert and oriented, cannot activate and use MDI correctly, or respiratory rate 25 breaths per minute or more, then use the equivalent nebulizer order(s) with same Frequency and PRN reasons based on the score. If a patient is on this medication at home then do not decrease Frequency below that used at home.     0-3 - enter or revise RT bronchodilator order(s) to equivalent RT Bronchodilator order with Frequency of every 4 hours PRN for wheezing or increased work of breathing using Per Protocol order mode. 4-6 - enter or revise RT Bronchodilator order(s) to two equivalent RT bronchodilator orders with one order with BID Frequency and one order with Frequency of every 4 hours PRN wheezing or increased work of breathing using Per Protocol order mode. 7-10 - enter or revise RT Bronchodilator order(s) to two equivalent RT bronchodilator orders with one order with TID Frequency and one order with Frequency of every 4 hours PRN wheezing or increased work of breathing using Per Protocol order mode. 11-13 - enter or revise RT Bronchodilator order(s) to one equivalent RT bronchodilator order with QID Frequency and an Albuterol order with Frequency of every 4 hours PRN wheezing or increased work of breathing using Per Protocol order mode. Greater than 13 - enter or revise RT Bronchodilator order(s) to one equivalent RT bronchodilator order with every 4 hours Frequency and an Albuterol order with Frequency of every 2 hours PRN wheezing or increased work of breathing using Per Protocol order mode. RT to enter RT Home Evaluation for COPD & MDI Assessment order using Per Protocol order mode.     Electronically signed by Ciarra Almaguer RCP on 5/8/2022 at 8:31 PM

## 2022-05-09 NOTE — DISCHARGE INSTR - COC
Continuity of Care Form    Patient Name: Sally Adam   :  11/10/1930  MRN:  [id number]    Admit date:  2022  Discharge date:  22    Code Status Order: Limited   Advance Directives:      Admitting Physician:  No admitting provider for patient encounter. PCP: Rajinder Cowan DO    Discharging Nurse: Fidelina Pike [address] [facility] Unit/Room#: 6K-20/020-A  Discharging Unit Phone Number: [phone]    Emergency Contact:   Extended Emergency Contact Information  Primary Emergency Contact: Evita [facility] of [address] [facility] Phone: [phone]  Relation: Child  Secondary Emergency Contact: DELETED, THIS NEEDS  Address: [address]           [address] [place] [facility]           [address] [facility], [address] [place] [address] [address]  Phone: [phone]  Mobile Phone: [phone]  Relation: Other  Preferred language: English   needed?  No    Past Surgical History:  Past Surgical History:   Procedure Laterality Date    CARDIAC SURGERY      COLONOSCOPY  ,    COLONOSCOPY N/A 2019    COLONOSCOPY DIAGNOSTIC performed by Jose Yusuf MD at [facility]      DIALYSIS FISTULA CREATION  2016    right arm fistula placement    ENDOSCOPY, COLON, DIAGNOSTIC      PACEMAKER PLACEMENT      LA ESOPHAGOGASTRODUODENOSCOPY TRANSORAL DIAGNOSTIC N/A 2018    EGD performed by Huma Hammond MD at [address] [place] [address]  ,    UPPER GASTROINTESTINAL ENDOSCOPY Left 2019    EGD DIAGNOSTIC ONLY performed by Jose Yusuf MD at [address] [place] [address] 10/9/2021    EGD performed by Jose Yusuf MD at [address] [place] [address] N/A 2021    EGD ESOPHAGOGASTRODUODENOSCOPY performed by Jose Yusuf MD at [address] [place] [address]  2021    EGD CONTROL HEMORRHAGE performed by Jose Yusuf MD at [address] [place] [address] SURGERY         Immunization History:   Immunization History   Administered Date(s) Administered    Influenza Virus Vaccine 10/01/2018    Pneumococcal Conjugate 13-valent ([id number]) 04/13/2017    Pneumococcal Polysaccharide ([id number]) 10/16/2017       Active Problems:  Patient Active Problem List   Diagnosis Code    CAD (coronary artery disease) I25.10    COPD (chronic obstructive pulmonary disease) ([facility] J44.9    Chronic renal insufficiency N18.9    Arthritis-  low back and neck .  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    Idiopathic hypotension R08.3    Systolic congestive heart failure ([facility]) I50.20    Hyperphosphatemia E83.39    Anemia due to chronic kidney disease, on chronic dialysis ([facility]) N18.6, D63.1, Z99.2    Secondary hyperparathyroidism of renal origin ([facility] N25.81    Chest pain R07.9    Gastritis and duodenitis K29.90    Closed fracture of left ankle S82.892A    ESRD on dialysis ([facility] N18.6, Z99.2    Chronic combined systolic and diastolic CHF (congestive heart failure) ([facility]) I50.42    Hypoxia R09.02    Upper GI bleed K92.2    History of pulmonary embolus (PE) Z86.711    Osteopenia of multiple sites M85.89    Chronic left hip pain M25.552, G89.29    Atrial fibrillation ([facility]) I48.91    Pain in gums K06.8    AMS (altered mental status) R41.82       Isolation/Infection:   Isolation            No Isolation          Patient Infection Status       Infection Onset Added Last Indicated Last Indicated By Review Planned Expiration Resolved Resolved By    None active    Resolved    COVID-19 (Rule Out) 11/05/21 11/05/21 11/05/21 COVID-19, Rapid (Ordered)   11/09/21 Claudia Venegas RN    COVID-19 (Rule Out) 10/26/21 10/26/21 10/26/21 COVID-19, Rapid (Ordered)   10/26/21 Rule-Out Test Resulted    C-diff Rule Out 09/30/21 09/30/21 09/30/21 Gastrointestinal Panel, Molecular (Ordered)   10/04/21 [facility] Jodi Michael RN    COVID-19 09/13/21 09/13/21 09/14/21 COVID-19, Rapid   09/21/21 [facility] Jodi Michael RN    S/S 9/11, + 9/13    COVID-19 (Rule Out) 09/13/21 09/13/21 09/13/21 COVID-19, Rapid (Ordered)   09/13/21 Rule-Out Test Resulted    COVID-19 (Rule Out) 04/30/21 04/30/21 04/30/21 COVID-19, Rapid (Ordered)   04/30/21 Rule-Out Test Resulted            Nurse Assessment:  Last Vital Signs: BP (!) 173/86   Pulse 79   Temp 98 °F (36.7 °C) (Oral)   Resp 16   Ht 5' 6\" (1.676 m)   Wt 150 lb 12.7 oz (68.4 kg)   SpO2 97%   BMI 24.34 kg/m²     Last documented pain score (0-10 scale): Pain Level: 0  Last Weight:   Wt Readings from Last 1 Encounters:   05/09/22 150 lb 12.7 oz (68.4 kg)     Mental Status:  oriented and alert    IV Access:  - None    Nursing Mobility/ADLs:  Walking   Assisted  Transfer  Assisted  Bathing  Assisted  Dressing  Assisted  Toileting  Assisted  Feeding  Independent  Med Admin  Assisted  Med Delivery   whole    Wound Care Documentation and Therapy:        Elimination:  Continence: Bowel: No  Bladder: anuric   Urinary Catheter: None   Colostomy/Ileostomy/Ileal Conduit: No       Date of Last BM: 5/7/22  No intake or output data in the 24 hours ending 05/09/22 1027  No intake/output data recorded. Safety Concerns: At Risk for Falls    Impairments/Disabilities:      None    Nutrition Therapy:  Current Nutrition Therapy:   - Oral Diet:  Renal    Routes of Feeding: Oral  Liquids: No Restrictions  Daily Fluid Restriction: no  Last Modified Barium Swallow with Video (Video Swallowing Test): not done    Treatments at the Time of Hospital Discharge:   Respiratory Treatments: n/a  Oxygen Therapy:  is on oxygen at 1 L/min per nasal cannula.   Ventilator:    - No ventilator support    Rehab Therapies: Physical Therapy and Occupational Therapy  Weight Bearing Status/Restrictions: No weight bearing restrictions  Other Medical Equipment (for information only, NOT a DME order):  walker  Other Treatments: n/a    Patient's personal belongings (please select all that are sent with patient):  None    RN SIGNATURE:  Electronically signed by Zelalem Mcduffie RN on 5/9/22 at 10:47 AM EDT    CASE MANAGEMENT/SOCIAL WORK SECTION    Inpatient Status Date: 5/6/22    Readmission Risk Assessment Score:  Readmission Risk              Risk of Unplanned Readmission:  41           Discharging to Facility/ Agency   Name: [facility]  Address:    [address] [place] [address], [address] [place] [address] Road [facility]         Phone: [phone]       Fax: [phone]        Phone:  Fax:    Dialysis Facility (if applicable)   Name:  Address:  Dialysis Schedule:  Phone:  Fax:    / signature: Electronically signed by MALI Rodriguez, LSW on 5/9/22 at 11:21 AM EDT    PHYSICIAN SECTION    Prognosis: Good    Condition at Discharge: Stable    Rehab Potential (if transferring to Rehab): Good    Recommended Labs or Other Treatments After Discharge: F/u with nephrology     Physician Certification: I certify the above information and transfer of Joyice Simmonds  is necessary for the continuing treatment of the diagnosis listed and that he requires [facility] for greater 30 days.      Update Admission H&P: No change in H&P    PHYSICIAN SIGNATURE:  Electronically signed by Cara Lopez DO on 5/9/22 at 10:28 AM EDT

## 2022-05-10 NOTE — DISCHARGE SUMMARY
DISCHARGE SUMMARY NOTE    Patient - Brian Lozano   MRN -  [id number]   Acct # - [de-identified]   - 11/10/1930      Date of Admission -  2022  4:52 AM  Date of Discharge-  2022  Length of Stay - 3 days  Code Status:  Prior  Attending Physician: Dr. Hermes Marie MD  Primary Care Physician - Karl Lombard, DO     Chief Complaint:     Active Hospital Problems    Diagnosis Date Noted    AMS (altered mental status) [R41.82] 2022     Priority: Medium    Chest pain [R07.9]      Discharge Diagnosis:   · ESRD on HD 2/2 DM/HTN Nephrosclerosis   · Hyperkalemia   · Chronic Syst/Myers CHF    · Gingival Pain likely gingivitis   · COPD   · Chronic Hypoxic RF baseline 2LNC O2   · Pulmonary HTN  · Hx of recurrent UGI bleed  · Hx of Plasma cell Dyscrasia  · Essential Hypertension   · CAD   · DJD   · Hx of SSS s/p Pacemaker   · Chronic A-fib rate-controlled  · Chronically debilitated/deconditioned     Consultations:   IP CONSULT TO NEPHROLOGY  IP CONSULT TO SOCIAL WORK    HPI:   79yo M, Chronically debilitated/deconditioned, current ECF resident w/PMHx HLD, HTN w/Hx of HTN emergency, INDRA/ACD 2/2 ESRD, DJD (knee, left) w/arthritis in Low back/neck + chronic Hip pain, Plasma cell Dyscrasia (noted to have slight increase in K/L free light chains. BM Bx found erythroid hyperplasia with 2% plasma cells, rare kappa (+) plasma cells; normocellular marrow w/full active trilineage hematopoiesis w/erythroid hyperplasia; Flow cytometry found a small population of atypical plasma cells, representing 0.53% of total leukocytes;  Follows OP w/Heme/Onc), Hx of recurrent UGI bleed (EGD on 21 Bleeding AVM treated w/APC - Post op diagnosis Grade 2 erosive esophagitis with features suggestive of recent GI bleed: Mild gastritis with deformity of the pylorus and duodenitis no active GI bleed seen no biopsy obtained, On PPI), Hx of PE (not a candidate for [facility], No IVC filter), Hx of SSS s/p Pacemaker , Chronic A-fib rate-controlled, CAD s/p PCI w/stents 2004 on DAPT, ? 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 Pt noted to have tenderness to palpation in upper gums w/redness at [facility], was given renally-dosed Augmentin + to have Dentist see him (5/3). Limited Hx from pt, mostly from chart review and assisted individuals.      In ED: Afebrile, /98, HR 98, Rr16 w/SpO2 >90% on 4LNC. Labs remarkable for Na 134, BUN 39, Cr 6.1, eGFR 11, Lactic acid wnl, Ca 9.3 w/iCa 1.08, Ph 2.3, K 5.3, Plts 152, Troponin 0.202 (baseline range 0.158-0.218), Hb/Hct 10.6/30.7, RDW 71.3. CXR findings suggestive of CHF/Fluid overload. CT A/P trace pelvic free fluid, b/l pleural effusions, cholelithiasis. CT head No evidence of acute process. Appears volume overloaded, Nephro consulted - taken to HD w/3K bath w/o UF (no hypotensive episodes). Brief Hospital Course:   Full workup including CT abdomen with contrast did not reveal any concerning findings or etiologies of patient's abdominal pain. Patient admitted the pain is chronic. In setting of his ESRD, it was suspected that he may be having some chronic calcium deposits and chronic changes of his mesenteric vasculature which could be resulting in slow chronic generalized and diffuse abdominal pain. During his stay, nephrology specialists saw him daily and performed dialysis on him every other day routinely.  He also presented with hypoxic respiratory failure and COPD exacerbation which resolved with some inhalers. Patient also started on Augmentin for gingival bleed. Further workup continued to be unremarkable for abdominal pain and patient was discharged in stable medical condition on 05/09/22. Physical Exam   General appearance: No apparent distress, appears stated age and cooperative. Chronically ill-appearing, fatigued, arousable to verbal stimuli  HEENT: Pupils equal, round, and reactive to light. Conjunctivae/corneas clear. Neck: Supple, with full range of motion. No jugular venous distention. Trachea midline. Respiratory:  Normal respiratory effort. Clear to auscultation, bilaterally without Rales/Wheezes/Rhonchi. Cardiovascular: Regular rate and rhythm with normal S1/S2 without murmurs, rubs or gallops. Abdomen: Soft, non-distended with normal bowel sounds. Generalized tenderness, no rebound, negative vazquez's  Musculoskeletal: passive and active ROM x 4 extremities. Skin: Skin color, texture, turgor normal.    Neurologic:  Neurovascularly intact without any focal sensory/motor deficits. Cranial nerves: II-XII intact, grossly non-focal.  Psychiatric: A/Ox2, thought content appropriate  Capillary Refill: Brisk,< 3 seconds   Peripheral Pulses: +2 palpable, equal bilaterally    Radiology      CT Head WO Contrast   Final Result    No evidence of an acute process. No change from prior. **This report has been created using voice recognition software. It may contain minor errors which are inherent in voice recognition technology. **      Final report electronically signed by Dr. Juvenal Diaz on 5/6/2022 9:47 AM      CT ABDOMEN PELVIS W IV CONTRAST Additional Contrast? None   Final Result       1. Trace pelvic free fluid. 2. Layering bilateral pleural effusions. 3. Cardiomegaly. 4. Cholelithiasis. 5. Possible nodular density associated with the prostate or bladder. **This report has been created using voice recognition software.  It may contain minor errors which are inherent in voice recognition technology. **      Final report electronically signed by Dr. Gerald Macdonald on 5/6/2022 8:01 AM      XR CHEST PORTABLE   Final Result   The findings are suggestive of CHF/fluid overload, are similar to multiple    previous chest x-rays.       This document has been electronically signed by: Denia Sifuentes MD    on 05/06/2022 06:16 AM        Labs:   CBC:    Lab Results   Component Value Date    WBC 7.3 05/07/2022    HGB 10.2 05/07/2022    HCT 29.4 05/07/2022     05/07/2022     Renal:    Lab Results   Component Value Date     05/09/2022    K 4.8 05/09/2022    K 5.8 05/07/2022    CL 94 05/09/2022    CO2 23 05/09/2022    BUN 30 05/09/2022    CREATININE 5.3 05/09/2022    CALCIUM 8.7 05/09/2022    PHOS 3.2 05/09/2022     Disposition:    Stable to [facility] on 5/9/2022    Discharge Medications:         Medication List      START taking these medications    amoxicillin-clavulanate 500-125 MG per tablet  Commonly known as: AUGMENTIN  Take 1 tablet by mouth every 12 hours for 4 doses        CONTINUE taking these medications    albuterol sulfate  (90 Base) MCG/ACT inhaler     Aspirin Low Dose 81 MG EC tablet  Generic drug: aspirin     atorvastatin 40 MG tablet  Commonly known as: LIPITOR     calcitRIOL 0.25 MCG capsule  Commonly known as: ROCALTROL     cinacalcet 30 MG tablet  Commonly known as: SENSIPAR     clopidogrel 75 MG tablet  Commonly known as: PLAVIX     cyclobenzaprine 10 MG tablet  Commonly known as: FLEXERIL     docusate 100 MG Caps  Commonly known as: COLACE, DULCOLAX  Take 100 mg by mouth 2 times daily     epoetin traci-epbx 16375 UNIT/ML Soln injection  Commonly known as: RETACRIT  Inject 1 mL into the skin three times a week     ferrous sulfate 325 (65 Fe) MG tablet  Commonly known as: IRON 325     losartan 50 MG tablet  Commonly known as: COZAAR  Take 1 tablet by mouth daily     metoprolol 100 MG tablet  Commonly known as: LOPRESSOR  Take 1 tablet by mouth 2 times daily     pantoprazole 40 MG tablet  Commonly known as: PROTONIX  Take 1 tablet by mouth 2 times daily (before meals)     polyethylene glycol 17 GM/SCOOP powder  Commonly known as: MiraLax  Renal Miralax Colonoscopy prep. Use as directed. Mix Miralax 238 g with 24 oz clear liquids. Drink 8 oz glass every 20-30 minutes until gone.      Renal 1 MG Caps     vitamin C 500 MG tablet  Commonly known as: ASCORBIC ACID  Take 1 tablet by mouth daily     white petrolatum Gel  Apply 28 g topically as needed for Dry Skin           Where to Get Your Medications      Information about where to get these medications is not yet available    Ask your nurse or doctor about these medications  · amoxicillin-clavulanate 500-125 MG per tablet       Follow-up Appointments:   - F/u with nephrology outpatient for MWF dialysis     Electronically signed by   Misty Shaffer DO on 5/9/2022 at 11:53 PM

## 2022-05-16 PROBLEM — E87.5 HYPERKALEMIA: Status: ACTIVE | Noted: 2022-01-01

## 2022-05-16 NOTE — PROGRESS NOTES
Pharmacy Medication History Note      List of current medications patient is taking is complete. Source of information: ECF MAR    Changes made to medication list:  Medications removed (include reason, ex. therapy complete or physician discontinued):  Retacrit - taking Mircera 75mg every 2 weeks (last dose 5/2/22)  Miralax - therapy completed    Medications added/doses adjusted:  Norco 5/325 every 8 hours for chronic pain      Other notes (ex. Recent course of antibiotics, Coumadin dosing):  Last dose of Mircera 5/2/22 per [facility]  Takes Cyclobenzaprine at lunch time at [facility]  Denies use of other OTC or herbal medications.       Allergies reviewed      Electronically signed by Kailyn Kendrick [facility] on 5/16/2022 at 2:57 PM

## 2022-05-16 NOTE — FLOWSHEET NOTE
Pt very restless and moaning in bed upon arrival in dialysis. ER gave PRN morphine 2mg prior to arrival. With in 5 min pt calm. Staff able to start dialysis with no other issues noted. Stable 4 hour TX. Removed no fluid as ordered. pt tolerated TX well. Pressure held to each needle site x 10 min. Drsg clean, dry, and intact upon leaving unit. TX record printed for scanning into EMR. Report called to primary RN.     05/16/22 0745 05/16/22 1210   Vital Signs   BP (!) 189/103 (!) 159/88   Temp 98.5 °F (36.9 °C) 97.5 °F (36.4 °C)   Pulse 118 92   Resp 22 20   SpO2 96 % 94 %   Weight   (unable to weigh on er strecher. )  --    Post-Hemodialysis Assessment   Post-Treatment Procedures  --  Blood returned; Access bleeding time < 10 minutes   Machine Disinfection Process  --  Acid/Vinegar Clean;Heat Disinfect; Exterior Machine Disinfection   Total Liters Processed (l/min)  --  94.7 l/min   Dialyzer Clearance  --  Lightly streaked   Duration of Treatment (minutes)  --  240 minutes   Heparin amount administered during treatment (units)  --  0 units   Hemodialysis Intake (ml)  --  400 ml   Hemodialysis Output (ml)  --  400 ml   NET Removed (ml)  --  0 ml   Tolerated Treatment  --  Fair

## 2022-05-16 NOTE — ED NOTES
RN called and spoke with Bolivar Dos Santos from [facility] who states both images are assigned to a doctor to be read.       Robbi Briscoe RN  05/16/22 7811

## 2022-05-16 NOTE — CARE COORDINATION
5/16/22, 2:28 PM EDT  DISCHARGE PLANNING EVALUATION:    Lois Stafford       Admitted: 5/16/2022/ Kimmy day: 0   Location: [address] [place] [address] Reason for admit: Hyperkalemia [E87.5]  Chronic combined systolic and diastolic CHF (congestive heart failure) ([facility]) [I50.42]  Anemia due to chronic kidney disease, on chronic dialysis ([facility]) [N18.6, D63.1, Z99.2]   PMH:  has a past medical history of Anemia in chronic kidney disease, Arthritis, Atrial fibrillation ([facility] CAD (coronary artery disease), Cardiomegaly, Chronic kidney disease, Chronic kidney disease, stage IV (severe) ([facility] Chronic respiratory failure with hypoxia ([facility] CKD (chronic kidney disease) stage 3, GFR 30-59 ml/min ([facility]), COPD (chronic obstructive pulmonary disease) ([facility] COVID-19, End stage renal disease ([facility] GERD without esophagitis, GI bleed, Gout, Hemodialysis patient ([address] History of blood transfusion, Hyperlipidemia, Hyperphosphatemia, Hypertension, Monoclonal gammopathy, Pneumonia due to coronavirus disease 2019, Pulmonary embolism ([facility] Sick sinus syndrome ([facility] and Systolic congestive heart failure ([facility] Procedure: 5/16 CXR: Findings of CHF/fluid overload again noted, radiographically slightly worsened versus 10 days ago  5/16 CT Abd/Plv: New moderate sized pericardial effusion. Bilateral pleural effusions persist  Barriers to Discharge:  Admitted through ER with abdominal pain. Clear liquids. IV pain medication and antiemetics. Nephrology following ESRD. Palliative care. Dialyzed today 0 ml removed. PCP: Tenisha Perez, DO   %  Patient's Healthcare Decision Maker: Legal Next of Kin    Patient Goals/Plan/Treatment Preferences: From [facility]. [facility] facility provides [facility]. Readmission screen completed with daughter Amada Chapin.  consult placed. Transportation/Food Security/Housekeeping Addressed:  No issues identified.

## 2022-05-16 NOTE — DISCHARGE INSTR - COC
Continuity of Care Form    Patient Name: Josefina Yeboah   :  11/10/1930  MRN:  [id number]    Admit date:  2022  Discharge date:  ***    Code Status Order: Limited   Advance Directives:      Admitting Physician:  Joe Cote DO  PCP: Sallie Morales DO    Discharging Nurse: [facility] Unit/Room#: 3Q-72/792-R  Discharging Unit Phone Number: ***    Emergency Contact:   Extended Emergency Contact Information  Primary Emergency Contact: Counselor Oostayala Orellana [address] Phone: [phone]  Relation: Child  Secondary Emergency Contact: DELETED, THIS NEEDS  Address: [facility]           [address]           [address] Phone: [phone]  Mobile Phone: [phone]  Relation: Other  Preferred language: English   needed?  No    Past Surgical History:  Past Surgical History:   Procedure Laterality Date    CARDIAC SURGERY      COLONOSCOPY  ,    COLONOSCOPY N/A 2019    COLONOSCOPY DIAGNOSTIC performed by Akhil Morrison MD at [facility] [address] [place] [address]  2004    DIALYSIS FISTULA CREATION  2016    right arm fistula placement    ENDOSCOPY, COLON, DIAGNOSTIC      PACEMAKER PLACEMENT  2013    VA ESOPHAGOGASTRODUODENOSCOPY TRANSORAL DIAGNOSTIC N/A 2018    EGD performed by Neo Cabrera MD at [address] [place] [address]  ,    UPPER GASTROINTESTINAL ENDOSCOPY Left 2019    EGD DIAGNOSTIC ONLY performed by Akhil Morrison MD at [address] [place] [address] N/A 10/9/2021    EGD performed by Akhil Morrison MD at [address] [place] [address] N/A 2021    EGD ESOPHAGOGASTRODUODENOSCOPY performed by Akhil Morrison MD at [address] [place] [address]  2021    EGD CONTROL HEMORRHAGE performed by Akhil Morrison MD at [place] [address]         Immunization History:   Immunization History   Administered Date(s) Administered    Influenza Virus Vaccine 10/01/2018    Pneumococcal Conjugate 13-valent (Hugvcmc40) 04/13/2017    Pneumococcal Polysaccharide ([id number]) 10/16/2017       Active Problems:  Patient Active Problem List   Diagnosis Code    CAD (coronary artery disease) I25.10    COPD (chronic obstructive pulmonary disease) ([facility] J44.9    Chronic renal insufficiency N18.9    Arthritis-  low back and neck .  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    Idiopathic hypotension W47.1    Systolic congestive heart failure ([facility]) I50.20    Hyperphosphatemia E83.39    Anemia due to chronic kidney disease, on chronic dialysis ([facility]) N18.6, D63.1, Z99.2    Secondary hyperparathyroidism of renal origin ([facility] N25.81    Chest pain R07.9    Gastritis and duodenitis K29.90    Closed fracture of left ankle S82.892A    ESRD on dialysis ([facility] N18.6, Z99.2    Chronic combined systolic and diastolic CHF (congestive heart failure) ([facility]) I50.42    Hypoxia R09.02    Upper GI bleed K92.2    History of pulmonary embolus (PE) Z86.711    Osteopenia of multiple sites M85.89    Chronic left hip pain M25.552, G89.29    Atrial fibrillation ([facility]) I48.91    Pain in gums K06.8    AMS (altered mental status) R41.82    Hyperkalemia E87.5       Isolation/Infection:   Isolation            No Isolation          Patient Infection Status       Infection Onset Added Last Indicated Last Indicated By Review Planned Expiration Resolved Resolved By    None active    Resolved    COVID-19 (Rule Out) 11/05/21 11/05/21 11/05/21 COVID-19, Rapid (Ordered)   11/09/21 Mary Peña RN    COVID-19 (Rule Out) 10/26/21 10/26/21 10/26/21 COVID-19, Rapid (Ordered)   10/26/21 Rule-Out Test Resulted    C-diff Rule Out 09/30/21 09/30/21 09/30/21 Gastrointestinal Panel, Molecular (Ordered)   10/04/21 Luh Michael RN    COVID-19 09/13/21 09/13/21 09/14/21 COVID-19, Rapid   09/21/21 Luh Michael RN    S/S 9/11, + 9/13    COVID-19 (Rule Out) 09/13/21 09/13/21 09/13/21 COVID-19, Rapid (Ordered)   09/13/21 Rule-Out Test Resulted    COVID-19 (Rule Out) 04/30/21 04/30/21 04/30/21 COVID-19, Rapid (Ordered)   04/30/21 Rule-Out Test Resulted            Nurse Assessment:  Last Vital Signs: BP (!) 115/100   Pulse 140   Temp 98 °F (36.7 °C) (Oral)   Resp 20   Ht 5' 6\" (1.676 m)   Wt 150 lb (68 kg)   SpO2 96%   BMI 24.21 kg/m²     Last documented pain score (0-10 scale): Pain Level: 10  Last Weight:   Wt Readings from Last 1 Encounters:   05/16/22 150 lb (68 kg)     Mental Status:  {IP PT MENTAL STATUS:81058}    IV Access:  { DANIELLE IV ACCESS:975834696}    Nursing Mobility/ADLs:  Walking   {CHP DME XZAZ:122232375}  Transfer  {CHP DME DBNQ:676028730}  Bathing  {CHP DME TUAF:356591470}  Dressing  {CHP DME JSLF:934398274}  Toileting  {CHP DME DMBO:684720330}  Feeding  {CHP DME WVYU:396984475}  Med Admin  {CHP DME PGRR:870205982}  Med Delivery   { DANIELLE MED Delivery:766006937}    Wound Care Documentation and Therapy:        Elimination:  Continence: Bowel: {YES / UW:61302}  Bladder: {YES / RC:36020}  Urinary Catheter: {Urinary Catheter:807876923}   Colostomy/Ileostomy/Ileal Conduit: {YES / JQ:11724}       Date of Last BM: ***    Intake/Output Summary (Last 24 hours) at 5/16/2022 1512  Last data filed at 5/16/2022 1210  Gross per 24 hour   Intake 400 ml   Output 400 ml   Net 0 ml     No intake/output data recorded.     Safety Concerns:     [address] [employer] Safety Concerns:130473350}    Impairments/Disabilities:      [address] [employer] Impairments/Disabilities:137096812}    Nutrition Therapy:  Current Nutrition Therapy:   [address] [employer] Diet FFKX:286822605}    Routes of Feeding: {CHP DME Other Feedings:316401315}  Liquids: {Slp liquid thickness:05835}  Daily Fluid Restriction: {CHP DME Yes amt example:655356925}  Last Modified Barium Swallow with Video (Video Swallowing Test): {Done Not Done [facility]:009448524}    Treatments at the Time of Hospital Discharge:   Respiratory Treatments: ***  Oxygen Therapy:  {Therapy; copd oxygen:78948}  Ventilator:    {MH CC Vent MRFC:461204865}    Rehab Therapies: {THERAPEUTIC INTERVENTION:6011232085}  Weight Bearing Status/Restrictions: [address] CC Weight Bearin}  Other Medical Equipment (for information only, NOT a DME order):  {EQUIPMENT:736614055}  Other Treatments: ***    Patient's personal belongings (please select all that are sent with patient):  {CHP DME Belongings:548705681}    RN SIGNATURE:  {Esignature:646828664}    CASE MANAGEMENT/SOCIAL WORK SECTION    Inpatient Status Date: 2022    Readmission Risk Assessment Score:  Readmission Risk              Risk of Unplanned Readmission:  0           Discharging to Facility/ Agency   Name: [facility] [address] [place], [address] [place] [address]  [facility]:[phone]  Fax:[phone]    Dialysis Facility (if applicable)   Name:[facility]   Address:  Dialysis Schedule:[facility]   Phone:  Fax:    / signature: Electronically signed by SARAHI Lynch on 22 at 3:13 PM EDT    PHYSICIAN SECTION    Prognosis: {Prognosis:5751708594}    Condition at Discharge: [address] [employer] Patient Condition:605872015}    Rehab Potential (if transferring to Rehab): {Prognosis:4900494242}    Recommended Labs or Other Treatments After Discharge: ***    Physician Certification: I certify the above information and transfer of Sandra Rosario  is necessary for the continuing treatment of the diagnosis listed and that he requires {Admit to Appropriate Level of Care:45777} for {GREATER/LESS:690856541} 30 days.      Update Admission H&P: {CHP DME Changes in GAFAR:037357213}    PHYSICIAN SIGNATURE:  {Esignature:814759106}

## 2022-05-16 NOTE — ED NOTES
RN into room. Pt moaning out and trashing around bed. Pt points to chest when RN ask where pain is. Secure message sent to SHANELL Blanchard.      Soni Andrade RN  05/16/22 9060

## 2022-05-16 NOTE — CONSULTS
[facility]    [address], [address]  [facility]  5/16/2022 12:36 PM    Pt Name:    Soraida Hernandes  MRN:     [id number]   [id number]  YOB: 1930  Admit Date:    5/16/2022  4:11 AM  Primary Care Physician:  Kana Mata DO    [facility] Number:   [id number]    Reason for Consult:  ESRD on hemodialysis, critical hyperkalemia  Requesting provider:  Dr. Christel Abdullahi, ED physician    History:   The patient is a 80 y.o. -American male with history of end-stage renal disease on hemodialysis via right upper extremity AV access on Mondays, Wednesdays and Fridays, coronary artery disease, atrial fibrillation, chronic systolic congestive heart failure who presented to the hospital from nursing home for evaluation of abdominal pain. Patient is not a very good historian. Says pain has been present for couple of days but does not elaborate much. Could not provide any further information. He was evaluated in the emergency room and was noted to have proBNP greater than 70,000, white count 11.6, potassium 6.5, procalcitonin 1.6. Chest x-ray showed some findings suggestive of CHF. Patient was medically managed for hyperkalemia in the ER. Nephrology was consulted by Dr. Christel Abdullahi from the emergency room. He called me and we discussed the case on the phone earlier this morning. Plans were made for urgent dialysis. In the interim patient was provided medical treatment for hyperkalemia. Patient subsequently seen and examined by me in the dialysis unit while undergoing dialysis treatment. Upon arrival to the dialysis unit patient was somewhat agitated, tachycardic and staff reports that he was below his dry weight. Patient was restless and complaining of pain. 1 dose of morphine was given. Currently tolerating dialysis treatment well blood pressure is 123/72 with blood flow rate of 400.   No acute distress    Past Medical History:  Past Medical History: Diagnosis Date    Anemia in chronic kidney disease     Arthritis     [facility]     CAD (coronary artery disease)     Cardiomegaly     Chronic kidney disease     Chronic kidney disease, stage IV (severe) (HCC)     Chronic respiratory failure with hypoxia (HCC)     CKD (chronic kidney disease) stage 3, GFR 30-59 ml/min (HCC)     COPD (chronic obstructive pulmonary disease) ([facility]     COVID-19     End stage renal disease ([facility]     GERD without esophagitis     GI bleed     Gout     Hemodialysis patient [facility]) 07/26/2016    @ [facility]    History of blood transfusion     6/2019    Hyperlipidemia     Hyperphosphatemia 05/22/2018    Hypertension     Monoclonal gammopathy     Pneumonia due to coronavirus disease 2019     Pulmonary embolism (HCC)     Sick sinus syndrome ([facility]     Systolic congestive heart failure [facility])        Past Surgical History:  Past Surgical History:   Procedure Laterality Date    CARDIAC SURGERY      COLONOSCOPY  2006,2009    COLONOSCOPY N/A 6/27/2019    COLONOSCOPY DIAGNOSTIC performed by Yokasta Live MD at [facility]   [facility] 2004    DIALYSIS FISTULA CREATION  5/6/2016    right arm fistula placement    ENDOSCOPY, COLON, DIAGNOSTIC      PACEMAKER PLACEMENT  2013    PA ESOPHAGOGASTRODUODENOSCOPY TRANSORAL DIAGNOSTIC N/A 1/18/2018    EGD performed by Jenny Vickers MD at [facility]   [address]  [id number]    UPPER GASTROINTESTINAL ENDOSCOPY Left 6/23/2019    EGD DIAGNOSTIC ONLY performed by Yokasta Live MD at [facility] ENDOSCOPY N/A 10/9/2021    EGD performed by Yokasta Live MD at [address] [place] [address] N/A 11/6/2021    EGD ESOPHAGOGASTRODUODENOSCOPY performed by Yokasta Live MD at [address] [place] [address]  11/6/2021    EGD CONTROL HEMORRHAGE performed by Yokasta Live MD at  [facility]    VASCULAR SURGERY         Family History:  Family History   Problem Relation Age of Onset    Diabetes Mother     Heart Disease Mother     Diabetes Sister     Mental Illness Paternal Aunt        Social History:  Social History     Socioeconomic History    Marital status:      Spouse name: Genna Camilo Number of children: Not on file    Years of education: Not on file    Highest education level: Not on file   Occupational History    Not on file   Tobacco Use    Smoking status: Former Smoker     Packs/day: 1.00     Years: 40.00     Pack years: 40.00     Quit date: 1982     Years since quittin.3    Smokeless tobacco: Never Used   Vaping Use    Vaping Use: Never used   Substance and Sexual Activity    Alcohol use: No    Drug use: No    Sexual activity: Not on file   Other Topics Concern    Not on file   Social History Narrative    Not on file     Social Determinants of Health     Financial Resource Strain:     Difficulty of Paying Living Expenses: Not on file   Food Insecurity:     Worried About [id number] in the Last Year: Not on file    [address] in the Last Year: Not on file   Transportation Needs:     Lack of Transportation (Medical): Not on file    Lack of Transportation (Non-Medical):  Not on file   Physical Activity:     Days of Exercise per Week: Not on file    Minutes of Exercise per Session: Not on file   Stress:     Feeling of Stress : Not on file   Social Connections:     Frequency of Communication with Friends and Family: Not on file    Frequency of Social Gatherings with Friends and Family: Not on file    Attends Church Services: Not on file    Active Member of Clubs or Organizations: Not on file    Attends Club or Organization Meetings: Not on file    Marital Status: Not on file   Intimate Partner Violence:     Fear of Current or Ex-Partner: Not on file    Emotionally Abused: Not on file    Physically Abused: Not on file    Sexually Abused: Not on file   Housing Stability:     Unable to Pay for Housing in the Last Year: Not on file    Number of Places Lived in the Last Year: Not on file    Unstable Housing in the Last Year: Not on file       Home Meds:  Prior to Admission medications    Medication Sig Start Date End Date Taking?  Authorizing Provider   losartan (COZAAR) 50 MG tablet Take 1 tablet by mouth daily 11/1/21   SHANELL Gudino   epoetin traci-epbx (RETACRIT) 95692 UNIT/ML SOLN injection Inject 1 mL into the skin three times a week 11/1/21   SHANELL Gudino   white petrolatum GEL Apply 28 g topically as needed for Dry Skin 10/15/21   Radha Peter MD   pantoprazole (PROTONIX) 40 MG tablet Take 1 tablet by mouth 2 times daily (before meals) 10/11/21   Radha Peter MD   albuterol sulfate  (90 Base) MCG/ACT inhaler Inhale 1 puff into the lungs every 4 hours as needed 9/8/21   Historical Provider, MD   ASPIRIN LOW DOSE 81 MG EC tablet Take 81 mg by mouth daily 9/1/21   Historical Provider, MD   atorvastatin (LIPITOR) 40 MG tablet Take 40 mg by mouth daily 9/1/21   Historical Provider, MD   cyclobenzaprine (FLEXERIL) 10 MG tablet Take 10 mg by mouth daily 8/28/17   Historical Provider, MD   clopidogrel (PLAVIX) 75 MG tablet Take 75 mg by mouth daily 8/13/21   Historical Provider, MD   ferrous sulfate (IRON 325) 325 (65 Fe) MG tablet Take 325 mg by mouth daily 6/24/19   Historical Provider, MD   metoprolol (LOPRESSOR) 100 MG tablet Take 1 tablet by mouth 2 times daily 7/14/21   Radha Peter MD   cinacalcet (SENSIPAR) 30 MG tablet Take 60 mg by mouth three times a week before dialysis on M,W,F    Historical Provider, MD   calcitRIOL (ROCALTROL) 0.25 MCG capsule Take 1.5 mcg by mouth three times a week before dialysis on M,W,F    Historical Provider, MD   docusate sodium (COLACE, DULCOLAX) 100 MG CAPS Take 100 mg by mouth 2 times daily 6/24/19   Maggie Blunt DO   vitamin C (ASCORBIC ACID) 500 MG tablet Take 1 tablet by mouth daily 6/24/19   Jacky Marking, DO   polyethylene glycol [facility]) powder Renal Miralax Colonoscopy prep. Use as directed. Mix Miralax 238 g with 24 oz clear liquids. Drink 8 oz glass every 20-30 minutes until gone. 6/6/19   Johanna Hernandez, APRN - CNP   B Complex-C-Folic Acid (RENAL) 1 MG CAPS Take 1 capsule by mouth daily    Historical Provider, MD       Review of Systems:  Very poor historian, very limited review of system at this time. Patient reports abdominal pain and some nausea but other than that not much information could be obtained. Current Meds:  Infusion:   Meds:   Meds prn:      Allergies/Intolerances: ALLERGIES: Patient has no known allergies. 24HR INTAKE/OUTPUT:  No intake or output data in the 24 hours ending 05/16/22 1236  No intake/output data recorded. No intake/output data recorded. Admission weight: 150 lb (68 kg)  Wt Readings from Last 3 Encounters:   05/16/22 150 lb (68 kg)   05/11/22 150 lb (68 kg)   05/09/22 146 lb 6.2 oz (66.4 kg)     Body mass index is 24.21 kg/m².     Physical Examination:  VITALS:   Vitals:    05/16/22 0449 05/16/22 0556 05/16/22 0703 05/16/22 0745   BP: 127/76 133/77 137/85 (!) 189/103   Pulse: 116 109 108 118   Resp: 26 19 20 22   Temp:    98.5 °F (36.9 °C)   TempSrc:       SpO2: 100% 96% (!) 89% 96%   Weight:       Height:         Weight:   Wt Readings from Last 3 Encounters:   05/16/22 150 lb (68 kg)   05/11/22 150 lb (68 kg)   05/09/22 146 lb 6.2 oz (66.4 kg)     Constitutional and General Appearance: Resting comfortably, no acute distress  Eyes: no icteric sclera in left eye or right eye, pallor conjunctiva both eyes  Ears and Nose: normal external appearance of left and right ear  Oral: moist oral mucus membranes  Neck: No jugular venous distention visibly noted  Lungs: no use of accessory muscles or labored breathing  Chest: No chest wall tenderness  Extremities: No significant LE edema, no tenderness  GI: Mild distention, no guarding or rebound  Skin: no rash seen on exposed extremities, warm to touch  Musculo: No obvious joint deformities noted  Neuro: Cannot be fully assessed at this time      Lab Data  CBC:   Recent Labs     05/16/22 0430   WBC 11.6*   HGB 9.5*   HCT 27.5*        BMP:  Recent Labs     05/16/22 0430      K 6.5*   CL 92*   CO2 25   BUN 57*   CREATININE 7.4*   GLUCOSE 101   CALCIUM 9.4   MG 1.9     Hepatic:   Recent Labs     05/16/22 0430   LABALBU 3.7   AST 32   ALT 17   BILITOT 0.9   ALKPHOS 118       Additional Labs: proBNP greater than 70,000  Chest x-ray findings noted  Diagnostics:    Old tests reviewed. Echo: EF 80%, grade 1 diastolic dysfunction May 8704      Impression and Plan:  1. ESRD on hemodialysis with critical hyperkalemia. Case discussed with the ER. Urgent dialysis arrangements were made. Patient seen in the dialysis unit currently undergoing dialysis and tolerating well. Will check potassium postdialysis. Clinically no significant peripheral edema  2. Critical hyperkalemia. Medically managed by ER, hemodialysis undergoing. Recheck potassium  3. Pericardial effusion: Follow echo, cardiology consulted by hospitalist  4. Bilateral pleural effusions  5. Chronic systolic and diastolic dysfunction  6. Anemia in ESRD  7. History of coronary artery disease  8. Sick sinus syndrome status post pacemaker  9. Medications reviewed  10. Discussed with the ER  11. Discussed with HD RN    Dr. Lidia Reynolds will resume care in a.m. Thank you for the consult. Please feel free to call me if you have any questions. Leon Mei MD  [facility]    This report has been created using voice recognition software. It may contain minor errors which are inherent in voice recognition technology.

## 2022-05-16 NOTE — H&P
Hospitalist History & Physical    Patient:  Lindbergh Gaucher    Unit/Bed:24/024A  YOB: 1930  MRN: [id number]   Acct: [de-identified]   PCP: Stefan Esparza DO  Code Status: Prior    Date of Service: Pt seen/examined on 05/16/22 and admitted to Observation with expected LOS less than two midnights due to medical therapy. Chief Complaint: abdominal pain    Assessment/Plan:    1. Generalized abdominal pain:  Pt admitted 5/6 for same complaint. CT at that time showed no acute findings. Repeat CT is pending. Clear liquid diet for now. Analgesics, antiemetics. Given readmission, may need to consider GI consult pending CT. 2. Elevated troponin: Chronically elevated in setting of ESRD, although slightly higher than baseline. Per reports, pt was complaining of chest pain on arrival although denies currently. No acute changes on EKG. Will repeat EKG and trend troponin. Last Echo is from a year ago, will repeat. Consider cardiology consult pending further workup. Pt is on OMT with ASA, Plavix, metoprolol, losartan, statin. Addendum: CT Abdomen Pelvis showed new moderate sized pericardial effusion. Echo ordered. Consult cardiology. 3. Hyperkalemia: Pt given sodium bicarb, kayexalate in ED. Dialysis today. 4. ESRD on HD:  Consult nephrology    5. GERD: Resume PPI. Possibly contributing to abdominal pain. 6. Chronic A. Fib: Rate controlled on metoprolol, PPM.  Patient is not on anticoagulation due to risks outweighing benefit. 7. Hx CAD, PCI: Resume home ASA, Plavix, statin, metoprolol, losartan. 8. Essential HTN: BP stable, resume home meds. Monitor. 9. Anemia of chronic disease:  No signs of acute bleeding, hemoglobin stable at baseline. Resume ferrous sulfate. 10. SSS s/p AICD, PPM: noted. 11. Chronic HFrEF:  EF 45%, G1DD on echo 05/2021. No overt signs of decompensation. Fluid status managed with dialysis. Continue home meds. Echo pending.      12. Dementia: Noted, patient at baseline. Patient from ECF. 13. Code status: Limited x 4. Consult palliative care for goals of care given readmission for uncontrolled abd pain. History of Present Illness:  80year old male with PMHx ESRD, dementia, SSS, HFrEF, CAD, chronic anemia, GERD, HTN who presented to [facility] ED from NH for evaluation of abdominal pain. Pt with history of dementia, history is limited. He is alert to verbal stimuli. Pt appears uncomfortable, moving around in bed and moaning. He is oriented to self, place, time, somewhat to situation. He states that he has had abdominal pain all night. Pt unable to describe pain or answer when last BM was. He moans in pain with very light touch of abdomen but not to palpation with stethoscope, which is consistent with exam from prior admission. Per ED note, the patient initially complained of chest pain then abdominal pain. He denies chest pain, SOB to me. Patient admitted for further management. Review of Systems: Pertinent positives as noted in the HPI. All other systems reviewed and negative.     Past Medical History:        Diagnosis Date    Anemia in chronic kidney disease     Arthritis     Atrial fibrillation ([address]     CAD (coronary artery disease)     Cardiomegaly     Chronic kidney disease     [facility], [facility] IV [facility]severe) ([facility]     Chronic respiratory failure with hypoxia (HCC)     CKD (chronic kidney disease) stage 3, GFR 30-59 ml/min (HCC)     COPD (chronic obstructive pulmonary disease) (HCC)     COVID-19     End stage renal disease ([address]     GERD without esophagitis     GI bleed     Gout     [facility]od[facility] 07/26/2016    @ [facility]    History of blood transfusion     6/2019    Hyperlipidemia     Hyperphosphatemia 05/22/2018    Hypertension     Monoclonal gammopathy     Pneumonia due to coronavirus disease 2019     Pulmonary embolism (HCC)     Sick sinus syndrome ([address]     Systolic congestive heart failure [facility])        Past Surgical History:        Procedure Laterality Date   [employer] CARDIAC SURGERY      COLONOSCOPY  [id number]    COLONOSCOPY N/A 6/27/2019    COLONOSCOPY DIAGNOSTIC performed by Cheyenne Alvarez MD at [facility]  2004    DIALYSIS FISTULA CREATION  5/6/2016    right arm fistula placement    ENDOSCOPY, COLON, DIAGNOSTIC      PACEMAKER PLACEMENT  2013    [place] ESOPHAGOGASTRODUODENOSCOPY TRANSORAL DIAGNOSTIC N/A 1/18/2018    EGD performed by Emely Marvin MD at [address] [place] [address]  2006,2009    UPPER GASTROINTESTINAL ENDOSCOPY Left 6/23/2019    EGD DIAGNOSTIC ONLY performed by Cheyenne Alvarez MD at [address] [place] [address] N/A 10/9/2021    EGD performed by Cheyenne Alvarez MD at [address] [place] [address] N/A 11/6/2021    EGD ESOPHAGOGASTRODUODENOSCOPY performed by Cheyenne Alvarez MD at [address] [place] [address]  11/6/2021    EGD CONTROL HEMORRHAGE performed by Cheyenne Alvarez MD at [place] [address] Medications:   No current facility-administered medications on file prior to encounter.      Current Outpatient Medications on File Prior to Encounter   Medication Sig Dispense Refill    losartan (COZAAR) 50 MG tablet Take 1 tablet by mouth daily 30 tablet 3    epoetin traci-epbx (RETACRIT) 68764 UNIT/ML SOLN injection Inject 1 mL into the skin three times a week 6.6 mL     white petrolatum GEL Apply 28 g topically as needed for Dry Skin  0    pantoprazole (PROTONIX) 40 MG tablet Take 1 tablet by mouth 2 times daily (before meals) 60 tablet 0    albuterol sulfate  (90 Base) MCG/ACT inhaler Inhale 1 puff into the lungs every 4 hours as needed      ASPIRIN LOW DOSE 81 MG EC tablet Take 81 mg by mouth daily      atorvastatin (LIPITOR) 40 MG tablet Take 40 mg by mouth daily      cyclobenzaprine (FLEXERIL) 10 MG tablet Take 10 mg by mouth daily      clopidogrel (PLAVIX) 75 MG tablet Take 75 mg by mouth daily      ferrous sulfate (IRON 325) 325 (65 Fe) MG tablet Take 325 mg by mouth daily      metoprolol (LOPRESSOR) 100 MG tablet Take 1 tablet by mouth 2 times daily 60 tablet 3    cinacalcet (SENSIPAR) 30 MG tablet Take 60 mg by mouth three times a week before dialysis on M,W,F      calcitRIOL (ROCALTROL) 0.25 MCG capsule Take 1.5 mcg by mouth three times a week before dialysis on M,W,F      docusate sodium (COLACE, DULCOLAX) 100 MG CAPS Take 100 mg by mouth 2 times daily 60 capsule 0    vitamin C (ASCORBIC ACID) 500 MG tablet Take 1 tablet by mouth daily 30 tablet 3    polyethylene glycol (MIRALAX) powder Renal Miralax Colonoscopy prep. Use as directed. Mix Miralax 238 g with 24 oz clear liquids. Drink 8 oz glass every 20-30 minutes until gone. 238 g 0    B Complex-C-Folic Acid (RENAL) 1 MG CAPS Take 1 capsule by mouth daily         Allergies:    Patient has no known allergies. Social History:    reports that he quit smoking about 40 years ago. He has a 40.00 pack-year smoking history. He has never used smokeless tobacco. He reports that he does not drink alcohol and does not use drugs. Family History:       Problem Relation Age of Onset    Diabetes Mother     Heart Disease Mother     Diabetes Sister     Mental Illness Paternal Aunt        Diet:  No diet orders on file      Physical Exam:  /77   Pulse 109   Temp 99 °F (37.2 °C) (Oral)   Resp 19   Ht 5' 6\" (1.676 m)   Wt 150 lb (68 kg)   SpO2 96%   BMI 24.21 kg/m²   General:   Acute on chronically ill appearing male. NAD. HEENT:  normocephalic and atraumatic. No scleral icterus. PERR. Neck: supple. No JVD. No thyromegaly. Lungs: clear to auscultation. No retractions  Cardiac: RRR without murmur. Abdomen: soft. Nontender. Bowel sounds positive. Extremities:  No clubbing, cyanosis, or edema x 4. Vasculature: capillary refill < 3 seconds. Palpable LE pulses bilaterally. Skin:  warm and dry. No rashes or lesions. Psych:  Alert and oriented x2. Appears uncomfortable. Lymph:  No supraclavicular adenopathy. Neurologic:  No focal deficit. No seizures. Data: (All radiographs, tracings, PFTs, and imaging are personally viewed and interpreted unless otherwise noted)  Labs:   Recent Labs     05/16/22 0430   WBC 11.6*   HGB 9.5*   HCT 27.5*        Recent Labs     05/16/22 0430      K 6.5*   CL 92*   CO2 25   BUN 57*   CREATININE 7.4*   CALCIUM 9.4   PHOS 3.5     Recent Labs     05/16/22 0430   AST 32   ALT 17   BILIDIR 0.4*   BILITOT 0.9   ALKPHOS 118     Recent Labs     05/16/22 0430   INR 1.10     No results for input(s): CKTOTAL, TROPONINI in the last 72 hours. Urinalysis:   Lab Results   Component Value Date    NITRU NEGATIVE 06/22/2019    WBCUA 50-75 06/22/2019    BACTERIA NONE 06/22/2019    RBCUA 25-50 06/22/2019    BLOODU LARGE 06/22/2019    SPECGRAV 1.013 05/03/2016    GLUCOSEU NEGATIVE 06/22/2019       EKG: undetermined rhythm    Radiology:  XR CHEST PORTABLE   ED Interpretation   Volume overload, no other significant findings compared to prior x-ray      CT ABDOMEN PELVIS WO CONTRAST Additional Contrast? None    (Results Pending)     No results found. Tele:   [x] yes             [] no      Thank you Henna Johnson DO for the opportunity to be involved in this patient's care.     Electronically signed by Woo Royal PA-C on 5/16/2022 at 6:27 AM

## 2022-05-16 NOTE — CARE COORDINATION
05/16/22 1455   Readmission Assessment   Number of Days since last admission? 1-7 days   Previous Disposition SNF   Who is being Elier Storm)   What was the patient's/caregiver's perception as to why they think they needed to return back to the hospital? Other (Comment)  (continued pain)   Did you visit your Primary Care Physician after you left the hospital, before you returned this time? Yes  (provider at SNF)   Did you see a specialist, such as Cardiac, Pulmonary, Orthopedic Physician, etc. after you left the hospital? Yes   Who advised the patient to return to the hospital? Other (Comment)  (ECF sent in)   Does the patient report anything that got in the way of taking their medications? No   In our efforts to provide the best possible care to you and others like you, can you think of anything that we could have done to help you after you left the hospital the first time, so that you might not have needed to return so soon?  Other (Comment)  (continued pain)

## 2022-05-16 NOTE — ED NOTES
RN called and spoke with Dr Shakeel Gallego who states he would like pt to go to dialysis this morning.       Bryon Palmer RN  05/16/22 6884

## 2022-05-16 NOTE — ED NOTES
RN called lab regarding adding on phosphorus and procal. Lab states they are able to add them on.       Curt Castillo RN  05/16/22 2931

## 2022-05-16 NOTE — ED NOTES
ED to inpatient nurses report    Chief Complaint   Patient presents with    Chest Pain      Present to ED from [facility]  LOC: alert and orientated to name and place  Vital signs   Vitals:    05/16/22 0421 05/16/22 0449 05/16/22 0556   BP: (!) 126/108 127/76 133/77   Pulse: 138 116 109   Resp: (!) 38 26 19   Temp: 99 °F (37.2 °C)     TempSrc: Oral     SpO2: (!) 87% 100% 96%   Weight: 150 lb (68 kg)     Height: 5' 6\" (1.676 m)        Oxygen Baseline 2L NC as needed    Current needs required 3L NC Bipap/Cpap No  LDAs:   Peripheral IV 05/16/22 Left Antecubital (Active)   Site Assessment Clean, dry & intact 05/16/22 0427   Line Status Blood return noted; Flushed;Normal saline locked;Specimen collected 05/16/22 0427   Dressing Status New dressing applied;Clean, dry & intact 05/16/22 0427   Dressing Type Transparent 05/16/22 0427     Mobility:   Pending ED orders: none  Present condition: continuous monitoring.     Electronically signed by Maria Elena Dinh RN on 5/16/2022 at 5:57 AM       Maria Elena Dinh RN  05/16/22 Vin Pizarro RN  05/16/22 0798

## 2022-05-16 NOTE — ED NOTES
RN spoke with inpatient dialysis who states they will place transport for [id number].       Curt Castillo RN  05/16/22 0727

## 2022-05-16 NOTE — ED NOTES
RN called [facility] and spoke with Olga that pt is going to be admitted.       Karan Roche RN  05/16/22 4256

## 2022-05-16 NOTE — CONSULTS
[facility] [address] [place] [address]  Cardiology Consult      Patient:  Kory Anderson  YOB: 1930    MRN: [id number]   Acct: [de-identified]     Primary Care Physician: Rennie Rinne, DO    REASON FOR CONSULT:    pericardial effusion     CHIEF COMPLAINT:    abd pain     HISTORY OF PRESENT ILLNESS:    Kory Anderson is a pleasant 80year old male patient with past medical history that includes:   Past Medical History:   Diagnosis Date    Anemia in chronic kidney disease     Arthritis     Atrial fibrillation ([address]     CAD (coronary artery disease)     Cardiomegaly     Chronic kidney disease     Chronic kidney disease, stage IV (severe) (HCC)     Chronic respiratory failure with hypoxia (HCC)     CKD (chronic kidney disease) stage 3, GFR 30-59 ml/min ([address]     COPD (chronic obstructive pulmonary disease) ([address]     COVID-19     End stage renal disease ([address]     GERD without esophagitis     GI bleed     Gout     Hemodialysis patient ([address] 07/26/2016    @ [facility]    History of blood transfusion     6/2019    Hyperlipidemia     Hyperphosphatemia 05/22/2018    Hypertension     Monoclonal gammopathy     Pneumonia due to coronavirus disease 2019     Pulmonary embolism ([address]     Sick sinus syndrome ([address]     Systolic congestive heart failure ([address]    He has h/o PE, GI bleeding, Hx of SSS s/p Pacemaker 2013, Chronic A-fib, CHF, EF ~ 45%, CAD s/p PCI w/stents 2004. Patient was recently admitted to [facility] and was treated/evaluated for AMS, ESRD, Chest pain, hypoxia and COPD. He has DNR code status. The patient was admitted to the hospital today after she was sent to ER from NH. She was c/o atypical chest discomfort and upper abdominal pain. K was 6.5. He was seen by Nephrology, HD was recommended. Pro-BNP >70,000. Troponin 0.33. Troponin is chronically elevated (0.18-0.29 on recent labs). Hgb 9.5. CXR revealed findings c/w pulmonary edema, volume overload. Cardiology was consulted after CT scan abd/pelvis revealed a moderate pericardial effusion (pleural effusion was also noted). Patient denies chest pain at this time. He is complaining of abdominal pain and distention, has mild SOB. All labs, EKG's, diagnostic testing and images as well as cardiac cath, stress testing   were reviewed during this encounter    CARDIAC TESTING  Echo:   ECHO: Results for orders placed during the hospital encounter of 05/19/21    ECHO Complete 2D W Doppler W Color    Narrative  Transthoracic Echocardiography Report (TTE)    Demographics    Patient Name    Foster Perez Gender               Male    MR #            [id number]     Race                 Black    Ethnicity    Account #       [de-identified]     Room Number          0021    Accession       [id number]    Date of Study        05/20/2021  Number    Date of Birth   11/10/1930    Referring Physician  Marcos Woods MD    Age             80 year(s)    Massimo Sunshine  [facility]    Interpreting         Melly Barba MD  Physician    Procedure    Type of Study    TTE procedure:ECHOCARDIOGRAM COMPLETE 2D W DOPPLER W COLOR. Procedure Date  Date: 05/20/2021 Start: 01:10 PM    Study Location: Echo Lab  Technical Quality: Adequate visualization    Indications:Preop cardiac evaluation. Additional Medical History:Anemia, arthritis, coronary artery disease,  chronic kidney disease, COPD, end stage renal disease, hypertension,  hyperlipidemia, sick sinus syndrome, pacemaker, former smoker, congestive  heart failure    Patient Status: Routine    Height: 66.14 inches Weight: 180.78 pounds BSA: 1.92 m^2 BMI: 29.05 kg/m^2    BP: 200/106 mmHg    Allergies  - No Known Allergies. Conclusions    Summary  Left ventricle size is normal.  Normal left ventricular wall thickness. There was mild global hypokinesis of the left ventricle. Systolic function was mildly reduced.   Ejection fraction is visually estimated at 45%. Doppler parameters were consistent with abnormal left ventricular  relaxation (grade 1 diastolic dysfunction). The left atrium is Moderately dilated. Moderate mitral regurgitation is present. Moderate tricuspid regurgitation visualized. Right ventricular systolic pressure measures 55 mmhg. When this echo is compared with the echo from 05/22/2018, the EF dropped  from 60 % to 45% now    Signature    ----------------------------------------------------------------  Electronically signed by Ashlie Norris MD (Interpreting  physician) on 05/20/2021 at 07:47 PM  ----------------------------------------------------------------    Findings    Mitral Valve  The mitral valve structure was normal with normal leaflet separation. DOPPLER: The transmitral velocity was within the normal range with no  evidence for mitral stenosis. Moderate mitral regurgitation is present. Aortic Valve  Aortic valve appears tricuspid. Aortic valve leaflets are Moderately  calcified. Leaflets exhibited moderately increased thickness and mild to  moderately reduced cuspal separation of the aortic valve. No evidence of  aortic valve regurgitation . There is mild aortic stenosis with valve area of 1.5 sq cm. Tricuspid Valve  The tricuspid valve structure was normal with normal leaflet separation. DOPPLER: There was no evidence of tricuspid stenosis. Moderate tricuspid regurgitation visualized. Right ventricular systolic pressure measures 55 mmhg. Pulmonic Valve  The pulmonic valve leaflets exhibited normal thickness, no calcification,  and normal cuspal separation. DOPPLER: The transpulmonic velocity was  within the normal range with no evidence for regurgitation. Left Atrium  The left atrium is Moderately dilated. Left Ventricle  Left ventricle size is normal.  Normal left ventricular wall thickness. There was mild global hypokinesis of the left ventricle. Systolic function was mildly reduced.   Ejection fraction is visually estimated at 45%. Doppler parameters were consistent with abnormal left ventricular  relaxation (grade 1 diastolic dysfunction). Right Atrium  Right atrial size was normal.    Right Ventricle  The right ventricular size was normal with normal systolic function and  wall thickness. Pericardial Effusion  The pericardium was normal in appearance with no evidence of a pericardial  effusion. Pleural Effusion  No evidence of pleural effusion. Aorta / Great Vessels  -Aortic root dimension within normal limits.  -The Pulmonary artery is within normal limits. -IVC size is within normal limits with normal respiratory phasic changes.     M-Mode/2D Measurements & Calculations    LV Diastolic   LV Systolic Dimension:    AV Cusp Separation: 1.6 cmLA  Dimension: 4.6 3.6 cm                    Dimension: 3.9 cmAO Root  cm             LV Volume Diastolic: 02.4 Dimension: 3.5 cmLA Area: 22.2  LV FS:21.7 %   ml                        cm^2  LV PW          LV Volume Systolic: 35.4  Diastolic: 1.1 ml  cm             LV EDV/LV EDV Index: 86.2  Septum         ml/45 m^2LV ESV/LV ESV    RV Diastolic Dimension: 3.8 cm  Diastolic: 1.1 Index: 25.3 ml/25 m^2  cm             EF Calculated: 43.7 %     LA/Aorta: 1.11  Ascending Aorta: 3.4 cm  LV Length: 9 cm           LA volume/Index: 71.6 ml /37m^2    LV Area        LVOT: 2 cm  Diastolic:  37.3 cm^2  LV Area  Systolic: 06.4  cm^2    Doppler Measurements & Calculations    MV Peak E-Wave: 101 AV Peak Velocity: 166   LVOT Peak Velocity: 82.9 cm/s  cm/s                cm/s                    LVOT Mean Velocity: 59.7 cm/s  MV Peak A-Wave:     AV Peak Gradient: 11.02 LVOT Peak Gradient: 3 mmHgLVOT  83.6 cm/s           mmHg                    Mean Gradient: 2 mmHg  MV E/A Ratio: 1.21  AV Mean Velocity: 126  MV Peak Gradient:   cm/s                    TV Peak E-Wave: 59.1 cm/s  4.08 mmHg           AV Mean Gradient: 7     TV Peak A-Wave: 78.8 cm/s  mmHg  MV Deceleration AV VTI: 43.4 cm         TV Peak Gradient: 1.4 mmHg  Time: 222 msec      AV Area                 TR Velocity:342 cm/s  MV P1/2t: 65 msec   (Continuity):1.5 cm^2   TR Gradient:46.79 mmHg  MVA by PHT:3.38                             PV Peak Velocity: 58.7 cm/s  cm^2                LVOT VTI: 20.7 cm       PV Peak Gradient: 1.38 mmHg  IVRT: 81 msec  MV E' Septal  Velocity: 4.5 cm/s                          ME ED Velocity: 140 cm/s  MV A' Septal        AV DVI (VTI): 0.48AV  Velocity: 7.4 cm/s  DVI (Vmax):0.5  MV E' Lateral  Velocity: 6.1 cm/s  MV A' Lateral  Velocity: 5.4 cm/s  E/E' septal: 22.44  E/E' lateral: 16.56  MR Velocity: 570  cm/s    [email or web address] [id number]    Past Medical History:    Past Medical History:   Diagnosis Date    Anemia in chronic kidney disease     Arthritis     [facility]     CAD (coronary artery disease)     Cardiomegaly     Chronic kidney disease     Chronic kidney disease, stage IV (severe) (HCC)     Chronic respiratory failure with hypoxia (HCC)     CKD (chronic kidney disease) stage 3, GFR 30-59 ml/min (HCC)     COPD (chronic obstructive pulmonary disease) (HCC)     COVID-19     End stage renal disease ([facility]     GERD without esophagitis     GI bleed     Gout     [facility] 07[facility]26/2016    [facility]    History of blood transfusion     6/2019    Hyperlipidemia     Hyperphosphatemia 05/22/2018    Hypertension     Monoclonal gammopathy     Pneumonia due to coronavirus disease 2019     Pulmonary embolism (HCC)     Sick sinus syndrome (HCC)     Systolic congestive heart failure [facility])        Past Surgical History:    Past Surgical History:   Procedure Laterality Date   Roberts CARDIAC SURGERY      COLONOSCOPY  [id number]    COLONOSCOPY N/A 6/27/2019    COLONOSCOPY DIAGNOSTIC performed by Claribel Gomez MD at [address] WITH STENT PLACEMENT  2004    DIALYSIS FISTULA CREATION  5/6/2016    right arm fistula placement    ENDOSCOPY, COLON, DIAGNOSTIC      PACEMAKER PLACEMENT  2013    MS ESOPHAGOGASTRODUODENOSCOPY TRANSORAL DIAGNOSTIC N/A 1/18/2018    EGD performed by Brandee Medina MD at [address] [place] [address]  2006,2009    UPPER GASTROINTESTINAL ENDOSCOPY Left 6/23/2019    EGD DIAGNOSTIC ONLY performed by Claribel Gomez MD at [address] [place] [address] N/A 10/9/2021    EGD performed by Claribel Gomez MD at [address] [place] [address] N/A 11/6/2021    EGD ESOPHAGOGASTRODUODENOSCOPY performed by Claribel Gomez MD at [address] [place] [address]  11/6/2021    EGD CONTROL HEMORRHAGE performed by Claribel Gomez MD at [address] [place] [address]         Medications Prior to Admission:    Medications Prior to Admission: losartan (COZAAR) 50 MG tablet, Take 1 tablet by mouth daily  epoetin traci-epbx (RETACRIT) 27991 UNIT/ML SOLN injection, Inject 1 mL into the skin three times a week  white petrolatum GEL, Apply 28 g topically as needed for Dry Skin  pantoprazole (PROTONIX) 40 MG tablet, Take 1 tablet by mouth 2 times daily (before meals)  albuterol sulfate  (90 Base) MCG/ACT inhaler, Inhale 1 puff into the lungs every 4 hours as needed  ASPIRIN LOW DOSE 81 MG EC tablet, Take 81 mg by mouth daily  atorvastatin (LIPITOR) 40 MG tablet, Take 40 mg by mouth daily  cyclobenzaprine (FLEXERIL) 10 MG tablet, Take 10 mg by mouth daily  clopidogrel (PLAVIX) 75 MG tablet, Take 75 mg by mouth daily  ferrous sulfate (IRON 325) 325 (65 Fe) MG tablet, Take 325 mg by mouth daily  metoprolol (LOPRESSOR) 100 MG tablet, Take 1 tablet by mouth 2 times daily  cinacalcet (SENSIPAR) 30 MG tablet, Take 60 mg by mouth three times a week before dialysis on M,W,F  calcitRIOL (ROCALTROL) 0.25 MCG capsule, Take 1.5 mcg by mouth three times a week before dialysis on M,W,F  docusate sodium (COLACE, DULCOLAX) 100 MG CAPS, Take 100 mg by mouth 2 times daily  vitamin C (ASCORBIC ACID) 500 MG tablet, Take 1 tablet by mouth daily  polyethylene glycol (MIRALAX) powder, Renal Miralax Colonoscopy prep. Use as directed. Mix Miralax 238 g with 24 oz clear liquids. Drink 8 oz glass every 20-30 minutes until gone. B Complex-C-Folic Acid (RENAL) 1 MG CAPS, Take 1 capsule by mouth daily    Allergies:    Patient has no known allergies. Social History:    reports that he quit smoking about 40 years ago. He has a 40.00 pack-year smoking history. He has never used smokeless tobacco. He reports that he does not drink alcohol and does not use drugs. Family History:   family history includes Diabetes in his mother and sister; Heart Disease in his mother; Mental Illness in his paternal aunt. REVIEW OF SYSTEMS:  Constitutional: negative for anorexia, chills and fevers,weight change  Skin: negative for new skin rash per patient  HEENT: negative for head trauma or new visual changes  Respiratory: negative for cough, hemoptysis, wheezing  Cardiovascular: negative for  orthopnea, palpitations and syncope.   Gastrointestinal: Positive for abdominal pain,nausea   Hematologic/lymphatic: negative for bruising,prolonged bleeding,blood clots  Musculoskeletal:negative for muscle weakness, myalgias,wasting  Neurological: negative for coordination problems, dizziness, gait problems and vertigo  Behavioral/Psych:negative for mood/sleep disturbance      PHYSICAL EXAM:   Vitals:  Patient Vitals for the past 24 hrs:   BP Temp Temp src Pulse Resp SpO2 Height Weight   05/16/22 1339 (!) 115/100 98 °F (36.7 °C) Oral 140 20 96 % -- --   05/16/22 1210 (!) 159/88 97.5 °F (36.4 °C) -- 92 20 94 % -- --   05/16/22 0745 (!) 189/103 98.5 °F (36.9 °C) -- 118 22 96 % -- --   05/16/22 0703 137/85 -- -- 108 20 (!) 89 % -- --   05/16/22 0556 133/77 -- -- 109 19 96 % -- --   05/16/22 0449 127/76 -- -- 116 26 100 % -- --   05/16/22 0441 -- -- -- 115 25 98 % -- --   05/16/22 0425 -- -- -- -- -- (!) 87 % -- --   05/16/22 0421 (!) 126/108 99 °F (37.2 °C) Oral 138 (!) 38 (!) 82 % 5' 6\" (1.676 m) 150 lb (68 kg)       Last 3 weights: Wt Readings from Last 3 Encounters:   05/16/22 150 lb (68 kg)   05/11/22 150 lb (68 kg)   05/09/22 146 lb 6.2 oz (66.4 kg)     24 hour intake/output:    Intake/Output Summary (Last 24 hours) at 5/16/2022 1434  Last data filed at 5/16/2022 1210  Gross per 24 hour   Intake 400 ml   Output 400 ml   Net 0 ml     BMI:Body mass index is 24.21 kg/m². General Appearance: alert and oriented to person, place and time, well developed and well- nourished, in no acute distress  Skin: warm and dry, no rash or erythema  Eyes: pupils equal, round, and reactive to light, extraocular eye movements intact, conjunctivae normal  Neck: supple and non-tender without mass, no thyromegaly or thyroid nodules, no cervical lymphadenopathy  Pulmonary/Chest: clear to auscultation bilaterally- no wheezes, rales or rhonchi, normal air movement, no respiratory distress  Cardiovascular: normal rate, regular rhythm, normal S1 and S2, III/VI systolic murmur. No rubs, clicks, or gallops, distal pulses intact, no carotid bruits, Negative JVD  Radial Pulses: intact 2+  Abdomen: mildly distended, mild generalized tenderness   Extremities: no cyanosis, clubbing . no Edema  Musculoskeletal: normal range of motion, no joint swelling, deformity or tenderness      RADIOLOGY   CT ABDOMEN PELVIS WO CONTRAST Additional Contrast? None    Result Date: 5/16/2022  EXAM: CT OF ABDOMEN/PELVIS WITHOUT CONTRAST: COMPARISON:  CT,SR - CT ABDOMEN PELVIS W IV CONTRAST - 05/06/2022 07:10 AM EDT TECHNIQUE: Enteric contrast was not administered prior to exam. Contiguous axial images from lung bases through ischial tuberosities, with sagittal and coronal reformatted images.  FINDINGS: Right greater than left pleural effusions persist. Moderate cardiomegaly. Pericardial effusion is now evident, a new finding. Bilateral renal atrophy unchanged. No acute urinary tract abnormality. Atherosclerosis of aorta without aneurysm. Unenhanced upper abdominal organs are unchanged. A small gallstone is again appreciated. No bowel obstruction, pneumatosis or pneumoperitoneum. Appendix normal. Urinary bladder is empty. No prostate enlargement appreciated. No osseous lesion detected. 1. New moderate sized pericardial effusion. 2. Bilateral pleural effusions persist. 3. No new abdominopelvic pathology. Chronic changes in gallstone again noted. This document has been electronically signed by: Carmen Hernandez MD on 05/16/2022 07:14 AM All CTs at this facility use dose modulation techniques and iterative reconstructions, and/or weight-based dosing when appropriate to reduce radiation to a low as reasonably achievable. XR CHEST PORTABLE    Result Date: 5/16/2022  EXAM: CHEST X-RAY, SINGLE VIEW PORTABLE: COMPARISON:  CR,SR - XR CHEST PORTABLE - 05/06/2022 05:34 AM EDT FINDINGS: Previous pacemaker placement again noted. Moderate cardiomegaly similar to prior study. Right pleural effusion is slightly more prominent than on study performed 10 days ago. Interstitial and septal prominence also is slightly increased bilaterally. Findings of CHF/fluid overload again noted, radiographically slightly worsened versus 10 days ago.  This document has been electronically signed by: Carmen Hernandez MD on 05/16/2022 07:08 AM      LABS:  Recent Labs     05/16/22  0430   TROPONINT 0.336*     CBC:   Lab Results   Component Value Date    WBC 11.6 05/16/2022    RBC 3.06 05/16/2022    RBC 2.95 09/19/2011    HGB 9.5 05/16/2022    HCT 27.5 05/16/2022    MCV 89.9 05/16/2022    MCH 31.0 05/16/2022    MCHC 34.5 05/16/2022    RDW 19.0 08/26/2021     05/16/2022    MPV 10.4 05/16/2022     BMP:    Lab Results   Component Value Date     05/16/2022    K 6.5 05/16/2022    K 5.8 05/07/2022    CL 92 05/16/2022    CO2 25 05/16/2022    BUN 57 05/16/2022    LABALBU 3.7 05/16/2022    LABALBU 4.2 04/05/2016    CREATININE 7.4 05/16/2022    CALCIUM 9.4 05/16/2022    LABGLOM 8 05/16/2022    GLUCOSE 101 05/16/2022    GLUCOSE 90 08/26/2021     Hepatic Function Panel:    Lab Results   Component Value Date    ALKPHOS 118 05/16/2022    ALT 17 05/16/2022    AST 32 05/16/2022    PROT 7.5 05/16/2022    BILITOT 0.9 05/16/2022    BILIDIR 0.4 05/16/2022    LABALBU 3.7 05/16/2022    LABALBU 4.2 04/05/2016     Magnesium:    Lab Results   Component Value Date    MG 1.9 05/16/2022     Warfarin PT/INR:  No components found for: PTPATWAR, PTINRWAR  HgBA1c:    Lab Results   Component Value Date    LABA1C 4.5 10/01/2021     FLP:    Lab Results   Component Value Date    TRIG 92 09/02/2020    HDL 63 09/02/2020    LDLCALC 141 09/02/2020     TSH:    Lab Results   Component Value Date    TSH 2.600 07/13/2021     BNP: No results found for: BNP    NUCLEAR STRESS TEST 5/2021     Imaging Results:Calculated gated LVEF 49 %. The T.I.D. ratio was 0.91 . There is mild attenuation artifact noted in the inferior wall seems to be  related to bowel artifact. There was no evidence of definite reversible tracer uptake. The nuclear images is not suggestive for myocardial ischemia.      Conclusions      Summary   Lexiscan EKG stress test is not suggestive for ischemia. The nuclear images is not suggestive for myocardial ischemia.       Signatures      ----------------------------------------------------------------   Electronically signed by Blayne Candelaria MD (Interpreting   Cardiologist) on 05/20/2021 at 19:07   ----------------------------------------------------------------      ASSESSMENT:  Marbella Mojica is a pleasant 80year old male patient with past medical history that includes:   Past Medical History:   Diagnosis Date    Anemia in chronic kidney disease     Arthritis     At[facility] ([facility]     CAD (coronary artery disease)     Cardiomegaly     Chronic kidney disease     Chronic kidney disease, stage IV (severe) (HCC)     Chronic respiratory failure with hypoxia (HCC)     CKD (chronic kidney disease) stage 3, GFR 30-59 ml/min (HCC)     COPD (chronic obstructive pulmonary disease) ([facility]     COVID-19     End stage renal disease (HCC)     GERD without esophagitis     GI bleed     Gout     [facility] 07/26/2016    @ [facility]    History of blood transfusion     6/2019    Hyperlipidemia     Hyperphosphatemia 05/22/2018    Hypertension     Monoclonal gammopathy     Pneumonia due to coronavirus disease 2019     Pulmonary embolism (HCC)     Sick sinus syndrome ([facility]     Systolic congestive heart failure ([facility]    He has h/o PE, GI bleeding, Hx of SSS s/p Pacemaker 2013, Chronic A-fib, CHF, EF ~ 45%, CAD s/p PCI w/stents 2004. Patient was recently admitted to [facility] and was treated/evaluated for AMS, ESRD, Chest pain, hypoxia and COPD. He has DNR code status. The patient was admitted to the hospital today after she was sent to ER from NH. She was c/o atypical chest discomfort and upper abdominal pain. K was 6.5. He was seen by Nephrology, HD was recommended. Pro-BNP >70,000. Troponin 0.33. Troponin is chronically elevated (0.18-0.29 on recent labs). Hgb 9.5. CXR revealed findings c/w pulmonary edema, volume overload. Cardiology was consulted after CT scan abd/pelvis revealed a moderate pericardial effusion (pleural effusion was also noted). Patient denies chest pain at this time. He is complaining of abdominal pain and distention, has mild SOB. 1. Pericardial effusion   2. Volume overload, pleural effusion   3. ESRD, On HD  4. Hyperkalemia   5. Acute on chronic HFmrEF  6. CAD  7. Chronic Troponin elevation   8. H/O PCI  9. SSS, Post pacemaker placement   10. Chronic anemia  11.  PAF    RECOMMENDATIONS:  [facility] was consulted for pericardial effusion, elevated Troponin    CT scan abd/pelvis revealed a moderate pericardial effusion (pleural effusion was also noted)   He remains hemodynamically stable    bp 115/100   Patient has volume overload, hyperkalemia   Known ESRD   HD per Nephrology    Denies chest pain at this time. He has abdominal pain, mildly tender/distended abdomen    Increased Troponin 2/2 ESRD, Stress, CHF, anemia (Type II MI)   No clinical evidence for ACS   Stress test 5/2021 was negative for ischemia   No immediate indication for for pericardiocentesis. Also, patient has DNR code status and per d/w his daughter at bedside, non invasive approach is preferred    Proceed with Echocardiogram as planned    Monitor on telemetry    Lipitor   ASA, plavix    Metoprolol   Hold losartan for hyperkalemia     Above findings and plan of care were discussed with patient and his daughter, questions were answered, agreeable to plan. Thank you for allowing me to participate in the care of this patient. Please let me know if I can be of any further assistance.       Delfina Calvin MD, [facility]   2:34 PM  5/16/2022

## 2022-05-16 NOTE — ED TRIAGE NOTES
Pt presents to  ED via EMS from [facility] with c/o chest pain that started around 2300. Per EMS report, pt started c/o chest pain around 2300. EMS states nursing home gave Orren Janice around that time, but continues to c/o chest pain. Pt continuous moaning out in pain, stating \"help me\".

## 2022-05-16 NOTE — ED PROVIDER NOTES
[facility]  eMERGENCY dEPARTMENT eNCOUnter          CHIEF COMPLAINT       Chief Complaint   Patient presents with    Chest Pain       Nurses Notes reviewed and I agree except as noted in the HPI. HISTORY OF PRESENT ILLNESS    Aly Hope is a 80 y.o. male who presents side pain which developed into chest pain. This started earlier today. Apparently the nursing home gave him Norco did not seem to improve it so they decided bring him in. Patient's current status is DNR CCA. He is not very communicative. He is complaining about chest pain. And then abdominal pain. Patient does have a history of GERD and gastritis. Patient is hemodynamically stable. He is complaining about 8 out of 10 pain. Patient is not having any shortness of breath. Interestingly I think he is close to be on oxygen, he is not currently. Patient does have a history of coronary artery disease, he does have stents. He does have a pacemaker. He also has a history of blood transfusions and he is on hemodialysis. Patient is currently on aspirin and Plavix. REVIEW OF SYSTEMS     Review of Systems   Constitutional: Negative for activity change, appetite change, diaphoresis, fatigue and unexpected weight change. HENT: Negative for congestion, ear discharge, ear pain, hearing loss, rhinorrhea, sinus pressure, sore throat, trouble swallowing and voice change. Eyes: Negative for photophobia, pain, discharge, redness and itching. Respiratory: Negative for cough, choking, chest tightness, shortness of breath and wheezing. Cardiovascular: Positive for chest pain. Negative for palpitations and leg swelling. Gastrointestinal: Positive for abdominal pain. Negative for abdominal distention, blood in stool, constipation, diarrhea, nausea and vomiting. Endocrine: Negative for polydipsia, polyphagia and polyuria.    Genitourinary: Negative for decreased urine volume, difficulty urinating, dysuria, enuresis, frequency, hematuria and urgency. Musculoskeletal: Negative for arthralgias, back pain, gait problem, myalgias, neck pain and neck stiffness. Skin: Negative for pallor and rash. Allergic/Immunologic: Negative for immunocompromised state. Neurological: Negative for dizziness, tremors, seizures, syncope, facial asymmetry, weakness, light-headedness, numbness and headaches. Hematological: Negative for adenopathy. Does not bruise/bleed easily. Psychiatric/Behavioral: Negative for agitation, hallucinations and suicidal ideas. The patient is not nervous/anxious. PAST MEDICAL HISTORY    has a past medical history of Anemia in chronic kidney disease, Arthritis, Atrial fibrillation ([address] CAD (coronary artery disease), Cardiomegaly, Chronic kidney disease, Chronic kidney disease, stage IV (severe) ([facility]), Chronic respiratory failure with hypoxia ([facility] CKD (chronic kidney disease) stage 3, GFR 30-59 ml/min ([facility]), COPD (chronic obstructive pulmonary disease) ([facility] [address] COVID-19, End stage renal disease ([facility] [address] GERD without esophagitis, GI bleed, Gout, Hemodialysis patient ([address] History of blood transfusion, Hyperlipidemia, Hyperphosphatemia, Hypertension, Monoclonal gammopathy, Pneumonia due to coronavirus disease 2019, Pulmonary embolism ([address] Sick sinus syndrome ([address] and Systolic congestive heart failure ([facility] [address] SURGICAL HISTORY      has a past surgical history that includes pacemaker placement (2013); Endoscopy, colon, diagnostic; Dialysis fistula creation (5/6/2016); Colonoscopy (6060,9413); Upper gastrointestinal endoscopy (5931,0321); Coronary angioplasty with stent (2004); pr esophagogastroduodenoscopy transoral diagnostic (N/A, 1/18/2018); vascular surgery; Cardiac surgery; Upper gastrointestinal endoscopy (Left, 6/23/2019); Colonoscopy (N/A, 6/27/2019); Upper gastrointestinal endoscopy (N/A, 10/9/2021);  Upper gastrointestinal endoscopy (N/A, 11/6/2021); and Upper gastrointestinal endoscopy sister; Heart Disease in his mother; Mental Illness in his paternal aunt. SOCIAL HISTORY      reports that he quit smoking about 40 years ago. He has a 40.00 pack-year smoking history. He has never used smokeless tobacco. He reports that he does not drink alcohol and does not use drugs. PHYSICAL EXAM     INITIAL VITALS:  height is 5' 6\" (1.676 m) and weight is 150 lb (68 kg). His oral temperature is 99 °F (37.2 °C). His blood pressure is 133/77 and his pulse is 109. His respiration is 19 and oxygen saturation is 96%. Physical Exam  Vitals and nursing note reviewed. Constitutional:       General: He is not in acute distress. Appearance: He is well-developed. He is not diaphoretic. HENT:      Head: Normocephalic and atraumatic. Right Ear: External ear normal.      Left Ear: External ear normal.      Nose: Nose normal.   Eyes:      General: Lids are normal. No scleral icterus. Right eye: No discharge. Left eye: No discharge. Conjunctiva/sclera: Conjunctivae normal.      Right eye: No exudate. Left eye: No exudate. Pupils: Pupils are equal, round, and reactive to light. Neck:      Thyroid: No thyromegaly. Vascular: No JVD. Trachea: No tracheal deviation. Cardiovascular:      Rate and Rhythm: Normal rate and regular rhythm. Pulses: Normal pulses. Heart sounds: Normal heart sounds, S1 normal and S2 normal. No murmur heard. No friction rub. No gallop. Pulmonary:      Effort: Pulmonary effort is normal. No respiratory distress. Breath sounds: Normal breath sounds. No stridor. No wheezing or rales. Chest:      Chest wall: Tenderness present. No lacerations, deformity, swelling, crepitus or edema. There is no dullness to percussion. Comments: Patient has generalized chest pain no matter where I palpate. Abdominal:      General: Bowel sounds are normal. There is no distension. Palpations: Abdomen is soft. There is no mass. Tenderness: There is generalized abdominal tenderness. There is no right CVA tenderness, left CVA tenderness, guarding or rebound. Negative signs include Randall's sign, Rovsing's sign, McBurney's sign, psoas sign and obturator sign. Comments: Generalized abdominal pain no matter where I palpate. No focal signs. Musculoskeletal:         General: No tenderness. Normal range of motion. Cervical back: Normal range of motion and neck supple. Normal range of motion. Lymphadenopathy:      Cervical: No cervical adenopathy. Skin:     General: Skin is warm and dry. Findings: No bruising, ecchymosis, lesion or rash. Neurological:      Mental Status: He is alert and oriented to person, place, and time. Cranial Nerves: No cranial nerve deficit. Sensory: No sensory deficit. Coordination: Coordination normal.      Deep Tendon Reflexes: Reflexes are normal and symmetric. Psychiatric:         Speech: Speech normal.         Behavior: Behavior normal.         Thought Content:  Thought content normal.         Judgment: Judgment normal.             DIFFERENTIAL DIAGNOSIS:   Gastritis gastroenteritis GERD chest pain ACS bowel obstruction hiatal hernia    DIAGNOSTIC RESULTS     EKG: All EKG's are interpreted by the Emergency Department Physician who either signs or Co-signs this chart in the absence of a cardiologist.  Sinus rhythm, normal axis, ventricular rate 121 MD interval is 160 ms QRS is 76 QT interval 312     RADIOLOGY: non-plain film images(s) such as CT, Ultrasound and MRI are read by the radiologist.  XR CHEST PORTABLE   ED Interpretation   Volume overload, no other significant findings compared to prior x-ray      CT ABDOMEN PELVIS WO CONTRAST Additional Contrast? None    (Results Pending)       LABS:   Labs Reviewed   CBC WITH AUTO DIFFERENTIAL - Abnormal; Notable for the following components:       Result Value    WBC 11.6 (*)     RBC 3.06 (*)     Hemoglobin 9.5 (*) Hematocrit 27.5 (*)     RDW-CV 19.2 (*)     RDW-SD 63.6 (*)     Segs Absolute 10.5 (*)     Lymphocytes Absolute 0.4 (*)     All other components within normal limits   BRAIN NATRIURETIC PEPTIDE - Abnormal; Notable for the following components:    Pro-BNP > 31885.0 (*)     All other components within normal limits   BASIC METABOLIC PANEL - Abnormal; Notable for the following components:    Potassium 6.5 (*)     Chloride 92 (*)     BUN 57 (*)     CREATININE 7.4 (*)     All other components within normal limits   HEPATIC FUNCTION PANEL - Abnormal; Notable for the following components:    Bilirubin, Direct 0.4 (*)     All other components within normal limits   TROPONIN - Abnormal; Notable for the following components:    Troponin T 0.336 (*)     All other components within normal limits   ANION GAP - Abnormal; Notable for the following components:    Anion Gap 18.0 (*)     All other components within normal limits   GLOMERULAR FILTRATION RATE, ESTIMATED - Abnormal; Notable for the following components:    Est, Glom Filt Rate 8 (*)     All other components within normal limits   PROCALCITONIN - Abnormal; Notable for the following components:    Procalcitonin 1.61 (*)     All other components within normal limits   COVID-19, RAPID   RAPID INFLUENZA A/B ANTIGENS   CULTURE, BLOOD 1    Narrative:     [facility] Plan - [id number]   CULTURE, BLOOD 2   PROTIME-INR   APTT   LIPASE   MAGNESIUM   OSMOLALITY   LACTATE, SEPSIS   PHOSPHORUS   LACTATE, SEPSIS       EMERGENCY DEPARTMENT COURSE:   Vitals:    Vitals:    05/16/22 0425 05/16/22 0441 05/16/22 0449 05/16/22 0556   BP:   127/76 133/77   Pulse:  115 116 109   Resp:  25 26 19   Temp:       TempSrc:       SpO2: (!) 87% 98% 100% 96%   Weight:       Height:         Patient was assessed at bedside labs and imaging were ordered. This is very confusing case. He has pain no matter where I push on him. Here today I find the patient is hyperkalemic. His kidney failure is worsening.   Chest x-ray shows fluid overload. BNP is greater than 70,000. Troponin slightly higher than what is normal as. I ordered a CT of the abdomen however this was not read when I came to the end of my shift. I did feel that the patient need to be admitted. I called and spoke with Dr. Rina Mackey and discussed case with him. He will see the patient this morning and put the patient on dialysis. Patient was given calcium and bicarb insulin and glucose. He was also given Kayexalate. I then called the hospitalist group and discussed case with them. They graciously excepted the admission. Patient is admitted in stable condition pending further evaluation and treatment. CRITICAL CARE:   None    CONSULTS:  Dr. Elida Yusuf  Hospitalist    PROCEDURES:  None    FINAL IMPRESSION      1. Hyperkalemia    2. Chronic combined systolic and diastolic CHF (congestive heart failure) ([facility]    3. Anemia due to chronic kidney disease, on chronic dialysis ([facility]          DISPOSITION/PLAN   Admission    PATIENT REFERRED TO:  No follow-up provider specified.     DISCHARGE MEDICATIONS:  New Prescriptions    No medications on file       (Please note that portions of this note were completed with a voice recognition program.  Efforts were made to edit the dictations but occasionally words are mis-transcribed.)    Leonid Magaña, [address], DO  05/16/22 8512

## 2022-05-16 NOTE — CARE COORDINATION
5/16/22, 3:28 PM EDT  Discharge Planning Evaluation  Social work consult received, patient from [facility]. Patient/Family preference is to return to [facility]. The patient's current payor source at the facility is medicaid. Medicare skilled days available: yes  Insurance precert:  no  Spoke with Sue Montague at the facility.   Patient bed hold: yes- long-term resident   Anticipated transport plan: [facility]lette   Do they require COVID 19 test to return to ECF:no  Is there a required time frame which which COVID test needs done:na  Patient's Healthcare Decision Maker: Legal Next of Kin

## 2022-05-17 NOTE — PROGRESS NOTES
At 12:05pm this RN notified primary team resident about HR sustaining in the 140's & patients uncontrollable Abdomen & chest pain even after patient has been medicated around the clock with PRN Q4hr Morphine IV & Q6hr Norco PO. Primary team ordered for me to continue to monitor HR & try to let pain meds kick in. At 12:35pm this RN notified primary team resident  That pts HR is still sustaining in the 140's while pt is more relaxed. Resident ordered for me to see how if the meds will kick in since pt was starting to relax. At 2:47pm this RN notified the primary team again that pts pain had increased & pt was tossing & turning & moaning in bed due to uncontrollable pain which made him throw up. Resident was able to come to bedside with primary MD to assess patient. Primary team wanted to keep plan the same & continue to monitor. At 4:38pm this RN notified the primary team resident that pts current vitals (BP:198/91 HR:123 BS:40) were unstable & pt was having of SOB. At this time primary team ordered medication for low bs & order to the 2100 metoprolol early. At 6:45pm this RN asked if we could get at least get a pain consult since pain level has not been controlled with current PRN meds & primary resident stated will work on that tomorrow. After multiple interventions(refer to [facility]), this RN was able to get BS up to 90. Pt was left resting in bed & HR starting to decrease. Currently in the 110's.

## 2022-05-17 NOTE — PROGRESS NOTES
Pt was resting quietly with eyes closed at time of visit. No visitors present. Palliative care will follow up at another time.

## 2022-05-17 NOTE — PROGRESS NOTES
Hospitalist Progress Note       Patient:  Lois Stafford      Unit/Bed:6K-27/027-A    YOB: 1930    MRN: [id number]       Acct: [de-identified]     PCP: Tenisha Perez DO    Date of Admission: 5/16/2022    Assessment/Plan:     #ESRD on HD 2/2 DM/HTN Nephrosclerosis w/HyperKalemia: HD M,W,F; w/subsequent HypoNa and 2/2 HyperPTH on Rocaltrol/Sensipar. Follows OP w/Nephrology. Chronically elevated Troponins, on arrival 0.202 (baseline range 0.158-0.218). INDRA/ACD 2/2 to ESRD. HD w/3K bath w/o UF (no hypotensive episodes) on arrival. K+ again 5.8 today, per Nephro pt going again to HD. Pericardial effusion, small noted, likely due to needing more HD.   - Nephrology on board for additional HD / electrolyte mgmt  - S/p Stable 2.5 hour TX. Removed 2L of fluid today  - C/w Rocaltrol, Sensipar, Iron 325mg + Vitamin C  - Monitor and evaluate the need for HD    #Generalized abdominal pain: Pt admitted 5/6 for same complaint. CT at that time showed no acute findings. Repeat CT is pending. Pt was placed on NPO.   - Analgesics, antiemetics    #Elevated troponin w/subjective Chest Pain: Chronically elevated in setting of ESRD, although slightly higher than baseline. Per reports, pt was complaining of chest pain on arrival although denies currently. No acute changes on EKG. Last Echo is from a year ago, will repeat. Cardiology was consulted for pericardial effusion seen on CT. Pt is on OMT with ASA, Plavix, metoprolol, losartan, statin. 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 Follows OP w/Cardiology. Appears fluid overload, but not in CHFe. Likely a contributing factor to pt's volume status although not in exacerbation.   - C/w current home medications at this time      #? COPD w/Chronic Hypoxic RF w/Home 2LNC O2 use, Pulmonary HTN: PFTs in 2012 demonstrated elevated FEV1/FVC ratio of 112% predicted consistent with RLD; worked in a [employer] for 27yrs; no OP Pulm visits, Likely combination of WHO group 2+3 PAH. Hx of 40PY smoking history, yet quit over 40 years ago  - Pt placed on 4L in ED although SpO2 >95%  - Back to baseline 2LNC already (unsure if pt actually needed 4L in first place)  - C/w Oxygen adjunctive therapy at this time  - C/w Albuterol Neb solutions TID     #Hx of recurrent UGI bleed: EGD on 11/6/21 Bleeding AVM treated w/APC - Post op diagnosis Grade 2 erosive esophagitis with features suggestive of recent GI bleed: Mild gastritis with deformity of the pylorus and duodenitis no active GI bleed seen no biopsy obtained, On PPI at home   - C/w PPI therapy - Protonix 40mg BID  - No current signs of bleeding      #Hx of Plasma cell Dyscrasia: noted to have slight increase in K/L free light chains. 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 + chronic Hip pain: C/w Flexeril   #Hx of SSS s/p Pacemaker 2013 and Chronic A-fib rate-controlled: PPM routinely interrogated; C/w Metoprolol 100mg BID  #Chronically debilitated/deconditioned, current ECF resident: Malnutrition, ill-appearing     Expected discharge date: TBD    Disposition:    [] Home       [] TCU       [] Rehab       [] Psych       [] SNF       [x] [facility]       [] Other-    Chief Complaint: Chest pain / Abdominal Pain    Hospital Course:     81yo M, Chronically debilitated/deconditioned, current ECF resident w/PMHx HLD, HTN w/Hx of HTN emergency, INDRA/ACD 2/2 ESRD, DJD (knee, left) w/arthritis in Low back/neck + chronic Hip pain, Plasma cell Dyscrasia (noted to have slight increase in K/L free light chains. BM Bx found erythroid hyperplasia with 2% plasma cells, rare kappa (+) plasma cells; normocellular marrow w/full active trilineage hematopoiesis w/erythroid hyperplasia; Flow cytometry found a small population of atypical plasma cells, representing 0.53% of total leukocytes; Follows OP w/Heme/Onc), Hx of recurrent UGI bleed (EGD on 11/6/21 Bleeding AVM treated w/APC - Post op diagnosis Grade 2 erosive esophagitis with features suggestive of recent GI bleed: Mild gastritis with deformity of the pylorus and duodenitis no active GI bleed seen no biopsy obtained, On PPI), Hx of PE (not a candidate for [place], No IVC filter), Hx of SSS s/p Pacemaker 2013, Chronic A-fib rate-controlled, CAD s/p PCI w/stents 2004 on DAPT, ? COPD (PFTs in 2012 demonstrated elevated FEV1/FVC ratio of 112% predicted consistent with RLD; worked in a [employer] for 27yrs; no OP Pulm visits), Chronic Hypoxic RF w/Home 2LNC O2 use, Pulmonary HTN (Likely combination of WHO group 2+3), Chronic Syst/Myers CHF (HFmrEF, 45% w/G1DD, Mod MR/TR, RVSP 55mmHg - Echo 5/2021) on GDMT, chronically elevated Troponins, and ESRD on HD 2/2 DM/HTN Nephrosclerosis (HD M,W,F; w/subsequent HypoNa and 2/2 HyperPTH on Rocaltrol/Sensipar) who presented to the [facility] from ECF due to Chest pain. Pt was given [place] at [facility] w/no resolution of CP. Pt has pain everywhere, as he did on the previous admission. Pt unaware of when last BM was. Pt was HD stable. Limited Hx from pt, mostly from chart review and assisted individuals. Of note, pt was recently admitted and d/c earlier this month (05/2022) for abdominal pain and gum pain. In ED: Afebrile, /108, , RR 38 w/SpO2 82% on RA that improved to >90% w/4LNC.  Labs remarkable for Na 135, BUN 57, Cr 7.4, eGFR 8, AG 18, Lactic acid wnl, Procal 1.61, Ca 9.4, Ph 3.5, K 6.5, Pro-BNP >70,000, Troponin 0.336 (baseline range 0.158-0.218; Increased Troponin 2/2 ESRD, Stress, CHF, anemia ~Type II MI), Hb/Hct 9.5/27.5, RDW 63.6. Influenza A/B and COVID (-). CXR Findings of CHF/fluid overload again noted, radiographically slightly worsened versus 10 days ago. CT A/P New moderate sized pericardial effusion, b/l pleural effusions persist, no new AP pathology. Pt was taken to Urgent dialysis on 5/16 per Nephro (ER gave PRN morphine 2mg prior to arrival. Within 5min pt calm. Stable 4 hour TX. Removed no fluid as ordered. Pt tolerated Tx well). Echo ordered and Cardiology consulted. Subjective (past 24 hours):      Pt seen and evaluated at bedside in mild distress. Pt states he feels pain whenever he is touched. Pt denies CP at time of evaluation. Complained of abdominal pain and distension. Denies SOB, fever/chills, N/V/D.      Medications:  Reviewed    Infusion Medications    dextrose      dextrose      sodium chloride       Scheduled Medications    sodium zirconium cyclosilicate  10 g Oral Once    aspirin  81 mg Oral Daily    atorvastatin  40 mg Oral Daily    folbee plus  1 tablet Oral Daily    calcitRIOL  1.5 mcg Oral Once per day on Mon Wed Fri    cinacalcet  60 mg Oral Once per day on Mon Wed Fri    clopidogrel  75 mg Oral Daily    cyclobenzaprine  10 mg Oral Daily    docusate sodium  100 mg Oral BID    ferrous sulfate  325 mg Oral Daily    losartan  50 mg Oral Daily    metoprolol  100 mg Oral BID    pantoprazole  40 mg Oral BID AC    vitamin C  500 mg Oral Daily    sodium chloride flush  10 mL IntraVENous 2 times per day    heparin (porcine)  5,000 Units SubCUTAneous TID     PRN Meds: glucose, dextrose bolus **OR** dextrose bolus, glucagon (rDNA), dextrose, albuterol sulfate HFA, sodium chloride flush, sodium chloride, ondansetron **OR** ondansetron, polyethylene glycol, acetaminophen **OR** acetaminophen, morphine, HYDROcodone-acetaminophen      Intake/Output Summary (Last 24 hours) at 5/17/2022 1653  Last data filed at 5/17/2022 1210  Gross per 24 hour   Intake 0 ml   Output 0 ml   Net 0 ml       Diet:  Diet NPO    Exam:  BP (!) 198/91   Pulse 123   Temp 97.6 °F (36.4 °C) (Oral)   Resp 24   Ht 5' 6\" (1.676 m)   Wt 162 lb 4.1 oz (73.6 kg)   SpO2 97%   BMI 26.19 kg/m²     General appearance: No apparent distress, appears stated age and cooperative. Chronically ill-appearing, fatigued  HEENT: Pupils equal, round, and reactive to light. Conjunctivae/corneas clear. Neck: Supple, with full range of motion. No jugular venous distention. Trachea midline. Respiratory:  Normal respiratory effort. Clear to auscultation, bilaterally without Rales/Wheezes/Rhonchi. Cardiovascular: Regular rate and rhythm with normal S1/S2 without murmurs, rubs or gallops. Abdomen: Soft, non-distended with normal bowel sounds. Generalized tenderness, negative vazquez's, no rebound/gaurding  Musculoskeletal: passive and active ROM x 4 extremities. Skin: Skin color, texture, turgor normal.    Neurologic:  Neurovascularly intact without any focal sensory/motor deficits. Cranial nerves: II-XII intact, grossly non-focal.  Psychiatric: A/Ox2, thought content appropriate  Capillary Refill: Brisk,< 3 seconds   Peripheral Pulses: +2 palpable, equal bilaterally     Labs:   Recent Labs     05/16/22 0430 05/17/22  0643   WBC 11.6* 7.6   HGB 9.5* 8.3*   HCT 27.5* 24.6*    222     Recent Labs     05/16/22  0430 05/16/22  0430 05/16/22  1415 05/17/22  0643     --   --  133*   K 6.5*   < > 5.2 5.9*  5.9*   CL 92*  --   --  93*   CO2 25  --   --  27   BUN 57*  --   --  35*   CREATININE 7.4*  --   --  4.6*   CALCIUM 9.4  --   --  8.8   PHOS 3.5  --   --   --     < > = values in this interval not displayed.      Recent Labs     05/16/22  0430 05/17/22  0643   AST 32 28   ALT 17 16   BILIDIR 0.4*  --    BILITOT 0.9 0.7   ALKPHOS 118 112     Recent Labs     05/16/22  0430   INR 1.10     No results for input(s): Taylor Vazquez in the last 72 hours. Recent Labs     05/16/22  0430   PROCAL 1.61*       Microbiology:      Urinalysis:      Lab Results   Component Value Date    NITRU NEGATIVE 06/22/2019    WBCUA 50-75 06/22/2019    BACTERIA NONE 06/22/2019    RBCUA 25-50 06/22/2019    BLOODU LARGE 06/22/2019    SPECGRAV 1.013 05/03/2016    GLUCOSEU NEGATIVE 06/22/2019       Radiology:  CT ABDOMEN PELVIS WO CONTRAST Additional Contrast? None   Final Result   1. New moderate sized pericardial effusion. 2. Bilateral pleural effusions persist.   3. No new abdominopelvic pathology. Chronic changes in gallstone again    noted. This document has been electronically signed by: Fabiana Cheung MD    on 05/16/2022 07:14 AM      All CTs at this facility use dose modulation techniques and iterative    reconstructions, and/or weight-based dosing   when appropriate to reduce radiation to a low as reasonably achievable. XR CHEST PORTABLE   ED Interpretation   Volume overload, no other significant findings compared to prior x-ray      Final Result   Findings of CHF/fluid overload again noted, radiographically slightly    worsened versus 10 days ago.       This document has been electronically signed by: Fabiana Cheung MD    on 05/16/2022 07:08 AM          DVT prophylaxis: [] Lovenox                                 [] SCDs                                 [x] SQ Heparin                                 [] Encourage ambulation           [] Already on Anticoagulation     Code Status: Limited    Tele:   [] yes             [x] no    Active Hospital Problems    Diagnosis Date Noted    Hyperkalemia [E87.5] 05/16/2022     Priority: Medium       Electronically signed by Emiliano Hendricks MD on 5/17/2022 at 4:53 PM

## 2022-05-17 NOTE — PROGRESS NOTES
Renal Progress Note    Assessment and Plan:   1. End-stage kidney disease on hemodialysis  2. Abdominal pain? 3. Hypertension primary  4. Hyperkalemia improved  5. Hyponatremia from end-stage kidney disease and inability of the kidneys to excrete a free water  6. Anemia of chronic disease  7. COPD  8. Deconditioning  9. Bilateral moderate pericardial effusion new  10. Bilateral pleural effusion  11. Severe pulmonary hypertension  12. Moderate to severe tricuspid regurgitation  13. Hypoglycemia    PLAN:  1. Labs reviewed  2. CT of the abdomen and pelvis report reviewed  3. Echocardiogram report reviewed  4. Medications reviewed  5. Sodium zirconium cyclosilicate 10 g orally once for hyperkalemia  6. Continue morphine sulfate for abdominal pain  7. Continue 10% dextrose infusion at 50 mill per hour for hypoglycemia  8. Potassium level in the morning  9. Hemodialysis tomorrow  10. Monitor blood pressure closely  11. We will try to transition him to peritoneal dialysis if he resides in the extended-care facility permanently on discharge  12. We will follow    Patient Active Problem List:     CAD (coronary artery disease)     COPD (chronic obstructive pulmonary disease) (HCC)     Chronic renal insufficiency     Arthritis-  low back and neck .      ED (erectile dysfunction)     Degenerative joint disease of knee, left     Anemia, chronic disease     Essential hypertension     Monoclonal gammopathy     Leukopenia     Thrombocytopenia (HCC)     Chronic kidney disease (CKD), stage V ([facility])     Iron deficiency anemia     Plasma cell dyscrasia     Idiopathic hypotension     Systolic congestive heart failure (HCC)     Hyperphosphatemia     Anemia due to chronic kidney disease, on chronic dialysis ([address]     Secondary hyperparathyroidism of renal origin ([address]     Chest pain     Gastritis and duodenitis     Closed fracture of left ankle     ESRD on dialysis (HCC)     Chronic combined systolic and diastolic CHF (congestive heart failure) (HCC)     Hypoxia     Upper GI bleed     History of pulmonary embolus (PE)     Osteopenia of multiple sites     Chronic left hip pain     Atrial fibrillation (HCC)     Pain in gums     AMS (altered mental status)     Hyperkalemia      Subjective:   Admit Date: 5/16/2022    Interval History:   Seen for end-stage kidney disease on hemodialysis  Awake and alert  Still complains of abdominal pain  Updated by the staff  Blood sugar has been low at 60 mg/dL  Receiving 10% dextrose infusion  Blood pressure is stable      Medications:   Scheduled Meds:   sodium zirconium cyclosilicate  10 g Oral Once    aspirin  81 mg Oral Daily    atorvastatin  40 mg Oral Daily    folbee plus  1 tablet Oral Daily    calcitRIOL  1.5 mcg Oral Once per day on Mon Wed Fri    cinacalcet  60 mg Oral Once per day on Mon Wed Fri    clopidogrel  75 mg Oral Daily    cyclobenzaprine  10 mg Oral Daily    docusate sodium  100 mg Oral BID    ferrous sulfate  325 mg Oral Daily    losartan  50 mg Oral Daily    metoprolol  100 mg Oral BID    pantoprazole  40 mg Oral BID AC    vitamin C  500 mg Oral Daily    sodium chloride flush  10 mL IntraVENous 2 times per day    heparin (porcine)  5,000 Units SubCUTAneous TID     Continuous Infusions:   dextrose 50 mL/hr at 05/17/22 1650    dextrose      sodium chloride         CBC:   Recent Labs     05/16/22  0430 05/17/22  0643   WBC 11.6* 7.6   HGB 9.5* 8.3*    222     CMP:    Recent Labs     05/16/22  0430 05/16/22  0430 05/16/22  1415 05/17/22  0643     --   --  133*   K 6.5*   < > 5.2 5.9*  5.9*   CL 92*  --   --  93*   CO2 25  --   --  27   BUN 57*  --   --  35*   CREATININE 7.4*  --   --  4.6*   GLUCOSE 101  --   --  103   CALCIUM 9.4  --   --  8.8   LABGLOM 8*  --   --  15*    < > = values in this interval not displayed. Troponin: No results for input(s): TROPONINI in the last 72 hours. BNP: No results for input(s): BNP in the last 72 hours.   INR:   Recent Labs 05/16/22  0430   INR 1.10     Lipids:   Recent Labs     05/16/22  0430   LIPASE 17.4     Liver:   Recent Labs     05/17/22  0643   AST 28   ALT 16   ALKPHOS 112   PROT 6.3   LABALBU 2.7*   BILITOT 0.7     Iron:  No results for input(s): IRONS, FERRITIN in the last 72 hours. Invalid input(s): LABIRONS  CT ABDOMEN PELVIS WO CONTRAST Additional Contrast? None   Final Result   1. New moderate sized pericardial effusion. 2. Bilateral pleural effusions persist.   3. No new abdominopelvic pathology. Chronic changes in gallstone again    noted. This document has been electronically signed by: Guillermo Mcclain MD    on 05/16/2022 07:14 AM      All CTs at this facility use dose modulation techniques and iterative    reconstructions, and/or weight-based dosing   when appropriate to reduce radiation to a low as reasonably achievable. XR CHEST PORTABLE   ED Interpretation   Volume overload, no other significant findings compared to prior x-ray      Final Result   Findings of CHF/fluid overload again noted, radiographically slightly    worsened versus 10 days ago. This document has been electronically signed by: Guillermo Mcclain MD    on 05/16/2022 07:08 AM            Objective:   Vitals: BP (!) 198/91   Pulse 123   Temp 97.6 °F (36.4 °C) (Oral)   Resp 24   Ht 5' 6\" (1.676 m)   Wt 162 lb 4.1 oz (73.6 kg)   SpO2 95%   BMI 26.19 kg/m²    Wt Readings from Last 3 Encounters:   05/17/22 162 lb 4.1 oz (73.6 kg)   05/11/22 150 lb (68 kg)   05/09/22 146 lb 6.2 oz (66.4 kg)      24HR INTAKE/OUTPUT:      Intake/Output Summary (Last 24 hours) at 5/17/2022 1723  Last data filed at 5/17/2022 1210  Gross per 24 hour   Intake 0 ml   Output 0 ml   Net 0 ml       Constitutional:  Alert, awake, no apparent distress   Skin:normal with no rash or lesions. HEENT:Pupils are reactive . Throat is clear . Oral mucosa is moist   Neck:supple with no thyromegaly or carotid bruit  Cardiovascular:  S1, S2 without murmur or rubs   Respiratory:  Clear to ausculation without wheezes, rhonchi or rales. Abdomen: Soft. Good bowel sounds. Mildly tender to deep pressure and deep palpation with no rebound or rigidity. No guarding. Ext: No LE edema. Right upper extremity brachiocephalic fistula is noted with good bruit and good thrill. Musculoskeletal:Intact  Neuro:Alert and awake. Answers questions appropriately. Somehow agitated  due to abdominal pain however. Electronically signed by Aj Gong MD on 5/17/2022 at 5:23 PM  **This report has been created using voice recognition software. It maycontain minor  errors which are inherent in voice recognition technology. **

## 2022-05-17 NOTE — PLAN OF CARE
Problem: Discharge Planning  Goal: Discharge to home or other facility with appropriate resources  5/17/2022 0147 by Arsenio Ramos RN  Outcome: Progressing  Note: Pt will go back to [facility] at discharge. 5/17/2022 0144 by Arsenio Ramos RN  Outcome: Progressing  Flowsheets (Taken 5/17/2022 0144)  Discharge to home or other facility with appropriate resources: Identify barriers to discharge with patient and caregiver  Note: Will DC back to [facility] after labs regulate. Problem: Pain  Goal: Verbalizes/displays adequate comfort level or baseline comfort level  5/17/2022 0147 by Arsenio Ramos RN  Outcome: Progressing  Note: Pt receiving morphine and norco PRN for pain. 5/17/2022 0144 by Arsenio Ramos RN  Outcome: Progressing  Note: Pt getting norco and morphine PRN for pain. Problem: Skin/Tissue Integrity  Goal: Absence of new skin breakdown  Description: 1. Monitor for areas of redness and/or skin breakdown  2. Assess vascular access sites hourly  3. Every 4-6 hours minimum:  Change oxygen saturation probe site  4. Every 4-6 hours:  If on nasal continuous positive airway pressure, respiratory therapy assess nares and determine need for appliance change or resting period. 5/17/2022 0147 by Arsenio Ramos RN  Outcome: Progressing  Note: No new signs or symptoms of skin breakdown noted this shift, encouraging patient to turn and reposition self in bed q2h    5/17/2022 0144 by Arsenio Ramos RN  Note: No new signs or symptoms of skin breakdown noted this shift, encouraging patient to turn and reposition self in bed q2h       Problem: Safety - Adult  Goal: Free from fall injury  5/17/2022 0147 by Arsenio Ramos RN  Outcome: Progressing  Note: No falls noted this shift. Continue falling star program. Bed alarm on, bed in low position. Call light and personal belongings in reach. Patient uses call light appropriately.     5/17/2022 0144 by Arsenio Ramos RN  Outcome: Progressing  Note: No falls noted this shift. Continue falling star program. Bed alarm on, bed in low position. Call light and personal belongings in reach. Patient uses call light appropriately. Problem: Metabolic/Fluid and Electrolytes - Adult  Goal: Electrolytes maintained within normal limits  Outcome: Progressing  Note: Potassium elevated at 6.5 on admission. QAM lab draws to determine next interventions. Goal: Hemodynamic stability and optimal renal function maintained  Outcome: Progressing  Note: Pt getting dialysis MWF. Creat 7.4 on admission. Fistula in L arm. Care plan reviewed with patient.

## 2022-05-18 NOTE — PROGRESS NOTES
Nephrology Progress Note    Patient - Jose Antonio Matos   MRN -  [id number]   Ancelmo # - [de-identified]      - 11/10/1930    80 y.o. Admit Date: 2022  Hospital Day: 0  Location: --A  Date of evaluation -  2022    Subjective:   CC: abdominal pain  No apparent shortness of breath   Pt seen during hemodialysis, Hemodynamically stable  Although BP runs low, 2 K bath  BP Range: Systolic (43AGR), BFK:424 , Min:92 , YXP:992      Diastolic (66GQZ), LQQ:18, Min:35, Max:95      Objective:   VITALS:  BP (!) 92/35   Pulse 68   Temp 96.8 °F (36 °C)   Resp 22   Ht 5' 6\" (1.676 m)   Wt 155 lb 3.3 oz (70.4 kg)   SpO2 94%   BMI 25.05 kg/m²    Patient Vitals for the past 24 hrs:   BP Temp Temp src Pulse Resp SpO2 Weight   22 0810 (!) 92/35 96.8 °F (36 °C) -- 68 22 -- 155 lb 3.3 oz (70.4 kg)   22 0425 (!) 164/80 97.9 °F (36.6 °C) Axillary 78 18 94 % --   22 0035 (!) 144/78 97.5 °F (36.4 °C) Oral 88 18 -- --   22 -- -- -- 137 -- -- --   22 -- -- -- -- 24 -- --   22 (!) 195/95 97.7 °F (36.5 °C) Oral 122 22 96 % --   22 1703 -- -- -- -- -- 95 % --   22 1630 (!) 198/91 -- -- 123 24 -- --   22 1438 -- -- -- (!) 20 -- -- --   22 1210 120/75 97.6 °F (36.4 °C) Oral 134 20 97 % --       Intake/Output Summary (Last 24 hours) at 2022 0936  Last data filed at 2022 0425  Gross per 24 hour   Intake 0 ml   Output 0 ml   Net 0 ml       Admission weight: 150 lb (68 kg) Body mass index is 25.05 kg/m². Patient Vitals for the past 96 hrs (Last 3 readings):   Weight   22 0810 155 lb 3.3 oz (70.4 kg)   22 0311 162 lb 4.1 oz (73.6 kg)   22 0421 150 lb (68 kg)     EXAM:  CONSTITUTIONAL:  No acute distress. Lethargic  HEENT:  Head is normocephalic, Extraocular movement intact. Neck is supple. CARDIOVASCULAR:  S1, S2  regular rate and rhythm. RESPIRATORY: Clear to ausculation bilaterally. Equal breath sounds. No wheezes.  No shortness of breath noted at rest.  ABDOMEN: soft, non tender  NEUROLOGICAL: Patient is alert and oriented to person, place. Recent and remote memory is not intact. SKIN: no rash, No significant bruises on exposed surfaces  MUSCULOSKELETAL: Movement is coordinated. Moves all extremities   EXTREMITIES: Distal lower extremity temp is warm, No lower extremity edema. Upper extremity AV Fistula   PSYCHIATRIC: mood and affect appropriate. Medications:   Med reviewed  Scheduled Meds:   albumin human  25 g IntraVENous Once    sodium zirconium cyclosilicate  10 g Oral Once    aspirin  81 mg Oral Daily    atorvastatin  40 mg Oral Daily    folbee plus  1 tablet Oral Daily    calcitRIOL  1.5 mcg Oral Once per day on Mon Wed Fri    cinacalcet  60 mg Oral Once per day on Mon Wed Fri    clopidogrel  75 mg Oral Daily    cyclobenzaprine  10 mg Oral Daily    docusate sodium  100 mg Oral BID    ferrous sulfate  325 mg Oral Daily    losartan  50 mg Oral Daily    metoprolol  100 mg Oral BID    pantoprazole  40 mg Oral BID AC    vitamin C  500 mg Oral Daily    sodium chloride flush  10 mL IntraVENous 2 times per day    heparin (porcine)  5,000 Units SubCUTAneous TID     Labs:   Labs reviewed    Recent Labs     05/16/22  0430 05/17/22  0643 05/18/22  0833   WBC 11.6* 7.6 8.3   HGB 9.5* 8.3* 7.9*   HCT 27.5* 24.6* 22.3*    222 254     Recent Labs     05/16/22  0430 05/16/22  1415 05/17/22  0643 05/18/22  0833     --  133* 134*   K 6.5*   < > 5.9*  5.9* 4.6  4.6   CL 92*  --  93* 92*   CO2 25  --  27 23   BUN 57*  --  35* 38*   CREATININE 7.4*  --  4.6* 4.0*   CALCIUM 9.4  --  8.8 8.2*   LABALBU 3.7  --  2.7* 3.3*   PHOS 3.5  --   --   --    ANIONGAP 18.0*  --  13.0 19.0*    < > = values in this interval not displayed. CT ABDOMEN PELVIS WO CONTRAST Additional Contrast? None  Result Date: 5/16/2022  1. New moderate sized pericardial effusion.  2. Bilateral pleural effusions persist. 3. No new abdominopelvic pathology. Chronic changes in gallstone again noted. XR CHEST PORTABLE  Result Date: 5/16/2022  Findings of CHF/fluid overload again noted, radiographically slightly worsened versus 10 days ago.      Chava Sands 5/21/2021   Left ventricle size is normal.   Normal left ventricular wall thickness.   There was mild global hypokinesis of the left ventricle.   Systolic function was mildly reduced.   Ejection fraction is visually estimated at 45%.   Doppler parameters were consistent with abnormal left ventricular   relaxation (grade 1 diastolic dysfunction).   The left atrium is Moderately dilated.   Moderate mitral regurgitation is present.   Moderate tricuspid regurgitation visualized.   Right ventricular systolic pressure measures 55 mmhg. ASSESSMENT  1. End Stage Renal Disease 2nd to diabetic and hypertensive nephrosclerosis on Hemodialysis M,W,F. AV fistula. K 4.6, Change from 2 K to 3 [employer]. Ok to give albumin due to hypotension. Hope to improve Ultrafiltration goal.   2. Hyponatremia 2nd to End Stage Renal Disease and decreased ability to excrete free water   3. Hyperkalemia, resolved  4. Hypoglycemia, resolved  5. Bilateral pleural effusion, pericardial effusion. Ultrafiltration as tolerated  6. History of Essential Hypertension with nephrosclerosis  7. 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 Coronary artery disease   9. Anemia of chronic disease and acute blood loss, Start Retacrit 6,000 units 3x/week  10. Secondary hyperparathyroidism of renal origin on rocaltrol, stop Sensipar  11. Lorita Cabot when corrected for albumin  12. Debility    Principal Problem:    AMS (altered mental status)  Resolved Problems:    * No resolved hospital problems. *    BMP in AM  Principal Problem:    Hyperkalemia  Resolved Problems:    * No resolved hospital problems.  XIN Cao CNP 9:36 AM 5/18/2022

## 2022-05-18 NOTE — CARE COORDINATION
5/18/22, 12:54 PM EDT    DISCHARGE ON GOING [facility] day: 0  Location: -27/027-A Reason for admit: Hyperkalemia [E87.5]  Chronic combined systolic and diastolic CHF (congestive heart failure) ([facility] [address] [I50.42]  Anemia due to chronic kidney disease, on chronic dialysis ([facility] [address] [N18.6, D63.1, Z99.2]   Procedure:5/16: CT abd:  1. New moderate sized pericardial effusion. 2. Bilateral pleural effusions persist.   3. No new abdominopelvic pathology. Chronic changes in gallstone again    noted. 5/18: CXR:  Barriers to Discharge: Dialysis today-only 3 of 4 hrs completed due to hypotension. Given Albumin and dextrose during HD. Lactic 3.2  PCP: Elier Thomas, DO   %  Patient Goals/Plan/Treatment Preferences: Plans to return to [facility]. AZIZA established dialysis pt.

## 2022-05-18 NOTE — FLOWSHEET NOTE
05/18/22 0810 05/18/22 1111 05/18/22 1145   Vital Signs   BP (!) 92/35  --  92/83   Temp 96.8 °F (36 °C)  --  96.8 °F (36 °C)   Pulse 68  --  98   Resp 22 23 18   Weight 155 lb 3.3 oz (70.4 kg)  --  146 lb 2.6 oz (66.3 kg)   Weight Method Bed scale  --  Bed scale   Percent Weight Change -4.35  --  -5.82   Pain Assessment   Pain Level  --  10  --    Pain Location  --  Abdomen  --    Post-Hemodialysis Assessment   Post-Treatment Procedures  --   --  Blood returned; Access bleeding time < 10 minutes   Machine Disinfection Process  --   --  Acid/Vinegar Clean;Heat Disinfect; Exterior Machine Disinfection   Total Liters Processed (l/min)  --   --  66.3 l/min   Dialyzer Clearance  --   --  Lightly streaked   Duration of Treatment (minutes)  --   --  240 minutes   Heparin amount administered during treatment (units)  --   --  0 units   Hemodialysis Intake (ml)  --   --  300 ml   Hemodialysis Output (ml)  --   --  300 ml   NET Removed (ml)  --   --  0   Pt completed 3 hr of 4 hour tx. Stopping early due to pain/low blood sugar and low blood pressure per Dr. Basia Garsia. Albumin/ Dextrose given per orders. Both needle sites held x10 min each with dressings dry and intact. Treatment record printed for scanning.  Report given to primary RN

## 2022-05-18 NOTE — SIGNIFICANT EVENT
Received a call regarding this 57-year-old male to evaluate abdominal pain. In reviewing the chart, this appears to be a chronic issue and recent imaging did not demonstrate abdominopelvic pathology. On exam, abdominal pain is diffuse worse with palpation and seems to be more pronounced suprapubic area, w/bilateral CVA tenderness. Since patient is anuric, bladder scan will probably be unrevealing. More concerning, was increased work of breathing that the patient was experiencing, this initially thought to be due to pain. Now tachypnea with increased oxygen requirements to 5 LPM O2. This is in the setting of bilateral pleural effusions as well as pericardial effusion which need dialyzed. Recommended positional changes to optimize V/Q for now. CXR showed worsening congestion consistent with CHF, plus he now has reduced EF. Since patient is anuric, he's not a candidate for diuresis, essentially he needs to undergo his HD and perhaps more frequently now. So far vitals are stable, bedside echo tonight without tamponade physiology.

## 2022-05-18 NOTE — SIGNIFICANT EVENT
Rapid response was called for hypotension. On arrival patient was in bed minimally responsive with a blood pressure reported to me in the 60s. Bedside nurse reports that patient just returned from dialysis where she reported that he did not have any fluid removed. Patient's CODE STATUS was noted as limited no x4. I did asked primary nurse if there were parameters of care established. She was unsure of any parameters outside of his limited no x4 CODE STATUS. I did ask for charge nurse and nursing supervisor to get family on the phone. I did reach out to attending  via [employer]. Family was contacted via telephone and wished to speak to the provider. Attending was not present so I did speak with family explaining to them that I had limited knowledge of the patient's overall health. I did explain that the current situation patient was hypotensive. I asked family to explain his current condition. This was a three-way call with his daughter and daughter-in-law. His daughter-in-law did state that patient has been in and out of the hospital numerous times in the last year. She states his overall health has been declining. Renata Faulkner the patient's daughter although emotional did agree with what her sister-in-law was saying. I explained that there was an option to move patient to the ICU and place him on vasopressors. I did explain that this would temporize the situation although I did not know if this would improve his overall function. I again explained that I was not familiar with the patient's overall health history. His daughter-in-law continue to explain that the patient has been declining. Renata Faulkner does agree and states that she does not want her dad to suffer. I explained that starting vasopressors would require him to move to the ICU. They both agree that they do not want patient to be aggressively treated. I did asked them what their expectations for care was.   Renata Faulkner again was emotional but did state that she wants her dad to be comfortable. She states that she does not want aggressive measures. Valeria Vargas agrees with palliative care. She did ask for clarification if her dad could receive dialysis while receiving palliative care treatment. I did explain that palliative dialysis was an option for his comfort. I also explained that if the patient would become hypotensive during dialysis the only treatment would be to give him fluid back because it has limited CODE STATUS and her wishes that he would not be placed on vasopressors. She agreed and and stated understanding. Valeria Vargas did say she was on her way to the hospital.  I explained that I would have the palliative care nurse meet her at the bedside and explained the goals of palliative care in detail. Valeria Vargas was very thankful and stated she was on her way to the hospital.  Primary nurse was updated. Unit nursing manager was also at the bedside and updated. I did speak to [facility] palliative care nurse who agreed to come to the bedside and speak with Valeria Vargas. Patient appeared comfortable when I left. I did speak to resident physician Dr. Claribel Ashton and updated him on the situation. Electronically signed by Savannah Shipley.  Madalyn Angelucci - CNP on 5/18/2022 at 3:38 PM

## 2022-05-18 NOTE — PROCEDURES
A Bladder scan was performed at 0458 . The residual amount was measured to be 0 ML. Report of results was given to  RN.

## 2022-05-18 NOTE — PROGRESS NOTES
Hospitalist Progress Note       Patient:  Sally Adam      Unit/Bed:6K-27/027-A    YOB: 1930    MRN: [id number]       Acct: [de-identified]     PCP: Rajinder Cowan DO    Date of Admission: 5/16/2022    Assessment/Plan:     #ESRD on HD 2/2 DM/HTN Nephrosclerosis w/HyperKalemia: HD M,W,F; w/subsequent HypoNa and 2/2 HyperPTH on Rocaltrol/Sensipar. Follows OP w/Nephrology. Chronically elevated Troponins, on arrival 0.202 (baseline range 0.158-0.218). INDRA/ACD 2/2 to ESRD. HD w/3K bath w/o UF (no hypotensive episodes) on arrival. K+ again 5.8 today, per Nephro pt going again to HD. Pericardial effusion, small noted, likely due to needing more HD. S/p Stable 2.5 hour TX. Removed 2L of fluid 05/17.   - Nephrology on board for additional HD / electrolyte mgmt  - C/w Rocaltrol, Sensipar, Iron 325mg + Vitamin C  - Dialysis today-only 3 of 4 hrs completed due to hypotension. Given Albumin and dextrose during HD. Lactic 3.2    #Generalized abdominal pain: Pt admitted 5/6 for same complaint. CT at that time showed no acute findings. Repeat CT is pending. Pt was placed on NPO.   - Analgesics, antiemetics    #Pericardial effusion: Moderate effusion per echo 5/17/22; no tamponade effect. Repeat stat echo today w/hypotension upon return from HD; Pericardial effusion unchanged; no tamponade, no indication for pericardiocentesis  - Continue to monitor  - Cardiology on board - spoke with Pt's family in great detail after discussion w/Cardiology    #Elevated troponin w/subjective Chest Pain: Chronically elevated in setting of ESRD, although slightly higher than baseline. Per reports, pt was complaining of chest pain on arrival although denies currently. No acute changes on EKG. Last Echo is from a year ago, will repeat. Cardiology was consulted for pericardial effusion seen on CT. Pt is on OMT with ASA, Plavix, metoprolol, losartan, statin.   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 Follows OP w/Cardiology. Appears fluid overload, but not in CHFe. Likely a contributing factor to pt's volume status although not in exacerbation.   - C/w current home medications at this time      #? 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 Hx of 40PY smoking history, yet quit over 40 years ago  - Pt placed on 4L in ED although SpO2 >95%  - Back to baseline 2LNC already (unsure if pt actually needed 4L in first place)  - C/w Oxygen adjunctive therapy at this time  - C/w Albuterol Neb solutions TID     #Hx of recurrent UGI bleed: EGD on 11/6/21 Bleeding AVM treated w/APC - Post op diagnosis Grade 2 erosive esophagitis with features suggestive of recent GI bleed: Mild gastritis with deformity of the pylorus and duodenitis no active GI bleed seen no biopsy obtained, On PPI at home   - C/w PPI therapy - Protonix 40mg BID  - No current signs of bleeding      #Hx of Plasma cell Dyscrasia: noted to have slight increase in K/L free light chains. 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 Of note, pt was recently admitted and d/c earlier this month (05/2022) for abdominal pain and gum pain. In ED: Afebrile, /108, , RR 38 w/SpO2 82% on RA that improved to >90% w/4LNC. Labs remarkable for Na 135, BUN 57, Cr 7.4, eGFR 8, AG 18, Lactic acid wnl, Procal 1.61, Ca 9.4, Ph 3.5, K 6.5, Pro-BNP >70,000, Troponin 0.336 (baseline range 0.158-0.218; Increased Troponin 2/2 ESRD, Stress, CHF, anemia ~Type II MI), Hb/Hct 9.5/27.5, RDW 63.6. Influenza A/B and COVID (-). CXR Findings of CHF/fluid overload again noted, radiographically slightly worsened versus 10 days ago. CT A/P New moderate sized pericardial effusion, b/l pleural effusions persist, no new AP pathology. Pt was taken to Urgent dialysis on 5/16 per Nephro (ER gave PRN morphine 2mg prior to arrival. Within 5min pt calm. Stable 4 hour TX. Removed no fluid as ordered. Pt tolerated Tx well). Echo ordered and Cardiology consulted. Subjective (past 24 hours):      Pt seen and evaluated in HD this AM, in mild distress. Pt states he feels pain whenever he is touched. Pt denies CP at time of evaluation. Complained of abdominal pain and distension. Denies SOB, fever/chills, N/V/D.     *Rapid response post-HD for hypotension with systolic 60 mmHg. IVF administered. Stat echo at bedside. Stat read per Dr. Natalie Knight. No change in pericardial effusion; no indication for pericardiocentesis at this time. RV dilated. SR to Afib after HD today; VR currently controlled, currently on Metoprolol 100 mg BID. Can consider Amiodarone gtt if RVR. On Heparin 5K SQ q8; consider change to Heparin infusion. - discussed with Irais Hassan, APRN-CNP. Pt remains DNR-CCA with limited code x 4. Family expressed desire for no heroic measures or interventions at time of rapid response (per A. Lorrine Braggs ACNP).  Palliative care consulted and meeting pending to determine [address] and clarify code status (DNR-CCA vs DNR). Spoke in great length w/Family members at bedside in regards to overall clinical progression and long term goals given pt's decline. Will f/u w/Palliative and family after there is time to process the abundance of information. *    Medications:  Reviewed    Infusion Medications    dextrose      sodium chloride       Scheduled Medications    albumin human  25 g IntraVENous Once    epoetin traci-epbx  6,000 Units SubCUTAneous Once per day on Mon Wed Fri    sodium zirconium cyclosilicate  10 g Oral Once    aspirin  81 mg Oral Daily    atorvastatin  40 mg Oral Daily    folbee plus  1 tablet Oral Daily    calcitRIOL  1.5 mcg Oral Once per day on Mon Wed Fri    clopidogrel  75 mg Oral Daily    cyclobenzaprine  10 mg Oral Daily    docusate sodium  100 mg Oral BID    ferrous sulfate  325 mg Oral Daily    losartan  50 mg Oral Daily    metoprolol  100 mg Oral BID    pantoprazole  40 mg Oral BID AC    vitamin C  500 mg Oral Daily    sodium chloride flush  10 mL IntraVENous 2 times per day    heparin (porcine)  5,000 Units SubCUTAneous TID     PRN Meds: glucose, dextrose bolus **OR** dextrose bolus, glucagon (rDNA), dextrose, morphine, albuterol sulfate HFA, sodium chloride flush, sodium chloride, ondansetron **OR** ondansetron, polyethylene glycol, acetaminophen **OR** acetaminophen, HYDROcodone-acetaminophen      Intake/Output Summary (Last 24 hours) at 5/18/2022 1024  Last data filed at 5/18/2022 0425  Gross per 24 hour   Intake 0 ml   Output 0 ml   Net 0 ml       Diet:  ADULT DIET; Clear Liquid; Low Potassium (Less than 3000 mg/day); Low Phosphorus (Less than 1000 mg)    Exam:  BP (!) 92/35   Pulse 68   Temp 96.8 °F (36 °C)   Resp 22   Ht 5' 6\" (1.676 m)   Wt 155 lb 3.3 oz (70.4 kg)   SpO2 94%   BMI 25.05 kg/m²     General appearance: No apparent distress, appears stated age and cooperative. Chronically ill-appearing, fatigued  HEENT: Pupils equal, round, and reactive to light. Conjunctivae/corneas clear. Neck: Supple, with full range of motion. No jugular venous distention. Trachea midline. Respiratory:  Normal respiratory effort. Clear to auscultation, bilaterally without Rales/Wheezes/Rhonchi. Cardiovascular: Regular rate and rhythm with normal S1/S2 without murmurs, rubs or gallops. Abdomen: Soft, non-distended with normal bowel sounds. Generalized tenderness, negative vazquez's, no rebound/gaurding  Musculoskeletal: passive and active ROM x 4 extremities. Skin: Skin color, texture, turgor normal.    Neurologic:  Neurovascularly intact without any focal sensory/motor deficits. Cranial nerves: II-XII intact, grossly non-focal.  Psychiatric: A/Ox2, thought content appropriate  Capillary Refill: Brisk,< 3 seconds   Peripheral Pulses: +2 palpable, equal bilaterally     Labs:   Recent Labs     05/16/22 0430 05/17/22  0643 05/18/22  0833   WBC 11.6* 7.6 8.3   HGB 9.5* 8.3* 7.9*   HCT 27.5* 24.6* 22.3*    222 254     Recent Labs     05/16/22 0430 05/16/22  1415 05/17/22  0643 05/18/22  0833     --  133* 134*   K 6.5*   < > 5.9*  5.9* 4.6  4.6   CL 92*  --  93* 92*   CO2 25  --  27 23   BUN 57*  --  35* 38*   CREATININE 7.4*  --  4.6* 4.0*   CALCIUM 9.4  --  8.8 8.2*   PHOS 3.5  --   --   --     < > = values in this interval not displayed. Recent Labs     05/16/22 0430 05/17/22  0643   AST 32 28   ALT 17 16   BILIDIR 0.4*  --    BILITOT 0.9 0.7   ALKPHOS 118 112     Recent Labs     05/16/22 0430   INR 1.10     No results for input(s): Darin Seed in the last 72 hours.   Recent Labs     05/16/22 0430   PROCAL 1.61*       Microbiology:      Urinalysis:      Lab Results   Component Value Date    NITRU NEGATIVE 06/22/2019    WBCUA 50-75 06/22/2019    BACTERIA NONE 06/22/2019    RBCUA 25-50 06/22/2019    BLOODU LARGE 06/22/2019    SPECGRAV 1.013 05/03/2016    GLUCOSEU NEGATIVE 06/22/2019       Radiology:  CT ABDOMEN PELVIS WO CONTRAST Additional Contrast? None Final Result   1. New moderate sized pericardial effusion. 2. Bilateral pleural effusions persist.   3. No new abdominopelvic pathology. Chronic changes in gallstone again    noted. This document has been electronically signed by: Akosua Vasquez MD    on 05/16/2022 07:14 AM      All CTs at this facility use dose modulation techniques and iterative    reconstructions, and/or weight-based dosing   when appropriate to reduce radiation to a low as reasonably achievable. XR CHEST PORTABLE   ED Interpretation   Volume overload, no other significant findings compared to prior x-ray      Final Result   Findings of CHF/fluid overload again noted, radiographically slightly    worsened versus 10 days ago.       This document has been electronically signed by: Akosua Vasquez MD    on 05/16/2022 07:08 AM      XR CHEST PORTABLE    (Results Pending)       DVT prophylaxis: [] Lovenox                                 [] SCDs                                 [x] SQ Heparin                                 [] Encourage ambulation           [] Already on Anticoagulation     Code Status: Limited    Tele:   [] yes             [x] no    Active Hospital Problems    Diagnosis Date Noted    Hyperkalemia [E87.5] 05/16/2022     Priority: Medium       Electronically signed by Trini Stone MD on 5/18/2022 at 10:24 AM

## 2022-05-18 NOTE — PROGRESS NOTES
Initial Evaluation          Patient:   Diana Sands  YOB: 1930  Age:  80 y.o. Room:  [address] [place] [address]  MRN:  [id number]   Acct: [de-identified]    Date of Admission:  5/16/2022  4:11 AM  Date of Service:  5/18/2022  Completed By:  Anton Mckeon RN                 Reason for Palliative Care Evaluation:-             [] Code Status Discussion              [x] Goals of Care              [] Pain/Symptom Management               [] Emotional Support              [] Other:                   Current Issues:-  [x]  Pain  []  Fatigue  []  Nausea  []  Anxiety  []  Depression  []  Shortness of Breath  []  Constipation  []  Appetite  []  Other:             Advance Directives:-  [x] [place] DNR Form  [x] Living Will  [x] Medical POA             Current Code Status:-  [] Full Resuscitation  [] DNR-Comfort Care-Arrest  [] DNR-Comfort Care       [x] Limited Resuscitation             [x] No CPR            [x] No shock            [x] No ET intubation/reintubation            [x] No resuscitative medications            [] Other limitation:              Palliative Performance Status:          [] 60%  Ambulation reduced; Significant disease;Can't do hobbies/housework; intake normal or reduced; occasional assist; LOC full/confusion        [] 50%  Mainly sit/lie; Extensive disease; Can't do any work; Considerable assist; intake normal or reduced; LOC full/confusion        [] 40%  Mainly in bed; Extensive disease; Mainly assist; intake normal or reduced; LOC full/confusion         [x] 30%  Bed Bound; Extensive disease; Total care; intake reduced; LOC full/confusion        [] 20%  Bed Bound; Extensive disease; Total care; intake minimal; Drowsy/coma        [] 10%  Bed Bound; Extensive disease;  Total care; Mouth care only; Drowsy/coma        [] 0  Death        Goals of care evaluation:-        The patient goals of care are to provide comfort care/supportive services/palliation & relieve suffering:  Goals of care discussed with:  [] Patient independently  [] Patient and Family  [x] Family or Healthcare DPOA independently  [] Unable to discuss with patient, family/DPOA not present         Family/Patient Discussion:  Met with Tunde's family at bedside. Fauzia Marie was resting quietly in bed with eyes closed at time of visit. Discussed plan of care with his daughter Radha Solis and other family present. Radha Solis confirms current code status as LIMITED X4 and desire to continue with dialysis. She understands that Fauzia Marie is not a hospice candidate because of dialysis but still wants minimal interventions and treatment done. She wants to keep him as comfortable as possible. Assured the family that comfort will be the focus of his care along with continue dialysis as usual. Encouraged the family to advocate for Fauzia Marie and to ask questions (regarding procedures) if it would improve his quality of life or not. They voiced understanding and appreciated the visit. Summary: Family stated they want to focus on comfort and continue dialysis for Fauzia Marie. They verbalized that they do not want Tunde to receive aggressive medical treatment that would not improve his quality of life.          Electronically signed by Gladys Alegria RN on 5/18/2022 at 2:36 PM           Palliative Care Office: [phone]

## 2022-05-18 NOTE — PROGRESS NOTES
Cardiology Progress Note      Patient:  Stanislaw Melchor  YOB: 1930  MRN: [id number]   Acct: [de-identified]  [facility] Date:  5/16/2022  Primary Cardiologist: none  Rounding Cardiologist: Anum Mantilla MD    Rationale for Cardiology consult: pericardial effusion    HPI/PMH: per Dr. Willie Peña consult note of 5/17/22:  79 y/o male with PMHx:  Past Medical History:   Diagnosis Date    Anemia in chronic kidney disease      Arthritis      Atrial fibrillation ([address]      CAD (coronary artery disease)      Cardiomegaly      Chronic kidney disease      Chronic kidney disease, stage IV (severe) (HCC)      Chronic respiratory failure with hypoxia ([address]      CKD (chronic kidney disease) stage 3, GFR 30-59 ml/min ([address]      COPD (chronic obstructive pulmonary disease) ([address]      COVID-19      End stage renal disease ([address]      GERD without esophagitis      GI bleed      Gout      Hemodialysis patient ([address] 07/26/2016     @ [facility]    History of blood transfusion       6/2019    Hyperlipidemia      Hyperphosphatemia 05/22/2018    Hypertension      Monoclonal gammopathy      Pneumonia due to coronavirus disease 2019      Pulmonary embolism (HCC)      Sick sinus syndrome ([address]      Systolic congestive heart failure ([address]      He has h/o PE, GI bleeding, Hx of SSS s/p Pacemaker 2013, Chronic A-fib, CHF, EF ~ 45%, CAD s/p PCI w/stents 2004. Patient was recently admitted to [facility] and was treated/evaluated for AMS, ESRD, Chest pain, hypoxia and COPD. He has DNR code status. The patient was admitted to the hospital today after she was sent to ER from NH. She was c/o atypical chest discomfort and upper abdominal pain. K was 6.5. He was seen by Nephrology, HD was recommended. Pro-BNP >70,000. Troponin 0.33. Troponin is chronically elevated (0.18-0.29 on recent labs). Hgb 9.5. CXR revealed findings c/w pulmonary edema, volume overload.  Cardiology was consulted after CT scan abd/pelvis revealed a moderate pericardial effusion (pleural effusion was also noted). Patient denies chest pain at this time. He is complaining of abdominal pain and distention, has mild SOB. Chief Complaint: Unable to state - rapid response    Subjective (Events in last 24 hours): Stable OVN  Patient to HD this AM  Reportedly BP in 90's/ at HD and unable to remove fluid. Did receive Albumin x 1. Returned to floor with BP 60/ and rapid response called. Awake, in no acute distress; respirations easy  No c/o pain      Objective:   BP (!) 92/35   Pulse 68   Temp 96.8 °F (36 °C)   Resp 23   Ht 5' 6\" (1.676 m)   Wt 155 lb 3.3 oz (70.4 kg)   SpO2 94%   BMI 25.05 kg/m²        TELEMETRY: SR 60's - now in atrial fib with VR 90 - 100 (hx afib)    Physical Exam:  General Appearance: alert and oriented to person;  in no acute distress, resting in bed in Trendelenburg  Cardiovascular: normal rate, irregular rhythm, normal S1 and S2, no murmurs, rubs, clicks, or gallops, distal pulses intact, no JVD  Pulmonary/Chest: few fine crackles bases bilaterally; otherwise clear to auscultation. No respiratory distress.   Abdomen: soft, non-tender, non-distended, normal bowel sounds Extremities: no cyanosis, clubbing or edema, pulse   Skin: warm and dry, no rash or erythema  Head: normocephalic and atraumatic  Eyes: pupils equal, round, and reactive to light  Neck: supple and non-tender without mass  Musculoskeletal: normal range of motion, no joint swelling, deformity or tenderness  Neurological: alert, oriented to self; no focal findings or movement disorder noted    Medications:    epoetin traci-epbx  6,000 Units SubCUTAneous Once per day on Mon Wed Fri    sodium zirconium cyclosilicate  10 g Oral Once    aspirin  81 mg Oral Daily    atorvastatin  40 mg Oral Daily    folbee plus  1 tablet Oral Daily    calcitRIOL  1.5 mcg Oral Once per day on Mon Wed Fri    clopidogrel  75 mg Oral Daily    cyclobenzaprine  10 mg Oral Daily    docusate sodium  100 mg Oral BID    ferrous sulfate  325 mg Oral Daily    losartan  50 mg Oral Daily    metoprolol  100 mg Oral BID    pantoprazole  40 mg Oral BID AC    vitamin C  500 mg Oral Daily    sodium chloride flush  10 mL IntraVENous 2 times per day    heparin (porcine)  5,000 Units SubCUTAneous TID      dextrose      sodium chloride       glucose, 4 tablet, PRN  dextrose bolus, 125 mL, PRN   Or  dextrose bolus, 250 mL, PRN  glucagon (rDNA), 1 mg, PRN  dextrose, 100 mL/hr, PRN  morphine, 2 mg, Q4H PRN  albuterol sulfate HFA, 1 puff, Q4H PRN  sodium chloride flush, 10 mL, PRN  sodium chloride, , PRN  ondansetron, 4 mg, Q8H PRN   Or  ondansetron, 4 mg, Q6H PRN  polyethylene glycol, 17 g, Daily PRN  acetaminophen, 650 mg, Q6H PRN   Or  acetaminophen, 650 mg, Q6H PRN  HYDROcodone-acetaminophen, 1 tablet, Q6H PRN        Diagnostics:  EK2022  EKG 12 Lead  Order: [id number]   Status: Final result     Visible to patient: Yes (not seen)     Next appt: None     0 Result Notes    Component Ref Range & Units 22 1705 22 1339 22 0407 22 0447 21 1355 21 0828 10/27/21 0130   Ventricular Rate   103  121  93  99  85  61    QRS Duration ms 94  86  76  72  78  76  76    Q-T Interval ms 320  362  312  348  366  376  428    QTc Calculation (Bazett) ms 472  474  443  432  469  447  430    R Axis degrees 64  28  39  15  41  37  -4    T Axis degrees 68  76  66  63  -86  6  -20    Resulting Agency  [address]             Narrative & Impression    Supraventricular tachycardia  Low voltage QRS, consider pulmonary disease, pericardial effusion, or normal variant  Incomplete right bundle branch block  Borderline ECG  Confirmed by Sarah Rosario ([id number]) on 2022 9:37:00 PM               Echo: 2022   TTE procedure:ECHOCARDIOGRAM COMPLETE 2D W DOPPLER W COLOR.      Procedure Date  Date: 05/17/2022 Start: 11:36 AM     Study Location: Bedside  Technical Quality: Adequate visualization     Indications:Chest pain.     Additional Medical History: Former smoker, Coronary artery disease, COPD,  Anemia, Hypertension, CKD, CHF, ESRD, PE     Patient Status: Routine     Height: 66 inches Weight: 162 pounds BSA: 1.83 m^2 BMI: 26.15 kg/m^2     BP: 113/67 mmHg     Allergies    - No Known Allergies.      Conclusions      Summary   Left ventricle size is normal.   Moderate concentric left ventricular hypertrophy. There were no regional wall motion abnormalities. Ejection fraction is visually estimated at 50%. Moderate to severe tricuspid regurgitation visualized. Right ventricular systolic pressure of >92 mmHg consistent with severe   pulmonary hypertension. Moderate circumferential pericardial effusion without tamponade   physiology. Clinical correlation is recommended. Signature      ----------------------------------------------------------------   Electronically signed by Manuel Bean MD (Interpreting   physician) on 05/17/2022 at 05:22 PM   ----------------------------------------------------------------      Findings      Mitral Valve   Structurally normal mitral valve. Mild mitral regurgitation is present. Aortic Valve   Structurally normal aortic valve. Mild aortic stenosis is present. Trivial aortic regurgitation is noted. Tricuspid Valve   Tricuspid valve is structurally normal.   Moderate to severe tricuspid regurgitation visualized. Right ventricular systolic pressure of >43 mmHg consistent with severe   pulmonary hypertension. Pulmonic Valve   Pulmonic valve is structurally normal.   Mild pulmonic regurgitation visualized. Left Atrium   Left atrial size was normal.      Left Ventricle   Left ventricle size is normal.   Moderate concentric left ventricular hypertrophy. There were no regional wall motion abnormalities.    Ejection fraction is visually estimated at 50%. Right Atrium   Right atrial size was normal.   Pacemaker/AICD lead(s) was(were) visualized in RA cavity. Right Ventricle   The right ventricular size was normal with normal systolic function and   wall thickness. Pacer Wire visualized in right ventricle. Pericardial Effusion   Moderate circumferential pericardial effusion without tamponade   physiology. Pleural Effusion   No evidence of pleural effusion. Aorta / Great Vessels   -Aortic root dimension within normal limits.   -The Pulmonary artery is within normal limits. -IVC size is within normal limits with normal respiratory phasic changes.      M-Mode/2D Measurements & Calculations      LV Diastolic   LV Systolic Dimension:    AV Cusp Separation: 1.5 cmLA   Dimension: 3.7 2.9 cm                    Dimension: 3.4 cmAO Root   cm             LV Volume Diastolic: 53.4 Dimension: 3.4 cmLA Area: 21.7   LV FS:21.6 %   ml                        cm^2   LV PW          LV Volume Systolic: 09.0   Diastolic: 1   ml   cm             LV EDV/LV EDV Index: 58.1   Septum         ml/32 m^2LV ESV/LV ESV    RV Diastolic Dimension: 4 cm   Diastolic: 1   Index: 99.5 ml/18 m^2   cm             EF Calculated: 44.6 %     LA/Aorta: 1                                               LA volume/Index: 66.9 ml /37m^2                     LVOT: 1.9 cm     Doppler Measurements & Calculations                     AV Peak Velocity: 174     LVOT Peak Velocity: 81.6 cm/s                  cm/s                      LVOT Mean Velocity: 58 cm/s                  AV Peak Gradient: 12.11   LVOT Peak Gradient: 3 mmHgLVOT                  mmHg                      Mean Gradient: 2 mmHg                  AV Mean Velocity: 135                  cm/s                  AV Mean Gradient: 8 mmHg                  AV VTI: 24.6 cm           TR Velocity:369 cm/s   MR Velocity:   AV Area (Continuity):1.36 TR Gradient:54.46 mmHg   376 cm/s       cm^2                      PV Peak Velocity: 63.8 cm/s                                            PV Peak Gradient: 1.63 mmHg                  LVOT VTI: 11.8 cm                  IVRT: 74 msec                                            NH ED Velocity: 148 cm/s                     AV DVI (VTI): 0.48AV DVI                  (Vmax):0.47     [email or web address] [id number]    Echo: stat echo at bedside 1330 hours  Stat reading per Dr. Marybel Schmidt notes no evidence of pericardial tamponade. No change in status of pericardial effusion from previous echo 5/17/22. RV enlargement is noted. Echo:    Summary   Left ventricle size is normal.   Normal left ventricular wall thickness. There was mild global hypokinesis of the left ventricle. Systolic function was mildly reduced. Ejection fraction is visually estimated at 45%. Doppler parameters were consistent with abnormal left ventricular   relaxation (grade 1 diastolic dysfunction). The left atrium is Moderately dilated. Moderate mitral regurgitation is present. Moderate tricuspid regurgitation visualized. Right ventricular systolic pressure measures 55 mmhg. When this echo is compared with the echo from 05/22/2018, the EF dropped   from 60 % to 45% now    Signature    ----------------------------------------------------------------   Electronically signed by Sondra Arevalo MD (Interpreting   physician) on 05/20/2021 at 07:47 PM    Stress: Lexiscan stress 5/20/21  Conclusions      Summary   Lexiscan EKG stress test is not suggestive for ischemia. The nuclear images is not suggestive for myocardial ischemia.     Signatures    ----------------------------------------------------------------   Electronically signed by Sondra Arevalo MD (Interpreting   Cardiologist) on 05/20/2021 at 19:07        Lab Data:    Cardiac Enzymes:  No results for input(s): CKTOTAL, CKMB, CKMBINDEX, TROPONINI in the last 72 hours.     CBC:   Lab Results   Component Value Date    WBC 8.3 05/18/2022    RBC 2.55 05/18/2022    RBC 2.95 09/19/2011    HGB 7.9 05/18/2022    HCT 22.3 05/18/2022     05/18/2022       CMP:    Lab Results   Component Value Date     05/18/2022    K 4.6 05/18/2022    K 4.6 05/18/2022    CL 92 05/18/2022    CO2 23 05/18/2022    BUN 38 05/18/2022    CREATININE 4.0 05/18/2022    LABGLOM 17 05/18/2022    GLUCOSE 115 05/18/2022    GLUCOSE 90 08/26/2021    CALCIUM 8.2 05/18/2022       Hepatic Function Panel:    Lab Results   Component Value Date    ALKPHOS 112 05/17/2022    ALT 16 05/17/2022    AST 28 05/17/2022    PROT 6.3 05/17/2022    BILITOT 0.7 05/17/2022    BILIDIR 0.4 05/16/2022    LABALBU 3.3 05/18/2022    LABALBU 4.2 04/05/2016       Magnesium:    Lab Results   Component Value Date    MG 1.9 05/16/2022       PT/INR:    Lab Results   Component Value Date    PROTIME 11.5 12/10/2013    INR 1.10 05/16/2022       HgBA1c:    Lab Results   Component Value Date    LABA1C 4.5 10/01/2021       FLP:    Lab Results   Component Value Date    TRIG 92 09/02/2020    HDL 63 09/02/2020    LDLCALC 141 09/02/2020       TSH:    Lab Results   Component Value Date    TSH 2.600 07/13/2021         Assessment/Plan:  · Pericardial effusion  · Moderate effusion per echo 5/17/22; no tamponade effect  · Repeat stat echo today with hypotension upon return from HD  · Pericardial effusion unchanged; no tamponade, no indication for pericardiocentesis  · Continue to monitor  · Volume overload; pleural effusion  · HD removal as per Nephro  · ESRD on HD  · Acute hyperkalemia on admit; now improved  · Continue to monitor  · Acute on chronic HFmrEF 45 - 50%  · CAD  · Hx PCI  · Chronic troponin elevation  · Evidence of [place] at this time  · ASA, Plavix, statin  · PPM  · PAF  · SR to Afib after HD today; VR currently controlled  · Metoprolol 100 mg BID  · Consider Amiodarone gtt if RVR  · On Heparin 5K SQ q8; consider change to Heparin infusion  · Chronic anemia  · HTN - Cozaar      Rapid response post HD for hypotension with systolic 60 mmHg. IVF administered. Stat echo at bedside. Stat read per Dr. Willie Peña. No change in pericardial effusion; no indication for pericardiocentesis at this time. RV dilated - discussed with Dr. Marybel Rodriguez. Patient remains DNR-CCA with limited code x 4. Family expressed desire for no heroic measures or interventions at time of rapid response (see note per MARCELA Payne ACNP). Requested Palliative care consult - consulted and meeting pending to determine goals of care and clarify code status (DNR-CCA vs DNR). SR to atrial fib as above. Discussed with Dr. Willie Peña; recommendations above.   K 4.6 - Keep > 4.0. Keep Mag > 2.0.     Electronically signed by XIN Goel CNP on 5/18/2022 at 11:27 AM

## 2022-05-18 NOTE — PROGRESS NOTES
At 2000 This pt is in what seems to be having excruciating abdominal pain. Pt is not able to stay still and is hyperventilating because of it. Blood pressure is up to 195/95 and HR is sustaining in the 130s despite administering metoprolol 100mg @1700. Increase in vital signs must be from the pain he is having. Morphine and norco was ordered and administered but does not seem to be controlling pain at all. Messaged hospitalist and awaiting further orders.

## 2022-05-19 NOTE — FLOWSHEET NOTE
05/19/22 0350   Vital Signs   BP (!) 98/55   Pulse 102   Resp 18   Post-Hemodialysis Assessment   Post-Treatment Procedures Blood returned; Access bleeding time < 10 minutes   Machine Disinfection Process Exterior Machine Disinfection   Rinseback Volume (ml) 400 ml   Total Liters Processed (l/min) 0 l/min   Dialyzer Clearance Lightly streaked   Duration of Treatment (minutes) 180 minutes   Heparin amount administered during treatment (units) 0 units   Hemodialysis Intake (ml) 400 ml   Hemodialysis Output (ml) 2400 ml   NET Removed (ml) 2000   3 hour isolated ultrafiltration treatment completed, 2 liters net uf. albumin given for hypotension. morphine and ativan given per primary nurse during treatment.

## 2022-05-19 NOTE — PROGRESS NOTES
Hospitalist Progress Note       Patient:  Rogelio Souza      Unit/Bed:4K-28/028-A    YOB: 1930    MRN: [id number]       Acct: [de-identified]     PCP: Magalis Nicolas DO    Date of Admission: 5/16/2022    Assessment/Plan:     #ESRD on HD 2/2 DM/HTN Nephrosclerosis w/HyperKalemia: HD M,W,F; w/subsequent HypoNa and 2/2 HyperPTH on Rocaltrol/Sensipar. Follows OP w/Nephrology. Chronically elevated Troponins, on arrival 0.202 (baseline range 0.158-0.218). INDRA/ACD 2/2 to ESRD. HD w/3K bath w/o UF (no hypotensive episodes) on arrival. K+ again 5.8 today, per Nephro pt going again to HD. Pericardial effusion, small noted, likely due to needing more HD. S/p Stable 2.5 hour TX. Removed 2L of fluid 05/17.   - Nephrology on board for additional HD / electrolyte mgmt  - C/w Rocaltrol, Sensipar, Iron 325mg + Vitamin C  - Dialysis today-only 3 of 4 hrs completed due to hypotension. Given Albumin and dextrose during HD. Lactic 3.2    #Generalized abdominal pain: Pt admitted 5/6 for same complaint. CT at that time showed no acute findings. Repeat CT is pending. Pt was placed on NPO.   - Analgesics, antiemetics    #Pericardial effusion: Moderate effusion per echo 5/17/22; no tamponade effect. Repeat stat echo today w/hypotension upon return from HD; Pericardial effusion unchanged; no tamponade, no indication for pericardiocentesis  - Continue to monitor  - Cardiology on board - spoke with Pt's family in great detail after discussion w/Cardiology    #Elevated troponin w/subjective Chest Pain: Chronically elevated in setting of ESRD, although slightly higher than baseline. Per reports, pt was complaining of chest pain on arrival although denies currently. No acute changes on EKG. Last Echo is from a year ago, will repeat. Cardiology was consulted for pericardial effusion seen on CT. Pt is on OMT with ASA, Plavix, metoprolol, losartan, statin.   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 Follows OP w/Cardiology. Appears fluid overload, but not in CHFe. Likely a contributing factor to pt's volume status although not in exacerbation.   - C/w current home medications at this time      #? 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 Hx of 40PY smoking history, yet quit over 40 years ago  - Pt placed on 4L in ED although SpO2 >95%  - Back to baseline 2LNC already (unsure if pt actually needed 4L in first place)  - C/w Oxygen adjunctive therapy at this time  - C/w Albuterol Neb solutions TID     #Hx of recurrent UGI bleed: EGD on 11/6/21 Bleeding AVM treated w/APC - Post op diagnosis Grade 2 erosive esophagitis with features suggestive of recent GI bleed: Mild gastritis with deformity of the pylorus and duodenitis no active GI bleed seen no biopsy obtained, On PPI at home   - C/w PPI therapy - Protonix 40mg BID  - No current signs of bleeding      #Hx of Plasma cell Dyscrasia: noted to have slight increase in K/L free light chains. 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 + chronic Hip pain: C/w Flexeril   #Hx of SSS s/p Pacemaker 2013 and Chronic pA-fib rate-controlled: PPM routinely interrogated; C/w Metoprolol 100mg BID  #Chronically debilitated/deconditioned, current ECF resident: Malnutrition, ill-appearing     Expected discharge date: TBD    Disposition:    [] Home       [] TCU       [] Rehab       [] Psych       [] SNF       [x] [place]       [] Other-    Chief Complaint: Chest pain / Abdominal Pain    Hospital Course:     81yo M, Chronically debilitated/deconditioned, current ECF resident w/PMHx HLD, HTN w/Hx of HTN emergency, INDRA/ACD 2/2 ESRD, DJD (knee, left) w/arthritis in Low back/neck + chronic Hip pain, Plasma cell Dyscrasia (noted to have slight increase in K/L free light chains. BM Bx found erythroid hyperplasia with 2% plasma cells, rare kappa (+) plasma cells; normocellular marrow w/full active trilineage hematopoiesis w/erythroid hyperplasia; Flow cytometry found a small population of atypical plasma cells, representing 0.53% of total leukocytes; Follows OP w/Heme/Onc), Hx of recurrent UGI bleed (EGD on 11/6/21 Bleeding AVM treated w/APC - Post op diagnosis Grade 2 erosive esophagitis with features suggestive of recent GI bleed: Mild gastritis with deformity of the pylorus and duodenitis no active GI bleed seen no biopsy obtained, On PPI), Hx of PE (not a candidate for Phoodeez, No IVC filter), Hx of SSS s/p Pacemaker 2013, Chronic A-fib rate-controlled, CAD s/p PCI w/stents 2004 on DAPT, ? 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 Pt was given [place] at [facility] w/no resolution of CP. Pt has pain everywhere, as he did on the previous admission. Pt unaware of when last BM was. Pt was HD stable. Limited Hx from pt, mostly from chart review and assisted individuals. Of note, pt was recently admitted and d/c earlier this month (05/2022) for abdominal pain and gum pain. In ED: Afebrile, /108, , RR 38 w/SpO2 82% on RA that improved to >90% w/4LNC. Labs remarkable for Na 135, BUN 57, Cr 7.4, eGFR 8, AG 18, Lactic acid wnl, Procal 1.61, Ca 9.4, Ph 3.5, K 6.5, Pro-BNP >70,000, Troponin 0.336 (baseline range 0.158-0.218; Increased Troponin 2/2 ESRD, Stress, CHF, anemia ~Type II MI), Hb/Hct 9.5/27.5, RDW 63.6. Influenza A/B and COVID (-). CXR Findings of CHF/fluid overload again noted, radiographically slightly worsened versus 10 days ago. CT A/P New moderate sized pericardial effusion, b/l pleural effusions persist, no new AP pathology. Pt was taken to Urgent dialysis on 5/16 per Nephro (ER gave PRN morphine 2mg prior to arrival. Within 5min pt calm. Stable 4 hour TX. Removed no fluid as ordered. Pt tolerated Tx well). Echo ordered and Cardiology consulted. 05/18/2022: *Rapid response post-HD for hypotension with systolic 60 mmHg. IVF administered. Stat echo at bedside. Stat read per Dr. Tatianna Stewart. No change in pericardial effusion; no indication for pericardiocentesis at this time. RV dilated. SR to Afib after HD today; VR currently controlled, currently on Metoprolol 100 mg BID. Can consider Amiodarone gtt if RVR. On Heparin 5K SQ q8; consider change to Heparin infusion. - discussed with Irais Hassan, APRN-CNP. Pt remains DNR-CCA with limited code x 4. Family expressed desire for no heroic measures or interventions at time of rapid response (per MARCELA [employer] ACNP). Palliative care consulted and meeting pending to determine [place] [address] and clarify code status (DNR-CCA vs DNR). Spoke in great length w/Family members at bedside in regards to overall clinical progression and long term goals given pt's decline.  Will f/u w/Palliative and family after there is time to process the abundance of information. *    Subjective (past 24 hours):      Pt seen and evaluated in HD this AM, in mild distress. Pt states he feels pain whenever he is touched. Pt denies CP at time of evaluation. Complained of abdominal pain and distension. Denies SOB, fever/chills, N/V/D. Overnight, RR again called for Hypotension/Tachycardia. Pt was due to receive dialysis 5/18 however only 300mL dialyzed d/t hypotension so he actually ended up receiving 1L NS bolus with albumin. Pt was desaturating on NC and did not do well on an NRB, Started BiPAP. Pt went into A-fib w/RVR into the 170s as well as hypotension. Pt became agitated and SOB, he was administered a total of 2mg lorazepam IV and 2mg morphine IV. Loaded w/renally dosed Digoxin 250mcg. This AM, another rapid was called for BG 11. Pt was stabilized and BG brought back wnl. Further code/GOC discussion had with family. Decision was made to transition pt to [facility] and go forward with comfort meds only. DNR-CC form signed, medications adjusted to only reflect wishes. IV Morphine and Ativan initiated for comfort measures. Family is in agreement. Hospice Nurse Viv Son to help relay our sympathies and make sure the family understood the next steps forward for pt.      Medications:  Reviewed    Infusion Medications    sodium bicarbonate infusion 50 mL/hr at 05/19/22 0856    dextrose      sodium chloride       Scheduled Medications    calcium replacement protocol   Other RX Placeholder    epoetin traci-epbx  6,000 Units SubCUTAneous Once per day on Mon Wed Fri    sodium zirconium cyclosilicate  10 g Oral Once    aspirin  81 mg Oral Daily    atorvastatin  40 mg Oral Daily    folbee plus  1 tablet Oral Daily    calcitRIOL  1.5 mcg Oral Once per day on Mon Wed Fri    clopidogrel  75 mg Oral Daily    cyclobenzaprine  10 mg Oral Daily    docusate sodium  100 mg Oral BID    ferrous sulfate  325 mg Oral Daily    [Held by provider] losartan  50 mg Oral Daily    [Held by provider] metoprolol  100 mg Oral BID    pantoprazole  40 mg Oral BID AC    vitamin C  500 mg Oral Daily    sodium chloride flush  10 mL IntraVENous 2 times per day    heparin (porcine)  5,000 Units SubCUTAneous TID     PRN Meds: glucose, dextrose bolus **OR** dextrose bolus, glucagon (rDNA), dextrose, morphine, sodium chloride flush, sodium chloride, ondansetron **OR** ondansetron, polyethylene glycol, acetaminophen **OR** acetaminophen, HYDROcodone-acetaminophen      Intake/Output Summary (Last 24 hours) at 5/19/2022 0940  Last data filed at 5/19/2022 0350  Gross per 24 hour   Intake 700 ml   Output 2700 ml   Net -2000 ml       Diet:  Diet NPO    Exam:  BP (!) 123/58   Pulse 99   Temp 97.8 °F (36.6 °C) (Axillary)   Resp 22   Ht 5' 6\" (1.676 m)   Wt 146 lb 2.6 oz (66.3 kg)   SpO2 94%   BMI 23.59 kg/m²     General appearance: No apparent distress, appears stated age and cooperative. Chronically ill-appearing, fatigued  HEENT: Pupils equal, round, and reactive to light. Conjunctivae/corneas clear. Neck: Supple, with full range of motion. No jugular venous distention. Trachea midline. Respiratory:  Normal respiratory effort. Clear to auscultation, bilaterally without Rales/Wheezes/Rhonchi. Cardiovascular: Regular rate and rhythm with normal S1/S2 without murmurs, rubs or gallops. Abdomen: Soft, non-distended with normal bowel sounds. Generalized tenderness, negative vazquez's, no rebound/gaurding  Musculoskeletal: passive and active ROM x 4 extremities. Skin: Skin color, texture, turgor normal.    Neurologic:  Neurovascularly intact without any focal sensory/motor deficits.  Cranial nerves: II-XII intact, grossly non-focal.  Psychiatric: A/Ox2, thought content appropriate  Capillary Refill: Brisk,< 3 seconds   Peripheral Pulses: +2 palpable, equal bilaterally     Labs:   Recent Labs     05/17/22  0643 05/18/22  0833 05/18/22  2323   WBC 7.6 8.3 9.9   HGB 8.3* 7.9* 8.0*   HCT 24.6* 22.3* 24.0*    254 289     Recent Labs     05/18/22  0833 05/18/22  2330 05/19/22  0330   * 134* 135   K 4.6  4.6 5.0  4.5 5.4*   CL 92* 92* 90*   CO2 23 23 17*   BUN 38* 32* 35*   CREATININE 4.0* 4.0* 4.3*   CALCIUM 8.2* 8.3* 9.1   PHOS  --  4.1  --      Recent Labs     05/17/22  0643 05/18/22  2330   AST 28 71*   ALT 16 31   BILITOT 0.7 1.2   ALKPHOS 112 156*     No results for input(s): INR in the last 72 hours. No results for input(s): Annel Comment in the last 72 hours. Recent Labs     05/18/22  2330 05/19/22  0330   PROCAL 11.94* 11.39*       Microbiology:      Urinalysis:      Lab Results   Component Value Date    NITRU NEGATIVE 06/22/2019    WBCUA 50-75 06/22/2019    BACTERIA NONE 06/22/2019    RBCUA 25-50 06/22/2019    BLOODU LARGE 06/22/2019    SPECGRAV 1.013 05/03/2016    GLUCOSEU NEGATIVE 06/22/2019       Radiology:  XR CHEST PORTABLE   Final Result   Impression:   Mild pulmonary edema, slightly improved since the prior study. This document has been electronically signed by: Magaly Walsh. Jim Hendricks MD    on 05/18/2022 11:37 PM      XR CHEST PORTABLE   Final Result   Persistent changes of congestive heart failure. Moderate right pleural effusion possible small left effusion            **This report has been created using voice recognition software. It may contain minor errors which are inherent in voice recognition technology. **      Final report electronically signed by Dr. Saúl Rivas on 5/18/2022 3:16 PM      CT ABDOMEN PELVIS WO CONTRAST Additional Contrast? None   Final Result   1. New moderate sized pericardial effusion. 2. Bilateral pleural effusions persist.   3. No new abdominopelvic pathology. Chronic changes in gallstone again    noted.       This document has been electronically signed by: Mary Bolivar MD    on 05/16/2022 07:14 AM      All CTs at this facility use dose modulation techniques and iterative    reconstructions, and/or weight-based dosing   when appropriate to reduce radiation to a low as reasonably achievable. XR CHEST PORTABLE   ED Interpretation   Volume overload, no other significant findings compared to prior x-ray      Final Result   Findings of CHF/fluid overload again noted, radiographically slightly    worsened versus 10 days ago.       This document has been electronically signed by: Michelle Basilio MD    on 05/16/2022 07:08 AM          DVT prophylaxis: [] Lovenox                                 [] SCDs                                 [x] SQ Heparin                                 [] Encourage ambulation           [] Already on Anticoagulation     Code Status: Limited    Tele:   [] yes             [x] no    Active Hospital Problems    Diagnosis Date Noted    Hyperkalemia [E87.5] 05/16/2022     Priority: Medium       Electronically signed by Shalini Franco MD on 5/19/2022 at 9:40 AM

## 2022-05-19 NOTE — SIGNIFICANT EVENT
Patient was again called for rapid response. This was patients 3rd rapid response in 24 hours. When I presented to bedside multiple staff members were around bed attempting labs and additonal IV lines. Family was in hallway and were tearful. I directed my attention and conversation to the family.  was there for support. I explained to Dionicio Olivier that again that we are causing him more discomfort and providing no increase in his quality of life. I explained that every time a rapid is called we are doing more invasive procedure, more IV sticks etc.  We are taking away from the time the family can be with him. It was reported last night when his oxygen dropped the staff attempted to place his on bipap and he became aggressive. I continued to explain that this is not providing him the quality he deserves and then the of his life and is not allowing for the family to spend time with him. Dionicio Olivier voices understanding and states that she doesn't want her father to continue to under go these procedures and more testing. She states she wants him to be comfortable. He cousin was with her in hallway and expressed that the patient has already had more life then most and he is javier to have had so much time with his family. Marta Vazquez was with family, I asked everyone to clear room and brought family to bedside. I asked for Marta Vazquez to spend time with family supporting their very challenging decisions. I asked family for permission to consult hospice for comfort and end of life planning. I encouraged Dionicio Olivier to invite all of him in to be with him and enjoy this time with him. Marta Vazquez was getting chairs for family and will spend time with family for support. Resident was there for conversation. All questions were answered and much support was given for the family. Attending will be updated. Electronically signed by Melany Monahan.  XIN Stafford - CNP on 5/19/2022 at 10:19 AM

## 2022-05-19 NOTE — CARE COORDINATION
Collaborative Discharge Planning    Euna Boas  :  11/10/1930  MRN:  [id number]    ADMIT DATE:  2022      Discharge Planning Discharge Planning  Meds-to-Beds: Does the patient want to have any new prescriptions delivered to bedside prior to discharge?: No  White Board Notes /Social Work Whiteboard Notes  /Social Work Whiteboard: ; SW: return to [facility],  pre-cert required    Discharge Plan SNF  Plans return [facility], [place] MWF; therapy following; has AVF  Discharge Milestones and Delays: Clinical status  Hyperkalemia/CHF/Pericardial Effusion  From 6K    Creatinine 4.3;  Bicarb gtt continued    BP 90/s; monitor     Rapid Response for w oxygen desaturations 60/s   Rapid Response for GLUC 11; treated w D10/Glucagon    Hospice following; await their recommendations      SIGNED:  Annice Gaucher, RN   2022, 3:11 PM

## 2022-05-19 NOTE — FLOWSHEET NOTE
2200 [facility], RN, Breanna Calvin 226, LPN, [place] 4, and fernando Phillips entered room    2222Evalex Lugo RN entered room    31 62 12- rapid response was called. Pt's hr 189, 3L nc with o2 sats 68%, bp 153/133. No rebreather placed on patient    2246- Dr. Esha Cortes (resource nurse) entered room.      Pt placed on bipap    2247Delbra Galeazzi, house supervisor, Annalisa Odell enter room  bp 157/105 with a hr of 136    2248- EKG completed    2249- Dr. Gisselle Payne and Adrienne Colin entered the room  Ultrasound completed    2251- bp 143/55 with hr of 135    2254 bi pap18/6/ 12/6 fi02 100%    2256- bp 140/55    2250 1mg iv morphine ordered     2259 ultrasound completed    2300 chest xray STAT order     1mg iv morphine given    bp 143/55 hr 155    2301- bipap 14/8    Called dr. Beverly Sera    8109 dialysis team called and on the way    1 mg morphine given    2305- xray at bedside    1 mg ativan given    2307- bp 155/81 hr 176    2310 500 mcg digoxin ordered    2312 transfer order placed    2314 labs ordered and obtained    2315 bed open 4k 28    2317 ABGs order    2318- 250 mcg digoxin given    Hr 147, oxygen 91%    2322- oxygen 95% hr 136    2331- hr 158    2332- bipap changed to 14/8 fio2 40%    2333- 1 mg ativan given    2336- bp 114/63    2338- pt transferred to 7A30

## 2022-05-19 NOTE — SIGNIFICANT EVENT
Rapid response was once again called on this 19-year-old male for hypotension and tachycardia. This is an ESRD on HD MWF patient, he was due to receive dialysis 5/18 however only 300 mL dialyzed d/t hypotension so he actually ended up receiving 1L NS bolus with albumin. This evening patient was hypotensive again, however because his heart rate was initially WNL, I did not want to give additional fluids as that would end up in his lungs or heart, besides at that point patient had appropriate physiologic response. In the course of the evening, patient went into AF RVR into the 170s as well as hypotension. He is limited x4. This is in the setting of underlying moderate pericardial effusion which was evaluated last night, decided to repeat bedside echo demonstrating possible early development of tamponade physiology. Patient was desaturating on nasal cannula and did not do well on an NRB. Started BiPAP. Patient became agitated and short of breath, he was administered a total of 2 mg lorazepam IV and 2 mg morphine IV, and he appeared more comfortable. For the heart rate opted not to give beta-blocker because wanted the patient to have real chance of tolerating the definitive treatment, dialysis, nephrology contacted and HD was started on the stepdown unit overnight. Patient's HR was becoming more controlled with pain control antianxiety meds, he was also renally loaded with 250 MCG digoxin. Lactic acid continues to rise as well as troponin, with diffuse generalized pain. Family was present at bedside and an extensive discussion was held to find out whether the patient would want synchronized cardioversion. POA explained that he would not end even in the event of cardiopulmonary arrest, patient would not want anything done. Same goes for the pericardial window, pericardiocentesis or catheterizations or any other invasive measures. At this point guarded prognosis, will see how he tolerates HD.   Family did say however that they would be okay with patient being on pressor support if that is what it takes for him to tolerate HD. That will be the plan for tonight, and an ongoing basis. Patient does not tolerate BiPAP very well, every so often he becomes agitated pulling the mask off as well as lines, after reviewing the repeat CXR pulmonary edema mostly unchanged, therefore asked that BiPAP be switched to HFNC. BP meds need parameters, consider holding on dialysis days. Guarded prognosis, would advise primary team to have another code discussion with family pending clinical outcomes.

## 2022-05-19 NOTE — PROGRESS NOTES
7152 rapid response call pt BS 11 while rapid being call an additional BS was taken and was 14  0945: D10 started at 999  0946: 102/57 (71) hr 97  0948 BS 51  0949 IM Glucagon  0955   0956 started D5 at 100   Will continue to monitor pt.

## 2022-05-19 NOTE — FLOWSHEET NOTE
Pt is a 89y.o. male, originally in [place].  was called to room due to a Rapid Response alert; Tunde's daughter, niece, son-in-law and two other family members were present as well.  provided presence, assisted with updating family on procedures, activity, what a rapid response means as they inquired.  also led them to Tunde's new room, nurtured hope and prayer-all of which was met with gratitude by family members as Elena Lobato was getting settled in.     05/18/22 2355   Encounter Summary   Encounter Overview/Reason  Crisis   Service Provided For: Patient and family together   Referral/Consult From: Multi-disciplinary team   Support System Family members   Last Encounter  05/18/22   Complexity of Encounter Moderate   Begin Time 1050   End Time  1155   Total Time Calculated 65 min   Crisis   Type Rapid Response   Assessment/Intervention/Outcome   Assessment Calm;Coping;Peaceful   Intervention Active listening;Prayer (assurance of)/[place];Sustaining Presence/Ministry of presence;Nurtured Hope;Facilitated/Participated in Patient/Family Care Conference   Outcome Receptive; Expressed Gratitude;Engaged in conversation;Expressed feelings, needs, and concerns;Coping

## 2022-05-19 NOTE — FLOWSHEET NOTE
Called at the Rapid Response to find his daughter in the hallway crying as is another in law member. Pt's staff talked with his daughter about comfort care measures and hospice will be consulted today. 05/19/22 0935   Encounter Summary   Encounter Overview/Reason  Crisis   Service Provided For: Patient and family together   Referral/Consult From: Multi-disciplinary team   Support System Family members; Children   Last Encounter  05/19/22   Complexity of Encounter High   Begin Time 0940   End Time  1020   Total Time Calculated 40 min   Spiritual/Emotional needs   Type Spiritual Support   Grief, Loss, and Adjustments   Type End of Life;Grief and loss; Hospice   words of encouragement, support and hospitality given.

## 2022-05-19 NOTE — PROGRESS NOTES
Not currently a hospice patient    Chart reviewed after receiving referral. I spoke with nurse Shad Do and Dr. Fatou Tang prior to referral. Patient has been changed to a [facility] and Dr. Fatou Tang has spoken with Glenroy Newman and other family members about patient's condition. Patient had 3 rapid responses this date. His chemstick was 11 this am.  BP is low and dropping. Sodium bicarb and dextrose still running but will likely be stopped tomorrow. I reviewed orders with Glenroy Newman and family, as she stated they could be present. Her sister, patient's daughter is flying in from [facility] tomorrow. Flight time was unknown. They are aware that patient may pass this date or before she arrives. I also discussed that he may live longer than we anticipate and that we will be assessing him to ensure he is comfortable and it will be a day by day process. Patient is from [facility]. I indicated that at times a patient may be able to go back. He is comfortable at this time and comfort meds, ativan and morphine, were just ordered right before I came in and we will be monitoring him to see if his orders are adequate and will be monitoring amount and frequency to ensure his needs are met. I stated we are on call for them if there are questions and are happy to advice staff or make a visit. I indicated that if symptoms require many adjustments, we may talk with her about signing into the program.  Family is aware that at this time, he is eligible for community hospice but not currently on our services. I discussed some end of life changes in breathing and what can be normal and what would be considered discomfort. I spoke of non-verbal signs we look for. I stated that in this type of situation, we welcome family being at the bedside and to let us know if they need the hospitality cart re-filled. About 25 family members were in and out of room. A hospice team member will follow up tomorrow.

## 2022-05-19 NOTE — PLAN OF CARE
Tolerated dialysis well, 2 L fluid pulled, /90 when reassessed after HD. Procal high, suspecting this may have been an accumulation because pt missed HD few times before labs drawn. No clinical source of infection, afebrile no white count, suggest repeating and if downtrending, would corroborate above impression. Would be very mindful restarting BP meds due to chain of events during this hospital stay. Patient was renally loaded with 250 digoxin for rate so would repeat the bedside echo or an official limited study, if pericardial effusion mostly unchanged from prior then probably dig worked and we need to have very careful q48 hr renal-maintenance  dose for rate control with daily dig levels, this is special consideration patient is ESRD. But essentially patient needs to be optimized to undergo dialysis sessions as scheduled for definitive treatment as he is limited x4 and doesn't want much else done. There was also the thought could uremic pericarditis be a component in which case, would make sense that HD worked, however BUN ranged only 57-35.

## 2022-05-19 NOTE — PROGRESS NOTES
[phone] - Patient transported to  via bed with several RNs, Resource RN, and RT. Patient is on BiPAP, lung sound coarse with crackles. Disoriented X4, does not follow commands. Bedside report received from primary RN. Family at bedside. Participated in family discussion about goals of care along with IM Residents. Family has made it clear he is a DNR-CCA No x4 should his heart stop. The plan for the night is to try HD treatment to remove fluid given his BP can tolerate it. If he is not able to tolerate it, they would be interested in moving to the ICU for Pressors. Clarified with Dr. Rajani Arevalo at what point she would like to be notified for hypotension. If Pnt's SBP drops below 90s,     0000 - L AC PIV infiltrated. 0040 -  Attempted x2 to start another, unsuccessful. Pnt continues to attempt to pull off BiPAP, potentially compromising HD access. MD notified, asked if another dose of Ativan would be necessary or a single restraint for the accessed arm. Ativan ordered. 1 - Ativan administered via Dialysis catheter per Dialysis RN.     0100 - Another RN attempted to start PIV, unsuccessful. Resource RN called asked to attempt. Resource RN stated she will be down once her patient is stablized. After discussion with residents, BiPAP has been removed and replaced with NC since pnt is oxygenating appropriately according to the ABG. Pnt is tolerating much better, no longer thrashing around. 0140 - Diet order changed to NPO as patient continues to not be alert enough to safely attempt a swallow eval. Family at bedside made aware not to try to give him anything to eat or drink. 0150 - Orders received for IV Calcium. Dialysis RN explained it will be dialyzed out if we were to administer it during his treatment. Will wait to administer once tx is completed. 46 - Resource RN at bedside, able to place 24g in L wrist!     0421 - Dialysis tx completed.  Calcium gluconate started    0630 - Son Sierra Zuniga Bro Rojas called asking for an update and asked why he did not get a call over night. Explained the person to contact on the chart is Daughter Marva aMyers. Son expressed frustration and explained he had given his information to be the person to call. Explained it is not within my scope of practice to change the call list and I will defer this issue to case management and the unit director.      Lenny Price can be reached at [phone]

## 2022-05-19 NOTE — PROGRESS NOTES
Echocardiogram ordered for today was cancelled by Lianna Carlson PA-C. Patient had a limited echo yesterday.

## 2022-05-19 NOTE — PROGRESS NOTES
Renal Progress Note    Assessment and Plan:   1. End-stage kidney disease on hemodialysis  2. Hypoglycemia  3. Volume overload status post emergent hemodialysis with ultrafiltration last night  4. Hyperkalemia mild  5. Hypertension primary with blood pressure on the low end of lambert  6. Hyponatremia improved  7. Anemia of chronic disease  8. Bilateral moderate pericardial effusion new  9. Bilateral pleural effusion  10. Severe pulmonary hypertension  11. Moderate to severe tricuspid regurgitation    PLAN:  1. Labs reviewed  2. I discussed with the daughter this morning Ms. Dayana Rashid. 3. Family is opting for hospice. 4. They want to discontinue hemodialysis treatment as well. 5. We will decrease 10% dextrose to 30 mill per hour from 50 mill per hour        Patient Active Problem List:     CAD (coronary artery disease)     COPD (chronic obstructive pulmonary disease) (HCC)     Chronic renal insufficiency     Arthritis-  low back and neck .      ED (erectile dysfunction)     Degenerative joint disease of knee, left     Anemia, chronic disease     Essential hypertension     Monoclonal gammopathy     Leukopenia     Thrombocytopenia (HCC)     Chronic kidney disease (CKD), stage V (HCC)     Iron deficiency anemia     Plasma cell dyscrasia     Idiopathic hypotension     Systolic congestive heart failure (HCC)     Hyperphosphatemia     Anemia due to chronic kidney disease, on chronic dialysis (HCC)     Secondary hyperparathyroidism of renal origin ([facility] [address]     Chest pain     Gastritis and duodenitis     Closed fracture of left ankle     ESRD on dialysis (HCC)     Chronic combined systolic and diastolic CHF (congestive heart failure) (HCC)     Hypoxia     Upper GI bleed     History of pulmonary embolus (PE)     Osteopenia of multiple sites     Chronic left hip pain     Atrial fibrillation (HCC)     Pain in gums     AMS (altered mental status)     Hyperkalemia      Subjective:   Admit Date: 5/16/2022    Interval History: ALT 31   ALKPHOS 156*   PROT 6.2   LABALBU 3.6   BILITOT 1.2     Iron:  No results for input(s): IRONS, FERRITIN in the last 72 hours. Invalid input(s): LABIRONS  XR CHEST PORTABLE   Final Result   Impression:   Mild pulmonary edema, slightly improved since the prior study. This document has been electronically signed by: Valeria Morgan. Juan Pablo Otero MD    on 05/18/2022 11:37 PM      XR CHEST PORTABLE   Final Result   Persistent changes of congestive heart failure. Moderate right pleural effusion possible small left effusion            **This report has been created using voice recognition software. It may contain minor errors which are inherent in voice recognition technology. **      Final report electronically signed by Dr. Camelia Mike on 5/18/2022 3:16 PM      CT ABDOMEN PELVIS WO CONTRAST Additional Contrast? None   Final Result   1. New moderate sized pericardial effusion. 2. Bilateral pleural effusions persist.   3. No new abdominopelvic pathology. Chronic changes in gallstone again    noted. This document has been electronically signed by: Dk West MD    on 05/16/2022 07:14 AM      All CTs at this facility use dose modulation techniques and iterative    reconstructions, and/or weight-based dosing   when appropriate to reduce radiation to a low as reasonably achievable. XR CHEST PORTABLE   ED Interpretation   Volume overload, no other significant findings compared to prior x-ray      Final Result   Findings of CHF/fluid overload again noted, radiographically slightly    worsened versus 10 days ago.       This document has been electronically signed by: Dk West MD    on 05/16/2022 07:08 AM            Objective:   Vitals: BP (!) 123/58   Pulse 99   Temp 97.8 °F (36.6 °C) (Axillary)   Resp 22   Ht 5' 6\" (1.676 m)   Wt 146 lb 2.6 oz (66.3 kg)   SpO2 94%   BMI 23.59 kg/m²    Wt Readings from Last 3 Encounters:   05/18/22 146 lb 2.6 oz (66.3 kg)   05/11/22 150 lb (68 kg)   05/09/22 146 lb 6.2 oz (66.4 kg)      24HR INTAKE/OUTPUT:      Intake/Output Summary (Last 24 hours) at 5/19/2022 1042  Last data filed at 5/19/2022 0350  Gross per 24 hour   Intake 700 ml   Output 2700 ml   Net -2000 ml       Constitutional: Well-developed elderly gentleman comfortably asleep during round, no apparent distress   Skin:normal with no rash or lesions. HEENT: Head is normal..Throat is clear . Oral mucosa is moist   Neck:supple with no carotid bruit  Cardiovascular:  S1, S2 without murmur or rubs   Respiratory:  Clear to ausculation without wheezes, rhonchi or rales in the anterior  Abdomen: Soft. Good bowel sounds. Ext: No LE edema. Right upper extremity brachiocephalic fistula is noted with good bruit and good thrill. Musculoskeletal:Intact  Neuro: Deferred. Electronically signed by Christie Navarro MD on 5/19/2022 at 10:42 AM  **This report has been created using voice recognition software. It maycontain minor  errors which are inherent in voice recognition technology. **

## 2022-05-19 NOTE — CARE COORDINATION
5/19/22, 10:43 AM EDT    DISCHARGE BARRIERS        Patient transferred to . Report given to unit [facility], regarding discharge plan for this patient.

## 2022-05-20 NOTE — PROGRESS NOTES
Renal Progress Note    Assessment and Plan:   1. End-stage kidney disease on hemodialysis  2. Hypoglycemia improved  3. Hypotension  4. Anemia of chronic disease  5. Bilateral moderate pericardial effusion new  6. Severe pulmonary hypertension  7. Moderate to severe tricuspid regurgitation    PLAN:  1. Labs discontinued  2. Patient now on hospice  3. Discontinue hemodialysis  4. We will sign off  5. I discussed that with the grand daughter in the room        Patient Active Problem List:     CAD (coronary artery disease)     COPD (chronic obstructive pulmonary disease) (HCC)     Chronic renal insufficiency     Arthritis-  low back and neck .      ED (erectile dysfunction)     Degenerative joint disease of knee, left     Anemia, chronic disease     Essential hypertension     Monoclonal gammopathy     Leukopenia     Thrombocytopenia ([facility])     Chronic kidney disease (CKD), stage V ([facility])     Iron deficiency anemia     Plasma cell dyscrasia     Idiopathic hypotension     Systolic congestive heart failure ([facility])     Hyperphosphatemia     Anemia due to chronic kidney disease, on chronic dialysis ([facility])     Secondary hyperparathyroidism of renal origin ([facility] [address]     Chest pain     Gastritis and duodenitis     Closed fracture of left ankle     ESRD on dialysis ([facility])     Chronic combined systolic and diastolic CHF (congestive heart failure) ([facility])     Hypoxia     Upper GI bleed     History of pulmonary embolus (PE)     Osteopenia of multiple sites     Chronic left hip pain     Atrial fibrillation ([facility])     Pain in gums     AMS (altered mental status)     Hyperkalemia      Subjective:   Admit Date: 5/16/2022    Interval History:   Seen for end-stage kidney disease on hemodialysis  Sleeping this morning comfortably  Granddaughter at bedside  Updated by the staff  Blood pressure is low      Medications:   Scheduled Meds:   sodium chloride flush  5-40 mL IntraVENous 2 times per day    scopolamine  1 patch TransDERmal Q72H     Continuous Infusions:      CBC:   Recent Labs     05/18/22  0833 05/18/22  2323   WBC 8.3 9.9   HGB 7.9* 8.0*    289     CMP:    Recent Labs     05/18/22  0833 05/18/22  2330 05/19/22  0330   * 134* 135   K 4.6  4.6 5.0  4.5 5.4*   CL 92* 92* 90*   CO2 23 23 17*   BUN 38* 32* 35*   CREATININE 4.0* 4.0* 4.3*   GLUCOSE 115* 106 103   CALCIUM 8.2* 8.3* 9.1   LABGLOM 17* 17* 16*     Troponin: No results for input(s): TROPONINI in the last 72 hours. BNP: No results for input(s): BNP in the last 72 hours. INR:   No results for input(s): INR in the last 72 hours. Lipids:   No results for input(s): CHOL, LDLDIRECT, TRIG, HDL, AMYLASE, LIPASE in the last 72 hours. Liver:   Recent Labs     05/18/22  2330   AST 71*   ALT 31   ALKPHOS 156*   PROT 6.2   LABALBU 3.6   BILITOT 1.2     Iron:  No results for input(s): IRONS, FERRITIN in the last 72 hours. Invalid input(s): LABIRONS  XR CHEST PORTABLE   Final Result   Impression:   Mild pulmonary edema, slightly improved since the prior study. This document has been electronically signed by: Pete Quintanilla. Albin Dubon MD    on 05/18/2022 11:37 PM      XR CHEST PORTABLE   Final Result   Persistent changes of congestive heart failure. Moderate right pleural effusion possible small left effusion            **This report has been created using voice recognition software. It may contain minor errors which are inherent in voice recognition technology. **      Final report electronically signed by Dr. Chrissy Jo on 5/18/2022 3:16 PM      CT ABDOMEN PELVIS WO CONTRAST Additional Contrast? None   Final Result   1. New moderate sized pericardial effusion. 2. Bilateral pleural effusions persist.   3. No new abdominopelvic pathology. Chronic changes in gallstone again    noted.       This document has been electronically signed by: Yanna Jerome MD    on 05/16/2022 07:14 AM      All CTs at this facility use dose modulation techniques and iterative    reconstructions, and/or weight-based dosing   when appropriate to reduce radiation to a low as reasonably achievable. XR CHEST PORTABLE   ED Interpretation   Volume overload, no other significant findings compared to prior x-ray      Final Result   Findings of CHF/fluid overload again noted, radiographically slightly    worsened versus 10 days ago. This document has been electronically signed by: Fabiana Cheung MD    on 05/16/2022 07:08 AM            Objective:   Vitals: BP (!) 113/51   Pulse 85   Temp 97.6 °F (36.4 °C) (Axillary)   Resp 20   Ht 5' 6\" (1.676 m)   Wt 146 lb 2.6 oz (66.3 kg)   SpO2 94%   BMI 23.59 kg/m²    Wt Readings from Last 3 Encounters:   05/18/22 146 lb 2.6 oz (66.3 kg)   05/11/22 150 lb (68 kg)   05/09/22 146 lb 6.2 oz (66.4 kg)      24HR INTAKE/OUTPUT:    No intake or output data in the 24 hours ending 05/20/22 0647    Constitutional: Well-developed elderly gentleman comfortably asleep during round, no apparent distress   Skin:normal with no rash or lesions. HEENT: Head is normal..Throat is clear . Oral mucosa is moist.  Nasogastric tube is noted. Neck:supple with no carotid bruit  Cardiovascular:  S1, S2 without murmur or rubs   Respiratory:  Clear to ausculation without wheezes, rhonchi or rales in the anterior  Abdomen: Soft. Good bowel sounds. Ext: No LE edema. Right upper extremity brachiocephalic fistula is noted with good bruit and good thrill. Musculoskeletal:Intact  Neuro: Deferred. Electronically signed by Carola Steven MD on 5/20/2022 at 6:47 AM  **This report has been created using voice recognition software. It maycontain minor  errors which are inherent in voice recognition technology. **

## 2022-05-20 NOTE — PLAN OF CARE
measures as appropriate and evaluate response   Notify Licensed Independent Practitioner if interventions unsuccessful or patient reports new pain     Problem: Skin/Tissue Integrity  Goal: Absence of new skin breakdown  Description: 1. Monitor for areas of redness and/or skin breakdown  2. Assess vascular access sites hourly  3. Every 4-6 hours minimum:  Change oxygen saturation probe site  4. Every 4-6 hours:  If on nasal continuous positive airway pressure, respiratory therapy assess nares and determine need for appliance change or resting period. Outcome: Progressing  Note: Encouraging good fluid and diet intake. Encourage hygiene  Increase movement and encourage turns. Problem: Safety - Adult  Goal: Free from fall injury  Outcome: Progressing  Flowsheets (Taken 5/19/2022 2236)  Free From Fall Injury: Instruct family/caregiver on patient safety  Note: Bed in low position  Call light in reach  Bed wheel lock  Teaching to use call light for assistance.        Problem: Metabolic/Fluid and Electrolytes - Adult  Goal: Electrolytes maintained within normal limits  Outcome: Progressing  Flowsheets  Taken 5/19/2022 2236 by Renata Talamantes RN  Electrolytes maintained within normal limits: Monitor labs and assess patient for signs and symptoms of electrolyte imbalances  Taken 5/19/2022 2005 by Renata Talamantes RN  Electrolytes maintained within normal limits:   Monitor labs and assess patient for signs and symptoms of electrolyte imbalances   Administer electrolyte replacement as ordered  Taken 5/19/2022 0933 by Maria Del Carmen Benavidez RN  Electrolytes maintained within normal limits: Monitor response to electrolyte replacements, including repeat lab results as appropriate     Problem: Metabolic/Fluid and Electrolytes - Adult  Goal: Hemodynamic stability and optimal renal function maintained  Outcome: Progressing  Flowsheets (Taken 5/19/2022 0933 by Maria Del Carmen Benavidez RN)  Hemodynamic stability and optimal renal function maintained: Monitor labs and assess for signs and symptoms of volume excess or deficit     Pain Assessment: Adult Nonverbal Pain Scale (NPVS)  Pain Level: 10   Patient's Stated Pain Goal: 2   Is pain goal met at this time?   Yes

## 2022-05-20 NOTE — CARE COORDINATION
5/20/22, 2:37 PM EDT    DISCHARGE ON GOING [facility] day: 1  Location: [id number]/028-[id number] Reason for admit: Hyperkalemia [E87.5]  Chronic combined systolic and diastolic CHF (congestive heart failure) ([facility] [address] [I50.42]  Anemia due to chronic kidney disease, on chronic dialysis ([facility] [address] [N18.6, D63.1, Z99.2]   Procedure:  Barriers to Discharge: Hospice consulted. Code status changed to [facility]. PCP: Magalis Nicolas DO  Readmission Risk Score: 33.2 ( )%  Patient Goals/Plan/Treatment Preferences: await recommendation of hospice. Pt from [facility].

## 2022-05-20 NOTE — PROGRESS NOTES
Hospitalist Progress Note       Patient:  Porfirio Sample      Unit/Bed:-28/028-A    YOB: 1930    MRN: [id number]       Acct: [de-identified]     PCP: Nimo Stockton DO    Date of Admission: 5/16/2022    Assessment/Plan:     #ESRD on HD 2/2 DM/HTN Nephrosclerosis w/HyperKalemia: HD M,W,F; w/subsequent HypoNa and 2/2 HyperPTH on Rocaltrol/Sensipar. Follows OP w/Nephrology. Chronically elevated Troponins, on arrival 0.202 (baseline range 0.158-0.218). INDRA/ACD 2/2 to ESRD. HD w/3K bath w/o UF (no hypotensive episodes) on arrival. K+ again 5.8 today, per Nephro pt going again to HD. Pericardial effusion, small noted, likely due to needing more HD. S/p Stable 2.5 hour TX. Removed 2L of fluid 05/17. Dialysis 5/19 -only 3 of 4 hrs completed due to hypotension. Given Albumin and dextrose during HD. Lactic 3.2. D/c'd Rocaltrol, Sensipar, Iron 325mg + Vitamin C.   - Nephrology on board for additional HD / electrolyte mgmt has signed off as pt now Hospice care     #Generalized abdominal pain: Pt admitted 5/6 for same complaint. CT at that time showed no acute findings. Repeat CT is pending. Pt was placed on NPO. Analgesics, antiemetics    #Pericardial effusion: Moderate effusion per echo 5/17/22; no tamponade effect. Repeat stat echo today w/hypotension upon return from HD; Pericardial effusion unchanged; no tamponade, no indication for pericardiocentesis. Cardiology on board - spoke with Pt's family in great detail after discussion w/Cardiology    #Elevated troponin w/subjective Chest Pain: Chronically elevated in setting of ESRD, although slightly higher than baseline. Per reports, pt was complaining of chest pain on arrival although denies currently. No acute changes on EKG. Last Echo is from a year ago, will repeat. Cardiology was consulted for pericardial effusion seen on CT. Pt is on OMT with ASA, Plavix, metoprolol, losartan, statin.     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 Follows OP w/Cardiology. Appears fluid overload, but not in CHFe. Likely a contributing factor to pt's volume status although not in exacerbation.      #? COPD w/Chronic Hypoxic RF w/Home 2LNC O2 use, Pulmonary HTN: PFTs in 2012 demonstrated elevated FEV1/FVC ratio of 112% predicted consistent with RLD; worked in a "[employer] for 27yrs; no OP Pulm visits, Likely combination of WHO group 2+3 PAH. Hx of 40PY smoking history, yet quit over 40 years ago. Pt placed on 4L in ED although SpO2 >95%. Back to baseline 2LNC already (unsure if pt actually needed 4L in first place)  - C/w Oxygen adjunctive therapy at this time as pt needs for comfort measures only     #Hx of recurrent UGI bleed: EGD on 11/6/21 Bleeding AVM treated w/APC - Post op diagnosis Grade 2 erosive esophagitis with features suggestive of recent GI bleed: Mild gastritis with deformity of the pylorus and duodenitis no active GI bleed seen no biopsy obtained, On PPI at home      #Hx of Plasma cell Dyscrasia: noted to have slight increase in K/L free light chains. 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 + chronic Hip pain: C/w Flexeril   #Hx of SSS s/p Pacemaker 2013 and Chronic pA-fib rate-controlled: PPM routinely interrogated; C/w Metoprolol 100mg BID  #Chronically debilitated/deconditioned, current ECF resident: Malnutrition, ill-appearing     Expected discharge date: TBD    Disposition:    [] Home       [] TCU       [] Rehab       [] Psych       [] SNF       [x] [place]       [] Other-    Chief Complaint: Chest pain / Abdominal Pain    Hospital Course:     79yo M, Chronically debilitated/deconditioned, current ECF resident w/PMHx HLD, HTN w/Hx of HTN emergency, INDRA/ACD 2/2 ESRD, DJD (knee, left) w/arthritis in Low back/neck + chronic Hip pain, Plasma cell Dyscrasia (noted to have slight increase in K/L free light chains. BM Bx found erythroid hyperplasia with 2% plasma cells, rare kappa (+) plasma cells; normocellular marrow w/full active trilineage hematopoiesis w/erythroid hyperplasia; Flow cytometry found a small population of atypical plasma cells, representing 0.53% of total leukocytes; Follows OP w/Heme/Onc), Hx of recurrent UGI bleed (EGD on 11/6/21 Bleeding AVM treated w/APC - Post op diagnosis Grade 2 erosive esophagitis with features suggestive of recent GI bleed: Mild gastritis with deformity of the pylorus and duodenitis no active GI bleed seen no biopsy obtained, On PPI), Hx of PE (not a candidate for [facility], No IVC filter), Hx of SSS s/p Pacemaker 2013, Chronic A-fib rate-controlled, CAD s/p PCI w/stents 2004 on DAPT, ? COPD (PFTs in 2012 demonstrated elevated FEV1/FVC ratio of 112% predicted consistent with RLD; worked in a [employer] for 27yrs; no OP Pulm visits), Chronic Hypoxic RF w/Home 2LNC O2 use, Pulmonary HTN (Likely combination of WHO group 2+3), Chronic Syst/Myers CHF (HFmrEF, 45% w/G1DD, Mod MR/TR, RVSP 55mmHg - Echo 5/2021) on GDMT, chronically elevated Troponins, and ESRD on HD 2/2 DM/HTN Nephrosclerosis (HD M,W,F; w/subsequent HypoNa and 2/2 HyperPTH on Rocaltrol/Sensipar) who presented to the [facility] from ECF due to Chest pain. Pt was given [place] at [facility] w/no resolution of CP. Pt has pain everywhere, as he did on the previous admission. Pt unaware of when last BM was. Pt was HD stable. Limited Hx from pt, mostly from chart review and assisted individuals. Of note, pt was recently admitted and d/c earlier this month (05/2022) for abdominal pain and gum pain.      In ED: Afebrile, BP 126/108, , RR 38 w/SpO2 82% on RA that improved to >90% w/4LNC. Labs remarkable for Na 135, BUN 57, Cr 7.4, eGFR 8, AG 18, Lactic acid wnl, Procal 1.61, Ca 9.4, Ph 3.5, K 6.5, Pro-BNP >70,000, Troponin 0.336 (baseline range 0.158-0.218; Increased Troponin 2/2 ESRD, Stress, CHF, anemia ~Type II MI), Hb/Hct 9.5/27.5, RDW 63.6. Influenza A/B and COVID (-). CXR Findings of CHF/fluid overload again noted, radiographically slightly worsened versus 10 days ago. CT A/P New moderate sized pericardial effusion, b/l pleural effusions persist, no new AP pathology. Pt was taken to Urgent dialysis on 5/16 per Nephro (ER gave PRN morphine 2mg prior to arrival. Within 5min pt calm. Stable 4 hour TX. Removed no fluid as ordered. Pt tolerated Tx well). Echo ordered and Cardiology consulted. 05/18/2022: *Rapid response post-HD for hypotension with systolic 60 mmHg. IVF administered. Stat echo at bedside. Stat read per Dr. Jr Penaloza. No change in pericardial effusion; no indication for pericardiocentesis at this time. RV dilated. SR to Afib after HD today; VR currently controlled, currently on Metoprolol 100 mg BID. Can consider Amiodarone gtt if RVR. On Heparin 5K SQ q8; consider change to Heparin infusion. - discussed with Irais Hassan, APRN-CNP. Pt remains DNR-CCA with limited code x 4. Family expressed desire for no heroic measures or interventions at time of rapid response (per MARCELA Marrufo ACNP). Palliative care consulted and meeting pending to determine [address] and clarify code status (DNR-CCA vs DNR). Spoke in great length w/Family members at bedside in regards to overall clinical progression and long term goals given pt's decline. Will f/u w/Palliative and family after there is time to process the abundance of information. *    5/19/2022: Overnight, RR again called for Hypotension/Tachycardia.  Pt was due to receive dialysis 5/18 however only 300mL dialyzed d/t hypotension so he actually ended up receiving 1L NS bolus with albumin. Pt was desaturating on NC and did not do well on an NRB, Started BiPAP. Pt went into A-fib w/RVR into the 170s as well as hypotension. Pt became agitated and SOB, he was administered a total of 2mg lorazepam IV and 2mg morphine IV. Loaded w/renally dosed Digoxin 250mcg. This AM, another rapid was called for BG 11. Pt was stabilized and BG brought back wnl. Further code/GOC discussion had with family. Decision was made to transition pt to [facility] and go forward with comfort meds only. DNR-CC form signed, medications adjusted to only reflect wishes. IV Morphine and Ativan initiated for comfort measures. Family is in agreement. Hospice Nurse Rosemary Theodore to help relay our sympathies and make sure the family understood the next steps forward for pt. Subjective (past 24 hours):      Pt seen and evaluated this AM, in no acute distress. Family is in room and pt is resting comfortably. Comfort care medications and measures at this time. Medications:  Reviewed    Infusion Medications     Scheduled Medications    sodium chloride flush  5-40 mL IntraVENous 2 times per day    scopolamine  1 patch TransDERmal Q72H     PRN Meds: sodium chloride flush, LORazepam, morphine **OR** morphine    No intake or output data in the 24 hours ending 05/20/22 0949    Diet:  No diet orders on file    Exam:  BP (!) 113/51   Pulse 85   Temp 97.6 °F (36.4 °C) (Axillary)   Resp 20   Ht 5' 6\" (1.676 m)   Wt 146 lb 2.6 oz (66.3 kg)   SpO2 94%   BMI 23.59 kg/m²     General appearance: No apparent distress, appears stated age and cooperative. Chronically ill-appearing, fatigued  HEENT: Pupils equal, round, and reactive to light. Conjunctivae/corneas clear. Neck: Supple, with full range of motion. No jugular venous distention. Trachea midline. Respiratory:  Normal respiratory effort. Clear to auscultation, bilaterally without Rales/Wheezes/Rhonchi.   Cardiovascular: Regular rate and rhythm with normal S1/S2 without murmurs, rubs or gallops. Abdomen: Soft, non-distended with normal bowel sounds. Generalized tenderness, negative vazquez's, no rebound/gaurding  Musculoskeletal: passive and active ROM x 4 extremities. Skin: Skin color, texture, turgor normal.    Neurologic:  Neurovascularly intact without any focal sensory/motor deficits. Cranial nerves: II-XII intact, grossly non-focal.  Psychiatric: A/Ox2, thought content appropriate  Capillary Refill: Brisk,< 3 seconds   Peripheral Pulses: +2 palpable, equal bilaterally     Labs:   Recent Labs     05/18/22 0833 05/18/22  2323   WBC 8.3 9.9   HGB 7.9* 8.0*   HCT 22.3* 24.0*    289     Recent Labs     05/18/22  0833 05/18/22  2330 05/19/22  0330   * 134* 135   K 4.6  4.6 5.0  4.5 5.4*   CL 92* 92* 90*   CO2 23 23 17*   BUN 38* 32* 35*   CREATININE 4.0* 4.0* 4.3*   CALCIUM 8.2* 8.3* 9.1   PHOS  --  4.1  --      Recent Labs     05/18/22  2330   AST 71*   ALT 31   BILITOT 1.2   ALKPHOS 156*     No results for input(s): INR in the last 72 hours. No results for input(s): Connee Matar in the last 72 hours. Recent Labs     05/18/22  2330 05/19/22  0330   PROCAL 11.94* 11.39*       Microbiology:      Urinalysis:      Lab Results   Component Value Date    NITRU NEGATIVE 06/22/2019    WBCUA 50-75 06/22/2019    BACTERIA NONE 06/22/2019    RBCUA 25-50 06/22/2019    BLOODU LARGE 06/22/2019    SPECGRAV 1.013 05/03/2016    GLUCOSEU NEGATIVE 06/22/2019       Radiology:  XR CHEST PORTABLE   Final Result   Impression:   Mild pulmonary edema, slightly improved since the prior study. This document has been electronically signed by: Pantera Mendez. Kristy Mercedes MD    on 05/18/2022 11:37 PM      XR CHEST PORTABLE   Final Result   Persistent changes of congestive heart failure. Moderate right pleural effusion possible small left effusion            **This report has been created using voice recognition software. It may contain minor errors which are inherent in voice recognition technology. ** Final report electronically signed by Dr. Sj Vickers on 5/18/2022 3:16 PM      CT ABDOMEN PELVIS WO CONTRAST Additional Contrast? None   Final Result   1. New moderate sized pericardial effusion. 2. Bilateral pleural effusions persist.   3. No new abdominopelvic pathology. Chronic changes in gallstone again    noted. This document has been electronically signed by: Maru Trejo MD    on 05/16/2022 07:14 AM      All CTs at this facility use dose modulation techniques and iterative    reconstructions, and/or weight-based dosing   when appropriate to reduce radiation to a low as reasonably achievable. XR CHEST PORTABLE   ED Interpretation   Volume overload, no other significant findings compared to prior x-ray      Final Result   Findings of CHF/fluid overload again noted, radiographically slightly    worsened versus 10 days ago.       This document has been electronically signed by: Maru Trejo MD    on 05/16/2022 07:08 AM          DVT prophylaxis: [] Lovenox                                 [] SCDs                                 [] SQ Heparin                                 [] Encourage ambulation           [] Already on Anticoagulation     Code Status: DNR-CC    Tele:   [] yes             [x] no    Active Hospital Problems    Diagnosis Date Noted    Hyperkalemia [E87.5] 05/16/2022     Priority: Medium       Electronically signed by Madhu Jarvis MD on 5/20/2022 at 9:49 AM

## 2022-05-20 NOTE — FLOWSHEET NOTE
Spiritual Care Follow Up:  Nurse supervisor and I spoke with Rocio Brunson about Chrissy Garcia. She wants him to come and see his dad, Nakia Barnard. This  called Chrissy Garcia to let him know he needs to come and see Patient, Nakia Barnard and to talk with Rocio Brunson. Our spiritual care staff or mission leader is willing to mediate and allow both to talk with the nurse and doctors if that is needed. This was shared with Chrissy Garcia. I hope he will contact Roicoevgeny Dueñasssing and see his Dad soon. A long conversation with Chrissy Garcia ended in a positive way. He is grieving his dad's loss of health. Our prayers and support will continue to be with the family.       05/20/22 1437   Encounter Summary   Service Provided For: Family   Referral/Consult From: Nursing Supervisor/Manager   Support System Children   Complexity of Encounter High   Begin Time 1200   End Time  1210   Total Time Calculated 10 min   Encounter    Type Follow up   Spiritual/Emotional needs   Type Spiritual Support

## 2022-05-20 NOTE — PROGRESS NOTES
[address] [address]  Notice of Patient Passing      Patient Name- Euna Boas   Acct Number- [de-identified]   Attending Physician- Sirisha Ag MD    Admitted on-5/16/2022  4:11 AM     On 5/20/2022 at 4k28 patient was found in [facility] with:   Absence of vital signs. Absence of neurological response. Confirmed time of death at [facility]. Physician or On-call Physician notified of time of death- yes    Family present at time of death- yes   Spiritual care present at time of death- no    Physician was notified and orders were obtained to release the body. Post-Mortem documentation completed; form printed, signed, and given to admitting.     Cosmo Wynn RN RN Nursing Supervisor/ Manager  5/20/22   6:50 PM

## 2022-05-20 NOTE — FLOWSHEET NOTE
Kiesha Rivas called this  to ask why his step sister, Aida Caldera is the only one who can have information concerning his father Stephen Kincaid, this 80year old male who is unable to respond. Aida Caldera is the [address] [place] [address]. She has instructed the nurse to have family contact her for any health care information. Tiffanie Kiran wants to know, if he comes in person could he speak with a doctor about his father's condition. Solomon Barrera , floor or unit manager is aware of the situation and will be talking with Aida Caldera to see if this can be answered. 05/20/22 1030   Encounter Summary   Encounter Overview/Reason  Crisis   Service Provided For: Family   Referral/Consult From: Other (comment)  (May be an Ethics case)   Support System Children   Last Encounter  05/20/22   Complexity of Encounter High   Begin Time 1001   End Time  1031   Total Time Calculated 30 min   Encounter    Type Follow up   Ethics/Mediation   Type Other (comment)  (possible ethics case)   spiritual care will continue to care for pt and family as needed.

## 2022-05-20 NOTE — PROGRESS NOTES
Nurse entered room accompanied by Gavin Eisenberg. Pt resting in bed comfortable without grimacing or moaning. Granddaughter and nieces and nephews at bedside. About 6 people. Nurse gave support about death and dying. Niece asked about stopping dialysis and what will happen now. Nurse explained about the toxin build up in the body and not being able to get filtered out. Niece voiced understanding. Nurse listen to family tell stories about Max Soda. Nurse told family that hospice nurses will be by to check in on them.

## 2022-05-20 NOTE — DISCHARGE SUMMARY
Notified at 18:39 on 5/20/2022 that pt has passed peacefully. Orders for body release and preliminary cause of death were placed. Cause of Death: acute hypoxic respiratory failure 2/2 decompensated CHF/ESRD.

## 2022-05-21 LAB — BLOOD CULTURE, ROUTINE: NORMAL

## 2022-05-22 LAB — BLOOD CULTURE, ROUTINE: NORMAL

## 2022-05-22 NOTE — PROGRESS NOTES
Physician Progress Note      PATIENT:               Sravanthi Perla  CSN #:                  [id number]  :                       11/10/1930  ADMIT DATE:       2022 4:11 AM  [address] [place] DATE:        2022 10:50 PM  RESPONDING  PROVIDER #:        Ash Simmons MD          QUERY TEXT:    Patient admitted with chest and abdominal pain, esrd, acute on chronic HFmrEF    . Documentation reflects \"Type II MI\"   in Cardiology consult note  dated    . If possible, please make selection below , document in the progress   notes and discharge summary if \"Type II MI \"  was: The medical record reflects the following:  Risk Factors: hypertensive heart with ESRD and acute combined systolic and   diastolic chf , pericardial effusion , volume overload , chronic anemia  Clinical Indicators: chest to abdominal pain , Troponins elevated above   chronic baseline - 0.336, 0.438 on admission , \" no clinical evidence of ACS \"  Treatment: admission , trend troponins & EKG , consult cardiology , treatment   of underlying comorbid acute conditions    Thank You,  Jackson Charlton  RN, BSN, [facility]  Clinical   P: [phone]  F: [phone]  Options provided:  -- Type II MI  confirmed after study  -- Type II MI  ruled out after study  -- Other - I will add my own diagnosis  -- Disagree - Not applicable / Not valid  -- Disagree - Clinically unable to determine / Unknown  -- Refer to Clinical Documentation Reviewer    PROVIDER RESPONSE TEXT:    Type II MI confirmed after study.     Query created by: Harshal Dickson on 2022 7:47 AM      Electronically signed by:  Ash Simmons MD 2022 10:07 AM

## 2022-05-24 NOTE — PROGRESS NOTES
Physician Progress Note      PATIENT:               Alexandra Palmer  CSN #:                  [id number]  :                       11/10/1930  ADMIT DATE:       2022 4:11 AM  [address] DATE:        2022 10:50 PM  RESPONDING  PROVIDER #:        Donny Neely MD          QUERY TEXT:    Pt admitted with acute on chronic combined hypertensive heart failure and ESRD   with pericardial effusion and bilateral pleural effusion and has  respiratory   failure documented. If possible, please make selection below ,document in   progress notes and discharge summary further specificity regarding the type   and acuity of respiratory failure: The medical record reflects the following:  Risk Factors: acute on chronic combined hypertensive heart failure and ESRD   with pericardial effusion and bilateral pleural effusion. Clinical Indicators: on admission patient saturations  82 % on ra> 87 % on 5   liters> 98 % on 4 liters > 96 % on 3 liters > dropped to 89 > increased to 4   liters to maintain. Tachypnea 20  38 on admission . Rapid response documentation () increased work of   breathing that the patient was experiencing, this initially thought to be due   to pain. Now tachypnea with increased oxygen requirements to 5 LPM O2. Treatment:  admission , imaging, treatment of co-morbid underlying conditions,   ABg's  supplemental oxygen up to 5 liters with Bi-pap initiated .     Thank you,  Vianney Elliott  RN, BSN, [facility]  Clinical   P: [phone]  F: [phone]  Options provided:  -- Acute respiratory failure with hypoxia present on admission  -- Acute on chronic respiratory failure with hypoxia present on admission  -- Chronic respiratory failure with hypoxia  -- Other - I will add my own diagnosis  -- Disagree - Not applicable / Not valid  -- Disagree - Clinically unable to determine / Unknown  -- Refer to Clinical Documentation Reviewer    PROVIDER RESPONSE TEXT:    This patient is in acute respiratory failure with hypoxia present on admission   .     Query created by: Becky Delgado on 5/20/2022 8:07 AM      Electronically signed by:  Tony Matta MD 5/24/2022 10:21 AM

## 2025-06-18 NOTE — PROGRESS NOTES
ESRD on hemodialysis [facility])     Hypertensive emergency     SOB (shortness of breath)     Chronic combined systolic and diastolic CHF (congestive heart failure) (HCC)     COVID-19     Severe malnutrition (HCC)     Hypoxia      Subjective:   Admit Date: 9/13/2021    Interval History:   Seen for end stage kidney disease on hemodialysis  Awake and alert  Feels good with no complaint  Mostly good blood pressure      Medications:   Scheduled Meds:   sodium chloride flush  5-40 mL IntraVENous 2 times per day    pantoprazole  40 mg IntraVENous BID    lactulose  20 g Oral BID    lidocaine 1 % injection  5 mL IntraDERmal Once    dicyclomine  10 mg Oral TID AC    dilTIAZem  30 mg Oral Once    [Held by provider] aspirin  81 mg Oral Daily    [Held by provider] heparin (porcine)  5,000 Units SubCUTAneous 3 times per day    epoetin traci-epbx  6,000 Units SubCUTAneous Once per day on Mon Wed Fri    calcitRIOL  1.5 mcg Oral Once per day on Mon Wed Fri    cinacalcet  60 mg Oral Once per day on Mon Wed Fri    docusate sodium  100 mg Oral BID    ferrous sulfate  325 mg Oral Daily with breakfast    gabapentin  100 mg Oral TID    metoprolol  100 mg Oral BID    vitamin C  500 mg Oral Daily    polyethylene glycol  17 g Oral Daily    [Held by provider] clopidogrel  75 mg Oral Daily     Continuous Infusions:   sodium chloride      sodium chloride      sodium chloride      sodium chloride      dextrose Stopped (10/01/21 1231)       CBC:   Recent Labs     10/09/21  0600 10/10/21  1952 10/11/21  0800   WBC 4.8 7.2 8.4   HGB 8.6* 9.8* 9.5*    207 198     CMP:    Recent Labs     10/09/21  0600 10/10/21  1952 10/11/21  0800    138 140   K 3.9 3.4* 4.4   CL 98 100 102   CO2 29 25 23   BUN 11 21 27*   CREATININE 3.6* 6.0* 6.5*   GLUCOSE 83 120* 142*   CALCIUM 7.6* 7.9* 8.0*   LABGLOM 19* 11* 10*     Troponin: No results for input(s): TROPONINI in the last 72 hours.   BNP: No results for input(s): BNP in the last 72 hours. INR: No results for input(s): INR in the last 72 hours. Lipids: No results for input(s): CHOL, LDLDIRECT, TRIG, HDL, AMYLASE, LIPASE in the last 72 hours. Liver: No results for input(s): AST, ALT, ALKPHOS, PROT, LABALBU, BILITOT in the last 72 hours. Invalid input(s): BILDIR  Iron:  No results for input(s): IRONS, FERRITIN in the last 72 hours. Invalid input(s): LABIRONS  CTA ABDOMEN PELVIS W WO CONTRAST   Final Result   1. Bilateral airspace infiltrates and pleural effusions right greater than left. 2. Cardiomegaly. 3. Atrophic kidneys bilaterally. 4 liquid stool throughout much of the colon and rectum consistent with diarrhea no evidence of bowel obstruction. 5. Although there is diffuse calcific atherosclerosis of the aorta and mesenteric vessels there is no significant stenosis of the mesenteric vessels. However the renal arteries are bilaterally extremely diffusely narrow. **This report has been created using voice recognition software. It may contain minor errors which are inherent in voice recognition technology. **      Final report electronically signed by Dr. Tanner Orr on 10/5/2021 3:33 PM      IR FLUORO GUIDED CVA DEVICE PLMT/REPLACE/REMOVAL   Final Result   Status post successful midline insertion. **This report has been created using voice recognition software. It may contain minor errors which are inherent in voice recognition technology. **      Final report electronically signed by Dr Jose Cardona on 10/4/2021 1:54 PM      XR ABDOMEN (KUB) (SINGLE AP VIEW)   Final Result   A nonspecific but nonobstructive bowel gas pattern. This document has been electronically signed by: Bianca Rock DO on    10/02/2021 12:45 AM      XR CHEST PORTABLE   Final Result   1. Scattered patchy regions of predominantly interstitial densities    throughout the bilateral lungs grossly similar to the prior examination.     Mild bibasilar pleural/parenchymal opacities most consistent with tiny    effusions with atelectasis and/or consolidation. 2. Cardiomegaly with the heart size unchanged from the prior examination. This document has been electronically signed by: Madhu Rush DO on    10/02/2021 12:47 AM      CT ABDOMEN PELVIS WO CONTRAST Additional Contrast? None   Final Result   1. Limited evaluation of the lung bases demonstrates small bilateral pleural effusions with patchy consolidative opacities at the lower lobes which may be related to pneumonia. 2. The gallbladder demonstrates a small gallstone near the gallbladder neck. This appears mildly distended. 3. Mild ascites in the perihepatic region is noted. **This report has been created using voice recognition software. It may contain minor errors which are inherent in voice recognition technology. **      Final report electronically signed by Dr. Michelle Kennedy on 9/30/2021 1:35 PM      XR ABDOMEN (KUB) (SINGLE AP VIEW)   Final Result   1. Overall nonobstructive bowel gas pattern. **This report has been created using voice recognition software. It may contain minor errors which are inherent in voice recognition technology. **      Final report electronically signed by Dr Dorian Pérez on 9/30/2021 12:26 PM      XR CHEST PORTABLE   Final Result   1. Patchy multifocal airspace disease is similar to 9/24/2021 exam, interstitial and airspace edema versus atypical infection. **This report has been created using voice recognition software. It may contain minor errors which are inherent in voice recognition technology. **      Final report electronically signed by Dr Dorian Pérez on 9/30/2021 9:33 AM      XR CHEST PORTABLE   Final Result   Impression:      Increasing consolidation and interstitial prominence.       Question pulmonary edema versus pneumonia      This document has been electronically signed by: Escobar Morse MD on    09/24/2021 10:34 PM      XR CHEST PORTABLE   Final Result   1. No acute intrathoracic process. 2. Mild stable cardiomegaly. **This report has been created using voice recognition software. It may contain minor errors which are inherent in voice recognition technology. **      Final report electronically signed by Dr. Subhash Cervantes on 9/22/2021 9:52 PM      CT HEAD WO CONTRAST   Final Result      1. Stable CT scan of the brain, no interval change since previous study dated 15th of June 2021. **This report has been created using voice recognition software. It may contain minor errors which are inherent in voice recognition technology. **      Final report electronically signed by DR Jd Farmer on 9/15/2021 4:26 PM      CT CERVICAL SPINE WO CONTRAST   Final Result    No evidence of acute osseous injury of the cervical spine. **This report has been created using voice recognition software. It may contain minor errors which are inherent in voice recognition technology. **      Final report electronically signed by Dr. Sharon Sawyer MD on 9/15/2021 4:29 PM      XR CHEST PORTABLE   Final Result   Cardiomegaly with vascular congestion and interstitial edema and effusions differential would include congestive heart failure or fluid overload. **This report has been created using voice recognition software. It may contain minor errors which are inherent in voice recognition technology. **      Final report electronically signed by Dr. Jovanni Choe on 9/13/2021 2:22 PM            Objective:   Vitals: /69   Pulse 92   Temp 98.5 °F (36.9 °C) (Oral)   Resp 15   Ht 5' 6\" (1.676 m)   Wt 114 lb 13.8 oz (52.1 kg)   SpO2 97%   BMI 18.54 kg/m²    Wt Readings from Last 3 Encounters:   10/08/21 114 lb 13.8 oz (52.1 kg)   07/14/21 145 lb 8.1 oz (66 kg)   06/17/21 161 lb (73 kg)      24HR INTAKE/OUTPUT:  No intake or output data in the 24 hours ending 10/11/21 1113    Constitutional: Well-developed elderly gentleman alert, awake eating breakfast, no apparent distress   Skin:normal with no rash or lesions. HEENT:Pupils are reactive . Throat is clear . Oral mucosa is moist   Neck:supple with no thyromegaly or carotid bruit  Cardiovascular:  S1, S2 without murmur or rubs   Respiratory:  Clear to ausculation without wheezes, rhonchi or rales. Abdomen: +bs, soft, no tenderness to palpation and no palpable mass. No abdominal bruit   Ext: No LE edema  Musculoskeletal:Intact  Neuro:Alert and awake. Well oriented  with no focal deficit. Speech is normal      Electronically signed by Chacorta Shultz MD on 10/11/2021 at 11:13 AM  **This report has been created using voice recognition software. It maycontain minor  errors which are inherent in voice recognition technology. ** General Sunscreen Counseling: I recommended a broad spectrum sunscreen with a SPF of 30 or higher.  I explained that SPF 30 sunscreens block approximately 97 percent of the sun's harmful rays.  Sunscreens should be applied at least 15 minutes prior to expected sun exposure and then every 2 hours after that as long as sun exposure continues. If swimming or exercising sunscreen should be reapplied every 45 minutes to an hour after getting wet or sweating.  One ounce, or the equivalent of a shot glass full of sunscreen, is adequate to protect the skin not covered by a bathing suit. I also recommended a lip balm with a sunscreen as well. Sun protective clothing can be used in lieu of sunscreen but must be worn the entire time you are exposed to the sun's rays. Detail Level: Detailed

## (undated) DEVICE — BIOGUARD A/W CLEANING ADAPTER

## (undated) DEVICE — CONNECTOR TBNG AUX H2O JET DISP FOR OLY 160/180 SER

## (undated) DEVICE — DEFENDO AIR WATER SUCTION AND BIOPSY VALVE KIT FOR  OLYMPUS: Brand: DEFENDO AIR/WATER/SUCTION AND BIOPSY VALVE

## (undated) DEVICE — SET ADMIN 25ML L117IN PMP MOD CK VLV RLER CLMP 2 SMRTSITE

## (undated) DEVICE — CANISTER, RIGID, 2000CC: Brand: MEDLINE INDUSTRIES, INC.

## (undated) DEVICE — ENDO KIT: Brand: MEDLINE INDUSTRIES, INC.

## (undated) DEVICE — CONMED SCOPE SAVER BITE BLOCK, 20X27 MM: Brand: SCOPE SAVER

## (undated) DEVICE — SET LNR RED GRN W/ BASE CLEANASCOPE

## (undated) DEVICE — CANNULA INJ 17GA CLR PLAS BLNT TIP IV ACCS SYR INTLNK

## (undated) DEVICE — Device

## (undated) DEVICE — CATHETER ETER IV 22GA L1IN POLYUR STR RADPQ INTROCAN SFTY

## (undated) DEVICE — YANKAUER,BULB TIP,W/O VENT,RIGID,STERILE: Brand: MEDLINE

## (undated) DEVICE — LINER SUCT CANSTR 1500CC SEMI RIG W/ POR HYDROPHOBIC SHUT

## (undated) DEVICE — IV START KIT: Brand: MEDLINE INDUSTRIES, INC.

## (undated) DEVICE — TUBING IV STOPCOCK 48 CM 3 W

## (undated) DEVICE — SOLUTION IV 1000ML 0.45% SOD CHL PH 5 INJ USP VIAFLX PLAS